# Patient Record
Sex: FEMALE | Race: WHITE | NOT HISPANIC OR LATINO | Employment: OTHER | URBAN - METROPOLITAN AREA
[De-identification: names, ages, dates, MRNs, and addresses within clinical notes are randomized per-mention and may not be internally consistent; named-entity substitution may affect disease eponyms.]

---

## 2017-01-12 ENCOUNTER — OFFICE VISIT (OUTPATIENT)
Dept: URGENT CARE | Facility: RETAIL CLINIC | Age: 67
End: 2017-01-12
Payer: OTHER GOVERNMENT

## 2017-01-12 VITALS
SYSTOLIC BLOOD PRESSURE: 173 MMHG | TEMPERATURE: 98.8 F | OXYGEN SATURATION: 97 % | DIASTOLIC BLOOD PRESSURE: 91 MMHG | HEART RATE: 62 BPM

## 2017-01-12 DIAGNOSIS — J01.90 ACUTE SINUSITIS WITH SYMPTOMS > 10 DAYS: Primary | ICD-10-CM

## 2017-01-12 PROCEDURE — 99213 OFFICE O/P EST LOW 20 MIN: CPT | Performed by: PHYSICIAN ASSISTANT

## 2017-01-12 RX ORDER — DOXYCYCLINE HYCLATE 100 MG
100 TABLET ORAL 2 TIMES DAILY
Qty: 14 TABLET | Refills: 0 | Status: SHIPPED | OUTPATIENT
Start: 2017-01-12 | End: 2017-01-19

## 2017-01-12 NOTE — PATIENT INSTRUCTIONS
Doxycycline twice daily for 7 days.  Take Tylenol or an NSAID such as ibuprofen or naproxen as needed for pain.  May use netti pot with bottled or distilled water and saline packets to flush sinuses.  Afrin (oxymetazoline) nasal spray twice daily for 3 days. Stop after 3 days.  Mucinex (guiafenesin) thins mucus and may help it to loosen more quickly  Saline drops or nasal sprays may loosen mucus.  Sit in the bathroom with the door closed and hot shower running to loosen mucus.  Contact primary care clinic if you do not have any relief from your symptoms after 10 days.  Present to emergency room for significantly increasing pain, persistent high fever >102F, swelling/redness around your eyes, changes in your vision or ability to move your eyes, altered mental status or a severe headache.

## 2017-01-12 NOTE — MR AVS SNAPSHOT
"              After Visit Summary   1/12/2017    Sri Grewal    MRN: [id number]           Patient Information     Date Of Birth          1950        Visit Information        Provider Department      1/12/2017 11:10 AM Jordyn Sun PA-C [facility]        Today's Diagnoses     Acute sinusitis with symptoms > 10 days    -  1       Care Instructions    Doxycycline twice daily for 7 days.  Take Tylenol or an NSAID such as ibuprofen or naproxen as needed for pain.  May use netti pot with bottled or distilled water and saline packets to flush sinuses.  Afrin (oxymetazoline) nasal spray twice daily for 3 days. Stop after 3 days.  Mucinex (guiafenesin) thins mucus and may help it to loosen more quickly  Saline drops or nasal sprays may loosen mucus.  Sit in the bathroom with the door closed and hot shower running to loosen mucus.  Contact primary care clinic if you do not have any relief from your symptoms after 10 days.  Present to emergency room for significantly increasing pain, persistent high fever >102F, swelling/redness around your eyes, changes in your vision or ability to move your eyes, altered mental status or a severe headache.        Follow-ups after your visit        Who to contact     You can reach your care team any time of the day by calling [phone].  Notification of test results:  If you have an abnormal lab result, we will notify you by phone as soon as possible.         Additional Information About Your Visit        MyChart Information     [facility]ointments[facility] lets you send messages to your doctor, view your test results, renew your prescriptions, schedule appointments and more. To sign up, go to [email or web address] . Click on \"Log in\" on the left side of the screen, which will take you to the Welcome page. Then click on \"Sign up Now\" on the right side of the page.     You will be asked to enter the access code listed below, as well as some personal information. Please follow " the directions to create your username and password.     Your access code is: [email or web address]  Expires: 2017  2:33 PM     Your access code will  in 90 days. If you need help or a new code, please call your [place] clinic or [phone].        Care EveryWhere ID     This is your Care EveryWhere ID. This could be used by other organizations to access your [place] medical records  [id number]        Your Vitals Were     Pulse Temperature Pulse Oximetry Last Period          62 98.8  F (37.1  C) (Oral) 97% 2005         Blood Pressure from Last 3 Encounters:   17 173/91   16 138/76   16 148/80    Weight from Last 3 Encounters:   16 166 lb (75.297 kg)   16 163 lb 3.2 oz (74.027 kg)   16 162 lb 9.6 oz (73.755 kg)              Today, you had the following     No orders found for display         Today's Medication Changes          These changes are accurate as of: 17 11:40 AM.  If you have any questions, ask your nurse or doctor.               Start taking these medicines.        Dose/Directions    doxycycline 100 MG tablet   Commonly known as:  VIBRA-TABS   Used for:  Acute sinusitis with symptoms > 10 days        Dose:  100 mg   Take 1 tablet (100 mg) by mouth 2 times daily for 7 days   Quantity:  14 tablet   Refills:  0            Where to get your medicines      These medications were sent to [facility] [address]  [address] [facility] [address]     Phone:  [phone]    - doxycycline 100 MG tablet             Primary Care Provider Office Phone # Fax #    Rosenda Pierre PA-C [phone]       [facility] [address] [facility] [facility] [address]  [facility] [address]        Thank you!     Thank you for choosing [facility]  for your care. Our goal is always to provide you with excellent care. Hearing back from our patients is one way we can continue to improve our services.  Please take a few minutes to complete the written survey that you may receive in the mail after your visit with us. Thank you!             Your Updated Medication List - Protect others around you: Learn how to safely use, store and throw away your medicines at www.disposemymeds.org.          This list is accurate as of: 1/12/17 11:40 AM.  Always use your most recent med list.                   Brand Name Dispense Instructions for use    albuterol 108 (90 BASE) MCG/ACT Inhaler    albuterol    2 Inhaler    Inhale 1-2 puffs into the lungs every 6 hours as needed for shortness of breath / dyspnea       ALLEGRA PO          amoxicillin-clavulanate 875-125 MG per tablet    AUGMENTIN    20 tablet    Take 1 tablet by mouth 2 times daily       aspirin 81 MG tablet          ONE DAILY       butalbital-aspirin-caffeine -40 MG per capsule    FIORINAL    30 capsule    TAKE ONE CAPSULE BY MOUTH EVERY 6 HOURS AS NEEDED (one month supply)       CENTURY-LUTEIN Tabs     100 tablet    Take 1 tablet by mouth daily.       doxycycline 100 MG tablet    VIBRA-TABS    14 tablet    Take 1 tablet (100 mg) by mouth 2 times daily for 7 days       IBUPROFEN PO          propranolol 80 MG 24 hr capsule    INDERAL LA    180 capsule    Take 2 capsules (160 mg) by mouth daily       pseudoePHEDrine 120 MG 12 hr tablet    SUDAFED    28 tablet    Take 1 tablet (120 mg) by mouth every 12 hours       topiramate 25 MG tablet    TOPAMAX    180 tablet    Take 2 tablets (50 mg) by mouth daily       valACYclovir 500 MG tablet    VALTREX    14 tablet    Take 1 tablet (500 mg) by mouth 2 times daily As needed

## 2017-01-12 NOTE — PROGRESS NOTES
Chief Complaint   Patient presents with     Sinus Problem     was treated with amoxicillin 10 weeks ago; improved for a while now worsening     Dizziness     Nasal Congestion     Generalized Body Aches     SUBJECTIVE:  Sri Grewal is a 66 year old female here with concerns about sinus infection. She was seen 2 months ago and given Augmentin and Sudafed. She took these as directed but did not take the last 2 Augmentin pills because she felt better and didn't think she needed them. Symptoms resolved but feel like they're coming back now.   She states onset of symptoms was 2 week(s) ago.    Course of illness is worsening.   Severity moderate  She has had maxillary pressure as well as nasal congestion, cough , headache and post-nasal drainage  Predisposing factors include HX of recurrent sinusitis.   Recent treatment has included: OTC meds    Past Medical History   Diagnosis Date     Migraine, unspecified, without mention of intractable migraine without mention of status migrainosus      Allergic rhinitis, cause unspecified      seasonal allergies     Esophageal reflux      GERD     Abnormal Papanicolaou smear of cervix and cervical HPV      Endometriosis, site unspecified      Unspecified disorder of uterus      fibroid uterus     Contact dermatitis and other eczema, due to unspecified cause      Mild persistent asthma      Current Outpatient Prescriptions   Medication Sig Dispense Refill     topiramate (TOPAMAX) 25 MG tablet Take 2 tablets (50 mg) by mouth daily 180 tablet 1     propranolol (INDERAL LA) 80 MG 24 hr capsule Take 2 capsules (160 mg) by mouth daily 180 capsule 1     pseudoePHEDrine (SUDAFED) 120 MG 12 hr tablet Take 1 tablet (120 mg) by mouth every 12 hours 28 tablet 0     IBUPROFEN PO        Fexofenadine HCl (ALLEGRA PO)        Multiple Vitamins-Minerals (CENTURY-LUTEIN) TABS Take 1 tablet by mouth daily. 100 tablet 12     ASPIRIN 81 MG OR TABS ONE DAILY       butalbital-aspirin-caffeine (FIORINAL)  -40 MG per capsule TAKE ONE CAPSULE BY MOUTH EVERY 6 HOURS AS NEEDED (one month supply) 30 capsule 5     amoxicillin-clavulanate (AUGMENTIN) 875-125 MG per tablet Take 1 tablet by mouth 2 times daily 20 tablet 0     albuterol (ALBUTEROL) 108 (90 BASE) MCG/ACT inhaler Inhale 1-2 puffs into the lungs every 6 hours as needed for shortness of breath / dyspnea 2 Inhaler 5     valACYclovir (VALTREX) 500 MG tablet Take 1 tablet (500 mg) by mouth 2 times daily As needed 14 tablet 5     Social History   Substance Use Topics     Smoking status: Never Smoker      Smokeless tobacco: Never Used     Alcohol Use: Yes      Comment: beerx2/x1 per month     Allergies   Allergen Reactions     Codeine      GI UPSET     Percocet [Oxycodone-Acetaminophen] Nausea and Vomiting     Seasonal Allergies      ROS:  Review of systems negative except as stated above.    OBJECTIVE:  /91 mmHg  Pulse 62  Temp(Src) 98.8  F (37.1  C) (Oral)  SpO2 97%  LMP 11/09/2005  GENERAL APPEARANCE: healthy, alert and no distress  EYES: PERRL, conjunctiva clear  HENT: Pain with palpation over frontal and maxillary sinuses. Ear canals normal TMs with mild serous effusions bilaterally. Nasal turbinates edematous and boggy with a blue hue bilaterally. Posterior pharynx is not erythematous.  NECK: supple, nontender, no lymphadenopathy  RESP: lungs clear to auscultation - no rales, rhonchi or wheezes  CV: regular rates and rhythm, normal S1 S2, no murmur noted    ASSESSMENT:    ICD-10-CM    1. Acute sinusitis with symptoms > 10 days J01.90 doxycycline (VIBRA-TABS) 100 MG tablet     PLAN:   Patient Instructions   Doxycycline twice daily for 7 days.  Take Tylenol or an NSAID such as ibuprofen or naproxen as needed for pain.  May use netti pot with bottled or distilled water and saline packets to flush sinuses.  Afrin (oxymetazoline) nasal spray twice daily for 3 days. Stop after 3 days.  Mucinex (guiafenesin) thins mucus and may help it to loosen more  quickly  Saline drops or nasal sprays may loosen mucus.  Sit in the bathroom with the door closed and hot shower running to loosen mucus.  Contact primary care clinic if you do not have any relief from your symptoms after 10 days.  Present to emergency room for significantly increasing pain, persistent high fever >102F, swelling/redness around your eyes, changes in your vision or ability to move your eyes, altered mental status or a severe headache.    Follow up with primary care provider with any problems, questions or concerns or if symptoms worsen or fail to improve. Patient agreed to plan and verbalized understanding.    Tessie Sun PA-C  [facility] [place]

## 2017-01-14 ENCOUNTER — TELEPHONE (OUTPATIENT)
Dept: NURSING | Facility: CLINIC | Age: 67
End: 2017-01-14

## 2017-01-14 NOTE — TELEPHONE ENCOUNTER
"Call Type: Triage Call    Presenting Problem: Patient is calling and has a \"runny nose.\"  Patient has asthma. Pharmacist recommended taking allegra and if  needed something more to take flonase nasal spray for her runny  nose. Triaged for medication questions-Adult/Disposition to provide  health information.  Triage Note:  Guideline Title: Medication Questions - Adult  Recommended Disposition: Provide Health Information  Original Inclination: Wanted to speak with a nurse  Override Disposition:  Intended Action: Follow advice given  Physician Contacted: No  Caller has medication question(s) that was answered with available resources ?  YES  Pregnant and has medication questions regarding prescribed and/or nonprescribed  medication(s) not covered by available resources ? NO  Breastfeeding and has medication questions regarding prescribed and/or  nonprescribed medication(s) not covered by available resources ? NO  Requested information on how to safely dispose of  or unused medications ?  NO  Sign(s) or symptom(s) associated with a diagnosed condition or with a new illness  ? NO  Prescription ordered today and not available at pharmacy putting patient at  clinical risk ? NO  Recurrence of a symptom(s) or illness post prescribed medication treatment AND  provider instructed patient to call if symptom(s) returned. ? NO  Unable to obtain prescribed medication related to available resources AND  situation poses immediate clinical risk ? NO  Pharmacy calling to clarify prescription order. ? NO  Requests refill of prescribed medication that does NOT have a valid refill; lack  of medication may cause clinical risk to patient if not available. ? NO  Has questions about prescribed and/or nonprescribed medications not covered by  available resources ? NO  Pharmacy calling with prescription question; answered per department policy. ? NO  Requests refill of prescribed medication without valid refills OR requests refill  of " prescribed medication with valid refills but does not have prescription number  (no RX container); lack of medication does not put patient at clinical risk ? NO  Physician Instructions:  Care Advice: Medication Storage: - Store medication as directed, for  example, refrigeration.  - Don't store medications in the bathroom medicine  cabinet or in direct sunlight. Humidity, heat and light can affect  medications' potency and safety. - Store all medications out of the reach  of children.  Safe Use of Medications: - Keep a list of your medications, listing both  brand and generic names, the prescribed dosage, common side effects,  recommended action for side effects, when to call their provider, and any  other specific instructions. This medication list should be updated if any  prescriptions change or if new medications are added. Your list should  include nonprescription medications, vitamins and supplements. An online  resource for a medication list is:  http://www.ahrq.gov/patients-consumers/diagnosis-treatment/treatments/safeme  ds/walletform.html or  http://www.ahrq.gov/patients-consumers/diagnosis-treatment/treatments/safeme  ds/yourmeds.pdf     Ask about your medications interaction with other  medications, supplements or foods. - Take the medication list to each  provider visit, especially if seeing more than one doctor. Make note of any  known allergies on this list. - Use your medication exactly as directed.  Any concerns about side effects should be discussed with your pharmacist or  prescribing provider before changing doses or stopping the medication. -  Don't chew or crush tablets or open capsules unless told to do so.  Some  long-acting medications can be absorbed too quickly when chewed or crushed.  Other medications either won't be effective or could cause side effects. -  If you have difficulty swallowing pills, ask your provider or the  pharmacist if the medication is available in liquid form. - For  liquid  medications, only use measuring device that came with it or was provided by  the pharmacy. Household teaspoons and tablespoons can be inaccurate. -  Never take anyone else's medication.

## 2017-01-15 ENCOUNTER — APPOINTMENT (OUTPATIENT)
Dept: CT IMAGING | Facility: CLINIC | Age: 67
End: 2017-01-15
Attending: FAMILY MEDICINE
Payer: OTHER GOVERNMENT

## 2017-01-15 ENCOUNTER — TELEPHONE (OUTPATIENT)
Dept: NURSING | Facility: CLINIC | Age: 67
End: 2017-01-15

## 2017-01-15 ENCOUNTER — HOSPITAL ENCOUNTER (EMERGENCY)
Facility: CLINIC | Age: 67
Discharge: ANOTHER HOSPITAL | End: 2017-01-16
Attending: FAMILY MEDICINE | Admitting: FAMILY MEDICINE
Payer: OTHER GOVERNMENT

## 2017-01-15 DIAGNOSIS — G45.9 TRANSIENT CEREBRAL ISCHEMIA, UNSPECIFIED TYPE: ICD-10-CM

## 2017-01-15 DIAGNOSIS — R51.9 NONINTRACTABLE EPISODIC HEADACHE, UNSPECIFIED HEADACHE TYPE: ICD-10-CM

## 2017-01-15 DIAGNOSIS — I10 SEVERE HYPERTENSION: ICD-10-CM

## 2017-01-15 DIAGNOSIS — I67.1 SACCULAR ANEURYSM: ICD-10-CM

## 2017-01-15 LAB
ANION GAP SERPL CALCULATED.3IONS-SCNC: 11 MMOL/L (ref 3–14)
APTT PPP: 30 SEC (ref 22–37)
BASOPHILS # BLD AUTO: 0.1 10E9/L (ref 0–0.2)
BASOPHILS NFR BLD AUTO: 1.2 %
BUN SERPL-MCNC: 10 MG/DL (ref 7–30)
CALCIUM SERPL-MCNC: 8.3 MG/DL (ref 8.5–10.1)
CHLORIDE SERPL-SCNC: 107 MMOL/L (ref 94–109)
CO2 SERPL-SCNC: 25 MMOL/L (ref 20–32)
CREAT SERPL-MCNC: 0.69 MG/DL (ref 0.52–1.04)
DIFFERENTIAL METHOD BLD: NORMAL
EOSINOPHIL # BLD AUTO: 0.1 10E9/L (ref 0–0.7)
EOSINOPHIL NFR BLD AUTO: 1.8 %
ERYTHROCYTE [DISTWIDTH] IN BLOOD BY AUTOMATED COUNT: 12.5 % (ref 10–15)
GFR SERPL CREATININE-BSD FRML MDRD: 85 ML/MIN/1.7M2
GLUCOSE SERPL-MCNC: 124 MG/DL (ref 70–99)
HCT VFR BLD AUTO: 41 % (ref 35–47)
HGB BLD-MCNC: 14.2 G/DL (ref 11.7–15.7)
IMM GRANULOCYTES # BLD: 0 10E9/L (ref 0–0.4)
IMM GRANULOCYTES NFR BLD: 0.2 %
INR PPP: 0.87 (ref 0.86–1.14)
LYMPHOCYTES # BLD AUTO: 0.9 10E9/L (ref 0.8–5.3)
LYMPHOCYTES NFR BLD AUTO: 20.1 %
MCH RBC QN AUTO: 31.2 PG (ref 26.5–33)
MCHC RBC AUTO-ENTMCNC: 34.6 G/DL (ref 31.5–36.5)
MCV RBC AUTO: 90 FL (ref 78–100)
MONOCYTES # BLD AUTO: 0.8 10E9/L (ref 0–1.3)
MONOCYTES NFR BLD AUTO: 17.8 %
NEUTROPHILS # BLD AUTO: 2.6 10E9/L (ref 1.6–8.3)
NEUTROPHILS NFR BLD AUTO: 58.9 %
PLATELET # BLD AUTO: 212 10E9/L (ref 150–450)
POTASSIUM SERPL-SCNC: 4 MMOL/L (ref 3.4–5.3)
RBC # BLD AUTO: 4.55 10E12/L (ref 3.8–5.2)
SODIUM SERPL-SCNC: 143 MMOL/L (ref 133–144)
TROPONIN I SERPL-MCNC: NORMAL UG/L (ref 0–0.04)
WBC # BLD AUTO: 4.3 10E9/L (ref 4–11)

## 2017-01-15 PROCEDURE — 85025 COMPLETE CBC W/AUTO DIFF WBC: CPT | Performed by: FAMILY MEDICINE

## 2017-01-15 PROCEDURE — 70450 CT HEAD/BRAIN W/O DYE: CPT | Mod: XS

## 2017-01-15 PROCEDURE — 25000125 ZZHC RX 250: Performed by: FAMILY MEDICINE

## 2017-01-15 PROCEDURE — 93005 ELECTROCARDIOGRAM TRACING: CPT

## 2017-01-15 PROCEDURE — 93010 ELECTROCARDIOGRAM REPORT: CPT | Performed by: FAMILY MEDICINE

## 2017-01-15 PROCEDURE — 85730 THROMBOPLASTIN TIME PARTIAL: CPT | Performed by: FAMILY MEDICINE

## 2017-01-15 PROCEDURE — 99285 EMERGENCY DEPT VISIT HI MDM: CPT | Mod: 25

## 2017-01-15 PROCEDURE — 96365 THER/PROPH/DIAG IV INF INIT: CPT

## 2017-01-15 PROCEDURE — 99291 CRITICAL CARE FIRST HOUR: CPT | Mod: 25 | Performed by: FAMILY MEDICINE

## 2017-01-15 PROCEDURE — 84484 ASSAY OF TROPONIN QUANT: CPT | Performed by: FAMILY MEDICINE

## 2017-01-15 PROCEDURE — 25500064 ZZH RX 255 OP 636: Performed by: FAMILY MEDICINE

## 2017-01-15 PROCEDURE — 85610 PROTHROMBIN TIME: CPT | Performed by: FAMILY MEDICINE

## 2017-01-15 PROCEDURE — 70498 CT ANGIOGRAPHY NECK: CPT

## 2017-01-15 PROCEDURE — 80048 BASIC METABOLIC PNL TOTAL CA: CPT | Performed by: FAMILY MEDICINE

## 2017-01-15 PROCEDURE — 96375 TX/PRO/DX INJ NEW DRUG ADDON: CPT

## 2017-01-15 RX ORDER — FENTANYL CITRATE 50 UG/ML
25-50 INJECTION, SOLUTION INTRAMUSCULAR; INTRAVENOUS
Status: DISCONTINUED | OUTPATIENT
Start: 2017-01-15 | End: 2017-01-16 | Stop reason: HOSPADM

## 2017-01-15 RX ORDER — IOPAMIDOL 755 MG/ML
100 INJECTION, SOLUTION INTRAVASCULAR ONCE
Status: COMPLETED | OUTPATIENT
Start: 2017-01-15 | End: 2017-01-15

## 2017-01-15 RX ADMIN — SODIUM CHLORIDE 70 ML: 9 INJECTION, SOLUTION INTRAVENOUS at 22:20

## 2017-01-15 RX ADMIN — NICARDIPINE HYDROCHLORIDE 2.5 MG/HR: 0.2 INJECTION, SOLUTION INTRAVENOUS at 22:57

## 2017-01-15 RX ADMIN — FENTANYL CITRATE 25 MCG: 50 INJECTION, SOLUTION INTRAMUSCULAR; INTRAVENOUS at 23:16

## 2017-01-15 RX ADMIN — IOPAMIDOL 80 ML: 755 INJECTION, SOLUTION INTRAVENOUS at 22:20

## 2017-01-15 ASSESSMENT — ENCOUNTER SYMPTOMS
PALPITATIONS: 0
NUMBNESS: 1
HEADACHES: 1
WEAKNESS: 0
VOMITING: 0
FEVER: 0
RHINORRHEA: 1
NAUSEA: 0
SHORTNESS OF BREATH: 0
CONFUSION: 0
SINUS PRESSURE: 1
DIZZINESS: 1

## 2017-01-15 ASSESSMENT — PAIN DESCRIPTION - DESCRIPTORS: DESCRIPTORS: ACHING

## 2017-01-16 ENCOUNTER — HOSPITAL ENCOUNTER (INPATIENT)
Facility: CLINIC | Age: 67
LOS: 1 days | Discharge: HOME | DRG: 069 | End: 2017-01-16
Attending: PSYCHIATRY & NEUROLOGY | Admitting: PSYCHIATRY & NEUROLOGY
Payer: OTHER GOVERNMENT

## 2017-01-16 ENCOUNTER — APPOINTMENT (OUTPATIENT)
Dept: CARDIOLOGY | Facility: CLINIC | Age: 67
DRG: 069 | End: 2017-01-16
Attending: NURSE PRACTITIONER
Payer: OTHER GOVERNMENT

## 2017-01-16 ENCOUNTER — HOSPITAL ENCOUNTER (OUTPATIENT)
Dept: EDUCATION SERVICES | Facility: CLINIC | Age: 67
DRG: 069 | End: 2017-01-16
Attending: NURSE PRACTITIONER | Admitting: PSYCHIATRY & NEUROLOGY
Payer: OTHER GOVERNMENT

## 2017-01-16 ENCOUNTER — APPOINTMENT (OUTPATIENT)
Dept: MRI IMAGING | Facility: CLINIC | Age: 67
DRG: 069 | End: 2017-01-16
Attending: PSYCHIATRY & NEUROLOGY
Payer: OTHER GOVERNMENT

## 2017-01-16 VITALS
HEIGHT: 64 IN | OXYGEN SATURATION: 97 % | DIASTOLIC BLOOD PRESSURE: 97 MMHG | TEMPERATURE: 98 F | WEIGHT: 163.8 LBS | BODY MASS INDEX: 27.96 KG/M2 | RESPIRATION RATE: 20 BRPM | SYSTOLIC BLOOD PRESSURE: 145 MMHG

## 2017-01-16 VITALS
BODY MASS INDEX: 28.35 KG/M2 | HEIGHT: 63 IN | DIASTOLIC BLOOD PRESSURE: 91 MMHG | RESPIRATION RATE: 15 BRPM | OXYGEN SATURATION: 93 % | WEIGHT: 160 LBS | TEMPERATURE: 99.4 F | HEART RATE: 100 BPM | SYSTOLIC BLOOD PRESSURE: 159 MMHG

## 2017-01-16 DIAGNOSIS — I10 ESSENTIAL HYPERTENSION: Primary | ICD-10-CM

## 2017-01-16 DIAGNOSIS — G45.3 AMAUROSIS FUGAX: ICD-10-CM

## 2017-01-16 PROBLEM — G45.9 TIA (TRANSIENT ISCHEMIC ATTACK): Status: ACTIVE | Noted: 2017-01-16

## 2017-01-16 LAB
ACETAMINOPHEN QUAL: NEGATIVE
ALBUMIN UR-MCNC: NEGATIVE MG/DL
AMANTADINE: NEGATIVE
AMITRIPTYLINE QUAL: NEGATIVE
AMOXAPINE: NEGATIVE
AMPHETAMINES QUAL: NEGATIVE
APPEARANCE UR: CLEAR
ATROPINE: ABNORMAL
BENZODIAZ UR QL: ABNORMAL
BILIRUB UR QL STRIP: NEGATIVE
CAFFEINE QUAL: POSITIVE
CANNABINOIDS UR QL SCN: ABNORMAL
CARBAMAZEPINE QUAL: NEGATIVE
CHLORPHENIRAMINE: NEGATIVE
CHLORPROMAZINE: NEGATIVE
CITALOPRAM QUAL: NEGATIVE
CLOMIPRAMINE QUAL: NEGATIVE
COCAINE QUAL: NEGATIVE
COCAINE UR QL: ABNORMAL
CODEINE QUAL: NEGATIVE
COLOR UR AUTO: ABNORMAL
CREAT SERPL-MCNC: 0.52 MG/DL (ref 0.52–1.04)
DESIPRAMINE QUAL: NEGATIVE
DEXTROMETHORPHAN: NEGATIVE
DIPHENHYDRAMINE: POSITIVE
DOXEPIN/METABOLITE: NEGATIVE
DOXYLAMINE: NEGATIVE
EPHEDRINE OR PSEUDO: NEGATIVE
FENTANYL QUAL: NEGATIVE
FLUOXETINE AND METAB: NEGATIVE
GFR SERPL CREATININE-BSD FRML MDRD: NORMAL ML/MIN/1.7M2
GLUCOSE UR STRIP-MCNC: NEGATIVE MG/DL
HBA1C MFR BLD: 5.4 % (ref 4.3–6)
HGB UR QL STRIP: NEGATIVE
HYDROCODONE QUAL: NEGATIVE
HYDROMORPHONE QUAL: NEGATIVE
IBUPROFEN QUAL: NEGATIVE
IMIPRAMINE QUAL: NEGATIVE
KETONES UR STRIP-MCNC: 5 MG/DL
LAMOTRIGINE QUAL: NEGATIVE
LEUKOCYTE ESTERASE UR QL STRIP: NEGATIVE
LOXAPINE: NEGATIVE
MAPROTYLINE: NEGATIVE
MDMA QUAL: NEGATIVE
MEPERIDINE QUAL: NEGATIVE
METHAMPHETAMINE: NEGATIVE
METHODONE QUAL: NEGATIVE
MORPHINE QUAL: NEGATIVE
MRSA DNA SPEC QL NAA+PROBE: NORMAL
NICOTINE: NEGATIVE
NITRATE UR QL: NEGATIVE
NORTRIPTYLINE QUAL: NEGATIVE
OLANZAPINE QUAL: NEGATIVE
OPIATES UR QL SCN: ABNORMAL
OXYCODONE QUAL: NEGATIVE
PENTAZOCINE: NEGATIVE
PH UR STRIP: 6.5 PH (ref 5–7)
PHENCYCLIDINE QUAL: NEGATIVE
PHENMETRAZINE: NEGATIVE
PHENTERMINE: NEGATIVE
PHENYLBUTAZONE: NEGATIVE
PHENYLPROPANOLAMINE: NEGATIVE
PLATELET # BLD AUTO: 184 10E9/L (ref 150–450)
PROPOXPHENE QUAL: NEGATIVE
PROPRANOLOL QUAL: NEGATIVE
PYRILAMINE: NEGATIVE
SALICYLATE QUAL: NEGATIVE
SP GR UR STRIP: 1.03 (ref 1–1.03)
SPECIMEN SOURCE: NORMAL
THEOBROMINE: POSITIVE
TOPIRAMATE QUAL: POSITIVE
TRIMIPRAMINE QUAL: NEGATIVE
TSH SERPL DL<=0.05 MIU/L-ACNC: 0.57 MU/L (ref 0.4–4)
URN SPEC COLLECT METH UR: ABNORMAL
UROBILINOGEN UR STRIP-MCNC: NORMAL MG/DL (ref 0–2)
VENLAFAXINE QUAL: ABNORMAL

## 2017-01-16 PROCEDURE — 93005 ELECTROCARDIOGRAM TRACING: CPT

## 2017-01-16 PROCEDURE — 25000132 ZZH RX MED GY IP 250 OP 250 PS 637: Performed by: PSYCHIATRY & NEUROLOGY

## 2017-01-16 PROCEDURE — 36415 COLL VENOUS BLD VENIPUNCTURE: CPT | Performed by: PSYCHIATRY & NEUROLOGY

## 2017-01-16 PROCEDURE — 93010 ELECTROCARDIOGRAM REPORT: CPT | Performed by: INTERNAL MEDICINE

## 2017-01-16 PROCEDURE — 93306 TTE W/DOPPLER COMPLETE: CPT | Mod: 26 | Performed by: INTERNAL MEDICINE

## 2017-01-16 PROCEDURE — 83036 HEMOGLOBIN GLYCOSYLATED A1C: CPT | Performed by: PSYCHIATRY & NEUROLOGY

## 2017-01-16 PROCEDURE — 85049 AUTOMATED PLATELET COUNT: CPT | Performed by: PSYCHIATRY & NEUROLOGY

## 2017-01-16 PROCEDURE — 80307 DRUG TEST PRSMV CHEM ANLYZR: CPT | Performed by: PSYCHIATRY & NEUROLOGY

## 2017-01-16 PROCEDURE — 40000264 ECHO COMPLETE BUBBLE

## 2017-01-16 PROCEDURE — 93306 TTE W/DOPPLER COMPLETE: CPT

## 2017-01-16 PROCEDURE — 25000125 ZZHC RX 250: Performed by: PSYCHIATRY & NEUROLOGY

## 2017-01-16 PROCEDURE — 25000132 ZZH RX MED GY IP 250 OP 250 PS 637: Performed by: STUDENT IN AN ORGANIZED HEALTH CARE EDUCATION/TRAINING PROGRAM

## 2017-01-16 PROCEDURE — 25000128 H RX IP 250 OP 636: Performed by: PSYCHIATRY & NEUROLOGY

## 2017-01-16 PROCEDURE — 82565 ASSAY OF CREATININE: CPT | Performed by: PSYCHIATRY & NEUROLOGY

## 2017-01-16 PROCEDURE — 87640 STAPH A DNA AMP PROBE: CPT | Performed by: PSYCHIATRY & NEUROLOGY

## 2017-01-16 PROCEDURE — 87641 MR-STAPH DNA AMP PROBE: CPT | Performed by: PSYCHIATRY & NEUROLOGY

## 2017-01-16 PROCEDURE — 84443 ASSAY THYROID STIM HORMONE: CPT | Performed by: PSYCHIATRY & NEUROLOGY

## 2017-01-16 PROCEDURE — 40000358 ZZHCL STATISTIC DRUG SCREEN MULTIPLE (METRO): Performed by: PSYCHIATRY & NEUROLOGY

## 2017-01-16 PROCEDURE — 70551 MRI BRAIN STEM W/O DYE: CPT

## 2017-01-16 PROCEDURE — 20000004 ZZH R&B ICU UMMC

## 2017-01-16 PROCEDURE — 81003 URINALYSIS AUTO W/O SCOPE: CPT | Performed by: PSYCHIATRY & NEUROLOGY

## 2017-01-16 PROCEDURE — 40000141 ZZH STATISTIC PERIPHERAL IV START W/O US GUIDANCE

## 2017-01-16 RX ORDER — LISINOPRIL 10 MG/1
10 TABLET ORAL DAILY
Qty: 30 TABLET | Refills: 3 | Status: SHIPPED
Start: 2017-01-16 | End: 2017-04-06

## 2017-01-16 RX ORDER — ASPIRIN 325 MG
325 TABLET ORAL DAILY
Status: DISCONTINUED | OUTPATIENT
Start: 2017-01-16 | End: 2017-01-16 | Stop reason: HOSPADM

## 2017-01-16 RX ORDER — ALBUTEROL SULFATE 90 UG/1
1-2 AEROSOL, METERED RESPIRATORY (INHALATION) EVERY 6 HOURS PRN
Status: DISCONTINUED | OUTPATIENT
Start: 2017-01-16 | End: 2017-01-16 | Stop reason: HOSPADM

## 2017-01-16 RX ORDER — CLOPIDOGREL BISULFATE 75 MG/1
75 TABLET ORAL DAILY
Status: DISCONTINUED | OUTPATIENT
Start: 2017-01-16 | End: 2017-01-16 | Stop reason: HOSPADM

## 2017-01-16 RX ORDER — ACETAMINOPHEN 325 MG/1
650 TABLET ORAL EVERY 4 HOURS PRN
Status: DISCONTINUED | OUTPATIENT
Start: 2017-01-16 | End: 2017-01-16 | Stop reason: HOSPADM

## 2017-01-16 RX ORDER — LISINOPRIL 10 MG/1
10 TABLET ORAL DAILY
Status: DISCONTINUED | OUTPATIENT
Start: 2017-01-16 | End: 2017-01-16 | Stop reason: HOSPADM

## 2017-01-16 RX ORDER — DOXYCYCLINE 100 MG/1
100 CAPSULE ORAL 2 TIMES DAILY
Status: DISCONTINUED | OUTPATIENT
Start: 2017-01-16 | End: 2017-01-16 | Stop reason: HOSPADM

## 2017-01-16 RX ORDER — CLOPIDOGREL BISULFATE 75 MG/1
75 TABLET ORAL DAILY
Qty: 30 TABLET | Refills: 3 | Status: SHIPPED
Start: 2017-01-16 | End: 2017-04-06

## 2017-01-16 RX ORDER — HYDRALAZINE HYDROCHLORIDE 20 MG/ML
10 INJECTION INTRAMUSCULAR; INTRAVENOUS EVERY 4 HOURS PRN
Status: DISCONTINUED | OUTPATIENT
Start: 2017-01-16 | End: 2017-01-16 | Stop reason: HOSPADM

## 2017-01-16 RX ORDER — SODIUM CHLORIDE 9 MG/ML
INJECTION, SOLUTION INTRAVENOUS
Status: DISCONTINUED
Start: 2017-01-16 | End: 2017-01-16 | Stop reason: HOSPADM

## 2017-01-16 RX ORDER — DOXYCYCLINE 100 MG/1
100 CAPSULE ORAL 2 TIMES DAILY
Status: DISCONTINUED | OUTPATIENT
Start: 2017-01-16 | End: 2017-01-16

## 2017-01-16 RX ORDER — ASPIRIN 81 MG/1
81 TABLET ORAL DAILY
Status: DISCONTINUED | OUTPATIENT
Start: 2017-01-16 | End: 2017-01-16

## 2017-01-16 RX ORDER — ASPIRIN 325 MG
325 TABLET ORAL DAILY
Qty: 180 TABLET | Refills: 1 | Status: SHIPPED
Start: 2017-01-16 | End: 2018-02-06 | Stop reason: ALTCHOICE

## 2017-01-16 RX ORDER — SODIUM CHLORIDE 9 MG/ML
INJECTION, SOLUTION INTRAVENOUS CONTINUOUS
Status: DISCONTINUED | OUTPATIENT
Start: 2017-01-16 | End: 2017-01-16 | Stop reason: HOSPADM

## 2017-01-16 RX ORDER — SCOLOPAMINE TRANSDERMAL SYSTEM 1 MG/1
1 PATCH, EXTENDED RELEASE TRANSDERMAL
Status: DISCONTINUED | OUTPATIENT
Start: 2017-01-16 | End: 2017-01-16 | Stop reason: HOSPADM

## 2017-01-16 RX ORDER — NALOXONE HYDROCHLORIDE 0.4 MG/ML
.1-.4 INJECTION, SOLUTION INTRAMUSCULAR; INTRAVENOUS; SUBCUTANEOUS
Status: DISCONTINUED | OUTPATIENT
Start: 2017-01-16 | End: 2017-01-16 | Stop reason: HOSPADM

## 2017-01-16 RX ORDER — LABETALOL HYDROCHLORIDE 5 MG/ML
10-20 INJECTION, SOLUTION INTRAVENOUS EVERY 4 HOURS PRN
Status: DISCONTINUED | OUTPATIENT
Start: 2017-01-16 | End: 2017-01-16 | Stop reason: HOSPADM

## 2017-01-16 RX ORDER — PROPRANOLOL HYDROCHLORIDE 160 MG/1
160 CAPSULE, EXTENDED RELEASE ORAL DAILY
Status: DISCONTINUED | OUTPATIENT
Start: 2017-01-16 | End: 2017-01-16

## 2017-01-16 RX ORDER — VALACYCLOVIR HYDROCHLORIDE 500 MG/1
500 TABLET, FILM COATED ORAL 2 TIMES DAILY PRN
Status: DISCONTINUED | OUTPATIENT
Start: 2017-01-16 | End: 2017-01-16 | Stop reason: HOSPADM

## 2017-01-16 RX ORDER — TOPIRAMATE 50 MG/1
50 TABLET, FILM COATED ORAL DAILY
Status: DISCONTINUED | OUTPATIENT
Start: 2017-01-16 | End: 2017-01-16 | Stop reason: HOSPADM

## 2017-01-16 RX ORDER — AMLODIPINE BESYLATE 5 MG/1
5 TABLET ORAL DAILY
Status: DISCONTINUED | OUTPATIENT
Start: 2017-01-16 | End: 2017-01-16

## 2017-01-16 RX ADMIN — AMLODIPINE BESYLATE 5 MG: 5 TABLET ORAL at 08:19

## 2017-01-16 RX ADMIN — Medication 2.5 MG/HR: at 02:10

## 2017-01-16 RX ADMIN — ACETAMINOPHEN 650 MG: 325 TABLET, FILM COATED ORAL at 08:19

## 2017-01-16 RX ADMIN — SODIUM CHLORIDE: 9 INJECTION, SOLUTION INTRAVENOUS at 08:20

## 2017-01-16 RX ADMIN — CLOPIDOGREL BISULFATE 75 MG: 75 TABLET, FILM COATED ORAL at 15:20

## 2017-01-16 RX ADMIN — PROPRANOLOL HYDROCHLORIDE 160 MG: 160 CAPSULE, EXTENDED RELEASE ORAL at 08:19

## 2017-01-16 RX ADMIN — ASPIRIN 325 MG ORAL TABLET 325 MG: 325 PILL ORAL at 12:34

## 2017-01-16 RX ADMIN — DOXYCYCLINE HYCLATE 100 MG: 100 CAPSULE, GELATIN COATED ORAL at 08:19

## 2017-01-16 RX ADMIN — LISINOPRIL 10 MG: 10 TABLET ORAL at 12:34

## 2017-01-16 RX ADMIN — SCOPALAMINE 1 PATCH: 1 PATCH, EXTENDED RELEASE TRANSDERMAL at 09:46

## 2017-01-16 RX ADMIN — TOPIRAMATE 50 MG: 50 TABLET ORAL at 09:38

## 2017-01-16 RX ADMIN — ALBUTEROL SULFATE 2 PUFF: 90 AEROSOL, METERED RESPIRATORY (INHALATION) at 06:37

## 2017-01-16 RX ADMIN — ACETAMINOPHEN 650 MG: 325 TABLET, FILM COATED ORAL at 03:11

## 2017-01-16 RX ADMIN — LABETALOL HYDROCHLORIDE 20 MG: 5 INJECTION, SOLUTION INTRAVENOUS at 11:28

## 2017-01-16 RX ADMIN — ASPIRIN 81 MG: 81 TABLET, COATED ORAL at 08:19

## 2017-01-16 RX ADMIN — ENOXAPARIN SODIUM 40 MG: 40 INJECTION SUBCUTANEOUS at 08:19

## 2017-01-16 ASSESSMENT — VISUAL ACUITY
OU: NORMAL ACUITY;BASELINE
OU: BLURRED VISION
OU: GLASSES

## 2017-01-16 ASSESSMENT — ACTIVITIES OF DAILY LIVING (ADL)
RETIRED_COMMUNICATION: 0-->UNDERSTANDS/COMMUNICATES WITHOUT DIFFICULTY
DRESS: 0-->INDEPENDENT
TOILETING: 0-->INDEPENDENT
COGNITION: 0 - NO COGNITION ISSUES REPORTED
TRANSFERRING: 0-->INDEPENDENT
FALL_HISTORY_WITHIN_LAST_SIX_MONTHS: NO
AMBULATION: 0-->INDEPENDENT
BATHING: 0-->INDEPENDENT
SWALLOWING: 0-->SWALLOWS FOODS/LIQUIDS WITHOUT DIFFICULTY
RETIRED_EATING: 0-->INDEPENDENT

## 2017-01-16 ASSESSMENT — PAIN DESCRIPTION - DESCRIPTORS
DESCRIPTORS: HEADACHE;CONSTANT
DESCRIPTORS: HEADACHE

## 2017-01-16 NOTE — PLAN OF CARE
Patient arrived at 01:15 for [facility] via stretcher with 3 attendants. Patient transferred herself from stretcher to bed SBA. Alert and oriented X 4. Neuro assessment intact except for HA. See flow sheets for further assessment.

## 2017-01-16 NOTE — CONSULTS
OPHTHALMOLOGY CONSULT NOTE  2017, 1:20 PM    Patient: Sri Grewal  Consulted by: Dr. Iftikhar Pina  Reason for consult: TIA    HISTORY OF PRESENTING ILLNESS:     Patient is a 66 year old year old female hx of hypertension who presents noting sudden onset left arm weakness and decreased vision which she described as a sudden line in her line of sight in her left eye. Patient notes that the line lasted for 10 minutes and then resolved spontaneously. She reports that her left arm numbness resolved spontaneously over a similar time period. She has not had any other similar episodes. The Ophthalmology service was asked to evaluate the patient due to concerns for TIA.     Patient denies: floaters/flashes    Review of systems were otherwise negative except for that which has been stated above.      OCULAR/MEDICAL/SURGICAL HISTORIES:     Past Ocular History:  Refractive error  S/p LASIK OU    Past Medical History   Diagnosis Date     Migraine, unspecified, without mention of intractable migraine without mention of status migrainosus      Allergic rhinitis, cause unspecified      seasonal allergies     Esophageal reflux      GERD     Abnormal Papanicolaou smear of cervix and cervical HPV      Endometriosis, site unspecified      Unspecified disorder of uterus      fibroid uterus     Contact dermatitis and other eczema, due to unspecified cause      Mild persistent asthma        Past Surgical History   Procedure Laterality Date     C  delivery only        X2     Hc colonoscopy thru stoma, diagnostic  2002     normal     Colonoscopy  2014     Procedure: COLONOSCOPY;  Surgeon: Nathan Baker MD;  Location:  GI         CURRENT MEDICATIONS:     Current Facility-Administered Medications   Medication     topiramate (TOPAMAX) tablet 50 mg     valACYclovir (VALTREX) tablet 500 mg     naloxone (NARCAN) injection 0.1-0.4 mg     enoxaparin (LOVENOX) injection 40 mg     acetaminophen (TYLENOL) tablet  650 mg     melatonin tablet 1 mg     scopolamine (TRANSDERM) 1 MG/3DAYS 72 hr patch 1 patch    And     scopolamine (TRANSDERM-SCOP) Patch in Place    And     [START ON 1/19/2017] scopolamine (TRANSDERM-SCOP) patch REMOVAL     albuterol (PROAIR HFA/PROVENTIL HFA/VENTOLIN HFA) Inhaler 1-2 puff     labetalol (NORMODYNE/TRANDATE) injection 10-20 mg     0.9% sodium chloride infusion     lisinopril (PRINIVIL/ZESTRIL) tablet 10 mg     hydrALAZINE (APRESOLINE) injection 10 mg     aspirin tablet 325 mg     doxycycline (VIBRAMYCIN) capsule 100 mg         EXAMINATION:     VAcc: RE 20/20 , LE 20/20. (Near card 3')  Motility: Full  Confrontational Visual Field: Full   Pupils: Equal and reactive to light and accomodation with no afferent pupillary defect.  IOP RE  10 LE 10 by tonopen (<5% error).       External/Slit Lamp exam   RIGHT   Lids/lashes: No abnormality   Conj/Sclera: White and quiet   Cornea:  Clear   Ant Chamber:  Deep and quiet   Iris: round and reactive   Lens: 1+ NS  LEFT   Lids/lashes: No abnormality   Conj/Sclera: White and quiet   Cornea:  Clear   Ant Chamber:  Deep and quiet   Iris: round and reactive   Lens: 1+ NS    Dilated Fundus Exam  Eyes Dilated? Yes  Time: 12:10 With Phenylephrine 2.5% and Mydriacyl 1%   (Normally, dilation with the above lasts about 4-6 hours)  RIGHT:   Anterior vitreous: clear   Media: clear   Optic nerve: normal contours and size   Cup to Disc Ratio: 0.2   Macula: flat and no pigment abnormality   Vessels: normal caliber and distribution   Retinal Periphery: no holes or tears identified  LEFT:   Anterior vitreous: clear   Media: clear   Optic nerve: normal contours and size   Cup to Disc Ratio: 0.2   Macula: flat and no pigment abnormality   Vessels: normal caliber and distribution   Retinal Periphery: no holes or tears identified    ASSESSMENT/PLAN:     Ophthalmology was consulted to evaluate Sri Grewal, who is a 66 year old female presenting with concerns for TIA.    1. Transient  Ischemic Attack:  Patient without evidence of current ocular or retinal pathology   -Recommend continuing workup with neurology for etiology of TIA  -Recommend continuing annual eye exams with her local ophthalmologist    Please contact us with any further questions or concerns.      Elías Bergman MD  Ophthalmology, PGY2  Pager [phone]    Tracey Rodriguez MD  Comprehensive Ophthalmology & Ocular Pathology  Department of Ophthalmology and Visual Neurosciences  [email or web address]  Pager [phone]

## 2017-01-16 NOTE — TELEPHONE ENCOUNTER
Call Type: Triage Call    Presenting Problem: Sri says she was diagnosed with a sinus  infection and was prescribed abx. Augmentin started 3 days ago. She  says she feels dizzy, started about 3 days ago, off and on. She says  she could not drive she is so dizzy. She also says, when asked, that  her left arm feels numb, off and on, for the last 3 days. She says  her son will drive her to the ER.  Triage Note:  Guideline Title: Dizziness or Vertigo  Recommended Disposition: Activate   Original Inclination: Wanted to speak with a nurse  Override Disposition:  Intended Action: Go to Hospital / ED  Physician Contacted: No  New numbness, weakness or paralysis involving face, arm or leg, especially on same  side of body, loss of balance or coordination, confusion or trouble speaking  occurring now or within last 8 hours ?  YES  Unconscious now ? NO  Severe breathing problems ? NO  New or worsening signs and symptoms that may indicate shock ? NO  Chest discomfort associated with shortness of breath, sweating, odd heartbeats or  different heart rate, nausea, vomiting, lightheadedness, or fainting lasting 5 or  more minutes now or within the last hour ? NO  Chest pain spreading to the shoulders, neck, jaw, in one or both arms, stomach or  back lasting 5 or more minutes now or within the last hour. Pain is NOT  associated with taking a deep breath or a productive cough, movement, or touch to  a localized area. ? NO  Pressure, fullness, squeezing sensation or pain anywhere in the chest lasting 5 or  more minutes now or within the last hour. Pain is NOT associated with taking a  deep breath or a productive cough, movement, or touch to a localized area on the  chest. ? NO  Physician Instructions:  Care Advice: Spinal Immobilization:    - If head, neck, or spinal injury or  disease is known, or suspected, tell the patient not to move.   - Do not  move the person unless there is an immediate threat to their life.  -  If  moving becomes absolutely necessary, immobilize head, neck and spine. -  Then move the patient's head, neck and body as a single unit to prevent or  not worsen spinal cord injury.  - If vomiting, immobilize, then roll  patient to side with head, neck and body as a single unit. Do not turn or  move head or neck.  An adult should stay with the patient, preferably one trained in CPR. If  the person is not trained in CPR, then he or she should provide hands-only  (compression-only) CPR as recommended by the American [employer] Association.

## 2017-01-16 NOTE — PLAN OF CARE
Problem: Goal Outcome Summary  Goal: Goal Outcome Summary  Outcome: Improving  S:  Pt w/ one episode of visual changes and decreased dexterity LUE at 0730.  B:  At start of shift pt noted gray line in vision from left eye and decreased dexterity in L hand.  Neurocrit mds notified and in to assess..  Symptoms fully resolved by midmorning.  During this time pt had MRI of brain.  Pt also has had bitemporal HA medicate dw/ tylenol w/ some decrease in pain.  No nausea.  Gait steady when OOB to bathroom.      Started on norvasc and given propranolol------able to DC nicardipine gtt after one time dose of labetolol all to keep SBP < 160.  .  Later in shift lisinopril started.         Harsh nonproductive cough.  Sp02 wnl on RA.  Afebrile         Opthalmology consult completed in room, as well as cardiac echo.  A:  As noted.  R:  Pt to possibly be dC to home after MDs review all test results.

## 2017-01-16 NOTE — ED NOTES
"Being treated for sinus infection for last 3 days, has been having intermittent dizziness since November.  C/o numbness to left upper arm and left hand intermittently but happening more often recently.  Left sided vision is blurry.  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

## 2017-01-16 NOTE — ED PROVIDER NOTES
"  History     Chief Complaint   Patient presents with     Numbness     Sinusitis     The history is provided by the patient.     Sri Grewal is a 66 year old female who presents to the ED with sinuses and numbness. Patient saw her primary PA Ambreen Pierre on 11/22/16, 2 months ago and was diagnosed with a sinus infection.  At that time her equilibrium was off and she felt dizzy and would list to the left.  She was started on an Augmentin but did not take the full course because she felt better after 4-5 days.  She did fine until around Kristy.    Around [place] she started having similar symptoms of her sinus infection including a runny nose, sinus pressure and her equilibrium being off. She then started having dizzy episodes and [facility] and was diagnosed with acute sinusitis and was started on doxycycline.     In the last few days she has had intermittent episodes of numbness to left arm, noting her arm will go completely numb and she had difficulty with coordination. Associated with \"foggy\" vision to left eye.     She has had a few episodes today, lasting 15 minutes. She also mentions she gets headaches \"always.\"  Has had approximately 6 episodes of this in the last couple of days, 2 or 3 today.  Symptoms seem to be stuttering, and she is currently symptom-free, other than a headache which is not unusual for her.     Sri has a history of an aneurysm that \"they are watching.\"  She is followed at [facility].  Had been having scans of her year but now is stretched out every 2 years because it has been stable.        Past Medical History   Diagnosis Date     Migraine, unspecified, without mention of intractable migraine without mention of status migrainosus      Allergic rhinitis, cause unspecified      seasonal allergies     Esophageal reflux      GERD     Abnormal Papanicolaou smear of cervix and cervical HPV      Endometriosis, site unspecified      Unspecified " disorder of uterus      fibroid uterus     Contact dermatitis and other eczema, due to unspecified cause      Mild persistent asthma        Past Surgical History   Procedure Laterality Date     C  delivery only        X2     Hc colonoscopy thru stoma, diagnostic  2002     normal     Colonoscopy  2014     Procedure: COLONOSCOPY;  Surgeon: Nathan Baker MD;  Location:  GI       Social History     Social History     Marital Status:      Spouse Name: N/A     Number of Children: 2     Years of Education: N/A     Occupational History     retired            Social History Main Topics     Smoking status: Never Smoker      Smokeless tobacco: Never Used     Alcohol Use: Yes      Comment: beerx2/x1 per month     Drug Use: No     Sexual Activity:     Partners: Male     Birth Control/ Protection: Surgical      Comment: tubal ligation     Other Topics Concern      Service No     Blood Transfusions No     Caffeine Concern No     Occupational Exposure No     Hobby Hazards No     Sleep Concern Yes     Insomnia     Stress Concern Yes     breathing,  passed away      Weight Concern Yes     would like to lose some weight     Special Diet No     Back Care Yes     Exercise Yes     walking at work daily     Seat Belt Yes     Self-Exams Yes     periodically     Social History Narrative          Allergies   Allergen Reactions     Codeine      GI UPSET     Percocet [Oxycodone-Acetaminophen] Nausea and Vomiting     Seasonal Allergies        Med List Reviewed    I have reviewed the Medications, Allergies, Past Medical and Surgical History, and Social History in the Epic system.    Review of Systems   Constitutional: Negative for fever.   HENT: Positive for congestion, rhinorrhea and sinus pressure.    Eyes: Positive for visual disturbance (left eye blurry vision).   Respiratory: Negative for shortness of breath.    Cardiovascular: Negative for chest pain, palpitations and leg swelling.  "  Gastrointestinal: Negative for nausea and vomiting.   Skin: Negative for rash.   Neurological: Positive for dizziness, numbness (left arm) and headaches. Negative for weakness.        Positive for her \"equilibrium\" being off.   Psychiatric/Behavioral: Negative for confusion.   All other systems reviewed and are negative.      Physical Exam   BP: (!) 201/107 mmHg  Pulse: 100  Temp: 99.4  F (37.4  C)  Resp: 15  Height: 160 cm (5' 3\")  Weight: 72.576 kg (160 lb)  SpO2: 97 %  Physical Exam   Constitutional: She is oriented to person, place, and time. She appears well-developed and well-nourished. No distress.   HENT:   Head: Normocephalic.   Right Ear: External ear normal.   Left Ear: External ear normal.   Mouth/Throat: Oropharynx is clear and moist.   Eyes: EOM are normal. Pupils are equal, round, and reactive to light.   Neck: Neck supple.   Cardiovascular: Normal rate, regular rhythm and normal heart sounds.    No murmur heard.  Pulmonary/Chest: Effort normal and breath sounds normal. No respiratory distress.   Abdominal: Soft. There is no tenderness.   Musculoskeletal: Normal range of motion. She exhibits no edema or tenderness.   Neurological: She is alert and oriented to person, place, and time. She has normal strength. No cranial nerve deficit or sensory deficit. Coordination and gait normal. GCS eye subscore is 4. GCS verbal subscore is 5. GCS motor subscore is 6.   Finger-nose-finger and heel-knee-shin are normal tonight.   Skin: Skin is warm and dry. No rash noted.   Psychiatric: She has a normal mood and affect.       ED Course    I'm concerned about her stuttering symptoms .  Head CT and CTA were ordered .      EKG shows a sinus rhythm at 98 bpm.  No acute ischemic changes.  Left anterior fascicular block with a left axis of -45 .  Left atrial enlargement.      Once everything is back, I will be speaking with the stroke team.      Head CT showed no acute bleed.  CTA is still pending.  I spoke with " Jamel from the stroke team at the [place] [facility] [place] and he agreed that she would benefit from transfer for further evaluation.  Her stuttering symptoms occurring 6 times over the past couple of days is quite concerning.  Her blood pressure has gone up even further and now is 210/120.  Nicardipine drip has been ordered.  We will try to bring her pressure down gradually to around a SBP of 160.      She will be going to the [facility].      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  Otherwise the CT of the head and neck were unremarkable.      She was started on nicardipine drip for blood pressure.  We gave her a little fentanyl for her headache and that actually brought her pressure down to about 160 systolic.  Headache is much improved.  She just now developed a little left facial droop and just slight slurring of her speech.  Still is able to move her face and her tongue protrudes midline.      I spoke with Dr. Mccullough and gave him an update.  Her systolic pressure just dropped down to about 148.  We are going to back off and on nicardipine and let that systolic pressure go back up to around 160 or so.  It may be that she is hypoperfusing a little bit and causing the left facial droop.  Dr. Mccullough is aware and will see her when she arrives.  The ambulance is on its way up from the [facility] area with the pump team and should be here in 15 minutes or so.         Procedures           EKG: Sinus rhythm at 98 BPM.  Left anterior fascicular block with left axis of -45 .  Left atrial enlargement.  No acute ischemic changes.    Results for orders placed or performed during the hospital encounter of 01/15/17 (from the past 24 hour(s))   CBC with platelets differential   Result Value Ref Range    WBC 4.3 4.0 - 11.0 10e9/L    RBC Count 4.55 3.8 - 5.2 10e12/L     Hemoglobin 14.2 11.7 - 15.7 g/dL    Hematocrit 41.0 35.0 - 47.0 %    MCV 90 78 - 100 fl    MCH 31.2 26.5 - 33.0 pg    MCHC 34.6 31.5 - 36.5 g/dL    RDW 12.5 10.0 - 15.0 %    Platelet Count 212 150 - 450 10e9/L    Diff Method Automated Method     % Neutrophils 58.9 %    % Lymphocytes 20.1 %    % Monocytes 17.8 %    % Eosinophils 1.8 %    % Basophils 1.2 %    % Immature Granulocytes 0.2 %    Absolute Neutrophil 2.6 1.6 - 8.3 10e9/L    Absolute Lymphocytes 0.9 0.8 - 5.3 10e9/L    Absolute Monocytes 0.8 0.0 - 1.3 10e9/L    Absolute Eosinophils 0.1 0.0 - 0.7 10e9/L    Absolute Basophils 0.1 0.0 - 0.2 10e9/L    Abs Immature Granulocytes 0.0 0 - 0.4 10e9/L   Basic metabolic panel   Result Value Ref Range    Sodium 143 133 - 144 mmol/L    Potassium 4.0 3.4 - 5.3 mmol/L    Chloride 107 94 - 109 mmol/L    Carbon Dioxide 25 20 - 32 mmol/L    Anion Gap 11 3 - 14 mmol/L    Glucose 124 (H) 70 - 99 mg/dL    Urea Nitrogen 10 7 - 30 mg/dL    Creatinine 0.69 0.52 - 1.04 mg/dL    GFR Estimate 85 >60 mL/min/1.7m2    GFR Estimate If Black >90   GFR Calc   >60 mL/min/1.7m2    Calcium 8.3 (L) 8.5 - 10.1 mg/dL   INR   Result Value Ref Range    INR 0.87 0.86 - 1.14   Partial thromboplastin time   Result Value Ref Range    PTT 30 22 - 37 sec   Troponin I   Result Value Ref Range    Troponin I ES  0.000 - 0.045 ug/L     <0.015  The 99th percentile for upper reference range is 0.045 ug/L.  Troponin values in   the range of 0.045 - 0.120 ug/L may be associated with risks of adverse   clinical events.     CT Head w/o Contrast    Narrative    CT OF THE HEAD WITHOUT CONTRAST 1/15/2017 10:18 PM     COMPARISON: Head CT 11/4/2013.    HISTORY: Intermittent left arm numbness/ataxia, left vision changes,  ataxia, listing to the left, stuttering symptoms over 2 days, OK now.    TECHNIQUE: 5 mm thick axial CT images of the head were acquired  without IV contrast material.    FINDINGS: There is mild diffuse cerebral volume loss. There  are  extensive confluent areas of decreased density in the cerebral white  matter bilaterally that are consistent with sequela of chronic small  vessel ischemic disease.    The ventricles and basal cisterns are within normal limits in  configuration given the degree of cerebral volume loss.  There is no  midline shift. There are no extra-axial fluid collections.    No intracranial hemorrhage, mass or recent infarct.    The visualized paranasal sinuses are well-aerated. There is no  mastoiditis. There are no fractures of the visualized bones.      Impression    IMPRESSION: Diffuse cerebral volume loss and cerebral white matter  changes consistent with chronic small vessel ischemic disease. No  evidence for acute intracranial pathology.      Radiation dose for this scan was reduced using automated exposure  control, adjustment of the mA and/or kV according to patient size, or  iterative reconstruction technique    TORIBIO MELENDREZ MD   CT Head Neck Angio w/o & w Contrast    Narrative    CT ANGIOGRAM OF THE HEAD AND NECK WITHOUT AND WITH CONTRAST  1/15/2017  10:39 PM     COMPARISON: CT angiogram 11/4/2013.    HISTORY: Evaluate for dissection/thromboembolism.  Has known aneurysm  that they are following.    TECHNIQUE:  Precontrast localizing scans were followed by CT  angiography with an injection of 80 mL- Isovue 370 nonionic  intravenous contrast material with scans through the head and neck.   Images were transferred to a separate 3-D workstation where  multiplanar reformations and 3-D images were created.  Estimates of  carotid stenoses are made relative to the distal internal carotid  artery diameters except as noted.      FINDINGS:   Neck CTA: The bilateral common carotid, bilateral cervical internal  carotid and bilateral vertebral arteries remain patent and  unremarkable.    Head CTA: A saccular aneurysm involving all the trifurcation vessels  of the left MCA trifurcation measuring roughly 0.6 x 0.6 x 0.7 cm  in  the greatest AP, transverse and cephalocaudad dimensions respectively  is again noted and may have increased minimally (less than 1 mm in any  dimension) in size since the comparison study. No other aneurysms  identified. The left middle cerebral artery is otherwise patent and  unremarkable. The basilar, bilateral distal internal carotid,  bilateral anterior cerebral, bilateral middle cerebral and bilateral  posterior cerebral arteries are patent and unremarkable. The anterior  communicating and right posterior communicating arteries are patent  and unremarkable.      Impression    IMPRESSION:  1. 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  2. Otherwise, normal neck and head CTA.      Radiation dose for this scan was reduced using automated exposure  control, adjustment of the mA and/or kV according to patient size, or  iterative reconstruction technique    TORIBIO MELENDREZ MD         Critical Care Time:  70 minutes excluding procedures.    Assessments & Plan (with Medical Decision Making)    (G45.9) Transient cerebral ischemia, unspecified type  Comment: Left arm numbness and ataxia with associated left vision fogginess and generalized ataxia, listing to the left.  6 episodes in the last 2 days  Plan: Transfer to the [facility] station [facility]A under the care of Dr. Mccullough from the stroke team for further workup.    (I10) Severe hypertension  Comment:   Plan: Nicardipine drip to gradually bring her pressure down for a systolic pressure of around 160.    (I67.1) Saccular aneurysm  Comment: 0.6 x 0.6 x 0.7cm  Plan: per neuro    (R51) Nonintractable episodic headache, unspecified headache type  Comment:   Plan: May try a little fentanyl for pain.  Bringing her blood pressure down little bit may help as well.            I have reviewed the nursing notes.    I have  reviewed the findings, diagnosis, plan and need for follow up with the patient.    New Prescriptions    No medications on file       Final diagnoses:   Transient cerebral ischemia, unspecified type   Severe hypertension   Saccular aneurysm - 0.6 x 0.6 x 0.7cm   Nonintractable episodic headache, unspecified headache type   This document serves as a record of services personally performed by Trenton Martinez MD. It was created on their behalf by Lizzie Grant, a trained medical scribe. The creation of this record is based on the provider's personal observations and the statements of the patient. This document has been checked and approved by the attending provider.   Note: Chart documentation done in part with Dragon Voice Recognition software. Although reviewed after completion, some word and grammatical errors may remain.        1/15/2017   [facility] EMERGENCY DEPARTMENT      Trenton Martinez MD  01/15/17 5551    Trenton Martinez MD  01/15/17 2086

## 2017-01-16 NOTE — IP AVS SNAPSHOT
"` `     [address] [facility]: [phone]                                              INTERAGENCY TRANSFER FORM - NURSING   2017                    Hospital Admission Date: 2017  PETRA BRUNSON   : 1950  Sex: Female        Attending Provider: Naveen Mccullough MD     Allergies:  Codeine, Percocet, Seasonal Allergies    Infection:  None   Service:  NEUROLOGY    Ht:  1.626 m (5' 4\")   Wt:  74.3 kg (163 lb 12.8 oz)   Admission Wt:  74.3 kg (163 lb 12.8 oz)    BMI:  28.1 kg/m 2   BSA:  1.83 m 2            Patient PCP Information     Provider PCP Type    Rosenda Pierre PA-C General      Current Code Status     Date Active Code Status Order ID Comments User Context       Prior      Code Status History     Date Active Date Inactive Code Status Order ID Comments User Context    2017  2:43 PM  Full Code [id number]  Iftikhar Pina MD Outpatient    2017  1:36 AM 2017  2:43 PM Full Code [id number]  Babs Gong MD Inpatient      Advance Directives        Does patient have a scanned Advance Directive/ACP document in EPIC?           No        Hospital Problems as of 2017              Priority Class Noted POA    TIA (transient ischemic attack) Medium  2017 Yes      Non-Hospital Problems as of 2017              Priority Class Noted    Chronic migraine without aura without status migrainosus, not intractable   Unknown    Other abnormal Papanicolaou smear of cervix and cervical HPV(795.09)   Unknown    Endometriosis   Unknown    Allergic rhinitis   Unknown    Anemia   2002    Chronic peptic ulcer   2003    Mild persistent asthma   2005    Lump or mass in breast   3/29/2010    Atrophy of vagina   2010    Abnormal Pap smear, can't excl hi gd sq intraepithelial lesion (ASC-H)   2010    Moderate major depression (H)   2010    Insomnia   2010    CARDIOVASCULAR SCREENING; LDL GOAL LESS THAN 160   10/31/2010    Health " "[facility] Home [facility]  7/27/2011    Advanced directives, counseling/discussion   7/19/2012    Adjustment disorder with depressed mood   11/8/2012    Middle cerebral artery aneurysm   10/29/2013    Adjustment disorder with mixed anxiety and depressed mood   9/9/2014      Immunizations     Name Date      HERPES ZOSTER 07/19/12     Influenza Vaccine IM 3yrs+ 4 Valent IIV4 defer-01/16/17     Deferral:       TD (ADULT, 7+) 05/30/03     TD (ADULT, 7+) 11/02/95     TDAP (ADACEL AGES 11-64) 07/12/13          END      ASSESSMENT     Discharge Profile Flowsheet     GASTROINTESTINAL (ADULT,PEDIATRIC,OB)     Skin Temperature  warm 01/16/17 0353    GI WDL  WDL 01/16/17 1342   Skin Moisture  dry 01/16/17 0353    Last Bowel Movement  01/15/17 (per patient ) 01/16/17 0244   Skin Elasticity  quick return to original state 01/16/17 0244    COMMUNICATION ASSESSMENT     Skin Integrity  drain/device(s);tattoo(s) 01/16/17 1342    Patient's communication style  spoken language (English or Bilingual) 01/16/17 0149   SAFETY      SKIN     Safety WDL  WDL 01/16/17 1342    Inspection  Partial (identify areas NOT inspected) 01/16/17 1342   Safety Factors  bed in low position;wheels locked;call light in reach;ID band on;upper side rails raised x 2 01/16/17 0353    Skin areas NOT inspected  -- (did not inspect buttocks or perineum) 01/16/17 1342   Safety Equipment  oxygen flowmeter;manual resusciator with appropriate mask;suction equipment 01/16/17 0807    Skin WDL  WDL 01/16/17 1342                      Assessment WDL (Within Defined Limits) Definitions           Safety WDL     Effective: 09/28/15    Row Information: <b>WDL Definition:</b> Bed in low position, wheels locked; call light in reach; upper side rails up x 2; ID band on<br> <font color=\"gray\"><i>Item=AS safety wdl>>List=AS safety wdl>>Version=F14</i></font>      Skin WDL     Effective: 09/28/15    Row Information: <b>WDL Definition:</b> Warm; dry; intact; elastic; without discoloration; " "pressure points without redness<br> <font color=\"gray\"><i>Item=AS skin wdl>>List=AS skin wdl>>Version=F14</i></font>      Vitals     Vital Signs Flowsheet     COMMENTS     Pain Control  partially effective 01/16/17 0327    Comments  Pt in MRI 01/16/17 0859   Functioning  can do everything I need to 01/16/17 0327    VITAL SIGNS     Sleep  awake with pain most of the night 01/16/17 0327    Temp  98  F (36.7  C) 01/16/17 0755   MICHAELLE COMA SCALE      Temp src  Axillary 01/16/17 0755   Best Eye Response  4-->(E4) spontaneous 01/16/17 1342    Resp  20 01/16/17 1454   Best Motor Response  6-->(M6) obeys commands 01/16/17 1342    Pulse  -- 01/16/17 0138   Best Verbal Response  5-->(V5) oriented 01/16/17 1342    Heart Rate  73 01/16/17 1454   Michaelle Coma Scale Score  15 01/16/17 1342    BP  (!) 145/97 mmHg 01/16/17 1317   HEIGHT AND WEIGHT      BP Location  Left arm 01/16/17 0346   Height  1.626 m (5' 4\") 01/16/17 0138    OXYGEN THERAPY     Weight  74.3 kg (163 lb 12.8 oz) 01/16/17 0138    SpO2  97 % 01/16/17 1102   BSA (Calculated - sq m)  1.83 01/16/17 0138    O2 Device  None (Room air) 01/16/17 0954   BMI (Calculated)  28.18 01/16/17 0138    PAIN/COMFORT     POSITIONING      Patient Currently in Pain  sleeping: patient not able to self report 01/16/17 0348   Body Position  independently positioning 01/16/17 0348    Preferred Pain Scale  CAPA (Clinically Aligned Pain Assessment) ([facility] Adults Only) 01/16/17 0327   Head of Bed (HOB)  HOB at 20-30 degrees 01/16/17 1342    0-10 Pain Scale  4 01/16/17 0819   DAILY CARE      Pain Location  -- (\"rainbow from temple to temple\" ) 01/16/17 0142   Activity Type  bedrest with bathroom privileges 01/16/17 0807    Pain Descriptors  Headache 01/16/17 0327   Activity Level of Assistance  assistance, stand-by 01/16/17 0807    Pain Intervention(s)  Medication (See eMAR) 01/16/17 0327   ECG      CLINICALLY ALIGNED PAIN ASSESSMENT (CAPA) ([facility], [facility] " ADULTS ONLY)     ECG Rhythm  Sinus tachycardia 01/16/17 0348    Comfort  intolerable 01/16/17 0327   Ectopy  None 01/16/17 0348    Change in Pain  about the same 01/16/17 0327   Lead Monitored  Lead II;V 1 01/16/17 0348            Patient Lines/Drains/Airways Status    Active LINES/DRAINS/AIRWAYS     Name: Placement date: Placement time: Site: Days: Last dressing change:    Peripheral IV 01/15/17 Right Upper forearm 01/15/17  2210  Upper forearm  less than 1     Peripheral IV 01/16/17 Right;Anterior Lower forearm 01/16/17  0222  Lower forearm  less than 1             Patient Lines/Drains/Airways Status    Active PICC/CVC     **None**            Intake/Output Detail Report     Date Intake     Output Net    Shift P.O. I.V. IV Piggyback Total Urine Total       Genevieve 01/15/17 0700 - 01/15/17 1459 -- -- -- -- -- -- 0    Noc 01/15/17 1500 - 01/15/17 2359 -- -- -- -- -- -- 0    Day 01/16/17 0000 - 01/16/17 0659 240 52.71 -- 292.71 1000 1000 -707.29    Genevieve 01/16/17 0700 - 01/16/17 1459 360 136.71 -- 496.71 1150 1150 -653.29    Noc 01/16/17 1500 - 01/16/17 2359 -- -- -- -- -- -- 0      Case Management/Discharge Planning     Case Management/Discharge Planning Flowsheet     LIVING ENVIRONMENT     ABUSE RISK SCREEN      Lives With  alone 01/16/17 0218   QUESTION TO PATIENT:  Has a member of your family or a partner(now or in the past) intimidated, hurt, manipulated, or controlled you in any way?  no 01/16/17 0150    COPING/STRESS     QUESTION TO PATIENT: Do you feel safe going back to the place where you are living?  yes 01/16/17 0150    Major Change/Loss/Stressor  none 01/16/17 0217   OBSERVATION: Is there reason to believe there has been maltreatment of a vulnerable adult (ie. Physical/Sexual/Emotional abuse, self neglect, lack of adequate food, shelter, medical care, or financial exploitation)?  no 01/16/17 0150    MH/BH CAREGIVER     (R) MENTAL HEALTH SUICIDE RISK      Filed Complexity Screen Score  8 01/16/17 1138   Are you  depressed or being treated for depression?  No 01/16/17 0225

## 2017-01-16 NOTE — IP AVS SNAPSHOT
[address] [address]    Phone:  [phone]                                       After Visit Summary   1/16/2017    Sri Grewal    MRN: [id number]           After Visit Summary Signature Page     I have received my discharge instructions, and my questions have been answered. I have discussed any challenges I see with this plan with the nurse or doctor.    ..........................................................................................................................................  Patient/Patient Representative Signature      ..........................................................................................................................................  Patient Representative Print Name and Relationship to Patient    ..................................................               ................................................  Date                                            Time    ..........................................................................................................................................  Reviewed by Signature/Title    ...................................................              ..............................................  Date                                                            Time

## 2017-01-16 NOTE — H&P
[facility]      Neurology Stroke Admission    Patient Name: Sri Grewal  : 1950 MRN#: [id number]    STROKE DATA     Stroke Code:  Stroke code not indicated.  Time patient seen:  2017 0100  Last known normal (pt's baseline):  1/15/17 unclear     TPA treatment:  Not given due to outside the time window, minor / isolated / quickly resolving stroke symptoms.     National Institutes of Health Stroke Scale (at presentation)  NIHSS done at:  time patient seen      Score   Level of consciousness: (0)   Alert, keenly responsive   LOC questions: (0)   Answers both questions correctly   LOC commands: (0)   Performs both tasks correctly   Best gaze: (0)   Normal   Visual: (0)   No visual loss   Facial palsy: (0)   Normal symmetrical movements   Motor arm (left): (0)   No drift   Motor arm (right): (0)   No drift   Motor leg (left): (0)   No drift   Motor leg (right): (0)   No drift   Limb ataxia: (0)   Absent   Sensory: (0)   Normal- no sensory loss   Best language: (0)   Normal- no aphasia   Dysarthria: (0)   Normal   Extinction and inattention: (0)   No abnormality       NIHSS Total Score: 0        Dysphagia Screen  Time of screenin2017 3:30  Screening results: Passed screening, no dysarthria - Regular Diet with thin liquids     ASSESSMENT & PLAN BY PROBLEM     Work-up for Transient Ischemic Attack   TIA vs stuttering stroke vs PRES vs hypertensive emergency. Symptoms of L arm numb, L side ataxia, L vision change (unclear if monocular or field), lasting about 10 min, resolved by time of admission. ABCD2 score 3. Probably patient has vertigo from ?labrynthitis, then overdosed on over-the-counter meds and became hypertensive.  - Admit to Neurology  - Neurochecks  - Nicardipine gtt to keep SBP around 160  - Euthermia, Euglycemia  - Daily aspirin for secondary stroke prevention  - Statin  - MRI/MRA Stroke Protocol  - TTE with Bubble Study  - Telemetry, EKG  - Bedside Glucose  "Monitoring  - A1c, Lipid Panel, Troponin x 3  - PT/OT/SLP  - PM&R  - Stroke Education  - Depression Screen  - Apnea Screen     Patient was admitted via transfer from [facility] ED.     The patient will be admitted to the Neuro Critical Care/Stroke team..     Other Medical Problems:  HTN: Patient doesn't have a documented hx of HTN, but has been hypertensive (150/90, even 190/95 once) on several occasions to her PCP visits. On propranolol for migraine prophylaxis. At OSH was 180's-215/103-110 before starting nicardipine gtt. BP improving w/ minimal intervention; probably 2/2 meds.  - SBP goal <160  - Continue propranolol to avoid rebound HTN  - Consider adding daily med  - TSH       Migraine: Continue PTA topiramate and propranolol for migraine prevention. PRN tylenol    ?Sinusitis: Was on doxycycline PTA, will complete course.   - No Sudafed  - No OTCs  - Try scopolamine patch for rhinorrhea... Otherwise can try antihistamines.      Fluids/Electrolytes/Nutrition  Regular diet, regular fluids  Avoid hypotonic fluids.    Nutrition:   Active Diet Order  NPO    Prophylaxis            For VTE Prevention:  - heparin SQ    For Acid Suppression:  - GI prophylaxis is not indicated    Code Status  Full Code    HPI  Sri Grewal is a 66 year old female with hx small L MCA aneurysm, migraines and frequent sinusitis who is transferred from [facility] for frequent episodes of L arm numbness, L arm ataxia, and \"foggy\" vision in L eye over past 2 days. Lasts less than 15 minutes. Asymptomatic when she presented to ER. BP there was very elevated to . Her head CT was without aneurysm rupture, no hemorrhage at all but has some faint hypodensities predominantly in the white matter that may be chronic. She was transferred to [facility] for stroke workup.    She was given doxycycline for a diagnosis of sinusitis at an urgent care on 1/12. Symptoms included 2 weeks of maxillary sinus pressure, nasal congestion, cough ,headache. " Then was feeling miserable, fatigued, head pressure, and had vertigo. She also had this vertigo when she had probable sinusitis Dec 24 and in Nov, in Nov it got better with antibiotics. Her left arm numbness, however, was new on .     She has been taking high quantities of tylenol-sinus and alkaseltzer-Sinus.    Pertinent Past Medical/Surgical History  Past Medical History   Diagnosis Date     Migraine, unspecified, without mention of intractable migraine without mention of status migrainosus      Allergic rhinitis, cause unspecified      seasonal allergies     Esophageal reflux      GERD     Abnormal Papanicolaou smear of cervix and cervical HPV      Endometriosis, site unspecified      Unspecified disorder of uterus      fibroid uterus     Contact dermatitis and other eczema, due to unspecified cause      Mild persistent asthma        Past Surgical History   Procedure Laterality Date     C  delivery only        X2     Hc colonoscopy thru stoma, diagnostic  2002     normal     Colonoscopy  2014     Procedure: COLONOSCOPY;  Surgeon: Nathan Baker MD;  Location:  GI       Medications:   Current Facility-Administered Medications   Medication     niCARdipine 40 mg in 200 mL 0.9% NaCl (CARDENE) infusion     fentaNYL Citrate (PF) (SUBLIMAZE) injection 25-50 mcg     Current Outpatient Prescriptions   Medication Sig     Albuterol Sulfate 108 (90 BASE) MCG/ACT AEPB      doxycycline (VIBRA-TABS) 100 MG tablet Take 1 tablet (100 mg) by mouth 2 times daily for 7 days     topiramate (TOPAMAX) 25 MG tablet Take 2 tablets (50 mg) by mouth daily     propranolol (INDERAL LA) 80 MG 24 hr capsule Take 2 capsules (160 mg) by mouth daily     butalbital-aspirin-caffeine (FIORINAL) -40 MG per capsule TAKE ONE CAPSULE BY MOUTH EVERY 6 HOURS AS NEEDED (one month supply)     amoxicillin-clavulanate (AUGMENTIN) 875-125 MG per tablet Take 1 tablet by mouth 2 times daily     pseudoePHEDrine (SUDAFED) 120  MG 12 hr tablet Take 1 tablet (120 mg) by mouth every 12 hours     albuterol (ALBUTEROL) 108 (90 BASE) MCG/ACT inhaler Inhale 1-2 puffs into the lungs every 6 hours as needed for shortness of breath / dyspnea     valACYclovir (VALTREX) 500 MG tablet Take 1 tablet (500 mg) by mouth 2 times daily As needed     IBUPROFEN PO      Fexofenadine HCl (ALLEGRA PO)      Multiple Vitamins-Minerals (CENTURY-LUTEIN) TABS Take 1 tablet by mouth daily.     ASPIRIN 81 MG OR TABS ONE DAILY   .    Allergies:   Allergies   Allergen Reactions     Codeine      GI UPSET     Percocet [Oxycodone-Acetaminophen] Nausea and Vomiting     Seasonal Allergies    .    Family History:   Family History   Problem Relation Age of Onset     CANCER No family hx of      breast     CANCER No family hx of      colon     HEART DISEASE Mother      anemia     OSTEOPOROSIS Mother      Hypertension Mother      Hypertension Father      CEREBROVASCULAR DISEASE Mother      Alcohol/Drug Mother      alcohol     OSTEOPOROSIS Mother      Neurologic Disorder Mother      migraines   .    Social History:   Social History   Substance Use Topics     Smoking status: Never Smoker      Smokeless tobacco: Never Used     Alcohol Use: Yes      Comment: beerx2/x1 per month   .    Tobacco use: Never    ROS:  The 10 point Review of Systems is negative other than noted in the HPI or here. Rhinorrhea    PHYSICAL EXAMINATION  Vital Signs:  B/P: 161/100,  T: 99.4,  P: 100,  R: 43    General:  patient lying in bed without any acute distress    HEENT:  normocephalic/atraumatic  Cardio:  RRR  Pulmonary:  no respiratory distress  Abdomen:  soft, obese  Extremities:  no edema  Skin:  intact     Neurologic  Mental Status:  fully alert, attentive and oriented, follows commands, speech clear and fluent  Cranial Nerves:  visual fields intact, PERRL, EOMI with normal smooth pursuit, facial sensation intact and symmetric, facial movements symmetric, hearing not formally tested but intact to  conversation, palate elevation symmetric and uvula midline, no dysarthria, shoulder shrug strong bilaterally, tongue protrusion midline  Motor:  no abnormal movements, no pronator drift, normal and symmetric rapid finger tapping, able to move all limbs spontaneously, strength 5/5 throughout upper and lower extremities  Reflexes:  2+ and symmetric throughout  Sensory:  intact/symmetric to light touch and pin prick throughout upper and lower extremities  Coordination:  FNF and HS intact without dysmetria      Labs  Labs and Imaging reviewed and used in developing the plan; pertinent results included.     Lab Results   Component Value Date    * 01/15/2017       The patient was discussed with the fellow, Dr. Dsouza.  The staff is Dr. Mccullough.    Babs Gong MD  Neurology PGY3

## 2017-01-16 NOTE — IP AVS SNAPSHOT
MRN:[id number]                      After Visit Summary   1/16/2017    Sri Grewal    MRN: [id number]           Thank you!     Thank you for choosing [facility] for your care. Our goal is always to provide you with excellent care. Hearing back from our patients is one way we can continue to improve our services. Please take a few minutes to complete the written survey that you may receive in the mail after you visit with us. Thank you!        Patient Information     Date Of Birth          1950        About your hospital stay     You were admitted on:  January 16, 2017 You last received care in the:  [address] [facility]    You were discharged on:  January 16, 2017        Reason for your hospital stay       TIA                  Who to Call     For medical emergencies, please call 911.  For non-urgent questions about your medical care, please call your primary care provider or clinic, [phone]          Attending Provider     Provider    Naveen Mccullough MD       Primary Care Provider Office Phone # Fax #    Rosenda Pierre PA-C [phone]       [address]  [facility] [address]         When to contact your care team       Please go the the emergency room if you are experiencing any vision changes, changes in speech, facial droop, weakness, and numbness as these symptoms are concerning for possible stroke.                  After Care Instructions     Activity       Your activity upon discharge: activity as tolerated            Diet       Follow this diet upon discharge: Orders Placed This Encounter  Regular Diet Adult            Discharge Instructions       1) Start aspirin 325mg daily.  2) Start clopidogrel 75mg daily for 3 months then stop.  3) Start lisinopril 10mg daily for hypertension  4) Follow up with PCP to discuss new med changes and recent hospitalization. Will check cholesterol levels as well. Please fast  "night before morning appointment and blood draw.   5) Follow up in stroke clinic in 3 months                  Follow-up Appointments     Follow Up and recommended labs and tests       Follow up with primary care provider, Rosenda Pierre, within 7 days for hospital follow- up and discuss recent med changes.  The following labs/tests are recommended: Lipid panel. Patient instructed to fast night before morning appointment. Also discuss propranolol and inhalers.      Follow up with all other scheduled appointments.    Follow up in stroke clinic in 3 months    Please contact Stroke Clinic- Neurology Clinic at [phone], pick option #1,  if you have not been contacted to schedule a stroke follow up appointment in 5 days of discharge.  If you have other stroke related questions, please call [phone], pick option #3, and ask to speak to Saul Pozo RN Stroke/Endovascular Care Coordinator.  If you have any urgent stroke related questions after hours, please contact the hospital  at [phone] and ask to be connected to a stroke provider.                  Pending Results     Date and Time Order Name Status Description    1/16/2017 1126 Echo Complete Bubble In process     1/16/2017 0136 EKG 12-lead, tracing only Preliminary             Admission Information        Provider Department Dept Phone    1/16/2017 Naveen Mccullough MD [facility] [phone]      Your Vitals Were     Blood Pressure Temperature Respirations    145/97 mmHg 98  F (36.7  C) (Axillary) 20    Height Weight BMI (Body Mass Index)    1.626 m (5' 4\") 74.3 kg (163 lb 12.8 oz) 28.10 kg/m2    Pulse Oximetry Last Period       97% 11/09/2005       [employer] Information     [facility] lets you send messages to your doctor, view your test results, renew your prescriptions, schedule appointments and more. To sign up, go to [email or web address] . Click on \"Log in\" on the left side of the screen, which will take you to the " "Welcome page. Then click on \"Sign up Now\" on the right side of the page.     You will be asked to enter the access code listed below, as well as some personal information. Please follow the directions to create your username and password.     Your access code is: [email or web address]  Expires: 2017  2:33 PM     Your access code will  in 90 days. If you need help or a new code, please call your [place] clinic or [phone].        Care EveryWhere ID     This is your Care EveryWhere ID. This could be used by other organizations to access your [place] medical records  [id number]           Review of your medicines      START taking        Dose / Directions    clopidogrel 75 MG tablet   Commonly known as:  PLAVIX   Used for:  Amaurosis fugax        Dose:  75 mg   Take 1 tablet (75 mg) by mouth daily   Quantity:  30 tablet   Refills:  3       lisinopril 10 MG tablet   Commonly known as:  PRINIVIL/ZESTRIL   Used for:  Essential hypertension        Dose:  10 mg   Take 1 tablet (10 mg) by mouth daily   Quantity:  30 tablet   Refills:  3         CONTINUE these medicines which may have CHANGED, or have new prescriptions. If we are uncertain of the size of tablets/capsules you have at home, strength may be listed as something that might have changed.        Dose / Directions    aspirin 325 MG tablet   This may have changed:  See the new instructions.   Used for:  Amaurosis fugax        Dose:  325 mg   Take 1 tablet (325 mg) by mouth daily   Quantity:  180 tablet   Refills:  1         CONTINUE these medicines which have NOT CHANGED        Dose / Directions    * Albuterol Sulfate 108 (90 BASE) MCG/ACT Aepb        Refills:  0       * albuterol 108 (90 BASE) MCG/ACT Inhaler   Commonly known as:  albuterol   Used for:  Mild persistent asthma, uncomplicated        Dose:  1-2 puff   Inhale 1-2 puffs into the lungs every 6 hours as needed for shortness of breath / dyspnea   Quantity:  2 Inhaler   Refills:  5       ALLEGRA PO    "     Refills:  0       amoxicillin-clavulanate 875-125 MG per tablet   Commonly known as:  AUGMENTIN   Used for:  Acute sinusitis with symptoms > 10 days        Dose:  1 tablet   Take 1 tablet by mouth 2 times daily   Quantity:  20 tablet   Refills:  0       butalbital-aspirin-caffeine -40 MG per capsule   Commonly known as:  FIORINAL   Used for:  Chronic migraine without aura without status migrainosus, not intractable        TAKE ONE CAPSULE BY MOUTH EVERY 6 HOURS AS NEEDED (one month supply)   Quantity:  30 capsule   Refills:  5       CENTURY-LUTEIN Tabs   Used for:  Family history of macular degeneration        Dose:  1 tablet   Take 1 tablet by mouth daily.   Quantity:  100 tablet   Refills:  12       doxycycline 100 MG tablet   Commonly known as:  VIBRA-TABS   Used for:  Acute sinusitis with symptoms > 10 days        Dose:  100 mg   Take 1 tablet (100 mg) by mouth 2 times daily for 7 days   Quantity:  14 tablet   Refills:  0       IBUPROFEN PO        Refills:  0       propranolol 80 MG 24 hr capsule   Commonly known as:  INDERAL LA   Used for:  Chronic migraine without aura without status migrainosus, not intractable        Dose:  160 mg   Take 2 capsules (160 mg) by mouth daily   Quantity:  180 capsule   Refills:  1       pseudoePHEDrine 120 MG 12 hr tablet   Commonly known as:  SUDAFED   Used for:  Acute sinusitis with symptoms > 10 days        Dose:  120 mg   Take 1 tablet (120 mg) by mouth every 12 hours   Quantity:  28 tablet   Refills:  0       topiramate 25 MG tablet   Commonly known as:  TOPAMAX   Used for:  Chronic migraine without aura without status migrainosus, not intractable        Dose:  50 mg   Take 2 tablets (50 mg) by mouth daily   Quantity:  180 tablet   Refills:  1       valACYclovir 500 MG tablet   Commonly known as:  VALTREX   Used for:  Herpes simplex disease        Dose:  500 mg   Take 1 tablet (500 mg) by mouth 2 times daily As needed   Quantity:  14 tablet   Refills:  5       *  Notice:  This list has 2 medication(s) that are the same as other medications prescribed for you. Read the directions carefully, and ask your doctor or other care provider to review them with you.         Where to get your medicines      These medications were sent to [facility] BY MAIL  [address] [address] must have pt , last 4 #, name, address, [place] [address]     Phone:  [phone]    - aspirin 325 MG tablet  - clopidogrel 75 MG tablet  - lisinopril 10 MG tablet             Protect others around you: Learn how to safely use, store and throw away your medicines at www.disposemymeds.org.             Medication List: This is a list of all your medications and when to take them. Check marks below indicate your daily home schedule. Keep this list as a reference.      Medications           Morning Afternoon Evening Bedtime As Needed    * Albuterol Sulfate 108 (90 BASE) MCG/ACT Aepb                                * albuterol 108 (90 BASE) MCG/ACT Inhaler   Commonly known as:  albuterol   Inhale 1-2 puffs into the lungs every 6 hours as needed for shortness of breath / dyspnea   Last time this was given:  2 puffs on 2017  6:37 AM                                ALLEGRA PO                                amoxicillin-clavulanate 875-125 MG per tablet   Commonly known as:  AUGMENTIN   Take 1 tablet by mouth 2 times daily                                aspirin 325 MG tablet   Take 1 tablet (325 mg) by mouth daily   Last time this was given:  325 mg on 2017 12:34 PM                                butalbital-aspirin-caffeine -40 MG per capsule   Commonly known as:  FIORINAL   TAKE ONE CAPSULE BY MOUTH EVERY 6 HOURS AS NEEDED (one month supply)                                CENTURY-LUTEIN Tabs   Take 1 tablet by mouth daily.                                clopidogrel 75 MG tablet   Commonly known as:  PLAVIX   Take 1 tablet (75 mg) by mouth daily                                doxycycline 100 MG tablet   Commonly  known as:  VIBRA-TABS   Take 1 tablet (100 mg) by mouth 2 times daily for 7 days                                IBUPROFEN PO                                lisinopril 10 MG tablet   Commonly known as:  PRINIVIL/ZESTRIL   Take 1 tablet (10 mg) by mouth daily   Last time this was given:  10 mg on 1/16/2017 12:34 PM                                propranolol 80 MG 24 hr capsule   Commonly known as:  INDERAL LA   Take 2 capsules (160 mg) by mouth daily   Last time this was given:  160 mg on 1/16/2017  8:19 AM                                pseudoePHEDrine 120 MG 12 hr tablet   Commonly known as:  SUDAFED   Take 1 tablet (120 mg) by mouth every 12 hours                                topiramate 25 MG tablet   Commonly known as:  TOPAMAX   Take 2 tablets (50 mg) by mouth daily   Last time this was given:  50 mg on 1/16/2017  9:38 AM                                valACYclovir 500 MG tablet   Commonly known as:  VALTREX   Take 1 tablet (500 mg) by mouth 2 times daily As needed                                * Notice:  This list has 2 medication(s) that are the same as other medications prescribed for you. Read the directions carefully, and ask your doctor or other care provider to review them with you.

## 2017-01-16 NOTE — PROVIDER NOTIFICATION
"Notified MD via [facility] that patient is reporting L eye vision changes, \"gray line through to middle\" and that her \"left hand feels funny\".   "

## 2017-01-16 NOTE — PLAN OF CARE
Problem: Goal Outcome Summary  Goal: Goal Outcome Summary  Outcome: No Change  D/I/A: Patient admitted to rule out stroke (symptoms of L arm numbness/ tingling, L eye vision changes, and ataxia resolved before transfer).   Neuro- Patient alert and oriented X 4. PERRLA. No neurological deficits noted. Complained of HA, treated with PRN Tylenol. Effective, patient currently sleeping.   CV- Sinus rhythm/ sinus tach without ectopy. Afebrile. Hypertensive 190s/110s. Nicardipine gtt initiated to keep SBP less than 160.   Resp- Lung sounds clear. Patient on room air.   GI- Patient passed dysphagia screen. Regular diet with thin liquids. Abdomen soft, non-tender. Bowel sounds auscultated X 4. Passing gas. Last BM yesterday per patient.   - Voiding clear yellow urine without difficulty.   P: MRI this AM. Continue to monitor and notify MD of any changes.

## 2017-01-16 NOTE — CONSULTS
Social Work: Assessment with Discharge Plan    Patient Name:  Petra Grewal  :  1950  Age:  66 year old  MRN:  [id number]  Risk/Complexity Score:  Filed Complexity Screen Score: 8  Completed assessment with:  Patient, chart review, RNCC    Presenting Information   Reason for Referral:  Stroke consult  Date of Intake:  2017  Referral Source:  Physician  Decision Maker:  self  Alternate Decision Maker:  Son Felix Grewal ([phone])  Health Care Directive:  Not in chart  Living Situation:  House alone  Previous Functional Status:  Independent and working part time at [employer]  Patient and family understanding of hospitalization:  Restorative   Cultural/Language/Spiritual Considerations:  Oriental orthodox  Adjustment to Illness:  Pt appears to be very happy to be discharging to home today.    Physical Health  Reason for Admission:  No diagnosis found.  Services Needed/Recommended:  Home with no services    Mental Health/Chemical Dependency  Diagnosis:  N/A  Support/Services in Place:  N/A  Services Needed/Recommended:  N/A    Support System  Significant relationship at present time:  N/A  Family of origin is available for support:  Yes, pt's son Felix lives nearby and is very supportive and can assist as needed.  Other support available:  Pt has good friends and denies having gaps in support system.  Gaps in support system:  None  Patient is caregiver to:  Other:  Pt is currently providing care/check-ins to her neighbor who just had knee surgery.    Provider Information   Primary Care Physician:  Rosenda Pierre   [phone]   Clinic:  [address] / [place] [facility]      :  None    Financial   Income Source:  Pt is retired and also works part time at [employer].  She receives assisted benefits and part-time salary.  Financial Concerns:  None  Insurance:    Payor/Plan Subscriber Name Rel Member # Group #   ELVIN/ADITHYA Bennett CHA* PETRA GREWAL   [id number]        [id number]       Discharge Plan   Patient and family discharge goal:  Home  Provided education on discharge plan:  YES  Patient agreeable to discharge plan:  YES  Barriers to discharge:  None    Discharge Recommendations   Anticipated Disposition:  Home, no needs identified  Transportation Needs:  Other:  Pt's friend will provide transportation and pt's son will meet them half-way.    Additional comments   Met with pt to introduce SW role and assess needs.  Pt appears to be quite independent and states she is eager to d/c to home.  Pt denies having any questions, concerns, or needs.    ASIA [place], [facility]  Adult ICU Clinical   Pager [phone]

## 2017-01-16 NOTE — DISCHARGE SUMMARY
[facility], [place]    Neurology Stroke Discharge Summary    Date of Admission: 1/16/2017  Date of Discharge: 01/16/2017    Disposition: Discharged to home  Primary Care Physician: Rosenda Pierre      Admission Diagnosis:   TIA    Discharge Diagnosis:   Transient Ischemic Attack    Problem Leading to Hospitalization (from hospitals):   Left eye visual changes and left arm numbness    Please see H&P dated 1/16/2017 for further details about presentation.    Brief Hospital Course:   Sri Grewal is a 65 yo woman with a history of migraine who is presenting with acute onset left arm numbness, left eye vision changes and left sided ataxia for 10 min likely due to TIA. ABCD2 score 3. She was hypertensive on admission likely due to uncontrolled HTN complicated by overuse of pseudoephedrine for sinusitis. She was treated with a nicardipine drip and BP stabilized. She will be discharged with 10mg lisinopril and close PCP follow up. She is currently on propranolol for migraine prophylaxis and albuterol inhaler as needed.  She will follow up with PCP and discuss alternative options as these two medications have opposing MOA.  Her CT and MRI were unremarkable.  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  She currently follows up with Dr. Bhaskar Lares at [facility] for surveillance of the aneurysm. I discussed with him about dual antiplatelet therapy for 3 months and he is on board with this therapy.  She will discuss further follow up with him for surveillance. She was evaluated by Ophtho who stated vision changes are likely due to TIA.  Results of her echo were unremarkable.  She will follow up with PCP for hospitalization follow up with new meds and lipid panel.  She will also follow up with stroke clinic in 3 months.        Work-up as stated below under Pertinent Investigations.    Etiology is thought to be undetermined at this time      Rehab evaluation: not indicated  "as deficits resolved.     Smoking Cessation: patient is not a smoker    BP Long-term Goal: 140/90 or less    Antithrombotic/Anticoagulant Agent: aspirin 325 mg and clopidogrel (Plavix) 75 mg    Statins: will follow up with PCP for lipid panel       Hgb A1C Goal: < 7.0    Complications: None.     Other problems addressed during this hospitalization:    HTN: Patient doesn't have a documented hx of HTN, but has been hypertensive (150/90, even 190/95 once) on several occasions to her PCP visits. At OSH was 180's-215/103-110 before starting nicardipine gtt. BP now stable off of nicardipine drip. Will start lisinopril 10mg as outpatient with close PCP follow up.  Will also order TSH level with PCP follow up.    Migraine: Continued PTA topiramate and propranolol for migraine prevention. PRN tylenol. Will discuss with PCP about taking propranolol with albuterol inhalers and their mechanism of action opposes each other.    Sinusitis: Continued doxycycline PTA    PERTINENT INVESTIGATIONS    Labs  Lipid Panel:   Recent Labs   Lab Test  07/13/12   0920   CHOL  214*   HDL  94   LDL  111   TRIG  45   CHOLHDLRATIO  2.0     TSH: 0.57  A1C: 5.4  INR:     Recent Labs  Lab 01/15/17  2200   INR 0.87      Coag Panel / Hypercoag Workup: Not indicated  Pending test results: None    Echo: read as \" Global and regional left ventricular function is normal with an EF of 60-65%. Left ventricular wall thickness is normal. Left ventricular size is normal. Normal left ventricular filling for age. No regional wall motion abnormalities are seen. Right ventricular function, chamber size, wall motion, and thickness are normal. Both atria appear normal. The atrial septum is intact as assessed by agitated dextrose bubble study. The mitral valve is normal. Trace mitral insufficiency is present. Aortic valve is normal in structure and function. The tricuspid valve is normal. Trace tricuspid insufficiency is present. Pulmonary artery systolic pressure " "cannot be assessed. The pulmonic valve is normal. Trace pulmonic insufficiency is present. The aorta root is normal. The pulmonary artery is normal. The inferior vena cava is normal. No pericardial effusion is present.\"    Imaging:     CT head w/o contrast: read as \"Diffuse cerebral volume loss and cerebral white matter changes consistent with chronic small vessel ischemic disease. No evidence for acute intracranial pathology.\"    CTA head and neck: read as \"Saccular aneurysm arising from the left MCA trifurcation measuring roughly 0.6 x 0.6 x 0.7 cm in size again noted and possibly minimally increased in size from the comparison study. Otherwise, normal neck and head CTA.    MRI brain: Moderate small vessel ischemic disease, but no acute  infarct or hemorrhage.    Endovascular procedure: None     Cardiac Monitoring: Patient had > 24 hrs of cardiac monitor while in hospital.    Findings: No atrial fibrillation was found.    Sleep Apnea Screen:   Did not screen patient since this was not an ischemic stroke    Sleep Apnea Screen Findings: Was not asked as not an ischemic stroke    PHQ-9 Depression Screen Score: Was not asked as not an ischemic stroke    Stroke Education was provided including: stroke warning signs, EMS activation, medication changes, follow-up instructions/plan, and risk factor management plan.      PHYSICAL EXAMINATION  Vital Signs:  B/P: 149/91, T: 98, P: Data Unavailable, R: 20    General:  Lying in bed and in NAD   HEENT:  AT/NC, MMM  Cardio:  RRR, no murmurs appreciated  Pulmonary:  CTAB, no increased work of breathing  Extremities:  No pedal edema  Skin:  No rashes or lesions    Neurologic  Mental Status:  fully alert, attentive and oriented, follows commands, speech clear and fluent  Cranial Nerves:  visual fields intact, PERRL, EOMI with normal smooth pursuit, facial sensation intact and symmetric, facial movements symmetric, hearing not formally tested but intact to conversation, palate " elevation symmetric and uvula midline, no dysarthria, shoulder shrug strong bilaterally, tongue protrusion midline  Motor:  no abnormal movements, normal tone throughout, normal muscle bulk, no pronator drift, normal and symmetric rapid finger tapping, able to move all limbs spontaneously, strength 5/5 throughout upper and lower extremities  Reflexes:  2+ and symmetric throughout, no clonus, toes downgoing  Sensory:  intact/symmetric to light touch and pin prick throughout upper and lower extremities  Coordination:  FNF and HS intact without dysmetria  Station/Gait:  Deferred    National Institutes of Health Stroke Scale (on day of discharge)  NIHSS Total Score: 0    Modified Keena Scale (on day of discharge): 0-No symptoms    Medications    Discharge Medication List as of 1/16/2017  3:07 PM      START taking these medications    Details   lisinopril (PRINIVIL/ZESTRIL) 10 MG tablet Take 1 tablet (10 mg) by mouth daily, Disp-30 tablet, R-3, Fax      clopidogrel (PLAVIX) 75 MG tablet Take 1 tablet (75 mg) by mouth daily, Disp-30 tablet, R-3, Fax         CONTINUE these medications which have CHANGED    Details   aspirin 325 MG tablet Take 1 tablet (325 mg) by mouth daily, Disp-180 tablet, R-1, Fax         CONTINUE these medications which have NOT CHANGED    Details   Albuterol Sulfate 108 (90 BASE) MCG/ACT AEPB Historical      doxycycline (VIBRA-TABS) 100 MG tablet Take 1 tablet (100 mg) by mouth 2 times daily for 7 days, Disp-14 tablet, R-0, E-Prescribe      topiramate (TOPAMAX) 25 MG tablet Take 2 tablets (50 mg) by mouth daily, Disp-180 tablet, R-1, Fax      propranolol (INDERAL LA) 80 MG 24 hr capsule Take 2 capsules (160 mg) by mouth daily, Disp-180 capsule, R-1, Fax      butalbital-aspirin-caffeine (FIORINAL) -40 MG per capsule TAKE ONE CAPSULE BY MOUTH EVERY 6 HOURS AS NEEDED (one month supply), Disp-30 capsule, R-5, Local Print      amoxicillin-clavulanate (AUGMENTIN) 875-125 MG per tablet Take 1 tablet  by mouth 2 times daily, Disp-20 tablet, R-0, E-Prescribe      pseudoePHEDrine (SUDAFED) 120 MG 12 hr tablet Take 1 tablet (120 mg) by mouth every 12 hours, Disp-28 tablet, R-0, Local Print      albuterol (ALBUTEROL) 108 (90 BASE) MCG/ACT inhaler Inhale 1-2 puffs into the lungs every 6 hours as needed for shortness of breath / dyspnea, Disp-2 Inhaler, R-5, FaxHold until patient needs, update refills      valACYclovir (VALTREX) 500 MG tablet Take 1 tablet (500 mg) by mouth 2 times daily As needed, Disp-14 tablet, R-5, Fax      IBUPROFEN PO Historical      Fexofenadine HCl (ALLEGRA PO) Historical      Multiple Vitamins-Minerals (CENTURY-LUTEIN) TABS Take 1 tablet by mouth daily., Disp-100 tablet, R-12, E-Prescribe           Additional recommendations and follow up:      Reason for your hospital stay   TIA     Follow Up and recommended labs and tests   Follow up with primary care provider, Rosenda Pierre, within 7 days for hospital follow- up and discuss recent med changes.  The following labs/tests are recommended: Lipid panel. Patient instructed to fast night before morning appointment. Also discuss propranolol and inhalers.      Follow up with all other scheduled appointments.    Follow up in stroke clinic in 3 months    Please contact Stroke Clinic- Neurology Clinic at [phone], pick option #1,  if you have not been contacted to schedule a stroke follow up appointment in 5 days of discharge.  If you have other stroke related questions, please call [phone], pick option #3, and ask to speak to Saul Pozo RN Stroke/Endovascular Care Coordinator.  If you have any urgent stroke related questions after hours, please contact the hospital  at [phone] and ask to be connected to a stroke provider.     Activity   Your activity upon discharge: activity as tolerated     When to contact your care team   Please go the the emergency room if you are experiencing any vision changes, changes in  speech, facial droop, weakness, and numbness as these symptoms are concerning for possible stroke.     Discharge Instructions   1) Start aspirin 325mg daily.  2) Start clopidogrel 75mg daily for 3 months then stop.  3) Start lisinopril 10mg daily for hypertension  4) Follow up with PCP to discuss new med changes and recent hospitalization. Will check cholesterol levels as well. Please fast night before morning appointment and blood draw.   5) Follow up in stroke clinic in 3 months     Full Code     Diet   Follow this diet upon discharge: Orders Placed This Encounter  Regular Diet Adult       Patient was seen and discussed with the Attending, Dr. Mcmahon.    Iftikhar Pina  Neurology PGY1  Pager: [phone]    CC: Rosenda Pierre

## 2017-01-17 ENCOUNTER — TELEPHONE (OUTPATIENT)
Dept: FAMILY MEDICINE | Facility: OTHER | Age: 67
End: 2017-01-17

## 2017-01-17 LAB — INTERPRETATION ECG - MUSE: NORMAL

## 2017-01-17 NOTE — TELEPHONE ENCOUNTER
RN to call for hospital follow up:    Reason for follow up: Sri Grewal appeared on our list for being seen in an Emergency Room or a recent Hospital discharge.    Admitting date: 1/16/17  Discharge date: 1/16/17  Location: [place]  Reason for visit: Chief Complaint: Essential Hypertension    Coleen Robertson RN, BSN

## 2017-01-17 NOTE — TELEPHONE ENCOUNTER
ED/ OUTREACH PROTOCOL    Patient Contact:  Follow up encounter documentation:    Reason for follow up: Sri Grewal appeared on our list for recent discharge from an Emergency Room, inpatient admission, or TCU/nursing home facility.    Attempt # 1      Was call answered? No   Left message for patient to return call.  Heather Hull RN

## 2017-01-18 NOTE — TELEPHONE ENCOUNTER
"I spoke with patient.   She is more concerned with the lack of improvement in her sinus infection than her hospital stay.   She has not started her medications yet as she hasn't received them in the mail.   Continues to feel weak and \"hard to breath in\".   Doesn't feel like the antibiotics are helping much.   Recommended follow up today, but patient refuses.   She did schedule for tomorrow morning and would like to address both the hospital follow up and the sinus infection.   She will take it easy today, continue to use her inhalers, try steam, and push fluids.   Patient will let us know if she worsens.     Rachel De Paz, RN, BSN    "

## 2017-01-19 ENCOUNTER — CARE COORDINATION (OUTPATIENT)
Dept: NEUROLOGY | Facility: CLINIC | Age: 67
End: 2017-01-19

## 2017-01-25 NOTE — PROGRESS NOTES
Discharge phone call placed - attempt #3: LVM message for patient to return call. Message sent to scheduling to contact patient to make appointment for stroke follow-up.     Follow up plan:   Per DC Summary -   1) Start aspirin 325mg daily.  2) Start clopidogrel 75mg daily for 3 months then stop.  3) Start lisinopril 10mg daily for hypertension  4) Follow up with PCP to discuss new med changes and recent hospitalization. Will check cholesterol levels as well. Please fast night before morning appointment and blood draw.   5) Follow up in stroke clinic in 3 months

## 2017-02-22 ENCOUNTER — TELEPHONE (OUTPATIENT)
Dept: FAMILY MEDICINE | Facility: OTHER | Age: 67
End: 2017-02-22

## 2017-02-22 NOTE — TELEPHONE ENCOUNTER
Sri Grewal is a 66 year old female who calls with vomiting.    NURSING ASSESSMENT:  Description:  Vomiting started yesterday, 2/21/17. LBM: 2/20/17. Ate 2 big meals yesterday, but has been vomiting since around lunch time. Anything she eats, throws it back up. Temp is 99F. Rates 6-7 on pain scale of 10. Pain located along waistline all the way across. Cramping, sharp pains. Feels full. Urination per normal. Denies weakness, dizziness, dehydration, severe pain, vomiting blood or black/coffee ground material  Onset/duration:  2/21/17  Precip. factors:  n/a  Associated symptoms:  Vomiting, cramping  Improves/worsens symptoms:  n/a  Pain scale (0-10)   6/10  LMP/preg/breast feeding:  n/a  Last exam/Treatment:  11/22/2016  Allergies:   Allergies   Allergen Reactions     Codeine      GI UPSET     Percocet [Oxycodone-Acetaminophen] Nausea and Vomiting     Seasonal Allergies        MEDICATIONS:   Taking medication(s) as prescribed? N/A  Taking over the counter medication(s?) N/A  Any medication side effects? Not Applicable    Any barriers to taking medication(s) as prescribed?  N/A  Medication(s) improving/managing symptoms?  N/A  Medication reconciliation completed: N/A      NURSING PLAN: Nursing advice to patient home care per protocol. schedule in 24hrs    RECOMMENDED DISPOSITION:  See in 24 hours - scheduled 2/23 in [place]. Home care per protocol until then. ED with new/worsening symptoms.   Will comply with recommendation: Yes  If further questions/concerns or if symptoms do not improve, worsen or new symptoms develop, call your PCP or [facility] as soon as possible.      Guideline used:  Telephone Triage Protocols for Nurses, Fourth Edition, Lilli Bright  Abdominal pain  Nausea/vomiting     Coleen Robertson RN

## 2017-02-22 NOTE — TELEPHONE ENCOUNTER
Sri Grewal is a 66 year old female who calls with abdominal pain.    NURSING ASSESSMENT:  Description:  ***  Onset/duration:  ***  Pain scale (0-10)   { :364312}  LMP/preg/breast feeding:  ***  Last exam/Treatment:  ***  Allergies:   Allergies   Allergen Reactions     Codeine      GI UPSET     Percocet [Oxycodone-Acetaminophen] Nausea and Vomiting     Seasonal Allergies      NURSING PLAN: { :291986}    RECOMMENDED DISPOSITION:  { :431462}  Will comply with recommendation: { :756668}  If further questions/concerns or if symptoms do not improve, worsen or new symptoms develop, call your PCP or [facility] as soon as possible.    NOTES:  Disposition was determined by the first positive assessment question, therefore all previous assessment questions were negative    Guideline used:  { :308653}    Cee Miles RN

## 2017-03-20 ENCOUNTER — TELEPHONE (OUTPATIENT)
Dept: FAMILY MEDICINE | Facility: OTHER | Age: 67
End: 2017-03-20

## 2017-03-20 NOTE — TELEPHONE ENCOUNTER
Spoke with pt and informed pt she will need an appt with [facility] if she wants her to start filling the medications. Mary Sullivan, CMA

## 2017-03-22 ENCOUNTER — OFFICE VISIT (OUTPATIENT)
Dept: URGENT CARE | Facility: RETAIL CLINIC | Age: 67
End: 2017-03-22
Payer: OTHER GOVERNMENT

## 2017-03-22 VITALS — HEART RATE: 81 BPM | DIASTOLIC BLOOD PRESSURE: 98 MMHG | TEMPERATURE: 99 F | SYSTOLIC BLOOD PRESSURE: 162 MMHG

## 2017-03-22 DIAGNOSIS — J01.90 ACUTE SINUSITIS WITH SYMPTOMS > 10 DAYS: Primary | ICD-10-CM

## 2017-03-22 PROCEDURE — 99213 OFFICE O/P EST LOW 20 MIN: CPT | Performed by: PHYSICIAN ASSISTANT

## 2017-03-22 NOTE — PATIENT INSTRUCTIONS
Take antibiotic as directed  Apply warm facial compresses/packs to R ear and sinuses for 5-10 minutes daily  Drink plenty of fluids- 6 to 10 glasses of liquids each day. Rest.  Saline drops or nasal sprays as needed.   May use netti pot as needed. Do not use tap water. May use filtered or distilled water.  Steam treatments or humidifier.  DO NOT TAKE Sudafed/nasal decongestant   Mucinex (guaifenesin) to thin out secretions.  Tylenol or ibuprofen as needed for pain or fever  Follow up at your primary care clinic for increasing pain, high fever, vision changes, worsening symptoms, or no relief from symptoms after 7-10 days.  Please follow up with primary care provider if not improving, worsening or new symptoms or for any adverse reactions to medications.

## 2017-03-22 NOTE — PROGRESS NOTES
Chief Complaint   Patient presents with     Sinus Problem     since the end of february, last night fever around 100     Otalgia     right ear pain        SUBJECTIVE:  Sri Grewal is a 66 year old female here with concerns about sinus infection.  She states onset of symptoms were 3+ week(s) ago.  She has had maxillary pressure. Course of illness is worsening. Severity moderate  Current and Associated symptoms: nasal congestion, rhinorrhea, cough , R ear pain (today), facial pain/pressure and headache  Predisposing factors include HX of recurrent sinusitis and recent illness. Recent treatment has included: steam, rest, allegra    Past Medical History:   Diagnosis Date     Abnormal Papanicolaou smear of cervix and cervical HPV      Allergic rhinitis, cause unspecified     seasonal allergies     Contact dermatitis and other eczema, due to unspecified cause      Endometriosis, site unspecified      Esophageal reflux     GERD     Migraine, unspecified, without mention of intractable migraine without mention of status migrainosus      Mild persistent asthma      Unspecified disorder of uterus     fibroid uterus     Current Outpatient Prescriptions   Medication Sig Dispense Refill     CHLORPROMAZINE HCL PO        LISINOPRIL PO        amoxicillin-clavulanate (AUGMENTIN) 875-125 MG per tablet Take 1 tablet by mouth 2 times daily for 10 days 20 tablet 0     aspirin 325 MG tablet Take 1 tablet (325 mg) by mouth daily 180 tablet 1     lisinopril (PRINIVIL/ZESTRIL) 10 MG tablet Take 1 tablet (10 mg) by mouth daily 30 tablet 3     butalbital-aspirin-caffeine (FIORINAL) -40 MG per capsule TAKE ONE CAPSULE BY MOUTH EVERY 6 HOURS AS NEEDED (one month supply) 30 capsule 5     albuterol (ALBUTEROL) 108 (90 BASE) MCG/ACT inhaler Inhale 1-2 puffs into the lungs every 6 hours as needed for shortness of breath / dyspnea 2 Inhaler 5     Fexofenadine HCl (ALLEGRA PO)        Multiple Vitamins-Minerals (CENTURY-LUTEIN) TABS Take 1  tablet by mouth daily. 100 tablet 12     clopidogrel (PLAVIX) 75 MG tablet Take 1 tablet (75 mg) by mouth daily (Patient not taking: Reported on 3/22/2017) 30 tablet 3     Albuterol Sulfate 108 (90 BASE) MCG/ACT AEPB Reported on 3/22/2017       valACYclovir (VALTREX) 500 MG tablet Take 1 tablet (500 mg) by mouth 2 times daily As needed (Patient not taking: Reported on 3/22/2017) 14 tablet 5     IBUPROFEN PO Reported on 3/22/2017          Allergies   Allergen Reactions     Codeine      GI UPSET     Percocet [Oxycodone-Acetaminophen] Nausea and Vomiting     Seasonal Allergies         History   Smoking Status     Never Smoker   Smokeless Tobacco     Never Used       ROS:  Review of systems negative except as stated above.    OBJECTIVE:  BP (!) 162/98  Pulse 81  Temp 99  F (37.2  C) (Temporal)  LMP 11/09/2005 Initial BP was 178/101  Exam:GENERAL APPEARANCE: mildly ill appearing  EYES: conjunctiva clear  HENT: R TM opaque effusion, L TM gray without effusion, nasal turbinates erythematous, swollen, rhinorrhea yellow, oral mucous membranes moist, no erythema noted, maxillary sinus tenderness. Post nasal drainage noted in the pharynx.  NECK: supple, nontender, no lymphadenopathy  RESP: lungs clear to auscultation - no rales, rhonchi or wheezes  CV: regular rates and rhythm, normal S1 S2, no murmur noted  SKIN: no suspicious lesions or rashes    ASSESSMENT/PLAN:  (J01.90) Acute sinusitis with symptoms > 10 days  (primary encounter diagnosis)   amoxicillin-clavulanate (AUGMENTIN) 875-125 MG         per tablet   Take antibiotic as directed  Apply warm facial compresses/packs to R ear and sinuses  Drink plenty of fluids- 6 to 10 glasses of liquids each day. Rest.  Saline drops or nasal sprays as needed.   May use netti pot as needed. Do not use tap water. May use filtered or distilled water.  Steam treatments or humidifier.  DO NOT TAKE SUDAFED or NASAL DECONGESTANTS  Mucinex (guaifenesin) to thin out secretions.  Tylenol  or ibuprofen as needed for pain or fever  Follow up at your primary care clinic for increasing pain, high fever, vision changes, worsening symptoms, or no relief from symptoms after 7-10 days.  Please follow up with primary care provider if not improving, worsening or new symptoms or for any adverse reactions to medications.       (I10) Elevated blood pressure  Discussed elevated BP  Today  Is seeing her PCP for f/u next month to discuss BP medications     Risa Beaver PA-C  [facility] [place]

## 2017-03-22 NOTE — Clinical Note
Primo Herzog,  I saw Sri at [facility] today. Her BPs were 178/101 and 162/98. I advised that she see you for recheck. She is scheduled to see you on 4/6 when you are back in. I informed her to avoid Sudafed and other nasal decongestants. She asked that I forward her BPs to you. Thanks!  Risa Beaver PA-C [facility] [place]

## 2017-03-22 NOTE — MR AVS SNAPSHOT
After Visit Summary   3/22/2017    Sri Grewal    MRN: [id number]           Patient Information     Date Of Birth          1950        Visit Information        Provider Department      3/22/2017 10:10 AM Risa Beaver PA-C [facility]        Today's Diagnoses     Acute sinusitis with symptoms > 10 days    -  1    Elevated blood pressure          Care Instructions    Take antibiotic as directed  Apply warm facial compresses/packs to R ear and sinuses for 5-10 minutes daily  Drink plenty of fluids- 6 to 10 glasses of liquids each day. Rest.  Saline drops or nasal sprays as needed.   May use netti pot as needed. Do not use tap water. May use filtered or distilled water.  Steam treatments or humidifier.  DO NOT TAKE Sudafed/nasal decongestant   Mucinex (guaifenesin) to thin out secretions.  Tylenol or ibuprofen as needed for pain or fever  Follow up at your primary care clinic for increasing pain, high fever, vision changes, worsening symptoms, or no relief from symptoms after 7-10 days.  Please follow up with primary care provider if not improving, worsening or new symptoms or for any adverse reactions to medications.             Follow-ups after your visit        Your next 10 appointments already scheduled     Apr 06, 2017 10:00 AM CDT   Office Visit with Rosenda Pierre PA-C   [facility]    [address] [place] [id number] [phone]           Bring a current list of meds and any records pertaining to this visit.  For Physicals, please bring immunization records and any forms needing to be filled out.  Please arrive 10 minutes early to complete paperwork.              Who to contact     You can reach your care team any time of the day by calling [phone].  Notification of test results:  If you have an abnormal lab result, we will notify you by phone as soon as possible.         Additional  "Information About Your Visit        MyChart Information     [facility] lets you send messages to your doctor, view your test results, renew your prescriptions, schedule appointments and more. To sign up, go to [email or web address] . Click on \"Log in\" on the left side of the screen, which will take you to the Welcome page. Then click on \"Sign up Now\" on the right side of the page.     You will be asked to enter the access code listed below, as well as some personal information. Please follow the directions to create your username and password.     Your access code is: [email or web address]  Expires: 2017  7:57 PM     Your access code will  in 90 days. If you need help or a new code, please call your [place] clinic or [phone].        Care EveryWhere ID     This is your Care EveryWhere ID. This could be used by other organizations to access your [place] medical records  [id number]        Your Vitals Were     Pulse Temperature Last Period             81 99  F (37.2  C) (Temporal) 2005          Blood Pressure from Last 3 Encounters:   17 (!) 162/98   17 (!) 145/97   17 (!) 159/91    Weight from Last 3 Encounters:   17 163 lb 12.8 oz (74.3 kg)   01/15/17 160 lb (72.6 kg)   16 166 lb (75.3 kg)              Today, you had the following     No orders found for display         Today's Medication Changes          These changes are accurate as of: 3/22/17 10:34 AM.  If you have any questions, ask your nurse or doctor.               Start taking these medicines.        Dose/Directions    amoxicillin-clavulanate 875-125 MG per tablet   Commonly known as:  AUGMENTIN   Used for:  Acute sinusitis with symptoms > 10 days        Dose:  1 tablet   Take 1 tablet by mouth 2 times daily for 10 days   Quantity:  20 tablet   Refills:  0            Where to get your medicines      These medications were sent to [facility] [place] [address] [facility] [address] " [facility] [address]     Phone:  [phone]     amoxicillin-clavulanate 875-125 MG per tablet                Primary Care Provider Office Phone # Fax #    Rosenda PAMELA Pierre PA-C [phone]       [facility] [address] [facility] [address]  [facility] [address]        Thank you!     Thank you for choosing [facility]  for your care. Our goal is always to provide you with excellent care. Hearing back from our patients is one way we can continue to improve our services. Please take a few minutes to complete the written survey that you may receive in the mail after your visit with us. Thank you!             Your Updated Medication List - Protect others around you: Learn how to safely use, store and throw away your medicines at www.disposemymeds.org.          This list is accurate as of: 3/22/17 10:34 AM.  Always use your most recent med list.                   Brand Name Dispense Instructions for use    * Albuterol Sulfate 108 (90 BASE) MCG/ACT Aepb      Reported on 3/22/2017       * albuterol 108 (90 BASE) MCG/ACT Inhaler    albuterol    2 Inhaler    Inhale 1-2 puffs into the lungs every 6 hours as needed for shortness of breath / dyspnea       ALLEGRA PO          amoxicillin-clavulanate 875-125 MG per tablet    AUGMENTIN    20 tablet    Take 1 tablet by mouth 2 times daily for 10 days       aspirin 325 MG tablet     180 tablet    Take 1 tablet (325 mg) by mouth daily       butalbital-aspirin-caffeine -40 MG per capsule    FIORINAL    30 capsule    TAKE ONE CAPSULE BY MOUTH EVERY 6 HOURS AS NEEDED (one month supply)       CENTURY-LUTEIN Tabs     100 tablet    Take 1 tablet by mouth daily.       CHLORPROMAZINE HCL PO          clopidogrel 75 MG tablet    PLAVIX    30 tablet    Take 1 tablet (75 mg) by mouth daily       IBUPROFEN PO      Reported on 3/22/2017       * LISINOPRIL PO          * lisinopril 10 MG tablet    PRINIVIL/ZESTRIL    30 tablet    Take 1  tablet (10 mg) by mouth daily       pseudoePHEDrine 120 MG 12 hr tablet    SUDAFED    28 tablet    Take 1 tablet (120 mg) by mouth every 12 hours       valACYclovir 500 MG tablet    VALTREX    14 tablet    Take 1 tablet (500 mg) by mouth 2 times daily As needed       * Notice:  This list has 4 medication(s) that are the same as other medications prescribed for you. Read the directions carefully, and ask your doctor or other care provider to review them with you.

## 2017-03-22 NOTE — NURSING NOTE
"Chief Complaint   Patient presents with     Sinus Problem     since the end of february, last night fever around 100     Otalgia     right ear pain       Initial BP (!) 178/101 (BP Location: Left arm)  Pulse 81  Temp 99  F (37.2  C) (Temporal)  LMP 11/09/2005 Estimated body mass index is 28.12 kg/(m^2) as calculated from the following:    Height as of 1/16/17: 5' 4\" (1.626 m).    Weight as of 1/16/17: 163 lb 12.8 oz (74.3 kg).  Medication Reconciliation: complete    "

## 2017-03-31 NOTE — PROGRESS NOTES
SUBJECTIVE:                                                    Sri Grewal is a 66 year old female who presents to clinic today for the following health issues:      History of Present Illness   Hypertension:     Outpatient blood pressures:  Are not being checked    Dietary sodium intake::  Low salt diet    Acute Illness   Acute illness concerns: nasal congestion, rhinorrhea, sore throat, facial pain/pressure, cough, fever, chills, ear pain both, headache and fatigue/malaise  Onset: 1+ week    Fever: YES    Chills/Sweats: YES    Headache (location?): no    Sinus Pressure:YES    Conjunctivitis:  no    Ear Pain: YES: bilateral    Rhinorrhea: YES    Congestion: YES    Sore Throat: YES    - Augmentin on 3/22/17 for sinus infection     Cough: YES-productive of yellow sputum, productive of green sputum    Wheeze: no    Decreased Appetite: no    Nausea: no    Vomiting: no    Diarrhea:  no    Dysuria/Freq.: no    Fatigue/Achiness: YES    Sick/Strep Exposure: YES- work     Therapies Tried and outcome: Aspirin, tylenol sinus      Hospital Follow-up Visit:    Hospital/Nursing Home/IP Rehab Facility: [facility]  Date of Admission: February  Date of Discharge: the next day  Reason(s) for Admission: Sinus infection            Problems taking medications regularly:  None       Medication changes since discharge: None       Problems adhering to non-medication therapy:  None    Summary of hospitalization:  [facility] discharge summary reviewed  Diagnostic Tests/Treatments reviewed.  Follow up needed: Lipid panel   Other Healthcare Providers Involved in Patient s Care:         None  Update since discharge: improved.     Post Discharge Medication Reconciliation: discharge medications reconciled, continue medications without change.  Plan of care communicated with patient     Coding guidelines for this visit:  Type of Medical   Decision Making Face-to-Face Visit       within 7 Days of discharge Face-to-Face Visit        within 14  "days of discharge   Moderate Complexity 71870 19453   High Complexity 58820 67649          - Did not follow up with stroke clinic in 3 months like they recommended       Hospital discharge  BP Long-term Goal: 140/90 or less  Antithrombotic/Anticoagulant Agent: aspirin 325 mg and clopidogrel (Plavix) 75 mg  Statins: will follow up with PCP for lipid panel   Hgb A1C Goal: < 7.0  Complications: None.   Other problems addressed during this hospitalization:  HTN: Patient doesn't have a documented hx of HTN, but has been hypertensive (150/90, even 190/95 once) on several occasions to her PCP visits. At OSH was 180's-215/103-110 before starting nicardipine gtt. BP now stable off of nicardipine drip. Will start lisinopril 10mg as outpatient with close PCP follow up. Will also order TSH level with PCP follow up.  Migraine: Continued PTA topiramate and propranolol for migraine prevention. PRN tylenol. Will discuss with PCP about taking propranolol with albuterol inhalers and their mechanism of action opposes each other.   Sinusitis: Continued doxycycline PTA        Problem list and histories reviewed & adjusted, as indicated.  Additional history: as documented    Labs reviewed in EPIC    ROS:  Constitutional, HEENT, cardiovascular, pulmonary, gi and gu systems are negative, except as otherwise noted.    OBJECTIVE:                                                    BP (!) 186/100 (BP Location: Left arm, Patient Position: Chair, Cuff Size: Adult Regular)  Pulse 88  Temp 100.7  F (38.2  C) (Oral)  Resp 16  Ht 5' 2.24\" (1.581 m)  Wt 162 lb (73.5 kg)  LMP 11/09/2005  SpO2 98%  BMI 29.4 kg/m2  Body mass index is 29.4 kg/(m^2).  GENERAL APPEARANCE: mildly ill appearing, alert and no distress  EYES: Eyes grossly normal to inspection, PERRLA, conjunctivae and sclerae without injection or discharge, EOM intact   HENT: Bilateral ear canals without erythema or cerumen, bilateral TM's pearly grey with normal light reflex, " bilateral clear serous effusion with no injection or bulging, nasal turbinates with severe swelling & erythema, yellow-green nasal discharge, mouth without ulcers or lesions, oropharynx clear and oral mucous membranes moist, bilateral maxillary sinus tenderness   NECK: Bilateral anterior cervical adenopathy, no adenopathy in posterior cervical or supraclavicular regions  RESP: Lungs clear to auscultation - no rales, rhonchi or wheezes   CV: Regular rates and rhythm, normal S1 S2, no S3 or S4, no murmur, click or rub, no peripheral edema and peripheral pulses strong and symmetric bilaterally   MS: No musculoskeletal defects are noted and gait is age appropriate without ataxia   SKIN: No suspicious lesions or rashes, hydration status appears adeuqate with normal skin turgor   PSYCH: Alert and oriented x3; speech- coherent , normal rate and volume; able to articulate logical thoughts, able to abstract reason, no tangential thoughts, no hallucinations or delusions, mentation appears normal, Mood is euthymic. Affect is appropriate for this mood state and bright. Thought content is free of suicidal ideation, hallucinations, and delusions. Dress is adequate and upkept. Eye contact is good during conversation.       Diagnostic Test Results:  See orders pending in Epic      ASSESSMENT/PLAN:                                                        ICD-10-CM    1. Essential hypertension I10 lisinopril (PRINIVIL/ZESTRIL) 20 MG tablet   2. Amaurosis fugax G45.3 clopidogrel (PLAVIX) 75 MG tablet     Lipid panel reflex to direct LDL     NEUROLOGY ADULT REFERRAL   3. Middle cerebral artery aneurysm I67.1    4. Chronic migraine without aura without status migrainosus, not intractable G43.709 butalbital-aspirin-caffeine (FIORINAL) -40 MG per capsule     NEUROLOGY ADULT REFERRAL   5. Acute sinusitis with symptoms > 10 days J01.90 levofloxacin (LEVAQUIN) 750 MG tablet     1. HTN   - Was previously on propanolol, taken off due to  asthma and put on Lisinopril, doing well with no side effects however BP still elevated   - Increase Lisinopril to 20 mg   - Recheck ~1 week with nurse only visit   - If still elevated, will refer to RN HTN program (free) or [facility] (financial  concerns)   - Recheck with provider in ~1 month, plan to recheck BMP at that time     2 & 3.   - Per discharge note, is to follow up with stroke clinic. Patient didn't want to drive to [facility].   - Referral to [facility] that comes here   - Discussed importance of this follow up   - Per discharge note, recommend lipid check   - Results will be communicated to patient when available and appropriate medication changes made if necessary   - Discussed continuing Plavix and what it is doing for her, refilled, per discharge note can stop at 3 months, she is just shy of this, plan to d/c at next appointment   - Continue Aspirin 325 mg   - Will also have her discuss this with neurology     4. Migraines   - Stable with Fiorinal occasionally that provides complete relief   - Refilled this, reviewed use and side effects  - Was previously on Propanolol for BP and migraine prophylaxis, doing ok since off it   - Discussed as per discharge notes, may need to consider a different prophylactic medication such as topiramate    - Discussed she can also talk about this with neurology, this provider will be a regular and important part of her care team from now on so needs to get established     5. Acute sinusitis   - Patient with frequent infections, discussed if this comes back after treatment this month, will refer to ENT for chronic sinusitis   - Discussed antibiotic use, duration, and side effects  - Also recommend starting daily Flonase (as opposed to PRN), continue until recheck in 1 month     The patient indicates understanding of these issues and agrees with the plan.    Follow up: as above         Rosenda Pierre PA-C  [facility]  [place]

## 2017-04-06 ENCOUNTER — OFFICE VISIT (OUTPATIENT)
Dept: FAMILY MEDICINE | Facility: OTHER | Age: 67
End: 2017-04-06
Payer: OTHER GOVERNMENT

## 2017-04-06 VITALS
WEIGHT: 162 LBS | OXYGEN SATURATION: 98 % | RESPIRATION RATE: 16 BRPM | HEART RATE: 88 BPM | DIASTOLIC BLOOD PRESSURE: 100 MMHG | HEIGHT: 62 IN | TEMPERATURE: 100.7 F | BODY MASS INDEX: 29.81 KG/M2 | SYSTOLIC BLOOD PRESSURE: 186 MMHG

## 2017-04-06 DIAGNOSIS — I10 ESSENTIAL HYPERTENSION: Primary | ICD-10-CM

## 2017-04-06 DIAGNOSIS — G43.709 CHRONIC MIGRAINE WITHOUT AURA WITHOUT STATUS MIGRAINOSUS, NOT INTRACTABLE: ICD-10-CM

## 2017-04-06 DIAGNOSIS — J01.90 ACUTE SINUSITIS WITH SYMPTOMS > 10 DAYS: ICD-10-CM

## 2017-04-06 DIAGNOSIS — I67.1 MIDDLE CEREBRAL ARTERY ANEURYSM: ICD-10-CM

## 2017-04-06 DIAGNOSIS — G45.3 AMAUROSIS FUGAX: ICD-10-CM

## 2017-04-06 LAB
CHOLEST SERPL-MCNC: 225 MG/DL
HDLC SERPL-MCNC: 110 MG/DL
LDLC SERPL CALC-MCNC: 103 MG/DL
NONHDLC SERPL-MCNC: 115 MG/DL
TRIGL SERPL-MCNC: 61 MG/DL

## 2017-04-06 PROCEDURE — 99214 OFFICE O/P EST MOD 30 MIN: CPT | Performed by: PHYSICIAN ASSISTANT

## 2017-04-06 PROCEDURE — 80061 LIPID PANEL: CPT | Performed by: PHYSICIAN ASSISTANT

## 2017-04-06 PROCEDURE — 36415 COLL VENOUS BLD VENIPUNCTURE: CPT | Performed by: PHYSICIAN ASSISTANT

## 2017-04-06 RX ORDER — BUTALBITAL/ASPIRIN/CAFFEINE 50-325-40
CAPSULE ORAL
Qty: 30 CAPSULE | Refills: 5 | Status: SHIPPED | OUTPATIENT
Start: 2017-04-06 | End: 2017-10-12

## 2017-04-06 RX ORDER — LEVOFLOXACIN 750 MG/1
750 TABLET, FILM COATED ORAL DAILY
Qty: 10 TABLET | Refills: 0 | Status: SHIPPED | OUTPATIENT
Start: 2017-04-06 | End: 2017-04-12

## 2017-04-06 RX ORDER — LISINOPRIL 20 MG/1
20 TABLET ORAL DAILY
Qty: 30 TABLET | Refills: 3 | Status: SHIPPED | OUTPATIENT
Start: 2017-04-06 | End: 2017-06-01 | Stop reason: DRUGHIGH

## 2017-04-06 RX ORDER — CLOPIDOGREL BISULFATE 75 MG/1
75 TABLET ORAL DAILY
Qty: 90 TABLET | Refills: 3 | Status: SHIPPED | OUTPATIENT
Start: 2017-04-06 | End: 2018-02-06 | Stop reason: ALTCHOICE

## 2017-04-06 ASSESSMENT — PAIN SCALES - GENERAL: PAINLEVEL: SEVERE PAIN (7)

## 2017-04-06 NOTE — LETTER
[facility]  [address]  [place] [address]  [phone]             April 6, 2017    Sri Grewal  [address]  [place] [address]              Dear Sri,    The results of your recent tests were normal, but cholesterol was borderline as it was 4 years ago when it was last checked. It is recommended to do a low-fat diet. Enclosed is a copy of the results.  It was a pleasure to see you at your last appointment.    Results for orders placed or performed in visit on 04/06/17   Lipid panel reflex to direct LDL   Result Value Ref Range    Cholesterol 225 (H) <200 mg/dL    Triglycerides 61 <150 mg/dL    HDL Cholesterol 110 >49 mg/dL    LDL Cholesterol Calculated 103 (H) <100 mg/dL    Non HDL Cholesterol 115 <130 mg/dL     If you have any questions or concerns, please call myself or my nurse at [phone].      Sincerely,      Rosenda Pierre PA-C

## 2017-04-06 NOTE — MR AVS SNAPSHOT
After Visit Summary   4/6/2017    Sri Grewal    MRN: [id number]           Patient Information     Date Of Birth          1950        Visit Information        Provider Department      4/6/2017 10:00 AM Rosenda Pierre PA-C [facility]        Today's Diagnoses     Essential hypertension    -  1    Amaurosis fugax        Chronic migraine without aura without status migrainosus, not intractable        Acute sinusitis with symptoms > 10 days          Care Instructions    - Increase Lisinopril to 20 mg   - Recheck ~1 week with nurse   - Recheck with provider in ~1 month     - Labs today     - Call for appointment with neurology, can discuss with them Topiramate for migraine prevention    - Start Levaquin for sinus infection   - Flonase 2 puffs each side once a day until recheck in 1 month         Follow-ups after your visit        Additional Services     NEUROLOGY ADULT REFERRAL       Your provider has referred you to: N: [facility] [place] [phone]   [email or web address]    Reason for Referral: Follow up TIA, was referred to stroke center in \[facility] [place] [facility]\"" and doesn't want to drive    Please be aware that coverage of these services is subject to the terms and limitations of your health insurance plan.  Call member services at your health plan with any benefit or coverage questions.      Please bring the following with you to your appointment:    (1) Any X-Rays, CTs or MRIs which have been performed.  Contact the facility where they were done to arrange for  prior to your scheduled appointment.    (2) List of current medications  (3) This referral request   (4) Any documents/labs given to you for this referral                  Who to contact     If you have questions or need follow up information about today's clinic visit or your schedule please contact [facility] directly at  "[phone].  Normal or non-critical lab and imaging results will be communicated to you by MyChart, letter or phone within 4 business days after the clinic has received the results. If you do not hear from us within 7 days, please contact the clinic through [facility] or phone. If you have a critical or abnormal lab result, we will notify you by phone as soon as possible.  Submit refill requests through [facility] or call your pharmacy and they will forward the refill request to us. Please allow 3 business days for your refill to be completed.          Additional Information About Your Visit        [facility] Information     [facility] lets you send messages to your doctor, view your test results, renew your prescriptions, schedule appointments and more. To sign up, go to [email or web address] . Click on \"Log in\" on the left side of the screen, which will take you to the Welcome page. Then click on \"Sign up Now\" on the right side of the page.     You will be asked to enter the access code listed below, as well as some personal information. Please follow the directions to create your username and password.     Your access code is: [email or web address]  Expires: 2017  7:57 PM     Your access code will  in 90 days. If you need help or a new code, please call your [place] clinic or [phone].        Care EveryWhere ID     This is your Care EveryWhere ID. This could be used by other organizations to access your [place] medical records  [id number]        Your Vitals Were     Pulse Temperature Respirations Height Last Period Pulse Oximetry    88 100.7  F (38.2  C) (Oral) 16 5' 2.24\" (1.581 m) 2005 98%    BMI (Body Mass Index)                   29.4 kg/m2            Blood Pressure from Last 3 Encounters:   17 (!) 186/100   17 (!) 162/98   17 (!) 145/97    Weight from Last 3 Encounters:   17 162 lb (73.5 kg)   17 163 lb 12.8 oz (74.3 kg)   01/15/17 160 lb (72.6 kg)              We " Performed the Following     Lipid panel reflex to direct LDL     NEUROLOGY ADULT REFERRAL          Today's Medication Changes          These changes are accurate as of: 4/6/17 10:55 AM.  If you have any questions, ask your nurse or doctor.               Start taking these medicines.        Dose/Directions    levofloxacin 750 MG tablet   Commonly known as:  LEVAQUIN   Used for:  Acute sinusitis with symptoms > 10 days   Started by:  Rosenda Pierre PA-C        Dose:  750 mg   Take 1 tablet (750 mg) by mouth daily   Quantity:  10 tablet   Refills:  0         These medicines have changed or have updated prescriptions.        Dose/Directions    lisinopril 20 MG tablet   Commonly known as:  PRINIVIL/ZESTRIL   This may have changed:    - medication strength  - how much to take   Used for:  Essential hypertension   Changed by:  Rosenda Pierre PA-C        Dose:  20 mg   Take 1 tablet (20 mg) by mouth daily   Quantity:  30 tablet   Refills:  3            Where to get your medicines      These medications were sent to [facility] - [place], [place] - [address]  [address] [facility] [address]     Phone:  [phone]     levofloxacin 750 MG tablet         Some of these will need a paper prescription and others can be bought over the counter.  Ask your nurse if you have questions.     Bring a paper prescription for each of these medications     butalbital-aspirin-caffeine -40 MG per capsule    clopidogrel 75 MG tablet    lisinopril 20 MG tablet                Primary Care Provider Office Phone # Fax #    Rosenda Pierre PA-C [phone]       [facility] [address]  [place] [address]        Thank you!     Thank you for choosing [facility]  for your care. Our goal is always to provide you with excellent care. Hearing back from our patients is one way we can continue to improve our  services. Please take a few minutes to complete the written survey that you may receive in the mail after your visit with us. Thank you!             Your Updated Medication List - Protect others around you: Learn how to safely use, store and throw away your medicines at www.disposemymeds.org.          This list is accurate as of: 4/6/17 10:55 AM.  Always use your most recent med list.                   Brand Name Dispense Instructions for use    * Albuterol Sulfate 108 (90 BASE) MCG/ACT Aepb      Reported on 3/22/2017       * albuterol 108 (90 BASE) MCG/ACT Inhaler    albuterol    2 Inhaler    Inhale 1-2 puffs into the lungs every 6 hours as needed for shortness of breath / dyspnea       ALLEGRA PO          aspirin 325 MG tablet     180 tablet    Take 1 tablet (325 mg) by mouth daily       butalbital-aspirin-caffeine -40 MG per capsule    FIORINAL    30 capsule    TAKE ONE CAPSULE BY MOUTH EVERY 6 HOURS AS NEEDED (one month supply)       CENTURY-LUTEIN Tabs     100 tablet    Take 1 tablet by mouth daily.       clopidogrel 75 MG tablet    PLAVIX    90 tablet    Take 1 tablet (75 mg) by mouth daily       IBUPROFEN PO      Reported on 4/6/2017       levofloxacin 750 MG tablet    LEVAQUIN    10 tablet    Take 1 tablet (750 mg) by mouth daily       lisinopril 20 MG tablet    PRINIVIL/ZESTRIL    30 tablet    Take 1 tablet (20 mg) by mouth daily       * Notice:  This list has 2 medication(s) that are the same as other medications prescribed for you. Read the directions carefully, and ask your doctor or other care provider to review them with you.

## 2017-04-06 NOTE — NURSING NOTE
"Chief Complaint   Patient presents with     Hospital F/U     Hypertension     Panel Management     height, mychart, pap, hep c, care everywhere       Initial BP (!) 186/100 (BP Location: Left arm, Patient Position: Chair, Cuff Size: Adult Regular)  Pulse 88  Temp 100.7  F (38.2  C) (Oral)  Resp 16  Ht 5' 2.24\" (1.581 m)  Wt 162 lb (73.5 kg)  LMP 11/09/2005  SpO2 98%  BMI 29.4 kg/m2 Estimated body mass index is 29.4 kg/(m^2) as calculated from the following:    Height as of this encounter: 5' 2.24\" (1.581 m).    Weight as of this encounter: 162 lb (73.5 kg).  Medication Reconciliation: complete  "

## 2017-04-06 NOTE — PROGRESS NOTES
Please call patient with the following message    Cholesterol borderline, as it was 4 years ago when last checked. Recommend low-fat diet. Please mail info.     Jose Francisco Pierre PA-C

## 2017-04-06 NOTE — PATIENT INSTRUCTIONS
- Increase Lisinopril to 20 mg   - Recheck ~1 week with nurse   - Recheck with provider in ~1 month     - Labs today     - Call for appointment with neurology, can discuss with them Topiramate for migraine prevention    - Start Levaquin for sinus infection   - Flonase 2 puffs each side once a day until recheck in 1 month

## 2017-04-10 ENCOUNTER — TELEPHONE (OUTPATIENT)
Dept: FAMILY MEDICINE | Facility: OTHER | Age: 67
End: 2017-04-10

## 2017-04-10 DIAGNOSIS — J45.30 MILD PERSISTENT ASTHMA, UNCOMPLICATED: ICD-10-CM

## 2017-04-10 RX ORDER — ALBUTEROL SULFATE 90 UG/1
1-2 AEROSOL, METERED RESPIRATORY (INHALATION) EVERY 6 HOURS PRN
Qty: 2 INHALER | Refills: 0 | Status: SHIPPED | OUTPATIENT
Start: 2017-04-10 | End: 2017-06-01

## 2017-04-10 NOTE — TELEPHONE ENCOUNTER
proventil      Last Written Prescription Date:    Last Fill Quantity: ,   # refills:   Last Office Visit with [facility] or [facility] prescribing provider: 04/06/17  Future Office visit:   NA    Routing refill request to provider for review/approval because:  Drug not active on patient's medication list    albuterol       Last Written Prescription Date: 03/31/16  Last Fill Quantity: 2, # refills: 5    Last Office Visit with [facility], [facility] or [facility] prescribing provider:  04/06/17   Future Office Visit:       Date of Last Asthma Action Plan Letter:   Asthma Action Plan Q1 Year    Topic Date Due     Asthma Action Plan - yearly  05/20/2017      Asthma Control Test:   ACT Total Scores 11/22/2016   ACT TOTAL SCORE (Goal Greater than or Equal to 20) 23   In the past 12 months, how many times did you visit the emergency room for your asthma without being admitted to the hospital? 0   In the past 12 months, how many times were you hospitalized overnight because of your asthma? 0       Date of Last Spirometry Test:   No results found for this or any previous visit.

## 2017-04-10 NOTE — TELEPHONE ENCOUNTER
Proventil and albuterol are the same thing.   Prescription approved per [facility] Refill Protocol.  Rachel De Paz, RN, BSN

## 2017-04-10 NOTE — TELEPHONE ENCOUNTER
Reason for call:  Medication  Reason for Call:  Medication or medication refill:    Do you use a [facility]?  Name of the pharmacy and phone number for the current request:  [facility]    Name of the medication requested: albuterol and proventil inhalers    Other request: pt needs filled asap    Can we leave a detailed message on this number? YES    Phone number patient can be reached at: Cell number on file:    Telephone Information:   Mobile [phone]       Best Time: any    Call taken on 4/10/2017 at 11:58 AM by Caroline Valle

## 2017-04-12 ENCOUNTER — TELEPHONE (OUTPATIENT)
Dept: FAMILY MEDICINE | Facility: OTHER | Age: 67
End: 2017-04-12

## 2017-04-12 DIAGNOSIS — J01.90 ACUTE SINUSITIS WITH SYMPTOMS > 10 DAYS: ICD-10-CM

## 2017-04-12 RX ORDER — LEVOFLOXACIN 750 MG/1
750 TABLET, FILM COATED ORAL DAILY
Qty: 10 TABLET | Refills: 0 | Status: SHIPPED | OUTPATIENT
Start: 2017-04-12 | End: 2017-06-01

## 2017-04-12 NOTE — TELEPHONE ENCOUNTER
Please notify patient I have sent a refill of her antibiotic to [facility].    Jose Francisco Pierre PA-C  [facility]

## 2017-04-12 NOTE — TELEPHONE ENCOUNTER
[facility] phone call message- patient reporting a symptom:    Symptom or request: sinus infection    Duration (how long have symptoms been present): last week  Have you been treated for this before? No    Additional comments:     Call taken on 4/12/2017 at 9:48 AM by Caroline Rodrigues

## 2017-04-12 NOTE — TELEPHONE ENCOUNTER
"Sri Grewal is a 66 year old female    S-(situation): Sri is calling today with concerns of ongoing sinus infection    B-(background): patient was seen 4/6. Treated for sinus infection. Given levaquin-10 day treatment. States she lost a pill. Looking for more medication, but hasn't completed first round.      A-(assessment): patient still coughing. Nasal congestion/drainage-yellow. Weak. \"i'm better overall, but not great\"    Pain scale (1-10): 0/10   Denies: Chest pain, sob, fever, dizziness, facial pain, sore throat, ear issues   Meds/MeasuresTried: levaquin   Improves/Worsens: abx is helping some       R-(recommendations): Home care advice - home cares per protocol recommended. Patient notified she still has medication left to take. Patient states \"i just know this won't be gone by the time the pills are done, can i get another round of antibiotics?\" notified will discuss with provider. She states she will be going out of town this weekend for [facility] and is hoping to get something before then. 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 Uses [facility] in ER.   Will comply with recommendation: YES     If further questions/concerns or if Sxs do not improve, worsen or new Sxs develop, call your PCP or [facility] as soon as possible.    Guideline used:  Telephone Triage Protocols for Nurses, Fourth Edition, Lilli Brigth  Sinus problems    NOTES:  Disposition was determined by the first positive assessment question, therefore all previous assessment questions were negative      Coleen Robertson, RN, BSN      "

## 2017-05-09 ENCOUNTER — TELEPHONE (OUTPATIENT)
Dept: FAMILY MEDICINE | Facility: OTHER | Age: 67
End: 2017-05-09

## 2017-05-09 DIAGNOSIS — I10 HYPERTENSION GOAL BP (BLOOD PRESSURE) < 140/90: Primary | ICD-10-CM

## 2017-05-09 NOTE — TELEPHONE ENCOUNTER
Summary:    Patient is due/failing the following:   BMP, ACT, BP CHECK, PAP and PHQ9    Action needed:   Patient needs office visit for PAP/BP check ., Patient needs to do ACT. and Patient needs to do PHQ9.    Type of outreach:    Phone, spoke to patient.  patient scheduled her Medicare PE    Questions for provider review:    None                                                                                                                                    Alisha Cabrera       Chart routed to Care Team .      Panel Management Review      Patient has the following on her problem list:     Depression / Dysthymia review  PHQ-9 SCORE 5/20/2016 5/20/2016 11/22/2016   Total Score - - -   Total Score 6 6 3      Patient is due for:  PHQ9    Asthma review     ACT Total Scores 11/22/2016   ACT TOTAL SCORE (Goal Greater than or Equal to 20) 23   In the past 12 months, how many times did you visit the emergency room for your asthma without being admitted to the hospital? 0   In the past 12 months, how many times were you hospitalized overnight because of your asthma? 0      1. Is Asthma diagnosis on the Problem List? Yes    2. Is Asthma listed on Health Maintenance? Yes    3. Patient is due for:  none      Composite cancer screening  Chart review shows that this patient is due/due soon for the following Pap Smear

## 2017-05-25 ENCOUNTER — TELEPHONE (OUTPATIENT)
Dept: FAMILY MEDICINE | Facility: OTHER | Age: 67
End: 2017-05-25

## 2017-05-25 ENCOUNTER — ALLIED HEALTH/NURSE VISIT (OUTPATIENT)
Dept: FAMILY MEDICINE | Facility: OTHER | Age: 67
End: 2017-05-25
Payer: OTHER GOVERNMENT

## 2017-05-25 VITALS — DIASTOLIC BLOOD PRESSURE: 92 MMHG | SYSTOLIC BLOOD PRESSURE: 179 MMHG

## 2017-05-25 DIAGNOSIS — I10 ESSENTIAL HYPERTENSION: ICD-10-CM

## 2017-05-25 DIAGNOSIS — Z01.30 BP CHECK: Primary | ICD-10-CM

## 2017-05-25 DIAGNOSIS — I10 HYPERTENSION GOAL BP (BLOOD PRESSURE) < 140/90: ICD-10-CM

## 2017-05-25 DIAGNOSIS — G45.3 AMAUROSIS FUGAX: ICD-10-CM

## 2017-05-25 PROCEDURE — 99207 ZZC NO CHARGE NURSE ONLY: CPT | Performed by: PHYSICIAN ASSISTANT

## 2017-05-25 RX ORDER — LISINOPRIL 30 MG/1
30 TABLET ORAL DAILY
Qty: 30 TABLET | Refills: 1 | Status: SHIPPED | OUTPATIENT
Start: 2017-05-25 | End: 2017-06-01

## 2017-05-25 RX ORDER — LISINOPRIL 20 MG/1
20 TABLET ORAL DAILY
Refills: 0 | OUTPATIENT
Start: 2017-05-25

## 2017-05-25 NOTE — TELEPHONE ENCOUNTER
Patient informed of message in pharmacy encounter. I informed her that CDL sent 30mg to [facility]. She states she will call [facility] to check the price. She states she might want to use [facility]. Will call back if she needs it sent there instead.  Candy Felix, [facility]

## 2017-05-25 NOTE — TELEPHONE ENCOUNTER
Reason for call:  Medication  Reason for Call:  Medication or medication refill:    Do you use a [facility]?  Name of the pharmacy and phone number for the current request:  [facility] - [phone]    Name of the medication requested: lisinopril    Other request: pt states her regular rx is on order through the mail.  She is out of the med and she is worried she wont get the med over the long weekend.  She si requesting 4-5 pills be called in to the [facility].  Please call to advise.  thanks    Can we leave a detailed message on this number? YES    Phone number patient can be reached at: Cell number on file:    Telephone Information:   Mobile [phone]       Best Time: any    Call taken on 5/25/2017 at 9:53 AM by Caroline Valle

## 2017-05-25 NOTE — MR AVS SNAPSHOT
"              After Visit Summary   5/25/2017    Sri Grewal    MRN: [id number]           Patient Information     Date Of Birth          1950        Visit Information        Provider Department      5/25/2017 10:02 AM Rosenda Pierre PA-C [facility]        Today's Diagnoses     BP check    -  1    Amaurosis fugax        Hypertension goal BP (blood pressure) < 140/90           Follow-ups after your visit        Your next 10 appointments already scheduled     Jun 01, 2017 10:45 AM CDT   [facility]YSICAL with Rosenda Pierre PA-C   [facility]    [address]  [place] [id number] [phone]              Future tests that were ordered for you today     Open Future Orders        Priority Expected Expires Ordered    **Basic metabolic panel FUTURE 2mo Routine 7/24/2017 9/22/2017 5/25/2017            Who to contact     If you have questions or need follow up information about today's clinic visit or your schedule please contact [facility] directly at [phone].  Normal or non-critical lab and imaging results will be communicated to you by [facility], letter or phone within 4 business days after the clinic has received the results. If you do not hear from us within 7 days, please contact the clinic through [employer] or phone. If you have a critical or abnormal lab result, we will notify you by phone as soon as possible.  Submit refill requests through [employer]pany or call your pharmacy and they will forward the refill request to us. Please allow 3 business days for your refill to be completed.          Additional Information About Your Visit        [facility] Information     kompany lets you send messages to your doctor, view your test results, renew your prescriptions, schedule appointments and more. To sign up, go to [email or web address] . Click on \"Log in\" on the left side of the screen, which will take you " "to the Welcome page. Then click on \"Sign up Now\" on the right side of the page.     You will be asked to enter the access code listed below, as well as some personal information. Please follow the directions to create your username and password.     Your access code is: [email or web address]  Expires: 2017  7:57 PM     Your access code will  in 90 days. If you need help or a new code, please call your [facility] clinic or [phone].        Care EveryWhere ID     This is your Care EveryWhere ID. This could be used by other organizations to access your [facility] medical records  [id number]        Your Vitals Were     Last Period                   2005            Blood Pressure from Last 3 Encounters:   17 (!) 179/92   17 (!) 186/100   17 (!) 162/98    Weight from Last 3 Encounters:   17 162 lb (73.5 kg)   17 163 lb 12.8 oz (74.3 kg)   01/15/17 160 lb (72.6 kg)                 Today's Medication Changes          These changes are accurate as of: 17 12:25 PM.  If you have any questions, ask your nurse or doctor.               These medicines have changed or have updated prescriptions.        Dose/Directions    * lisinopril 20 MG tablet   Commonly known as:  PRINIVIL/ZESTRIL   This may have changed:  Another medication with the same name was added. Make sure you understand how and when to take each.   Used for:  Essential hypertension   Changed by:  Rosenda Pierre PA-C        Dose:  20 mg   Take 1 tablet (20 mg) by mouth daily   Quantity:  30 tablet   Refills:  3       * lisinopril 30 MG tablet   Commonly known as:  PRINIVIL,ZESTRIL   This may have changed:  You were already taking a medication with the same name, and this prescription was added. Make sure you understand how and when to take each.   Used for:  Amaurosis fugax, Hypertension goal BP (blood pressure) < 140/90   Changed by:  Rosenda Pierre PA-C        Dose:  30 mg   Take 1 tablet " (30 mg) by mouth daily   Quantity:  30 tablet   Refills:  1       * Notice:  This list has 2 medication(s) that are the same as other medications prescribed for you. Read the directions carefully, and ask your doctor or other care provider to review them with you.         Where to get your medicines      [place] [facility] [place] [address]  [address], [place] [address]     Phone:  [phone]     lisinopril 30 MG tablet                Primary Care Provider Office Phone # Fax #    Rosenda PAMELA Pierre PA-C [phone]       [facility] [address]  [place] [address]        Thank you!     Thank you for choosing [facility]  for your care. Our goal is always to provide you with excellent care. Hearing back from our patients is one way we can continue to improve our services. Please take a few minutes to complete the written survey that you may receive in the mail after your visit with us. Thank you!             Your Updated Medication List - Protect others around you: Learn how to safely use, store and throw away your medicines at www.disposemymeds.org.          This list is accurate as of: 5/25/17 12:25 PM.  Always use your most recent med list.                   Brand Name Dispense Instructions for use    * Albuterol Sulfate 108 (90 BASE) MCG/ACT Aepb      Reported on 3/22/2017       * albuterol 108 (90 BASE) MCG/ACT Inhaler    albuterol    2 Inhaler    Inhale 1-2 puffs into the lungs every 6 hours as needed for shortness of breath / dyspnea       ALLEGRA PO          aspirin 325 MG tablet     180 tablet    Take 1 tablet (325 mg) by mouth daily       butalbital-aspirin-caffeine -40 MG per capsule    FIORINAL    30 capsule    TAKE ONE CAPSULE BY MOUTH EVERY 6 HOURS AS NEEDED (one month supply)       CENTURY-LUTEIN Tabs     100 tablet    Take 1 tablet by mouth daily.        clopidogrel 75 MG tablet    PLAVIX    90 tablet    Take 1 tablet (75 mg) by mouth daily       IBUPROFEN PO      Reported on 4/6/2017       levofloxacin 750 MG tablet    LEVAQUIN    10 tablet    Take 1 tablet (750 mg) by mouth daily       * lisinopril 20 MG tablet    PRINIVIL/ZESTRIL    30 tablet    Take 1 tablet (20 mg) by mouth daily       * lisinopril 30 MG tablet    PRINIVIL,ZESTRIL    30 tablet    Take 1 tablet (30 mg) by mouth daily       * Notice:  This list has 4 medication(s) that are the same as other medications prescribed for you. Read the directions carefully, and ask your doctor or other care provider to review them with you.

## 2017-05-25 NOTE — TELEPHONE ENCOUNTER
RX's is this in your protocol?  (see below)  I started reorder, please add appropriate qty if approved.

## 2017-05-25 NOTE — PROGRESS NOTES
Please call patient    - Please increase Lisinopril to 30 mg (1.5 tablets of what she has at home) I have sent a new RX as well for 30 mg (1 tablet)   - Need her to come in for lab recheck, BMP, I have placed this order, important to follow closely due to Lisinopril and HTN  - BP recheck in 1 week    - Recheck with me in 2-3 weeks     Jose Francisco Pierre PA-C  [facility]

## 2017-05-25 NOTE — PROGRESS NOTES
Sri Grewal is enrolled/participating in the retail pharmacy Blood Pressure Goals Achievement Program (BPGAP).  Sri Grewal was evaluated at [facility] on May 25, 2017 at which time her blood pressure was:    BP Readings from Last 3 Encounters:   05/25/17 (!) 179/92   04/06/17 (!) 186/100   03/22/17 (!) 162/98     Reviewed lifestyle modifications for blood pressure control and reduction: including making healthy food choices, managing weight, getting regular exercise, smoking cessation, reducing alcohol consumption, monitoring blood pressure regularly.     Sri Grewal is not experiencing symptoms.    Follow-Up: BP is not at goal of < 140/90mmHg (patient 18+ years of age with or without diabetes), Recommended follow-up with PCP.  Routing to PCP for further review.    Recommendation to Provider: Patient states she is out of lisinopril due to waiting on mail order. Advised pt to request 1-2 week supply from MD until mail order comes (pt does not use our pharmacy). Advised pt to have another check on 5/30/ or 5/31. Patient states she will try, but has appointment on 6/2 with MD and may wait until that time.    Completed by:     Jorge Price, [place]  [facility]   [phone]

## 2017-05-25 NOTE — TELEPHONE ENCOUNTER
Routing refill request to provider for review/approval because:  Patient needs to be seen because:  Due for 1 month follow up with Provider and BMP check.  Are you willing to send in a short dose?    Cee Miles RN, BSN

## 2017-05-26 ENCOUNTER — HEALTH MAINTENANCE LETTER (OUTPATIENT)
Age: 67
End: 2017-05-26

## 2017-05-26 NOTE — PROGRESS NOTES
SUBJECTIVE:                                                            Sri Grewal is a 67 year old female who presents for Preventive Visit.      Are you in the first 12 months of your Medicare coverage?  No    Physical   Annual:     Getting at least 3 servings of Calcium per day::  Yes    Bi-annual eye exam::  Yes    Dental care twice a year::  Yes    Sleep apnea or symptoms of sleep apnea::  None    Diet::  Low salt    Frequency of exercise::  1 day/week    Duration of exercise::  15-30 minutes    Taking medications regularly::  Yes    Medication side effects::  Lightheadedness    Additional concerns today::  No      COGNITIVE SCREEN  1) Repeat 3 items (Banana, Sunrise, Chair)    2) Clock draw: NORMAL  3) 3 item recall: Recalls 3 objects  Results: 3 items recalled: COGNITIVE IMPAIRMENT LESS LIKELY    Mini-CogTM Copyright S Abida. Licensed by the author for use in [place] [employer]; reprinted with permission (lee@[facility]). All rights reserved.      Hypertension Follow-up      Outpatient blood pressures are not being checked.    Low Salt Diet: no added salt    - Not quite a week, been up to 30 mg   - Lightheadedness only when bends down and comes up to quickly        Cerebrovascular Follow-up      Patient history: TIA    Residual symptoms: None    Worsened or new symptoms since last visit: No    Daily aspirin use: Yes    Hypertension controlled: Yes     Asthma Follow-Up    Was ACT completed today?  No      Respiratory symptoms:   Cough: No   Wheezing: No   Shortness of breath: No    Use of short- acting(rescue) inhaler: None    Taking controlled (daily) meds as prescribed: NA    ER/UC visits or hospital admissions since last visit: none     Recent asthma triggers that patient is dealing with: pollens and humidity     Reviewed and updated as needed this visit by clinical staff  Tobacco  Allergies  Meds  Problems  Med Hx  Surg Hx  Fam Hx  Soc Hx          Reviewed and updated as needed this visit by  Provider  Allergies  Meds  Problems        Social History   Substance Use Topics     Smoking status: Never Smoker     Smokeless tobacco: Never Used     Alcohol use Yes      Comment: beerx2/x1 per month       The patient does not drink >3 drinks per day nor >7 drinks per week.      Today's PHQ-2 Score:   PHQ-2 ( 1999 Pfizer) 7/12/2013   Q1: Little interest or pleasure in doing things 1   Q2: Feeling down, depressed or hopeless 1   PHQ-2 Score 2       Do you feel safe in your environment - Yes    Do you have a Health Care Directive?: No: Advance care planning reviewed with patient; information given to patient to review.    Current providers sharing in care for this patient include:   Patient Care Team:  Rosenda Pierre PA-C as PCP - General (Physician Assistant - Medical)  Maricel Pozo RN as Nurse Coordinator (Neurology)      Hearing impairment: No    Ability to successfully perform activities of daily living: Yes, no assistance needed     Fall risk:  Fallen 2 or more times in the past year?: No  Any fall with injury in the past year?: No    Home safety:  none identified      The following health maintenance items are reviewed in Epic and correct as of today:  Health Maintenance   Topic Date Due     MEDICARE ANNUAL WELLNESS VISIT  05/09/1970     HEPATITIS C SCREENING  05/09/1970     PAP Q3 YR  07/12/2016     PNEUMOCOCCAL (1 of 2 - PCV13) 05/09/2017     FALL RISK ASSESSMENT  05/09/2017     DEXA SCAN SCREENING (SYSTEM ASSIGNED)  05/09/2017     DEPRESSION ACTION PLAN Q1 YR  05/19/2017     ASTHMA ACTION PLAN Q1 YR  05/20/2017     ASTHMA CONTROL TEST Q6 MOS  05/22/2017     PHQ-9 Q6 MONTHS  05/22/2017     ADVANCE DIRECTIVE PLANNING Q5 YRS  07/19/2017     MAMMO SCREEN Q2 YR (SYSTEM ASSIGNED)  07/24/2017     INFLUENZA VACCINE (SYSTEM ASSIGNED)  09/01/2017     LIPID SCREEN Q5 YR FEMALE (SYSTEM ASSIGNED)  04/06/2022     TETANUS IMMUNIZATION (SYSTEM ASSIGNED)  07/12/2023     COLON CANCER SCREEN (SYSTEM  "ASSIGNED)  05/30/2024     MIGRAINE ACTION PLAN  Completed         Pneumonia Vaccine:Adults age 65+ who have not received previous Pneumovax (PPSV23) or PCV13 as an adult: Should first be given PCV13 AND then should be given PPSV23 6-12 months after PCV13  Mammogram Screening: Patient over age 50, mutual decision to screen reflected in health maintenance.  History of abnormal Pap smear:   Last 3 Pap Results:   PAP (no units)   Date Value   07/12/2013 NIL   07/19/2012 NIL   07/18/2011 NIL        ROS:  Constitutional, HEENT, cardiovascular, pulmonary, gi and gu systems are negative, except as otherwise noted.    Problem list, Medication list, Allergies, and Medical/Social/Surgical histories reviewed in [facility] and updated as appropriate.  Labs reviewed in EPIC  BP Readings from Last 3 Encounters:   06/01/17 128/78   05/25/17 (!) 179/92   04/06/17 (!) 186/100    Wt Readings from Last 3 Encounters:   06/01/17 159 lb 3.2 oz (72.2 kg)   04/06/17 162 lb (73.5 kg)   01/16/17 163 lb 12.8 oz (74.3 kg)                  OBJECTIVE:                                                            LMP 11/09/2005 Estimated body mass index is 29.4 kg/(m^2) as calculated from the following:    Height as of 4/6/17: 5' 2.24\" (1.581 m).    Weight as of 4/6/17: 162 lb (73.5 kg).  EXAM:   GENERAL: healthy, alert and no distress  EYES: Eyes grossly normal to inspection, PERRL and conjunctivae and sclerae normal  HENT: ear canals and TM's normal, nose and mouth without ulcers or lesions  NECK: no adenopathy, no asymmetry, masses, or scars and thyroid normal to palpation  RESP: lungs clear to auscultation - no rales, rhonchi or wheezes  BREAST: normal without masses, tenderness or nipple discharge and no palpable axillary masses or adenopathy  CV: regular rate and rhythm, normal S1 S2, no S3 or S4, no murmur, click or rub, no peripheral edema and peripheral pulses strong  ABDOMEN: soft, nontender, no hepatosplenomegaly, no masses and bowel sounds " normal   (female): normal female external genitalia, normal urethral meatus, vaginal mucosa pink, moist, well rugated, and normal cervix/adnexa/uterus without masses or discharge  MS: no gross musculoskeletal defects noted, no edema  SKIN: Many SK's, 4 concerning as follows - no other suspicious lesions or rashes      1) Mid back - 0.5 cm circular raised mole with irregular borders       2) Left lower back - 0.6 cm circular raised mole with multiple colors and irregular borders         3)  Left flank superior - large multilobulated, multicolored irregular mole ~0.7 x 0.8 cm         4) Left flank inferior - 0.5 multicolored irregular mole   NEURO: Normal strength and tone, mentation intact and speech normal  PSYCH: mentation appears normal, affect normal/bright    ASSESSMENT / PLAN:                                                                ICD-10-CM    1. Encounter for routine adult health examination without abnormal findings Z00.00 Asthma Action Plan (AAP)   2. Asymptomatic postmenopausal status Z78.0 DEXA HIP/PELVIS/SPINE - Future   3. Screening for malignant neoplasm of cervix Z12.4 Pap imaged thin layer screen with HPV - recommended age 30 - 65 years (select HPV order below)     HPV High Risk Types DNA Cervical   4. Need for hepatitis C screening test Z11.59 Hepatitis C Screen Reflex to HCV RNA Quant and Genotype   5. Need for prophylactic vaccination against Streptococcus pneumoniae (pneumococcus) Z23    6. Amaurosis fugax G45.3 lisinopril (PRINIVIL,ZESTRIL) 30 MG tablet   7. Hypertension goal BP (blood pressure) < 140/90 I10 **Basic metabolic panel FUTURE 2mo     lisinopril (PRINIVIL,ZESTRIL) 30 MG tablet   8. Encounter for screening mammogram for breast cancer Z12.31 *MA Screening Digital Bilateral   9. Mild persistent asthma, uncomplicated J45.30 albuterol (ALBUTEROL) 108 (90 BASE) MCG/ACT Inhaler   10. Atypical mole D22.9      - Blood work today  - Results will be communicated to patient when  "available and appropriate medication changes made if necessary   - BP much improved on 30 mg Lisinopril, refilled, also gave RX to get a BP cuff to monitor at home   - Discussed BP goals   - Patient to schedule mammogram &  DEXA scan   - Patient to schedule mole removal        - Discussed risks and benefits of procedure including bleeding, infection, and scarring       - Recommend procedure as follows:          1) Mid back - 6 mm punch biopsy, with sutures and removal in 10-14 days             2) Left lower back  - 6 mm punch biopsy, with sutures and removal in 10-14 days          3) Left flank superior - shave biopsy with cautery          4) Left flank inferior - shave biopsy with cautery     Supplies: 1% lidocaine with epi, 2 alcohol wipes, chloraprep, biopsy blade - 6 mm and bendy blade, cautery pen, lac pack, Sutures: 3-0 Ethilon, sterile 4x4 pack, 4 - sterile 2x2, 4 - small tegaderm      - Recheck BP every 6 months, lab work yearly        End of Life Planning:  Patient currently has an advanced directive: No.  I have verified the patient's ablity to prepare an advanced directive/make health care decisions.  Literature was provided to assist patient in preparing an advanced directive.    COUNSELING:  Reviewed preventive health counseling, as reflected in patient instructions  Special attention given to:       Regular exercise       Healthy diet/nutrition       Dental care       Immunizations    Vaccinated for: Pneumococcal  - Patient given this to take to pharmacy          Aspirin Prophylaxsis       Osteoporosis Prevention/Bone Health       Colon cancer screening       Hepatitis C screening        Estimated body mass index is 29.4 kg/(m^2) as calculated from the following:    Height as of 4/6/17: 5' 2.24\" (1.581 m).    Weight as of 4/6/17: 162 lb (73.5 kg).  Weight management plan: Discussed healthy diet and exercise guidelines and patient will follow up in 12 months in clinic to re-evaluate.   reports that she " has never smoked. She has never used smokeless tobacco.      Appropriate preventive services were discussed with this patient, including applicable screening as appropriate for cardiovascular disease, diabetes, osteopenia/osteoporosis, and glaucoma.  As appropriate for age/gender, discussed screening for colorectal cancer, prostate cancer, breast cancer, and cervical cancer. Checklist reviewing preventive services available has been given to the patient.    Reviewed patients plan of care and provided an AVS. The Basic Care Plan (routine screening as documented in Health Maintenance) for Sri meets the Care Plan requirement. This Care Plan has been established and reviewed with the Patient.    Counseling Resources:  ATP IV Guidelines  Pooled Cohorts Equation Calculator  Breast Cancer Risk Calculator  FRAX Risk Assessment  ICSI Preventive Guidelines  Dietary Guidelines for Americans, 2010  USDA's MyPlate  ASA Prophylaxis  Lung CA Screening    Rosenda Pierre PA-C  [facility]

## 2017-05-26 NOTE — PATIENT INSTRUCTIONS
1. Schedule DEXA scan (bone scan)     You may call any office from below to schedule an appointment for radiology.  [place]  [phone]  [place]  [phone]     2. Schedule mammogram      [phone]     3. Schedule mole removal - 1 hour       Preventive Health Recommendations    Female Ages 65 +    Yearly exam:     See your health care provider every year in order to  o Review health changes.   o Discuss preventive care.    o Review your medicines if your doctor has prescribed any.      You no longer need a yearly Pap test unless you've had an abnormal Pap test in the past 10 years. If you have vaginal symptoms, such as bleeding or discharge, be sure to talk with your provider about a Pap test.      Every 1 to 2 years, have a mammogram.  If you are over 69, talk with your health care provider about whether or not you want to continue having screening mammograms.      Every 10 years, have a colonoscopy. Or, have a yearly FIT test (stool test). These exams will check for colon cancer.       Have a cholesterol test every 5 years, or more often if your doctor advises it.       Have a diabetes test (fasting glucose) every three years. If you are at risk for diabetes, you should have this test more often.       At age 65, have a bone density scan (DEXA) to check for osteoporosis (brittle bone disease).    Shots:    Get a flu shot each year.    Get a tetanus shot every 10 years.    Talk to your doctor about your pneumonia vaccines. There are now two you should receive - Pneumovax (PPSV 23) and Prevnar (PCV 13).    Talk to your doctor about the shingles vaccine.    Talk to your doctor about the hepatitis B vaccine.    Nutrition:     Eat at least 5 servings of fruits and vegetables each day.      Eat whole-grain bread, whole-wheat pasta and brown rice instead of white grains and rice.      Talk to your provider about Calcium and Vitamin D.     Lifestyle    Exercise at least 150 minutes a week (30 minutes a day, 5  days a week). This will help you control your weight and prevent disease.      Limit alcohol to one drink per day.      No smoking.       Wear sunscreen to prevent skin cancer.       See your dentist twice a year for an exam and cleaning.      See your eye doctor every 1 to 2 years to screen for conditions such as glaucoma, macular degeneration and cataracts.

## 2017-06-01 ENCOUNTER — OFFICE VISIT (OUTPATIENT)
Dept: FAMILY MEDICINE | Facility: OTHER | Age: 67
End: 2017-06-01
Payer: MEDICARE

## 2017-06-01 VITALS
OXYGEN SATURATION: 97 % | WEIGHT: 159.2 LBS | HEART RATE: 73 BPM | DIASTOLIC BLOOD PRESSURE: 78 MMHG | SYSTOLIC BLOOD PRESSURE: 128 MMHG | HEIGHT: 62 IN | TEMPERATURE: 98.5 F | BODY MASS INDEX: 29.3 KG/M2 | RESPIRATION RATE: 16 BRPM

## 2017-06-01 DIAGNOSIS — Z12.4 SCREENING FOR MALIGNANT NEOPLASM OF CERVIX: ICD-10-CM

## 2017-06-01 DIAGNOSIS — I10 HYPERTENSION GOAL BP (BLOOD PRESSURE) < 140/90: ICD-10-CM

## 2017-06-01 DIAGNOSIS — J45.30 MILD PERSISTENT ASTHMA, UNCOMPLICATED: ICD-10-CM

## 2017-06-01 DIAGNOSIS — Z00.00 ENCOUNTER FOR ROUTINE ADULT HEALTH EXAMINATION WITHOUT ABNORMAL FINDINGS: Primary | ICD-10-CM

## 2017-06-01 DIAGNOSIS — D22.9 ATYPICAL MOLE: ICD-10-CM

## 2017-06-01 DIAGNOSIS — Z78.0 ASYMPTOMATIC POSTMENOPAUSAL STATUS: ICD-10-CM

## 2017-06-01 DIAGNOSIS — G45.3 AMAUROSIS FUGAX: ICD-10-CM

## 2017-06-01 DIAGNOSIS — Z12.31 ENCOUNTER FOR SCREENING MAMMOGRAM FOR BREAST CANCER: ICD-10-CM

## 2017-06-01 DIAGNOSIS — Z11.59 NEED FOR HEPATITIS C SCREENING TEST: ICD-10-CM

## 2017-06-01 LAB
ANION GAP SERPL CALCULATED.3IONS-SCNC: 6 MMOL/L (ref 3–14)
BUN SERPL-MCNC: 8 MG/DL (ref 7–30)
CALCIUM SERPL-MCNC: 9.1 MG/DL (ref 8.5–10.1)
CHLORIDE SERPL-SCNC: 99 MMOL/L (ref 94–109)
CO2 SERPL-SCNC: 30 MMOL/L (ref 20–32)
CREAT SERPL-MCNC: 0.57 MG/DL (ref 0.52–1.04)
GFR SERPL CREATININE-BSD FRML MDRD: NORMAL ML/MIN/1.7M2
GLUCOSE SERPL-MCNC: 90 MG/DL (ref 70–99)
POTASSIUM SERPL-SCNC: 4.9 MMOL/L (ref 3.4–5.3)
SODIUM SERPL-SCNC: 135 MMOL/L (ref 133–144)

## 2017-06-01 PROCEDURE — G0101 CA SCREEN;PELVIC/BREAST EXAM: HCPCS | Performed by: PHYSICIAN ASSISTANT

## 2017-06-01 PROCEDURE — 99397 PER PM REEVAL EST PAT 65+ YR: CPT | Performed by: PHYSICIAN ASSISTANT

## 2017-06-01 PROCEDURE — G0476 HPV COMBO ASSAY CA SCREEN: HCPCS | Performed by: PHYSICIAN ASSISTANT

## 2017-06-01 PROCEDURE — 80048 BASIC METABOLIC PNL TOTAL CA: CPT | Performed by: PHYSICIAN ASSISTANT

## 2017-06-01 PROCEDURE — G0472 HEP C SCREEN HIGH RISK/OTHER: HCPCS | Performed by: PHYSICIAN ASSISTANT

## 2017-06-01 PROCEDURE — G0145 SCR C/V CYTO,THINLAYER,RESCR: HCPCS | Performed by: PHYSICIAN ASSISTANT

## 2017-06-01 PROCEDURE — 87624 HPV HI-RISK TYP POOLED RSLT: CPT | Performed by: PHYSICIAN ASSISTANT

## 2017-06-01 PROCEDURE — 86803 HEPATITIS C AB TEST: CPT | Performed by: PHYSICIAN ASSISTANT

## 2017-06-01 PROCEDURE — 36415 COLL VENOUS BLD VENIPUNCTURE: CPT | Performed by: PHYSICIAN ASSISTANT

## 2017-06-01 RX ORDER — LISINOPRIL 30 MG/1
30 TABLET ORAL DAILY
Qty: 30 TABLET | Refills: 1 | Status: CANCELLED | OUTPATIENT
Start: 2017-06-01

## 2017-06-01 RX ORDER — LISINOPRIL 30 MG/1
30 TABLET ORAL DAILY
Qty: 90 TABLET | Refills: 3 | Status: SHIPPED | OUTPATIENT
Start: 2017-06-01 | End: 2017-11-13

## 2017-06-01 RX ORDER — ALBUTEROL SULFATE 90 UG/1
1-2 AEROSOL, METERED RESPIRATORY (INHALATION) EVERY 6 HOURS PRN
Qty: 2 INHALER | Refills: 11 | Status: SHIPPED | OUTPATIENT
Start: 2017-06-01 | End: 2018-02-06

## 2017-06-01 ASSESSMENT — ANXIETY QUESTIONNAIRES
5. BEING SO RESTLESS THAT IT IS HARD TO SIT STILL: NOT AT ALL
4. TROUBLE RELAXING: NOT AT ALL
3. WORRYING TOO MUCH ABOUT DIFFERENT THINGS: NOT AT ALL
7. FEELING AFRAID AS IF SOMETHING AWFUL MIGHT HAPPEN: NOT AT ALL
7. FEELING AFRAID AS IF SOMETHING AWFUL MIGHT HAPPEN: NOT AT ALL
2. NOT BEING ABLE TO STOP OR CONTROL WORRYING: SEVERAL DAYS
6. BECOMING EASILY ANNOYED OR IRRITABLE: SEVERAL DAYS
GAD7 TOTAL SCORE: 0
GAD7 TOTAL SCORE: 0
1. FEELING NERVOUS, ANXIOUS, OR ON EDGE: SEVERAL DAYS
GAD7 TOTAL SCORE: 3

## 2017-06-01 ASSESSMENT — PATIENT HEALTH QUESTIONNAIRE - PHQ9
10. IF YOU CHECKED OFF ANY PROBLEMS, HOW DIFFICULT HAVE THESE PROBLEMS MADE IT FOR YOU TO DO YOUR WORK, TAKE CARE OF THINGS AT HOME, OR GET ALONG WITH OTHER PEOPLE: SOMEWHAT DIFFICULT
SUM OF ALL RESPONSES TO PHQ QUESTIONS 1-9: 6
SUM OF ALL RESPONSES TO PHQ QUESTIONS 1-9: 6

## 2017-06-01 ASSESSMENT — PAIN SCALES - GENERAL: PAINLEVEL: NO PAIN (0)

## 2017-06-01 NOTE — LETTER
My Depression Action Plan  Name: Sri Grewal   Date of Birth 1950  Date: 5/26/2017    My doctor: Rosenda Pierre   My clinic: [address] [id number] [phone]          GREEN    ZONE   Good Control    What it looks like:     Things are going generally well. You have normal up s and down s. You may even feel depressed from time to time, but bad moods usually last less than a day.   What you need to do:  1. Continue to care for yourself (see self care plan)  2. Check your depression survival kit and update it as needed  3. Follow your physician s recommendations including any medication.  4. Do not stop taking medication unless you consult with your physician first.           YELLOW         ZONE Getting Worse    What it looks like:     Depression is starting to interfere with your life.     It may be hard to get out of bed; you may be starting to isolate yourself from others.    Symptoms of depression are starting to last most all day and this has happened for several days.     You may have suicidal thoughts but they are not constant.   What you need to do:     1. Call your care team, your response to treatment will improve if you keep your care team informed of your progress. Yellow periods are signs an adjustment may need to be made.     2. Continue your self-care, even if you have to fake it!    3. Talk to someone in your support network    4. Open up your depression survival kit           RED    ZONE Medical Alert - Get Help    What it looks like:     Depression is seriously interfering with your life.     You may experience these or other symptoms: You can t get out of bed most days, can t work or engage in other necessary activities, you have trouble taking care of basic hygiene, or basic responsibilities, thoughts of suicide or death that will not go away, self-injurious behavior.     What you need to do:  1. Call your  care team and request a same-day appointment. If they are not available (weekends or after hours) call your local crisis line, emergency room or 911.      Electronically signed by: Candy Felix, May 26, 2017    Depression Self Care Plan / Survival Kit    Self-Care for Depression  Here s the deal. Your body and mind are really not as separate as most people think.  What you do and think affects how you feel and how you feel influences what you do and think. This means if you do things that people who feel good do, it will help you feel better.  Sometimes this is all it takes.  There is also a place for medication and therapy depending on how severe your depression is, so be sure to consult with your medical provider and/ or Behavioral Health Consultant if your symptoms are worsening or not improving.     In order to better manage my stress, I will:    Exercise  Get some form of exercise, every day. This will help reduce pain and release endorphins, the  feel good  chemicals in your brain. This is almost as good as taking antidepressants!  This is not the same as joining a gym and then never going! (they count on that by the way ) It can be as simple as just going for a walk or doing some gardening, anything that will get you moving.      Hygiene   Maintain good hygiene (Get out of bed in the morning, Make your bed, Brush your teeth, Take a shower, and Get dressed like you were going to work, even if you are unemployed).  If your clothes don't fit try to get ones that do.    Diet  I will strive to eat foods that are good for me, drink plenty of water, and avoid excessive sugar, caffeine, alcohol, and other mood-altering substances.  Some foods that are helpful in depression are: complex carbohydrates, B vitamins, flaxseed, fish or fish oil, fresh fruits and vegetables.    Psychotherapy  I agree to participate in Individual Therapy (if recommended).    Medication  If prescribed medications, I agree to take them.   Missing doses can result in serious side effects.  I understand that drinking alcohol, or other illicit drug use, may cause potential side effects.  I will not stop my medication abruptly without first discussing it with my provider.    Staying Connected With Others  I will stay in touch with my friends, family members, and my primary care provider/team.    Use your imagination  Be creative.  We all have a creative side; it doesn t matter if it s oil painting, sand castles, or mud pies! This will also kick up the endorphins.    Witness Beauty  (AKA stop and smell the roses) Take a look outside, even in mid-winter. Notice colors, textures. Watch the squirrels and birds.     Service to others  Be of service to others.  There is always someone else in need.  By helping others we can  get out of ourselves  and remember the really important things.  This also provides opportunities for practicing all the other parts of the program.    Humor  Laugh and be silly!  Adjust your TV habits for less news and crime-drama and more comedy.    Control your stress  Try breathing deep, massage therapy, biofeedback, and meditation. Find time to relax each day.     My support system    Clinic Contact:  Phone number:    Contact 1:  Phone number:    Contact 2:  Phone number:    Rastafarian/:  Phone number:    Therapist:  Phone number:    Local crisis center:    Phone number:    Other community support:  Phone number:

## 2017-06-01 NOTE — NURSING NOTE
"Chief Complaint   Patient presents with     Physical     Panel Management     mychart, height, pneumo, dexa, pap, fall risk hep c, DAP, phw/ethan, AAP, ACT       Initial /80 (BP Location: Right arm, Patient Position: Chair, Cuff Size: Adult Regular)  Pulse 73  Temp 98.5  F (36.9  C) (Oral)  Resp 16  Ht 5' 2.13\" (1.578 m)  Wt 159 lb 3.2 oz (72.2 kg)  LMP 11/09/2005  SpO2 97%  BMI 29 kg/m2 Estimated body mass index is 29 kg/(m^2) as calculated from the following:    Height as of this encounter: 5' 2.13\" (1.578 m).    Weight as of this encounter: 159 lb 3.2 oz (72.2 kg).  Medication Reconciliation: complete    "

## 2017-06-01 NOTE — MR AVS SNAPSHOT
After Visit Summary   6/1/2017    Sri Grewal    MRN: [id number]           Patient Information     Date Of Birth          1950        Visit Information        Provider Department      6/1/2017 10:45 AM Rosenda Pierre PA-C [facility]        Today's Diagnoses     Encounter for routine adult health examination without abnormal findings    -  1    Asymptomatic postmenopausal status        Screening for malignant neoplasm of cervix        Need for hepatitis C screening test        Need for prophylactic vaccination against Streptococcus pneumoniae (pneumococcus)        Amaurosis fugax        Hypertension goal BP (blood pressure) < 140/90        Encounter for screening mammogram for breast cancer        Mild persistent asthma, uncomplicated          Care Instructions    1. Schedule DEXA scan (bone scan)     You may call any office from below to schedule an appointment for radiology.  [place]  [phone]  [place]  [phone]     2. Schedule mammogram      [phone]     3. Schedule mole removal - 1 hour       Preventive Health Recommendations    Female Ages 65 +    Yearly exam:     See your health care provider every year in order to  o Review health changes.   o Discuss preventive care.    o Review your medicines if your doctor has prescribed any.      You no longer need a yearly Pap test unless you've had an abnormal Pap test in the past 10 years. If you have vaginal symptoms, such as bleeding or discharge, be sure to talk with your provider about a Pap test.      Every 1 to 2 years, have a mammogram.  If you are over 69, talk with your health care provider about whether or not you want to continue having screening mammograms.      Every 10 years, have a colonoscopy. Or, have a yearly FIT test (stool test). These exams will check for colon cancer.       Have a cholesterol test every 5 years, or more often if your doctor advises it.       Have a diabetes test  (fasting glucose) every three years. If you are at risk for diabetes, you should have this test more often.       At age 65, have a bone density scan (DEXA) to check for osteoporosis (brittle bone disease).    Shots:    Get a flu shot each year.    Get a tetanus shot every 10 years.    Talk to your doctor about your pneumonia vaccines. There are now two you should receive - Pneumovax (PPSV 23) and Prevnar (PCV 13).    Talk to your doctor about the shingles vaccine.    Talk to your doctor about the hepatitis B vaccine.    Nutrition:     Eat at least 5 servings of fruits and vegetables each day.      Eat whole-grain bread, whole-wheat pasta and brown rice instead of white grains and rice.      Talk to your provider about Calcium and Vitamin D.     Lifestyle    Exercise at least 150 minutes a week (30 minutes a day, 5 days a week). This will help you control your weight and prevent disease.      Limit alcohol to one drink per day.      No smoking.       Wear sunscreen to prevent skin cancer.       See your dentist twice a year for an exam and cleaning.      See your eye doctor every 1 to 2 years to screen for conditions such as glaucoma, macular degeneration and cataracts.          Follow-ups after your visit        Future tests that were ordered for you today     Open Future Orders        Priority Expected Expires Ordered    *MA Screening Digital Bilateral Routine  6/1/2018 6/1/2017    DEXA HIP/PELVIS/SPINE - Future Routine  5/26/2018 6/1/2017            Who to contact     If you have questions or need follow up information about today's clinic visit or your schedule please contact [facility] directly at [phone].  Normal or non-critical lab and imaging results will be communicated to you by MyChart, letter or phone within 4 business days after the clinic has received the results. If you do not hear from us within 7 days, please contact the clinic through MyChart or phone. If you have a critical or  "abnormal lab result, we will notify you by phone as soon as possible.  Submit refill requests through Sopheon or call your pharmacy and they will forward the refill request to us. Please allow 3 business days for your refill to be completed.          Additional Information About Your Visit        Tiny Posthart Information     Sopheon lets you send messages to your doctor, view your test results, renew your prescriptions, schedule appointments and more. To sign up, go to [email or web address] . Click on \"Log in\" on the left side of the screen, which will take you to the Welcome page. Then click on \"Sign up Now\" on the right side of the page.     You will be asked to enter the access code listed below, as well as some personal information. Please follow the directions to create your username and password.     Your access code is: [email or web address]  Expires: 2017 11:38 AM     Your access code will  in 90 days. If you need help or a new code, please call your [facility] clinic or [phone].        Care EveryWhere ID     This is your Care EveryWhere ID. This could be used by other organizations to access your [facility] medical records  [id number]        Your Vitals Were     Pulse Temperature Respirations Height Last Period Pulse Oximetry    73 98.5  F (36.9  C) (Oral) 16 5' 2.13\" (1.578 m) 2005 97%    BMI (Body Mass Index)                   29 kg/m2            Blood Pressure from Last 3 Encounters:   17 128/78   17 (!) 179/92   17 (!) 186/100    Weight from Last 3 Encounters:   17 159 lb 3.2 oz (72.2 kg)   17 162 lb (73.5 kg)   17 163 lb 12.8 oz (74.3 kg)              We Performed the Following     **Basic metabolic panel FUTURE 2mo     Asthma Action Plan (AAP)     DEPRESSION ACTION PLAN (DAP)     Hepatitis C Screen Reflex to HCV RNA Quant and Genotype     HPV High Risk Types DNA Cervical     Pap imaged thin layer screen with HPV - recommended age 30 - 65 years (select HPV " order below)          Today's Medication Changes          These changes are accurate as of: 6/1/17 11:38 AM.  If you have any questions, ask your nurse or doctor.               These medicines have changed or have updated prescriptions.        Dose/Directions    lisinopril 30 MG tablet   Commonly known as:  PRINIVIL,ZESTRIL   This may have changed:  Another medication with the same name was removed. Continue taking this medication, and follow the directions you see here.   Used for:  Amaurosis fugax, Hypertension goal BP (blood pressure) < 140/90   Changed by:  Rosenda Pierre PA-C        Dose:  30 mg   Take 1 tablet (30 mg) by mouth daily   Quantity:  90 tablet   Refills:  3            Where to get your medicines      Some of these will need a paper prescription and others can be bought over the counter.  Ask your nurse if you have questions.     Bring a paper prescription for each of these medications     albuterol 108 (90 BASE) MCG/ACT Inhaler    lisinopril 30 MG tablet                Primary Care Provider Office Phone # Fax #    Rosenda Pierre PA-C [phone]       [facility] [address]  [place] [address]        Thank you!     Thank you for choosing [facility]  for your care. Our goal is always to provide you with excellent care. Hearing back from our patients is one way we can continue to improve our services. Please take a few minutes to complete the written survey that you may receive in the mail after your visit with us. Thank you!             Your Updated Medication List - Protect others around you: Learn how to safely use, store and throw away your medicines at www.disposemymeds.org.          This list is accurate as of: 6/1/17 11:38 AM.  Always use your most recent med list.                   Brand Name Dispense Instructions for use    * Albuterol Sulfate 108 (90 BASE) MCG/ACT Aepb      Reported on 3/22/2017        * albuterol 108 (90 BASE) MCG/ACT Inhaler    albuterol    2 Inhaler    Inhale 1-2 puffs into the lungs every 6 hours as needed for shortness of breath / dyspnea       ALLEGRA PO          aspirin 325 MG tablet     180 tablet    Take 1 tablet (325 mg) by mouth daily       butalbital-aspirin-caffeine -40 MG per capsule    FIORINAL    30 capsule    TAKE ONE CAPSULE BY MOUTH EVERY 6 HOURS AS NEEDED (one month supply)       CENTURY-LUTEIN Tabs     100 tablet    Take 1 tablet by mouth daily.       clopidogrel 75 MG tablet    PLAVIX    90 tablet    Take 1 tablet (75 mg) by mouth daily       IBUPROFEN PO      Reported on 4/6/2017       lisinopril 30 MG tablet    PRINIVIL,ZESTRIL    90 tablet    Take 1 tablet (30 mg) by mouth daily       * Notice:  This list has 2 medication(s) that are the same as other medications prescribed for you. Read the directions carefully, and ask your doctor or other care provider to review them with you.

## 2017-06-01 NOTE — LETTER
June 12, 2017      Sri Grewal  [address] [address]    Dear ,      I am happy to inform you that your cervical cancer screening test (PAP smear) was normal and your Human Papillomavirus (HPV) test was negative.    Per current guidelines, you no longer need to have pap smears completed.     Please continue to be seen every year for an annual wellness visit and other preventative tests.     Please contact my office at [phone] if you have further questions.    Sincerely,      Rosenda Pierre PA-C/leonel

## 2017-06-01 NOTE — LETTER
[facility]  [address]  [place] [address]  [phone]    June 2, 2017    Sri Grewla  [address]  [place] [address]    Dear Sri,    The results of your recent tests were normal.  Enclosed is a copy of the results.   Results for orders placed or performed in visit on 06/01/17   Hepatitis C Screen Reflex to HCV RNA Quant and Genotype   Result Value Ref Range    Hepatitis C Antibody  NR     Nonreactive   Assay performance characteristics have not been established for newborns,   infants, and children     **Basic metabolic panel FUTURE 2mo   Result Value Ref Range    Sodium 135 133 - 144 mmol/L    Potassium 4.9 3.4 - 5.3 mmol/L    Chloride 99 94 - 109 mmol/L    Carbon Dioxide 30 20 - 32 mmol/L    Anion Gap 6 3 - 14 mmol/L    Glucose 90 70 - 99 mg/dL    Urea Nitrogen 8 7 - 30 mg/dL    Creatinine 0.57 0.52 - 1.04 mg/dL    GFR Estimate >90  Non  GFR Calc   >60 mL/min/1.7m2    GFR Estimate If Black >90   GFR Calc   >60 mL/min/1.7m2    Calcium 9.1 8.5 - 10.1 mg/dL   It was a pleasure to see you at your last appointment. If you have any questions or concerns, please call myself or my nurse at [phone].      Sincerely,    Rosenda Pierre PA-C

## 2017-06-02 LAB — HCV AB SERPL QL IA: NORMAL

## 2017-06-02 ASSESSMENT — PATIENT HEALTH QUESTIONNAIRE - PHQ9: SUM OF ALL RESPONSES TO PHQ QUESTIONS 1-9: 6

## 2017-06-02 ASSESSMENT — ASTHMA QUESTIONNAIRES: ACT_TOTALSCORE: 22

## 2017-06-07 LAB
COPATH REPORT: NORMAL
PAP: NORMAL

## 2017-06-08 LAB
FINAL DIAGNOSIS: NORMAL
HPV HR 12 DNA CVX QL NAA+PROBE: NEGATIVE
HPV16 DNA SPEC QL NAA+PROBE: NEGATIVE
HPV18 DNA SPEC QL NAA+PROBE: NEGATIVE
SPECIMEN DESCRIPTION: NORMAL

## 2017-07-07 DIAGNOSIS — B00.9 HERPES SIMPLEX DISEASE: ICD-10-CM

## 2017-07-07 NOTE — TELEPHONE ENCOUNTER
valACYclovir (VALTREX) 500 MG tablet (Discontinued)  Routing refill request to provider for review/approval because:  Drug not active on patient's medication list  Discontinued on 04/06/2017 with reason of therapy completed    Creatinine   Date Value Ref Range Status   06/01/2017 0.57 0.52 - 1.04 mg/dL Final     Cee Miles RN, BSN

## 2017-07-07 NOTE — TELEPHONE ENCOUNTER
valtrex      Last Written Prescription Date: 10/22/2015  Last Fill Quantity: 14,  # refills: 5   Last Office Visit with G, [facility] or [facility] prescribing provider: 06/01/2017                                             Routing refill request to provider for review/approval because:  Drug not active on patient's medication list

## 2017-07-07 NOTE — TELEPHONE ENCOUNTER
Reason for Call:  Medication or medication refill:    Do you use a [facility]?  Name of the pharmacy and phone number for the current request:  Meds by Mail    Name of the medication requested: valACYclovir (VALTREX) tablet 500 mg         Other request: n/a    Can we leave a detailed message on this number? YES    Phone number patient can be reached at: Home number on file [phone] (home)    Best Time: anytime    Call taken on 7/7/2017 at 9:47 AM by Sujata Mack

## 2017-07-10 RX ORDER — VALACYCLOVIR HYDROCHLORIDE 500 MG/1
500 TABLET, FILM COATED ORAL 2 TIMES DAILY
Qty: 14 TABLET | Refills: 5 | Status: SHIPPED | OUTPATIENT
Start: 2017-07-10 | End: 2017-10-13

## 2017-07-10 NOTE — TELEPHONE ENCOUNTER
Medication(s) reviewed and approved. Rx. was printed and signed. Please fax.       Please notify that this has been done.      Please close the encounter when finished with it.     Rosenda Pierre PA-C

## 2017-08-03 ENCOUNTER — OFFICE VISIT (OUTPATIENT)
Dept: URGENT CARE | Facility: RETAIL CLINIC | Age: 67
End: 2017-08-03
Payer: MEDICARE

## 2017-08-03 VITALS
HEART RATE: 74 BPM | OXYGEN SATURATION: 98 % | DIASTOLIC BLOOD PRESSURE: 90 MMHG | TEMPERATURE: 97.5 F | SYSTOLIC BLOOD PRESSURE: 156 MMHG

## 2017-08-03 DIAGNOSIS — H69.92 DYSFUNCTION OF EUSTACHIAN TUBE, LEFT: Primary | ICD-10-CM

## 2017-08-03 DIAGNOSIS — H92.02 OTALGIA OF LEFT EAR: ICD-10-CM

## 2017-08-03 PROCEDURE — 99213 OFFICE O/P EST LOW 20 MIN: CPT | Performed by: PHYSICIAN ASSISTANT

## 2017-08-03 NOTE — PATIENT INSTRUCTIONS
Aleve twice daily as needed for pain and to reduce inflammation.  Use Flonase as directed- 2 sprays in each nostril daily until symptoms resolve then continue with 1 spray in each nostril daily for 5 more days.  Warm compresses next to ear for pain relief.  Drink plenty of fluids and place a humidifier in bedroom.  Follow up with primary care provider in 10-14 days with any concern of persistent pain.

## 2017-08-03 NOTE — MR AVS SNAPSHOT
"              After Visit Summary   8/3/2017    Sri Grewal    MRN: [id number]           Patient Information     Date Of Birth          1950        Visit Information        Provider Department      8/3/2017 4:30 PM Jordyn Sun PA-C [facility]        Care Instructions    Aleve twice daily as needed for pain and to reduce inflammation.  Use Flonase as directed- 2 sprays in each nostril daily until symptoms resolve then continue with 1 spray in each nostril daily for 5 more days.  Warm compresses next to ear for pain relief.  Drink plenty of fluids and place a humidifier in bedroom.  Follow up with primary care provider in 10-14 days with any concern of persistent pain.          Follow-ups after your visit        Who to contact     You can reach your care team any time of the day by calling [phone].  Notification of test results:  If you have an abnormal lab result, we will notify you by phone as soon as possible.         Additional Information About Your Visit        BOSS [employer] Information     Access Mobile lets you send messages to your doctor, view your test results, renew your prescriptions, schedule appointments and more. To sign up, go to [email or web address] . Click on \"Log in\" on the left side of the screen, which will take you to the Welcome page. Then click on \"Sign up Now\" on the right side of the page.     You will be asked to enter the access code listed below, as well as some personal information. Please follow the directions to create your username and password.     Your access code is: [email or web address]  Expires: 2017 11:38 AM     Your access code will  in 90 days. If you need help or a new code, please call your [facility] clinic or [phone].        Care EveryWhere ID     This is your Care EveryWhere ID. This could be used by other organizations to access your [facility] medical records  [id number]        Your Vitals Were     Pulse Temperature Last Period Pulse " Oximetry          74 97.5  F (36.4  C) (Tympanic) 11/09/2005 98%         Blood Pressure from Last 3 Encounters:   08/03/17 156/90   06/01/17 128/78   05/25/17 (!) 179/92    Weight from Last 3 Encounters:   06/01/17 159 lb 3.2 oz (72.2 kg)   04/06/17 162 lb (73.5 kg)   01/16/17 163 lb 12.8 oz (74.3 kg)              Today, you had the following     No orders found for display       Primary Care Provider Office Phone # Fax #    Rosenda SANTIAGO FLEX Pierre [phone]       [facility] [place] [facility] [address]  [place] [address]        Equal Access to Services     SACHA VICKERS : Hadii aad ku hadasho Soomaali, waaxda luqadaha, qaybta kaalmada adeegyada, waxjuvenal castroin hayjoseph davies . So [facility] [phone].    ATENCIÓN: Si habla español, tiene a orozco disposición servicios gratuitos de asistencia lingüística. Llame al [phone].    We comply with applicable federal civil rights laws and [place] laws. We do not discriminate on the basis of race, color, national origin, age, disability sex, sexual orientation or gender identity.            Thank you!     Thank you for choosing [facility]  for your care. Our goal is always to provide you with excellent care. Hearing back from our patients is one way we can continue to improve our services. Please take a few minutes to complete the written survey that you may receive in the mail after your visit with us. Thank you!             Your Updated Medication List - Protect others around you: Learn how to safely use, store and throw away your medicines at www.disposemymeds.org.          This list is accurate as of: 8/3/17  4:59 PM.  Always use your most recent med list.                   Brand Name Dispense Instructions for use Diagnosis    albuterol 108 (90 BASE) MCG/ACT Inhaler    albuterol    2 Inhaler    Inhale 1-2 puffs into the lungs every 6 hours as needed for shortness of breath / dyspnea    Mild persistent  asthma, uncomplicated       ALLEGRA PO           aspirin 325 MG tablet     180 tablet    Take 1 tablet (325 mg) by mouth daily    Amaurosis fugax       butalbital-aspirin-caffeine -40 MG per capsule    FIORINAL    30 capsule    TAKE ONE CAPSULE BY MOUTH EVERY 6 HOURS AS NEEDED (one month supply)    Chronic migraine without aura without status migrainosus, not intractable       CENTURY-LUTEIN Tabs     100 tablet    Take 1 tablet by mouth daily.    Family history of macular degeneration       clopidogrel 75 MG tablet    PLAVIX    90 tablet    Take 1 tablet (75 mg) by mouth daily    Amaurosis fugax       IBUPROFEN PO      Reported on 4/6/2017        lisinopril 30 MG tablet    PRINIVIL,ZESTRIL    90 tablet    Take 1 tablet (30 mg) by mouth daily    Amaurosis fugax, Hypertension goal BP (blood pressure) < 140/90       valACYclovir 500 MG tablet    VALTREX    14 tablet    Take 1 tablet (500 mg) by mouth 2 times daily As needed    Herpes simplex disease

## 2017-08-03 NOTE — PROGRESS NOTES
Chief Complaint   Patient presents with     Otalgia     left x3w      Headache     SUBJECTIVE:  Sri Grewal is a 67 year old female who presents for evaluation of left ear pain for 3 weeks.   Severity: mild and moderate   Timing: gradual onset but suddenly got worse today.  Additional symptoms include none.  Treatment measures tried include: None tried.  History of recurrent otitis: no  Predisposing factors include: None.    Past Medical History:   Diagnosis Date     Abnormal Papanicolaou smear of cervix and cervical HPV      Allergic rhinitis, cause unspecified     seasonal allergies     Contact dermatitis and other eczema, due to unspecified cause      Endometriosis, site unspecified      Esophageal reflux     GERD     Migraine, unspecified, without mention of intractable migraine without mention of status migrainosus      Mild persistent asthma      Unspecified disorder of uterus     fibroid uterus     Current Outpatient Prescriptions   Medication Sig Dispense Refill     lisinopril (PRINIVIL,ZESTRIL) 30 MG tablet Take 1 tablet (30 mg) by mouth daily 90 tablet 3     albuterol (ALBUTEROL) 108 (90 BASE) MCG/ACT Inhaler Inhale 1-2 puffs into the lungs every 6 hours as needed for shortness of breath / dyspnea 2 Inhaler 11     clopidogrel (PLAVIX) 75 MG tablet Take 1 tablet (75 mg) by mouth daily 90 tablet 3     butalbital-aspirin-caffeine (FIORINAL) -40 MG per capsule TAKE ONE CAPSULE BY MOUTH EVERY 6 HOURS AS NEEDED (one month supply) 30 capsule 5     aspirin 325 MG tablet Take 1 tablet (325 mg) by mouth daily 180 tablet 1     IBUPROFEN PO Reported on 4/6/2017       Fexofenadine HCl (ALLEGRA PO)        Multiple Vitamins-Minerals (CENTURY-LUTEIN) TABS Take 1 tablet by mouth daily. 100 tablet 12     valACYclovir (VALTREX) 500 MG tablet Take 1 tablet (500 mg) by mouth 2 times daily As needed (Patient not taking: Reported on 8/3/2017) 14 tablet 5     [DISCONTINUED] Albuterol Sulfate 108 (90 BASE) MCG/ACT AEPB  Reported on 3/22/2017       Social History   Substance Use Topics     Smoking status: Never Smoker     Smokeless tobacco: Never Used     Alcohol use Yes      Comment: beerx2/x1 per month     Allergies   Allergen Reactions     Codeine      GI UPSET     Percocet [Oxycodone-Acetaminophen] Nausea and Vomiting     Seasonal Allergies      ROS:   Review of systems negative except as stated above.    OBJECTIVE:  /90 (BP Location: Left arm, Patient Position: Chair, Cuff Size: Adult Regular)  Pulse 74  Temp 97.5  F (36.4  C) (Tympanic)  LMP 11/09/2005  SpO2 98%   GENERAL: awake alert and in no acute distress  EARS:   Right TM in neutral position, translucent with moderate scarring, without effusion or erythema. Normal landmarks are visible. Auditory canal without drainage, edema, erythema.  Left TM in neutral position, translucent with moderate scarring, with minimal effusion without erythema. Normal landmarks are visible. Auditory canal without drainage, edema, erythema.  ENT: EOMI,  PERRL, conjunctiva clear. Nares bilaterally without edematous turbinates or discharge. Posterior pharynx is not erythematous.  NECK: supple, non-tender to palpation, no adenopathy noted.   RESP: lungs clear to auscultation - no rales, rhonchi or wheezes  CV: regular rates and rhythm, normal S1 S2, no murmur noted    ASSESSMENT:    ICD-10-CM    1. Dysfunction of eustachian tube, left H69.82    2. Otalgia of left ear H92.02      PLAN:   Patient Instructions   Aleve twice daily as needed for pain and to reduce inflammation.  Use Flonase as directed- 2 sprays in each nostril daily until symptoms resolve then continue with 1 spray in each nostril daily for 5 more days.  Warm compresses next to ear for pain relief.  Drink plenty of fluids and place a humidifier in bedroom.  Follow up with primary care provider in 10-14 days with any concern of persistent pain.    Follow up with primary care provider with any problems, questions or concerns  or if symptoms worsen or fail to improve. Patient agreed to plan and verbalized understanding.    Tessie Sun PA-C  [facility]

## 2017-08-28 ENCOUNTER — TELEPHONE (OUTPATIENT)
Dept: FAMILY MEDICINE | Facility: OTHER | Age: 67
End: 2017-08-28

## 2017-08-28 NOTE — TELEPHONE ENCOUNTER
Not on medication list. Was prescribed in the past for acute reasons. Will need appointment, but advise this medication is available OTC without a prescription. Also should limit the use of this as it could raise her blood pressure.    Jose Francisco Pierre PA-C  [facility]

## 2017-08-28 NOTE — TELEPHONE ENCOUNTER
Sudafed 120mg 12hr tab   Last Written Prescription Date:  n/a  Last Fill Quantity: n/a,   # refills: n/a  Last Office Visit with G, P or [facility] prescribing provider: n/a  Future Office visit:       Routing refill request to provider for review/approval because:  Drug not active on patient's medication list

## 2017-08-28 NOTE — TELEPHONE ENCOUNTER
Reason for Call:  Medication or medication refill:    Do you use a [facility]?  Name of the pharmacy and phone number for the current request:  Meds by mail    Name of the medication requested: pseudoePHEDrine (SUDAFED) 120 MG 12 hr tablet     Other request: patient would like a refill,please call when this is called in.      Can we leave a detailed message on this number? YES    Phone number patient can be reached at: Home number on file [phone] (home)    Best Time: any    Call taken on 8/28/2017 at 3:15 PM by Miryam Torre

## 2017-08-29 NOTE — TELEPHONE ENCOUNTER
Pt returned our call and was given message below.  She states that she is going to gets some over the counter.

## 2017-10-10 ENCOUNTER — TELEPHONE (OUTPATIENT)
Dept: FAMILY MEDICINE | Facility: OTHER | Age: 67
End: 2017-10-10

## 2017-10-10 DIAGNOSIS — G43.709 CHRONIC MIGRAINE WITHOUT AURA WITHOUT STATUS MIGRAINOSUS, NOT INTRACTABLE: ICD-10-CM

## 2017-10-10 RX ORDER — BUTALBITAL/ASPIRIN/CAFFEINE 50-325-40
CAPSULE ORAL
Qty: 30 CAPSULE | Refills: 5 | Status: CANCELLED | OUTPATIENT
Start: 2017-10-10

## 2017-10-10 NOTE — TELEPHONE ENCOUNTER
butalbital-aspirin-caffeine (FIORINAL) -40 MG per capsule  Routing refill request to provider for review/approval because:  Drug not on the [facility] refill protocol     Cee Miles RN, BSN

## 2017-10-10 NOTE — TELEPHONE ENCOUNTER
Reason for call:  Medication  Reason for Call:  Medication or medication refill:    Do you use a [facility]?  Name of the pharmacy and phone number for the current request:  christian fax to 1 [phone]    Name of the medication requested: butalbital    Other request: please call patient once this has been done.    Can we leave a detailed message on this number? YES    Phone number patient can be reached at: Home number on file [phone] (home)    Best Time: asap    Call taken on 10/10/2017 at 4:58 PM by Marianne Perez

## 2017-10-11 NOTE — TELEPHONE ENCOUNTER
Lm with Medical Records to either fax us notes form her visit with them or call me back to let me know that she never made the appointment.

## 2017-10-11 NOTE — TELEPHONE ENCOUNTER
Patient called back stating she still has one more refill left for this medication. She only refilled 5 out of 6. She wants to know what is going on and said she needs this prescription refilled. I relayed message from below that she was to establish care with a neurologist and she didn't understand what she needed to do that for. She is asking for an update. Please contact patient and leave message if she doesn't answer.

## 2017-10-11 NOTE — TELEPHONE ENCOUNTER
April note states they were supposed to establish with neurology and patient has sent no notes to us from any visit of this type.    CDL noted need for prophylactic med.  Please call to see if there are any records to get or if she needs any assistance in scheduling.  My last visit with her more than a year ago I was working on weaning her off of this, so I would not be filling for her if she is continuing to use and not following through with her primary care instructions.  Primary will be in and can review tomorrow if she prefers.  Yanely Manrique MD

## 2017-10-11 NOTE — TELEPHONE ENCOUNTER
"If patient has only filled 5 out of 6, she should be able to call the pharmacy to get it refilled?    [facility]. They said \"it has .\" She states she is going to call the [facility] again, because her rx bottle says order . Told patient to call back here if there are any more issues.  Candy Felix CMA      Patient was referred to the [facility]. [phone]. Order faxed to them. Patient given phone number.      "

## 2017-10-11 NOTE — TELEPHONE ENCOUNTER
Patient then states it has to be faxed to them [phone].  She would like to get some on hand because she was told it would take up to 2 weeks before her rx would get to her ... She would like it sent to [facility].  Please call her and let her know where we are with this.

## 2017-10-12 RX ORDER — BUTALBITAL/ASPIRIN/CAFFEINE 50-325-40
CAPSULE ORAL
Qty: 15 CAPSULE | Refills: 0 | Status: SHIPPED | OUTPATIENT
Start: 2017-10-12 | End: 2017-10-27

## 2017-10-12 RX ORDER — BUTALBITAL/ASPIRIN/CAFFEINE 50-325-40
CAPSULE ORAL
Qty: 30 CAPSULE | Refills: 5 | Status: SHIPPED | OUTPATIENT
Start: 2017-10-12 | End: 2017-10-12

## 2017-10-12 NOTE — TELEPHONE ENCOUNTER
1 RX signed and placed in MA task to be faxed to [facility].     Smaller rx signed and placed in MA task for zina myles to be faxed to [facility].     Jose Francisco Pierre PA-C  [facility]

## 2017-10-12 NOTE — TELEPHONE ENCOUNTER
Spoke to patient and informed her of message below.   Short fill faxed to [facility]  And other with the 5 refills sent to mail order

## 2017-10-13 ENCOUNTER — ALLIED HEALTH/NURSE VISIT (OUTPATIENT)
Dept: FAMILY MEDICINE | Facility: OTHER | Age: 67
End: 2017-10-13
Payer: MEDICARE

## 2017-10-13 VITALS — SYSTOLIC BLOOD PRESSURE: 158 MMHG | DIASTOLIC BLOOD PRESSURE: 80 MMHG

## 2017-10-13 DIAGNOSIS — I10 HYPERTENSION GOAL BP (BLOOD PRESSURE) < 140/90: Primary | ICD-10-CM

## 2017-10-13 PROCEDURE — 99207 ZZC NO CHARGE NURSE ONLY: CPT | Performed by: PHYSICIAN ASSISTANT

## 2017-10-13 NOTE — PROGRESS NOTES
Please call patient     Got her blood pressure readings from the pharmacy. Still running a little high.     Recommend increasing Lisinopril to 45 mg (1.5 tablets of her 30 mg tablets) and recheck BP in 1-2 weeks at pharmacy.     Please route back if patient needs refills sent.       Jose Francisco Pierre PA-C  [facility]

## 2017-10-13 NOTE — PROGRESS NOTES
Sri Grewal is enrolled/participating in the retail pharmacy Blood Pressure Goals Achievement Program (BPGAP).  Sri Grewal was evaluated at [facility] on October 13, 2017 at which time her blood pressure was:    BP Readings from Last 3 Encounters:   10/13/17 158/80   08/03/17 156/90   06/01/17 128/78         Sri Grewal is not experiencing symptoms.    Follow-Up: BP is not at goal of < 140/90mmHg (patient 18+ years of age with or without diabetes), Recommended follow-up with PCP.  Routing to PCP for further review.    Recommendation to Provider:  None     Completed by:     Jorge Price PharmD  [facility]   [phone]

## 2017-10-13 NOTE — MR AVS SNAPSHOT
"              After Visit Summary   10/13/2017    Sri Grewal    MRN: [id number]           Patient Information     Date Of Birth          1950        Visit Information        Provider Department      10/13/2017 11:20 AM Rosenda Pierre PA-C [facility]        Today's Diagnoses     Hypertension goal BP (blood pressure) < 140/90    -  1       Follow-ups after your visit        Who to contact     If you have questions or need follow up information about today's clinic visit or your schedule please contact [facility] directly at [phone].  Normal or non-critical lab and imaging results will be communicated to you by Dotted Blockhart, letter or phone within 4 business days after the clinic has received the results. If you do not hear from us within 7 days, please contact the clinic through Dotted Blockhart or phone. If you have a critical or abnormal lab result, we will notify you by phone as soon as possible.  Submit refill requests through [facility] or call your pharmacy and they will forward the refill request to us. Please allow 3 business days for your refill to be completed.          Additional Information About Your Visit        MyChart Information     [facility] lets you send messages to your doctor, view your test results, renew your prescriptions, schedule appointments and more. To sign up, go to [email or web address] . Click on \"Log in\" on the left side of the screen, which will take you to the Welcome page. Then click on \"Sign up Now\" on the right side of the page.     You will be asked to enter the access code listed below, as well as some personal information. Please follow the directions to create your username and password.     Your access code is: [email or web address]  Expires: 2018 12:00 PM     Your access code will  in 90 days. If you need help or a new code, please call your [facility] or [phone].        Care EveryWhere ID     This is your Care " EveryWhere ID. This could be used by other organizations to access your [place] medical records  [id number]        Your Vitals Were     Last Period                   11/09/2005            Blood Pressure from Last 3 Encounters:   10/13/17 158/80   08/03/17 156/90   06/01/17 128/78    Weight from Last 3 Encounters:   06/01/17 159 lb 3.2 oz (72.2 kg)   04/06/17 162 lb (73.5 kg)   01/16/17 163 lb 12.8 oz (74.3 kg)              Today, you had the following     No orders found for display       Primary Care Provider Office Phone # Fax #    Rosenda SANTIAGO FLEX Pierre [phone]       [address]  [facility] [address]        Equal Access to Services     SACHA VICKERS : Hadii aad ku hadasho Soomaali, waaxda luqadaha, qaybta kaalmada adeegyada, caridad davies . So [facility] [phone].    ATENCIÓN: Si habla español, tiene a orozco disposición servicios gratuitos de asistencia lingüística. Llame al [phone].    We comply with applicable federal civil rights laws and [place] laws. We do not discriminate on the basis of race, color, national origin, age, disability, sex, sexual orientation, or gender identity.            Thank you!     Thank you for choosing [facility]  for your care. Our goal is always to provide you with excellent care. Hearing back from our patients is one way we can continue to improve our services. Please take a few minutes to complete the written survey that you may receive in the mail after your visit with us. Thank you!             Your Updated Medication List - Protect others around you: Learn how to safely use, store and throw away your medicines at www.disposemymeds.org.          This list is accurate as of: 10/13/17 11:59 PM.  Always use your most recent med list.                   Brand Name Dispense Instructions for use Diagnosis    albuterol 108 (90 BASE) MCG/ACT Inhaler    PROAIR HFA    2 Inhaler    Inhale 1-2 puffs into  the lungs every 6 hours as needed for shortness of breath / dyspnea    Mild persistent asthma, uncomplicated       ALLEGRA PO           aspirin 325 MG tablet     180 tablet    Take 1 tablet (325 mg) by mouth daily    Amaurosis fugax       butalbital-aspirin-caffeine -40 MG per capsule    FIORINAL    15 capsule    TAKE ONE CAPSULE BY MOUTH EVERY 6 HOURS AS NEEDED (one month supply)    Chronic migraine without aura without status migrainosus, not intractable       CENTURY-LUTEIN Tabs     100 tablet    Take 1 tablet by mouth daily.    Family history of macular degeneration       clopidogrel 75 MG tablet    PLAVIX    90 tablet    Take 1 tablet (75 mg) by mouth daily    Amaurosis fugax       IBUPROFEN PO      Reported on 4/6/2017        lisinopril 30 MG tablet    PRINIVIL,ZESTRIL    90 tablet    Take 1 tablet (30 mg) by mouth daily    Amaurosis fugax, Hypertension goal BP (blood pressure) < 140/90

## 2017-10-16 ENCOUNTER — TELEPHONE (OUTPATIENT)
Dept: FAMILY MEDICINE | Facility: OTHER | Age: 67
End: 2017-10-16

## 2017-10-16 NOTE — TELEPHONE ENCOUNTER
Please call patient      Got her blood pressure readings from the pharmacy. Still running a little high.      Recommend increasing Lisinopril to 45 mg (1.5 tablets of her 30 mg tablets) and recheck BP in 1-2 weeks at pharmacy.      Please route back if patient needs refills sent.         Jose Francisco Pierre PA-C  [facility]

## 2017-10-16 NOTE — TELEPHONE ENCOUNTER
Patient returned call - given information below. She expressed understanding.    Candy Felix, CMA

## 2017-10-16 NOTE — TELEPHONE ENCOUNTER
Copied and pasted CDL's message below; somehow it came as a CC'd chart.     Notes   Rosenda Pierre PA-C (Physician Assistant)     Physician Assistant - Medical   Unsigned      Please call patient      Got her blood pressure readings from the pharmacy. Still running a little high.      Recommend increasing Lisinopril to 45 mg (1.5 tablets of her 30 mg tablets) and recheck BP in 1-2 weeks at pharmacy.      Please route back if patient needs refills sent.         Jose Francisco Pierre PA-C  [facility]

## 2017-10-27 ENCOUNTER — TELEPHONE (OUTPATIENT)
Dept: FAMILY MEDICINE | Facility: OTHER | Age: 67
End: 2017-10-27

## 2017-10-27 DIAGNOSIS — G43.709 CHRONIC MIGRAINE WITHOUT AURA WITHOUT STATUS MIGRAINOSUS, NOT INTRACTABLE: ICD-10-CM

## 2017-10-27 RX ORDER — BUTALBITAL/ASPIRIN/CAFFEINE 50-325-40
CAPSULE ORAL
Qty: 30 CAPSULE | Refills: 5 | Status: SHIPPED | OUTPATIENT
Start: 2017-10-27 | End: 2018-02-06

## 2017-10-27 RX ORDER — BUTALBITAL/ASPIRIN/CAFFEINE 50-325-40
CAPSULE ORAL
Qty: 15 CAPSULE | Refills: 0 | Status: SHIPPED | OUTPATIENT
Start: 2017-10-27 | End: 2017-10-27

## 2017-10-27 NOTE — TELEPHONE ENCOUNTER
Reason for Call:  Medication or medication refill:    Do you use a [facility]?  Name of the pharmacy and phone number for the current request:  [facility] - [phone]    Name of the medication requested: butalbital-aspirin-caffeine (FIORINAL) -40 MG per capsule AND Asthma Inhaler    Other request: pt states following up on medication,states was supposed to get another 15 sent and hasnt received it. Pt states would like this filled to pharmacy.PT also wants asthma inhaler refilled-that [facility] be [facility]     Can we leave a detailed message on this number? YES    Phone number patient can be reached at: Home number on file [phone] (home)    Best Time: ANY    Call taken on 10/27/2017 at 8:57 AM by Kesha Ngo

## 2017-10-27 NOTE — TELEPHONE ENCOUNTER
RX signed and placed in MA task. Please fax.    Jose Francisco Pierre PA-C  [facility]

## 2017-10-27 NOTE — TELEPHONE ENCOUNTER
Spoke to patient and she will need refill on Fiorinal  She got the 15 tables from [facility] on 10/12/17 but not sure if the rest of refills [facility]. I saw that it was then discontinued   on 10/12/17   I told her that the albuterol was sent to [facility]  In June 1 with 11 refills.

## 2017-10-31 ENCOUNTER — OFFICE VISIT (OUTPATIENT)
Dept: URGENT CARE | Facility: RETAIL CLINIC | Age: 67
End: 2017-10-31
Payer: MEDICARE

## 2017-10-31 VITALS
DIASTOLIC BLOOD PRESSURE: 102 MMHG | OXYGEN SATURATION: 98 % | SYSTOLIC BLOOD PRESSURE: 170 MMHG | HEART RATE: 91 BPM | TEMPERATURE: 98.3 F

## 2017-10-31 DIAGNOSIS — J01.90 ACUTE SINUSITIS WITH SYMPTOMS > 10 DAYS: Primary | ICD-10-CM

## 2017-10-31 DIAGNOSIS — J45.31 MILD PERSISTENT ASTHMA WITH EXACERBATION: ICD-10-CM

## 2017-10-31 PROCEDURE — 99213 OFFICE O/P EST LOW 20 MIN: CPT | Performed by: PHYSICIAN ASSISTANT

## 2017-10-31 RX ORDER — PREDNISONE 20 MG/1
40 TABLET ORAL DAILY
Qty: 10 TABLET | Refills: 0 | Status: SHIPPED | OUTPATIENT
Start: 2017-10-31 | End: 2017-11-05

## 2017-10-31 NOTE — PROGRESS NOTES
Chief Complaint   Patient presents with     Sinus Problem     headaches 2 weeks ago then sinus congestion and drainage, coughing all night last night, no fevers     SUBJECTIVE:  Sri Grewal is a 67 year old female here with concerns about sinus infection.  She states onset of symptoms was 2 weeks ago.    Course of illness is worsening.   Severity moderate  She has had maxillary pressure as well as post-nasal drainage with a cough and headaches  Predisposing factors include none.   Recent treatment has included: OTC meds  Takes lisinopril at 2pm. Dose was recently increased.    Past Medical History:   Diagnosis Date     Abnormal Papanicolaou smear of cervix and cervical HPV      Allergic rhinitis, cause unspecified     seasonal allergies     Contact dermatitis and other eczema, due to unspecified cause      Endometriosis, site unspecified      Esophageal reflux     GERD     Migraine, unspecified, without mention of intractable migraine without mention of status migrainosus      Mild persistent asthma      Unspecified disorder of uterus     fibroid uterus     Current Outpatient Prescriptions   Medication Sig Dispense Refill     Multiple Vitamins-Minerals (AIRBORNE PO)        amoxicillin-clavulanate (AUGMENTIN) 875-125 MG per tablet Take 1 tablet by mouth 2 times daily for 10 days 20 tablet 0     predniSONE (DELTASONE) 20 MG tablet Take 2 tablets (40 mg) by mouth daily for 5 days 10 tablet 0     butalbital-aspirin-caffeine (FIORINAL) -40 MG per capsule TAKE ONE CAPSULE BY MOUTH EVERY 6 HOURS AS NEEDED (one month supply) 30 capsule 5     lisinopril (PRINIVIL,ZESTRIL) 30 MG tablet Take 1 tablet (30 mg) by mouth daily 90 tablet 3     albuterol (ALBUTEROL) 108 (90 BASE) MCG/ACT Inhaler Inhale 1-2 puffs into the lungs every 6 hours as needed for shortness of breath / dyspnea 2 Inhaler 11     clopidogrel (PLAVIX) 75 MG tablet Take 1 tablet (75 mg) by mouth daily 90 tablet 3     aspirin 325 MG tablet Take 1 tablet (325  mg) by mouth daily 180 tablet 1     IBUPROFEN PO Reported on 4/6/2017       Fexofenadine HCl (ALLEGRA PO)        Multiple Vitamins-Minerals (CENTURY-LUTEIN) TABS Take 1 tablet by mouth daily. (Patient not taking: Reported on 10/31/2017) 100 tablet 12     Social History   Substance Use Topics     Smoking status: Never Smoker     Smokeless tobacco: Never Used     Alcohol use Yes      Comment: beerx2/x1 per month     Allergies   Allergen Reactions     Codeine      GI UPSET     Percocet [Oxycodone-Acetaminophen] Nausea and Vomiting     Seasonal Allergies      ROS:  Review of systems negative except as stated above.    OBJECTIVE:  BP (!) 170/102 (BP Location: Left arm)  Pulse 91  Temp 98.3  F (36.8  C) (Temporal)  LMP 11/09/2005  SpO2 98%  GENERAL APPEARANCE: healthy, alert and no distress  EYES: PERRL, conjunctiva clear  HENT: Pain with palpation over frontal and maxillary sinuses. Ear canals normal TMs with mild serous effusions bilaterally. Nasal turbinates edematous and boggy with a blue hue bilaterally. Posterior pharynx is not erythematous.  NECK: supple, nontender, no lymphadenopathy  RESP: lungs with expiratory wheezes and scattered coarse rhonchi that clear with coughing throughout lung field. Breathing is comfortable without use of accessory muscles.  CV: regular rates and rhythm, normal S1 S2, no murmur noted    ASSESSMENT:    ICD-10-CM    1. Acute sinusitis with symptoms > 10 days J01.90 amoxicillin-clavulanate (AUGMENTIN) 875-125 MG per tablet   2. Mild persistent asthma with exacerbation J45.31 predniSONE (DELTASONE) 20 MG tablet     PLAN:   Patient Instructions   Augmentin (amoxicillin-clavulanate) 875mg twice daily for 10 days as directed.  Prednisone 40 mg daily for 5 days.  Albuterol, 2 puffs every 4-6 hours as needed for wheezing.  Flonase 2 sprays in each nostril daily until symptoms resolve, then continue 1 spray in each nostril for at least 5 more days.  Take Tylenol or an NSAID such as  ibuprofen or naproxen as needed for pain.  Avoid Sudafed or other decongestants as these will raise your blood pressure.  Mucinex (guiafenesin) thins mucus and may help it to loosen more quickly  Saline drops or nasal sprays may loosen mucus.  Sit in the bathroom with the door closed and hot shower running to loosen mucus.  Contact primary care clinic if you do not have any relief from your symptoms after 10 days.  Present to emergency room for significantly increasing pain, persistent high fever >102F, swelling/redness around your eyes, changes in your vision or ability to move your eyes, altered mental status or a severe headache.    Follow up with primary care provider with any problems, questions or concerns or if symptoms worsen or fail to improve. Patient agreed to plan and verbalized understanding.    Tessie Sun PA-C  [facility]

## 2017-10-31 NOTE — PATIENT INSTRUCTIONS
Augmentin (amoxicillin-clavulanate) 875mg twice daily for 10 days as directed.  Prednisone 40 mg daily for 5 days.  Albuterol, 2 puffs every 4-6 hours as needed for wheezing.  Flonase 2 sprays in each nostril daily until symptoms resolve, then continue 1 spray in each nostril for at least 5 more days.  Take Tylenol or an NSAID such as ibuprofen or naproxen as needed for pain.  Avoid Sudafed or other decongestants as these will raise your blood pressure.  Mucinex (guiafenesin) thins mucus and may help it to loosen more quickly  Saline drops or nasal sprays may loosen mucus.  Sit in the bathroom with the door closed and hot shower running to loosen mucus.  Contact primary care clinic if you do not have any relief from your symptoms after 10 days.  Present to emergency room for significantly increasing pain, persistent high fever >102F, swelling/redness around your eyes, changes in your vision or ability to move your eyes, altered mental status or a severe headache.

## 2017-10-31 NOTE — MR AVS SNAPSHOT
"              After Visit Summary   10/31/2017    Sri Grewal    MRN: [id number]           Patient Information     Date Of Birth          1950        Visit Information        Provider Department      10/31/2017 9:30 AM Jordyn Sun PA-C [facility]        Today's Diagnoses     Acute sinusitis with symptoms > 10 days    -  1    Mild persistent asthma with exacerbation          Care Instructions    Augmentin (amoxicillin-clavulanate) 875mg twice daily for 10 days as directed.  Prednisone 40 mg daily for 5 days.  Albuterol, 2 puffs every 4-6 hours as needed for wheezing.  Flonase 2 sprays in each nostril daily until symptoms resolve, then continue 1 spray in each nostril for at least 5 more days.  Take Tylenol or an NSAID such as ibuprofen or naproxen as needed for pain.  Avoid Sudafed or other decongestants as these will raise your blood pressure.  Mucinex (guiafenesin) thins mucus and may help it to loosen more quickly  Saline drops or nasal sprays may loosen mucus.  Sit in the bathroom with the door closed and hot shower running to loosen mucus.  Contact primary care clinic if you do not have any relief from your symptoms after 10 days.  Present to emergency room for significantly increasing pain, persistent high fever >102F, swelling/redness around your eyes, changes in your vision or ability to move your eyes, altered mental status or a severe headache.          Follow-ups after your visit        Who to contact     You can reach your care team any time of the day by calling [phone].  Notification of test results:  If you have an abnormal lab result, we will notify you by phone as soon as possible.         Additional Information About Your Visit        MyChart Information     [facility] lets you send messages to your doctor, view your test results, renew your prescriptions, schedule appointments and more. To sign up, go to [email or web address] . Click on \"Log in\" on the left " "side of the screen, which will take you to the Welcome page. Then click on \"Sign up Now\" on the right side of the page.     You will be asked to enter the access code listed below, as well as some personal information. Please follow the directions to create your username and password.     Your access code is: [email or web address]  Expires: 2018 12:00 PM     Your access code will  in 90 days. If you need help or a new code, please call your [place] clinic or [phone].        Care EveryWhere ID     This is your Care EveryWhere ID. This could be used by other organizations to access your [place] medical records  [id number]        Your Vitals Were     Pulse Temperature Last Period Pulse Oximetry          91 98.3  F (36.8  C) (Temporal) 2005 98%         Blood Pressure from Last 3 Encounters:   10/31/17 (!) 170/102   10/13/17 158/80   17 156/90    Weight from Last 3 Encounters:   17 159 lb 3.2 oz (72.2 kg)   17 162 lb (73.5 kg)   17 163 lb 12.8 oz (74.3 kg)              Today, you had the following     No orders found for display         Today's Medication Changes          These changes are accurate as of: 10/31/17  9:45 AM.  If you have any questions, ask your nurse or doctor.               Start taking these medicines.        Dose/Directions    amoxicillin-clavulanate 875-125 MG per tablet   Commonly known as:  AUGMENTIN   Used for:  Acute sinusitis with symptoms > 10 days        Dose:  1 tablet   Take 1 tablet by mouth 2 times daily for 10 days   Quantity:  20 tablet   Refills:  0       predniSONE 20 MG tablet   Commonly known as:  DELTASONE   Used for:  Mild persistent asthma with exacerbation        Dose:  40 mg   Take 2 tablets (40 mg) by mouth daily for 5 days   Quantity:  10 tablet   Refills:  0            Where to get your medicines      These medications were sent to [facility] [place] - [place], [place] - [address]  [address], [facility] [address] "     Phone:  [phone]     amoxicillin-clavulanate 875-125 MG per tablet    predniSONE 20 MG tablet                Primary Care Provider Office Phone # Fax #    Rosenda SANTIAGO FLEX Pierre [phone]       [address] [place] [address] [address]  [facility] [address]        Equal Access to Services     BUSHRA VICKERS : Hadii aad ku hadasho Soomaali, waaxda luqadaha, qaybta kaalmada adeegyada, waxay gloriain hayaan adenancy khlindaraya davies . So [facility] [place] [phone].    ATENCIÓN: Si habla español, tiene a orozco disposición servicios gratuitos de asistencia lingüística. Radha al [phone].    We comply with applicable federal civil rights laws and [place] laws. We do not discriminate on the basis of race, color, national origin, age, disability, sex, sexual orientation, or gender identity.            Thank you!     Thank you for choosing [facility]  for your care. Our goal is always to provide you with excellent care. Hearing back from our patients is one way we can continue to improve our services. Please take a few minutes to complete the written survey that you may receive in the mail after your visit with us. Thank you!             Your Updated Medication List - Protect others around you: Learn how to safely use, store and throw away your medicines at www.disposemymeds.org.          This list is accurate as of: 10/31/17  9:45 AM.  Always use your most recent med list.                   Brand Name Dispense Instructions for use Diagnosis    albuterol 108 (90 BASE) MCG/ACT Inhaler    PROAIR HFA    2 Inhaler    Inhale 1-2 puffs into the lungs every 6 hours as needed for shortness of breath / dyspnea    Mild persistent asthma, uncomplicated       ALLEGRA PO           amoxicillin-clavulanate 875-125 MG per tablet    AUGMENTIN    20 tablet    Take 1 tablet by mouth 2 times daily for 10 days    Acute sinusitis with symptoms > 10 days       aspirin 325 MG tablet     180 tablet    Take 1 tablet (325 mg) by  mouth daily    Amaurosis fugax       butalbital-aspirin-caffeine -40 MG per capsule    FIORINAL    30 capsule    TAKE ONE CAPSULE BY MOUTH EVERY 6 HOURS AS NEEDED (one month supply)    Chronic migraine without aura without status migrainosus, not intractable       * AIRBORNE PO           * CENTURY-LUTEIN Tabs     100 tablet    Take 1 tablet by mouth daily.    Family history of macular degeneration       clopidogrel 75 MG tablet    PLAVIX    90 tablet    Take 1 tablet (75 mg) by mouth daily    Amaurosis fugax       IBUPROFEN PO      Reported on 4/6/2017        lisinopril 30 MG tablet    PRINIVIL,ZESTRIL    90 tablet    Take 1 tablet (30 mg) by mouth daily    Amaurosis fugax, Hypertension goal BP (blood pressure) < 140/90       predniSONE 20 MG tablet    DELTASONE    10 tablet    Take 2 tablets (40 mg) by mouth daily for 5 days    Mild persistent asthma with exacerbation       * Notice:  This list has 2 medication(s) that are the same as other medications prescribed for you. Read the directions carefully, and ask your doctor or other care provider to review them with you.

## 2017-10-31 NOTE — NURSING NOTE
"Chief Complaint   Patient presents with     Sinus Problem     headaches 2 weeks ago then sinus congestion and drainage, coughing all night last night, no fevers       Initial BP (!) 170/102 (BP Location: Left arm)  Pulse 91  Temp 98.3  F (36.8  C) (Temporal)  LMP 11/09/2005  SpO2 98% Estimated body mass index is 29 kg/(m^2) as calculated from the following:    Height as of 6/1/17: 5' 2.13\" (1.578 m).    Weight as of 6/1/17: 159 lb 3.2 oz (72.2 kg).  Medication Reconciliation: complete    "

## 2017-11-03 ENCOUNTER — TELEPHONE (OUTPATIENT)
Dept: FAMILY MEDICINE | Facility: OTHER | Age: 67
End: 2017-11-03

## 2017-11-03 NOTE — TELEPHONE ENCOUNTER
I did a follow up call to patient regarding her blood pressure.      10/13/17 she was in and had it checked at the pharmacy and it was high  10/16/17 provider recommended she increase dose to 1 and 1/2 tablets and recheck bp in 2 weeks  10/31/17 In to urgent care for sinus issue and bp was still high     Today 11/3/17 - I called patient to follow up - she said she has been taking the increase dose, and said the dr wanted her to recheck in a couple weeks and that she did have it checked at the Urgent Care and the dr should have gotten that reading.  Seems reluctant to have checked in pharmacy again as she had it checked at Urgent Care.  I said it may have been high due to her issues that were going on at the time and suggested she come in next week and have it checked again in the pharmacy.  It seemed she doesn't think she has to as she had it checked and dr could review it off the one done at Urgent Care.    Please review and follow up with patient if needed.    Thank you   -Nova Pozo, Pharm.D., [facility], [phone]

## 2017-11-03 NOTE — TELEPHONE ENCOUNTER
Sri Grewal is a 67 year old female who calls with blood pressure concerns - high readings.    NURSING ASSESSMENT:  Description:  Spoke with the patient regarding recent blood pressure checks being high even with her medication change of Lisinopril to 1.5 tablets per day. Patient stated she always has headaches, but no changes to them. Has a known sinus infection that she is being treated for with an intermittent cough. Denies visual changes, lightheaded, dizzy, weak, breathing concerns.  Onset/duration:  Ongoing the past month  Pain scale (0-10)   No new pain  LMP/preg/breast feeding:  Patient's last menstrual period was 11/09/2005.  Last exam/Treatment:  06/01/2017  Allergies:   Allergies   Allergen Reactions     Codeine      GI UPSET     Percocet [Oxycodone-Acetaminophen] Nausea and Vomiting     Seasonal Allergies      NURSING PLAN: Nursing advice to patient to follow up in clinic for BP concerns either for FREE BP check, or with provider as she will be due for 6 month follow up 12/2017    RECOMMENDED DISPOSITION:  See above  Will comply with recommendation: Yes stated would call back with changes, or if she would complete a BP check next week.  If further questions/concerns or if symptoms do not improve, worsen or new symptoms develop, call your PCP or [facility] as soon as possible.    NOTES:  Disposition was determined by the first positive assessment question, therefore all previous assessment questions were negative    Guideline used:  Telephone Triage Protocols for Nurses, Fifth Edition, Lilli Bright  Hypertension  Nursing Judgment    Cee Miles RN, BSN

## 2017-11-13 ENCOUNTER — TELEPHONE (OUTPATIENT)
Dept: FAMILY MEDICINE | Facility: OTHER | Age: 67
End: 2017-11-13

## 2017-11-13 DIAGNOSIS — I10 HYPERTENSION GOAL BP (BLOOD PRESSURE) < 140/90: ICD-10-CM

## 2017-11-13 DIAGNOSIS — G45.3 AMAUROSIS FUGAX: ICD-10-CM

## 2017-11-13 RX ORDER — LISINOPRIL 30 MG/1
45 TABLET ORAL DAILY
Qty: 135 TABLET | Refills: 3 | Status: SHIPPED | OUTPATIENT
Start: 2017-11-13 | End: 2018-02-06

## 2017-11-13 NOTE — TELEPHONE ENCOUNTER
Left message for patient to call back. Please see message below and help schedule nurse blood pressure check. Rx faxed.  Candy Felix, CMA

## 2017-11-13 NOTE — TELEPHONE ENCOUNTER
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  Goes through meds by mail would like to have new rx sent for new dose.  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  Miryam Grimm, CMA

## 2017-11-13 NOTE — TELEPHONE ENCOUNTER
Patient would like a call regarding Lisinopril, she has some questions, did not wish to give any more information.

## 2017-11-13 NOTE — TELEPHONE ENCOUNTER
Rx signed, needs to be faxed   Also make sure comes in THIS WEEK for BP recheck     Jose Francisco Pierre PA-C  [facility]

## 2017-11-16 ENCOUNTER — TELEPHONE (OUTPATIENT)
Dept: FAMILY MEDICINE | Facility: OTHER | Age: 67
End: 2017-11-16

## 2017-11-16 ENCOUNTER — ALLIED HEALTH/NURSE VISIT (OUTPATIENT)
Dept: FAMILY MEDICINE | Facility: OTHER | Age: 67
End: 2017-11-16
Payer: MEDICARE

## 2017-11-16 VITALS — HEART RATE: 80 BPM | SYSTOLIC BLOOD PRESSURE: 146 MMHG | DIASTOLIC BLOOD PRESSURE: 76 MMHG

## 2017-11-16 DIAGNOSIS — I10 HYPERTENSION GOAL BP (BLOOD PRESSURE) < 140/90: Primary | ICD-10-CM

## 2017-11-16 PROCEDURE — 99207 ZZC NO CHARGE NURSE ONLY: CPT | Performed by: PHYSICIAN ASSISTANT

## 2017-11-16 NOTE — Clinical Note
Routing message to PCP for review -BP checked at pharmacy and noted to be above 140/90 but is at goal per pharmay protocol (<150/90 without diabetes).  Sending as fyi.

## 2017-11-16 NOTE — TELEPHONE ENCOUNTER
Please call patient     I received her BP recheck from the pharmacy. I recommend we leave her medication as it is (Lisinopril 1.5 tablets or 45 mg) for now.     I would like her to have another recheck in 1-2 weeks     Jose Francisco Pierre PA-C  [facility]

## 2017-11-16 NOTE — PROGRESS NOTES
Sri Grewal is enrolled/participating in the retail pharmacy Blood Pressure Goals Achievement Program (BPGAP).  Sri Grewal was evaluated at [facility] on November 16, 2017 at which time her blood pressure was:    BP Readings from Last 3 Encounters:   11/16/17 146/76   10/31/17 (!) 170/102   10/13/17 158/80     Reviewed lifestyle modifications for blood pressure control and reduction: including making healthy food choices, managing weight, getting regular exercise, smoking cessation, reducing alcohol consumption, monitoring blood pressure regularly.     Sri Grewal is not experiencing symptoms.    Follow-Up: BP is at goal of < 150/90 mmHg (patient 60+ years of age without diabetes).  Recommended follow-up at pharmacy in 6 months.     Recommendation to Provider: none    Sri Grewal was evaluated for enrollment into the PGEN study today.    Patient eligible for enrollment:  Yes  Patient interested in enrollment:  Yes    Completed by:  -Nova Pozo, Pharm.D., [facility], [phone]

## 2017-11-16 NOTE — MR AVS SNAPSHOT
"              After Visit Summary   2017    Sri Grewal    MRN: [id number]           Patient Information     Date Of Birth          1950        Visit Information        Provider Department      2017 12:26 PM Rosenda Pierre PA-C [facility]        Today's Diagnoses     Hypertension goal BP (blood pressure) < 140/90    -  1       Follow-ups after your visit        Who to contact     If you have questions or need follow up information about today's clinic visit or your schedule please contact [facility] directly at [phone].  Normal or non-critical lab and imaging results will be communicated to you by [facility], letter or phone within 4 business days after the clinic has received the results. If you do not hear from us within 7 days, please contact the clinic through [employer] or phone. If you have a critical or abnormal lab result, we will notify you by phone as soon as possible.  Submit refill requests through [address] or call your pharmacy and they will forward the refill request to us. Please allow 3 business days for your refill to be completed.          Additional Information About Your Visit        MyChart Information     [facility] [address] lets you send messages to your doctor, view your test results, renew your prescriptions, schedule appointments and more. To sign up, go to [email or web address] . Click on \"Log in\" on the left side of the screen, which will take you to the Welcome page. Then click on \"Sign up Now\" on the right side of the page.     You will be asked to enter the access code listed below, as well as some personal information. Please follow the directions to create your username and password.     Your access code is: [email or web address]  Expires: 2018 11:00 AM     Your access code will  in 90 days. If you need help or a new code, please call your [facility] or [phone].        Care EveryWhere ID     This is your Care " EveryWhere ID. This could be used by other organizations to access your [place] medical records  [id number]        Your Vitals Were     Pulse Last Period                80 11/09/2005           Blood Pressure from Last 3 Encounters:   11/16/17 146/76   10/31/17 (!) 170/102   10/13/17 158/80    Weight from Last 3 Encounters:   06/01/17 159 lb 3.2 oz (72.2 kg)   04/06/17 162 lb (73.5 kg)   01/16/17 163 lb 12.8 oz (74.3 kg)              Today, you had the following     No orders found for display       Primary Care Provider Office Phone # Fax #    Rosenda SANTIAGO FLEX Pierre [phone]       [address] [place] [address]        Equal Access to Services     [facility] Center[facility]O : Hadii anthony cabrera hadasho Soomaali, waaxda luqadaha, qaybta kaalmada adeegyada, caridad davies . So [facility] [phone].    ATENCIÓN: Si habla español, tiene a orozco disposición servicios gratuitos de asistencia lingüística. Radha al [phone].    We comply with applicable federal civil rights laws and [place] laws. We do not discriminate on the basis of race, color, national origin, age, disability, sex, sexual orientation, or gender identity.            Thank you!     Thank you for choosing [facility]  for your care. Our goal is always to provide you with excellent care. Hearing back from our patients is one way we can continue to improve our services. Please take a few minutes to complete the written survey that you may receive in the mail after your visit with us. Thank you!             Your Updated Medication List - Protect others around you: Learn how to safely use, store and throw away your medicines at www.disposemymeds.org.          This list is accurate as of: 11/16/17 11:59 PM.  Always use your most recent med list.                   Brand Name Dispense Instructions for use Diagnosis    albuterol 108 (90 BASE) MCG/ACT Inhaler    PROAIR HFA    2 Inhaler    Inhale 1-2  puffs into the lungs every 6 hours as needed for shortness of breath / dyspnea    Mild persistent asthma, uncomplicated       ALLEGRA PO           aspirin 325 MG tablet     180 tablet    Take 1 tablet (325 mg) by mouth daily    Amaurosis fugax       butalbital-aspirin-caffeine -40 MG per capsule    FIORINAL    30 capsule    TAKE ONE CAPSULE BY MOUTH EVERY 6 HOURS AS NEEDED (one month supply)    Chronic migraine without aura without status migrainosus, not intractable       * AIRBORNE PO           * CENTURY-LUTEIN Tabs     100 tablet    Take 1 tablet by mouth daily.    Family history of macular degeneration       clopidogrel 75 MG tablet    PLAVIX    90 tablet    Take 1 tablet (75 mg) by mouth daily    Amaurosis fugax       IBUPROFEN PO      Reported on 4/6/2017        lisinopril 30 MG tablet    PRINIVIL,ZESTRIL    135 tablet    Take 1.5 tablets (45 mg) by mouth daily    Amaurosis fugax, Hypertension goal BP (blood pressure) < 140/90       * Notice:  This list has 2 medication(s) that are the same as other medications prescribed for you. Read the directions carefully, and ask your doctor or other care provider to review them with you.

## 2017-11-22 ENCOUNTER — TRANSFERRED RECORDS (OUTPATIENT)
Dept: HEALTH INFORMATION MANAGEMENT | Facility: CLINIC | Age: 67
End: 2017-11-22

## 2017-12-01 ENCOUNTER — HOSPITAL ENCOUNTER (OUTPATIENT)
Dept: CT IMAGING | Facility: CLINIC | Age: 67
Discharge: HOME | End: 2017-12-01
Attending: PSYCHIATRY & NEUROLOGY | Admitting: PSYCHIATRY & NEUROLOGY
Payer: MEDICARE

## 2017-12-01 DIAGNOSIS — I67.1 CEREBRAL ANEURYSM: ICD-10-CM

## 2017-12-01 DIAGNOSIS — G43.711 INTRACTABLE CHRONIC MIGRAINE WITHOUT AURA AND WITH STATUS MIGRAINOSUS: ICD-10-CM

## 2017-12-01 PROCEDURE — 25000125 ZZHC RX 250: Performed by: RADIOLOGY

## 2017-12-01 PROCEDURE — 25000128 H RX IP 250 OP 636: Performed by: RADIOLOGY

## 2017-12-01 PROCEDURE — 70496 CT ANGIOGRAPHY HEAD: CPT

## 2017-12-01 RX ORDER — IOPAMIDOL 755 MG/ML
500 INJECTION, SOLUTION INTRAVASCULAR ONCE
Status: COMPLETED | OUTPATIENT
Start: 2017-12-01 | End: 2017-12-01

## 2017-12-01 RX ADMIN — IOPAMIDOL 80 ML: 755 INJECTION, SOLUTION INTRAVENOUS at 09:58

## 2017-12-01 RX ADMIN — SODIUM CHLORIDE 100 ML: 9 INJECTION, SOLUTION INTRAVENOUS at 09:58

## 2018-01-05 ENCOUNTER — OFFICE VISIT (OUTPATIENT)
Dept: URGENT CARE | Facility: RETAIL CLINIC | Age: 68
End: 2018-01-05
Payer: MEDICARE

## 2018-01-05 ENCOUNTER — HOSPITAL ENCOUNTER (EMERGENCY)
Facility: CLINIC | Age: 68
Discharge: HOME | End: 2018-01-05
Attending: FAMILY MEDICINE | Admitting: FAMILY MEDICINE
Payer: MEDICARE

## 2018-01-05 VITALS
HEART RATE: 76 BPM | DIASTOLIC BLOOD PRESSURE: 100 MMHG | SYSTOLIC BLOOD PRESSURE: 187 MMHG | BODY MASS INDEX: 27.31 KG/M2 | TEMPERATURE: 98.8 F | OXYGEN SATURATION: 98 % | RESPIRATION RATE: 20 BRPM | HEIGHT: 64 IN | WEIGHT: 160 LBS

## 2018-01-05 VITALS
HEART RATE: 73 BPM | SYSTOLIC BLOOD PRESSURE: 210 MMHG | DIASTOLIC BLOOD PRESSURE: 100 MMHG | OXYGEN SATURATION: 98 % | TEMPERATURE: 99.2 F

## 2018-01-05 DIAGNOSIS — J45.21 MILD INTERMITTENT ASTHMA WITH EXACERBATION: ICD-10-CM

## 2018-01-05 DIAGNOSIS — J20.9 ACUTE BRONCHITIS, UNSPECIFIED ORGANISM: ICD-10-CM

## 2018-01-05 DIAGNOSIS — I10 ESSENTIAL HYPERTENSION, BENIGN: ICD-10-CM

## 2018-01-05 DIAGNOSIS — T50.905A ADVERSE EFFECT OF DRUG, INITIAL ENCOUNTER: ICD-10-CM

## 2018-01-05 DIAGNOSIS — I67.1 MIDDLE CEREBRAL ARTERY ANEURYSM: ICD-10-CM

## 2018-01-05 DIAGNOSIS — I10 ELEVATED BLOOD PRESSURE READING WITH DIAGNOSIS OF HYPERTENSION: Primary | ICD-10-CM

## 2018-01-05 PROCEDURE — 99282 EMERGENCY DEPT VISIT SF MDM: CPT | Performed by: FAMILY MEDICINE

## 2018-01-05 PROCEDURE — 99284 EMERGENCY DEPT VISIT MOD MDM: CPT | Mod: Z6 | Performed by: FAMILY MEDICINE

## 2018-01-05 RX ORDER — PREDNISONE 20 MG/1
20 TABLET ORAL 2 TIMES DAILY
Qty: 10 TABLET | Refills: 0 | Status: SHIPPED | OUTPATIENT
Start: 2018-01-05 | End: 2018-01-10

## 2018-01-05 RX ORDER — PROPRANOLOL HYDROCHLORIDE 20 MG/1
20 TABLET ORAL 2 TIMES DAILY
Refills: 11 | COMMUNITY
Start: 2017-12-18 | End: 2018-02-06

## 2018-01-05 RX ORDER — AMOXICILLIN 500 MG/1
500 CAPSULE ORAL 3 TIMES DAILY
Qty: 30 CAPSULE | Refills: 0 | Status: SHIPPED | OUTPATIENT
Start: 2018-01-05 | End: 2018-02-06

## 2018-01-05 ASSESSMENT — ENCOUNTER SYMPTOMS
VOMITING: 0
NAUSEA: 0
LIGHT-HEADEDNESS: 0
DIZZINESS: 0
BACK PAIN: 0
WEAKNESS: 0
COUGH: 1
FATIGUE: 0
SINUS PAIN: 0
HEADACHES: 0
ABDOMINAL PAIN: 0
WHEEZING: 1
CHILLS: 0
SHORTNESS OF BREATH: 0
FEVER: 0
DIARRHEA: 0
SORE THROAT: 0
EYE REDNESS: 0
RHINORRHEA: 0
CONFUSION: 0
SINUS PRESSURE: 0
POLYDIPSIA: 0
EYE PAIN: 0

## 2018-01-05 NOTE — DISCHARGE INSTRUCTIONS
It was nice to meet you Sri.  Stop any decongestant--pseudoephedrine.  Do not take any cold medication that has a decongestant in it.  You can take plain Mucinex continue your inhaler 2 puffs every 4 hours as needed..  Take prednisone 20 mg twice a day and amoxicillin 503 times a day.  When you have recovered and you are feeling bacteria your usual self you need to follow-up with your primary care physician to recheck your blood pressure.  Continue your blood pressure medications as previously directed for the time being.  Return to the ED if any problems.

## 2018-01-05 NOTE — PATIENT INSTRUCTIONS
Must be seen in ER today due to elevated blood pressure  204/99, 210/82, and 210/100  Should be evaluated today urgently  [facility] to evaluated in [facility]

## 2018-01-05 NOTE — ED NOTES
Cough for about 2 weeks, chest tightness today.  Hx of asthma and has been using inhalers recently.  [facility] to [facility] and was sent here for HTN.  Denies dizziness, ringing in ears or vision changes.

## 2018-01-05 NOTE — ED AVS SNAPSHOT
[facility] Emergency Department    [address] [place] [address]    Phone:  [phone]    Fax:  [phone]                                       Sri Grewal   MRN: [id number]    Department:  [facility] Emergency Department   Date of Visit:  1/5/2018           Patient Information     Date Of Birth          1950        Your diagnoses for this visit were:     Essential hypertension, benign     Adverse effect of drug, initial encounter     Acute bronchitis, unspecified organism     Mild intermittent asthma with exacerbation     Middle cerebral artery aneurysm        You were seen by Juliana, Giovanni EDWARDS MD.      Follow-up Information     Follow up with Rosenda Pierre PA-C In 1 week.    Specialty:  Physician Assistant - Medical    Contact information:    [address]  [place] [address]  [phone]          Follow up with [facility] Emergency Department.    Specialty:  EMERGENCY MEDICINE    Why:  If symptoms worsen    Contact information:    [address] [place] [address] [phone]    Additional information:    From [address]: Exit at [address] on [address] side of [place]. Turn right on [address]. Turn left at stoplight on [address]. [facility] will be in view two blocks ahead        Discharge Instructions       It was nice to meet you Sri.  Stop any decongestant--pseudoephedrine.  Do not take any cold medication that has a decongestant in it.  You can take plain Mucinex continue your inhaler 2 puffs every 4 hours as needed..  Take prednisone 20 mg twice a day and amoxicillin 503 times a day.  When you have recovered and you are feeling bacteria your usual self you need to follow-up with your primary care physician to recheck your blood pressure.  Continue your blood pressure medications as previously directed for the time being.  Return to the ED if any problems.    24 Hour Appointment Hotline       To  make an appointment at any [facility] clinic, call [phone]). If you don't have a family doctor or clinic, we will help you find one. [facility] clinics are conveniently located to serve the needs of you and your family.             Review of your medicines      START taking        Dose / Directions Last dose taken    amoxicillin 500 MG capsule   Commonly known as:  AMOXIL   Dose:  500 mg   Quantity:  30 capsule        Take 1 capsule (500 mg) by mouth 3 times daily   Refills:  0        predniSONE 20 MG tablet   Commonly known as:  DELTASONE   Dose:  20 mg   Quantity:  10 tablet        Take 1 tablet (20 mg) by mouth 2 times daily for 5 days   Refills:  0          Our records show that you are taking the medicines listed below. If these are incorrect, please call your family doctor or clinic.        Dose / Directions Last dose taken    albuterol 108 (90 BASE) MCG/ACT Inhaler   Commonly known as:  PROAIR HFA   Dose:  1-2 puff   Quantity:  2 Inhaler        Inhale 1-2 puffs into the lungs every 6 hours as needed for shortness of breath / dyspnea   Refills:  11        ALLEGRA PO        Refills:  0        * ASPIRIN 81 PO        Refills:  0        * aspirin 325 MG tablet   Dose:  325 mg   Quantity:  180 tablet        Take 1 tablet (325 mg) by mouth daily   Refills:  1        butalbital-aspirin-caffeine -40 MG per capsule   Commonly known as:  FIORINAL   Quantity:  30 capsule        TAKE ONE CAPSULE BY MOUTH EVERY 6 HOURS AS NEEDED (one month supply)   Refills:  5        * AIRBORNE PO        Refills:  0        * CENTURY-LUTEIN Tabs   Dose:  1 tablet   Quantity:  100 tablet        Take 1 tablet by mouth daily.   Refills:  12        clopidogrel 75 MG tablet   Commonly known as:  PLAVIX   Dose:  75 mg   Quantity:  90 tablet        Take 1 tablet (75 mg) by mouth daily   Refills:  3        IBUPROFEN PO        Reported on 4/6/2017   Refills:  0        lisinopril 30 MG tablet   Commonly known as:   PRINIVIL,ZESTRIL   Dose:  45 mg   Quantity:  135 tablet        Take 1.5 tablets (45 mg) by mouth daily   Refills:  3        propranolol 20 MG tablet   Commonly known as:  INDERAL   Dose:  20 mg        Take 20 mg by mouth 2 times daily   Refills:  11        * Notice:  This list has 4 medication(s) that are the same as other medications prescribed for you. Read the directions carefully, and ask your doctor or other care provider to review them with you.            Prescriptions were sent or printed at these locations (2 Prescriptions)                   [facility] [place] [address]   [address] [facility] [address]    Telephone:  [phone]   Fax:  [phone]   Hours:                  E-Prescribed (2 of 2)         amoxicillin (AMOXIL) 500 MG capsule               predniSONE (DELTASONE) 20 MG tablet                Orders Needing Specimen Collection     None      Pending Results     No orders found from 1/3/2018 to 1/6/2018.            Pending Culture Results     No orders found from 1/3/2018 to 1/6/2018.            Pending Results Instructions     If you had any lab results that were not finalized at the time of your Discharge, you can call the ED Lab Result RN at [phone]. You will be contacted by this team for any positive Lab results or changes in treatment. The nurses are available 7 days a week from 10A to 6:30P.  You can leave a message 24 hours per day and they will return your call.        Thank you for choosing [facility]       Thank you for choosing [facility] for your care. Our goal is always to provide you with excellent care. Hearing back from our patients is one way we can continue to improve our services. Please take a few minutes to complete the written survey that you may receive in the mail after you visit with us. Thank you!        [facility] Information     [facility] lets you send messages to your doctor, view your test results, renew your prescriptions,  "schedule appointments and more. To sign up, go to [email or web address] . Click on \"Log in\" on the left side of the screen, which will take you to the Welcome page. Then click on \"Sign up Now\" on the right side of the page.     You will be asked to enter the access code listed below, as well as some personal information. Please follow the directions to create your username and password.     Your access code is: [email or web address]  Expires: 2018 11:00 AM     Your access code will  in 90 days. If you need help or a new code, please call your [place] clinic or [phone].        Care EveryWhere ID     This is your Care EveryWhere ID. This could be used by other organizations to access your [place] [place] medical records  [id number]        Equal Access to Services     SACHA VICKERS : Rachel Rao, rosa saldaña, ingrid mcbride, caridad davies . So [facility] [phone].    ATENCIÓN: Si habla español, tiene a orozco disposición servicios gratuitos de asistencia lingüística. Llame al [phone].    We comply with applicable federal civil rights laws and [place] laws. We do not discriminate on the basis of race, color, national origin, age, disability, sex, sexual orientation, or gender identity.            After Visit Summary       This is your record. Keep this with you and show to your community pharmacist(s) and doctor(s) at your next visit.                  "

## 2018-01-05 NOTE — ED PROVIDER NOTES
History     Chief Complaint   Patient presents with     Hypertension     Cough     HPI  Sri Grewal is a 67 year old female who presents to the emergency department as directed by a local [facility] clinic.  The patient was being seen for a 2 week history of cough and congestion.  She has mild intermittent asthma that only gives her difficulty if she does not get over a cold quickly enough.  She has a history of bronchitis which has been treated with prednisone and amoxicillin.  At the [facility] clinic she was noted to be hypertensive.  She is on propranolol but she is unclear as to why she takes this as she has a history of hypertension, aneurysm and migraine headaches.  She is also on lisinopril for her hypertension.  Her only complaint is an ongoing nonproductive cough and a little difficulty with her asthma.  She would like to be on prednisone and amoxicillin as she does quite well on this regimen.    Problem List:    Patient Active Problem List    Diagnosis Date Noted     Health Care Home 07/27/2011     Priority: High     X  EMERGENCY CARE PLAN  Presenting Problem Signs and Symptoms Treatment Plan    Questions or conerns during clinic hours    I will call the clinic directly     Questions or conerns outside clinic hours    I will call the 24 hour nurse line at [phone]    Patient needs to schedule an appointment    I will call the 24 hour scheduling team at [phone] or clinic directly    Same day treatment     I will call the clinic first, nurse line if after hours, urgent care and express care if needed                            DX V65.8 REPLACED WITH [id number] [facility] CARE HOME (04/08/2013)       Atypical mole 06/01/2017     Priority: Medium     Hypertension goal BP (blood pressure) < 140/90 05/25/2017     Priority: Medium     TIA (transient ischemic attack) 01/16/2017     Priority: Medium     Adjustment disorder with mixed anxiety and depressed mood 09/09/2014     Priority: Medium     Middle cerebral artery  aneurysm 10/29/2013     Priority: Medium     Noted in 2007.  Intervention not recommended at that time.  Observation.  Saw Interventional Radiology 12/2013.  Recheck CTA in one year.       Adjustment disorder with depressed mood 11/08/2012     Priority: Medium     Advanced directives, counseling/discussion 07/19/2012     Priority: Medium     Discussed advance care planning with patient; information given to patient to review. 7/19/2012          Moderate major depression (H) 09/08/2010     Priority: Medium     Insomnia 09/08/2010     Priority: Medium     Atrophy of vagina 05/30/2010     Priority: Medium     Lump or mass in breast 03/29/2010     Priority: Medium     Mild persistent asthma 02/21/2005     Priority: Medium     Chronic peptic ulcer 12/23/2003     Priority: Medium     Problem list name updated by automated process. Provider to review       Anemia 04/25/2002     Priority: Medium     Problem list name updated by automated process. Provider to review       Chronic migraine without aura without status migrainosus, not intractable      Priority: Medium     Multiple trials off of fiorinal have not been successful  Problem list name updated by automated process. Provider to review       Other abnormal Papanicolaou smear of cervix and cervical HPV(795.09)      Priority: Medium     (Problem list name updated by automated process. Provider to review and confirm.)       Endometriosis      Priority: Medium     Problem list name updated by automated process. Provider to review       Allergic rhinitis      Priority: Medium     Problem list name updated by automated process. Provider to review          Past Medical History:    Past Medical History:   Diagnosis Date     Abnormal Papanicolaou smear of cervix and cervical HPV      Allergic rhinitis, cause unspecified      Contact dermatitis and other eczema, due to unspecified cause      Endometriosis, site unspecified      Esophageal reflux      Migraine, unspecified,  without mention of intractable migraine without mention of status migrainosus      Mild persistent asthma      Unspecified disorder of uterus        Past Surgical History:    Past Surgical History:   Procedure Laterality Date     C  DELIVERY ONLY       X2     COLONOSCOPY  2014    Procedure: COLONOSCOPY;  Surgeon: Nathan Baker MD;  Location:  GI     HC COLONOSCOPY THRU STOMA, DIAGNOSTIC  2002    normal       Family History:    Family History   Problem Relation Age of Onset     HEART DISEASE Mother      anemia     OSTEOPOROSIS Mother      Hypertension Mother      CEREBROVASCULAR DISEASE Mother      Alcohol/Drug Mother      alcohol     Neurologic Disorder Mother      migraines     Hypertension Father      CANCER No family hx of      breast       Social History:  Marital Status:   [5]  Social History   Substance Use Topics     Smoking status: Never Smoker     Smokeless tobacco: Never Used     Alcohol use Yes      Comment: beerx2/x1 per month        Medications:      amoxicillin (AMOXIL) 500 MG capsule   predniSONE (DELTASONE) 20 MG tablet   propranolol (INDERAL) 20 MG tablet   ASPIRIN 81 PO   lisinopril (PRINIVIL,ZESTRIL) 30 MG tablet   Multiple Vitamins-Minerals (AIRBORNE PO)   butalbital-aspirin-caffeine (FIORINAL) -40 MG per capsule   albuterol (ALBUTEROL) 108 (90 BASE) MCG/ACT Inhaler   clopidogrel (PLAVIX) 75 MG tablet   aspirin 325 MG tablet   IBUPROFEN PO   Fexofenadine HCl (ALLEGRA PO)   Multiple Vitamins-Minerals (CENTURY-LUTEIN) TABS         Review of Systems   Constitutional: Negative for chills, fatigue and fever.   HENT: Positive for congestion. Negative for rhinorrhea, sinus pain, sinus pressure and sore throat.    Eyes: Negative for pain and redness.   Respiratory: Positive for cough and wheezing. Negative for shortness of breath.    Cardiovascular: Negative for chest pain.   Gastrointestinal: Negative for abdominal pain, diarrhea, nausea and vomiting.  "  Endocrine: Negative for polydipsia and polyuria.   Musculoskeletal: Negative for back pain.   Skin: Negative for rash.   Allergic/Immunologic: Negative for immunocompromised state.   Neurological: Negative for dizziness, weakness, light-headedness and headaches.   Psychiatric/Behavioral: Negative for confusion.    ROS: 10 point ROS neg other than the symptoms noted above in the HPI.      Physical Exam   BP: (!) 217/103  Pulse: 65  Heart Rate: 64  Temp: 98.8  F (37.1  C)  Resp: 17  Height: 162.6 cm (5' 4\")  Weight: 72.6 kg (160 lb)  SpO2: 99 %      Physical Exam   Constitutional: She is oriented to person, place, and time. She appears well-developed and well-nourished.   Alert pleasant smiling female in no acute distress.  Her blood pressures noted to be elevated.No results found for this or any previous visit (from the past 48 hour(s))..vs  BP (!) 188/103  Pulse 65  Temp 98.8  F (37.1  C) (Oral)  Resp 8  Ht 1.626 m (5' 4\")  Wt 72.6 kg (160 lb)  LMP 11/09/2005  SpO2 98%  BMI 27.46 kg/m2     HENT:   Head: Normocephalic.   Right Ear: External ear normal.   Left Ear: External ear normal.   Nose: Nose normal.   Mouth/Throat: Oropharynx is clear and moist.   Eyes: Conjunctivae are normal.   Neck: Normal range of motion. Neck supple.   Cardiovascular: Normal rate, regular rhythm and normal heart sounds.    Pulmonary/Chest: No accessory muscle usage. No tachypnea. No respiratory distress. She has no rhonchi.   Abdominal: Soft.   Musculoskeletal: Normal range of motion.   Neurological: She is alert and oriented to person, place, and time.   Skin: Skin is warm and dry.   Psychiatric: She has a normal mood and affect. Her behavior is normal.   Nursing note and vitals reviewed.      ED Course     ED Course     Procedures               Critical Care time:  none               Labs Ordered and Resulted from Time of ED Arrival Up to the Time of Departure from the ED - No data to display    Assessments & Plan (with Medical " Decision Making)   MDM--67-year-old female with history of essential hypertension who was sent to the emergency department for further evaluation of her elevated blood pressure.  Patient has been having trouble with cough and congestion.  Illness started as a cold and now is settled in her chest and is exacerbating her asthma.  She failed to tell the practitioner at the clinic that she was on pseudoephedrine.  She is also using her inhaler frequently.  She is on propranolol but it is unclear if this is for her blood pressure, her migraines or aneurysm.  I discussed options with the patient.  I recommended stopping the pseudoephedrine and she should not take any cold medication as the decongestants would give her trouble with her hypertension and aneurysm and antihistamines give her trouble with asthma.  I told her she could take Mucinex.  I also elected to place her on prednisone 20 twice daily for 5 days and amoxicillin.  Patient will need her blood pressure addressed when she is back to her usual self and off her inhaler.  I do not want to adjust any of her antihypertensives at this time given it obvious reason that they are out of control at this time.  I have reviewed the nursing notes.    I have reviewed the findings, diagnosis, plan and need for follow up with the patient.            New Prescriptions    AMOXICILLIN (AMOXIL) 500 MG CAPSULE    Take 1 capsule (500 mg) by mouth 3 times daily    PREDNISONE (DELTASONE) 20 MG TABLET    Take 1 tablet (20 mg) by mouth 2 times daily for 5 days       Final diagnoses:   Essential hypertension, benign   Adverse effect of drug, initial encounter   Acute bronchitis, unspecified organism   Mild intermittent asthma with exacerbation   Middle cerebral artery aneurysm       1/5/2018   [facility] EMERGENCY DEPARTMENT     Juliana, Giovanni EDWARDS MD  01/05/18 [id number]

## 2018-01-05 NOTE — ED AVS SNAPSHOT
[facility] Emergency Department    [address]     [place] [address]    Phone:  [phone]    Fax:  [phone]                                       Sri Grewal   MRN: [id number]    Department:  [facility] Emergency Department   Date of Visit:  1/5/2018           After Visit Summary Signature Page     I have received my discharge instructions, and my questions have been answered. I have discussed any challenges I see with this plan with the nurse or doctor.    ..........................................................................................................................................  Patient/Patient Representative Signature      ..........................................................................................................................................  Patient Representative Print Name and Relationship to Patient    ..................................................               ................................................  Date                                            Time    ..........................................................................................................................................  Reviewed by Signature/Title    ...................................................              ..............................................  Date                                                            Time

## 2018-01-05 NOTE — PROGRESS NOTES
Chief Complaint   Patient presents with     Sinus Problem     pressure in face     Cough     at least 2 weeks     Eye Problem     eyes hurt from sinus congestion        BP (!) 204/99 (BP Location: Left arm)  Pulse 73  Temp 99.2  F (37.3  C) (Oral)  LMP 11/09/2005  SpO2 98%      First BP reading 210/82, Second 204/99, Third reading 210/100  Having some shortness of breath, eye hurt, headaches  Taking over the counter medication    Advised unable to evaluate her symptoms at [facility]  Stable, advised to be seen at ER urgently for evaluation of very elevated BP.  She agreed to this.

## 2018-01-05 NOTE — MR AVS SNAPSHOT
"              After Visit Summary   2018    Sri Grewal    MRN: [id number]           Patient Information     Date Of Birth          1950        Visit Information        Provider Department      2018 9:50 AM Risa Beaver PA-C [facility]        Care Instructions    Must be seen in ER today due to elevated blood pressure  204/99, 210/82, and 210/100  Should be evaluated today urgently  Unable to evaluated in [facility]          Follow-ups after your visit        Who to contact     You can reach your care team any time of the day by calling [phone].  Notification of test results:  If you have an abnormal lab result, we will notify you by phone as soon as possible.         Additional Information About Your Visit        MyChart Information     [employer] lets you send messages to your doctor, view your test results, renew your prescriptions, schedule appointments and more. To sign up, go to [email or web address] . Click on \"Log in\" on the left side of the screen, which will take you to the Welcome page. Then click on \"Sign up Now\" on the right side of the page.     You will be asked to enter the access code listed below, as well as some personal information. Please follow the directions to create your username and password.     Your access code is: [email or web address]  Expires: 2018 11:00 AM     Your access code will  in 90 days. If you need help or a new code, please call your [facility] clinic or [phone].        Care EveryWhere ID     This is your Care EveryWhere ID. This could be used by other organizations to access your [facility] medical records  [id number]        Your Vitals Were     Pulse Temperature Last Period Pulse Oximetry          73 99.2  F (37.3  C) (Oral) 2005 98%         Blood Pressure from Last 3 Encounters:   18 (!) 210/100   17 146/76   10/31/17 (!) 170/102    Weight from Last 3 Encounters:   17 159 lb 3.2 oz (72.2 kg) "   04/06/17 162 lb (73.5 kg)   01/16/17 163 lb 12.8 oz (74.3 kg)              Today, you had the following     No orders found for display       Primary Care Provider Office Phone # Fax #    Rosenda PAMELA Pierre PA-C [phone]       [address]  [facility] [address]        Equal Access to Services     [facility] : Hadii aad ku hadasho Soomaali, waaxda luqadaha, qaybta kaalmada adeegyada, waxay idiin hayaan adeeg kharash la'aan . So [facility] [phone].    ATENCIÓN: Si habla espjeanmarie, tiene a orozco disposición servicios gratuitos de asistencia lingüística. Llame al [phone].    We comply with applicable federal civil rights laws and [place] laws. We do not discriminate on the basis of race, color, national origin, age, disability, sex, sexual orientation, or gender identity.            Thank you!     Thank you for choosing [facility]  for your care. Our goal is always to provide you with excellent care. Hearing back from our patients is one way we can continue to improve our services. Please take a few minutes to complete the written survey that you may receive in the mail after your visit with us. Thank you!             Your Updated Medication List - Protect others around you: Learn how to safely use, store and throw away your medicines at www.disposemymeds.org.          This list is accurate as of: 1/5/18 11:06 AM.  Always use your most recent med list.                   Brand Name Dispense Instructions for use Diagnosis    albuterol 108 (90 BASE) MCG/ACT Inhaler    PROAIR HFA    2 Inhaler    Inhale 1-2 puffs into the lungs every 6 hours as needed for shortness of breath / dyspnea    Mild persistent asthma, uncomplicated       ALLEGRA PO           * ASPIRIN 81 PO           * aspirin 325 MG tablet     180 tablet    Take 1 tablet (325 mg) by mouth daily    Amaurosis fugax       butalbital-aspirin-caffeine -40 MG per capsule    FIORINAL    30 capsule     TAKE ONE CAPSULE BY MOUTH EVERY 6 HOURS AS NEEDED (one month supply)    Chronic migraine without aura without status migrainosus, not intractable       * AIRBORNE PO           * CENTURY-LUTEIN Tabs     100 tablet    Take 1 tablet by mouth daily.    Family history of macular degeneration       clopidogrel 75 MG tablet    PLAVIX    90 tablet    Take 1 tablet (75 mg) by mouth daily    Amaurosis fugax       IBUPROFEN PO      Reported on 4/6/2017        lisinopril 30 MG tablet    PRINIVIL,ZESTRIL    135 tablet    Take 1.5 tablets (45 mg) by mouth daily    Amaurosis fugax, Hypertension goal BP (blood pressure) < 140/90       propranolol 20 MG tablet    INDERAL     Take 20 mg by mouth 2 times daily        * Notice:  This list has 4 medication(s) that are the same as other medications prescribed for you. Read the directions carefully, and ask your doctor or other care provider to review them with you.

## 2018-01-17 ENCOUNTER — TELEPHONE (OUTPATIENT)
Dept: FAMILY MEDICINE | Facility: OTHER | Age: 68
End: 2018-01-17

## 2018-01-17 NOTE — TELEPHONE ENCOUNTER
Reason for call:  Patient reporting a symptom    Symptom or request: symptoms    Duration (how long have symptoms been present): n/a    Have you been treated for this before? Yes    Additional comments: patient is calling asking to speak to a nurse she states her ulcers are really bad right now and she wants to know what she can do from home to help them?    Phone Number patient can be reached at:  Home number on file [phone] (home)    Best Time:  anytime    Can we leave a detailed message on this number:  YES    Call taken on 1/17/2018 at 9:09 AM by Sujata Mack

## 2018-01-17 NOTE — TELEPHONE ENCOUNTER
"Sri Grewal is a 67 year old female who calls with pain in her stomach.    NURSING ASSESSMENT:  Description: Patient states she has a \"hot burning\" pain in her \"stomach. I don't know where specifically, just in my stomach\".   Onset/duration:  Today  Precip. factors:  History of peptic ulcer - states this \"feels like always\".  Associated symptoms:  Nausea  Improves/worsens symptoms: Started to flare after her cup of coffee. Has only tried water.  Last exam/Treatment:  6/2017  Allergies:   Allergies   Allergen Reactions     Codeine      GI UPSET     Percocet [Oxycodone-Acetaminophen] Nausea and Vomiting     Seasonal Allergies        RECOMMENDED DISPOSITION:  Home care advice - Patient will try Maalox or TUMS. Advised to be seen if not improving or pain is severe.  Will comply with recommendation: YES   If further questions/concerns or if Sx do not improve, worsen or new Sx develop, call your PCP or [facility] as soon as possible.    NOTES:  Disposition was determined by the first positive assessment question, therefore all previous assessment questions were negative.     Guideline used:  Telephone Triage Protocols for Nurses, Fifth Edition, Lilli De Paz, RN, BSN      "

## 2018-01-23 ENCOUNTER — TELEPHONE (OUTPATIENT)
Dept: FAMILY MEDICINE | Facility: OTHER | Age: 68
End: 2018-01-23

## 2018-01-23 NOTE — TELEPHONE ENCOUNTER
Summary:    Patient is due/failing the following:   MAMMOGRAM    Action needed:   Schedule a mammogram     Type of outreach:    Phone, spoke to patient.  patient will calll back to schedule this summer.       Questions for provider review:    None                                                                                                                                    Alisha Cabrera       Chart routed to Care Team .    Panel Management Review      Patient has the following on her problem list:     Depression / Dysthymia review    Measure:  Needs PHQ-9 score of 4 or less during index window.  Administer PHQ-9 and if score is 5 or more, send encounter to provider for next steps.        PHQ-9 SCORE 5/20/2016 11/22/2016 6/1/2017   Total Score - - -   Total Score MyChart - - 6 (Mild depression)   Total Score 6 3 6       If PHQ-9 recheck is 5 or more, route to provider for next steps.    Patient is due for:  PHQ9    Hypertension   Last three blood pressure readings:  BP Readings from Last 3 Encounters:   01/05/18 (!) 187/100   01/05/18 (!) 210/100   11/16/17 146/76     Blood pressure: FAILED    HTN Guidelines:  Age 18-59 BP range:  Less than 140/90  Age 60-85 with Diabetes:  Less than 140/90  Age 60-85 without Diabetes:  less than 150/90          Composite cancer screening  Chart review shows that this patient is due/due soon for the following Mammogram

## 2018-01-23 NOTE — LETTER
[facility]  [address]   [place] [address]  Phone: [phone]  May 17, 2018      Sri [place]  [address] [address]      Dear Sri,    We care about your health and have reviewed your health plan including your medical conditions, medications, and lab results.  Based on this review, it is recommended that you follow up regarding the following health topic(s):  -Breast Cancer Screening    We recommend you take the following action(s):  -schedule a MAMMOGRAM which is due. Please disregard this reminder if you have had this exam elsewhere within the last 1-2 years please let us know so we can update your records.     Please call us at the [facility] - [phone] (or use [facility]) to address the above recommendations.     Thank you for trusting [facility] and we appreciate the opportunity to serve you.  We look forward to supporting your healthcare needs in the future.    Healthy Regards,    Your Health Care Team  [facility]

## 2018-02-05 NOTE — PROGRESS NOTES
"  SUBJECTIVE:   Sri Grewal is a 67 year old female who presents to clinic today for the following health issues:      HPI     ED/UC Followup:    Facility:  1/5/18   Date of visit: [facility]   Reason for visit: Bronchitis and elevated BP   Current Status: not improving, cough is back    - Treated with prednisone 20 mg for 5 days and amoxicillin 500 mg TID for 10 days   - Did get better, then came back after about 1 week          Acute Illness   Acute illness concerns: sinus/cold  Onset: 1.5 weeks ago    Fever: YES- low grade    Chills/Sweats: YES    Headache (location?): YES    Sinus Pressure:YES    Conjunctivitis:  no    Ear Pain: no    Rhinorrhea: no     Congestion: YES    Sore Throat: YES     Cough: YES-productive of yellow sputum    Wheeze: YES    Decreased Appetite: YES- slightly    Nausea: YES- seldomly     Vomiting: no     Diarrhea:  YES    Dysuria/Freq.: no     Fatigue/Achiness: YES    Sick/Strep Exposure: no      Therapies Tried and outcome: allegra- helped  - Occasionally needing albuterol, 2-3x per week     Medication Followup of all     Taking Medication as prescribed: yes    Side Effects:  None    Medication Helping Symptoms:  Yes    - Migraines stable, improving with addition of propanolol per neurology   - Continues to need Bubital, gets complete relief of symptoms   - Requesting 90 day supply of medications sent to mail order company        Hypertension Follow-up      Outpatient blood pressures are being checked at home.  Results are \"high\".    Low Salt Diet: no added salt    - Blood pressures have been high at home. 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  Additional history: as documented    Labs reviewed in EPIC    ROS:  Constitutional, HEENT, cardiovascular, " "pulmonary, gi and gu systems are negative, except as otherwise noted.    OBJECTIVE:   /80  Pulse 72  Temp 98  F (36.7  C) (Oral)  Resp 18  Ht 5' 1.81\" (1.57 m)  Wt 159 lb 6.4 oz (72.3 kg)  LMP 11/09/2005  SpO2 99%  BMI 29.33 kg/m2  Body mass index is 29.33 kg/(m^2).  GENERAL APPEARANCE: ill appearing, alert and no distress  EYES: Eyes grossly normal to inspection, PERRLA, conjunctivae and sclerae without injection or discharge, EOM intact   HENT: Bilateral ear canals without erythema or cerumen, bilateral TM's with clear serous effusion, no injection or bulging, nasal turbinates with severe swelling & erythema, yellow-green nasal discharge, mouth without ulcers or lesions, oropharynx mildly erythematous and irritated, no edema or exudate, oral mucous membranes moist, bilateral maxillary and frontal sinus tenderness   NECK: Bilateral anterior cervical adenopathy, no adenopathy in posterior cervical or supraclavicular regions  RESP: Diffuse intermittent expiratory wheezes  CV: Regular rates and rhythm, normal S1 S2, no S3 or S4, no murmur, click or rub  MS: No musculoskeletal defects are noted and gait is age appropriate without ataxia   SKIN: No suspicious lesions or rashes, hydration status appears adeuqate with normal skin turgor   PSYCH: Alert and oriented x3; speech- coherent , normal rate and volume; able to articulate logical thoughts, able to abstract reason, no tangential thoughts, no hallucinations or delusions, mentation appears normal, Mood is euthymic. Affect is appropriate for this mood state and bright. Thought content is free of suicidal ideation, hallucinations, and delusions. Dress is adequate and upkept. Eye contact is good during conversation.       Diagnostic Test Results:  None       ASSESSMENT/PLAN:       ICD-10-CM    1. Mild persistent asthma with acute exacerbation J45.31 albuterol (PROAIR HFA) 108 (90 BASE) MCG/ACT Inhaler     levofloxacin (LEVAQUIN) 750 MG tablet   2. Acute " sinusitis with coexisting condition requiring prophylactic treatment J01.90 predniSONE (DELTASONE) 20 MG tablet     levofloxacin (LEVAQUIN) 750 MG tablet   3. Hypertension goal BP (blood pressure) < 140/90 I10 lisinopril (PRINIVIL,ZESTRIL) 30 MG tablet     DISCONTINUED: lisinopril (PRINIVIL,ZESTRIL) 30 MG tablet   4. Chronic migraine without aura without status migrainosus, not intractable G43.709 propranolol (INDERAL) 20 MG tablet     butalbital-aspirin-caffeine (FIORINAL) -40 MG per capsule   5. Amaurosis fugax G45.3 lisinopril (PRINIVIL,ZESTRIL) 30 MG tablet     DISCONTINUED: lisinopril (PRINIVIL,ZESTRIL) 30 MG tablet     1 & 2.   - Likely not same illness as from 1/5/18 when went to ED, was better for ~1 week before symptom returned, does work with public (retail)   - Discussed exam findings of sinus infection and exacerbation of asthma   - Start Levofloxacin tonight, 1 tablet once a day for 10 days   - Discussed antibiotic use, duration, and side effects  - Tomorrow morning start Prednisone, 1 tablet for 5 days      Discussed use and side effects including that it can disrupt sleep   - Albuterol inhaler - 2 puffs 3-4x/day for 4-5 days, then return to as needed   - Hand out on sinus infections given  - Avoid all decongestants   - Can try Afrin (3 days only), Mucinex (not D), or Coricidin     3. HTN   - Elevated, though patient is ill, but per patient was elevated when not sick  - Increase Lisinopril to 2 tablets (top dose of 60 mg per day)   - Reviewed use and side effects  - Recheck BP in clinic in 1-2 weeks   - Patient is also on Propanolol, but this was prescribed for migraine prophylaxis     4 & 5.   - Followed by [facility]   - Reviewed scanned in notes along with recent MRI   - Continue propanolol for migraine prophylaxis, patient would like RX to come from PCP and 90 day supply to mail order company   - Reviewed use and side effects, refilled     The patient indicates understanding of  these issues and agrees with the plan.    Follow up: BP recheck in 1-2 weeks         Rosenda Pierre PA-C  [facility]

## 2018-02-06 ENCOUNTER — OFFICE VISIT (OUTPATIENT)
Dept: FAMILY MEDICINE | Facility: OTHER | Age: 68
End: 2018-02-06
Payer: MEDICARE

## 2018-02-06 VITALS
RESPIRATION RATE: 18 BRPM | BODY MASS INDEX: 29.33 KG/M2 | WEIGHT: 159.4 LBS | SYSTOLIC BLOOD PRESSURE: 140 MMHG | HEIGHT: 62 IN | OXYGEN SATURATION: 99 % | TEMPERATURE: 98 F | DIASTOLIC BLOOD PRESSURE: 80 MMHG | HEART RATE: 72 BPM

## 2018-02-06 DIAGNOSIS — G43.709 CHRONIC MIGRAINE WITHOUT AURA WITHOUT STATUS MIGRAINOSUS, NOT INTRACTABLE: ICD-10-CM

## 2018-02-06 DIAGNOSIS — G45.3 AMAUROSIS FUGAX: ICD-10-CM

## 2018-02-06 DIAGNOSIS — I10 HYPERTENSION GOAL BP (BLOOD PRESSURE) < 140/90: ICD-10-CM

## 2018-02-06 DIAGNOSIS — J01.90 ACUTE SINUSITIS WITH COEXISTING CONDITION REQUIRING PROPHYLACTIC TREATMENT: ICD-10-CM

## 2018-02-06 DIAGNOSIS — J45.31 MILD PERSISTENT ASTHMA WITH ACUTE EXACERBATION: Primary | ICD-10-CM

## 2018-02-06 PROCEDURE — 99214 OFFICE O/P EST MOD 30 MIN: CPT | Performed by: PHYSICIAN ASSISTANT

## 2018-02-06 RX ORDER — LISINOPRIL 30 MG/1
45 TABLET ORAL DAILY
Qty: 135 TABLET | Refills: 3 | Status: SHIPPED | OUTPATIENT
Start: 2018-02-06 | End: 2018-02-06

## 2018-02-06 RX ORDER — BUTALBITAL/ASPIRIN/CAFFEINE 50-325-40
CAPSULE ORAL
Qty: 30 CAPSULE | Refills: 5 | Status: SHIPPED | OUTPATIENT
Start: 2018-02-06 | End: 2018-07-31

## 2018-02-06 RX ORDER — LEVOFLOXACIN 750 MG/1
750 TABLET, FILM COATED ORAL DAILY
Qty: 10 TABLET | Refills: 0 | Status: SHIPPED | OUTPATIENT
Start: 2018-02-06 | End: 2018-03-12

## 2018-02-06 RX ORDER — ALBUTEROL SULFATE 90 UG/1
1-2 AEROSOL, METERED RESPIRATORY (INHALATION) EVERY 6 HOURS PRN
Qty: 2 INHALER | Refills: 11 | Status: SHIPPED | OUTPATIENT
Start: 2018-02-06 | End: 2018-07-31

## 2018-02-06 RX ORDER — PREDNISONE 20 MG/1
20 TABLET ORAL DAILY
Qty: 5 TABLET | Refills: 0 | Status: SHIPPED | OUTPATIENT
Start: 2018-02-06 | End: 2018-03-12

## 2018-02-06 RX ORDER — PROPRANOLOL HYDROCHLORIDE 20 MG/1
20 TABLET ORAL 2 TIMES DAILY
Qty: 180 TABLET | Refills: 3 | Status: ON HOLD | OUTPATIENT
Start: 2018-02-06 | End: 2019-01-11

## 2018-02-06 RX ORDER — LISINOPRIL 30 MG/1
60 TABLET ORAL DAILY
Qty: 180 TABLET | Refills: 3 | Status: SHIPPED | OUTPATIENT
Start: 2018-02-06 | End: 2018-05-14 | Stop reason: ALTCHOICE

## 2018-02-06 ASSESSMENT — PAIN SCALES - GENERAL: PAINLEVEL: MODERATE PAIN (5)

## 2018-02-06 ASSESSMENT — ANXIETY QUESTIONNAIRES
7. FEELING AFRAID AS IF SOMETHING AWFUL MIGHT HAPPEN: NOT AT ALL
GAD7 TOTAL SCORE: 1
6. BECOMING EASILY ANNOYED OR IRRITABLE: NOT AT ALL
2. NOT BEING ABLE TO STOP OR CONTROL WORRYING: NOT AT ALL
3. WORRYING TOO MUCH ABOUT DIFFERENT THINGS: NOT AT ALL
GAD7 TOTAL SCORE: 1
4. TROUBLE RELAXING: NOT AT ALL
5. BEING SO RESTLESS THAT IT IS HARD TO SIT STILL: NOT AT ALL
1. FEELING NERVOUS, ANXIOUS, OR ON EDGE: SEVERAL DAYS
GAD7 TOTAL SCORE: 1
7. FEELING AFRAID AS IF SOMETHING AWFUL MIGHT HAPPEN: NOT AT ALL

## 2018-02-06 ASSESSMENT — PATIENT HEALTH QUESTIONNAIRE - PHQ9
SUM OF ALL RESPONSES TO PHQ QUESTIONS 1-9: 3
SUM OF ALL RESPONSES TO PHQ QUESTIONS 1-9: 3
10. IF YOU CHECKED OFF ANY PROBLEMS, HOW DIFFICULT HAVE THESE PROBLEMS MADE IT FOR YOU TO DO YOUR WORK, TAKE CARE OF THINGS AT HOME, OR GET ALONG WITH OTHER PEOPLE: NOT DIFFICULT AT ALL

## 2018-02-06 NOTE — NURSING NOTE
"Chief Complaint   Patient presents with     URI     Panel Management     mychart, height, flu, pneumovax, mammogram, dexa, honoring choices, ethan, phq9, aap, act       Initial BP (!) 154/92  Pulse 72  Temp 98  F (36.7  C) (Oral)  Resp 18  Ht 5' 1.81\" (1.57 m)  Wt 159 lb 6.4 oz (72.3 kg)  LMP 11/09/2005  SpO2 99%  BMI 29.33 kg/m2 Estimated body mass index is 29.33 kg/(m^2) as calculated from the following:    Height as of this encounter: 5' 1.81\" (1.57 m).    Weight as of this encounter: 159 lb 6.4 oz (72.3 kg).  Medication Reconciliation: complete    "

## 2018-02-06 NOTE — PATIENT INSTRUCTIONS
- Afrin nasal spray - only 3 days   - Mucinex but not Mucinex-D   - Coricidin     - Increase Lisinopril to 2 tablets   - Stop by for a BP recheck in 1-2 weeks     - Start Levofloxacin tonight, 1 tablet once a day for 10 days   - Tomorrow morning start Prednisone, 1 tablet for 5 days   - Albuterol inhaler - 2 puffs 3-4x/day for 4-5 days                     Sinusitis           What is sinusitis?   Sinusitis is swollen, infected linings of the sinuses. The sinuses are hollow spaces in the bones of your face and skull. They connect with the nose through small openings. Like the nose, their linings make mucus.   How does it occur?   Sinusitis occurs when the sinus linings become infected. The passageways from the sinuses to the nose are very narrow. Swelling and mucus may block the passageways. This leads to pressure changes in the sinuses that can be painful.   A number of things can cause swelling and sinusitis. Most often it's allergens (things that cause allergies, like pollen and mold) and viruses, such as viruses that cause the common cold. Whether the cause is allergies or a virus, the sinus linings can swell. When swelling causes the sinus passageway to swell shut, bacteria, viruses, and even fungus can be trapped in the sinuses and cause a sinus infection.   If your nasal bones have been injured or are deformed, causing partial blockage of the sinus openings, you are more likely to get sinusitis.   What are the symptoms?   Symptoms include:   feeling of fullness or pressure in your head   a headache that is most painful when you first wake up in the morning or when you bend your head down or forward   pain above or below your eyes   aching in the upper jaw and teeth   runny or stuffy nose   cough, especially at night   fluid draining down the back of your throat (postnasal drainage)   sore throat in the morning or evening.   How is it diagnosed?   Your healthcare provider will ask about your symptoms and will  examine you. You may have an X-ray to look for swelling, fluid, or small benign growths (polyps) in the sinuses.   How is it treated?   Decongestants may help. They may be nonprescription or prescription. They are available as liquids, pills, and nose sprays.   Your healthcare provider may prescribe an antibiotic. In some cases you may need to take decongestants and antibiotics for several weeks.   You may need nonprescription medicine for pain, such as acetaminophen or ibuprofen. Check with your healthcare provider before you give any medicine that contains aspirin or salicylates to a child or teen. This includes medicines like baby aspirin, some cold medicines, and Pepto Bismol. Children and teens who take aspirin are at risk for a serious illness called Reye's syndrome. Ibuprofen is an NSAID. Nonsteroidal anti-inflammatory medicines (NSAIDs) may cause stomach bleeding and other problems. These risks increase with age. Read the label and take as directed. Unless recommended by your healthcare provider, do not take NSAIDs for more than 10 days for any reason.   If you have chronic or repeated sinus infections, allergies may be the cause. Your healthcare provider may prescribe antihistamine tablets or prescription nasal sprays (steroids or cromolyn) to treat the allergies.   If you have chronic, severe sinusitis that does not respond to treatment with medicines, surgery may be done. The surgeon can create an extra or enlarged passageway in the wall of the sinus cavity. This allows the sinuses to drain more easily through the nasal passages. This should help them stay free of infection.   How long will the effects last?   Symptoms may get better gradually over 3 to 10 days. Depending on what caused the sinusitis and how severe it is, it may last for days or weeks. The symptoms may come back if you do not finish all of your antibiotic.   How can I take care of myself?   Follow your healthcare provider's instructions.    If you are taking an antibiotic, take all of it as directed by your provider. If you stop taking the medicine when your symptoms are gone but before you have taken all of the medicine, symptoms may come back.   Avoid tobacco smoke.   If you have allergies, take care to avoid the things you are allergic to, such as animal dander.   Add moisture to the air with a humidifier or a vaporizer, unless you have mold allergy (mold may grow in your vaporizer).   Inhale steam from a basin of hot water or shower to help open your sinuses and relieve pain.   Use saline nasal sprays to help wash out nasal passages and clear some mucus from the airways.   Use decongestants as directed on the label or by your provider.   If you are using a nonprescription nasal-spray decongestant, generally you should not use it for more than 3 days. After 3 days it may cause your symptoms to get worse. Ask your healthcare provider if it is OK for you to use a nasal spray decongestant longer than this.   Get plenty of rest.   Drink more fluids to keep the mucus as thin as possible so your sinuses can drain more easily.   Put warm compresses on painful areas.   Take antibiotics as prescribed. Use all of the medicine, even after you feel better. Some sinus infections require 2 to 4 weeks of antibiotic treatment.   See your healthcare provider if the pain lasts for several days or gets worse.   If the sinus areas above or below your eyes are swollen or bulging, see your healthcare provider right away. This symptom may mean that the infection is spreading. A spreading infection can affect other parts of your body--even the brain--and needs to be treated promptly.   How can I help prevent sinusitis?   Treat your colds and allergies promptly. Use decongestants as soon as you start having symptoms.   Do not smoke and stay away from secondhand smoke.   Drink lots of fluids to keep the mucus thin.   Humidify your home if the air is particularly dry.   If  you have sinus infections often, consider having allergy tests.   If sinusitis continues to be a problem despite treatment, you might need an exam by an ear, nose, and throat doctor (called an ENT or otolaryngologist). The specialist will check for polyps or a deformed bone that may be blocking your sinuses.     Published by [employer].  This content is reviewed periodically and is subject to change as new health information becomes available. The information is intended to inform and educate and is not a replacement for medical evaluation, advice, diagnosis or treatment by a healthcare professional.   Developed by [employer].   ? 2010 [employer] and/or its affiliates. All Rights Reserved.   Copyright   [employer] 2011  [employer]visor

## 2018-02-06 NOTE — MR AVS SNAPSHOT
After Visit Summary   2/6/2018    Sri Grewal    MRN: [id number]           Patient Information     Date Of Birth          1950        Visit Information        Provider Department      2/6/2018 9:00 AM Rosenda Pierre PA-C [facility]        Today's Diagnoses     Mild persistent asthma with acute exacerbation    -  1    Chronic migraine without aura without status migrainosus, not intractable        Amaurosis fugax        Hypertension goal BP (blood pressure) < 140/90        Mild persistent asthma, uncomplicated        Acute sinusitis with coexisting condition requiring prophylactic treatment          Care Instructions    - Afrin nasal spray - only 3 days   - Mucinex but not Mucinex-D   - Coricidin     - Increase Lisinopril to 2 tablets   - Stop by for a BP recheck in 1-2 weeks     - Start Levofloxacin tonight, 1 tablet once a day for 10 days   - Tomorrow morning start Prednisone, 1 tablet for 5 days   - Albuterol inhaler - 2 puffs 3-4x/day for 4-5 days                     Sinusitis           What is sinusitis?   Sinusitis is swollen, infected linings of the sinuses. The sinuses are hollow spaces in the bones of your face and skull. They connect with the nose through small openings. Like the nose, their linings make mucus.   How does it occur?   Sinusitis occurs when the sinus linings become infected. The passageways from the sinuses to the nose are very narrow. Swelling and mucus may block the passageways. This leads to pressure changes in the sinuses that can be painful.   A number of things can cause swelling and sinusitis. Most often it's allergens (things that cause allergies, like pollen and mold) and viruses, such as viruses that cause the common cold. Whether the cause is allergies or a virus, the sinus linings can swell. When swelling causes the sinus passageway to swell shut, bacteria, viruses, and even fungus can be trapped in the sinuses and cause a sinus  infection.   If your nasal bones have been injured or are deformed, causing partial blockage of the sinus openings, you are more likely to get sinusitis.   What are the symptoms?   Symptoms include:   feeling of fullness or pressure in your head   a headache that is most painful when you first wake up in the morning or when you bend your head down or forward   pain above or below your eyes   aching in the upper jaw and teeth   runny or stuffy nose   cough, especially at night   fluid draining down the back of your throat (postnasal drainage)   sore throat in the morning or evening.   How is it diagnosed?   Your healthcare provider will ask about your symptoms and will examine you. You may have an X-ray to look for swelling, fluid, or small benign growths (polyps) in the sinuses.   How is it treated?   Decongestants may help. They may be nonprescription or prescription. They are available as liquids, pills, and nose sprays.   Your healthcare provider may prescribe an antibiotic. In some cases you may need to take decongestants and antibiotics for several weeks.   You may need nonprescription medicine for pain, such as acetaminophen or ibuprofen. Check with your healthcare provider before you give any medicine that contains aspirin or salicylates to a child or teen. This includes medicines like baby aspirin, some cold medicines, and Pepto Bismol. Children and teens who take aspirin are at risk for a serious illness called Reye's syndrome. Ibuprofen is an NSAID. Nonsteroidal anti-inflammatory medicines (NSAIDs) may cause stomach bleeding and other problems. These risks increase with age. Read the label and take as directed. Unless recommended by your healthcare provider, do not take NSAIDs for more than 10 days for any reason.   If you have chronic or repeated sinus infections, allergies may be the cause. Your healthcare provider may prescribe antihistamine tablets or prescription nasal sprays (steroids or cromolyn) to  treat the allergies.   If you have chronic, severe sinusitis that does not respond to treatment with medicines, surgery may be done. The surgeon can create an extra or enlarged passageway in the wall of the sinus cavity. This allows the sinuses to drain more easily through the nasal passages. This should help them stay free of infection.   How long will the effects last?   Symptoms may get better gradually over 3 to 10 days. Depending on what caused the sinusitis and how severe it is, it may last for days or weeks. The symptoms may come back if you do not finish all of your antibiotic.   How can I take care of myself?   Follow your healthcare provider's instructions.   If you are taking an antibiotic, take all of it as directed by your provider. If you stop taking the medicine when your symptoms are gone but before you have taken all of the medicine, symptoms may come back.   Avoid tobacco smoke.   If you have allergies, take care to avoid the things you are allergic to, such as animal dander.   Add moisture to the air with a humidifier or a vaporizer, unless you have mold allergy (mold may grow in your vaporizer).   Inhale steam from a basin of hot water or shower to help open your sinuses and relieve pain.   Use saline nasal sprays to help wash out nasal passages and clear some mucus from the airways.   Use decongestants as directed on the label or by your provider.   If you are using a nonprescription nasal-spray decongestant, generally you should not use it for more than 3 days. After 3 days it may cause your symptoms to get worse. Ask your healthcare provider if it is OK for you to use a nasal spray decongestant longer than this.   Get plenty of rest.   Drink more fluids to keep the mucus as thin as possible so your sinuses can drain more easily.   Put warm compresses on painful areas.   Take antibiotics as prescribed. Use all of the medicine, even after you feel better. Some sinus infections require 2 to 4  weeks of antibiotic treatment.   See your healthcare provider if the pain lasts for several days or gets worse.   If the sinus areas above or below your eyes are swollen or bulging, see your healthcare provider right away. This symptom may mean that the infection is spreading. A spreading infection can affect other parts of your body--even the brain--and needs to be treated promptly.   How can I help prevent sinusitis?   Treat your colds and allergies promptly. Use decongestants as soon as you start having symptoms.   Do not smoke and stay away from secondhand smoke.   Drink lots of fluids to keep the mucus thin.   Humidify your home if the air is particularly dry.   If you have sinus infections often, consider having allergy tests.   If sinusitis continues to be a problem despite treatment, you might need an exam by an ear, nose, and throat doctor (called an ENT or otolaryngologist). The specialist will check for polyps or a deformed bone that may be blocking your sinuses.     Published by [employer].  This content is reviewed periodically and is subject to change as new health information becomes available. The information is intended to inform and educate and is not a replacement for medical evaluation, advice, diagnosis or treatment by a healthcare professional.   Developed by [employer].   ? 2010 [employer] and/or its affiliates. All Rights Reserved.   Copyright   [employer] 2011  [employer]ult [employer]visor                Follow-ups after your visit        Who to contact     If you have questions or need follow up information about today's clinic visit or your schedule please contact [facility] directly at [phone].  Normal or non-critical lab and imaging results will be communicated to you by MyChart, letter or phone within 4 business days after the clinic has received the results. If you do not hear from us within 7 days, please contact the clinic through MyChart or phone. If  "you have a critical or abnormal lab result, we will notify you by phone as soon as possible.  Submit refill requests through [employer] or call your pharmacy and they will forward the refill request to us. Please allow 3 business days for your refill to be completed.          Additional Information About Your Visit        123ContactFormhart Information     [employer] lets you send messages to your doctor, view your test results, renew your prescriptions, schedule appointments and more. To sign up, go to [email or web address] . Click on \"Log in\" on the left side of the screen, which will take you to the Welcome page. Then click on \"Sign up Now\" on the right side of the page.     You will be asked to enter the access code listed below, as well as some personal information. Please follow the directions to create your username and password.     Your access code is: [email or web address]  Expires: 2018  9:41 AM     Your access code will  in 90 days. If you need help or a new code, please call your [facility] clinic or [phone].        Care EveryWhere ID     This is your Care EveryWhere ID. This could be used by other organizations to access your [facility] medical records  [id number]        Your Vitals Were     Pulse Temperature Respirations Height Last Period Pulse Oximetry    72 98  F (36.7  C) (Oral) 18 5' 1.81\" (1.57 m) 2005 99%    BMI (Body Mass Index)                   29.33 kg/m2            Blood Pressure from Last 3 Encounters:   18 140/80   18 (!) 187/100   18 (!) 210/100    Weight from Last 3 Encounters:   18 159 lb 6.4 oz (72.3 kg)   18 160 lb (72.6 kg)   17 159 lb 3.2 oz (72.2 kg)              Today, you had the following     No orders found for display         Today's Medication Changes          These changes are accurate as of 18  9:41 AM.  If you have any questions, ask your nurse or doctor.               Start taking these medicines.        Dose/Directions    levofloxacin " 750 MG tablet   Commonly known as:  LEVAQUIN   Used for:  Mild persistent asthma with acute exacerbation, Acute sinusitis with coexisting condition requiring prophylactic treatment   Started by:  Rosneda Pierre PA-C        Dose:  750 mg   Take 1 tablet (750 mg) by mouth daily   Quantity:  10 tablet   Refills:  0       lisinopril 30 MG tablet   Commonly known as:  PRINIVIL,ZESTRIL   Used for:  Amaurosis fugax, Hypertension goal BP (blood pressure) < 140/90   Started by:  Rosenda Pierre PA-C        Dose:  60 mg   Take 2 tablets (60 mg) by mouth daily   Quantity:  180 tablet   Refills:  3       predniSONE 20 MG tablet   Commonly known as:  DELTASONE   Used for:  Acute sinusitis with coexisting condition requiring prophylactic treatment   Started by:  Rosenda Pierre PA-C        Dose:  20 mg   Take 1 tablet (20 mg) by mouth daily   Quantity:  5 tablet   Refills:  0         These medicines have changed or have updated prescriptions.        Dose/Directions    ASPIRIN 81 PO   This may have changed:  Another medication with the same name was removed. Continue taking this medication, and follow the directions you see here.   Changed by:  Rosenda Pierre PA-C        Refills:  0         Stop taking these medicines if you haven't already. Please contact your care team if you have questions.     clopidogrel 75 MG tablet   Commonly known as:  PLAVIX   Stopped by:  Rosenda Pierre PA-C                Where to get your medicines      These medications were sent to [facility] [address] [facility]  [address] [facility] [address]     Phone:  [phone]     levofloxacin 750 MG tablet    predniSONE 20 MG tablet         These medications were sent to [facility] by Mail [facility]  [place] [address]  [address] [address]     Phone:  [phone]     albuterol 108 (90 BASE)  MCG/ACT Inhaler    lisinopril 30 MG tablet    propranolol 20 MG tablet         Some of these will need a paper prescription and others can be bought over the counter.  Ask your nurse if you have questions.     Bring a paper prescription for each of these medications     butalbital-aspirin-caffeine -40 MG per capsule                Primary Care Provider Office Phone # Fax #    Rosenda SANTIAGO FLEX Pierre [phone]       [address] [place] [address] [address]  [facility] [address]        Equal Access to Services     SACHA VICKERS : Hadii aad ku hadasho Soomaali, waaxda luqadaha, qaybta kaalmada adeegyada, waxay idiin hayrekhan karin davies . So [facility] [phone].    ATENCIÓN: Si habla español, tiene a orozco disposición servicios gratuitos de asistencia lingüística. [facility] [phone].    We comply with applicable federal civil rights laws and [place] laws. We do not discriminate on the basis of race, color, national origin, age, disability, sex, sexual orientation, or gender identity.            Thank you!     Thank you for choosing [facility]  for your care. Our goal is always to provide you with excellent care. Hearing back from our patients is one way we can continue to improve our services. Please take a few minutes to complete the written survey that you may receive in the mail after your visit with us. Thank you!             Your Updated Medication List - Protect others around you: Learn how to safely use, store and throw away your medicines at www.disposemymeds.org.          This list is accurate as of 2/6/18  9:41 AM.  Always use your most recent med list.                   Brand Name Dispense Instructions for use Diagnosis    albuterol 108 (90 BASE) MCG/ACT Inhaler    PROAIR HFA    2 Inhaler    Inhale 1-2 puffs into the lungs every 6 hours as needed for shortness of breath / dyspnea    Mild persistent asthma, uncomplicated       ALLEGRA PO           ASPIRIN 81 PO            butalbital-aspirin-caffeine -40 MG per capsule    FIORINAL    30 capsule    TAKE ONE CAPSULE BY MOUTH EVERY 6 HOURS AS NEEDED (one month supply)    Chronic migraine without aura without status migrainosus, not intractable       IBUPROFEN PO      Reported on 4/6/2017        levofloxacin 750 MG tablet    LEVAQUIN    10 tablet    Take 1 tablet (750 mg) by mouth daily    Mild persistent asthma with acute exacerbation, Acute sinusitis with coexisting condition requiring prophylactic treatment       lisinopril 30 MG tablet    PRINIVIL,ZESTRIL    180 tablet    Take 2 tablets (60 mg) by mouth daily    Amaurosis fugax, Hypertension goal BP (blood pressure) < 140/90       predniSONE 20 MG tablet    DELTASONE    5 tablet    Take 1 tablet (20 mg) by mouth daily    Acute sinusitis with coexisting condition requiring prophylactic treatment       propranolol 20 MG tablet    INDERAL    180 tablet    Take 1 tablet (20 mg) by mouth 2 times daily    Chronic migraine without aura without status migrainosus, not intractable

## 2018-02-07 ASSESSMENT — ASTHMA QUESTIONNAIRES: ACT_TOTALSCORE: 19

## 2018-02-07 ASSESSMENT — ANXIETY QUESTIONNAIRES: GAD7 TOTAL SCORE: 1

## 2018-02-07 ASSESSMENT — PATIENT HEALTH QUESTIONNAIRE - PHQ9: SUM OF ALL RESPONSES TO PHQ QUESTIONS 1-9: 3

## 2018-02-08 ENCOUNTER — TELEPHONE (OUTPATIENT)
Dept: FAMILY MEDICINE | Facility: OTHER | Age: 68
End: 2018-02-08

## 2018-02-08 DIAGNOSIS — J01.90 ACUTE SINUSITIS TREATED WITH ANTIBIOTICS IN THE PAST 60 DAYS: Primary | ICD-10-CM

## 2018-02-08 RX ORDER — DOXYCYCLINE HYCLATE 100 MG
100 TABLET ORAL 2 TIMES DAILY
Qty: 20 TABLET | Refills: 0 | Status: SHIPPED | OUTPATIENT
Start: 2018-02-08 | End: 2018-03-12

## 2018-02-08 NOTE — TELEPHONE ENCOUNTER
Reason for call:  Patient reporting a symptom    Symptom or request: sinus inf    Duration (how long have symptoms been present): a month    Have you been treated for this before? Yes    Additional comments: call to fu. Declined e visit or phone visit, wanted to speak with triage.    Phone Number patient can be reached at: [phone]    Best Time:  any    Can we leave a detailed message on this number:  YES    Call taken on 2/8/2018 at 9:45 AM by Jordyn Orr

## 2018-02-08 NOTE — TELEPHONE ENCOUNTER
"RN triage phone assessment note: 2/8/2018  Sri Grewal is a 67 year old female with continuing sinus symptoms. I spoke with her today.    NURSING ASSESSMENT:  Description:  See visit note from 2 days ago, she does not feel any better. Taking all prescribed meds as advised, but still having sinus pain/pressure, not sleeping, coughing, and labored breathing. No stridor or wheezing noted, no gasping. She is speaking in full, clear sentences. She does report mild relief from using albuterol inhaler. Denies fever. Denies redness or swelling in face, denies tenderness to the touch. \"It feels like it's all stuck in my head, I wish I could blow it out.\" She reports trying to go in to work last night, but only lasted about 2 hours and had to go home sick. Not sleeping well, only got about 4 hours last night. \"It just hurts so much when I lay down.\"  Onset/duration:  persisting since last office visit  Medications related to the above issues: See med list, taking all meds prescribed at visit 2 days ago  Allergies:   Allergies   Allergen Reactions     Codeine      GI UPSET     Percocet [Oxycodone-Acetaminophen] Nausea and Vomiting     Seasonal Allergies        NURSING PLAN: Huddle with provider, plan includes changing antibiotic to doxycycline- per FANTASMA Pierre, patient can start the doxycycline later this afternoon, but only to take one dose today, since she already took the levaquin at 6am today. She is to stop the levaquin, but continue the prednisone. Patient verbalizes understanding of this, and is in agreement. She will follow up if not improving, and will call back if breathing changes/worsens, or any other new/persisiting symptoms.       Will comply with recommendation: Yes  If further questions/concerns or if symptoms do not improve, worsen or new symptoms develop, call your PCP or [facility] as soon as possible.      Guideline used:  Huddled with patient's PCP    NOTE:  Disposition was " determined by the first positive assessment question in the listed triage protocol, therefore all previous assessment questions were negative.       Yousif Arellano RN, BSN

## 2018-02-12 ENCOUNTER — TELEPHONE (OUTPATIENT)
Dept: FAMILY MEDICINE | Facility: OTHER | Age: 68
End: 2018-02-12

## 2018-02-12 NOTE — TELEPHONE ENCOUNTER
Sri Grewal is a 67 year old female who calls with facial pain and coughing.    NURSING ASSESSMENT:  Description:  I spoke with pt who states she is having facial pain around the eyes and coughing. 6/10 days of antibiotics and prednisone. Pt states she is not coughing up mucus or blowing her nose as much. Is feeling better but sick of coughing. She is calling looking for some more home care measures.   Onset/duration:  Since OV 2/6/2018  Precip. factors:  Asthma, HTN  Associated symptoms:  No fever  Improves/worsens symptoms:  Inhaler helps short term for cough  Pain scale (0-10)   0/10 sore from coughing so much  Last exam/Treatment:  OV 2/6/2018 sinus infection and exacerbation of asthma, Levofloxacin and prednisone  Allergies:   Allergies   Allergen Reactions     Codeine      GI UPSET     Percocet [Oxycodone-Acetaminophen] Nausea and Vomiting     Seasonal Allergies        RECOMMENDED DISPOSITION:  Home care advice - plus from OV 2/6/2018 note: Can try Afrin (3 days only), Mucinex (not D), or Coricidin   Will comply with recommendation: Yes  If further questions/concerns or if symptoms do not improve, worsen or new symptoms develop, call your PCP or [facility] as soon as possible.      Guideline used:  Telephone Triage Protocols for Nurses, Fifth Edition, Lilli Francisco RN

## 2018-03-12 ENCOUNTER — TELEPHONE (OUTPATIENT)
Dept: FAMILY MEDICINE | Facility: OTHER | Age: 68
End: 2018-03-12

## 2018-03-12 ENCOUNTER — OFFICE VISIT (OUTPATIENT)
Dept: FAMILY MEDICINE | Facility: OTHER | Age: 68
End: 2018-03-12
Payer: MEDICARE

## 2018-03-12 VITALS
HEART RATE: 68 BPM | SYSTOLIC BLOOD PRESSURE: 156 MMHG | TEMPERATURE: 98.9 F | DIASTOLIC BLOOD PRESSURE: 90 MMHG | RESPIRATION RATE: 16 BRPM | OXYGEN SATURATION: 97 % | BODY MASS INDEX: 28.52 KG/M2 | WEIGHT: 155 LBS

## 2018-03-12 DIAGNOSIS — I10 HYPERTENSION GOAL BP (BLOOD PRESSURE) < 140/90: ICD-10-CM

## 2018-03-12 DIAGNOSIS — J32.9 CHRONIC SINUSITIS, UNSPECIFIED LOCATION: Primary | ICD-10-CM

## 2018-03-12 DIAGNOSIS — J45.31 MILD PERSISTENT ASTHMA WITH ACUTE EXACERBATION: ICD-10-CM

## 2018-03-12 PROCEDURE — 99214 OFFICE O/P EST MOD 30 MIN: CPT | Performed by: PHYSICIAN ASSISTANT

## 2018-03-12 RX ORDER — METHYLPREDNISOLONE 4 MG
TABLET, DOSE PACK ORAL
Qty: 21 TABLET | Refills: 0 | Status: SHIPPED | OUTPATIENT
Start: 2018-03-12 | End: 2018-04-19

## 2018-03-12 RX ORDER — AZITHROMYCIN 250 MG/1
TABLET, FILM COATED ORAL
Qty: 6 TABLET | Refills: 1 | Status: SHIPPED | OUTPATIENT
Start: 2018-03-12 | End: 2018-04-19

## 2018-03-12 ASSESSMENT — PAIN SCALES - GENERAL: PAINLEVEL: SEVERE PAIN (7)

## 2018-03-12 NOTE — TELEPHONE ENCOUNTER
"Sri Grewal is a 67 year old female who calls with ongoing symptoms.    NURSING ASSESSMENT:  Description: Patient continues to have \"the same symptoms\" including sinus pressure and a cough.  Onset/duration:  Beginning of January  Precip. factors:  Seen on 2/6/18  Associated symptoms:  \"run down\"  Improves/worsens symptoms: \"everything is just temporary\" - has tried the FLonase  Last exam/Treatment:  2/6/18  Allergies:   Allergies   Allergen Reactions     Codeine      GI UPSET     Percocet [Oxycodone-Acetaminophen] Nausea and Vomiting     Seasonal Allergies        RECOMMENDED DISPOSITION:  See in 24 hours -   Will comply with recommendation: YES     Next 5 appointments (look out 90 days)     Mar 12, 2018 10:15 AM CDT   [facility]-C   [facility]    [address] [place] [phone]                If further questions/concerns or if Sx do not improve, worsen or new Sx develop, call your PCP or [facility] as soon as possible.    NOTES:  Disposition was determined by the first positive assessment question, therefore all previous assessment questions were negative.     Guideline used:  Telephone Triage Protocols for Nurses, Fifth Edition, Lilli De Paz, RN, BSN      "

## 2018-03-12 NOTE — PROGRESS NOTES
"  SUBJECTIVE:   Sri Grewal is a 67 year old female who presents to clinic today for the following health issues:  HPI     Ok for student  Acute Illness   Acute illness concerns: sinus issues/headache  Onset: 2/8/18    Fever: YES- 99    Chills/Sweats: YES    Headache (location?): YES    Sinus Pressure:YES    Conjunctivitis:  no    Ear Pain: behind left ear    Rhinorrhea: YES    Congestion: YES    Sore Throat: YES     Cough: YES-productive of yellow sputum    Wheeze: no     Decreased Appetite: no     Nausea: no     Vomiting: no     Diarrhea:  no     Dysuria/Freq.: no     Fatigue/Achiness: YES- fatiuge    Sick/Strep Exposure: no     Therapies Tried and outcome: was on levaquin, prednisone, doxycycline - has never fully cleared    Main complaints are feeling tired/rundown and coughing, but other symptoms as above persist as well  Overall not better than a month ago  Has been going to work, feels like she is struggling to get it done due to illness (Benito Crandall)  Hasn't been taking anything for the cough - doesn't like syrups  Has 3 pills left of doxycycline, but doesn't feel like it helped except for the first couple days  No improvement with the Levaquin, but was only on this a couple days before doxy  Had amoxacillin beginning of February, relieved pressure  Prednisone didn't help at first, but later she had some improvement  Using albuterol, once per day; says she only used it twice per day when she was advised to use it on scheduled basis  Thought about taking some leftover antibiotics she has at home, but didn't take these  Adding blueberries, oranges, other healthy foods without improvement    \"Always had bronchitis\" when younger  Seasonal allergies    Is going out of town in 2 weeks to visit daughter's boyfriend's parents in [place]      Problem list and histories reviewed & adjusted, as indicated.  Additional history: as documented    ROS:  Constitutional, HEENT, cardiovascular, pulmonary, gi and gu systems " are negative, except as otherwise noted.    OBJECTIVE:   /90  Pulse 68  Temp 98.9  F (37.2  C) (Oral)  Resp 16  Wt 155 lb (70.3 kg)  LMP 11/09/2005  SpO2 97%  BMI 28.52 kg/m2  Body mass index is 28.52 kg/(m^2).  GENERAL APPEARANCE: moderately ill appearing, alert and no distress  EYES: Eyes grossly normal to inspection, PERRLA, conjunctivae and sclerae without injection or discharge, EOM intact   HENT: Bilateral ear canals without erythema or cerumen, bilateral TM's with bulging serous effusions (L > R); nasal turbinates edematous and erythematous with clear discharge, mouth without ulcers or lesions, oropharynx clear and oral mucous membranes moist, no sinus tenderness   NECK: Cervical lymphadenopathy with tender posterior auricular lymphadenopathy on the left  RESP: Faint rhonchi on the right, worst in the RLL; normal effort on room air  CV: Regular rates and rhythm, normal S1 S2, no S3 or S4, no murmur, click or rub  MS: No musculoskeletal defects are noted and gait is age appropriate without ataxia   SKIN: No suspicious lesions or rashes, hydration status appears adeuqate with normal skin turgor   PSYCH: Alert and oriented x3; speech- coherent , normal rate and volume; able to articulate logical thoughts, able to abstract reason, no tangential thoughts, no hallucinations or delusions, mentation appears normal, Mood is euthymic. Affect is appropriate for this mood state and bright. Thought content is free of suicidal ideation, hallucinations, and delusions. Dress is adequate and upkept. Eye contact is good during conversation.       Diagnostic Test Results:  none     ASSESSMENT/PLAN:       ICD-10-CM    1. Chronic sinusitis, unspecified location J32.9 CT Sinus w/o Contrast     azithromycin (ZITHROMAX) 250 MG tablet     OTOLARYNGOLOGY REFERRAL     methylPREDNISolone (MEDROL DOSEPAK) 4 MG tablet   2. Mild persistent asthma with acute exacerbation J45.31    3. Hypertension goal BP (blood pressure) <  140/90 I10      Acute on Chronic Sinusitis  - Patient with over 1 month of sinus congestion, cough, and fatigue despite treatment with amoxicillin, partial course of Levaquin (2 days), and most recently course of doxycycline; also treated with prednisone and scheduled albuterol, with no improvement   - Recommend start Macrolide  - Discussed antibiotic use, duration, and side effects  - Will also do a medrol dose pack, discussed use and side effects  - Since this has not been responsive to other treatments, will recommend getting CT scan of her sinuses as well as a follow up with ENT specialist   - Encouraged rest and fluids   - Could consider adding in Flonase for additional symptom relief     2. Asthma   - Recommend albuterol scheduled 3-4x/daily 2 puffs for 4-5 days, then return to PRN  - Antibiotic as above will cover anything in her lungs as well     3. HTN   - Severely elevated today, but had just finished coughing fit before came in and had not yet taken Lisinopril (takes with her lunch)   - Patient to monitor   - Recheck BP only in 1-2 weeks when feeling better     The patient indicates understanding of these issues and agrees with the plan.    Follow up: as outlined above     [facility]       Rosenda Pierre PA-C  [facility]

## 2018-03-12 NOTE — NURSING NOTE
"Chief Complaint   Patient presents with     Sinus Problem       Initial /82 (BP Location: Left arm, Patient Position: Chair, Cuff Size: Adult Regular)  Pulse 68  Temp 98.9  F (37.2  C) (Oral)  Resp 16  Wt 155 lb (70.3 kg)  LMP 11/09/2005  SpO2 97%  BMI 28.52 kg/m2 Estimated body mass index is 28.52 kg/(m^2) as calculated from the following:    Height as of 2/6/18: 5' 1.81\" (1.57 m).    Weight as of this encounter: 155 lb (70.3 kg).  Medication Reconciliation: complete  "

## 2018-03-12 NOTE — MR AVS SNAPSHOT
After Visit Summary   3/12/2018    Sri Grewal    MRN: [id number]           Patient Information     Date Of Birth          1950        Visit Information        Provider Department      3/12/2018 10:15 AM Rosenda Pierre PA-C [facility]        Today's Diagnoses     Hypertension goal BP (blood pressure) < 140/90    -  1    Mild persistent asthma with acute exacerbation        Chronic sinusitis, unspecified location          Care Instructions    - Start Medrol dose pack  - Start antibiotic               Follow-ups after your visit        Additional Services     OTOLARYNGOLOGY REFERRAL       Your provider has referred you to: PREFERRED PROVIDERS:  [facility]: [facility] [phone]   [email or web address]    Please be aware that coverage of these services is subject to the terms and limitations of your health insurance plan.  Call member services at your health plan with any benefit or coverage questions.      Please bring the following with you to your appointment:    (1) Any X-Rays, CTs or MRIs which have been performed.  Contact the facility where they were done to arrange for  prior to your scheduled appointment.   (2) List of current medications  (3) This referral request   (4) Any documents/labs given to you for this referral                  Your next 10 appointments already scheduled     Mar 15, 2018 10:15 AM CDT   CT SINUS W/O CONTRAST with PHCT1   [facility] CT Scan ([facility])    [address] [place] [id number] [phone]           Please bring any scans or X-rays taken at other hospitals, if similar tests were done. Also bring a list of your medicines, including vitamins, minerals and over-the-counter drugs. It is safest to leave personal items at home.  Be sure to tell your doctor:   If you have any allergies.   If there s any chance you are pregnant.   If you are  "breastfeeding.  You do not need to do anything special to prepare for this exam.  Please wear loose clothing, such as a sweat suit or jogging clothes. Avoid snaps, zippers and other metal. We may ask you to undress and put on a hospital gown.            Apr 25, 2018 10:45 AM CDT   New Visit with Nic Mckeon MD   [facility] ([facility]    [address]  [facility] [address]   [phone]              Future tests that were ordered for you today     Open Future Orders        Priority Expected Expires Ordered    CT Sinus w/o Contrast Routine  3/12/2019 3/12/2018            Who to contact     If you have questions or need follow up information about today's clinic visit or your schedule please contact [facility] directly at [phone].  Normal or non-critical lab and imaging results will be communicated to you by MyChart, letter or phone within 4 business days after the clinic has received the results. If you do not hear from us within 7 days, please contact the clinic through MyChart or phone. If you have a critical or abnormal lab result, we will notify you by phone as soon as possible.  Submit refill requests through [employer] or call your pharmacy and they will forward the refill request to us. Please allow 3 business days for your refill to be completed.          Additional Information About Your Visit        MyChart Information     [employer] lets you send messages to your doctor, view your test results, renew your prescriptions, schedule appointments and more. To sign up, go to [email or web address] . Click on \"Log in\" on the left side of the screen, which will take you to the Welcome page. Then click on \"Sign up Now\" on the right side of the page.     You will be asked to enter the access code listed below, as well as some personal information. Please follow the directions to create your username and password.     Your access code is: " [email or web address]  Expires: 2018 10:41 AM     Your access code will  in 90 days. If you need help or a new code, please call your [place] clinic or [phone].        Care EveryWhere ID     This is your Care EveryWhere ID. This could be used by other organizations to access your [place] medical records  [id number]        Your Vitals Were     Pulse Temperature Respirations Last Period Pulse Oximetry BMI (Body Mass Index)    68 98.9  F (37.2  C) (Oral) 16 2005 97% 28.52 kg/m2       Blood Pressure from Last 3 Encounters:   18 156/90   18 140/80   18 (!) 187/100    Weight from Last 3 Encounters:   18 155 lb (70.3 kg)   18 159 lb 6.4 oz (72.3 kg)   18 160 lb (72.6 kg)              We Performed the Following     OTOLARYNGOLOGY REFERRAL          Today's Medication Changes          These changes are accurate as of 3/12/18 11:00 AM.  If you have any questions, ask your nurse or doctor.               Start taking these medicines.        Dose/Directions    azithromycin 250 MG tablet   Commonly known as:  ZITHROMAX   Used for:  Chronic sinusitis, unspecified location   Started by:  Rosenda Pierre PA-C        Two tablets first day, then one tablet daily for four days.   Quantity:  6 tablet   Refills:  1       methylPREDNISolone 4 MG tablet   Commonly known as:  MEDROL DOSEPAK   Used for:  Chronic sinusitis, unspecified location   Started by:  Rosenda Pierre PA-C        Follow package instructions   Quantity:  21 tablet   Refills:  0            Where to get your medicines      These medications [place] [facility] [place] [address]  [address] [address] [address]     Phone:  [phone]     azithromycin 250 MG tablet    methylPREDNISolone 4 MG tablet                Primary Care Provider Office Phone # Fax #    Rosenda Pierre PA-C [phone]       [address] [place] [address]  [address]  [facility] [address]        Equal Access to Services     [facility] Hospital[facility] : Hadii aad ku hadkarmenshamika Cynthiaali, waaxda luqadaha, qaybta kaflomarvel mcbride, caridad mccall. So [facility] [phone].    ATENCIÓN: Si habla español, tiene a orozco disposición servicios gratuitos de asistencia lingüística. Masoodame al [phone].    We comply with applicable federal civil rights laws and [place] laws. We do not discriminate on the basis of race, color, national origin, age, disability, sex, sexual orientation, or gender identity.            Thank you!     Thank you for choosing [facility]  for your care. Our goal is always to provide you with excellent care. Hearing back from our patients is one way we can continue to improve our services. Please take a few minutes to complete the written survey that you may receive in the mail after your visit with us. Thank you!             Your Updated Medication List - Protect others around you: Learn how to safely use, store and throw away your medicines at www.disposemymeds.org.          This list is accurate as of 3/12/18 11:00 AM.  Always use your most recent med list.                   Brand Name Dispense Instructions for use Diagnosis    albuterol 108 (90 BASE) MCG/ACT Inhaler    PROAIR HFA    2 Inhaler    Inhale 1-2 puffs into the lungs every 6 hours as needed for shortness of breath / dyspnea    Mild persistent asthma with acute exacerbation       ALLEGRA PO           ASPIRIN 81 PO           azithromycin 250 MG tablet    ZITHROMAX    6 tablet    Two tablets first day, then one tablet daily for four days.    Chronic sinusitis, unspecified location       butalbital-aspirin-caffeine -40 MG per capsule    FIORINAL    30 capsule    TAKE ONE CAPSULE BY MOUTH EVERY 6 HOURS AS NEEDED (one month supply)    Chronic migraine without aura without status migrainosus, not intractable       IBUPROFEN PO      Reported on 4/6/2017        lisinopril 30 MG  tablet    PRINIVIL,ZESTRIL    180 tablet    Take 2 tablets (60 mg) by mouth daily    Amaurosis fugax, Hypertension goal BP (blood pressure) < 140/90       methylPREDNISolone 4 MG tablet    MEDROL DOSEPAK    21 tablet    Follow package instructions    Chronic sinusitis, unspecified location       propranolol 20 MG tablet    INDERAL    180 tablet    Take 1 tablet (20 mg) by mouth 2 times daily    Chronic migraine without aura without status migrainosus, not intractable

## 2018-03-12 NOTE — TELEPHONE ENCOUNTER
Reason for call:  Symptom  Reason for call:  Patient reporting a symptom    Symptom or request: sinus infection    Duration (how long have symptoms been present): a few months    Have you been treated for this before? Yes    Additional comments: she states it doesn't totally go away and just wants to have more antibiotics called to [facility] pharmacy    Phone Number patient can be reached at:  Home number on file [phone] (home)    Best Time:  any    Can we leave a detailed message on this number:  YES    Call taken on 3/12/2018 at 8:42 AM by Marianne Perez

## 2018-03-15 ENCOUNTER — HOSPITAL ENCOUNTER (OUTPATIENT)
Dept: CT IMAGING | Facility: CLINIC | Age: 68
Discharge: HOME | End: 2018-03-15
Attending: PHYSICIAN ASSISTANT | Admitting: PHYSICIAN ASSISTANT
Payer: MEDICARE

## 2018-03-15 ENCOUNTER — TELEPHONE (OUTPATIENT)
Dept: FAMILY MEDICINE | Facility: OTHER | Age: 68
End: 2018-03-15

## 2018-03-15 DIAGNOSIS — J32.9 CHRONIC SINUSITIS, UNSPECIFIED LOCATION: ICD-10-CM

## 2018-03-15 PROCEDURE — 70486 CT MAXILLOFACIAL W/O DYE: CPT

## 2018-03-15 NOTE — TELEPHONE ENCOUNTER
LM for patient to return phone call to clinic about message below.  Angélica Nair CMA ([facility])      Notes Recorded by Humberto Mccullough PA-C on 3/15/2018 at 5:35 PM  Please call and notify that there was a small retention cyst in one of her sinuses but otherwise, no signs of chronic sinus thickening or any other abnormalities. They will discuss this further when she sees the ENT.    Humberto Mccullough PA-C

## 2018-04-18 ENCOUNTER — ALLIED HEALTH/NURSE VISIT (OUTPATIENT)
Dept: FAMILY MEDICINE | Facility: OTHER | Age: 68
End: 2018-04-18
Payer: MEDICARE

## 2018-04-18 VITALS — SYSTOLIC BLOOD PRESSURE: 154 MMHG | DIASTOLIC BLOOD PRESSURE: 84 MMHG | HEART RATE: 64 BPM

## 2018-04-18 DIAGNOSIS — I10 HYPERTENSION GOAL BP (BLOOD PRESSURE) < 140/90: Primary | ICD-10-CM

## 2018-04-18 PROCEDURE — 99207 ZZC NO CHARGE NURSE ONLY: CPT | Performed by: PHYSICIAN ASSISTANT

## 2018-04-18 NOTE — MR AVS SNAPSHOT
"              After Visit Summary   4/18/2018    Sri Grewal    MRN: [id number]           Patient Information     Date Of Birth          1950        Visit Information        Provider Department      4/18/2018 1:23 PM Rosenda Pierre PA-C [facility]        Today's Diagnoses     Hypertension goal BP (blood pressure) < 140/90    -  1       Follow-ups after your visit        Your next 10 appointments already scheduled     Apr 25, 2018 10:45 AM CDT   New Visit with Nic Mckeon MD   [facility]    [address]  [address]  [facility] [id number] [phone]              Who to contact     If you have questions or need follow up information about today's clinic visit or your schedule please contact [facility] directly at [phone].  Normal or non-critical lab and imaging results will be communicated to you by MyChart, letter or phone within 4 business days after the clinic has received the results. If you do not hear from us within 7 days, please contact the clinic through MyChart or phone. If you have a critical or abnormal lab result, we will notify you by phone as soon as possible.  Submit refill requests through [employer] or call your pharmacy and they will forward the refill request to us. Please allow 3 business days for your refill to be completed.          Additional Information About Your Visit        MyChart Information     [employer] lets you send messages to your doctor, view your test results, renew your prescriptions, schedule appointments and more. To sign up, go to [email or web address] . Click on \"Log in\" on the left side of the screen, which will take you to the Welcome page. Then click on \"Sign up Now\" on the right side of the page.     You will be asked to enter the access code listed below, as well as some personal information. Please follow the directions to create your username and " password.     Your access code is: [email or web address]  Expires: 2018 10:41 AM     Your access code will  in 90 days. If you need help or a new code, please call your [place] clinic or [phone].        Care EveryWhere ID     This is your Care EveryWhere ID. This could be used by other organizations to access your [place] medical records  [id number]        Your Vitals Were     Pulse Last Period                64 2005           Blood Pressure from Last 3 Encounters:   18 154/84   18 156/90   18 140/80    Weight from Last 3 Encounters:   18 155 lb (70.3 kg)   18 159 lb 6.4 oz (72.3 kg)   18 160 lb (72.6 kg)              Today, you had the following     No orders found for display         Today's Medication Changes          These changes are accurate as of 18 11:59 PM.  If you have any questions, ask your nurse or doctor.               Start taking these medicines.        Dose/Directions    lisinopril-hydrochlorothiazide 20-12.5 MG per tablet   Commonly known as:  PRINZIDE/ZESTORETIC   Used for:  Hypertension goal BP (blood pressure) < 140/90        Dose:  2 tablet   Take 2 tablets by mouth daily   Quantity:  60 tablet   Refills:  3            Where to get your medicines      These medications were sent to [facility] [address]  [address] [place] [address]     Phone:  [phone]     lisinopril-hydrochlorothiazide 20-12.5 MG per tablet                Primary Care Provider Office Phone # Fax #    Rosenda Pierre PA-C [phone]       [address]  [facility] [address]        Equal Access to Services     SACHA VICKERS : Rachel Rao, waaxelda lumadina, qaybta kaalmacaridad de leon. So [facility] [phone].    ATENCIÓN: Si habla español, tiene a orozco disposición servicios gratuitos de asistencia lingüística. Llame al  [phone].    We comply with applicable federal civil rights laws and [place] laws. We do not discriminate on the basis of race, color, national origin, age, disability, sex, sexual orientation, or gender identity.            Thank you!     Thank you for choosing [facility]  for your care. Our goal is always to provide you with excellent care. Hearing back from our patients is one way we can continue to improve our services. Please take a few minutes to complete the written survey that you may receive in the mail after your visit with us. Thank you!             Your Updated Medication List - Protect others around you: Learn how to safely use, store and throw away your medicines at www.disposemymeds.org.          This list is accurate as of 4/18/18 11:59 PM.  Always use your most recent med list.                   Brand Name Dispense Instructions for use Diagnosis    albuterol 108 (90 Base) MCG/ACT Inhaler    PROAIR HFA    2 Inhaler    Inhale 1-2 puffs into the lungs every 6 hours as needed for shortness of breath / dyspnea    Mild persistent asthma with acute exacerbation       ALLEGRA PO           ASPIRIN 81 PO           butalbital-aspirin-caffeine -40 MG per capsule    FIORINAL    30 capsule    TAKE ONE CAPSULE BY MOUTH EVERY 6 HOURS AS NEEDED (one month supply)    Chronic migraine without aura without status migrainosus, not intractable       IBUPROFEN PO      Reported on 4/6/2017        lisinopril 30 MG tablet    PRINIVIL,ZESTRIL    180 tablet    Take 2 tablets (60 mg) by mouth daily    Amaurosis fugax, Hypertension goal BP (blood pressure) < 140/90       lisinopril-hydrochlorothiazide 20-12.5 MG per tablet    PRINZIDE/ZESTORETIC    60 tablet    Take 2 tablets by mouth daily    Hypertension goal BP (blood pressure) < 140/90       propranolol 20 MG tablet    INDERAL    180 tablet    Take 1 tablet (20 mg) by mouth 2 times daily    Chronic migraine without aura without status migrainosus,  not intractable

## 2018-04-18 NOTE — PROGRESS NOTES
Sri Grewal is enrolled/participating in the retail pharmacy Blood Pressure Goals Achievement Program (BPGAP).  Sri Grewal was evaluated at [facility] on April 18, 2018 at which time her blood pressure was:    BP Readings from Last 3 Encounters:   04/18/18 154/84   03/12/18 156/90   02/06/18 140/80     Reviewed lifestyle modifications for blood pressure control and reduction: including making healthy food choices, managing weight, getting regular exercise, smoking cessation, reducing alcohol consumption, monitoring blood pressure regularly.     Sri Grewal is experiencing symtoms: (She said she had been shoveling snow and had no symptoms with that, but recently has had a small faint numbness in her lower lip and also some tingling in her right hand)    Follow-Up: BP is not at goal of < 150/90 mmHg (patient 60+ years of age without diabetes), Recommended follow-up with PCP.  Routing to PCP for further review.    Recommendation to Provider: Addition of HCTZ to her Lisinopril, although I did ask her if her provider had discussed adding or increasing meds and she said she has talked to her about it many times in the past.    Sri Grewal was evaluated for enrollment into the PGEN study today.    Patient eligible for enrollment:  No  Patient interested in enrollment:  No    Completed by:  -Nova Pozo, Pharm.D., [facility], [phone]

## 2018-04-19 ENCOUNTER — TELEPHONE (OUTPATIENT)
Dept: FAMILY MEDICINE | Facility: OTHER | Age: 68
End: 2018-04-19

## 2018-04-19 RX ORDER — LISINOPRIL AND HYDROCHLOROTHIAZIDE 12.5; 2 MG/1; MG/1
2 TABLET ORAL DAILY
Qty: 60 TABLET | Refills: 3 | Status: SHIPPED | OUTPATIENT
Start: 2018-04-19 | End: 2018-07-31

## 2018-04-19 NOTE — PROGRESS NOTES
Please call patient     I would like her to stop her Lisinopril and start a new medication Prinzide (Lisinopril+Hydrochlorothiazide). 2 tablets once a day. BP recheck at pharmacy in 1-2 weeks. Recheck with provider in 3-4 weeks.     Jose Francisco Pierre PA-C  [facility]

## 2018-04-19 NOTE — TELEPHONE ENCOUNTER
Please call patient     I would like her to stop her Lisinopril and start a new medication Prinzide (Lisinopril+Hydrochlorothiazide). 2 tablets once a day. BP recheck at pharmacy in 1-2 weeks. Recheck with provider in 3-4 weeks.     Jose Francisco Pierre PA-C  [facility]

## 2018-04-19 NOTE — TELEPHONE ENCOUNTER
Pt returned call,relayed below. Pt states ok to do new meidcation and to send through meds by mail. Please advise

## 2018-04-25 ENCOUNTER — OFFICE VISIT (OUTPATIENT)
Dept: OTOLARYNGOLOGY | Facility: OTHER | Age: 68
End: 2018-04-25
Payer: MEDICARE

## 2018-04-25 VITALS
DIASTOLIC BLOOD PRESSURE: 84 MMHG | RESPIRATION RATE: 18 BRPM | SYSTOLIC BLOOD PRESSURE: 160 MMHG | WEIGHT: 156.5 LBS | TEMPERATURE: 98 F | OXYGEN SATURATION: 96 % | HEIGHT: 62 IN | HEART RATE: 64 BPM | BODY MASS INDEX: 28.8 KG/M2

## 2018-04-25 DIAGNOSIS — R20.0 RIGHT FACIAL NUMBNESS: ICD-10-CM

## 2018-04-25 DIAGNOSIS — J32.9 CHRONIC SINUSITIS, UNSPECIFIED LOCATION: Primary | ICD-10-CM

## 2018-04-25 PROCEDURE — 99203 OFFICE O/P NEW LOW 30 MIN: CPT | Performed by: OTOLARYNGOLOGY

## 2018-04-25 ASSESSMENT — PAIN SCALES - GENERAL: PAINLEVEL: NO PAIN (0)

## 2018-04-25 NOTE — NURSING NOTE
"Chief Complaint   Patient presents with     Consult     Chronic sinusitis       Initial /84 (BP Location: Left arm, Patient Position: Chair, Cuff Size: Adult Regular)  Pulse 64  Temp 98  F (36.7  C) (Temporal)  Resp 18  Ht 1.57 m (5' 1.81\")  Wt 71 kg (156 lb 8 oz)  LMP 11/09/2005  SpO2 96%  BMI 28.8 kg/m2 Estimated body mass index is 28.8 kg/(m^2) as calculated from the following:    Height as of this encounter: 1.57 m (5' 1.81\").    Weight as of this encounter: 71 kg (156 lb 8 oz).  Medication Reconciliation: complete     Lucie Inman CMA       "

## 2018-04-25 NOTE — LETTER
4/25/2018         RE: Sri Grewal  [address] [address] [facility] [address]        Dear Colleague,    Thank you for referring your patient, Sri Grewal, to the [facility]. Please see a copy of my visit note below.    ENT Consultation    Sri Grewal who is a 67 year old female seen in consultation at the request of Rosenda Pierre PA-C.      History of Present Illness - Sri Grewal is a 67 year old female with concerns of chronic sinusitis. She states that in Jan 2018 she had a URI that was follow by a protractible sinus infection. Patient was on multiple antibiotics without much relief. Patient now reports that she is doing well. She does have seasonal allergies. Patient was teste for allergies many years ago. She states that the only symptoms she has now is a numbness on the right cheek/lower lip that comes and goes lasting about 1 minute.    CT SCAN OF THE PARANASAL SINUSES AND FACE March 15, 2018 10:25 AM     HISTORY: Chronic sinusitis, unspecified location.    TECHNIQUE: Noncontrast axial scans and coronal reformations. Radiation  dose for this scan was reduced using automated exposure control,  adjustment of the mA and/or kV according to patient size, or iterative  reconstruction technique.    COMPARISON: None.    FINDINGS:  Frontal sinuses: Normal.     Ethmoid sinuses: Normal.     Right maxillary sinus: Normal. The ostiomeatal unit is normal with a  patent infundibulum.     Left maxillary sinus: There is a 2 x 1 x 1.5 cm retention cyst. The  ostiomeatal unit is normal with a patent infundibulum.     Sphenoid sinus: Normal.     Nasal septum: Normal and in the midline.     Turbinates and nasal cavity: Normal.     Laminae papyracea and cribriform plate: Normal.     Other findings: None.    IMPRESSION:   1. Retention cyst in left maxillary sinus.  2. No evidence for nasal cavity mass or any air-fluid levels.    JAMARI ARRIAGA MD    Past Medical History -   Past Medical  History:   Diagnosis Date     Abnormal Papanicolaou smear of cervix and cervical HPV      Allergic rhinitis, cause unspecified     seasonal allergies     Contact dermatitis and other eczema, due to unspecified cause      Endometriosis, site unspecified      Esophageal reflux     GERD     Migraine, unspecified, without mention of intractable migraine without mention of status migrainosus      Mild persistent asthma      Unspecified disorder of uterus     fibroid uterus       Current Medications -   Current Outpatient Prescriptions:      albuterol (PROAIR HFA) 108 (90 BASE) MCG/ACT Inhaler, Inhale 1-2 puffs into the lungs every 6 hours as needed for shortness of breath / dyspnea, Disp: 2 Inhaler, Rfl: 11     ASPIRIN 81 PO, , Disp: , Rfl:      butalbital-aspirin-caffeine (FIORINAL) -40 MG per capsule, TAKE ONE CAPSULE BY MOUTH EVERY 6 HOURS AS NEEDED (one month supply), Disp: 30 capsule, Rfl: 5     Fexofenadine HCl (ALLEGRA PO), , Disp: , Rfl:      IBUPROFEN PO, Reported on 4/6/2017, Disp: , Rfl:      lisinopril (PRINIVIL,ZESTRIL) 30 MG tablet, Take 2 tablets (60 mg) by mouth daily, Disp: 180 tablet, Rfl: 3     lisinopril-hydrochlorothiazide (PRINZIDE/ZESTORETIC) 20-12.5 MG per tablet, Take 2 tablets by mouth daily, Disp: 60 tablet, Rfl: 3     propranolol (INDERAL) 20 MG tablet, Take 1 tablet (20 mg) by mouth 2 times daily, Disp: 180 tablet, Rfl: 3    Allergies -   Allergies   Allergen Reactions     Codeine      GI UPSET     Percocet [Oxycodone-Acetaminophen] Nausea and Vomiting     Seasonal Allergies        Social History -   Social History     Social History     Marital status:      Spouse name: N/A     Number of children: 2     Years of education: N/A     Occupational History     retired       Retired     Social History Main Topics     Smoking status: Never Smoker     Smokeless tobacco: Never Used     Alcohol use Yes      Comment: beerx2/x1 per month     Drug use: No     Sexual activity: Yes      "Partners: Male     Birth control/ protection: Surgical      Comment: tubal ligation     Other Topics Concern      Service No     Blood Transfusions No     Caffeine Concern No     Occupational Exposure No     Hobby Hazards No     Sleep Concern Yes     Insomnia     Stress Concern Yes     breathing,  passed away 2004     Weight Concern Yes     would like to lose some weight     Special Diet No     Back Care Yes     Exercise Yes     walking at work daily     Seat Belt Yes     Self-Exams Yes     periodically     Social History Narrative       Family History -   Family History   Problem Relation Age of Onset     HEART DISEASE Mother      anemia     OSTEOPOROSIS Mother      Hypertension Mother      CEREBROVASCULAR DISEASE Mother      Alcohol/Drug Mother      alcohol     Neurologic Disorder Mother      migraines     Hypertension Father      CANCER No family hx of      breast       Review of Systems - As per HPI and PMHx, otherwise review of system review of the head and neck negative.    Physical Exam  /84 (BP Location: Left arm, Patient Position: Chair, Cuff Size: Adult Regular)  Pulse 64  Temp 98  F (36.7  C) (Temporal)  Resp 18  Ht 1.57 m (5' 1.81\")  Wt 71 kg (156 lb 8 oz)  LMP 11/09/2005  SpO2 96%  BMI 28.8 kg/m2  BMI: Body mass index is 28.8 kg/(m^2).    General - The patient is well nourished and well developed, and appears to have good nutritional status.  Alert and oriented to person and place, answers questions and cooperates with examination appropriately.    SKIN - No suspicious lesions or rashes.  Respiration - No respiratory distress.  Head and Face - Normocephalic and atraumatic, with no gross asymmetry noted of the contour of the facial features.  The facial nerve is intact, with strong symmetric movements.    Voice and Breathing - The patient was breathing comfortably without the use of accessory muscles. The patients voice was clear and strong, and had appropriate pitch and " quality.    Ears - Bilateral pinna and EACs with normal appearing overlying skin. Tympanic membrane intact with good mobility on pneumatic otoscopy bilaterally. Bony landmarks of the ossicular chain are normal. The tympanic membranes are normal in appearance. No retraction, perforation, or masses.  No fluid or purulence was seen in the external canal or the middle ear.     Eyes - Extraocular movements intact.  Sclera were not icteric or injected, conjunctiva were pink and moist.    Mouth - Examination of the oral cavity showed pink, healthy oral mucosa. No lesions or ulcerations noted.  The tongue was mobile and midline, and the dentition were in good condition.      Throat - The walls of the oropharynx were smooth, pink, moist, symmetric, and had no lesions or ulcerations.  The tonsillar pillars and soft palate were symmetric.  The uvula was midline on elevation.    Neck - Normal midline excursion of the laryngotracheal complex during swallowing.  Full range of motion on passive movement.  Palpation of the occipital, submental, submandibular, internal jugular chain, and supraclavicular nodes did not demonstrate any abnormal lymph nodes or masses.  The carotid pulse was palpable bilaterally.  Palpation of the thyroid was soft and smooth, with no nodules or goiter appreciated.  The trachea was mobile and midline.    Nose - External contour is symmetric, no gross deflection or scars.  Nasal mucosa is pink and moist with no abnormal mucus.  The septum was midline and non-obstructive, turbinates of normal size and position.  No polyps, masses, or purulence noted on examination.    Neuro - Nonfocal neuro exam is normal, CN 2 through 12 intact, normal gait and muscle tone.      Performed in clinic today:  No procedures preformed in clinic today      A/P - Sri Grewal is a 67 year old female with facial numbness. I suggested that patient come back for further work up if the numbness becomes more permanent fixture.        This document serves as a record of the services and decisions personally performed and made by Dr. Nic Mckeon MD. It was created on his behalf by Vaishali Zepeda, a trained medical scribe. The creation of this document is based the provider's statements to the medical scribe.  Vaishali Zeepda 4/25/2018    Provider:   The information in this document, created by the medical scribe for me, accurately reflects the services I personally performed and the decisions made by me. I have reviewed and approved this document for accuracy prior to leaving the patient care area.  Dr. Nic Mckeon MD 4/25/2018    Nic Mckeon MD.      Again, thank you for allowing me to participate in the care of your patient.        Sincerely,        Nic Mckeon MD, MD

## 2018-04-25 NOTE — MR AVS SNAPSHOT
"              After Visit Summary   2018    Sri Grewal    MRN: [id number]           Patient Information     Date Of Birth          1950        Visit Information        Provider Department      2018 10:45 AM Nic Mckeon MD [facility]         Follow-ups after your visit        Who to contact     If you have questions or need follow up information about today's clinic visit or your schedule please contact [facility] directly at [phone].  Normal or non-critical lab and imaging results will be communicated to you by MyChart, letter or phone within 4 business days after the clinic has received the results. If you do not hear from us within 7 days, please contact the clinic through [employer] or phone. If you have a critical or abnormal lab result, we will notify you by phone as soon as possible.  Submit refill requests through [employer] or call your pharmacy and they will forward the refill request to us. Please allow 3 business days for your refill to be completed.          Additional Information About Your Visit        [facility] Information     [employer] lets you send messages to your doctor, view your test results, renew your prescriptions, schedule appointments and more. To sign up, go to [email or web address] . Click on \"Log in\" on the left side of the screen, which will take you to the Welcome page. Then click on \"Sign up Now\" on the right side of the page.     You will be asked to enter the access code listed below, as well as some personal information. Please follow the directions to create your username and password.     Your access code is: [email or web address]  Expires: 2018 10:41 AM     Your access code will  in 90 days. If you need help or a new code, please call your [facility] or [phone].        Care EveryWhere ID     This is your Care EveryWhere ID. This could be used by other organizations to access your [place] medical " "records  [id number]        Your Vitals Were     Pulse Temperature Respirations Height Last Period Pulse Oximetry    64 98  F (36.7  C) (Temporal) 18 1.57 m (5' 1.81\") 11/09/2005 96%    BMI (Body Mass Index)                   28.8 kg/m2            Blood Pressure from Last 3 Encounters:   04/25/18 160/84   04/18/18 154/84   03/12/18 156/90    Weight from Last 3 Encounters:   04/25/18 71 kg (156 lb 8 oz)   03/12/18 70.3 kg (155 lb)   02/06/18 72.3 kg (159 lb 6.4 oz)              Today, you had the following     No orders found for display       Primary Care Provider Office Phone # Fax #    Rosenda PAMELA Pierre PA-C [phone]       [address] [address]        Equal Access to Services     [facility]: Hadii aad ku hadasho Soomaali, waaxda luqadaha, qaybta kaalmada adeegyada, waxay gloriain hayjoseph davies . So [facility] [phone].    ATENCIÓN: Si habla español, tiene a orozco disposición servicios gratuitos de asistencia lingüística. Radha al [phone].    We comply with applicable federal civil rights laws and [place] laws. We do not discriminate on the basis of race, color, national origin, age, disability, sex, sexual orientation, or gender identity.            Thank you!     Thank you for choosing [facility]  for your care. Our goal is always to provide you with excellent care. Hearing back from our patients is one way we can continue to improve our services. Please take a few minutes to complete the written survey that you may receive in the mail after your visit with us. Thank you!             Your Updated Medication List - Protect others around you: Learn how to safely use, store and throw away your medicines at www.disposemymeds.org.          This list is accurate as of 4/25/18 12:07 PM.  Always use your most recent med list.                   Brand Name Dispense Instructions for use Diagnosis    albuterol 108 (90 Base) MCG/ACT Inhaler "    PROAIR HFA    2 Inhaler    Inhale 1-2 puffs into the lungs every 6 hours as needed for shortness of breath / dyspnea    Mild persistent asthma with acute exacerbation       ALLEGRA PO           ASPIRIN 81 PO           butalbital-aspirin-caffeine -40 MG per capsule    FIORINAL    30 capsule    TAKE ONE CAPSULE BY MOUTH EVERY 6 HOURS AS NEEDED (one month supply)    Chronic migraine without aura without status migrainosus, not intractable       IBUPROFEN PO      Reported on 4/6/2017        lisinopril 30 MG tablet    PRINIVIL,ZESTRIL    180 tablet    Take 2 tablets (60 mg) by mouth daily    Amaurosis fugax, Hypertension goal BP (blood pressure) < 140/90       lisinopril-hydrochlorothiazide 20-12.5 MG per tablet    PRINZIDE/ZESTORETIC    60 tablet    Take 2 tablets by mouth daily    Hypertension goal BP (blood pressure) < 140/90       propranolol 20 MG tablet    INDERAL    180 tablet    Take 1 tablet (20 mg) by mouth 2 times daily    Chronic migraine without aura without status migrainosus, not intractable

## 2018-05-01 ENCOUNTER — TELEPHONE (OUTPATIENT)
Dept: FAMILY MEDICINE | Facility: OTHER | Age: 68
End: 2018-05-01

## 2018-05-14 ENCOUNTER — TELEPHONE (OUTPATIENT)
Dept: FAMILY MEDICINE | Facility: OTHER | Age: 68
End: 2018-05-14

## 2018-05-14 NOTE — TELEPHONE ENCOUNTER
Do you have any suggestions for her besides bland diet and Maalox? Patient is not willing to make an appointment?   Sri Grewal is a 68 year old female who calls with burning in stomach.    NURSING ASSESSMENT:  Description:  Patient states he is having burning in her stomach.   Onset/duration:  2 days  Precip. factors:  Hx of stomach ulcer, states she has had them on and off since 1974. States this is the same.   Associated symptoms:  Nausea.   Improves/worsens symptoms:  Worsens with eating non bland foods. Has been drinking water and taking Maalox with no relief.   I/O: Eating bland diet, normal urine and bowel. Denies blood in stool.   Pain scale (0-10)   2/10    Allergies:   Allergies   Allergen Reactions     Codeine      GI UPSET     Percocet [Oxycodone-Acetaminophen] Nausea and Vomiting     Seasonal Allergies        NURSING PLAN: Routed to provider Yes. Patient refuses to make appointment. States she would like suggestions from provider otherwise she will just let it heal on its own.     RECOMMENDED DISPOSITION:  See in 24 hours  Will comply with recommendation: No- Barriers to comply with plan of care Patinet refuses to make appointment. .  If further questions/concerns or if symptoms do not improve, worsen or new symptoms develop, call your PCP or [facility] as soon as possible.      Guideline used: Abdominal pain  Telephone Triage Protocols for Nurses, Fifth Edition, Lilli Bright    Disposition was determined by the first positive assessmsent question, therefore all previous assessment questions were negative.     Faustina Dotson, RN, BSN

## 2018-05-14 NOTE — TELEPHONE ENCOUNTER
Patient should take a daily OTC PPI (proton pump inhibitor) like Nexium, Prilosec, Zantac. Return to clinic if doesn't improve.     Jose Francisco Pierre PA-C  [facility]

## 2018-05-14 NOTE — TELEPHONE ENCOUNTER
Reason for call:  Patient reporting a symptom    Symptom or request: Stomach ulcer    Duration (how long have symptoms been present): ongoing    Have you been treated for this before? No    Additional comments: Patient would like to see if CDL has any suggestion as to something that may help her. She said she's tried Maylox and it doesn't help.     Phone Number patient can be reached at:  Home number on file [phone] (home)    Best Time:  any    Can we leave a detailed message on this number:  YES     Call taken on 5/14/2018 at 9:45 AM by Helena White

## 2018-06-12 ENCOUNTER — TELEPHONE (OUTPATIENT)
Dept: FAMILY MEDICINE | Facility: OTHER | Age: 68
End: 2018-06-12

## 2018-06-12 NOTE — TELEPHONE ENCOUNTER
Summary:    Patient is due/failing the following:   BMP, BP CHECK and MAMMOGRAM    Action needed:   Patient needs non-fasting lab only appointment, Patient needs nurse only appointment. and schedule a mammogram     Type of outreach:    Phone, left message for patient to call back.     Questions for provider review:    None                                                                                                                                    Alisha Cabrera       Chart routed to Care Team .        Panel Management Review      Patient has the following on her problem list:     Depression / Dysthymia review    Measure:  Needs PHQ-9 score of 4 or less during index window.  Administer PHQ-9 and if score is 5 or more, send encounter to provider for next steps.      PHQ-9 SCORE 11/22/2016 6/1/2017 2/6/2018   Total Score - - -   Total Score MyChart - 6 (Mild depression) 3 (Minimal depression)   Total Score 3 6 3       If PHQ-9 recheck is 5 or more, route to provider for next steps.    Patient is due for:  None    Asthma review     ACT Total Scores 2/6/2018   ACT TOTAL SCORE -   ASTHMA ER VISITS -   ASTHMA HOSPITALIZATIONS -   ACT TOTAL SCORE (Goal Greater than or Equal to 20) 19   In the past 12 months, how many times did you visit the emergency room for your asthma without being admitted to the hospital? 0   In the past 12 months, how many times were you hospitalized overnight because of your asthma? 0      1. Is Asthma diagnosis on the Problem List? Yes    2. Is Asthma listed on Health Maintenance? Yes    3. Patient is due for:  ACT    Hypertension   Last three blood pressure readings:  BP Readings from Last 3 Encounters:   04/25/18 160/84   04/18/18 154/84   03/12/18 156/90     Blood pressure: FAILED    HTN Guidelines:  Age 18-59 BP range:  Less than 140/90  Age 60-85 with Diabetes:  Less than 140/90  Age 60-85 without Diabetes:  less than 150/90      Composite cancer screening  Chart review shows that this  patient is due/due soon for the following Mammogram

## 2018-07-30 NOTE — PROGRESS NOTES
SUBJECTIVE:   Sri Grewal is a 68 year old female who presents to clinic today for the following health issues:  HPI    Depression and Anxiety Follow-Up    Status since last visit: Worsened     Other associated symptoms: panicing, heavy breathing, consuming    Complicating factors:     Significant life event: Yes-  Finances     Current substance abuse: None    - Always been sort of depressed  - Now worsening anxiety   - Panic attack about finances and house, can't take care of yard without    - Worried about going to concert   - Was on pill years ago - Effexor, never felt real happy or sad, too much      Chart shows Wellbutrin, Celexa, Cymbalta, Prozac, Lexapro, and Zoloft      Always wore off after 1 year       PHQ-9 6/1/2017 2/6/2018 7/31/2018   Total Score 6 3 6   Q9: Suicide Ideation Not at all Not at all Not at all     HAYDEE-7 SCORE 6/1/2017 2/6/2018 7/31/2018   Total Score - - -   Total Score 0 (minimal anxiety) 1 (minimal anxiety) 8 (mild anxiety)   Total Score 3 1 8     In the past two weeks have you had thoughts of suicide or self-harm?  No.    Do you have concerns about your personal safety or the safety of others?   No    Hypertension Follow-up      Outpatient blood pressures are not being checked.    Low Salt Diet: low salt    Additional concern  - Needs refill of Valtrex, would take for 5-7 days due to herpes outbreak  - Got from  who got it in [place]         Problem list and histories reviewed & adjusted, as indicated.  Additional history: as documented    BP Readings from Last 3 Encounters:   04/25/18 160/84   04/18/18 154/84   03/12/18 156/90    Wt Readings from Last 3 Encounters:   04/25/18 156 lb 8 oz (71 kg)   03/12/18 155 lb (70.3 kg)   02/06/18 159 lb 6.4 oz (72.3 kg)         Labs reviewed in EPIC    ROS:  Constitutional, HEENT, cardiovascular, pulmonary, gi and gu systems are negative, except as otherwise noted.    OBJECTIVE:   /80  Pulse 66  Temp 98.4  F (36.9  C)  "(Temporal)  Resp 16  Wt 157 lb (71.2 kg)  LMP 11/09/2005  SpO2 98%  BMI 28.89 kg/m2  Body mass index is 28.89 kg/(m^2).  GENERAL APPEARANCE: healthy, alert and no distress  EYES: Eyes grossly normal to inspection, PERRLA, conjunctivae and sclerae without injection or discharge, EOM intact   RESP: Lungs clear to auscultation - no rales, rhonchi or wheezes    CV: Regular rates and rhythm, normal S1 S2, no S3 or S4, no murmur, click or rub, no peripheral edema and peripheral pulses strong and symmetric bilaterally   MS: No musculoskeletal defects are noted and gait is age appropriate without ataxia   SKIN: No suspicious lesions or rashes, hydration status appears adeuqate with normal skin turgor   PSYCH: Alert and oriented x3; speech- coherent , normal rate and volume; able to articulate logical thoughts, able to abstract reason, no tangential thoughts, no hallucinations or delusions, mentation appears normal, Mood is euthymic. Affect is appropriate for this mood state and bright. Thought content is free of suicidal ideation, hallucinations, and delusions. Dress is adequate and upkept. Eye contact is good during conversation.       Diagnostic Test Results:  none     ASSESSMENT/PLAN:       ICD-10-CM    1. Adjustment disorder with mixed anxiety and depressed mood F43.23 sertraline (ZOLOFT) 50 MG tablet     HONORING CHOICES REFERRAL   2. Moderate major depression (H) F32.1 sertraline (ZOLOFT) 50 MG tablet     HONORING CHOICES REFERRAL   3. Hypertension goal BP (blood pressure) < 140/90 I10 HONORING CHOICES REFERRAL     **Basic metabolic panel FUTURE 2mo     CANCELED: BASIC METABOLIC PANEL   4. HSV (herpes simplex virus) infection B00.9 valACYclovir (VALTREX) 500 MG tablet     1& 2. Mood  - Chronic issue but new worsening, patient not currently on any medications   - Tried Effexor, Wellbutrin, Celexa, Cymbalta, Prozac, Lexapro, and Zoloft for varying amounts of time, patient only remembers Effexor was \"too strong\"   - " Recommend trial of Zoloft, reviewed use and side effects  - Start sertraline (Zoloft) 1/2 tablet (25 mg) per day for 1-2 weeks, then increase to full tablet (50 mg) per day  - Recheck 1 month     3. HTN   - Stable today, has had some highs and lows   - No medication changes today  - Due for BMP, will get at next visit     4. HSV  - Occasional outbreaks, does well on Valtrex BID for 5-7 days   - Reviewed use and side effects, refilled     The patient indicates understanding of these issues and agrees with the plan.    Follow up: 1 month         Rosenda Pierre PA-C  [facility]

## 2018-07-31 ENCOUNTER — OFFICE VISIT (OUTPATIENT)
Dept: FAMILY MEDICINE | Facility: OTHER | Age: 68
End: 2018-07-31
Payer: MEDICARE

## 2018-07-31 VITALS
DIASTOLIC BLOOD PRESSURE: 80 MMHG | SYSTOLIC BLOOD PRESSURE: 128 MMHG | WEIGHT: 157 LBS | BODY MASS INDEX: 28.89 KG/M2 | RESPIRATION RATE: 16 BRPM | HEART RATE: 66 BPM | OXYGEN SATURATION: 98 % | TEMPERATURE: 98.4 F

## 2018-07-31 DIAGNOSIS — G43.709 CHRONIC MIGRAINE WITHOUT AURA WITHOUT STATUS MIGRAINOSUS, NOT INTRACTABLE: ICD-10-CM

## 2018-07-31 DIAGNOSIS — J45.31 MILD PERSISTENT ASTHMA WITH ACUTE EXACERBATION: ICD-10-CM

## 2018-07-31 DIAGNOSIS — I10 HYPERTENSION GOAL BP (BLOOD PRESSURE) < 140/90: ICD-10-CM

## 2018-07-31 DIAGNOSIS — B00.9 HSV (HERPES SIMPLEX VIRUS) INFECTION: ICD-10-CM

## 2018-07-31 DIAGNOSIS — F43.23 ADJUSTMENT DISORDER WITH MIXED ANXIETY AND DEPRESSED MOOD: Primary | ICD-10-CM

## 2018-07-31 DIAGNOSIS — F32.1 MODERATE MAJOR DEPRESSION (H): ICD-10-CM

## 2018-07-31 PROCEDURE — 99214 OFFICE O/P EST MOD 30 MIN: CPT | Performed by: PHYSICIAN ASSISTANT

## 2018-07-31 RX ORDER — LISINOPRIL AND HYDROCHLOROTHIAZIDE 12.5; 2 MG/1; MG/1
2 TABLET ORAL DAILY
Qty: 180 TABLET | Refills: 3 | Status: ON HOLD | OUTPATIENT
Start: 2018-07-31 | End: 2019-01-11

## 2018-07-31 RX ORDER — VALACYCLOVIR HYDROCHLORIDE 500 MG/1
500 TABLET, FILM COATED ORAL 2 TIMES DAILY
Qty: 14 TABLET | Refills: 3 | Status: ON HOLD | OUTPATIENT
Start: 2018-07-31 | End: 2019-01-11

## 2018-07-31 RX ORDER — ALBUTEROL SULFATE 90 UG/1
1-2 AEROSOL, METERED RESPIRATORY (INHALATION) EVERY 6 HOURS PRN
Qty: 2 INHALER | Refills: 11 | Status: SHIPPED | OUTPATIENT
Start: 2018-07-31 | End: 2019-05-22

## 2018-07-31 RX ORDER — BUTALBITAL/ASPIRIN/CAFFEINE 50-325-40
CAPSULE ORAL
Qty: 30 CAPSULE | Refills: 5 | Status: SHIPPED | OUTPATIENT
Start: 2018-07-31 | End: 2018-11-08

## 2018-07-31 ASSESSMENT — PATIENT HEALTH QUESTIONNAIRE - PHQ9
SUM OF ALL RESPONSES TO PHQ QUESTIONS 1-9: 6
SUM OF ALL RESPONSES TO PHQ QUESTIONS 1-9: 6

## 2018-07-31 ASSESSMENT — ANXIETY QUESTIONNAIRES
GAD7 TOTAL SCORE: 8
2. NOT BEING ABLE TO STOP OR CONTROL WORRYING: SEVERAL DAYS
3. WORRYING TOO MUCH ABOUT DIFFERENT THINGS: SEVERAL DAYS
6. BECOMING EASILY ANNOYED OR IRRITABLE: MORE THAN HALF THE DAYS
7. FEELING AFRAID AS IF SOMETHING AWFUL MIGHT HAPPEN: SEVERAL DAYS
1. FEELING NERVOUS, ANXIOUS, OR ON EDGE: SEVERAL DAYS
7. FEELING AFRAID AS IF SOMETHING AWFUL MIGHT HAPPEN: SEVERAL DAYS
GAD7 TOTAL SCORE: 8
GAD7 TOTAL SCORE: 8
4. TROUBLE RELAXING: SEVERAL DAYS
5. BEING SO RESTLESS THAT IT IS HARD TO SIT STILL: SEVERAL DAYS

## 2018-07-31 ASSESSMENT — PAIN SCALES - GENERAL: PAINLEVEL: NO PAIN (0)

## 2018-07-31 NOTE — TELEPHONE ENCOUNTER
This is done. Please fax Bubital rx. Notify patient went done.    Jose Francisco Pierre PA-C  [facility]

## 2018-07-31 NOTE — TELEPHONE ENCOUNTER
Reason for Call:  Medication or medication refill:    Do you use a [facility]?  Name of the pharmacy and phone number for the current request:  [facility]    Name of the medication requested: butabutol, asthma inhalers    Other request: pt forgot to mention these at appt today.  Please let her know by message annamaria done    Can we leave a detailed message on this number? YES    Phone number patient can be reached at: Cell number on file:    Telephone Information:   Mobile [phone]       Best Time: any    Call taken on 7/31/2018 at 11:17 AM by Caroline Valle

## 2018-07-31 NOTE — LETTER
My Depression Action Plan  Name: Sri Grewal   Date of Birth 1950  Date: 7/30/2018    My doctor: Rosenda Pierre   My clinic: [address]  [facility] [address] [phone]          GREEN    ZONE   Good Control    What it looks like:     Things are going generally well. You have normal up s and down s. You may even feel depressed from time to time, but bad moods usually last less than a day.   What you need to do:  1. Continue to care for yourself (see self care plan)  2. Check your depression survival kit and update it as needed  3. Follow your physician s recommendations including any medication.  4. Do not stop taking medication unless you consult with your physician first.           YELLOW         ZONE Getting Worse    What it looks like:     Depression is starting to interfere with your life.     It may be hard to get out of bed; you may be starting to isolate yourself from others.    Symptoms of depression are starting to last most all day and this has happened for several days.     You may have suicidal thoughts but they are not constant.   What you need to do:     1. Call your care team, your response to treatment will improve if you keep your care team informed of your progress. Yellow periods are signs an adjustment may need to be made.     2. Continue your self-care, even if you have to fake it!    3. Talk to someone in your support network    4. Open up your depression survival kit           RED    ZONE Medical Alert - Get Help    What it looks like:     Depression is seriously interfering with your life.     You may experience these or other symptoms: You can t get out of bed most days, can t work or engage in other necessary activities, you have trouble taking care of basic hygiene, or basic responsibilities, thoughts of suicide or death that will not go away, self-injurious behavior.     What you need to do:  1. Call your care  team and request a same-day appointment. If they are not available (weekends or after hours) call your local crisis line, emergency room or 911.            Depression Self Care Plan / Survival Kit    Self-Care for Depression  Here s the deal. Your body and mind are really not as separate as most people think.  What you do and think affects how you feel and how you feel influences what you do and think. This means if you do things that people who feel good do, it will help you feel better.  Sometimes this is all it takes.  There is also a place for medication and therapy depending on how severe your depression is, so be sure to consult with your medical provider and/ or Behavioral Health Consultant if your symptoms are worsening or not improving.     In order to better manage my stress, I will:    Exercise  Get some form of exercise, every day. This will help reduce pain and release endorphins, the  feel good  chemicals in your brain. This is almost as good as taking antidepressants!  This is not the same as joining a gym and then never going! (they count on that by the way ) It can be as simple as just going for a walk or doing some gardening, anything that will get you moving.      Hygiene   Maintain good hygiene (Get out of bed in the morning, Make your bed, Brush your teeth, Take a shower, and Get dressed like you were going to work, even if you are unemployed).  If your clothes don't fit try to get ones that do.    Diet  I will strive to eat foods that are good for me, drink plenty of water, and avoid excessive sugar, caffeine, alcohol, and other mood-altering substances.  Some foods that are helpful in depression are: complex carbohydrates, B vitamins, flaxseed, fish or fish oil, fresh fruits and vegetables.    Psychotherapy  I agree to participate in Individual Therapy (if recommended).    Medication  If prescribed medications, I agree to take them.  Missing doses can result in serious side effects.  I  understand that drinking alcohol, or other illicit drug use, may cause potential side effects.  I will not stop my medication abruptly without first discussing it with my provider.    Staying Connected With Others  I will stay in touch with my friends, family members, and my primary care provider/team.    Use your imagination  Be creative.  We all have a creative side; it doesn t matter if it s oil painting, sand castles, or mud pies! This will also kick up the endorphins.    Witness Beauty  (AKA stop and smell the roses) Take a look outside, even in mid-winter. Notice colors, textures. Watch the squirrels and birds.     Service to others  Be of service to others.  There is always someone else in need.  By helping others we can  get out of ourselves  and remember the really important things.  This also provides opportunities for practicing all the other parts of the program.    Humor  Laugh and be silly!  Adjust your TV habits for less news and crime-drama and more comedy.    Control your stress  Try breathing deep, massage therapy, biofeedback, and meditation. Find time to relax each day.     My support system    Clinic Contact:  Phone number:    Contact 1:  Phone number:    Contact 2:  Phone number:    [employer]/:  Phone number:    Therapist:  Phone number:    Local crisis center:    Phone number:    Other community support:  Phone number:

## 2018-07-31 NOTE — PATIENT INSTRUCTIONS
- Start sertraline (Zoloft) 1/2 tablet (25 mg) per day for 1-2 weeks, then increase to full tablet (50 mg) per day     - Recheck 1 month

## 2018-07-31 NOTE — MR AVS SNAPSHOT
After Visit Summary   7/31/2018    Sri Grewal    MRN: [id number]           Patient Information     Date Of Birth          1950        Visit Information        Provider Department      7/31/2018 10:30 AM Rosenda Pierre PA-C [facility]        Today's Diagnoses     Adjustment disorder with mixed anxiety and depressed mood    -  1    Moderate major depression (H)        Hypertension goal BP (blood pressure) < 140/90        HSV (herpes simplex virus) infection          Care Instructions      - Start sertraline (Zoloft) 1/2 tablet (25 mg) per day for 1-2 weeks, then increase to full tablet (50 mg) per day     - Recheck 1 month           Follow-ups after your visit        Additional Services     [facility] REFERRAL       Your provider has referred you to Outpatient [facility] Advance Care Planning Facilitator or Serious illness clinic support staff. The facilitator or support staff will contact you to schedule the appointment or for the follow up call    Reason for Referral: Basic Advance Care Planning - 1:1 need                  Your next 10 appointments already scheduled     Aug 09, 2018  2:30 PM CDT   MA SCREENING DIGITAL BILATERAL with ERMA1   [facility] ([facility])    [address] [facility] [address] [phone]           Do not use any powder, lotion or deodorant under your arms or on your breast. If you do, we will ask you to remove it before your exam.  Wear comfortable, two-piece clothing.  If you have any allergies, tell your care team.  Bring any previous mammograms from other facilities or have them mailed to the breast center.              Who to contact     If you have questions or need follow up information about today's clinic visit or your schedule please contact [facility] directly at [phone].  Normal or non-critical lab and imaging results will be communicated to you by  MyChart, letter or phone within 4 business days after the clinic has received the results. If you do not hear from us within 7 days, please contact the clinic through MyChart or phone. If you have a critical or abnormal lab result, we will notify you by phone as soon as possible.  Submit refill requests through [employer] or call your pharmacy and they will forward the refill request to us. Please allow 3 business days for your refill to be completed.          Additional Information About Your Visit        Care EveryWhere ID     This is your Care EveryWhere ID. This could be used by other organizations to access your [facility] medical records  [id number]        Your Vitals Were     Pulse Temperature Respirations Last Period Pulse Oximetry BMI (Body Mass Index)    66 98.4  F (36.9  C) (Temporal) 16 11/09/2005 98% 28.89 kg/m2       Blood Pressure from Last 3 Encounters:   07/31/18 128/80   04/25/18 160/84   04/18/18 154/84    Weight from Last 3 Encounters:   07/31/18 157 lb (71.2 kg)   04/25/18 156 lb 8 oz (71 kg)   03/12/18 155 lb (70.3 kg)              We Performed the Following     BASIC METABOLIC PANEL     [facility] REFERRAL          Today's Medication Changes          These changes are accurate as of 7/31/18 11:06 AM.  If you have any questions, ask your nurse or doctor.               Start taking these medicines.        Dose/Directions    sertraline 50 MG tablet   Commonly known as:  ZOLOFT   Used for:  Moderate major depression (H), Adjustment disorder with mixed anxiety and depressed mood   Started by:  Rosenda Pierre PA-C        Take 1/2 tablet (25 mg) for 1-2 weeks, then increase to 1 tablet orally daily   Quantity:  30 tablet   Refills:  0       valACYclovir 500 MG tablet   Commonly known as:  VALTREX   Used for:  HSV (herpes simplex virus) infection   Started by:  Rosenda Pierre PA-C        Dose:  500 mg   Take 1 tablet (500 mg) by mouth 2 times daily for 7 days    Quantity:  14 tablet   Refills:  3            Where to get your medicines      [facility] [place] [place] - [address]  [address], [facility] [address]     Phone:  [phone]     sertraline 50 MG tablet    valACYclovir 500 MG tablet                Primary Care Provider Office Phone # Fax #    Rosenda SANTIAGO FLEX Pierre [phone]       [address] [place] [address] [address]  [facility] [address]        Equal Access to Services     [facility]: Hadii aad ku hadasho Soomaali, waaxda luqadaha, qaybta kaalmada adeegyada, waxay idiin hayaan karin davies . So [facility] [phone].    ATENCIÓN: Si habla español, tiene a orozco disposición servicios gratuitos de asistencia lingüística. [facility] [phone].    We comply with applicable federal civil rights laws and [place] laws. We do not discriminate on the basis of race, color, national origin, age, disability, sex, sexual orientation, or gender identity.            Thank you!     Thank you for choosing [facility]  for your care. Our goal is always to provide you with excellent care. Hearing back from our patients is one way we can continue to improve our services. Please take a few minutes to complete the written survey that you may receive in the mail after your visit with us. Thank you!             Your Updated Medication List - Protect others around you: Learn how to safely use, store and throw away your medicines at www.disposemymeds.org.          This list is accurate as of 7/31/18 11:06 AM.  Always use your most recent med list.                   Brand Name Dispense Instructions for use Diagnosis    albuterol 108 (90 Base) MCG/ACT Inhaler    PROAIR HFA    2 Inhaler    Inhale 1-2 puffs into the lungs every 6 hours as needed for shortness of breath / dyspnea    Mild persistent asthma with acute exacerbation       ALLEGRA PO           ASPIRIN 81 PO            butalbital-aspirin-caffeine -40 MG per capsule    FIORINAL    30 capsule    TAKE ONE CAPSULE BY MOUTH EVERY 6 HOURS AS NEEDED (one month supply)    Chronic migraine without aura without status migrainosus, not intractable       IBUPROFEN PO      Reported on 4/6/2017        lisinopril-hydrochlorothiazide 20-12.5 MG per tablet    PRINZIDE/ZESTORETIC    60 tablet    Take 2 tablets by mouth daily    Hypertension goal BP (blood pressure) < 140/90       propranolol 20 MG tablet    INDERAL    180 tablet    Take 1 tablet (20 mg) by mouth 2 times daily    Chronic migraine without aura without status migrainosus, not intractable       sertraline 50 MG tablet    ZOLOFT    30 tablet    Take 1/2 tablet (25 mg) for 1-2 weeks, then increase to 1 tablet orally daily    Moderate major depression (H), Adjustment disorder with mixed anxiety and depressed mood       valACYclovir 500 MG tablet    VALTREX    14 tablet    Take 1 tablet (500 mg) by mouth 2 times daily for 7 days    HSV (herpes simplex virus) infection

## 2018-08-01 ASSESSMENT — ANXIETY QUESTIONNAIRES: GAD7 TOTAL SCORE: 8

## 2018-08-01 ASSESSMENT — PATIENT HEALTH QUESTIONNAIRE - PHQ9: SUM OF ALL RESPONSES TO PHQ QUESTIONS 1-9: 6

## 2018-08-09 ENCOUNTER — RADIANT APPOINTMENT (OUTPATIENT)
Dept: MAMMOGRAPHY | Facility: OTHER | Age: 68
End: 2018-08-09
Attending: PHYSICIAN ASSISTANT
Payer: MEDICARE

## 2018-08-09 DIAGNOSIS — Z12.31 ENCOUNTER FOR SCREENING MAMMOGRAM FOR BREAST CANCER: ICD-10-CM

## 2018-08-09 PROCEDURE — 77067 SCR MAMMO BI INCL CAD: CPT | Mod: TC

## 2018-08-13 ENCOUNTER — OFFICE VISIT (OUTPATIENT)
Dept: URGENT CARE | Facility: RETAIL CLINIC | Age: 68
End: 2018-08-13
Payer: MEDICARE

## 2018-08-13 VITALS
TEMPERATURE: 99 F | DIASTOLIC BLOOD PRESSURE: 84 MMHG | SYSTOLIC BLOOD PRESSURE: 164 MMHG | OXYGEN SATURATION: 98 % | HEART RATE: 58 BPM

## 2018-08-13 DIAGNOSIS — J45.31 MILD PERSISTENT ASTHMA WITH ACUTE EXACERBATION: ICD-10-CM

## 2018-08-13 DIAGNOSIS — R06.2 WHEEZING: ICD-10-CM

## 2018-08-13 DIAGNOSIS — J01.90 ACUTE SINUSITIS WITH SYMPTOMS > 10 DAYS: Primary | ICD-10-CM

## 2018-08-13 PROCEDURE — 99213 OFFICE O/P EST LOW 20 MIN: CPT | Performed by: PHYSICIAN ASSISTANT

## 2018-08-13 RX ORDER — PREDNISONE 20 MG/1
20 TABLET ORAL DAILY
Qty: 5 TABLET | Refills: 0 | Status: SHIPPED | OUTPATIENT
Start: 2018-08-13 | End: 2019-02-26

## 2018-08-13 NOTE — PROGRESS NOTES
Chief Complaint   Patient presents with     Sinus Problem     almost 2 weeks; pressure in whole head, face     Cough     almost 2 weeks     Plugged Ears     feel full, both      SUBJECTIVE:  Sri Grewal is a 68 year old female here with concerns about sinus infection.  She states onset of symptoms were 2 week(s) ago.  She has had maxillary, frontal pressure. Course of illness is worsening. Severity moderate  Current and Associated symptoms: nasal congestion, rhinorrhea, wheezing, cough , facial pain/pressure, headache and post-nasal drainage  Predisposing factors include HX of recurrent sinusitis, has asthma and recent illness.  Had ongoing sinus infection earlier this winter that required several antibiotic to clear, saw ENT 04/2018. Had CT sinuses per chart.   Recent treatment has included: albuterol inhaler frequently, benadryl, ibuprofen, allegra    Past Medical History:   Diagnosis Date     Abnormal Papanicolaou smear of cervix and cervical HPV      Allergic rhinitis, cause unspecified     seasonal allergies     Contact dermatitis and other eczema, due to unspecified cause      Endometriosis, site unspecified      Esophageal reflux     GERD     Migraine, unspecified, without mention of intractable migraine without mention of status migrainosus      Mild persistent asthma      Unspecified disorder of uterus     fibroid uterus     Current Outpatient Prescriptions   Medication Sig Dispense Refill     albuterol (PROAIR HFA) 108 (90 Base) MCG/ACT Inhaler Inhale 1-2 puffs into the lungs every 6 hours as needed for shortness of breath / dyspnea 2 Inhaler 11     amoxicillin-clavulanate (AUGMENTIN) 875-125 MG per tablet Take 1 tablet by mouth 2 times daily for 10 days 20 tablet 0     ASPIRIN 81 PO        butalbital-aspirin-caffeine (FIORINAL) -40 MG per capsule TAKE ONE CAPSULE BY MOUTH EVERY 6 HOURS AS NEEDED (one month supply) 30 capsule 5     Fexofenadine HCl (ALLEGRA PO)        IBUPROFEN PO Reported on  4/6/2017       lisinopril-hydrochlorothiazide (PRINZIDE/ZESTORETIC) 20-12.5 MG per tablet Take 2 tablets by mouth daily 180 tablet 3     predniSONE (DELTASONE) 20 MG tablet Take 1 tablet (20 mg) by mouth daily for 5 days 5 tablet 0     propranolol (INDERAL) 20 MG tablet Take 1 tablet (20 mg) by mouth 2 times daily 180 tablet 3     sertraline (ZOLOFT) 50 MG tablet Take 1/2 tablet (25 mg) for 1-2 weeks, then increase to 1 tablet orally daily 30 tablet 0     valACYclovir (VALTREX) 500 MG tablet Take 1 tablet (500 mg) by mouth 2 times daily for 7 days 14 tablet 3        Allergies   Allergen Reactions     Codeine      GI UPSET     Percocet [Oxycodone-Acetaminophen] Nausea and Vomiting     Seasonal Allergies         History   Smoking Status     Never Smoker   Smokeless Tobacco     Never Used       ROS:  CONSTITUTIONAL:POSITIVE  for fatigue and NEGATIVE  for fever   ENT/MOUTH: POSITIVE for Hx sinus infections, nasal congestion, rhinorrhea-purulent, ears plugged, sinus pressure and NEGATIVE for sore throat  RESP:POSITIVE for cough-non productive, wheezing     OBJECTIVE:  /84 (BP Location: Left arm)  Pulse 58  Temp 99  F (37.2  C) (Oral)  LMP 11/09/2005  SpO2 98%  Exam:GENERAL APPEARANCE: healthy, alert and no distress  EYES:  conjunctiva clear  HENT: ear canals clear. Bilateral TMs gray, neutral position, serous effusion. , nasal turbinates erythematous, swollen, rhinorrhea yellow, oral mucous membranes moist, no erythema noted, maxillary sinus tenderness. Post nasal drainage noted in the pharynx.   NECK: supple, nontender, no lymphadenopathy  RESP: expiratory and inspiratory wheezing in upper airways, no rales or rhonchi, frequent cough  CV: regular rates and rhythm, normal S1 S2, no murmur noted  SKIN: no suspicious lesions or rashes    ASSESSMENT:  (J01.90) Acute sinusitis with symptoms > 10 days  (primary encounter diagnosis)  (R06.2) Wheezing  (J45.31) Mild persistent asthma with acute  exacerbation    PLAN:  amoxicillin-clavulanate (AUGMENTIN) 875-125 MG per tablet  predniSONE (DELTASONE) 20 MG tablet  Take antibiotic as directed - twice a day for 10 days with food  Discussed yogurt daily also during antibiotic course  Take oral steroid - 1 tablet for 5 days in morning for with food  Use albuterol inhaler as needed  Apply warm facial compresses/packs for 5-10 minutes three times daily.  Drink plenty of fluids- 6 to 10 glasses of liquids each day. Rest.  Saline drops or nasal sprays as needed.   May use netti pot as needed. Do not use tap water. May use filtered or distilled water.  Steam treatments or humidifier.  Mucinex (guaifenesin) to thin out secretions.  Over the counter pain reliever as needed for pain or fever  Follow up at your primary care clinic for increasing pain, high fever, vision changes, worsening symptoms, or no relief from symptoms after 7-10 days.  Please follow up with primary care provider if not improving, worsening or new symptoms or for any adverse reactions to medications.     Risa Beaver PA-C  [facility]

## 2018-08-13 NOTE — MR AVS SNAPSHOT
After Visit Summary   8/13/2018    Sri Grewal    MRN: [id number]           Patient Information     Date Of Birth          1950        Visit Information        Provider Department      8/13/2018 9:00 AM Risa Beaver PA-C [facility]        Today's Diagnoses     Acute sinusitis with symptoms > 10 days    -  1    Wheezing        Mild persistent asthma with acute exacerbation          Care Instructions    Take antibiotic as directed - twice a day for 10 days with food  Take oral steroid - 1 tablet for5 days in morning for with food  Use inhaler as needed  Apply warm facial compresses/packs for 5-10 minutes three times daily.  Drink plenty of fluids- 6 to 10 glasses of liquids each day. Rest.  Saline drops or nasal sprays as needed.   May use netti pot as needed. Do not use tap water. May use filtered or distilled water.  Steam treatments or humidifier.  Mucinex (guaifenesin) to thin out secretions.  Tylenol or ibuprofen as needed for pain or fever  Follow up at your primary care clinic for increasing pain, high fever, vision changes, worsening symptoms, or no relief from symptoms after 7-10 days.  Please follow up with primary care provider if not improving, worsening or new symptoms or for any adverse reactions to medications.               Follow-ups after your visit        Who to contact     You can reach your care team any time of the day by calling [phone].  Notification of test results:  If you have an abnormal lab result, we will notify you by phone as soon as possible.         Additional Information About Your Visit        Care EveryWhere ID     This is your Care EveryWhere ID. This could be used by other organizations to access your [facility] medical records  [id number]        Your Vitals Were     Pulse Temperature Last Period Pulse Oximetry          58 99  F (37.2  C) (Oral) 11/09/2005 98%         Blood Pressure from Last 3 Encounters:   08/13/18 164/84    07/31/18 128/80   04/25/18 160/84    Weight from Last 3 Encounters:   07/31/18 157 lb (71.2 kg)   04/25/18 156 lb 8 oz (71 kg)   03/12/18 155 lb (70.3 kg)              Today, you had the following     No orders found for display         Today's Medication Changes          These changes are accurate as of 8/13/18  9:24 AM.  If you have any questions, ask your nurse or doctor.               Start taking these medicines.        Dose/Directions    amoxicillin-clavulanate 875-125 MG per tablet   Commonly known as:  AUGMENTIN   Used for:  Acute sinusitis with symptoms > 10 days        Dose:  1 tablet   Take 1 tablet by mouth 2 times daily for 10 days   Quantity:  20 tablet   Refills:  0       predniSONE 20 MG tablet   Commonly known as:  DELTASONE   Used for:  Wheezing, Mild persistent asthma with acute exacerbation        Dose:  20 mg   Take 1 tablet (20 mg) by mouth daily for 5 days   Quantity:  5 tablet   Refills:  0            Where to get your medicines      These medications were sent to [place] [address]  [address] [facility] [address]     Phone:  [phone]     amoxicillin-clavulanate 875-125 MG per tablet    predniSONE 20 MG tablet                Primary Care Provider Office Phone # Fax #    Rosenda Pierre PA-C [phone]       [address]  [facility] [address]        Equal Access to Services     [facility]: Hadii anthony cabrera hadasho Sojairali, waaxda luqadaha, qaybta kaalmada adeegyada, caridad mccall. So [facility] [phone].    ATENCIÓN: Si habla español, tiene a orozco disposición servicios gratuitos de asistencia lingüística. Radha al [phone].    We comply with applicable federal civil rights laws and [place] laws. We do not discriminate on the basis of race, color, national origin, age, disability, sex, sexual orientation, or gender identity.            Thank you!     Thank you for  choosing [facility]  for your care. Our goal is always to provide you with excellent care. Hearing back from our patients is one way we can continue to improve our services. Please take a few minutes to complete the written survey that you may receive in the mail after your visit with us. Thank you!             Your Updated Medication List - Protect others around you: Learn how to safely use, store and throw away your medicines at www.disposemymeds.org.          This list is accurate as of 8/13/18  9:24 AM.  Always use your most recent med list.                   Brand Name Dispense Instructions for use Diagnosis    albuterol 108 (90 Base) MCG/ACT inhaler    PROAIR HFA    2 Inhaler    Inhale 1-2 puffs into the lungs every 6 hours as needed for shortness of breath / dyspnea    Mild persistent asthma with acute exacerbation       ALLEGRA PO           amoxicillin-clavulanate 875-125 MG per tablet    AUGMENTIN    20 tablet    Take 1 tablet by mouth 2 times daily for 10 days    Acute sinusitis with symptoms > 10 days       ASPIRIN 81 PO           butalbital-aspirin-caffeine -40 MG per capsule    FIORINAL    30 capsule    TAKE ONE CAPSULE BY MOUTH EVERY 6 HOURS AS NEEDED (one month supply)    Chronic migraine without aura without status migrainosus, not intractable       IBUPROFEN PO      Reported on 4/6/2017        lisinopril-hydrochlorothiazide 20-12.5 MG per tablet    PRINZIDE/ZESTORETIC    180 tablet    Take 2 tablets by mouth daily    Hypertension goal BP (blood pressure) < 140/90       predniSONE 20 MG tablet    DELTASONE    5 tablet    Take 1 tablet (20 mg) by mouth daily for 5 days    Wheezing, Mild persistent asthma with acute exacerbation       propranolol 20 MG tablet    INDERAL    180 tablet    Take 1 tablet (20 mg) by mouth 2 times daily    Chronic migraine without aura without status migrainosus, not intractable       sertraline 50 MG tablet    ZOLOFT    30 tablet    Take 1/2  tablet (25 mg) for 1-2 weeks, then increase to 1 tablet orally daily    Moderate major depression (H), Adjustment disorder with mixed anxiety and depressed mood       valACYclovir 500 MG tablet    VALTREX    14 tablet    Take 1 tablet (500 mg) by mouth 2 times daily for 7 days    HSV (herpes simplex virus) infection

## 2018-08-13 NOTE — PATIENT INSTRUCTIONS
Take antibiotic as directed - twice a day for 10 days with food  Take oral steroid - 1 tablet for5 days in morning for with food  Use inhaler as needed  Apply warm facial compresses/packs for 5-10 minutes three times daily.  Drink plenty of fluids- 6 to 10 glasses of liquids each day. Rest.  Saline drops or nasal sprays as needed.   May use netti pot as needed. Do not use tap water. May use filtered or distilled water.  Steam treatments or humidifier.  Mucinex (guaifenesin) to thin out secretions.  Tylenol or ibuprofen as needed for pain or fever  Follow up at your primary care clinic for increasing pain, high fever, vision changes, worsening symptoms, or no relief from symptoms after 7-10 days.  Please follow up with primary care provider if not improving, worsening or new symptoms or for any adverse reactions to medications.

## 2018-08-24 ENCOUNTER — TELEPHONE (OUTPATIENT)
Dept: FAMILY MEDICINE | Facility: OTHER | Age: 68
End: 2018-08-24

## 2018-08-24 DIAGNOSIS — F43.23 ADJUSTMENT DISORDER WITH MIXED ANXIETY AND DEPRESSED MOOD: ICD-10-CM

## 2018-08-24 DIAGNOSIS — F32.1 MODERATE MAJOR DEPRESSION (H): ICD-10-CM

## 2018-08-24 NOTE — TELEPHONE ENCOUNTER
"Requested Prescriptions   Pending Prescriptions Disp Refills     sertraline (ZOLOFT) 50 MG tablet 30 tablet 0     Sig: Take 1/2 tablet (25 mg) for 1-2 weeks, then increase to 1 tablet orally daily    SSRIs Protocol Failed    8/24/2018  9:52 AM       Failed - PHQ-9 score less than 5 in past 6 months    Please review last PHQ-9 score.   PHQ-9 SCORE 6/1/2017 2/6/2018 7/31/2018   Total Score - - -   Total Score MyChart 6 (Mild depression) 3 (Minimal depression) 6 (Mild depression)   Total Score 6 3 6          Passed - Patient is age 18 or older       Passed - No active pregnancy on record       Passed - No positive pregnancy test in last 12 months       Passed - Recent (6 mo) or future (30 days) visit within the authorizing provider's specialty    Patient had office visit in the last 6 months or has a visit in the next 30 days with authorizing provider or within the authorizing provider's specialty.  See \"Patient Info\" tab in inbasket, or \"Choose Columns\" in Meds & Orders section of the refill encounter.            sertraline (ZOLOFT) 50 MG tablet  Medication is being filled for 1 time refill only due to:  Patient needs to be seen because due for 1 month OV for medication.   Please assist with scheduling.    Cee Miles, RN, BSN         "

## 2018-08-24 NOTE — TELEPHONE ENCOUNTER
Reason for Call:  Medication or medication refill:    Do you use a [facility]?  Name of the pharmacy and phone number for the current request:  [facility] mail order    Name of the medication requested: sertraline    Other request: patient has been taking her sertraline one per day. It is showing slight improvement and no side effects. She is wondering if it should be increased to 1 1/2 tablets?     Can we leave a detailed message on this number? YES    Phone number patient can be reached at: Home number on file [phone] (home)    Best Time: any    Call taken on 8/24/2018 at 9:48 AM by Jordyn Orr

## 2018-08-28 NOTE — PROGRESS NOTES
"  SUBJECTIVE:   Sri Grewal is a 68 year old female who presents to clinic today for the following health issues:    History of Present Illness     Asthma:     Cough::  No    Wheezing::  No    Dyspnea::  No    Use of rescue inhaler::  Twice a month    Taking Asthma medication as prescribed::  Yes    Asthma triggers::  [place]    ER/UC Visits or Admissions::  None    Depression and Anxiety Follow-Up    Status since last visit: Improved very slight    Other associated symptoms:None    Complicating factors:     Significant life event: Yes-  Already addressed     Current substance abuse: None    - No side effects   - 1 week on 1/2 tablet then increased   - Less \"hating myself\"   - Worrying makes stomach hurts, chronic ulcer hurts lately      Used to be on Nexium which helped, wondering if can do this again     - Additional concern     Right great toe, 20+ years ago ran into concrete step and broke it    Always been funny but now darkening, starting to hurt, and starting to lift up    Would like to see a podiatrist about it       PHQ-9 2/6/2018 7/31/2018 8/30/2018   Total Score 3 6 4   Q9: Suicide Ideation Not at all Not at all Not at all     HAYDEE-7 SCORE 2/6/2018 7/31/2018 8/30/2018   Total Score - - -   Total Score 1 (minimal anxiety) 8 (mild anxiety) 6 (mild anxiety)   Total Score 1 8 6     In the past two weeks have you had thoughts of suicide or self-harm?  No.    Do you have concerns about your personal safety or the safety of others?   No      Plan from 7/31/18   1& 2. Mood  - Chronic issue but new worsening, patient not currently on any medications   - Tried Effexor, Wellbutrin, Celexa, Cymbalta, Prozac, Lexapro, and Zoloft for varying amounts of time, patient only remembers Effexor was \"too strong\"   - Recommend trial of Zoloft, reviewed use and side effects  - Start sertraline (Zoloft) 1/2 tablet (25 mg) per day for 1-2 weeks, then increase to full tablet (50 mg) per day  - Recheck 1 month           Problem " list and histories reviewed & adjusted, as indicated.  Additional history: as documented    BP Readings from Last 3 Encounters:   08/30/18 110/62   08/13/18 164/84   07/31/18 128/80    Wt Readings from Last 3 Encounters:   08/30/18 155 lb 6.4 oz (70.5 kg)   07/31/18 157 lb (71.2 kg)   04/25/18 156 lb 8 oz (71 kg)           Labs reviewed in EPIC    ROS:  Constitutional, HEENT, cardiovascular, pulmonary, gi and gu systems are negative, except as otherwise noted.    OBJECTIVE:   /62  Pulse 70  Temp 98.4  F (36.9  C) (Temporal)  Resp 16  Wt 155 lb 6.4 oz (70.5 kg)  LMP 11/09/2005  SpO2 97%  BMI 28.6 kg/m2  Body mass index is 28.6 kg/(m^2).  GENERAL APPEARANCE: healthy, alert and no distress  EYES: Eyes grossly normal to inspection, PERRLA, conjunctivae and sclerae without injection or discharge, EOM intact   RESP: Lungs clear to auscultation - no rales, rhonchi or wheezes    CV: Regular rates and rhythm, normal S1 S2, no S3 or S4, no murmur, click or rub, no peripheral edema and peripheral pulses strong and symmetric bilaterally   MS: No musculoskeletal defects are noted and gait is age appropriate without ataxia      Right great toe: dark nail bed, thickened nail, lifting from distal end, nail not well attached throughout   SKIN: No suspicious lesions or rashes, hydration status appears adeuqate with normal skin turgor   PSYCH: Alert and oriented x3; speech- coherent , normal rate and volume; able to articulate logical thoughts, able to abstract reason, no tangential thoughts, no hallucinations or delusions, mentation appears normal, Mood is euthymic. Affect is appropriate for this mood state and bright. Thought content is free of suicidal ideation, hallucinations, and delusions. Dress is adequate and upkept. Eye contact is good during conversation.       Diagnostic Test Results:  none     ASSESSMENT/PLAN:       ICD-10-CM    1. Moderate major depression (H) F32.1 sertraline (ZOLOFT) 50 MG tablet   2.  Adjustment disorder with mixed anxiety and depressed mood F43.23 sertraline (ZOLOFT) 50 MG tablet   3. Hypertension goal BP (blood pressure) < 140/90 I10 BASIC METABOLIC PANEL   4. Asymptomatic postmenopausal status Z78.0 DEXA HIP/PELVIS/SPINE - Future   5. Onycholysis L60.1 PODIATRY/FOOT & ANKLE SURGERY REFERRAL   6. Chronic peptic ulcer K27.7 esomeprazole (NEXIUM) 40 MG CR capsule   7. Mild persistent asthma without complication J45.30      1 & 2. Mood   - Improving a little with 4 weeks Zoloft 50 mg, no side effects  - Increase Zoloft to 75 mg   - Recheck 1 month     3. HTN   - Stable below goal on current regimen  - Recommend update BMP today, await results     4. Recommend Dexa scan, patient will schedule     5. Lifting of great nail on right foot   - Podiatry referral placed     6. History of peptic ulcer   - Worse with worrying lately   - Nexium used to help, will re-start   - Recheck 1 month     7. Asthma   - Stable, doing well with albuterol only     Also discussed pneumonia vaccination, gave pharmacy router to get PCV13, patient will consider         The patient indicates understanding of these issues and agrees with the plan.    Follow up: 1 month           Rosenda Varma-FLEX [place]  [facility]

## 2018-08-30 ENCOUNTER — OFFICE VISIT (OUTPATIENT)
Dept: FAMILY MEDICINE | Facility: OTHER | Age: 68
End: 2018-08-30
Payer: MEDICARE

## 2018-08-30 VITALS
TEMPERATURE: 98.4 F | OXYGEN SATURATION: 97 % | DIASTOLIC BLOOD PRESSURE: 62 MMHG | WEIGHT: 155.4 LBS | HEART RATE: 70 BPM | RESPIRATION RATE: 16 BRPM | SYSTOLIC BLOOD PRESSURE: 110 MMHG | BODY MASS INDEX: 28.6 KG/M2

## 2018-08-30 DIAGNOSIS — Z78.0 ASYMPTOMATIC POSTMENOPAUSAL STATUS: ICD-10-CM

## 2018-08-30 DIAGNOSIS — F32.1 MODERATE MAJOR DEPRESSION (H): Primary | ICD-10-CM

## 2018-08-30 DIAGNOSIS — K27.7 CHRONIC PEPTIC ULCER: ICD-10-CM

## 2018-08-30 DIAGNOSIS — E87.1 LOW SODIUM LEVELS: ICD-10-CM

## 2018-08-30 DIAGNOSIS — F43.23 ADJUSTMENT DISORDER WITH MIXED ANXIETY AND DEPRESSED MOOD: ICD-10-CM

## 2018-08-30 DIAGNOSIS — J45.30 MILD PERSISTENT ASTHMA WITHOUT COMPLICATION: ICD-10-CM

## 2018-08-30 DIAGNOSIS — I10 HYPERTENSION GOAL BP (BLOOD PRESSURE) < 140/90: ICD-10-CM

## 2018-08-30 DIAGNOSIS — L60.1 ONYCHOLYSIS: ICD-10-CM

## 2018-08-30 LAB
ANION GAP SERPL CALCULATED.3IONS-SCNC: 9 MMOL/L (ref 3–14)
BUN SERPL-MCNC: 11 MG/DL (ref 7–30)
CALCIUM SERPL-MCNC: 9 MG/DL (ref 8.5–10.1)
CHLORIDE SERPL-SCNC: 90 MMOL/L (ref 94–109)
CO2 SERPL-SCNC: 30 MMOL/L (ref 20–32)
CREAT SERPL-MCNC: 0.8 MG/DL (ref 0.52–1.04)
GFR SERPL CREATININE-BSD FRML MDRD: 72 ML/MIN/1.7M2
GLUCOSE SERPL-MCNC: 96 MG/DL (ref 70–99)
POTASSIUM SERPL-SCNC: 4.2 MMOL/L (ref 3.4–5.3)
SODIUM SERPL-SCNC: 129 MMOL/L (ref 133–144)

## 2018-08-30 PROCEDURE — 36415 COLL VENOUS BLD VENIPUNCTURE: CPT | Performed by: PHYSICIAN ASSISTANT

## 2018-08-30 PROCEDURE — 80048 BASIC METABOLIC PNL TOTAL CA: CPT | Performed by: PHYSICIAN ASSISTANT

## 2018-08-30 PROCEDURE — 99214 OFFICE O/P EST MOD 30 MIN: CPT | Performed by: PHYSICIAN ASSISTANT

## 2018-08-30 RX ORDER — ESOMEPRAZOLE MAGNESIUM 40 MG/1
40 CAPSULE, DELAYED RELEASE ORAL
Qty: 90 CAPSULE | Refills: 3 | Status: SHIPPED | OUTPATIENT
Start: 2018-08-30 | End: 2019-01-09

## 2018-08-30 ASSESSMENT — ANXIETY QUESTIONNAIRES
GAD7 TOTAL SCORE: 6
4. TROUBLE RELAXING: SEVERAL DAYS
5. BEING SO RESTLESS THAT IT IS HARD TO SIT STILL: NOT AT ALL
3. WORRYING TOO MUCH ABOUT DIFFERENT THINGS: SEVERAL DAYS
GAD7 TOTAL SCORE: 6
6. BECOMING EASILY ANNOYED OR IRRITABLE: SEVERAL DAYS
7. FEELING AFRAID AS IF SOMETHING AWFUL MIGHT HAPPEN: SEVERAL DAYS
7. FEELING AFRAID AS IF SOMETHING AWFUL MIGHT HAPPEN: SEVERAL DAYS
GAD7 TOTAL SCORE: 6
1. FEELING NERVOUS, ANXIOUS, OR ON EDGE: SEVERAL DAYS
2. NOT BEING ABLE TO STOP OR CONTROL WORRYING: SEVERAL DAYS

## 2018-08-30 ASSESSMENT — PAIN SCALES - GENERAL: PAINLEVEL: NO PAIN (0)

## 2018-08-30 ASSESSMENT — PATIENT HEALTH QUESTIONNAIRE - PHQ9
SUM OF ALL RESPONSES TO PHQ QUESTIONS 1-9: 4
10. IF YOU CHECKED OFF ANY PROBLEMS, HOW DIFFICULT HAVE THESE PROBLEMS MADE IT FOR YOU TO DO YOUR WORK, TAKE CARE OF THINGS AT HOME, OR GET ALONG WITH OTHER PEOPLE: SOMEWHAT DIFFICULT
SUM OF ALL RESPONSES TO PHQ QUESTIONS 1-9: 4

## 2018-08-30 NOTE — MR AVS SNAPSHOT
After Visit Summary   8/30/2018    Sri Grewal    MRN: [id number]           Patient Information     Date Of Birth          1950        Visit Information        Provider Department      8/30/2018 2:30 PM Rosenda Pierre PA-C [facility]        Today's Diagnoses     Moderate major depression (H)    -  1    Adjustment disorder with mixed anxiety and depressed mood        Hypertension goal BP (blood pressure) < 140/90        Asymptomatic postmenopausal status        Onycholysis        Chronic peptic ulcer          Care Instructions    1. Dexa scan (bone health)      You may call any office from below to schedule an appointment for radiology.  [place]  [phone]  [place]  [phone]     2. Labs today, await results     3. Increase Zoloft to 75 mg (1.5 tablets)                   Follow-ups after your visit        Additional Services     PODIATRY/FOOT & ANKLE SURGERY REFERRAL       Your provider has referred you to: PREFERRED PROVIDERS:  [facility]: [facility] [phone]   [email or web address]    Please be aware that coverage of these services is subject to the terms and limitations of your health insurance plan.  Call member services at your health plan with any benefit or coverage questions.      Please bring the following to your appointment:  >>   Any x-rays, CTs or MRIs which have been performed.  Contact the facility where they were done to arrange for  prior to your scheduled appointment.    >>   List of current medications   >>   This referral request   >>   Any documents/labs given to you for this referral                  Follow-up notes from your care team     Return in about 1 month (around 9/30/2018).      Future tests that were ordered for you today     Open Future Orders        Priority Expected Expires Ordered    DEXA HIP/PELVIS/SPINE - Future Routine  8/28/2019 8/30/2018            Who to  contact     If you have questions or need follow up information about today's clinic visit or your schedule please contact [facility] directly at [phone].  Normal or non-critical lab and imaging results will be communicated to you by MyChart, letter or phone within 4 business days after the clinic has received the results. If you do not hear from us within 7 days, please contact the clinic through MyChart or phone. If you have a critical or abnormal lab result, we will notify you by phone as soon as possible.  Submit refill requests through [employer] or call your pharmacy and they will forward the refill request to us. Please allow 3 business days for your refill to be completed.          Additional Information About Your Visit        Care EveryWhere ID     This is your Care EveryWhere ID. This could be used by other organizations to access your [facility] medical records  [id number]        Your Vitals Were     Pulse Temperature Respirations Last Period Pulse Oximetry BMI (Body Mass Index)    70 98.4  F (36.9  C) (Temporal) 16 11/09/2005 97% 28.6 kg/m2       Blood Pressure from Last 3 Encounters:   08/30/18 110/62   08/13/18 164/84   07/31/18 128/80    Weight from Last 3 Encounters:   08/30/18 155 lb 6.4 oz (70.5 kg)   07/31/18 157 lb (71.2 kg)   04/25/18 156 lb 8 oz (71 kg)              We Performed the Following     BASIC METABOLIC PANEL     PODIATRY/FOOT & ANKLE SURGERY REFERRAL          Today's Medication Changes          These changes are accurate as of 8/30/18  2:55 PM.  If you have any questions, ask your nurse or doctor.               Start taking these medicines.        Dose/Directions    esomeprazole 40 MG CR capsule   Commonly known as:  nexIUM   Used for:  Chronic peptic ulcer   Started by:  Rosenda Pierre PA-C        Dose:  40 mg   Take 1 capsule (40 mg) by mouth every morning (before breakfast) Take 30-60 minutes before eating.   Quantity:  90 capsule   Refills:  3          These medicines have changed or have updated prescriptions.        Dose/Directions    sertraline 50 MG tablet   Commonly known as:  ZOLOFT   This may have changed:    - how much to take  - how to take this  - when to take this   Used for:  Moderate major depression (H), Adjustment disorder with mixed anxiety and depressed mood   Changed by:  Rosenda Pierre PA-C        Dose:  75 mg   Take 1.5 tablets (75 mg) by mouth daily Take 1 tablet orally daily   Quantity:  45 tablet   Refills:  3            Where to get your medicines      These medications were sent to [employer] [address]  [address], [facility] [address]     Phone:  [phone]     esomeprazole 40 MG CR capsule    sertraline 50 MG tablet                Primary Care Provider Office Phone # Fax #    Rosenda Pierre PA-C [phone]       [address] [address]        Equal Access to Services     BUSHRA [facility] RegionAM[facility]O : Hadii anthony ku hadasho Soomaali, waaxda luqadaha, qaybta kaalmada adeegyada, waxay idiin hayjoseph davies . So [facility] [phone].    ATENCIÓN: Si habla español, tiene a orozco disposición servicios gratuitos de asistencia lingüística. [facility] [phone].    We comply with applicable federal civil rights laws and [place] laws. We do not discriminate on the basis of race, color, national origin, age, disability, sex, sexual orientation, or gender identity.            Thank you!     Thank you for choosing [facility]  for your care. Our goal is always to provide you with excellent care. Hearing back from our patients is one way we can continue to improve our services. Please take a few minutes to complete the written survey that you may receive in the mail after your visit with us. Thank you!             Your Updated Medication List - Protect others around you: Learn how to safely use, store and throw away  your medicines at www.disposemymeds.org.          This list is accurate as of 8/30/18  2:55 PM.  Always use your most recent med list.                   Brand Name Dispense Instructions for use Diagnosis    albuterol 108 (90 Base) MCG/ACT inhaler    PROAIR HFA    2 Inhaler    Inhale 1-2 puffs into the lungs every 6 hours as needed for shortness of breath / dyspnea    Mild persistent asthma with acute exacerbation       ALLEGRA PO           ASPIRIN 81 PO           butalbital-aspirin-caffeine -40 MG per capsule    FIORINAL    30 capsule    TAKE ONE CAPSULE BY MOUTH EVERY 6 HOURS AS NEEDED (one month supply)    Chronic migraine without aura without status migrainosus, not intractable       esomeprazole 40 MG CR capsule    nexIUM    90 capsule    Take 1 capsule (40 mg) by mouth every morning (before breakfast) Take 30-60 minutes before eating.    Chronic peptic ulcer       IBUPROFEN PO      Reported on 4/6/2017        lisinopril-hydrochlorothiazide 20-12.5 MG per tablet    PRINZIDE/ZESTORETIC    180 tablet    Take 2 tablets by mouth daily    Hypertension goal BP (blood pressure) < 140/90       propranolol 20 MG tablet    INDERAL    180 tablet    Take 1 tablet (20 mg) by mouth 2 times daily    Chronic migraine without aura without status migrainosus, not intractable       sertraline 50 MG tablet    ZOLOFT    45 tablet    Take 1.5 tablets (75 mg) by mouth daily Take 1 tablet orally daily    Moderate major depression (H), Adjustment disorder with mixed anxiety and depressed mood       valACYclovir 500 MG tablet    VALTREX    14 tablet    Take 1 tablet (500 mg) by mouth 2 times daily for 7 days    HSV (herpes simplex virus) infection

## 2018-08-30 NOTE — PATIENT INSTRUCTIONS
1. Dexa scan (bone health)      You may call any office from below to schedule an appointment for radiology.  [place]  [phone]  [place]  [phone]     2. Labs today, await results     3. Increase Zoloft to 75 mg (1.5 tablets)

## 2018-08-31 ASSESSMENT — ASTHMA QUESTIONNAIRES: ACT_TOTALSCORE: 21

## 2018-08-31 ASSESSMENT — ANXIETY QUESTIONNAIRES: GAD7 TOTAL SCORE: 6

## 2018-08-31 ASSESSMENT — PATIENT HEALTH QUESTIONNAIRE - PHQ9: SUM OF ALL RESPONSES TO PHQ QUESTIONS 1-9: 4

## 2018-08-31 NOTE — PROGRESS NOTES
Please call patient with the following message    Labs were normal except sodium was a little low. I recommend she come in for lab only recheck in 2 weeks.     Jose Francisco Pierre PA-C

## 2018-09-06 NOTE — TELEPHONE ENCOUNTER
Pharmacy is requesting clarification on directions for patients Zoloft. [phone]    sertraline (ZOLOFT) 50 MG tablet 45 tablet 3 8/30/2018  No   Sig - Route: Take 1.5 tablets (75 mg) by mouth daily Take 1 tablet orally daily - Oral   Class: E-Prescribe   Order: [id number]   E-Prescribing Status: Sent to pharmacy (8/30/2018  2:52 PM CDT)

## 2018-09-06 NOTE — TELEPHONE ENCOUNTER
LM for patient to return phone call to clinic about message below.  Just clarifying dose with patient.  Angélica Nair CMA ([facility])

## 2018-09-06 NOTE — TELEPHONE ENCOUNTER
Called and spoke with patient regarding message below.  Patient verbalized understanding.  Angélica Nair CMA ([facility])

## 2018-09-11 ENCOUNTER — OFFICE VISIT (OUTPATIENT)
Dept: PODIATRY | Facility: OTHER | Age: 68
End: 2018-09-11
Payer: MEDICARE

## 2018-09-11 VITALS
HEIGHT: 62 IN | DIASTOLIC BLOOD PRESSURE: 80 MMHG | BODY MASS INDEX: 29.08 KG/M2 | SYSTOLIC BLOOD PRESSURE: 132 MMHG | WEIGHT: 158 LBS

## 2018-09-11 DIAGNOSIS — L60.3 ONYCHODYSTROPHY: Primary | ICD-10-CM

## 2018-09-11 PROCEDURE — 99203 OFFICE O/P NEW LOW 30 MIN: CPT | Performed by: PODIATRIST

## 2018-09-11 ASSESSMENT — PAIN SCALES - GENERAL: PAINLEVEL: NO PAIN (1)

## 2018-09-11 NOTE — MR AVS SNAPSHOT
"              After Visit Summary   9/11/2018    Sri Grewal    MRN: [id number]           Patient Information     Date Of Birth          1950        Visit Information        Provider Department      9/11/2018 3:30 PM Norberto Veras DPM [facility] Instructions    Nail Debridement    A high quality instrument makes trimming toenails MUCH easier.  Search ebay for any 5\" nail nipper manufactured by reliable brands such as Miltex, Integra or Jarit as these quality instruments will help manage difficult nails more effectively and comfortably. We use Miltex -SS.  A physician is not necessary to trim nails even if you are taking blood thinners or are diabetic.  Your family or care givers may help manage your toenails.      Trim or sand the nails once weekly.  Do not wait until they are long and painful or trimming will become too difficult and painful and will increase your risk of complications or infection.  A course file or 120 grit sandpaper on a sanding block can be helpful.  For very thick nails many people prefer battery operated vasquez such as an [employer]', Personal Pedi and Emjoi for regular use or heavy painful callouses or thick toenails.    Trim or skive any portion of nail that is thick, loose, crumbling, or not well attached. Do not tear the nail away, but rather cut them with a nail nipperor sand or sand them down.  You may follow up with your Podiatric Physician if you have pain, bleeding, infection, questions or other concerns.      You may also contact the following Registered Nurses for further help with nail debridement and minor hygiene concnerns.  They may come to your home or meet them at their clinic to trim your toenails and soak your feet, as well as monitor for any complications that would require evaluation by a Physician.        [facility]  Karina Gonzalez, RN  Office [phone]    [facility]  Soila Hebert, " RN  [phone]   Call or text for appointment  Some home visits and has a clinic at:  [address] [facility] [address] [place] [address]    [facility]  [phone]  [facility]    [facility]  [phone]  [email or web address]  [facility]    For up to date list and to find foot care nurses in other communities visit [employer] Association website:  afcna.org.               Follow-ups after your visit        Your next 10 appointments already scheduled     Sep 14, 2018  2:00 PM CDT   [facility]    [address] [facility] [address] [phone]           Please do not eat 10-12 hours before your appointment if you are coming in fasting for labs on lipids, cholesterol, or glucose (sugar). This does not apply to pregnant women. Water, hot tea and black coffee (with nothing added) are okay. Do not drink other fluids, diet soda or chew gum.              Who to contact     If you have questions or need follow up information about today's clinic visit or your schedule please contact [facility] directly at [phone].  Normal or non-critical lab and imaging results will be communicated to you by MyChart, letter or phone within 4 business days after the clinic has received the results. If you do not hear from us within 7 days, please contact the clinic through MyChart or phone. If you have a critical or abnormal lab result, we will notify you by phone as soon as possible.  Submit refill requests through [email or web address] or call your pharmacy and they will forward the refill request to us. Please allow 3 business days for your refill to be completed.          Additional Information About Your Visit        Care EveryWhere ID     This is your Care EveryWhere ID. This could be used by other organizations to access your [place] medical records  [id number]        Your Vitals Were     Height  "Last Period BMI (Body Mass Index)             5' 1.81\" (1.57 m) 11/09/2005 29.08 kg/m2          Blood Pressure from Last 3 Encounters:   09/11/18 132/80   08/30/18 110/62   08/13/18 164/84    Weight from Last 3 Encounters:   09/11/18 158 lb (71.7 kg)   08/30/18 155 lb 6.4 oz (70.5 kg)   07/31/18 157 lb (71.2 kg)              Today, you had the following     No orders found for display       Primary Care Provider Office Phone # Fax #    Rosenda SANTIAGO FLEX Pierre [phone]       [address] [facility] [address]  [facility] [address]        Equal Access to Services     SACHA VICKERS : Hadii aad ku hadasho Soomaali, waaxda luqadaha, qaybta kaalmada adeegyada, caridad castroin jose davies . So [facility] [phone].    ATENCIÓN: Si habla español, tiene a orozco disposición servicios gratuitos de asistencia lingüística. Llame al [phone].    We comply with applicable federal civil rights laws and [place] laws. We do not discriminate on the basis of race, color, national origin, age, disability, sex, sexual orientation, or gender identity.            Thank you!     Thank you for choosing [facility]  for your care. Our goal is always to provide you with excellent care. Hearing back from our patients is one way we can continue to improve our services. Please take a few minutes to complete the written survey that you may receive in the mail after your visit with us. Thank you!             Your Updated Medication List - Protect others around you: Learn how to safely use, store and throw away your medicines at www.disposemymeds.org.          This list is accurate as of 9/11/18  4:12 PM.  Always use your most recent med list.                   Brand Name Dispense Instructions for use Diagnosis    albuterol 108 (90 Base) MCG/ACT inhaler    PROAIR HFA    2 Inhaler    Inhale 1-2 puffs into the lungs every 6 hours as needed for shortness of breath / dyspnea    Mild persistent asthma with acute " exacerbation       ALLEGRA PO           ASPIRIN 81 PO           butalbital-aspirin-caffeine -40 MG per capsule    FIORINAL    30 capsule    TAKE ONE CAPSULE BY MOUTH EVERY 6 HOURS AS NEEDED (one month supply)    Chronic migraine without aura without status migrainosus, not intractable       esomeprazole 40 MG CR capsule    nexIUM    90 capsule    Take 1 capsule (40 mg) by mouth every morning (before breakfast) Take 30-60 minutes before eating.    Chronic peptic ulcer       IBUPROFEN PO      Reported on 4/6/2017        lisinopril-hydrochlorothiazide 20-12.5 MG per tablet    PRINZIDE/ZESTORETIC    180 tablet    Take 2 tablets by mouth daily    Hypertension goal BP (blood pressure) < 140/90       propranolol 20 MG tablet    INDERAL    180 tablet    Take 1 tablet (20 mg) by mouth 2 times daily    Chronic migraine without aura without status migrainosus, not intractable       sertraline 50 MG tablet    ZOLOFT    45 tablet    Take 1.5 tablets (75 mg) by mouth daily Take 1 tablet orally daily    Moderate major depression (H), Adjustment disorder with mixed anxiety and depressed mood       valACYclovir 500 MG tablet    VALTREX    14 tablet    Take 1 tablet (500 mg) by mouth 2 times daily for 7 days    HSV (herpes simplex virus) infection

## 2018-09-11 NOTE — LETTER
9/11/2018         RE: Sri Grewal  [address]  [facility] [address]        Dear Colleague,    Thank you for referring your patient, Sri Grewal, to the [facility]. Please see a copy of my visit note below.    HPI:  Sri Grewal is a 68 year old female who is seen in consultation at the request of Rosenda Pierre PA-C    Pt presents for eval of:   (Onset, Location, L/R, Character, Treatments, Injury if yes)     Many years ago dropped something onto Left and Right great toenails, toenails did not come off but crack. Now thick, discolored, pain 1, > Left.    Retired.    BMI is normal.    Patient to follow up with Primary Care provider regarding elevated blood pressure.    ROS:  10 point ROS neg other than the symptoms noted above in the HPI.    Patient Active Problem List   Diagnosis     Chronic migraine without aura without status migrainosus, not intractable     Other abnormal Papanicolaou smear of cervix and cervical HPV(795.09)     Endometriosis     Allergic rhinitis     Anemia     Chronic peptic ulcer     Mild persistent asthma     Lump or mass in breast     Atrophy of vagina     Moderate major depression (H)     Insomnia     Health Care Home     Advanced directives, counseling/discussion     Middle cerebral artery aneurysm     Adjustment disorder with mixed anxiety and depressed mood     TIA (transient ischemic attack)     Hypertension goal BP (blood pressure) < 140/90     Atypical mole     HSV (herpes simplex virus) infection       PAST MEDICAL HISTORY:   Past Medical History:   Diagnosis Date     Abnormal Papanicolaou smear of cervix and cervical HPV      Allergic rhinitis, cause unspecified     seasonal allergies     Contact dermatitis and other eczema, due to unspecified cause      Endometriosis, site unspecified      Esophageal reflux     GERD     Migraine, unspecified, without mention of intractable migraine without mention of status migrainosus      Mild  persistent asthma      Unspecified disorder of uterus     fibroid uterus        PAST SURGICAL HISTORY:   Past Surgical History:   Procedure Laterality Date     C  DELIVERY ONLY       X2     COLONOSCOPY  2014    Procedure: COLONOSCOPY;  Surgeon: Nathan Baker MD;  Location:  GI      COLONOSCOPY THRU STOMA, DIAGNOSTIC  2002    normal        MEDICATIONS:   Current Outpatient Prescriptions:      albuterol (PROAIR HFA) 108 (90 Base) MCG/ACT Inhaler, Inhale 1-2 puffs into the lungs every 6 hours as needed for shortness of breath / dyspnea, Disp: 2 Inhaler, Rfl: 11     ASPIRIN 81 PO, , Disp: , Rfl:      butalbital-aspirin-caffeine (FIORINAL) -40 MG per capsule, TAKE ONE CAPSULE BY MOUTH EVERY 6 HOURS AS NEEDED (one month supply), Disp: 30 capsule, Rfl: 5     esomeprazole (NEXIUM) 40 MG CR capsule, Take 1 capsule (40 mg) by mouth every morning (before breakfast) Take 30-60 minutes before eating., Disp: 90 capsule, Rfl: 3     Fexofenadine HCl (ALLEGRA PO), , Disp: , Rfl:      IBUPROFEN PO, Reported on 2017, Disp: , Rfl:      lisinopril-hydrochlorothiazide (PRINZIDE/ZESTORETIC) 20-12.5 MG per tablet, Take 2 tablets by mouth daily, Disp: 180 tablet, Rfl: 3     propranolol (INDERAL) 20 MG tablet, Take 1 tablet (20 mg) by mouth 2 times daily, Disp: 180 tablet, Rfl: 3     sertraline (ZOLOFT) 50 MG tablet, Take 1.5 tablets (75 mg) by mouth daily Take 1 tablet orally daily, Disp: 45 tablet, Rfl: 3     valACYclovir (VALTREX) 500 MG tablet, Take 1 tablet (500 mg) by mouth 2 times daily for 7 days, Disp: 14 tablet, Rfl: 3     ALLERGIES:    Allergies   Allergen Reactions     Codeine      GI UPSET     Percocet [Oxycodone-Acetaminophen] Nausea and Vomiting     Seasonal Allergies         SOCIAL HISTORY:   Social History     Social History     Marital status:      Spouse name: N/A     Number of children: 2     Years of education: N/A     Occupational History     retired       Retired  "    Social History Main Topics     Smoking status: Never Smoker     Smokeless tobacco: Never Used     Alcohol use Yes      Comment: beerx2/x1 per month     Drug use: No     Sexual activity: Not Currently     Partners: Male     Birth control/ protection: Surgical      Comment: tubal ligation     Other Topics Concern      Service No     Blood Transfusions No     Caffeine Concern No     Occupational Exposure No     Hobby Hazards No     Sleep Concern Yes     Insomnia     Stress Concern Yes     breathing,  passed away 2004     Weight Concern Yes     would like to lose some weight     Special Diet No     Back Care Yes     Exercise Yes     walking at work daily     Seat Belt Yes     Self-Exams Yes     periodically     Parent/Sibling W/ Cabg, Mi Or Angioplasty Before 65f 55m? No     Social History Narrative        FAMILY HISTORY:   Family History   Problem Relation Age of Onset     HEART DISEASE Mother      anemia     Osteoporosis Mother      Hypertension Mother      Cerebrovascular Disease Mother      Alcohol/Drug Mother      alcohol     Neurologic Disorder Mother      migraines     Hypertension Father      Cancer No family hx of      breast        EXAM:Vitals: /80 (BP Location: Right arm, Cuff Size: Adult Regular)  Ht 5' 1.81\" (1.57 m)  Wt 158 lb (71.7 kg)  LMP 11/09/2005  BMI 29.08 kg/m2  BMI= Body mass index is 29.08 kg/(m^2).    General appearance: Patient is alert and fully cooperative with history & exam.  No sign of distress is noted during the visit.     Psychiatric: Affect is pleasant & appropriate.  Patient appears motivated to improve health.     Respiratory: Breathing is regular & unlabored while sitting.     HEENT: Hearing is intact to spoken word.  Speech is clear.  No gross evidence of visual impairment that would impact ambulation.     Vascular: DP & PT pulses are intact & regular bilaterally.  No significant edema or varicosities noted.  CFT and skin temperature is normal to both " lower extremities.     Neurologic: Lower extremity sensation is intact to light touch.  No evidence of weakness or contracture in the lower extremities.  No evidence of neuropathy.    Dermatologic: Skin is intact to both lower extremities with adequate texture, turgor and tone about the integument.  Both hallux nails are severely dystrophic thickened and lifted from the nailbed.  They are painful.  No acute abscess drainage or paronychia noted.    Musculoskeletal: Patient is ambulatory without assistive device or brace.  No gross ankle deformity noted.  No foot or ankle joint effusion is noted.       ASSESSMENT:       ICD-10-CM    1. Onychodystrophy L60.3         PLAN:  Reviewed patient's chart in [facility].      9/11/2018   We discussed treatment options of debridement abrasive debridement sharp debridement versus matrixectomy.  Written instructions were dispensed.  She would rather not pursue matrixectomy but we describe this at length and as well as expectations and postoperative care.  She would like to exhaust conservative options with appropriate abrasive debridement and written instructions were dispensed.  All questions answered follow-up as needed.    Norberto Veras DPM        Again, thank you for allowing me to participate in the care of your patient.        Sincerely,        Norberto Veras DPM

## 2018-09-11 NOTE — PATIENT INSTRUCTIONS
"Nail Debridement    A high quality instrument makes trimming toenails MUCH easier.  Search ebay for any 5\" nail nipper manufactured by reliable brands such as Miltex, Integra or Jarit as these quality instruments will help manage difficult nails more effectively and comfortably. We use Miltex -SS.  A physician is not necessary to trim nails even if you are taking blood thinners or are diabetic.  Your family or care givers may help manage your toenails.      Trim or sand the nails once weekly.  Do not wait until they are long and painful or trimming will become too difficult and painful and will increase your risk of complications or infection.  A course file or 120 grit sandpaper on a sanding block can be helpful.  For very thick nails many people prefer battery operated vasquez such as an [employer]', Personal Pedi and Emjoi for regular use or heavy painful callouses or thick toenails.    Trim or skive any portion of nail that is thick, loose, crumbling, or not well attached. Do not tear the nail away, but rather cut them with a nail nipperor sand or sand them down.  You may follow up with your Podiatric Physician if you have pain, bleeding, infection, questions or other concerns.      You may also contact the following Registered Nurses for further help with nail debridement and minor hygiene concnerns.  They may come to your home or meet them at their clinic to trim your toenails and soak your feet, as well as monitor for any complications that would require evaluation by a Physician.        [facility]  Karina Gonzalez RN  Office [phone]    [facility]  Soila Hebert RN  [phone]   Call or text for appointment  Some home visits and has a clinic at:  [address] [place] [id number]    [facility]  [phone]  [facility]  [phone]  [email or web address]  [facility]    For up to date list and " to find foot care nurses in other communities visit American [employer] Nurses Association website:  afcna.org.

## 2018-09-11 NOTE — PROGRESS NOTES
HPI:  Sri Grewal is a 68 year old female who is seen in consultation at the request of Rosenda Pierre PA-C    Pt presents for eval of:   (Onset, Location, L/R, Character, Treatments, Injury if yes)     Many years ago dropped something onto Left and Right great toenails, toenails did not come off but crack. Now thick, discolored, pain 1, > Left.    Retired.    BMI is normal.    Patient to follow up with Primary Care provider regarding elevated blood pressure.    ROS:  10 point ROS neg other than the symptoms noted above in the HPI.    Patient Active Problem List   Diagnosis     Chronic migraine without aura without status migrainosus, not intractable     Other abnormal Papanicolaou smear of cervix and cervical HPV(795.09)     Endometriosis     Allergic rhinitis     Anemia     Chronic peptic ulcer     Mild persistent asthma     Lump or mass in breast     Atrophy of vagina     Moderate major depression (H)     Insomnia     Health Care Home     Advanced directives, counseling/discussion     Middle cerebral artery aneurysm     Adjustment disorder with mixed anxiety and depressed mood     TIA (transient ischemic attack)     Hypertension goal BP (blood pressure) < 140/90     Atypical mole     HSV (herpes simplex virus) infection       PAST MEDICAL HISTORY:   Past Medical History:   Diagnosis Date     Abnormal Papanicolaou smear of cervix and cervical HPV      Allergic rhinitis, cause unspecified     seasonal allergies     Contact dermatitis and other eczema, due to unspecified cause      Endometriosis, site unspecified      Esophageal reflux     GERD     Migraine, unspecified, without mention of intractable migraine without mention of status migrainosus      Mild persistent asthma      Unspecified disorder of uterus     fibroid uterus        PAST SURGICAL HISTORY:   Past Surgical History:   Procedure Laterality Date     C  DELIVERY ONLY       X2     COLONOSCOPY  2014    Procedure:  COLONOSCOPY;  Surgeon: Nathan Baker MD;  Location:  GI      COLONOSCOPY THRU STOMA, DIAGNOSTIC  5/2002    normal        MEDICATIONS:   Current Outpatient Prescriptions:      albuterol (PROAIR HFA) 108 (90 Base) MCG/ACT Inhaler, Inhale 1-2 puffs into the lungs every 6 hours as needed for shortness of breath / dyspnea, Disp: 2 Inhaler, Rfl: 11     ASPIRIN 81 PO, , Disp: , Rfl:      butalbital-aspirin-caffeine (FIORINAL) -40 MG per capsule, TAKE ONE CAPSULE BY MOUTH EVERY 6 HOURS AS NEEDED (one month supply), Disp: 30 capsule, Rfl: 5     esomeprazole (NEXIUM) 40 MG CR capsule, Take 1 capsule (40 mg) by mouth every morning (before breakfast) Take 30-60 minutes before eating., Disp: 90 capsule, Rfl: 3     Fexofenadine HCl (ALLEGRA PO), , Disp: , Rfl:      IBUPROFEN PO, Reported on 4/6/2017, Disp: , Rfl:      lisinopril-hydrochlorothiazide (PRINZIDE/ZESTORETIC) 20-12.5 MG per tablet, Take 2 tablets by mouth daily, Disp: 180 tablet, Rfl: 3     propranolol (INDERAL) 20 MG tablet, Take 1 tablet (20 mg) by mouth 2 times daily, Disp: 180 tablet, Rfl: 3     sertraline (ZOLOFT) 50 MG tablet, Take 1.5 tablets (75 mg) by mouth daily Take 1 tablet orally daily, Disp: 45 tablet, Rfl: 3     valACYclovir (VALTREX) 500 MG tablet, Take 1 tablet (500 mg) by mouth 2 times daily for 7 days, Disp: 14 tablet, Rfl: 3     ALLERGIES:    Allergies   Allergen Reactions     Codeine      GI UPSET     Percocet [Oxycodone-Acetaminophen] Nausea and Vomiting     Seasonal Allergies         SOCIAL HISTORY:   Social History     Social History     Marital status:      Spouse name: N/A     Number of children: 2     Years of education: N/A     Occupational History     retired       Retired     Social History Main Topics     Smoking status: Never Smoker     Smokeless tobacco: Never Used     Alcohol use Yes      Comment: beerx2/x1 per month     Drug use: No     Sexual activity: Not Currently     Partners: Male     Birth control/ protection:  "Surgical      Comment: tubal ligation     Other Topics Concern      Service No     Blood Transfusions No     Caffeine Concern No     Occupational Exposure No     Hobby Hazards No     Sleep Concern Yes     Insomnia     Stress Concern Yes     breathing,  passed away 2004     Weight Concern Yes     would like to lose some weight     Special Diet No     Back Care Yes     Exercise Yes     walking at work daily     Seat Belt Yes     Self-Exams Yes     periodically     Parent/Sibling W/ Cabg, Mi Or Angioplasty Before 65f 55m? No     Social History Narrative        FAMILY HISTORY:   Family History   Problem Relation Age of Onset     HEART DISEASE Mother      anemia     Osteoporosis Mother      Hypertension Mother      Cerebrovascular Disease Mother      Alcohol/Drug Mother      alcohol     Neurologic Disorder Mother      migraines     Hypertension Father      Cancer No family hx of      breast        EXAM:Vitals: /80 (BP Location: Right arm, Cuff Size: Adult Regular)  Ht 5' 1.81\" (1.57 m)  Wt 158 lb (71.7 kg)  LMP 11/09/2005  BMI 29.08 kg/m2  BMI= Body mass index is 29.08 kg/(m^2).    General appearance: Patient is alert and fully cooperative with history & exam.  No sign of distress is noted during the visit.     Psychiatric: Affect is pleasant & appropriate.  Patient appears motivated to improve health.     Respiratory: Breathing is regular & unlabored while sitting.     HEENT: Hearing is intact to spoken word.  Speech is clear.  No gross evidence of visual impairment that would impact ambulation.     Vascular: DP & PT pulses are intact & regular bilaterally.  No significant edema or varicosities noted.  CFT and skin temperature is normal to both lower extremities.     Neurologic: Lower extremity sensation is intact to light touch.  No evidence of weakness or contracture in the lower extremities.  No evidence of neuropathy.    Dermatologic: Skin is intact to both lower extremities with adequate " texture, turgor and tone about the integument.  Both hallux nails are severely dystrophic thickened and lifted from the nailbed.  They are painful.  No acute abscess drainage or paronychia noted.    Musculoskeletal: Patient is ambulatory without assistive device or brace.  No gross ankle deformity noted.  No foot or ankle joint effusion is noted.       ASSESSMENT:       ICD-10-CM    1. Onychodystrophy L60.3         PLAN:  [facility]      9/11/2018   We discussed treatment options of debridement abrasive debridement sharp debridement versus matrixectomy.  Written instructions were dispensed.  She would rather not pursue matrixectomy but we describe this at length and as well as expectations and postoperative care.  She would like to exhaust conservative options with appropriate abrasive debridement and written instructions were dispensed.  All questions answered follow-up as needed.    Norberto Veras DPM

## 2018-09-12 DIAGNOSIS — E87.1 LOW SODIUM LEVELS: ICD-10-CM

## 2018-09-12 DIAGNOSIS — I10 HYPERTENSION GOAL BP (BLOOD PRESSURE) < 140/90: ICD-10-CM

## 2018-09-12 LAB
ANION GAP SERPL CALCULATED.3IONS-SCNC: 9 MMOL/L (ref 3–14)
BUN SERPL-MCNC: 12 MG/DL (ref 7–30)
CALCIUM SERPL-MCNC: 8.8 MG/DL (ref 8.5–10.1)
CHLORIDE SERPL-SCNC: 91 MMOL/L (ref 94–109)
CO2 SERPL-SCNC: 30 MMOL/L (ref 20–32)
CREAT SERPL-MCNC: 0.65 MG/DL (ref 0.52–1.04)
GFR SERPL CREATININE-BSD FRML MDRD: >90 ML/MIN/1.7M2
GLUCOSE SERPL-MCNC: 108 MG/DL (ref 70–99)
POTASSIUM SERPL-SCNC: 4.4 MMOL/L (ref 3.4–5.3)
SODIUM SERPL-SCNC: 130 MMOL/L (ref 133–144)

## 2018-09-12 PROCEDURE — 80048 BASIC METABOLIC PNL TOTAL CA: CPT | Performed by: PHYSICIAN ASSISTANT

## 2018-09-12 PROCEDURE — 36415 COLL VENOUS BLD VENIPUNCTURE: CPT | Performed by: PHYSICIAN ASSISTANT

## 2018-09-13 ENCOUNTER — TELEPHONE (OUTPATIENT)
Dept: FAMILY MEDICINE | Facility: OTHER | Age: 68
End: 2018-09-13

## 2018-09-13 DIAGNOSIS — F32.1 MODERATE MAJOR DEPRESSION (H): ICD-10-CM

## 2018-09-13 DIAGNOSIS — F43.23 ADJUSTMENT DISORDER WITH MIXED ANXIETY AND DEPRESSED MOOD: ICD-10-CM

## 2018-09-13 NOTE — TELEPHONE ENCOUNTER
Reason for call: Patient would like to get a refill on her sertraline at 1 1/2 tablet.  [facility] mail order.  Ok to leave a detailed message

## 2018-09-13 NOTE — PROGRESS NOTES
Please call patient with the following message    Sodium improved. We will recheck again in about 1 month, when I need her to come back for mood follow up anyway. Please assist in scheduling recheck.     Jose Francisco Pierre PA-C

## 2018-09-13 NOTE — TELEPHONE ENCOUNTER
"This was refilled on 08/30 for 45 tablets with 3 refills and it was sent to [facility].     Called [facility] to confirm - pharmacy tech states that they received it but they can't fill it because \"the sig has two separate sets of directions.\"  Sig says: \"Take 1.5 tablets (75 mg) by mouth daily Take 1 tablet orally daily - Oral\"  Per last OV: \"- Improving a little with 4 weeks Zoloft 50 mg, no side effects  - Increase Zoloft to 75 mg   - Recheck 1 month \"    So patient should be taking the 75 mg per CDL message.    Will route to CDL - can we re-write this prescription and send it to [facility]? I pended the medication and pharmacy. Please review/advise.  Candy Felix [facility]    "

## 2018-10-05 NOTE — PROGRESS NOTES
"  SUBJECTIVE:   Sri Grewal is a 68 year old female who presents to clinic today for the following health issues:    HPI  Depression and Anxiety Follow-Up    Status since last visit: No change - \"no change with the increase in Zoloft\"    Other associated symptoms: None    Complicating factors:     Significant life event: No     Current substance abuse: None    HAYDEE - 6  PHQ9 - 9  - No side effects       PHQ 2/6/2018 7/31/2018 8/30/2018   PHQ-9 Total Score 3 6 4   Q9: Suicide Ideation Not at all Not at all Not at all     HAYDEE-7 SCORE 2/6/2018 7/31/2018 8/30/2018   Total Score - - -   Total Score 1 (minimal anxiety) 8 (mild anxiety) 6 (mild anxiety)   Total Score 1 8 6     In the past two weeks have you had thoughts of suicide or self-harm?  No.    Do you have concerns about your personal safety or the safety of others?   No      Plan from last visit 8/30/18  1 & 2. Mood   - Improving a little with 4 weeks Zoloft 50 mg, no side effects  - Increase Zoloft to 75 mg   - Recheck 1 month   3. HTN   - Stable below goal on current regimen  - Recommend update BMP today, await results       Problem list and histories reviewed & adjusted, as indicated.  Additional history: as documented    BP Readings from Last 3 Encounters:   09/11/18 132/80   08/30/18 110/62   08/13/18 164/84    Wt Readings from Last 3 Encounters:   09/11/18 158 lb (71.7 kg)   08/30/18 155 lb 6.4 oz (70.5 kg)   07/31/18 157 lb (71.2 kg)                  Labs reviewed in EPIC    ROS:  Constitutional, HEENT, cardiovascular, pulmonary, gi and gu systems are negative, except as otherwise noted.    OBJECTIVE:   /72  Pulse 62  Temp 97.9  F (36.6  C) (Temporal)  Resp 16  Wt 158 lb (71.7 kg)  LMP 11/09/2005  SpO2 99%  BMI 29.08 kg/m2  Body mass index is 29.08 kg/(m^2).  GENERAL APPEARANCE: healthy, alert and no distress  EYES: Eyes grossly normal to inspection, PERRLA, conjunctivae and sclerae without injection or discharge, EOM intact   RESP: Lungs clear " to auscultation - no rales, rhonchi or wheezes    CV: Regular rates and rhythm, normal S1 S2, no S3 or S4, no murmur, click or rub, no peripheral edema and peripheral pulses strong and symmetric bilaterally   MS: No musculoskeletal defects are noted and gait is age appropriate without ataxia   SKIN: No suspicious lesions or rashes, hydration status appears adeuqate with normal skin turgor   PSYCH: Alert and oriented x3; speech- coherent , normal rate and volume; able to articulate logical thoughts, able to abstract reason, no tangential thoughts, no hallucinations or delusions, mentation appears normal, Mood is euthymic. Affect is appropriate for this mood state and bright. Thought content is free of suicidal ideation, hallucinations, and delusions. Dress is adequate and upkept. Eye contact is good during conversation.       Diagnostic Test Results:  BMP - pending       ASSESSMENT/PLAN:       ICD-10-CM    1. Hypertension goal BP (blood pressure) < 140/90 I10 Basic metabolic panel   2. Hyponatremia E87.1 Basic metabolic panel   3. TIA (transient ischemic attack) G45.9    4. Adjustment disorder with mixed anxiety and depressed mood F43.23 sertraline (ZOLOFT) 100 MG tablet   5. Moderate major depression (H) F32.1 sertraline (ZOLOFT) 100 MG tablet     1-3.   - Blood pressures well controlled, as goal from neurology after TIA   - However last 2 months slight decrease in Sodium (129 and 130, with normal >133)   - Discussed and reviewed this with patient   - Discussed likely due to Prinzide (Lisinopril-hydrochlorothiazide)      Also continues on Propranolol   - Discussed if persists may need to change Prinzide     4 & 5. Mood   - No change with increase Zoloft from 25 mg to 50 mg   - Will increase to 100 mg   - Reviewed use and side effects  - Recheck 1-2 months     The patient indicates understanding of these issues and agrees with the plan.    Follow up: 1-2 months     The patient was seen with student LINDA Ortiz.        Rosenda Pierre PA-C  [facility]

## 2018-10-11 ENCOUNTER — OFFICE VISIT (OUTPATIENT)
Dept: FAMILY MEDICINE | Facility: OTHER | Age: 68
End: 2018-10-11
Payer: MEDICARE

## 2018-10-11 VITALS
DIASTOLIC BLOOD PRESSURE: 72 MMHG | HEART RATE: 62 BPM | OXYGEN SATURATION: 99 % | BODY MASS INDEX: 29.08 KG/M2 | SYSTOLIC BLOOD PRESSURE: 124 MMHG | WEIGHT: 158 LBS | RESPIRATION RATE: 16 BRPM | TEMPERATURE: 97.9 F

## 2018-10-11 DIAGNOSIS — I10 HYPERTENSION GOAL BP (BLOOD PRESSURE) < 140/90: Primary | ICD-10-CM

## 2018-10-11 DIAGNOSIS — E87.1 HYPONATREMIA: ICD-10-CM

## 2018-10-11 DIAGNOSIS — F32.1 MODERATE MAJOR DEPRESSION (H): ICD-10-CM

## 2018-10-11 DIAGNOSIS — F43.23 ADJUSTMENT DISORDER WITH MIXED ANXIETY AND DEPRESSED MOOD: ICD-10-CM

## 2018-10-11 DIAGNOSIS — G45.9 TIA (TRANSIENT ISCHEMIC ATTACK): ICD-10-CM

## 2018-10-11 LAB
ANION GAP SERPL CALCULATED.3IONS-SCNC: 7 MMOL/L (ref 3–14)
BUN SERPL-MCNC: 12 MG/DL (ref 7–30)
CALCIUM SERPL-MCNC: 8.7 MG/DL (ref 8.5–10.1)
CHLORIDE SERPL-SCNC: 91 MMOL/L (ref 94–109)
CO2 SERPL-SCNC: 30 MMOL/L (ref 20–32)
CREAT SERPL-MCNC: 0.53 MG/DL (ref 0.52–1.04)
GFR SERPL CREATININE-BSD FRML MDRD: >90 ML/MIN/1.7M2
GLUCOSE SERPL-MCNC: 96 MG/DL (ref 70–99)
POTASSIUM SERPL-SCNC: 4.6 MMOL/L (ref 3.4–5.3)
SODIUM SERPL-SCNC: 128 MMOL/L (ref 133–144)

## 2018-10-11 PROCEDURE — 99214 OFFICE O/P EST MOD 30 MIN: CPT | Performed by: PHYSICIAN ASSISTANT

## 2018-10-11 PROCEDURE — 80048 BASIC METABOLIC PNL TOTAL CA: CPT | Performed by: PHYSICIAN ASSISTANT

## 2018-10-11 PROCEDURE — 36415 COLL VENOUS BLD VENIPUNCTURE: CPT | Performed by: PHYSICIAN ASSISTANT

## 2018-10-11 RX ORDER — SERTRALINE HYDROCHLORIDE 100 MG/1
100 TABLET, FILM COATED ORAL DAILY
Qty: 90 TABLET | Refills: 1 | Status: SHIPPED | OUTPATIENT
Start: 2018-10-11 | End: 2018-11-26

## 2018-10-11 ASSESSMENT — ANXIETY QUESTIONNAIRES
7. FEELING AFRAID AS IF SOMETHING AWFUL MIGHT HAPPEN: SEVERAL DAYS
6. BECOMING EASILY ANNOYED OR IRRITABLE: NOT AT ALL
3. WORRYING TOO MUCH ABOUT DIFFERENT THINGS: SEVERAL DAYS
GAD7 TOTAL SCORE: 6
IF YOU CHECKED OFF ANY PROBLEMS ON THIS QUESTIONNAIRE, HOW DIFFICULT HAVE THESE PROBLEMS MADE IT FOR YOU TO DO YOUR WORK, TAKE CARE OF THINGS AT HOME, OR GET ALONG WITH OTHER PEOPLE: SOMEWHAT DIFFICULT
2. NOT BEING ABLE TO STOP OR CONTROL WORRYING: SEVERAL DAYS
1. FEELING NERVOUS, ANXIOUS, OR ON EDGE: SEVERAL DAYS
5. BEING SO RESTLESS THAT IT IS HARD TO SIT STILL: SEVERAL DAYS

## 2018-10-11 ASSESSMENT — PATIENT HEALTH QUESTIONNAIRE - PHQ9: 5. POOR APPETITE OR OVEREATING: SEVERAL DAYS

## 2018-10-11 NOTE — MR AVS SNAPSHOT
After Visit Summary   10/11/2018    Sri Grewal    MRN: [id number]           Patient Information     Date Of Birth          1950        Visit Information        Provider Department      10/11/2018 2:30 PM Rosenda Pierre PA-C [facility]        Today's Diagnoses     Hypertension goal BP (blood pressure) < 140/90    -  1    Hyponatremia        TIA (transient ischemic attack)        Adjustment disorder with mixed anxiety and depressed mood        Moderate major depression (H)        Need for prophylactic vaccination and inoculation against influenza          Care Instructions      - Increase Zoloft to 100 mg   - Recheck 1-2 months                 Follow-ups after your visit        Who to contact     If you have questions or need follow up information about today's clinic visit or your schedule please contact [facility] directly at [phone].  Normal or non-critical lab and imaging results will be communicated to you by MyChart, letter or phone within 4 business days after the clinic has received the results. If you do not hear from us within 7 days, please contact the clinic through MyChart or phone. If you have a critical or abnormal lab result, we will notify you by phone as soon as possible.  Submit refill requests through [facility] or call your pharmacy and they will forward the refill request to us. Please allow 3 business days for your refill to be completed.          Additional Information About Your Visit        Care EveryWhere ID     This is your Care EveryWhere ID. This could be used by other organizations to access your [facility] medical records  [id number]        Your Vitals Were     Pulse Temperature Respirations Last Period Pulse Oximetry BMI (Body Mass Index)    62 97.9  F (36.6  C) (Temporal) 16 11/09/2005 99% 29.08 kg/m2       Blood Pressure from Last 3 Encounters:   10/11/18 124/72   09/11/18 132/80   08/30/18 110/62    Weight from  Last 3 Encounters:   10/11/18 158 lb (71.7 kg)   09/11/18 158 lb (71.7 kg)   08/30/18 155 lb 6.4 oz (70.5 kg)              We Performed the Following     Basic metabolic panel          Today's Medication Changes          These changes are accurate as of 10/11/18  3:14 PM.  If you have any questions, ask your nurse or doctor.               These medicines have changed or have updated prescriptions.        Dose/Directions    sertraline 100 MG tablet   Commonly known as:  ZOLOFT   This may have changed:    - medication strength  - how much to take   Used for:  Moderate major depression (H), Adjustment disorder with mixed anxiety and depressed mood   Changed by:  Rosenda Pierre PA-C        Dose:  100 mg   Take 1 tablet (100 mg) by mouth daily   Quantity:  90 tablet   Refills:  1            Where to get your medicines      These medications were sent to [facility] [address] [facility]  [address] [facility] [address]     Phone:  [phone]     sertraline 100 MG tablet                Primary Care Provider Office Phone # Fax #    Rosenda Pierre PA-C [phone]       [address] [place] [address]        Equal Access to Services     SACHA VICKERS AH: Hadii anthony ku hadasho Soomaali, waaxda luqadaha, qaybta kaalmada adeegyada, waxay nilda mccall. So [facility] [phone].    ATENCIÓN: Si habla español, tiene a orozco disposición servicios gratuitos de asistencia lingüística. Llame al [phone].    We comply with applicable federal civil rights laws and [place] laws. We do not discriminate on the basis of race, color, national origin, age, disability, sex, sexual orientation, or gender identity.            Thank you!     Thank you for choosing [facility]  for your care. Our goal is always to provide you with excellent care. Hearing back from our patients is one way we can continue to improve our  services. Please take a few minutes to complete the written survey that you may receive in the mail after your visit with us. Thank you!             Your Updated Medication List - Protect others around you: Learn how to safely use, store and throw away your medicines at www.disposemymeds.org.          This list is accurate as of 10/11/18  3:14 PM.  Always use your most recent med list.                   Brand Name Dispense Instructions for use Diagnosis    albuterol 108 (90 Base) MCG/ACT inhaler    PROAIR HFA    2 Inhaler    Inhale 1-2 puffs into the lungs every 6 hours as needed for shortness of breath / dyspnea    Mild persistent asthma with acute exacerbation       ALLEGRA PO           ASPIRIN 81 PO           butalbital-aspirin-caffeine -40 MG per capsule    FIORINAL    30 capsule    TAKE ONE CAPSULE BY MOUTH EVERY 6 HOURS AS NEEDED (one month supply)    Chronic migraine without aura without status migrainosus, not intractable       esomeprazole 40 MG CR capsule    nexIUM    90 capsule    Take 1 capsule (40 mg) by mouth every morning (before breakfast) Take 30-60 minutes before eating.    Chronic peptic ulcer       IBUPROFEN PO      Reported on 4/6/2017        lisinopril-hydrochlorothiazide 20-12.5 MG per tablet    PRINZIDE/ZESTORETIC    180 tablet    Take 2 tablets by mouth daily    Hypertension goal BP (blood pressure) < 140/90       propranolol 20 MG tablet    INDERAL    180 tablet    Take 1 tablet (20 mg) by mouth 2 times daily    Chronic migraine without aura without status migrainosus, not intractable       sertraline 100 MG tablet    ZOLOFT    90 tablet    Take 1 tablet (100 mg) by mouth daily    Moderate major depression (H), Adjustment disorder with mixed anxiety and depressed mood       valACYclovir 500 MG tablet    VALTREX    14 tablet    Take 1 tablet (500 mg) by mouth 2 times daily for 7 days    HSV (herpes simplex virus) infection

## 2018-10-11 NOTE — NURSING NOTE
"Chief Complaint   Patient presents with     RECHECK     Panel Management     medicare annual, dexa, pcv13, aac, fall risk, flu       Initial /86  Pulse 62  Temp 97.9  F (36.6  C) (Temporal)  Resp 16  Wt 158 lb (71.7 kg)  LMP 11/09/2005  SpO2 99%  BMI 29.08 kg/m2 Estimated body mass index is 29.08 kg/(m^2) as calculated from the following:    Height as of 9/11/18: 5' 1.81\" (1.57 m).    Weight as of this encounter: 158 lb (71.7 kg).  Medication Reconciliation: complete    Candy Felix, CMA      "

## 2018-10-12 ENCOUNTER — TELEPHONE (OUTPATIENT)
Dept: FAMILY MEDICINE | Facility: OTHER | Age: 68
End: 2018-10-12

## 2018-10-12 DIAGNOSIS — I10 HYPERTENSION GOAL BP (BLOOD PRESSURE) < 140/90: ICD-10-CM

## 2018-10-12 DIAGNOSIS — E87.1 HYPONATREMIA: Primary | ICD-10-CM

## 2018-10-12 RX ORDER — POTASSIUM CHLORIDE 750 MG/1
10 TABLET, EXTENDED RELEASE ORAL DAILY
Qty: 30 TABLET | Refills: 1 | Status: SHIPPED | OUTPATIENT
Start: 2018-10-12 | End: 2018-10-12

## 2018-10-12 RX ORDER — POTASSIUM CHLORIDE 750 MG/1
10 TABLET, EXTENDED RELEASE ORAL DAILY
Qty: 30 TABLET | Refills: 1 | Status: SHIPPED | OUTPATIENT
Start: 2018-10-12 | End: 2018-12-14

## 2018-10-12 ASSESSMENT — ANXIETY QUESTIONNAIRES: GAD7 TOTAL SCORE: 6

## 2018-10-12 ASSESSMENT — PATIENT HEALTH QUESTIONNAIRE - PHQ9: SUM OF ALL RESPONSES TO PHQ QUESTIONS 1-9: 9

## 2018-10-12 NOTE — TELEPHONE ENCOUNTER
This is done. Please notify [facility] to disregard rx.     Jose Francisco Pierre PA-C  [facility]

## 2018-10-12 NOTE — TELEPHONE ENCOUNTER
Please call patient     Sodium continues to be mildly low. I would like for her to take a potassium supplement I have prescribed for her. 1 tablet per day. This will help to drive her sodium up and hopefully correct the problem without needing to switch medications. I hope this will be just a short term medication. I have sent this to [facility] so she can start today.     She should come in for lab only recheck in 2 weeks.     Jose Francisco Pierre PA-C  [facility]

## 2018-10-12 NOTE — TELEPHONE ENCOUNTER
Spoke to patient and read her the message, she wants this to go to her [facility] instead of [facility]. Pharmacy pended.

## 2018-11-08 DIAGNOSIS — G43.709 CHRONIC MIGRAINE WITHOUT AURA WITHOUT STATUS MIGRAINOSUS, NOT INTRACTABLE: ICD-10-CM

## 2018-11-08 RX ORDER — BUTALBITAL/ASPIRIN/CAFFEINE 50-325-40
CAPSULE ORAL
Qty: 30 CAPSULE | Refills: 5 | Status: ON HOLD | OUTPATIENT
Start: 2018-11-08 | End: 2019-01-11

## 2018-11-08 NOTE — TELEPHONE ENCOUNTER
Reason for Call:  Other prescription    Detailed comments: pt states has ongoing headaches and takes medication butalbital-aspirin-caffeine (FIORINAL) -40 MG per capsule. Pt states is almost out of the medication and wondering what Jose Francisco scott would like pt to do. Please advise     Phone Number Patient can be reached at: Cell number on file:    Telephone Information:   Mobile [phone]       Best Time: ANY    Can we leave a detailed message on this number? YES    Call taken on 11/8/2018 at 12:43 PM by Kesha Ngo

## 2018-11-09 ENCOUNTER — HOSPITAL ENCOUNTER (OUTPATIENT)
Dept: BONE DENSITY | Facility: CLINIC | Age: 68
Discharge: HOME | End: 2018-11-09
Attending: PHYSICIAN ASSISTANT | Admitting: PHYSICIAN ASSISTANT
Payer: MEDICARE

## 2018-11-09 DIAGNOSIS — Z78.0 ASYMPTOMATIC POSTMENOPAUSAL STATUS: ICD-10-CM

## 2018-11-09 PROCEDURE — 77080 DXA BONE DENSITY AXIAL: CPT

## 2018-11-09 NOTE — TELEPHONE ENCOUNTER
Requested Prescriptions   Pending Prescriptions Disp Refills     butalbital-aspirin-caffeine (FIORINAL) -40 MG per capsule 30 capsule 5     Sig: TAKE ONE CAPSULE BY MOUTH EVERY 6 HOURS AS NEEDED (one month supply)    There is no refill protocol information for this order        butalbital-aspirin-caffeine (FIORINAL) -40 MG per capsule      Last Written Prescription Date:  07/31/2018  Last Fill Quantity: 30,   # refills: 5  Last Office Visit: 10/11/2018  Future Office visit:       Routing refill request to provider for review/approval because:  Drug not on the [facility], [facility] or [facility] refill protocol or controlled substance  Cee Miles RN, BSN

## 2018-11-09 NOTE — TELEPHONE ENCOUNTER
Refill signed and placed in MA task.    Jose Francisco Pierre PA-C  [facility]

## 2018-11-12 ENCOUNTER — TELEPHONE (OUTPATIENT)
Dept: FAMILY MEDICINE | Facility: OTHER | Age: 68
End: 2018-11-12

## 2018-11-12 DIAGNOSIS — M81.0 AGE-RELATED OSTEOPOROSIS WITHOUT CURRENT PATHOLOGICAL FRACTURE: Primary | ICD-10-CM

## 2018-11-12 RX ORDER — ALENDRONATE SODIUM 70 MG/1
TABLET ORAL
Qty: 12 TABLET | Refills: 3 | Status: SHIPPED | OUTPATIENT
Start: 2018-11-12 | End: 2019-01-09

## 2018-11-12 NOTE — TELEPHONE ENCOUNTER
Please call patient     DEXA scan showed osteoporosis. Due to this we should start a medication to preserve her bone health.     I would like her to start alendronate (Fosamax) Take 1 tablet (70 mg) by mouth with 8oz water every 7 days 30 minutes before breakfast and remain upright during this time.    She should also make sure she is getting 1200 mg of calcium and 2,000 IU of vitamin D per day.     We will recheck DEXA scan in 3 years.     Jose Francisco Pierre PA-C  [facility]

## 2018-11-14 ENCOUNTER — TELEPHONE (OUTPATIENT)
Dept: FAMILY MEDICINE | Facility: OTHER | Age: 68
End: 2018-11-14

## 2018-11-14 NOTE — TELEPHONE ENCOUNTER
Reason for Call:  Other call back    Detailed comments: Patient called clinic back. She stated she figured out her medication issue and to disregard this message.     Phone Number Patient can be reached at: Home number on file [phone] (home)    Best Time: ---    Can we leave a detailed message on this number? Not Applicable    Call taken on 11/14/2018 at 11:52 AM by Helena White

## 2018-11-20 NOTE — PROGRESS NOTES
SUBJECTIVE:   Sri Grewal is a 68 year old female who presents to clinic today for the following health issues:    HPI     Depression and Anxiety Follow-Up    Status since last visit: No change    Other associated symptoms: pain on toe since starting medications, goes away through out day, restless legs at night only (only happened twice)     Complicating factors:     Significant life event: No     Current substance abuse: None    - Potassium added 10/12  - Dexa scan, started Fosamax 11/12      PHQ 7/31/2018 8/30/2018 10/11/2018   PHQ-9 Total Score 6 4 9   Q9: Suicide Ideation Not at all Not at all Not at all     HAYDEE-7 SCORE 8/30/2018 10/11/2018 11/26/2018   Total Score - - -   Total Score 6 (mild anxiety) - 8 (mild anxiety)   Total Score 6 6 8     In the past two weeks have you had thoughts of suicide or self-harm?  No.    Do you have concerns about your personal safety or the safety of others?   No      Problem list and histories reviewed & adjusted, as indicated.  Additional history: as documented    BP Readings from Last 3 Encounters:   10/11/18 124/72   09/11/18 132/80   08/30/18 110/62    Wt Readings from Last 3 Encounters:   11/26/18 158 lb 6.4 oz (71.8 kg)   10/11/18 158 lb (71.7 kg)   09/11/18 158 lb (71.7 kg)            Plan from last visit 10/11/18  1-3.   - Blood pressures well controlled, as goal from neurology after TIA   - However last 2 months slight decrease in Sodium (129 and 130, with normal >133)   - Discussed and reviewed this with patient   - Discussed likely due to Prinzide (Lisinopril-hydrochlorothiazide)      Also continues on Propranolol   - Discussed if persists may need to change Prinzide      4 & 5. Mood   - No change with increase Zoloft from 25 mg to 50 mg   - Will increase to 100 mg   - Reviewed use and side effects  - Recheck 1-2 months           Labs reviewed in EPIC    ROS:  Constitutional, HEENT, cardiovascular, pulmonary, gi and gu systems are negative, except as otherwise  noted.    OBJECTIVE:   /70  Pulse 70  Temp 98.7  F (37.1  C) (Temporal)  Resp 16  Wt 158 lb 6.4 oz (71.8 kg)  LMP 11/09/2005  SpO2 96%  BMI 29.15 kg/m2  Body mass index is 29.15 kg/(m^2).  GENERAL APPEARANCE: healthy, alert and no distress  EYES: Eyes grossly normal to inspection, PERRLA, conjunctivae and sclerae without injection or discharge, EOM intact   RESP: Lungs clear to auscultation - no rales, rhonchi or wheezes    CV: Regular rates and rhythm, normal S1 S2, no S3 or S4, no murmur, click or rub, no peripheral edema and peripheral pulses strong and symmetric bilaterally   MS: No musculoskeletal defects are noted and gait is age appropriate without ataxia   SKIN: No suspicious lesions or rashes, hydration status appears adeuqate with normal skin turgor   PSYCH: Alert and oriented x3; speech- coherent , normal rate and volume; able to articulate logical thoughts, able to abstract reason, no tangential thoughts, no hallucinations or delusions, mentation appears normal, Mood is euthymic. Affect is appropriate for this mood state and bright. Thought content is free of suicidal ideation, hallucinations, and delusions. Dress is adequate and upkept. Eye contact is good during conversation.       Diagnostic Test Results:  none     ASSESSMENT/PLAN:       ICD-10-CM    1. Moderate major depression (H) F32.1 venlafaxine (EFFEXOR-XR) 37.5 MG 24 hr capsule   2. Adjustment disorder with mixed anxiety and depressed mood F43.23 venlafaxine (EFFEXOR-XR) 37.5 MG 24 hr capsule   3. Hypertension goal BP (blood pressure) < 140/90 I10 **Basic metabolic panel FUTURE 14d   4. Hyponatremia E87.1 **Basic metabolic panel FUTURE 14d   5. Age-related osteoporosis without current pathological fracture M81.0    6. Need for prophylactic vaccination against Streptococcus pneumoniae (pneumococcus) Z23 PNEUMOCOCCAL CONJ VACCINE 13 VALENT IM     1 & 2. Mood   - Increased Zoloft to 100 mg, no change in anxiety or perhaps even a  little worse  - Failed previously on many SSRIs (Celexa, Prozac, Lexapro) as well as Wellbutrin and Cymbalta   - Recommend trial of Effexor (SNRI)   - Reviewed use and side effects  - Taper plan      - Week 1 - Decrease Sertraline 50 mg     - Week 2 - Continue Sertraline 50 mg and start Effexor 37.5 mg      - Week 3 - Stop Sertraline and continue Effexor 37.5 mg      - Week 4 - Increase Effexor to 75 mg (2 capsules)   - Recheck 4-6 weeks       3. HTN & 4. Hyponatremia  - Blood pressures well controlled, as goal from neurology after TIA   - However last 2 months slight decrease in Sodium (129, 130, and 128 with normal >133)   - Started on Potassium supplement on 10/12/18  - Recommend update BMP today to see if hyponatremia has improved, if not may need to consider alternate BP therapy       5. Osteoporosis   - DEXA scan results reviewed   - Started Fosamax 1 week ago, went fine no side effects  - Patient working on calcium in her diet as well   - Plan DEXA recheck in 3 years      - Agreed to pneumonia vaccine, declined Flu shot     The patient indicates understanding of these issues and agrees with the plan.    Follow up: 4-6 weeks         Rosenda Varma-FLEX Saravia  [facility]

## 2018-11-26 ENCOUNTER — OFFICE VISIT (OUTPATIENT)
Dept: FAMILY MEDICINE | Facility: OTHER | Age: 68
End: 2018-11-26
Payer: MEDICARE

## 2018-11-26 VITALS
SYSTOLIC BLOOD PRESSURE: 110 MMHG | OXYGEN SATURATION: 96 % | BODY MASS INDEX: 29.15 KG/M2 | RESPIRATION RATE: 16 BRPM | HEART RATE: 70 BPM | DIASTOLIC BLOOD PRESSURE: 70 MMHG | TEMPERATURE: 98.7 F | WEIGHT: 158.4 LBS

## 2018-11-26 DIAGNOSIS — I10 HYPERTENSION GOAL BP (BLOOD PRESSURE) < 140/90: ICD-10-CM

## 2018-11-26 DIAGNOSIS — F32.1 MODERATE MAJOR DEPRESSION (H): Primary | ICD-10-CM

## 2018-11-26 DIAGNOSIS — F43.23 ADJUSTMENT DISORDER WITH MIXED ANXIETY AND DEPRESSED MOOD: ICD-10-CM

## 2018-11-26 DIAGNOSIS — Z23 NEED FOR PROPHYLACTIC VACCINATION AGAINST STREPTOCOCCUS PNEUMONIAE (PNEUMOCOCCUS): ICD-10-CM

## 2018-11-26 DIAGNOSIS — M81.0 AGE-RELATED OSTEOPOROSIS WITHOUT CURRENT PATHOLOGICAL FRACTURE: ICD-10-CM

## 2018-11-26 DIAGNOSIS — E87.1 HYPONATREMIA: ICD-10-CM

## 2018-11-26 PROCEDURE — G0009 ADMIN PNEUMOCOCCAL VACCINE: HCPCS | Performed by: PHYSICIAN ASSISTANT

## 2018-11-26 PROCEDURE — 90670 PCV13 VACCINE IM: CPT | Performed by: PHYSICIAN ASSISTANT

## 2018-11-26 PROCEDURE — 99214 OFFICE O/P EST MOD 30 MIN: CPT | Mod: 25 | Performed by: PHYSICIAN ASSISTANT

## 2018-11-26 RX ORDER — VENLAFAXINE HYDROCHLORIDE 37.5 MG/1
CAPSULE, EXTENDED RELEASE ORAL
Qty: 46 CAPSULE | Refills: 1 | Status: ON HOLD | OUTPATIENT
Start: 2018-11-26 | End: 2019-01-11

## 2018-11-26 ASSESSMENT — ANXIETY QUESTIONNAIRES
7. FEELING AFRAID AS IF SOMETHING AWFUL MIGHT HAPPEN: SEVERAL DAYS
1. FEELING NERVOUS, ANXIOUS, OR ON EDGE: MORE THAN HALF THE DAYS
GAD7 TOTAL SCORE: 8
GAD7 TOTAL SCORE: 8
4. TROUBLE RELAXING: SEVERAL DAYS
2. NOT BEING ABLE TO STOP OR CONTROL WORRYING: MORE THAN HALF THE DAYS
GAD7 TOTAL SCORE: 8
7. FEELING AFRAID AS IF SOMETHING AWFUL MIGHT HAPPEN: SEVERAL DAYS
5. BEING SO RESTLESS THAT IT IS HARD TO SIT STILL: SEVERAL DAYS
6. BECOMING EASILY ANNOYED OR IRRITABLE: NOT AT ALL
3. WORRYING TOO MUCH ABOUT DIFFERENT THINGS: SEVERAL DAYS

## 2018-11-26 ASSESSMENT — PAIN SCALES - GENERAL: PAINLEVEL: MILD PAIN (2)

## 2018-11-26 ASSESSMENT — PATIENT HEALTH QUESTIONNAIRE - PHQ9: SUM OF ALL RESPONSES TO PHQ QUESTIONS 1-9: 11

## 2018-11-26 NOTE — MR AVS SNAPSHOT
After Visit Summary   11/26/2018    Sri Grewal    MRN: [id number]           Patient Information     Date Of Birth          1950        Visit Information        Provider Department      11/26/2018 3:00 PM Rosenda Pierre PA-C [facility]        Today's Diagnoses     Moderate major depression (H)    -  1    Adjustment disorder with mixed anxiety and depressed mood        Hypertension goal BP (blood pressure) < 140/90        Hyponatremia        Age-related osteoporosis without current pathological fracture        Need for prophylactic vaccination against Streptococcus pneumoniae (pneumococcus)          Care Instructions      - Week 1 - Decrease Sertraline 50 mg  - Week 2 - Continue Sertraline 50 mg and start Effexor 37.5 mg   - Week 3 - Stop Sertraline and continue Effexor 37.5 mg   - Week 4 - Increase Effexor to 75 mg (2 capsules)     - Recheck 4-6 weeks               Follow-ups after your visit        Follow-up notes from your care team     Return in about 1 month (around 12/26/2018).      Who to contact     If you have questions or need follow up information about today's clinic visit or your schedule please contact [facility] directly at [phone].  Normal or non-critical lab and imaging results will be communicated to you by MyChart, letter or phone within 4 business days after the clinic has received the results. If you do not hear from us within 7 days, please contact the clinic through MyChart or phone. If you have a critical or abnormal lab result, we will notify you by phone as soon as possible.  Submit refill requests through [facility] or call your pharmacy and they will forward the refill request to us. Please allow 3 business days for your refill to be completed.          Additional Information About Your Visit        Care EveryWhere ID     This is your Care EveryWhere ID. This could be used by other organizations to access your [facility]  medical records  [id number]        Your Vitals Were     Pulse Temperature Respirations Last Period Pulse Oximetry BMI (Body Mass Index)    70 98.7  F (37.1  C) (Temporal) 16 11/09/2005 96% 29.15 kg/m2       Blood Pressure from Last 3 Encounters:   11/26/18 110/70   10/11/18 124/72   09/11/18 132/80    Weight from Last 3 Encounters:   11/26/18 158 lb 6.4 oz (71.8 kg)   10/11/18 158 lb (71.7 kg)   09/11/18 158 lb (71.7 kg)              We Performed the Following     **Basic metabolic panel FUTURE 14d     PNEUMOCOCCAL CONJ VACCINE 13 VALENT IM          Today's Medication Changes          These changes are accurate as of 11/26/18  3:36 PM.  If you have any questions, ask your nurse or doctor.               Start taking these medicines.        Dose/Directions    venlafaxine 37.5 MG 24 hr capsule   Commonly known as:  EFFEXOR-XR   Used for:  Moderate major depression (H), Adjustment disorder with mixed anxiety and depressed mood   Started by:  Rosenda Pierre PA-C        Take 1 capsule daily for 14 days, then take 2 capsules daily.   Quantity:  46 capsule   Refills:  1         Stop taking these medicines if you haven't already. Please contact your care team if you have questions.     sertraline 100 MG tablet   Commonly known as:  ZOLOFT   Stopped by:  Rosenda Pierre PA-C                Where to get your medicines      These medications were sent to [facility] [address]  [address] [address]     Phone:  [phone]     venlafaxine 37.5 MG 24 hr capsule                Primary Care Provider Office Phone # Fax #    Rosenda Pierre PA-C [phone]       [address] [place] [address]        Equal Access to Services     [facility] ALEE : Rachel Rao, waaxda luqadaha, qaybta kaaltamy mcbride, caridad davies . [facility] [phone].    ATENCIÓN: Si  maria elena valerio, tiene a orozco disposición servicios gratuitos de asistencia lingüística. Radha ly [phone].    We comply with applicable federal civil rights laws and [place] laws. We do not discriminate on the basis of race, color, national origin, age, disability, sex, sexual orientation, or gender identity.            Thank you!     Thank you for choosing [facility]  for your care. Our goal is always to provide you with excellent care. Hearing back from our patients is one way we can continue to improve our services. Please take a few minutes to complete the written survey that you may receive in the mail after your visit with us. Thank you!             Your Updated Medication List - Protect others around you: Learn how to safely use, store and throw away your medicines at www.disposemymeds.org.          This list is accurate as of 11/26/18  3:36 PM.  Always use your most recent med list.                   Brand Name Dispense Instructions for use Diagnosis    albuterol 108 (90 Base) MCG/ACT inhaler    PROAIR HFA    2 Inhaler    Inhale 1-2 puffs into the lungs every 6 hours as needed for shortness of breath / dyspnea    Mild persistent asthma with acute exacerbation       alendronate 70 MG tablet    FOSAMAX    12 tablet    Take 1 tablet (70 mg) by mouth with 8oz water every 7 days 30 minutes before breakfast and remain upright during this time.    Age-related osteoporosis without current pathological fracture       ALLEGRA PO           ASPIRIN 81 PO           butalbital-aspirin-caffeine -40 MG per capsule    FIORINAL    30 capsule    TAKE ONE CAPSULE BY MOUTH EVERY 6 HOURS AS NEEDED (one month supply)    Chronic migraine without aura without status migrainosus, not intractable       esomeprazole 40 MG DR capsule    nexIUM    90 capsule    Take 1 capsule (40 mg) by mouth every morning (before breakfast) Take 30-60 minutes before eating.    Chronic peptic ulcer       IBUPROFEN PO       Reported on 4/6/2017        lisinopril-hydrochlorothiazide 20-12.5 MG per tablet    PRINZIDE/ZESTORETIC    180 tablet    Take 2 tablets by mouth daily    Hypertension goal BP (blood pressure) < 140/90       potassium chloride SA 10 MEQ CR tablet    K-DUR/KLOR-CON M    30 tablet    Take 1 tablet (10 mEq) by mouth daily    Hypertension goal BP (blood pressure) < 140/90, Hyponatremia       propranolol 20 MG tablet    INDERAL    180 tablet    Take 1 tablet (20 mg) by mouth 2 times daily    Chronic migraine without aura without status migrainosus, not intractable       valACYclovir 500 MG tablet    VALTREX    14 tablet    Take 1 tablet (500 mg) by mouth 2 times daily for 7 days    HSV (herpes simplex virus) infection       venlafaxine 37.5 MG 24 hr capsule    EFFEXOR-XR    46 capsule    Take 1 capsule daily for 14 days, then take 2 capsules daily.    Moderate major depression (H), Adjustment disorder with mixed anxiety and depressed mood

## 2018-11-26 NOTE — PATIENT INSTRUCTIONS
- Week 1 - Decrease Sertraline 50 mg  - Week 2 - Continue Sertraline 50 mg and start Effexor 37.5 mg   - Week 3 - Stop Sertraline and continue Effexor 37.5 mg   - Week 4 - Increase Effexor to 75 mg (2 capsules)     - Recheck 4-6 weeks

## 2018-11-26 NOTE — NURSING NOTE
Screening Questionnaire for Adult Immunization    Are you sick today?   No   Do you have allergies to medications, food, a vaccine component or latex?   No   Have you ever had a serious reaction after receiving a vaccination?   No   Do you have a long-term health problem with heart disease, lung disease, asthma, kidney disease, metabolic disease (e.g. diabetes), anemia, or other blood disorder?   No   Do you have cancer, leukemia, HIV/AIDS, or any other immune system problem?   No   In the past 3 months, have you taken medications that affect  your immune system, such as prednisone, other steroids, or anticancer drugs; drugs for the treatment of rheumatoid arthritis, Crohn s disease, or psoriasis; or have you had radiation treatments?   No   Have you had a seizure, or a brain or other nervous system problem?   No   During the past year, have you received a transfusion of blood or blood     products, or been given immune (gamma) globulin or antiviral drug?   No   For women: Are you pregnant or is there a chance you could become        pregnant during the next month?   No   Have you received any vaccinations in the past 4 weeks?   No     Immunization questionnaire answers were all negative.        Per orders of [facility], injection of PVC 13   given by Sabrina White. Patient instructed to remain in clinic for 15 minutes afterwards, and to report any adverse reaction to me immediately.       Screening performed by Sabrina White on 11/26/2018 at 3:41 PM.    Prior to injection verified patient identity using patient's name and date of birth.  Due to injection administration, patient instructed to remain in clinic for 15 minutes  afterwards, and to report any adverse reaction to me immediately.

## 2018-11-27 ASSESSMENT — ANXIETY QUESTIONNAIRES: GAD7 TOTAL SCORE: 8

## 2018-11-28 DIAGNOSIS — E87.1 HYPONATREMIA: ICD-10-CM

## 2018-11-28 DIAGNOSIS — I10 HYPERTENSION GOAL BP (BLOOD PRESSURE) < 140/90: ICD-10-CM

## 2018-11-28 LAB
ANION GAP SERPL CALCULATED.3IONS-SCNC: 11 MMOL/L (ref 3–14)
BUN SERPL-MCNC: 9 MG/DL (ref 7–30)
CALCIUM SERPL-MCNC: 8.8 MG/DL (ref 8.5–10.1)
CHLORIDE SERPL-SCNC: 89 MMOL/L (ref 94–109)
CO2 SERPL-SCNC: 29 MMOL/L (ref 20–32)
CREAT SERPL-MCNC: 1.06 MG/DL (ref 0.52–1.04)
GFR SERPL CREATININE-BSD FRML MDRD: 52 ML/MIN/1.7M2
GLUCOSE SERPL-MCNC: 98 MG/DL (ref 70–99)
POTASSIUM SERPL-SCNC: 4.3 MMOL/L (ref 3.4–5.3)
SODIUM SERPL-SCNC: 129 MMOL/L (ref 133–144)

## 2018-11-28 PROCEDURE — 36415 COLL VENOUS BLD VENIPUNCTURE: CPT | Performed by: PHYSICIAN ASSISTANT

## 2018-11-28 PROCEDURE — 80048 BASIC METABOLIC PNL TOTAL CA: CPT | Performed by: PHYSICIAN ASSISTANT

## 2018-12-14 DIAGNOSIS — E87.1 HYPONATREMIA: ICD-10-CM

## 2018-12-14 DIAGNOSIS — I10 HYPERTENSION GOAL BP (BLOOD PRESSURE) < 140/90: ICD-10-CM

## 2018-12-14 RX ORDER — POTASSIUM CHLORIDE 750 MG/1
10 TABLET, EXTENDED RELEASE ORAL DAILY
Qty: 30 TABLET | Refills: 1 | Status: ON HOLD | OUTPATIENT
Start: 2018-12-14 | End: 2019-01-11

## 2018-12-14 NOTE — TELEPHONE ENCOUNTER
"Requested Prescriptions   Pending Prescriptions Disp Refills     potassium chloride ER (K-DUR/KLOR-CON M) 10 MEQ CR tablet 30 tablet 1     Sig: Take 1 tablet (10 mEq) by mouth daily    Potassium Supplements Protocol Passed - 12/14/2018 12:55 PM       Passed - Recent (12 mo) or future (30 days) visit within the authorizing provider's specialty    Patient had office visit in the last 12 months or has a visit in the next 30 days with authorizing provider or within the authorizing provider's specialty.  See \"Patient Info\" tab in inbasket, or \"Choose Columns\" in Meds & Orders section of the refill encounter.             Passed - Patient is age 18 or older       Passed - Normal serum potassium in past 12 months    Recent Labs   Lab Test 11/28/18  1504   POTASSIUM 4.3           potassium chloride SA (K-DUR/KLOR-CON M) 10 MEQ CR tablet  Prescription approved per [facility] Ref[facility] Protocol.    "

## 2018-12-14 NOTE — TELEPHONE ENCOUNTER
Reason for Call:  Medication or medication refill:    Do you use a [facility]?  Name of the pharmacy and phone number for the current request:  [facility]    Name of the medication requested: potassium    Other request:     Can we leave a detailed message on this number? Yes    Phone number patient can be reached at: Home number on file [phone] (home)    Best Time:     Call taken on 12/14/2018 at 12:53 PM by Caroline Rodrigues

## 2019-01-02 ENCOUNTER — TELEPHONE (OUTPATIENT)
Dept: FAMILY MEDICINE | Facility: OTHER | Age: 69
End: 2019-01-02

## 2019-01-02 ENCOUNTER — OFFICE VISIT (OUTPATIENT)
Dept: URGENT CARE | Facility: RETAIL CLINIC | Age: 69
End: 2019-01-02
Payer: MEDICARE

## 2019-01-02 VITALS
DIASTOLIC BLOOD PRESSURE: 97 MMHG | HEART RATE: 76 BPM | SYSTOLIC BLOOD PRESSURE: 164 MMHG | OXYGEN SATURATION: 97 % | TEMPERATURE: 98.7 F

## 2019-01-02 DIAGNOSIS — J06.9 VIRAL URI WITH COUGH: Primary | ICD-10-CM

## 2019-01-02 PROCEDURE — 99213 OFFICE O/P EST LOW 20 MIN: CPT | Performed by: PHYSICIAN ASSISTANT

## 2019-01-02 NOTE — PROGRESS NOTES
Chief Complaint   Patient presents with     Sinus Problem     sinus pain and pressure since x 8 days, no fevers     Cough     x 8 days, post nasal drip constantly       SUBJECTIVE:   Sri Grewal is a 68 year old female presenting with a chief complaint of sinus congestion, sinus pressure and cough  Onset of symptoms was 8 day(s) ago.  Course of illness is same.    Severity moderate  Current and Associated symptoms:sinus congestion, pressure, post nasal drainage and mild cough  Treatment measures tried include fluids, albuterol inhaler in mornings  No wheezing  Predisposing factors include asthma    Past Medical History:   Diagnosis Date     Abnormal Papanicolaou smear of cervix and cervical HPV      Allergic rhinitis, cause unspecified     seasonal allergies     Contact dermatitis and other eczema, due to unspecified cause      Endometriosis, site unspecified      Esophageal reflux     GERD     Migraine, unspecified, without mention of intractable migraine without mention of status migrainosus      Mild persistent asthma      Unspecified disorder of uterus     fibroid uterus     Current Outpatient Medications   Medication Sig Dispense Refill     albuterol (PROAIR HFA) 108 (90 Base) MCG/ACT Inhaler Inhale 1-2 puffs into the lungs every 6 hours as needed for shortness of breath / dyspnea 2 Inhaler 11     ASPIRIN 81 PO        butalbital-aspirin-caffeine (FIORINAL) -40 MG per capsule TAKE ONE CAPSULE BY MOUTH EVERY 6 HOURS AS NEEDED (one month supply) 30 capsule 5     Fexofenadine HCl (ALLEGRA PO)        lisinopril-hydrochlorothiazide (PRINZIDE/ZESTORETIC) 20-12.5 MG per tablet Take 2 tablets by mouth daily 180 tablet 3     potassium chloride ER (K-DUR/KLOR-CON M) 10 MEQ CR tablet Take 1 tablet (10 mEq) by mouth daily 30 tablet 1     propranolol (INDERAL) 20 MG tablet Take 1 tablet (20 mg) by mouth 2 times daily 180 tablet 3     venlafaxine (EFFEXOR-XR) 37.5 MG 24 hr capsule Take 1 capsule daily for 14 days, then  take 2 capsules daily. 46 capsule 1     alendronate (FOSAMAX) 70 MG tablet Take 1 tablet (70 mg) by mouth with 8oz water every 7 days 30 minutes before breakfast and remain upright during this time. (Patient not taking: Reported on 1/2/2019) 12 tablet 3     esomeprazole (NEXIUM) 40 MG CR capsule Take 1 capsule (40 mg) by mouth every morning (before breakfast) Take 30-60 minutes before eating. (Patient not taking: Reported on 11/26/2018) 90 capsule 3     IBUPROFEN PO Reported on 4/6/2017       valACYclovir (VALTREX) 500 MG tablet Take 1 tablet (500 mg) by mouth 2 times daily for 7 days 14 tablet 3        Allergies   Allergen Reactions     Codeine      GI UPSET     Percocet [Oxycodone-Acetaminophen] Nausea and Vomiting     Seasonal Allergies         Social History     Tobacco Use     Smoking status: Never Smoker     Smokeless tobacco: Never Used   Substance Use Topics     Alcohol use: Yes     Comment: beerx2/x1 per month       ROS:  CONSTITUTIONAL:NEGATIVE for fever, chills  ENT/MOUTH: POSITIVE for congestion, drainage, sinus presure and NEGATIVE for sore throat  RESP:POSITIVE for cough-non productive and NEGATIVE for wheezing    OBJECTIVE:  BP (!) 164/97 (BP Location: Left arm)   Pulse 76   Temp 98.7  F (37.1  C) (Oral)   LMP 11/09/2005   SpO2 97%   GENERAL APPEARANCE: healthy, alert and no distress  EYES: conjunctiva clear  HENT: ear canals and TM's normal.  Nose boggy, pink. mouth without ulcers, erythema or lesions  NECK: supple, nontender, no lymphadenopathy  CV: regular rates and rhythm, normal S1 S2, no murmur noted  SKIN: no suspicious lesions or rashes    ASSESSMENT:  (J06.9,  B97.89) Viral URI with cough  (primary encounter diagnosis)    PLAN:  Your symptoms appear to be viral at this time.  Take ibuprofen as needed for pain.  Mucinex (guiafenesin) 600mg tablet 12 hour (blue box) thins mucus and may help it to loosen more quickly  Saline drops or nasal sprays may loosen mucus.    DO NOT TAKE  DECONGESTANTS     Apply warm facial compresses/packs for 5-10 minutes three times daily.  Drink plenty of fluids- 6 to 10 glasses of liquids each day. Rest.  Sit in the bathroom with the door closed and hot shower running to loosen mucus.  Contact primary care clinic if you do not have any relief from your symptoms after 10 days.  Present to emergency room for significantly increasing pain, persistent high fever >102F, swelling/redness around your eyes, changes in your vision or ability to move your eyes, altered mental status or a severe headache.    Risa Beaver PA-C  [facility]   See orders in Epic

## 2019-01-02 NOTE — PATIENT INSTRUCTIONS
Your symptoms appear to be viral at this time.    Take ibuprofen as needed for pain.  Mucinex (guiafenesin) 600mg tablet 12 hour blue box  thins mucus and may help it to loosen more quickly  Saline drops or nasal sprays may loosen mucus.    DO NOT TAKE DECONGESTANTS     Apply warm facial compresses/packs for 5-10 minutes three times daily.  Drink plenty of fluids- 6 to 10 glasses of liquids each day. Rest.  Sit in the bathroom with the door closed and hot shower running to loosen mucus.  Contact primary care clinic if you do not have any relief from your symptoms after 10 days.  Present to emergency room for significantly increasing pain, persistent high fever >102F, swelling/redness around your eyes, changes in your vision or ability to move your eyes, altered mental status or a severe headache.

## 2019-01-02 NOTE — TELEPHONE ENCOUNTER
Sri Grewal is a 68 year old female who calls with cold symptoms.    NURSING ASSESSMENT:  Description:  Has a cold with cough, head pain, nasal congestion, runny nose. Dry cough. Using humidifier at home. Getting real warm and sweats at home. Increased shortness of breath. Pain in face above eyes and ears. Sore throat. Denies fevers, chest pain, faint, lightheaded, dizzy.   Onset/duration:  12/25/2018  Precip. factors:  Asthma   Associated symptoms:  Nasal congestion is causing her to feel short of breath but stated lungs feel good, head pain, dry cough, runny nose, sore throat   Improves/worsens symptoms:  New  Pain scale (0-10)   Increased pain with coughing   LMP/preg/breast feeding:  Patient's last menstrual period was 11/09/2005.  Last exam/Treatment:  11/26/2018  Allergies:   Allergies   Allergen Reactions     Codeine      GI UPSET     Percocet [Oxycodone-Acetaminophen] Nausea and Vomiting     Seasonal Allergies      NURSING PLAN: Nursing advice to patient to be seen within 24 hour    RECOMMENDED DISPOSITION:  See in 24 hours   Will comply with recommendation: Yes  If further questions/concerns or if symptoms do not improve, worsen or new symptoms develop, call your PCP or [facility] as soon as possible.    NOTES:  Disposition was determined by the first positive assessment question, therefore all previous assessment questions were negative    Guideline used:  Telephone Triage Protocols for Nurses, Fifth Edition, Lilli Bright  Common cold symptoms   Nursing Judgment     Cee Miles RN, BSN

## 2019-01-02 NOTE — TELEPHONE ENCOUNTER
Reason for call:  Symptom  Reason for call:  Patient reporting a symptom    Symptom or request: cold symptoms    Duration (how long have symptoms been present): for a few days    Have you been treated for this before? No    Additional comments: pt is just calling to see what would be safe to take over the counter with her asthma and high blood pressure.     Phone Number patient can be reached at:  Cell number on file:    Telephone Information:   Mobile [phone]       Best Time:  anytime    Can we leave a detailed message on this number:  YES    Call taken on 1/2/2019 at 2:26 PM by Yamilex Hernandez

## 2019-01-04 NOTE — PROGRESS NOTES
"  SUBJECTIVE:   Sri Grewal is a 68 year old female who presents to clinic today for the following health issues:    HPI     Patient was seen on 01/02 at [facility] and was told she has a viral URI with cough. Nothing prescribed.     Acute Illness   Acute illness concerns: \"bad cold\"  Onset: 2 weeks    Fever: YES- yesterday was 100    Chills/Sweats: YES- both    Headache (location?): no    Sinus Pressure:YES- post-nasal drainage and facial pain    Conjunctivitis:  no    Ear Pain: no - \"not like it was in the start\"    Rhinorrhea: YES    Congestion: YES    Sore Throat: no - possibly from coughing     Cough: YES-productive of \"darker, thicker\" yellow sputum    Wheeze: YES    Decreased Appetite: YES    Nausea: YES    Vomiting: no    Diarrhea:  no    Dysuria/Freq.: no    Fatigue/Achiness: YES- \"feeling like garbage\"    Sick/Strep Exposure: no     Therapies Tried and outcome: fluids, albuterol inhaler in mornings, mucinex with little relief \"but it didn't make me feel better\"    She was seen last week in urgent care and was diagnosed with a viral URI. Her symptoms have worsened since then with intermittent fevers, chills, sweats, productive cough, wheezing, shortness of breath, sinus pressure, and fatigue.     Problem list and histories reviewed & adjusted, as indicated.  Additional history: none    ROS:  GENERAL: +Fevers and fatigue. Denies weakness, weight gain, or weight loss.  HEENT: Eyes-Denies pain, redness, loss of vision, double or blurred vision.     Ears/Nose- +Sinus/nasal congestion and pressure. Denies tinnitus, loss of hearing, epistaxis, decreased sense of smell. Denies loss of sense of taste, dry mouth, or sore throat.   CARDIOVASCULAR: +Shortness of breath. Denies chest pain, irregular heartbeats, palpitations, or edema.  RESPIRATORY: +Productive cough, wheezing, shortness of breath. Denies hemoptysis.    OBJECTIVE:     /80   Pulse 77   Temp 97.8  F (36.6  C) (Temporal)   " Resp 18   Wt 69.9 kg (154 lb)   LMP 11/09/2005   SpO2 97%   BMI 28.34 kg/m    Body mass index is 28.34 kg/m .  GENERAL: healthy, alert and no distress  EYES: Eyes grossly normal to inspection, PERRL and conjunctivae and sclerae normal  HENT: ear canals and TM's normal. Nasal mucosa is mildly erythematous bilaterally. Pharynx is clear.   NECK: no adenopathy, no asymmetry, masses, or scars and thyroid normal to palpation  RESP: lungs with course breath sounds throughout and mild wheezing of left lower lobe, no rales or rhonchi.   CV: regular rate and rhythm, normal S1 S2, no S3 or S4, no murmur, click or rub    ASSESSMENT/PLAN:       ICD-10-CM    1. Acute sinusitis with symptoms > 10 days J01.90 predniSONE (DELTASONE) 20 MG tablet     amoxicillin-clavulanate (AUGMENTIN) 875-125 MG tablet   2. Wheezing R06.2 predniSONE (DELTASONE) 20 MG tablet       Symptoms are consistent with sinusitis and I am concerned about progression to pneumonia so will treat with Augmentin for 10 days to cover for both. Vitals normal today.  I recommend she take a daily probiotic or Activia yogurt while on this..  Will also prescribe prednisone 20 mg to take once daily for 5 days to help with the sinus congestion and the wheezing/shortness of breath.  Continue with plenty of fluids.  Can use an over the counter Nettipot or sinus rinse to help with nasal congestion.   Continue with Mucinex.  Take Tylenol or Advil as needed for fevers.  Follow up if symptoms are not improving within the next week.        Humberto Mccullough PA-C  [facility]

## 2019-01-07 ENCOUNTER — OFFICE VISIT (OUTPATIENT)
Dept: FAMILY MEDICINE | Facility: OTHER | Age: 69
End: 2019-01-07
Payer: MEDICARE

## 2019-01-07 VITALS
RESPIRATION RATE: 18 BRPM | BODY MASS INDEX: 28.34 KG/M2 | HEART RATE: 77 BPM | WEIGHT: 154 LBS | TEMPERATURE: 97.8 F | OXYGEN SATURATION: 97 % | SYSTOLIC BLOOD PRESSURE: 138 MMHG | DIASTOLIC BLOOD PRESSURE: 80 MMHG

## 2019-01-07 DIAGNOSIS — J01.90 ACUTE SINUSITIS WITH SYMPTOMS > 10 DAYS: Primary | ICD-10-CM

## 2019-01-07 DIAGNOSIS — R06.2 WHEEZING: ICD-10-CM

## 2019-01-07 PROCEDURE — 99213 OFFICE O/P EST LOW 20 MIN: CPT | Performed by: PHYSICIAN ASSISTANT

## 2019-01-07 RX ORDER — PREDNISONE 20 MG/1
20 TABLET ORAL DAILY
Qty: 5 TABLET | Refills: 0 | Status: ON HOLD | OUTPATIENT
Start: 2019-01-07 | End: 2019-01-11

## 2019-01-07 NOTE — PATIENT INSTRUCTIONS
Will prescribe an antibiotic called Augmentin to take twice daily for 10 days. Take a daily probiotic or Activia yogurt while you are on this.  Will also prescribe a steroid to take once daily for 5 days to help with the sinus congestion and the wheezing/shortness of breath.  Drink plenty of fluids.  Can use an over the counter Nettipot or sinus rinse to help with nasal congestion.   Continue with Mucinex.  Take Tylenol or Advil as needed for fevers.  Follow up if symptoms are not improving.

## 2019-01-09 ENCOUNTER — APPOINTMENT (OUTPATIENT)
Dept: CT IMAGING | Facility: CLINIC | Age: 69
DRG: 556 | End: 2019-01-09
Payer: MEDICARE

## 2019-01-09 ENCOUNTER — TELEPHONE (OUTPATIENT)
Dept: FAMILY MEDICINE | Facility: OTHER | Age: 69
End: 2019-01-09

## 2019-01-09 ENCOUNTER — HOSPITAL ENCOUNTER (INPATIENT)
Facility: CLINIC | Age: 69
LOS: 3 days | Discharge: HOME | DRG: 556 | End: 2019-01-12
Attending: STUDENT IN AN ORGANIZED HEALTH CARE EDUCATION/TRAINING PROGRAM | Admitting: HOSPITALIST
Payer: MEDICARE

## 2019-01-09 ENCOUNTER — OFFICE VISIT (OUTPATIENT)
Dept: FAMILY MEDICINE | Facility: CLINIC | Age: 69
End: 2019-01-09
Payer: MEDICARE

## 2019-01-09 VITALS
HEART RATE: 84 BPM | RESPIRATION RATE: 16 BRPM | WEIGHT: 154 LBS | DIASTOLIC BLOOD PRESSURE: 84 MMHG | OXYGEN SATURATION: 98 % | SYSTOLIC BLOOD PRESSURE: 133 MMHG | TEMPERATURE: 97.9 F | BODY MASS INDEX: 28.34 KG/M2

## 2019-01-09 DIAGNOSIS — J06.9 UPPER RESPIRATORY TRACT INFECTION, UNSPECIFIED TYPE: ICD-10-CM

## 2019-01-09 DIAGNOSIS — S30.1XXA ABDOMINAL WALL HEMATOMA, INITIAL ENCOUNTER: ICD-10-CM

## 2019-01-09 DIAGNOSIS — F43.23 ADJUSTMENT DISORDER WITH MIXED ANXIETY AND DEPRESSED MOOD: ICD-10-CM

## 2019-01-09 DIAGNOSIS — E87.1 HYPONATREMIA: ICD-10-CM

## 2019-01-09 DIAGNOSIS — E87.8 HYPOCHLOREMIA: ICD-10-CM

## 2019-01-09 DIAGNOSIS — R05.9 COUGH: ICD-10-CM

## 2019-01-09 DIAGNOSIS — H66.002 ACUTE SUPPURATIVE OTITIS MEDIA OF LEFT EAR WITHOUT SPONTANEOUS RUPTURE OF TYMPANIC MEMBRANE, RECURRENCE NOT SPECIFIED: ICD-10-CM

## 2019-01-09 DIAGNOSIS — I10 HYPERTENSION GOAL BP (BLOOD PRESSURE) < 140/90: Primary | ICD-10-CM

## 2019-01-09 DIAGNOSIS — S30.1XXA HEMATOMA OF ABDOMINAL WALL, INITIAL ENCOUNTER: ICD-10-CM

## 2019-01-09 DIAGNOSIS — R10.31 ABDOMINAL PAIN, RIGHT LOWER QUADRANT: Primary | ICD-10-CM

## 2019-01-09 LAB
ALBUMIN SERPL-MCNC: 3.5 G/DL (ref 3.4–5)
ALP SERPL-CCNC: 169 U/L (ref 40–150)
ALT SERPL W P-5'-P-CCNC: 88 U/L (ref 0–50)
ANION GAP SERPL CALCULATED.3IONS-SCNC: 11 MMOL/L (ref 3–14)
AST SERPL W P-5'-P-CCNC: 70 U/L (ref 0–45)
BASOPHILS # BLD AUTO: 0.1 10E9/L (ref 0–0.2)
BASOPHILS NFR BLD AUTO: 1 %
BILIRUB SERPL-MCNC: 0.5 MG/DL (ref 0.2–1.3)
BUN SERPL-MCNC: 9 MG/DL (ref 7–30)
CALCIUM SERPL-MCNC: 8.7 MG/DL (ref 8.5–10.1)
CHLORIDE SERPL-SCNC: 85 MMOL/L (ref 94–109)
CO2 SERPL-SCNC: 27 MMOL/L (ref 20–32)
CREAT SERPL-MCNC: 0.55 MG/DL (ref 0.52–1.04)
DIFFERENTIAL METHOD BLD: ABNORMAL
EOSINOPHIL NFR BLD AUTO: 2.3 %
ERYTHROCYTE [DISTWIDTH] IN BLOOD BY AUTOMATED COUNT: 11.5 % (ref 10–15)
GFR SERPL CREATININE-BSD FRML MDRD: >90 ML/MIN/{1.73_M2}
GLUCOSE SERPL-MCNC: 127 MG/DL (ref 70–99)
HCT VFR BLD AUTO: 43.3 % (ref 35–47)
HGB BLD-MCNC: 13.4 G/DL (ref 11.7–15.7)
HGB BLD-MCNC: 15.3 G/DL (ref 11.7–15.7)
IMM GRANULOCYTES # BLD: 0.2 10E9/L (ref 0–0.4)
IMM GRANULOCYTES NFR BLD: 1.5 %
LYMPHOCYTES # BLD AUTO: 2.4 10E9/L (ref 0.8–5.3)
LYMPHOCYTES NFR BLD AUTO: 20.9 %
MCH RBC QN AUTO: 31.4 PG (ref 26.5–33)
MCHC RBC AUTO-ENTMCNC: 35.3 G/DL (ref 31.5–36.5)
MCV RBC AUTO: 89 FL (ref 78–100)
MONOCYTES # BLD AUTO: 1.4 10E9/L (ref 0–1.3)
MONOCYTES NFR BLD AUTO: 12.3 %
NEUTROPHILS # BLD AUTO: 7.2 10E9/L (ref 1.6–8.3)
NEUTROPHILS NFR BLD AUTO: 62 %
NRBC # BLD AUTO: 0 10*3/UL
NRBC BLD AUTO-RTO: 0 /100
PLATELET # BLD AUTO: 364 10E9/L (ref 150–450)
POTASSIUM SERPL-SCNC: 4 MMOL/L (ref 3.4–5.3)
PROT SERPL-MCNC: 7.7 G/DL (ref 6.8–8.8)
RBC # BLD AUTO: 4.88 10E12/L (ref 3.8–5.2)
SODIUM SERPL-SCNC: 123 MMOL/L (ref 133–144)
WBC # BLD AUTO: 11.5 10E9/L (ref 4–11)

## 2019-01-09 PROCEDURE — 25000128 H RX IP 250 OP 636: Performed by: STUDENT IN AN ORGANIZED HEALTH CARE EDUCATION/TRAINING PROGRAM

## 2019-01-09 PROCEDURE — 99207 ZZC CDG-CHARGE REQUIRED MANUAL ENTRY: CPT | Performed by: INTERNAL MEDICINE

## 2019-01-09 PROCEDURE — 25000125 ZZHC RX 250: Performed by: STUDENT IN AN ORGANIZED HEALTH CARE EDUCATION/TRAINING PROGRAM

## 2019-01-09 PROCEDURE — A9270 NON-COVERED ITEM OR SERVICE: HCPCS | Mod: GY | Performed by: INTERNAL MEDICINE

## 2019-01-09 PROCEDURE — 36415 COLL VENOUS BLD VENIPUNCTURE: CPT | Performed by: INTERNAL MEDICINE

## 2019-01-09 PROCEDURE — 25000132 ZZH RX MED GY IP 250 OP 250 PS 637: Mod: GY | Performed by: INTERNAL MEDICINE

## 2019-01-09 PROCEDURE — 96375 TX/PRO/DX INJ NEW DRUG ADDON: CPT

## 2019-01-09 PROCEDURE — 99285 EMERGENCY DEPT VISIT HI MDM: CPT | Mod: 25

## 2019-01-09 PROCEDURE — 96374 THER/PROPH/DIAG INJ IV PUSH: CPT

## 2019-01-09 PROCEDURE — 96361 HYDRATE IV INFUSION ADD-ON: CPT

## 2019-01-09 PROCEDURE — 85025 COMPLETE CBC W/AUTO DIFF WBC: CPT | Performed by: STUDENT IN AN ORGANIZED HEALTH CARE EDUCATION/TRAINING PROGRAM

## 2019-01-09 PROCEDURE — 99215 OFFICE O/P EST HI 40 MIN: CPT | Performed by: FAMILY MEDICINE

## 2019-01-09 PROCEDURE — 74177 CT ABD & PELVIS W/CONTRAST: CPT

## 2019-01-09 PROCEDURE — 99222 1ST HOSP IP/OBS MODERATE 55: CPT | Mod: AI | Performed by: INTERNAL MEDICINE

## 2019-01-09 PROCEDURE — 25000132 ZZH RX MED GY IP 250 OP 250 PS 637: Mod: GY | Performed by: STUDENT IN AN ORGANIZED HEALTH CARE EDUCATION/TRAINING PROGRAM

## 2019-01-09 PROCEDURE — 99285 EMERGENCY DEPT VISIT HI MDM: CPT | Mod: 25 | Performed by: STUDENT IN AN ORGANIZED HEALTH CARE EDUCATION/TRAINING PROGRAM

## 2019-01-09 PROCEDURE — 85018 HEMOGLOBIN: CPT | Performed by: INTERNAL MEDICINE

## 2019-01-09 PROCEDURE — 12000011 ZZH R&B MS OVERFLOW

## 2019-01-09 PROCEDURE — 80053 COMPREHEN METABOLIC PANEL: CPT | Performed by: STUDENT IN AN ORGANIZED HEALTH CARE EDUCATION/TRAINING PROGRAM

## 2019-01-09 PROCEDURE — A9270 NON-COVERED ITEM OR SERVICE: HCPCS | Mod: GY | Performed by: STUDENT IN AN ORGANIZED HEALTH CARE EDUCATION/TRAINING PROGRAM

## 2019-01-09 RX ORDER — ONDANSETRON 2 MG/ML
4 INJECTION INTRAMUSCULAR; INTRAVENOUS EVERY 6 HOURS PRN
Status: DISCONTINUED | OUTPATIENT
Start: 2019-01-09 | End: 2019-01-12 | Stop reason: HOSPADM

## 2019-01-09 RX ORDER — ONDANSETRON 4 MG/1
4 TABLET, ORALLY DISINTEGRATING ORAL EVERY 6 HOURS PRN
Status: DISCONTINUED | OUTPATIENT
Start: 2019-01-09 | End: 2019-01-09

## 2019-01-09 RX ORDER — BENZONATATE 100 MG/1
100 CAPSULE ORAL 3 TIMES DAILY PRN
Status: DISCONTINUED | OUTPATIENT
Start: 2019-01-09 | End: 2019-01-12 | Stop reason: HOSPADM

## 2019-01-09 RX ORDER — HYDRALAZINE HYDROCHLORIDE 20 MG/ML
10 INJECTION INTRAMUSCULAR; INTRAVENOUS EVERY 4 HOURS PRN
Status: DISCONTINUED | OUTPATIENT
Start: 2019-01-09 | End: 2019-01-12 | Stop reason: HOSPADM

## 2019-01-09 RX ORDER — BUTALBITAL, ASPIRIN, AND CAFFEINE 325; 50; 40 MG/1; MG/1; MG/1
1 CAPSULE ORAL EVERY 4 HOURS PRN
Status: DISCONTINUED | OUTPATIENT
Start: 2019-01-09 | End: 2019-01-12 | Stop reason: HOSPADM

## 2019-01-09 RX ORDER — HYDROMORPHONE HYDROCHLORIDE 1 MG/ML
0.5 INJECTION, SOLUTION INTRAMUSCULAR; INTRAVENOUS; SUBCUTANEOUS ONCE
Status: COMPLETED | OUTPATIENT
Start: 2019-01-09 | End: 2019-01-09

## 2019-01-09 RX ORDER — BENZONATATE 200 MG/1
200 CAPSULE ORAL 3 TIMES DAILY PRN
Qty: 30 CAPSULE | Refills: 0 | Status: SHIPPED | OUTPATIENT
Start: 2019-01-09 | End: 2019-02-26

## 2019-01-09 RX ORDER — ALBUTEROL SULFATE 90 UG/1
1-2 AEROSOL, METERED RESPIRATORY (INHALATION) EVERY 6 HOURS PRN
Status: DISCONTINUED | OUTPATIENT
Start: 2019-01-09 | End: 2019-01-12 | Stop reason: HOSPADM

## 2019-01-09 RX ORDER — PROPRANOLOL HYDROCHLORIDE 10 MG/1
20 TABLET ORAL 2 TIMES DAILY
Status: DISCONTINUED | OUTPATIENT
Start: 2019-01-09 | End: 2019-01-10

## 2019-01-09 RX ORDER — NALOXONE HYDROCHLORIDE 0.4 MG/ML
.1-.4 INJECTION, SOLUTION INTRAMUSCULAR; INTRAVENOUS; SUBCUTANEOUS
Status: DISCONTINUED | OUTPATIENT
Start: 2019-01-09 | End: 2019-01-09

## 2019-01-09 RX ORDER — LEVOFLOXACIN 750 MG/1
750 TABLET, FILM COATED ORAL DAILY
Status: DISCONTINUED | OUTPATIENT
Start: 2019-01-09 | End: 2019-01-12 | Stop reason: HOSPADM

## 2019-01-09 RX ORDER — IOPAMIDOL 755 MG/ML
200 INJECTION, SOLUTION INTRAVASCULAR ONCE
Status: COMPLETED | OUTPATIENT
Start: 2019-01-09 | End: 2019-01-09

## 2019-01-09 RX ORDER — GUAIFENESIN 600 MG/1
600 TABLET, EXTENDED RELEASE ORAL 2 TIMES DAILY
Status: DISCONTINUED | OUTPATIENT
Start: 2019-01-09 | End: 2019-01-12 | Stop reason: HOSPADM

## 2019-01-09 RX ORDER — ONDANSETRON 4 MG/1
4 TABLET, ORALLY DISINTEGRATING ORAL EVERY 6 HOURS PRN
Status: DISCONTINUED | OUTPATIENT
Start: 2019-01-09 | End: 2019-01-12 | Stop reason: HOSPADM

## 2019-01-09 RX ORDER — LISINOPRIL 2.5 MG/1
2.5 TABLET ORAL DAILY
Status: DISCONTINUED | OUTPATIENT
Start: 2019-01-10 | End: 2019-01-09

## 2019-01-09 RX ORDER — LEVOFLOXACIN 250 MG/1
250 TABLET, FILM COATED ORAL DAILY
Status: DISCONTINUED | OUTPATIENT
Start: 2019-01-10 | End: 2019-01-09

## 2019-01-09 RX ORDER — NALOXONE HYDROCHLORIDE 0.4 MG/ML
.1-.4 INJECTION, SOLUTION INTRAMUSCULAR; INTRAVENOUS; SUBCUTANEOUS
Status: DISCONTINUED | OUTPATIENT
Start: 2019-01-09 | End: 2019-01-12 | Stop reason: HOSPADM

## 2019-01-09 RX ORDER — SODIUM CHLORIDE 9 MG/ML
INJECTION, SOLUTION INTRAVENOUS CONTINUOUS
Status: DISCONTINUED | OUTPATIENT
Start: 2019-01-10 | End: 2019-01-10

## 2019-01-09 RX ORDER — ACETAMINOPHEN 325 MG/1
650 TABLET ORAL EVERY 4 HOURS PRN
Status: DISCONTINUED | OUTPATIENT
Start: 2019-01-09 | End: 2019-01-12 | Stop reason: HOSPADM

## 2019-01-09 RX ORDER — OXYCODONE HYDROCHLORIDE 5 MG/1
5 TABLET ORAL EVERY 4 HOURS PRN
Status: DISCONTINUED | OUTPATIENT
Start: 2019-01-09 | End: 2019-01-12 | Stop reason: HOSPADM

## 2019-01-09 RX ORDER — ONDANSETRON 2 MG/ML
4 INJECTION INTRAMUSCULAR; INTRAVENOUS EVERY 6 HOURS PRN
Status: DISCONTINUED | OUTPATIENT
Start: 2019-01-09 | End: 2019-01-09

## 2019-01-09 RX ORDER — KETOROLAC TROMETHAMINE 15 MG/ML
10 INJECTION, SOLUTION INTRAMUSCULAR; INTRAVENOUS ONCE
Status: COMPLETED | OUTPATIENT
Start: 2019-01-09 | End: 2019-01-09

## 2019-01-09 RX ORDER — DOCUSATE SODIUM 100 MG/1
100 CAPSULE, LIQUID FILLED ORAL 2 TIMES DAILY
Status: DISCONTINUED | OUTPATIENT
Start: 2019-01-09 | End: 2019-01-12 | Stop reason: HOSPADM

## 2019-01-09 RX ORDER — IPRATROPIUM BROMIDE AND ALBUTEROL SULFATE 2.5; .5 MG/3ML; MG/3ML
3 SOLUTION RESPIRATORY (INHALATION)
Status: DISCONTINUED | OUTPATIENT
Start: 2019-01-09 | End: 2019-01-10

## 2019-01-09 RX ORDER — VENLAFAXINE HYDROCHLORIDE 75 MG/1
75 CAPSULE, EXTENDED RELEASE ORAL
Status: DISCONTINUED | OUTPATIENT
Start: 2019-01-10 | End: 2019-01-12 | Stop reason: HOSPADM

## 2019-01-09 RX ORDER — LISINOPRIL 10 MG/1
20 TABLET ORAL DAILY
Status: DISCONTINUED | OUTPATIENT
Start: 2019-01-10 | End: 2019-01-10

## 2019-01-09 RX ORDER — ACETAMINOPHEN 650 MG/1
650 SUPPOSITORY RECTAL EVERY 4 HOURS PRN
Status: DISCONTINUED | OUTPATIENT
Start: 2019-01-09 | End: 2019-01-10

## 2019-01-09 RX ORDER — ACETAMINOPHEN 325 MG/1
650 TABLET ORAL EVERY 4 HOURS PRN
Status: DISCONTINUED | OUTPATIENT
Start: 2019-01-09 | End: 2019-01-09

## 2019-01-09 RX ORDER — HYDROMORPHONE HYDROCHLORIDE 1 MG/ML
0.5 INJECTION, SOLUTION INTRAMUSCULAR; INTRAVENOUS; SUBCUTANEOUS EVERY 4 HOURS PRN
Status: DISCONTINUED | OUTPATIENT
Start: 2019-01-09 | End: 2019-01-12 | Stop reason: HOSPADM

## 2019-01-09 RX ADMIN — HYDROMORPHONE HYDROCHLORIDE 0.5 MG: 1 INJECTION, SOLUTION INTRAMUSCULAR; INTRAVENOUS; SUBCUTANEOUS at 18:02

## 2019-01-09 RX ADMIN — KETOROLAC TROMETHAMINE 10 MG: 15 INJECTION, SOLUTION INTRAMUSCULAR; INTRAVENOUS at 17:25

## 2019-01-09 RX ADMIN — ACETAMINOPHEN 650 MG: 325 TABLET ORAL at 23:39

## 2019-01-09 RX ADMIN — SODIUM CHLORIDE 60 ML: 9 INJECTION, SOLUTION INTRAVENOUS at 18:17

## 2019-01-09 RX ADMIN — BENZONATATE 100 MG: 100 CAPSULE, LIQUID FILLED ORAL at 23:39

## 2019-01-09 RX ADMIN — OXYCODONE HYDROCHLORIDE 5 MG: 5 TABLET ORAL at 23:40

## 2019-01-09 RX ADMIN — SODIUM CHLORIDE 500 ML: 9 INJECTION, SOLUTION INTRAVENOUS at 17:29

## 2019-01-09 RX ADMIN — GUAIFENESIN 600 MG: 600 TABLET, EXTENDED RELEASE ORAL at 23:39

## 2019-01-09 RX ADMIN — IOPAMIDOL 75 ML: 755 INJECTION, SOLUTION INTRAVENOUS at 18:18

## 2019-01-09 RX ADMIN — PROPRANOLOL HYDROCHLORIDE 20 MG: 10 TABLET ORAL at 23:39

## 2019-01-09 ASSESSMENT — ACTIVITIES OF DAILY LIVING (ADL)
FALL_HISTORY_WITHIN_LAST_SIX_MONTHS: NO
RETIRED_COMMUNICATION: 0-->UNDERSTANDS/COMMUNICATES WITHOUT DIFFICULTY
RETIRED_EATING: 0-->INDEPENDENT
DRESS: 0-->INDEPENDENT
TRANSFERRING: 0-->INDEPENDENT
COGNITION: 0 - NO COGNITION ISSUES REPORTED
SWALLOWING: 0-->SWALLOWS FOODS/LIQUIDS WITHOUT DIFFICULTY
TOILETING: 0-->INDEPENDENT
BATHING: 0-->INDEPENDENT
AMBULATION: 0-->INDEPENDENT

## 2019-01-09 ASSESSMENT — PAIN SCALES - GENERAL: PAINLEVEL: SEVERE PAIN (6)

## 2019-01-09 ASSESSMENT — MIFFLIN-ST. JEOR: SCORE: 1207.13

## 2019-01-09 NOTE — PROGRESS NOTES
SUBJECTIVE:   Sri Grewal is a 68 year old female who presents to clinic today for the following health issues:      Patient presents with:  Foreign Body in Ear: possible ear plug in her left ear.  Cough: c/o cough, congestion, post nasal drainage and sinus pressure x 3 weeks    augmentin and prednisone x 2 days   Hasn't gotten much better   However today wasn't sure as she felt her left ear now completely plugged  She thought maybe an ear plug got stuck in her ear    Today also started having right lower quadrant pain.  Worse with movement  Some nausea  She thought may have been muscular from coughing  Other symptoms: positive  cough, colds, sinus congestion  Fever: No  Tried over the counter medications without relief  No fevers or chills chest pain or shortness of breath   No rash    Because of persistent and worsening symptoms came in to be seen    Problem list and histories reviewed & adjusted, as indicated.  Additional history: as documented    Problem list, Medication list, Allergies, and Medical/Social/Surgical histories reviewed in EPIC and updated as appropriate.    ROS:  Constitutional, HEENT, cardiovascular, pulmonary, gi and gu systems are negative, except as otherwise noted.  Review negative except for noted above.    OBJECTIVE:                                                    /84   Pulse 84   Temp 97.9  F (36.6  C) (Oral)   Resp 16   Wt 69.9 kg (154 lb)   LMP 11/09/2005   SpO2 98%   Breastfeeding? No   BMI 28.34 kg/m    Body mass index is 28.34 kg/m .  GENERAL: healthy, alert and no distress  EYES: pink palpebral conjunctiva, anicteric sclera, pupils equally reactive to light and accomodation, extraocular muscles intact full and equal.  ENT: midline nasal septum, positive  nasal congestion   Left ear:no tragal tenderness, no mastoid tenderness erythematous, bulging and effusion tympaninc membrane   Right ear: no tragal tenderness, no mastoid tenderness normal tympaninc membrane    NECK: no adenopathy, no asymmetry or  masses  RESP: lungs clear to auscultation - no rales, rhonchi or wheezes  CV: regular rate and rhythm, normal S1 S2, no S3 or S4, no murmur, click or rub, no peripheral edema and peripheral pulses strong  ABDOMEN:   Soft good bowel sounds  Exquisitely tender on right lower quadrant area with guarding   Patient was doubled over in pain after exam for a good minute   MS: no gross musculoskeletal defects noted, no edema  NEURO: Normal strength and tone, mentation intact and speech normal  Neuro. No thoughts of harming self or others     Diagnostic Test Results:  No results found for this or any previous visit (from the past 24 hour(s)).     ASSESSMENT/PLAN:                                                        ICD-10-CM    1. Abdominal pain, right lower quadrant R10.31    2. Cough R05 benzonatate (TESSALON) 200 MG capsule   3. Acute suppurative otitis media of left ear without spontaneous rupture of tympanic membrane, recurrence not specified H66.002      Otitis media noted of the left ear- hasn't really been on augmentin long enough to say effective. Will continue on same antibiotic and follow up right away with worsening symptoms or if do not improve  Prescribed with tessalon as needed cough  Abdominal pain - initially thought maybe abdominal wall pain  However on exam was very tender and patient was doubled over in pain and guarding at least a minute after exam  With severity need to rule out acute abdomen. I discussed STAT CT abdomen/pelvis vs ER evaluation and patient preferred ER evaluation for rule out acute abdomen  Patient declined ambulance. Her son will drive. She states she will contact her son first. She is not sure when she will go to ER. AVS printed.       MD Silvia Nolasco MD  [facility]

## 2019-01-09 NOTE — ED TRIAGE NOTES
"Pt here with pain to right lower abdominal area, states \"feels like I pulled a muscle when I coughed\"    "

## 2019-01-09 NOTE — PATIENT INSTRUCTIONS
Moderate to severe abdominal pain Right lower quadrant  - recommend ER evaluation rule out acute abdomen

## 2019-01-09 NOTE — TELEPHONE ENCOUNTER
I spoke with patient.   She thinks she may have part of her foam ear plug stuck in her left ear.   There is no pain, but it is a plugged feeling.   She was scheduled in [place] today to address that as well as concerns below.     Rachel De Paz, RN, BSN

## 2019-01-09 NOTE — ED PROVIDER NOTES
History     Chief Complaint   Patient presents with     Abdominal Pain     HPI  Sri Grewal is a 68 year old female with past medical history which includes migraine headaches, endometriosis, chronic PUD, anemia, previous TIA, hypertension and asthma who presents for evaluation of right lower abdominal pain.  Patient explains that over the past several days she has had a productive cough which is seemingly improving with prescribed oral Augmentin.  However early this morning while coughing in bed around 3 AM she developed achy right lower abdominal pain.  The discomfort was tolerable until getting out of her car around 3 PM this afternoon when it acutely grew severe.  Associated symptoms include nausea but no vomiting.  She denies fever, chills, chest pain, shortness of breath, back pain, diarrhea or genitourinary symptoms.  Has taken no analgesic medication.  No known alleviating factors.    Problem List:    Patient Active Problem List    Diagnosis Date Noted     Health Care Home 07/27/2011     Priority: High     X  EMERGENCY CARE PLAN  Presenting Problem Signs and Symptoms Treatment Plan    Questions or conerns during clinic hours    I will call the clinic directly     Questions or conerns outside clinic hours    I will call the 24 hour nurse line at [phone]    Patient needs to schedule an appointment    I will call the 24 hour scheduling team at [phone] or clinic directly    Same day treatment     I will call the clinic first, nurse line if after hours, urgent care and express care if needed                            DX V65.8 REPLACED WITH [id number] [facility] CARE HOME (04/08/2013)       Hyponatremia 11/26/2018     Priority: Medium     Age-related osteoporosis without current pathological fracture 11/12/2018     Priority: Medium     HSV (herpes simplex virus) infection 07/31/2018     Priority: Medium     Atypical mole 06/01/2017     Priority: Medium     Hypertension goal BP (blood pressure) < 140/90  05/25/2017     Priority: Medium     TIA (transient ischemic attack) 01/16/2017     Priority: Medium     Adjustment disorder with mixed anxiety and depressed mood 09/09/2014     Priority: Medium     Middle cerebral artery aneurysm 10/29/2013     Priority: Medium     Noted in 2007.  Intervention not recommended at that time.  Observation.  Saw Interventional Radiology 12/2013.  Recheck CTA in one year.       Advanced directives, counseling/discussion 07/19/2012     Priority: Medium     Discussed advance care planning with patient; information given to patient to review. 7/19/2012          Moderate major depression (H) 09/08/2010     Priority: Medium     Insomnia 09/08/2010     Priority: Medium     Atrophy of vagina 05/30/2010     Priority: Medium     Lump or mass in breast 03/29/2010     Priority: Medium     Mild persistent asthma 02/21/2005     Priority: Medium     Chronic peptic ulcer 12/23/2003     Priority: Medium     Problem list name updated by automated process. Provider to review       Anemia 04/25/2002     Priority: Medium     Problem list name updated by automated process. Provider to review       Chronic migraine without aura without status migrainosus, not intractable      Priority: Medium     Multiple trials off of fiorinal have not been successful  Problem list name updated by automated process. Provider to review       Other abnormal Papanicolaou smear of cervix and cervical HPV(795.09)      Priority: Medium     (Problem list name updated by automated process. Provider to review and confirm.)       Endometriosis      Priority: Medium     Problem list name updated by automated process. Provider to review       Allergic rhinitis      Priority: Medium     Problem list name updated by automated process. Provider to review          Past Medical History:    Past Medical History:   Diagnosis Date     Abnormal Papanicolaou smear of cervix and cervical HPV      Allergic rhinitis, cause unspecified      Contact  dermatitis and other eczema, due to unspecified cause      Endometriosis, site unspecified      Esophageal reflux      Migraine, unspecified, without mention of intractable migraine without mention of status migrainosus      Mild persistent asthma      Unspecified disorder of uterus        Past Surgical History:    Past Surgical History:   Procedure Laterality Date     C  DELIVERY ONLY       X2     COLONOSCOPY  2014    Procedure: COLONOSCOPY;  Surgeon: Nathan Baker MD;  Location: [facility]     HC COLONOSCOPY THRU STOMA, DIAGNOSTIC  2002    normal       Family History:    Family History   Problem Relation Age of Onset     Heart Disease Mother         anemia     Osteoporosis Mother      Hypertension Mother      Cerebrovascular Disease Mother      Alcohol/Drug Mother         alcohol     Neurologic Disorder Mother         migraines     Hypertension Father      Cancer No family hx of         breast       Social History:  Marital Status:   [5]  Social History     Tobacco Use     Smoking status: Never Smoker     Smokeless tobacco: Never Used   Substance Use Topics     Alcohol use: Yes     Comment: beerx2/x1 per month     Drug use: No        Medications:      albuterol (PROAIR HFA) 108 (90 Base) MCG/ACT Inhaler   alendronate (FOSAMAX) 70 MG tablet   amoxicillin-clavulanate (AUGMENTIN) 875-125 MG tablet   ASPIRIN 81 PO   benzonatate (TESSALON) 200 MG capsule   butalbital-aspirin-caffeine (FIORINAL) -40 MG per capsule   esomeprazole (NEXIUM) 40 MG CR capsule   Fexofenadine HCl (ALLEGRA PO)   IBUPROFEN PO   lisinopril-hydrochlorothiazide (PRINZIDE/ZESTORETIC) 20-12.5 MG per tablet   potassium chloride ER (K-DUR/KLOR-CON M) 10 MEQ CR tablet   predniSONE (DELTASONE) 20 MG tablet   propranolol (INDERAL) 20 MG tablet   valACYclovir (VALTREX) 500 MG tablet   venlafaxine (EFFEXOR-XR) 37.5 MG 24 hr capsule         Review of Systems  Constitutional:  Negative for fever or chills.  Cardiovascular:   Negative for chest pain.  Respiratory: Positive for productive cough, improving.  Negative for shortness of breath.  Gastrointestinal: The for RLQ abdominal pain.  Negative for vomiting or diarrhea.  Denies blood with bowel movements.  Genitourinary:  Negative for dysuria.  Musculoskeletal: Negative for back pain.    All others reviewed and are negative.      Physical Exam   BP: (!) 190/119  Pulse: 84  Heart Rate: 92  Temp: 97.8  F (36.6  C)  Resp: 18  Weight: 69.6 kg (153 lb 6 oz)  SpO2: 97 %      Physical Exam  Constitutional:  Well developed, well nourished.  Appears nontoxic and in no acute distress.    HENT:  Normocephalic and atraumatic.  Symmetric in appearance.  Eyes:  Conjunctivae are normal.  Neck:  Neck supple.  Cardiovascular:  No cyanosis.  RRR.  No audible murmurs noted.    Respiratory:  Effort normal without sign of respiratory distress.  CTAB without diminished regions.    Gastrointestinal:  Soft nondistended abdomen.  RLQ tenderness with guarding.  No rigidity or rebound tenderness.  Negative Higgins's sign.  No palpable pulsatile mass.   Genitourinary:  Noncontributory.   Musculoskeletal:  Moves extremities spontaneously.  Neurological:  Patient is alert.  Skin:  Skin is warm and dry.  Psychiatric:  Normal mood and affect.      ED Course        Procedures               Critical Care time:  none               Results for orders placed or performed during the hospital encounter of 01/09/19 (from the past 24 hour(s))   CBC with platelets differential   Result Value Ref Range    WBC 11.5 (H) 4.0 - 11.0 10e9/L    RBC Count 4.88 3.8 - 5.2 10e12/L    Hemoglobin 15.3 11.7 - 15.7 g/dL    Hematocrit 43.3 35.0 - 47.0 %    MCV 89 78 - 100 fl    MCH 31.4 26.5 - 33.0 pg    MCHC 35.3 31.5 - 36.5 g/dL    RDW 11.5 10.0 - 15.0 %    Platelet Count 364 150 - 450 10e9/L    Diff Method Automated Method     % Neutrophils 62.0 %    % Lymphocytes 20.9 %    % Monocytes 12.3 %    % Eosinophils 2.3 %    % Basophils 1.0 %    %  Immature Granulocytes 1.5 %    Nucleated RBCs 0 0 /100    Absolute Neutrophil 7.2 1.6 - 8.3 10e9/L    Absolute Lymphocytes 2.4 0.8 - 5.3 10e9/L    Absolute Monocytes 1.4 (H) 0.0 - 1.3 10e9/L    Absolute Basophils 0.1 0.0 - 0.2 10e9/L    Abs Immature Granulocytes 0.2 0 - 0.4 10e9/L    Absolute Nucleated RBC 0.0    Comprehensive metabolic panel   Result Value Ref Range    Sodium 123 (L) 133 - 144 mmol/L    Potassium 4.0 3.4 - 5.3 mmol/L    Chloride 85 (L) 94 - 109 mmol/L    Carbon Dioxide 27 20 - 32 mmol/L    Anion Gap 11 3 - 14 mmol/L    Glucose 127 (H) 70 - 99 mg/dL    Urea Nitrogen 9 7 - 30 mg/dL    Creatinine 0.55 0.52 - 1.04 mg/dL    GFR Estimate >90 >60 mL/min/[1.73_m2]    GFR Estimate If Black >90 >60 mL/min/[1.73_m2]    Calcium 8.7 8.5 - 10.1 mg/dL    Bilirubin Total 0.5 0.2 - 1.3 mg/dL    Albumin 3.5 3.4 - 5.0 g/dL    Protein Total 7.7 6.8 - 8.8 g/dL    Alkaline Phosphatase 169 (H) 40 - 150 U/L    ALT 88 (H) 0 - 50 U/L    AST 70 (H) 0 - 45 U/L   CT Abdomen Pelvis w Contrast    Narrative    CT ABDOMEN AND PELVIS WITH CONTRAST   1/9/2019 6:34 PM     HISTORY: Right lower quadrant abdominal pain.    TECHNIQUE: 75mL Isovue-370. Radiation dose for this scan was reduced  using automated exposure control, adjustment of the mA and/or kV  according to patient size, or iterative reconstruction technique.    COMPARISON: None.    FINDINGS: The lung bases are clear. There is no evidence for free air.  There is a large right rectus abdominus mass which is most likely an  acute hematoma. The initial images show curvilinear density which is  most likely due to some active bleeding. This is not as well seen on  the five minute delayed images. The lesion does not change density,  indicating that there is no enhancement on five minute delayed images.  The liver, spleen, pancreas, and adrenal glands are normal.  Gallbladder not visualized; presumably, it has been resected. There is  a 4.5 cm simple cyst in the right kidney. The  kidneys are otherwise  normal. The bladder, uterus and gastrointestinal tract appear normal.  Osseous structures unremarkable.      Impression    IMPRESSION: Right abdominus rectus sheath mass which appears to be due  to a hematoma with active extravasation.. Initial images showed some  high density which is presumably due to some active bleeding. Much  less likely, this could represent unusual mass.    JAMARI ARRIAGA MD       Medications   sodium chloride 0.45% BOLUS (500 mLs Intravenous Not Given 1/9/19 1730)   ketorolac (TORADOL) injection 10 mg (10 mg Intravenous Given 1/9/19 1725)   sodium chloride (PF) 0.9% PF flush 3 mL (3 mLs Intravenous Given 1/9/19 1817)   iopamidol (ISOVUE-370) solution 200 mL (75 mLs Intravenous Given 1/9/19 1818)   Saline 100mL Bag (60 mLs Intravenous Given 1/9/19 1817)   0.9% sodium chloride BOLUS (500 mLs Intravenous New Bag 1/9/19 1729)   HYDROmorphone (PF) (DILAUDID) injection 0.5 mg (0.5 mg Intravenous Given 1/9/19 1802)       Assessments & Plan (with Medical Decision Making)   Sri Grewal is a 68 year old female who presented to the department complaining of acute severe right lower abdominal pain.  She has had upper respiratory infectious symptoms for the past few days for which she is taking oral Augmentin.  Differential diagnosis included ovarian torsion, appendicitis, ureteral calculus and pyelonephritis.  She has significant RLQ tenderness on examination but without rigidity or rebound tenderness.  CBC reveals mild leukocytosis but nonspecific finding.  Patient afebrile with clear lung sounds to auscultation.  Surprisingly she is significantly hyponatremic compared to previous values on file, is likely related to her diuretic therapies.  CT scan of abdomen/pelvis independently reviewed and a large right abdominal rectus sheath mass is noted.  Radiologist called to inform that it is consistent with hematoma, this would explain her acute onset pain.  In reviewing her records,  she had a similar diagnosis made of the left rectus sheath few years ago which was apparently self-limited.  On-call surgeon Dr. Narvaez was consulted, suggests this is a self-limited injury but recommends placement of binder.  Patient will require admission for continued monitoring and management of her hyponatremia.  Hospitalist Dr. Helton was consulted and has accepted ongoing care for the patient at this time.  Recommends full admission to telemetry.  Temporary transition orders were placed per hospital protocol to prevent any potential delay in patient care.    The patient has been informed of results and the recommendation for admission.  They have verbalized an understanding, all questions answered, and in agreement with the plan.      Disclaimer:  This note consists of symbols derived from keyboarding, dictation, and/or voice recognition software.  As a result, there may be errors in the script that have gone undetected.  Please consider this when interpreting information found in the chart.        I have reviewed the nursing notes.    I have reviewed the findings, diagnosis, plan and need for follow up with the patient.          Medication List      There are no discharge medications for this visit.         Final diagnoses:   Abdominal wall hematoma, initial encounter   Hyponatremia   Hypochloremia   Upper respiratory tract infection, unspecified type       1/9/2019   [facility] EMERGENCY DEPARTMENT     Vik Ureña DO  01/09/19 [id number]

## 2019-01-09 NOTE — TELEPHONE ENCOUNTER
Patient called stating that she is still not feeling well. States that her left ear is plugged and uncomfortable. She is having a lot of sinus pressure and pain and cannot stop coughing. States that her chest hurts from coughing. She is wondering if she should stay on the Augmentin or should she be on something else? Need to be seen? Please advise. Thank you

## 2019-01-10 ENCOUNTER — APPOINTMENT (OUTPATIENT)
Dept: GENERAL RADIOLOGY | Facility: CLINIC | Age: 69
DRG: 556 | End: 2019-01-10
Payer: MEDICARE

## 2019-01-10 PROBLEM — T14.8XXA HEMATOMA: Status: ACTIVE | Noted: 2019-01-10

## 2019-01-10 LAB
ABO + RH BLD: NORMAL
ABO + RH BLD: NORMAL
ALBUMIN UR-MCNC: NEGATIVE MG/DL
ANION GAP SERPL CALCULATED.3IONS-SCNC: 7 MMOL/L (ref 3–14)
APPEARANCE UR: CLEAR
BILIRUB UR QL STRIP: NEGATIVE
BLD GP AB SCN SERPL QL: NORMAL
BLOOD BANK CMNT PATIENT-IMP: NORMAL
BUN SERPL-MCNC: 12 MG/DL (ref 7–30)
CALCIUM SERPL-MCNC: 7.8 MG/DL (ref 8.5–10.1)
CHLORIDE SERPL-SCNC: 90 MMOL/L (ref 94–109)
CO2 SERPL-SCNC: 31 MMOL/L (ref 20–32)
COLOR UR AUTO: YELLOW
CREAT SERPL-MCNC: 0.71 MG/DL (ref 0.52–1.04)
ERYTHROCYTE [DISTWIDTH] IN BLOOD BY AUTOMATED COUNT: 11.6 % (ref 10–15)
GFR SERPL CREATININE-BSD FRML MDRD: 88 ML/MIN/{1.73_M2}
GLUCOSE SERPL-MCNC: 137 MG/DL (ref 70–99)
GLUCOSE UR STRIP-MCNC: NEGATIVE MG/DL
HCT VFR BLD AUTO: 32.6 % (ref 35–47)
HCT VFR BLD AUTO: 33.5 % (ref 35–47)
HCT VFR BLD AUTO: 33.9 % (ref 35–47)
HCT VFR BLD AUTO: 34.3 % (ref 35–47)
HGB BLD-MCNC: 11.5 G/DL (ref 11.7–15.7)
HGB BLD-MCNC: 11.8 G/DL (ref 11.7–15.7)
HGB BLD-MCNC: 11.8 G/DL (ref 11.7–15.7)
HGB BLD-MCNC: 11.9 G/DL (ref 11.7–15.7)
HGB BLD-MCNC: 12.4 G/DL (ref 11.7–15.7)
HGB UR QL STRIP: NEGATIVE
KETONES UR STRIP-MCNC: 5 MG/DL
LEUKOCYTE ESTERASE UR QL STRIP: NEGATIVE
MCH RBC QN AUTO: 31.3 PG (ref 26.5–33)
MCHC RBC AUTO-ENTMCNC: 34.7 G/DL (ref 31.5–36.5)
MCV RBC AUTO: 90 FL (ref 78–100)
NITRATE UR QL: NEGATIVE
PH UR STRIP: 6 PH (ref 5–7)
PLATELET # BLD AUTO: 267 10E9/L (ref 150–450)
POTASSIUM SERPL-SCNC: 4.3 MMOL/L (ref 3.4–5.3)
RBC # BLD AUTO: 3.8 10E12/L (ref 3.8–5.2)
SODIUM SERPL-SCNC: 128 MMOL/L (ref 133–144)
SOURCE: ABNORMAL
SP GR UR STRIP: 1.02 (ref 1–1.03)
SPECIMEN EXP DATE BLD: NORMAL
UROBILINOGEN UR STRIP-MCNC: 0 MG/DL (ref 0–2)
WBC # BLD AUTO: 8.9 10E9/L (ref 4–11)

## 2019-01-10 PROCEDURE — 36415 COLL VENOUS BLD VENIPUNCTURE: CPT | Performed by: INTERNAL MEDICINE

## 2019-01-10 PROCEDURE — 12000000 ZZH R&B MED SURG/OB

## 2019-01-10 PROCEDURE — 86850 RBC ANTIBODY SCREEN: CPT | Performed by: INTERNAL MEDICINE

## 2019-01-10 PROCEDURE — 81003 URINALYSIS AUTO W/O SCOPE: CPT | Performed by: STUDENT IN AN ORGANIZED HEALTH CARE EDUCATION/TRAINING PROGRAM

## 2019-01-10 PROCEDURE — A9270 NON-COVERED ITEM OR SERVICE: HCPCS | Mod: GY | Performed by: INTERNAL MEDICINE

## 2019-01-10 PROCEDURE — 25000128 H RX IP 250 OP 636: Performed by: HOSPITALIST

## 2019-01-10 PROCEDURE — 36415 COLL VENOUS BLD VENIPUNCTURE: CPT | Performed by: HOSPITALIST

## 2019-01-10 PROCEDURE — 85027 COMPLETE CBC AUTOMATED: CPT | Performed by: INTERNAL MEDICINE

## 2019-01-10 PROCEDURE — 94640 AIRWAY INHALATION TREATMENT: CPT

## 2019-01-10 PROCEDURE — 86900 BLOOD TYPING SEROLOGIC ABO: CPT | Performed by: INTERNAL MEDICINE

## 2019-01-10 PROCEDURE — 25000132 ZZH RX MED GY IP 250 OP 250 PS 637: Mod: GY | Performed by: INTERNAL MEDICINE

## 2019-01-10 PROCEDURE — A9270 NON-COVERED ITEM OR SERVICE: HCPCS | Mod: GY | Performed by: HOSPITALIST

## 2019-01-10 PROCEDURE — 85014 HEMATOCRIT: CPT | Performed by: HOSPITALIST

## 2019-01-10 PROCEDURE — 99232 SBSQ HOSP IP/OBS MODERATE 35: CPT | Performed by: HOSPITALIST

## 2019-01-10 PROCEDURE — 86901 BLOOD TYPING SEROLOGIC RH(D): CPT | Performed by: INTERNAL MEDICINE

## 2019-01-10 PROCEDURE — 85018 HEMOGLOBIN: CPT | Performed by: INTERNAL MEDICINE

## 2019-01-10 PROCEDURE — 80048 BASIC METABOLIC PNL TOTAL CA: CPT | Performed by: INTERNAL MEDICINE

## 2019-01-10 PROCEDURE — 25000132 ZZH RX MED GY IP 250 OP 250 PS 637: Mod: GY | Performed by: HOSPITALIST

## 2019-01-10 PROCEDURE — 25000132 ZZH RX MED GY IP 250 OP 250 PS 637: Mod: GY | Performed by: STUDENT IN AN ORGANIZED HEALTH CARE EDUCATION/TRAINING PROGRAM

## 2019-01-10 PROCEDURE — A9270 NON-COVERED ITEM OR SERVICE: HCPCS | Mod: GY | Performed by: STUDENT IN AN ORGANIZED HEALTH CARE EDUCATION/TRAINING PROGRAM

## 2019-01-10 PROCEDURE — 40000275 ZZH STATISTIC RCP TIME EA 10 MIN

## 2019-01-10 PROCEDURE — 25000128 H RX IP 250 OP 636: Performed by: INTERNAL MEDICINE

## 2019-01-10 PROCEDURE — 71046 X-RAY EXAM CHEST 2 VIEWS: CPT | Mod: TC

## 2019-01-10 PROCEDURE — 40000274 ZZH STATISTIC RCP CONSULT EA 30 MIN

## 2019-01-10 PROCEDURE — 25000125 ZZHC RX 250: Performed by: INTERNAL MEDICINE

## 2019-01-10 PROCEDURE — 85018 HEMOGLOBIN: CPT | Performed by: HOSPITALIST

## 2019-01-10 PROCEDURE — 87081 CULTURE SCREEN ONLY: CPT | Performed by: HOSPITALIST

## 2019-01-10 RX ORDER — TRAZODONE HYDROCHLORIDE 50 MG/1
50 TABLET, FILM COATED ORAL
Status: DISCONTINUED | OUTPATIENT
Start: 2019-01-10 | End: 2019-01-12 | Stop reason: HOSPADM

## 2019-01-10 RX ORDER — HYDRALAZINE HYDROCHLORIDE 25 MG/1
25 TABLET, FILM COATED ORAL ONCE
Status: COMPLETED | OUTPATIENT
Start: 2019-01-10 | End: 2019-01-10

## 2019-01-10 RX ORDER — LIDOCAINE 40 MG/G
CREAM TOPICAL
Status: DISCONTINUED | OUTPATIENT
Start: 2019-01-10 | End: 2019-01-12 | Stop reason: HOSPADM

## 2019-01-10 RX ORDER — PROPRANOLOL HYDROCHLORIDE 10 MG/1
20 TABLET ORAL 3 TIMES DAILY
Status: DISCONTINUED | OUTPATIENT
Start: 2019-01-10 | End: 2019-01-11

## 2019-01-10 RX ADMIN — ACETAMINOPHEN 650 MG: 325 TABLET ORAL at 16:09

## 2019-01-10 RX ADMIN — OXYCODONE HYDROCHLORIDE 5 MG: 5 TABLET ORAL at 22:30

## 2019-01-10 RX ADMIN — DEXTROSE AND SODIUM CHLORIDE: 5; 900 INJECTION, SOLUTION INTRAVENOUS at 08:11

## 2019-01-10 RX ADMIN — IPRATROPIUM BROMIDE AND ALBUTEROL SULFATE 3 ML: .5; 3 SOLUTION RESPIRATORY (INHALATION) at 07:57

## 2019-01-10 RX ADMIN — SODIUM CHLORIDE: 9 INJECTION, SOLUTION INTRAVENOUS at 00:44

## 2019-01-10 RX ADMIN — GUAIFENESIN 600 MG: 600 TABLET, EXTENDED RELEASE ORAL at 20:38

## 2019-01-10 RX ADMIN — DOCUSATE SODIUM 100 MG: 100 CAPSULE, LIQUID FILLED ORAL at 20:38

## 2019-01-10 RX ADMIN — ACETAMINOPHEN 650 MG: 325 TABLET ORAL at 20:38

## 2019-01-10 RX ADMIN — PROPRANOLOL HYDROCHLORIDE 20 MG: 10 TABLET ORAL at 14:35

## 2019-01-10 RX ADMIN — IPRATROPIUM BROMIDE AND ALBUTEROL SULFATE 3 ML: .5; 3 SOLUTION RESPIRATORY (INHALATION) at 00:14

## 2019-01-10 RX ADMIN — LEVOFLOXACIN 750 MG: 750 TABLET, FILM COATED ORAL at 00:11

## 2019-01-10 RX ADMIN — GUAIFENESIN 600 MG: 600 TABLET, EXTENDED RELEASE ORAL at 08:12

## 2019-01-10 RX ADMIN — DOCUSATE SODIUM 100 MG: 100 CAPSULE, LIQUID FILLED ORAL at 00:11

## 2019-01-10 RX ADMIN — LEVOFLOXACIN 750 MG: 750 TABLET, FILM COATED ORAL at 08:11

## 2019-01-10 RX ADMIN — OXYCODONE HYDROCHLORIDE 5 MG: 5 TABLET ORAL at 11:43

## 2019-01-10 RX ADMIN — BENZONATATE 100 MG: 100 CAPSULE, LIQUID FILLED ORAL at 20:38

## 2019-01-10 RX ADMIN — DOCUSATE SODIUM 100 MG: 100 CAPSULE, LIQUID FILLED ORAL at 08:11

## 2019-01-10 RX ADMIN — VENLAFAXINE HYDROCHLORIDE 75 MG: 75 CAPSULE, EXTENDED RELEASE ORAL at 08:11

## 2019-01-10 RX ADMIN — ACETAMINOPHEN 650 MG: 325 TABLET ORAL at 05:21

## 2019-01-10 RX ADMIN — PROPRANOLOL HYDROCHLORIDE 20 MG: 10 TABLET ORAL at 20:38

## 2019-01-10 RX ADMIN — HYDROMORPHONE HYDROCHLORIDE 0.5 MG: 1 INJECTION, SOLUTION INTRAMUSCULAR; INTRAVENOUS; SUBCUTANEOUS at 00:46

## 2019-01-10 RX ADMIN — ACETAMINOPHEN 650 MG: 325 TABLET ORAL at 09:50

## 2019-01-10 RX ADMIN — OXYCODONE HYDROCHLORIDE 5 MG: 5 TABLET ORAL at 17:27

## 2019-01-10 RX ADMIN — BENZONATATE 100 MG: 100 CAPSULE, LIQUID FILLED ORAL at 09:50

## 2019-01-10 RX ADMIN — HYDRALAZINE HYDROCHLORIDE 25 MG: 25 TABLET ORAL at 00:28

## 2019-01-10 RX ADMIN — PROPRANOLOL HYDROCHLORIDE 20 MG: 10 TABLET ORAL at 08:11

## 2019-01-10 ASSESSMENT — ACTIVITIES OF DAILY LIVING (ADL)
ADLS_ACUITY_SCORE: 13
ADLS_ACUITY_SCORE: 11
ADLS_ACUITY_SCORE: 13

## 2019-01-10 NOTE — PROGRESS NOTES
SPIRITUAL HEALTH SERVICES  SPIRITUAL ASSESSMENT Progress Note  [facility]      During Rounding,  introduced himself to Sri Grewal and informed her of his availability.    Dedrick Marin M.Div., [facility]  Staff   Office tel: [phone]

## 2019-01-10 NOTE — ED NOTES
Patient has abdominal binder on. Ice pack placed on abdomen. Patient declines further pain medicine at this time. /95,  HR 90's. Requesting ice chips, given per OK by provider.

## 2019-01-10 NOTE — PROGRESS NOTES
S-(situation): Patient registered to Observation. Patient arrived to room 216 via cart from ED    B-(background): patient with pain associated with muscle hematoma and hyponatremia.     A-(assessment): Hypertensive and pain tolerable.   All other systems negative.    R-(recommendations): Orders and observation goals reviewed with patient.    Nursing Observation criteria listed below was met:    Skin issues/needs documented:NA  Isolation needs addressed, if appropriate: NA  Fall Prevention: Education given and documented: Yes  Education Assessment documented:Yes  Education Documented (Pre-existing chronic infection such as, MRSA/VRE need education on admission): NA  OBS video/handout Reviewed & DocumentedYes  Medication Reconciliation Complete: Yes  New medication patient education completed and documented (Possible Side Effects of Common Medications handout): Yes  Home medications if not able to send immediately home with family stored here: NA  Reminder note placed in discharge instructions: NA  Patient has discharge needs (If yes, please explain): No

## 2019-01-10 NOTE — H&P
[facility]    History and Physical - Hospitalist Service       Date of Admission:  1/9/2019    Assessment & Plan   Sri Grewal is a 68 year old female PMH of mild intermittent asthma, HTN, TIA, migraine headaches, chronic hyponatremia, anemia, PID, endometriosis, recently received treatment for acute bronchitis and acute sinusitis, still ongoing cough, admitted on 1/9/2019 with:    Nontraumatic rectal sheath hematoma, with active extravasation is seen on abdominal CT.  Patient not anticoagulated.  No recent trauma.  This is recurrent problem, patient had similar presentation in May 2014, when with cough she developed left-sided rectal sheath hematoma, which resolved with conservative treatment.  HgB 15.3 at 5 PM, and 13.4 at 11 PM, after patient received 0.5 L normal saline bolus.  - On-call surgeon Dr. Narvaez was consulted, suggests this is a self-limited injury but recommends placement of binder  -Apply ice pack to right lower abdomen  - Serial hemoglobin, next check at 3 AM, if at the drop noted, will transfer patient to the facility with IR capacity  -Serial abdominal exams, frequent vitals check, telemetry  - Obtain blood infusion consent  - Symptomatic treatment with analgesics  -Optimize blood pressure    # Mild intermittent asthma, currently in exacerbation.  Likely viral infection cause exacerbation.  No symptoms of bronchitis, failed outpatient treatment with Augmentin.  -Obtain chest x-ray, given persistent cough  -10-day course of levofloxacin  -Scheduled DuoNeb  -As needed albuterol nebs  -Extended release guaifenesin twice a day  -Joi Rich, patient intolerant to codeine    # Hyponatremia, likely due to HCTZ.  Chronically sodium 129-130.  Was 123 on admission.  -0.5 L normal saline given in ER  -Discontinue hydrochlorothiazide  -Continue normal saline overnight, check a.m. Electrolytes    # Essential hypertension, elevated BP on admission, likely due to  abdominal pain  -Continue propranolol, discontinue HCTZ, continue lisinopril  -As needed IV hydralazine for SBP above 170  -One-time 25 mg p.o. Hydralazine  -Pain control     Diet: NPO, in case she would require IR procedure  Room Service    DVT Prophylaxis: Pneumatic Compression Devices  Merida Catheter: not present  Code Status: Full Code      Disposition Plan   Expected discharge: Tomorrow, recommended to prior living arrangement once hemoglobin stable.  Entered: Letty Helton MD 01/09/2019, 11:57 PM     The patient's care was discussed with the Bedside Nurse, Patient and Patient's Family.    Letty Helton MD  [facility]    ______________________________________________________________________    Chief Complaint   right lower quadrant abdominal pain, cough    History is obtained from the patient    History of Present Illness   Sri Grewal is a 68 year old female PMH of mild intermittent asthma, HTN, TIA, migraine headaches, chronic hyponatremia, anemia, PID, endometriosis, recently received treatment for acute bronchitis and acute sinusitis, still ongoing cough, presented to ED for evaluation of right lower quadrant abdominal pain, sudden onset earlier today, and worsened throughout the day.  She has history of spontaneous rectal sheath hematoma on the left in 2014, triggered by frequent cough.  CT abdomen showed right rectal sheath hematoma, with active extravasation.  Patient hemodynamic is stable, hemoglobin 15.3.  On-call surgeon Dr. Narvaez was consulted, suggests this is a self-limited injury but recommends placement of binder.   Patient has been treated for acute bronchitis/sinusitis with Augmentin.  Still having productive whitish phlegm cough.  Afebrile.  WBC is 11.5.  She denies hemoptysis, chest pain, headache, cardiac palpitations, nausea, vomiting, diarrhea.  She has been constipated lately.    Review of Systems    The 10 point Review of Systems is negative  other than noted in the HPI.    Past Medical History    I have reviewed this patient's medical history and updated it with pertinent information if needed.   Past Medical History:   Diagnosis Date     Abnormal Papanicolaou smear of cervix and cervical HPV      Allergic rhinitis, cause unspecified     seasonal allergies     Contact dermatitis and other eczema, due to unspecified cause      Endometriosis, site unspecified      Esophageal reflux     GERD     Migraine, unspecified, without mention of intractable migraine without mention of status migrainosus      Mild persistent asthma      Unspecified disorder of uterus     fibroid uterus     Past Surgical History   I have reviewed this patient's surgical history and updated it with pertinent information if needed.  Past Surgical History:   Procedure Laterality Date     C  DELIVERY ONLY       X2     COLONOSCOPY  2014    Procedure: COLONOSCOPY;  Surgeon: Nathan Baker MD;  Location:  GI     HC COLONOSCOPY THRU STOMA, DIAGNOSTIC  2002    normal     Social History   I have reviewed this patient's social history and updated it with pertinent information if needed.  Social History     Tobacco Use     Smoking status: Never Smoker     Smokeless tobacco: Never Used   Substance Use Topics     Alcohol use: Yes     Comment: beerx2/x1 per month     Drug use: No     Family History   I have reviewed this patient's family history and updated it with pertinent information if needed.   Family History   Problem Relation Age of Onset     Heart Disease Mother         anemia     Osteoporosis Mother      Hypertension Mother      Cerebrovascular Disease Mother      Alcohol/Drug Mother         alcohol     Neurologic Disorder Mother         migraines     Hypertension Father      Cancer No family hx of         breast     Prior to Admission Medications   Prior to Admission Medications   Prescriptions Last Dose Informant Patient Reported? Taking?   ASPIRIN 81 PO Past  Month at Unknown time  Yes Yes   Fexofenadine HCl (ALLEGRA PO) Past Month at Unknown time  Yes Yes   IBUPROFEN PO Past Week at Unknown time  Yes Yes   Sig: Reported on 4/6/2017   albuterol (PROAIR HFA) 108 (90 Base) MCG/ACT Inhaler 1/9/2019 at 2100  No Yes   Sig: Inhale 1-2 puffs into the lungs every 6 hours as needed for shortness of breath / dyspnea   amoxicillin-clavulanate (AUGMENTIN) 875-125 MG tablet 1/9/2019 at 0500  No Yes   Sig: Take 1 tablet by mouth 2 times daily for 10 days   benzonatate (TESSALON) 200 MG capsule Unknown at Unknown time  No No   Sig: Take 1 capsule (200 mg) by mouth 3 times daily as needed for cough   butalbital-aspirin-caffeine (FIORINAL) -40 MG per capsule 1/8/2019 at 1200  No Yes   Sig: TAKE ONE CAPSULE BY MOUTH EVERY 6 HOURS AS NEEDED (one month supply)   lisinopril-hydrochlorothiazide (PRINZIDE/ZESTORETIC) 20-12.5 MG per tablet 1/9/2019 at 1200  No Yes   Sig: Take 2 tablets by mouth daily   potassium chloride ER (K-DUR/KLOR-CON M) 10 MEQ CR tablet 1/9/2019 at 1200  No Yes   Sig: Take 1 tablet (10 mEq) by mouth daily   predniSONE (DELTASONE) 20 MG tablet Unknown at Unknown time  No No   Sig: Take 20 mg by mouth daily.   propranolol (INDERAL) 20 MG tablet 1/9/2019 at 0800  No Yes   Sig: Take 1 tablet (20 mg) by mouth 2 times daily   valACYclovir (VALTREX) 500 MG tablet   No No   Sig: Take 1 tablet (500 mg) by mouth 2 times daily for 7 days   venlafaxine (EFFEXOR-XR) 37.5 MG 24 hr capsule 1/9/2019 at 1200  No Yes   Sig: Take 1 capsule daily for 14 days, then take 2 capsules daily.      Facility-Administered Medications: None     Allergies   Allergies   Allergen Reactions     Codeine      GI UPSET     Percocet [Oxycodone-Acetaminophen] Nausea and Vomiting     Seasonal Allergies        Physical Exam   Vital Signs: Temp: 97.8  F (36.6  C) Temp src: Oral BP: (!) 160/105 Pulse: 103 Heart Rate: 95 Resp: 18 SpO2: 97 % O2 Device: None (Room air)    Weight: 153 lbs 14.1  oz    Constitutional:  Well developed, well nourished.  Appears nontoxic and in no acute distress.    HENT:  Normocephalic and atraumatic.  Symmetric in appearance.  Eyes:  Conjunctivae are normal.  Neck:  Neck supple.  Cardiovascular:  No cyanosis.  RRR.  No audible murmurs noted.    Respiratory:  Effort normal without sign of respiratory distress.  CTAB without diminished regions.    Gastrointestinal:  Soft nondistended abdomen.  RLQ tenderness with guarding,.  No rigidity or rebound tenderness.  Negative Higgins's sign.  No palpable pulsatile mass.   Genitourinary:  Noncontributory.   Musculoskeletal:  Moves extremities spontaneously.  Neurological:  Patient is alert.  Skin:  Skin is warm and dry.  Psychiatric:  Normal mood and affect.    Data   Data reviewed today: I reviewed all medications, new labs and imaging results over the last 24 hours. I personally reviewed no images or EKG's today.    Recent Labs   Lab 01/09/19  2349 01/09/19  1721   WBC  --  11.5*   HGB 13.4 15.3   MCV  --  89   PLT  --  364   NA  --  123*   POTASSIUM  --  4.0   CHLORIDE  --  85*   CO2  --  27   BUN  --  9   CR  --  0.55   ANIONGAP  --  11   CESIA  --  8.7   GLC  --  127*   ALBUMIN  --  3.5   PROTTOTAL  --  7.7   BILITOTAL  --  0.5   ALKPHOS  --  169*   ALT  --  88*   AST  --  70*     Recent Results (from the past 24 hour(s))   CT Abdomen Pelvis w Contrast    Narrative    CT ABDOMEN AND PELVIS WITH CONTRAST   1/9/2019 6:34 PM     HISTORY: Right lower quadrant abdominal pain.    TECHNIQUE: 75mL Isovue-370. Radiation dose for this scan was reduced  using automated exposure control, adjustment of the mA and/or kV  according to patient size, or iterative reconstruction technique.    COMPARISON: None.    FINDINGS: The lung bases are clear. There is no evidence for free air.  There is a large right rectus abdominus mass which is most likely an  acute hematoma. The initial images show curvilinear density which is  most likely due to some active  bleeding. This is not as well seen on  the five minute delayed images. The lesion does not change density,  indicating that there is no enhancement on five minute delayed images.  The liver, spleen, pancreas, and adrenal glands are normal.  Gallbladder not visualized; presumably, it has been resected. There is  a 4.5 cm simple cyst in the right kidney. The kidneys are otherwise  normal. The bladder, uterus and gastrointestinal tract appear normal.  Osseous structures unremarkable.      Impression    IMPRESSION: Right abdominus rectus sheath mass which appears to be due  to a hematoma with active extravasation.. Initial images showed some  high density which is presumably due to some active bleeding. Much  less likely, this could represent unusual mass.    JAMARI ARRIAGA MD

## 2019-01-10 NOTE — PROGRESS NOTES
S- Respiratory Therapy  B- Asthma  A- Patient is on room air SpO2 = 97%.  Breath sounds are clear and equal.  Patient sates she has an occasional wheeze and an occasional non productive cough.   Patient states she prefers to use her inhaler as opposed to a nebulizer.     Patient states she feels ready to go home.   R- RCP will follow , recommend changing patient to MDI prn from scheduled nebs QID

## 2019-01-10 NOTE — PROGRESS NOTES
S: Patient changed to inpatient status    B: Patient status change due to observation goals not being met.     A: C/o RLQ pain. Declines pain meds at this time. Na 128, Hgb 11.9    R: Will monitor patient per MD orders.       Inpatient nursing criteria listed below were met:    Adult Profile completedYes  Health care directives status obtained and documented: Yes  VTE prophylaxis orders: Yes  (FYI: Asprin is not an approved anticoagulation for DVT prophylaxis)  SCD's Documented: Yes  Vaccine assessment done and vaccine ordered if needed. Yes  MRSA swab completed for patient 55 years and older (exclude IGOR and TKA): Yes  Care Plan initiated: Yes  Discharge planning review completed (admission navigator) Yes

## 2019-01-10 NOTE — PROGRESS NOTES
Patient has rested comfortably since Dilaudid and Oxycodone administration.  She declines medication at this time.  Vitals are stable, BP came down nicely.  Sinus rhythm on monitor.   LS are clear, cough seems less pronounced after Mucinex and Tessalon.    She is voiding and steady on her feet with ambulation.   She offers no complaints at this time.

## 2019-01-10 NOTE — PROVIDER NOTIFICATION
Notified MD at 0345 AM regarding lab results.      Spoke with: Dr. Helton    Orders were not obtained.    Comments: Hemoglobin result for 0300 down to 12.4 from 13.4. MD aware vitals are stable and no further action at this time will continue to monitor. Patient denies any need for pain medication at this time.

## 2019-01-10 NOTE — PROGRESS NOTES
[facility]    Medicine Progress Note - Hospitalist Service       Date of Admission:  1/9/2019  Assessment & Plan    Sri Grewal is a 68 year old female PMH of mild intermittent asthma, HTN, TIA, migraine headaches, chronic hyponatremia, anemia, PID, endometriosis, recently received treatment for acute bronchitis and acute sinusitis, still ongoing cough, admitted on 1/9/2019 with:     rectal sheath hematoma, with active extravasation is seen on abdominal CT.  Patient not anticoagulated.  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  HgB 15.3 at 5 PM, and 13.4 at 11 PM, after patient received 0.5 L normal saline bolus.  - On-call surgeon Dr. Narvaez was consulted, suggests this is a self-limited injury but recommends placement of binder and application of ice pack to right lower abdomen  - Serial hemoglobin,  will transfer patient to the facility with IR capacity if H/H drops more than 2 points today  -Serial abdominal exams, frequent vitals check, telemetry  - Obtain blood infusion consent  - Symptomatic treatment with analgesics  -Optimize blood pressure(done)     # Mild intermittent asthma, currently with exacerbation, likely due to viral infection.  No symptoms of bronchitis, failed outpatient treatment with Augmentin.  -chest x-ray pending  -10-day course of levofloxacin(1/9 to 1/19/19)  -Scheduled DuoNeb  -As needed albuterol nebs  -Extended release guaifenesin twice a day  -Joi Rich, patient intolerant to codeine     # Hyponatremia, likely due to HCTZ.  Chronically sodium 129-130.  Was 123 on admission. Improved to 128 today, goal is to increase by 4-6 points per day  -0.5 L normal saline given in ER, D5NS while NPO  -Discontinue hydrochlorothiazide and lisinopril     # Essential  hypertension, elevated BP on admission, likely due to abdominal pain  -discontinue hydrochlorothiazide and  Lisinopril due to hyponatremia  -increase propranolol from 20mg bid to tid to control BP and HR while off lisinopril/HCTZ  -As needed IV hydralazine for SBP above 170  -Pain control       Diet: NPO, in case she would require IR procedure  DVT Prophylaxis: Pneumatic Compression Devices  Merida Catheter: not present  Code Status: Full Code       Diet: Room Service  NPO for Medical/Clinical Reasons Except for: Meds, Ice Chips    DVT Prophylaxis: Pneumatic Compression Devices  Merida Catheter: not present  Code Status: Full Code      Disposition Plan   Expected discharge: 2 - 3 days, recommended to prior living arrangement once hemoglobin stable.  Entered: Mariposa Heart MD 01/10/2019, 7:37 AM       The patient's care was discussed with the Patient.    Mariposa Heart MD  Hospitalist Service  [facility]    ______________________________________________________________________    Interval History   Sri Grewal is a 68 year old female PMH of mild intermittent asthma, HTN, TIA, migraine headaches, chronic hyponatremia, anemia, PID, endometriosis, recently received treatment for acute bronchitis and acute sinusitis, still ongoing cough, presented to ED for evaluation of right lower quadrant abdominal pain, sudden onset earlier today, and worsened throughout the day.  She has history of spontaneous rectal sheath hematoma on the left in 2014, triggered by frequent cough.  CT abdomen showed right rectal sheath hematoma, with active extravasation.  Patient hemodynamic is stable, hemoglobin 15.3.  On-call surgeon Dr. Narvaez was consulted, suggests this is a self-limited injury but recommends placement of binder.   Patient has been treated for acute bronchitis/sinusitis with Augmentin.  Still having productive whitish phlegm cough.  Afebrile.  WBC is 11.5.  She denies hemoptysis, chest pain,  headache, cardiac palpitations, nausea, vomiting, diarrhea.  She has been constipated lately.      Data reviewed today: I reviewed all medications, new labs and imaging results over the last 24 hours. I personally reviewed as follows:  CXR  IMPRESSION:  1. Patchy density left base could represent mild vascular congestion,  atelectasis, or subtle pneumonia.  2. Slightly increased interstitial markings throughout the lungs have  a similar differential diagnosis. Recommend clinical correlation.     CT of abdomen and pelvis  IMPRESSION: Right abdominus rectus sheath mass which appears to be due  to a hematoma with active extravasation.. Initial images showed some  high density which is presumably due to some active bleeding. Much  less likely, this could represent unusual mass.    Physical Exam   Vital Signs: Temp: 97.9  F (36.6  C) Temp src: Oral BP: 131/67 Pulse: 69 Heart Rate: 95 Resp: 18 SpO2: 96 % O2 Device: None (Room air)    Weight: 153 lbs 14.1 oz  Constitutional:  Well developed, well nourished.  Appears nontoxic and in no acute distress.    HENT:  Normocephalic and atraumatic.  Symmetric in appearance.  Eyes:  Conjunctivae are normal.  Neck:  Neck supple.  Cardiovascular:  No cyanosis.  RRR.  No audible murmurs noted.    Respiratory:  Effort normal without sign of respiratory distress.  CTAB without diminished regions.    Gastrointestinal:  Soft nondistended abdomen.  RLQ tenderness with guarding,.  No rigidity or rebound tenderness.  Negative Higgins's sign.  No palpable pulsatile mass.   Genitourinary:  Noncontributory.   Musculoskeletal:  Moves extremities spontaneously.  Neurological:  Patient is alert.  Skin:  Skin is warm and dry.  Psychiatric:  Normal mood and affect.      Data   Recent Labs   Lab 01/10/19  1801 01/10/19  1410 01/10/19  0958 01/10/19  0513  01/09/19  1721   WBC  --   --   --  8.9  --  11.5*   HGB 11.8 11.5* 11.8 11.9   < > 15.3   MCV  --   --   --  90  --  89   PLT  --   --   --  267  --   364   NA  --   --   --  128*  --  123*   POTASSIUM  --   --   --  4.3  --  4.0   CHLORIDE  --   --   --  90*  --  85*   CO2  --   --   --  31  --  27   BUN  --   --   --  12  --  9   CR  --   --   --  0.71  --  0.55   ANIONGAP  --   --   --  7  --  11   CESIA  --   --   --  7.8*  --  8.7   GLC  --   --   --  137*  --  127*   ALBUMIN  --   --   --   --   --  3.5   PROTTOTAL  --   --   --   --   --  7.7   BILITOTAL  --   --   --   --   --  0.5   ALKPHOS  --   --   --   --   --  169*   ALT  --   --   --   --   --  88*   AST  --   --   --   --   --  70*    < > = values in this interval not displayed.

## 2019-01-10 NOTE — PROVIDER NOTIFICATION
MD updated regarding most recent hemoglobin and VS.  Patient declines further pain intervention at this time. Plan is to continue POC at this time.

## 2019-01-11 LAB
BACTERIA SPEC CULT: NORMAL
ERYTHROCYTE [DISTWIDTH] IN BLOOD BY AUTOMATED COUNT: 11.5 % (ref 10–15)
HCT VFR BLD AUTO: 34.7 % (ref 35–47)
HGB BLD-MCNC: 12.2 G/DL (ref 11.7–15.7)
MCH RBC QN AUTO: 31.3 PG (ref 26.5–33)
MCHC RBC AUTO-ENTMCNC: 35.2 G/DL (ref 31.5–36.5)
MCV RBC AUTO: 89 FL (ref 78–100)
PLATELET # BLD AUTO: 296 10E9/L (ref 150–450)
PROCALCITONIN SERPL-MCNC: <0.05 NG/ML
RBC # BLD AUTO: 3.9 10E12/L (ref 3.8–5.2)
SPECIMEN SOURCE: NORMAL
WBC # BLD AUTO: 9.5 10E9/L (ref 4–11)

## 2019-01-11 PROCEDURE — 25000132 ZZH RX MED GY IP 250 OP 250 PS 637: Mod: GY | Performed by: INTERNAL MEDICINE

## 2019-01-11 PROCEDURE — A9270 NON-COVERED ITEM OR SERVICE: HCPCS | Mod: GY | Performed by: STUDENT IN AN ORGANIZED HEALTH CARE EDUCATION/TRAINING PROGRAM

## 2019-01-11 PROCEDURE — 25000128 H RX IP 250 OP 636: Performed by: INTERNAL MEDICINE

## 2019-01-11 PROCEDURE — 25000132 ZZH RX MED GY IP 250 OP 250 PS 637: Mod: GY | Performed by: STUDENT IN AN ORGANIZED HEALTH CARE EDUCATION/TRAINING PROGRAM

## 2019-01-11 PROCEDURE — 36415 COLL VENOUS BLD VENIPUNCTURE: CPT | Performed by: INTERNAL MEDICINE

## 2019-01-11 PROCEDURE — A9270 NON-COVERED ITEM OR SERVICE: HCPCS | Mod: GY | Performed by: HOSPITALIST

## 2019-01-11 PROCEDURE — 25000132 ZZH RX MED GY IP 250 OP 250 PS 637: Mod: GY | Performed by: HOSPITALIST

## 2019-01-11 PROCEDURE — 99232 SBSQ HOSP IP/OBS MODERATE 35: CPT | Performed by: HOSPITALIST

## 2019-01-11 PROCEDURE — 12000000 ZZH R&B MED SURG/OB

## 2019-01-11 PROCEDURE — 85027 COMPLETE CBC AUTOMATED: CPT | Performed by: INTERNAL MEDICINE

## 2019-01-11 PROCEDURE — A9270 NON-COVERED ITEM OR SERVICE: HCPCS | Mod: GY | Performed by: INTERNAL MEDICINE

## 2019-01-11 PROCEDURE — 84145 PROCALCITONIN (PCT): CPT | Performed by: HOSPITALIST

## 2019-01-11 RX ORDER — LEVOFLOXACIN 750 MG/1
750 TABLET, FILM COATED ORAL DAILY
Qty: 4 TABLET | Refills: 0 | Status: SHIPPED | OUTPATIENT
Start: 2019-01-12 | End: 2019-01-28

## 2019-01-11 RX ORDER — VENLAFAXINE HYDROCHLORIDE 75 MG/1
75 CAPSULE, EXTENDED RELEASE ORAL
Qty: 30 CAPSULE | Refills: 0 | Status: SHIPPED | OUTPATIENT
Start: 2019-01-12 | End: 2019-01-17

## 2019-01-11 RX ORDER — PROPRANOLOL HYDROCHLORIDE 40 MG/1
40 TABLET ORAL 3 TIMES DAILY
Qty: 90 TABLET | Refills: 0 | Status: SHIPPED | OUTPATIENT
Start: 2019-01-11 | End: 2019-01-17

## 2019-01-11 RX ORDER — ACETAMINOPHEN 325 MG/1
650 TABLET ORAL EVERY 8 HOURS PRN
Qty: 30 TABLET | Refills: 0 | COMMUNITY
Start: 2019-01-11 | End: 2019-05-22

## 2019-01-11 RX ORDER — OXYCODONE HYDROCHLORIDE 5 MG/1
5 TABLET ORAL EVERY 6 HOURS PRN
Qty: 10 TABLET | Refills: 0 | Status: SHIPPED | OUTPATIENT
Start: 2019-01-11 | End: 2019-05-22

## 2019-01-11 RX ORDER — GUAIFENESIN 600 MG/1
600 TABLET, EXTENDED RELEASE ORAL 2 TIMES DAILY
Qty: 20 TABLET | Refills: 0 | Status: SHIPPED | OUTPATIENT
Start: 2019-01-11 | End: 2019-02-26

## 2019-01-11 RX ORDER — PROPRANOLOL HYDROCHLORIDE 10 MG/1
40 TABLET ORAL 3 TIMES DAILY
Status: DISCONTINUED | OUTPATIENT
Start: 2019-01-11 | End: 2019-01-12 | Stop reason: HOSPADM

## 2019-01-11 RX ADMIN — ACETAMINOPHEN 650 MG: 325 TABLET ORAL at 01:06

## 2019-01-11 RX ADMIN — TRAZODONE HYDROCHLORIDE 50 MG: 50 TABLET ORAL at 01:04

## 2019-01-11 RX ADMIN — ACETAMINOPHEN 650 MG: 325 TABLET ORAL at 13:08

## 2019-01-11 RX ADMIN — PROPRANOLOL HYDROCHLORIDE 20 MG: 10 TABLET ORAL at 09:45

## 2019-01-11 RX ADMIN — OXYCODONE HYDROCHLORIDE 5 MG: 5 TABLET ORAL at 02:28

## 2019-01-11 RX ADMIN — ACETAMINOPHEN 650 MG: 325 TABLET ORAL at 18:04

## 2019-01-11 RX ADMIN — HYDRALAZINE HYDROCHLORIDE 10 MG: 20 INJECTION INTRAMUSCULAR; INTRAVENOUS at 21:13

## 2019-01-11 RX ADMIN — OXYCODONE HYDROCHLORIDE 5 MG: 5 TABLET ORAL at 23:28

## 2019-01-11 RX ADMIN — PROPRANOLOL HYDROCHLORIDE 40 MG: 10 TABLET ORAL at 21:32

## 2019-01-11 RX ADMIN — DOCUSATE SODIUM 100 MG: 100 CAPSULE, LIQUID FILLED ORAL at 09:46

## 2019-01-11 RX ADMIN — VENLAFAXINE HYDROCHLORIDE 75 MG: 75 CAPSULE, EXTENDED RELEASE ORAL at 09:45

## 2019-01-11 RX ADMIN — PROPRANOLOL HYDROCHLORIDE 20 MG: 10 TABLET ORAL at 15:24

## 2019-01-11 RX ADMIN — LEVOFLOXACIN 750 MG: 750 TABLET, FILM COATED ORAL at 09:45

## 2019-01-11 RX ADMIN — GUAIFENESIN 600 MG: 600 TABLET, EXTENDED RELEASE ORAL at 21:06

## 2019-01-11 RX ADMIN — GUAIFENESIN 600 MG: 600 TABLET, EXTENDED RELEASE ORAL at 09:44

## 2019-01-11 RX ADMIN — HYDRALAZINE HYDROCHLORIDE 10 MG: 20 INJECTION INTRAMUSCULAR; INTRAVENOUS at 01:13

## 2019-01-11 RX ADMIN — DOCUSATE SODIUM 100 MG: 100 CAPSULE, LIQUID FILLED ORAL at 21:06

## 2019-01-11 ASSESSMENT — ACTIVITIES OF DAILY LIVING (ADL)
ADLS_ACUITY_SCORE: 11

## 2019-01-11 NOTE — PROGRESS NOTES
[facility]    Medicine Progress Note - Hospitalist Service       Date of Admission:  1/9/2019  Assessment & Plan    Sri Grewal is a 68 year old female PMH of mild intermittent asthma, HTN, TIA, migraine headaches, chronic hyponatremia, anemia, PID, endometriosis, recently received treatment for acute bronchitis and acute sinusitis, still ongoing cough, admitted on 1/9/2019 with:     rectal sheath hematoma, with active extravasation is seen on abdominal CT.  Patient not anticoagulated.  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  -H/H stable since admission  - surgeon Dr. Narvaez suggested placement of binder and application of ice pack to right lower abdomen  - Symptomatic treatment with analgesics     # Mild intermittent asthma, currently with exacerbation, likely due to viral infection.  No symptoms of bacterial bronchitis, failed outpatient treatment with Augmentin.  -chest x-ray 1/10/19 suggest pneumonia vs vascular congestion, the latter is more likely the case based on her clinical history, procalcitonin negative 1/11/19  -7-day course of levofloxacin(1/9 to 1/16/19)  -As needed albuterol nebs  -Extended release guaifenesin twice a day  -Joi Rich, patient intolerant to codeine     # chronic Hyponatremia.  Chronically sodium 129-130.  not much improved after stopping hydrochlorothiazide and lisinopril  -IVF saline locked  -restrict free water oral intake     # Essential hypertension, uncontrolled  -discontinue hydrochlorothiazide and  Lisinopril due to hyponatremia  -increase propranolol from 20mg tid to 40mg tid control BP and HR better  -As needed IV hydralazine for SBP above 170  -Pain control     Diet: Room Service  Regular Diet Adult    DVT Prophylaxis: Pneumatic  Compression Devices  Merida Catheter: not present  Code Status: Full Code      Disposition Plan   Expected discharge: Tomorrow, recommended to prior living arrangement once blood pressure less than 140/90 and hemoglobin stable.  Entered: Mariposa Heart MD 01/11/2019, 3:58 PM       The patient's care was discussed with the Patient.    Mariposa Heart MD  Hospitalist Service  [facility]    ______________________________________________________________________    Interval History   Abdominal pain is better but not gone, tolerated diet well    Data reviewed today: I reviewed all medications, new labs and imaging results over the last 24 hours. I personally reviewed as follows:  CXR  IMPRESSION:  1. Patchy density left base could represent mild vascular congestion,  atelectasis, or subtle pneumonia.  2. Slightly increased interstitial markings throughout the lungs have  a similar differential diagnosis. Recommend clinical correlation.     CT of abdomen and pelvis  IMPRESSION: Right abdominus rectus sheath mass which appears to be due  to a hematoma with active extravasation.. Initial images showed some  high density which is presumably due to some active bleeding. Much  less likely, this could represent unusual mass.    Physical Exam   Vital Signs: Temp: 97.4  F (36.3  C) Temp src: Oral BP: 150/82 Pulse: 81 Heart Rate: 81 Resp: 16 SpO2: 98 % O2 Device: None (Room air)    Weight: 153 lbs 14.1 oz  Constitutional:  Well developed, well nourished.  Appears nontoxic and in no acute distress.    HENT:  Normocephalic and atraumatic.  Symmetric in appearance.  Eyes:  Conjunctivae are normal.  Neck:  Neck supple.  Cardiovascular:  No cyanosis.  RRR.  No audible murmurs noted.    Respiratory:  Effort normal without sign of respiratory distress.  CTAB without diminished regions.    Gastrointestinal:  Soft nondistended abdomen.  RLQ tenderness without rebound,.   No palpable pulsatile mass.   Genitourinary:   Noncontributory.   Musculoskeletal:  Moves extremities spontaneously.  Neurological:  Patient is alert.  Skin:  Skin is warm and dry.  Psychiatric:  Normal mood and affect.      Data   Recent Labs   Lab 01/11/19  0514 01/10/19  1801 01/10/19  1410  01/10/19  0513  01/09/19  1721   WBC 9.5  --   --   --  8.9  --  11.5*   HGB 12.2 11.8 11.5*   < > 11.9   < > 15.3   MCV 89  --   --   --  90  --  89     --   --   --  267  --  364   NA  --   --   --   --  128*  --  123*   POTASSIUM  --   --   --   --  4.3  --  4.0   CHLORIDE  --   --   --   --  90*  --  85*   CO2  --   --   --   --  31  --  27   BUN  --   --   --   --  12  --  9   CR  --   --   --   --  0.71  --  0.55   ANIONGAP  --   --   --   --  7  --  11   CESIA  --   --   --   --  7.8*  --  8.7   GLC  --   --   --   --  137*  --  127*   ALBUMIN  --   --   --   --   --   --  3.5   PROTTOTAL  --   --   --   --   --   --  7.7   BILITOTAL  --   --   --   --   --   --  0.5   ALKPHOS  --   --   --   --   --   --  169*   ALT  --   --   --   --   --   --  88*   AST  --   --   --   --   --   --  70*    < > = values in this interval not displayed.

## 2019-01-11 NOTE — PLAN OF CARE
Patient received one dose of Hydralazine for elevated SBP.   She received Trazodone for sleep overnight.  She appears to have slept well.   She is SR on telemetry.   LS are clear and diminished. Cough is controlled with Tessalon PRN and Mucinex.  She is eating, drinking and voiding.  She offers no complaints at this time.

## 2019-01-12 VITALS
DIASTOLIC BLOOD PRESSURE: 92 MMHG | HEART RATE: 92 BPM | BODY MASS INDEX: 27.27 KG/M2 | RESPIRATION RATE: 18 BRPM | WEIGHT: 153.88 LBS | TEMPERATURE: 98.6 F | SYSTOLIC BLOOD PRESSURE: 174 MMHG | HEIGHT: 63 IN | OXYGEN SATURATION: 95 %

## 2019-01-12 LAB
ALBUMIN SERPL-MCNC: 2.8 G/DL (ref 3.4–5)
ALP SERPL-CCNC: 108 U/L (ref 40–150)
ALT SERPL W P-5'-P-CCNC: 44 U/L (ref 0–50)
ANION GAP SERPL CALCULATED.3IONS-SCNC: 6 MMOL/L (ref 3–14)
AST SERPL W P-5'-P-CCNC: 30 U/L (ref 0–45)
BILIRUB SERPL-MCNC: 0.4 MG/DL (ref 0.2–1.3)
BUN SERPL-MCNC: 5 MG/DL (ref 7–30)
CALCIUM SERPL-MCNC: 8.5 MG/DL (ref 8.5–10.1)
CHLORIDE SERPL-SCNC: 100 MMOL/L (ref 94–109)
CO2 SERPL-SCNC: 25 MMOL/L (ref 20–32)
CREAT SERPL-MCNC: 0.51 MG/DL (ref 0.52–1.04)
ERYTHROCYTE [DISTWIDTH] IN BLOOD BY AUTOMATED COUNT: 11.9 % (ref 10–15)
GFR SERPL CREATININE-BSD FRML MDRD: >90 ML/MIN/{1.73_M2}
GLUCOSE SERPL-MCNC: 112 MG/DL (ref 70–99)
HCT VFR BLD AUTO: 34.7 % (ref 35–47)
HGB BLD-MCNC: 12.1 G/DL (ref 11.7–15.7)
MAGNESIUM SERPL-MCNC: 2.3 MG/DL (ref 1.6–2.3)
MCH RBC QN AUTO: 31.9 PG (ref 26.5–33)
MCHC RBC AUTO-ENTMCNC: 34.9 G/DL (ref 31.5–36.5)
MCV RBC AUTO: 92 FL (ref 78–100)
PLATELET # BLD AUTO: 295 10E9/L (ref 150–450)
POTASSIUM SERPL-SCNC: 4 MMOL/L (ref 3.4–5.3)
PROT SERPL-MCNC: 6.1 G/DL (ref 6.8–8.8)
RBC # BLD AUTO: 3.79 10E12/L (ref 3.8–5.2)
SODIUM SERPL-SCNC: 131 MMOL/L (ref 133–144)
WBC # BLD AUTO: 7.7 10E9/L (ref 4–11)

## 2019-01-12 PROCEDURE — 36415 COLL VENOUS BLD VENIPUNCTURE: CPT | Performed by: HOSPITALIST

## 2019-01-12 PROCEDURE — 25000132 ZZH RX MED GY IP 250 OP 250 PS 637: Mod: GY | Performed by: STUDENT IN AN ORGANIZED HEALTH CARE EDUCATION/TRAINING PROGRAM

## 2019-01-12 PROCEDURE — 25000128 H RX IP 250 OP 636: Performed by: INTERNAL MEDICINE

## 2019-01-12 PROCEDURE — A9270 NON-COVERED ITEM OR SERVICE: HCPCS | Mod: GY | Performed by: HOSPITALIST

## 2019-01-12 PROCEDURE — 25000132 ZZH RX MED GY IP 250 OP 250 PS 637: Mod: GY | Performed by: INTERNAL MEDICINE

## 2019-01-12 PROCEDURE — A9270 NON-COVERED ITEM OR SERVICE: HCPCS | Mod: GY | Performed by: STUDENT IN AN ORGANIZED HEALTH CARE EDUCATION/TRAINING PROGRAM

## 2019-01-12 PROCEDURE — A9270 NON-COVERED ITEM OR SERVICE: HCPCS | Mod: GY | Performed by: INTERNAL MEDICINE

## 2019-01-12 PROCEDURE — 25000132 ZZH RX MED GY IP 250 OP 250 PS 637: Mod: GY | Performed by: HOSPITALIST

## 2019-01-12 PROCEDURE — 99239 HOSP IP/OBS DSCHRG MGMT >30: CPT | Performed by: HOSPITALIST

## 2019-01-12 PROCEDURE — 85027 COMPLETE CBC AUTOMATED: CPT | Performed by: HOSPITALIST

## 2019-01-12 PROCEDURE — 80053 COMPREHEN METABOLIC PANEL: CPT | Performed by: HOSPITALIST

## 2019-01-12 PROCEDURE — 83735 ASSAY OF MAGNESIUM: CPT | Performed by: HOSPITALIST

## 2019-01-12 RX ORDER — CLONIDINE HYDROCHLORIDE 0.1 MG/1
0.1 TABLET ORAL 2 TIMES DAILY PRN
Qty: 60 TABLET | Refills: 0 | Status: SHIPPED | OUTPATIENT
Start: 2019-01-12 | End: 2020-10-14

## 2019-01-12 RX ADMIN — BENZONATATE 100 MG: 100 CAPSULE, LIQUID FILLED ORAL at 03:27

## 2019-01-12 RX ADMIN — HYDRALAZINE HYDROCHLORIDE 10 MG: 20 INJECTION INTRAMUSCULAR; INTRAVENOUS at 01:36

## 2019-01-12 RX ADMIN — PROPRANOLOL HYDROCHLORIDE 40 MG: 10 TABLET ORAL at 08:38

## 2019-01-12 RX ADMIN — LEVOFLOXACIN 750 MG: 750 TABLET, FILM COATED ORAL at 08:38

## 2019-01-12 RX ADMIN — ACETAMINOPHEN 650 MG: 325 TABLET ORAL at 03:27

## 2019-01-12 RX ADMIN — VENLAFAXINE HYDROCHLORIDE 75 MG: 75 CAPSULE, EXTENDED RELEASE ORAL at 08:38

## 2019-01-12 RX ADMIN — DOCUSATE SODIUM 100 MG: 100 CAPSULE, LIQUID FILLED ORAL at 08:38

## 2019-01-12 RX ADMIN — GUAIFENESIN 600 MG: 600 TABLET, EXTENDED RELEASE ORAL at 08:38

## 2019-01-12 ASSESSMENT — ACTIVITIES OF DAILY LIVING (ADL)
ADLS_ACUITY_SCORE: 11

## 2019-01-12 NOTE — PROGRESS NOTES
S-(situation): Patient discharged to home via ambulation with family    B-(background): abdominal hematoma, hyponatremia    A-(assessment): patient reports abdominal pain controlled. Sodium up to 131 this morning. On a 1000 ml free water restriction, stated understanding.     R-(recommendations): Discharge instructions reviewed with patient. Listed belongings gathered and returned to patient. yes         Discharge Nursing Criteria:     Care Plan and Patient education resolved: Yes    New Medications- pt has been educated about purpose and side effects: Yes    Vaccines  Influenza status verified at discharge:  Yes    MISC  Prescriptions if needed, hard copies sent with patient  NA  Home and hospital aquired medications returned to patient: NA  Medication Bin checked and emptied on discharge Yes  Patient reports post-discharge pain management plan is effective: Yes

## 2019-01-12 NOTE — PLAN OF CARE
"S-(situation): End of shift note    B-(background): abdominal hematoma    A-(assessment): /84 (BP Location: Right arm)   Pulse 83   Temp 98.7  F (37.1  C) (Oral)   Resp 16   Ht 1.6 m (5' 3\")   Wt 69.8 kg (153 lb 14.1 oz)   LMP 11/09/2005   SpO2 94%   BMI 27.26 kg/m  . Pt hypertensive throughout shift. Hydralazine given x 2 per MAR.  High BP of 206/99. Ambulating independently with SBA. Adequate output. Tele NSR.     R-(recommendations): Continue to monitor per care plan.     "

## 2019-01-12 NOTE — DISCHARGE SUMMARY
[facility]  Hospitalist Discharge Summary       Date of Admission:  1/9/2019  Date of Discharge:  1/12/2019 10:20 AM  Discharging Provider: Mariposa Heart MD      Discharge Diagnoses   Sri Grewal is a 68 year old female PMH of mild intermittent asthma, HTN, TIA, migraine headaches, chronic hyponatremia, anemia, PID, endometriosis, recently received treatment for acute bronchitis and acute sinusitis, still ongoing cough, admitted on 1/9/2019 with:     rectal sheath hematoma, with active extravasation is seen on abdominal CT.  Patient not anticoagulated.  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  -H/H stable since admission  - surgeon Dr. Narvaez suggested placement of binder and application of ice pack to right lower abdomen  - Symptomatic treatment with analgesics     # Mild intermittent asthma, currently with exacerbation, likely due to viral infection.  No symptoms of bacterial bronchitis, failed outpatient treatment with Augmentin.  -chest x-ray 1/10/19 suggest pneumonia vs vascular congestion, the latter is more likely the case based on her clinical history, procalcitonin negative 1/11/19  -7-day course of levofloxacin(1/9 to 1/16/19)  -As needed albuterol nebs  -Extended release guaifenesin twice a day  -Joi Rich, patient intolerant to codeine     # chronic Hyponatremia.  Chronically sodium 129-130.  not much improved after stopping hydrochlorothiazide and lisinopril  -restrict free water oral intake     # Essential hypertension, uncontrolled  -discontinue hydrochlorothiazide and  Lisinopril due to hyponatremia  -increased propranolol from 20mg tid to 40mg tid control BP and HR better  -Pain control    Follow-ups Needed After Discharge   Follow-up Appointments     Follow-up and  recommended labs and tests       Follow up with PCP within 3-5 days, recheck CBC and BMP Monday 1/14/19 to follow up hematoma/hyponatremia               Unresulted Labs Ordered in the Past 30 Days of this Admission     No orders found from 11/10/2018 to 1/10/2019.          Hospital Course   Patient was admitted for abdominal pain from rectal sheath hematoma from violent coughes. H/H remains stable, no transfusion or surgical intervention is needed, her pain is better and she is stable to go home today    Consultations This Hospital Stay   None    Code Status   Full Code    Time Spent on this Encounter   I, Mariposa Heart, personally saw the patient today and spent greater than 30 minutes discharging this patient.       Mariposa Heart MD  [facility]  ______________________________________________________________________    Physical Exam   Vital Signs: Temp: 98.6  F (37  C) Temp src: Oral BP: (!) 174/92 Pulse: 92 Heart Rate: 83 Resp: 18 SpO2: 95 % O2 Device: None (Room air)    Weight: 153 lbs 14.1 oz  Constitutional:  Well developed, well nourished.  Appears nontoxic and in no acute distress.    HENT:  Normocephalic and atraumatic.  Symmetric in appearance.  Eyes:  Conjunctivae are normal.  Neck:  Neck supple.  Cardiovascular:  No cyanosis.  RRR.  No audible murmurs noted.    Respiratory:  Effort normal without sign of respiratory distress.  CTAB without diminished regions.    Gastrointestinal:  Soft nondistended abdomen.  RLQ tenderness without rebound,.   No palpable pulsatile mass.   Genitourinary:  Noncontributory.   Musculoskeletal:  Moves extremities spontaneously.  Neurological:  Patient is alert.  Skin:  Skin is warm and dry.  Psychiatric:  Normal mood and affect.        Primary Care Physician   Rosenda Pierre    Discharge Disposition   Discharged to home  Condition at discharge: Stable    Significant Results and Procedures   Most Recent 3 CBC's:  Recent Labs   Lab Test  01/12/19  0544 01/11/19  0514 01/10/19  1801  01/10/19  0513   WBC 7.7 9.5  --   --  8.9   HGB 12.1 12.2 11.8   < > 11.9   MCV 92 89  --   --  90    296  --   --  267    < > = values in this interval not displayed.       Discharge Orders      Reason for your hospital stay    Right rectal sheath hematoma     Follow-up and recommended labs and tests     Follow up with PCP within 3-5 days, recheck CBC and BMP Monday 1/14/19 to follow up hematoma/hyponatremia     Activity    Your activity upon discharge: as tolerated     Full Code     Diet    Follow this diet upon discharge: Orders Placed This Encounter      Regular Diet Adult, restrict free water oral intake under 1000cc/day     Discharge Medications   Current Discharge Medication List      START taking these medications    Details   acetaminophen (TYLENOL) 325 MG tablet Take 2 tablets (650 mg) by mouth every 8 hours as needed for mild pain or fever  Qty: 30 tablet, Refills: 0    Associated Diagnoses: Hematoma of abdominal wall, initial encounter      guaiFENesin (MUCINEX) 600 MG 12 hr tablet Take 1 tablet (600 mg) by mouth 2 times daily for 10 days  Qty: 20 tablet, Refills: 0    Associated Diagnoses: Upper respiratory tract infection, unspecified type      levofloxacin (LEVAQUIN) 750 MG tablet Take 1 tablet (750 mg) by mouth daily for 4 days  Qty: 4 tablet, Refills: 0    Associated Diagnoses: Upper respiratory tract infection, unspecified type      oxyCODONE (ROXICODONE) 5 MG tablet Take 1 tablet (5 mg) by mouth every 6 hours as needed for severe pain  Qty: 10 tablet, Refills: 0    Associated Diagnoses: Hematoma of abdominal wall, initial encounter         CONTINUE these medications which have CHANGED    Details   propranolol (INDERAL) 40 MG tablet Take 1 tablet (40 mg) by mouth 3 times daily  Qty: 90 tablet, Refills: 0    Associated Diagnoses: Hypertension goal BP (blood pressure) < 140/90      venlafaxine (EFFEXOR-XR) 75 MG 24 hr capsule Take 1 capsule (75  mg) by mouth daily (with breakfast)  Qty: 30 capsule, Refills: 0    Associated Diagnoses: Adjustment disorder with mixed anxiety and depressed mood         CONTINUE these medications which have NOT CHANGED    Details   albuterol (PROAIR HFA) 108 (90 Base) MCG/ACT Inhaler Inhale 1-2 puffs into the lungs every 6 hours as needed for shortness of breath / dyspnea  Qty: 2 Inhaler, Refills: 11    Associated Diagnoses: Mild persistent asthma with acute exacerbation      benzonatate (TESSALON) 200 MG capsule Take 1 capsule (200 mg) by mouth 3 times daily as needed for cough  Qty: 30 capsule, Refills: 0    Associated Diagnoses: Cough         STOP taking these medications       amoxicillin-clavulanate (AUGMENTIN) 875-125 MG tablet Comments:   Reason for Stopping:         ASPIRIN 81 PO Comments:   Reason for Stopping:         butalbital-aspirin-caffeine (FIORINAL) -40 MG per capsule Comments:   Reason for Stopping:         Fexofenadine HCl (ALLEGRA PO) Comments:   Reason for Stopping:         IBUPROFEN PO Comments:   Reason for Stopping:         lisinopril-hydrochlorothiazide (PRINZIDE/ZESTORETIC) 20-12.5 MG per tablet Comments:   Reason for Stopping:         potassium chloride ER (K-DUR/KLOR-CON M) 10 MEQ CR tablet Comments:   Reason for Stopping:         predniSONE (DELTASONE) 20 MG tablet Comments:   Reason for Stopping:         valACYclovir (VALTREX) 500 MG tablet Comments:   Reason for Stopping:             Allergies   Allergies   Allergen Reactions     Codeine      GI UPSET     Percocet [Oxycodone-Acetaminophen] Nausea and Vomiting     Seasonal Allergies

## 2019-01-14 ENCOUNTER — TELEPHONE (OUTPATIENT)
Facility: CLINIC | Age: 69
End: 2019-01-14

## 2019-01-14 ENCOUNTER — TELEPHONE (OUTPATIENT)
Dept: FAMILY MEDICINE | Facility: OTHER | Age: 69
End: 2019-01-14

## 2019-01-14 DIAGNOSIS — S30.1XXA HEMATOMA OF ABDOMINAL WALL, INITIAL ENCOUNTER: ICD-10-CM

## 2019-01-14 DIAGNOSIS — E87.1 HYPONATREMIA: ICD-10-CM

## 2019-01-14 PROCEDURE — 36415 COLL VENOUS BLD VENIPUNCTURE: CPT | Performed by: FAMILY MEDICINE

## 2019-01-14 PROCEDURE — 80048 BASIC METABOLIC PNL TOTAL CA: CPT | Performed by: FAMILY MEDICINE

## 2019-01-14 PROCEDURE — 85027 COMPLETE CBC AUTOMATED: CPT | Performed by: FAMILY MEDICINE

## 2019-01-14 NOTE — TELEPHONE ENCOUNTER
Reason for follow up call: Sri Grewal appeared on our list for being seen in and recenlty discharge from the Hospital.    Chief Complaint   Patient presents with     Hospital F/U     Hypertension Goal Bp (Blood Pressure) < 140/90, Abdominal Wall Hematoma, Initial Encounter       Encounter routed for Clinic RN to call for follow up      Cassia Francisco RN, BSN

## 2019-01-14 NOTE — PROGRESS NOTES
"  SUBJECTIVE:   Sri Grewal is a 68 year old female who presents to clinic today for the following health issues:      HPI       Hospital Follow-up Visit:    Hospital/Nursing Home/IP Rehab Facility: [facility]  Date of Admission: 01/09/2019  Date of Discharge: 01/12/2019  Reason(s) for Admission: abdominal pain from rectal sheath hematoma from violent coughs             Problems taking medications regularly:  None       Medication changes since discharge: None       Problems adhering to non-medication therapy:  None    Summary of hospitalization:  [facility] discharge summary reviewed  \"Sri Grewal is a 68 year old female PMH of mild intermittent asthma, HTN, TIA, migraine headaches, chronic hyponatremia, anemia, PID, endometriosis, recently received treatment for acute bronchitis and acute sinusitis, still ongoing cough, admitted on 1/9/2019 with:     rectal sheath hematoma, with active extravasation is seen on abdominal CT.  Patient not anticoagulated.  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  -H/H stable since admission  - surgeon Dr. Narvaez suggested placement of binder and application of ice pack to right lower abdomen  - Symptomatic treatment with analgesics     # Mild intermittent asthma, currently with exacerbation, likely due to viral infection.  No symptoms of bacterial bronchitis, failed outpatient treatment with Augmentin.  -chest x-ray 1/10/19 suggest pneumonia vs vascular congestion, the latter is more likely the case based on her clinical history, procalcitonin negative 1/11/19  -7-day course of levofloxacin(1/9 to 1/16/19)  -As needed albuterol nebs  -Extended release guaifenesin twice a day  -Joi Rich, patient intolerant to codeine     # chronic Hyponatremia. " " Chronically sodium 129-130.  not much improved after stopping hydrochlorothiazide and lisinopril  -restrict free water oral intake     # Essential hypertension, uncontrolled  -discontinue hydrochlorothiazide and  Lisinopril due to hyponatremia  -increased propranolol from 20mg tid to 40mg tid control BP and HR better  -Pain control\"    Diagnostic Tests/Treatments reviewed.  Follow up needed: none  Other Healthcare Providers Involved in Patient s Care:         none  Update since discharge: improved. Patient is feeling much better since her recently hospital stay with improving cough and abdominal pain. The lower abdominal bruising is also improving. She still has some nasal congestion but denies fevers or shortness of breath. She recently finished her Levaquin. She is not longer wearing the abdominal binder.     Post Discharge Medication Reconciliation: discharge medications reconciled, continue medications without change.  Plan of care communicated with patient     Coding guidelines for this visit:  Type of Medical   Decision Making Face-to-Face Visit       within 7 Days of discharge Face-to-Face Visit        within 14 days of discharge   Moderate Complexity [id number]   High Complexity [id number]            Problem list and histories reviewed & adjusted, as indicated.  Additional history: none    ROS:  GENERAL: Denies fever, fatigue, weakness, weight gain, or weight loss.  HEENT: Eyes-Denies pain, redness, loss of vision, double or blurred vision.     Ears/Nose- +Nasal congestion. Denies tinnitus, loss of hearing, epistaxis, decreased sense of smell. Denies loss of sense of taste, dry mouth, or sore throat.   CARDIOVASCULAR: Denies chest pain, shortness of breath, irregular heartbeats,  palpitations, or edema.  RESPIRATORY: +Improving cough. Denies hemoptysis, and shortness of breath.  GASTROINTESTINAL: +Improving abdominal pain and abdominal bruising. Denies nausea, vomiting, change in appetite, diarrhea, or " "constipation.  NEUROLOGIC: Denies headache, fainting, dizziness, memory loss, numbness, tingling, or seizures.  PSYCHIATRIC: +Improving anxiety and depression since starting Effexor. Denies thoughts of suicide.    OBJECTIVE:     /74   Pulse 68   Temp 97.3  F (36.3  C) (Temporal)   Resp 16   Ht 1.565 m (5' 1.61\")   Wt 68 kg (150 lb)   LMP 11/09/2005   SpO2 99%   BMI 27.78 kg/m    Body mass index is 27.78 kg/m .  GENERAL: healthy, alert and no distress  EYES: Eyes grossly normal to inspection, PERRL and conjunctivae and sclerae normal  HENT: ear canals and TM's normal, nose and mouth without ulcers or lesions  NECK: no adenopathy, no asymmetry, masses, or scars and thyroid normal to palpation  RESP: lungs clear to auscultation - no rales, rhonchi or wheezes  CV: regular rate and rhythm, normal S1 S2, no S3 or S4, no murmur, click or rub, no peripheral edema  ABDOMEN: soft, minimal lower abdominal tenderness, no hepatosplenomegaly, no masses and bowel sounds normal  PSYCH: mentation appears normal, affect normal/bright    ASSESSMENT/PLAN:       ICD-10-CM    1. Hematoma of rectum, subsequent encounter S36.62XD    2. Pneumonia of left lower lobe due to infectious organism (H) J18.1    3. Hypertension goal BP (blood pressure) < 140/90 I10 propranolol (INDERAL) 40 MG tablet   4. Hyponatremia E87.1 Basic metabolic panel  (Ca, Cl, CO2, Creat, Gluc, K, Na, BUN)   5. Hyperkalemia E87.5 Basic metabolic panel  (Ca, Cl, CO2, Creat, Gluc, K, Na, BUN)   6. Adjustment disorder with mixed anxiety and depressed mood F43.23 venlafaxine (EFFEXOR-XR) 75 MG 24 hr capsule       1-2. Her rectal sheath hematoma symptoms have significantly improved and her cough is improving as well. She will continue with Mucinex and Tessalon pearls as needed to help with her residual cough and if symptoms worsen again, she will contact the clinic.    3-5. She was taken off lisinopril and hydrochlorithiazide due to hyponatremia and her " propranolol was increased to 40 mg 3 times daily. She was also prescribed as needed clonidine to take when systolic pressures are above 180. Her sodium was still low on Monday and potassium was elevated for some reason so this will be rechecked today. Her blood pressure has improved significantly and she will continue to monitor pressures at home. If continually above 140/90, she will let me know.    6. Improving depression and anxiety on the Effexor so will continue current dose.    Follow up in 6 months for a recheck with PCP.           Humberto Mccullough PA-C  [facility]

## 2019-01-14 NOTE — TELEPHONE ENCOUNTER
This patient was in for lab this morning, per your request.    Please place any orders you would like completed.    Thank you,  Jyoti

## 2019-01-14 NOTE — TELEPHONE ENCOUNTER
"Hospital/TCU/ED for chronic condition Discharge Protocol    \"Hi, my name is Cee Miles, a registered nurse, and I am calling from [facility].  I am calling to follow up and see how things are going for you after your recent emergency visit/hospital/TCU stay.\"    Tell me how you are doing now that you are home? \"Here today for lab.\"      Discharge Instructions    \"Let's review your discharge instructions.  What is/are the follow-up recommendations?  Pt. Response: labs today and JM Thursday     \"Has an appointment with your primary care provider been scheduled?\"   Yes. (confirm)    \"When you see the provider, I would recommend that you bring your medications with you.\"    Medications    \"Tell me what changed about your medicines when you discharged?\"    Changes to chronic meds?    0-1    \"What questions do you have about your medications?\"    None     New diagnoses of heart failure, COPD, diabetes, or MI?    No       Medication reconciliation completed? Yes  Was MTM referral placed (*Make sure to put transitions as reason for referral)?   No    Call Summary    \"What questions or concerns do you have about your recent visit and your follow-up care?\"     none    \"If you have questions or things don't continue to improve, we encourage you contact us through the main clinic number (give number).  Even if the clinic is not open, triage nurses are available 24/7 to help you.     We would like you to know that our clinic has extended hours (provide information).  We also have urgent care (provide details on closest location and hours/contact info)\"      \"Thank you for your time and take care!\"    Cee Miles, SHAVON, BSN     "

## 2019-01-15 LAB
ANION GAP SERPL CALCULATED.3IONS-SCNC: 3 MMOL/L (ref 3–14)
BUN SERPL-MCNC: 7 MG/DL (ref 7–30)
CALCIUM SERPL-MCNC: 8.5 MG/DL (ref 8.5–10.1)
CHLORIDE SERPL-SCNC: 94 MMOL/L (ref 94–109)
CO2 SERPL-SCNC: 32 MMOL/L (ref 20–32)
CREAT SERPL-MCNC: 0.63 MG/DL (ref 0.52–1.04)
ERYTHROCYTE [DISTWIDTH] IN BLOOD BY AUTOMATED COUNT: 11.9 % (ref 10–15)
GFR SERPL CREATININE-BSD FRML MDRD: >90 ML/MIN/{1.73_M2}
GLUCOSE SERPL-MCNC: 106 MG/DL (ref 70–99)
HCT VFR BLD AUTO: 37.4 % (ref 35–47)
HGB BLD-MCNC: 12.9 G/DL (ref 11.7–15.7)
MCH RBC QN AUTO: 31.5 PG (ref 26.5–33)
MCHC RBC AUTO-ENTMCNC: 34.5 G/DL (ref 31.5–36.5)
MCV RBC AUTO: 91 FL (ref 78–100)
PLATELET # BLD AUTO: 368 10E9/L (ref 150–450)
POTASSIUM SERPL-SCNC: 6 MMOL/L (ref 3.4–5.3)
RBC # BLD AUTO: 4.1 10E12/L (ref 3.8–5.2)
SODIUM SERPL-SCNC: 129 MMOL/L (ref 133–144)
WBC # BLD AUTO: 8.2 10E9/L (ref 4–11)

## 2019-01-17 ENCOUNTER — OFFICE VISIT (OUTPATIENT)
Dept: FAMILY MEDICINE | Facility: OTHER | Age: 69
End: 2019-01-17
Payer: MEDICARE

## 2019-01-17 VITALS
BODY MASS INDEX: 27.6 KG/M2 | OXYGEN SATURATION: 99 % | TEMPERATURE: 97.3 F | WEIGHT: 150 LBS | RESPIRATION RATE: 16 BRPM | HEIGHT: 62 IN | SYSTOLIC BLOOD PRESSURE: 126 MMHG | DIASTOLIC BLOOD PRESSURE: 74 MMHG | HEART RATE: 68 BPM

## 2019-01-17 DIAGNOSIS — F43.23 ADJUSTMENT DISORDER WITH MIXED ANXIETY AND DEPRESSED MOOD: ICD-10-CM

## 2019-01-17 DIAGNOSIS — S36.62XD: Primary | ICD-10-CM

## 2019-01-17 DIAGNOSIS — E87.5 HYPERKALEMIA: ICD-10-CM

## 2019-01-17 DIAGNOSIS — J18.9 PNEUMONIA OF LEFT LOWER LOBE DUE TO INFECTIOUS ORGANISM: ICD-10-CM

## 2019-01-17 DIAGNOSIS — E87.1 HYPONATREMIA: ICD-10-CM

## 2019-01-17 DIAGNOSIS — I10 HYPERTENSION GOAL BP (BLOOD PRESSURE) < 140/90: ICD-10-CM

## 2019-01-17 LAB
ANION GAP SERPL CALCULATED.3IONS-SCNC: 5 MMOL/L (ref 3–14)
BUN SERPL-MCNC: 10 MG/DL (ref 7–30)
CALCIUM SERPL-MCNC: 8.6 MG/DL (ref 8.5–10.1)
CHLORIDE SERPL-SCNC: 99 MMOL/L (ref 94–109)
CO2 SERPL-SCNC: 29 MMOL/L (ref 20–32)
CREAT SERPL-MCNC: 0.6 MG/DL (ref 0.52–1.04)
GFR SERPL CREATININE-BSD FRML MDRD: >90 ML/MIN/{1.73_M2}
GLUCOSE SERPL-MCNC: 91 MG/DL (ref 70–99)
POTASSIUM SERPL-SCNC: 4.9 MMOL/L (ref 3.4–5.3)
SODIUM SERPL-SCNC: 133 MMOL/L (ref 133–144)

## 2019-01-17 PROCEDURE — 36415 COLL VENOUS BLD VENIPUNCTURE: CPT | Performed by: PHYSICIAN ASSISTANT

## 2019-01-17 PROCEDURE — 99214 OFFICE O/P EST MOD 30 MIN: CPT | Performed by: PHYSICIAN ASSISTANT

## 2019-01-17 PROCEDURE — 80048 BASIC METABOLIC PNL TOTAL CA: CPT | Performed by: PHYSICIAN ASSISTANT

## 2019-01-17 RX ORDER — VENLAFAXINE HYDROCHLORIDE 75 MG/1
75 CAPSULE, EXTENDED RELEASE ORAL
Qty: 90 CAPSULE | Refills: 1 | Status: SHIPPED | OUTPATIENT
Start: 2019-01-17 | End: 2019-03-27

## 2019-01-17 RX ORDER — PROPRANOLOL HYDROCHLORIDE 40 MG/1
40 TABLET ORAL 3 TIMES DAILY
Qty: 270 TABLET | Refills: 1 | Status: SHIPPED | OUTPATIENT
Start: 2019-01-17 | End: 2019-05-22

## 2019-01-17 ASSESSMENT — MIFFLIN-ST. JEOR: SCORE: 1167.52

## 2019-01-17 NOTE — PATIENT INSTRUCTIONS
Continue current medications.    Will order updated labs.    Watch home blood pressures closely and if consistently above 140/90, contact the clinic.    Follow up in 6 months for a recheck.

## 2019-01-22 ENCOUNTER — TELEPHONE (OUTPATIENT)
Dept: FAMILY MEDICINE | Facility: OTHER | Age: 69
End: 2019-01-22

## 2019-01-22 NOTE — TELEPHONE ENCOUNTER
Reason for call:  Patient reporting a symptom    Symptom or request: Water in ear    Duration (how long have symptoms been present): few days    Have you been treated for this before? No    Additional comments: Patient has water in her ear and she cannot get it out. She is asking to speak with a nurse about what she can do to help get it out    Phone Number patient can be reached at:  Home number on file [phone] (home)    Best Time:  any    Can we leave a detailed message on this number:  YES    Call taken on 1/22/2019 at 4:21 PM by Helena White

## 2019-01-22 NOTE — TELEPHONE ENCOUNTER
LM for the patient to return call to the clinic to discuss the below. Will await to hear from patient. Cee Miles RN, BSN

## 2019-01-23 NOTE — TELEPHONE ENCOUNTER
I spoke with the pt who states it is feeling a little better. Got her haircut and water went in her ear.     She will callback if not better.    Cassia Francisco, RN, BSN

## 2019-01-28 ENCOUNTER — TELEPHONE (OUTPATIENT)
Dept: FAMILY MEDICINE | Facility: OTHER | Age: 69
End: 2019-01-28

## 2019-01-28 DIAGNOSIS — J06.9 UPPER RESPIRATORY TRACT INFECTION, UNSPECIFIED TYPE: ICD-10-CM

## 2019-01-28 RX ORDER — LEVOFLOXACIN 750 MG/1
750 TABLET, FILM COATED ORAL DAILY
Qty: 7 TABLET | Refills: 0 | Status: SHIPPED | OUTPATIENT
Start: 2019-01-28 | End: 2019-02-26

## 2019-01-28 NOTE — TELEPHONE ENCOUNTER
Reason for call:  Patient reporting a symptom    Symptom or request: ear pain and still coughing    Duration (how long have symptoms been present): 2 weeks    Have you been treated for this before? Yes    Additional comments: she uses [facility] pharmacy    Phone Number patient can be reached at:  Cell number on file:    Telephone Information:   Mobile [phone]       Best Time:  none    Can we leave a detailed message on this number:  YES    Call taken on 1/28/2019 at 8:49 AM by Harry Barber

## 2019-01-28 NOTE — TELEPHONE ENCOUNTER
Patient requesting another prescription for levofloxacin. Patient was seen in clinic on 1/17/19. She states that she was improving until levofloxacin prescription was completed. Patient states that she has not worsened since that clinic visit but has shown no improvement either.  Patient uses [facility]. Patient requests that we call and leave a detailed message on her voicemail with plan (if prescription is sent or if she has to be seen in clinic).  Sri Grewal is a 68 year old female who calls with plugged left ear, sinus congestion, cough.    NURSING ASSESSMENT:  Description:  Coughs all night, left ear plugged, dental pain, sinus congestion,   Onset/duration:  12/25/18  Precip. factors:  Patient has been treated with augmentin, benzonatate, and levofloxacin  Associated symptoms:  Denies SOB, clear mucus, denies fever, increased fatigue, reports occasional wheezing, ear is not draining,   Improves/worsens symptoms:  Taking mucinex which helps, takes tylenol for pain, augmentin was not effective, levofloxacin was helping but symptoms resumed when patient therapy was completed  Pain scale (0-10)   2/10  LMP/preg/breast feeding:  NA  Last exam/Treatment:  1/17/19  Allergies:   Allergies   Allergen Reactions     Codeine      GI UPSET     Percocet [Oxycodone-Acetaminophen] Nausea and Vomiting     Seasonal Allergies        MEDICATIONS:   Taking medication(s) as prescribed? Yes  Taking over the counter medication(s?) Yes  Any medication side effects? No significant side effects    Any barriers to taking medication(s) as prescribed?  No  Medication(s) improving/managing symptoms?  Yes mucinex is helping and levofloxacin was helping, forgets to take tessalon  Medication reconciliation completed: No      NURSING PLAN: Nursing advice to patient see in 24 hours-no improvement after 48 hours of antibiotic therapy    RECOMMENDED DISPOSITION:  See in 24 hours - patient requesting prescription be called in to  pharmacy. Will come in to clinic if provider wants her to be seen.   Will comply with recommendation: Yes  If further questions/concerns or if symptoms do not improve, worsen or new symptoms develop, call your PCP or [facility] as soon as possible.      Guideline used: sinus problem, cough, earache  Telephone Triage Protocols for Nurses, Fifth Edition, Lilli Bryson RN

## 2019-01-28 NOTE — TELEPHONE ENCOUNTER
Will provide another 7 day course of Levaquin but if still not improving, needs an appt. Sent to [facility].    Humberto Mccullough PA-C

## 2019-01-28 NOTE — TELEPHONE ENCOUNTER
Left detailed message for patient with information below.  Candy Felix CMA    JM - per message below, she wanted the prescripion to be sent to the pharmacy in the building.  Can you please resend it to [facility] pharmacy.

## 2019-02-25 DIAGNOSIS — G43.709 CHRONIC MIGRAINE WITHOUT AURA WITHOUT STATUS MIGRAINOSUS, NOT INTRACTABLE: ICD-10-CM

## 2019-02-25 NOTE — TELEPHONE ENCOUNTER
butalbitol-aspirin-caffeine   Last Written Prescription Date:  11/8/2018  Last Fill Quantity: 30,   # refills: 5  Last Office Visit: 01/17/2019 with TORI  Future Office visit:       Routing refill request to provider for review/approval because:  Drug not active on patient's medication list - discontinued on 01/11/2019

## 2019-02-25 NOTE — TELEPHONE ENCOUNTER
Reason for Call:  Medication or medication refill:    Do you use a [facility]?  Name of the pharmacy and phone number for the current request:  [facility] - [phone]    Name of the medication requested: butalbital    Other request:     Can we leave a detailed message on this number? YES    Phone number patient can be reached at: Cell number on file:    Telephone Information:   Mobile [phone]       Best Time: any    Call taken on 2/25/2019 at 9:44 AM by Caroline Valle

## 2019-02-26 RX ORDER — BUTALBITAL/ASPIRIN/CAFFEINE 50-325-40
CAPSULE ORAL
Qty: 30 CAPSULE | Refills: 0 | Status: SHIPPED | OUTPATIENT
Start: 2019-02-26 | End: 2019-03-27

## 2019-02-26 NOTE — TELEPHONE ENCOUNTER
CDL patient.  Chart reviewed, it appears it was stopped upon discharge from hospital in 1/2019, but no mention of the intention and most likely just med-rec clean-up.  Checked --last refill 12/2018, will go ahead and refill.  Rx printed and signed.

## 2019-02-26 NOTE — TELEPHONE ENCOUNTER
Requested Prescriptions   Pending Prescriptions Disp Refills     butalbital-aspirin-caffeine (FIORINAL) -40 MG per capsule 30 capsule 5     Sig: TAKE ONE CAPSULE BY MOUTH EVERY 6 HOURS AS NEEDED (one month supply)    There is no refill protocol information for this order        Routing refill request to provider for review/approval because:  Drug not on the [facility] refill protocol   Yelena Bryson RN

## 2019-02-26 NOTE — TELEPHONE ENCOUNTER
Patient wondering if this could be sent to [facility] mail order pharmacy. Please call to let her know if this can be done.

## 2019-03-21 NOTE — PROGRESS NOTES
"  SUBJECTIVE:   Sri Grewal is a 68 year old female who presents to clinic today for the following health issues:    HPI  Hypertension Follow-up      Outpatient blood pressures are not being checked.    Low Salt Diet: no added salt    Depression and Anxiety Follow-Up    Status since last visit: Same but Effexor is helping     Other associated symptoms:None    Complicating factors:     Significant life event: Yes-  Friend has cancer      Current substance abuse: None        PHQ 10/11/2018 11/26/2018 3/27/2019   PHQ-9 Total Score 9 11 4   Q9: Suicide Ideation Not at all Not at all Not at all     HAYDEE-7 SCORE 10/11/2018 11/26/2018 3/27/2019   Total Score - - -   Total Score - 8 (mild anxiety) 4 (minimal anxiety)   Total Score 6 8 4     In the past two weeks have you had thoughts of suicide or self-harm?  No.    Do you have concerns about your personal safety or the safety of others?   No        Plan from last visit 1/17/19 with JM - improving mood on Effexor   Started Effexor 11/26/18             Problem list and histories reviewed & adjusted, as indicated.  Additional history: as documented    BP Readings from Last 3 Encounters:   03/27/19 150/90   01/17/19 126/74   01/12/19 (!) 174/92    Wt Readings from Last 3 Encounters:   03/27/19 67.6 kg (149 lb)   01/17/19 68 kg (150 lb)   01/09/19 69.8 kg (153 lb 14.1 oz)                  Labs reviewed in EPIC    ROS:  Constitutional, HEENT, cardiovascular, pulmonary, gi and gu systems are negative, except as otherwise noted.    OBJECTIVE:   /88   Pulse 64   Temp 98.1  F (36.7  C) (Temporal)   Resp 14   Ht 1.575 m (5' 2\")   Wt 67.6 kg (149 lb)   LMP 11/09/2005   SpO2 97%   BMI 27.25 kg/m    Body mass index is 27.25 kg/m .  GENERAL APPEARANCE: healthy, alert and no distress  EYES: Eyes grossly normal to inspection, PERRLA, conjunctivae and sclerae without injection or discharge, EOM intact   RESP: Lungs clear to auscultation - no rales, rhonchi or wheezes    CV: " Regular rates and rhythm, normal S1 S2, no S3 or S4, no murmur, click or rub, no peripheral edema and peripheral pulses strong and symmetric bilaterally   MS: No musculoskeletal defects are noted and gait is age appropriate without ataxia   SKIN: No suspicious lesions or rashes, hydration status appears adeuqate with normal skin turgor   PSYCH: Alert and oriented x3; speech- coherent , normal rate and volume; able to articulate logical thoughts, able to abstract reason, no tangential thoughts, no hallucinations or delusions, mentation appears normal, Mood is euthymic. Affect is appropriate for this mood state and bright. Thought content is free of suicidal ideation, hallucinations, and delusions. Dress is adequate and upkept. Eye contact is good during conversation.       Diagnostic Test Results:  none     ASSESSMENT/PLAN:       ICD-10-CM    1. Moderate major depression (H) F32.1    2. Adjustment disorder with mixed anxiety and depressed mood F43.23 venlafaxine (EFFEXOR-XR) 75 MG 24 hr capsule   3. Chronic migraine without aura without status migrainosus, not intractable G43.709 butalbital-aspirin-caffeine (FIORINAL) -40 MG per capsule   4. Hypertension goal BP (blood pressure) < 140/90 I10      1 & 2. Mood   - Stable on Effexor 75 mg for ~3 months now with no side effects   - Reviewed use and side effects  - Refilled for 1 year, return to clinic if desires dosing change     3. Migraines   - Stable with PRN use of Bubital, failed on triptans in the past   - Reviewed use and side effects, refilled     4. HTN   - Had issues with sodium and potassium due to other medications, ended up in the hospital, now on Propranolol with PRN use of Clonidine if systolic >180   - Stable     The patient indicates understanding of these issues and agrees with the plan.    Follow up: 6 months for migraine recheck         Rosenda Varma-FLEX Saravia  [facility]

## 2019-03-27 ENCOUNTER — OFFICE VISIT (OUTPATIENT)
Dept: FAMILY MEDICINE | Facility: OTHER | Age: 69
End: 2019-03-27
Payer: MEDICARE

## 2019-03-27 VITALS
OXYGEN SATURATION: 97 % | SYSTOLIC BLOOD PRESSURE: 140 MMHG | BODY MASS INDEX: 27.42 KG/M2 | DIASTOLIC BLOOD PRESSURE: 88 MMHG | WEIGHT: 149 LBS | HEIGHT: 62 IN | TEMPERATURE: 98.1 F | RESPIRATION RATE: 14 BRPM | HEART RATE: 64 BPM

## 2019-03-27 DIAGNOSIS — F32.1 MODERATE MAJOR DEPRESSION (H): Primary | ICD-10-CM

## 2019-03-27 DIAGNOSIS — I10 HYPERTENSION GOAL BP (BLOOD PRESSURE) < 140/90: ICD-10-CM

## 2019-03-27 DIAGNOSIS — F43.23 ADJUSTMENT DISORDER WITH MIXED ANXIETY AND DEPRESSED MOOD: ICD-10-CM

## 2019-03-27 DIAGNOSIS — G43.709 CHRONIC MIGRAINE WITHOUT AURA WITHOUT STATUS MIGRAINOSUS, NOT INTRACTABLE: ICD-10-CM

## 2019-03-27 PROCEDURE — 99214 OFFICE O/P EST MOD 30 MIN: CPT | Performed by: PHYSICIAN ASSISTANT

## 2019-03-27 RX ORDER — VENLAFAXINE HYDROCHLORIDE 75 MG/1
75 CAPSULE, EXTENDED RELEASE ORAL
Qty: 90 CAPSULE | Refills: 3 | Status: SHIPPED | OUTPATIENT
Start: 2019-03-27 | End: 2019-05-22

## 2019-03-27 RX ORDER — BUTALBITAL/ASPIRIN/CAFFEINE 50-325-40
CAPSULE ORAL
Qty: 30 CAPSULE | Refills: 5 | Status: SHIPPED | OUTPATIENT
Start: 2019-03-27 | End: 2019-05-22

## 2019-03-27 ASSESSMENT — ANXIETY QUESTIONNAIRES
3. WORRYING TOO MUCH ABOUT DIFFERENT THINGS: SEVERAL DAYS
GAD7 TOTAL SCORE: 4
7. FEELING AFRAID AS IF SOMETHING AWFUL MIGHT HAPPEN: NOT AT ALL
2. NOT BEING ABLE TO STOP OR CONTROL WORRYING: SEVERAL DAYS
7. FEELING AFRAID AS IF SOMETHING AWFUL MIGHT HAPPEN: NOT AT ALL
GAD7 TOTAL SCORE: 4
1. FEELING NERVOUS, ANXIOUS, OR ON EDGE: SEVERAL DAYS
GAD7 TOTAL SCORE: 4
6. BECOMING EASILY ANNOYED OR IRRITABLE: NOT AT ALL
5. BEING SO RESTLESS THAT IT IS HARD TO SIT STILL: SEVERAL DAYS
4. TROUBLE RELAXING: NOT AT ALL

## 2019-03-27 ASSESSMENT — PATIENT HEALTH QUESTIONNAIRE - PHQ9
SUM OF ALL RESPONSES TO PHQ QUESTIONS 1-9: 4
SUM OF ALL RESPONSES TO PHQ QUESTIONS 1-9: 4
10. IF YOU CHECKED OFF ANY PROBLEMS, HOW DIFFICULT HAVE THESE PROBLEMS MADE IT FOR YOU TO DO YOUR WORK, TAKE CARE OF THINGS AT HOME, OR GET ALONG WITH OTHER PEOPLE: SOMEWHAT DIFFICULT

## 2019-03-27 ASSESSMENT — PAIN SCALES - GENERAL: PAINLEVEL: MILD PAIN (3)

## 2019-03-27 ASSESSMENT — MIFFLIN-ST. JEOR: SCORE: 1169.11

## 2019-03-28 ASSESSMENT — PATIENT HEALTH QUESTIONNAIRE - PHQ9: SUM OF ALL RESPONSES TO PHQ QUESTIONS 1-9: 4

## 2019-03-28 ASSESSMENT — ANXIETY QUESTIONNAIRES: GAD7 TOTAL SCORE: 4

## 2019-05-13 NOTE — PATIENT INSTRUCTIONS
- Morning of procedure      Take Venlafaxine and Propranolol with a small sip of water     - Increase Venlafaxine to 3 capsules (112.5 mg)      Recheck 4-6 weeks     - Ask insurance about coverage and location   Check at the insurance for coverage of the new shingles vaccine, Shingrix. If it is not covered now, it may be covered later in the year. Shingrix is given in a 2 shot series, between 2 and 6 months apart. It is recommended for adults age 50 and older. Initial studies have indicated that this new vaccine is 85-90% effective at preventing shingles, and is preferred over the old Zostavax vaccine.            Preventive Health Recommendations    See your health care provider every year to    Review health changes.     Discuss preventive care.      Review your medicines if your doctor has prescribed any.      You no longer need a yearly Pap test unless you've had an abnormal Pap test in the past 10 years. If you have vaginal symptoms, such as bleeding or discharge, be sure to talk with your provider about a Pap test.      Every 1 to 2 years, have a mammogram.  If you are over 69, talk with your health care provider about whether or not you want to continue having screening mammograms.      Every 10 years, have a colonoscopy. Or, have a yearly FIT test (stool test). These exams will check for colon cancer.       Have a cholesterol test every 5 years, or more often if your doctor advises it.       Have a diabetes test (fasting glucose) every three years. If you are at risk for diabetes, you should have this test more often.       At age 65, have a bone density scan (DEXA) to check for osteoporosis (brittle bone disease).    Shots:    Get a flu shot each year.    Get a tetanus shot every 10 years.    Talk to your doctor about your pneumonia vaccines. There are now two you should receive - Pneumovax (PPSV 23) and Prevnar (PCV 13).    Talk to your pharmacist about the shingles vaccine.    Talk to your doctor about  the hepatitis B vaccine.    Nutrition:     Eat at least 5 servings of fruits and vegetables each day.      Eat whole-grain bread, whole-wheat pasta and brown rice instead of white grains and rice.      Get adequate about Calcium and Vitamin D.     Lifestyle    Exercise at least 150 minutes a week (30 minutes a day, 5 days a week). This will help you control your weight and prevent disease.      Limit alcohol to one drink per day.      No smoking.       Wear sunscreen to prevent skin cancer.       See your dentist twice a year for an exam and cleaning.      See your eye doctor every 1 to 2 years to screen for conditions such as glaucoma, macular degeneration, cataracts, etc.    Personalized Prevention Plan  You are due for the preventive services outlined below.  Your care team is available to assist you in scheduling these services.  If you have already completed any of these items, please share that information with your care team to update in your medical record.    Health Maintenance Due   Topic Date Due     Zoster (Shingles) Vaccine (2 of 3) 09/13/2012     Asthma Action Plan - yearly  05/20/2017     Annual Wellness Visit  06/01/2018     Asthma Control Test - every 6 months  02/28/2019     Before Your Surgery      Call your surgeon if there is any change in your health. This includes signs of a cold or flu (such as a sore throat, runny nose, cough, rash or fever).    Do not smoke, drink alcohol or take over the counter medicine (unless your surgeon or primary care doctor tells you to) for the 24 hours before and after surgery.    If you take prescribed drugs: Follow your doctor s orders about which medicines to take and which to stop until after surgery.    Eating and drinking prior to surgery: follow the instructions from your surgeon    Take a shower or bath the night before surgery. Use the soap your surgeon gave you to gently clean your skin. If you do not have soap from your surgeon, use your regular soap.  Do not shave or scrub the surgery site.  Wear clean pajamas and have clean sheets on your bed.   Patient Education   Personalized Prevention Plan  You are due for the preventive services outlined below.  Your care team is available to assist you in scheduling these services.  If you have already completed any of these items, please share that information with your care team to update in your medical record.  Health Maintenance Due   Topic Date Due     Asthma Action Plan  1950     Asthma Control Test  1950     Mammogram  1950     Colonscopy  05/09/1962     Cholesterol Lab  05/09/1997     Zoster (Shingles) Vaccine (2 of 3) 09/13/2012     Annual Wellness Visit  06/01/2018       Exercise for a Healthier Heart     Exercise with a friend. When activity is fun, you're more likely to stick with it.   You may wonder how you can improve the health of your heart. If you re thinking about exercise, you re on the right track. You don t need to become an athlete, but you do need a certain amount of brisk exercise to help strengthen your heart. If you have been diagnosed with a heart condition, your doctor may recommend exercise to help stabilize your condition. To help make exercise a habit, choose safe, fun activities.  Be sure to check with your healthcare provider before starting an exercise program.   Why exercise?  Exercising regularly offers many healthy rewards. It can help you do all of the following:  Improve your blood cholesterol level to help prevent further heart trouble  Lower your blood pressure to help prevent a stroke or heart attack  Control diabetes, or reduce your risk of getting this disease  Improve your heart and lung function  Reach and maintain a healthy weight  Make your muscles stronger and more limber so you can stay active  Prevent falls and fractures by slowing the loss of bone mass (osteoporosis)  Manage stress better  Reduce your blood pressure  Improve your sense of self and your  body image  Exercise tips  Ease into your routine. Set small goals. Then build on them.  Exercise on most days. Aim for a total of 150 or more minutes of moderate to  vigorous intensity activity each week. Consider 40 minutes, 3 to 4 times a week. For best results, activity should last for 40 minutes on average. It is OK to work up to the 40 minute period over time. Examples of moderate-intensity activity is walking 1 mile in 15 minutes or 30 to 45 minutes of yard work.  Step up your daily activity level. Along with your exercise program, try being more active throughout the day. Walk instead of drive. Do more household tasks or yard work.  Choose one or more activities you enjoy. Walking is one of the easiest things you can do. You can also try swimming, riding a bike, dancing, or taking an exercise class.  Stop exercising and call your doctor if you:  Have chest pain or feel dizzy or lightheaded  Feel burning, tightness, pressure, or heaviness in your chest, neck, shoulders, back, or arms  Have unusual shortness of breath  Have increased joint or muscle pain  Have palpitations or an irregular heartbeat   Date Last Reviewed: 5/1/2016 2000-2018 [employer]. [address] [place], [place] [address]. All rights reserved. This information is not intended as a substitute for professional medical care. Always follow your healthcare professional's instructions.        Your Health Risk Assessment indicates you feel you are not in good emotional health.    Recreation   Recreation is not limited to sports and team events. It includes any activity that provides relaxation, interest, enjoyment, and exercise. Recreation provides an outlet for physical, mental, and social energy. It can give a sense of worth and achievement. It can help you stay healthy.    Mental Exercise and Social Involvement  Mental and emotional health is as important as physical health. Keep in touch with friends and family. Stay as  active as possible. Continue to learn and challenge yourself.   Things you can do to stay mentally active are:  Learn something new, like a foreign language or musical instrument.   Play SCRABBLE or do crossword puzzles. If you cannot find people to play these games with you at home, you can play them with others on your computer through the Internet.   Join a games club--anything from card games to chess or checkers or lawn bowling.   Start a new hobby.   Go back to school.   Volunteer.   Read.   Keep up with world events.

## 2019-05-13 NOTE — PROGRESS NOTES
"SUBJECTIVE:   Sri Grewal is a 69 year old female who presents for Preventive Visit.  {PVP to remind patient that this is not necessarily a physical exam; physical exam may or may not be done:701007::\"click delete button to remove this line now\"}  {PVP to inform patient that additional E&M charge may apply, if additional problems addressed:681661::\"click delete button to remove this line now\"}  Are you in the first 12 months of your Medicare coverage?  { :198011::\"No\"}    HPI  Do you feel safe in your environment? { :315577}    Do you have a Health Care Directive? { :979920}    {Hearing Test Done (Optional):118175}  Fall risk  { :827631}  {If any of the above assessments are answered yes, consider ordering appropriate referrals (Optional):158484::\"click delete button to remove this line now\"}  Cognitive Screening { :345969}    {Do you have sleep apnea, excessive snoring or daytime drowsiness? (Optional):712887}    Reviewed and updated as needed this visit by clinical staff         Reviewed and updated as needed this visit by Provider        Social History     Tobacco Use     Smoking status: Never Smoker     Smokeless tobacco: Never Used   Substance Use Topics     Alcohol use: Yes     Comment: beerx2/x1 per month     {Rooming Staff- Complete this question if Prescreen response is not shown below for today's visit. If you drink alcohol do you typically have >3 drinks per day or >7 drinks per week? (Optional):037479}    Alcohol Use 6/1/2017   Prescreen: >3 drinks/day or >7 drinks/week? The patient does not drink >3 drinks per day nor >7 drinks per week.   {add AUDIT responses (Optional) (A score of 7 for adult men is an indication of hazardous drinking; a score of 8 or more is an indication of an alcohol use disorder.  A score of 7 or more for adult women is an indication of hazardous drinking or an alchohol use disorder):316234}    {Outside tests to abstract? :107895}    {additional problems to add " "(Optional):828616}    Current providers sharing in care for this patient include: {Rooming staff:  Please update Care Team in Rooming Activity, refresh this note and then delete this statement}  Patient Care Team:  Rosenda Pierre PA-C as PCP - General (Physician Assistant - Medical)  Rosenda Pierre PA-C as Assigned PCP    The following health maintenance items are reviewed in Epic and correct as of today:  Health Maintenance   Topic Date Due     ZOSTER IMMUNIZATION (2 of 3) 2012     ASTHMA ACTION PLAN Q1 YR  2017     MEDICARE ANNUAL WELLNESS VISIT  2018     ASTHMA CONTROL TEST Q6 MOS  2019     PHQ-9 Q6 MONTHS  2019     BMP Q1 YR  2020     FALL RISK ASSESSMENT  2020     MAMMO SCREEN Q2 YR (SYSTEM ASSIGNED)  2020     LIPID SCREEN Q5 YR FEMALE (SYSTEM ASSIGNED)  2022     DTAP/TDAP/TD IMMUNIZATION (4 - Td) 2023     ADVANCE DIRECTIVE PLANNING Q5 YRS  01/10/2024     COLON CANCER SCREEN (SYSTEM ASSIGNED)  2024     MIGRAINE ACTION PLAN  Completed     DEXA SCAN SCREENING (SYSTEM ASSIGNED)  Completed     DEPRESSION ACTION PLAN  Completed     HEPATITIS C SCREENING  Completed     INFLUENZA VACCINE  Addressed     IPV IMMUNIZATION  Aged Out     MENINGITIS IMMUNIZATION  Aged Out     {Chronicprobdata (optional):372015}  {Decision Support (Optional):019439}    Review of Systems  {ROS COMP (Optional):889904}    OBJECTIVE:   LMP 2005  Estimated body mass index is 27.25 kg/m  as calculated from the following:    Height as of 3/27/19: 1.575 m (5' 2\").    Weight as of 3/27/19: 67.6 kg (149 lb).  Physical Exam  {Exam (Optional) :881476}    {Diagnostic Test Results (Optional):625097::\"Diagnostic Test Results:\",\"none \"}    ASSESSMENT / PLAN:   {Diag Picklist:082563}    End of Life Planning:  Patient currently has an advanced directive: { :604232}    COUNSELING:  {Medicare Counselin}    Estimated body mass index is 27.25 kg/m  as " "calculated from the following:    Height as of 3/27/19: 1.575 m (5' 2\").    Weight as of 3/27/19: 67.6 kg (149 lb).    {Weight Management Plan (ACO) Complete if BMI is abnormal-  Ages 18-64  BMI >24.9.  Age 65+ with BMI <23 or >30 (Optional):903844}     reports that she has never smoked. She has never used smokeless tobacco.  {Tobacco Cessation -- Complete if patient is a smoker (Optional):007468}    Appropriate preventive services were discussed with this patient, including applicable screening as appropriate for cardiovascular disease, diabetes, osteopenia/osteoporosis, and glaucoma.  As appropriate for age/gender, discussed screening for colorectal cancer, prostate cancer, breast cancer, and cervical cancer. Checklist reviewing preventive services available has been given to the patient.    Reviewed patients plan of care and provided an AVS. The {CarePlan:597769} for Sri meets the Care Plan requirement. This Care Plan has been established and reviewed with the {PATIENT, FAMILY MEMBER, CAREGIVER:853875}.    Counseling Resources:  ATP IV Guidelines  Pooled Cohorts Equation Calculator  Breast Cancer Risk Calculator  FRAX Risk Assessment  ICSI Preventive Guidelines  Dietary Guidelines for Americans, 2010  USDA's MyPlate  ASA Prophylaxis  Lung CA Screening    Rosenda Pierre PA-C  [facility]    Identified Health Risks:  "

## 2019-05-22 ENCOUNTER — OFFICE VISIT (OUTPATIENT)
Dept: FAMILY MEDICINE | Facility: OTHER | Age: 69
End: 2019-05-22
Payer: MEDICARE

## 2019-05-22 VITALS
BODY MASS INDEX: 28.05 KG/M2 | WEIGHT: 148.6 LBS | RESPIRATION RATE: 16 BRPM | HEIGHT: 61 IN | DIASTOLIC BLOOD PRESSURE: 88 MMHG | SYSTOLIC BLOOD PRESSURE: 138 MMHG | OXYGEN SATURATION: 99 % | TEMPERATURE: 97.4 F | HEART RATE: 64 BPM

## 2019-05-22 DIAGNOSIS — F43.23 ADJUSTMENT DISORDER WITH MIXED ANXIETY AND DEPRESSED MOOD: ICD-10-CM

## 2019-05-22 DIAGNOSIS — I67.1 MIDDLE CEREBRAL ARTERY ANEURYSM: ICD-10-CM

## 2019-05-22 DIAGNOSIS — Z01.818 PREOP GENERAL PHYSICAL EXAM: Primary | ICD-10-CM

## 2019-05-22 DIAGNOSIS — Z00.00 MEDICARE ANNUAL WELLNESS VISIT, SUBSEQUENT: ICD-10-CM

## 2019-05-22 DIAGNOSIS — I10 HYPERTENSION GOAL BP (BLOOD PRESSURE) < 140/90: ICD-10-CM

## 2019-05-22 DIAGNOSIS — G43.709 CHRONIC MIGRAINE WITHOUT AURA WITHOUT STATUS MIGRAINOSUS, NOT INTRACTABLE: ICD-10-CM

## 2019-05-22 DIAGNOSIS — H25.9 AGE-RELATED CATARACT OF BOTH EYES, UNSPECIFIED AGE-RELATED CATARACT TYPE: ICD-10-CM

## 2019-05-22 DIAGNOSIS — Z13.220 SCREENING FOR LIPID DISORDERS: ICD-10-CM

## 2019-05-22 DIAGNOSIS — J45.31 MILD PERSISTENT ASTHMA WITH ACUTE EXACERBATION: ICD-10-CM

## 2019-05-22 LAB
ANION GAP SERPL CALCULATED.3IONS-SCNC: 6 MMOL/L (ref 3–14)
BUN SERPL-MCNC: 6 MG/DL (ref 7–30)
CALCIUM SERPL-MCNC: 9.3 MG/DL (ref 8.5–10.1)
CHLORIDE SERPL-SCNC: 100 MMOL/L (ref 94–109)
CHOLEST SERPL-MCNC: 205 MG/DL
CO2 SERPL-SCNC: 31 MMOL/L (ref 20–32)
CREAT SERPL-MCNC: 0.64 MG/DL (ref 0.52–1.04)
ERYTHROCYTE [DISTWIDTH] IN BLOOD BY AUTOMATED COUNT: 12.6 % (ref 10–15)
GFR SERPL CREATININE-BSD FRML MDRD: >90 ML/MIN/{1.73_M2}
GLUCOSE SERPL-MCNC: 100 MG/DL (ref 70–99)
HCT VFR BLD AUTO: 43.6 % (ref 35–47)
HDLC SERPL-MCNC: 108 MG/DL
HGB BLD-MCNC: 15.2 G/DL (ref 11.7–15.7)
LDLC SERPL CALC-MCNC: 86 MG/DL
MCH RBC QN AUTO: 31.6 PG (ref 26.5–33)
MCHC RBC AUTO-ENTMCNC: 34.9 G/DL (ref 31.5–36.5)
MCV RBC AUTO: 91 FL (ref 78–100)
NONHDLC SERPL-MCNC: 97 MG/DL
PLATELET # BLD AUTO: 236 10E9/L (ref 150–450)
POTASSIUM SERPL-SCNC: 5.5 MMOL/L (ref 3.4–5.3)
RBC # BLD AUTO: 4.81 10E12/L (ref 3.8–5.2)
SODIUM SERPL-SCNC: 137 MMOL/L (ref 133–144)
TRIGL SERPL-MCNC: 55 MG/DL
WBC # BLD AUTO: 4.8 10E9/L (ref 4–11)

## 2019-05-22 PROCEDURE — 36415 COLL VENOUS BLD VENIPUNCTURE: CPT | Performed by: PHYSICIAN ASSISTANT

## 2019-05-22 PROCEDURE — 80048 BASIC METABOLIC PNL TOTAL CA: CPT | Performed by: PHYSICIAN ASSISTANT

## 2019-05-22 PROCEDURE — 99214 OFFICE O/P EST MOD 30 MIN: CPT | Mod: 25 | Performed by: PHYSICIAN ASSISTANT

## 2019-05-22 PROCEDURE — 85027 COMPLETE CBC AUTOMATED: CPT | Performed by: PHYSICIAN ASSISTANT

## 2019-05-22 PROCEDURE — G0438 PPPS, INITIAL VISIT: HCPCS | Performed by: PHYSICIAN ASSISTANT

## 2019-05-22 PROCEDURE — 80061 LIPID PANEL: CPT | Performed by: PHYSICIAN ASSISTANT

## 2019-05-22 RX ORDER — VENLAFAXINE HYDROCHLORIDE 37.5 MG/1
112.5 CAPSULE, EXTENDED RELEASE ORAL
Qty: 90 CAPSULE | Refills: 3 | Status: SHIPPED | OUTPATIENT
Start: 2019-05-22 | End: 2019-08-01

## 2019-05-22 RX ORDER — BUTALBITAL/ASPIRIN/CAFFEINE 50-325-40
CAPSULE ORAL
Qty: 30 CAPSULE | Refills: 5 | Status: SHIPPED | OUTPATIENT
Start: 2019-05-22 | End: 2019-11-12

## 2019-05-22 RX ORDER — ALBUTEROL SULFATE 90 UG/1
1-2 AEROSOL, METERED RESPIRATORY (INHALATION) EVERY 6 HOURS PRN
Qty: 18 G | Refills: 3 | Status: SHIPPED | OUTPATIENT
Start: 2019-05-22 | End: 2020-10-14

## 2019-05-22 RX ORDER — PROPRANOLOL HYDROCHLORIDE 40 MG/1
40 TABLET ORAL 3 TIMES DAILY
Qty: 270 TABLET | Refills: 1 | Status: SHIPPED | OUTPATIENT
Start: 2019-05-22 | End: 2020-01-03

## 2019-05-22 ASSESSMENT — PAIN SCALES - GENERAL: PAINLEVEL: NO PAIN (0)

## 2019-05-22 ASSESSMENT — ACTIVITIES OF DAILY LIVING (ADL): CURRENT_FUNCTION: NO ASSISTANCE NEEDED

## 2019-05-22 ASSESSMENT — MIFFLIN-ST. JEOR: SCORE: 1149.92

## 2019-05-22 NOTE — PROGRESS NOTES
"SUBJECTIVE:   Sri Grewal is a 69 year old female who presents for Preventive Visit.  Are you in the first 12 months of your Medicare coverage?  No    Healthy Habits:    In general, how would you rate your overall health?  Good    Frequency of exercise:  None    Duration of exercise:  Less than 15 minutes    Do you usually eat at least 4 servings of fruit and vegetables a day, include whole grains    & fiber and avoid regularly eating high fat or \"junk\" foods?  Yes    Taking medications regularly:  Yes    Barriers to taking medications:  None    Medication side effects:  None    Ability to successfully perform activities of daily living:  No assistance needed    Home Safety:  No safety concerns identified    Hearing Impairment:  No hearing concerns    In the past 6 months, have you been bothered by leaking of urine?  No    In general, how would you rate your overall mental or emotional health?  Fair      PHQ-2 Total Score:    Additional concerns today:  Yes (can she bump up effexor )    Do you feel safe in your environment? Yes    Do you have a Health Care Directive? Yes: Patient states has Advance Directive and will bring in a copy to clinic.      Fall risk  Fallen 2 or more times in the past year?: No  Any fall with injury in the past year?: No    Cognitive Screening   1) Repeat 3 items (Leader, Season, Table)   2) Clock draw: NORMAL  3) 3 item recall: Recalls 3 objects  Results: 3 items recalled: COGNITIVE IMPAIRMENT LESS LIKELY    Mini-CogTM Copyright S Abida. Licensed by the author for use in [facility]; reprinted with permission (lee@.[facility]). All rights reserved.      Do you have sleep apnea, excessive snoring or daytime drowsiness?: no    Reviewed and updated as needed this visit by clinical staff  Tobacco  Allergies         Reviewed and updated as needed this visit by Provider  Allergies  Meds  Problems        Social History     Tobacco Use     Smoking status: Never Smoker     Smokeless " tobacco: Never Used   Substance Use Topics     Alcohol use: Yes     Comment: beerx2/x1 per month     If you drink alcohol do you typically have >3 drinks per day or >7 drinks per week? No    Alcohol Use 6/1/2017   Prescreen: >3 drinks/day or >7 drinks/week? The patient does not drink >3 drinks per day nor >7 drinks per week.   No flowsheet data found.    Hypertension Follow-up      Do you check your blood pressure regularly outside of the clinic? Yes     Are you following a low salt diet? Yes    Are your blood pressures ever more than 140 on the top number (systolic) OR more   than 90 on the bottom number (diastolic), for example 140/90? No    Depression and Anxiety Follow-Up    How are you doing with your depression since your last visit? No change    How are you doing with your anxiety since your last visit?  No change    Are you having other symptoms that might be associated with depression or anxiety? Yes:  gets down sometimes     Have you had a significant life event? Financial Concerns     Do you have any concerns with your use of alcohol or other drugs? No    Social History     Tobacco Use     Smoking status: Never Smoker     Smokeless tobacco: Never Used   Substance Use Topics     Alcohol use: Yes     Comment: beerx2/x1 per month     Drug use: No     PHQ 10/11/2018 11/26/2018 3/27/2019   PHQ-9 Total Score 9 11 4   Q9: Thoughts of better off dead/self-harm past 2 weeks Not at all Not at all Not at all     HAYDEE-7 SCORE 10/11/2018 11/26/2018 3/27/2019   Total Score - - -   Total Score - 8 (mild anxiety) 4 (minimal anxiety)   Total Score 6 8 4     In the past two weeks have you had thoughts of suicide or self-harm?  No.    Do you have concerns about your personal safety or the safety of others?   No    Suicide Assessment Five-step Evaluation and Treatment (SAFE-T)      Asthma Follow-Up    Was ACT completed today?    Yes    ACT Total Scores 8/30/2018   ACT TOTAL SCORE -   ASTHMA ER VISITS -   ASTHMA  HOSPITALIZATIONS -   ACT TOTAL SCORE (Goal Greater than or Equal to 20) 21   In the past 12 months, how many times did you visit the emergency room for your asthma without being admitted to the hospital? 0   In the past 12 months, how many times were you hospitalized overnight because of your asthma? 0       How many days per week do you miss taking your asthma controller medication?  I do not have an asthma controller medication    Please describe any recent triggers for your asthma: upper respiratory infections, pollens, mold and humidity    Have you had any Emergency Room Visits, Urgent Care Visits, or Hospital Admissions since your last office visit?  No        Current providers sharing in care for this patient include:   Patient Care Team:  Rosenda Pierre PA-C as PCP - General (Physician Assistant - Medical)  Rosenda Pierre PA-C as Assigned PCP    The following health maintenance items are reviewed in Epic and correct as of today:  Health Maintenance   Topic Date Due     ASTHMA ACTION PLAN  1950     ASTHMA CONTROL TEST  1950     MAMMO SCREENING  1950     COLONOSCOPY  05/09/1962     LIPID  05/09/1997     ZOSTER IMMUNIZATION (2 of 3) 09/13/2012     MEDICARE ANNUAL WELLNESS VISIT  06/01/2018     PHQ-9  09/27/2019     BMP  01/17/2020     FALL RISK ASSESSMENT  01/17/2020     DEXA  11/09/2020     DTAP/TDAP/TD IMMUNIZATION (4 - Td) 07/12/2023     ADVANCED DIRECTIVE PLANNING  01/10/2024     HEPATITIS C SCREENING  Completed     DEPRESSION ACTION PLAN  Completed     MIGRAINE ACTION PLAN  Completed     INFLUENZA VACCINE  Addressed     IPV IMMUNIZATION  Aged Out     MENINGITIS IMMUNIZATION  Aged Out     Lab work is in process  Labs reviewed in EPIC  BP Readings from Last 3 Encounters:   05/22/19 138/88   03/27/19 140/88   01/17/19 126/74    Wt Readings from Last 3 Encounters:   05/22/19 67.4 kg (148 lb 9.6 oz)   03/27/19 67.6 kg (149 lb)   01/17/19 68 kg (150 lb)           "        Mammogram Screening: Mammogram Screening: Patient over age 50, mutual decision to screen reflected in health maintenance.    Review of Systems  Constitutional, HEENT, cardiovascular, pulmonary, GI, , musculoskeletal, neuro, skin, endocrine and psych systems are negative, except as otherwise noted.    OBJECTIVE:   /88   Pulse 64   Temp 97.4  F (36.3  C) (Temporal)   Resp 16   Ht 1.555 m (5' 1.22\")   Wt 67.4 kg (148 lb 9.6 oz)   LMP 11/09/2005   SpO2 99%   BMI 27.88 kg/m   Estimated body mass index is 27.25 kg/m  as calculated from the following:    Height as of 3/27/19: 1.575 m (5' 2\").    Weight as of 3/27/19: 67.6 kg (149 lb).  Physical Exam   Constitutional: She is oriented to person, place, and time. She appears well-developed and well-nourished. No distress.   HENT:   Right Ear: Tympanic membrane and external ear normal.   Left Ear: Tympanic membrane and external ear normal.   Nose: Nose normal.   Mouth/Throat: Oropharynx is clear and moist. No oropharyngeal exudate.   Eyes: Pupils are equal, round, and reactive to light. Conjunctivae are normal. Right eye exhibits no discharge. Left eye exhibits no discharge.   Neck: Neck supple. No tracheal deviation present. No thyromegaly present.   Cardiovascular: Normal rate, regular rhythm, S1 normal, S2 normal, normal heart sounds and normal pulses. Exam reveals no S3, no S4 and no friction rub.   No murmur heard.  Pulmonary/Chest: Effort normal and breath sounds normal. No respiratory distress. She has no wheezes. She has no rales.   Abdominal: Soft. Bowel sounds are normal. She exhibits no mass. There is no hepatosplenomegaly. There is no tenderness.   Musculoskeletal: Normal range of motion. She exhibits no edema.   Lymphadenopathy:     She has no cervical adenopathy.   Neurological: She is alert and oriented to person, place, and time. She has normal strength and normal reflexes. She exhibits normal muscle tone.   Skin: Skin is warm and dry. " No rash noted.   Psychiatric: She has a normal mood and affect. Judgment and thought content normal. Cognition and memory are normal.       Diagnostic Test Results:  Labs reviewed in Epic    See orders pending in Epic       ASSESSMENT / PLAN:       ICD-10-CM    1. Preop general physical exam Z01.818 Basic metabolic panel     CBC with platelets     OFFICE/OUTPT VISIT,EST,LEVL IV   2. Age-related cataract of both eyes, unspecified age-related cataract type H25.9 OFFICE/OUTPT VISIT,EST,LEVL IV   3. Medicare annual wellness visit, subsequent Z00.00    4. Hypertension goal BP (blood pressure) < 140/90 I10 propranolol (INDERAL) 40 MG tablet     Basic metabolic panel   5. Mild persistent asthma with acute exacerbation J45.31 albuterol (PROAIR HFA) 108 (90 Base) MCG/ACT inhaler   6. Adjustment disorder with mixed anxiety and depressed mood F43.23 venlafaxine (EFFEXOR-XR) 37.5 MG 24 hr capsule   7. Chronic migraine without aura without status migrainosus, not intractable G43.709 butalbital-aspirin-caffeine (FIORINAL) -40 MG per capsule   8. Middle cerebral artery aneurysm I67.1    9. Screening for lipid disorders Z13.220 Lipid panel reflex to direct LDL Fasting     - See other note for pre-operative consult     - No records of colonoscopy, patient states had one      Addendum: found Arun Olivera 5/15/2002, will need to update     - Mood     Patient on Venlafaxine 75 mg, wanting to try increased dose due to still having some sadness      Discussed increasing to 112.5 vs. 150 mg, patient would like to go to 112.5 mg (3 tablets of 37.5 mg)      Reviewed use and side effects     Recheck 4-6 weeks     - Asthma and Migraines stable on current regimen     - HTN and Aneurysm      Stable with Propranolol and Clonidine PRN       Follows with neurology for aneurysm           End of Life Planning:  Patient currently has an advanced directive: Yes.  Practitioner is supportive of decision.    COUNSELING:  Reviewed preventive  "health counseling, as reflected in patient instructions  Special attention given to:       Regular exercise       Healthy diet/nutrition       Vision screening       Immunizations    Declined: Zoster due to Other check with insurance for coverage            Advanced Planning     Estimated body mass index is 27.25 kg/m  as calculated from the following:    Height as of 3/27/19: 1.575 m (5' 2\").    Weight as of 3/27/19: 67.6 kg (149 lb).    Weight management plan: Discussed healthy diet and exercise guidelines     reports that she has never smoked. She has never used smokeless tobacco.      Appropriate preventive services were discussed with this patient, including applicable screening as appropriate for cardiovascular disease, diabetes, osteopenia/osteoporosis, and glaucoma.  As appropriate for age/gender, discussed screening for colorectal cancer, prostate cancer, breast cancer, and cervical cancer. Checklist reviewing preventive services available has been given to the patient.    Reviewed patients plan of care and provided an AVS. The Basic Care Plan (routine screening as documented in Health Maintenance) for Sri meets the Care Plan requirement. This Care Plan has been established and reviewed with the Patient.    Counseling Resources:  ATP IV Guidelines  Pooled Cohorts Equation Calculator  Breast Cancer Risk Calculator  FRAX Risk Assessment  ICSI Preventive Guidelines  Dietary Guidelines for Americans, 2010  [employer]'s MyPlate  ASA Prophylaxis  Lung CA Screening    Rosenda Pierre PA-C  [facility]    Identified Health Risks:  "

## 2019-05-22 NOTE — PROGRESS NOTES
[address]  [facility] [address]  [phone]  Dept: [phone]    PRE-OP EVALUATION:  Today's date: 2019    Sri Grewal (: 1950) presents for pre-operative evaluation assessment as requested by Dr. herrera.  She requires evaluation and anesthesia risk assessment prior to undergoing surgery/procedure for treatment of cataracts .    Proposed Surgery/ Procedure: cataracts   Date of Surgery/ Procedure: May 30 and    Time of Surgery/ Procedure: unknown   Hospital/Surgical Facility: [facility] [phone]  Fax number for surgical facility: [phone]  Primary Physician: Rosenda Pierre  Type of Anesthesia Anticipated: Local    Patient has a Health Care Directive or Living Will:  NO    1. NO - Do you have a history of heart attack, stroke, stent, bypass or surgery on an artery in the head, neck, heart or legs?  2. NO - Do you ever have any pain or discomfort in your chest?  3. NO - Do you have a history of  Heart Failure?  4. YES - ARE YOUR TROUBLED BY SHORTNESS OF BREATH WHEN WALKING ON THE LEVEL, UP A SLIGHT HILL OR AT NIGHT? asthmatic under control   5. NO - Do you currently have a cold, bronchitis or other respiratory infection?  6. NO - Do you have a cough, shortness of breath or wheezing?  7. NO - Do you sometimes get pains in the calves of your legs when you walk?  8. NO - Do you or anyone in your family have previous history of blood clots?  9. NO - Do you or does anyone in your family have a serious bleeding problem such as prolonged bleeding following surgeries or cuts?  10. YES - HAVE YOU EVER HAD PROBLEMS WITH ANEMIA OR BEEN TOLD TO TAKE IRON PILLS? Anemia 25 years ago  11. NO - Have you had any abnormal blood loss such as black, tarry or bloody stools, or abnormal vaginal bleeding?  12. NO - Have you ever had a blood transfusion?  13. NO - Have you or any of your relatives ever had problems with anesthesia?  14. NO - Do  you have sleep apnea, excessive snoring or daytime drowsiness?  15. NO - Do you have any prosthetic heart valves?  16. NO - Do you have prosthetic joints?  17. NO - Is there any chance that you may be pregnant?      HPI:     HPI related to upcoming procedure: Patient with bilateral cataracts requiring surgical repair as continue to worsen     See problem list for active medical problems.  Problems all longstanding and stable, except as noted/documented.  See ROS for pertinent symptoms related to these conditions.                                                                                                                                                           ASTHMA - Patient has a longstanding history of asthma . Patient has been doing well overall noting NO SYMPTOMS and continues on medication regimen consisting of albuterol without adverse reactions or side effects.                                                                                                                                                DEPRESSION - Patient has a long history of depression requiring medication for control with recent symptoms being gradually improving. Current symptoms of depression include depressed mood.                                                                                                                                                                                     HYPERTENSION - Patient has history of hypertension, currently denies any symptoms referable to elevated blood pressure. Specifically denies chest pain, palpitations, dyspnea, orthopnea, PND or peripheral edema. Blood pressure readings have been in normal range. Current medication regimen is as listed below. Patient denies any side effects of medication.                                                                                                                                                                                              MEDICAL HISTORY:     Patient Active Problem List    Diagnosis Date Noted     Health Care Home 07/27/2011     Priority: High     X  EMERGENCY CARE PLAN  Presenting Problem Signs and Symptoms Treatment Plan    Questions or conerns during clinic hours    I will call the clinic directly     Questions or conerns outside clinic hours    I will call the 24 hour nurse line at [phone]    Patient needs to schedule an appointment    I will call the 24 hour scheduling team at [phone] or clinic directly    Same day treatment     I will call the clinic first, nurse line if after hours, urgent care and express care if needed                            DX V65.8 REPLACED WITH [id number] HEALTH CARE HOME (04/08/2013)       Hematoma 01/10/2019     Priority: Medium     Hematoma of abdominal wall, initial encounter 01/09/2019     Priority: Medium     Abdominal wall hematoma 01/09/2019     Priority: Medium     Hyponatremia 11/26/2018     Priority: Medium     Age-related osteoporosis without current pathological fracture 11/12/2018     Priority: Medium     HSV (herpes simplex virus) infection 07/31/2018     Priority: Medium     Atypical mole 06/01/2017     Priority: Medium     Hypertension goal BP (blood pressure) < 140/90 05/25/2017     Priority: Medium     TIA (transient ischemic attack) 01/16/2017     Priority: Medium     Adjustment disorder with mixed anxiety and depressed mood 09/09/2014     Priority: Medium     Middle cerebral artery aneurysm 10/29/2013     Priority: Medium     Noted in 2007.  Intervention not recommended at that time.  Observation.  Saw Interventional Radiology 12/2013.  Recheck CTA in one year.       Moderate major depression (H) 09/08/2010     Priority: Medium     Insomnia 09/08/2010     Priority: Medium     Atrophy of vagina 05/30/2010     Priority: Medium     Lump or mass in breast 03/29/2010     Priority: Medium     Mild persistent asthma 02/21/2005     Priority: Medium     Chronic peptic ulcer  2003     Priority: Medium     Problem list name updated by automated process. Provider to review       Anemia 2002     Priority: Medium     Problem list name updated by automated process. Provider to review       Chronic migraine without aura without status migrainosus, not intractable      Priority: Medium     Multiple trials off of fiorinal have not been successful  Problem list name updated by automated process. Provider to review       Other abnormal Papanicolaou smear of cervix and cervical HPV(795.09)      Priority: Medium     (Problem list name updated by automated process. Provider to review and confirm.)       Endometriosis      Priority: Medium     Problem list name updated by automated process. Provider to review       Allergic rhinitis      Priority: Medium     Problem list name updated by automated process. Provider to review        Past Medical History:   Diagnosis Date     Abnormal Papanicolaou smear of cervix and cervical HPV      Allergic rhinitis, cause unspecified     seasonal allergies     Contact dermatitis and other eczema, due to unspecified cause      Endometriosis, site unspecified      Esophageal reflux     GERD     Migraine, unspecified, without mention of intractable migraine without mention of status migrainosus      Mild persistent asthma      Unspecified disorder of uterus     fibroid uterus     Past Surgical History:   Procedure Laterality Date     C  DELIVERY ONLY       X2     COLONOSCOPY  2014    Procedure: COLONOSCOPY;  Surgeon: Nathan Baker MD;  Location:  GI      COLONOSCOPY THRU STOMA, DIAGNOSTIC  2002    normal     Current Outpatient Medications   Medication Sig Dispense Refill     albuterol (PROAIR HFA) 108 (90 Base) MCG/ACT inhaler Inhale 1-2 puffs into the lungs every 6 hours as needed for shortness of breath / dyspnea 18 g 3     butalbital-aspirin-caffeine (FIORINAL) -40 MG per capsule TAKE ONE CAPSULE BY MOUTH EVERY 6 HOURS  "AS NEEDED (one month supply) 30 capsule 5     propranolol (INDERAL) 40 MG tablet Take 1 tablet (40 mg) by mouth 3 times daily 270 tablet 1     venlafaxine (EFFEXOR-XR) 37.5 MG 24 hr capsule Take 3 capsules (112.5 mg) by mouth daily (with breakfast) 90 capsule 3     cloNIDine (CATAPRES) 0.1 MG tablet Take 1 tablet (0.1 mg) by mouth 2 times daily as needed (systolic blood pressure over 180) 60 tablet 0     OTC products: None, except as noted above    Allergies   Allergen Reactions     Codeine      GI UPSET     Percocet [Oxycodone-Acetaminophen] Nausea and Vomiting     Seasonal Allergies       Latex Allergy: NO    Social History     Tobacco Use     Smoking status: Never Smoker     Smokeless tobacco: Never Used   Substance Use Topics     Alcohol use: Yes     Comment: beerx2/x1 per month     History   Drug Use No       REVIEW OF SYSTEMS:   Constitutional, neuro, ENT, endocrine, pulmonary, cardiac, gastrointestinal, genitourinary, musculoskeletal, integument and psychiatric systems are negative, except as otherwise noted.    EXAM:   /88   Pulse 64   Temp 97.4  F (36.3  C) (Temporal)   Resp 16   Ht 1.555 m (5' 1.22\")   Wt 67.4 kg (148 lb 9.6 oz)   LMP 11/09/2005   SpO2 99%   BMI 27.88 kg/m      GENERAL APPEARANCE: healthy, alert and no distress     EYES: EOMI, PERRL     HENT: ear canals and TM's normal and nose and mouth without ulcers or lesions     NECK: no adenopathy, no asymmetry, masses, or scars and thyroid normal to palpation     RESP: lungs clear to auscultation - no rales, rhonchi or wheezes     CV: regular rates and rhythm, normal S1 S2, no S3 or S4 and no murmur, click or rub     ABDOMEN:  soft, nontender, no HSM or masses and bowel sounds normal     MS: extremities normal- no gross deformities noted, no evidence of inflammation in joints, FROM in all extremities.     SKIN: no suspicious lesions or rashes     NEURO: Normal strength and tone, sensory exam grossly normal, mentation intact and speech " normal     PSYCH: mentation appears normal. and affect normal/bright     LYMPHATICS: No cervical adenopathy    DIAGNOSTICS:     No labs or EKG required for low risk surgery (cataract, skin procedure, breast biopsy, etc)    Labs Resulted Today:   Results for orders placed or performed in visit on 01/17/19   Basic metabolic panel  (Ca, Cl, CO2, Creat, Gluc, K, Na, BUN)   Result Value Ref Range    Sodium 133 133 - 144 mmol/L    Potassium 4.9 3.4 - 5.3 mmol/L    Chloride 99 94 - 109 mmol/L    Carbon Dioxide 29 20 - 32 mmol/L    Anion Gap 5 3 - 14 mmol/L    Glucose 91 70 - 99 mg/dL    Urea Nitrogen 10 7 - 30 mg/dL    Creatinine 0.60 0.52 - 1.04 mg/dL    GFR Estimate >90 >60 mL/min/[1.73_m2]    GFR Estimate If Black >90 >60 mL/min/[1.73_m2]    Calcium 8.6 8.5 - 10.1 mg/dL     Labs Drawn and in Process:   Unresulted Labs Ordered in the Past 30 Days of this Admission     Date and Time Order Name Status Description    5/22/2019 1105 LIPID REFLEX TO DIRECT LDL PANEL In process     5/22/2019 1105 CBC WITH PLATELETS In process     5/22/2019 1105 BASIC METABOLIC PANEL In process           Recent Labs   Lab Test 01/17/19  1155 01/14/19  1001 01/12/19  0544  01/16/17  0323 01/15/17  2200   HGB  --  12.9 12.1   < >  --  14.2   PLT  --  368 295   < > 184 212   INR  --   --   --   --   --  0.87    129* 131*   < >  --  143   POTASSIUM 4.9 6.0* 4.0   < >  --  4.0   CR 0.60 0.63 0.51*   < > 0.52 0.69   A1C  --   --   --   --  5.4  --     < > = values in this interval not displayed.      IMPRESSION:   Reason for surgery/procedure: Cataracts - bilateral   Diagnosis/reason for consult: Pre operative consult     The proposed surgical procedure is considered LOW risk.    REVISED CARDIAC RISK INDEX  The patient has the following serious cardiovascular risks for perioperative complications such as (MI, PE, VFib and 3  AV Block):  No serious cardiac risks  INTERPRETATION: 0 risks: Class I (very low risk - 0.4% complication rate)    The  patient has the following additional risks for perioperative complications:  No identified additional risks      ICD-10-CM    1. Preop general physical exam Z01.818 Basic metabolic panel     CBC with platelets     OFFICE/OUTPT VISIT,EST,LEVL IV   2. Age-related cataract of both eyes, unspecified age-related cataract type H25.9 OFFICE/OUTPT VISIT,EST,LEVL IV   3. Medicare annual wellness visit, subsequent Z00.00    4. Hypertension goal BP (blood pressure) < 140/90 I10 propranolol (INDERAL) 40 MG tablet     Basic metabolic panel   5. Mild persistent asthma with acute exacerbation J45.31 albuterol (PROAIR HFA) 108 (90 Base) MCG/ACT inhaler   6. Adjustment disorder with mixed anxiety and depressed mood F43.23 venlafaxine (EFFEXOR-XR) 37.5 MG 24 hr capsule   7. Chronic migraine without aura without status migrainosus, not intractable G43.709 butalbital-aspirin-caffeine (FIORINAL) -40 MG per capsule   8. Screening for lipid disorders Z13.220 Lipid panel reflex to direct LDL Fasting       RECOMMENDATIONS:       Cardiovascular Risk  Patient is already on a Beta Blocker. Continue Betablocker therapy before and after surgery, using Beta blocker order set as necessary for NPO status.    --Patient is to take all scheduled medications on the day of surgery EXCEPT for modifications listed below.    APPROVAL GIVEN to proceed with proposed procedure, without further diagnostic evaluation       Signed Electronically by: Rosenda Pierre PA-C    Copy of this evaluation report is provided to requesting physician.    [facility] Preop Guidelines    Revised Cardiac Risk Index

## 2019-05-22 NOTE — LETTER
My Asthma Action Plan  Name: Sri Grewal   YOB: 1950  Date: 5/22/2019   My doctor: Rosenda Pierre PA-C   My clinic: [facility]        My Control Medicine: None  My Rescue Medicine: Albuterol (Proair/Ventolin/Proventil) inhaler 1-2 puffs every 2-4 hours PRN    My Asthma Severity: intermittent  Avoid your asthma triggers: pollens and humidity  pollens  humidity            GREEN ZONE   Good Control    I feel good    No cough or wheeze    Can work, sleep and play without asthma symptoms       Take your asthma control medicine every day.     1. If exercise triggers your asthma, take your rescue medication    15 minutes before exercise or sports, and    During exercise if you have asthma symptoms  2. Spacer to use with inhaler: If you have a spacer, make sure to use it with your inhaler             YELLOW ZONE Getting Worse  I have ANY of these:    I do not feel good    Cough or wheeze    Chest feels tight    Wake up at night   1. Keep taking your Green Zone medications  2. Start taking your rescue medicine:    every 20 minutes for up to 1 hour. Then every 4 hours for 24-48 hours.  3. If you stay in the Yellow Zone for more than 12-24 hours, contact your doctor.  4. If you do not return to the Green Zone in 12-24 hours or you get worse, start taking your oral steroid medicine if prescribed by your provider.           RED ZONE Medical Alert - Get Help  I have ANY of these:    I feel awful    Medicine is not helping    Breathing getting harder    Trouble walking or talking    Nose opens wide to breathe       1. Take your rescue medicine NOW  2. If your provider has prescribed an oral steroid medicine, start taking it NOW  3. Call your doctor NOW  4. If you are still in the Red Zone after 20 minutes and you have not reached your doctor:    Take your rescue medicine again and    Call 911 or go to the emergency room right away    See your regular doctor within 2 weeks of an  Emergency Room or Urgent Care visit for follow-up treatment.          Annual Reminders:  Meet with Asthma Educator,  Flu Shot in the Fall, consider Pneumonia Vaccination for patients with asthma (aged 19 and older).    Pharmacy:    [facility] [place] [address]  [facility]   [facility] - [place], [place] - [address]                      Asthma Triggers  How To Control Things That Make Your Asthma Worse    Triggers are things that make your asthma worse.  Look at the list below to help you find your triggers and what you can do about them.  You can help prevent asthma flare-ups by staying away from your triggers.      Trigger                                                          What you can do   Cigarette Smoke  Tobacco smoke can make asthma worse. Do not allow smoking in your home, car or around you.  Be sure no one smokes at a child s day care or school.  If you smoke, ask your health care provider for ways to help you quit.  Ask family members to quit too.  Ask your health care provider for a referral to Quit Plan to help you quit smoking, or call 5-154-211-PLAN.     Colds, Flu, Bronchitis  These are common triggers of asthma. Wash your hands often.  Don t touch your eyes, nose or mouth.  Get a flu shot every year.     Dust Mites  These are tiny bugs that live in cloth or carpet. They are too small to see. Wash sheets and blankets in hot water every week.   Encase pillows and mattress in dust mite proof covers.  Avoid having carpet if you can. If you have carpet, vacuum weekly.   Use a dust mask and HEPA vacuum.   Pollen and Outdoor Mold  Some people are allergic to trees, grass, or weed pollen, or molds. Try to keep your windows closed.  Limit time out doors when pollen count is high.   Ask you health care provider about taking medicine during allergy season.     Animal Dander  Some people are allergic to  skin flakes, urine or saliva from pets with fur or feathers. Keep pets with fur or feathers out of your home.    If you can t keep the pet outdoors, then keep the pet out of your bedroom.  Keep the bedroom door closed.  Keep pets off cloth furniture and away from stuffed toys.     Mice, Rats, and Cockroaches  Some people are allergic to the waste from these pests.   Cover food and garbage.  Clean up spills and food crumbs.  Store grease in the refrigerator.   Keep food out of the bedroom.   Indoor Mold  This can be a trigger if your home has high moisture. Fix leaking faucets, pipes, or other sources of water.   Clean moldy surfaces.  Dehumidify basement if it is damp and smelly.   Smoke, Strong Odors, and Sprays  These can reduce air quality. Stay away from strong odors and sprays, such as perfume, powder, hair spray, paints, smoke incense, paint, cleaning products, candles and new carpet.   Exercise or Sports  Some people with asthma have this trigger. Be active!  Ask your doctor about taking medicine before sports or exercise to prevent symptoms.    Warm up for 5-10 minutes before and after sports or exercise.     Other Triggers of Asthma  Cold air:  Cover your nose and mouth with a scarf.  Sometimes laughing or crying can be a trigger.  Some medicines and food can trigger asthma.

## 2019-05-22 NOTE — Clinical Note
Ronit - Please review billing - Pre-op, medicare, physical, and I increased her depression medication

## 2019-05-22 NOTE — PROGRESS NOTES
She is at risk for lack of exercise and has been provided with information to increase physical activity for the benefit of her well-being.  The patient was provided with suggestions to help her develop a healthy emotional lifestyle.

## 2019-05-27 ENCOUNTER — TELEPHONE (OUTPATIENT)
Dept: FAMILY MEDICINE | Facility: OTHER | Age: 69
End: 2019-05-27

## 2019-05-27 DIAGNOSIS — E87.1 HYPONATREMIA: ICD-10-CM

## 2019-05-27 DIAGNOSIS — E87.5 HYPERKALEMIA: ICD-10-CM

## 2019-05-27 DIAGNOSIS — I10 HYPERTENSION GOAL BP (BLOOD PRESSURE) < 140/90: Primary | ICD-10-CM

## 2019-05-27 NOTE — RESULT ENCOUNTER NOTE
See telephone encounter.   Patient is cleared to proceed with surgery.     Jose Francisco Pierre PA-C

## 2019-05-27 NOTE — TELEPHONE ENCOUNTER
Please call patient     Labs all looking good except for a small increase in potassium. Not as high as she has been in the past. I am not sure why as she is no longer on the medication that caused this issue in the past. Could be due to dehydration. I recommend we recheck this Tuesday 5/28 or morning of Wednesday 5/29 before her surgery to make sure it has normalized.     She is cleared to proceed with surgery.   Rest of labs normal.    Jose Francisco Pierre PA-C  [facility]

## 2019-05-28 ENCOUNTER — TELEPHONE (OUTPATIENT)
Dept: FAMILY MEDICINE | Facility: OTHER | Age: 69
End: 2019-05-28

## 2019-05-28 DIAGNOSIS — E87.5 HYPERKALEMIA: ICD-10-CM

## 2019-05-28 DIAGNOSIS — I10 HYPERTENSION GOAL BP (BLOOD PRESSURE) < 140/90: ICD-10-CM

## 2019-05-28 DIAGNOSIS — E87.1 HYPONATREMIA: ICD-10-CM

## 2019-05-28 LAB
ANION GAP SERPL CALCULATED.3IONS-SCNC: 5 MMOL/L (ref 3–14)
BUN SERPL-MCNC: 8 MG/DL (ref 7–30)
CALCIUM SERPL-MCNC: 9.4 MG/DL (ref 8.5–10.1)
CHLORIDE SERPL-SCNC: 96 MMOL/L (ref 94–109)
CO2 SERPL-SCNC: 31 MMOL/L (ref 20–32)
CREAT SERPL-MCNC: 0.65 MG/DL (ref 0.52–1.04)
GFR SERPL CREATININE-BSD FRML MDRD: >90 ML/MIN/{1.73_M2}
GLUCOSE SERPL-MCNC: 102 MG/DL (ref 70–99)
POTASSIUM SERPL-SCNC: 5.1 MMOL/L (ref 3.4–5.3)
SODIUM SERPL-SCNC: 132 MMOL/L (ref 133–144)

## 2019-05-28 PROCEDURE — 80048 BASIC METABOLIC PNL TOTAL CA: CPT | Performed by: PHYSICIAN ASSISTANT

## 2019-05-28 PROCEDURE — 36415 COLL VENOUS BLD VENIPUNCTURE: CPT | Performed by: PHYSICIAN ASSISTANT

## 2019-05-28 NOTE — TELEPHONE ENCOUNTER
Attempted to reach the patient with the following information.  Left message for patient to return call to clinic.     Sabrina White MA

## 2019-05-28 NOTE — RESULT ENCOUNTER NOTE
Please call patient with the following message    Potassium now in normal range. OK to proceed with surgery.     Jose Francisco Pierre PA-C

## 2019-05-28 NOTE — TELEPHONE ENCOUNTER
----- Message from Rosenda Pierre PA-C sent at 5/28/2019  2:43 PM CDT -----  Please call patient with the following message    Potassium now in normal range. OK to proceed with surgery.     Jose Francisco Pierre PA-C

## 2019-07-06 ENCOUNTER — NURSE TRIAGE (OUTPATIENT)
Dept: NURSING | Facility: CLINIC | Age: 69
End: 2019-07-06

## 2019-07-06 NOTE — TELEPHONE ENCOUNTER
Pt calling as she fell while up camping last night. Still having pain from her right arm pit to her upper chest. Denies bruising, but hurts more when she moves or tries to take a breath. Advised to be seen in the [place] ED now. She wants to wait awhile before going. I again advised she go now. If she chooses to stay home, to let someone know what is going on and to carry a cell phone with her at all times.    Dina Gomez RN, [facility]      Reason for Disposition    SEVERE chest pain    Additional Information    Negative: Major injury from dangerous force or speed (e.g., MVA, fall > 10 feet or 3 meters)    Negative: Bullet wound, knife wound, or other penetrating object    Negative: Puncture wound that sounds life-threatening to the triager    Negative: Severe difficulty breathing (e.g., struggling for each breath, speaks in single words)    Negative: [1] Major bleeding (e.g., actively dripping or spurting) AND [2] can't be stopped    Negative: Open wound of the chest with sound of moving air (sucking wound) or visible air bubbles    Negative: Shock suspected (e.g., cold/pale/clammy skin, too weak to stand, low BP, rapid pulse)    Negative: Coughing or spitting up blood    Negative: Bluish (or gray) lips or face now    Negative: Unconscious or was unconscious    Negative: Sounds like a life-threatening emergency to the triager    Negative: [1] Injuries at more than 1 site AND [2] unsure which guideline to use    Negative: Chest pain not from an injury OR cause is unknown    Negative: Wound looks infected    Protocols used: CHEST INJURY-A-AH

## 2019-07-09 ENCOUNTER — OFFICE VISIT (OUTPATIENT)
Dept: FAMILY MEDICINE | Facility: OTHER | Age: 69
End: 2019-07-09
Payer: MEDICARE

## 2019-07-09 ENCOUNTER — TELEPHONE (OUTPATIENT)
Dept: FAMILY MEDICINE | Facility: OTHER | Age: 69
End: 2019-07-09

## 2019-07-09 ENCOUNTER — ANCILLARY PROCEDURE (OUTPATIENT)
Dept: GENERAL RADIOLOGY | Facility: OTHER | Age: 69
End: 2019-07-09
Attending: PHYSICIAN ASSISTANT
Payer: MEDICARE

## 2019-07-09 VITALS
HEIGHT: 61 IN | DIASTOLIC BLOOD PRESSURE: 88 MMHG | OXYGEN SATURATION: 96 % | TEMPERATURE: 98.2 F | SYSTOLIC BLOOD PRESSURE: 142 MMHG | HEART RATE: 70 BPM | WEIGHT: 151.2 LBS | BODY MASS INDEX: 28.55 KG/M2 | RESPIRATION RATE: 16 BRPM
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
## 2019-07-09 DIAGNOSIS — R07.81 RIB PAIN ON RIGHT SIDE: Primary | ICD-10-CM

## 2019-07-09 DIAGNOSIS — R07.81 RIB PAIN ON RIGHT SIDE: ICD-10-CM

## 2019-07-09 PROCEDURE — 99214 OFFICE O/P EST MOD 30 MIN: CPT | Performed by: PHYSICIAN ASSISTANT

## 2019-07-09 PROCEDURE — 71101 X-RAY EXAM UNILAT RIBS/CHEST: CPT | Mod: RT

## 2019-07-09 RX ORDER — TRAMADOL HYDROCHLORIDE 50 MG/1
50 TABLET ORAL EVERY 6 HOURS PRN
Qty: 20 TABLET | Refills: 0 | Status: SHIPPED | OUTPATIENT
Start: 2019-07-09 | End: 2019-07-12

## 2019-07-09 ASSESSMENT — MIFFLIN-ST. JEOR: SCORE: 1161.71

## 2019-07-09 ASSESSMENT — PAIN SCALES - GENERAL: PAINLEVEL: SEVERE PAIN (6)

## 2019-07-09 NOTE — TELEPHONE ENCOUNTER
Reason for call:  Same Day Appointment  Reason for Call:  Same Day Appointment, Requested Provider:  KATIE Berger     PCP: Rosenda Pierre    Reason for visit: injured right ribcage    Duration of symptoms: since friday    Have you been treated for this in the past? No    Additional comments: pt had taken a fall on Friday and her ribcage is bothering her. She states its painful and when she fell a chair went into her ribcage. 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   - painful when taking deep breaths    Can we leave a detailed message on this number? YES    Phone number patient can be reached at: Cell number on file:    Telephone Information:   Mobile [phone]       Best Time: anytime    Call taken on 7/9/2019 at 8:52 AM by Yamilex Hernandez

## 2019-07-09 NOTE — PATIENT INSTRUCTIONS
1. Continue Tylenol Extra Strength - 500 mg      2 tablets (1,000 mg) three times a day for 4-5 days     2. Tramadol - pain medication, as needed mainly at night, no driving     3. Wear brace as needed for pain     4. Remember to take slow deep breaths every few hours, take 4-5 deep breaths              Patient Education     Rib Contusion or Minor Fracture    A rib contusion is a bruise to one or more rib bones. It may cause pain, tenderness, swelling, and a purplish color to the skin. There may be a sharp pain with each breath. A rib contusion takes anywhere from a few days to a few weeks to heal. A minor rib fracture or break may cause the same symptoms as a rib contusion. The small crack may not be seen on a regular chest X-ray. Treatment for both problems is basically the same.  Home care    You may use over-the-counter pain medicine to control pain, unless another pain medicine was prescribed. If you have chronic liver or kidney disease or ever had a stomach ulcer or GI (gastrointestinal) bleeding, talk with your healthcare provider before using these medicines.    Rest. Don't lift anything heavy or do any activity that causes pain.    Apply an ice pack over the injured area for 15 to 20 minutes every 1 to 2 hours. You should do this for the first 24 to 48 hours. To make an ice pack, put ice cubes in a plastic bag that seals at the top. Wrap the bag in a clean, thin towel or cloth. Never put ice or an ice pack directly on the skin. Continue with ice packs as needed for the relief of pain and swelling.    The first 3 to 4 weeks of healing will be the most painful. If your pain is not under control with the treatment given, call your healthcare provider. Sometimes a stronger pain medicine may be needed. A nerve block can be done in case of severe pain. It will numb the nerve between the ribs.  Follow-up care  Follow up with your healthcare provider, or as advised.  If X-rays were taken, you will be told of any  new findings that may affect your care.  Call 911  Call 911 if you have:    Dizziness, weakness or fainting    Shortness of breath with or without chest discomfort    New or worsening pain  When to seek medical advice  Call your healthcare provider right away if any of these occur:    Fever of 100.4 F (38 C) or higher, or as directed by your healthcare provider    Chills    Stomach pain, vomiting  Date Last Reviewed: 6/1/2018 2000-2018 [employer]. [address] [place], [place] [address]. All rights reserved. This information is not intended as a substitute for professional medical care. Always follow your healthcare professional's instructions.

## 2019-07-09 NOTE — PROGRESS NOTES
"Subjective     Sri Grewal is a 69 year old female who presents to clinic today for the following health issues:    XRay first -CDL    HPI   Joint Pain- Rib pain     Onset: Friday     Description:   Location: right ribs  Character: Sharp    Intensity: moderate    Progression of Symptoms: intermittent with movement     Accompanying Signs & Symptoms:  Other symptoms: none    History:   Previous similar pain: no       Precipitating factors:   Trauma or overuse: YES- fell on a chair    Alleviating factors:  Improved by: rest/inactivity  Therapies Tried and outcome: over the counter tylenol - doesn't help  Was told to avoid NSAIDs due to chronic peptic ulcer     RN Triage note from 7/6/19   Pt calling as she fell while up camping last night. Still having pain from her right arm pit to her upper chest. Denies bruising, but hurts more when she moves or tries to take a breath. Advised to be seen in the [facility] ED now. She wants to wait awhile before going. I again advised she go now. If she chooses to stay home, to let someone know what is going on and to carry a cell phone with her at all times.  Dina Gomez RN, [facility]      Note from today  Reason for call:  Same Day Appointment  Reason for Call:  Same Day Appointment  Reason for visit: injured right ribcage  Duration of symptoms: since friday  Have you been treated for this in the past? No  Additional comments: pt had taken a fall on Friday and her ribcage is bothering her. She states its painful and when she fell a chair went into her ribcage. 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   - painful when taking deep breaths      Review of Systems   ROS COMP: Constitutional, HEENT, cardiovascular, pulmonary, gi and gu systems are negative, except as otherwise noted.      Objective    /88   Pulse 70   Temp 98.2  F (36.8  C) (Temporal)   Resp 16   Ht 1.555 m (5' 1.22\")   Wt 68.6 kg (151 lb 3.2 oz)   LMP 11/09/2005   SpO2 96%   " BMI 28.36 kg/m    Body mass index is 28.36 kg/m .  Physical Exam   GENERAL APPEARANCE: healthy, alert and no distress  EYES: Eyes grossly normal to inspection, PERRLA, conjunctivae and sclerae without injection or discharge, EOM intact   RESP: Lungs clear to auscultation - no rales, rhonchi or wheezes   CV: Regular rates and rhythm, normal S1 S2, no S3 or S4, no murmur, click or rub, no peripheral edema and peripheral pulses strong and symmetric bilaterally, mild tenderness to ribs located just under right breast, no crepitus, no skin changes    MS: No musculoskeletal defects are noted and gait is age appropriate without ataxia   SKIN: No suspicious lesions or rashes, hydration status appears adeuqate with normal skin turgor   PSYCH: Alert and oriented x3; speech- coherent , normal rate and volume; able to articulate logical thoughts, able to abstract reason, no tangential thoughts, no hallucinations or delusions, mentation appears normal, Mood is euthymic. Affect is appropriate for this mood state and bright. Thought content is free of suicidal ideation, hallucinations, and delusions. Dress is adequate and upkept. Eye contact is good during conversation.       Diagnostic Test Results:  Xray right ribs and chest: Preliminary reading - normal bone structure with no evidence of fracture. Final pending review by radiology.           Assessment & Plan       ICD-10-CM    1. Rib pain on right side R07.81 XR Ribs & Chest Right G/E 3 Views     traMADol (ULTRAM) 50 MG tablet   2. Fall, initial encounter W19.XXXA XR Ribs & Chest Right G/E 3 Views     - Discussed no findings of rib fracture, no lung collapse or changes, good breath sounds   - Likely rib injury, will take awhile to heal  - As above, no NSAIDs due to chronic peptic ulcer   - Recommend the following     1. Continue Tylenol Extra Strength - 500 mg      2 tablets (1,000 mg) three times a day for 4-5 days, then as needed for pain after    2. Tramadol - pain  medication, as needed mainly at night, no driving as will cause sedation, use sparingly     3. Wear brace as needed for pain (gave ace wrap, could get belly band or back brace OTC)     4. Remember to take slow deep breaths every few hours, take 4-5 deep breaths     - Hand out given  - Discussed warning signs that would warrant return to clinic/ED      The patient indicates understanding of these issues and agrees with the plan.    Return in about 2 weeks (around 7/23/2019) for if not improving.    Rosenda Pierre PA-C  [facility]

## 2019-07-09 NOTE — TELEPHONE ENCOUNTER
Please put at 1:45 for 30 min appointment  OK dr meredith Varma-FLEX Saravia  [facility]

## 2019-07-10 ENCOUNTER — TELEPHONE (OUTPATIENT)
Dept: FAMILY MEDICINE | Facility: OTHER | Age: 69
End: 2019-07-10

## 2019-07-10 NOTE — TELEPHONE ENCOUNTER
Patient called back and said to disregard this message because she found her medication.    Thank you Sujata

## 2019-07-10 NOTE — TELEPHONE ENCOUNTER
----- Message from Rosenda Pierre PA-C sent at 7/10/2019 10:25 AM CDT -----  Please call patient with the following message    X-ray was not completely clear, but the radiologist thinks there may be a small fracture of the 6th rib, right where your pain is. I recommend recheck in 2-3 weeks so we can re-xray the area.     Jose Francisco Pierre PA-C

## 2019-07-10 NOTE — TELEPHONE ENCOUNTER
Reason for Call:  Other prescription    Detailed comments: Pt was in yesterday for injury and she was given pain pills to take at night (5 pills) - she has lost the bottle and hasn't even taken one. Wondering if CDL would prescribe her 5 more? If not, she states she totally understands as she know's it sounds suspicious. Please advise.     Phone Number Patient can be reached at: Cell number on file:    Telephone Information:   Mobile [phone]       Best Time: any     Can we leave a detailed message on this number? YES    Call taken on 7/10/2019 at 8:31 AM by Salome Nunes

## 2019-07-10 NOTE — RESULT ENCOUNTER NOTE
Please call patient with the following message    X-ray was not completely clear, but the radiologist thinks there may be a small fracture of the 6th rib, right where your pain is. I recommend recheck in 2-3 weeks so we can re-xray the area.     Jose Francisco Pierre PA-C

## 2019-08-01 ENCOUNTER — TELEPHONE (OUTPATIENT)
Dept: FAMILY MEDICINE | Facility: OTHER | Age: 69
End: 2019-08-01

## 2019-08-01 DIAGNOSIS — F43.23 ADJUSTMENT DISORDER WITH MIXED ANXIETY AND DEPRESSED MOOD: ICD-10-CM

## 2019-08-01 RX ORDER — VENLAFAXINE HYDROCHLORIDE 150 MG/1
150 CAPSULE, EXTENDED RELEASE ORAL
Qty: 90 CAPSULE | Refills: 3 | Status: SHIPPED | OUTPATIENT
Start: 2019-08-01 | End: 2020-04-13

## 2019-08-01 NOTE — TELEPHONE ENCOUNTER
Last increased Effexor on 5/22/19 to 112.5 mg     Advise patient   - Can increase to 150 mg next   - We can do this now as it has been 3 months since last increase  - I will send a new RX     Jose Francisco Vrama-FLEX Saravia  [facility]

## 2019-08-01 NOTE — TELEPHONE ENCOUNTER
Attempted to contact patient - when she answered - she hung up. Will try again later.  If she calls back, please see CDL message below and confirm which medication she is talking about.

## 2019-08-01 NOTE — TELEPHONE ENCOUNTER
Reason for Call:  Other prescription    Detailed comments: Patient is wondering how she can increase the dose on her medication. She is wondering if she can double up or not and how she can do it soon.    Phone Number Patient can be reached at: Cell number on file:    Telephone Information:   Mobile [phone]       Best Time: any    Can we leave a detailed message on this number? YES    Call taken on 8/1/2019 at 11:38 AM by Dionne Cardoso

## 2019-08-01 NOTE — TELEPHONE ENCOUNTER
Patient called clinic back. She stated she was speaking about her Venlafaxine. Please follow up with patient.

## 2019-08-01 NOTE — TELEPHONE ENCOUNTER
I need to know which medication as some we did discuss increasing during her last appointment.    Jose Francisco Pierre PA-C  [facility]

## 2019-09-05 NOTE — PROGRESS NOTES
[address]  [facility] [address]  [phone]  Dept: [phone]    PRE-OP EVALUATION:  Today's date: 2019    Sri Grewal (: 1950) presents for pre-operative evaluation assessment as requested by Dr. Pandya.  She requires evaluation and anesthesia risk assessment prior to undergoing surgery/procedure for treatment of bilateral eyelid blepharoplasty surgery .    Fax number for surgical facility: [phone]  Primary Physician: Rosenda Pierre  Type of Anesthesia Anticipated: to be determined    Patient has a Health Care Directive or Living Will:  NO    Preop Questions 2019   Who is doing your surgery?    What are you having done? eye lid surgery   Date of Surgery/Procedure: 10/03   Facility or Hospital where procedure/surgery will be performed: [facility]   1.  Do you have a history of Heart attack, stroke, stent, coronary bypass surgery, or other heart surgery? YES  - notes having had a mild heart attack in  where vessels were open but was stress related to  passing, no problems since.    2.  Do you ever have any pain or discomfort in your chest? YES - once per year, unchanged, no concerns. Nothing in the last week.    3.  Do you have a history of  Heart Failure? No   4.   Are you troubled by shortness of breath when:  walking on a level surface, or up a slight hill, or at night? YES - related to asthma. Stable unchanged.    5.  Do you currently have a cold, bronchitis or other respiratory infection? No   6.  Do you have a cough, shortness of breath, or wheezing? No   7.  Do you sometimes get pains in the calves of your legs when you walk? No   8. Do you or anyone in your family have previous history of blood clots? No   9.  Do you or does anyone in your family have a serious bleeding problem such as prolonged bleeding following surgeries or cuts? No   10. Have you ever had problems with anemia or  been told to take iron pills? YES - Issue when she was 32's likely menstrual related.    11. Have you had any abnormal blood loss such as black, tarry or bloody stools, or abnormal vaginal bleeding? No   12. Have you ever had a blood transfusion? No   13. Have you or any of your relatives ever had problems with anesthesia? No   14. Do you have sleep apnea, excessive snoring or daytime drowsiness? No   15. Do you have any prosthetic heart valves? No   16. Do you have prosthetic joints? No   17. Is there any chance that you may be pregnant? No         HPI:     HPI related to upcoming procedure: Has had issues with this for years, didn't realize she could have a procedure done.  Ophthalmologist recommended after her cataract procedure.       HYPERTENSION - Patient has longstanding history of HTN , currently denies any symptoms referable to elevated blood pressure. Specifically denies chest pain, palpitations, dyspnea, orthopnea, PND or peripheral edema. Blood pressure readings have been in normal range. Current medication regimen is as listed below. Patient denies any side effects of medication.     ANEURYSM - Followed by Neurology.       MEDICAL HISTORY:     Patient Active Problem List    Diagnosis Date Noted     Health Care Home 07/27/2011     Priority: High     X  EMERGENCY CARE PLAN  Presenting Problem Signs and Symptoms Treatment Plan    Questions or conerns during clinic hours    I will call the clinic directly     Questions or conerns outside clinic hours    I will call the 24 hour nurse line at [phone]    Patient needs to schedule an appointment    I will call the 24 hour scheduling team at [phone] or clinic directly    Same day treatment     I will call the clinic first, nurse line if after hours, urgent care and express care if needed                            DX V65.8 REPLACED WITH [id number] HEALTH CARE HOME (04/08/2013)       Hematoma 01/10/2019     Priority: Medium     Hematoma of abdominal wall,  initial encounter 01/09/2019     Priority: Medium     Abdominal wall hematoma 01/09/2019     Priority: Medium     Hyponatremia 11/26/2018     Priority: Medium     Age-related osteoporosis without current pathological fracture 11/12/2018     Priority: Medium     HSV (herpes simplex virus) infection 07/31/2018     Priority: Medium     Atypical mole 06/01/2017     Priority: Medium     Hypertension goal BP (blood pressure) < 140/90 05/25/2017     Priority: Medium     TIA (transient ischemic attack) 01/16/2017     Priority: Medium     Adjustment disorder with mixed anxiety and depressed mood 09/09/2014     Priority: Medium     Middle cerebral artery aneurysm 10/29/2013     Priority: Medium     Noted in 2007.  Intervention not recommended at that time.  Observation.  Saw Interventional Radiology 12/2013.  Recheck CTA in one year.       Moderate major depression (H) 09/08/2010     Priority: Medium     Insomnia 09/08/2010     Priority: Medium     Atrophy of vagina 05/30/2010     Priority: Medium     Lump or mass in breast 03/29/2010     Priority: Medium     Mild persistent asthma 02/21/2005     Priority: Medium     Chronic peptic ulcer 12/23/2003     Priority: Medium     Problem list name updated by automated process. Provider to review       Anemia 04/25/2002     Priority: Medium     Problem list name updated by automated process. Provider to review       Chronic migraine without aura without status migrainosus, not intractable      Priority: Medium     Multiple trials off of fiorinal have not been successful  Problem list name updated by automated process. Provider to review       Other abnormal Papanicolaou smear of cervix and cervical HPV(795.09)      Priority: Medium     (Problem list name updated by automated process. Provider to review and confirm.)       Endometriosis      Priority: Medium     Problem list name updated by automated process. Provider to review       Allergic rhinitis      Priority: Medium     Problem  list name updated by automated process. Provider to review        Past Medical History:   Diagnosis Date     Abnormal Papanicolaou smear of cervix and cervical HPV      Allergic rhinitis, cause unspecified     seasonal allergies     Contact dermatitis and other eczema, due to unspecified cause      Endometriosis, site unspecified      Esophageal reflux     GERD     Migraine, unspecified, without mention of intractable migraine without mention of status migrainosus      Mild persistent asthma      Unspecified disorder of uterus     fibroid uterus     Past Surgical History:   Procedure Laterality Date     C  DELIVERY ONLY       X2     COLONOSCOPY  2014    Procedure: COLONOSCOPY;  Surgeon: Nathan Baker MD;  Location:  GI     HC COLONOSCOPY THRU STOMA, DIAGNOSTIC  2002    normal     Current Outpatient Medications   Medication Sig Dispense Refill     albuterol (PROAIR HFA) 108 (90 Base) MCG/ACT inhaler Inhale 1-2 puffs into the lungs every 6 hours as needed for shortness of breath / dyspnea 18 g 3     butalbital-aspirin-caffeine (FIORINAL) -40 MG per capsule TAKE ONE CAPSULE BY MOUTH EVERY 6 HOURS AS NEEDED (one month supply) 30 capsule 5     propranolol (INDERAL) 40 MG tablet Take 1 tablet (40 mg) by mouth 3 times daily 270 tablet 1     venlafaxine (EFFEXOR-XR) 150 MG 24 hr capsule Take 1 capsule (150 mg) by mouth daily (with breakfast) 90 capsule 3     cloNIDine (CATAPRES) 0.1 MG tablet Take 1 tablet (0.1 mg) by mouth 2 times daily as needed (systolic blood pressure over 180) 60 tablet 0     OTC products: allegra and tylenol    Allergies   Allergen Reactions     Codeine      GI UPSET     Percocet [Oxycodone-Acetaminophen] Nausea and Vomiting     Seasonal Allergies       Latex Allergy: NO    Social History     Tobacco Use     Smoking status: Never Smoker     Smokeless tobacco: Never Used   Substance Use Topics     Alcohol use: Yes     Comment: beerx2/x1 per month     History   Drug Use  "No       REVIEW OF SYSTEMS:   Constitutional, neuro, ENT, endocrine, pulmonary, cardiac, gastrointestinal, genitourinary, musculoskeletal, integument and psychiatric systems are negative, except as otherwise noted.    EXAM:   BP (!) 134/96   Pulse 64   Temp 97.5  F (36.4  C) (Temporal)   Resp 16   Ht 1.565 m (5' 1.61\")   Wt 66.4 kg (146 lb 6.4 oz)   LMP 11/09/2005   SpO2 98%   BMI 27.11 kg/m      GENERAL APPEARANCE: healthy, alert and no distress     EYES: EOMI, PERRL     HENT: ear canals and TM's normal and nose and mouth without ulcers or lesions     NECK: no adenopathy, no asymmetry, masses, or scars and thyroid normal to palpation     RESP: lungs clear to auscultation - no rales, rhonchi or wheezes     CV: regular rates and rhythm, normal S1 S2, no S3 or S4 and no murmur, click or rub     ABDOMEN:  soft, nontender, no HSM or masses and bowel sounds normal     MS: extremities normal- no gross deformities noted, no evidence of inflammation in joints, FROM in all extremities.     SKIN: no suspicious lesions or rashes     NEURO: Normal strength and tone, sensory exam grossly normal, mentation intact and speech normal     PSYCH: mentation appears normal. and affect normal/bright     LYMPHATICS: No cervical adenopathy    DIAGNOSTICS:   EKG: appears normal, NSR, anterior fascicular block, normal axis, normal intervals, no acute ST/T changes c/w ischemia, no LVH by voltage criteria, unchanged from previous tracings    Recent Labs   Lab Test 05/28/19  0956 05/22/19  1123  01/14/19  1001  01/16/17  0323 01/15/17  2200   HGB  --  15.2  --  12.9   < >  --  14.2   PLT  --  236  --  368   < > 184 212   INR  --   --   --   --   --   --  0.87   * 137   < > 129*   < >  --  143   POTASSIUM 5.1 5.5*   < > 6.0*   < >  --  4.0   CR 0.65 0.64   < > 0.63   < > 0.52 0.69   A1C  --   --   --   --   --  5.4  --     < > = values in this interval not displayed.      Component      Latest Ref Rng & Units 9/30/2019   Sodium     "  133 - 144 mmol/L 133   Potassium      3.4 - 5.3 mmol/L 4.0   Chloride      94 - 109 mmol/L 96   Carbon Dioxide      20 - 32 mmol/L 29   Anion Gap      3 - 14 mmol/L 8   Glucose      70 - 99 mg/dL 107 (H)   Urea Nitrogen      7 - 30 mg/dL 8   Creatinine      0.52 - 1.04 mg/dL 0.62   GFR Estimate      >60 mL/min/1.73:m2 >90   GFR Estimate If Black      >60 mL/min/1.73:m2 >90   Calcium      8.5 - 10.1 mg/dL 8.9   Hemoglobin      11.7 - 15.7 g/dL 15.0       IMPRESSION:   Reason for surgery/procedure: Eyelid drooping.     The proposed surgical procedure is considered INTERMEDIATE risk.    REVISED CARDIAC RISK INDEX  The patient has the following serious cardiovascular risks for perioperative complications such as (MI, PE, VFib and 3  AV Block):  Coronary Artery Disease (MI, positive stress test, angina, Qs on EKG)  Cerebrovascular Disease (TIA or CVA)  INTERPRETATION: 2 risks: Class III (moderate risk - 6.6% complication rate)    The patient has the following additional risks for perioperative complications:  No identified additional risks      ICD-10-CM    1. Preop general physical exam Z01.818    2. Ptosis of both eyelids H02.403    3. Hypertension goal BP (blood pressure) < 140/90 I10 EKG 12-lead complete w/read - Clinics     Basic metabolic panel     Hemoglobin   4. TIA (transient ischemic attack) G45.9 EKG 12-lead complete w/read - Clinics     Basic metabolic panel     Hemoglobin   5. Mild persistent asthma without complication J45.30    6. Middle cerebral artery aneurysm I67.1        RECOMMENDATIONS:       --Patient is to take all scheduled medications on the day of surgery    APPROVAL GIVEN to proceed with proposed procedure, without further diagnostic evaluation       Signed Electronically by: José Miguel Thornton PA-C    Copy of this evaluation report is provided to requesting physician.    [facility] Preop Guidelines    Revised Cardiac Risk Index  Answers for HPI/ROS submitted by the patient on 9/30/2019   If you  checked off any problems, how difficult have these problems made it for you to do your work, take care of things at home, or get along with other people?: Not difficult at all  PHQ9 TOTAL SCORE: 5

## 2019-09-05 NOTE — PATIENT INSTRUCTIONS
[facility] [place]  Address: [address], [place] [address]   Phone: [phone]        Before Your Surgery      Call your surgeon if there is any change in your health. This includes signs of a cold or flu (such as a sore throat, runny nose, cough, rash or fever).    Do not smoke, drink alcohol or take over the counter medicine (unless your surgeon or primary care doctor tells you to) for the 24 hours before and after surgery.    If you take prescribed drugs: Follow your doctor s orders about which medicines to take and which to stop until after surgery.    Eating and drinking prior to surgery: follow the instructions from your surgeon    Take a shower or bath the night before surgery. Use the soap your surgeon gave you to gently clean your skin. If you do not have soap from your surgeon, use your regular soap. Do not shave or scrub the surgery site.  Wear clean pajamas and have clean sheets on your bed.

## 2019-09-30 ENCOUNTER — TELEPHONE (OUTPATIENT)
Dept: FAMILY MEDICINE | Facility: OTHER | Age: 69
End: 2019-09-30

## 2019-09-30 ENCOUNTER — OFFICE VISIT (OUTPATIENT)
Dept: FAMILY MEDICINE | Facility: OTHER | Age: 69
End: 2019-09-30
Payer: MEDICARE

## 2019-09-30 VITALS
HEIGHT: 62 IN | OXYGEN SATURATION: 98 % | BODY MASS INDEX: 26.94 KG/M2 | SYSTOLIC BLOOD PRESSURE: 134 MMHG | TEMPERATURE: 97.5 F | DIASTOLIC BLOOD PRESSURE: 96 MMHG | RESPIRATION RATE: 16 BRPM | HEART RATE: 64 BPM | WEIGHT: 146.4 LBS

## 2019-09-30 DIAGNOSIS — Z01.818 PREOP GENERAL PHYSICAL EXAM: Primary | ICD-10-CM

## 2019-09-30 DIAGNOSIS — I67.1 MIDDLE CEREBRAL ARTERY ANEURYSM: ICD-10-CM

## 2019-09-30 DIAGNOSIS — J45.30 MILD PERSISTENT ASTHMA WITHOUT COMPLICATION: ICD-10-CM

## 2019-09-30 DIAGNOSIS — H02.403 PTOSIS OF BOTH EYELIDS: ICD-10-CM

## 2019-09-30 DIAGNOSIS — G45.9 TIA (TRANSIENT ISCHEMIC ATTACK): ICD-10-CM

## 2019-09-30 DIAGNOSIS — I10 HYPERTENSION GOAL BP (BLOOD PRESSURE) < 140/90: ICD-10-CM

## 2019-09-30 LAB
ANION GAP SERPL CALCULATED.3IONS-SCNC: 8 MMOL/L (ref 3–14)
BUN SERPL-MCNC: 8 MG/DL (ref 7–30)
CALCIUM SERPL-MCNC: 8.9 MG/DL (ref 8.5–10.1)
CHLORIDE SERPL-SCNC: 96 MMOL/L (ref 94–109)
CO2 SERPL-SCNC: 29 MMOL/L (ref 20–32)
CREAT SERPL-MCNC: 0.62 MG/DL (ref 0.52–1.04)
GFR SERPL CREATININE-BSD FRML MDRD: >90 ML/MIN/{1.73_M2}
GLUCOSE SERPL-MCNC: 107 MG/DL (ref 70–99)
HGB BLD-MCNC: 15 G/DL (ref 11.7–15.7)
POTASSIUM SERPL-SCNC: 4 MMOL/L (ref 3.4–5.3)
SODIUM SERPL-SCNC: 133 MMOL/L (ref 133–144)

## 2019-09-30 PROCEDURE — 99214 OFFICE O/P EST MOD 30 MIN: CPT | Performed by: PHYSICIAN ASSISTANT

## 2019-09-30 PROCEDURE — 93000 ELECTROCARDIOGRAM COMPLETE: CPT | Performed by: PHYSICIAN ASSISTANT

## 2019-09-30 PROCEDURE — 36415 COLL VENOUS BLD VENIPUNCTURE: CPT | Performed by: PHYSICIAN ASSISTANT

## 2019-09-30 PROCEDURE — 85018 HEMOGLOBIN: CPT | Performed by: PHYSICIAN ASSISTANT

## 2019-09-30 PROCEDURE — 80048 BASIC METABOLIC PNL TOTAL CA: CPT | Performed by: PHYSICIAN ASSISTANT

## 2019-09-30 ASSESSMENT — MIFFLIN-ST. JEOR: SCORE: 1146.19

## 2019-09-30 ASSESSMENT — PATIENT HEALTH QUESTIONNAIRE - PHQ9
10. IF YOU CHECKED OFF ANY PROBLEMS, HOW DIFFICULT HAVE THESE PROBLEMS MADE IT FOR YOU TO DO YOUR WORK, TAKE CARE OF THINGS AT HOME, OR GET ALONG WITH OTHER PEOPLE: NOT DIFFICULT AT ALL
SUM OF ALL RESPONSES TO PHQ QUESTIONS 1-9: 5
SUM OF ALL RESPONSES TO PHQ QUESTIONS 1-9: 5

## 2019-09-30 NOTE — TELEPHONE ENCOUNTER
Reason for call:  Pt is calling to have her records of her Pre-Op visit  sent over to the [facility] .[employer] @ [phone].

## 2019-09-30 NOTE — TELEPHONE ENCOUNTER
Patient is having this done through [facility] and already have their fax number which is not the number listed below. Will send pre-op as soon as it is finished.  Kala Winchestre MA

## 2019-10-01 ASSESSMENT — PATIENT HEALTH QUESTIONNAIRE - PHQ9: SUM OF ALL RESPONSES TO PHQ QUESTIONS 1-9: 5

## 2019-10-28 NOTE — PROGRESS NOTES
"Subjective     Sri Grewal is a 69 year old female who presents to clinic today for the following health issues:    HPI     CHEST PAIN - rib pain      Onset: 2.5 weeks     Description:   Location:  left side  Character: sharp and tight  Radiation: None   Duration: waxing and waning     Intensity: moderate    Progression of Symptoms:  same    Accompanying Signs & Symptoms:  Shortness of breath: no  Sweating: no  Nausea/vomiting: no  Lightheadedness: no  Palpitations: no  Fever/Chills: no  Cough: no  Heartburn: no    History:   Family history of heart disease no  Tobacco use: no    Precipitating factors:   Worse with exertion: YES  Worse with deep breaths :  YES  Related to food: no    Alleviating factors:          Rest and not moving      Therapies Tried and outcome: Tylenol - not helping     - felt like wind was knocked out of her after bending over the - having a hard time now with breathing feels like she has to take deep breaths     - Tylenol lots and lots   - Doesn't want to use migraine pills on this   - Bra bothers her         Review of Systems   ROS COMP: Constitutional, HEENT, cardiovascular, pulmonary, gi and gu systems are negative, except as otherwise noted.      Objective    BP (!) 150/92   Pulse 72   Temp 97.8  F (36.6  C) (Temporal)   Resp 16   Ht 1.565 m (5' 1.61\")   Wt 68.2 kg (150 lb 6.4 oz)   LMP 11/09/2005   SpO2 98%   BMI 27.86 kg/m    Body mass index is 27.86 kg/m .  Physical Exam   GENERAL APPEARANCE: healthy, alert and no distress  EYES: Eyes grossly normal to inspection, PERRLA, conjunctivae and sclerae without injection or discharge, EOM intact   RESP: Lungs clear to auscultation - no rales, rhonchi or wheezes    CV: Regular rates and rhythm, normal S1 S2, no S3 or S4, no murmur, click or rub, no peripheral edema and peripheral pulses strong and symmetric bilaterally, chest wall pain to lower left ribs between axillary and nipple lines   MS: No musculoskeletal defects are " noted and gait is age appropriate without ataxia   SKIN: No suspicious lesions or rashes, hydration status appears adeuqate with normal skin turgor   PSYCH: Alert and oriented x3; speech- coherent , normal rate and volume; able to articulate logical thoughts, able to abstract reason, no tangential thoughts, no hallucinations or delusions, mentation appears normal, Mood is euthymic. Affect is appropriate for this mood state and bright. Thought content is free of suicidal ideation, hallucinations, and delusions. Dress is adequate and upkept. Eye contact is good during conversation.       Diagnostic Test Results:  Labs reviewed in Epic  Xray - possible fractures lower left 3 ribs, remainder normal, await radiology report         Assessment & Plan       ICD-10-CM    1. Rib pain on left side R07.81 XR Ribs & Chest Left G/E 3 Views     naproxen (NAPROSYN) 500 MG tablet     - Possible fracture of ribs after injury with  2.5 weeks ago   - Discussed if fractured doesn't change plan from just bruised (unless displaced)   - Plan   - Naproxen 500 mg - 1 tablet twice a day       Eat when you take it       Take scheduled for 1 week, then as needed   - Tylenol Extra strength 2 tablets up to three times a day   - Alternating Naproxen and Tylenol for pain   - Ace wrap as needed   - Every couple hours - take 4 long slow deep breaths - 15 seconds   - Ice in 15-20 minute intervals   - Also every few hours - take slow deep breaths counting 10 seconds in, 10 second hold, and 10 second out   - Hand out given  - Discussed warning signs that would warrant return to clinic/ED      The patient indicates understanding of these issues and agrees with the plan.    Return in about 2 weeks (around 11/12/2019) for if not improving.    Rosenda Pierre PA-C  [facility]

## 2019-10-29 ENCOUNTER — OFFICE VISIT (OUTPATIENT)
Dept: FAMILY MEDICINE | Facility: OTHER | Age: 69
End: 2019-10-29
Payer: MEDICARE

## 2019-10-29 ENCOUNTER — ANCILLARY PROCEDURE (OUTPATIENT)
Dept: GENERAL RADIOLOGY | Facility: OTHER | Age: 69
End: 2019-10-29
Attending: PHYSICIAN ASSISTANT
Payer: MEDICARE

## 2019-10-29 VITALS
RESPIRATION RATE: 16 BRPM | TEMPERATURE: 97.8 F | HEART RATE: 72 BPM | BODY MASS INDEX: 27.68 KG/M2 | DIASTOLIC BLOOD PRESSURE: 92 MMHG | HEIGHT: 62 IN | OXYGEN SATURATION: 98 % | WEIGHT: 150.4 LBS | SYSTOLIC BLOOD PRESSURE: 150 MMHG

## 2019-10-29 DIAGNOSIS — R07.81 RIB PAIN ON LEFT SIDE: ICD-10-CM

## 2019-10-29 DIAGNOSIS — R07.81 RIB PAIN ON LEFT SIDE: Primary | ICD-10-CM

## 2019-10-29 PROCEDURE — 71101 X-RAY EXAM UNILAT RIBS/CHEST: CPT | Mod: LT

## 2019-10-29 PROCEDURE — 99214 OFFICE O/P EST MOD 30 MIN: CPT | Performed by: PHYSICIAN ASSISTANT

## 2019-10-29 RX ORDER — NAPROXEN 500 MG/1
500 TABLET ORAL 2 TIMES DAILY WITH MEALS
Qty: 60 TABLET | Refills: 0 | Status: SHIPPED | OUTPATIENT
Start: 2019-10-29 | End: 2020-05-11

## 2019-10-29 ASSESSMENT — MIFFLIN-ST. JEOR: SCORE: 1164.27

## 2019-10-29 ASSESSMENT — PAIN SCALES - GENERAL: PAINLEVEL: MILD PAIN (2)

## 2019-10-29 NOTE — RESULT ENCOUNTER NOTE
Please call patient with the following message    Radiologist sees 3 fractures in ribs. Recommend recheck in 3-4 weeks so we can re x-ray to assure they have improved.     Jose Francisco Pierre PA-C

## 2019-10-29 NOTE — PATIENT INSTRUCTIONS
- Naproxen 500 mg - 1 tablet twice a day       Eat when you take it       Take scheduled for 1 week, then as needed     - Tylenol Extra strength 2 tablets up to three times a day     - Alternating Naproxen and Tylenol for pain     - Ace wrap as needed     - Every couple hours - take 4 long slow deep breaths - 15 seconds     - Ice in 15-20 minute intervals       Patient Education     Rib Contusion or Minor Fracture    A rib contusion is a bruise to one or more rib bones. It may cause pain, tenderness, swelling, and a purplish color to the skin. There may be a sharp pain with each breath. A rib contusion takes anywhere from a few days to a few weeks to heal. A minor rib fracture or break may cause the same symptoms as a rib contusion. The small crack may not be seen on a regular chest X-ray. Treatment for both problems is basically the same.  Home care    You may use over-the-counter pain medicine to control pain, unless another pain medicine was prescribed. If you have chronic liver or kidney disease or ever had a stomach ulcer or GI (gastrointestinal) bleeding, talk with your healthcare provider before using these medicines.    Rest. Don't lift anything heavy or do any activity that causes pain.    Apply an ice pack over the injured area for 15 to 20 minutes every 1 to 2 hours. You should do this for the first 24 to 48 hours. To make an ice pack, put ice cubes in a plastic bag that seals at the top. Wrap the bag in a clean, thin towel or cloth. Never put ice or an ice pack directly on the skin. Continue with ice packs as needed for the relief of pain and swelling.    The first 3 to 4 weeks of healing will be the most painful. If your pain is not under control with the treatment given, call your healthcare provider. Sometimes a stronger pain medicine may be needed. A nerve block can be done in case of severe pain. It will numb the nerve between the ribs.  Follow-up care  Follow up with your healthcare provider, or  as advised.  If X-rays were taken, you will be told of any new findings that may affect your care.  Call 911  Call 911 if you have:    Dizziness, weakness or fainting    Shortness of breath with or without chest discomfort    New or worsening pain  When to seek medical advice  Call your healthcare provider right away if any of these occur:    Fever of 100.4 F (38 C) or higher, or as directed by your healthcare provider    Chills    Stomach pain, vomiting  Date Last Reviewed: 6/1/2018 2000-2018 [employer]. [address] [place] [id number]. All rights reserved. This information is not intended as a substitute for professional medical care. Always follow your healthcare professional's instructions.

## 2019-11-12 DIAGNOSIS — G43.709 CHRONIC MIGRAINE WITHOUT AURA WITHOUT STATUS MIGRAINOSUS, NOT INTRACTABLE: ICD-10-CM

## 2019-11-12 RX ORDER — BUTALBITAL/ASPIRIN/CAFFEINE 50-325-40
CAPSULE ORAL
Qty: 30 CAPSULE | Refills: 5 | Status: SHIPPED | OUTPATIENT
Start: 2019-11-12 | End: 2020-05-11

## 2019-11-12 NOTE — TELEPHONE ENCOUNTER
Pending Prescriptions:                       Disp   Refills    butalbital-aspirin-caffeine (FIORINAL) 50-*30 cap*5        Sig: TAKE ONE CAPSULE BY MOUTH EVERY 6 HOURS AS NEEDED           (one month supply)    Last Written Prescription Date:  5/22/2019  Last Fill Quantity: 30,  # refills: 5   Last office visit: 10/29/2019 with prescribing provider:     Future Office Visit:      Routing refill request to provider for review/approval because:  Drug not on the [facility] refill protocol     Cassia Francisco RN, BSN

## 2019-11-12 NOTE — TELEPHONE ENCOUNTER
Reason for Call:  Medication or medication refill:    Do you use a [facility]?  Name of the pharmacy and phone number for the current request:  [facility]    Name of the medication requested: butalbital-aspirin-caffeine (FIORINAL) -40 MG per capsule    Other request: Patient needs refills, she thought she asked you to refill this at her last visit.      Can we leave a detailed message on this number? YES    Phone number patient can be reached at: Home number on file [phone] (home)    Best Time: any    Call taken on 11/12/2019 at 8:16 AM by Miryam Torre

## 2019-11-12 NOTE — TELEPHONE ENCOUNTER
Medication(s) reviewed and approved. Rx. was faxed to the designated pharmacy.      Please notify that this has been done.      Please close the encounter when finished with it.     Rosenda Peirre PA-C

## 2020-01-03 DIAGNOSIS — I10 HYPERTENSION GOAL BP (BLOOD PRESSURE) < 140/90: ICD-10-CM

## 2020-01-03 RX ORDER — PROPRANOLOL HYDROCHLORIDE 40 MG/1
40 TABLET ORAL 3 TIMES DAILY
Qty: 270 TABLET | Refills: 0 | Status: SHIPPED | OUTPATIENT
Start: 2020-01-03 | End: 2020-04-02

## 2020-01-03 NOTE — TELEPHONE ENCOUNTER
Routing refill request to provider for review/approval because:  Labs out of range:  BP    Rachel De Paz, RN, BSN

## 2020-01-03 NOTE — TELEPHONE ENCOUNTER
Reason for Call:  Medication or medication refill:    Do you use a [facility]?  Name of the pharmacy and phone number for the current request:  [facility]    Name of the medication requested: PROPRANOLOL 40 MG    Other request: Patient needs refill    Can we leave a detailed message on this number? YES    Phone number patient can be reached at: Home number on file [phone] (home)    Best Time: any    Call taken on 1/3/2020 at 8:26 AM by Helena White

## 2020-01-08 ENCOUNTER — ALLIED HEALTH/NURSE VISIT (OUTPATIENT)
Dept: FAMILY MEDICINE | Facility: OTHER | Age: 70
End: 2020-01-08
Payer: MEDICARE

## 2020-01-08 VITALS — SYSTOLIC BLOOD PRESSURE: 138 MMHG | DIASTOLIC BLOOD PRESSURE: 72 MMHG | HEART RATE: 82 BPM

## 2020-01-08 DIAGNOSIS — I10 HYPERTENSION GOAL BP (BLOOD PRESSURE) < 140/90: Primary | ICD-10-CM

## 2020-01-08 PROCEDURE — 99207 ZZC NO CHARGE NURSE ONLY: CPT

## 2020-01-08 NOTE — PROGRESS NOTES
Sri Grewal is a 69 year old patient who comes in today for a Blood Pressure check.  Initial BP:  /72 (BP Location: Right arm, Patient Position: Chair, Cuff Size: Adult Regular)   Pulse 82   LMP 11/09/2005      Data Unavailable  Disposition: results routed to provider

## 2020-01-21 ENCOUNTER — TELEPHONE (OUTPATIENT)
Dept: FAMILY MEDICINE | Facility: OTHER | Age: 70
End: 2020-01-21

## 2020-01-21 DIAGNOSIS — B00.9 HSV (HERPES SIMPLEX VIRUS) INFECTION: Primary | ICD-10-CM

## 2020-01-21 RX ORDER — VALACYCLOVIR HYDROCHLORIDE 500 MG/1
500 TABLET, FILM COATED ORAL 2 TIMES DAILY
Qty: 14 TABLET | Refills: 3 | Status: SHIPPED | OUTPATIENT
Start: 2020-01-21 | End: 2020-05-11

## 2020-01-21 NOTE — TELEPHONE ENCOUNTER
Valacyclovir not on current medication list, it appears it was last prescribed 7/31/18. Patient was last seen 10/29/19 and this was not addressed. May need a new appointment. Will route to [facility] to review/advise  Melvina Kenny CMA

## 2020-01-21 NOTE — TELEPHONE ENCOUNTER
Reason for Call:  Medication or medication refill: Refill    Do you use a [facility]?  Name of the pharmacy and phone number for the current request:  [facility] fax# [phone]    Name of the medication requested: valacyclovir    Other request: Pt states her Rx  and needs new one    Can we leave a detailed message on this number? YES    Phone number patient can be reached at: Cell number on file:    Telephone Information:   Mobile [phone]       Best Time: Anytime    Call taken on 2020 at 2:17 PM by Kalyani Lira

## 2020-01-27 ENCOUNTER — TELEPHONE (OUTPATIENT)
Dept: FAMILY MEDICINE | Facility: OTHER | Age: 70
End: 2020-01-27

## 2020-01-27 NOTE — TELEPHONE ENCOUNTER
Reason for call:  Patient reporting a symptom    Symptom or request: no sense of taste, some drainage as well in sinuses    Duration (how long have symptoms been present): 4 days     Have you been treated for this before? No    Additional comments: patient would like to know if she should be seen or not. Is leaving in two hours would like a call back before then.     Phone Number patient can be reached at:  Home number on file [phone] (home)    Best Time:  any    Can we leave a detailed message on this number:  YES    Call taken on 1/27/2020 at 11:32 AM by Rosalinda Lunsford

## 2020-01-27 NOTE — TELEPHONE ENCOUNTER
4 days without a taste. Has a cold, nasal congestion, no fever. No facial pain. Pt would like to see if it improves in next day or 2. If not she will callback and schedule    Cassia Francisco, MSN, RN

## 2020-02-04 NOTE — PROGRESS NOTES
"Subjective     Sri Grewal is a 69 year old female who presents to clinic today for the following health issues:    HPI   Acute Illness   Acute illness concerns: sinus concern   Onset: 2.5 weeks     Fever: no    Chills/Sweats: YES- sweats at night    Headache (location?): YES    Sinus Pressure:YES- mild    Conjunctivitis:  YES: both    Ear Pain: YES: bilateral    Rhinorrhea: YES    Congestion: YES    Sore Throat: no     Cough: YES-non-productive: when laying down     Wheeze: YES    Decreased Appetite: no    Nausea: no    Vomiting: no    Diarrhea:  YES    Dysuria/Freq.: no    Fatigue/Achiness: no    Sick/Strep Exposure: no     Therapies Tried and outcome: mucinex- no didn't help      RN Note from 1/27/2020  no sense of taste, some drainage as well in sinuses    4 days without a taste. Has a cold, nasal congestion, no fever. No facial pain. Pt would like to see if it improves in next day or 2. If not she will callback and schedule     Cassia Francisco, MSN, RN          Review of Systems   ROS COMP: Constitutional, HEENT, cardiovascular, pulmonary, gi and gu systems are negative, except as otherwise noted.      Objective    BP (!) 156/88   Pulse 78   Temp 98.1  F (36.7  C) (Temporal)   Resp 18   Ht 1.565 m (5' 1.61\")   Wt 68.5 kg (151 lb)   LMP 11/09/2005   SpO2 97%   BMI 27.97 kg/m    Body mass index is 27.97 kg/m .  Physical Exam   GENERAL APPEARANCE: ill appearing, alert and no distress  EYES: Eyes grossly normal to inspection, PERRLA, conjunctivae and sclerae without injection or discharge, EOM intact   HENT: Bilateral ear canals without erythema or cerumen, bilateral TM's with clear serous effusion and mild injection, no bulging, nasal turbinates with severe swelling & erythema, yellow nasal discharge, mouth without ulcers or lesions, oropharynx slightly erythematous without edema or exudate, oral mucous membranes moist, bilateral maxillary and frontal sinus tenderness   NECK: Bilateral anterior cervical " and submandibular adenopathy, no adenopathy in posterior cervical or supraclavicular regions  RESP: Lungs clear to auscultation - no rales, rhonchi or wheezes   CV: Regular rates and rhythm, normal S1 S2, no S3 or S4, no murmur, click or rub  MS: No musculoskeletal defects are noted and gait is age appropriate without ataxia   SKIN: No suspicious lesions or rashes, hydration status appears adeuqate with normal skin turgor   PSYCH: Alert and oriented x3; speech- coherent , normal rate and volume; able to articulate logical thoughts, able to abstract reason, no tangential thoughts, no hallucinations or delusions, mentation appears normal, Mood is euthymic. Affect is appropriate for this mood state and bright. Thought content is free of suicidal ideation, hallucinations, and delusions. Dress is adequate and upkept. Eye contact is good during conversation.       Diagnostic Test Results:  none         Assessment & Plan     ICD-10-CM    1. Acute sinusitis with symptoms > 10 days J01.90 amoxicillin-clavulanate (AUGMENTIN) 875-125 MG tablet     - Due to exam findings and duration will treat  - Discussed antibiotic use, duration, and side effects     Has been on this in the past and is helpful   - Avoid decongestants due to HTN   - Encouraged rest and fluids   - Hand out given        The patient indicates understanding of these issues and agrees with the plan.    Return in about 1 week (around 2/13/2020) for if not improving.    Rosenda Pierre PA-C  [facility]

## 2020-02-06 ENCOUNTER — OFFICE VISIT (OUTPATIENT)
Dept: FAMILY MEDICINE | Facility: OTHER | Age: 70
End: 2020-02-06
Payer: MEDICARE

## 2020-02-06 VITALS
TEMPERATURE: 98.1 F | RESPIRATION RATE: 18 BRPM | SYSTOLIC BLOOD PRESSURE: 162 MMHG | HEART RATE: 78 BPM | DIASTOLIC BLOOD PRESSURE: 86 MMHG | HEIGHT: 62 IN | BODY MASS INDEX: 27.79 KG/M2 | OXYGEN SATURATION: 97 % | WEIGHT: 151 LBS

## 2020-02-06 DIAGNOSIS — J01.90 ACUTE SINUSITIS WITH SYMPTOMS > 10 DAYS: Primary | ICD-10-CM

## 2020-02-06 PROCEDURE — 99213 OFFICE O/P EST LOW 20 MIN: CPT | Performed by: PHYSICIAN ASSISTANT

## 2020-02-06 ASSESSMENT — MIFFLIN-ST. JEOR: SCORE: 1166.99

## 2020-02-06 ASSESSMENT — PAIN SCALES - GENERAL: PAINLEVEL: MILD PAIN (3)

## 2020-02-06 NOTE — PATIENT INSTRUCTIONS
Patient Education     Sinusitis (Antibiotic Treatment)    The sinuses are air-filled spaces within the bones of the face. They connect to the inside of the nose. Sinusitis is an inflammation of the tissue that lines the sinuses. Sinusitis can occur during a cold. It can also happen due to allergies to pollens and other particles in the air. Sinusitis can cause symptoms of sinus congestion and a feeling of fullness. A sinus infection causes fever, headache, and facial pain. There is often green or yellow fluid draining from the nose or into the back of the throat (post-nasal drip). You have been given antibiotics to treat this condition.  Home care    Take the full course of antibiotics as instructed. Do not stop taking them, even when you feel better.    Drink plenty of water, hot tea, and other liquids. This may help thin nasal mucus. It also may help your sinuses drain fluids.    Heat may help soothe painful areas of your face. Use a towel soaked in hot water. Or,  the shower and direct the warm spray onto your face. Using a vaporizer along with a menthol rub at night may also help soothe symptoms.     An expectorant with guaifenesin may help thin nasal mucus and help your sinuses drain fluids.    You can use an over-the-counter decongestant, unless a similar medicine was prescribed to you. Nasal sprays work the fastest. Use one that contains phenylephrine or oxymetazoline. First blow your nose gently. Then use the spray. Do not use these medicines more often than directed on the label. If you do, your symptoms may get worse. You may also take pills that contain pseudoephedrine. Don t use products that combine multiple medicines. This is because side effects may be increased. Read labels. You can also ask the pharmacist for help. (People with high blood pressure should not use decongestants. They can raise blood pressure.)    Over-the-counter antihistamines may help if allergies contributed to your  sinusitis.      Do not use nasal rinses or irrigation during an acute sinus infection, unless your healthcare provider tells you to. Rinsing may spread the infection to other areas in your sinuses.    Use acetaminophen or ibuprofen to control pain, unless another pain medicine was prescribed to you. If you have chronic liver or kidney disease or ever had a stomach ulcer, talk with your healthcare provider before using these medicines. (Aspirin should never be taken by anyone under age 18 who is ill with a fever. It may cause severe liver damage.)    Don't smoke. This can make symptoms worse.  Follow-up care  Follow up with your healthcare provider or our staff if you are not better in 1 week.  When to seek medical advice  Call your healthcare provider if any of these occur:    Facial pain or headache that gets worse    Stiff neck    Unusual drowsiness or confusion    Swelling of your forehead or eyelids    Vision problems, such as blurred or double vision    Fever of 100.4 F (38 C) or higher, or as directed by your healthcare provider    Seizure    Breathing problems    Symptoms don't go away in 10 days  Prevention  Here are steps you can take to help prevent an infection:    Keep good hand washing habits.    Don t have close contact with people who have sore throats, colds, or other upper respiratory infections.    Don t smoke, and stay away from secondhand smoke.    Stay up to date with of your vaccines.  Date Last Reviewed: 11/1/2017 2000-2019 [employer]. [address] [place] [address], [place] [address]. All rights reserved. This information is not intended as a substitute for professional medical care. Always follow your healthcare professional's instructions.

## 2020-02-07 ASSESSMENT — ASTHMA QUESTIONNAIRES: ACT_TOTALSCORE: 16

## 2020-04-01 DIAGNOSIS — I10 HYPERTENSION GOAL BP (BLOOD PRESSURE) < 140/90: ICD-10-CM

## 2020-04-01 NOTE — TELEPHONE ENCOUNTER
Pending Prescriptions:                       Disp   Refills    propranolol (INDERAL) 40 MG tablet         270 ta*0        Sig: Take 1 tablet (40 mg) by mouth 3 times daily    Routing refill request to provider for review/approval because:  BP is not at goal  BP Readings from Last 3 Encounters:   02/06/20 (!) 162/86   01/08/20 138/72   10/29/19 (!) 150/92

## 2020-04-02 RX ORDER — PROPRANOLOL HYDROCHLORIDE 40 MG/1
40 TABLET ORAL 3 TIMES DAILY
Qty: 270 TABLET | Refills: 3 | Status: SHIPPED | OUTPATIENT
Start: 2020-04-02 | End: 2020-10-14

## 2020-04-02 NOTE — TELEPHONE ENCOUNTER
Medication(s) reviewed and approved. Rx. was faxed to the designated pharmacy.      Please notify that this has been done.      Please close the encounter when finished with it.     Rosenda Pierre PA-C

## 2020-04-13 ENCOUNTER — TELEPHONE (OUTPATIENT)
Dept: FAMILY MEDICINE | Facility: OTHER | Age: 70
End: 2020-04-13

## 2020-04-13 ENCOUNTER — VIRTUAL VISIT (OUTPATIENT)
Dept: FAMILY MEDICINE | Facility: OTHER | Age: 70
End: 2020-04-13
Payer: MEDICARE

## 2020-04-13 DIAGNOSIS — F43.23 ADJUSTMENT DISORDER WITH MIXED ANXIETY AND DEPRESSED MOOD: ICD-10-CM

## 2020-04-13 PROCEDURE — G2012 BRIEF CHECK IN BY MD/QHP: HCPCS | Performed by: PHYSICIAN ASSISTANT

## 2020-04-13 RX ORDER — VENLAFAXINE HYDROCHLORIDE 150 MG/1
150 CAPSULE, EXTENDED RELEASE ORAL
Qty: 90 CAPSULE | Refills: 3 | Status: SHIPPED | OUTPATIENT
Start: 2020-04-13 | End: 2020-07-20

## 2020-04-13 RX ORDER — BUPROPION HYDROCHLORIDE 150 MG/1
150 TABLET ORAL EVERY MORNING
Qty: 90 TABLET | Refills: 1 | Status: SHIPPED | OUTPATIENT
Start: 2020-04-13 | End: 2020-05-11

## 2020-04-13 ASSESSMENT — ANXIETY QUESTIONNAIRES
IF YOU CHECKED OFF ANY PROBLEMS ON THIS QUESTIONNAIRE, HOW DIFFICULT HAVE THESE PROBLEMS MADE IT FOR YOU TO DO YOUR WORK, TAKE CARE OF THINGS AT HOME, OR GET ALONG WITH OTHER PEOPLE: SOMEWHAT DIFFICULT
GAD7 TOTAL SCORE: 7
1. FEELING NERVOUS, ANXIOUS, OR ON EDGE: SEVERAL DAYS
6. BECOMING EASILY ANNOYED OR IRRITABLE: NOT AT ALL
5. BEING SO RESTLESS THAT IT IS HARD TO SIT STILL: MORE THAN HALF THE DAYS
3. WORRYING TOO MUCH ABOUT DIFFERENT THINGS: MORE THAN HALF THE DAYS
2. NOT BEING ABLE TO STOP OR CONTROL WORRYING: MORE THAN HALF THE DAYS
7. FEELING AFRAID AS IF SOMETHING AWFUL MIGHT HAPPEN: NOT AT ALL

## 2020-04-13 ASSESSMENT — PATIENT HEALTH QUESTIONNAIRE - PHQ9
SUM OF ALL RESPONSES TO PHQ QUESTIONS 1-9: 9
5. POOR APPETITE OR OVEREATING: NOT AT ALL

## 2020-04-13 NOTE — PROGRESS NOTES
"Sri Grewal is a 69 year old female who is being evaluated via a billable telephone visit.      The patient has been notified of following:     \"This telephone visit will be conducted via a call between you and your physician/provider. We have found that certain health care needs can be provided without the need for a physical exam.  This service lets us provide the care you need with a short phone conversation.  If a prescription is necessary we can send it directly to your pharmacy.  If lab work is needed we can place an order for that and you can then stop by our lab to have the test done at a later time.    Telephone visits are billed at different rates depending on your insurance coverage. During this emergency period, for some insurers they may be billed the same as an in-person visit.  Please reach out to your insurance provider with any questions.    If during the course of the call the physician/provider feels a telephone visit is not appropriate, you will not be charged for this service.\"    Patient has given verbal consent for Telephone visit?  Yes    How would you like to obtain your AVS? Mail a copy    Subjective     Sri Grewal is a 69 year old female who presents to clinic today for the following health issues:    Wondering if there is something she can take in addition to her Venlafaxine during this time, or if she can be on a higher dose.    Depression and Anxiety Follow-Up    How are you doing with your depression/anxiety since your last visit? \"under the circumstance of being quarantined and stuff during this, it's worsened. I feel better in the day if I can help people, like calling people to make them laugh. But when I wake up in the morning, I feel like helpless.\"    Are you having other symptoms that might be associated with depression or anxiety? Yes:  \"Irritability. Stomach is messed up/upset all of the time\"    Have you had a significant life event? OTHER: \"Sister just . Construction " "near her house. The quarantine\"     Do you have any concerns with your use of alcohol or other drugs? No    - Patient wants to change Venlafaxine       Social History     Tobacco Use     Smoking status: Never Smoker     Smokeless tobacco: Never Used   Substance Use Topics     Alcohol use: Yes     Comment: beerx2/x1 per month     Drug use: No     PHQ 11/26/2018 3/27/2019 9/30/2019   PHQ-9 Total Score 11 4 5   Q9: Thoughts of better off dead/self-harm past 2 weeks Not at all Not at all Not at all     HAYDEE-7 SCORE 10/11/2018 11/26/2018 3/27/2019   Total Score - - -   Total Score - 8 (mild anxiety) 4 (minimal anxiety)   Total Score 6 8 4       Reviewed and updated as needed this visit by Provider  Allergies  Meds  Problems  Med Hx  Surg Hx         Review of Systems   ROS COMP: Constitutional, HEENT, cardiovascular, pulmonary, gi and gu systems are negative, except as otherwise noted.       Objective   Reported vitals:  [facility] 11/09/2005    healthy, alert and no distress  PSYCH: Alert and oriented times 3; coherent speech, normal   rate and volume, able to articulate logical thoughts, able   to abstract reason, no tangential thoughts, no hallucinations   or delusions  Her affect is normal  RESP: No cough, no audible wheezing, able to talk in full sentences  Remainder of exam unable to be completed due to telephone visits    Diagnostic Test Results:  Labs reviewed in Epic  none         Assessment/Plan:    ICD-10-CM    1. Adjustment disorder with mixed anxiety and depressed mood  F43.23 buPROPion (WELLBUTRIN XL) 150 MG 24 hr tablet     - Patient previously doing well on Effexor 150 mg, now reporting worsening   - Failed in the past on Wellbutrin, Celexa, Cymbalta, Prozac, Lexapro, & Zoloft      These were a long time ago and patient doesn't remember side effects   - Discussed increasing Effexor to 225 mg vs. Adding something in      She would like to add something in, will do a trial of Wellbutrin 150 mg      Reviewed use " and side effects  - Plan       - Venlafaxine (Effexor) - 150 mg - 1 capsule - at night       - Bupropion (Wellbutrin) 150 mg - 1 tablet in the morning       - Recheck in 1 month          Monday, 5/11/2020 - at 2 pm      The patient indicates understanding of these issues and agrees with the plan.    Return in about 1 month (around 5/13/2020).    Phone call duration:  7 minutes and 37 seconds     Jose Francisco [facility]

## 2020-04-13 NOTE — PATIENT INSTRUCTIONS
- Venlafaxine (Effexor) - 150 mg - 1 capsule - at night     - Bupropion (Wellbutrin) 150 mg - 1 tablet in the morning     - Recheck in 1 month      Monday, 5/11/2020 - at 2 pm

## 2020-04-14 ASSESSMENT — ANXIETY QUESTIONNAIRES: GAD7 TOTAL SCORE: 7

## 2020-05-08 NOTE — PROGRESS NOTES
"Sri Grewal is a 69 year old female who is being evaluated via a billable telephone visit.      The patient has been notified of following:     \"This telephone visit will be conducted via a call between you and your physician/provider. We have found that certain health care needs can be provided without the need for a physical exam.  This service lets us provide the care you need with a short phone conversation.  If a prescription is necessary we can send it directly to your pharmacy.  If lab work is needed we can place an order for that and you can then stop by our lab to have the test done at a later time.    Telephone visits are billed at different rates depending on your insurance coverage. During this emergency period, for some insurers they may be billed the same as an in-person visit.  Please reach out to your insurance provider with any questions.    If during the course of the call the physician/provider feels a telephone visit is not appropriate, you will not be charged for this service.\"    Patient has given verbal consent for Telephone visit?  Yes    What phone number would you like to be contacted at? [phone]     How would you like to obtain your AVS? Mail a copy    Subjective     Sri Grewal is a 69 year old female who presents to clinic today for the following health issues:    HPI     Depression and Anxiety Follow-Up    How are you doing with your depression since your last visit? Improved     How are you doing with your anxiety since your last visit?  Improved     Are you having other symptoms that might be associated with depression or anxiety? No    Have you had a significant life event? No     Do you have any concerns with your use of alcohol or other drugs? No    - Got to see her daughter, really helped, didn't hug   - Feeling much better in the morning      Used to wake up not worrying and not angry   - No side effects       Plan from 4/13/20   - Plan       - Venlafaxine (Effexor) - 150 " mg - 1 capsule - at night       - Bupropion (Wellbutrin) 150 mg - 1 tablet in the morning       - Recheck in 1 month          Monday, 5/11/2020 - at 2 pm          Social History     Tobacco Use     Smoking status: Never Smoker     Smokeless tobacco: Never Used   Substance Use Topics     Alcohol use: Yes     Comment: beerx2/x1 per month     Drug use: No     PHQ 9/30/2019 4/13/2020 5/11/2020   PHQ-9 Total Score 5 9 4   Q9: Thoughts of better off dead/self-harm past 2 weeks Not at all Not at all Not at all     HAYDEE-7 SCORE 3/27/2019 4/13/2020 5/11/2020   Total Score - - -   Total Score 4 (minimal anxiety) - -   Total Score 4 7 3         How many servings of fruits and vegetables do you eat daily?  2-3    On average, how many sweetened beverages do you drink each day (Examples: soda, juice, sweet tea, etc.  Do NOT count diet or artificially sweetened beverages)?   0      Reviewed and updated as needed this visit by Provider  Allergies  Meds  Problems  Med Hx  Surg Hx         Review of Systems   Constitutional, HEENT, cardiovascular, pulmonary, gi and gu systems are negative, except as otherwise noted.       Objective   Reported vitals:  LMP 11/09/2005    healthy, alert and no distress  PSYCH: Alert and oriented times 3; coherent speech, normal   rate and volume, able to articulate logical thoughts, able   to abstract reason, no tangential thoughts, no hallucinations   or delusions  Her affect is normal  RESP: No cough, no audible wheezing, able to talk in full sentences  Remainder of exam unable to be completed due to telephone visits    Diagnostic Test Results:  Labs reviewed in Epic  none         Assessment/Plan:    ICD-10-CM    1. Adjustment disorder with mixed anxiety and depressed mood  F43.23 buPROPion (WELLBUTRIN XL) 150 MG 24 hr tablet   2. Chronic migraine without aura without status migrainosus, not intractable  G43.709 butalbital-aspirin-caffeine (FIORINAL) -40 MG per capsule     - Marked  improvement with mood since last visit and no side effects      Will continue on Effexor and Wellbutrin      Reviewed both use and side effects   - Migraines stable with rare Fiorinal use      Reviewed use and side effects, refilled     The patient indicates understanding of these issues and agrees with the plan.    Return in about 6 months (around 11/11/2020) for Yearly Physical.    Phone call duration:  6 minutes    Jose Francisco Pierre PA-C  [facility]

## 2020-05-11 ENCOUNTER — VIRTUAL VISIT (OUTPATIENT)
Dept: FAMILY MEDICINE | Facility: OTHER | Age: 70
End: 2020-05-11
Payer: MEDICARE

## 2020-05-11 DIAGNOSIS — G43.709 CHRONIC MIGRAINE WITHOUT AURA WITHOUT STATUS MIGRAINOSUS, NOT INTRACTABLE: ICD-10-CM

## 2020-05-11 DIAGNOSIS — F43.23 ADJUSTMENT DISORDER WITH MIXED ANXIETY AND DEPRESSED MOOD: ICD-10-CM

## 2020-05-11 PROCEDURE — 99441 ZZC PHYSICIAN TELEPHONE EVALUATION 5-10 MIN: CPT | Performed by: PHYSICIAN ASSISTANT

## 2020-05-11 RX ORDER — BUTALBITAL/ASPIRIN/CAFFEINE 50-325-40
CAPSULE ORAL
Qty: 30 CAPSULE | Refills: 5 | Status: SHIPPED | OUTPATIENT
Start: 2020-05-11 | End: 2020-10-14

## 2020-05-11 RX ORDER — BUPROPION HYDROCHLORIDE 150 MG/1
150 TABLET ORAL EVERY MORNING
Qty: 90 TABLET | Refills: 3 | Status: SHIPPED | OUTPATIENT
Start: 2020-05-11 | End: 2020-10-14

## 2020-05-11 ASSESSMENT — ANXIETY QUESTIONNAIRES
3. WORRYING TOO MUCH ABOUT DIFFERENT THINGS: SEVERAL DAYS
7. FEELING AFRAID AS IF SOMETHING AWFUL MIGHT HAPPEN: NOT AT ALL
5. BEING SO RESTLESS THAT IT IS HARD TO SIT STILL: SEVERAL DAYS
6. BECOMING EASILY ANNOYED OR IRRITABLE: NOT AT ALL
GAD7 TOTAL SCORE: 3
2. NOT BEING ABLE TO STOP OR CONTROL WORRYING: NOT AT ALL
1. FEELING NERVOUS, ANXIOUS, OR ON EDGE: SEVERAL DAYS
IF YOU CHECKED OFF ANY PROBLEMS ON THIS QUESTIONNAIRE, HOW DIFFICULT HAVE THESE PROBLEMS MADE IT FOR YOU TO DO YOUR WORK, TAKE CARE OF THINGS AT HOME, OR GET ALONG WITH OTHER PEOPLE: NOT DIFFICULT AT ALL

## 2020-05-11 ASSESSMENT — PATIENT HEALTH QUESTIONNAIRE - PHQ9
5. POOR APPETITE OR OVEREATING: NOT AT ALL
SUM OF ALL RESPONSES TO PHQ QUESTIONS 1-9: 4

## 2020-05-12 ASSESSMENT — ANXIETY QUESTIONNAIRES: GAD7 TOTAL SCORE: 3

## 2020-05-17 ENCOUNTER — NURSE TRIAGE (OUTPATIENT)
Dept: NURSING | Facility: CLINIC | Age: 70
End: 2020-05-17

## 2020-05-17 NOTE — TELEPHONE ENCOUNTER
Was around a person who tested positive for COVID yesterday.    Was around him on 5/10/2020.    Currently has no symptoms    Pt would like an appointment with clinic.    Transferred to scheduling.    Dionne Moon RN    Reason for Disposition    [1] COVID-19 EXPOSURE (Close Contact) within last 14 days AND [2] NO cough, fever, or breathing difficulty    Additional Information    Negative: SEVERE difficulty breathing (e.g., struggling for each breath, speaks in single words)    Negative: Bluish (or gray) lips or face now    Negative: Sounds like a life-threatening emergency to the triager    Negative: [1] Difficulty breathing occurs AND [2] within 14 days of COVID-19 EXPOSURE (Close Contact)    Negative: Patient sounds very sick or weak to the triager    Negative: [1] Fever (or feeling feverish) OR cough AND [2] within 14 Days of COVID-19 EXPOSURE (Close Contact)    Negative: [1] Fever (or feeling feverish) OR cough occurs AND [2] within 14 days of travel from another country (international travel)    Negative: [1] Fever (or feeling feverish) OR cough occurs AND [2] within 14 days of travel from a city or area with major community spread    Negative: [1] Fever (or feeling feverish) OR cough occurs AND [2] living in area with major community spread AND [3] testing being done in the community for symptoms    Negative: [1] Mild body aches, chills, diarrhea, headache, runny nose, or sore throat AND [2] within 14 days of COVID-19 EXPOSURE    Negative: [1] COVID-19 EXPOSURE within last 14 days AND [2] NO cough, fever, or breathing difficulty AND [3] exposed person is a healthcare worker who was NOT using all recommended personal protective equipment (i.e., a respirator-N95 mask, eye protection, gloves, and gown)    Protocols used: CORONAVIRUS (COVID-19) EXPOSURE-A- 4.22.20

## 2020-05-18 ENCOUNTER — TELEPHONE (OUTPATIENT)
Dept: FAMILY MEDICINE | Facility: OTHER | Age: 70
End: 2020-05-18

## 2020-05-18 ENCOUNTER — VIRTUAL VISIT (OUTPATIENT)
Dept: FAMILY MEDICINE | Facility: OTHER | Age: 70
End: 2020-05-18
Payer: MEDICARE

## 2020-05-18 DIAGNOSIS — Z20.822 EXPOSURE TO COVID-19 VIRUS: Primary | ICD-10-CM

## 2020-05-18 DIAGNOSIS — R05.9 COUGH: ICD-10-CM

## 2020-05-18 PROCEDURE — 99441 ZZC PHYSICIAN TELEPHONE EVALUATION 5-10 MIN GOVT: CPT | Performed by: FAMILY MEDICINE

## 2020-05-18 ASSESSMENT — PAIN SCALES - GENERAL: PAINLEVEL: NO PAIN (0)

## 2020-05-18 NOTE — TELEPHONE ENCOUNTER
Reason for Call:  Other appointment and call back    Detailed comments: Patient called clinic. She had a phone apppt with [facility] earlier today and has been waiting for someone to give her a call to schedule her COVID test. Please f/u with patient.    Phone Number Patient can be reached at: Home number on file [phone] (home) or Cell number on file:    Telephone Information:   Mobile [phone]       Best Time: any    Can we leave a detailed message on this number? YES    Call taken on 5/18/2020 at 4:18 PM by Helena White

## 2020-05-18 NOTE — PROGRESS NOTES
"Sri Grewal is a 70 year old female who is being evaluated via a billable telephone visit.      The patient has been notified of following:     \"This telephone visit will be conducted via a call between you and your physician/provider. We have found that certain health care needs can be provided without the need for a physical exam.  This service lets us provide the care you need with a short phone conversation.  If a prescription is necessary we can send it directly to your pharmacy.  If lab work is needed we can place an order for that and you can then stop by our lab to have the test done at a later time.    Telephone visits are billed at different rates depending on your insurance coverage. During this emergency period, for some insurers they may be billed the same as an in-person visit.  Please reach out to your insurance provider with any questions.    If during the course of the call the physician/provider feels a telephone visit is not appropriate, you will not be charged for this service.\"    Patient has given verbal consent for Telephone visit?  Yes    What phone number would you like to be contacted at? [phone]    How would you like to obtain your AVS? Mail a copy    Subjective     Sri Grewal is a 70 year old female who presents via phone visit today for the following health issues:    HPI  Acute Illness   Acute illness concerns: suspected [id number] exposed to someone who does have a confirmed test  Onset: exposed about 1 week ago    Fever: no    Chills/Sweats: YES- chills    Headache (location?): no    Sinus Pressure:no    Conjunctivitis:  no    Ear Pain: no    Rhinorrhea: YES    Congestion: YES    Sore Throat: no, dry mouth    Rash: no     Cough: YES-non-productive    Wheeze: no    Decreased Appetite: no but has \"been strange\"    Nausea: no but stomach problems    Vomiting: no    Diarrhea:  YES- and constipation     Dysuria/Freq.: no    Fatigue/Achiness: no    Sick/Strep Exposure: YES- was " "exposed to someone who was positive      Therapies Tried and outcome: \"natural laxative\", allergy med      -------------------------------------    Patient Active Problem List   Diagnosis     Chronic migraine without aura without status migrainosus, not intractable     Other abnormal Papanicolaou smear of cervix and cervical HPV(795.09)     Endometriosis     Allergic rhinitis     Anemia     Mild persistent asthma     Lump or mass in breast     Atrophy of vagina     Moderate major depression (H)     Insomnia     Health Care Home     Middle cerebral artery aneurysm     Adjustment disorder with mixed anxiety and depressed mood     TIA (transient ischemic attack)     Hypertension goal BP (blood pressure) < 140/90     Atypical mole     HSV (herpes simplex virus) infection     Age-related osteoporosis without current pathological fracture     Hyponatremia     Hematoma of abdominal wall, initial encounter     Abdominal wall hematoma     Hematoma     Past Surgical History:   Procedure Laterality Date     C  DELIVERY ONLY       X2     COLONOSCOPY  2014    Procedure: COLONOSCOPY;  Surgeon: Nathan Baker MD;  Location: [facility]     HC COLONOSCOPY THRU STOMA, DIAGNOSTIC  2002    normal       Social History     Tobacco Use     Smoking status: Never Smoker     Smokeless tobacco: Never Used   Substance Use Topics     Alcohol use: Yes     Comment: beerx2/x1 per month     Family History   Problem Relation Age of Onset     Heart Disease Mother         anemia     Osteoporosis Mother      Hypertension Mother      Cerebrovascular Disease Mother      Alcohol/Drug Mother         alcohol     Neurologic Disorder Mother         migraines     Hypertension Father      Cancer No family hx of         breast         Current Outpatient Medications   Medication Sig Dispense Refill     albuterol (PROAIR HFA) 108 (90 Base) MCG/ACT inhaler Inhale 1-2 puffs into the lungs every 6 hours as needed for shortness of breath / dyspnea " 18 g 3     buPROPion (WELLBUTRIN XL) 150 MG 24 hr tablet Take 1 tablet (150 mg) by mouth every morning 90 tablet 3     butalbital-aspirin-caffeine (FIORINAL) -40 MG per capsule TAKE ONE CAPSULE BY MOUTH EVERY 6 HOURS AS NEEDED (one month supply) 30 capsule 5     propranolol (INDERAL) 40 MG tablet Take 1 tablet (40 mg) by mouth 3 times daily 270 tablet 3     venlafaxine (EFFEXOR-XR) 150 MG 24 hr capsule Take 1 capsule (150 mg) by mouth daily (with breakfast) 90 capsule 3     cloNIDine (CATAPRES) 0.1 MG tablet Take 1 tablet (0.1 mg) by mouth 2 times daily as needed (systolic blood pressure over 180) 60 tablet 0     Allergies   Allergen Reactions     Codeine      GI UPSET     Percocet [Oxycodone-Acetaminophen] Nausea and Vomiting     Seasonal Allergies      BP Readings from Last 3 Encounters:   02/06/20 (!) 162/86   01/08/20 138/72   10/29/19 (!) 150/92    Wt Readings from Last 3 Encounters:   02/06/20 68.5 kg (151 lb)   10/29/19 68.2 kg (150 lb 6.4 oz)   09/30/19 66.4 kg (146 lb 6.4 oz)                    Reviewed and updated as needed this visit by Provider         Review of Systems   Constitutional, HEENT, cardiovascular, pulmonary, GI, , musculoskeletal, neuro, skin, endocrine and psych systems are negative, except as otherwise noted.       Objective   Reported vitals:  LMP 11/09/2005 (Approximate)    healthy, alert and no distress  PSYCH: Alert and oriented times 3; coherent speech, normal   rate and volume, able to articulate logical thoughts, able   to abstract reason, no tangential thoughts, no hallucinations   or delusions  Her affect is normal  RESP: No cough, no audible wheezing, able to talk in full sentences  Remainder of exam unable to be completed due to telephone visits    Diagnostic Test Results:  Labs reviewed in Epic        Assessment/Plan:  1. Exposure to Covid-19 Virus  Patient is a pleasant 70-year-old with history of TIA, hypertension, asthma is evaluated through virtual visit to discuss  new symptoms of chills and cough following a recent visit to her daughter in [facility] about 10 days ago.  She received a phone call 2 days ago from her daughter stating that her son-in-law was tested positive for COVID-19 and is calling the clinic to request testing.  Given her age, risk factors and her symptoms of chills I think she is an eligible candidate to be tested for COVID-19.  Test ordered.  She seems to be stable right now.  Reportable signs and symptoms discussed.  Will await test results.  She is currently quarantined at home for the next 2 weeks.  - Symptomatic COVID-19 Virus (Coronavirus) by PCR Nasopharyngeal swab; Future    2. Cough  - Symptomatic COVID-19 Virus (Coronavirus) by PCR Nasopharyngeal swab; Future    No follow-ups on file.      Phone call duration:  5 minutes    Litzy Walton MD

## 2020-05-19 ENCOUNTER — NURSE TRIAGE (OUTPATIENT)
Dept: NURSING | Facility: CLINIC | Age: 70
End: 2020-05-19

## 2020-05-19 NOTE — TELEPHONE ENCOUNTER
Pt calls to schedule PCR swab covid test, per orders entered by provider yesterday (5/18/2020).  Now transferring to scheduling line -> [phone].    Gisella MELENDEZ [facility] Nurse Advisor

## 2020-05-19 NOTE — TELEPHONE ENCOUNTER
Additional Information    General information question, no triage required and triager able to answer question    Protocols used: INFORMATION ONLY CALL-A-AH

## 2020-05-20 ENCOUNTER — OFFICE VISIT (OUTPATIENT)
Dept: URGENT CARE | Facility: URGENT CARE | Age: 70
End: 2020-05-20
Attending: FAMILY MEDICINE
Payer: MEDICARE

## 2020-05-20 DIAGNOSIS — Z20.822 EXPOSURE TO COVID-19 VIRUS: ICD-10-CM

## 2020-05-20 DIAGNOSIS — R05.9 COUGH: ICD-10-CM

## 2020-05-20 PROCEDURE — 99000 SPECIMEN HANDLING OFFICE-LAB: CPT

## 2020-05-20 PROCEDURE — 87635 SARS-COV-2 COVID-19 AMP PRB: CPT

## 2020-05-21 LAB
SARS-COV-2 RNA SPEC QL NAA+PROBE: NOT DETECTED
SPECIMEN SOURCE: NORMAL

## 2020-07-20 DIAGNOSIS — F43.23 ADJUSTMENT DISORDER WITH MIXED ANXIETY AND DEPRESSED MOOD: ICD-10-CM

## 2020-07-20 RX ORDER — VENLAFAXINE HYDROCHLORIDE 150 MG/1
150 CAPSULE, EXTENDED RELEASE ORAL
Qty: 90 CAPSULE | Refills: 2 | Status: SHIPPED | OUTPATIENT
Start: 2020-07-20 | End: 2020-10-14

## 2020-07-20 NOTE — TELEPHONE ENCOUNTER
Reason for Call:  Medication or medication refill:    Do you use a [facility]?  Name of the pharmacy and phone number for the current request:  [facility]    Name of the medication requested: VENLAFAXINE    Other request: Patient only has 7 pills left. She has 3 refills left, but pharmacy stated the rx was put on hold by [facility].     Can we leave a detailed message on this number? YES    Phone number patient can be reached at: [phone]    Best Time: any    Call taken on 7/20/2020 at 9:04 AM by Helena White

## 2020-07-20 NOTE — TELEPHONE ENCOUNTER
Pending Prescriptions:                       Disp   Refills    venlafaxine (EFFEXOR-XR) 150 MG 24 hr cap*90 cap*3            Sig: Take 1 capsule (150 mg) by mouth daily (with           breakfast)    Sending remaining refills to new pharmacy    Cassia Francisco, MSN, RN

## 2020-10-02 NOTE — PROGRESS NOTES
"SUBJECTIVE:   Sri Grewal is a 70 year old female who presents for Preventive Visit.    Patient has been advised of split billing requirements and indicates understanding: Yes   Are you in the first 12 months of your Medicare coverage?  No    Healthy Habits:     In general, how would you rate your overall health?  Good    Frequency of exercise:  2-3 days/week    Duration of exercise:  45-60 minutes    Do you usually eat at least 4 servings of fruit and vegetables a day, include whole grains    & fiber and avoid regularly eating high fat or \"junk\" foods?  Yes    Taking medications regularly:  Yes    Medication side effects:  Lightheadedness    Ability to successfully perform activities of daily living:  No assistance needed    Home Safety:  No safety concerns identified    Hearing Impairment:  No hearing concerns    In the past 6 months, have you been bothered by leaking of urine? Yes    In general, how would you rate your overall mental or emotional health?  Fair      PHQ-2 Total Score: 1    Additional concerns today:  No    - Migraines stable, rare   - HTN, also stable, hasn't needed clonidine   - Mood - doing really well, working on hobbies and volunteering     Do you feel safe in your environment? Yes    Have you ever done Advance Care Planning? (For example, a Health Directive, POLST, or a discussion with a medical provider or your loved ones about your wishes): Yes, advance care planning is on file.      Fall risk  Fallen 2 or more times in the past year?: No  Any fall with injury in the past year?: No    Cognitive Screening   1) Repeat 3 items (Leader, Season, Table)    2) Clock draw: NORMAL  3) 3 item recall: Recalls 3 objects  Results: 3 items recalled: COGNITIVE IMPAIRMENT LESS LIKELY    Mini-CogTM Copyright ABRAHAM Tai. Licensed by the author for use in [facility]; reprinted with permission (lee@.[facility]). All rights reserved.      Do you have sleep apnea, excessive snoring or daytime " drowsiness?: no    Reviewed and updated as needed this visit by clinical staff  Tobacco  Allergies  Meds   Med Hx  Surg Hx  Fam Hx  Soc Hx        Reviewed and updated as needed this visit by Provider   Allergies  Meds  Problems            Social History     Tobacco Use     Smoking status: Never Smoker     Smokeless tobacco: Never Used   Substance Use Topics     Alcohol use: Yes     Comment: beerx2/x1 per month     If you drink alcohol do you typically have >3 drinks per day or >7 drinks per week? Yes      Alcohol Use 10/14/2020   Prescreen: >3 drinks/day or >7 drinks/week? Yes   Prescreen: >3 drinks/day or >7 drinks/week? -   AUDIT SCORE  3     AUDIT - Alcohol Use Disorders Identification Test - Reproduced from the World Health Organization Audit 2001 (Second Edition) 10/14/2020   1.  How often do you have a drink containing alcohol? 2 to 3 times a week   2.  How many drinks containing alcohol do you have on a typical day when you are drinking? 1 or 2   3.  How often do you have five or more drinks on one occasion? Never   4.  How often during the last year have you found that you were not able to stop drinking once you had started? Never   5.  How often during the last year have you failed to do what was normally expected of you because of drinking? Never   6.  How often during the last year have you needed a first drink in the morning to get yourself going after a heavy drinking session? Never   7.  How often during the last year have you had a feeling of guilt or remorse after drinking? Never   8.  How often during the last year have you been unable to remember what happened the night before because of your drinking? Never   9.  Have you or someone else been injured because of your drinking? No   10. Has a relative, friend, doctor or other health care worker been concerned about your drinking or suggested you cut down? No   TOTAL SCORE 3       Current providers sharing in care for this patient include:    Patient Care Team:  Rosenda Pierre PA-C as PCP - General (Physician Assistant - Medical)  Rosenda Pierre PA-C as Assigned PCP    The following health maintenance items are reviewed in Epic and correct as of today:  Health Maintenance   Topic Date Due     ZOSTER IMMUNIZATION (2 of 3) 09/13/2012     Pneumococcal Vaccine: 65+ Years (2 of 2 - PPSV23) 11/26/2019     ASTHMA ACTION PLAN  05/22/2020     ASTHMA CONTROL TEST  08/06/2020     MAMMO SCREENING  08/09/2020     INFLUENZA VACCINE (1) 09/01/2020     BMP  09/30/2020     PHQ-9  11/11/2020     FALL RISK ASSESSMENT  05/18/2021     MEDICARE ANNUAL WELLNESS VISIT  10/05/2021     DTAP/TDAP/TD IMMUNIZATION (4 - Td) 07/12/2023     ADVANCE CARE PLANNING  01/10/2024     LIPID  05/22/2024     COLORECTAL CANCER SCREENING  05/30/2024     DEXA  Completed     HEPATITIS C SCREENING  Completed     DEPRESSION ACTION PLAN  Completed     MIGRAINE ACTION PLAN  Completed     Pneumococcal Vaccine: Pediatrics (0 to 5 Years) and At-Risk Patients (6 to 64 Years)  Aged Out     IPV IMMUNIZATION  Aged Out     MENINGITIS IMMUNIZATION  Aged Out     HEPATITIS B IMMUNIZATION  Aged Out       Review of Systems   Constitutional: Negative for chills and fever.   HENT: Negative for congestion, ear pain, hearing loss and sore throat.    Eyes: Negative for pain and visual disturbance.   Respiratory: Negative for cough and shortness of breath.    Cardiovascular: Negative for chest pain, palpitations and peripheral edema.   Gastrointestinal: Negative for abdominal pain, constipation, diarrhea, heartburn, hematochezia and nausea.   Breasts:  Negative for tenderness, breast mass and discharge.   Genitourinary: Negative for dysuria, frequency, genital sores, hematuria, pelvic pain, urgency, vaginal bleeding and vaginal discharge.   Musculoskeletal: Negative for arthralgias, joint swelling and myalgias.   Skin: Negative for rash.   Neurological: Negative for dizziness,  "weakness, headaches and paresthesias.   Psychiatric/Behavioral: Negative for mood changes. The patient is not nervous/anxious.          OBJECTIVE:   BP (!) 144/86   Pulse 64   Temp 98.2  F (36.8  C) (Temporal)   Resp 18   Ht 1.565 m (5' 1.61\")   Wt 66.2 kg (146 lb)   LMP 11/09/2005 (Approximate)   SpO2 98%   BMI 27.04 kg/m   Estimated body mass index is 27.04 kg/m  as calculated from the following:    Height as of this encounter: 1.565 m (5' 1.61\").    Weight as of this encounter: 66.2 kg (146 lb).  Physical Exam  Constitutional:       General: She is not in acute distress.     Appearance: She is well-developed.   HENT:      Right Ear: External ear normal.      Left Ear: External ear normal.      Nose: Nose normal.      Mouth/Throat:      Pharynx: No oropharyngeal exudate.   Eyes:      General:         Right eye: No discharge.         Left eye: No discharge.      Conjunctiva/sclera: Conjunctivae normal.      Pupils: Pupils are equal, round, and reactive to light.   Neck:      Musculoskeletal: Normal range of motion and neck supple.      Thyroid: No thyromegaly.      Vascular: No JVD.      Trachea: No tracheal deviation.   Cardiovascular:      Rate and Rhythm: Normal rate and regular rhythm.      Heart sounds: Normal heart sounds. No murmur. No friction rub. No gallop.    Pulmonary:      Effort: Pulmonary effort is normal. No respiratory distress.      Breath sounds: Normal breath sounds. No stridor. No wheezing or rales.   Abdominal:      General: Bowel sounds are normal. There is no distension.      Palpations: Abdomen is soft. There is no mass.      Tenderness: There is no abdominal tenderness. There is no guarding or rebound.      Hernia: No hernia is present.   Musculoskeletal: Normal range of motion.   Lymphadenopathy:      Cervical: No cervical adenopathy.   Skin:     General: Skin is warm and dry.   Neurological:      Mental Status: She is alert and oriented to person, place, and time.   Psychiatric:  "        Behavior: Behavior normal.         Thought Content: Thought content normal.         Judgment: Judgment normal.         Diagnostic Test Results:  Labs reviewed in Epic  See orders pending in Epic     ASSESSMENT / PLAN:       ICD-10-CM    1. Routine general medical examination at a health care facility  Z00.00    2. Breast cancer screening  Z12.39    3. Chronic migraine without aura without status migrainosus, not intractable  G43.709 butalbital-aspirin-caffeine (FIORINAL) -40 MG per capsule   4. Mild persistent asthma without complication  J45.30    5. Moderate major depression (H)  F32.1    6. Hypertension goal BP (blood pressure) < 140/90  I10 BASIC METABOLIC PANEL     propranolol (INDERAL) 40 MG tablet     cloNIDine (CATAPRES) 0.1 MG tablet   7. Screening for lipid disorders  Z13.220 Lipid panel reflex to direct LDL Fasting   8. Encounter for Medicare annual wellness exam  Z00.00    9. Encounter for screening mammogram for breast cancer  Z12.31 MA SCREENING DIGITAL BILAT - Future  (s+30)   10. Adjustment disorder with mixed anxiety and depressed mood  F43.23 buPROPion (WELLBUTRIN XL) 150 MG 24 hr tablet     venlafaxine (EFFEXOR-XR) 150 MG 24 hr capsule   11. Need for pneumococcal vaccination  Z23 PPSV23, IM/SUBQ (2+ YRS) - [id number]   12. Need for influenza vaccination  Z23 FLUZONE HIGH DOSE 65+  [[id number]]   13. Urinary urgency  R39.15 *UA reflex to Microscopic and Culture ([facility] (except [place] and [place])   14. Mild persistent asthma with acute exacerbation  J45.31 albuterol (PROAIR HFA) 108 (90 Base) MCG/ACT inhaler     - All stable   - Reviewed use and side effects   - Will update labs today  - Results will be communicated to patient when available and appropriate medication changes made if necessary     - Wants UA checked, just in case, no symptoms           Patient has been advised of split billing requirements and indicates understanding: Yes  COUNSELING:  Reviewed  "preventive health counseling, as reflected in patient instructions  Special attention given to:       Regular exercise       Healthy diet/nutrition       Fall risk prevention       Immunizations    Vaccinated for: Influenza and Pneumococcal         Colon cancer screening       Advanced Planning     RISKS    Information on urinary incontinence and treatment options given to patient.  The patient was provided with suggestions to help her develop a healthy emotional lifestyle.      Estimated body mass index is 27.04 kg/m  as calculated from the following:    Height as of this encounter: 1.565 m (5' 1.61\").    Weight as of this encounter: 66.2 kg (146 lb).    She reports that she has never smoked. She has never used smokeless tobacco.      Appropriate preventive services were discussed with this patient, including applicable screening as appropriate for cardiovascular disease, diabetes, osteopenia/osteoporosis, and glaucoma.  As appropriate for age/gender, discussed screening for colorectal cancer, prostate cancer, breast cancer, and cervical cancer. Checklist reviewing preventive services available has been given to the patient.    Reviewed patients plan of care and provided an AVS. The Basic Care Plan (routine screening as documented in Health Maintenance) for Sri meets the Care Plan requirement. This Care Plan has been established and reviewed with the Patient.    Counseling Resources:  ATP IV Guidelines  Pooled Cohorts Equation Calculator  Breast Cancer Risk Calculator  Breast Cancer: Medication to Reduce Risk  FRAX Risk Assessment  ICSI Preventive Guidelines  Dietary Guidelines for Americans, 2010  USDA's MyPlate  ASA Prophylaxis  Lung CA Screening    FLEX [facility]    Identified Health Risks:  "

## 2020-10-02 NOTE — PATIENT INSTRUCTIONS
Check with insurance for coverage of the new shingles vaccine, Shingrix (ask them how old do I need to be and where should I get it done pharmacy or clinic). If it is not covered now, it may be covered later in the year. Shingrix is given in a 2 shot series, between 2 and 6 months apart. It is recommended for adults age 50 and older. Initial studies have indicated that this new vaccine is 85-90% effective at preventing shingles, and is preferred over the old Zostavax vaccine.             Preventive Health Recommendations    See your health care provider every year to    Review health changes.     Discuss preventive care.      Review your medicines if your doctor has prescribed any.      You no longer need a yearly Pap test unless you've had an abnormal Pap test in the past 10 years. If you have vaginal symptoms, such as bleeding or discharge, be sure to talk with your provider about a Pap test.      Every 1 to 2 years, have a mammogram.  If you are over 69, talk with your health care provider about whether or not you want to continue having screening mammograms.      Every 10 years, have a colonoscopy. Or, have a yearly FIT test (stool test). These exams will check for colon cancer.       Have a cholesterol test every 5 years, or more often if your doctor advises it.       Have a diabetes test (fasting glucose) every three years. If you are at risk for diabetes, you should have this test more often.       At age 65, have a bone density scan (DEXA) to check for osteoporosis (brittle bone disease).    Shots:    Get a flu shot each year.    Get a tetanus shot every 10 years.    Talk to your doctor about your pneumonia vaccines. There are now two you should receive - Pneumovax (PPSV 23) and Prevnar (PCV 13).    Talk to your pharmacist about the shingles vaccine.    Talk to your doctor about the hepatitis B vaccine.    Nutrition:     Eat at least 5 servings of fruits and vegetables each day.      Eat whole-grain bread,  whole-wheat pasta and brown rice instead of white grains and rice.      Get adequate about Calcium and Vitamin D.     Lifestyle    Exercise at least 150 minutes a week (30 minutes a day, 5 days a week). This will help you control your weight and prevent disease.      Limit alcohol to one drink per day.      No smoking.       Wear sunscreen to prevent skin cancer.       See your dentist twice a year for an exam and cleaning.      See your eye doctor every 1 to 2 years to screen for conditions such as glaucoma, macular degeneration, cataracts, etc.    Personalized Prevention Plan  You are due for the preventive services outlined below.  Your care team is available to assist you in scheduling these services.  If you have already completed any of these items, please share that information with your care team to update in your medical record.    Health Maintenance Due   Topic Date Due     Zoster (Shingles) Vaccine (2 of 3) 09/13/2012     Pneumococcal Vaccine (1 of 1 - PPSV23) 05/09/2017     Annual Wellness Visit  05/22/2020     Asthma Action Plan - yearly  05/22/2020     Asthma Control Test  08/06/2020     Flu Vaccine (1) 09/01/2020     Basic Metabolic Panel  09/30/2020     Mammogram  08/09/2020     Patient Education   Personalized Prevention Plan  You are due for the preventive services outlined below.  Your care team is available to assist you in scheduling these services.  If you have already completed any of these items, please share that information with your care team to update in your medical record.  Health Maintenance Due   Topic Date Due     Zoster (Shingles) Vaccine (2 of 3) 09/13/2012     Pneumococcal Vaccine (2 of 2 - PPSV23) 11/26/2019     Asthma Action Plan - yearly  05/22/2020     Asthma Control Test  08/06/2020     Mammogram  08/09/2020     Flu Vaccine (1) 09/01/2020     Basic Metabolic Panel  09/30/2020     Depression Assessment  11/11/2020       Urinary Incontinence, Female (Adult)  Urinary  incontinence means loss of control of the bladder. This problem affects many women, especially as they get older. If you have incontinence, you may be embarrassed to ask for help. But know that this problem can be treated.  Types of Incontinence  There are different types of incontinence. Two of the main types are described here. You can have more than one type.    Stress incontinence. With this type, urine leaks when pressure (stress) is put on the bladder. This may happen when you cough, sneeze, or laugh. Stress incontinence most often occurs because the pelvic floor muscles that support the bladder and urethra are weak. This can happen after pregnancy and vaginal childbirth or a hysterectomy. It can also be due to excess body weight or hormone changes.    Urge incontinence (also called overactive bladder). With this type, a sudden urge to urinate is felt often. This may happen even though there may not be much urine in the bladder. The need to urinate often during the night is common. Urge incontinence most often occurs because of bladder spasms. This may be due to bladder irritation or infection. Damage to bladder nerves or pelvic muscles, constipation, and certain medicines can also lead to urge incontinence.  Treatment of urinary incontinence depends on the cause. Further evaluation is needed to find the type you have. This will likely include an exam and certain tests. Based on the results, you and your healthcare provider can then plan treatment. Until a diagnosis is made, the home care tips below can help relieve symptoms.  Home care    Do pelvic floor muscle exercises, if they are prescribed. The pelvic floor muscles help support the bladder and urethra. Many women find that their symptoms improve when doing special exercises that strengthen these muscles. To do the exercises contract the muscles you would use to stop your stream of urine, but do this when you re not urinating. Hold for 10 seconds, then  relax. Repeat 10 to 20 times in a row, at least 3 times a day. Your provider may give you other instructions for how to do the exercises and how often.    Keep a bladder diary. This helps track how often and how much you urinate over a set period of time. Bring this diary with you to your next visit with the provider. The information can help your provider learn more about your bladder problem.    Lose weight, if advised to by your provider. Excess weight puts pressure on the bladder. Your provider can help you create a weight-loss plan that s right for you. This may include exercising more and making certain diet changes.    Don't consume foods and drinks that may irritate the bladder. These can include alcohol and caffeinated drinks.    Quit smoking. Smoking and other tobacco use can lead to chronic cough that strains the pelvic floor muscles. Smoking may also damage the bladder and urethra. Talk with your provider about treatments or methods you can use to quit smoking.    If drinking large amounts of fluid causes you to have symptoms, you may be advised to limit your fluid intake. You may also be advised to drink most of your fluids during the day and to limit fluids at night.    If you re worried about urine leakage or accidents, you may wear absorbent pads to catch urine. Change the pads often. This helps reduce discomfort. It may also reduce the risk of skin or bladder infections.  Follow-up care  Follow up with your healthcare provider, or as directed. It may take some to find the right treatment for your problem. Your treatment plan may include special therapies or medicines. Certain procedures or surgery may also be options. Be sure to discuss any questions you have with your provider.  When to seek medical advice  Call the healthcare provider right away if any of these occur:    Fever of 100.4 F (38 C) or higher, or as directed by your provider    Bladder pain or fullness    Abdominal swelling    Nausea  or vomiting    Back pain    Weakness, dizziness or fainting  Date Last Reviewed: 10/1/2017    8941-7236 [employer] [address] [facility], [place] [address]. All rights reserved. This information is not intended as a substitute for professional medical care. Always follow your healthcare professional's instructions.        Your Health Risk Assessment indicates you feel you are not in good emotional health.    Recreation   Recreation is not limited to sports and team events. It includes any activity that provides relaxation, interest, enjoyment, and exercise. Recreation provides an outlet for physical, mental, and social energy. It can give a sense of worth and achievement. It can help you stay healthy.    Mental Exercise and Social Involvement  Mental and emotional health is as important as physical health. Keep in touch with friends and family. Stay as active as possible. Continue to learn and challenge yourself.   Things you can do to stay mentally active are:    Learn something new, like a foreign language or musical instrument.     Play SCRABBLE or do crossword puzzles. If you cannot find people to play these games with you at home, you can play them with others on your computer through the Internet.     Join a games club--anything from card games to chess or checkers or lawn bowling.     Start a new hobby.     Go back to school.     Volunteer.     Read.   Keep up with world events.

## 2020-10-14 ENCOUNTER — OFFICE VISIT (OUTPATIENT)
Dept: FAMILY MEDICINE | Facility: OTHER | Age: 70
End: 2020-10-14
Payer: MEDICARE

## 2020-10-14 VITALS
TEMPERATURE: 98.2 F | HEIGHT: 62 IN | WEIGHT: 146 LBS | DIASTOLIC BLOOD PRESSURE: 86 MMHG | HEART RATE: 64 BPM | SYSTOLIC BLOOD PRESSURE: 144 MMHG | OXYGEN SATURATION: 98 % | RESPIRATION RATE: 18 BRPM | BODY MASS INDEX: 26.87 KG/M2

## 2020-10-14 DIAGNOSIS — Z00.00 ROUTINE GENERAL MEDICAL EXAMINATION AT A HEALTH CARE FACILITY: Primary | ICD-10-CM

## 2020-10-14 DIAGNOSIS — F32.1 MODERATE MAJOR DEPRESSION (H): ICD-10-CM

## 2020-10-14 DIAGNOSIS — Z23 NEED FOR INFLUENZA VACCINATION: ICD-10-CM

## 2020-10-14 DIAGNOSIS — Z00.00 ENCOUNTER FOR MEDICARE ANNUAL WELLNESS EXAM: ICD-10-CM

## 2020-10-14 DIAGNOSIS — R39.15 URINARY URGENCY: ICD-10-CM

## 2020-10-14 DIAGNOSIS — J45.30 MILD PERSISTENT ASTHMA WITHOUT COMPLICATION: ICD-10-CM

## 2020-10-14 DIAGNOSIS — Z23 NEED FOR PNEUMOCOCCAL VACCINATION: ICD-10-CM

## 2020-10-14 DIAGNOSIS — Z12.31 ENCOUNTER FOR SCREENING MAMMOGRAM FOR BREAST CANCER: ICD-10-CM

## 2020-10-14 DIAGNOSIS — Z13.220 SCREENING FOR LIPID DISORDERS: ICD-10-CM

## 2020-10-14 DIAGNOSIS — G43.709 CHRONIC MIGRAINE WITHOUT AURA WITHOUT STATUS MIGRAINOSUS, NOT INTRACTABLE: ICD-10-CM

## 2020-10-14 DIAGNOSIS — I10 HYPERTENSION GOAL BP (BLOOD PRESSURE) < 140/90: ICD-10-CM

## 2020-10-14 DIAGNOSIS — F43.23 ADJUSTMENT DISORDER WITH MIXED ANXIETY AND DEPRESSED MOOD: ICD-10-CM

## 2020-10-14 DIAGNOSIS — J45.31 MILD PERSISTENT ASTHMA WITH ACUTE EXACERBATION: ICD-10-CM

## 2020-10-14 LAB
ALBUMIN UR-MCNC: NEGATIVE MG/DL
ANION GAP SERPL CALCULATED.3IONS-SCNC: 3 MMOL/L (ref 3–14)
APPEARANCE UR: CLEAR
BILIRUB UR QL STRIP: NEGATIVE
BUN SERPL-MCNC: 10 MG/DL (ref 7–30)
CALCIUM SERPL-MCNC: 9.4 MG/DL (ref 8.5–10.1)
CHLORIDE SERPL-SCNC: 102 MMOL/L (ref 94–109)
CHOLEST SERPL-MCNC: 215 MG/DL
CO2 SERPL-SCNC: 31 MMOL/L (ref 20–32)
COLOR UR AUTO: YELLOW
CREAT SERPL-MCNC: 0.72 MG/DL (ref 0.52–1.04)
GFR SERPL CREATININE-BSD FRML MDRD: 87 ML/MIN/{1.73_M2}
GLUCOSE SERPL-MCNC: 105 MG/DL (ref 70–99)
GLUCOSE UR STRIP-MCNC: NEGATIVE MG/DL
HDLC SERPL-MCNC: 101 MG/DL
HGB UR QL STRIP: NEGATIVE
KETONES UR STRIP-MCNC: NEGATIVE MG/DL
LDLC SERPL CALC-MCNC: 99 MG/DL
LEUKOCYTE ESTERASE UR QL STRIP: NEGATIVE
NITRATE UR QL: NEGATIVE
NONHDLC SERPL-MCNC: 114 MG/DL
PH UR STRIP: 6.5 PH (ref 5–7)
POTASSIUM SERPL-SCNC: 5.4 MMOL/L (ref 3.4–5.3)
SODIUM SERPL-SCNC: 136 MMOL/L (ref 133–144)
SOURCE: NORMAL
SP GR UR STRIP: 1.01 (ref 1–1.03)
TRIGL SERPL-MCNC: 75 MG/DL
UROBILINOGEN UR STRIP-ACNC: 0.2 EU/DL (ref 0.2–1)

## 2020-10-14 PROCEDURE — 90732 PPSV23 VACC 2 YRS+ SUBQ/IM: CPT | Performed by: PHYSICIAN ASSISTANT

## 2020-10-14 PROCEDURE — 90662 IIV NO PRSV INCREASED AG IM: CPT | Performed by: PHYSICIAN ASSISTANT

## 2020-10-14 PROCEDURE — 80048 BASIC METABOLIC PNL TOTAL CA: CPT | Performed by: PHYSICIAN ASSISTANT

## 2020-10-14 PROCEDURE — 36415 COLL VENOUS BLD VENIPUNCTURE: CPT | Performed by: PHYSICIAN ASSISTANT

## 2020-10-14 PROCEDURE — G0008 ADMIN INFLUENZA VIRUS VAC: HCPCS | Performed by: PHYSICIAN ASSISTANT

## 2020-10-14 PROCEDURE — 80061 LIPID PANEL: CPT | Performed by: PHYSICIAN ASSISTANT

## 2020-10-14 PROCEDURE — 81003 URINALYSIS AUTO W/O SCOPE: CPT | Performed by: PHYSICIAN ASSISTANT

## 2020-10-14 PROCEDURE — G0439 PPPS, SUBSEQ VISIT: HCPCS | Performed by: PHYSICIAN ASSISTANT

## 2020-10-14 PROCEDURE — G0009 ADMIN PNEUMOCOCCAL VACCINE: HCPCS | Performed by: PHYSICIAN ASSISTANT

## 2020-10-14 RX ORDER — BUTALBITAL/ASPIRIN/CAFFEINE 50-325-40
CAPSULE ORAL
Qty: 30 CAPSULE | Refills: 5 | Status: SHIPPED | OUTPATIENT
Start: 2020-10-14 | End: 2020-11-05

## 2020-10-14 RX ORDER — PROPRANOLOL HYDROCHLORIDE 40 MG/1
40 TABLET ORAL 3 TIMES DAILY
Qty: 270 TABLET | Refills: 3 | Status: SHIPPED | OUTPATIENT
Start: 2020-10-14 | End: 2021-04-06

## 2020-10-14 RX ORDER — BUPROPION HYDROCHLORIDE 150 MG/1
150 TABLET ORAL EVERY MORNING
Qty: 90 TABLET | Refills: 3 | Status: SHIPPED | OUTPATIENT
Start: 2020-10-14 | End: 2021-01-06

## 2020-10-14 RX ORDER — VENLAFAXINE HYDROCHLORIDE 150 MG/1
150 CAPSULE, EXTENDED RELEASE ORAL
Qty: 90 CAPSULE | Refills: 3 | Status: SHIPPED | OUTPATIENT
Start: 2020-10-14 | End: 2021-02-16

## 2020-10-14 RX ORDER — ALBUTEROL SULFATE 90 UG/1
1-2 AEROSOL, METERED RESPIRATORY (INHALATION) EVERY 6 HOURS PRN
Qty: 18 G | Refills: 3 | Status: ON HOLD | OUTPATIENT
Start: 2020-10-14 | End: 2023-04-30

## 2020-10-14 RX ORDER — CLONIDINE HYDROCHLORIDE 0.1 MG/1
0.1 TABLET ORAL 2 TIMES DAILY PRN
Qty: 60 TABLET | Refills: 3 | Status: SHIPPED | OUTPATIENT
Start: 2020-10-14 | End: 2021-07-27

## 2020-10-14 ASSESSMENT — ACTIVITIES OF DAILY LIVING (ADL): CURRENT_FUNCTION: NO ASSISTANCE NEEDED

## 2020-10-14 ASSESSMENT — MIFFLIN-ST. JEOR: SCORE: 1139.31

## 2020-10-15 ASSESSMENT — ENCOUNTER SYMPTOMS
ARTHRALGIAS: 0
WEAKNESS: 0
MYALGIAS: 0
DYSURIA: 0
NERVOUS/ANXIOUS: 0
CHILLS: 0
CONSTIPATION: 0
FREQUENCY: 0
SORE THROAT: 0
DIARRHEA: 0
PALPITATIONS: 0
BREAST MASS: 0
SHORTNESS OF BREATH: 0
HEADACHES: 0
NAUSEA: 0
HEMATOCHEZIA: 0
EYE PAIN: 0
DIZZINESS: 0
PARESTHESIAS: 0
ABDOMINAL PAIN: 0
HEMATURIA: 0
COUGH: 0
HEARTBURN: 0
FEVER: 0
JOINT SWELLING: 0

## 2020-10-15 NOTE — RESULT ENCOUNTER NOTE
Please call patient with the following message    Labs all normal. Mail patient a copy.     Jose Francisco Pierre PA-C

## 2020-11-04 ENCOUNTER — APPOINTMENT (OUTPATIENT)
Dept: CT IMAGING | Facility: CLINIC | Age: 70
End: 2020-11-04
Attending: FAMILY MEDICINE
Payer: MEDICARE

## 2020-11-04 ENCOUNTER — HOSPITAL ENCOUNTER (EMERGENCY)
Facility: CLINIC | Age: 70
Discharge: HOME | End: 2020-11-04
Attending: FAMILY MEDICINE | Admitting: FAMILY MEDICINE
Payer: MEDICARE

## 2020-11-04 VITALS
TEMPERATURE: 99 F | HEART RATE: 72 BPM | RESPIRATION RATE: 16 BRPM | OXYGEN SATURATION: 96 % | SYSTOLIC BLOOD PRESSURE: 195 MMHG | DIASTOLIC BLOOD PRESSURE: 97 MMHG

## 2020-11-04 DIAGNOSIS — S22.080A COMPRESSION FRACTURE OF T12 VERTEBRA, INITIAL ENCOUNTER (H): ICD-10-CM

## 2020-11-04 LAB
ALBUMIN UR-MCNC: NEGATIVE MG/DL
ANION GAP SERPL CALCULATED.3IONS-SCNC: 8 MMOL/L (ref 3–14)
APPEARANCE UR: CLEAR
BASOPHILS # BLD AUTO: 0.1 10E9/L (ref 0–0.2)
BASOPHILS NFR BLD AUTO: 0.6 %
BILIRUB UR QL STRIP: NEGATIVE
BUN SERPL-MCNC: 7 MG/DL (ref 7–30)
CALCIUM SERPL-MCNC: 9.1 MG/DL (ref 8.5–10.1)
CHLORIDE SERPL-SCNC: 95 MMOL/L (ref 94–109)
CO2 SERPL-SCNC: 27 MMOL/L (ref 20–32)
COLOR UR AUTO: NORMAL
CREAT SERPL-MCNC: 0.54 MG/DL (ref 0.52–1.04)
DIFFERENTIAL METHOD BLD: NORMAL
EOSINOPHIL NFR BLD AUTO: 0.5 %
ERYTHROCYTE [DISTWIDTH] IN BLOOD BY AUTOMATED COUNT: 11.7 % (ref 10–15)
GFR SERPL CREATININE-BSD FRML MDRD: >90 ML/MIN/{1.73_M2}
GLUCOSE SERPL-MCNC: 112 MG/DL (ref 70–99)
GLUCOSE UR STRIP-MCNC: NEGATIVE MG/DL
HCT VFR BLD AUTO: 41.7 % (ref 35–47)
HGB BLD-MCNC: 14.8 G/DL (ref 11.7–15.7)
HGB UR QL STRIP: NEGATIVE
IMM GRANULOCYTES # BLD: 0 10E9/L (ref 0–0.4)
IMM GRANULOCYTES NFR BLD: 0.5 %
KETONES UR STRIP-MCNC: NEGATIVE MG/DL
LEUKOCYTE ESTERASE UR QL STRIP: NEGATIVE
LYMPHOCYTES # BLD AUTO: 1.2 10E9/L (ref 0.8–5.3)
LYMPHOCYTES NFR BLD AUTO: 14.7 %
MCH RBC QN AUTO: 32.2 PG (ref 26.5–33)
MCHC RBC AUTO-ENTMCNC: 35.5 G/DL (ref 31.5–36.5)
MCV RBC AUTO: 91 FL (ref 78–100)
MONOCYTES # BLD AUTO: 0.6 10E9/L (ref 0–1.3)
MONOCYTES NFR BLD AUTO: 7 %
NEUTROPHILS # BLD AUTO: 6.2 10E9/L (ref 1.6–8.3)
NEUTROPHILS NFR BLD AUTO: 76.7 %
NITRATE UR QL: NEGATIVE
NRBC # BLD AUTO: 0 10*3/UL
NRBC BLD AUTO-RTO: 0 /100
PH UR STRIP: 7 PH (ref 5–7)
PLATELET # BLD AUTO: 242 10E9/L (ref 150–450)
POTASSIUM SERPL-SCNC: 4.2 MMOL/L (ref 3.4–5.3)
RBC # BLD AUTO: 4.59 10E12/L (ref 3.8–5.2)
RBC #/AREA URNS AUTO: 0 /HPF (ref 0–2)
SODIUM SERPL-SCNC: 130 MMOL/L (ref 133–144)
SOURCE: NORMAL
SP GR UR STRIP: 1 (ref 1–1.03)
SQUAMOUS #/AREA URNS AUTO: 1 /HPF (ref 0–1)
UROBILINOGEN UR STRIP-MCNC: 0 MG/DL (ref 0–2)
WBC # BLD AUTO: 8.1 10E9/L (ref 4–11)
WBC #/AREA URNS AUTO: 0 /HPF (ref 0–5)

## 2020-11-04 PROCEDURE — 250N000011 HC RX IP 250 OP 636: Performed by: FAMILY MEDICINE

## 2020-11-04 PROCEDURE — 81001 URINALYSIS AUTO W/SCOPE: CPT | Performed by: FAMILY MEDICINE

## 2020-11-04 PROCEDURE — 99284 EMERGENCY DEPT VISIT MOD MDM: CPT | Performed by: FAMILY MEDICINE

## 2020-11-04 PROCEDURE — 96374 THER/PROPH/DIAG INJ IV PUSH: CPT | Mod: 59 | Performed by: FAMILY MEDICINE

## 2020-11-04 PROCEDURE — 250N000013 HC RX MED GY IP 250 OP 250 PS 637: Mod: GY | Performed by: FAMILY MEDICINE

## 2020-11-04 PROCEDURE — 99285 EMERGENCY DEPT VISIT HI MDM: CPT | Mod: 25 | Performed by: FAMILY MEDICINE

## 2020-11-04 PROCEDURE — 85025 COMPLETE CBC W/AUTO DIFF WBC: CPT | Performed by: FAMILY MEDICINE

## 2020-11-04 PROCEDURE — 80048 BASIC METABOLIC PNL TOTAL CA: CPT | Performed by: FAMILY MEDICINE

## 2020-11-04 PROCEDURE — 250N000009 HC RX 250: Performed by: FAMILY MEDICINE

## 2020-11-04 PROCEDURE — 74177 CT ABD & PELVIS W/CONTRAST: CPT

## 2020-11-04 RX ORDER — LIDOCAINE 4 G/G
2 PATCH TOPICAL ONCE
Status: DISCONTINUED | OUTPATIENT
Start: 2020-11-04 | End: 2020-11-04 | Stop reason: HOSPADM

## 2020-11-04 RX ORDER — KETOROLAC TROMETHAMINE 15 MG/ML
15 INJECTION, SOLUTION INTRAMUSCULAR; INTRAVENOUS ONCE
Status: COMPLETED | OUTPATIENT
Start: 2020-11-04 | End: 2020-11-04

## 2020-11-04 RX ORDER — CLONIDINE HYDROCHLORIDE 0.1 MG/1
0.2 TABLET ORAL ONCE
Status: COMPLETED | OUTPATIENT
Start: 2020-11-04 | End: 2020-11-04

## 2020-11-04 RX ORDER — IOPAMIDOL 755 MG/ML
500 INJECTION, SOLUTION INTRAVASCULAR ONCE
Status: COMPLETED | OUTPATIENT
Start: 2020-11-04 | End: 2020-11-04

## 2020-11-04 RX ORDER — CALCITONIN SALMON 200 [IU]/.09ML
1 SPRAY, METERED NASAL DAILY
Qty: 2.7 ML | Refills: 0 | Status: SHIPPED | OUTPATIENT
Start: 2020-11-04 | End: 2020-11-05

## 2020-11-04 RX ORDER — HYDROCODONE BITARTRATE AND ACETAMINOPHEN 5; 325 MG/1; MG/1
1 TABLET ORAL EVERY 6 HOURS PRN
Qty: 14 TABLET | Refills: 0 | Status: SHIPPED | OUTPATIENT
Start: 2020-11-04 | End: 2020-11-07

## 2020-11-04 RX ADMIN — CLONIDINE HYDROCHLORIDE 0.2 MG: 0.1 TABLET ORAL at 08:58

## 2020-11-04 RX ADMIN — SODIUM CHLORIDE 60 ML: 9 INJECTION, SOLUTION INTRAVENOUS at 09:08

## 2020-11-04 RX ADMIN — LIDOCAINE 2 PATCH: 560 PATCH PERCUTANEOUS; TOPICAL; TRANSDERMAL at 09:20

## 2020-11-04 RX ADMIN — IOPAMIDOL 70 ML: 755 INJECTION, SOLUTION INTRAVENOUS at 09:08

## 2020-11-04 RX ADMIN — KETOROLAC TROMETHAMINE 15 MG: 15 INJECTION, SOLUTION INTRAMUSCULAR; INTRAVENOUS at 08:26

## 2020-11-04 NOTE — ED PROVIDER NOTES
History     Chief Complaint   Patient presents with     Back Pain     HPI  Sri Grewal is a 70 year old female who presents with low back pain that started in the middle of the night.  Pain woke her up from her sleep and was pretty intense.  She rated it a 9 out of 10 type of pain.  She states she is never had pain like this before.  She states when she went to bed last night she was feeling perfectly fine.  Denies any recent trauma.  She states she was not doing any strenuous activity yesterday before bed.  She took a Tylenol and that was able to let her go back to sleep but when she woke up the pain was still pretty intense.  The pain seems to be across her whole lower back.  It does radiate around both sides to her abdomen somewhat.  Denies any nausea any vomiting.  Denies any chest pain.  Denies any fevers or chills.  Denies any recent dysuria or hematuria.    Allergies:  Allergies   Allergen Reactions     Codeine      GI UPSET     Percocet [Oxycodone-Acetaminophen] Nausea and Vomiting     Seasonal Allergies        Problem List:    Patient Active Problem List    Diagnosis Date Noted     Health Care Home 07/27/2011     Priority: High     X  EMERGENCY CARE PLAN  Presenting Problem Signs and Symptoms Treatment Plan    Questions or conerns during clinic hours    I will call the clinic directly     Questions or conerns outside clinic hours    I will call the 24 hour nurse line at [phone]    Patient needs to schedule an appointment    I will call the 24 hour scheduling team at [phone] or clinic directly    Same day treatment     I will call the clinic first, nurse line if after hours, urgent care and express care if needed                            DX V65.8 REPLACED WITH [id number] [facility] (04/08/2013)       Hematoma 01/10/2019     Priority: Medium     Hematoma of abdominal wall, initial encounter 01/09/2019     Priority: Medium     Abdominal wall hematoma 01/09/2019     Priority: Medium      Hyponatremia 11/26/2018     Priority: Medium     Age-related osteoporosis without current pathological fracture 11/12/2018     Priority: Medium     HSV (herpes simplex virus) infection 07/31/2018     Priority: Medium     Atypical mole 06/01/2017     Priority: Medium     Hypertension goal BP (blood pressure) < 140/90 05/25/2017     Priority: Medium     TIA (transient ischemic attack) 01/16/2017     Priority: Medium     Adjustment disorder with mixed anxiety and depressed mood 09/09/2014     Priority: Medium     Middle cerebral artery aneurysm 10/29/2013     Priority: Medium     Noted in 2007.  Intervention not recommended at that time.  Observation.  Saw Interventional Radiology 12/2013.  Recheck CTA in one year.       Moderate major depression (H) 09/08/2010     Priority: Medium     Insomnia 09/08/2010     Priority: Medium     Atrophy of vagina 05/30/2010     Priority: Medium     Lump or mass in breast 03/29/2010     Priority: Medium     Mild persistent asthma 02/21/2005     Priority: Medium     Anemia 04/25/2002     Priority: Medium     Problem list name updated by automated process. Provider to review       Chronic migraine without aura without status migrainosus, not intractable      Priority: Medium     Multiple trials off of fiorinal have not been successful  Problem list name updated by automated process. Provider to review       Other abnormal Papanicolaou smear of cervix and cervical HPV(795.09)      Priority: Medium     (Problem list name updated by automated process. Provider to review and confirm.)       Endometriosis      Priority: Medium     Problem list name updated by automated process. Provider to review       Allergic rhinitis      Priority: Medium     Problem list name updated by automated process. Provider to review          Past Medical History:    Past Medical History:   Diagnosis Date     Abnormal Papanicolaou smear of cervix and cervical HPV      Allergic rhinitis, cause unspecified       Contact dermatitis and other eczema, due to unspecified cause      Endometriosis, site unspecified      Esophageal reflux      Migraine, unspecified, without mention of intractable migraine without mention of status migrainosus      Mild persistent asthma      Unspecified disorder of uterus        Past Surgical History:    Past Surgical History:   Procedure Laterality Date     C  DELIVERY ONLY       X2     COLONOSCOPY  2014    Procedure: COLONOSCOPY;  Surgeon: Nathan Baker MD;  Location: [facility]     HC COLONOSCOPY THRU STOMA, DIAGNOSTIC  2002    normal       Family History:    Family History   Problem Relation Age of Onset     Heart Disease Mother         anemia     Osteoporosis Mother      Hypertension Mother      Cerebrovascular Disease Mother      Alcohol/Drug Mother         alcohol     Neurologic Disorder Mother         migraines     Hypertension Father      Cancer No family hx of         breast       Social History:  Marital Status:   [5]  Social History     Tobacco Use     Smoking status: Never Smoker     Smokeless tobacco: Never Used   Substance Use Topics     Alcohol use: Yes     Comment: beerx2/x1 per month     Drug use: No        Medications:         albuterol (PROAIR HFA) 108 (90 Base) MCG/ACT inhaler       buPROPion (WELLBUTRIN XL) 150 MG 24 hr tablet       butalbital-aspirin-caffeine (FIORINAL) -40 MG per capsule       cloNIDine (CATAPRES) 0.1 MG tablet       propranolol (INDERAL) 40 MG tablet       venlafaxine (EFFEXOR-XR) 150 MG 24 hr capsule          Review of Systems   All other systems reviewed and are negative.      Physical Exam   BP: (!) 188/119  Pulse: 72  Temp: 99  F (37.2  C)  Resp: 16  SpO2: 96 %      Physical Exam  Vitals signs and nursing note reviewed.   Constitutional:       General: She is not in acute distress.     Appearance: She is well-developed. She is not diaphoretic.   HENT:      Head: Normocephalic and atraumatic.      Nose: Nose normal.       Mouth/Throat:      Pharynx: No oropharyngeal exudate.   Eyes:      Conjunctiva/sclera: Conjunctivae normal.   Neck:      Musculoskeletal: Normal range of motion and neck supple.   Cardiovascular:      Rate and Rhythm: Normal rate and regular rhythm.      Heart sounds: Normal heart sounds. No murmur. No friction rub.   Pulmonary:      Effort: Pulmonary effort is normal. No respiratory distress.      Breath sounds: Normal breath sounds. No stridor. No wheezing or rales.   Abdominal:      General: Bowel sounds are normal. There is no distension.      Palpations: Abdomen is soft. There is no mass.      Tenderness: There is no abdominal tenderness. There is no guarding.   Musculoskeletal: Normal range of motion.      Lumbar back: She exhibits tenderness.        Back:    Skin:     General: Skin is warm and dry.      Capillary Refill: Capillary refill takes less than 2 seconds.      Findings: No erythema.   Neurological:      Mental Status: She is alert and oriented to person, place, and time.   Psychiatric:         Judgment: Judgment normal.         ED Course        Procedures      Results for orders placed or performed during the hospital encounter of 11/04/20 (from the past 24 hour(s))   CBC with platelets differential   Result Value Ref Range    WBC 8.1 4.0 - 11.0 10e9/L    RBC Count 4.59 3.8 - 5.2 10e12/L    Hemoglobin 14.8 11.7 - 15.7 g/dL    Hematocrit 41.7 35.0 - 47.0 %    MCV 91 78 - 100 fl    MCH 32.2 26.5 - 33.0 pg    MCHC 35.5 31.5 - 36.5 g/dL    RDW 11.7 10.0 - 15.0 %    Platelet Count 242 150 - 450 10e9/L    Diff Method Automated Method     % Neutrophils 76.7 %    % Lymphocytes 14.7 %    % Monocytes 7.0 %    % Eosinophils 0.5 %    % Basophils 0.6 %    % Immature Granulocytes 0.5 %    Nucleated RBCs 0 0 /100    Absolute Neutrophil 6.2 1.6 - 8.3 10e9/L    Absolute Lymphocytes 1.2 0.8 - 5.3 10e9/L    Absolute Monocytes 0.6 0.0 - 1.3 10e9/L    Absolute Basophils 0.1 0.0 - 0.2 10e9/L    Abs Immature Granulocytes  0.0 0 - 0.4 10e9/L    Absolute Nucleated RBC 0.0    Basic metabolic panel   Result Value Ref Range    Sodium 130 (L) 133 - 144 mmol/L    Potassium 4.2 3.4 - 5.3 mmol/L    Chloride 95 94 - 109 mmol/L    Carbon Dioxide 27 20 - 32 mmol/L    Anion Gap 8 3 - 14 mmol/L    Glucose 112 (H) 70 - 99 mg/dL    Urea Nitrogen 7 7 - 30 mg/dL    Creatinine 0.54 0.52 - 1.04 mg/dL    GFR Estimate >90 >60 mL/min/[1.73_m2]    GFR Estimate If Black >90 >60 mL/min/[1.73_m2]    Calcium 9.1 8.5 - 10.1 mg/dL   UA with Microscopic   Result Value Ref Range    Color Urine Straw     Appearance Urine Clear     Glucose Urine Negative NEG^Negative mg/dL    Bilirubin Urine Negative NEG^Negative    Ketones Urine Negative NEG^Negative mg/dL    Specific Gravity Urine 1.005 1.003 - 1.035    Blood Urine Negative NEG^Negative    pH Urine 7.0 5.0 - 7.0 pH    Protein Albumin Urine Negative NEG^Negative mg/dL    Urobilinogen mg/dL 0.0 0.0 - 2.0 mg/dL    Nitrite Urine Negative NEG^Negative    Leukocyte Esterase Urine Negative NEG^Negative    Source Midstream Urine     WBC Urine 0 0 - 5 /HPF    RBC Urine 0 0 - 2 /HPF    Squamous Epithelial /HPF Urine 1 0 - 1 /HPF   CT Abdomen Pelvis w Contrast    Narrative    CT ABDOMEN PELVIS W CONTRAST 11/4/2020 9:20 AM    CLINICAL HISTORY: low back pain, radiates to abd    TECHNIQUE: CT scan of the abdomen and pelvis was performed following  injection of IV contrast. Multiplanar reformats were obtained. Dose  reduction techniques were used.  CONTRAST: 70mL, Isovue-370    COMPARISON: CT exams 1/9/2019 and 5/20/2014    FINDINGS:   LOWER CHEST: Bibasilar atelectasis versus scarring. Mitral annulus  calcifications. Tiny hiatal hernia.    HEPATOBILIARY: Nonvisualized gallbladder. Mild biliary ductal  dilatation which is improved since the prior CT. No obstructing mass  or radiodense stone.    PANCREAS: Stable prominence of the main pancreatic duct.     SPLEEN: Normal.    ADRENAL GLANDS: Normal.    KIDNEYS/BLADDER: Stable benign  4.8 cm exophytic right renal cyst. No  follow-up is needed. No hydronephrosis or hydroureter. Unremarkable  urinary bladder.    BOWEL: Normal caliber small bowel. Normal appendix. Colonic  diverticulosis without evidence of acute diverticulitis. No free air  or free fluid.    PELVIC ORGANS: Stable incidental left ovarian calcification.    ADDITIONAL FINDINGS: Mild atherosclerosis. Patent central SMA and SMV.    MUSCULOSKELETAL: Moderate T12 wedge compression fracture which is new  since the CT from 1/9/2019. No significant osseous retropulsion or  central canal narrowing.      Impression    IMPRESSION:   1.  Moderate T12 wedge compression fracture which is new since the CT  from 1/9/2019.  2.  No acute intra-abdominal or intrapelvic findings.    STERLING GOMEZ MD       Medications   ketorolac (TORADOL) injection 15 mg (15 mg Intravenous Given 11/4/20 0826)   cloNIDine (CATAPRES) tablet 0.2 mg (0.2 mg Oral Given 11/4/20 0858)   iopamidol (ISOVUE-370) solution 500 mL (70 mLs Intravenous Given 11/4/20 0908)   sodium chloride (PF) 0.9% PF flush 3 mL (10 mLs Intravenous Given 11/4/20 0908)   Saline Bag 100mL  CT  flush use only (60 mLs Intravenous Given 11/4/20 0908)     X-ray shows a new T12 compression fracture.  I think this is most likely the cause of the patient's pain.  Discussed pain options with the patient, will treat with some calcitonin and patient was given some Norco for breakthrough pain.  Patient apparently cannot tolerate oral NSAIDs.  Patient's blood pressure was somewhat elevated here, she had not taken her morning medications.  We gave her some oral clonidine and her blood pressure improved significantly.  Recommend that when she follows up with her doctor, she should discuss further management of this also.    Assessments & Plan (with Medical Decision Making)  T12 compression fracture     I have reviewed the nursing notes.    I have reviewed the findings, diagnosis, plan and need for follow up  with the patient.      Discharge Medication List as of 11/4/2020  9:52 AM      START taking these medications    Details   calcitonin, salmon, (MIACALCIN) 200 UNIT/ACT nasal spray Spray 1 spray into one nostril alternating nostrils daily Alternate nostril each day., Disp-2.7 mL, R-0, E-Prescribe      HYDROcodone-acetaminophen (NORCO) 5-325 MG tablet Take 1 tablet by mouth every 6 hours as needed for severe pain, Disp-14 tablet, R-0, Local Print             Final diagnoses:   Compression fracture of T12 vertebra, initial encounter (H)       11/4/2020   [facility] EMERGENCY DEPT     Toni Jackson MD  11/04/20 [id number]

## 2020-11-04 NOTE — ED TRIAGE NOTES
Pt woke up in the middle of the night from back pain 9/10 felt like could pass out, took two tylenol pain is now 7/10, feels stiff, and feels the pain in lower back most equally on both sides. Says has not experienced pain like this before.

## 2020-11-04 NOTE — ED AVS SNAPSHOT
[facility]  [address] [place] [address]  Phone: [phone]  Fax: [phone]                                    Sri Grewal   MRN: [id number]    Department: [facility]   Date of Visit: 11/4/2020           After Visit Summary Signature Page    I have received my discharge instructions, and my questions have been answered. I have discussed any challenges I see with this plan with the nurse or doctor.    ..........................................................................................................................................  Patient/Patient Representative Signature      ..........................................................................................................................................  Patient Representative Print Name and Relationship to Patient    ..................................................               ................................................  Date                                   Time    ..........................................................................................................................................  Reviewed by Signature/Title    ...................................................              ..............................................  Date                                               Time          22EPIC Rev 08/18

## 2020-11-05 ENCOUNTER — VIRTUAL VISIT (OUTPATIENT)
Dept: FAMILY MEDICINE | Facility: OTHER | Age: 70
End: 2020-11-05
Payer: MEDICARE

## 2020-11-05 DIAGNOSIS — M80.00XD AGE-RELATED OSTEOPOROSIS WITH CURRENT PATHOLOGICAL FRACTURE WITH ROUTINE HEALING, SUBSEQUENT ENCOUNTER: ICD-10-CM

## 2020-11-05 DIAGNOSIS — S22.080D COMPRESSION FRACTURE OF T12 VERTEBRA WITH ROUTINE HEALING, SUBSEQUENT ENCOUNTER: Primary | ICD-10-CM

## 2020-11-05 PROCEDURE — 99442 PR PHYSICIAN TELEPHONE EVALUATION 11-20 MIN: CPT | Mod: 95 | Performed by: PHYSICIAN ASSISTANT

## 2020-11-05 RX ORDER — BUTALBITAL/ASPIRIN/CAFFEINE 50-325-40
1 CAPSULE ORAL EVERY 6 HOURS PRN
Qty: 30 CAPSULE | Refills: 0 | Status: SHIPPED | OUTPATIENT
Start: 2020-11-05 | End: 2020-12-14

## 2020-11-05 RX ORDER — CALCITONIN SALMON 200 [IU]/.09ML
1 SPRAY, METERED NASAL DAILY
Qty: 2.7 ML | Refills: 5 | Status: SHIPPED | OUTPATIENT
Start: 2020-11-05 | End: 2021-07-27

## 2020-11-05 ASSESSMENT — PAIN SCALES - GENERAL: PAINLEVEL: EXTREME PAIN (8)

## 2020-11-05 ASSESSMENT — ANXIETY QUESTIONNAIRES
IF YOU CHECKED OFF ANY PROBLEMS ON THIS QUESTIONNAIRE, HOW DIFFICULT HAVE THESE PROBLEMS MADE IT FOR YOU TO DO YOUR WORK, TAKE CARE OF THINGS AT HOME, OR GET ALONG WITH OTHER PEOPLE: SOMEWHAT DIFFICULT
3. WORRYING TOO MUCH ABOUT DIFFERENT THINGS: SEVERAL DAYS
1. FEELING NERVOUS, ANXIOUS, OR ON EDGE: SEVERAL DAYS
5. BEING SO RESTLESS THAT IT IS HARD TO SIT STILL: SEVERAL DAYS
6. BECOMING EASILY ANNOYED OR IRRITABLE: SEVERAL DAYS
2. NOT BEING ABLE TO STOP OR CONTROL WORRYING: SEVERAL DAYS
GAD7 TOTAL SCORE: 6
7. FEELING AFRAID AS IF SOMETHING AWFUL MIGHT HAPPEN: NOT AT ALL

## 2020-11-05 ASSESSMENT — PATIENT HEALTH QUESTIONNAIRE - PHQ9
SUM OF ALL RESPONSES TO PHQ QUESTIONS 1-9: 6
5. POOR APPETITE OR OVEREATING: SEVERAL DAYS

## 2020-11-05 NOTE — PROGRESS NOTES
"Sri Grewal is a 70 year old female who is being evaluated via a billable telephone visit.      The patient has been notified of following:     \"This telephone visit will be conducted via a call between you and your physician/provider. We have found that certain health care needs can be provided without the need for a physical exam.  This service lets us provide the care you need with a short phone conversation.  If a prescription is necessary we can send it directly to your pharmacy.  If lab work is needed we can place an order for that and you can then stop by our lab to have the test done at a later time.    Telephone visits are billed at different rates depending on your insurance coverage. During this emergency period, for some insurers they may be billed the same as an in-person visit.  Please reach out to your insurance provider with any questions.    If during the course of the call the physician/provider feels a telephone visit is not appropriate, you will not be charged for this service.\"    Patient has given verbal consent for Telephone visit?  Yes    What phone number would you like to be contacted at? [phone]    How would you like to obtain your AVS? Mail a copy    Subjective     Sri Grewal is a 70 year old female who presents via phone visit today for the following health issues:    HPI     ED/UC Followup:    Facility:  [facility]  Date of visit: 11/4/2020  Reason for visit: compression fracture of T12 vertebra  Current Status: ER gave her some pain meds but pt does not like this medication (hydrocodone) and she has only taken a half a one, she is afraid to take these, BP is very high due to pain, patches help a little especially with heating pad, hydrocodone doesn't work at all she would like butalbital     - Thinks happened when she fell a few months ago down a couple stairs   - Hurt her ribs then but didn't know hurt her back   - Yesterday just started hurting and hurt so bad   - Every " movement hurts so bad   - 1/2 tablet of Hydrocodone, slept 2 hours then was up in pain   - Salon pas patch, heating pad - both helped a little   - Night before went to ED, did try Butalbital - took one whole one, helped      Just doesn't want to be on pain medications, doesn't like the way they make her feel            Review of Systems   Constitutional, HEENT, cardiovascular, pulmonary, gi and gu systems are negative, except as otherwise noted.       Objective          Vitals:  No vitals were obtained today due to virtual visit.    healthy, alert and no distress  PSYCH: Alert and oriented times 3; coherent speech, normal   rate and volume, able to articulate logical thoughts, able   to abstract reason, no tangential thoughts, no hallucinations   or delusions  Her affect is normal  RESP: No cough, no audible wheezing, able to talk in full sentences  Remainder of exam unable to be completed due to telephone visits    Diagnostics  None       Assessment & Plan       ICD-10-CM    1. Compression fracture of T12 vertebra with routine healing, subsequent encounter  S22.080D Orthopedic & Spine  Referral   2. Age-related osteoporosis with current pathological fracture with routine healing, subsequent encounter  M80.00XD      - Reviewed T12 fracture from ED      Patient reports possibly from   - Last Dexa 11/2018      Osteoporosis with high fracture risk      Was started on Fosamax but this was discontinued some time last year for unknown reason       Will need to discuss with patient at follow up       Was given calcitonin in ED       Will need to review with patient at follow up to continue this medication   - Will also get her into neurosurgery for evaluation and follow to healing       Referral placed   - Patient reports pain is very bad      Took 1/2 tablet Norco but didn't help and really doesn't want to take any more       Took Butalbital for her pain before ED, did help and doesn't make her foggy/tired         Discussed not the ideal medication for this kind of pain, she is looking just for 1-2 weeks to help      Will allow this, reviewed use and side effects         The patient indicates understanding of these issues and agrees with the plan.    Return in about 2 weeks (around 11/19/2020) for if not improving. and with specialist     Rosenda Pierre PA-C  [facility]    Phone call duration:  12 minutes

## 2020-11-05 NOTE — PATIENT INSTRUCTIONS
Patient Education     Back Fracture (Compression Fracture)  Your spine stretches from the base of your skull to your tailbone. It's composed of 33 bones (vertebrae) stacked on top of one another. These bones are strong enough to support the weight of your upper body. Certain injuries, however, can damage one or more of the vertebrae and cause them to collapse. A collapsed bone in your spine is known as a compression fracture.  What to expect in the ER  A healthcare provider will ask about your health history and examine you. In some cases, you may have X-rays. You also may have other tests, such as computed tomography (CT) scan or magnetic resonance imaging (MRI) scan. These tests can provide detailed images of your bones and spinal cord.  Treatment  Treatment will depend on the type and cause of the fracture. You will be given medicine for pain. Severe fractures or those that cause nerve problems may need surgery. Many compression fractures mend on their own.  Follow-up  As you improve, you may be given exercises to strengthen your bones. If you have osteoporosis, your healthcare provider may prescribe a medicine to treat it. Sometimes you may have pain even after the bone has healed. In that case, your healthcare provider will discuss your further options.    Causes of compression fracture  Many compression fractures result from osteoporosis. This disease thins your bones and weakens so they can't withstand normal pressure and are more likely to break. Trauma from a car accident or hard fall can fracture even healthy vertebrae. In rare cases, vertebrae may fracture for unknown reasons.  When to go to the emergency room (ER)  Call 911 if you've been in an accident or had a fall and have neck or back pain, especially when pain occurs with any of these symptoms:    Loss of control over your bowels or bladder    Numbness or weakness    High fever    Unexplained back pain in a person with cancer  [employer] last  reviewed this educational content on 4/1/2018 2000-2020 [employer]. [address] [facility], [place] [address]. All rights reserved. This information is not intended as a substitute for professional medical care. Always follow your healthcare professional's instructions.           Patient Education     Vertebral Compression Fracture    You have a compression fracture or break in one of the bones in your spine. This kind of break usually happens in older people with thinning of the bones called osteoporosis. It may happen after a ground level fall or even with a very minor force. This can include bending forward, getting up from a seated position, coughing, or sneezing.  It may also occur in young healthy people after a severe injury, such as a car accident or fall from a height. This is generally a stable break and usually does not cause any injury to the spinal cord or nerves. This injury usually takes 1 to 3 months to heal. It can be treated at home with bed rest and pain medicine.  Prescription or over-the-counter pain medicine can be used to control the pain. Long-term use of pain medicine can increase the risk of side effects. This includes: liver or kidney damage, gastrointestinal bleeding, constipation, or narcotic dependence. If you have chronic liver or kidney disease, or ever had a stomach ulcer or gastrointestinal bleeding, talk with your healthcare provider before using these medicines. If pain medicine is needed for more than 1 to 2 weeks, talk with your healthcare provider about other treatment options.  A back brace or abdominal binder may be prescribed to reduce pain by limiting motion at the site of the break. If you have osteoporosis, talk with your healthcare provider about using calcium and vitamin D supplements. You may need prescription medicines to prevent further bone loss. An exercise program to strengthen spine strength is a very important part of the treatment plan. It  should start once the pain is under control.  If you have severe or persistent pain, your healthcare provider may recommend a procedure called a vertebral augmentation. In this procedure, a needle is used to inject a bone cement into the broken vertebra. This will expand it  to its original shape.  Home care    You may need to stay in bed for the first few days. But, start sitting or walking as soon as possible. This will help prevent problems with prolonged bed rest such as: muscle weakness, worsening back stiffness and pain, and blood clots in the legs.    When in bed, try to find a comfortable position. A firm mattress is best. Try lying flat on your back with pillows under your knees. You can also try lying on your side with your knees bent up towards your chest and a pillow between your knees.    Don't sit for long periods of time. This puts more stress on the lower back than standing or walking.    Apply an ice pack over the injured area for 15 to 20 minutes every 3 to 6 hours. You should do this for the first 24 to 48 hours. You can make an ice pack by filling a plastic bag that seals at the top with ice cubes and then wrapping it with a thin towel. You can start with ice, then switch to heat after 2 days. Apply heat (warm shower or warm bath) for 15 to 20 minutes several times a day for muscle spasms. Some people feel best alternating ice and heat treatments. Use the one method that feels the best to you. Be careful not to injure your skin with the ice or heat treatments. Ice should never be applied directly to skin. Warm rather than hot heat should be used to protect skin areas that have decreased sensation.    Take pain medicine as directed. Call your healthcare provider if your pain is not well-controlled. A dose change, stronger medicine, or other treatment options may be needed.    Be aware of safe lifting methods and don't lift anything over 10 pounds until all the pain is gone.  Follow-up  care  Follow up with your healthcare provider, or as advised. If X-rays were taken, you will be told of any new findings that may affect your care.  Call 911  Call 911 if you have:    Weakness or numbness in one or both legs    Loss of control over bowels or bladder    Numbness in the groin area  When to seek medical advice  Call your healthcare provider right away if the pain gets worse or spreads to your arms or legs.  [employer] last reviewed this educational content on 5/1/2018 2000-2020 [employer]. [address] [place] [address], [place] [address]. All rights reserved. This information is not intended as a substitute for professional medical care. Always follow your healthcare professional's instructions.

## 2020-11-06 ASSESSMENT — ANXIETY QUESTIONNAIRES: GAD7 TOTAL SCORE: 6

## 2020-11-09 ENCOUNTER — NURSE TRIAGE (OUTPATIENT)
Dept: FAMILY MEDICINE | Facility: OTHER | Age: 70
End: 2020-11-09

## 2020-11-09 ENCOUNTER — HOSPITAL ENCOUNTER (EMERGENCY)
Facility: CLINIC | Age: 70
Discharge: HOME | End: 2020-11-09
Attending: PHYSICIAN ASSISTANT | Admitting: PHYSICIAN ASSISTANT
Payer: MEDICARE

## 2020-11-09 ENCOUNTER — APPOINTMENT (OUTPATIENT)
Dept: GENERAL RADIOLOGY | Facility: CLINIC | Age: 70
End: 2020-11-09
Attending: PHYSICIAN ASSISTANT
Payer: MEDICARE

## 2020-11-09 VITALS
TEMPERATURE: 97.7 F | SYSTOLIC BLOOD PRESSURE: 218 MMHG | BODY MASS INDEX: 27.6 KG/M2 | WEIGHT: 149 LBS | DIASTOLIC BLOOD PRESSURE: 126 MMHG | RESPIRATION RATE: 20 BRPM | OXYGEN SATURATION: 97 % | HEART RATE: 79 BPM

## 2020-11-09 DIAGNOSIS — R14.0 ABDOMINAL BLOATING: ICD-10-CM

## 2020-11-09 DIAGNOSIS — K59.00 CONSTIPATION: ICD-10-CM

## 2020-11-09 PROCEDURE — 99284 EMERGENCY DEPT VISIT MOD MDM: CPT | Performed by: PHYSICIAN ASSISTANT

## 2020-11-09 PROCEDURE — 250N000011 HC RX IP 250 OP 636: Performed by: PHYSICIAN ASSISTANT

## 2020-11-09 PROCEDURE — 99284 EMERGENCY DEPT VISIT MOD MDM: CPT | Mod: 25 | Performed by: PHYSICIAN ASSISTANT

## 2020-11-09 PROCEDURE — 250N000013 HC RX MED GY IP 250 OP 250 PS 637: Mod: GY | Performed by: PHYSICIAN ASSISTANT

## 2020-11-09 PROCEDURE — 74019 RADEX ABDOMEN 2 VIEWS: CPT

## 2020-11-09 PROCEDURE — 96372 THER/PROPH/DIAG INJ SC/IM: CPT | Performed by: PHYSICIAN ASSISTANT

## 2020-11-09 RX ORDER — ACETAMINOPHEN 325 MG/1
975 TABLET ORAL ONCE
Status: COMPLETED | OUTPATIENT
Start: 2020-11-09 | End: 2020-11-09

## 2020-11-09 RX ADMIN — ACETAMINOPHEN 975 MG: 325 TABLET, FILM COATED ORAL at 14:13

## 2020-11-09 RX ADMIN — METHYLNALTREXONE BROMIDE 12 MG: 12 INJECTION, SOLUTION SUBCUTANEOUS at 15:39

## 2020-11-09 RX ADMIN — DOCUSATE SODIUM 286 ML: 50 LIQUID ORAL at 14:30

## 2020-11-09 NOTE — ED PROVIDER NOTES
History     Chief Complaint   Patient presents with     Constipation     The history is provided by the patient.     Sri Grewal is a 70 year old female who presents to the emergency department with concerns of constipation. The patient reports having a compression fracture of her T12 vertebrae on 11/04/2020. Since then she has been taking pain medications, only at night. She has not had a bowel movement for the past 6 days. She has felt uncomfortable and bloated. Last night she notes her abdomen feeling hard. She has lost her appetite, which she notes is unusual for her. The patient has mild abdominal pain she rates a 4-5/10 and describes as achy and dull. No recent fever or chills. She has felt nauseated, but denies any vomiting. She has noticed increased bowel sounds. The patient has tried eating more salads. She has also attempted to drink milk of magnesia, but says this was unsuccessful.    Allergies:  Allergies   Allergen Reactions     Codeine      GI UPSET     Oxycodone      Seasonal Allergies        Problem List:    Patient Active Problem List    Diagnosis Date Noted     Health Care Home 07/27/2011     Priority: High     X  EMERGENCY CARE PLAN  Presenting Problem Signs and Symptoms Treatment Plan    Questions or conerns during clinic hours    I will call the clinic directly     Questions or conerns outside clinic hours    I will call the 24 hour nurse line at [phone]    Patient needs to schedule an appointment    I will call the 24 hour scheduling team at [phone] or clinic directly    Same day treatment     I will call the clinic first, nurse line if after hours, urgent care and express care if needed                            DX V65.8 REPLACED WITH [id number] CARE HOME (04/08/2013)       Hematoma 01/10/2019     Priority: Medium     Hematoma of abdominal wall, initial encounter 01/09/2019     Priority: Medium     Abdominal wall hematoma 01/09/2019     Priority: Medium     Hyponatremia  11/26/2018     Priority: Medium     Age-related osteoporosis without current pathological fracture 11/12/2018     Priority: Medium     HSV (herpes simplex virus) infection 07/31/2018     Priority: Medium     Atypical mole 06/01/2017     Priority: Medium     Hypertension goal BP (blood pressure) < 140/90 05/25/2017     Priority: Medium     TIA (transient ischemic attack) 01/16/2017     Priority: Medium     Adjustment disorder with mixed anxiety and depressed mood 09/09/2014     Priority: Medium     Middle cerebral artery aneurysm 10/29/2013     Priority: Medium     Noted in 2007.  Intervention not recommended at that time.  Observation.  Saw Interventional Radiology 12/2013.  Recheck CTA in one year.       Moderate major depression (H) 09/08/2010     Priority: Medium     Insomnia 09/08/2010     Priority: Medium     Atrophy of vagina 05/30/2010     Priority: Medium     Lump or mass in breast 03/29/2010     Priority: Medium     Mild persistent asthma 02/21/2005     Priority: Medium     Anemia 04/25/2002     Priority: Medium     Problem list name updated by automated process. Provider to review       Chronic migraine without aura without status migrainosus, not intractable      Priority: Medium     Multiple trials off of fiorinal have not been successful  Problem list name updated by automated process. Provider to review       Other abnormal Papanicolaou smear of cervix and cervical HPV(795.09)      Priority: Medium     (Problem list name updated by automated process. Provider to review and confirm.)       Endometriosis      Priority: Medium     Problem list name updated by automated process. Provider to review       Allergic rhinitis      Priority: Medium     Problem list name updated by automated process. Provider to review          Past Medical History:    Past Medical History:   Diagnosis Date     Abnormal Papanicolaou smear of cervix and cervical HPV      Allergic rhinitis, cause unspecified      Contact dermatitis  and other eczema, due to unspecified cause      Endometriosis, site unspecified      Esophageal reflux      Migraine, unspecified, without mention of intractable migraine without mention of status migrainosus      Mild persistent asthma      Unspecified disorder of uterus        Past Surgical History:    Past Surgical History:   Procedure Laterality Date     C  DELIVERY ONLY       X2     COLONOSCOPY  2014    Procedure: COLONOSCOPY;  Surgeon: Nathan Baker MD;  Location: [facility]     HC COLONOSCOPY THRU STOMA, DIAGNOSTIC  2002    normal       Family History:    Family History   Problem Relation Age of Onset     Heart Disease Mother         anemia     Osteoporosis Mother      Hypertension Mother      Cerebrovascular Disease Mother      Alcohol/Drug Mother         alcohol     Neurologic Disorder Mother         migraines     Hypertension Father      Cancer No family hx of         breast       Social History:  Marital Status:   [5]  Social History     Tobacco Use     Smoking status: Never Smoker     Smokeless tobacco: Never Used   Substance Use Topics     Alcohol use: Yes     Comment: beerx2/x1 per month     Drug use: No        Medications:         albuterol (PROAIR HFA) 108 (90 Base) MCG/ACT inhaler       buPROPion (WELLBUTRIN XL) 150 MG 24 hr tablet       butalbital-aspirin-caffeine (FIORINAL) -40 MG per capsule       calcitonin, salmon, (MIACALCIN) 200 UNIT/ACT nasal spray       cloNIDine (CATAPRES) 0.1 MG tablet       propranolol (INDERAL) 40 MG tablet       venlafaxine (EFFEXOR-XR) 150 MG 24 hr capsule          Review of Systems   All other systems reviewed and are negative.      Physical Exam   BP: (!) 176/98  Pulse: 99  Temp: 97.7  F (36.5  C)  Resp: 20  Weight: 67.6 kg (149 lb)  SpO2: 96 %      Physical Exam  Vitals signs and nursing note reviewed.   Constitutional:       General: She is not in acute distress.     Appearance: She is not diaphoretic.   HENT:      Head:  Normocephalic and atraumatic.      Right Ear: External ear normal.      Left Ear: External ear normal.      Nose: Nose normal.      Mouth/Throat:      Pharynx: No oropharyngeal exudate.   Eyes:      General: No scleral icterus.        Right eye: No discharge.         Left eye: No discharge.      Conjunctiva/sclera: Conjunctivae normal.      Pupils: Pupils are equal, round, and reactive to light.   Neck:      Musculoskeletal: Normal range of motion and neck supple.      Thyroid: No thyromegaly.   Cardiovascular:      Rate and Rhythm: Normal rate and regular rhythm.      Heart sounds: Normal heart sounds. No murmur.   Pulmonary:      Effort: Pulmonary effort is normal. No respiratory distress.      Breath sounds: Normal breath sounds. No wheezing or rales.   Chest:      Chest wall: No tenderness.   Abdominal:      General: Bowel sounds are normal. There is no distension.      Palpations: Abdomen is soft. There is no mass.      Tenderness: There is no abdominal tenderness. There is no right CVA tenderness, left CVA tenderness, guarding or rebound.   Musculoskeletal: Normal range of motion.         General: No tenderness or deformity.   Lymphadenopathy:      Cervical: No cervical adenopathy.   Skin:     General: Skin is warm and dry.      Capillary Refill: Capillary refill takes less than 2 seconds.      Findings: No erythema or rash.   Neurological:      Mental Status: She is alert and oriented to person, place, and time.      Cranial Nerves: No cranial nerve deficit.   Psychiatric:         Behavior: Behavior normal.         Thought Content: Thought content normal.         ED Course        Procedures               Critical Care time:  none               Results for orders placed or performed during the hospital encounter of 11/09/20 (from the past 24 hour(s))   XR Abdomen 2 Views    Narrative    XR ABDOMEN 2 VW 11/9/2020 1:49 PM    HISTORY: Abdominal pain and nausea, no bowel movement x6 days.    COMPARISON: CT  11/4/2020       Impression    IMPRESSION: Considerable retained fecal debris within colon. Nothing  for intestinal obstruction. No organomegaly or suspicious  calcification. Phlebolith calcifications in the pelvis. No evidence  for pneumoperitoneum.    JESUS SOTO MD       Medications   pink lady enema (COMPOUNDED: docusate, magnesium citrate, mineral oil, sodium phosphate) (286 mLs Rectal Given 11/9/20 1430)   acetaminophen (TYLENOL) tablet 975 mg (975 mg Oral Given 11/9/20 1413)   methylnaltrexone (RELISTOR) injection 12 mg (12 mg Subcutaneous Given 11/9/20 1539)       Assessments & Plan (with Medical Decision Making)     Abdominal bloating  Constipation     70 year old female who presents with concerns of constipation. She recently started taking pain medications. Has not had a BM in the past 6 days since starting pain medication. Upon arrival she is afebrile and all vitals are within normal limits.  Abdomen exam is reassuring.  Good bowel sounds.  No significant localized tenderness.  Remainder the exam is otherwise normal.  An x-ray of her abdomen shows considerable amount of retained fecal debris within the colon but no sign of obstruction.  Patient was given a pink lady enema with no results.  We then gave Relistor with no results.  Rather than repeating an enema, it was felt that we would need to hit this from a top-down approach.  She will drink a bottle of magnesium citrate when she returns home.  If no results by tomorrow morning, then she will do a colonoscopy prep with MiraLAX as noted in the discharge instructions.  We could repeat an enema as well.  Indications for ED return reviewed.  She will start a stool softener anytime that she needs pain medication for her compression fracture.  Patient was in agreement with this plan and was suitable for discharge.     I have reviewed the nursing notes.    I have reviewed the findings, diagnosis, plan and need for follow up with the patient.        Discharge Medication List as of 11/9/2020  4:58 PM          Final diagnoses:   Abdominal bloating   Constipation       This document serves as a record of services personally performed by Juan F Sifuentes PA-C*. It was created on their behalf by Verna Barber, a trained medical scribe. The creation of this record is based on the provider's personal observations and the statements of the patient. This document has been checked and approved by the attending provider.  Note: Chart documentation done in part with Dragon Voice Recognition software. Although reviewed after completion, some word and grammatical errors may remain.  11/9/2020   [facility] EMERGENCY DEPT     Juan F Sifuentes PA-C  11/09/20 8101

## 2020-11-09 NOTE — TELEPHONE ENCOUNTER
Reason for Call:  Other call back    Detailed comments: Patient calling stating that she is real tense and hasn't been eating. Patient is concerned that she hasn't had a bowl movement since last Tuesday. Patient states she even tried milk of magnesium and hasnt seemed to work. Patient would like a call back to discuss what else she should try. Please advise.    Phone Number Patient can be reached at: Cell number on file:    Telephone Information:   Mobile [phone]       Best Time: Any    Can we leave a detailed message on this number? YES    Call taken on 11/9/2020 at 8:50 AM by Candi Pagan

## 2020-11-09 NOTE — ED TRIAGE NOTES
Pt has not had a BM since Tuesday 11-2.  Pt is on pain medication for a recent back fx.  She is not taking stool softeners.

## 2020-11-09 NOTE — DISCHARGE INSTRUCTIONS
It was a pleasure working with you today!  I hope your condition improves rapidly!     Please drink a bottle of magnesium citrate when you get home.  You can drink it straight or you can mix it with lemon lime soda or Gatorade as an option.  This hopefully will help open things up so that you can go to the bathroom.  I would also recommend taking Docusate ( stool softener) 100 mg 2 times daily until you start to go regularly again.      If you do not get results by tomorrow morning, then drink MiraLAX.  Purchase the 8.3 ounce or 238 g bottle of MiraLAX.  Mix one half of the bottle in 32 ounces of Propel or Gatorade.  Reserve the one half bottle of MiraLAX to consume in 32 ounces of Propel or Gatorade tomorrow morning if you still have not had any results.  Please also take 4 tablets of 5 mg Bisacodyl/Dulcolax 5 mg ( stool softener )  This combination is the same thing that is given for a colonoscopy prep.

## 2020-11-09 NOTE — TELEPHONE ENCOUNTER
"Back pain seen on Tuesday, given pain medication  Last BM Tuesday 4-5/10 constant pain  generalized weakness  Hard/distended/swollen abdomen  Tried milk of mag yesterday, no success    PLAN: per protocol patient advised ED visit due to distention and constant pain, at home cares unsuccessful. Patient is agreeable to plan and will go in now with someone driving her    Salomegina Montenegro RN      1. STOOL PATTERN OR FREQUENCY: \"How often do you pass bowel movements (BMs)?\"  (Normal range: tid to q 3 days)  \"When was the last BM passed?\"        Normal is once daily      Last BM- Tuesday 11/2    2. STRAINING: \"Do you have to strain to have a BM?\"       None, cannot go per patient    3. RECTAL PAIN: \"Does your rectum hurt when the stool comes out?\" If so, ask: \"Do you have hemorrhoids? How bad is the pain?\"  (Scale 1-10; or mild, moderate, severe)      none    4. STOOL COMPOSITION: \"Are the stools hard?\"       Unable to recall last stool consistancy    5. BLOOD ON STOOLS: \"Has there been any blood on the toilet tissue or on the surface of the BM?\" If so, ask: \"When was the last time?\"       None    6. CHRONIC CONSTIPATION: \"Is this a new problem for you?\"  If no, ask: \"How long have you had this problem?\" (days, weeks, months)       None    7. CHANGES IN DIET: \"Have there been any recent changes in your diet?\"       Not eating    8. MEDICATIONS: \"Have you been taking any new medications?\"      No    9. LAXATIVES: \"Have you been using any laxatives or enemas?\"  If yes, ask \"What, how often, and when was the last time?\"      Tried taking milk of mag yesterday    10. CAUSE: \"What do you think is causing the constipation?\"         Pain medication    11. OTHER SYMPTOMS: \"Do you have any other symptoms?\" (e.g., abdominal pain, fever, vomiting)        nausea    12. PREGNANCY: \"Is there any chance you are pregnant?\" \"When was your last menstrual period?\"        NA    Additional Information    Constant abdominal pain lasting > 2 " hours    Negative: Patient sounds very sick or weak to the triager    Negative: Abdomen pain is the main symptom and adult male    Negative: Abdomen pain is the main symptom and adult female    Negative: Rectal bleeding or blood in stool is the main symptom    Protocols used: CONSTIPATION-A-[place]

## 2020-11-10 ENCOUNTER — PATIENT OUTREACH (OUTPATIENT)
Dept: CARE COORDINATION | Facility: CLINIC | Age: 70
End: 2020-11-10

## 2020-11-10 DIAGNOSIS — K59.00 CONSTIPATION: Primary | ICD-10-CM

## 2020-11-10 NOTE — PROGRESS NOTES
Clinic Care Coordination Contact  [facility]/Voicemail       Clinical Data: CHW Outreach  Outreach attempted x 1. Left message on patient's voicemail with call back information and requested return call.    Plan: CHW will try to reach patient again in 1-2 business days.    Karolina CHAN Community Health Worker  Clinic Care Coordination  [facility] [place] [facility] [place]  Phone: [phone]    Reason for Referral: Care Transition: Inpatient to outpatient

## 2020-11-11 ENCOUNTER — PATIENT OUTREACH (OUTPATIENT)
Dept: NURSING | Facility: CLINIC | Age: 70
End: 2020-11-11
Payer: MEDICARE

## 2020-11-11 ENCOUNTER — TELEPHONE (OUTPATIENT)
Dept: FAMILY MEDICINE | Facility: OTHER | Age: 70
End: 2020-11-11

## 2020-11-11 NOTE — TELEPHONE ENCOUNTER
Reason for Call: Request for an order or referral:    Order or referral being requested: Spine speacialist    Date needed: at your convenience    Has the patient been seen by the PCP for this problem? YES    Additional comments: Patient called and is requesting a referral to see a spine specialist she is requesting it to be close if possible. Please Advise thank you    Phone number Patient can be reached at:  Cell number on file:    Telephone Information:   Mobile [phone]       Best Time:  anytime    Can we leave a detailed message on this number?  YES    Call taken on 11/11/2020 at 9:44 AM by Tracey Garces

## 2020-11-11 NOTE — TELEPHONE ENCOUNTER
Attempted to reach the patient with the following information.  Left message for patient to return call to clinic.     [phone] is the number she needs to call.    Sabrina White MA

## 2020-11-11 NOTE — PROGRESS NOTES
Clinic Care Coordination Contact  Community Health Worker Initial Outreach            Patient accepts CC: No, Patient declined CCC. . Patient will be sent Care Coordination introduction letter for future reference.     The Clinic Community Health Worker spoke with the patient today at the request of the PCP to discuss possible Clinic Care Coordination enrollment. The service was described to the patient and immediate needs were discussed. The patient declined enrollment at this time. The PCP is encouraged to refer in the future if the patient's needs change.    Karolina CHAN Community Health Worker  Clinic Care Coordination  [facility] : [facility]  Phone: [phone]    Reason for Referral: Care Transition: Inpatient to outpatient

## 2020-11-11 NOTE — LETTER
[place] CARE COORDINATION  [address]  [facility] [address]    November 11, 2020    Sri [place] Car[place]  [address] [facility] [address]      Dear Sri,    I am a clinic community health worker who works with Rosenda Pierre PA-C at [facility]. I wanted to thank you for spending the time to talk with me.  Below is a description of clinic care coordination and how I can further assist you.      The clinic care coordination team is made up of a registered nurse,  and community health worker who understand the health care system. The goal of clinic care coordination is to help you manage your health and improve access to the health care system in the most efficient manner. The team can assist you in meeting your health care goals by providing education, coordinating services, strengthening the communication among your providers and supporting you with any resource needs.    Please feel free to contact me at [phone] with any questions or concerns. We are focused on providing you with the highest-quality healthcare experience possible and that all starts with you.     Sincerely,     Karolina CHAN, Community Health Worker  Clinic Care Coordination  [facility] : [facility] [place]  Phone: [phone]

## 2020-11-11 NOTE — TELEPHONE ENCOUNTER
Referral was already placed at last visit on 11/5/20.    Jose Francisco Pierre PA-C  [facility]

## 2020-11-16 ENCOUNTER — TELEPHONE (OUTPATIENT)
Dept: FAMILY MEDICINE | Facility: OTHER | Age: 70
End: 2020-11-16

## 2020-11-16 DIAGNOSIS — S22.080D COMPRESSION FRACTURE OF T12 VERTEBRA WITH ROUTINE HEALING, SUBSEQUENT ENCOUNTER: Primary | ICD-10-CM

## 2020-11-16 NOTE — TELEPHONE ENCOUNTER
We typically avoid NSAIDs like Ibuprofen because they can cause GI upset and delayed bone healing. But it would be lower risk than narcotic pain medication like Norco or Percocet. I can send something for her if she wants, otherwise if she has a lot of questions schedule a visit with me.     Jose Francisco Pierre PA-C  [facility]

## 2020-11-16 NOTE — TELEPHONE ENCOUNTER
Patient has a fx back and was told she can take tylenol for pain. That is not helping at all she said. Wondering what else she can do to relieve her pain. She does use a heating pad.    Please advise and okay to lm she said if don't .

## 2020-11-17 PROBLEM — S22.080D COMPRESSION FRACTURE OF T12 VERTEBRA WITH ROUTINE HEALING, SUBSEQUENT ENCOUNTER: Status: ACTIVE | Noted: 2020-11-17

## 2020-11-17 RX ORDER — HYDROCODONE BITARTRATE AND ACETAMINOPHEN 5; 325 MG/1; MG/1
1 TABLET ORAL EVERY 6 HOURS PRN
Qty: 30 TABLET | Refills: 0 | Status: SHIPPED | OUTPATIENT
Start: 2020-11-17 | End: 2020-11-20

## 2020-11-17 NOTE — TELEPHONE ENCOUNTER
I have sent the Norco for her. She should not drive on this medication and keep up on the stool softeners.     Jose Francisco Pierre PA-C  [facility]

## 2020-11-17 NOTE — TELEPHONE ENCOUNTER
Spoke to patient, she would like to try Norco. She states she will keep up on her stool softeners. She does have to go see the spine specialist.     Kala Winchester MA

## 2020-11-27 ENCOUNTER — TELEPHONE (OUTPATIENT)
Dept: FAMILY MEDICINE | Facility: OTHER | Age: 70
End: 2020-11-27

## 2020-11-27 NOTE — TELEPHONE ENCOUNTER
Pt is calling back and she states that the [facility] reached out to her so we can disregard this message.

## 2020-11-27 NOTE — TELEPHONE ENCOUNTER
Patient is scheduled for Monday 11-30 to see a spine specialist with [facility] in [place].  She received a message that they want to do a virtual visit.  She left them a message saying she is unable to do a virtual visit as she does not have the smart phone or computer.  She has not heard back from them.  She wanted to let Ambreen Pierre know.  She will try to contact them again and let us know what happens.  She is calling with [facility].

## 2020-11-30 ENCOUNTER — VIRTUAL VISIT (OUTPATIENT)
Dept: NEUROSURGERY | Facility: CLINIC | Age: 70
End: 2020-11-30
Attending: NURSE PRACTITIONER
Payer: MEDICARE

## 2020-11-30 DIAGNOSIS — S22.080A COMPRESSION FRACTURE OF T12 VERTEBRA, INITIAL ENCOUNTER (H): Primary | ICD-10-CM

## 2020-11-30 PROCEDURE — 99443 PR PHYSICIAN TELEPHONE EVALUATION 21-30 MIN: CPT | Performed by: NURSE PRACTITIONER

## 2020-11-30 NOTE — LETTER
"    11/30/2020         RE: Sri Grewal  [address] [facility] [address]        Dear Colleague,    Thank you for referring your patient, Sri Grewal, to the [facility] [place]. Please see a copy of my visit note below.    The patient has been notified of following:     \"This telephone visit will be conducted via a call between you and your physician/provider. We have found that certain health care needs can be provided without the need for a physical exam.  This service lets us provide the care you need with a short phone conversation.  If a prescription is necessary we can send it directly to your pharmacy.  If lab work is needed we can place an order for that and you can then stop by our lab to have the test done at a later time.    If during the course of the call the physician/provider feels a telephone visit is not appropriate, you will not be charged for this service.\"     Physician has received verbal consent for a Telephone Visit from the patient? Yes    CC:     Primary care Provider: Rosenda Pierre    Reason For Visit:   I was asked by Rosenda Woods PA-C to consult on the patient for   S22.080D (ICD-10-CM) - Compression fracture of T12 vertebra with routine healing, subsequent encounter    HPI: Sri Grewal is a 70 year old female who was referred to our clinic for evaluation of T12 compression fracture that occurred on 11/4/20. Patient was seen in the ED that day. She reports her pain occurred when she turned over in bed.  She was not given a brace. Today, patient reports intermittent, mild back pain, but not as severe as when she went to the ED on 11/4/20. She feels as though her fracture is \"healing\". Denies any radicular leg pain, paresthesias, or weakness. She is no longer taking Norco. Her main concern is the pain in her right side near her waist line. This is the same pain that brought her to the ED on 11/9/20. She describes it " as a constant sharp pain. She denies any nausea or vomiting. She denies any falls, gait changes, incontinence, or saddle anesthesia.     Current right sided pain: 8  Current back pain: 3    Past Medical History:   Diagnosis Date     Abnormal Papanicolaou smear of cervix and cervical HPV      Allergic rhinitis, cause unspecified     seasonal allergies     Contact dermatitis and other eczema, due to unspecified cause      Endometriosis, site unspecified      Esophageal reflux     GERD     Migraine, unspecified, without mention of intractable migraine without mention of status migrainosus      Mild persistent asthma      Unspecified disorder of uterus     fibroid uterus       Past Medical History reviewed with patient during visit.    Past Surgical History:   Procedure Laterality Date     C  DELIVERY ONLY       X2     COLONOSCOPY  2014    Procedure: COLONOSCOPY;  Surgeon: Nathan Baker MD;  Location:  GI     HC COLONOSCOPY THRU STOMA, DIAGNOSTIC  2002    normal     Past Surgical History reviewed with patient during visit.    Current Outpatient Medications   Medication     albuterol (PROAIR HFA) 108 (90 Base) MCG/ACT inhaler     buPROPion (WELLBUTRIN XL) 150 MG 24 hr tablet     butalbital-aspirin-caffeine (FIORINAL) -40 MG per capsule     calcitonin, salmon, (MIACALCIN) 200 UNIT/ACT nasal spray     cloNIDine (CATAPRES) 0.1 MG tablet     propranolol (INDERAL) 40 MG tablet     venlafaxine (EFFEXOR-XR) 150 MG 24 hr capsule     No current facility-administered medications for this visit.        Allergies   Allergen Reactions     Codeine      GI UPSET     Oxycodone      Seasonal Allergies        Social History     Socioeconomic History     Marital status:      Spouse name: Not on file     Number of children: 2     Years of education: Not on file     Highest education level: Not on file   Occupational History     Occupation: retired     Employer: RETIRED   Social Needs     Financial  resource strain: Not on file     Food insecurity     Worry: Not on file     Inability: Not on file     Transportation needs     Medical: Not on file     Non-medical: Not on file   Tobacco Use     Smoking status: Never Smoker     Smokeless tobacco: Never Used   Substance and Sexual Activity     Alcohol use: Yes     Comment: beerx2/x1 per month     Drug use: No     Sexual activity: Not Currently     Partners: Male     Birth control/protection: Surgical     Comment: tubal ligation   Lifestyle     Physical activity     Days per week: Not on file     Minutes per session: Not on file     Stress: Not on file   Relationships     Social connections     Talks on phone: Not on file     Gets together: Not on file     Attends Mandaen service: Not on file     Active member of club or organization: Not on file     Attends meetings of clubs or organizations: Not on file     Relationship status: Not on file     Intimate partner violence     Fear of current or ex partner: Not on file     Emotionally abused: Not on file     Physically abused: Not on file     Forced sexual activity: Not on file   Other Topics Concern      Service No     Blood Transfusions No     Caffeine Concern No     Occupational Exposure No     Hobby Hazards No     Sleep Concern Yes     Comment: Insomnia     Stress Concern Yes     Comment: breathing,  passed away 2004     Weight Concern Yes     Comment: would like to lose some weight     Special Diet No     Back Care Yes     Exercise Yes     Comment: walking at work daily     Bike Helmet Not Asked     Seat Belt Yes     Self-Exams Yes     Comment: periodically     Parent/sibling w/ CABG, MI or angioplasty before 65F 55M? No   Social History Narrative     Not on file       Family History   Problem Relation Age of Onset     Heart Disease Mother         anemia     Osteoporosis Mother      Hypertension Mother      Cerebrovascular Disease Mother      Alcohol/Drug Mother         alcohol     Neurologic  "Disorder Mother         migraines     Hypertension Father      Cancer No family hx of         breast         ROS: 10 point ROS neg other than the symptoms noted above in the HPI.    Imaging:   CT ABDOMEN PELVIS W CONTRAST 11/4/2020 9:20 AM. Reviewed in clinic today.    IMPRESSION:   1.  Moderate T12 wedge compression fracture which is new since the CT from 1/9/2019.  2.  No acute intra-abdominal or intrapelvic findings.    Assessment/Plan:  70 year old female who was referred to our clinic for evaluation of T12 compression fracture that occurred on 11/4/20. Patient was seen in the ED that day. She reports her pain occurred when she turned over in bed.  She was not given a brace. Today, patient reports intermittent, mild back pain, but not as severe as when she went to the ED on 11/4/20. She feels as though her fracture is \"healing\". Denies any radicular leg pain, paresthesias, or weakness. Her main concern is the pain in her right side near her waist line. She reports this is the same pain that brought her to the ED on 11/9/20. She describes it as a constant sharp pain. We discussed obtaining a lumbar spine MRI for further evaluation of T12 compression fracture and radicular right sided pain. I will call her with the results. Also recommend a brace through Orthotics, but patient declined at this time. She states she will continue with no heavy lifting and limited bending/twisting activities.      Advised patient to call our clinic if symptoms persist, change, or worsen. Patient voiced understanding and agreement with this plan.    Phone call duration: 21 minutes    Ryanne Zhou CNP  [facility] [place] [address]  Tel [phone]  Pager [phone]                Again, thank you for allowing me to participate in the care of your patient.        Sincerely,        Ryanne Zhou NP    "

## 2020-11-30 NOTE — PROGRESS NOTES
"The patient has been notified of following:     \"This telephone visit will be conducted via a call between you and your physician/provider. We have found that certain health care needs can be provided without the need for a physical exam.  This service lets us provide the care you need with a short phone conversation.  If a prescription is necessary we can send it directly to your pharmacy.  If lab work is needed we can place an order for that and you can then stop by our lab to have the test done at a later time.    If during the course of the call the physician/provider feels a telephone visit is not appropriate, you will not be charged for this service.\"     Physician has received verbal consent for a Telephone Visit from the patient? Yes    CC:     Primary care Provider: Rosenda Pierre    Reason For Visit:   I was asked by Rosenda Woods PA-C to consult on the patient for   S22.080D (ICD-10-CM) - Compression fracture of T12 vertebra with routine healing, subsequent encounter    HPI: Sri Grewal is a 70 year old female who was referred to our clinic for evaluation of T12 compression fracture that occurred on 11/4/20. Patient was seen in the ED that day. She reports her pain occurred when she turned over in bed.  She was not given a brace. Today, patient reports intermittent, mild back pain, but not as severe as when she went to the ED on 11/4/20. She feels as though her fracture is \"healing\". Denies any radicular leg pain, paresthesias, or weakness. She is no longer taking Norco. Her main concern is the pain in her right side near her waist line. This is the same pain that brought her to the ED on 11/9/20. She describes it as a constant sharp pain. She denies any nausea or vomiting. She denies any falls, gait changes, incontinence, or saddle anesthesia.     Current right sided pain: 8  Current back pain: 3    Past Medical History:   Diagnosis Date     Abnormal Papanicolaou smear of " cervix and cervical HPV      Allergic rhinitis, cause unspecified     seasonal allergies     Contact dermatitis and other eczema, due to unspecified cause      Endometriosis, site unspecified      Esophageal reflux     GERD     Migraine, unspecified, without mention of intractable migraine without mention of status migrainosus      Mild persistent asthma      Unspecified disorder of uterus     fibroid uterus       Past Medical History reviewed with patient during visit.    Past Surgical History:   Procedure Laterality Date     C  DELIVERY ONLY       X2     COLONOSCOPY  2014    Procedure: COLONOSCOPY;  Surgeon: Nathan Baker MD;  Location:  GI      COLONOSCOPY THRU STOMA, DIAGNOSTIC  2002    normal     Past Surgical History reviewed with patient during visit.    Current Outpatient Medications   Medication     albuterol (PROAIR HFA) 108 (90 Base) MCG/ACT inhaler     buPROPion (WELLBUTRIN XL) 150 MG 24 hr tablet     butalbital-aspirin-caffeine (FIORINAL) -40 MG per capsule     calcitonin, salmon, (MIACALCIN) 200 UNIT/ACT nasal spray     cloNIDine (CATAPRES) 0.1 MG tablet     propranolol (INDERAL) 40 MG tablet     venlafaxine (EFFEXOR-XR) 150 MG 24 hr capsule     No current facility-administered medications for this visit.        Allergies   Allergen Reactions     Codeine      GI UPSET     Oxycodone      Seasonal Allergies        Social History     Socioeconomic History     Marital status:      Spouse name: Not on file     Number of children: 2     Years of education: Not on file     Highest education level: Not on file   Occupational History     Occupation: retired     Employer: RETIRED   Social Needs     Financial resource strain: Not on file     Food insecurity     Worry: Not on file     Inability: Not on file     Transportation needs     Medical: Not on file     Non-medical: Not on file   Tobacco Use     Smoking status: Never Smoker     Smokeless tobacco: Never Used    Substance and Sexual Activity     Alcohol use: Yes     Comment: beerx2/x1 per month     Drug use: No     Sexual activity: Not Currently     Partners: Male     Birth control/protection: Surgical     Comment: tubal ligation   Lifestyle     Physical activity     Days per week: Not on file     Minutes per session: Not on file     Stress: Not on file   Relationships     Social connections     Talks on phone: Not on file     Gets together: Not on file     Attends Restorationism service: Not on file     Active member of club or organization: Not on file     Attends meetings of clubs or organizations: Not on file     Relationship status: Not on file     Intimate partner violence     Fear of current or ex partner: Not on file     Emotionally abused: Not on file     Physically abused: Not on file     Forced sexual activity: Not on file   Other Topics Concern      Service No     Blood Transfusions No     Caffeine Concern No     Occupational Exposure No     Hobby Hazards No     Sleep Concern Yes     Comment: Insomnia     Stress Concern Yes     Comment: breathing,  passed away 2004     Weight Concern Yes     Comment: would like to lose some weight     Special Diet No     Back Care Yes     Exercise Yes     Comment: walking at work daily     Bike Helmet Not Asked     Seat Belt Yes     Self-Exams Yes     Comment: periodically     Parent/sibling w/ CABG, MI or angioplasty before 65F 55M? No   Social History Narrative     Not on file       Family History   Problem Relation Age of Onset     Heart Disease Mother         anemia     Osteoporosis Mother      Hypertension Mother      Cerebrovascular Disease Mother      Alcohol/Drug Mother         alcohol     Neurologic Disorder Mother         migraines     Hypertension Father      Cancer No family hx of         breast         ROS: 10 point ROS neg other than the symptoms noted above in the HPI.    Imaging:   CT ABDOMEN PELVIS W CONTRAST 11/4/2020 9:20 AM. Reviewed in clinic  "today.    IMPRESSION:   1.  Moderate T12 wedge compression fracture which is new since the CT from 1/9/2019.  2.  No acute intra-abdominal or intrapelvic findings.    Assessment/Plan:  70 year old female who was referred to our clinic for evaluation of T12 compression fracture that occurred on 11/4/20. Patient was seen in the ED that day. She reports her pain occurred when she turned over in bed.  She was not given a brace. Today, patient reports intermittent, mild back pain, but not as severe as when she went to the ED on 11/4/20. She feels as though her fracture is \"healing\". Denies any radicular leg pain, paresthesias, or weakness. Her main concern is the pain in her right side near her waist line. She reports this is the same pain that brought her to the ED on 11/9/20. She describes it as a constant sharp pain. We discussed obtaining a lumbar spine MRI for further evaluation of T12 compression fracture and radicular right sided pain. I will call her with the results. Also recommend a brace through Orth[facility], but patient declined at this time. She states she will continue with no heavy lifting and limited bending/twisting activities.      Advised patient to call our clinic if symptoms persist, change, or worsen. Patient voiced understanding and agreement with this plan.    Phone call duration: 21 minutes    Ryanne Zhou CNP  [facility] Neurosurgery  [address] [address]  Tel [phone]  Pager [phone]            "

## 2020-11-30 NOTE — NURSING NOTE
"Sri Grewal is a 70 year old female who is being evaluated via a billable telephone visit.      The patient has been notified of following:     \"This telephone visit will be conducted via a call between you and your physician/provider. We have found that certain health care needs can be provided without the need for a physical exam.  This service lets us provide the care you need with a short phone conversation.  If a prescription is necessary we can send it directly to your pharmacy.  If lab work is needed we can place an order for that and you can then stop by our lab to have the test done at a later time.    Telephone visits are billed at different rates depending on your insurance coverage. During this emergency period, for some insurers they may be billed the same as an in-person visit.  Please reach out to your insurance provider with any questions.    If during the course of the call the physician/provider feels a telephone visit is not appropriate, you will not be charged for this service.\"    Patient has given verbal consent for Telephone visit?  Yes    What phone number would you like to be contacted at? [phone]    How would you like to obtain your AVS? Mail a copy    Patient having a lot of pain on rt side.  Kasandra Pratt MA    "

## 2020-12-01 ENCOUNTER — HOSPITAL ENCOUNTER (OUTPATIENT)
Dept: MRI IMAGING | Facility: CLINIC | Age: 70
Discharge: HOME | End: 2020-12-01
Attending: NURSE PRACTITIONER | Admitting: NURSE PRACTITIONER
Payer: MEDICARE

## 2020-12-01 DIAGNOSIS — S22.080A COMPRESSION FRACTURE OF T12 VERTEBRA, INITIAL ENCOUNTER (H): ICD-10-CM

## 2020-12-01 PROCEDURE — 72148 MRI LUMBAR SPINE W/O DYE: CPT

## 2020-12-02 ENCOUNTER — TELEPHONE (OUTPATIENT)
Dept: NEUROSURGERY | Facility: CLINIC | Age: 70
End: 2020-12-02

## 2020-12-02 DIAGNOSIS — S22.080A COMPRESSION FRACTURE OF T12 VERTEBRA, INITIAL ENCOUNTER (H): Primary | ICD-10-CM

## 2020-12-02 NOTE — TELEPHONE ENCOUNTER
Reviewed recent MRI of lumbar spine . Reveals further compression fracture at T12, now with approximately 75% vertebral body height loss. There is further posterior displacement of the fractured superior endplate causing moderate to severe spinal canal narrowing, but no obvious spinal cord edema. Reviewed with Dr. Asif as well. Discussed with patient.    Plan:  -TLSO brace ordered through Orth[facility]   -Follow-up with PCP regarding bone health medication management. Currently on calcitonin. Last DEXA was 2018, showed osteoporosis.  -Follow up in 6 weeks with NSG clinic, xray prior    Advised patient to call our clinic if symptoms persist, change, or worsen. Patient voiced understanding and agreement with this plan.       Ryanne Zhou, [facility] Neurosurgery  [address] [address]  Tel [phone]  Pager [phone]

## 2020-12-10 DIAGNOSIS — G43.709 CHRONIC MIGRAINE WITHOUT AURA WITHOUT STATUS MIGRAINOSUS, NOT INTRACTABLE: Primary | ICD-10-CM

## 2020-12-10 NOTE — TELEPHONE ENCOUNTER
[facility] phone call message- patient requests medication or medication refill:    If this is a refill request, has the caller requested the refill from the pharmacy already? No  Name of the pharmacy and phone number for the current request:  [facility]    Name of the medication requested: butalbital-aspirin-caffeine    Other request:     OK to leave the result message on voice mail or with a family member? NO    Call taken on 12/10/2020 at 9:24 AM by Caroline Rodrigues

## 2020-12-14 RX ORDER — BUTALBITAL/ASPIRIN/CAFFEINE 50-325-40
1 CAPSULE ORAL EVERY 6 HOURS PRN
Qty: 30 CAPSULE | Refills: 5 | Status: SHIPPED | OUTPATIENT
Start: 2020-12-14 | End: 2021-08-02

## 2020-12-14 NOTE — TELEPHONE ENCOUNTER
Signed and placed in MA task. Will need to be faxed    Jose Francisco Pierre PA-C  [facility]

## 2020-12-16 ENCOUNTER — ANCILLARY PROCEDURE (OUTPATIENT)
Dept: MAMMOGRAPHY | Facility: OTHER | Age: 70
End: 2020-12-16
Payer: MEDICARE

## 2020-12-16 DIAGNOSIS — Z12.31 ENCOUNTER FOR SCREENING MAMMOGRAM FOR BREAST CANCER: ICD-10-CM

## 2020-12-16 PROCEDURE — 77067 SCR MAMMO BI INCL CAD: CPT | Mod: TC | Performed by: RADIOLOGY

## 2020-12-19 DIAGNOSIS — F43.23 ADJUSTMENT DISORDER WITH MIXED ANXIETY AND DEPRESSED MOOD: ICD-10-CM

## 2020-12-19 NOTE — TELEPHONE ENCOUNTER
Reason for call:  Medication   If this is a refill request, has the caller requested the refill from the pharmacy already? N/A  Will the patient be using a [facility]? No  Name of the pharmacy and phone number for the current request: [facility]    Name of the medication requested: Bupropion 150 mg    Other request: Please let patient know by a phone call when this is taken care of    Phone number to reach patient:  Cell number on file:    Telephone Information:   Mobile [phone]       Best Time:  Anytime    Can we leave a detailed message on this number?  YES    Travel screening: Not Applicable

## 2020-12-22 RX ORDER — BUPROPION HYDROCHLORIDE 150 MG/1
150 TABLET ORAL EVERY MORNING
Qty: 90 TABLET | Refills: 3 | OUTPATIENT
Start: 2020-12-22

## 2020-12-22 NOTE — TELEPHONE ENCOUNTER
Routing refill request to provider for review/approval because:  Unable to fill per RN protocol due to PHQ-9 >5.  Sanaz Nicolas RN      PHQ-2 Score:     PHQ-2 ( 1999 [employer]) 11/5/2020 10/14/2020   Q1: Little interest or pleasure in doing things 1 0   Q2: Feeling down, depressed or hopeless 0 1   PHQ-2 Score 1 1   Q1: Little interest or pleasure in doing things - Not at all   Q2: Feeling down, depressed or hopeless - Several days   PHQ-2 Score - 1

## 2021-01-06 ENCOUNTER — TELEPHONE (OUTPATIENT)
Dept: FAMILY MEDICINE | Facility: OTHER | Age: 71
End: 2021-01-06

## 2021-01-06 ENCOUNTER — HOSPITAL ENCOUNTER (OUTPATIENT)
Dept: GENERAL RADIOLOGY | Facility: CLINIC | Age: 71
Discharge: HOME | End: 2021-01-06
Attending: NURSE PRACTITIONER | Admitting: NURSE PRACTITIONER
Payer: MEDICARE

## 2021-01-06 DIAGNOSIS — F43.23 ADJUSTMENT DISORDER WITH MIXED ANXIETY AND DEPRESSED MOOD: ICD-10-CM

## 2021-01-06 DIAGNOSIS — S22.080A COMPRESSION FRACTURE OF T12 VERTEBRA, INITIAL ENCOUNTER (H): ICD-10-CM

## 2021-01-06 PROCEDURE — 72100 X-RAY EXAM L-S SPINE 2/3 VWS: CPT

## 2021-01-06 RX ORDER — BUPROPION HYDROCHLORIDE 150 MG/1
150 TABLET ORAL EVERY MORNING
Qty: 90 TABLET | Refills: 0 | Status: SHIPPED | OUTPATIENT
Start: 2021-01-06 | End: 2021-07-27
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
## 2021-01-13 ENCOUNTER — VIRTUAL VISIT (OUTPATIENT)
Dept: NEUROSURGERY | Facility: CLINIC | Age: 71
End: 2021-01-13
Attending: NURSE PRACTITIONER
Payer: MEDICARE

## 2021-01-13 VITALS — WEIGHT: 149 LBS | BODY MASS INDEX: 28.13 KG/M2 | HEIGHT: 61 IN

## 2021-01-13 DIAGNOSIS — S22.080A COMPRESSION FRACTURE OF T12 VERTEBRA, INITIAL ENCOUNTER (H): Primary | ICD-10-CM

## 2021-01-13 PROCEDURE — 99441 PR PHYSICIAN TELEPHONE EVALUATION 5-10 MIN: CPT | Mod: 95 | Performed by: NURSE PRACTITIONER

## 2021-01-13 ASSESSMENT — PAIN SCALES - GENERAL: PAINLEVEL: NO PAIN (0)

## 2021-01-13 ASSESSMENT — MIFFLIN-ST. JEOR: SCORE: 1143.24

## 2021-01-13 NOTE — PROGRESS NOTES
"The patient has been notified of following:     \"This telephone visit will be conducted via a call between you and your physician/provider. We have found that certain health care needs can be provided without the need for a physical exam.  This service lets us provide the care you need with a short phone conversation.  If a prescription is necessary we can send it directly to your pharmacy.  If lab work is needed we can place an order for that and you can then stop by our lab to have the test done at a later time.    If during the course of the call the physician/provider feels a telephone visit is not appropriate, you will not be charged for this service.\"     Physician has received verbal consent for a Telephone Visit from the patient? Yes    Primary care Provider: Rosenda Pierre    Reason For Visit:   Follow up for T12 compression fracture     HPI: Sri Grewal is a 70 year old female who was referred to our clinic for evaluation of T12 compression fracture that occurred on 11/4/20. Patient was seen in the ED that day. She reports her pain occurred when she turned over in bed.  She was not given a brace. Today, patient reports intermittent, mild back pain, but not as severe as when she went to the ED on 11/4/20. She feels as though her fracture is \"healing\". Denies any radicular leg pain, paresthesias, or weakness. She is no longer taking Norco. Her main concern is the pain in her right side near her waist line. This is the same pain that brought her to the ED on 11/9/20. She describes it as a constant sharp pain. She denies any nausea or vomiting. She denies any falls, gait changes, incontinence, or saddle anesthesia.     Telephone encounter today for follow-up of T12 compression fracture that occurred on 11/4/20. Patient reports feeling well today. She denies any back or leg pain. Denies any paresthesias or weakness. Denies any falls, gait changes, incontinence, or saddle anesthesia. She has " been wearing the TLSO brace when outside of her home. She follows with her PCP regarding osteoporosis management. She does not have any further concerns today.     Current back pain: 0    Past Medical History:   Diagnosis Date     Abnormal Papanicolaou smear of cervix and cervical HPV      Allergic rhinitis, cause unspecified     seasonal allergies     Contact dermatitis and other eczema, due to unspecified cause      Endometriosis, site unspecified      Esophageal reflux     GERD     Migraine, unspecified, without mention of intractable migraine without mention of status migrainosus      Mild persistent asthma      Unspecified disorder of uterus     fibroid uterus       Past Medical History reviewed with patient during visit.    Past Surgical History:   Procedure Laterality Date     C  DELIVERY ONLY       X2     COLONOSCOPY  2014    Procedure: COLONOSCOPY;  Surgeon: Nathan Baker MD;  Location:  GI     [facility] COLONOSCOPY THRU STOMA, DIAGNOSTIC  2002    normal     Past Surgical History reviewed with patient during visit.    Current Outpatient Medications   Medication     albuterol (PROAIR HFA) 108 (90 Base) MCG/ACT inhaler     buPROPion (WELLBUTRIN XL) 150 MG 24 hr tablet     butalbital-aspirin-caffeine (FIORINAL) -40 MG per capsule     calcitonin, salmon, (MIACALCIN) 200 UNIT/ACT nasal spray     cloNIDine (CATAPRES) 0.1 MG tablet     propranolol (INDERAL) 40 MG tablet     venlafaxine (EFFEXOR-XR) 150 MG 24 hr capsule     No current facility-administered medications for this visit.        Allergies   Allergen Reactions     Codeine      GI UPSET     Oxycodone      Seasonal Allergies        Social History     Socioeconomic History     Marital status:      Spouse name: Not on file     Number of children: 2     Years of education: Not on file     Highest education level: Not on file   Occupational History     Occupation: retired     Employer: RETIRED   Social Needs     Financial  resource strain: Not on file     Food insecurity     Worry: Not on file     Inability: Not on file     Transportation needs     Medical: Not on file     Non-medical: Not on file   Tobacco Use     Smoking status: Never Smoker     Smokeless tobacco: Never Used   Substance and Sexual Activity     Alcohol use: Yes     Comment: beerx2/x1 per month     Drug use: No     Sexual activity: Not Currently     Partners: Male     Birth control/protection: Surgical     Comment: tubal ligation   Lifestyle     Physical activity     Days per week: Not on file     Minutes per session: Not on file     Stress: Not on file   Relationships     Social connections     Talks on phone: Not on file     Gets together: Not on file     Attends Yarsani service: Not on file     Active member of club or organization: Not on file     Attends meetings of clubs or organizations: Not on file     Relationship status: Not on file     Intimate partner violence     Fear of current or ex partner: Not on file     Emotionally abused: Not on file     Physically abused: Not on file     Forced sexual activity: Not on file   Other Topics Concern      Service No     Blood Transfusions No     Caffeine Concern No     Occupational Exposure No     Hobby Hazards No     Sleep Concern Yes     Comment: Insomnia     Stress Concern Yes     Comment: breathing,  passed away 2004     Weight Concern Yes     Comment: would like to lose some weight     Special Diet No     Back Care Yes     Exercise Yes     Comment: walking at work daily     Bike Helmet Not Asked     Seat Belt Yes     Self-Exams Yes     Comment: periodically     Parent/sibling w/ CABG, MI or angioplasty before 65F 55M? No   Social History Narrative     Not on file       Family History   Problem Relation Age of Onset     Heart Disease Mother         anemia     Osteoporosis Mother      Hypertension Mother      Cerebrovascular Disease Mother      Alcohol/Drug Mother         alcohol     Neurologic  Disorder Mother         migraines     Hypertension Father      Cancer No family hx of         breast         ROS: 10 point ROS neg other than the symptoms noted above in the HPI.    Imaging:   LUMBAR SPINE TWO TO THREE VIEWS   1/6/2021 9:08 AM     IMPRESSION: T12 burst-type fracture deformity again noted with severe anterior wedging. There is some slightly more progressive wedging since the MR dated 12/1/2020. Posterior alignment of the lumbar spine is normal. The posterior protrusion seen on prior MR study is difficult to appreciate on this study. Lumbar vertebral body heights are maintained. Mild degenerative facet disease is noted in the lower lumbar spine.    Assessment/Plan:  70F with T12 compression fracture that occurred on 11/4/20. Patient reports feeling well today. She denies any back or leg pain. Denies any paresthesias or weakness. Denies any falls, gait changes, incontinence, or saddle anesthesia. She has been wearing the TLSO brace. She follows with her PCP regarding osteoporosis management. XR reviewed with patient and Dr. Asif.  Recommend to continue with TLSO brace and follow-up in 6 weeks with CT prior.     Advised patient to call our clinic if symptoms persist, change, or worsen. Patient voiced understanding and agreement with this plan.    Phone call duration: 9 minutes     Ryanne Zhou CNP  [facility] Neurosurgery  [address] [address]  Tel [phone]  Pager [phone]

## 2021-01-13 NOTE — LETTER
"    1/13/2021         RE: Sri Grewal  [address] [facility] [address]        Dear Colleague,    Thank you for referring your patient, Sri Grewal, to the [facility] [place]. Please see a copy of my visit note below.    The patient has been notified of following:     \"This telephone visit will be conducted via a call between you and your physician/provider. We have found that certain health care needs can be provided without the need for a physical exam.  This service lets us provide the care you need with a short phone conversation.  If a prescription is necessary we can send it directly to your pharmacy.  If lab work is needed we can place an order for that and you can then stop by our lab to have the test done at a later time.    If during the course of the call the physician/provider feels a telephone visit is not appropriate, you will not be charged for this service.\"     Physician has received verbal consent for a Telephone Visit from the patient? Yes    Primary care Provider: Rosenda Pierre    Reason For Visit:   Follow up for T12 compression fracture     HPI: Sri Grewal is a 70 year old female who was referred to our clinic for evaluation of T12 compression fracture that occurred on 11/4/20. Patient was seen in the ED that day. She reports her pain occurred when she turned over in bed.  She was not given a brace. Today, patient reports intermittent, mild back pain, but not as severe as when she went to the ED on 11/4/20. She feels as though her fracture is \"healing\". Denies any radicular leg pain, paresthesias, or weakness. She is no longer taking Norco. Her main concern is the pain in her right side near her waist line. This is the same pain that brought her to the ED on 11/9/20. She describes it as a constant sharp pain. She denies any nausea or vomiting. She denies any falls, gait changes, incontinence, or saddle anesthesia.     Telephone " encounter today for follow-up of T12 compression fracture that occurred on 20. Patient reports feeling well today. She denies any back or leg pain. Denies any paresthesias or weakness. Denies any falls, gait changes, incontinence, or saddle anesthesia. She has been wearing the TLSO brace when outside of her home. She follows with her PCP regarding osteoporosis management. She does not have any further concerns today.     Current back pain: 0    Past Medical History:   Diagnosis Date     Abnormal Papanicolaou smear of cervix and cervical HPV      Allergic rhinitis, cause unspecified     seasonal allergies     Contact dermatitis and other eczema, due to unspecified cause      Endometriosis, site unspecified      Esophageal reflux     GERD     Migraine, unspecified, without mention of intractable migraine without mention of status migrainosus      Mild persistent asthma      Unspecified disorder of uterus     fibroid uterus       Past Medical History reviewed with patient during visit.    Past Surgical History:   Procedure Laterality Date     C  DELIVERY ONLY       X2     COLONOSCOPY  2014    Procedure: COLONOSCOPY;  Surgeon: Nathan Baker MD;  Location: [facility] COLONOSCOPY THRU STOMA, DIAGNOSTIC  2002    normal     Past Surgical History reviewed with patient during visit.    Current Outpatient Medications   Medication     albuterol (PROAIR HFA) 108 (90 Base) MCG/ACT inhaler     buPROPion (WELLBUTRIN XL) 150 MG 24 hr tablet     butalbital-aspirin-caffeine (FIORINAL) -40 MG per capsule     calcitonin, salmon, (MIACALCIN) 200 UNIT/ACT nasal spray     cloNIDine (CATAPRES) 0.1 MG tablet     propranolol (INDERAL) 40 MG tablet     venlafaxine (EFFEXOR-XR) 150 MG 24 hr capsule     No current facility-administered medications for this visit.        Allergies   Allergen Reactions     Codeine      GI UPSET     Oxycodone      Seasonal Allergies        Social History     Socioeconomic  History     Marital status:      Spouse name: Not on file     Number of children: 2     Years of education: Not on file     Highest education level: Not on file   Occupational History     Occupation: retired     Employer: RETIRED   Social Needs     Financial resource strain: Not on file     Food insecurity     Worry: Not on file     Inability: Not on file     Transportation needs     Medical: Not on file     Non-medical: Not on file   Tobacco Use     Smoking status: Never Smoker     Smokeless tobacco: Never Used   Substance and Sexual Activity     Alcohol use: Yes     Comment: beerx2/x1 per month     Drug use: No     Sexual activity: Not Currently     Partners: Male     Birth control/protection: Surgical     Comment: tubal ligation   Lifestyle     Physical activity     Days per week: Not on file     Minutes per session: Not on file     Stress: Not on file   Relationships     Social connections     Talks on phone: Not on file     Gets together: Not on file     Attends Nondenominational service: Not on file     Active member of club or organization: Not on file     Attends meetings of clubs or organizations: Not on file     Relationship status: Not on file     Intimate partner violence     Fear of current or ex partner: Not on file     Emotionally abused: Not on file     Physically abused: Not on file     Forced sexual activity: Not on file   Other Topics Concern      Service No     Blood Transfusions No     Caffeine Concern No     Occupational Exposure No     Hobby Hazards No     Sleep Concern Yes     Comment: Insomnia     Stress Concern Yes     Comment: breathing,  passed away 2004     Weight Concern Yes     Comment: would like to lose some weight     Special Diet No     Back Care Yes     Exercise Yes     Comment: walking at work daily     Bike Helmet Not Asked     Seat Belt Yes     Self-Exams Yes     Comment: periodically     Parent/sibling w/ CABG, MI or angioplasty before 65F 55M? No   Social  History Narrative     Not on file       Family History   Problem Relation Age of Onset     Heart Disease Mother         anemia     Osteoporosis Mother      Hypertension Mother      Cerebrovascular Disease Mother      Alcohol/Drug Mother         alcohol     Neurologic Disorder Mother         migraines     Hypertension Father      Cancer No family hx of         breast         ROS: 10 point ROS neg other than the symptoms noted above in the HPI.    Imaging:   LUMBAR SPINE TWO TO THREE VIEWS   1/6/2021 9:08 AM     IMPRESSION: T12 burst-type fracture deformity again noted with severe anterior wedging. There is some slightly more progressive wedging since the MR dated 12/1/2020. Posterior alignment of the lumbar spine is normal. The posterior protrusion seen on prior MR study is difficult to appreciate on this study. Lumbar vertebral body heights are maintained. Mild degenerative facet disease is noted in the lower lumbar spine.    Assessment/Plan:  70F with T12 compression fracture that occurred on 11/4/20. Patient reports feeling well today. She denies any back or leg pain. Denies any paresthesias or weakness. Denies any falls, gait changes, incontinence, or saddle anesthesia. She has been wearing the TLSO brace. She follows with her PCP regarding osteoporosis management. XR reviewed with patient and Dr. Asif.  Recommend to continue with TLSO brace and follow-up in 6 weeks with CT prior.     Advised patient to call our clinic if symptoms persist, change, or worsen. Patient voiced understanding and agreement with this plan.    Phone call duration: 9 minutes     Ryanne Zhou CNP  [facility] Neurosurgery  [address] [address]  Tel [phone]  Pager [phone]                Again, thank you for allowing me to participate in the care of your patient.        Sincerely,        Ryanne Zhou NP

## 2021-01-13 NOTE — NURSING NOTE
January 13, 2021 9:57 AM  Sri Grewal is a 70 year old female who is being evaluated via a billable telephone visit.      What phone number would you like to be contacted at? [phone]  How would you like to obtain your AVS? mail  Manuel Salazar MA

## 2021-01-14 ENCOUNTER — TELEPHONE (OUTPATIENT)
Dept: NEUROSURGERY | Facility: CLINIC | Age: 71
End: 2021-01-14

## 2021-01-14 NOTE — TELEPHONE ENCOUNTER
LVM for patient that CT has been scheduled for same day as appt  On 2-22 at 10:45 prior to her visit.

## 2021-02-01 ENCOUNTER — OFFICE VISIT (OUTPATIENT)
Dept: FAMILY MEDICINE | Facility: OTHER | Age: 71
End: 2021-02-01
Payer: MEDICARE

## 2021-02-01 VITALS
OXYGEN SATURATION: 96 % | TEMPERATURE: 100 F | DIASTOLIC BLOOD PRESSURE: 102 MMHG | HEIGHT: 61 IN | HEART RATE: 96 BPM | BODY MASS INDEX: 26.81 KG/M2 | WEIGHT: 142 LBS | SYSTOLIC BLOOD PRESSURE: 174 MMHG

## 2021-02-01 DIAGNOSIS — R39.9 SYMPTOMS OF URINARY TRACT INFECTION: Primary | ICD-10-CM

## 2021-02-01 DIAGNOSIS — R82.90 NONSPECIFIC FINDING ON EXAMINATION OF URINE: ICD-10-CM

## 2021-02-01 DIAGNOSIS — R50.9 FEVER, UNSPECIFIED FEVER CAUSE: ICD-10-CM

## 2021-02-01 DIAGNOSIS — N10 PYELONEPHRITIS, ACUTE: ICD-10-CM

## 2021-02-01 LAB
ALBUMIN UR-MCNC: 100 MG/DL
APPEARANCE UR: CLEAR
BILIRUB UR QL STRIP: NEGATIVE
COLOR UR AUTO: YELLOW
GLUCOSE UR STRIP-MCNC: NEGATIVE MG/DL
HGB UR QL STRIP: ABNORMAL
KETONES UR STRIP-MCNC: NEGATIVE MG/DL
LEUKOCYTE ESTERASE UR QL STRIP: ABNORMAL
NITRATE UR QL: POSITIVE
PH UR STRIP: 6.5 PH (ref 5–7)
RBC #/AREA URNS AUTO: >100 /HPF
SOURCE: ABNORMAL
SP GR UR STRIP: 1.02 (ref 1–1.03)
UROBILINOGEN UR STRIP-ACNC: 0.2 EU/DL (ref 0.2–1)
WBC #/AREA URNS AUTO: ABNORMAL /HPF

## 2021-02-01 PROCEDURE — 99215 OFFICE O/P EST HI 40 MIN: CPT | Mod: 25 | Performed by: PHYSICIAN ASSISTANT

## 2021-02-01 PROCEDURE — 87086 URINE CULTURE/COLONY COUNT: CPT | Performed by: PHYSICIAN ASSISTANT

## 2021-02-01 PROCEDURE — U0003 INFECTIOUS AGENT DETECTION BY NUCLEIC ACID (DNA OR RNA); SEVERE ACUTE RESPIRATORY SYNDROME CORONAVIRUS 2 (SARS-COV-2) (CORONAVIRUS DISEASE [COVID-19]), AMPLIFIED PROBE TECHNIQUE, MAKING USE OF HIGH THROUGHPUT TECHNOLOGIES AS DESCRIBED BY CMS-2020-01-R: HCPCS | Performed by: PHYSICIAN ASSISTANT

## 2021-02-01 PROCEDURE — 96372 THER/PROPH/DIAG INJ SC/IM: CPT | Performed by: PHYSICIAN ASSISTANT

## 2021-02-01 PROCEDURE — 81001 URINALYSIS AUTO W/SCOPE: CPT | Performed by: PHYSICIAN ASSISTANT

## 2021-02-01 PROCEDURE — U0005 INFEC AGEN DETEC AMPLI PROBE: HCPCS | Performed by: PHYSICIAN ASSISTANT

## 2021-02-01 RX ORDER — CEFTRIAXONE 1 G/1
1000 INJECTION, POWDER, FOR SOLUTION INTRAMUSCULAR; INTRAVENOUS ONCE
Qty: 10 ML | Refills: 0 | Status: SHIPPED | OUTPATIENT
Start: 2021-02-01 | End: 2021-02-01

## 2021-02-01 RX ORDER — CEFTRIAXONE SODIUM 1 G
1 VIAL (EA) INJECTION ONCE
Status: COMPLETED | OUTPATIENT
Start: 2021-02-01 | End: 2021-02-01

## 2021-02-01 RX ORDER — CIPROFLOXACIN 500 MG/1
500 TABLET, FILM COATED ORAL 2 TIMES DAILY
Qty: 28 TABLET | Refills: 0 | Status: SHIPPED | OUTPATIENT
Start: 2021-02-01 | End: 2021-02-15

## 2021-02-01 RX ADMIN — Medication 1 G: at 15:56

## 2021-02-01 ASSESSMENT — MIFFLIN-ST. JEOR: SCORE: 1111.49

## 2021-02-01 NOTE — PROGRESS NOTES
"Assessment & Plan     ICD-10-CM    1. Symptoms of urinary tract infection  R39.9 *UA reflex to Microscopic and Culture ([place] [facility] (except [place] and [place])     Urine Microscopic   2. Nonspecific finding on examination of urine  R82.90 Urine Culture Aerobic Bacterial   3. Pyelonephritis, acute  N10 ciprofloxacin (CIPRO) 500 MG tablet     cefTRIAXone (ROCEPHIN) injection 1 g     DISCONTINUED: cefTRIAXone (ROCEPHIN) 1 GM vial   4. Fever, unspecified fever cause  R50.9 Symptomatic COVID-19 Virus (Coronavirus) by PCR     - Patient with sudden onset of fever/chills, headache, dysuria, hematuria, urinary frequency, and fatigue 3 days ago   - Patient left urine sample, consistent with infection, pyelonephritis due to fever/chills      Patient does not look toxic, no CVA tenderness, no signs of acute or surgical abdomen       OK to treat at home, patient doesn't want to go to ED anyway      Will give Rocephin here in clinic and start patient on Cipro 500 mg BID  - Discussed antibiotic use, duration, and side effects  - Recommend take tylenol round the clock (didn't take today due to wanting to come in with everything at \"full force\")      1,000 mg TID, reviewed use and side effects   - Discussed could also be COVID on top of infection      Recommend COVID swab, await results   - Should quarantine for 10 days from symptom onset   - Encouraged pushing fluids and resting   - Hand out given   - Discussed warning signs that would warrant return to clinic/ER   - Will need close follow up of BP      Should take her clonidine that has for PRN use when BP is high     Review of the result(s) of each unique test - UA today   Diagnosis or treatment significantly limited by social determinants of health - None     40 minutes spent on the date of the encounter doing chart review, history and exam, documentation and further activities as noted above    The patient indicates understanding of these issues and agrees " with the plan.    Return in about 1 week (around 2/8/2021) for if not improving.    FLEX Alonso [facility]     Sri Grewal is a 70 year old female who presents to clinic today for the following health issues accompanied by her:     HPI     Genitourinary - Female  Onset/Duration: 3 days  Description:   Painful urination (Dysuria): YES           Frequency: YES  Blood in urine (Hematuria): YES  Delay in urine (Hesitency): YES  Intensity: moderate, severe  Progression of Symptoms:  worsening  Accompanying Signs & Symptoms:  Fever/chills: YES- 101 this morning   Flank pain: no  Nausea and vomiting: YES, nausea only   Vaginal symptoms: itching  Abdominal/Pelvic Pain: no  History:   History of frequent UTI s: no  History of kidney stones: no  Sexually Active: no  Possibility of pregnancy: No  Precipitating or alleviating factors: None  Therapies tried and outcome:  Tylenol     ALSO HAS HEADACHE AND FATIGUE       Concern for COVID-19  About how many days ago did these symptoms start? Sunday AM (yesterday)  Is this your first visit for this illness? Yes  In the 14 days before your symptoms started, have you had close contact with someone with COVID-19 (Coronavirus)? No  Do you have a fever or chills? Yes, the highest temperature was 101  Are you having new or worsening difficulty breathing? No  Do you have new or worsening cough? No  Have you had any new or unexplained body aches? YES    Have you experienced any of the following NEW symptoms?    Headache: YES - across front of head     Sore throat: No    Loss of taste or smell: No,     Chest pain: No    Diarrhea: No    Rash: No  What treatments have you tried? Tylenol   Who do you live with? No one   Are you, or a household member, a healthcare worker or a ? No  Do you live in a nursing home, group home, or shelter? No  Do you have a way to get food/medications if quarantined? Yes, I have a  "friend or family member who can help me.      Review of Systems   Constitutional, HEENT, cardiovascular, pulmonary, gi and gu systems are negative, except as otherwise noted.      Objective    BP (!) 174/102   Pulse 96   Temp 100  F (37.8  C) (Temporal)   Ht 1.549 m (5' 1\")   Wt 64.4 kg (142 lb)   LMP 11/09/2005 (Approximate)   SpO2 96%   BMI 26.83 kg/m    Body mass index is 26.83 kg/m .  Physical Exam   GENERAL APPEARANCE: healthy, alert and no distress but is in pain and looks sick but not toxic   EYES: Eyes grossly normal to inspection, PERRLA, conjunctivae and sclerae without injection or discharge, EOM intact   RESP: Lungs clear to auscultation - no rales, rhonchi or wheezes   CV: Regular rates and rhythm, normal S1 S2, no S3 or S4, no murmur, click or rub, no peripheral edema and peripheral pulses strong and symmetric bilaterally   ABDOMEN: Soft, nontender, no hepatosplenomegaly, no masses and bowel sounds normal   MS: No musculoskeletal defects are noted and gait is age appropriate without ataxia   SKIN: No suspicious lesions or rashes, hydration status appears adeuqate with normal skin turgor   BACK: No CVA tenderness  PSYCH: Alert and oriented x3; speech- coherent , normal rate and volume; able to articulate logical thoughts, able to abstract reason, no tangential thoughts, no hallucinations or delusions, mentation appears normal, Mood is euthymic. Affect is appropriate for this mood state and bright. Thought content is free of suicidal ideation, hallucinations, and delusions. Dress is adequate and upkept. Eye contact is good during conversation.       Diagnostics     Results for orders placed or performed in visit on 02/01/21   *UA reflex to Microscopic and Culture ([employer] [facility] [facility] (except [place] and [place])     Status: Abnormal    Specimen: Midstream Urine   Result Value Ref Range    Color Urine Yellow     Appearance Urine Clear     Glucose Urine Negative NEG^Negative mg/dL    " Bilirubin Urine Negative NEG^Negative    Ketones Urine Negative NEG^Negative mg/dL    Specific Gravity Urine 1.020 1.003 - 1.035    Blood Urine Large (A) NEG^Negative    pH Urine 6.5 5.0 - 7.0 pH    Protein Albumin Urine 100 (A) NEG^Negative mg/dL    Urobilinogen Urine 0.2 0.2 - 1.0 EU/dL    Nitrite Urine Positive (A) NEG^Negative    Leukocyte Esterase Urine Small (A) NEG^Negative    Source Midstream Urine    Urine Microscopic     Status: Abnormal   Result Value Ref Range    WBC Urine  (A) OTO5^0 - 5 /HPF    RBC Urine >100 (A) OTO2^O - 2 /HPF   Symptomatic COVID-19 Virus (Coronavirus) by PCR     Status: None    Specimen: Nasopharyngeal   Result Value Ref Range    COVID-19 Virus PCR to [facility] - Source Nasopharyngeal     COVID-19 Virus PCR to [facility] - Result Not Detected    Urine Culture Aerobic Bacterial     Status: None    Specimen: Midstream Urine   Result Value Ref Range    Specimen Description Midstream Urine     Special Requests Specimen received in preservative     Culture Micro       10,000 to 50,000 colonies/mL  mixed urogenital akhil

## 2021-02-01 NOTE — PATIENT INSTRUCTIONS
Patient Education     Discharge Instructions for Pyelonephritis  You have been told you have a kidney infection. This is called pyelonephritis. The infection can be serious. It can damage your kidneys and cause bacteria to enter your bloodstream. You were treated in the hospital. Once you return home, here s what you can do at home to aid in your recovery and prevent future infections.   Home care    Take all the medicine you were prescribed, even if you feel better. Not finishing the medicine can make the infection come back. It may also make a future infection harder to treat.    Unless told not to by your healthcare provider, drink 8 to 12 glasses of fluid every day. Clear fluids, such as water, are best. This may help flush the infection from your system.    Preventing future infection    Keep your genital area clean. Use mild soap. Rinse with water.    If you are a woman, always wipe the genital area from front to back.    Urinate frequently. Don't hold urine in your bladder for a long time.    Always urinate after having sex.    Practice safe sex. Protect yourself and your partner from sexually transmitted infections (STIs).    Follow-up care  Follow up with your healthcare provider, or as advised. And see your healthcare provider for regular lab tests as directed.   When to call your healthcare provider  Call your healthcare provider right away if you have any of the following:    Decreased urine output or trouble urinating    Severe pain in the lower back or flank    Fever of 100.4 F (38 C) or higher, or as directed by your healthcare provider    Shaking chills    Vomiting    Blood in your urine    Dark-colored or foul-smelling urine    Nausea or other problems that prevent you from taking your prescribed medicine    New or worsening symptoms  [employer] last reviewed this educational content on 4/1/2020 2000-2020 [employer]. [address] [place] [address], [place] [address]. All rights reserved.  This information is not intended as a substitute for professional medical care. Always follow your healthcare professional's instructions.

## 2021-02-02 LAB
BACTERIA SPEC CULT: NORMAL
Lab: NORMAL
SARS-COV-2 RNA RESP QL NAA+PROBE: NOT DETECTED
SPECIMEN SOURCE: NORMAL
SPECIMEN SOURCE: NORMAL

## 2021-02-03 NOTE — RESULT ENCOUNTER NOTE
Please call patient with the following message    COVID test was negative. Please get update on patient's condition and route back to provider.     Jose Francisco Pierre PA-C

## 2021-02-04 ENCOUNTER — TELEPHONE (OUTPATIENT)
Dept: FAMILY MEDICINE | Facility: OTHER | Age: 71
End: 2021-02-04

## 2021-02-04 NOTE — TELEPHONE ENCOUNTER
Reason for Call:  Other FYI    Detailed comments: Pt states that provider requested an update on how she was feeling. She states that she is feeling 100% better than on Monday and she says thank you.    Phone Number Patient can be reached at: Home number on file [phone] (home)    Best Time: Any    Can we leave a detailed message on this number? YES    Call taken on 2/4/2021 at 3:53 PM by Darcie Naqvi

## 2021-02-15 ENCOUNTER — TELEPHONE (OUTPATIENT)
Dept: FAMILY MEDICINE | Facility: OTHER | Age: 71
End: 2021-02-15

## 2021-02-15 DIAGNOSIS — F43.23 ADJUSTMENT DISORDER WITH MIXED ANXIETY AND DEPRESSED MOOD: ICD-10-CM

## 2021-02-16 RX ORDER — VENLAFAXINE HYDROCHLORIDE 150 MG/1
150 CAPSULE, EXTENDED RELEASE ORAL
Qty: 14 CAPSULE | Refills: 0 | Status: SHIPPED | OUTPATIENT
Start: 2021-02-16 | End: 2021-10-05 | Stop reason: DRUGHIGH

## 2021-02-16 RX ORDER — VENLAFAXINE HYDROCHLORIDE 150 MG/1
150 CAPSULE, EXTENDED RELEASE ORAL
Qty: 90 CAPSULE | Refills: 3 | Status: SHIPPED | OUTPATIENT
Start: 2021-02-16 | End: 2021-02-16

## 2021-02-16 RX ORDER — BUPROPION HYDROCHLORIDE 150 MG/1
150 TABLET ORAL EVERY MORNING
Refills: 0 | Status: CANCELLED | OUTPATIENT
Start: 2021-02-16

## 2021-02-16 NOTE — TELEPHONE ENCOUNTER
I sent refills to her mail order. Does she need a few days supply sent to a local pharmacy?     Jose Francisco Pierre PA-C  [facility]

## 2021-02-16 NOTE — TELEPHONE ENCOUNTER
Pending Prescriptions:                       Disp   Refills    venlafaxine (EFFEXOR-XR) 150 MG 24 hr caps*90 cap*3        Sig: Take 1 capsule (150 mg) by mouth daily (with           breakfast)      Routing refill request to provider for review/approval because:  Labs out of range:  Bp, phq9  Patient is almost out    Winnie Atwood, RN on 2/16/2021 at 11:10 AM

## 2021-02-22 ENCOUNTER — HOSPITAL ENCOUNTER (OUTPATIENT)
Dept: CT IMAGING | Facility: CLINIC | Age: 71
Discharge: HOME | End: 2021-02-22
Attending: NURSE PRACTITIONER | Admitting: NURSE PRACTITIONER
Payer: MEDICARE

## 2021-02-22 ENCOUNTER — OFFICE VISIT (OUTPATIENT)
Dept: NEUROSURGERY | Facility: CLINIC | Age: 71
End: 2021-02-22
Payer: MEDICARE

## 2021-02-22 VITALS
SYSTOLIC BLOOD PRESSURE: 138 MMHG | DIASTOLIC BLOOD PRESSURE: 86 MMHG | HEIGHT: 61 IN | BODY MASS INDEX: 26.81 KG/M2 | WEIGHT: 142 LBS | TEMPERATURE: 98 F

## 2021-02-22 DIAGNOSIS — S22.080A COMPRESSION FRACTURE OF T12 VERTEBRA, INITIAL ENCOUNTER (H): ICD-10-CM

## 2021-02-22 DIAGNOSIS — S22.080A COMPRESSION FRACTURE OF T12 VERTEBRA, INITIAL ENCOUNTER (H): Primary | ICD-10-CM

## 2021-02-22 PROCEDURE — G1004 CDSM NDSC: HCPCS

## 2021-02-22 PROCEDURE — 99213 OFFICE O/P EST LOW 20 MIN: CPT | Performed by: PHYSICIAN ASSISTANT

## 2021-02-22 ASSESSMENT — MIFFLIN-ST. JEOR: SCORE: 1111.49

## 2021-02-22 ASSESSMENT — PAIN SCALES - GENERAL: PAINLEVEL: MILD PAIN (3)

## 2021-02-22 NOTE — LETTER
"    2/22/2021         RE: Sri Grewal  [address]  [facility] [address]        Dear Colleague,    Thank you for referring your patient, Sri Grewal, to the [facility] [place]. Please see a copy of my visit note below.    Sri Grewal is a 70 year old female who presents for:  Chief Complaint   Patient presents with     Neurologic Problem     Compression fracture T 12         Initial Vitals:  /86   Temp 98  F (36.7  C) (Temporal)   Ht 5' 1\" (1.549 m)   Wt 142 lb (64.4 kg)   LMP 11/09/2005 (Approximate)   BMI 26.83 kg/m   Estimated body mass index is 26.83 kg/m  as calculated from the following:    Height as of this encounter: 5' 1\" (1.549 m).    Weight as of this encounter: 142 lb (64.4 kg).. Body surface area is 1.66 meters squared. BP completed using cuff size: regular  Mild Pain (3)    Nursing Comments:     Adamaris Jean [facility]      [facility]  Neurosurgery followup:    HPI: She is 3 months status post injury for her T12 compression fracture from early November.  She has been maintained in a TLSO brace.  She now notes that her pain has dramatically improved.  She has minimal back pain in the area of her injury.  No radicular leg pain or paresthesias.  Exam:  Constitutional:  Alert, well nourished, NAD.  HEENT: Normocephalic, atraumatic.   Pulm:  Without shortness of breath   CV:  No pitting edema of BLE.      Neurological:  Awake  Alert  Oriented x 3  Motor exam:        IP Q DF PF EHL  R   5  5   5   5    5  L   5  5   5   5    5     Reflexes are 2+ in the patellar and Achilles. There is no clonus. Downgoing Babinski.      Able to spontaneously move L/E bilaterally  Sensation intact throughout all L/E dermatomes       Imaging: CT scan of the lumbar spine was reviewed in the office today.  It shows stable appearance of her T12 compression fracture in comparison with prior imaging.    A/P:  3-month status post T12 compression fracture.  At this " point, her pain has improved dramatically.  Her imaging is stable.  She can certainly follow-up with us for any new or worsening symptoms.  Otherwise, we will see her back on an as-needed basis.  She voiced agreement and understanding.      Byron Bill PA-C  [facility] Neurosurgery  [address] [address]    Tel [phone]  Pager [phone]        Again, thank you for allowing me to participate in the care of your patient.        Sincerely,        Byron Bill PA-C

## 2021-02-22 NOTE — PROGRESS NOTES
[facility]  Neurosurgery followup:    HPI: She is 3 months status post injury for her T12 compression fracture from early November.  She has been maintained in a TLSO brace.  She now notes that her pain has dramatically improved.  She has minimal back pain in the area of her injury.  No radicular leg pain or paresthesias.  Exam:  Constitutional:  Alert, well nourished, NAD.  HEENT: Normocephalic, atraumatic.   Pulm:  Without shortness of breath   CV:  No pitting edema of BLE.      Neurological:  Awake  Alert  Oriented x 3  Motor exam:        IP Q DF PF EHL  R   5  5   5   5    5  L   5  5   5   5    5     Reflexes are 2+ in the patellar and Achilles. There is no clonus. Downgoing Babinski.      Able to spontaneously move L/E bilaterally  Sensation intact throughout all L/E dermatomes       Imaging: CT scan of the lumbar spine was reviewed in the office today.  It shows stable appearance of her T12 compression fracture in comparison with prior imaging.    A/P:  3-month status post T12 compression fracture.  At this point, her pain has improved dramatically.  Her imaging is stable.  She can certainly follow-up with us for any new or worsening symptoms.  Otherwise, we will see her back on an as-needed basis.  She voiced agreement and understanding.      Byron MELENDEZ [facility] Neurosurgery  [address] [address]    Tel [phone]  Pager [phone]

## 2021-02-22 NOTE — PROGRESS NOTES
"Sri Grewal is a 70 year old female who presents for:  Chief Complaint   Patient presents with     Neurologic Problem     Compression fracture T 12         Initial Vitals:  /86   Temp 98  F (36.7  C) (Temporal)   Ht 5' 1\" (1.549 m)   Wt 142 lb (64.4 kg)   LMP 11/09/2005 (Approximate)   BMI 26.83 kg/m   Estimated body mass index is 26.83 kg/m  as calculated from the following:    Height as of this encounter: 5' 1\" (1.549 m).    Weight as of this encounter: 142 lb (64.4 kg).. Body surface area is 1.66 meters squared. BP completed using cuff size: regular  Mild Pain (3)    Nursing Comments:     Adamaris Jean CMA    "
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
## 2021-03-23 NOTE — TELEPHONE ENCOUNTER
[facility] Daily Progress Note    Patient: Maura Cook Date: 3/23/2021  : 3/8/1928 Attending: Olaf Ott MD  89 year old female     Chief Complaint: Delirium    Dementia    Falls      Subjective: Pt seen sitting in recliner chair. Pt is an unreliable historian 2/2 dementia. Denies pain, but when asked specifically about R hip, pt does state she has mild pain currently. Per RN, pt did just get narcotic recently. Denies trouble sleeping, poor appetite or trouble with BMs. No c/o discomfort with Castillo cath.    Per RN: pt has been anxious on and off through the day. Mental status fluctuates. Worried about . Forgot that he came to visit her.      Review of Systems:  Unable to perform review of systems due to  dementia    PAST MEDICAL HISTORY, PAST SURGICAL HISTORY, FAMILY HISTORY, SOCIAL HISTORY:  Reviewed in EPIC.     Medications/Infusions:    Scheduled:   • famotidine  20 mg Oral Daily   • amLODIPine  10 mg Oral Daily   • metoPROLOL succinate  25 mg Oral Daily   • aspirin  324 mg Oral Daily   • polyethylene glycol  17 g Oral Daily   • docusate sodium-sennosides  1 tablet Oral Nightly   • sodium chloride (PF)  2 mL Intracatheter 2 times per day   • Potassium Standard Replacement Protocol   Does not apply See Admin Instructions   • Magnesium Standard Replacement Protocol   Does not apply See Admin Instructions   • acetaminophen  500 mg Oral TID   • melatonin  3 mg Oral Nightly        Continuous Infusions:     Physical Exam:     Vital 24 Hour Range Most Recent Value   Temperature Temp  Min: 97.5 °F (36.4 °C)  Max: 98.6 °F (37 °C) 98.6 °F (37 °C) (21 0815)   Pulse Pulse  Min: 60  Max: 80 73 (21 0815)   Respiratory Resp  Min: 12  Max: 19 18 (21 0815)   Non-Invasive  Blood Pressure BP  Min: 141/100  Max: 199/100 (!) 160/58 (21 1000)   Pulse Oximetry SpO2  Min: 92 %  Max: 97 % 96 % (21 0815)   Arterial  Blood Pressure No data recorded     O2 No data recorded    Left message for patient to return call.       Vital Most Recent Value First Value   Weight 77.1 kg (03/20/21 1800) Weight: 77.1 kg (03/20/21 1253)   Height 5' 6\" (167.6 cm) (03/20/21 1800) Height: 5' 6\" (167.6 cm) (03/20/21 1253)   BMI 27.44 (03/20/21 1800) N/A     Last Stool Occurrence:    I/O:  03/22 0700 - 03/23 0659  In: 1000 [I.V.:1000]  Out: 340 [Urine:340]    GENERAL:  AAO (Awake, alert, oriented) x 2 (self and hospital).  No acute pain or distress.   HEENT:  PERRLA (Pupils equal, round, reactive to light and accommodation).  No pallor or icterus.  Oral mucosa is moist.  No thrush.   NECK:  No JVD (jugular venous distention), thyromegaly.   CHEST:  Bilaterally clear.  No rhonchi or crepitations.   CARDIOVASCULAR:  RRR (Regular rate and rhythm).  No gallops, rub or murmur.   ABDOMEN:  BS+ (Bowel sounds positive), soft, nontender.   EXTREMITIES:  No edema.   SKIN:  Intact.  No new decubitus or rashes.   NEUROLOGIC:  No new focal neurological deficit.   MENTAL STATUS:  Unable to understand directions to count backward from 20 -> 1. CAM: positive based on hx. Does not know why she's in the hospital.     Labs:  Lab Results   Component Value Date    SODIUM 139 03/23/2021    POTASSIUM 4.0 03/23/2021    CHLORIDE 110 (H) 03/23/2021    CO2 25 03/23/2021    ANIONGAP 8 (L) 03/23/2021    BUN 27 (H) 03/23/2021    CREATININE 0.77 03/23/2021    WBC 8.2 03/23/2021    RBC 3.76 (L) 03/23/2021    HCT 35.1 (L) 03/23/2021    HGB 11.0 (L) 03/23/2021     03/23/2021    GLUCOSE 134 (H) 03/23/2021    TSH 3.091 03/21/2021     No results available in last 24 hours  Recent Labs   Lab 03/23/21  0512 03/22/21  0801 03/22/21  0529 03/21/21  0537  03/20/21  1301  03/20/21  1300   WBC 8.2 9.3  --  8.5  --  8.7   < >  --    HGB 11.0* 11.8*  --  11.2*  --  12.3   < >  --    HCT 35.1* 37.9  --  35.8*  --  39.3   < >  --     149  --  148  --  170   < >  --    MCV 93.4 93.1  --  93.2  --  93.6   < >  --    SODIUM 139 137  --  136  --  137   < >  --    POTASSIUM 4.0 4.0 4.0  3.7  --  4.1   < >  --    CHLORIDE 110* 108*  --  107  --  107   < >  --    CO2 25 27  --  29  --  27   < >  --    BUN 27* 19  --  25*  --  27*   < >  --    CREATININE 0.77 0.76  --  0.90   < > 0.86  --   --    GLUCOSE 134* 131*  --  121*  --  141*   < >  --    ALBUMIN  --   --   --   --   --  3.5*  --   --    CALCIUM 9.6 10.0  --  9.7  --  10.8*   < >  --    AST  --   --   --   --   --  20  --   --    BILIRUBIN  --   --   --   --   --  0.8  --   --    PTT  --   --   --   --   --   --   --  22   PT  --   --   --   --   --   --   --  10.7   INR  --   --   --   --   --   --   --  1.0    < > = values in this interval not displayed.     Lab Results   Component Value Date    TSH 3.091 03/21/2021    CHOLESTEROL 177 03/21/2021    TRIGLYCERIDE 53 03/21/2021    HDL 71 03/21/2021    CALCLDL 95 03/21/2021    HGBA1C 5.7 (H) 03/21/2021     Blood Cx:  No results found for this or any previous visit.  Urine Cx:  No results found for this or any previous visit.    EKG:  No results found for this or any previous visit.  Imaging:  Xr Chest Ap Or Pa    Result Date: 3/20/2021  Narrative: EXAM: XR CHEST AP OR PA CLINICAL HISTORY: sob COMPARISON: None. FINDINGS: The cardiac silhouette is mildly enlarged. There is ectasia and tortuosity of the aortic arch. Pulmonary vascularity is normal. Lungs are well aerated and clear without acute consolidation or effusion. Levorotoscoliosis and degenerative changes involve the lumbar spine.     Impression: IMPRESSION: No acute disease.     Xr Pelvis 1 Or 2 Views    Result Date: 3/20/2021  Narrative: PELVIS HISTORY: Trauma. Pelvic pain. COMPARISON: Femur x-rays performed 3/20/2021 Single view of the pelvis is obtained. There is deformity of the right femoral neck compatible with a mildly displaced femoral neck fracture. The right femoral head remains within the right acetabulum in the right hip joint space is narrowed. There is no left hip fracture. The left hip joint space is narrowed. There are no  additional fractures identified in the bony pelvis. There is degenerative change and scoliosis in the spine.     Impression: IMPRESSION: Deformity right femoral neck compatible with a mildly displaced femoral neck fracture.    Xr Hip 1 Vw Right    Result Date: 3/22/2021  Narrative: XR HIP 1 VW RIGHT DATE OF EXAM:  3/22/2021 7:02 PM. HISTORY:Total Hip PREVIOUS FOR COMPARISON: None. FINDINGS: Portable AP view of the right hip was obtained.  There is no evidence for acute fracture or dislocation. There are postoperative changes consistent with right hip replacement. The prosthetic components are well anchored and aligned. There is soft tissue swelling and gas at the operative site. No radiopaque retained foreign bodies are seen.     Impression: IMPRESSION: Postoperative changes consistent with right hip replacement.      Xr Femur 2 Vw Bilateral    Result Date: 3/20/2021  Narrative: EXAM: XR FEMUR 2+ VW BILATERAL HISTORY:  From order:  trauma COMPARISON:  None FINDINGS:  The study was performed on 3/20/2021 2:34 PM Bilateral femurs, two view study. On the right there is an acute angulated and displaced femoral neck fracture. No femoral shaft deformity is seen. Both knees show mild arthritis changes. Left hip and femoral neck region appears intact. There are some vascular calcifications in both five regions.     Impression: IMPRESSION:  Acute angulated right femoral neck fracture     Ct Abdomen Pelvis W Contrast    Result Date: 3/20/2021  Narrative: EXAM: CT ABDOMEN PELVIS W CONTRAST HISTORY: From order: Abdominal pain, acute, nonlocalized CONTRAST: 100 mL Omnipaque 300.. COMPARISON: None. This CT exam was performed using one or more of the following dose reduction techniques: Automated exposure control, adjustment of the mA and/or KV according to patient size, and/or use of iterative reconstructive technique. ABDOMEN FINDINGS: Lung bases: No lung base consolidation or effusion. The heart is mildly enlarged. There are  artifacts related to patient arm positioning. This obscures upper abdomen anatomy Liver, Spleen and Pancreas: No discrete mass or abnormality. Gallbladder: Normal without stones, with no evidence of significant biliary dilation.. Kidneys and Adrenal Glands: The left kidney is hydronephrotic with a large renal pelvic calculus measuring 2.1 cm. There are additional smaller calyceal stones. On the left there are smaller renal stones. The renal pelvis is seen but the ureters not dilated. The superior left kidney shows a partially exophytic hypoattenuating area which would appear to be a cyst or some parenchymal thinning. There are no adrenal masses. Aorta and Retroperitoneum: Extensive aortic calcification with fusiform infrarenal aortic dilation measuring 3.3 cm. No dissection rupture or leakage. The iliac arteries are calcified and tortuous without dilation. No intracranial adenopathy is evident. Bowel: The stomach is mildly distended with air and fluid. There is no wall thickening. Normal small bowel. Mildly increased generalized: Stool content. No wall thickening or inflammation.  Appendix: Not seen Small umbilical hernia PELVIS FINDINGS: Distended normal-appearing urinary bladder. Uterus is present. No sign of ovarian or adnexal mass. There is some sigmoid constipation but there is no wall thickening or inflammation. No free fluid or pelvic sidewall mass. There is a shallow right inguinal hernia, there are additional bilateral inguinal lipomas or smaller hernias. No adenopathy or mass is seen. The spine is arthritic without compression fracture. There is some levoscoliosis at the thoracal lumbar region. No acute pelvic finding. There is an acute angulated right femoral neck fracture also identified on preceding radiographs done today     Impression: IMPRESSION:  There is left hydronephrosis with a large left renal pelvic calculus. There are additional multiple bilateral renal calculi. No obstruction on right. The  superior left kidney shows some parenchymal thinning, also a partially exophytic low-attenuation area which could be a cyst. There are artifacts in the region. Acute appearing right femoral neck fracture. Normal gallbladder Widespread colonic constipation without wall thickening or inflammation The appendix is not seen Fusiform infrarenal aortic aneurysm without rupture or dissection, size 3.3 cm Distended urinary bladder without stone or mass.     Ct Head Wo Contrast    Result Date: 3/20/2021  Narrative: EXAM: CT HEAD WO CONTRAST CLINICAL INFORMATION: Head trauma, mod-severe COMPARISON:  None available. TECHNIQUE:  Routine noncontrast head CT spanning cranial vertex through foramen magnum.   Coronal and sagittal reformats. FINDINGS:  Mild to moderate diffuse cortical atrophic changes are present. There is associated ventriculomegaly. Patchy areas of diminished attenuation are present within the periventricular white matter consistent with chronic small vessel ischemic disease. Remote lacunar infarct left thalamus. No acute intracranial mass, mass effect or midline shift is present. No intracranial hemorrhage noted. No abnormal extra-axial masses or fluid collections are present. The calvarium is intact.     Impression: IMPRESSION:  1. No acute intracranial mass or hemorrhage. 2. Age-related atrophic changes. 3. Chronic small vessel ischemic changes within the periventricular white matter. Remote left thalamic lacunar infarct.          Geriatric Problems and Recommendations:  1. Dementia, unspecified type, moderate stage with acute delirium secondary to acute illness.  Dementia may be vascular in nature per family discussion with a neurologist 3 years ago.  - decision-making capacity:  Impaired.  POA activation is appropriate at this time.  May be reversed if patient returns back to baseline.  - not on any medications for memory, agree that benefits are on likely to outweigh risks  - home safety:  Recommend home  safety evaluation once patient is able to return safely home.  It sounds like her 's ability to assist with her cares is impaired.  Daughter has been very involved in their care.  - recommend delirium prevention strategies, especially staying awake during the day, encouraged out of bed to chair during the day, which read any possible unmet needs including hunger, thirst, uncontrolled pain, troubles with elimination.  2. Acute traumatic fall with right femoral neck fracture  3. Unsteady gait  - patient is followed by Ortho, POD#1. Daughter signed the consent.  - PT/OT following. Await final dispo recs.  4. Post op Pain s/p R THR  - trial increase tylenol to 1gm TID, dc percocet, start plain oxycodone 2.5 - 5mg q6hr PRN mod-severe pain.  5. Transitions of care.  Social work consulted for discharge planning.  6. Goals of care.  Daughter confirms that patient would want to be full code, in spite of the the risk that patient will likely not recover to baseline and possibility have a poor quality of life after surviving resuscitation.  Daughter did bring POA forms.  - RN to follow up w/SW to see if it was received       Advanced Care Planning/GOC  Code Status:    Code status discussed with: Family and Power of  for health care  Current code status is:  Full code    We appreciate being able to see this senior patient in consultation with your service.  We will continue to follow her during this hospitalization.    Thank you and please contact us with any questions.    Discussed with or notes reviewed: RN, Patient, MD and Reviewed old records    Karen Padua, DO  3/23/2021

## 2021-04-06 DIAGNOSIS — I10 HYPERTENSION GOAL BP (BLOOD PRESSURE) < 140/90: ICD-10-CM

## 2021-04-06 RX ORDER — PROPRANOLOL HYDROCHLORIDE 40 MG/1
40 TABLET ORAL 3 TIMES DAILY
Qty: 12 TABLET | Refills: 0 | Status: SHIPPED | OUTPATIENT
Start: 2021-04-06 | End: 2021-04-08

## 2021-04-06 NOTE — TELEPHONE ENCOUNTER
She is in [place] for a few days and forgot to bring her inderall. She is requesting 12 be sent to the pharmacy

## 2021-04-06 NOTE — TELEPHONE ENCOUNTER
INDERAL 40 mg  Last Written Prescription Date:  10/14/20  Last Fill Quantity: 270 ( one tablet 3 times per day),  # refills: 3   Last office visit: 2/1/2021 with prescribing provider:  Rosenda Pierre PA-C.  Future Office Visit:  NONE    Routing refill request to provider for review/approval because:  Drug interaction warning for use with Albuteral INhaler.   Pt is in [place] and forgot her meds Needs  MD approval as RN cannot approve cross state lines....SHAVON Burgos

## 2021-04-06 NOTE — TELEPHONE ENCOUNTER
This is fine. Rx sent. Please let patient know.    Jose Francisco Pierre PA-C  [facility]

## 2021-04-08 ENCOUNTER — TELEPHONE (OUTPATIENT)
Dept: FAMILY MEDICINE | Facility: OTHER | Age: 71
End: 2021-04-08

## 2021-04-08 DIAGNOSIS — I10 HYPERTENSION GOAL BP (BLOOD PRESSURE) < 140/90: ICD-10-CM

## 2021-04-08 RX ORDER — PROPRANOLOL HYDROCHLORIDE 40 MG/1
40 TABLET ORAL 3 TIMES DAILY
Qty: 12 TABLET | Refills: 0 | Status: SHIPPED | OUTPATIENT
Start: 2021-04-08 | End: 2021-10-05

## 2021-04-08 NOTE — TELEPHONE ENCOUNTER
Spoke with patient. On Sunday in [place] visiting her daughter.  And forgot her propranolol.  She has not be able to get them,  Would like them resent to [place] pharmacy.  carlos    rx sent.    Brandt Conner, TRINON, RN, PHN  [facility] ~ Lead Registered Nurse  Clinic Triage ~ [facility] [place]  April 8, 2021

## 2021-05-20 ENCOUNTER — TELEPHONE (OUTPATIENT)
Dept: FAMILY MEDICINE | Facility: OTHER | Age: 71
End: 2021-05-20

## 2021-05-20 ENCOUNTER — HOSPITAL ENCOUNTER (EMERGENCY)
Facility: CLINIC | Age: 71
Discharge: HOME | End: 2021-05-20
Attending: EMERGENCY MEDICINE | Admitting: EMERGENCY MEDICINE
Payer: MEDICARE

## 2021-05-20 VITALS
DIASTOLIC BLOOD PRESSURE: 117 MMHG | RESPIRATION RATE: 18 BRPM | WEIGHT: 145 LBS | HEIGHT: 61 IN | SYSTOLIC BLOOD PRESSURE: 222 MMHG | BODY MASS INDEX: 27.38 KG/M2 | OXYGEN SATURATION: 98 % | TEMPERATURE: 97.6 F | HEART RATE: 80 BPM

## 2021-05-20 DIAGNOSIS — R39.15 URINARY URGENCY: ICD-10-CM

## 2021-05-20 PROCEDURE — 99283 EMERGENCY DEPT VISIT LOW MDM: CPT | Performed by: EMERGENCY MEDICINE

## 2021-05-20 ASSESSMENT — MIFFLIN-ST. JEOR: SCORE: 1120.1

## 2021-05-20 NOTE — ED TRIAGE NOTES
"Pt seen in  in [place] today for urinary incontinence. \"back injury month ago\", and was told by  that pt needed to come to ED today for MRI of back - to see if related to urinary problems. Denies back pain.   "

## 2021-05-20 NOTE — TELEPHONE ENCOUNTER
Not appropriate for phone visit. Advised in person visit either in clinic or UC (UC would be more appropriate)    Jose Francisco Varma-FLEX Saravia  [facility]

## 2021-05-20 NOTE — TELEPHONE ENCOUNTER
Patient notified. She has had this problem before.  She is wondering if she can have a urine sample.    She does not want to go to the ER.    She thinks that this is similar to the last urinary infection.    She states she will go to the Er if her urine is normal.  Please advise.    Winnie Atwood RN on 5/20/2021 at 2:54 PM

## 2021-05-20 NOTE — TELEPHONE ENCOUNTER
Comes on real quick and has to find a bathroom really quick or cannot control it. Started about 2 weeks ago. More light at this point. Normally has a bowel movement every 2-3 days, harder in shape. Starting today they have been uncontrollable and liquid in form. Urine is also uncontrollable. When it comes on tries not to talk or move otherwise it comes out. Cannot control bowel and bladder and it came on hard today.     Patient is declining the ER due to costs. States she thinks it may be an infection of some sort.     PLAN:  Route back to provider      SHAVON Schilling/[facility] [place]

## 2021-05-20 NOTE — TELEPHONE ENCOUNTER
Since she has a history of fractures in her lumbar spine this could indicate cord impingement and is a medical emergency. She needs to go to ED and get her back imaged right away.     Jose Francisco Pierre PA-C  [facility]

## 2021-05-20 NOTE — DISCHARGE INSTRUCTIONS
Your urgency complaint for both urination and defecation is clinically not showing concerns for pinching off of the spinal cord.  Medically this is referred to his cauda equina syndrome.  Typically patients with this syndrome present with bilateral leg pain, pins-and-needles type tingling, weakness.  In addition patients with this will have numbness through the saddle area of the groin.  With those symptoms patients can then also have urinary retention.  Clinic provider was concerned because of your previous burst fracture at the T12 vertebrae.    Your physical examination and neurological examination days does not suggest any concerns for cauda equina syndrome.  At this time you are not in need of an emergent MRI of your spine.  Would recommend waiting for the urine culture results to return.  The initial urine test completed at the urgent care clinic in [place] does not suggest urinary tract infection.  If infection is proven to not be present per the urine culture results and you continue having urinary urgency I would recommend that you see a urologist.  Urologist to see patients with the specially clinic at [facility].

## 2021-05-20 NOTE — TELEPHONE ENCOUNTER
Per provider she can go to the urgent care.    She agrees with the plan.    Winnie Atwood RN on 5/20/2021 at 2:56 PM

## 2021-05-20 NOTE — TELEPHONE ENCOUNTER
Patient on my schedule for uncontrollable bowel and bladder.     If this is true, she is having incontinence, this is a medical emergency and she needs to go to the ED.    Jose Francisco Pierre PA-C  [facility]

## 2021-05-21 NOTE — ED PROVIDER NOTES
History     Chief Complaint   Patient presents with     Back Pain     HPI  Sri Grewal is a 71 year old female who presents for evaluation of urinary urgency. She is not complaining of back pain. She has had a previous burst fracture of T12. Her most recent CT from February this year shows that stable that there was only some moderate canal narrowing but no neuroforaminal compromise. She was seen at urgent care clinic today for some urinary urgency. The urinalysis was unremarkable. Urine culture is pending. They thought she should come to the emergency room to make sure that she is not having cauda equina syndrome.    With regards to typical symptoms suggestive of cauda equina syndrome the patient expressed;    No urinary retention  No saddle anesthesia  No paresthesias  No bilateral lower extremity symptoms        Allergies:  Allergies   Allergen Reactions     Codeine      GI UPSET     Oxycodone      Seasonal Allergies        Problem List:    Patient Active Problem List    Diagnosis Date Noted     Health Care Home 07/27/2011     Priority: High     X  EMERGENCY CARE PLAN  Presenting Problem Signs and Symptoms Treatment Plan    Questions or conerns during clinic hours    I will call the clinic directly     Questions or conerns outside clinic hours    I will call the 24 hour nurse line at [phone]    Patient needs to schedule an appointment    I will call the 24 hour scheduling team at [phone] or clinic directly    Same day treatment     I will call the clinic first, nurse line if after hours, urgent care and express care if needed                            DX V65.8 REPLACED WITH [id number] [facility] CARE HOME (04/08/2013)       Compression fracture of T12 vertebra with routine healing, subsequent encounter 11/17/2020     Priority: Medium     Hematoma 01/10/2019     Priority: Medium     Hematoma of abdominal wall, initial encounter 01/09/2019     Priority: Medium     Abdominal wall hematoma 01/09/2019      Priority: Medium     Hyponatremia 11/26/2018     Priority: Medium     Age-related osteoporosis without current pathological fracture 11/12/2018     Priority: Medium     HSV (herpes simplex virus) infection 07/31/2018     Priority: Medium     Atypical mole 06/01/2017     Priority: Medium     Hypertension goal BP (blood pressure) < 140/90 05/25/2017     Priority: Medium     TIA (transient ischemic attack) 01/16/2017     Priority: Medium     Adjustment disorder with mixed anxiety and depressed mood 09/09/2014     Priority: Medium     Middle cerebral artery aneurysm 10/29/2013     Priority: Medium     Noted in 2007.  Intervention not recommended at that time.  Observation.  Saw Interventional Radiology 12/2013.  Recheck CTA in one year.       Moderate major depression (H) 09/08/2010     Priority: Medium     Insomnia 09/08/2010     Priority: Medium     Atrophy of vagina 05/30/2010     Priority: Medium     Lump or mass in breast 03/29/2010     Priority: Medium     Mild persistent asthma 02/21/2005     Priority: Medium     Anemia 04/25/2002     Priority: Medium     Problem list name updated by automated process. Provider to review       Chronic migraine without aura without status migrainosus, not intractable      Priority: Medium     Multiple trials off of fiorinal have not been successful  Problem list name updated by automated process. Provider to review       Other abnormal Papanicolaou smear of cervix and cervical HPV(795.09)      Priority: Medium     (Problem list name updated by automated process. Provider to review and confirm.)       Endometriosis      Priority: Medium     Problem list name updated by automated process. Provider to review       Allergic rhinitis      Priority: Medium     Problem list name updated by automated process. Provider to review          Past Medical History:    Past Medical History:   Diagnosis Date     Abnormal Papanicolaou smear of cervix and cervical HPV      Allergic rhinitis, cause  "unspecified      Contact dermatitis and other eczema, due to unspecified cause      Endometriosis, site unspecified      Esophageal reflux      Migraine, unspecified, without mention of intractable migraine without mention of status migrainosus      Mild persistent asthma      Unspecified disorder of uterus        Past Surgical History:    Past Surgical History:   Procedure Laterality Date     C  DELIVERY ONLY       X2     COLONOSCOPY  2014    Procedure: COLONOSCOPY;  Surgeon: Nathan Baker MD;  Location: [facility]     HC COLONOSCOPY THRU STOMA, DIAGNOSTIC  2002    normal       Family History:    Family History   Problem Relation Age of Onset     Heart Disease Mother         anemia     Osteoporosis Mother      Hypertension Mother      Cerebrovascular Disease Mother      Alcohol/Drug Mother         alcohol     Neurologic Disorder Mother         migraines     Hypertension Father      Cancer No family hx of         breast       Social History:  Marital Status:   [5]  Social History     Tobacco Use     Smoking status: Never Smoker     Smokeless tobacco: Never Used   Substance Use Topics     Alcohol use: Yes     Comment: beerx2/x1 per month     Drug use: No        Medications:    albuterol (PROAIR HFA) 108 (90 Base) MCG/ACT inhaler  buPROPion (WELLBUTRIN XL) 150 MG 24 hr tablet  butalbital-aspirin-caffeine (FIORINAL) -40 MG per capsule  calcitonin, salmon, (MIACALCIN) 200 UNIT/ACT nasal spray  cloNIDine (CATAPRES) 0.1 MG tablet  propranolol (INDERAL) 40 MG tablet  venlafaxine (EFFEXOR-XR) 150 MG 24 hr capsule          Review of Systems   All other systems reviewed and are negative.      Physical Exam   BP: (!) 222/117  Pulse: 80  Temp: 97.6  F (36.4  C)  Resp: 18  Height: 154.9 cm (5' 1\")  Weight: 65.8 kg (145 lb)  SpO2: 98 %      Physical Exam  Vitals signs and nursing note reviewed.   Constitutional:       General: She is not in acute distress.     Appearance: She is not ill-appearing " or diaphoretic.   HENT:      Head: Normocephalic and atraumatic.      Nose: Nose normal.      Mouth/Throat:      Pharynx: No oropharyngeal exudate.   Eyes:      General: No scleral icterus.     Conjunctiva/sclera: Conjunctivae normal.      Pupils: Pupils are equal, round, and reactive to light.   Cardiovascular:      Rate and Rhythm: Normal rate.      Heart sounds: Normal heart sounds.   Pulmonary:      Effort: Pulmonary effort is normal. No respiratory distress.      Breath sounds: Normal breath sounds.   Abdominal:      General: Bowel sounds are normal.      Palpations: Abdomen is soft.      Tenderness: There is no abdominal tenderness.   Musculoskeletal:         General: No tenderness.   Skin:     General: Skin is warm.      Capillary Refill: Capillary refill takes less than 2 seconds.      Findings: No rash.   Neurological:      General: No focal deficit present.      Mental Status: She is alert and oriented to person, place, and time.      Cranial Nerves: No cranial nerve deficit.      Sensory: Sensation is intact. No sensory deficit.      Motor: No weakness.      Coordination: Coordination is intact. Coordination normal.      Gait: Gait is intact. Gait normal.      Deep Tendon Reflexes: Reflexes are normal and symmetric. Reflexes normal. Babinski sign absent on the right side.      Reflex Scores:       Tricep reflexes are 2+ on the right side and 2+ on the left side.       Bicep reflexes are 2+ on the right side and 2+ on the left side.       Brachioradialis reflexes are 2+ on the right side and 2+ on the left side.       Patellar reflexes are 2+ on the right side and 2+ on the left side.       Achilles reflexes are 2+ on the right side and 2+ on the left side.  Psychiatric:         Mood and Affect: Mood normal.         Behavior: Behavior normal.         ED Course        Procedures              Assessments & Plan (with Medical Decision Making)  Sri is 71 years of age. She expressed concern to the urgent care  clinic today for some urinary urgency. They determined that she did not have a urinary tract infection suggestive per a UA without micro but are process in a UC. She is not having fever or dysuria. They noted her chart that she had a burst fracture T12 a year and a half ago and are concerned as to the potential for cauda equina syndrome. Patient states she did not feel she need to come to emergency room but thought she should given that they recommended so.  She reports that she has had no change as far as bilateral lower extremity symptoms. No paresthesias. No saddle anesthesia. She has not had difficulty emptying her bladder with regards to the acute urinary retention.  Patient's neurological examination did not suggest any concerns for central canal stenosis/lumbar radiculopathy. Also for the patient that the L1 level would be rare for cauda equina to present. Did take her through the red flags of cauda equina syndrome advised to return if there noted but did not feel that advanced MRI neurologic imaging was warranted on an emergent basis. She also has not had any recurrence of severe low back pain for other  pathology concerns.     I have reviewed the nursing notes.    I have reviewed the findings, diagnosis, plan and need for follow up with the patient.      Discharge Medication List as of 5/20/2021  5:41 PM          Final diagnoses:   Urinary urgency       5/20/2021   [facility] EMERGENCY DEPT     Faraz Nicolas,   05/20/21 2041

## 2021-05-25 NOTE — PROGRESS NOTES
ENT Consultation    Sri Grewal who is a 67 year old female seen in consultation at the request of Rosenda Pierre PA-C.      History of Present Illness - Sri Grewal is a 67 year old female with concerns of chronic sinusitis. She states that in Jan 2018 she had a URI that was follow by a protractible sinus infection. Patient was on multiple antibiotics without much relief. Patient now reports that she is doing well. She does have seasonal allergies. Patient was teste for allergies many years ago. She states that the only symptoms she has now is a numbness on the right cheek/lower lip that comes and goes lasting about 1 minute.    CT SCAN OF THE PARANASAL SINUSES AND FACE March 15, 2018 10:25 AM     HISTORY: Chronic sinusitis, unspecified location.    TECHNIQUE: Noncontrast axial scans and coronal reformations. Radiation  dose for this scan was reduced using automated exposure control,  adjustment of the mA and/or kV according to patient size, or iterative  reconstruction technique.    COMPARISON: None.    FINDINGS:  Frontal sinuses: Normal.     Ethmoid sinuses: Normal.     Right maxillary sinus: Normal. The ostiomeatal unit is normal with a  patent infundibulum.     Left maxillary sinus: There is a 2 x 1 x 1.5 cm retention cyst. The  ostiomeatal unit is normal with a patent infundibulum.     Sphenoid sinus: Normal.     Nasal septum: Normal and in the midline.     Turbinates and nasal cavity: Normal.     Laminae papyracea and cribriform plate: Normal.     Other findings: None.    IMPRESSION:   1. Retention cyst in left maxillary sinus.  2. No evidence for nasal cavity mass or any air-fluid levels.    JAMARI ARRIAGA MD    Past Medical History -   Past Medical History:   Diagnosis Date     Abnormal Papanicolaou smear of cervix and cervical HPV      Allergic rhinitis, cause unspecified     seasonal allergies     Contact dermatitis and other eczema, due to unspecified cause      Endometriosis, site  unspecified      Esophageal reflux     GERD     Migraine, unspecified, without mention of intractable migraine without mention of status migrainosus      Mild persistent asthma      Unspecified disorder of uterus     fibroid uterus       Current Medications -   Current Outpatient Prescriptions:      albuterol (PROAIR HFA) 108 (90 BASE) MCG/ACT Inhaler, Inhale 1-2 puffs into the lungs every 6 hours as needed for shortness of breath / dyspnea, Disp: 2 Inhaler, Rfl: 11     ASPIRIN 81 PO, , Disp: , Rfl:      butalbital-aspirin-caffeine (FIORINAL) -40 MG per capsule, TAKE ONE CAPSULE BY MOUTH EVERY 6 HOURS AS NEEDED (one month supply), Disp: 30 capsule, Rfl: 5     Fexofenadine HCl (ALLEGRA PO), , Disp: , Rfl:      IBUPROFEN PO, Reported on 4/6/2017, Disp: , Rfl:      lisinopril (PRINIVIL,ZESTRIL) 30 MG tablet, Take 2 tablets (60 mg) by mouth daily, Disp: 180 tablet, Rfl: 3     lisinopril-hydrochlorothiazide (PRINZIDE/ZESTORETIC) 20-12.5 MG per tablet, Take 2 tablets by mouth daily, Disp: 60 tablet, Rfl: 3     propranolol (INDERAL) 20 MG tablet, Take 1 tablet (20 mg) by mouth 2 times daily, Disp: 180 tablet, Rfl: 3    Allergies -   Allergies   Allergen Reactions     Codeine      GI UPSET     Percocet [Oxycodone-Acetaminophen] Nausea and Vomiting     Seasonal Allergies        Social History -   Social History     Social History     Marital status:      Spouse name: N/A     Number of children: 2     Years of education: N/A     Occupational History     retired       Retired     Social History Main Topics     Smoking status: Never Smoker     Smokeless tobacco: Never Used     Alcohol use Yes      Comment: beerx2/x1 per month     Drug use: No     Sexual activity: Yes     Partners: Male     Birth control/ protection: Surgical      Comment: tubal ligation     Other Topics Concern      Service No     Blood Transfusions No     Caffeine Concern No     Occupational Exposure No     Hobby Hazards No     Sleep  "Concern Yes     Insomnia     Stress Concern Yes     breathing,  passed away 2004     Weight Concern Yes     would like to lose some weight     Special Diet No     Back Care Yes     Exercise Yes     walking at work daily     Seat Belt Yes     Self-Exams Yes     periodically     Social History Narrative       Family History -   Family History   Problem Relation Age of Onset     HEART DISEASE Mother      anemia     OSTEOPOROSIS Mother      Hypertension Mother      CEREBROVASCULAR DISEASE Mother      Alcohol/Drug Mother      alcohol     Neurologic Disorder Mother      migraines     Hypertension Father      CANCER No family hx of      breast       Review of Systems - As per HPI and PMHx, otherwise review of system review of the head and neck negative.    Physical Exam  /84 (BP Location: Left arm, Patient Position: Chair, Cuff Size: Adult Regular)  Pulse 64  Temp 98  F (36.7  C) (Temporal)  Resp 18  Ht 1.57 m (5' 1.81\")  Wt 71 kg (156 lb 8 oz)  LMP 11/09/2005  SpO2 96%  BMI 28.8 kg/m2  BMI: Body mass index is 28.8 kg/(m^2).    General - The patient is well nourished and well developed, and appears to have good nutritional status.  Alert and oriented to person and place, answers questions and cooperates with examination appropriately.    SKIN - No suspicious lesions or rashes.  Respiration - No respiratory distress.  Head and Face - Normocephalic and atraumatic, with no gross asymmetry noted of the contour of the facial features.  The facial nerve is intact, with strong symmetric movements.    Voice and Breathing - The patient was breathing comfortably without the use of accessory muscles. The patients voice was clear and strong, and had appropriate pitch and quality.    Ears - Bilateral pinna and EACs with normal appearing overlying skin. Tympanic membrane intact with good mobility on pneumatic otoscopy bilaterally. Bony landmarks of the ossicular chain are normal. The tympanic membranes are normal in " <--- Click to Launch ICDx for PreOp, PostOp and Procedure appearance. No retraction, perforation, or masses.  No fluid or purulence was seen in the external canal or the middle ear.     Eyes - Extraocular movements intact.  Sclera were not icteric or injected, conjunctiva were pink and moist.    Mouth - Examination of the oral cavity showed pink, healthy oral mucosa. No lesions or ulcerations noted.  The tongue was mobile and midline, and the dentition were in good condition.      Throat - The walls of the oropharynx were smooth, pink, moist, symmetric, and had no lesions or ulcerations.  The tonsillar pillars and soft palate were symmetric.  The uvula was midline on elevation.    Neck - Normal midline excursion of the laryngotracheal complex during swallowing.  Full range of motion on passive movement.  Palpation of the occipital, submental, submandibular, internal jugular chain, and supraclavicular nodes did not demonstrate any abnormal lymph nodes or masses.  The carotid pulse was palpable bilaterally.  Palpation of the thyroid was soft and smooth, with no nodules or goiter appreciated.  The trachea was mobile and midline.    Nose - External contour is symmetric, no gross deflection or scars.  Nasal mucosa is pink and moist with no abnormal mucus.  The septum was midline and non-obstructive, turbinates of normal size and position.  No polyps, masses, or purulence noted on examination.    Neuro - Nonfocal neuro exam is normal, CN 2 through 12 intact, normal gait and muscle tone.      Performed in clinic today:  No procedures preformed in clinic today      A/P - Sri Grewal is a 67 year old female with facial numbness. I suggested that patient come back for further work up if the numbness becomes more permanent fixture.       This document serves as a record of the services and decisions personally performed and made by Dr. Nic Mckeon MD. It was created on his behalf by Vaishali Zepeda, a trained medical scribe. The creation of this document is based the provider's  statements to the medical scribe.  Vaishali Zepeda 4/25/2018    Provider:   The information in this document, created by the medical scribe for me, accurately reflects the services I personally performed and the decisions made by me. I have reviewed and approved this document for accuracy prior to leaving the patient care area.  Dr. Nic Mckeon MD 4/25/2018    Nic Mckeon MD.

## 2021-06-25 ENCOUNTER — TRANSFERRED RECORDS (OUTPATIENT)
Dept: HEALTH INFORMATION MANAGEMENT | Facility: CLINIC | Age: 71
End: 2021-06-25

## 2021-06-25 LAB — INR (EXTERNAL): 1.1

## 2021-07-01 LAB
CREATININE (EXTERNAL): 0.84 MG/DL (ref 0.57–1.11)
GFR ESTIMATED (EXTERNAL): >60 ML/MIN/1.73M2
GFR ESTIMATED (IF AFRICAN AMERICAN) (EXTERNAL): >60 ML/MIN/1.73M2
GLUCOSE (EXTERNAL): 117 MG/DL (ref 65–100)
POTASSIUM (EXTERNAL): 4.1 MMOL/L (ref 3.5–5)

## 2021-07-05 ENCOUNTER — PATIENT OUTREACH (OUTPATIENT)
Dept: CARE COORDINATION | Facility: CLINIC | Age: 71
End: 2021-07-05

## 2021-07-05 DIAGNOSIS — I21.4 NSTEMI (NON-ST ELEVATED MYOCARDIAL INFARCTION) (H): Primary | ICD-10-CM

## 2021-07-05 NOTE — PROGRESS NOTES
Clinic Care Coordination Contact  Community Health Worker Initial Outreach    CHW Initial Information Gathering:  Referral Source: IP Report       Patient accepts CC:No, I'm feel that I'm doing alright. No needs or barriers identified today.

## 2021-07-15 ENCOUNTER — TELEPHONE (OUTPATIENT)
Dept: FAMILY MEDICINE | Facility: OTHER | Age: 71
End: 2021-07-15

## 2021-07-15 NOTE — TELEPHONE ENCOUNTER
Verbal orders requested:    Delay start of care until this Saturday as pt is coming down from [place], moving in with daughter

## 2021-07-16 NOTE — TELEPHONE ENCOUNTER
Called and spoke with [facility] regarding message below.  Angélica Nair CMA ([facility])

## 2021-07-22 ENCOUNTER — TELEPHONE (OUTPATIENT)
Dept: FAMILY MEDICINE | Facility: OTHER | Age: 71
End: 2021-07-22

## 2021-07-22 NOTE — TELEPHONE ENCOUNTER
Routing to covering provider to review. PCP out of clinic.    Sri Grewal is a 71 year old female    Home care calls for the following orders:  PT start date 7/21/2021. Once a week for 4 weeks. This is for balance training due to recent hospitalization from sepsis.     Speech evaluation for the week of 7/26/2021    Please contact Estefania with orders: [phone]. Ok to leave a detailed message at this number.     Heather Hull, RN, BSN

## 2021-07-27 ENCOUNTER — OFFICE VISIT (OUTPATIENT)
Dept: FAMILY MEDICINE | Facility: OTHER | Age: 71
End: 2021-07-27
Payer: MEDICARE

## 2021-07-27 VITALS
RESPIRATION RATE: 18 BRPM | HEIGHT: 61 IN | HEART RATE: 74 BPM | TEMPERATURE: 98.1 F | BODY MASS INDEX: 26.58 KG/M2 | DIASTOLIC BLOOD PRESSURE: 78 MMHG | WEIGHT: 140.8 LBS | SYSTOLIC BLOOD PRESSURE: 118 MMHG | OXYGEN SATURATION: 98 %

## 2021-07-27 DIAGNOSIS — I21.3 ST ELEVATION MYOCARDIAL INFARCTION (STEMI), UNSPECIFIED ARTERY (H): Primary | ICD-10-CM

## 2021-07-27 DIAGNOSIS — R65.21 SEPTIC SHOCK (H): ICD-10-CM

## 2021-07-27 DIAGNOSIS — G93.40 ENCEPHALOPATHY: ICD-10-CM

## 2021-07-27 DIAGNOSIS — F32.1 MODERATE MAJOR DEPRESSION (H): ICD-10-CM

## 2021-07-27 DIAGNOSIS — I63.10 CEREBROVASCULAR ACCIDENT (CVA) DUE TO EMBOLISM OF PRECEREBRAL ARTERY (H): ICD-10-CM

## 2021-07-27 DIAGNOSIS — I10 HYPERTENSION GOAL BP (BLOOD PRESSURE) < 140/90: ICD-10-CM

## 2021-07-27 DIAGNOSIS — I25.83 CORONARY ARTERY DISEASE DUE TO LIPID RICH PLAQUE: ICD-10-CM

## 2021-07-27 DIAGNOSIS — F43.23 ADJUSTMENT DISORDER WITH MIXED ANXIETY AND DEPRESSED MOOD: ICD-10-CM

## 2021-07-27 DIAGNOSIS — A41.9 SEPTIC SHOCK (H): ICD-10-CM

## 2021-07-27 DIAGNOSIS — I25.10 CORONARY ARTERY DISEASE DUE TO LIPID RICH PLAQUE: ICD-10-CM

## 2021-07-27 LAB
ALBUMIN SERPL-MCNC: 3.6 G/DL (ref 3.4–5)
ALP SERPL-CCNC: 70 U/L (ref 40–150)
ALT SERPL W P-5'-P-CCNC: 28 U/L (ref 0–50)
ANION GAP SERPL CALCULATED.3IONS-SCNC: 5 MMOL/L (ref 3–14)
AST SERPL W P-5'-P-CCNC: 23 U/L (ref 0–45)
BILIRUB SERPL-MCNC: 0.4 MG/DL (ref 0.2–1.3)
BUN SERPL-MCNC: 7 MG/DL (ref 7–30)
CALCIUM SERPL-MCNC: 9.6 MG/DL (ref 8.5–10.1)
CHLORIDE BLD-SCNC: 103 MMOL/L (ref 94–109)
CO2 SERPL-SCNC: 28 MMOL/L (ref 20–32)
CREAT SERPL-MCNC: 0.76 MG/DL (ref 0.52–1.04)
ERYTHROCYTE [DISTWIDTH] IN BLOOD BY AUTOMATED COUNT: 12.6 % (ref 10–15)
GFR SERPL CREATININE-BSD FRML MDRD: 80 ML/MIN/1.73M2
GLUCOSE BLD-MCNC: 109 MG/DL (ref 70–99)
HCT VFR BLD AUTO: 39.3 % (ref 35–47)
HGB BLD-MCNC: 13.4 G/DL (ref 11.7–15.7)
MCH RBC QN AUTO: 31.8 PG (ref 26.5–33)
MCHC RBC AUTO-ENTMCNC: 34.1 G/DL (ref 31.5–36.5)
MCV RBC AUTO: 93 FL (ref 78–100)
PLATELET # BLD AUTO: 224 10E3/UL (ref 150–450)
POTASSIUM BLD-SCNC: 5.5 MMOL/L (ref 3.4–5.3)
PROT SERPL-MCNC: 6.7 G/DL (ref 6.8–8.8)
RBC # BLD AUTO: 4.22 10E6/UL (ref 3.8–5.2)
SODIUM SERPL-SCNC: 136 MMOL/L (ref 133–144)
WBC # BLD AUTO: 5.1 10E3/UL (ref 4–11)

## 2021-07-27 PROCEDURE — 80053 COMPREHEN METABOLIC PANEL: CPT | Performed by: PHYSICIAN ASSISTANT

## 2021-07-27 PROCEDURE — 36415 COLL VENOUS BLD VENIPUNCTURE: CPT | Performed by: PHYSICIAN ASSISTANT

## 2021-07-27 PROCEDURE — 85027 COMPLETE CBC AUTOMATED: CPT | Performed by: PHYSICIAN ASSISTANT

## 2021-07-27 PROCEDURE — 96127 BRIEF EMOTIONAL/BEHAV ASSMT: CPT | Performed by: PHYSICIAN ASSISTANT

## 2021-07-27 PROCEDURE — 99215 OFFICE O/P EST HI 40 MIN: CPT | Performed by: PHYSICIAN ASSISTANT

## 2021-07-27 RX ORDER — BUPROPION HYDROCHLORIDE 150 MG/1
150 TABLET ORAL EVERY MORNING
Qty: 90 TABLET | Refills: 3 | Status: SHIPPED | OUTPATIENT
Start: 2021-07-27 | End: 2022-01-27

## 2021-07-27 RX ORDER — ROSUVASTATIN CALCIUM 20 MG/1
20 TABLET, COATED ORAL DAILY
COMMUNITY
Start: 2021-07-03 | End: 2021-10-05

## 2021-07-27 RX ORDER — LISINOPRIL 20 MG/1
20 TABLET ORAL DAILY
COMMUNITY
Start: 2021-07-03 | End: 2021-08-27

## 2021-07-27 RX ORDER — CARVEDILOL 12.5 MG/1
12.5 TABLET ORAL 2 TIMES DAILY
COMMUNITY
Start: 2021-07-03 | End: 2021-10-05

## 2021-07-27 RX ORDER — FUROSEMIDE 40 MG
40 TABLET ORAL DAILY
COMMUNITY
Start: 2021-07-03 | End: 2021-08-27

## 2021-07-27 RX ORDER — ASPIRIN 81 MG/1
81 TABLET, CHEWABLE ORAL DAILY
Status: ON HOLD | COMMUNITY
Start: 2021-07-03 | End: 2023-05-01

## 2021-07-27 ASSESSMENT — ASTHMA QUESTIONNAIRES
QUESTION_2 LAST FOUR WEEKS HOW OFTEN HAVE YOU HAD SHORTNESS OF BREATH: ONCE A DAY
QUESTION_3 LAST FOUR WEEKS HOW OFTEN DID YOUR ASTHMA SYMPTOMS (WHEEZING, COUGHING, SHORTNESS OF BREATH, CHEST TIGHTNESS OR PAIN) WAKE YOU UP AT NIGHT OR EARLIER THAN USUAL IN THE MORNING: ONCE A WEEK
QUESTION_5 LAST FOUR WEEKS HOW WOULD YOU RATE YOUR ASTHMA CONTROL: WELL CONTROLLED
QUESTION_1 LAST FOUR WEEKS HOW MUCH OF THE TIME DID YOUR ASTHMA KEEP YOU FROM GETTING AS MUCH DONE AT WORK, SCHOOL OR AT HOME: NONE OF THE TIME
ACT_TOTALSCORE: 17
QUESTION_4 LAST FOUR WEEKS HOW OFTEN HAVE YOU USED YOUR RESCUE INHALER OR NEBULIZER MEDICATION (SUCH AS ALBUTEROL): TWO OR THREE TIMES PER WEEK

## 2021-07-27 ASSESSMENT — PATIENT HEALTH QUESTIONNAIRE - PHQ9
10. IF YOU CHECKED OFF ANY PROBLEMS, HOW DIFFICULT HAVE THESE PROBLEMS MADE IT FOR YOU TO DO YOUR WORK, TAKE CARE OF THINGS AT HOME, OR GET ALONG WITH OTHER PEOPLE: SOMEWHAT DIFFICULT
SUM OF ALL RESPONSES TO PHQ QUESTIONS 1-9: 8
SUM OF ALL RESPONSES TO PHQ QUESTIONS 1-9: 8

## 2021-07-27 ASSESSMENT — ANXIETY QUESTIONNAIRES
3. WORRYING TOO MUCH ABOUT DIFFERENT THINGS: SEVERAL DAYS
GAD7 TOTAL SCORE: 5
7. FEELING AFRAID AS IF SOMETHING AWFUL MIGHT HAPPEN: NOT AT ALL
5. BEING SO RESTLESS THAT IT IS HARD TO SIT STILL: SEVERAL DAYS
8. IF YOU CHECKED OFF ANY PROBLEMS, HOW DIFFICULT HAVE THESE MADE IT FOR YOU TO DO YOUR WORK, TAKE CARE OF THINGS AT HOME, OR GET ALONG WITH OTHER PEOPLE?: NOT DIFFICULT AT ALL
7. FEELING AFRAID AS IF SOMETHING AWFUL MIGHT HAPPEN: NOT AT ALL
GAD7 TOTAL SCORE: 5
1. FEELING NERVOUS, ANXIOUS, OR ON EDGE: SEVERAL DAYS
2. NOT BEING ABLE TO STOP OR CONTROL WORRYING: SEVERAL DAYS
4. TROUBLE RELAXING: SEVERAL DAYS
6. BECOMING EASILY ANNOYED OR IRRITABLE: NOT AT ALL
GAD7 TOTAL SCORE: 5

## 2021-07-27 ASSESSMENT — MIFFLIN-ST. JEOR: SCORE: 1100.78

## 2021-07-27 ASSESSMENT — PAIN SCALES - GENERAL: PAINLEVEL: NO PAIN (0)

## 2021-07-27 NOTE — PATIENT INSTRUCTIONS
1. They will call you to schedule with neurology     2. Call to [facility]     [address]  [address]  [place] [address] [phone]   Phone  [phone]   Fax      3. Start Bupropion (Wellbutrin) 1 tablet every morning     4. Recheck 1 month

## 2021-07-27 NOTE — PROGRESS NOTES
Assessment & Plan     ICD-10-CM    1. ST elevation myocardial infarction (STEMI), unspecified artery (H)  I21.3 Comprehensive metabolic panel (BMP + Alb, Alk Phos, ALT, AST, Total. Bili, TP)     CBC with platelets     Comprehensive metabolic panel (BMP + Alb, Alk Phos, ALT, AST, Total. Bili, TP)     CBC with platelets   2. Septic shock (H)  A41.9 Comprehensive metabolic panel (BMP + Alb, Alk Phos, ALT, AST, Total. Bili, TP)    R65.21 CBC with platelets     Comprehensive metabolic panel (BMP + Alb, Alk Phos, ALT, AST, Total. Bili, TP)     CBC with platelets   3. Moderate major depression (H)  F32.1    4. Hypertension goal BP (blood pressure) < 140/90  I10 Comprehensive metabolic panel (BMP + Alb, Alk Phos, ALT, AST, Total. Bili, TP)     CBC with platelets     Comprehensive metabolic panel (BMP + Alb, Alk Phos, ALT, AST, Total. Bili, TP)     CBC with platelets   5. Coronary artery disease due to lipid rich plaque  I25.10     I25.83    6. Encephalopathy  G93.40 Adult Neurology Referral   7. Cerebrovascular accident (CVA) due to embolism of precerebral artery (H)  I63.10 Adult Neurology Referral   8. Adjustment disorder with mixed anxiety and depressed mood  F43.23 buPROPion (WELLBUTRIN XL) 150 MG 24 hr tablet       - Patient reports doing better, but not 100% since hospital stay and rehab stay, living in a temporary apartment   - Children and friends are helping out  - Reviewed labs and hospital discharge note      These were rechecked in rehab but I don't have rehab notes      Will recheck today to ensure kidneys back to normal   - Cardiology      Unclear when cardiology wants to see her, this is not scheduled      Has 2nd ziopatch in place     Recommend call them to discuss and schedule   - Never told needs neurology recheck, unclear from discharge note      Recommend neurology evaluation due to CVA   - Reviewed medication use and side effects, refilled as needed   - Patient very depressed, wishes to go back on her  Wellbutrin 150 mg and Effexor      Reviewed hospital notes, appears these were first stopped when she was confused due to CVA      Doesn't seem to be a reason that she can't restart, recommend one at a time      Will start with Wellbutrin 150 mg, reviewed use and side effects   - Recheck 1 month   - Advised to continue to not drive until seen by neurology and OT/PT clears       Review of the result(s) of each unique test - see list      All labs from hospitalization   Diagnosis or treatment significantly limited by social determinants of health - None     43 minutes spent on the date of the encounter doing chart review, history and exam, documentation and further activities as noted above    The patient indicates understanding of these issues and agrees with the plan.    Return in about 1 month (around 8/27/2021).    Rosenda Pierre PA-C  [facility] SUSY Fierro is a 71 year old who presents for the following health issues  accompanied by her friend Bryson:    [facility]     Hospital Follow-up Visit:    Hospital/Nursing Home/ Rehab Facility: [facility]   Date of Admission: 6/25/2021  Date of Discharge: 7/3/2021  Reason(s) for Admission: Confusion, heart attack/stroke      Was your hospitalization related to COVID-19? No   Problems taking medications regularly:  None  Medication changes since discharge: None  Problems adhering to non-medication therapy:  None    Summary of hospitalization:  CareEverywhere information obtained and reviewed  Diagnostic Tests/Treatments reviewed.  Follow up needed: Cardiology and neurology   Other Healthcare Providers Involved in Patient s Care:         Specialist appointment - needs to schedule   Update since discharge: improved.     - Rehab, was in there 10 days   - Can't drive   - Has restrictions   - Depressed, stopped because told to in hospital      Wants to restart   - OT, Physical therapy, and speech coming to her home       Helping   - Short term 6 months at an apartment   - [facility]              Post Discharge Medication Reconciliation: discharge medications reconciled, continue medications without change.  Plan of care communicated with patient and caregiver         Hospital Course     HPI per Dr Spaulding   History of present illness: Sri Grewal is a 71 y.o. female with coronary artery disease, asthma, hypertension, and gastroesophogeal reflux disease who was found in her bath room standing but obviously confused. She is not able to provide historical information and her son who found her was not able to provide more history. The patient is not a reliable historian but denies chest pain, fever/chills, shortness of breath, etc.     ED physician reported very elevated Troponin and EKG changes and has already spoken with Cardiology who is concerned about MI and plans on coronary angiogram.    Assessment and Plan   Severe Sepsis vs Heat Stroke ( seems more likely)  --admit White blood cells 15.5K, temperature 102.2F, heart rate 138 beats per minute   --lactate: 5.6--.3.0-->2.3  --procalcitonin: 0.82  --CRP : 0.66 (<0.50)   --Blood culture(6/25/21): no growth to date   --COVID-19 : negative (6/25/21)  --CT Chest/Abd/Pelvis(6/25/21):  1.  Age-indeterminate severe T12 compression deformity with bony retropulsion resulting in mild spinal canal narrowing.  2.  Subacute to chronic appearing nondisplaced anterolateral right sixth and seventh rib fractures.  3.  No pleural effusion or pneumothorax.  4.  No evidence of acute abdominopelvic visceral injury or hemoperitoneum.  5.  Urinary bladder wall thickening asymmetric anteriorly and may be due to cystitis, though urothelial carcinoma cannot a similar appearance. Cystoscopy may be warranted for further evaluation.  --CXR(6/26/21):  The heart size is normal. The thoracic aorta is calcified and tortuous. There is interstitial edema at the lung bases. No pneumothorax.  --continue  cefepime and Flagyl for now but may discontinue if ID agrees  --continue iv acyclovir for now (unless HSV results come back negative)  --given vigorous IV fluid hydration- stopped intravenous fluids during the night due to pulmonary edema noted on chest radiograph   --given iv Lasix and now on room air  --ID consulted and they did not see a source of infection and felt that heat stroke was also a possible etiology for her fever, tachycardia, hemoconcentration.  6/29/2021 acyclovir discontinued by neurology secondary to negative HSV  6/30/2021 Stable from an infectious disease and neurology standpoint to discharge as soon as okay with cardiology  7/2/2021 okay to dismiss as per cardiology  7/3/2021 cardiology had ordered all of their medications yesterday however because they were ordered in anticipation of her going to acute inpatient rehab none of them are actually filled and are a no fill for that reason they will need to be reordered today which I can do and change the pharmacy if patient is to go to her daughters until an acute rehab bed can be found apparently her bed for yesterday fell through     Asthma -shortness of breath was due to volume overload  --iv steroids started but discontinued quickly  --given iv Lasix and now on room air  --Duonebs started   --started albuterol mdi      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 No high grade coronary lesions  LEFT VENTRICULAR FUNCTION  * Left ventricular function is severely reduced with EF <30%  --echocardiogram(6/26/21):  Normal left ventricular  size. The mid to apical wall is akinetic.  Normal right ventricular size and function.  Visually estimated ejection fraction is 30-35%.  Normal right ventricular size and function.  The left atrium is normal in size.  No significant valvular heart disease noted.  --repeat echocardiogram in ~ 3 months   6/30/2021 cardiac MRI done yesterday await cardiology review and plan for discharge  7/1/2021 yung is planning on a Zio patch on discharge BNP today was 1016 pending add on BMP anticipate discharge soon from a cardiovascular standpoint transportation to [place] for acute rehabilitation  7/2/2021 okay to discharge as per cardiology cardiology emailed her Zio patch to her home however she will be going to [place] for acute rehab with this arise via the mail prior to their departing for [place] today if not hopefully can be forwarded as a cannot issue another device  7/3/2021 his Zio patch did arrive at home he can be placed on her here prior to discharge however now her acute rehab bed fell through available on Monday she is wanting to go home and live with her daughter for a few days until she can be admitted there waiting for therapy approval    Acute Metabolic Encephalopathy: likely due to combination of severe sepsis, hypercalcemia, acute renal failure, and dehydration and embolic stroke HSV ruled out   --ammonia level: 33 (11-33)  --ethanol level:  <0.010  --Neurology performed Lumbar Puncture (6/26/21):  CSF glucose: 107 (40-70)  CSF Protein: 41 (15-40)  CSF Enterovirus RNA PCR: negative  CSF Encephalopathy Autoimmune panel: pending  CSF HSV 1/2 PCR: pending  CSF Cell Count: 1 WBC, 0 RBC  CSF gram stain/ culture : no PMNs, No RBCs, No organisms / culture: no growth to date   --HEAD CT (6/25/21):  1.  Severe changes of cerebral small vessel disease.  2.  Given differences in technique, no evidence for significant interval change in size of left MCA bifurcation aneurysm.   --CERVICAL SPINE CT(6/25/21):  1.  No CT  evidence for acute fracture or post traumatic subluxation.  2.  Cervical spondylosis.  3.  Stable benign right thyroid nodule.  --THORACIC SPINE CT(6/25/21):  1.  Age-indeterminate, though likely chronic, severe osteoporotic compression fracture at T12 with vertebra plana, 5 mm retropulsion, segmental kyphosis from T11 through L1, and probable osteonecrosis.There is contiguous involvement of the adjacent T11-T12 intervertebral disc.  2.  Mild spinal canal stenosis at T11-T12.  --LUMBAR SPINE CT(6/25/21):  1.  No fracture or posttraumatic subluxation.  2.  No high-grade spinal canal or neural foraminal stenosis.  --Brain MRI(6/26/21):  1.  Multiple scattered small foci of diffusion restriction compatible with acute infarct involving the posterior left insula, the periventricular left corona radiata, the posterolateral left temporal lobe, the left temporal occipital region, the posterior right temporal lobe, the anteroinferior right frontal lobe, the lateral left cerebellar hemisphere, and the lateral right parietal lobe. Given the diffuse scattered distribution, an embolic etiology is likely.  2.  Nonenhancing FLAIR hyperintense diffusion restriction involving the medial aspect of the left amygdala and the entirety of the left hippocampus. While an acute to very early subacute infarct is possible, this distribution is somewhat atypical. For this reason, consideration should also be given towards a limbic encephalitis, with consideration towards a paraneoplastic syndrome or possible HSV in the appropriate clinical setting. This could additionally relate to a postictal change in the appropriate clinical setting.  3.  Chronic lacunar infarct with accompanying hemosiderin deposition in the left basal ganglia. Severe burden chronic small vessel ischemic change accompanies this, with an additional small chronic lacunar infarct in the left cerebellar hemisphere.  --HEAD CTA (6/26/21)::   1.  No evidence of proximal large  vessel occlusion.  2.  Multiple sites of mild and mild-to-moderate stenosis, as well as scattered areas of contour irregularity. These changes suggest underlying atherosclerosis.  3.  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  --NECK CTA(6/26/21):  1.  No evidence of hemodynamically significant stenosis based on NASCET criteria.  2.  No evidence for dissection or pseudoaneurysm.  --Neuro consult and felt patient likely had embolic stroke due to cholesterol emboli but also felt that HSV encephalitis was possible so the also recommended LP  --EEG(6/26/21): no epileptiform activity or EEG seizures were seen during this recording.  --acyclovir started due to possible HSV encephalititis   --given intravenous fluids   --avoid anticholinergic drugs, benzodiazepines, opiates if possible  6/29/2021 improving but intermittently still confused  6/30/2021 planning for acute rehab once okay with cardiology for dismissal  7/2/2021 Per cardiology okay for discharge today  7/3/2021 bed at the acute rehab fell through until Monday waiting to hear from therapy of safe to go home with 24-hour surveillance by her children     Acute renal failure : likely due to dehydration and severe sepsis; resolved  --admit Creatinine 2.02 (baseline Creatinine 0.65  5/28/19)  --Urinalysis spec gravity  >1.030 , + protein but otherwise bland Urinalysis consistent with dehydration  --given intravenous fluids  --avoid nephrotoxins( ie iv contrast, nsaids, ace/arb) if possible  6/29/2021 repeat labs in a.m.  6/30/2021 labs remain stable  7/2/2021 needs follow-up BMP within a week     Lactic acidosis: due to severe sepsis and severe dehydration; resolved  --treatment as above   --serial lactate levels     Hypercalcemia: due to dehydration; resolved  --admit calcium: 10.7  --given intravenous fluids     Erythrocytosis: due to dehydration; resolved  --admit Hemoglobin 18.1  --given intravenous fluids      Hypokalemia  --potassium replacement  "per protocol       History of Trauma- patient had right rib fractures and T12 fracture documented ~ 2 months ago   -- Trauma Surgery consulted and they consulted Neuro Surgery who felt the fracture was old and no surgical intervention was needed     DVT Prophylaxis  --subcutaneous Heparin           Review of Systems   Constitutional, HEENT, cardiovascular, pulmonary, gi and gu systems are negative, except as otherwise noted.      Objective    /78   Pulse 74   Temp 98.1  F (36.7  C) (Temporal)   Resp 18   Ht 1.549 m (5' 0.98\")   Wt 63.9 kg (140 lb 12.8 oz)   LMP 11/09/2005 (Approximate)   SpO2 98%   BMI 26.62 kg/m    Body mass index is 26.62 kg/m .  Physical Exam   GENERAL APPEARANCE: healthy, alert and no distress  EYES: Eyes grossly normal to inspection, PERRLA, conjunctivae and sclerae without injection or discharge, EOM intact   RESP: Lungs clear to auscultation - no rales, rhonchi or wheezes    CV: Regular rates and rhythm, normal S1 S2, no S3 or S4, no murmur, click or rub, no peripheral edema and peripheral pulses strong and symmetric bilaterally   MS: No musculoskeletal defects are noted and gait is age appropriate without ataxia   SKIN: No suspicious lesions or rashes, hydration status appears adeuqate with normal skin turgor   PSYCH: Alert and oriented x3; speech- coherent , normal rate and volume; able to articulate logical thoughts, able to abstract reason, no tangential thoughts, no hallucinations or delusions, mentation appears normal, Mood is euthymic. Affect is appropriate for this mood state and bright. Thought content is free of suicidal ideation, hallucinations, and delusions. Dress is adequate and upkept. Eye contact is good during conversation.       Diagnostics  Reviewed in Epic  See orders pending in Epic           "

## 2021-07-28 ASSESSMENT — ASTHMA QUESTIONNAIRES: ACT_TOTALSCORE: 17

## 2021-07-28 ASSESSMENT — PATIENT HEALTH QUESTIONNAIRE - PHQ9: SUM OF ALL RESPONSES TO PHQ QUESTIONS 1-9: 8

## 2021-07-28 ASSESSMENT — ANXIETY QUESTIONNAIRES: GAD7 TOTAL SCORE: 5

## 2021-07-28 NOTE — RESULT ENCOUNTER NOTE
Please call patient with the following message    Labs have returned to normal. No need for recheck of this. Continue with plan as we discussed.     Jose Francisco Pierre PA-C

## 2021-07-29 ENCOUNTER — TELEPHONE (OUTPATIENT)
Dept: FAMILY MEDICINE | Facility: OTHER | Age: 71
End: 2021-07-29

## 2021-07-29 ENCOUNTER — MEDICAL CORRESPONDENCE (OUTPATIENT)
Dept: HEALTH INFORMATION MANAGEMENT | Facility: CLINIC | Age: 71
End: 2021-07-29
Payer: MEDICARE

## 2021-07-29 NOTE — TELEPHONE ENCOUNTER
Reason for Call:  Form, our goal is to have forms completed with 72 hours, however, some forms may require a visit or additional information.    Type of letter, form or note:  medical    Who is the form from?: [facility] (if other please explain)    Where did the form come from: form was faxed in    What clinic location was the form placed at?: [facility] [phone]    Where the form was placed: Team C form bin    What number is listed as a contact on the form?: fax [phone]       Additional comments: Please complete and fax    Call taken on 7/29/2021 at 5:36 PM by Miryam Torre

## 2021-07-30 ENCOUNTER — TELEPHONE (OUTPATIENT)
Dept: FAMILY MEDICINE | Facility: OTHER | Age: 71
End: 2021-07-30

## 2021-07-30 DIAGNOSIS — I25.83 CORONARY ARTERY DISEASE DUE TO LIPID RICH PLAQUE: Primary | ICD-10-CM

## 2021-07-30 DIAGNOSIS — I25.10 CORONARY ARTERY DISEASE DUE TO LIPID RICH PLAQUE: Primary | ICD-10-CM

## 2021-07-30 DIAGNOSIS — Z53.9 DIAGNOSIS NOT YET DEFINED: Primary | ICD-10-CM

## 2021-07-30 PROCEDURE — G0180 MD CERTIFICATION HHA PATIENT: HCPCS | Performed by: FAMILY MEDICINE

## 2021-07-30 RX ORDER — ROSUVASTATIN CALCIUM 20 MG/1
20 TABLET, COATED ORAL DAILY
Qty: 90 TABLET | Refills: 0 | Status: SHIPPED | OUTPATIENT
Start: 2021-07-30 | End: 2021-10-05

## 2021-07-30 NOTE — TELEPHONE ENCOUNTER
Reason for Call:  Medication or medication refill:    Do you use a [facility]?  Name of the pharmacy and phone number for the current request:  [facility] - [phone]    Name of the medication requested: Rosuvastatin    Other request: Patient states it was previously written by a clinician at [facility], but she is almost out and her next appointment isn't until 8/25/21    Can we leave a detailed message on this number? YES    Phone number patient can be reached at: Cell number on file:    Telephone Information:   Mobile [phone]       Best Time: any    Call taken on 7/30/2021 at 2:44 PM by Michael Hutchinson

## 2021-07-30 NOTE — TELEPHONE ENCOUNTER
See note below    Routing refill request to provider for review/approval because:  Medication is reported/historical    Caren Daniels Rn

## 2021-07-30 NOTE — TELEPHONE ENCOUNTER
Refill given.       JAYY Brush CNP  Questions or concerns please feel free to send me a [place] message or call me  Phone : [phone]

## 2021-08-02 DIAGNOSIS — G43.709 CHRONIC MIGRAINE WITHOUT AURA WITHOUT STATUS MIGRAINOSUS, NOT INTRACTABLE: ICD-10-CM

## 2021-08-02 RX ORDER — BUTALBITAL/ASPIRIN/CAFFEINE 50-325-40
1 CAPSULE ORAL EVERY 6 HOURS PRN
Qty: 30 CAPSULE | Refills: 5 | Status: SHIPPED | OUTPATIENT
Start: 2021-08-02 | End: 2022-02-28

## 2021-08-02 NOTE — TELEPHONE ENCOUNTER
reviewed. OK for refill.     States pharmacy won't accept e-prescribe so RX signed and placed in MA task.    Jose Francisco Varma-FLEX Saravia  [facility]

## 2021-08-02 NOTE — TELEPHONE ENCOUNTER
Requested Prescriptions   Pending Prescriptions Disp Refills    butalbital-aspirin-caffeine (FIORINAL) -40 MG per capsule 30 capsule 5     Sig: Take 1 capsule by mouth every 6 hours as needed for moderate to severe pain        There is no refill protocol information for this order         Last Written Prescription Date:  12/14/2020  Last Fill Quantity: 30,  # refills: 5   Last office visit: 7/27/2021 with prescribing provider:  Lewis   Future Office Visit:   Next 5 appointments (look out 90 days)      Aug 25, 2021 10:30 AM  SHORT with Rosenda Pierre PA-C  [facility] ([facility] ) [address]  [place] [address]  [phone]     Sep 21, 2021  1:00 PM  Telephone Visit with Miryam Mcgowan MD  [facility] Neurology [facility] ([facility] ) [address]  [address]  [place] [address] [phone]                 Routing refill request to provider for review/approval because:  Drug not on the [facility] refill protocol         Jodi Riggins RN  [facility]

## 2021-08-02 NOTE — TELEPHONE ENCOUNTER
patient called to request a refill.    Is confused - started 2 months ago was hospitalized.       Nurse cued up medication and pharmacy.        Jodi Riggins RN  [facility]

## 2021-08-11 ENCOUNTER — TELEPHONE (OUTPATIENT)
Dept: FAMILY MEDICINE | Facility: OTHER | Age: 71
End: 2021-08-11

## 2021-08-11 NOTE — TELEPHONE ENCOUNTER
Reason for Call:  Form, our goal is to have forms completed with 72 hours, however, some forms may require a visit or additional information.    Type of letter, form or note:  orders    Who is the form from?: [facility] (if other please explain)    Where did the form come from: form was faxed in    What clinic location was the form placed at?: [facility] [phone]    Where the form was placed: Team C Box/Folder    What number is listed as a contact on the form?: [phone]       Additional comments:  Fax [phone]    Call taken on 8/11/2021 at 12:15 PM by Yee Viera

## 2021-08-12 NOTE — TELEPHONE ENCOUNTER
[facility]    Jose Francisco Pierre PA-C  [facility]

## 2021-08-16 ENCOUNTER — TELEPHONE (OUTPATIENT)
Dept: FAMILY MEDICINE | Facility: OTHER | Age: 71
End: 2021-08-16

## 2021-08-16 NOTE — TELEPHONE ENCOUNTER
Reason for Call:  Form, our goal is to have forms completed with 72 hours, however, some forms may require a visit or additional information.    Type of letter, form or note:  medical    Who is the form from?: [facility] needs signature (if other please explain)    Where did the form come from: form was faxed in    What clinic location was the form placed at?: [facility] [phone]    Where the form was placed: Team [employer] Box/Folder    What number is listed as a contact on the form?: [phone]       Additional comments: Please sign and fax to [phone]    Call taken on 8/16/2021 at 8:47 AM by Amparo Carter

## 2021-08-17 ENCOUNTER — TELEPHONE (OUTPATIENT)
Dept: FAMILY MEDICINE | Facility: OTHER | Age: 71
End: 2021-08-17

## 2021-08-17 NOTE — TELEPHONE ENCOUNTER
Patient is wondering if she can start taking her mood medications again.    Patient notified that Wellbutrin was restarted on 7/27/2021.    Patient will call her pharmacy and ask if they have mailed it.  Winnie Atwood RN on 8/17/2021 at 12:50 PM

## 2021-08-22 NOTE — TELEPHONE ENCOUNTER
FUTURE VISIT INFORMATION      FUTURE VISIT INFORMATION:    Date: 9/21/2021    Time: 1pm    Location: [facility]  REFERRAL INFORMATION:    Referring provider:  Rosenda Pierre PA-C     Referring providers clinic:  [facility]     Reason for visit/diagnosis  CVA, Encephalopathy     RECORDS REQUESTED FROM:       Clinic name Comments Records Status Imaging Status   Internal [facility]-7/27/2021    MR Brain-1/16/2017    CT Head-1/15/2017 [facility] PACS         [facility] ED Visit/Admission-6/25/2021    CTA Head/Neck-6/26/2021    MR Brain-6/26/2021    CT Head-6/25/2021    CT Cervical Spine-6/25/2021    CT Thoracic and Lumbar Spine-6/25/2021 Care Everywhere Requested to PACS                       8/22/2021-Request for Images faxed to [facility]-MR @ 827am    9/13/2021-[facility] Images now in PACS-MR @ 626am

## 2021-08-26 ENCOUNTER — TELEPHONE (OUTPATIENT)
Dept: FAMILY MEDICINE | Facility: OTHER | Age: 71
End: 2021-08-26

## 2021-08-26 ENCOUNTER — VIRTUAL VISIT (OUTPATIENT)
Dept: FAMILY MEDICINE | Facility: OTHER | Age: 71
End: 2021-08-26
Payer: MEDICARE

## 2021-08-26 DIAGNOSIS — I25.10 CORONARY ARTERY DISEASE DUE TO LIPID RICH PLAQUE: ICD-10-CM

## 2021-08-26 DIAGNOSIS — I25.83 CORONARY ARTERY DISEASE DUE TO LIPID RICH PLAQUE: ICD-10-CM

## 2021-08-26 DIAGNOSIS — G45.9 TIA (TRANSIENT ISCHEMIC ATTACK): ICD-10-CM

## 2021-08-26 DIAGNOSIS — F32.1 MODERATE MAJOR DEPRESSION (H): Primary | ICD-10-CM

## 2021-08-26 DIAGNOSIS — I21.3 ST ELEVATION MYOCARDIAL INFARCTION (STEMI), UNSPECIFIED ARTERY (H): ICD-10-CM

## 2021-08-26 DIAGNOSIS — I10 HYPERTENSION GOAL BP (BLOOD PRESSURE) < 140/90: ICD-10-CM

## 2021-08-26 DIAGNOSIS — F43.23 ADJUSTMENT DISORDER WITH MIXED ANXIETY AND DEPRESSED MOOD: ICD-10-CM

## 2021-08-26 PROCEDURE — 96127 BRIEF EMOTIONAL/BEHAV ASSMT: CPT | Performed by: PHYSICIAN ASSISTANT

## 2021-08-26 PROCEDURE — 99442 PR PHYSICIAN TELEPHONE EVALUATION 11-20 MIN: CPT | Mod: 95 | Performed by: PHYSICIAN ASSISTANT

## 2021-08-26 RX ORDER — VENLAFAXINE HYDROCHLORIDE 37.5 MG/1
CAPSULE, EXTENDED RELEASE ORAL
Qty: 42 CAPSULE | Refills: 0 | Status: SHIPPED | OUTPATIENT
Start: 2021-08-26 | End: 2021-10-05

## 2021-08-26 ASSESSMENT — ANXIETY QUESTIONNAIRES
3. WORRYING TOO MUCH ABOUT DIFFERENT THINGS: MORE THAN HALF THE DAYS
2. NOT BEING ABLE TO STOP OR CONTROL WORRYING: MORE THAN HALF THE DAYS
1. FEELING NERVOUS, ANXIOUS, OR ON EDGE: MORE THAN HALF THE DAYS
GAD7 TOTAL SCORE: 11
6. BECOMING EASILY ANNOYED OR IRRITABLE: MORE THAN HALF THE DAYS
7. FEELING AFRAID AS IF SOMETHING AWFUL MIGHT HAPPEN: SEVERAL DAYS
IF YOU CHECKED OFF ANY PROBLEMS ON THIS QUESTIONNAIRE, HOW DIFFICULT HAVE THESE PROBLEMS MADE IT FOR YOU TO DO YOUR WORK, TAKE CARE OF THINGS AT HOME, OR GET ALONG WITH OTHER PEOPLE: SOMEWHAT DIFFICULT
5. BEING SO RESTLESS THAT IT IS HARD TO SIT STILL: SEVERAL DAYS

## 2021-08-26 ASSESSMENT — PATIENT HEALTH QUESTIONNAIRE - PHQ9
5. POOR APPETITE OR OVEREATING: SEVERAL DAYS
SUM OF ALL RESPONSES TO PHQ QUESTIONS 1-9: 11

## 2021-08-26 ASSESSMENT — PAIN SCALES - GENERAL: PAINLEVEL: NO PAIN (0)

## 2021-08-26 NOTE — PROGRESS NOTES
Sri is a 71 year old who is being evaluated via a billable telephone visit.      What phone number would you like to be contacted at? [phone]    How would you like to obtain your AVS? mail    Assessment & Plan     ICD-10-CM    1. Moderate major depression (H)  F32.1 venlafaxine (EFFEXOR-XR) 37.5 MG 24 hr capsule   2. Hypertension goal BP (blood pressure) < 140/90  I10    3. TIA (transient ischemic attack)  G45.9    4. Adjustment disorder with mixed anxiety and depressed mood  F43.23    5. ST elevation myocardial infarction (STEMI), unspecified artery (H)  I21.3    6. Coronary artery disease due to lipid rich plaque  I25.10     I25.83        - Patient reports doing better, but not 100% since hospital stay and rehab stay, living in a temporary apartment, having children and friends are helping out, also has physical therapy and OT   - Cardiology      Reports had her apt and everything was good   - Neurology scheduled for 9/21/21   - Reviewed medication use and side effects, refilled as needed   - Patient very depressed     Has been on her Bupropion 150 mg without marked improvement      Will add back in Venlafaxine that she was on prior to hospitalization     Start Venlafaxine - 37.5 mg for 2 weeks, then increase to 75 mg for 2 weeks      Reviewed use and side effects   - Recheck 1 month   - Patient states ready to drive again, family supportive, recommend make sure cleared by physical therapy and occupational therapy first, then if so I am ok with her starting with very short drives in town     Review of the result(s) of each unique test - PHQ9 & GAD7     Diagnosis or treatment significantly limited by social determinants of health - Depression     35 minutes spent on the date of the encounter doing chart review, history and exam, documentation and further activities as noted above    The patient indicates understanding of these issues and agrees with the plan.    Return in about 1 month (around  9/26/2021).    FLEX [facility] SUSY Fierro is a 71 year old who presents for the following health issues     HPI     Depression Followup    How are you doing with your depression since your last visit? Worsened     Are you having other symptoms that might be associated with depression? No    Have you had a significant life event?  OTHER: unable to drive     Are you feeling anxious or having panic attacks?   Yes:  nervous     Do you have any concerns with your use of alcohol or other drugs? No    - Doing physical therapy and OT   - Going well, decreasing number of visits now   - Gets meds in the mail - got Bupropion 150 mg and Venlafaxine 150 mg      Didn't know if could take both, didn't start Venlafaxine   - Feels good about driving, didn't ask therapists      Would just stay in the city   - 80-90%      Family feels good about her driving as well   - No plan to move back to house yet   - Had follow up with cardiology, told everything looks good   - Has follow up with scheduled with neurology as well (Dr. Mcgowan 9/21/21)         Social History     Tobacco Use     Smoking status: Never Smoker     Smokeless tobacco: Never Used   Vaping Use     Vaping Use: Never used   Substance Use Topics     Alcohol use: Yes     Comment: beerx2/x1 per month     Drug use: No     PHQ 11/5/2020 7/27/2021 8/26/2021   PHQ-9 Total Score 6 8 11   Q9: Thoughts of better off dead/self-harm past 2 weeks Not at all Not at all Not at all     HAYDEE-7 SCORE 11/5/2020 7/27/2021 8/26/2021   Total Score - - -   Total Score - 5 (mild anxiety) -   Total Score 6 5 11         Review of Systems   Constitutional, HEENT, cardiovascular, pulmonary, gi and gu systems are negative, except as otherwise noted.      Objective    Vitals - Patient Reported  Pain Score: No Pain (0)  Vitals:  No vitals were obtained today due to virtual visit.    Physical Exam   healthy, alert and no distress  PSYCH:  Alert and oriented times 3; coherent speech, normal   rate and volume, able to articulate logical thoughts, able   to abstract reason, no tangential thoughts, no hallucinations   or delusions  Her affect is normal  RESP: No cough, no audible wheezing, able to talk in full sentences  Remainder of exam unable to be completed due to telephone visits      Diagnostics: None         Phone call duration: 11 minutes

## 2021-08-26 NOTE — PATIENT INSTRUCTIONS
- Continue Bupropion 150 mg     - Start Venlafaxine - 37.5 mg for 2 weeks, then increase to 75 mg for 2 weeks      - Recheck with provider in 1 month

## 2021-08-26 NOTE — TELEPHONE ENCOUNTER
Reason for Call:  Form, our goal is to have forms completed with 72 hours, however, some forms may require a visit or additional information.    Type of letter, form or note:  medical    Who is the form from?: [facility] (if other please explain)    Where did the form come from: form was faxed in    What clinic location was the form placed at?: [facility] [phone]    Where the form was placed: Team C form bin    What number is listed as a contact on the form?: fax [phone]       Additional comments: Please complete and fax    Call taken on 8/26/2021 at 1:55 PM by Miryam Torre

## 2021-08-27 DIAGNOSIS — I10 HYPERTENSION GOAL BP (BLOOD PRESSURE) < 140/90: Primary | ICD-10-CM

## 2021-08-27 RX ORDER — FUROSEMIDE 40 MG
40 TABLET ORAL DAILY
Qty: 90 TABLET | Refills: 3 | Status: SHIPPED | OUTPATIENT
Start: 2021-08-27 | End: 2022-01-27

## 2021-08-27 RX ORDER — LISINOPRIL 20 MG/1
20 TABLET ORAL DAILY
Qty: 90 TABLET | Refills: 3 | Status: SHIPPED | OUTPATIENT
Start: 2021-08-27 | End: 2022-01-27

## 2021-08-27 ASSESSMENT — ANXIETY QUESTIONNAIRES: GAD7 TOTAL SCORE: 11

## 2021-08-27 NOTE — TELEPHONE ENCOUNTER
Pending Prescriptions:                       Disp   Refills    lisinopril (ZESTRIL) 20 MG tablet                          Sig: Take 1 tablet (20 mg) by mouth daily    furosemide (LASIX) 40 MG tablet                            Sig: Take 1 tablet (40 mg) by mouth daily    Routing refill request to provider for review/approval because:  Medication is reported/historical

## 2021-08-30 ENCOUNTER — TELEPHONE (OUTPATIENT)
Dept: FAMILY MEDICINE | Facility: OTHER | Age: 71
End: 2021-08-30

## 2021-08-30 ENCOUNTER — MEDICAL CORRESPONDENCE (OUTPATIENT)
Dept: HEALTH INFORMATION MANAGEMENT | Facility: CLINIC | Age: 71
End: 2021-08-30

## 2021-08-30 DIAGNOSIS — G45.9 TIA (TRANSIENT ISCHEMIC ATTACK): Primary | ICD-10-CM

## 2021-08-30 DIAGNOSIS — I25.10 CORONARY ARTERY DISEASE DUE TO LIPID RICH PLAQUE: ICD-10-CM

## 2021-08-30 DIAGNOSIS — I25.83 CORONARY ARTERY DISEASE DUE TO LIPID RICH PLAQUE: ICD-10-CM

## 2021-08-30 NOTE — TELEPHONE ENCOUNTER
Jose Francisco, please review the request below this documentation.     [facility] is also requesting outpatient speech therapy treat and evaluation. Please advise.     TRINO MancillaN, RN, PHN  [facility]  August 30, 2021

## 2021-08-30 NOTE — TELEPHONE ENCOUNTER
Reviewed [facility] note, she wanted drive assessment as well. Placed order please fax.     José Miguel Thornton PA-C

## 2021-08-30 NOTE — TELEPHONE ENCOUNTER
Johana kenny from [facility] services stating they having been seeing patient for OT. Patient is needing a referral placed for OT that states okay for outpatient occupational therapy for evaluation for driving assessment. Please fax this order with patients demographic to [facility] at [phone]. Please advise nurse once order has been faxed. Claudia Fraga LPN

## 2021-09-02 ENCOUNTER — TELEPHONE (OUTPATIENT)
Dept: FAMILY MEDICINE | Facility: OTHER | Age: 71
End: 2021-09-02

## 2021-09-02 NOTE — TELEPHONE ENCOUNTER
Called and spoke to Nydia at [facility]  Assessment Rehab order was received and they will call patient to schedule appt.

## 2021-09-02 NOTE — TELEPHONE ENCOUNTER
Reason for Call:  Form, our goal is to have forms completed with 72 hours, however, some forms may require a visit or additional information.    Type of letter, form or note:  medical    Who is the form from?: [facility] (if other please explain)    Where did the form come from: form was faxed in    What clinic location was the form placed at?: [facility] [phone]    Where the form was placed: Team C form bin    What number is listed as a contact on the form?: fax [phone]       Additional comments: Please complete and fax    Call taken on 9/2/2021 at 3:36 PM by Miryam Torre

## 2021-09-03 ENCOUNTER — TELEPHONE (OUTPATIENT)
Dept: FAMILY MEDICINE | Facility: OTHER | Age: 71
End: 2021-09-03

## 2021-09-03 DIAGNOSIS — R13.10 DYSPHAGIA, UNSPECIFIED TYPE: Primary | ICD-10-CM

## 2021-09-03 NOTE — TELEPHONE ENCOUNTER
Reason for Call:  Other call back    Detailed comments: Needs referral order for OT and Speech to be faxed to [phone] please so the appointment can be made.    Phone Number Patient can be reached at: Home number on file [phone] (home)    Best Time: Any    Can we leave a detailed message on this number? YES    Call taken on 9/3/2021 at 3:10 PM by Risa Kramer

## 2021-09-03 NOTE — TELEPHONE ENCOUNTER
Form has ES name on it, but patient appears to be followed by CD[facility]. 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       JAYY Brush CNP  Questions or concerns please feel free to send me a [employer] message or call me  Phone : [phone]

## 2021-09-08 NOTE — TELEPHONE ENCOUNTER
Is this what is on the form for this patient? Making this possibly duplicate.     I need to know which agency she is using to place a referral.    Jose Francisco Varma-FLEX Saravia  [facility]

## 2021-09-09 ENCOUNTER — TELEPHONE (OUTPATIENT)
Dept: FAMILY MEDICINE | Facility: OTHER | Age: 71
End: 2021-09-09

## 2021-09-09 NOTE — TELEPHONE ENCOUNTER
Reason for Call:  Form, our goal is to have forms completed with 72 hours, however, some forms may require a visit or additional information.    Type of letter, form or note:  medical    Who is the form from?: [employer] (if other please explain)    Where did the form come from: form was faxed in    What clinic location was the form placed at?: [facility] [phone]    Where the form was placed: TEam C form bin    What number is listed as a contact on the form?: fax [phone]       Additional comments: please complete and fax    Call taken on 9/9/2021 at 1:43 PM by Miryam Torre

## 2021-09-09 NOTE — TELEPHONE ENCOUNTER
Bryson called stating that [facility] received the OT referral but not the Speech referral. Can you please refax the form and then call Bryson at [phone] once it has been faxed. Thank you

## 2021-09-13 NOTE — TELEPHONE ENCOUNTER
Speech therapy order needed for [facility], they received the OT order and the driving assessment order but not Speech.     Please call Bryson at [phone] so he knows he can schedule Sri for her speech.     Fax referral to [phone]

## 2021-09-20 ENCOUNTER — TRANSFERRED RECORDS (OUTPATIENT)
Dept: HEALTH INFORMATION MANAGEMENT | Facility: CLINIC | Age: 71
End: 2021-09-20

## 2021-09-20 ENCOUNTER — TELEPHONE (OUTPATIENT)
Dept: FAMILY MEDICINE | Facility: OTHER | Age: 71
End: 2021-09-20

## 2021-09-20 NOTE — TELEPHONE ENCOUNTER
Reason for Call:  Form, our goal is to have forms completed with 72 hours, however, some forms may require a visit or additional information.    Type of letter, form or note:  medical    Who is the form from?: [facility] needs signature (if other please explain)    Where did the form come from: form was faxed in    What clinic location was the form placed at?: [facility] [phone]    Where the form was placed: Team B Box/Folder    What number is listed as a contact on the form?: [phone]       Additional comments: Please sign and fax to [phone]    Call taken on 9/20/2021 at 10:57 AM by Amparo Carter

## 2021-09-20 NOTE — TELEPHONE ENCOUNTER
Reason for Call:  Form, our goal is to have forms completed with 72 hours, however, some forms may require a visit or additional information.    Type of letter, form or note:  medical    Who is the form from?: [facility] (if other please explain)    Where did the form come from: form was faxed in    What clinic location was the form placed at?: [facility] [phone]    Where the form was placed: Team C Box/Folder    What number is listed as a contact on the form?: [phone]       Additional comments: Please complete form and return to [phone]    Call taken on 9/20/2021 at 4:14 PM by Beulah Washington

## 2021-09-21 ENCOUNTER — PRE VISIT (OUTPATIENT)
Dept: NEUROLOGY | Facility: CLINIC | Age: 71
End: 2021-09-21

## 2021-09-21 ENCOUNTER — VIRTUAL VISIT (OUTPATIENT)
Dept: NEUROLOGY | Facility: CLINIC | Age: 71
End: 2021-09-21
Attending: PHYSICIAN ASSISTANT
Payer: MEDICARE

## 2021-09-21 ENCOUNTER — MEDICAL CORRESPONDENCE (OUTPATIENT)
Dept: HEALTH INFORMATION MANAGEMENT | Facility: CLINIC | Age: 71
End: 2021-09-21

## 2021-09-21 DIAGNOSIS — G93.40 ENCEPHALOPATHY: ICD-10-CM

## 2021-09-21 DIAGNOSIS — I63.10 CEREBROVASCULAR ACCIDENT (CVA) DUE TO EMBOLISM OF PRECEREBRAL ARTERY (H): ICD-10-CM

## 2021-09-21 PROCEDURE — 99443 PR PHYSICIAN TELEPHONE EVALUATION 21-30 MIN: CPT | Performed by: PSYCHIATRY & NEUROLOGY

## 2021-09-21 NOTE — TELEPHONE ENCOUNTER
Form placed in out of office box in Team C  for review/signature of covering provider.    Veronica GRULLON CMA

## 2021-09-21 NOTE — LETTER
September 28, 2021       TO: Sri Grewal  [address]  [place] [place] [address]       DearMs.Carmina,    We are writing to inform you of your test results.    {[facility] results letter list:[id number]}    No results found from the In Basket message.    ***

## 2021-09-21 NOTE — LETTER
9/21/2021       RE: Sri Grewal  [address]  [place] [address]     Dear Colleague,    Thank you for referring your patient, Sri Grewal, to the [facility] [place] [facility]. Please see a copy of my visit note below.    [facility]    Sri Grewal MRN# [id number]   Age: 71 year old YOB: 1950     Requesting physician: Rosenda Varma-*  Avani-Rosenda Saravia            Assessment and Plan:      (G93.40) Encephalopathy    (I63.10) Cerebrovascular accident (CVA) due to embolism of precerebral artery (H)  Comment:  Embolic strokes due to MI is likely source due to multiple vascular territories. We will check for full improvement to give better prognosis regarding cognitive status with repeat MRI scan brain.  Plan: MRI Brain w/o contrast    Miryam Mcgowan MD            History of Present Illness:     chief complaint:   Chief Complaint   Patient presents with     Neurologic Problem     New visit        Sri Grewal is a 71 year old patient presenting for assessment of encephalopathy/stroke. She was admitted to [facility] in July for a myocardial infarction. Subsequently she was found to have multiple foci of embolic stroke on MRI scan brain likely due to the  Cardiac event.     She had confusion and word finding problems as her symptoms. She reports she feels she is much better at this time. She is not back to living fully independently but feels close to baseline.             Physical Exam:     Unable due to telephone. The patient has some loss of memory of hospital events but seems to be solid on more recent events with no aphasia or dysarthria.                Pertinent history/data     EXAM: MR HEAD BRAIN WWO   LOCATION: [facility]   DATE/TIME: 6/26/2021 11:50 AM     INDICATION: Altered mental status.   COMPARISON: 06/25/2021 head CT and 01/16/2017 brain MRI.   CONTRAST: Gadavist 6  mL IV   TECHNIQUE: Routine multiplanar multisequence head MRI without and with intravenous contrast.     FINDINGS:   INTRACRANIAL CONTENTS: FLAIR hyperintense diffusion restriction without accompanying enhancement in the medial left temporal lobe to involve the medial aspect of the left amygdala as well as the left hippocampus in its entirety. This demonstrates no accompanying susceptibility or abnormal enhancement. This may potentially relate to an acute infarct. In the appropriate clinical setting, the finding is concerning for encephalitis (with consideration towards HSV) could be considered. Limbic encephalitis with possible consideration towards a paraneoplastic syndrome could also be considered. Additionally, this finding could potentially be postictal in nature in the appropriate clinical setting.     Scattered small foci of diffusion restriction involving the posterior left insula, the periventricular left corona radiata, the posterolateral left temporal lobe, the left temporooccipital region, the right posterior temporal lobe, the anteroinferior right frontal lobe, the lateral left cerebellar hemisphere and the lateral right parietal lobe are consistent with additional scattered small embolic foci of acute infarction. Susceptibility in the left basal ganglia reflects hemosiderin deposition from prior hemorrhage in this area. Additional chronic microhemorrhage at the anterior left insula, the parasagittal superior left frontal lobe, and the bilateral anterior temporal lobes. No abnormally enhancing mass. Chronic lacunar infarct in the parasagittal left frontal white matter. Additional chronic lacunar infarcts in the left basal ganglia. Small chronic infarct in the left cerebellar hemisphere. Severe burden FLAIR hyperintense chronic small vessel ischemic change. Mild to moderate diffuse parenchymal volume loss. Ventricular size is in keeping with this volume loss. Major intracranial vascular flow voids are  preserved. Cerebellar tonsils are normally positioned.     SELLA: No abnormality accounting for technique.     OSSEOUS STRUCTURES/SOFT TISSUES: Normal marrow signal.     ORBITS: Prior bilateral cataract surgery. Visualized portions of the orbits are otherwise unremarkable.     SINUSES/MASTOIDS: No paranasal sinus mucosal disease. No middle ear or mastoid effusion.     IMPRESSION:   1.  Multiple scattered small foci of diffusion restriction compatible with acute infarct involving the posterior left insula, the periventricular left corona radiata, the posterolateral left temporal lobe, the left temporal occipital region, the posterior right temporal lobe, the anteroinferior right frontal lobe, the lateral left cerebellar hemisphere, and the lateral right parietal lobe. Given the diffuse scattered distribution, an embolic etiology is likely.     2.  Nonenhancing FLAIR hyperintense diffusion restriction involving the medial aspect of the left amygdala and the entirety of the left hippocampus. While an acute to very early subacute infarct is possible, this distribution is somewhat atypical. For this reason, consideration should also be given towards a limbic encephalitis, with consideration towards a paraneoplastic syndrome or possible HSV in the appropriate clinical setting. This could additionally relate to a postictal change in the appropriate clinical setting.     3.  Chronic lacunar infarct with accompanying hemosiderin deposition in the left basal ganglia. Severe burden chronic small vessel ischemic change accompanies this, with an additional small chronic lacunar infarct in the left cerebellar hemisphere.     Findings and impression discussed with Dr. Worthy by phone at 1300 hours on 06/26/2021.        Again, thank you for allowing me to participate in the care of your patient.      Sincerely,    Miryam Mcgowan MD

## 2021-09-21 NOTE — PROGRESS NOTES
Sri is a 71 year old who is being evaluated via a billable telephone visit.      What phone number would you like to be contacted at? [phone]  How would you like to obtain your AVS? Mail a copy  Phone call duration: 25 minutes        [facility]    Sri Grewal MRN# [id number]   Age: 71 year old YOB: 1950     Requesting physician: Rosenda Varma-Rosenda Chu            Assessment and Plan:      (G93.40) Encephalopathy    (I63.10) Cerebrovascular accident (CVA) due to embolism of precerebral artery (H)  Comment:  Embolic strokes due to MI is likely source due to multiple vascular territories. We will check for full improvement to give better prognosis regarding cognitive status with repeat MRI scan brain.  Plan: MRI Brain w/o contrast    Miryam Mcgowan MD            History of Present Illness:     chief complaint:   Chief Complaint   Patient presents with     Neurologic Problem     New visit        Sri Grewal is a 71 year old patient presenting for assessment of encephalopathy/stroke. She was admitted to [facility] in July for a myocardial infarction. Subsequently she was found to have multiple foci of embolic stroke on MRI scan brain likely due to the  Cardiac event.     She had confusion and word finding problems as her symptoms. She reports she feels she is much better at this time. She is not back to living fully independently but feels close to baseline.             Physical Exam:     Unable due to telephone. The patient has some loss of memory of hospital events but seems to be solid on more recent events with no aphasia or dysarthria.                Pertinent history/data     EXAM: MR HEAD BRAIN WWO   LOCATION: [facility]   DATE/TIME: 6/26/2021 11:50 AM     INDICATION: Altered mental status.   COMPARISON: 06/25/2021 head CT and 01/16/2017 brain MRI.   CONTRAST: Gadavist 6 mL IV   TECHNIQUE: Routine multiplanar multisequence head MRI without and with  intravenous contrast.     FINDINGS:   INTRACRANIAL CONTENTS: FLAIR hyperintense diffusion restriction without accompanying enhancement in the medial left temporal lobe to involve the medial aspect of the left amygdala as well as the left hippocampus in its entirety. This demonstrates no accompanying susceptibility or abnormal enhancement. This may potentially relate to an acute infarct. In the appropriate clinical setting, the finding is concerning for encephalitis (with consideration towards HSV) could be considered. Limbic encephalitis with possible consideration towards a paraneoplastic syndrome could also be considered. Additionally, this finding could potentially be postictal in nature in the appropriate clinical setting.     Scattered small foci of diffusion restriction involving the posterior left insula, the periventricular left corona radiata, the posterolateral left temporal lobe, the left temporooccipital region, the right posterior temporal lobe, the anteroinferior right frontal lobe, the lateral left cerebellar hemisphere and the lateral right parietal lobe are consistent with additional scattered small embolic foci of acute infarction. Susceptibility in the left basal ganglia reflects hemosiderin deposition from prior hemorrhage in this area. Additional chronic microhemorrhage at the anterior left insula, the parasagittal superior left frontal lobe, and the bilateral anterior temporal lobes. No abnormally enhancing mass. Chronic lacunar infarct in the parasagittal left frontal white matter. Additional chronic lacunar infarcts in the left basal ganglia. Small chronic infarct in the left cerebellar hemisphere. Severe burden FLAIR hyperintense chronic small vessel ischemic change. Mild to moderate diffuse parenchymal volume loss. Ventricular size is in keeping with this volume loss. Major intracranial vascular flow voids are preserved. Cerebellar tonsils are normally positioned.     SELLA: No  abnormality accounting for technique.     OSSEOUS STRUCTURES/SOFT TISSUES: Normal marrow signal.     ORBITS: Prior bilateral cataract surgery. Visualized portions of the orbits are otherwise unremarkable.     SINUSES/MASTOIDS: No paranasal sinus mucosal disease. No middle ear or mastoid effusion.     IMPRESSION:   1.  Multiple scattered small foci of diffusion restriction compatible with acute infarct involving the posterior left insula, the periventricular left corona radiata, the posterolateral left temporal lobe, the left temporal occipital region, the posterior right temporal lobe, the anteroinferior right frontal lobe, the lateral left cerebellar hemisphere, and the lateral right parietal lobe. Given the diffuse scattered distribution, an embolic etiology is likely.     2.  Nonenhancing FLAIR hyperintense diffusion restriction involving the medial aspect of the left amygdala and the entirety of the left hippocampus. While an acute to very early subacute infarct is possible, this distribution is somewhat atypical. For this reason, consideration should also be given towards a limbic encephalitis, with consideration towards a paraneoplastic syndrome or possible HSV in the appropriate clinical setting. This could additionally relate to a postictal change in the appropriate clinical setting.     3.  Chronic lacunar infarct with accompanying hemosiderin deposition in the left basal ganglia. Severe burden chronic small vessel ischemic change accompanies this, with an additional small chronic lacunar infarct in the left cerebellar hemisphere.     Findings and impression discussed with Dr. Worthy by phone at 1300 hours on 06/26/2021.

## 2021-09-21 NOTE — LETTER
9/21/2021       RE: Sri Grewal  [address]  [facility] [address]     Dear Colleague,    Thank you for referring your patient, Sri Grewal, to the [facility] [place] [facility]. Please see a copy of my visit note below.    [facility]    Sri Grewal MRN# [id number]   Age: 71 year old YOB: 1950     Requesting physician: Rosenda Varma-*  Avani-Rosenda Saravia            Assessment and Plan:      (G93.40) Encephalopathy    (I63.10) Cerebrovascular accident (CVA) due to embolism of precerebral artery (H)  Comment:  Embolic strokes due to MI is likely source due to multiple vascular territories. We will check for full improvement to give better prognosis regarding cognitive status with repeat MRI scan brain.  Plan: MRI Brain w/o contrast    Miryam Mcgowan MD            History of Present Illness:     chief complaint:   Chief Complaint   Patient presents with     Neurologic Problem     New visit        Sri Grewal is a 71 year old patient presenting for assessment of encephalopathy/stroke. She was admitted to [facility] in July for a myocardial infarction. Subsequently she was found to have multiple foci of embolic stroke on MRI scan brain likely due to the  Cardiac event.     She had confusion and word finding problems as her symptoms. She reports she feels she is much better at this time. She is not back to living fully independently but feels close to baseline.             Physical Exam:     Unable due to telephone. The patient has some loss of memory of hospital events but seems to be solid on more recent events with no aphasia or dysarthria.                Pertinent history/data     EXAM: MR HEAD BRAIN WWO   LOCATION: [facility]   DATE/TIME: 6/26/2021 11:50 AM     INDICATION: Altered mental status.   COMPARISON: 06/25/2021 head CT and 01/16/2017 brain MRI.   CONTRAST: Gadavist 6  mL IV   TECHNIQUE: Routine multiplanar multisequence head MRI without and with intravenous contrast.     FINDINGS:   INTRACRANIAL CONTENTS: FLAIR hyperintense diffusion restriction without accompanying enhancement in the medial left temporal lobe to involve the medial aspect of the left amygdala as well as the left hippocampus in its entirety. This demonstrates no accompanying susceptibility or abnormal enhancement. This may potentially relate to an acute infarct. In the appropriate clinical setting, the finding is concerning for encephalitis (with consideration towards HSV) could be considered. Limbic encephalitis with possible consideration towards a paraneoplastic syndrome could also be considered. Additionally, this finding could potentially be postictal in nature in the appropriate clinical setting.     Scattered small foci of diffusion restriction involving the posterior left insula, the periventricular left corona radiata, the posterolateral left temporal lobe, the left temporooccipital region, the right posterior temporal lobe, the anteroinferior right frontal lobe, the lateral left cerebellar hemisphere and the lateral right parietal lobe are consistent with additional scattered small embolic foci of acute infarction. Susceptibility in the left basal ganglia reflects hemosiderin deposition from prior hemorrhage in this area. Additional chronic microhemorrhage at the anterior left insula, the parasagittal superior left frontal lobe, and the bilateral anterior temporal lobes. No abnormally enhancing mass. Chronic lacunar infarct in the parasagittal left frontal white matter. Additional chronic lacunar infarcts in the left basal ganglia. Small chronic infarct in the left cerebellar hemisphere. Severe burden FLAIR hyperintense chronic small vessel ischemic change. Mild to moderate diffuse parenchymal volume loss. Ventricular size is in keeping with this volume loss. Major intracranial vascular flow voids are  preserved. Cerebellar tonsils are normally positioned.     SELLA: No abnormality accounting for technique.     OSSEOUS STRUCTURES/SOFT TISSUES: Normal marrow signal.     ORBITS: Prior bilateral cataract surgery. Visualized portions of the orbits are otherwise unremarkable.     SINUSES/MASTOIDS: No paranasal sinus mucosal disease. No middle ear or mastoid effusion.     IMPRESSION:   1.  Multiple scattered small foci of diffusion restriction compatible with acute infarct involving the posterior left insula, the periventricular left corona radiata, the posterolateral left temporal lobe, the left temporal occipital region, the posterior right temporal lobe, the anteroinferior right frontal lobe, the lateral left cerebellar hemisphere, and the lateral right parietal lobe. Given the diffuse scattered distribution, an embolic etiology is likely.     2.  Nonenhancing FLAIR hyperintense diffusion restriction involving the medial aspect of the left amygdala and the entirety of the left hippocampus. While an acute to very early subacute infarct is possible, this distribution is somewhat atypical. For this reason, consideration should also be given towards a limbic encephalitis, with consideration towards a paraneoplastic syndrome or possible HSV in the appropriate clinical setting. This could additionally relate to a postictal change in the appropriate clinical setting.     3.  Chronic lacunar infarct with accompanying hemosiderin deposition in the left basal ganglia. Severe burden chronic small vessel ischemic change accompanies this, with an additional small chronic lacunar infarct in the left cerebellar hemisphere.     Findings and impression discussed with Dr. Worthy by phone at 1300 hours on 06/26/2021.        Again, thank you for allowing me to participate in the care of your patient.      Sincerely,    Miryam Mcgowan MD

## 2021-09-22 ENCOUNTER — TELEPHONE (OUTPATIENT)
Dept: FAMILY MEDICINE | Facility: OTHER | Age: 71
End: 2021-09-22

## 2021-09-22 NOTE — TELEPHONE ENCOUNTER
Reason for Call:  Form, our goal is to have forms completed with 72 hours, however, some forms may require a visit or additional information.    Type of letter, form or note:  medical    Who is the form from?: [place] [facility] (if other please explain)    Where did the form come from: form was faxed in    What clinic location was the form placed at?: [facility] [phone]    Where the form was placed: Team C Box/Folder    What number is listed as a contact on the form?: [phone]       Additional comments: Please complete form and return to [phone]    Call taken on 9/22/2021 at 10:05 AM by Beulah Washington

## 2021-09-24 ENCOUNTER — TELEPHONE (OUTPATIENT)
Dept: FAMILY MEDICINE | Facility: OTHER | Age: 71
End: 2021-09-24

## 2021-09-24 DIAGNOSIS — Z53.9 DIAGNOSIS NOT YET DEFINED: Primary | ICD-10-CM

## 2021-09-24 PROCEDURE — G0179 MD RECERTIFICATION HHA PT: HCPCS | Performed by: FAMILY MEDICINE

## 2021-09-24 NOTE — TELEPHONE ENCOUNTER
Also faxed over was the home health certification/plan of care.  Placed with below form.    
Reason for Call:  Form, our goal is to have forms completed with 72 hours, however, some forms may require a visit or additional information.    Type of letter, form or note:  orders    Who is the form from?: [place] [facility] (if other please explain)    Where did the form come from: form was faxed in    What clinic location was the form placed at?: [facility] [phone]    Where the form was placed: Team C Box/Folder    What number is listed as a contact on the form?:  [phone]       Additional comments:  Fax [phone]    Call taken on 9/24/2021 at 1:29 PM by Yee Viera        
faxed  
- - -

## 2021-09-29 ENCOUNTER — TRANSFERRED RECORDS (OUTPATIENT)
Dept: HEALTH INFORMATION MANAGEMENT | Facility: CLINIC | Age: 71
End: 2021-09-29

## 2021-10-04 ENCOUNTER — TRANSFERRED RECORDS (OUTPATIENT)
Dept: HEALTH INFORMATION MANAGEMENT | Facility: CLINIC | Age: 71
End: 2021-10-04

## 2021-10-05 ENCOUNTER — TELEPHONE (OUTPATIENT)
Dept: FAMILY MEDICINE | Facility: OTHER | Age: 71
End: 2021-10-05

## 2021-10-05 ENCOUNTER — OFFICE VISIT (OUTPATIENT)
Dept: FAMILY MEDICINE | Facility: OTHER | Age: 71
End: 2021-10-05
Payer: MEDICARE

## 2021-10-05 VITALS
BODY MASS INDEX: 25.9 KG/M2 | RESPIRATION RATE: 20 BRPM | HEIGHT: 61 IN | DIASTOLIC BLOOD PRESSURE: 68 MMHG | HEART RATE: 88 BPM | OXYGEN SATURATION: 95 % | SYSTOLIC BLOOD PRESSURE: 142 MMHG | WEIGHT: 137.2 LBS | TEMPERATURE: 96.7 F

## 2021-10-05 DIAGNOSIS — G45.9 TIA (TRANSIENT ISCHEMIC ATTACK): Primary | ICD-10-CM

## 2021-10-05 DIAGNOSIS — I25.83 CORONARY ARTERY DISEASE DUE TO LIPID RICH PLAQUE: ICD-10-CM

## 2021-10-05 DIAGNOSIS — I21.3 ST ELEVATION MYOCARDIAL INFARCTION (STEMI), UNSPECIFIED ARTERY (H): ICD-10-CM

## 2021-10-05 DIAGNOSIS — I10 HYPERTENSION GOAL BP (BLOOD PRESSURE) < 140/90: ICD-10-CM

## 2021-10-05 DIAGNOSIS — F32.1 MODERATE MAJOR DEPRESSION (H): ICD-10-CM

## 2021-10-05 DIAGNOSIS — I25.10 CORONARY ARTERY DISEASE DUE TO LIPID RICH PLAQUE: ICD-10-CM

## 2021-10-05 DIAGNOSIS — Z23 NEED FOR PROPHYLACTIC VACCINATION AND INOCULATION AGAINST INFLUENZA: ICD-10-CM

## 2021-10-05 PROCEDURE — G0008 ADMIN INFLUENZA VIRUS VAC: HCPCS | Performed by: PHYSICIAN ASSISTANT

## 2021-10-05 PROCEDURE — 90662 IIV NO PRSV INCREASED AG IM: CPT | Performed by: PHYSICIAN ASSISTANT

## 2021-10-05 PROCEDURE — 99214 OFFICE O/P EST MOD 30 MIN: CPT | Mod: 25 | Performed by: PHYSICIAN ASSISTANT

## 2021-10-05 RX ORDER — CARVEDILOL 12.5 MG/1
12.5 TABLET ORAL 2 TIMES DAILY
Qty: 180 TABLET | Refills: 3 | Status: SHIPPED | OUTPATIENT
Start: 2021-10-05 | End: 2022-11-10

## 2021-10-05 RX ORDER — VENLAFAXINE HYDROCHLORIDE 75 MG/1
75 CAPSULE, EXTENDED RELEASE ORAL DAILY
Qty: 90 CAPSULE | Refills: 3 | Status: SHIPPED | OUTPATIENT
Start: 2021-10-05 | End: 2021-12-07

## 2021-10-05 RX ORDER — ROSUVASTATIN CALCIUM 20 MG/1
20 TABLET, COATED ORAL DAILY
Qty: 90 TABLET | Refills: 3 | Status: SHIPPED | OUTPATIENT
Start: 2021-10-05 | End: 2022-04-04

## 2021-10-05 ASSESSMENT — PATIENT HEALTH QUESTIONNAIRE - PHQ9
5. POOR APPETITE OR OVEREATING: SEVERAL DAYS
SUM OF ALL RESPONSES TO PHQ QUESTIONS 1-9: 9

## 2021-10-05 ASSESSMENT — ASTHMA QUESTIONNAIRES
QUESTION_2 LAST FOUR WEEKS HOW OFTEN HAVE YOU HAD SHORTNESS OF BREATH: ONCE OR TWICE A WEEK
QUESTION_3 LAST FOUR WEEKS HOW OFTEN DID YOUR ASTHMA SYMPTOMS (WHEEZING, COUGHING, SHORTNESS OF BREATH, CHEST TIGHTNESS OR PAIN) WAKE YOU UP AT NIGHT OR EARLIER THAN USUAL IN THE MORNING: ONCE OR TWICE
QUESTION_4 LAST FOUR WEEKS HOW OFTEN HAVE YOU USED YOUR RESCUE INHALER OR NEBULIZER MEDICATION (SUCH AS ALBUTEROL): NOT AT ALL
QUESTION_1 LAST FOUR WEEKS HOW MUCH OF THE TIME DID YOUR ASTHMA KEEP YOU FROM GETTING AS MUCH DONE AT WORK, SCHOOL OR AT HOME: NONE OF THE TIME
QUESTION_5 LAST FOUR WEEKS HOW WOULD YOU RATE YOUR ASTHMA CONTROL: WELL CONTROLLED
ACT_TOTALSCORE: 22

## 2021-10-05 ASSESSMENT — ANXIETY QUESTIONNAIRES
IF YOU CHECKED OFF ANY PROBLEMS ON THIS QUESTIONNAIRE, HOW DIFFICULT HAVE THESE PROBLEMS MADE IT FOR YOU TO DO YOUR WORK, TAKE CARE OF THINGS AT HOME, OR GET ALONG WITH OTHER PEOPLE: SOMEWHAT DIFFICULT
2. NOT BEING ABLE TO STOP OR CONTROL WORRYING: SEVERAL DAYS
5. BEING SO RESTLESS THAT IT IS HARD TO SIT STILL: SEVERAL DAYS
3. WORRYING TOO MUCH ABOUT DIFFERENT THINGS: SEVERAL DAYS
7. FEELING AFRAID AS IF SOMETHING AWFUL MIGHT HAPPEN: NOT AT ALL
6. BECOMING EASILY ANNOYED OR IRRITABLE: NOT AT ALL
GAD7 TOTAL SCORE: 5
1. FEELING NERVOUS, ANXIOUS, OR ON EDGE: SEVERAL DAYS

## 2021-10-05 ASSESSMENT — PAIN SCALES - GENERAL: PAINLEVEL: NO PAIN (0)

## 2021-10-05 ASSESSMENT — MIFFLIN-ST. JEOR: SCORE: 1085.1

## 2021-10-05 NOTE — PROGRESS NOTES
Assessment & Plan     ICD-10-CM    1. TIA (transient ischemic attack)  G45.9    2. Hypertension goal BP (blood pressure) < 140/90  I10 carvedilol (COREG) 12.5 MG tablet   3. ST elevation myocardial infarction (STEMI), unspecified artery (H)  I21.3 carvedilol (COREG) 12.5 MG tablet   4. Moderate major depression (H)  F32.1 venlafaxine (EFFEXOR-XR) 75 MG 24 hr capsule   5. Coronary artery disease due to lipid rich plaque  I25.10 rosuvastatin (CRESTOR) 20 MG tablet    I25.83    6. Need for prophylactic vaccination and inoculation against influenza  Z23 INFLUENZA, QUAD, HIGH DOSE, PF, 65YR + (FLUZONE HD)       - Patient here for another recheck after hospital stay and rehab stay due to stroke and MI   - Patient and friend who is with today report doing much better      Completed OT/PT, was cleared by them to drive, didn't need formal driving assessment, reviewed these notes      Just started speech therapy      Just about ready to move from her temporary apt back to her house     Her son is living at the house and will stay there when she comes home      Everyone in agreement to start driving again  - I agree with this      Encouraged to start with small drives with someone else in car, then small drives alone, before going back to full independence   - Cardiology      Reports had her apt and everything was good   - Neurology apt      Also went well, has to have a recheck MRI and will schedule this   - Reports mood much better but still a little depressed but mainly due to the not driving   - Recommend that we not make any medication changes          Was previously on Effexor 150 mg, but recommend wait on increasing from the 75 mg for now      Reviewed medication list at length with patient and friend       Reviewed all use and side effects, refilled as needed   - Recheck about 2 months, sooner if mood doesn't continue to improve       Review of prior external note(s) from - Outside records from [facility]Re[facility]  Review of the result(s) of each unique test - PHQ9 & GAD7   Diagnosis or treatment significantly limited by social determinants of health - None     35 minutes spent on the date of the encounter doing chart review, history and exam, documentation and further activities as noted above    The patient indicates understanding of these issues and agrees with the plan.    Return in about 2 months (around 12/5/2021).    FLEX Alonso [facility] DERRICK Fierro is a 71 year old who presents for the following health issues with friend     HPI   Depression and Anxiety Follow-Up    How are you doing with your depression since your last visit? Worsened     How are you doing with your anxiety since your last visit?  No change    Are you having other symptoms that might be associated with depression or anxiety? No    Have you had a significant life event? OTHER: family concerns-son    Do you have any concerns with your use of alcohol or other drugs? No      - OT cleared, said ok to drive   - Started speech therapy yesterday, 2 x/week for 8 visits           Social History     Tobacco Use     Smoking status: Never Smoker     Smokeless tobacco: Never Used   Vaping Use     Vaping Use: Never used   Substance Use Topics     Alcohol use: Yes     Comment: beerx2/x1 per month     Drug use: No     PHQ 11/5/2020 7/27/2021 8/26/2021   PHQ-9 Total Score 6 8 11   Q9: Thoughts of better off dead/self-harm past 2 weeks Not at all Not at all Not at all     HAYDEE-7 SCORE 11/5/2020 7/27/2021 8/26/2021   Total Score - - -   Total Score - 5 (mild anxiety) -   Total Score 6 5 11     GAD7 - 5   PHQ9 - 9           Review of Systems   Constitutional, HEENT, cardiovascular, pulmonary, gi and gu systems are negative, except as otherwise noted.      Objective    BP (!) 142/68   Pulse 88   Temp (!) 96.7  F (35.9  C) (Temporal)   Resp 20   Ht 1.55 m (5' 1.02\")   Wt 62.2 kg (137 lb 3.2 oz)   " LMP 11/09/2005 (Approximate)   SpO2 95%   BMI 25.90 kg/m    Body mass index is 25.9 kg/m .  Physical Exam   GENERAL APPEARANCE: healthy, alert and no distress  EYES: Eyes grossly normal to inspection, PERRLA, conjunctivae and sclerae without injection or discharge, EOM intact   RESP: Lungs clear to auscultation - no rales, rhonchi or wheezes    CV: Regular rates and rhythm, normal S1 S2, no S3 or S4, no murmur, click or rub, no peripheral edema and peripheral pulses strong and symmetric bilaterally   MS: No musculoskeletal defects are noted and gait is age appropriate without ataxia   SKIN: No suspicious lesions or rashes, hydration status appears adeuqate with normal skin turgor   PSYCH: Alert and oriented x3; speech- coherent , normal rate and volume; able to articulate logical thoughts, able to abstract reason, no tangential thoughts, no hallucinations or delusions, mentation appears normal, Mood is euthymic. Affect is appropriate for this mood state and bright. Thought content is free of suicidal ideation, hallucinations, and delusions. Dress is adequate and upkept. Eye contact is good during conversation.       Diagnostics:  None

## 2021-10-05 NOTE — TELEPHONE ENCOUNTER
Reason for Call:  Form, our goal is to have forms completed with 72 hours, however, some forms may require a visit or additional information.    Type of letter, form or note:  gerard Alvarado    Who is the form from?: [facility] (if other please explain)    Where did the form come from: form was faxed in    What clinic location was the form placed at?: [facility] [phone]    Where the form was placed: Provider box Box/Folder    What number is listed as a contact on the form?: [phone]       Additional comments:  Call taken on 10/5/2021 at 11:19 AM by Babs Dickerson

## 2021-10-05 NOTE — PATIENT INSTRUCTIONS
Call to schedule MRI brain ordered by Dr. Miryam Mcgowan (neurology)     You may call any office from below to schedule an appointment for radiology.    [place]  [phone]  [place]  [phone]

## 2021-10-06 ASSESSMENT — ASTHMA QUESTIONNAIRES: ACT_TOTALSCORE: 22

## 2021-10-06 ASSESSMENT — ANXIETY QUESTIONNAIRES: GAD7 TOTAL SCORE: 5

## 2021-10-13 ENCOUNTER — HOSPITAL ENCOUNTER (OUTPATIENT)
Dept: MRI IMAGING | Facility: CLINIC | Age: 71
Discharge: HOME | End: 2021-10-13
Attending: PSYCHIATRY & NEUROLOGY | Admitting: PSYCHIATRY & NEUROLOGY
Payer: MEDICARE

## 2021-10-13 DIAGNOSIS — I63.10 CEREBROVASCULAR ACCIDENT (CVA) DUE TO EMBOLISM OF PRECEREBRAL ARTERY (H): ICD-10-CM

## 2021-10-13 PROCEDURE — 70551 MRI BRAIN STEM W/O DYE: CPT | Mod: MG

## 2021-10-27 ENCOUNTER — TELEPHONE (OUTPATIENT)
Dept: FAMILY MEDICINE | Facility: OTHER | Age: 71
End: 2021-10-27

## 2021-10-27 NOTE — TELEPHONE ENCOUNTER
Reason for Call:  Form, our goal is to have forms completed with 72 hours, however, some forms may require a visit or additional information.    Type of letter, form or note:  medical    Who is the form from?: [facility] needs signature (if other please explain)    Where did the form come from: form was faxed in    What clinic location was the form placed at?: [facility] [phone]    Where the form was placed: Team C Box/Folder    What number is listed as a contact on the form?: [phone]       Additional comments: Please sign and fax to [phone]    Call taken on 10/27/2021 at 10:42 AM by Amparo Carter

## 2021-10-28 ENCOUNTER — TRANSFERRED RECORDS (OUTPATIENT)
Dept: HEALTH INFORMATION MANAGEMENT | Facility: CLINIC | Age: 71
End: 2021-10-28
Payer: MEDICARE

## 2021-11-04 ENCOUNTER — TRANSFERRED RECORDS (OUTPATIENT)
Dept: HEALTH INFORMATION MANAGEMENT | Facility: CLINIC | Age: 71
End: 2021-11-04
Payer: MEDICARE

## 2021-11-05 ENCOUNTER — TELEPHONE (OUTPATIENT)
Dept: FAMILY MEDICINE | Facility: OTHER | Age: 71
End: 2021-11-05
Payer: MEDICARE

## 2021-11-05 NOTE — TELEPHONE ENCOUNTER
Reason for Call:  Form, our goal is to have forms completed with 72 hours, however, some forms may require a visit or additional information.    Type of letter, form or note:  medical    Who is the form from?: [facility] (if other please explain)    Where did the form come from: form was faxed in    What clinic location was the form placed at?: [facility] [phone]    Where the form was placed: team c Box/Folder    What number is listed as a contact on the form?: [phone]       Additional comments: please review, sign, and return fax to [phone]    Call taken on 11/5/2021 at 10:04 AM by Ana Vigil

## 2021-11-11 ENCOUNTER — TELEPHONE (OUTPATIENT)
Dept: FAMILY MEDICINE | Facility: OTHER | Age: 71
End: 2021-11-11
Payer: MEDICARE

## 2021-11-11 NOTE — TELEPHONE ENCOUNTER
Reason for Call:  Form, our goal is to have forms completed with 72 hours, however, some forms may require a visit or additional information.    Type of letter, form or note:  medical    Who is the form from?: [place] [facility] (if other please explain)    Where did the form come from: form was faxed in    What clinic location was the form placed at?: [facility] [phone]    Where the form was placed: Team C Box/Folder    What number is listed as a contact on the form?: [phone]       Additional comments: Please complete form and return to [phone]    Call taken on 11/11/2021 at 11:43 AM by Beulah Washington

## 2021-11-11 NOTE — TELEPHONE ENCOUNTER
Reason for Call:  Form, our goal is to have forms completed with 72 hours, however, some forms may require a visit or additional information.    Type of letter, form or note:  medical    Who is the form from?: [place] [facility] (if other please explain)    Where did the form come from: form was faxed in    What clinic location was the form placed at?: [facility] [phone]    Where the form was placed: Team C form bin    What number is listed as a contact on the form?: fax [phone]       Additional comments: Please complete and fax    Call taken on 11/11/2021 at 12:06 PM by Miryam Torre

## 2021-11-15 ENCOUNTER — MEDICAL CORRESPONDENCE (OUTPATIENT)
Dept: HEALTH INFORMATION MANAGEMENT | Facility: CLINIC | Age: 71
End: 2021-11-15

## 2021-11-15 DIAGNOSIS — Z53.9 DIAGNOSIS NOT YET DEFINED: Primary | ICD-10-CM

## 2021-11-15 PROCEDURE — G0179 MD RECERTIFICATION HHA PT: HCPCS | Performed by: PHYSICIAN ASSISTANT

## 2021-12-07 ENCOUNTER — OFFICE VISIT (OUTPATIENT)
Dept: FAMILY MEDICINE | Facility: OTHER | Age: 71
End: 2021-12-07
Payer: MEDICARE

## 2021-12-07 VITALS
HEIGHT: 61 IN | WEIGHT: 134.4 LBS | TEMPERATURE: 98 F | DIASTOLIC BLOOD PRESSURE: 78 MMHG | BODY MASS INDEX: 25.37 KG/M2 | HEART RATE: 89 BPM | SYSTOLIC BLOOD PRESSURE: 138 MMHG | RESPIRATION RATE: 18 BRPM | OXYGEN SATURATION: 98 %

## 2021-12-07 DIAGNOSIS — F33.0 MILD EPISODE OF RECURRENT MAJOR DEPRESSIVE DISORDER (H): Primary | ICD-10-CM

## 2021-12-07 DIAGNOSIS — I69.30 HISTORY OF CEREBROVASCULAR ACCIDENT (CVA) WITH RESIDUAL DEFICIT: ICD-10-CM

## 2021-12-07 DIAGNOSIS — Z23 HIGH PRIORITY FOR 2019 NOVEL CORONAVIRUS VACCINATION: ICD-10-CM

## 2021-12-07 DIAGNOSIS — F32.1 MODERATE MAJOR DEPRESSION (H): ICD-10-CM

## 2021-12-07 PROCEDURE — 91300 COVID-19,PF,PFIZER (12+ YRS): CPT | Performed by: PHYSICIAN ASSISTANT

## 2021-12-07 PROCEDURE — 99214 OFFICE O/P EST MOD 30 MIN: CPT | Mod: 25 | Performed by: PHYSICIAN ASSISTANT

## 2021-12-07 PROCEDURE — 0004A COVID-19,PF,PFIZER (12+ YRS): CPT | Performed by: PHYSICIAN ASSISTANT

## 2021-12-07 RX ORDER — VENLAFAXINE HYDROCHLORIDE 150 MG/1
150 CAPSULE, EXTENDED RELEASE ORAL DAILY
Qty: 90 CAPSULE | Refills: 1 | Status: SHIPPED | OUTPATIENT
Start: 2021-12-07 | End: 2022-04-18

## 2021-12-07 ASSESSMENT — ANXIETY QUESTIONNAIRES
7. FEELING AFRAID AS IF SOMETHING AWFUL MIGHT HAPPEN: NOT AT ALL
4. TROUBLE RELAXING: NOT AT ALL
GAD7 TOTAL SCORE: 3
GAD7 TOTAL SCORE: 3
1. FEELING NERVOUS, ANXIOUS, OR ON EDGE: SEVERAL DAYS
6. BECOMING EASILY ANNOYED OR IRRITABLE: NOT AT ALL
3. WORRYING TOO MUCH ABOUT DIFFERENT THINGS: SEVERAL DAYS
GAD7 TOTAL SCORE: 3
2. NOT BEING ABLE TO STOP OR CONTROL WORRYING: SEVERAL DAYS
8. IF YOU CHECKED OFF ANY PROBLEMS, HOW DIFFICULT HAVE THESE MADE IT FOR YOU TO DO YOUR WORK, TAKE CARE OF THINGS AT HOME, OR GET ALONG WITH OTHER PEOPLE?: SOMEWHAT DIFFICULT
5. BEING SO RESTLESS THAT IT IS HARD TO SIT STILL: NOT AT ALL
7. FEELING AFRAID AS IF SOMETHING AWFUL MIGHT HAPPEN: NOT AT ALL

## 2021-12-07 ASSESSMENT — MIFFLIN-ST. JEOR: SCORE: 1072.39

## 2021-12-07 ASSESSMENT — PAIN SCALES - GENERAL: PAINLEVEL: NO PAIN (0)

## 2021-12-07 NOTE — PATIENT INSTRUCTIONS
1. They will call you to schedule counseling     2. Increase Venlafaxine (Effexor) from 75 mg to 150 mg     3. Recheck 4-6 weeks

## 2021-12-07 NOTE — PROGRESS NOTES
"Assessment & Plan     ICD-10-CM    1. Mild episode of recurrent major depressive disorder (H)  F33.0 MENTAL HEALTH REFERRAL  - Adult; Outpatient Treatment; Individual/Couples/Family/Group Therapy/Health Psychology; [facility] [phone]; We will contact you to schedule the appointment or please call with any questions   2. Moderate major depression (H)  F32.1 venlafaxine (EFFEXOR-XR) 150 MG 24 hr capsule   3. High priority for 2019 novel coronavirus vaccination  Z23 COVID-19,PF,PFIZER (12+ Yrs PURPLE LABEL)   4. History of cerebrovascular accident (CVA) with residual deficit  I69.30        1 & 2. Mood has room for improvement. Patient has been feeling depressed and frusterated lately. PHQ9 of 4 today.   - Discussed counseling and therapy. Mental health referral placed. Patient will receive a call to schedule  - Increase Venlafaxine (Effexor) from 75 mg to 150 mg. Discussed medication use and side effects.     Continue Wellbutrin     This would then be a return to her normal regimen prior to stroke and MI   - Recheck in 4 - 6 weeks  - Patient understood and verbally consented to the treatment plan     3. Discussed COVID booster. Patient received this in clinic today.     4. CVA   - Continues to have some struggles with some effects of CVA - word finding and some others   - Therapy told her she was \"therapied out\" and recommended a break, but can resume in the new year if wishes, patient thinks she will do this, encouraged this   - Discussed driving, she has now slowly increased driving and feel no effects from CVA with this, was cleared by all her therapists, first drove with friend and now feels confident and driving alone, no anxiety     - Patient plans to get pneumonia vaccine after two weeks  - Patient will follow up sooner if questions or concerns arise    Review of the result(s) of each unique test - See list       10/13/21 - MRI brain   Diagnosis or treatment significantly limited by social " "determinants of health - Depression     25 minutes spent on the date of the encounter doing chart review, history and exam, documentation and further activities as noted above    The patient indicates understanding of these issues and agrees with the plan.    Return in about 6 weeks (around 1/18/2022) for Recheck.    Rosenda Varma-FLEX Saravia [facility] SUSY Fierro is a 71 year old who presents for the following health issues  accompanied by her Fredy - Good friend.    HPI   Mood follow up  - patient feels trapped and struggles with mood at times. She feels like she cant get her words out. Trouble remember things. Feeling depressed and frustrated because she can not remember things.   - Room for improvement with mood. Asking if a dose change would help  - Discussed referral for counseling. Would like mental health referral.   - Good support system, with fredy and daughter.      Driving  - Driving is going well  - Driving ever other day, patient drives 5 miles  - One long trip to [facility] 25 miles  - No concerns with driving, plans to do longer trips    - Covid booster question. Would like to discuss Pfizer booster  - Question about shingles vaccine  - Questions about Mri from 10/13/21. Patient states she did not receive a call with the results        PHQ 8/26/2021 10/5/2021 12/7/2021   PHQ-9 Total Score 11 9 4   Q9: Thoughts of better off dead/self-harm past 2 weeks Not at all Not at all Not at all     HAYDEE-7 SCORE 8/26/2021 10/5/2021 12/7/2021   Total Score - - -   Total Score - - 3 (minimal anxiety)   Total Score 11 5 3           Review of Systems   Constitutional, HEENT, cardiovascular, pulmonary, gi and gu systems are negative, except as otherwise noted.      Objective    /78   Pulse 89   Temp 98  F (36.7  C) (Temporal)   Resp 18   Ht 1.55 m (5' 1.02\")   Wt 61 kg (134 lb 6.4 oz)   LMP 11/09/2005 (Approximate)   SpO2 98%   BMI 25.37 kg/m    Body mass index " is 25.37 kg/m .  Physical Exam   GENERAL APPEARANCE: healthy, alert and no distress  EYES: Eyes grossly normal to inspection, PERRLA, conjunctivae and sclerae without injection or discharge, EOM intact   MS: No musculoskeletal defects are noted and gait is age appropriate without ataxia   SKIN: No suspicious lesions or rashes, hydration status appears adeuqate with normal skin turgor   PSYCH: Alert and oriented x3; speech- coherent , normal rate and volume; able to articulate logical thoughts, able to abstract reason, no tangential thoughts, no hallucinations or delusions, mentation appears normal, Mood is euthymic. Affect is appropriate for this mood state and bright. Thought content is free of suicidal ideation, hallucinations, and delusions. Dress is adequate and upkept. Eye contact is good during conversation.         Diagnostics: Reviewed in Epic

## 2021-12-08 ASSESSMENT — ANXIETY QUESTIONNAIRES: GAD7 TOTAL SCORE: 3

## 2021-12-08 ASSESSMENT — PATIENT HEALTH QUESTIONNAIRE - PHQ9: SUM OF ALL RESPONSES TO PHQ QUESTIONS 1-9: 4

## 2022-01-27 ENCOUNTER — OFFICE VISIT (OUTPATIENT)
Dept: FAMILY MEDICINE | Facility: OTHER | Age: 72
End: 2022-01-27
Payer: MEDICARE

## 2022-01-27 VITALS
SYSTOLIC BLOOD PRESSURE: 122 MMHG | WEIGHT: 132.2 LBS | BODY MASS INDEX: 24.96 KG/M2 | RESPIRATION RATE: 16 BRPM | OXYGEN SATURATION: 95 % | HEIGHT: 61 IN | DIASTOLIC BLOOD PRESSURE: 70 MMHG | TEMPERATURE: 97.5 F | HEART RATE: 84 BPM

## 2022-01-27 DIAGNOSIS — I10 HYPERTENSION GOAL BP (BLOOD PRESSURE) < 140/90: ICD-10-CM

## 2022-01-27 DIAGNOSIS — F32.1 MODERATE MAJOR DEPRESSION (H): Primary | ICD-10-CM

## 2022-01-27 DIAGNOSIS — Z00.00 ENCOUNTER FOR MEDICARE ANNUAL WELLNESS EXAM: ICD-10-CM

## 2022-01-27 DIAGNOSIS — F43.23 ADJUSTMENT DISORDER WITH MIXED ANXIETY AND DEPRESSED MOOD: ICD-10-CM

## 2022-01-27 PROCEDURE — G0439 PPPS, SUBSEQ VISIT: HCPCS | Performed by: PHYSICIAN ASSISTANT

## 2022-01-27 PROCEDURE — 99214 OFFICE O/P EST MOD 30 MIN: CPT | Mod: 25 | Performed by: PHYSICIAN ASSISTANT

## 2022-01-27 RX ORDER — LISINOPRIL 20 MG/1
20 TABLET ORAL DAILY
Qty: 90 TABLET | Refills: 3 | Status: SHIPPED | OUTPATIENT
Start: 2022-01-27 | End: 2023-03-07

## 2022-01-27 RX ORDER — FUROSEMIDE 40 MG
40 TABLET ORAL DAILY
Qty: 90 TABLET | Refills: 3 | Status: SHIPPED | OUTPATIENT
Start: 2022-01-27 | End: 2023-03-07

## 2022-01-27 ASSESSMENT — ANXIETY QUESTIONNAIRES
GAD7 TOTAL SCORE: 6
5. BEING SO RESTLESS THAT IT IS HARD TO SIT STILL: SEVERAL DAYS
7. FEELING AFRAID AS IF SOMETHING AWFUL MIGHT HAPPEN: NOT AT ALL
7. FEELING AFRAID AS IF SOMETHING AWFUL MIGHT HAPPEN: NOT AT ALL
6. BECOMING EASILY ANNOYED OR IRRITABLE: SEVERAL DAYS
2. NOT BEING ABLE TO STOP OR CONTROL WORRYING: SEVERAL DAYS
1. FEELING NERVOUS, ANXIOUS, OR ON EDGE: SEVERAL DAYS
GAD7 TOTAL SCORE: 6
3. WORRYING TOO MUCH ABOUT DIFFERENT THINGS: SEVERAL DAYS
4. TROUBLE RELAXING: SEVERAL DAYS
GAD7 TOTAL SCORE: 6

## 2022-01-27 ASSESSMENT — PATIENT HEALTH QUESTIONNAIRE - PHQ9
10. IF YOU CHECKED OFF ANY PROBLEMS, HOW DIFFICULT HAVE THESE PROBLEMS MADE IT FOR YOU TO DO YOUR WORK, TAKE CARE OF THINGS AT HOME, OR GET ALONG WITH OTHER PEOPLE: NOT DIFFICULT AT ALL
SUM OF ALL RESPONSES TO PHQ QUESTIONS 1-9: 6
SUM OF ALL RESPONSES TO PHQ QUESTIONS 1-9: 6

## 2022-01-27 ASSESSMENT — MIFFLIN-ST. JEOR: SCORE: 1068.65

## 2022-01-27 ASSESSMENT — PAIN SCALES - GENERAL: PAINLEVEL: NO PAIN (0)

## 2022-01-27 NOTE — PROGRESS NOTES
Assessment & Plan     ICD-10-CM    1. Moderate major depression (H)  F32.1 Adult Mental Health  Referral     OFFICE/OUTPT VISIT,EST,LEVL IV   2. Adjustment disorder with mixed anxiety and depressed mood  F43.23 Adult Mental Health  Referral     OFFICE/OUTPT VISIT,EST,LEVL IV   3. Hypertension goal BP (blood pressure) < 140/90  I10 furosemide (LASIX) 40 MG tablet     lisinopril (ZESTRIL) 20 MG tablet     OFFICE/OUTPT VISIT,EST,LEVL IV   4. Encounter for Medicare annual wellness exam  Z00.00      1 & 2. Mood  - Patient and friend reporting she is very flat, no emotions since increasing Effexor to 150 mg and continuing on Wellbutrin 150 mg   - Concern for overtreatment  - Recommend stopping Wellbutrin and continue on Effexor   - Reviewed use and side effects   - Recheck 1 month     3. HTN  - Stable on current regimen   - Reviewed medication use and side effects, refilled   - Last CMP 7/27/21 - no concerns, stable     4. No additional identified health risks   - Advised to consider shingles vaccination       Review of the result(s) of each unique test - None   Diagnosis or treatment significantly limited by social determinants of health - None     25 minutes spent on the date of the encounter doing chart review, history and exam, documentation and further activities as noted above    The patient indicates understanding of these issues and agrees with the plan.    Return in about 53 weeks (around 2/2/2023) for Annual Wellness Visit.    Rosenda Pierre PA-C  [facility] SUSY Fierro is a 71 year old who presents for the following health issues  accompanied by her friend.    History of Present Illness       Mental Health Follow-up:  Patient presents to follow-up on Depression & Anxiety.Patient's depression since last visit has been:  No change  The patient is not having other symptoms associated with depression.  Patient's anxiety since last visit has been:  " No change  The patient is not having other symptoms associated with anxiety.  Any significant life events: No  Patient is not feeling anxious or having panic attacks.  Patient has no concerns about alcohol or drug use.     Social History  Tobacco Use    Smoking status: Never Smoker    Smokeless tobacco: Never Used  Vaping Use    Vaping Use: Never used  Alcohol use: Yes    Comment: beerx2/x1 per month  Drug use: No      Today's PHQ-9         PHQ-9 Total Score:     (P) 6   PHQ-9 Q9 Thoughts of better off dead/self-harm past 2 weeks :   (P) Not at all   Thoughts of suicide or self harm:      Self-harm Plan:        Self-harm Action:          Safety concerns for self or others:           Hypertension: She presents for follow up of hypertension.  She does not check blood pressure  regularly outside of the clinic. Outpatient blood pressures have not been over 140/90. She follows a low salt diet.     She eats 4 or more servings of fruits and vegetables daily.She consumes 2 sweetened beverage(s) daily.She exercises with enough effort to increase her heart rate 9 or less minutes per day.  She exercises with enough effort to increase her heart rate 3 or less days per week.   She is taking medications regularly.       Annual Wellness Visit  {  Patient has been advised of split billing requirements and indicates understanding: Yes   Are you in the first 12 months of your Medicare Part B coverage?  No    Physical Health:    In general, how would you rate your overall physical health? good    Outside of work, how many days during the week do you exercise? 2-3 days/week    Outside of work, approximately how many minutes a day do you exercise?less than 15 minutes    If you drink alcohol do you typically have >3 drinks per day or >7 drinks per week? No    Do you usually eat at least 4 servings of fruit and vegetables a day, include whole grains & fiber and avoid regularly eating high fat or \"junk\" foods? Yes    Do you have any " problems taking medications regularly?  No    Do you have any side effects from medications? none    Needs assistance for the following daily activities: no assistance needed    Which of the following safety concerns are present in your home?  none identified     Hearing impairment: No    In the past 6 months, have you been bothered by leaking of urine? no        Mental Health:    In general, how would you rate your overall mental or emotional health? fair  PHQ-2 Score:      Do you feel safe in your environment? Yes    Have you ever done Advance Care Planning? (For example, a Health Directive, POLST, or a discussion with a medical provider or your loved ones about your wishes)? No, advance care planning information given to patient to review.  Patient plans to discuss their wishes with loved ones or provider.      Fall risk:  Fallen 2 or more times in the past year?: (P) No  Any fall with injury in the past year?: (P) No    Cognitive Screenin) Repeat 3 items (Leader, Season, Table)    2) Clock draw: NORMAL  3) 3 item recall: Recalls NO objects   Results: 0 items recalled: PROBABLE COGNITIVE IMPAIRMENT, **INFORM PROVIDER**    Mini-CogTM Copyright S Abida. Licensed by the author for use in [facility]; reprinted with permission (soob@[facility]). All rights reserved.        Current providers sharing in care for this patient include:   Patient Care Team:  Rosenda Pierre PA-C as PCP - General (Physician Assistant - Medical)  Rosenda Pierre PA-C as Assigned PCP  Byron Bill PA-C as Assigned Musculoskeletal Provider  Ryanne Ramon NP as Assigned Neuroscience Provider  Miryam Mcgowan MD as MD (Neurology)    Patient has been advised of split billing requirements and indicates understanding: Yes    HAYDEE-7 SCORE 10/5/2021 2021 2022   Total Score - - -   Total Score - 3 (minimal anxiety) 6 (mild anxiety)   Total Score 5 3 6       PHQ 10/5/2021  "12/7/2021 1/27/2022   PHQ-9 Total Score 9 4 6   Q9: Thoughts of better off dead/self-harm past 2 weeks Not at all Not at all Not at all           Review of Systems   Constitutional, HEENT, cardiovascular, pulmonary, gi and gu systems are negative, except as otherwise noted.      Objective    /70   Pulse 84   Temp 97.5  F (36.4  C) (Temporal)   Resp 16   Ht 1.56 m (5' 1.42\")   Wt 60 kg (132 lb 3.2 oz)   LMP 11/09/2005 (Approximate)   SpO2 95%   BMI 24.64 kg/m    Body mass index is 24.64 kg/m .  Physical Exam   GENERAL APPEARANCE: healthy, alert and no distress  EYES: Eyes grossly normal to inspection, PERRLA, conjunctivae and sclerae without injection or discharge, EOM intact   RESP: Lungs clear to auscultation - no rales, rhonchi or wheezes    CV: Regular rates and rhythm, normal S1 S2, no S3 or S4, no murmur, click or rub, no peripheral edema and peripheral pulses strong and symmetric bilaterally   MS: No musculoskeletal defects are noted and gait is age appropriate without ataxia   SKIN: No suspicious lesions or rashes, hydration status appears adeuqate with normal skin turgor   PSYCH: Alert and oriented x3; speech- coherent , normal rate and volume; able to articulate logical thoughts, able to abstract reason, no tangential thoughts, no hallucinations or delusions, mentation appears normal, Mood is depressed. Affect is flat. Thought content is free of suicidal ideation, hallucinations, and delusions. Dress is adequate and upkept. Eye contact is good during conversation.         Diagnostics: none     "

## 2022-01-27 NOTE — PATIENT INSTRUCTIONS
- Stop Bupropion     - Recheck 1 month       Check with insurance for coverage of the new shingles vaccine, Shingrix (ask them how old do I need to be and where should I get it done pharmacy or clinic). If it is not covered now, it may be covered later in the year. Shingrix is given in a 2 shot series, between 2 and 6 months apart. It is recommended for adults age 50 and older. Initial studies have indicated that this new vaccine is 85-90% effective at preventing shingles, and is preferred over the old Zostavax vaccine.     Patient Education   Personalized Prevention Plan  You are due for the preventive services outlined below.  Your care team is available to assist you in scheduling these services.  If you have already completed any of these items, please share that information with your care team to update in your medical record.  Health Maintenance Due   Topic Date Due     Zoster (Shingles) Vaccine (2 of 3) 09/13/2012     Asthma Action Plan - yearly  05/22/2020     FALL RISK ASSESSMENT  10/14/2021        Patient Education   Personalized Prevention Plan  You are due for the preventive services outlined below.  Your care team is available to assist you in scheduling these services.  If you have already completed any of these items, please share that information with your care team to update in your medical record.  Health Maintenance Due   Topic Date Due     Zoster (Shingles) Vaccine (2 of 3) 09/13/2012     Asthma Action Plan - yearly  05/22/2020     FALL RISK ASSESSMENT  10/14/2021

## 2022-01-28 ASSESSMENT — ANXIETY QUESTIONNAIRES: GAD7 TOTAL SCORE: 6

## 2022-01-28 ASSESSMENT — PATIENT HEALTH QUESTIONNAIRE - PHQ9: SUM OF ALL RESPONSES TO PHQ QUESTIONS 1-9: 6

## 2022-02-09 ENCOUNTER — ANCILLARY PROCEDURE (OUTPATIENT)
Dept: MAMMOGRAPHY | Facility: OTHER | Age: 72
End: 2022-02-09
Attending: PHYSICIAN ASSISTANT
Payer: MEDICARE

## 2022-02-09 DIAGNOSIS — Z12.31 VISIT FOR SCREENING MAMMOGRAM: ICD-10-CM

## 2022-02-09 PROCEDURE — 77063 BREAST TOMOSYNTHESIS BI: CPT | Mod: TC | Performed by: RADIOLOGY

## 2022-02-09 PROCEDURE — 77067 SCR MAMMO BI INCL CAD: CPT | Mod: TC | Performed by: RADIOLOGY

## 2022-02-28 ENCOUNTER — TELEPHONE (OUTPATIENT)
Dept: FAMILY MEDICINE | Facility: OTHER | Age: 72
End: 2022-02-28

## 2022-02-28 DIAGNOSIS — G43.709 CHRONIC MIGRAINE WITHOUT AURA WITHOUT STATUS MIGRAINOSUS, NOT INTRACTABLE: ICD-10-CM

## 2022-02-28 RX ORDER — BUTALBITAL/ASPIRIN/CAFFEINE 50-325-40
1 CAPSULE ORAL EVERY 6 HOURS PRN
Qty: 30 CAPSULE | Refills: 5 | Status: SHIPPED | OUTPATIENT
Start: 2022-02-28 | End: 2022-09-19

## 2022-02-28 NOTE — TELEPHONE ENCOUNTER
CDL is out and I am not certain when she is going to be returning. We can leave this for her to find a date that works. In the meantime she could do a virtual with another provider as well OR if she is running low on medication please let us know so that we can assist.       JAYY Brush CNP  Questions or concerns please feel free to send me a [employer] message or call me  Phone : [phone]

## 2022-02-28 NOTE — TELEPHONE ENCOUNTER
Pending Prescriptions:                       Disp   Refills    butalbital-aspirin-caffeine (FIORINAL) 50-*30 cap*5        Sig: Take 1 capsule by mouth every 6 hours as needed for           moderate to severe pain    Routing refill request to provider for review/approval because:  Drug not on the [facility] refill protocol

## 2022-02-28 NOTE — TELEPHONE ENCOUNTER
Reason for Call:  Same Day Appointment, Requested Provider:  Work in    PCP: Rosenda Pierre    Reason for visit: f/u mood    Duration of symptoms: n/a    Have you been treated for this in the past? Yes    Additional comments: Patient had an appointment on 2/28/2022 with Roesnda Saravia and provider is out this day patient is wanting to know if she can be worked onto your schedule before your first opening on 3/29/2022?    Can we leave a detailed message on this number? YES    Phone number patient can be reached at: Cell number on file:    Telephone Information:   Mobile [phone]       Best Time: Anytime ok to leave patient detailed message with appointment date and time.    Call taken on 2/28/2022 at 7:09 AM by Sujata Mack

## 2022-03-02 NOTE — TELEPHONE ENCOUNTER
OK to work in today before 10:30 am. Or tomorrow before 11:30. I am virtual only. Tomorrow most of my patient's will be rescheduled since they are all in person so tomorrow would be better for me.     Jose Francisco Pierre PA-C  [facility]

## 2022-03-03 ENCOUNTER — VIRTUAL VISIT (OUTPATIENT)
Dept: FAMILY MEDICINE | Facility: OTHER | Age: 72
End: 2022-03-03
Payer: MEDICARE

## 2022-03-03 DIAGNOSIS — F43.23 ADJUSTMENT DISORDER WITH MIXED ANXIETY AND DEPRESSED MOOD: ICD-10-CM

## 2022-03-03 DIAGNOSIS — F32.1 MODERATE MAJOR DEPRESSION (H): Primary | ICD-10-CM

## 2022-03-03 PROCEDURE — 99214 OFFICE O/P EST MOD 30 MIN: CPT | Mod: 95 | Performed by: PHYSICIAN ASSISTANT

## 2022-03-03 ASSESSMENT — PATIENT HEALTH QUESTIONNAIRE - PHQ9: SUM OF ALL RESPONSES TO PHQ QUESTIONS 1-9: 11

## 2022-03-03 NOTE — PROGRESS NOTES
Sri is a 71 year old who is being evaluated via a billable telephone visit.      What phone number would you like to be contacted at? [phone]  How would you like to obtain your AVS? Mail a copy    Assessment & Plan     ICD-10-CM    1. Moderate major depression (H)  F32.1 amitriptyline (ELAVIL) 25 MG tablet   2. Adjustment disorder with mixed anxiety and depressed mood  F43.23 amitriptyline (ELAVIL) 25 MG tablet     - Patient continues to struggle with her mood after having to stop her regimen due to TIA and MI   - We restarted Effexor with mild improvement, we added back in her Wellbutrin 150 mg but this resulted in very flat affect so the Wellbutrin was discontinued at our last visit   - Patient now reports still flat but also some depression      Discussed increasing Effexor vs. Re-trying Wellbutrin vs. Trying something else   - She requests trying something else   - Recommend trial of TCA will start low to avoid serotonin syndrome with her SNRI      Discussed once this is in her, will plan taper off the Effexor   - Discussed Amitriptyline use and side effects   - Plan to start 25 mg, then after 2 weeks can increase to 50 mg   - Recheck monthly until stable     PLAN   - Start Amitriptyline 25 mg, 1 at bedtime      At 2 weeks, if helping but not enough can increase to 2 tablets   - Recheck 1 month       Review of the result(s) of each unique test - PHQ9 & GAD7   Diagnosis or treatment significantly limited by social determinants of health - Depression     20 minutes spent on the date of the encounter doing chart review, history and exam, documentation and further activities as noted above    The patient indicates understanding of these issues and agrees with the plan.    Return in about 1 month (around 4/3/2022).    FLEX Alonso [facility]   Sri is a 71 year old who presents for the following health issues     HPI     Depression Followup    How  "are you doing with your depression since     your last visit? Worsened     Are you having other symptoms that might be associated with depression? Yes:  Energy level is down    Have you had a significant life event?  Housing Concerns     Are you feeling anxious or having panic attacks?   No    Do you have any concerns with your use of alcohol or other drugs? No    - Feeling down, depressed   - \"I need something\"   - But still also flat, helpless, don't care about anything   - April will be going back home   - Poor energy level   - Sleeping well, feels rested         Plan from last visit 1/27/22   - Patient and friend reporting she is very flat, no emotions since increasing Effexor to 150 mg and continuing on Wellbutrin 150 mg   - Concern for overtreatment  - Recommend stopping Wellbutrin and continue on Effexor   - Reviewed use and side effects   - Recheck 1 month         Social History     Tobacco Use     Smoking status: Never Smoker     Smokeless tobacco: Never Used   Vaping Use     Vaping Use: Never used   Substance Use Topics     Alcohol use: Yes     Comment: beerx2/x1 per month     Drug use: No     PHQ 12/7/2021 1/27/2022 3/3/2022   PHQ-9 Total Score 4 6 11   Q9: Thoughts of better off dead/self-harm past 2 weeks Not at all Not at all Not at all     HAYDEE-7 SCORE 10/5/2021 12/7/2021 1/27/2022   Total Score - - -   Total Score - 3 (minimal anxiety) 6 (mild anxiety)   Total Score 5 3 6     Last PHQ-9 3/3/2022   1.  Little interest or pleasure in doing things 2   2.  Feeling down, depressed, or hopeless 2   3.  Trouble falling or staying asleep, or sleeping too much 0   4.  Feeling tired or having little energy 2   5.  Poor appetite or overeating 1   6.  Feeling bad about yourself 2   7.  Trouble concentrating 1   8.  Moving slowly or restless 1   Q9: Thoughts of better off dead/self-harm past 2 weeks 0   PHQ-9 Total Score 11   Difficulty at work, home, or with people Somewhat difficult         How many servings of " fruits and vegetables do you eat daily?  2-3    On average, how many sweetened beverages do you drink each day (Examples: soda, juice, sweet tea, etc.  Do NOT count diet or artificially sweetened beverages)?   0    How many days per week do you exercise enough to make your heart beat faster? 3 or less    How many minutes a day do you exercise enough to make your heart beat faster? 9 or less    How many days per week do you miss taking your medication? 0    Review of Systems   Constitutional, HEENT, cardiovascular, pulmonary, gi and gu systems are negative, except as otherwise noted.      Objective       Vitals:  No vitals were obtained today due to virtual visit.    Physical Exam   healthy, alert and no distress  PSYCH: Alert and oriented times 3; coherent speech, normal   rate and volume, able to articulate logical thoughts, able   to abstract reason, no tangential thoughts, no hallucinations   or delusions  Her affect is slightly flat   RESP: No cough, no audible wheezing, able to talk in full sentences  Remainder of exam unable to be completed due to telephone visits    Diagnostics: None         Phone call duration: 7 minutes and 20 seconds

## 2022-03-03 NOTE — PATIENT INSTRUCTIONS
- Start Amitriptyline 25 mg, 1 at bedtime      At 2 weeks, if helping but not enough can increase to 2 tablets     - Recheck 1 month

## 2022-03-28 ENCOUNTER — ALLIED HEALTH/NURSE VISIT (OUTPATIENT)
Dept: FAMILY MEDICINE | Facility: OTHER | Age: 72
End: 2022-03-28
Payer: MEDICARE

## 2022-03-28 DIAGNOSIS — Z23 ENCOUNTER FOR ADMINISTRATION OF COVID-19 VACCINE: Primary | ICD-10-CM

## 2022-03-28 PROCEDURE — 90471 IMMUNIZATION ADMIN: CPT

## 2022-03-28 PROCEDURE — 90750 HZV VACC RECOMBINANT IM: CPT

## 2022-03-28 NOTE — PROGRESS NOTES
Prior to immunization administration, verified patients identity using patient s name and date of birth. Please see Immunization Activity for additional information.     Screening Questionnaire for Adult Immunization    Are you sick today?   No   Do you have allergies to medications, food, a vaccine component or latex?   No   Have you ever had a serious reaction after receiving a vaccination?   No   Do you have a long-term health problem with heart, lung, kidney, or metabolic disease (e.g., diabetes), asthma, a blood disorder, no spleen, complement component deficiency, a cochlear implant, or a spinal fluid leak?  Are you on long-term aspirin therapy?   No   Do you have cancer, leukemia, HIV/AIDS, or any other immune system problem?   No   Do you have a parent, brother, or sister with an immune system problem?   No   In the past 3 months, have you taken medications that affect  your immune system, such as prednisone, other steroids, or anticancer drugs; drugs for the treatment of rheumatoid arthritis, Crohn s disease, or psoriasis; or have you had radiation treatments?   No   Have you had a seizure, or a brain or other nervous system problem?   No   During the past year, have you received a transfusion of blood or blood    products, or been given immune (gamma) globulin or antiviral drug?   No   For women: Are you pregnant or is there a chance you could become       pregnant during the next month?   No   Have you received any vaccinations in the past 4 weeks?   No     Immunization questionnaire answers were all negative.        Per orders of Rosenda Pierre, injection of Shingrix given by Luann Sandoval. Patient instructed to remain in clinic for 15 minutes afterwards, and to report any adverse reaction to me immediately.       Screening performed by Luann Sandoval on 3/28/2022 at 10:52 AM.

## 2022-04-04 ENCOUNTER — OFFICE VISIT (OUTPATIENT)
Dept: FAMILY MEDICINE | Facility: OTHER | Age: 72
End: 2022-04-04
Payer: MEDICARE

## 2022-04-04 VITALS
OXYGEN SATURATION: 99 % | WEIGHT: 140.8 LBS | HEART RATE: 73 BPM | BODY MASS INDEX: 25.91 KG/M2 | RESPIRATION RATE: 22 BRPM | TEMPERATURE: 98.2 F | HEIGHT: 62 IN | SYSTOLIC BLOOD PRESSURE: 100 MMHG | DIASTOLIC BLOOD PRESSURE: 70 MMHG

## 2022-04-04 DIAGNOSIS — I25.10 CORONARY ARTERY DISEASE DUE TO LIPID RICH PLAQUE: ICD-10-CM

## 2022-04-04 DIAGNOSIS — F32.1 MODERATE MAJOR DEPRESSION (H): ICD-10-CM

## 2022-04-04 DIAGNOSIS — F43.23 ADJUSTMENT DISORDER WITH MIXED ANXIETY AND DEPRESSED MOOD: ICD-10-CM

## 2022-04-04 DIAGNOSIS — I25.83 CORONARY ARTERY DISEASE DUE TO LIPID RICH PLAQUE: ICD-10-CM

## 2022-04-04 PROCEDURE — 99214 OFFICE O/P EST MOD 30 MIN: CPT | Performed by: PHYSICIAN ASSISTANT

## 2022-04-04 RX ORDER — BUPROPION HYDROCHLORIDE 150 MG/1
150 TABLET ORAL EVERY MORNING
Status: CANCELLED | OUTPATIENT
Start: 2022-04-04

## 2022-04-04 RX ORDER — ROSUVASTATIN CALCIUM 20 MG/1
20 TABLET, COATED ORAL DAILY
Qty: 90 TABLET | Refills: 3 | Status: ON HOLD | OUTPATIENT
Start: 2022-04-04 | End: 2023-05-16

## 2022-04-04 RX ORDER — BUPROPION HYDROCHLORIDE 150 MG/1
150 TABLET ORAL EVERY MORNING
COMMUNITY
End: 2022-04-04 | Stop reason: SINTOL

## 2022-04-04 NOTE — PATIENT INSTRUCTIONS
Mental Health   [facility] will call you to coordinate your care as prescribed by your provider. If you don't hear from a representative within 2 business days, please call [phone]      - Stop Bupropion   - Continue Effexor 150   - Increase Amitriptyline to 75 mg (3 tablets) for 2 weeks       Then increase to 100 mg (4 tablets)     - Recheck 2 weeks

## 2022-04-04 NOTE — PROGRESS NOTES
Assessment & Plan     ICD-10-CM    1. Coronary artery disease due to lipid rich plaque  I25.10 rosuvastatin (CRESTOR) 20 MG tablet    I25.83    2. Moderate major depression (H)  F32.1 amitriptyline (ELAVIL) 25 MG tablet     Adult Mental [facility]  Referral     [facility] (You MUST fill out the order form (see link in reference section below) and fax the completed form to [facility] in order for the test to be completed.)     CANCELED: [facility] Psychotropic   3. Adjustment disorder with mixed anxiety and depressed mood  F43.23 amitriptyline (ELAVIL) 25 MG tablet     Adult [facility]  Referral     [facility] (You MUST fill out the order form (see link in reference section below) and fax the completed form to [facility] in order for the test to be completed.)     CANCELED: [facility] Psychotropic     - Patient continues to struggle with depression, despite improving PHQ9 scores   - There was some medication changes that were made that it seems patient did not understand      She continues on Bupropion, this was discontinued 2 visits ago as it was felt the start of this made her very flat      Discussed possibly discontinuing this medication vs. Tapering off Effexor as we are also not sure if this is doing anything for her       We elected to discontinue Bupropion   - For now will continue on Effexor 150 mg      We added in Amitriptyline 25 mg and tapered to 50 mg with no change     Recommend increase to 75 mg   - Also discussed [facility] testing, patient consented to this today, hopeful this will provide some guidance      We will await these results and plan short term follow up in 2 weeks to review these results and make changes   - She also never got a call from mental health for counseling     Gave her this number and placed a new referral   - She also requested refill of Rosuvastatin, reviewed use and side effects, refilled     PLAN  - Stop Bupropion   - Continue Effexor 150   - Increase  Amitriptyline to 75 mg (3 tablets) for 2 weeks       Then increase to 100 mg (4 tablets)   - Recheck 2 weeks       Review of the result(s) of each unique test - PHQ9 & GAD7       Care Everywhere 6/26/21 -Lipid panel   Diagnosis or treatment significantly limited by social determinants of health - depression     45 minutes spent on the date of the encounter doing chart review, history and exam, documentation and further activities as noted above    The patient indicates understanding of these issues and agrees with the plan.    Return in about 2 weeks (around 4/18/2022).    FLEX Alonso [facility] SUSY Fierro is a 71 year old who presents for the following health issues     History of Present Illness       Mental Health Follow-up:  Patient presents to follow-up on Depression.Patient's depression since last visit has been:  No change  The patient is not having other symptoms associated with depression.      Any significant life events: No  Patient is not feeling anxious or having panic attacks.  Patient has no concerns about alcohol or drug use.       Today's PHQ-9         PHQ-9 Total Score: 5  PHQ-9 Q9 Thoughts of better off dead/self-harm past 2 weeks :   (P) Not at all    How difficult have these problems made it for you to do your work, take care of things at home, or get along with other people: Not difficult at all        She eats 2-3 servings of fruits and vegetables daily.She consumes 0 sweetened beverage(s) daily.She exercises with enough effort to increase her heart rate 10 to 19 minutes per day.  She exercises with enough effort to increase her heart rate 4 days per week.   She is taking medications regularly.     - No one ever called about counseling   - Moving to her house in 2 weeks   - Just no change, still depressed         Plan from last visit 3/3/22   PLAN   - Start Amitriptyline 25 mg, 1 at bedtime      At 2 weeks, if helping but not enough  "can increase to 2 tablets   - Recheck 1 month     PHQ 1/27/2022 3/3/2022 4/4/2022   PHQ-9 Total Score 6 11 5   Q9: Thoughts of better off dead/self-harm past 2 weeks Not at all Not at all Not at all     HAYDEE-7 SCORE 10/5/2021 12/7/2021 1/27/2022   Total Score - - -   Total Score - 3 (minimal anxiety) 6 (mild anxiety)   Total Score 5 3 6         Review of Systems   Constitutional, HEENT, cardiovascular, pulmonary, gi and gu systems are negative, except as otherwise noted.      Objective    /70   Pulse 73   Temp 98.2  F (36.8  C) (Temporal)   Resp 22   Ht 1.565 m (5' 1.61\")   Wt 63.9 kg (140 lb 12.8 oz)   LMP 11/09/2005 (Approximate)   SpO2 99%   BMI 26.08 kg/m    Body mass index is 26.08 kg/m .  Physical Exam   GENERAL APPEARANCE: healthy, alert and no distress  EYES: Eyes grossly normal to inspection, PERRLA, conjunctivae and sclerae without injection or discharge, EOM intact   RESP: Lungs clear to auscultation - no rales, rhonchi or wheezes    CV: Regular rates and rhythm, normal S1 S2, no S3 or S4, no murmur, click or rub, no peripheral edema and peripheral pulses strong and symmetric bilaterally   MS: No musculoskeletal defects are noted and gait is age appropriate without ataxia   SKIN: No suspicious lesions or rashes, hydration status appears adeuqate with normal skin turgor   PSYCH: Alert and oriented x3; speech- coherent , normal rate and volume; able to articulate logical thoughts, able to abstract reason, no tangential thoughts, no hallucinations or delusions, mentation appears normal, Mood is depressed. Affect is flat. Thought content is free of suicidal ideation, hallucinations, and delusions. Dress is adequate and upkept. Eye contact is good during conversation.       Diagnostics: None       "

## 2022-04-05 ASSESSMENT — PATIENT HEALTH QUESTIONNAIRE - PHQ9: SUM OF ALL RESPONSES TO PHQ QUESTIONS 1-9: 5

## 2022-04-18 ENCOUNTER — OFFICE VISIT (OUTPATIENT)
Dept: FAMILY MEDICINE | Facility: OTHER | Age: 72
End: 2022-04-18
Payer: MEDICARE

## 2022-04-18 VITALS
SYSTOLIC BLOOD PRESSURE: 138 MMHG | DIASTOLIC BLOOD PRESSURE: 84 MMHG | WEIGHT: 141 LBS | BODY MASS INDEX: 26.11 KG/M2 | RESPIRATION RATE: 18 BRPM | OXYGEN SATURATION: 100 % | TEMPERATURE: 98.9 F | HEART RATE: 78 BPM

## 2022-04-18 DIAGNOSIS — F32.1 MODERATE MAJOR DEPRESSION (H): ICD-10-CM

## 2022-04-18 DIAGNOSIS — F43.23 ADJUSTMENT DISORDER WITH MIXED ANXIETY AND DEPRESSED MOOD: ICD-10-CM

## 2022-04-18 PROCEDURE — 99214 OFFICE O/P EST MOD 30 MIN: CPT | Performed by: PHYSICIAN ASSISTANT

## 2022-04-18 RX ORDER — VENLAFAXINE HYDROCHLORIDE 150 MG/1
150 CAPSULE, EXTENDED RELEASE ORAL DAILY
Qty: 90 CAPSULE | Refills: 1 | Status: SHIPPED | OUTPATIENT
Start: 2022-04-18 | End: 2022-08-18

## 2022-04-18 RX ORDER — AMITRIPTYLINE HYDROCHLORIDE 100 MG/1
100 TABLET ORAL AT BEDTIME
Qty: 90 TABLET | Refills: 1 | Status: SHIPPED | OUTPATIENT
Start: 2022-04-18 | End: 2022-05-16

## 2022-04-18 ASSESSMENT — ANXIETY QUESTIONNAIRES
4. TROUBLE RELAXING: SEVERAL DAYS
GAD7 TOTAL SCORE: 7
7. FEELING AFRAID AS IF SOMETHING AWFUL MIGHT HAPPEN: SEVERAL DAYS
GAD7 TOTAL SCORE: 7
3. WORRYING TOO MUCH ABOUT DIFFERENT THINGS: SEVERAL DAYS
GAD7 TOTAL SCORE: 7
2. NOT BEING ABLE TO STOP OR CONTROL WORRYING: SEVERAL DAYS
7. FEELING AFRAID AS IF SOMETHING AWFUL MIGHT HAPPEN: SEVERAL DAYS
5. BEING SO RESTLESS THAT IT IS HARD TO SIT STILL: SEVERAL DAYS
1. FEELING NERVOUS, ANXIOUS, OR ON EDGE: SEVERAL DAYS
6. BECOMING EASILY ANNOYED OR IRRITABLE: SEVERAL DAYS

## 2022-04-18 ASSESSMENT — ASTHMA QUESTIONNAIRES
QUESTION_3 LAST FOUR WEEKS HOW OFTEN DID YOUR ASTHMA SYMPTOMS (WHEEZING, COUGHING, SHORTNESS OF BREATH, CHEST TIGHTNESS OR PAIN) WAKE YOU UP AT NIGHT OR EARLIER THAN USUAL IN THE MORNING: NOT AT ALL
QUESTION_2 LAST FOUR WEEKS HOW OFTEN HAVE YOU HAD SHORTNESS OF BREATH: NOT AT ALL
ACT_TOTALSCORE: 25
QUESTION_1 LAST FOUR WEEKS HOW MUCH OF THE TIME DID YOUR ASTHMA KEEP YOU FROM GETTING AS MUCH DONE AT WORK, SCHOOL OR AT HOME: NONE OF THE TIME
ACT_TOTALSCORE: 25
QUESTION_4 LAST FOUR WEEKS HOW OFTEN HAVE YOU USED YOUR RESCUE INHALER OR NEBULIZER MEDICATION (SUCH AS ALBUTEROL): NOT AT ALL
QUESTION_5 LAST FOUR WEEKS HOW WOULD YOU RATE YOUR ASTHMA CONTROL: COMPLETELY CONTROLLED

## 2022-04-18 ASSESSMENT — PATIENT HEALTH QUESTIONNAIRE - PHQ9
SUM OF ALL RESPONSES TO PHQ QUESTIONS 1-9: 7
SUM OF ALL RESPONSES TO PHQ QUESTIONS 1-9: 7
10. IF YOU CHECKED OFF ANY PROBLEMS, HOW DIFFICULT HAVE THESE PROBLEMS MADE IT FOR YOU TO DO YOUR WORK, TAKE CARE OF THINGS AT HOME, OR GET ALONG WITH OTHER PEOPLE: SOMEWHAT DIFFICULT

## 2022-04-18 ASSESSMENT — PAIN SCALES - GENERAL: PAINLEVEL: NO PAIN (0)

## 2022-04-18 ASSESSMENT — ENCOUNTER SYMPTOMS: NERVOUS/ANXIOUS: 1

## 2022-04-18 NOTE — PROGRESS NOTES
Assessment & Plan     ICD-10-CM    1. Moderate major depression (H)  F32.1 amitriptyline (ELAVIL) 100 MG tablet     venlafaxine (EFFEXOR-XR) 150 MG 24 hr capsule   2. Adjustment disorder with mixed anxiety and depressed mood  F43.23 amitriptyline (ELAVIL) 100 MG tablet     - Patient continues to struggle with mood after some recent health issues however reports slightly improved from last visit 2 weeks ago with increase of Amitriptyline to 75 mg   - We did [facility] testing at last visit and are here to review this today   - Reviewed at length   - For now, no changes to current regimen except will continue with plan to increase Amitriptyline to 100 mg      She is also on Effexor 150 mg      Both of these on genesight say that she may require high doses   - Reviewed use and side effects   - Recheck 1 month       Review of the result(s) of each unique test - PHQ9 & GAD7       4/4/22 - Genesight testing   Diagnosis or treatment significantly limited by social determinants of health - Depression     22 minutes spent on the date of the encounter doing chart review, history and exam, documentation and further activities as noted above    The patient indicates understanding of these issues and agrees with the plan.    Return in about 1 month (around 5/18/2022).    FLEX Alonso [facility] SUSY Fierro is a 71 year old who presents for the following health issues  accompanied by her friend.    Anxiety    History of Present Illness       Mental Health Follow-up:  Patient presents to follow-up on Depression & Anxiety.Patient's depression since last visit has been:  Better  The patient is not having other symptoms associated with depression.  Patient's anxiety since last visit has been:  Better  The patient is not having other symptoms associated with anxiety.  Any significant life events: housing concerns  Patient is not feeling anxious or having panic attacks.  Patient  has no concerns about alcohol or drug use.       Today's PHQ-9         PHQ-9 Total Score: 7  PHQ-9 Q9 Thoughts of better off dead/self-harm past 2 weeks :   (P) Not at all    How difficult have these problems made it for you to do your work, take care of things at home, or get along with other people: Somewhat difficult    Today's HAYDEE-7 Score: 7    She eats 2-3 servings of fruits and vegetables daily.She consumes 0 sweetened beverage(s) daily.She exercises with enough effort to increase her heart rate 10 to 19 minutes per day.  She exercises with enough effort to increase her heart rate 3 or less days per week.   She is taking medications regularly.     - Little bit better   - More talkative   - People noticed not so down   - Did get call for therapy, not until August   - No side effects       PHQ 3/3/2022 4/4/2022 4/18/2022   PHQ-9 Total Score 11 5 7   Q9: Thoughts of better off dead/self-harm past 2 weeks Not at all Not at all Not at all     HAYDEE-7 SCORE 12/7/2021 1/27/2022 4/18/2022   Total Score - - -   Total Score 3 (minimal anxiety) 6 (mild anxiety) 7 (mild anxiety)   Total Score 3 6 7           Plan from last visit   PLAN  - Stop Bupropion   - Continue Effexor 150   - Increase Amitriptyline to 75 mg (3 tablets) for 2 weeks       Then increase to 100 mg (4 tablets)   - Recheck 2 weeks         Review of Systems   Psychiatric/Behavioral: The patient is nervous/anxious.    Constitutional, HEENT, cardiovascular, pulmonary, gi and gu systems are negative, except as otherwise noted.      Objective    /84   Pulse 78   Temp 98.9  F (37.2  C) (Temporal)   Resp 18   Wt 64 kg (141 lb)   LMP 11/09/2005 (Approximate)   SpO2 100%   BMI 26.11 kg/m    Body mass index is 26.11 kg/m .  Physical Exam   GENERAL APPEARANCE: healthy, alert and no distress  EYES: Eyes grossly normal to inspection, PERRLA, conjunctivae and sclerae without injection or discharge, EOM intact   MS: No musculoskeletal defects are noted and  gait is age appropriate without ataxia   SKIN: No suspicious lesions or rashes, hydration status appears adeuqate with normal skin turgor   PSYCH: Alert and oriented x3; speech- coherent , normal rate and volume; able to articulate logical thoughts, able to abstract reason, no tangential thoughts, no hallucinations or delusions, mentation appears normal, Mood is euthymic. Affect is flat. Thought content is free of suicidal ideation, hallucinations, and delusions. Dress is adequate and upkept. Eye contact is good during conversation.         Diagnostics: Reviewed in Epic

## 2022-04-18 NOTE — PATIENT INSTRUCTIONS
- Increase Amitriptyline to 100 mg (4 of what you have at home, then next prescription will be 100 mg in 1 tablet)    - No other changes     - Recheck 1 month

## 2022-04-19 ASSESSMENT — ANXIETY QUESTIONNAIRES: GAD7 TOTAL SCORE: 7

## 2022-05-16 ENCOUNTER — OFFICE VISIT (OUTPATIENT)
Dept: FAMILY MEDICINE | Facility: OTHER | Age: 72
End: 2022-05-16
Payer: MEDICARE

## 2022-05-16 VITALS
WEIGHT: 140 LBS | HEART RATE: 77 BPM | TEMPERATURE: 98.5 F | DIASTOLIC BLOOD PRESSURE: 84 MMHG | OXYGEN SATURATION: 97 % | BODY MASS INDEX: 25.93 KG/M2 | SYSTOLIC BLOOD PRESSURE: 130 MMHG | RESPIRATION RATE: 16 BRPM

## 2022-05-16 DIAGNOSIS — F32.1 MODERATE MAJOR DEPRESSION (H): Primary | ICD-10-CM

## 2022-05-16 DIAGNOSIS — F43.23 ADJUSTMENT DISORDER WITH MIXED ANXIETY AND DEPRESSED MOOD: ICD-10-CM

## 2022-05-16 PROCEDURE — 99214 OFFICE O/P EST MOD 30 MIN: CPT | Performed by: PHYSICIAN ASSISTANT

## 2022-05-16 RX ORDER — AMITRIPTYLINE HYDROCHLORIDE 150 MG/1
150 TABLET ORAL AT BEDTIME
Qty: 30 TABLET | Refills: 2 | Status: SHIPPED | OUTPATIENT
Start: 2022-05-16 | End: 2022-06-30

## 2022-05-16 ASSESSMENT — ANXIETY QUESTIONNAIRES
6. BECOMING EASILY ANNOYED OR IRRITABLE: NOT AT ALL
8. IF YOU CHECKED OFF ANY PROBLEMS, HOW DIFFICULT HAVE THESE MADE IT FOR YOU TO DO YOUR WORK, TAKE CARE OF THINGS AT HOME, OR GET ALONG WITH OTHER PEOPLE?: SOMEWHAT DIFFICULT
7. FEELING AFRAID AS IF SOMETHING AWFUL MIGHT HAPPEN: NOT AT ALL
4. TROUBLE RELAXING: SEVERAL DAYS
GAD7 TOTAL SCORE: 5
5. BEING SO RESTLESS THAT IT IS HARD TO SIT STILL: SEVERAL DAYS
7. FEELING AFRAID AS IF SOMETHING AWFUL MIGHT HAPPEN: NOT AT ALL
1. FEELING NERVOUS, ANXIOUS, OR ON EDGE: SEVERAL DAYS
2. NOT BEING ABLE TO STOP OR CONTROL WORRYING: SEVERAL DAYS
GAD7 TOTAL SCORE: 5
3. WORRYING TOO MUCH ABOUT DIFFERENT THINGS: SEVERAL DAYS
GAD7 TOTAL SCORE: 5

## 2022-05-16 ASSESSMENT — PAIN SCALES - GENERAL: PAINLEVEL: NO PAIN (0)

## 2022-05-16 ASSESSMENT — PATIENT HEALTH QUESTIONNAIRE - PHQ9
SUM OF ALL RESPONSES TO PHQ QUESTIONS 1-9: 3
10. IF YOU CHECKED OFF ANY PROBLEMS, HOW DIFFICULT HAVE THESE PROBLEMS MADE IT FOR YOU TO DO YOUR WORK, TAKE CARE OF THINGS AT HOME, OR GET ALONG WITH OTHER PEOPLE: NOT DIFFICULT AT ALL
SUM OF ALL RESPONSES TO PHQ QUESTIONS 1-9: 3

## 2022-05-16 NOTE — PROGRESS NOTES
Assessment & Plan     ICD-10-CM    1. Moderate major depression (H)  F32.1 amitriptyline (ELAVIL) 150 MG tablet   2. Adjustment disorder with mixed anxiety and depressed mood  F43.23 amitriptyline (ELAVIL) 150 MG tablet       - Patient continues to struggle with mood after some recent health issues however reports slightly improved from last visit 2 weeks ago with increase of Amitriptyline to 75 mg   - We did [facility] testing at last visit   - She reports doing a little better but still has bad days where she is down in the dumps, requests med increase      Will increase Amitriptyline to 150 mg      She is also on Effexor 150 mg      Both of these on genesight say that she may require high doses   - Reviewed use and side effects   - Recheck 1-2 months       Review of the result(s) of each unique test - PHQ9 & GAD7   Diagnosis or treatment significantly limited by social determinants of health - Depression     15 minutes spent on the date of the encounter doing chart review, history and exam, documentation and further activities as noted above    The patient indicates understanding of these issues and agrees with the plan.    Return in about 1 month (around 6/16/2022).    FLEX Alonso [facility] SUSY Fierro is a 72 year old who presents for the following health issues  accompanied by her spouse.    History of Present Illness       Mental Health Follow-up:  Patient presents to follow-up on Depression.Patient's depression since last visit has been:  Medium  The patient is not having other symptoms associated with depression.      Any significant life events: No  Patient is not feeling anxious or having panic attacks.  Patient has no concerns about alcohol or drug use.       Today's PHQ-9         PHQ-9 Total Score: 3  PHQ-9 Q9 Thoughts of better off dead/self-harm past 2 weeks :   (P) Not at all    How difficult have these problems made it for you to do your  work, take care of things at home, or get along with other people: Not difficult at all    Today's HAYDEE-7 Score: 5    She eats 2-3 servings of fruits and vegetables daily.She consumes 0 sweetened beverage(s) daily.She exercises with enough effort to increase her heart rate 9 or less minutes per day.  She exercises with enough effort to increase her heart rate 3 or less days per week.   She is taking medications regularly.       - Sleep is fine  - Still down in the dumps   - Moved             PHQ 4/4/2022 4/18/2022 5/16/2022   PHQ-9 Total Score 5 7 3   Q9: Thoughts of better off dead/self-harm past 2 weeks Not at all Not at all Not at all     HAYDEE-7 SCORE 1/27/2022 4/18/2022 5/16/2022   Total Score - - -   Total Score 6 (mild anxiety) 7 (mild anxiety) 5 (mild anxiety)   Total Score 6 7 5           Review of Systems   Constitutional, HEENT, cardiovascular, pulmonary, gi and gu systems are negative, except as otherwise noted.      Objective    /84   Pulse 77   Temp 98.5  F (36.9  C) (Temporal)   Resp 16   Wt 63.5 kg (140 lb)   LMP 11/09/2005 (Approximate)   SpO2 97%   BMI 25.93 kg/m    Body mass index is 25.93 kg/m .  Physical Exam   GENERAL APPEARANCE: healthy, alert and no distress  EYES: Eyes grossly normal to inspection, PERRLA, conjunctivae and sclerae without injection or discharge, EOM intact   RESP: Lungs clear to auscultation - no rales, rhonchi or wheezes    CV: Regular rates and rhythm, normal S1 S2, no S3 or S4, no murmur, click or rub, no peripheral edema and peripheral pulses strong and symmetric bilaterally   MS: No musculoskeletal defects are noted and gait is age appropriate without ataxia   SKIN: No suspicious lesions or rashes, hydration status appears adeuqate with normal skin turgor   PSYCH: Alert and oriented x3; speech- coherent , normal rate and volume; able to articulate logical thoughts, able to abstract reason, no tangential thoughts, no hallucinations or delusions, mentation  appears normal, Mood is depressed. Affect is slightly flat Thought content is free of suicidal ideation, hallucinations, and delusions. Dress is adequate and upkept. Eye contact is good during conversation.       Diagnostics: none

## 2022-05-16 NOTE — PATIENT INSTRUCTIONS
- [facility]         - Increase Amitriptyline to 150 mg     - Recheck 1-2 months

## 2022-05-17 ASSESSMENT — ANXIETY QUESTIONNAIRES: GAD7 TOTAL SCORE: 5

## 2022-05-17 ASSESSMENT — PATIENT HEALTH QUESTIONNAIRE - PHQ9: SUM OF ALL RESPONSES TO PHQ QUESTIONS 1-9: 3

## 2022-05-27 ENCOUNTER — TELEPHONE (OUTPATIENT)
Dept: FAMILY MEDICINE | Facility: OTHER | Age: 72
End: 2022-05-27
Payer: MEDICARE

## 2022-05-27 NOTE — TELEPHONE ENCOUNTER
Patient calling in regards to medication (Furosemide).    States pharmacy is telling her medication is on hold. RX was sent on 1/27/22 with #90 and 3 refills.    Calling pharmacy to see why medication is on hold. Pharmacy states there is no hold and not sure why patient was told this.    Will prepare the medication and patient will receive in 10-14 days.     Patient notified.    TRINO SchillingN, RN  [facility]  May 27, 2022

## 2022-06-30 ENCOUNTER — VIRTUAL VISIT (OUTPATIENT)
Dept: FAMILY MEDICINE | Facility: OTHER | Age: 72
End: 2022-06-30
Payer: MEDICARE

## 2022-06-30 DIAGNOSIS — F43.23 ADJUSTMENT DISORDER WITH MIXED ANXIETY AND DEPRESSED MOOD: Primary | ICD-10-CM

## 2022-06-30 DIAGNOSIS — K59.03 DRUG INDUCED CONSTIPATION: ICD-10-CM

## 2022-06-30 DIAGNOSIS — F32.1 MODERATE MAJOR DEPRESSION (H): ICD-10-CM

## 2022-06-30 PROCEDURE — 99214 OFFICE O/P EST MOD 30 MIN: CPT | Mod: 95 | Performed by: PHYSICIAN ASSISTANT

## 2022-06-30 RX ORDER — AMITRIPTYLINE HYDROCHLORIDE 150 MG/1
150 TABLET ORAL AT BEDTIME
Qty: 90 TABLET | Refills: 3 | Status: SHIPPED | OUTPATIENT
Start: 2022-06-30 | End: 2022-10-13

## 2022-06-30 RX ORDER — ASPIRIN 81 MG
100-200 TABLET, DELAYED RELEASE (ENTERIC COATED) ORAL 2 TIMES DAILY PRN
Qty: 120 TABLET | Refills: 3 | Status: ON HOLD | OUTPATIENT
Start: 2022-06-30 | End: 2023-05-16

## 2022-06-30 ASSESSMENT — ANXIETY QUESTIONNAIRES
1. FEELING NERVOUS, ANXIOUS, OR ON EDGE: NOT AT ALL
6. BECOMING EASILY ANNOYED OR IRRITABLE: NOT AT ALL
5. BEING SO RESTLESS THAT IT IS HARD TO SIT STILL: NOT AT ALL
GAD7 TOTAL SCORE: 0
GAD7 TOTAL SCORE: 0
IF YOU CHECKED OFF ANY PROBLEMS ON THIS QUESTIONNAIRE, HOW DIFFICULT HAVE THESE PROBLEMS MADE IT FOR YOU TO DO YOUR WORK, TAKE CARE OF THINGS AT HOME, OR GET ALONG WITH OTHER PEOPLE: NOT DIFFICULT AT ALL
3. WORRYING TOO MUCH ABOUT DIFFERENT THINGS: NOT AT ALL
2. NOT BEING ABLE TO STOP OR CONTROL WORRYING: NOT AT ALL
7. FEELING AFRAID AS IF SOMETHING AWFUL MIGHT HAPPEN: NOT AT ALL

## 2022-06-30 ASSESSMENT — PATIENT HEALTH QUESTIONNAIRE - PHQ9
5. POOR APPETITE OR OVEREATING: NOT AT ALL
SUM OF ALL RESPONSES TO PHQ QUESTIONS 1-9: 5

## 2022-06-30 NOTE — PROGRESS NOTES
"Sri is a 72 year old who is being evaluated via a billable telephone visit.      What phone number would you like to be contacted at? [phone]  How would you like to obtain your AVS? Mail a copy    Assessment & Plan     ICD-10-CM    1. Adjustment disorder with mixed anxiety and depressed mood  F43.23 Adult Mental Health  Referral     amitriptyline (ELAVIL) 150 MG tablet   2. Moderate major depression (H)  F32.1 Adult Mental Health  Referral     amitriptyline (ELAVIL) 150 MG tablet   3. Drug induced constipation  K59.03 docusate sodium (COLACE) 100 MG tablet        - Patient continues to struggle with mood after some recent health issues however reports slightly improved from last visit   - We did Genes[facility] testing previously   - She reports doing a little better but still \"medium\"      On Amitriptyline to 150 mg and Effexor 150 mg      Both of these on genesight say that she may require high doses   - Reviewed use and side effects   - She still would like to feel better, at this point I would recommend psychiatry evaluation as has failed medications     I also think that counseling will be what helps her the most      This is scheduled for August  - Referral placed for mental health psychiatry   - Recheck 1-2 months   - Also reports constipation from all her medications      Recommend continue OTC stimulant laxative until large BM, then start daily stool softener as most her medications that cause dehydration and constipation      Will send Docustate for daily use, reviewed use and side effects        Review of the result(s) of each unique test - PHQ9 & GAD7   Diagnosis or treatment significantly limited by social determinants of health - Depression     28 minutes spent on the date of the encounter doing chart review, history and exam, documentation and further activities as noted above    The patient indicates understanding of these issues and agrees with the plan.    Return in about 2 months " "(around 8/30/2022) for Recheck.    FLEX Alonso [facility] SUSY Fierro is a 72 year old, presenting for the following health issues:  Depression    *Please do PHQ9 & GAD7    HPI   Depression and Anxiety Follow-Up    How are you doing with your depression since your last visit? No change    How are you doing with your anxiety since your last visit?  Improved     Are you having other symptoms that might be associated with depression or anxiety? No    Have you had a significant life event? No     Do you have any concerns with your use of alcohol or other drugs? No    -  Very constipated   - Sleep -   - Just feels \"in the middle\"   - reaching for pills and fell on butt and side   - Just doesn't have a lot of motivation or interest    - Therapy in August       Social History     Tobacco Use     Smoking status: Never Smoker     Smokeless tobacco: Never Used   Vaping Use     Vaping Use: Never used   Substance Use Topics     Alcohol use: Yes     Comment: beerx2/x1 per month     Drug use: No     PHQ 4/18/2022 5/16/2022 6/30/2022   PHQ-9 Total Score 7 3 5   Q9: Thoughts of better off dead/self-harm past 2 weeks Not at all Not at all Not at all     HAYDEE-7 SCORE 4/18/2022 5/16/2022 6/30/2022   Total Score - - -   Total Score 7 (mild anxiety) 5 (mild anxiety) -   Total Score 7 5 0           Review of Systems   Constitutional, HEENT, cardiovascular, pulmonary, gi and gu systems are negative, except as otherwise noted.      Objective     Vitals:  No vitals were obtained today due to virtual visit.    Physical Exam   healthy, alert and no distress  PSYCH: Alert and oriented times 3; coherent speech, normal   rate and volume, able to articulate logical thoughts, able   to abstract reason, no tangential thoughts, no hallucinations   or delusions  Her affect is flat   RESP: No cough, no audible wheezing, able to talk in full sentences  Remainder of exam unable to be completed " due to telephone visits    Diagnostics: none       Phone call duration: 10 minutes and 30 seconds

## 2022-07-01 ENCOUNTER — NURSE TRIAGE (OUTPATIENT)
Dept: FAMILY MEDICINE | Facility: OTHER | Age: 72
End: 2022-07-01

## 2022-07-01 NOTE — TELEPHONE ENCOUNTER
Spoke with patient. Fell last night.  Hurt her ribs.  Landed on her butt. Ribs are very tender. Hurts to take a deep breath.  She is still able to get around just slower.   Advised she should be seen.  No opening in clinic.  Advised UC.    Return call if symptoms worsen or have other questions.    TRINO AlanN, RN, PHN  [facility] ~ Registered Nurse  Clinic Triage ~ [place] [facility]  July 1, 2022      Reason for Disposition    High-risk adult (e.g., age > 60, osteoporosis, chronic steroid use)    Additional Information    Negative: Dangerous mechanism of injury (e.g., MVA, contact sports, trampoline, diving, fall > 10 feet or 3 meters) (Exception: back pain began > 1 hour after injury)    Negative: Weakness (i.e., paralysis, loss of muscle strength) of the leg(s) or foot and sudden onset after back injury    Negative: Numbness (i.e., loss of sensation) of the leg(s) or foot and sudden onset after back injury    Negative: Major bleeding (actively dripping or spurting) that can't be stopped    Negative: Bullet, knife or other serious penetrating wound    Negative: Shock suspected (e.g., cold/pale/clammy skin, too weak to stand, low BP, rapid pulse)    Negative: Sounds like a life-threatening emergency to the triager    Negative: Back pain not from an injury    Negative: Back pain from overuse (work, exercise, gardening) OR from twisting, lifting, or bending injury    Negative: SEVERE pain in kidney area (flank) that follows a direct blow to that site    Negative: Blood in urine (red, pink, or tea-colored)    Negative: Unable to urinate (or only a few drops) > 4 hours and bladder feels very full (e.g., palpable bladder or strong urge to urinate)    Negative: Loss of bladder or bowel control (urine or bowel incontinence; wetting self, leaking stool) of new onset    Negative: Numbness (loss of sensation) in groin or rectal area    Negative: Skin is split open or gaping (length > 1/2 inch or 12 mm)     Negative: Puncture wound of back    Negative: Bleeding won't stop after 10 minutes of direct pressure (using correct technique)    Negative: Sounds like a serious injury to the triager    Negative: Weakness of a leg or foot (e.g., unable to bear weight, dragging foot)    Negative: Numbness of a leg or foot (i.e.., loss of sensation)    Negative: SEVERE back pain (e.g., excruciating, unable to do any normal activities) and not improved after pain medicine and CARE ADVICE    Negative: Pain radiates into the thigh or further down the leg now    Negative: Landed hard on feet or buttocks and pain over spine    Negative: Large swelling or bruise and size > palm of person's hand    Negative: No prior tetanus shots (or is not fully vaccinated) and any wound (e.g., cut or scrape)    Negative: HIV positive or severe immunodeficiency (severely weak immune system) and DIRTY cut or scrape    Negative: Patient is confused or is an unreliable provider of information (e.g., dementia, severe intellectual disability, alcohol intoxication)    Negative: Patient wants to be seen    Negative: Last tetanus shot >10 years ago and CLEAN cut or scrape    Negative: Last tetanus shot > 5 years ago and DIRTY cut or scrape    Protocols used: BACK INJURY-A-OH

## 2022-07-05 ENCOUNTER — VIRTUAL VISIT (OUTPATIENT)
Dept: BEHAVIORAL HEALTH | Facility: CLINIC | Age: 72
End: 2022-07-05
Payer: MEDICARE

## 2022-07-05 ENCOUNTER — VIRTUAL VISIT (OUTPATIENT)
Dept: PSYCHIATRY | Facility: CLINIC | Age: 72
End: 2022-07-05
Attending: PHYSICIAN ASSISTANT
Payer: MEDICARE

## 2022-07-05 DIAGNOSIS — E53.8 VITAMIN B12 DEFICIENCY (NON ANEMIC): ICD-10-CM

## 2022-07-05 DIAGNOSIS — F43.23 ADJUSTMENT DISORDER WITH MIXED ANXIETY AND DEPRESSED MOOD: Primary | ICD-10-CM

## 2022-07-05 DIAGNOSIS — E55.9 VITAMIN D DEFICIENCY: ICD-10-CM

## 2022-07-05 DIAGNOSIS — F33.0 MAJOR DEPRESSIVE DISORDER, RECURRENT EPISODE, MILD (H): Primary | ICD-10-CM

## 2022-07-05 PROCEDURE — 90791 PSYCH DIAGNOSTIC EVALUATION: CPT | Mod: 52 | Performed by: COUNSELOR

## 2022-07-05 PROCEDURE — 99443 PR PHYSICIAN TELEPHONE EVALUATION 21-30 MIN: CPT | Mod: 95 | Performed by: NURSE PRACTITIONER

## 2022-07-05 NOTE — PROGRESS NOTES
Sri is a 72 year old who is being evaluated via a billable telephone visit.      What phone number would you like to be contacted at? [phone]  How would you like to obtain your AVS? Mail a copy   Keila MONZON  Phone call duration: 45 minutes                                                             Outpatient Psychiatric Evaluation - Standard Adult    Name:  Sri Grewal  : 1950    Source of Referral:  Primary Care Provider: Rosenda Pierre PA-C   Last visit: 2022  Current Psychotherapist: None    Identifying Data:  Patient is a 72 year old,   White Not  or  female  who presents for initial visit with me.  Patient is currently retired. Patient attended the session alone. Consent to communicate signed for no one. Consent for treatment signed and included in electronic medical record. Discussed limits of confidentiality today. My Practice Policy was reviewed and signed.     Patient prefers to be called: Sri     Chief Complaint:    No chief complaint on file.        HPI:      Sri is a 72-year-old female visiting with me today to talk about medication options to manage depression and anxiety.  She currently has no psychiatric provider.  She has depression but it does not occur on a daily basis.  Usually it involves how her life is going now.  During the daytime she works around her house and then at noon time will often have lunch with a friend.  Around 4 or 5 PM she will drink 1-2 beers with friends at [facility] and then tells me she gets home by 6:00.  Sometimes she will have additional beer at home after that.  At no time did Sri endorse any suicide thinking.  Sometimes she has concerns about her memory function whereby she can remember things recently but not what happened over 10 years ago.    She began taking medication at age 32 years old.  At the time she was working full-time, her  had alcoholism, and she had 2 small children.   She retired from her position after 31 years of service.  Her  is  and this was a sudden cardiac event.    With regards to anxiety she tells me she used to worry about everything but things have lessened in intensity.  She denies panic attacks.  She takes over-the-counter sleeping medications and sleeps well with no nightmares.    Psychiatric Review of Symptoms:  Depression: Depressed Mood  Sleep: Decrease   Concentration: Decrease  Suicide: No  Ruminations: Increase    PHQ-9 scores:   PHQ-9 SCORE 2022   PHQ-9 Total Score - - -   PHQ-9 Total Score MyChart 7 (Mild depression) 3 (Minimal depression) -   PHQ-9 Total Score 7 3 5     Disha:  No symptoms   MDQ Score: Negative Screen  Anxiety: Feeling nervous, anxious, or on edge  Worrying too much about different things    HAYDEE-7 scores:    HAYDEE-7 SCORE 2022   Total Score - - -   Total Score 7 (mild anxiety) 5 (mild anxiety) -   Total Score 7 5 0     Panic:  No symptoms   Agoraphobia:  No   PTSD:  Denies   OCD:  No symptoms   Psychosis: No symptoms   ADD / ADHD: No symptoms  Gambling or shoplifting: No   Eating Disorder:  No symptoms  Sleep:   Denies concerns     Psychiatric History:   Hospitalizations: None  History of Commitment? No   Past Treatment: counseling and medication(s) from physician / PCP  Suicide Attempts: None  Self-injurious Behavior: Denies  Electroconvulsive Therapy (ECT) or Transcranial Magnetic Stimulation (TMS): No   Genetic Testing: No     Substance Use History:  Current use of drugs or alcohol: Alcohol    Patient reports no problems as a result of their drinking / drug use.   Based on the clinical interview, there  are indications of drug or alcohol abuse. Frequent alcohol consumption.  Tobacco use: No  Caffeine: No  Patient has not received chemical dependency treatment in the past  Recovery Programming Involvement: None    Past Medical History:  Past Medical History:   Diagnosis Date      Abnormal Papanicolaou smear of cervix and cervical HPV      Allergic rhinitis, cause unspecified     seasonal allergies     Contact dermatitis and other eczema, due to unspecified cause      Endometriosis, site unspecified      Esophageal reflux     GERD     Migraine, unspecified, without mention of intractable migraine without mention of status migrainosus      Mild persistent asthma      Unspecified disorder of uterus     fibroid uterus      Surgery:   Past Surgical History:   Procedure Laterality Date     COLONOSCOPY  2014    Procedure: COLONOSCOPY;  Surgeon: Nathan Baker MD;  Location: [facility]     ZZC  DELIVERY ONLY       X2     ZZHC COLONOSCOPY THRU STOMA, DIAGNOSTIC  2002    normal     Allergies:     Allergies   Allergen Reactions     Codeine      GI UPSET     Oxycodone      Seasonal Allergies      Primary Care Provider: Rosenda Pierre PA-C  Seizures or Head Injury: No  Diet: No Restrictions  Food Allergies: No   Exercise: No regular exercise program  Supplements: Reviewed per Electronic Medical Record Today    albuterol (PROAIR HFA) 108 (90 Base) MCG/ACT inhaler, Inhale 1-2 puffs into the lungs every 6 hours as needed for shortness of breath / dyspnea  amitriptyline (ELAVIL) 150 MG tablet, Take 1 tablet (150 mg) by mouth At Bedtime  aspirin (ASA) 81 MG chewable tablet, Take 81 mg by mouth daily  butalbital-aspirin-caffeine (FIORINAL) -40 MG per capsule, Take 1 capsule by mouth every 6 hours as needed for moderate to severe pain  carvedilol (COREG) 12.5 MG tablet, Take 1 tablet (12.5 mg) by mouth 2 times daily (Patient taking differently: Take 12.5 mg by mouth 2 times daily Patient states only taking 1 a day but will start taking 2)  docusate sodium (COLACE) 100 MG tablet, Take 1-2 tablets (100-200 mg) by mouth 2 times daily as needed for constipation  furosemide (LASIX) 40 MG tablet, Take 1 tablet (40 mg) by mouth daily  lisinopril (ZESTRIL) 20 MG tablet,  Take 1 tablet (20 mg) by mouth daily  rosuvastatin (CRESTOR) 20 MG tablet, Take 1 tablet (20 mg) by mouth daily  venlafaxine (EFFEXOR-XR) 150 MG 24 hr capsule, Take 1 capsule (150 mg) by mouth daily    No current facility-administered medications on file prior to visit.       The [place] Prescription Monitoring Program has been reviewed and there are no concerns about diversionary activity for controlled substances at this time.     Vital Signs:  Vitals: LMP 11/09/2005 (Approximate)     Labs:  Most recent laboratory results reviewed and the pertinent results include: History of low sodium    Most recent EKG from January 2017 reviewed. QTc interval 469.     Review of Systems:  10 systems (general, cardiovascular, respiratory, eyes, ENT, endocrine, GI, , M/S, neurological) were reviewed. Most pertinent finding(s) is/are: GI upset, constipated, denies chest pain, controlled hypertension,  . The remaining systems are all unremarkable.  Family History:   Patient reported family history includes:   Family History   Problem Relation Age of Onset     Heart Disease Mother         anemia     Osteoporosis Mother      Hypertension Mother      Cerebrovascular Disease Mother      Alcohol/Drug Mother         alcohol     Neurologic Disorder Mother         migraines     Hypertension Father      Cancer No family hx of         breast     Mental Illness History: Unknown  Substance Abuse History: Yes: Alcoholism  Suicide History: Unknown  Medications: Unknown     Social History:     See [facility] assessment for further information    Raised: [place], [place]  Highest education level was high school graduate.   Employment History: Retired  Childhood illnesses: Denies  Current Living situation:  [place], [place]  with alone . Feels safe at home.  Children: 30, 35   Firearms/Weapons Access: Yes Safety plan reviewed   Service: No    Mental Status Examination:     Appearance:  awake, alert  Attitude:  cooperative   Eye Contact:   Unable to assess  Gait and Station: No assistive Devices used and No dizziness or falls  Psychomotor Behavior:  Unable to assess  Oriented to:  time, person, and place  Attention Span and Concentration:  Normal  Speech:  clear, coherent and Speaks: English  Mood:  anxious and depressed  Affect:  mood congruent  Associations:  no loose associations  Thought Process:  goal oriented  Thought Content:  no evidence of suicidal ideation or homicidal ideation, no auditory hallucinations present and no visual hallucinations present  Recent and Remote Memory:  intact Not formally assessed. No amnesia  Fund of Knowledge: appropriate  Insight:  good  Judgment:  intact  Impulse Control:  intact    Suicide Risk Assessment:  Today Sri Grewal reports no thoughts to harm self or others. In addition, there are notable risk factors for self-harm, including single status, anxiety and substance abuse. However, risk is mitigated by commitment to family, history of seeking help when needed, future oriented, denies suicidal intent or plan and denies homicidal ideation, intent, or plan. Therefore, based on all available evidence including the factors cited above, Sri Grewal does not appear to be at imminent risk for self-harm, does not meet criteria for a 72-hr hold, and therefore remains appropriate for ongoing outpatient level of care.  A thorough assessment of risk factors related to suicide and self-harm have been reviewed and are noted above. The patient convincingly denies acute suicidality on several occasions. Local community safety resources reviewed and printed for patient to use if needed. There was no deceit detected, and the patient presented in a manner that was believable.     DSM5  Diagnosis:  296.31 (F33.0) Major Depressive Disorder, Recurrent Episode, Mild _, With anxious distress and With mixed features    Medical Comorbidities Include:   Patient Active Problem List    Diagnosis Date Noted     Health Care Home  07/27/2011     Priority: High     X  EMERGENCY CARE PLAN  Presenting Problem Signs and Symptoms Treatment Plan    Questions or conerns during clinic hours    I will call the clinic directly     Questions or conerns outside clinic hours    I will call the 24 hour nurse line at [phone]    Patient needs to schedule an appointment    I will call the 24 hour scheduling team at [phone] or clinic directly    Same day treatment     I will call the clinic first, nurse line if after hours, urgent care and express care if needed                            DX V65.8 REPLACED WITH [id number] HEALTH CARE HOME (04/08/2013)       History of cerebrovascular accident (CVA) with residual deficit 12/08/2021     Priority: Medium     ST elevation myocardial infarction (STEMI), unspecified artery (H) 08/26/2021     Priority: Medium     Coronary artery disease due to lipid rich plaque 08/26/2021     Priority: Medium     Compression fracture of T12 vertebra with routine healing, subsequent encounter 11/17/2020     Priority: Medium     Hematoma 01/10/2019     Priority: Medium     Hematoma of abdominal wall, initial encounter 01/09/2019     Priority: Medium     Abdominal wall hematoma 01/09/2019     Priority: Medium     Hyponatremia 11/26/2018     Priority: Medium     Age-related osteoporosis without current pathological fracture 11/12/2018     Priority: Medium     HSV (herpes simplex virus) infection 07/31/2018     Priority: Medium     Atypical mole 06/01/2017     Priority: Medium     Hypertension goal BP (blood pressure) < 140/90 05/25/2017     Priority: Medium     TIA (transient ischemic attack) 01/16/2017     Priority: Medium     Adjustment disorder with mixed anxiety and depressed mood 09/09/2014     Priority: Medium     Middle cerebral artery aneurysm 10/29/2013     Priority: Medium     Noted in 2007.  Intervention not recommended at that time.  Observation.  Saw Interventional Radiology 12/2013.  Recheck CTA in one year.        Moderate major depression (H) 09/08/2010     Priority: Medium     Insomnia 09/08/2010     Priority: Medium     Atrophy of vagina 05/30/2010     Priority: Medium     Lump or mass in breast 03/29/2010     Priority: Medium     Mild persistent asthma 02/21/2005     Priority: Medium     Anemia 04/25/2002     Priority: Medium     Problem list name updated by automated process. Provider to review       Chronic migraine without aura without status migrainosus, not intractable      Priority: Medium     Multiple trials off of fiorinal have not been successful  Problem list name updated by automated process. Provider to review       Other abnormal Papanicolaou smear of cervix and cervical HPV(795.09)      Priority: Medium     (Problem list name updated by automated process. Provider to review and confirm.)       Endometriosis      Priority: Medium     Problem list name updated by automated process. Provider to review       Allergic rhinitis      Priority: Medium     Problem list name updated by automated process. Provider to review         The Patient Activation Measure (PACHECO) score was completed and recorded in EPIC. This assesses patient knowledge, skill, and confidence for self-management.   PACHECO Score (Last Two) 9/8/2010 6/10/2011   PACHECO Raw Score 40 40   Activation Score 60 60   PACHECO Level 3 3                Impression:  Sri Grewal met with me today to review her mental health history and determine medication options to manage depression and anxiety symptoms.  Most days she has low mood but is primarily concerned about anxiety that is generalized in nature.  She denies any panic attacks.  She prefers to continue with her medications of venlafaxine  mg daily and amitriptyline 150 mg at bedtime.  She is reporting no side effects of concern at this time.  1 more visit with me to determine if any other changes are needed is recommended.    Medication side effects and alternatives reviewed. Health promotion activities  recommended and reviewed today. All questions addressed. Education and counseling completed regarding risks and benefits of medications and psychotherapy options. Collaborative Care Psychiatry Service model reviewed today. Recommend therapy for additional support.     Treatment Plan:     1.  Amitriptyline 150 mg at bedtime  2.  Venlafaxine  mg daily  3.  Talk therapy, when able to, for learning ways to adjust to life changes and stressors      Continue all other medical directions per primary care provider.     Continue all other medications as reviewed per electronic medical record today.     Safety plan reviewed. To the Emergency Department as needed or call after hours crisis line at [phone] or [phone]. [place] Crisis Text Line: Text [place] to 266397  or  Suicide LifeLine Chat: Masterseek.org/chat/    To schedule individual or family therapy, call [facility] at [phone].     Schedule an appointment with me in 6 weeks or sooner as needed.  Call [facility] at [phone] to schedule.    Follow up with primary care provider as planned or for acute medical concerns.    Call the psychiatric nurse line with medication questions or concerns at [phone].    Exhbit may be used to communicate with your provider, but this is not intended to be used for emergencies.    Crisis Resources:    National Suicide Prevention Lifeline: 199.263.6080 (TTY: 411.365.9922). Call anytime for help.  (www.suicidepreventionlifeline.org)  [employer] [place] [employer] Mental Illness (www.diamond.org): 408.854.4875 or 974-138-4076.   Mental Health Association (www.mentalhealth.org): 755.609.8460 or 888-515-7398.  [place] Crisis Text Line: Text [place] to 484096  Suicide LifeLine Chat: Masterseek.org/chat    Administrative Billing:   Time spent with patient included counseling and coordination of care regarding above diagnoses and treatment plan.    Patient Status:  Patient  will continue to be seen for ongoing consultation and stabilization.    Signed:   HARJINDER LariosP-BC   Psychiatry

## 2022-07-05 NOTE — PROGRESS NOTES
"Collaborative Care Psychiatry Service  Provider Name: ASIA Hughes, [facility]    PATIENT'S NAME: Sri Grewal  PREFERRED NAME: Sri   PREFERRED PRONOUNS:    MRN:   [id number]  :   1950   ACCT. NUMBER: [id number]  DATE OF SERVICE: 22  START TIME: 958am  END TIME: 1022am    BRIEF ADULT DIAGNOSTIC ASSESSMENT    Telemedicine Visit: The patient's condition can be safely assessed and treated via synchronous audio and visual telemedicine encounter.      Reason for Telemedicine Visit: Services only offered telehealth    Originating Site (Patient Location): Patient's home    Distant Site (Provider Location): Provider Remote Setting- Home Office    Consent:  The patient/guardian has verbally consented to: the potential risks and benefits of telemedicine (video visit) versus in person care; bill my insurance or make self-payment for services provided; and responsibility for payment of non-covered services.     Mode of Communication:  Phone    As the provider I attest to compliance with applicable laws and regulations related to telemedicine.    The patient has been notified of the following:      \"We have found that certain health care needs can be provided without the need for a face to face visit.  This service lets us provide the care you need with a phone conversation.       I will have full access to your [place] medical record during this entire phone call.   I will be taking notes for your medical record.      Since this is like an office visit, we will bill your insurance company for this service.       There are potential benefits and risks of telephone visits (e.g. limits to patient confidentiality) that differ from in-person visits.?  Confidentiality still applies for telephone services, and nobody will record the visit.  It is important to be in a quiet, private space that is free of distractions (including cell phone or other devices) during the visit.??      If during the course of the call I " "believe a telephone visit is not appropriate, you will not be charged for this service\"     Consent has been obtained for this service by care team member: Yes     Identifying Information:  Patient is a 72 year old, .  The pronoun use throughout this assessment reflects the patient's chosen pronoun.  Patient was referred for an assessment by primary care provider.  Patient attended the session alone.     Chief Complaint:   The reason for seeking services at this time is: \" depression that is not responding to medication \"   The problem(s) began 10 years ago and then depression came back after pt had a stoke or seziure. Patient has attempted to resolve these concerns in the past through PCP, medication.    Does the client have any condition that is currently presenting as a potential to harm themselves or others (severe withdrawal, serious medical condition, severe emotional/behavioral problem)? No.  Proceed with assessment.     Reason for CCPS: Tried different medications and they haven't worked. Pt had a gene sight test. Pt says the PCP did not fully review these results with her. Depression in the past 10 years and then it got better on it's own and it started in again. Pt had a stroke or a seizure and pt had to leave their home and then go to a tx center a ways from their home. Pt moved back home and the depression has stayed with her.   Pt states that they still do activities they like to do, but the enjoyment is less. Pt also reports that their appetite has decreased.     Pt did take medications to help with depression 10 years ago. Pt has not done therapy before.     Hope to: find medication to help get rid of depression     Review of Symptoms per patient report:  Depression: Lack of interest, Change in energy level, Difficulties concentrating, Change in appetite, Feelings of hopelessness, Feeling sad, down, or depressed and Poor hygeine  Disha:  No Symptoms  Psychosis: No Symptoms  Anxiety: Poor " concentration- head aches- normal to have them   Panic:  No symptoms  Post Traumatic Stress Disorder:  No Symptoms none,   Eating Disorder: No Symptoms  ADD / ADHD:  Poor organizational skills, Distractibility and Restlessness/fidgety  Conduct Disorder: No symptoms  Autism Spectrum Disorder: No symptoms  Obsessive Compulsive Disorder: No Symptoms    Sleep: fall asleep and stay asleep     Caffeine: coffee- drink one casually over the day   Tobacco: Pt denies     Current alcohol use: at night couple a beers almost every night   Current drug use: Pt denies       Rating Scales:  PHQ-9:   [facility] Follow-up to PHQ 4/18/2022 5/16/2022 6/30/2022   PHQ-9 9. Suicide Ideation past 2 weeks Not at all Not at all Not at all      GAD7:    HAYDEE-7 SCORE 4/18/2022 5/16/2022 6/30/2022   Total Score - - -   Total Score 7 (mild anxiety) 5 (mild anxiety) -   Total Score 7 5 0     WHODAS: No flowsheet data found. Will be updated next meeting due to time constraints.     CAGE:    CAGE-AID Flowsheet 7/5/2022   Have you ever felt you should Cut down on your drinking or drug use? 0   Have people Annoyed you by criticizing your drinking or drug use? 0   Have you ever felt bad or Guilty about your drinking or drug use? 0   Have you ever had a drink or used drugs first thing in the morning to steady your nerves or to get rid of a hangover? (Eye opener) 0   CAGE-AID SCORE 0           Personal Medical History:  Past Medical History:   Diagnosis Date     Abnormal Papanicolaou smear of cervix and cervical HPV      Allergic rhinitis, cause unspecified     seasonal allergies     Contact dermatitis and other eczema, due to unspecified cause      Endometriosis, site unspecified      Esophageal reflux     GERD     Migraine, unspecified, without mention of intractable migraine without mention of status migrainosus      Mild persistent asthma      Unspecified disorder of uterus     fibroid uterus       Patient has not received mental health services in the  past: none.  Psychiatric Hospitalizations: None.  Patient denies a history of civil commitment. Currently, patient is not receiving other mental health services.  These include none.     Patient does not report a history of head injury / trauma / cognitive impairment / seizures.  Pt has migraines every once in awhile, takes medications prescribed by PCP.    Current Medications:  Current Outpatient Medications   Medication Sig Dispense Refill     albuterol (PROAIR HFA) 108 (90 Base) MCG/ACT inhaler Inhale 1-2 puffs into the lungs every 6 hours as needed for shortness of breath / dyspnea 18 g 3     amitriptyline (ELAVIL) 150 MG tablet Take 1 tablet (150 mg) by mouth At Bedtime 90 tablet 3     aspirin (ASA) 81 MG chewable tablet Take 81 mg by mouth daily       butalbital-aspirin-caffeine (FIORINAL) -40 MG per capsule Take 1 capsule by mouth every 6 hours as needed for moderate to severe pain 30 capsule 5     carvedilol (COREG) 12.5 MG tablet Take 1 tablet (12.5 mg) by mouth 2 times daily (Patient taking differently: Take 12.5 mg by mouth 2 times daily Patient states only taking 1 a day but will start taking 2) 180 tablet 3     docusate sodium (COLACE) 100 MG tablet Take 1-2 tablets (100-200 mg) by mouth 2 times daily as needed for constipation 120 tablet 3     furosemide (LASIX) 40 MG tablet Take 1 tablet (40 mg) by mouth daily 90 tablet 3     lisinopril (ZESTRIL) 20 MG tablet Take 1 tablet (20 mg) by mouth daily 90 tablet 3     rosuvastatin (CRESTOR) 20 MG tablet Take 1 tablet (20 mg) by mouth daily 90 tablet 3     venlafaxine (EFFEXOR-XR) 150 MG 24 hr capsule Take 1 capsule (150 mg) by mouth daily 90 capsule 1        Allergies:  Allergies   Allergen Reactions     Codeine      GI UPSET     Oxycodone      Seasonal Allergies        Family Psychiatric History:  Patient did not report a family history of mental health concerns.     Family History     Problem (# of Occurrences) Relation (Name,Age of Onset)     "Alcohol/Drug (1) Mother: alcohol    Cerebrovascular Disease (1) Mother    Heart Disease (1) Mother: anemia    Hypertension (2) Mother, Father    Neurologic Disorder (1) Mother: migraines    Osteoporosis (1) Mother       Negative family history of: Cancer          Social/Family History:  Patient reported they grew up in [place], [place].  They were raised by biological parents. Pt has a brother and a sister. Patient reported that   childhood was \"pretty good\".  Patient denies experiencing childhood abuse/neglect. Patient described their current relationships with family of origin as mom and dad are , when alive, pt had a good relationship with them.  Pt got along with brother and sister.    The patient has been  1 times,  passed away and has 2 children.  Pt  described the relationship with   children as, \"very good.\" Patient reported having few good friends.     Cultural influences and impact on patient's life structure, values, norms, and healthcare: Racial or Ethnic Self-Identification White female, Time Orientation: is on time to appointments and central time and Social Orientation: pt reports having a few close friends who are supportive. Patient identified their preferred language to be English. Patient reported they does not need the assistance of an  or other support involved in treatment.       Educational/Occupational History:  Patient reported   highest education level was associate degree / vocational certificate. 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 The patient did not serve in the . Patient is currently retired.       Social History     Socioeconomic History     Marital status:      Spouse name: Not on file     Number of children: 2     Years of education: Not on file     Highest education level: Not on file   Occupational History     Occupation: retired     Employer: RETIRED   Tobacco Use     Smoking status: Never Smoker     Smokeless " tobacco: Never Used   Vaping Use     Vaping Use: Never used   Substance and Sexual Activity     Alcohol use: Yes     Comment: beerx2/x1 per month     Drug use: No     Sexual activity: Not Currently     Partners: Male     Birth control/protection: Surgical     Comment: tubal ligation   Other Topics Concern      Service No     Blood Transfusions No     Caffeine Concern No     Occupational Exposure No     Hobby Hazards No     Sleep Concern Yes     Comment: Insomnia     Stress Concern Yes     Comment: breathing,  passed away 2004     Weight Concern Yes     Comment: would like to lose some weight     Special Diet No     Back Care Yes     Exercise Yes     Comment: walking at work daily     Bike Helmet Not Asked     Seat Belt Yes     Self-Exams Yes     Comment: periodically     Parent/sibling w/ CABG, MI or angioplasty before 65F 55M? No   Social History Narrative     Not on file     Social Determinants of Health     Financial Resource Strain: Not on file   Food Insecurity: Not on file   Transportation Needs: Not on file   Physical Activity: Not on file   Stress: Not on file   Social Connections: Not on file   Intimate Partner Violence: Not on file   Housing Stability: Not on file       Patient reported that they have not been involved with the legal system. Patient denies being on probation / parole / under the jurisdiction of the court.    Current Mental Status Exam:   Appearance:   Unable to assess-telephone visit  Eye Contact:   Unable to assess-telephone visit  Psychomotor:   Normal   Attitude / Demeanor:  Cooperative  Interested  Speech      Rate / Production:  Normal/ Responsive      Volume:   Normal  volume, soft at times      Language:   intact  Mood:    Depressed  Normal  Affect:    Appropriate    Thought Content:  Clear   Thought Process:  Coherent  Logical       Associations:  No loosening of associations  Insight:    Good   Judgment:   Intact    Orientation:   All  Attention/concentration: Good      Safety Assessment:   Current Safety Concerns:  [place] Suicide Severity Rating Scale (Short Version)  [place] Suicide Severity Rating (Short Version) 1/9/2019 1/9/2019 11/4/2020 11/9/2020 5/20/2021   Over the past 2 weeks have you felt down, depressed, or hopeless? - - no no no   Over the past 2 weeks have you had thoughts of killing yourself? - - no no no   Have you ever attempted to kill yourself? - - no no no   Q1 Wished to be Dead (Past Month) no no - - -   Q2 Suicidal Thoughts (Past Month) no no - - -   Q6 Suicide Behavior (Lifetime) no no - - -     Patient denies current homicidal ideation and behaviors.  Patient denies current self-injurious ideation and behaviors.    Patient denied risk behaviors associated with substance use.  Patient reported substance use associated with mental health symptoms.  Patient reports the following current concerns for their personal safety: None.  Patient reports there are firearms in the house. Pt thinks that there might be one somewhere left from  and it is not loaded. .     History of Safety Concerns:  Patient denied a history of homicidal ideation.     Patient denied a history of personal safety concerns.    Patient denied a history of assaultive behaviors.    Patient denied a history of sexual assault behaviors.     Patient denied a history of risk behaviors associated with substance use.  Patient reported a history of substance use associated with mental health symptoms.  Patient reports the following protective factors: positive relationships positive social network and positive family connections, forward/future oriented thinking, dedication to family/friends, safe and stable environment, regular sleep, effectively controls impulses, adherence with prescribed medication, daily obligations, committment to well-being, strong sense of self-worth/esteem and sense of personal control or determination    Risk  Plan:  See Preliminary Treatment Plan for Safety and Risk Management Plan    Diagnosis:  Diagnostic Criteria:   Major Depressive Disorder  CRITERIA (A-C) REPRESENT A MAJOR DEPRESSIVE EPISODE - SELECT THESE CRITERIA  A) Recurrent episode(s) - symptoms have been present during the same 2-week period and represent a change from previous functioning 5 or more symptoms (required for diagnosis)   - Depressed mood. Note: In children and adolescents, can be irritable mood.     - Diminished interest or pleasure in all, or almost all, activities.    - Fatigue or loss of energy.    - Feelings of worthlessness or N/A guilt.    - Diminished ability to think or concentrate, or indecisiveness.   B) The symptoms cause clinically significant distress or impairment in social, occupational, or other important areas of functioning  C) The episode is not attributable to the physiological effects of a substance or to another medical condition  D) The occurence of major depressive episode is not better explained by other thought / psychotic disorders  E) There has never been a manic episode or hypomanic episode    Diagnoses:  1. Major depressive disorder, recurrent episode, mild (H)        Patient's Strengths and Limitations:  Patient identified the following strengths or resources that will help them succeed in treatment: commitment to health and well being, friends / good social support, family support, insight, intelligence, motivation, sense of humor and work ethic. Things that may interfere with the patient's success in treatment include: none identified.     Recommendations:     1. Plan for Safety and Risk Management:Recommended that patient call 911 or go to the local ED should there be a change in any of these risk factors.  Report to child / adult protection services was N/A.      2. Resources/Service Plan:       services are not indicated.     Modifications to assist communication are not indicated.     Additional  disability accommodations are not indicated.      3. Collaboration:  Collaboration / coordination of treatment will be initiated with the following support professionals:   [facility]      4.  Referrals:   The following referral(s) will be initiated: N/A, pt has a therapy appointment for next month.       Staff Name/Credentials:  ASIA Hughes, [facility]  July 5, 2022

## 2022-07-06 ENCOUNTER — TELEPHONE (OUTPATIENT)
Dept: PHARMACY | Facility: CLINIC | Age: 72
End: 2022-07-06

## 2022-08-18 ENCOUNTER — VIRTUAL VISIT (OUTPATIENT)
Dept: BEHAVIORAL HEALTH | Facility: CLINIC | Age: 72
End: 2022-08-18
Payer: MEDICARE

## 2022-08-18 ENCOUNTER — VIRTUAL VISIT (OUTPATIENT)
Dept: PSYCHIATRY | Facility: CLINIC | Age: 72
End: 2022-08-18
Payer: MEDICARE

## 2022-08-18 DIAGNOSIS — F43.23 ADJUSTMENT DISORDER WITH MIXED ANXIETY AND DEPRESSED MOOD: Primary | ICD-10-CM

## 2022-08-18 DIAGNOSIS — F33.0 MAJOR DEPRESSIVE DISORDER, RECURRENT EPISODE, MILD (H): Primary | ICD-10-CM

## 2022-08-18 PROCEDURE — 99204 OFFICE O/P NEW MOD 45 MIN: CPT | Mod: 95 | Performed by: NURSE PRACTITIONER

## 2022-08-18 RX ORDER — VENLAFAXINE HYDROCHLORIDE 150 MG/1
150 CAPSULE, EXTENDED RELEASE ORAL DAILY
Qty: 90 CAPSULE | Refills: 1 | Status: ON HOLD | OUTPATIENT
Start: 2022-08-18 | End: 2023-05-08

## 2022-08-18 NOTE — PROGRESS NOTES
"Sri is a 72 year old who is being evaluated via a billable telephone visit.      What phone number would you like to be contacted at? [phone]  How would you like to obtain your AVS? Mail  Keila MONZON    Phone call duration: 20 minutes             Outpatient Psychiatric Progress Note    Name: Sri Grewal   : 1950                    Primary Care Provider: Rosenda Pierre PA-C   Therapist: None    PHQ-9 scores:  PHQ-9 SCORE 2022   PHQ-9 Total Score - - -   PHQ-9 Total Score MyChart 7 (Mild depression) 3 (Minimal depression) -   PHQ-9 Total Score 7 3 5       HAYDEE-7 scores:  HAYDEE-7 SCORE 2022   Total Score - - -   Total Score 7 (mild anxiety) 5 (mild anxiety) -   Total Score 7 5 0       Patient Identification:    Patient is a 72 year old year old, single  White Not  or  female  who presents for return visit with me.  Patient is currently retired. Patient attended the session alone. Patient prefers to be called: \" Sri\".    Current medications include: albuterol (PROAIR HFA) 108 (90 Base) MCG/ACT inhaler, Inhale 1-2 puffs into the lungs every 6 hours as needed for shortness of breath / dyspnea  amitriptyline (ELAVIL) 150 MG tablet, Take 1 tablet (150 mg) by mouth At Bedtime  aspirin (ASA) 81 MG chewable tablet, Take 81 mg by mouth daily  butalbital-aspirin-caffeine (FIORINAL) -40 MG per capsule, Take 1 capsule by mouth every 6 hours as needed for moderate to severe pain  carvedilol (COREG) 12.5 MG tablet, Take 1 tablet (12.5 mg) by mouth 2 times daily (Patient taking differently: Take 12.5 mg by mouth 2 times daily Patient states only taking 1 a day but will start taking 2)  docusate sodium (COLACE) 100 MG tablet, Take 1-2 tablets (100-200 mg) by mouth 2 times daily as needed for constipation  furosemide (LASIX) 40 MG tablet, Take 1 tablet (40 mg) by mouth daily  lisinopril (ZESTRIL) 20 MG tablet, Take 1 tablet (20 mg) by " mouth daily  rosuvastatin (CRESTOR) 20 MG tablet, Take 1 tablet (20 mg) by mouth daily  venlafaxine (EFFEXOR-XR) 150 MG 24 hr capsule, Take 1 capsule (150 mg) by mouth daily    No current facility-administered medications on file prior to visit.       The [place] Prescription Monitoring Program has been reviewed and there are no concerns about diversionary activity for controlled substances at this time.      I was able to review most recent Primary Care Provider, specialty provider, and therapy visit notes that I have access to.     Today, patient reports that she is enjoying life more.  She is not isolating.  She meets with friends regularly.  There are days when she feels down.  She worries about things in life that hold her down.  She has no sleep concerns.       has a past medical history of Abnormal Papanicolaou smear of cervix and cervical HPV, Allergic rhinitis, cause unspecified, Contact dermatitis and other eczema, due to unspecified cause, Endometriosis, site unspecified, Esophageal reflux, Migraine, unspecified, without mention of intractable migraine without mention of status migrainosus, Mild persistent asthma, and Unspecified disorder of uterus.    Social history updates:    Remains social with her friends in the community.    Substance use updates:    He admits to daily alcohol use in a social setting with friends.  Tobacco use: No    Vital Signs:   LMP 11/09/2005 (Approximate)     Labs:    Most recent laboratory results reviewed and no new labs.     Mental Status Examination:  Appearance: awake, alert  Attitude: cooperative  Eye Contact:  Unable to assess  Gait and Station: No dizziness or falls  Psychomotor Behavior:  Unable to assess  Oriented to:  time, person, and place  Attention Span and Concentration:  Normal  Speech:   clear, coherent and Speaks: English  Mood:  anxious, depressed and better  Affect:  mood congruent  Associations:  no loose associations  Thought Process:  goal  oriented  Thought Content:  no evidence of suicidal ideation or homicidal ideation, no auditory hallucinations present and no visual hallucinations present  Recent and Remote Memory:  intact Not formally assessed. No amnesia.  Fund of Knowledge: appropriate  Insight:  good  Judgment:  intact  Impulse Control:  intact    Suicide Risk Assessment:  Today Sri Grewal reports no thoughts to harm themself or others. In addition, there are notable risk factors for self-harm, including age, single status and anxiety. However, risk is mitigated by history of seeking help when needed, future oriented, denies suicidal intent or plan and denies homicidal ideation, intent, or plan. Therefore, based on all available evidence including the factors cited above, Sri Grewal does not appear to be at imminent risk for self-harm, does not meet criteria for a 72-hr hold, and therefore remains appropriate for ongoing outpatient level of care.  A thorough assessment of risk factors related to suicide and self-harm have been reviewed and are noted above. The patient convincingly denies suicidality on several occasions. Local community safety resources printed and reviewed for patient to use if needed. There was no deceit detected, and the patient presented in a manner that was believable.     DSM5 Diagnosis:  296.35 (F33.41)  Major Depressive Disorder, Recurrent Episode, In partial remission _ and With mixed features  300.02 (F41.1) Generalized Anxiety Disorder    Medical comorbidities include:   Patient Active Problem List    Diagnosis Date Noted     Health Care Home 07/27/2011     Priority: High     X  EMERGENCY CARE PLAN  Presenting Problem Signs and Symptoms Treatment Plan    Questions or conerns during clinic hours    I will call the clinic directly     Questions or conerns outside clinic hours    I will call the 24 hour nurse line at [phone]    Patient needs to schedule an appointment    I will call the 24 hour scheduling team  at [phone] or clinic directly    Same day treatment     I will call the clinic first, nurse line if after hours, urgent care and express care if needed                            DX V65.8 REPLACED WITH 23479 HEALTH CARE HOME (04/08/2013)       History of cerebrovascular accident (CVA) with residual deficit 12/08/2021     Priority: Medium     ST elevation myocardial infarction (STEMI), unspecified artery (H) 08/26/2021     Priority: Medium     Coronary artery disease due to lipid rich plaque 08/26/2021     Priority: Medium     Compression fracture of T12 vertebra with routine healing, subsequent encounter 11/17/2020     Priority: Medium     Hematoma 01/10/2019     Priority: Medium     Hematoma of abdominal wall, initial encounter 01/09/2019     Priority: Medium     Abdominal wall hematoma 01/09/2019     Priority: Medium     Hyponatremia 11/26/2018     Priority: Medium     Age-related osteoporosis without current pathological fracture 11/12/2018     Priority: Medium     HSV (herpes simplex virus) infection 07/31/2018     Priority: Medium     Atypical mole 06/01/2017     Priority: Medium     Hypertension goal BP (blood pressure) < 140/90 05/25/2017     Priority: Medium     TIA (transient ischemic attack) 01/16/2017     Priority: Medium     Adjustment disorder with mixed anxiety and depressed mood 09/09/2014     Priority: Medium     Middle cerebral artery aneurysm 10/29/2013     Priority: Medium     Noted in 2007.  Intervention not recommended at that time.  Observation.  Saw Interventional Radiology 12/2013.  Recheck CTA in one year.       Moderate major depression (H) 09/08/2010     Priority: Medium     Insomnia 09/08/2010     Priority: Medium     Atrophy of vagina 05/30/2010     Priority: Medium     Lump or mass in breast 03/29/2010     Priority: Medium     Mild persistent asthma 02/21/2005     Priority: Medium     Anemia 04/25/2002     Priority: Medium     Problem list name updated by automated process.  Provider to review       Chronic migraine without aura without status migrainosus, not intractable      Priority: Medium     Multiple trials off of fiorinal have not been successful  Problem list name updated by automated process. Provider to review       Other abnormal Papanicolaou smear of cervix and cervical HPV(795.09)      Priority: Medium     (Problem list name updated by automated process. Provider to review and confirm.)       Endometriosis      Priority: Medium     Problem list name updated by automated process. Provider to review       Allergic rhinitis      Priority: Medium     Problem list name updated by automated process. Provider to review         Assessment:    Sri Grewal reported in today that she is feeling stable.  Depression and anxiety symptoms are mostly manageable.  She does report occasional drinking of alcohol with friends and sometimes drinks at night to help her sleep.  She is continuing with amitriptyline at bedtime to help with sleep.  As she feels stable she is ready to return to her primary care provider for future refills of her venlafaxine.  A note was sent to her primary care provider about this.  Patient is in agreement with this plan as no further changes in her medications are desired..    Medication side effects and alternatives were reviewed. Health promotion activities recommended and reviewed today. All questions addressed. Education and counseling completed regarding risks and benefits of medications and psychotherapy options.    Treatment Plan:        1.  Continue venlafaxine  mg daily    2.  Continue amitriptyline 150 mg at bedtime    3.  Talk therapy, when you feeling need to, to process life stressors    4.  You are now ready to return to your primary care provider for future refills of your venlafaxine        Continue all other medications as reviewed per electronic medical record today.     Safety plan reviewed. To the Emergency Department as needed or call  after hours crisis line at [phone] or [phone]. [place] Crisis Text [place]. Text [place] to 873577 or Suicide LifeLine Chat: suicideUrgentRx.org/chat/    To schedule individual or family therapy, call [facility] at [phone]    Schedule an appointment with me as needed. Call [facility] at [phone] to schedule.    Follow up with primary care provider as planned or for acute medical concerns.    Call the psychiatric nurse line with medication questions or concerns at [phone]    MyChart may be used to communicate with your provider, but this is not intended to be used for emergencies.    Crisis Resources:    National Suicide Prevention Lifeline: 196.776.2968 (TTY: 722.653.3569). Call anytime for help.  (www.suicidepreventionlifeline.org)  National [place] on Mental Illness (www.diamond.org): 809.220.7025 or 091-965-5776.   Mental Health Association (www.mentalhealth.org): 816.640.9935 or 407-882-8038.  [place] Crisis Text [place]: Text [place] to 322627  Suicide LifeLine Chat: suicideUrgentRx.org/chat    Administrative Billing:   Time spent with patient includes counseling and coordination of care regarding above diagnoses and treatment plan.    Patient Status:  The patient is being returned to the referring provider for ongoing care and medication prescribing.  The patient can be referred back to this service for further consultation as needed.    Signed:   YENNY Larios-BC   Psychiatry

## 2022-08-18 NOTE — PATIENT INSTRUCTIONS
1.  Continue venlafaxine  mg daily  2.  Continue amitriptyline 150 mg at bedtime  3.  Talk therapy, when you feeling need to, to process life stressors  4.  You are now ready to return to your primary care provider for future refills of your venlafaxine    Continue all other medications as reviewed per electronic medical record today.   Safety plan reviewed. To the Emergency Department as needed or call after hours crisis line at [phone] or [phone]. [place] Crisis Text [place]. Text [place] to 348594 or Suicide LifeLine Chat: suicide2heuresavant.org/chat/  To schedule individual or family therapy, call [facility] at [phone]  Schedule an appointment with me as needed. Call [facility] at [phone] to schedule.  Follow up with primary care provider as planned or for acute medical concerns.  Call the psychiatric nurse line with medication questions or concerns at [phone]  MyChart may be used to communicate with your provider, but this is not intended to be used for emergencies.    Crisis Resources:    National Suicide Prevention Lifeline: 723.210.8332 (TTY: 902.956.3107). Call anytime for help.  (www.suicidepreventionlifeline.org)  National [place] on Mental Illness (www.diamond.org): 324.753.8929 or 573-595-3438.   Mental Health Association (www.mentalhealth.org): 354.338.3798 or 741-047-3585.  [place] Crisis Text [place]: Text [place] to 299079  Suicide LifeLine Chat: suicide2heuresavant.org/chat

## 2022-08-18 NOTE — PROGRESS NOTES
"[facility] [place]  2022      Behavioral Health Clinician Progress Note    Patient Name: Sri Grewal           Service Type:  Phone Visit      Service Location:   Phone call (patient / identified key support person reached)     Session Start Time: 950am  Session End Time: 10am      Session Length: 10 min- no bill     Attendees: Patient     Service Modality:  Phone Visit:      Provider verified identity through the following two step process.  Patient provided:  Patient  and Patient is known previously to provider    The patient has been notified of the following:      \"We have found that certain health care needs can be provided without the need for a face to face visit.  This service lets us provide the care you need with a phone conversation.       I will have full access to your [facility] medical record during this entire phone call.   I will be taking notes for your medical record.      Since this is like an office visit, we will bill your insurance company for this service.       There are potential benefits and risks of telephone visits (e.g. limits to patient confidentiality) that differ from in-person visits.?Confidentiality still applies for telephone services, and nobody will record the visit.  It is important to be in a quiet, private space that is free of distractions (including cell phone or other devices) during the visit.??      If during the course of the call I believe a telephone visit is not appropriate, you will not be charged for this service\"     Consent has been obtained for this service by care team member: Yes     Visit Activities (Refresh list every visit): [facility] Only    Diagnostic Assessment Date: 2022  Treatment Plan Review Date: 2022  See Flowsheets for today's PHQ-9 and HAYDEE-7 results  Previous PHQ-9:   PHQ-9 SCORE 2022   PHQ-9 Total Score - - -   PHQ-9 Total Score MyChart 7 (Mild " "depression) 3 (Minimal depression) -   PHQ-9 Total Score 7 3 5     Previous HAYDEE-7:   HAYDEE-7 SCORE 4/18/2022 5/16/2022 6/30/2022   Total Score - - -   Total Score 7 (mild anxiety) 5 (mild anxiety) -   Total Score 7 5 0       PACHECO LEVEL:  PACHECO Score (Last Two) 9/8/2010 6/10/2011   PACHECO Raw Score 40 40   Activation Score 60 60   PACHECO Level 3 3       DATA  Extended Session (60+ minutes): No  Interactive Complexity: No  Crisis: No  [facility] Patient: No    Treatment Objective(s) Addressed in This Session:  Target Behavior(s): be social, accept situation and emotions, enjoy activities    Depressed Mood: Decrease frequency and intensity of feeling down, depressed, hopeless  Improve diet, appetite, mindful eating, and / or meal planning  Identify negative self-talk and behaviors: challenge core beliefs, myths, and actions    Current Stressors / Issues:   update: Pt says that they are learned to adjust and accept that this life and world isn't always going to be fun. Pt is spending time with the people they enjoy. Pt says that their medication is helping them to balance things. Pt is still doing what they like to do. Pt is gaining control over their depression. Pt is getting more enjoyment from.   Stressors: none   Side Effects: constipation   Mood: \"right in the middle good, it's getting better\"   Appetite: average   Sleep: unremarkable    Impulsive spending/spending sprees: none  Suicidality: Pt denies  Self-harm: Pt denies  Substance Use: alcohol- 2 lower calorie beer   Caffeine: 2 cups of coffee per day   Therapist: not meeting with a therapist   Interventions: supportive therapy, encourage pt to continue to do things they enjoy   Medication Questions/Requests: none        Progress on Treatment Objective(s) / Homework:  Satisfactory progress - ACTION (Actively working towards change); Intervened by reinforcing change plan / affirming steps taken    Motivational Interviewing    MI Intervention: Co-Developed Goal: reduce depression " symptoms, Expressed Empathy/Understanding, Supported Autonomy, Collaboration, Evocation, Permission to raise concern or advise, Open-ended questions, Reflections: simple and complex, Change talk (evoked) and Reframe     Change Talk Expressed by the Patient: Committment to change Taking steps    Provider Response to Change Talk: E - Evoked more info from patient about behavior change, A - Affirmed patient's thoughts, decisions, or attempts at behavior change, R - Reflected patient's change talk and S - Summarized patient's change talk statements    Also provided psychoeducation about behavioral health condition, symptoms, and treatment options    Care Plan review completed: Yes    Medication Review:  No changes to current psychiatric medication(s)    Medication Compliance:  Yes    Changes in Health Issues:   None reported    Chemical Use Review:   Substance Use: Chemical use reviewed, no active concerns identified      Tobacco Use: No current tobacco use.      Assessment: Current Emotional / Mental Status (status of significant symptoms):  Risk status (Self / Other harm or suicidal ideation)  Patient denies a history of suicidal ideation, suicide attempts, self-injurious behavior, homicidal ideation, homicidal behavior and and other safety concerns  Patient denies current fears or concerns for personal safety.  Patient denies current or recent suicidal ideation or behaviors.  Patient denies current or recent homicidal ideation or behaviors.  Patient denies current or recent self injurious behavior or ideation.  Patient denies other safety concerns.  A safety and risk management plan has not been developed at this time, however patient was encouraged to call [place] [phone] should there be a change in any of these risk factors.    Appearance:   Unable to assess-telephone visit  Eye Contact:   Unable to assess-telephone visit  Psychomotor Behavior: Normal   Attitude:   Cooperative   Interested  Orientation:   All  Speech   Rate / Production: Normal    Volume:  Soft   Mood:    Sad   Affect:    Subdued   Thought Content:  Clear   Thought Form:  Coherent  Logical   Insight:    Good     Diagnoses:  1. Major depressive disorder, recurrent episode, mild (H)        Collateral Reports Completed:  Communicated with:   Communicated with HARJINDER LariosDonaldBC   [facility]      Plan: (Homework, other):  Patient was given information about behavioral services and encouraged to schedule a follow up appointment with the clinic [facility] in conjunction with [facility] appointment.  She was also given information about mental health symptoms and treatment options .  CD Recommendations: No indications of CD issues. ASIA Hughes, [facility] 08/18/2022      ______________________________________________________________________    Integrated Primary Care Behavioral Health Treatment Plan    Patient's Name: Sri Grewal  YOB: 1950    Date of Creation: 08/18/2022  Date Treatment Plan Last Reviewed/Revised: 08/18/2022    DSM5 Diagnoses: 296.31 (F33.0) Major Depressive Disorder, Recurrent Episode, Mild _  Psychosocial / Contextual Factors: health concerns, social, enjoyment of activities   PROMIS (reviewed every 90 days): Will be completed next meeting.     Referral / Collaboration:  Referral to another professional/service is not indicated at this time.    Anticipated number of session for this episode of care: 5-7  Anticipation frequency of session: as determined by Alexus  Anticipated Duration of each session: 16-37 minutes  Treatment plan will be reviewed in 90 days or when goals have been changed.       MeasurableTreatment Goal(s) related to diagnosis / functional impairment(s)  Goal 1: Patient will reduce depressive symptoms.     I will know I've met my goal when I will continue to accept things as they are.      Objective #A (Patient Action)    Patient will continue to use at least 1 skill to  accept their situation and emotions and report to [facility] each meeting their progress .    Status: New - Date: 08/18/2022     Intervention(s)  Therapist will provide support to pt through CBT and MI to make progress towards this goal.     Patient has reviewed and agreed to the above plan.      OMAYRA Hughes  August 18, 2022

## 2022-08-18 NOTE — Clinical Note
Good morning, Sri is ready to return to your care for future medication refills.  She is tolerating the venlafaxine ER well at 150 mg daily.  Amitriptyline is at bedtime at 150 mg and she reports sleeping well.  Thank you for the referral.  Alexus

## 2022-08-25 ENCOUNTER — VIRTUAL VISIT (OUTPATIENT)
Dept: PSYCHOLOGY | Facility: CLINIC | Age: 72
End: 2022-08-25
Attending: PHYSICIAN ASSISTANT
Payer: MEDICARE

## 2022-08-25 DIAGNOSIS — F32.1 MODERATE MAJOR DEPRESSION (H): ICD-10-CM

## 2022-08-25 DIAGNOSIS — F43.23 ADJUSTMENT DISORDER WITH MIXED ANXIETY AND DEPRESSED MOOD: ICD-10-CM

## 2022-08-25 PROCEDURE — 90834 PSYTX W PT 45 MINUTES: CPT | Mod: 95

## 2022-08-25 NOTE — PROGRESS NOTES
"      [facility]                                     Progress Note    Patient Name: Sri Grewal  Date: 8/25/22         Service Type: Individual      Session Start Time: 11:00 am  Session End Time: 11:45 am     Session Length: 45 min    Session #: 1    Attendees: Client attended alone    Service Modality:  Phone Visit:      Provider verified identity through the following two step process.  Patient provided:  Patient address    The patient has been notified of the following:      \"We have found that certain health care needs can be provided without the need for a face to face visit.  This service lets us provide the care you need with a phone conversation.       I will have full access to your [facility] medical record during this entire phone call.   I will be taking notes for your medical record.      Since this is like an office visit, we will bill your insurance company for this service.       There are potential benefits and risks of telephone visits (e.g. limits to patient confidentiality) that differ from in-person visits.?Confidentiality still applies for telephone services, and nobody will record the visit.  It is important to be in a quiet, private space that is free of distractions (including cell phone or other devices) during the visit.??      If during the course of the call I believe a telephone visit is not appropriate, you will not be charged for this service\"     Consent has been obtained for this service by care team member: Yes     DATA  Interactive Complexity: No  Crisis: No        Progress Since Last Session (Related to Symptoms / Goals / Homework):   Symptoms: No change first session    Homework: Did not complete      Episode of Care Goals: No improvement - PRECONTEMPLATION (Not seeing need for change); Intervened by educating the patient about the effects of current behavior on health.  Evoked information about reasons to continue behavior, express concern / " recommendations, and explored any change talk     Current / Ongoing Stressors and Concerns:   Managing depression with medication and behavioral activation, working to improve thinking/activity level.     Treatment Objective(s) Addressed in This Session:   Increase interest, engagement, and pleasure in doing things    Intervention:   DBT: Introduced radical acceptance concept, applied to situational stressors around cognitive and physical functioning.   Interpersonal Therapy: Provided active listening and rapport building. Surfaced current helpful living behaviors and desire for adding cognitive challenge to living including returning to cooking, potentially sewing (quilting)    Assessments completed prior to visit:  The following assessments were completed by patient for this visit:  PHQ2:   PHQ-2 ( 1999 [employer]) 5/16/2022 5/16/2022 4/18/2022 4/18/2022 4/4/2022 4/4/2022 1/27/2022   Q1: Little interest or pleasure in doing things - - - - - - -   Q2: Feeling down, depressed or hopeless - - - - - - -   PHQ-2 Score - - - - - - -   PHQ-2 Total Score (12-17 Years)- Positive if 3 or more points; Administer PHQ-A if positive - - - - - - -   Q1: Little interest or pleasure in doing things - - - - - - -   Q2: Feeling down, depressed or hopeless - - - - - - -   PHQ-2 Score Incomplete Incomplete Incomplete Incomplete Incomplete Incomplete Incomplete     PHQ9:   PHQ-9 SCORE 12/7/2021 1/27/2022 3/3/2022 4/4/2022 4/18/2022 5/16/2022 6/30/2022   PHQ-9 Total Score - - - - - - -   PHQ-9 Total Score MyChart 4 (Minimal depression) 6 (Mild depression) - 5 (Mild depression) 7 (Mild depression) 3 (Minimal depression) -   PHQ-9 Total Score 4 6 11 5 7 3 5     GAD2: No flowsheet data found.  GAD7:   HAYDEE-7 SCORE 8/26/2021 10/5/2021 12/7/2021 1/27/2022 4/18/2022 5/16/2022 6/30/2022   Total Score - - - - - - -   Total Score - - 3 (minimal anxiety) 6 (mild anxiety) 7 (mild anxiety) 5 (mild anxiety) -   Total Score 11 5 3 6 7 5 0     [place]  Suicide Severity Rating Scale (Lifetime/Recent)  [place] Suicide Severity Rating (Lifetime/Recent) 1/9/2019 1/9/2019   Q1 Wished to be Dead (Past Month) no no   Q2 Suicidal Thoughts (Past Month) no no   Q6 Suicide Behavior (Lifetime) no no         ASSESSMENT: Current Emotional / Mental Status (status of significant symptoms):   Risk status (Self / Other harm or suicidal ideation)   Patient denies current fears or concerns for personal safety.   Patient denies current or recent suicidal ideation or behaviors.   Patient denies current or recent homicidal ideation or behaviors.   Patient denies current or recent self injurious behavior or ideation.   Patient denies other safety concerns.   Patient reports there has been no change in risk factors since their last session.     Patient reports there has been no change in protective factors since their last session.     Recommended that patient call 911 or go to the local ED should there be a change in any of these risk factors.     Appearance:   Phone    Eye Contact:   Phone    Psychomotor Behavior: Normal    Attitude:   Cooperative  Interested   Orientation:   All   Speech    Rate / Production: Normal     Volume:  Normal    Mood:    Anxious  Depressed    Affect:    Appropriate  Blunted    Thought Content:  Clear    Thought Form:  Coherent  Logical    Insight:    Good      Medication Review:   No changes to current psychiatric medication(s)     Medication Compliance:   Yes     Changes in Health Issues:   None reported     Chemical Use Review:   Substance Use: Chemical use reviewed, no active concerns identified  drinks a maximum of 2 beers a night.     Tobacco Use: No current tobacco use.      Diagnosis:  1. Moderate major depression (H)    2. Adjustment disorder with mixed anxiety and depressed mood        Collateral Reports Completed:   Not Applicable    PLAN: (Patient Tasks / Therapist Tasks / Other)  Continue with sleep schedule.  Focus on accepting reality of  current condition, while challenging self with some cooking tasks, ie a familiar recipe.Schedule follow up appt as needed at .    Heather Barnett, OMAYRA 8/25/2022                                                         ______________________________________________________________________

## 2022-09-19 DIAGNOSIS — G43.709 CHRONIC MIGRAINE WITHOUT AURA WITHOUT STATUS MIGRAINOSUS, NOT INTRACTABLE: ICD-10-CM

## 2022-09-19 RX ORDER — BUTALBITAL/ASPIRIN/CAFFEINE 50-325-40
1 CAPSULE ORAL EVERY 6 HOURS PRN
Qty: 30 CAPSULE | Refills: 5 | Status: ON HOLD | OUTPATIENT
Start: 2022-09-19 | End: 2023-05-16

## 2022-09-19 NOTE — TELEPHONE ENCOUNTER
reviewed. Due for routine fills.     Jose Francisco Pierre PA-C  [facility]

## 2022-09-26 NOTE — PATIENT INSTRUCTIONS
1.  Amitriptyline 150 mg at bedtime  2.  Venlafaxine  mg daily  3.  Talk therapy, when able to, for learning ways to adjust to life changes and stressors    Continue all other medical directions per primary care provider.   Continue all other medications as reviewed per electronic medical record today.   Safety plan reviewed. To the Emergency Department as needed or call after hours crisis line at [phone] or [phone]. [place] Crisis Text Line: Text [place] to 861963  or  Suicide LifeLine Chat: suicideBenaissance.org/chat/  To schedule individual or family therapy, call [facility] at [phone].   Schedule an appointment with me in 6 weeks or sooner as needed.  Call [facility] at [phone] to schedule.  Follow up with primary care provider as planned or for acute medical concerns.  Call the psychiatric nurse line with medication questions or concerns at [phone].  [employer] may be used to communicate with your provider, but this is not intended to be used for emergencies.    Crisis Resources:    National Suicide Prevention Lifeline: 786.715.1460 (TTY: 280.487.5706). Call anytime for help.  (www.suicidepreventionlifeline.org)  National Premier on Mental Illness (www.diamond.org): 798.134.4770 or 175-009-9583.   Mental Health Association (www.mentalhealth.org): 250.186.7733 or 681-540-4488.  [place] Crisis Text Line: Text [place] to 581896  Suicide LifeLine Chat: suicideBenaissance.org/chat

## 2022-10-13 ENCOUNTER — TELEPHONE (OUTPATIENT)
Dept: FAMILY MEDICINE | Facility: OTHER | Age: 72
End: 2022-10-13

## 2022-10-13 DIAGNOSIS — F32.1 MODERATE MAJOR DEPRESSION (H): ICD-10-CM

## 2022-10-13 DIAGNOSIS — F43.23 ADJUSTMENT DISORDER WITH MIXED ANXIETY AND DEPRESSED MOOD: ICD-10-CM

## 2022-10-13 RX ORDER — AMITRIPTYLINE HYDROCHLORIDE 150 MG/1
150 TABLET ORAL AT BEDTIME
Qty: 90 TABLET | Refills: 3 | Status: SHIPPED | OUTPATIENT
Start: 2022-10-13 | End: 2023-02-02

## 2022-10-13 NOTE — TELEPHONE ENCOUNTER
Refilled Amitriptyline to [facility]    Jose Francisco Pierre PA-C  [facility]

## 2022-10-13 NOTE — TELEPHONE ENCOUNTER
Reason for Call:  Other prescription    Detailed comments: Patient called needing refill on amitriptyline, states that pharmacy told her per order that she is unable to refill until December but she will be out of the medication by then.     Phone Number Patient can be reached at: Cell number on file:    Telephone Information:   Mobile [phone]       Best Time: Anytime    Can we leave a detailed message on this number? YES    Call taken on 10/13/2022 at 12:11 PM by Jordyn Rose

## 2023-01-03 ENCOUNTER — VIRTUAL VISIT (OUTPATIENT)
Dept: FAMILY MEDICINE | Facility: OTHER | Age: 73
End: 2023-01-03
Payer: MEDICARE

## 2023-01-03 DIAGNOSIS — F33.41 MAJOR DEPRESSIVE DISORDER, RECURRENT, IN PARTIAL REMISSION (H): ICD-10-CM

## 2023-01-03 PROCEDURE — 99442 PR PHYSICIAN TELEPHONE EVALUATION 11-20 MIN: CPT | Mod: 95 | Performed by: PHYSICIAN ASSISTANT

## 2023-01-03 RX ORDER — BUPROPION HYDROCHLORIDE 150 MG/1
150 TABLET ORAL EVERY MORNING
Qty: 30 TABLET | Refills: 2 | Status: SHIPPED | OUTPATIENT
Start: 2023-01-03 | End: 2023-02-02

## 2023-01-03 NOTE — PROGRESS NOTES
Sri is a 72 year old who is being evaluated via a billable telephone visit.      What phone number would you like to be contacted at? [phone]  How would you like to obtain your AVS? Mail a copy       [place] Location (provider location):  On-site    Assessment & Plan     ICD-10-CM    1. Major depressive disorder, recurrent, in partial remission (H)  F33.41 buPROPion (WELLBUTRIN XL) 150 MG 24 hr tablet        - Patient reports still feeling very depressed   - Was doing a little better and stopped Amitriptyline at night   - She wants to go back on what she was on before      Chart reviewed, was on Wellbutrin      She would like to restart this   - Will start 150 mg in the AM   - Continue on Effexor 150 mg   - Did go to psychiatry and counseling, didn't find either helpful, doesn't want to go back       Review of the result(s) of each unique test - PHQ9 & GAD7   Diagnosis or treatment significantly limited by social determinants of health - Depression     25 minutes spent on the date of the encounter doing chart review, history and exam, documentation and further activities as noted above    The patient indicates understanding of these issues and agrees with the plan.    Return in about 1 month (around 2/3/2023) for Recheck.    PA student as below was present and participated in shadow capacity only    Baljeet Vann, PADonaldS  [facility] PA-C  [facility]   Sri is a 72 year old accompanied by her friend Bryson, presenting for the following health issues:  Recheck Medication      HPI     Depression - Anxiety   - Been better   - 1 week ago (last Tuesday overnight), felt like had attack, forgot a lot of things       Denies chest pain, numbness/tingling, chest pain, shortness of breath      Happened at night, didn't wake up, in AM woke up with some memory loss      Some has returned, some has not      Forgot peoples names   - Mood       Short fuse, doesn't talk much      Counseling and psychiatry didn't work   - Hasn't been checking blood pressure   - Depressed, during the day      Stopped some because feeling better      Doesn't have mood medications      5 in the AM - Coreg, Lasix, Lisinopril, Crestor, venlafaxine   - Stopped them about 1 month ago   - Amitriptyline didn't seem like working, stopped it           PHQ 4/18/2022 5/16/2022 6/30/2022   PHQ-9 Total Score 7 3 5   Q9: Thoughts of better off dead/self-harm past 2 weeks Not at all Not at all Not at all     HAYDEE-7 SCORE 4/18/2022 5/16/2022 6/30/2022   Total Score - - -   Total Score 7 (mild anxiety) 5 (mild anxiety) -   Total Score 7 5 0       Review of Systems   Constitutional, HEENT, cardiovascular, pulmonary, gi and gu systems are negative, except as otherwise noted.      Objective    Vitals - Patient Reported  Pain Score: No Pain (0)  Vitals:  No vitals were obtained today due to virtual visit.    Physical Exam   healthy, alert and no distress  PSYCH: Alert and oriented times 3; coherent speech, normal   rate and volume, able to articulate logical thoughts, able   to abstract reason, no tangential thoughts, no hallucinations   or delusions  Her affect is normal  RESP: No cough, no audible wheezing, able to talk in full sentences  Remainder of exam unable to be completed due to telephone visits    Diagnostics: none           Phone call duration: 11 minutes & 5 min

## 2023-02-02 ENCOUNTER — OFFICE VISIT (OUTPATIENT)
Dept: FAMILY MEDICINE | Facility: OTHER | Age: 73
End: 2023-02-02
Payer: MEDICARE

## 2023-02-02 VITALS
SYSTOLIC BLOOD PRESSURE: 132 MMHG | WEIGHT: 124 LBS | RESPIRATION RATE: 18 BRPM | TEMPERATURE: 98.6 F | OXYGEN SATURATION: 95 % | BODY MASS INDEX: 23.41 KG/M2 | DIASTOLIC BLOOD PRESSURE: 80 MMHG | HEART RATE: 82 BPM | HEIGHT: 61 IN

## 2023-02-02 DIAGNOSIS — E55.9 VITAMIN D DEFICIENCY: ICD-10-CM

## 2023-02-02 DIAGNOSIS — R01.1 NEWLY RECOGNIZED HEART MURMUR: ICD-10-CM

## 2023-02-02 DIAGNOSIS — I25.83 CORONARY ARTERY DISEASE DUE TO LIPID RICH PLAQUE: ICD-10-CM

## 2023-02-02 DIAGNOSIS — Z00.00 ENCOUNTER FOR MEDICARE ANNUAL WELLNESS EXAM: Primary | ICD-10-CM

## 2023-02-02 DIAGNOSIS — G43.709 CHRONIC MIGRAINE WITHOUT AURA WITHOUT STATUS MIGRAINOSUS, NOT INTRACTABLE: ICD-10-CM

## 2023-02-02 DIAGNOSIS — I10 HYPERTENSION GOAL BP (BLOOD PRESSURE) < 140/90: ICD-10-CM

## 2023-02-02 DIAGNOSIS — I69.30 HISTORY OF CEREBROVASCULAR ACCIDENT (CVA) WITH RESIDUAL DEFICIT: ICD-10-CM

## 2023-02-02 DIAGNOSIS — R41.3 LOSS OF MEMORY: ICD-10-CM

## 2023-02-02 DIAGNOSIS — F32.1 MODERATE MAJOR DEPRESSION (H): ICD-10-CM

## 2023-02-02 DIAGNOSIS — I25.10 CORONARY ARTERY DISEASE DUE TO LIPID RICH PLAQUE: ICD-10-CM

## 2023-02-02 DIAGNOSIS — F43.23 ADJUSTMENT DISORDER WITH MIXED ANXIETY AND DEPRESSED MOOD: ICD-10-CM

## 2023-02-02 LAB
ALBUMIN SERPL BCG-MCNC: 4.5 G/DL (ref 3.5–5.2)
ALP SERPL-CCNC: 86 U/L (ref 35–104)
ALT SERPL W P-5'-P-CCNC: 24 U/L (ref 10–35)
ANION GAP SERPL CALCULATED.3IONS-SCNC: 10 MMOL/L (ref 7–15)
AST SERPL W P-5'-P-CCNC: 31 U/L (ref 10–35)
BASOPHILS # BLD AUTO: 0.1 10E3/UL (ref 0–0.2)
BASOPHILS NFR BLD AUTO: 1 %
BILIRUB SERPL-MCNC: 0.6 MG/DL
BUN SERPL-MCNC: 9.3 MG/DL (ref 8–23)
CALCIUM SERPL-MCNC: 9.7 MG/DL (ref 8.8–10.2)
CHLORIDE SERPL-SCNC: 94 MMOL/L (ref 98–107)
CHOLEST SERPL-MCNC: 184 MG/DL
CREAT SERPL-MCNC: 0.74 MG/DL (ref 0.51–0.95)
DEPRECATED HCO3 PLAS-SCNC: 30 MMOL/L (ref 22–29)
EOSINOPHIL # BLD AUTO: 0.1 10E3/UL (ref 0–0.7)
EOSINOPHIL NFR BLD AUTO: 1 %
ERYTHROCYTE [DISTWIDTH] IN BLOOD BY AUTOMATED COUNT: 12 % (ref 10–15)
FOLATE SERPL-MCNC: 26.9 NG/ML (ref 4.6–34.8)
GFR SERPL CREATININE-BSD FRML MDRD: 87 ML/MIN/1.73M2
GLUCOSE SERPL-MCNC: 125 MG/DL (ref 70–99)
HCT VFR BLD AUTO: 46 % (ref 35–47)
HDLC SERPL-MCNC: 116 MG/DL
HGB BLD-MCNC: 15.9 G/DL (ref 11.7–15.7)
LDLC SERPL CALC-MCNC: 58 MG/DL
LYMPHOCYTES # BLD AUTO: 1.6 10E3/UL (ref 0.8–5.3)
LYMPHOCYTES NFR BLD AUTO: 26 %
MCH RBC QN AUTO: 31.5 PG (ref 26.5–33)
MCHC RBC AUTO-ENTMCNC: 34.6 G/DL (ref 31.5–36.5)
MCV RBC AUTO: 91 FL (ref 78–100)
MONOCYTES # BLD AUTO: 0.6 10E3/UL (ref 0–1.3)
MONOCYTES NFR BLD AUTO: 10 %
NEUTROPHILS # BLD AUTO: 3.8 10E3/UL (ref 1.6–8.3)
NEUTROPHILS NFR BLD AUTO: 63 %
NONHDLC SERPL-MCNC: 68 MG/DL
PLATELET # BLD AUTO: 236 10E3/UL (ref 150–450)
POTASSIUM SERPL-SCNC: 3.9 MMOL/L (ref 3.4–5.3)
PROT SERPL-MCNC: 7.2 G/DL (ref 6.4–8.3)
RBC # BLD AUTO: 5.04 10E6/UL (ref 3.8–5.2)
SODIUM SERPL-SCNC: 134 MMOL/L (ref 136–145)
TRIGL SERPL-MCNC: 52 MG/DL
TSH SERPL DL<=0.005 MIU/L-ACNC: 0.35 UIU/ML (ref 0.3–4.2)
VIT B12 SERPL-MCNC: 596 PG/ML (ref 232–1245)
WBC # BLD AUTO: 6.1 10E3/UL (ref 4–11)

## 2023-02-02 PROCEDURE — 36415 COLL VENOUS BLD VENIPUNCTURE: CPT | Performed by: PHYSICIAN ASSISTANT

## 2023-02-02 PROCEDURE — 80050 GENERAL HEALTH PANEL: CPT | Performed by: PHYSICIAN ASSISTANT

## 2023-02-02 PROCEDURE — 82607 VITAMIN B-12: CPT | Performed by: PHYSICIAN ASSISTANT

## 2023-02-02 PROCEDURE — 99214 OFFICE O/P EST MOD 30 MIN: CPT | Mod: 25 | Performed by: PHYSICIAN ASSISTANT

## 2023-02-02 PROCEDURE — 82306 VITAMIN D 25 HYDROXY: CPT | Performed by: PHYSICIAN ASSISTANT

## 2023-02-02 PROCEDURE — 80061 LIPID PANEL: CPT | Performed by: PHYSICIAN ASSISTANT

## 2023-02-02 PROCEDURE — 82746 ASSAY OF FOLIC ACID SERUM: CPT | Performed by: PHYSICIAN ASSISTANT

## 2023-02-02 PROCEDURE — 86780 TREPONEMA PALLIDUM: CPT | Performed by: PHYSICIAN ASSISTANT

## 2023-02-02 PROCEDURE — G0439 PPPS, SUBSEQ VISIT: HCPCS | Performed by: PHYSICIAN ASSISTANT

## 2023-02-02 RX ORDER — BUPROPION HYDROCHLORIDE 300 MG/1
300 TABLET ORAL EVERY MORNING
Qty: 30 TABLET | Refills: 1 | Status: SHIPPED | OUTPATIENT
Start: 2023-02-02 | End: 2023-03-07

## 2023-02-02 ASSESSMENT — ASTHMA QUESTIONNAIRES
QUESTION_2 LAST FOUR WEEKS HOW OFTEN HAVE YOU HAD SHORTNESS OF BREATH: NOT AT ALL
ACT_TOTALSCORE: 25
QUESTION_5 LAST FOUR WEEKS HOW WOULD YOU RATE YOUR ASTHMA CONTROL: COMPLETELY CONTROLLED
ACT_TOTALSCORE: 25
QUESTION_1 LAST FOUR WEEKS HOW MUCH OF THE TIME DID YOUR ASTHMA KEEP YOU FROM GETTING AS MUCH DONE AT WORK, SCHOOL OR AT HOME: NONE OF THE TIME
QUESTION_3 LAST FOUR WEEKS HOW OFTEN DID YOUR ASTHMA SYMPTOMS (WHEEZING, COUGHING, SHORTNESS OF BREATH, CHEST TIGHTNESS OR PAIN) WAKE YOU UP AT NIGHT OR EARLIER THAN USUAL IN THE MORNING: NOT AT ALL
QUESTION_4 LAST FOUR WEEKS HOW OFTEN HAVE YOU USED YOUR RESCUE INHALER OR NEBULIZER MEDICATION (SUCH AS ALBUTEROL): NOT AT ALL

## 2023-02-02 ASSESSMENT — PATIENT HEALTH QUESTIONNAIRE - PHQ9
SUM OF ALL RESPONSES TO PHQ QUESTIONS 1-9: 8
10. IF YOU CHECKED OFF ANY PROBLEMS, HOW DIFFICULT HAVE THESE PROBLEMS MADE IT FOR YOU TO DO YOUR WORK, TAKE CARE OF THINGS AT HOME, OR GET ALONG WITH OTHER PEOPLE: SOMEWHAT DIFFICULT
SUM OF ALL RESPONSES TO PHQ QUESTIONS 1-9: 8

## 2023-02-02 ASSESSMENT — ACTIVITIES OF DAILY LIVING (ADL): CURRENT_FUNCTION: NO ASSISTANCE NEEDED

## 2023-02-02 ASSESSMENT — PAIN SCALES - GENERAL: PAINLEVEL: NO PAIN (0)

## 2023-02-02 NOTE — PATIENT INSTRUCTIONS
1. Schedule MRI   a. You may call the number below to schedule a radiology appointment from any office  b. [phone]  2.       Patient Education   Personalized Prevention Plan  You are due for the preventive services outlined below.  Your care team is available to assist you in scheduling these services.  If you have already completed any of these items, please share that information with your care team to update in your medical record.  Health Maintenance Due   Topic Date Due     Asthma Action Plan - yearly  05/22/2020     COVID-19 Vaccine (4 - Booster for Moderna series) 02/01/2022     Flu Vaccine (1) 09/01/2022     Annual Wellness Visit  01/27/2023       Exercise for a Healthier Heart  You may wonder how you can improve the health of your heart. If you re thinking about exercise, you re on the right track. You don t need to become an athlete. But you do need a certain amount of brisk exercise to help strengthen your heart. If you have been diagnosed with a heart condition, your healthcare provider may advise exercise to help stabilize your condition. To help make exercise a habit, choose safe, fun activities.      Exercise with a friend. When activity is fun, you're more likely to stick with it.   Before you start  Check with your healthcare provider before starting an exercise program. This is especially important if you have not been active for a while. It's also important if you have a long-term (chronic) health problem such as heart disease, diabetes, or obesity. Or if you are at high risk for having these problems.   Why exercise?  Exercising regularly offers many healthy rewards. It can help you do all of the following:     Improve your blood cholesterol level to help prevent further heart trouble    Lower your blood pressure to help prevent a stroke or heart attack    Control diabetes, or reduce your risk of getting this disease    Improve your heart and lung function    Reach and stay at a healthy  weight    Make your muscles stronger so you can stay active    Prevent falls and fractures by slowing the loss of bone mass (osteoporosis)    Manage stress better    Reduce your blood pressure    Improve your sense of self and your body image  Exercise tips      Ease into your routine. Set small goals. Then build on them. If you are not sure what your activity level should be, talk with your healthcare provider first before starting an exercise routine.    Exercise on most days. Aim for a total of 150 minutes (2 hours and 30 minutes) or more of moderate-intensity aerobic activity each week. Or 75 minutes (1 hour and 15 minutes) or more of vigorous-intensity aerobic activity each week. Or try for a combination of both. Moderate activity means that you breathe heavier and your heart rate increases but you can still talk. Think about doing 40 minutes of moderate exercise, 3 to 4 times a week. For best results, activity should last for about 40 minutes to lower blood pressure and cholesterol. It's OK to work up to the 40-minute period over time. Examples of moderate-intensity activity are walking 1 mile in 15 minutes. Or doing 30 to 45 minutes of yard work.    Step up your daily activity level.  Along with your exercise program, try being more active the whole day. Walk instead of drive. Or park further away so that you take more steps each day. Do more household tasks or yard work. You may not be able to meet the advised mount of physical activity. But doing some moderate- or vigorous-intensity aerobic activity can help reduce your risk for heart disease. Your healthcare provider can help you figure out what is best for you.    Choose 1 or more activities you enjoy.  Walking is one of the easiest things you can do. You can also try swimming, riding a bike, dancing, or taking an exercise class.    When to call your healthcare provider  Call your healthcare provider if you have any of these:     Chest pain or feel dizzy  or lightheaded    Burning, tightness, pressure, or heaviness in your chest, neck, shoulders, back, or arms    Abnormal shortness of breath    More joint or muscle pain    A very fast or irregular heartbeat (palpitations)  [employer] last reviewed this educational content on 7/1/2019 2000-2021 [employer]. All rights reserved. This information is not intended as a substitute for professional medical care. Always follow your healthcare professional's instructions.          Understanding USDA MyPlate  The USDA has guidelines to help you make healthy food choices. These are called MyPlate. MyPlate shows the food groups that make up healthy meals using the image of a place setting. Before you eat, think about the healthiest choices for what to put on your plate or in your cup or bowl. To learn more about building a healthy plate, visit www.choosemyplate.gov.    The food groups    Fruits. Any fruit or 100% fruit juice counts as part of the Fruit Group. Fruits may be fresh, canned, frozen, or dried, and may be whole, cut-up, or pureed. Make 1/2 of your plate fruits and vegetables.    Vegetables. Any vegetable or 100% vegetable juice counts as a member of the Vegetable Group. Vegetables may be fresh, frozen, canned, or dried. They can be served raw or cooked and may be whole, cut-up, or mashed. Make 1/2 of your plate fruits and vegetables.    Grains. All foods made from grains are part of the Grains Group. These include wheat, rice, oats, cornmeal, and barley. Grains are often used to make foods such as bread, pasta, oatmeal, cereal, tortillas, and grits. Grains should be no more than 1/4 of your plate. At least half of your grains should be whole grains.    Protein. This group includes meat, poultry, seafood, beans and peas, eggs, processed soy products (such as tofu), nuts (including nut butters), and seeds. Make protein choices no more than 1/4 of your plate. Meat and poultry choices should be lean or low  fat.    Dairy. The Dairy Group includes all fluid milk products and foods made from milk that contain calcium, such as yogurt and cheese. (Foods that have little calcium, such as cream, butter, and cream cheese, are not part of this group.) Most dairy choices should be low-fat or fat-free.    Oils. Oils aren't a food group, but they do contain essential nutrients. However it's important to watch your intake of oils. These are fats that are liquid at room temperature. They include canola, corn, olive, soybean, vegetable, and sunflower oil. Foods that are mainly oil include mayonnaise, certain salad dressings, and soft margarines. You likely already get your daily oil allowance from the foods you eat.  Things to limit  Eating healthy also means limiting these things in your diet:       Salt (sodium). Many processed foods have a lot of sodium. To keep sodium intake down, eat fresh vegetables, meats, poultry, and seafood when possible. Purchase low-sodium, reduced-sodium, or no-salt-added food products at the store. And don't add salt to your meals at home. Instead, season them with herbs and spices such as dill, oregano, cumin, and paprika. Or try adding flavor with lemon or lime zest and juice.    Saturated fat. Saturated fats are most often found in animal products such as beef, pork, and chicken. They are often solid at room temperature, such as butter. To reduce your saturated fat intake, choose leaner cuts of meat and poultry. And try healthier cooking methods such as grilling, broiling, roasting, or baking. For a simple lower-fat swap, use plain nonfat yogurt instead of mayonnaise when making potato salad or macaroni salad.    Added sugars. These are sugars added to foods. They are in foods such as ice cream, candy, soda, fruit drinks, sports drinks, energy drinks, cookies, pastries, jams, and syrups. Cut down on added sugars by sharing sweet treats with a family member or friend. You can also choose fruit for  dessert, and drink water or other unsweetened beverages.     [employer] last reviewed this educational content on 6/1/2020 2000-2021 [employer]. All rights reserved. This information is not intended as a substitute for professional medical care. Always follow your healthcare professional's instructions.          Urinary Incontinence, Female (Adult)   Urinary incontinence means loss of bladder control. This problem affects many women, especially as they get older. If you have incontinence, you may be embarrassed to ask for help. But know that this problem can be treated.   Types of Incontinence  There are different types of incontinence. Two of the main types are described here. You can have more than one type.     Stress incontinence. With this type, urine leaks when pressure (stress) is put on the bladder. This may happen when you cough, sneeze, or laugh. Stress incontinence most often occurs because the pelvic floor muscles that support the bladder and urethra are weak. This can happen after pregnancy and vaginal childbirth or a hysterectomy. It can also be due to excess body weight or hormone changes.    Urge incontinence (also called overactive bladder). With this type, a sudden urge to urinate is felt often. This may happen even though there may not be much urine in the bladder. The need to urinate often during the night is common. Urge incontinence most often occurs because of bladder spasms. This may be due to bladder irritation or infection. Damage to bladder nerves or pelvic muscles, constipation, and certain medicines can also lead to urge incontinence.  Treatment depends on the cause. Further evaluation is needed to find the type you have. This will likely include an exam and certain tests. Based on the results, you and your healthcare provider can then plan treatment. Until a diagnosis is made, the home care tips below can help ease symptoms.   Home care    Do pelvic floor muscle  exercises, if they are prescribed. The pelvic floor muscles help support the bladder and urethra. Many women find that their symptoms improve when doing special exercises that strengthen these muscles. To do the exercises, contract the muscles you would use to stop your stream of urine. But do this when you re not urinating. Hold for 10 seconds, then relax. Repeat 10 to 20 times in a row, at least 3 times a day. Your healthcare provider may give you other instructions for how to do the exercises and how often.    Keep a bladder diary. This helps track how often and how much you urinate over a set period of time. Bring this diary with you to your next visit with the provider. The information can help your provider learn more about your bladder problem.    Lose weight, if advised to by your provider. Extra weight puts pressure on the bladder. Your provider can help you create a weight-loss plan that s right for you. This may include exercising more and making certain diet changes.    Don't have foods and drinks that may irritate the bladder. These can include alcohol and caffeinated drinks.    Quit smoking. Smoking and other tobacco use can lead to a long-term (chronic) cough that strains the pelvic floor muscles. Smoking may also damage the bladder and urethra. Talk with your provider about treatments or methods you can use to quit smoking.    If drinking large amounts of fluid makes you have symptoms, you may be advised to limit your fluid intake. You may also be advised to drink most of your fluids during the day and to limit fluids at night.    If you re worried about urine leakage or accidents, you may wear absorbent pads to catch urine. Change the pads often. This helps reduce discomfort. It may also reduce the risk of skin or bladder infections.    Follow-up care  Follow up with your healthcare provider, or as directed. It may take some to find the right treatment for your problem. But healthy lifestyle changes  can be made right away. These include such things as exercising on a regular basis, eating a healthy diet, losing weight (if needed), and quitting smoking. Your treatment plan may include special therapies or medicines. Certain procedures or surgery may also be options. Talk about any questions you have with your provider.   When to seek medical advice  Call the healthcare provider right away if any of these occur:    Fever of 100.4 F (38 C) or higher, or as directed by your provider    Bladder pain or fullness    Belly swelling    Nausea or vomiting    Back pain    Weakness, dizziness, or fainting  [employer] last reviewed this educational content on 1/1/2020 2000-2021 [employer]. All rights reserved. This information is not intended as a substitute for professional medical care. Always follow your healthcare professional's instructions.        Your Health Risk Assessment indicates you feel you are not in good emotional health.    Recreation   Recreation is not limited to sports and team events. It includes any activity that provides relaxation, interest, enjoyment, and exercise. Recreation provides an outlet for physical, mental, and social energy. It can give a sense of worth and achievement. It can help you stay healthy.    Mental Exercise and Social Involvement  Mental and emotional health is as important as physical health. Keep in touch with friends and family. Stay as active as possible. Continue to learn and challenge yourself.   Things you can do to stay mentally active are:    Learn something new, like a foreign language or musical instrument.     Play SCRABBLE or do crossword puzzles. If you cannot find people to play these games with you at home, you can play them with others on your computer through the Internet.     Join a games club--anything from card games to chess or checkers or lawn bowling.     Start a new hobby.     Go back to school.     Volunteer.     Read.   Keep up with  "world events.    Depression and Suicide in Older Adults    Nearly 2 million older Americans have some type of depression. Some of them even take their own lives. Yet depression among older adults is often ignored. Learn the warning signs. You may help spare a loved one needless pain. You may also save a life.   What is depression?  Depression is a common and serious illness that affects the way you think and feel. It is not a normal part of aging, nor is it a sign of weakness, a character flaw, or something you can snap out of. Most people with depression need treatment to get better. The most common symptom is a feeling of deep sadness. People who are depressed also may seem tired and listless. And nothing seems to give them pleasure. It s normal to grieve or be sad sometimes. But sadness lessens or passes with time. Depression rarely goes away or improves on its own. A person with clinical depression can't \"snap out of it.\" Other symptoms of depression are:     Sleeping more or less than normal    Eating more or less than normal    Having headaches, stomachaches, or other pains that don t go away    Feeling nervous,  empty,  or worthless    Crying a great deal    Thinking or talking about suicide or death    Loss of interest in activities previously enjoyed    Social isolation    Feeling confused or forgetful  What causes it?  The causes of depression aren t fully known. But it is thought to result from a complex blend of these factors:     Biochemistry. Certain chemicals in the brain play a role.    Genes. Depression does run in families.    Life stress. Life stresses can also trigger depression in some people. Older adults often face many stressors, such as death of friends or a spouse, health problems, and financial concerns.    Chronic conditions. This includes conditions such as diabetes, heart disease, or cancer. These can cause symptoms of depression. Medicine side effects can cause changes in thoughts and " behaviors.  How you can help  Often, depressed people may not want to ask for help. When they do, they may be ignored. Or, they may receive the wrong treatment. You can help by showing parents and older friends love and support. If they seem depressed, don t lecture the person, ignore the symptoms, or discount the symptoms as a  normal  part of aging -which they are not. Get involved, listen, and show interest and support.   Help them understand that depression is a treatable illness. Tell them you can help them find the right treatment. Offer to go to their healthcare provider's appointment with them for support when the symptoms are discussed. With their approval, contact a local mental health center, social service agency, or hospital about services.   You can be an advocate for him or her at healthcare appointments. Many older adults have chronic illnesses that can cause symptoms of depression. Medicine side effects can change thoughts and behaviors. You can help make sure that the healthcare provider looks at all of these factors. He or she should refer your family member or friend to a mental healthcare provider when needed. in some cases, untreated depression can lead to a misdiagnosis. A person may be diagnosed with a brain disorder such as dementia. If the healthcare provider does not take the issue of depression seriously, help your family member or friend to find another provider.   Don't be afraid to ask  If you think an older person you care about could be suicidal, ask,  Have you thought about suicide?  Most people will tell you the truth. If they say  yes,  they may already have a plan for how and when they will attempt it. Find out as much as you can. The more detailed the plan, and the easier it is to carry out, the more danger the person is in right now. Tell the person you are there for them and do not want them to harm him or herself. Don't wait to get help for the person. Call the person's  healthcare provider, local hospital, or emergency services.   To learn more    National Suicide Prevention Lifeline (crisis hotline) 102-208-BBED (164-146-8866)    National [place] of Mental Opqznj901-312-7410vei.[facility].nih.gov    National [place] on Mental Ujeyfnw016-531-4771qjw.diamond.org    Mental Health Tzxbcpq637-965-2027kon.[facility].org    National Suicide Zxrhosc821-PNWXYWA (489-718-4823)    Call 911  Never leave the person alone. A person who is actively suicidal needs psychiatric care right away. They will need constant supervision. Never leave the person out of sight. Call 911 or the national 24-hour suicide crisis hotline at 735-185-BADZ (765-524-0346). You can also take the person to the closest emergency room.   [employer] last reviewed this educational content on 5/1/2020 2000-2021 [employer]. All rights reserved. This information is not intended as a substitute for professional medical care. Always follow your healthcare professional's instructions.          Preventing Falls at Home  A person can fall for many reasons. Older adults may fall because reaction time slows down as we age. Your muscles and joints may get stiff, weak, or less flexible because of illness, medicines, or a physical condition.   Other health problems that make falls more likely include:     Arthritis    Dizziness or lightheadedness when you stand up (orthostatic hypotension)    History of a stroke    Dizziness    Anemia    Certain medicines taken for mental illness or to control blood pressure.    Problems with balance or gait    Bladder or urinary problems    History of falling    Changes in vision (vision impairment)    Changes in thinking skills and memory (cognitive impairment)  Injuries from a fall can include serious injuries such as broken bones, dislocated joints, internal bleeding and cuts. Injuries like these can limit your independence.   Prevention tips  To help prevent falls and fall-related injuries,  follow the tips below.    Floors  To make floors safer:     Put nonskid pads under area rugs.    Remove small rugs.    Replace worn floor coverings.    Tack carpets firmly to each step on carpeted stairs. Put nonskid strips on the edges of uncarpeted stairs.    Keep floors and stairs free of clutter and cords.    Arrange furniture so there are clear pathways.    Clean up any spills right away.  Bathrooms    To make bathrooms safer:     Install grab bars in the tub or shower.    Apply nonskid strips or put a nonskid rubber mat in the tub or shower.    Sit on a bath chair to bathe.    Use bathmats with nonskid backing.  Lighting  To improve visibility in your home:      Keep a flashlight in each room. Or put a lamp next to the bed within easy reach.    Put nightlights in the bedrooms, hallways, kitchen, and bathrooms.    Make sure all stairways have good lighting.    Take your time when going up and down stairs.    Put handrails on both sides of stairs and in walkways for more support. To prevent injury to your wrist or arm, don t use handrails to pull yourself up.    Install grab bars to pull yourself up.    Move or rearrange items that you use often. This will make them easier to find or reach.    Look at your home to find any safety hazards. Especially look at doorways, walkways, and the driveway. Remove or repair any safety problems that you find.  Other changes to make    Look around to find any safety hazards. Look closely at doorways, walkways, and the driveway. Remove or repair any safety problems that you find.    Wear shoes that fit well.    Take your time when going up and down stairs.    Put handrails on both sides of stairs and in walkways for more support. To prevent injury to your wrist or arm, don t use handrails to pull yourself up.    Install grab bars wherever needed to pull yourself up.    Arrange items that you use often. This will make them easier to find or reach.    [employer] last reviewed this  educational content on 3/1/2020    5397-9389 [employer]. All rights reserved. This information is not intended as a substitute for professional medical care. Always follow your healthcare professional's instructions.

## 2023-02-02 NOTE — PROGRESS NOTES
"SUBJECTIVE:   Sri is a 72 year old who presents for Preventive Visit with her son Felix       Patient has been advised of split billing requirements and indicates understanding: Yes  Are you in the first 12 months of your Medicare coverage?  No    History of Present Illness       Reason for visit:  Follow up for existing condition    She eats 0-1 servings of fruits and vegetables daily.She consumes 0 sweetened beverage(s) daily.She exercises with enough effort to increase her heart rate 9 or less minutes per day.  She exercises with enough effort to increase her heart rate 3 or less days per week.   She is not taking prescribed medications regularly due to None.    Today's PHQ-9         PHQ-9 Total Score: 8    PHQ-9 Q9 Thoughts of better off dead/self-harm past 2 weeks :   Not at all    How difficult have these problems made it for you to do your work, take care of things at home, or get along with other people: Somewhat difficult  Healthy Habits:     In general, how would you rate your overall health?  Good    Frequency of exercise:  None    Duration of exercise:  Less than 15 minutes    Do you usually eat at least 4 servings of fruit and vegetables a day, include whole grains    & fiber and avoid regularly eating high fat or \"junk\" foods?  No    Taking medications regularly:  Yes    Barriers to taking medications:  None    Medication side effects:  None    Ability to successfully perform activities of daily living:  No assistance needed    Home Safety:  No safety concerns identified    Hearing Impairment:  No hearing concerns    In the past 6 months, have you been bothered by leaking of urine? Yes    In general, how would you rate your overall mental or emotional health?  Fair      PHQ-2 Total Score: 2    Additional concerns today:  Yes (memory )      Depression and Anxiety Follow-Up    How are you doing with your depression since your last visit? No change    How are you doing with your anxiety since your last " "visit?  No change    Are you having other symptoms that might be associated with depression or anxiety? Yes:  memory     Have you had a significant life event? No     Do you have any concerns with your use of alcohol or other drugs? No     Medication was well tolerated. Patient says she just feels \"in the middle\". Would be interested in trying higher dose.    Patient's son has concerns for memory. Specifically some long term memory concerns.   - forgot family member names   - forgetful of events past and recent past   - Word finding   - Lack of motivation     - just over the counter   - 4 or 5 in AM      Wellbutrin, Coreg, Lisinopril, Effexor, statin, and lasix       Social History     Tobacco Use     Smoking status: Never     Smokeless tobacco: Never   Vaping Use     Vaping Use: Never used   Substance Use Topics     Alcohol use: Yes     Comment: beerx2/x1 per month     Drug use: No     PHQ 5/16/2022 6/30/2022 2/2/2023   PHQ-9 Total Score 3 5 8   Q9: Thoughts of better off dead/self-harm past 2 weeks Not at all Not at all Not at all     HAYDEE-7 SCORE 4/18/2022 5/16/2022 6/30/2022   Total Score - - -   Total Score 7 (mild anxiety) 5 (mild anxiety) -   Total Score 7 5 0       Have you ever done Advance Care Planning? (For example, a Health Directive, POLST, or a discussion with a medical provider or your loved ones about your wishes): Yes, patient states has an Advance Care Planning document and will bring a copy to the clinic.       Fall risk  Fallen 2 or more times in the past year?: Yes  Any fall with injury in the past year?: No    Cognitive Screening   1) Repeat 3 items (Leader, Season, Table)    2) Clock draw: NORMAL  3) 3 item recall: Recalls 1 object   Results: NORMAL clock, 1-2 items recalled: COGNITIVE IMPAIRMENT LESS LIKELY    Mini-CogTM Copyright ABRAHAM Tai. Licensed by the author for use in [facility]; reprinted with permission (lee@.[facility]). All rights reserved.      Do you have sleep apnea, " excessive snoring or daytime drowsiness?: no    Reviewed and updated as needed this visit by clinical staff   Tobacco  Allergies  Meds  Problems  Med Hx  Surg Hx  Fam Hx          Reviewed and updated as needed this visit by Provider   Tobacco  Allergies  Meds  Problems  Med Hx  Surg Hx  Fam Hx            Social History     Tobacco Use     Smoking status: Never     Smokeless tobacco: Never   Substance Use Topics     Alcohol use: Yes     Comment: beerx2/x1 per month         Alcohol Use 10/14/2020   Prescreen: >3 drinks/day or >7 drinks/week? Yes   Prescreen: >3 drinks/day or >7 drinks/week? -   AUDIT SCORE  3     Current providers sharing in care for this patient include:   Patient Care Team:  Rosenda Pierre PA-C as PCP - General (Physician Assistant - Medical)  Rosenda Pierre PA-C as Assigned PCP  Miryam Mcgowan MD as MD (Neurology)  Miryam Mcgowan MD as Assigned Neuroscience Provider  Alexus Ferrari NP as Assigned Behavioral Health Provider    The following health maintenance items are reviewed in Epic and correct as of today:  Health Maintenance   Topic Date Due     ASTHMA ACTION PLAN  05/22/2020     COVID-19 Vaccine (4 - Booster for Moderna series) 02/01/2022     INFLUENZA VACCINE (1) 09/01/2022     ANNUAL REVIEW OF HM ORDERS  06/30/2023     DTAP/TDAP/TD IMMUNIZATION (4 - Td or Tdap) 07/12/2023     ASTHMA CONTROL TEST  08/02/2023     PHQ-9  08/02/2023     MEDICARE ANNUAL WELLNESS VISIT  02/02/2024     BMP  02/02/2024     FALL RISK ASSESSMENT  02/02/2024     MAMMO SCREENING  02/09/2024     COLORECTAL CANCER SCREENING  05/30/2024     ADVANCE CARE PLANNING  01/27/2027     LIPID  02/02/2028     DEXA  11/09/2033     HEPATITIS C SCREENING  Completed     DEPRESSION ACTION PLAN  Completed     MIGRAINE ACTION PLAN  Completed     Pneumococcal Vaccine: 65+ Years  Completed     ZOSTER IMMUNIZATION  Completed     IPV IMMUNIZATION  Aged Out      "MENINGITIS IMMUNIZATION  Aged Out     Lab work is in process  Labs reviewed in EPIC      Review of Systems  Constitutional, HEENT, cardiovascular, pulmonary, gi and gu systems are negative, except as otherwise noted.    OBJECTIVE:   /80   Pulse 82   Temp 98.6  F (37  C) (Temporal)   Resp 18   Ht 1.54 m (5' 0.63\")   Wt 56.2 kg (124 lb)   LMP 11/09/2005 (Approximate)   SpO2 95%   BMI 23.72 kg/m   Estimated body mass index is 23.72 kg/m  as calculated from the following:    Height as of this encounter: 1.54 m (5' 0.63\").    Weight as of this encounter: 56.2 kg (124 lb).  Physical Exam  Constitutional:       General: She is not in acute distress.     Appearance: She is well-developed.   HENT:      Right Ear: External ear normal.      Left Ear: External ear normal.      Nose: Nose normal.      Mouth/Throat:      Pharynx: No oropharyngeal exudate.   Eyes:      General:         Right eye: No discharge.         Left eye: No discharge.      Conjunctiva/sclera: Conjunctivae normal.      Pupils: Pupils are equal, round, and reactive to light.   Neck:      Thyroid: No thyromegaly.      Vascular: No JVD.      Trachea: No tracheal deviation.   Cardiovascular:      Rate and Rhythm: Normal rate and regular rhythm.      Heart sounds: Murmur (best heard over aortic area) heard.    Systolic murmur is present with a grade of 1/6.    No friction rub. No gallop.   Pulmonary:      Effort: Pulmonary effort is normal. No respiratory distress.      Breath sounds: Normal breath sounds. No stridor. No wheezing or rales.   Abdominal:      General: Bowel sounds are normal. There is no distension.      Palpations: Abdomen is soft. There is no mass.      Tenderness: There is no abdominal tenderness. There is no guarding or rebound.      Hernia: No hernia is present.   Musculoskeletal:         General: Normal range of motion.      Cervical back: Normal range of motion and neck supple.   Lymphadenopathy:      Cervical: No cervical " adenopathy.   Skin:     General: Skin is warm and dry.   Neurological:      Mental Status: She is alert and oriented to person, place, and time.   Psychiatric:         Mood and Affect: Mood is depressed. Affect is flat.         Speech: Speech normal.         Behavior: Behavior normal.         Thought Content: Thought content normal.         Cognition and Memory: Memory is impaired (forgetting past events, our last visit, not sure of what medications she is taking, some word finding difficultly ).         Judgment: Judgment normal.         Diagnostic Test Results:  Labs reviewed in Epic  See list    ASSESSMENT / PLAN:       ICD-10-CM    1. Encounter for Medicare annual wellness exam  Z00.00       2. Chronic migraine without aura without status migrainosus, not intractable  G43.709 MR Brain w/o Contrast     Adult Neurology  Referral      3. History of cerebrovascular accident (CVA) with residual deficit  I69.30 MR Brain w/o Contrast     Adult Neurology  Referral     OFFICE/OUTPT VISIT,EST,LEVL IV      4. Loss of memory  R41.3 MR Brain w/o Contrast     Adult Neurology  Referral     Comprehensive metabolic panel (BMP + Alb, Alk Phos, ALT, AST, Total. Bili, TP)     TSH with free T4 reflex     CBC with platelets and differential     Vitamin B12     Folate     Treponema Abs w Reflex to RPR and Titer     Treponema Abs w Reflex to RPR and Titer     Folate     Vitamin B12     CBC with platelets and differential     TSH with free T4 reflex     Comprehensive metabolic panel (BMP + Alb, Alk Phos, ALT, AST, Total. Bili, TP)     OFFICE/OUTPT VISIT,EST,LEVL IV      5. Vitamin D deficiency  E55.9 Vitamin D Deficiency     Vitamin D Deficiency     vitamin D2 (ERGOCALCIFEROL) 79125 units (1250 mcg) capsule     OFFICE/OUTPT VISIT,EST,LEVL IV      6. Newly recognized heart murmur  R01.1 Echocardiogram Complete     OFFICE/OUTPT VISIT,EST,LEVL IV      7. Coronary artery disease due to lipid rich plaque  I25.10  Echocardiogram Complete    I25.83 Lipid panel reflex to direct LDL Non-fasting     Lipid panel reflex to direct LDL Non-fasting     OFFICE/OUTPT VISIT,EST,LEVL IV      8. Hypertension goal BP (blood pressure) < 140/90  I10 OFFICE/OUTPT VISIT,EST,LEVL IV      9. Adjustment disorder with mixed anxiety and depressed mood  F43.23 buPROPion (WELLBUTRIN XL) 300 MG 24 hr tablet     OFFICE/OUTPT VISIT,EST,LEVL IV      10. Moderate major depression (H)  F32.1 buPROPion (WELLBUTRIN XL) 300 MG 24 hr tablet     OFFICE/OUTPT VISIT,EST,LEVL IV        - Medicare see below     - Son here with patient, they are reporting issues with memory since about last fall, forgetting events or people that she should know, aware that she should know them, word finding difficulty  - Exam today largely normal except for some areas of memory, no signs of acute neurological etiology      Discussed possible etiology - electrolyte deficiency vs. Vitamin deficiency vs. Intracranial process vs. Medication side effect vs. Uncontrolled depression vs. Other      Reviewed MRI done around time of her heart attack and stroke, there is a collection of blood vessels that were supposed to followed      Appears that after her heart attack and stroke in summer of 2021 she never followed up with cardiology and neurology for very long      Recommend we get MRI of brain to recheck this abnormality      Recommend labs to check for underlying issue      Will also get neurology consult     - On exam today, there is a new aortic systolic murmur, could possibly be contributing       Recommend further evaluation with a non fasting cholesterol and echocardiogram       Discussed what murmur means with patient and son and things to watch for     - Mood      Patient reports no real change with adding Wellbutrin back in at 150 mg, continues on Effexor as well (150 mg)      Discussed could be contributing to memory      Could increase Wellbutrin to 300 vs. Effexor to 225 mg      " Will increase Wellbutrin to 300 mg      Reviewed both use and side effects      Recheck 3-4 weeks     - Extensive review of all medications including use and side effects, refilled as needed       Patient has been advised of split billing requirements and indicates understanding: Yes    COUNSELING:  Reviewed preventive health counseling, as reflected in patient instructions  Special attention given to:       Regular exercise       Healthy diet/nutrition       Fall risk prevention       Advanced Planning     BMI:   Estimated body mass index is 23.72 kg/m  as calculated from the following:    Height as of this encounter: 1.54 m (5' 0.63\").    Weight as of this encounter: 56.2 kg (124 lb).     She reports that she has never smoked. She has never used smokeless tobacco.      Appropriate preventive services were discussed with this patient, including applicable screening as appropriate for cardiovascular disease, diabetes, osteopenia/osteoporosis, and glaucoma.  As appropriate for age/gender, discussed screening for colorectal cancer, prostate cancer, breast cancer, and cervical cancer. Checklist reviewing preventive services available has been given to the patient.    Reviewed patients plan of care and provided an AVS. The Intermediate Care Plan ( asthma action plan, low back pain action plan, and migraine action plan) for Sri meets the Care Plan requirement. This Care Plan has been established and reviewed with the Patient and son.    Review of the result(s) of each unique test - See list      7/27/22 - all labs   Diagnosis or treatment significantly limited by social determinants of health - Depression     40 minutes spent on the date of the encounter doing chart review, history and exam, documentation and further activities as noted above    The patient & her son indicates understanding of these issues and agrees with the plan.    Follow up: 1 month for mood recheck     I, Jose Francisco Pierre PA-C,  was " present with the PA student who participated in the service and in the documentation of the note.  I have verified the history and personally performed the physical exam and medical decision making.  I agree with the assessment and plan of care as documented in the note.     BUDDY MurphyS   [facility]     Rosenda Pierre PA-C  [facility]    Identified Health Risks:    She is at risk for lack of exercise and has been provided with information to increase physical activity for the benefit of her well-being.  The patient was counseled and encouraged to consider modifying their diet and eating habits. She was provided with information on recommended healthy diet options.  Information on urinary incontinence and treatment options given to patient.  The patient was provided with suggestions to help her develop a healthy emotional lifestyle.  The patient's PHQ-9 score is consistent with mild depression. She was provided with information regarding depression and was advised to schedule a follow up appointment in 4 weeks to further address this issue.  She is at risk for falling and has been provided with information to reduce the risk of falling at home.

## 2023-02-02 NOTE — PROGRESS NOTES
{PROVIDER CHARTING PREFERENCE:140536}    Julio Cesar Fierro is a 72 year old accompanied by her son- Felix, presenting for the following health issues:  No chief complaint on file.      History of Present Illness       Reason for visit:  Follow up for existing condition    She eats 0-1 servings of fruits and vegetables daily.She consumes 0 sweetened beverage(s) daily.She exercises with enough effort to increase her heart rate 9 or less minutes per day.  She exercises with enough effort to increase her heart rate 3 or less days per week.   She is taking medications regularly.    Today's PHQ-9         PHQ-9 Total Score: 8    PHQ-9 Q9 Thoughts of better off dead/self-harm past 2 weeks :   Not at all    How difficult have these problems made it for you to do your work, take care of things at home, or get along with other people: Somewhat difficult       Depression and Anxiety Follow-Up    How are you doing with your depression since your last visit? No change    How are you doing with your anxiety since your last visit?  No change    Are you having other symptoms that might be associated with depression or anxiety? Memory     Have you had a significant life event? No     Do you have any concerns with your use of alcohol or other drugs? No    Social History     Tobacco Use     Smoking status: Never     Smokeless tobacco: Never   Vaping Use     Vaping Use: Never used   Substance Use Topics     Alcohol use: Yes     Comment: beerx2/x1 per month     Drug use: No     PHQ 5/16/2022 6/30/2022 2/2/2023   PHQ-9 Total Score 3 5 8   Q9: Thoughts of better off dead/self-harm past 2 weeks Not at all Not at all Not at all     HAYDEE-7 SCORE 4/18/2022 5/16/2022 6/30/2022   Total Score - - -   Total Score 7 (mild anxiety) 5 (mild anxiety) -   Total Score 7 5 0     {Last PHQ9 or GAD7 Responses (Optional):788969}    Suicide Assessment Five-step Evaluation and Treatment (SAFE-T)  {Provider  Link to Depression Care Package SmartSet  :000158}  {additonal problems for provider to add (Optional):799813}    Review of Systems   {ROS COMP (Optional):392201}      Objective    LMP 11/09/2005 (Approximate)   There is no height or weight on file to calculate BMI.  Physical Exam   {Exam List (Optional):943376}    {Diagnostic Test Results (Optional):487686}    {AMBULATORY ATTESTATION (Optional):216410}

## 2023-02-03 LAB
DEPRECATED CALCIDIOL+CALCIFEROL SERPL-MC: 17 UG/L (ref 20–75)
T PALLIDUM AB SER QL: NONREACTIVE

## 2023-02-05 PROBLEM — F33.41 MAJOR DEPRESSIVE DISORDER, RECURRENT, IN PARTIAL REMISSION (H): Status: ACTIVE | Noted: 2023-02-05

## 2023-02-05 PROBLEM — R01.1 NEWLY RECOGNIZED HEART MURMUR: Status: ACTIVE | Noted: 2023-02-05

## 2023-02-05 PROBLEM — R41.3 LOSS OF MEMORY: Status: ACTIVE | Noted: 2023-02-05

## 2023-02-05 PROBLEM — E55.9 VITAMIN D DEFICIENCY: Status: ACTIVE | Noted: 2023-02-05

## 2023-02-05 RX ORDER — ERGOCALCIFEROL 1.25 MG/1
50000 CAPSULE, LIQUID FILLED ORAL WEEKLY
Qty: 8 CAPSULE | Refills: 0 | Status: SHIPPED | OUTPATIENT
Start: 2023-02-05 | End: 2023-03-27

## 2023-02-06 ENCOUNTER — TELEPHONE (OUTPATIENT)
Dept: FAMILY MEDICINE | Facility: OTHER | Age: 73
End: 2023-02-06
Payer: MEDICARE

## 2023-02-06 NOTE — TELEPHONE ENCOUNTER
Left message for pt to return our call      Please call patient with the following message     Vitamin D is low. Recommend high dose replacement for 8 weeks (50,000 IU once a week). Should stop at home vitamin D if taking one. Then recheck lab between 8-10 weeks. With that lab, I will be able to advise on maintenance dosing.     Rest of labs in normal range.       Jose Francisco Pierre PA-C

## 2023-02-11 ENCOUNTER — APPOINTMENT (OUTPATIENT)
Dept: GENERAL RADIOLOGY | Facility: CLINIC | Age: 73
End: 2023-02-11
Attending: NURSE PRACTITIONER
Payer: MEDICARE

## 2023-02-11 ENCOUNTER — HOSPITAL ENCOUNTER (EMERGENCY)
Facility: CLINIC | Age: 73
Discharge: HOME | End: 2023-02-11
Attending: NURSE PRACTITIONER | Admitting: NURSE PRACTITIONER
Payer: MEDICARE

## 2023-02-11 VITALS
BODY MASS INDEX: 25.82 KG/M2 | WEIGHT: 135 LBS | HEART RATE: 87 BPM | TEMPERATURE: 97.8 F | DIASTOLIC BLOOD PRESSURE: 127 MMHG | SYSTOLIC BLOOD PRESSURE: 226 MMHG | RESPIRATION RATE: 16 BRPM | OXYGEN SATURATION: 97 %

## 2023-02-11 DIAGNOSIS — S42.211A CLOSED DISPLACED FRACTURE OF SURGICAL NECK OF RIGHT HUMERUS, UNSPECIFIED FRACTURE MORPHOLOGY, INITIAL ENCOUNTER: ICD-10-CM

## 2023-02-11 PROCEDURE — 99283 EMERGENCY DEPT VISIT LOW MDM: CPT | Performed by: NURSE PRACTITIONER

## 2023-02-11 PROCEDURE — 250N000013 HC RX MED GY IP 250 OP 250 PS 637: Performed by: NURSE PRACTITIONER

## 2023-02-11 PROCEDURE — 73030 X-RAY EXAM OF SHOULDER: CPT | Mod: RT

## 2023-02-11 PROCEDURE — 23600 CLTX PROX HUMRL FX W/O MNPJ: CPT | Mod: 54 | Performed by: NURSE PRACTITIONER

## 2023-02-11 PROCEDURE — 23600 CLTX PROX HUMRL FX W/O MNPJ: CPT | Mod: RT | Performed by: NURSE PRACTITIONER

## 2023-02-11 PROCEDURE — 99284 EMERGENCY DEPT VISIT MOD MDM: CPT | Mod: 25 | Performed by: NURSE PRACTITIONER

## 2023-02-11 PROCEDURE — 23600 CLTX PROX HUMRL FX W/O MNPJ: CPT | Mod: 55 | Performed by: PHYSICIAN ASSISTANT

## 2023-02-11 RX ORDER — HYDROCODONE BITARTRATE AND ACETAMINOPHEN 5; 325 MG/1; MG/1
1 TABLET ORAL EVERY 6 HOURS PRN
Qty: 4 TABLET | Refills: 0 | Status: SHIPPED | OUTPATIENT
Start: 2023-02-11 | End: 2023-02-16

## 2023-02-11 RX ORDER — HYDROCODONE BITARTRATE AND ACETAMINOPHEN 5; 325 MG/1; MG/1
1 TABLET ORAL EVERY 6 HOURS PRN
Qty: 12 TABLET | Refills: 0 | Status: SHIPPED | OUTPATIENT
Start: 2023-02-11 | End: 2023-02-11

## 2023-02-11 RX ORDER — HYDROCODONE BITARTRATE AND ACETAMINOPHEN 5; 325 MG/1; MG/1
1 TABLET ORAL ONCE
Status: COMPLETED | OUTPATIENT
Start: 2023-02-11 | End: 2023-02-11

## 2023-02-11 RX ORDER — HYDROCODONE BITARTRATE AND ACETAMINOPHEN 5; 325 MG/1; MG/1
1 TABLET ORAL EVERY 6 HOURS PRN
Status: DISCONTINUED | OUTPATIENT
Start: 2023-02-11 | End: 2023-02-11 | Stop reason: HOSPADM

## 2023-02-11 RX ADMIN — HYDROCODONE BITARTRATE AND ACETAMINOPHEN 1 TABLET: 5; 325 TABLET ORAL at 16:13

## 2023-02-11 ASSESSMENT — ACTIVITIES OF DAILY LIVING (ADL): ADLS_ACUITY_SCORE: 35

## 2023-02-11 NOTE — DISCHARGE INSTRUCTIONS
Wear sling. Make position changes slowly.  I recommend someone stay with you the next couple days to give you some additional support.  Ice packs intermittently.  Tylenol 650 mg every 4-6 hours as needed for pain.    Or--    Norco 1 tablet every 6 hours as needed for severe pain. Do not drive or drink alcohol with this medication.  (HOLD your Fiorinal while taking this medication)    Follow-up with orthopedics. Referral has been sent. They should call you or you can contact them to set-up appointment [phone].

## 2023-02-11 NOTE — ED PROVIDER NOTES
History     Chief Complaint   Patient presents with     Arm Injury     HPI  Sri Grewal is a 72 year old female with history of hypertension, MI, chronic migraine, CVA/TIA, middle cerebral artery aneurysm, anemia, depression, and esophageal reflux who presents for evaluation of right shoulder pain after fall.  This was a trip and fall. No syncope. Patient fell about 1.5 hours ago landing on her right shoulder.  She denies hitting her head or loss of consciousness.  She complains of pain in the right shoulder/upper humerus.  Unable to move her right arm due to acuity of pain.         Allergies:  Allergies   Allergen Reactions     Codeine      GI UPSET     Oxycodone      Seasonal Allergies        Problem List:    Patient Active Problem List    Diagnosis Date Noted     Health Care Home 07/27/2011     Priority: High     X  EMERGENCY CARE PLAN  Presenting Problem Signs and Symptoms Treatment Plan    Questions or conerns during clinic hours    I will call the clinic directly     Questions or conerns outside clinic hours    I will call the 24 hour nurse line at [phone]    Patient needs to schedule an appointment    I will call the 24 hour scheduling team at [phone] or clinic directly    Same day treatment     I will call the clinic first, nurse line if after hours, urgent care and express care if needed                            DX V65.8 REPLACED WITH [id number] HEALTH CARE HOME (04/08/2013)       Newly recognized heart murmur 02/05/2023     Priority: Medium     Major depressive disorder, recurrent, in partial remission (H) 02/05/2023     Priority: Medium     Vitamin D deficiency 02/05/2023     Priority: Medium     Loss of memory 02/05/2023     Priority: Medium     History of cerebrovascular accident (CVA) with residual deficit 12/08/2021     Priority: Medium     ST elevation myocardial infarction (STEMI), unspecified artery (H) 08/26/2021     Priority: Medium     Coronary artery disease due to lipid rich plaque  08/26/2021     Priority: Medium     Compression fracture of T12 vertebra with routine healing, subsequent encounter 11/17/2020     Priority: Medium     Hematoma 01/10/2019     Priority: Medium     Hematoma of abdominal wall, initial encounter 01/09/2019     Priority: Medium     Abdominal wall hematoma 01/09/2019     Priority: Medium     Hyponatremia 11/26/2018     Priority: Medium     Age-related osteoporosis without current pathological fracture 11/12/2018     Priority: Medium     HSV (herpes simplex virus) infection 07/31/2018     Priority: Medium     Atypical mole 06/01/2017     Priority: Medium     Hypertension goal BP (blood pressure) < 140/90 05/25/2017     Priority: Medium     TIA (transient ischemic attack) 01/16/2017     Priority: Medium     Adjustment disorder with mixed anxiety and depressed mood 09/09/2014     Priority: Medium     Middle cerebral artery aneurysm 10/29/2013     Priority: Medium     Noted in 2007.  Intervention not recommended at that time.  Observation.  Saw Interventional Radiology 12/2013.  Recheck CTA in one year.       Moderate major depression (H) 09/08/2010     Priority: Medium     Insomnia 09/08/2010     Priority: Medium     Atrophy of vagina 05/30/2010     Priority: Medium     Lump or mass in breast 03/29/2010     Priority: Medium     Mild persistent asthma 02/21/2005     Priority: Medium     Anemia 04/25/2002     Priority: Medium     Problem list name updated by automated process. Provider to review       Chronic migraine without aura without status migrainosus, not intractable      Priority: Medium     Multiple trials off of fiorinal have not been successful  Problem list name updated by automated process. Provider to review       Other abnormal Papanicolaou smear of cervix and cervical HPV(795.09)      Priority: Medium     (Problem list name updated by automated process. Provider to review and confirm.)       Endometriosis      Priority: Medium     Problem list name updated by  automated process. Provider to review       Allergic rhinitis      Priority: Medium     Problem list name updated by automated process. Provider to review          Past Medical History:    Past Medical History:   Diagnosis Date     Abnormal Papanicolaou smear of cervix and cervical HPV      Allergic rhinitis, cause unspecified      Contact dermatitis and other eczema, due to unspecified cause      Endometriosis, site unspecified      Esophageal reflux      Migraine, unspecified, without mention of intractable migraine without mention of status migrainosus      Mild persistent asthma      Unspecified disorder of uterus        Past Surgical History:    Past Surgical History:   Procedure Laterality Date     COLONOSCOPY  2014    Procedure: COLONOSCOPY;  Surgeon: Nathan Baker MD;  Location: [facility]     ZZC  DELIVERY ONLY       X2     ZZHC COLONOSCOPY THRU STOMA, DIAGNOSTIC  2002    normal       Family History:    Family History   Problem Relation Age of Onset     Heart Disease Mother         anemia     Osteoporosis Mother      Hypertension Mother      Cerebrovascular Disease Mother      Alcohol/Drug Mother         alcohol     Neurologic Disorder Mother         migraines     Hypertension Father      Cancer No family hx of         breast       Social History:  Marital Status:   [5]  Social History     Tobacco Use     Smoking status: Never     Passive exposure: Never     Smokeless tobacco: Never   Vaping Use     Vaping Use: Never used   Substance Use Topics     Alcohol use: Yes     Comment: socially     Drug use: No        Medications:    HYDROcodone-acetaminophen (NORCO) 5-325 MG tablet  albuterol (PROAIR HFA) 108 (90 Base) MCG/ACT inhaler  aspirin (ASA) 81 MG chewable tablet  buPROPion (WELLBUTRIN XL) 300 MG 24 hr tablet  butalbital-aspirin-caffeine (FIORINAL) -40 MG per capsule  carvedilol (COREG) 12.5 MG tablet  docusate sodium (COLACE) 100 MG tablet  furosemide (LASIX) 40 MG  tablet  lisinopril (ZESTRIL) 20 MG tablet  rosuvastatin (CRESTOR) 20 MG tablet  venlafaxine (EFFEXOR XR) 150 MG 24 hr capsule  vitamin D2 (ERGOCALCIFEROL) 90630 units (1250 mcg) capsule          Review of Systems  As mentioned above in the history present illness. All other systems were reviewed and are negative.    Physical Exam   BP: (!) 226/127  Pulse: 87  Temp: 97.8  F (36.6  C)  Resp: 16  Weight: 61.2 kg (135 lb)  SpO2: 97 %      Physical Exam  Constitutional:       General: She is in acute distress (appears in pain.).      Appearance: She is well-developed. She is not ill-appearing.   HENT:      Head: Normocephalic and atraumatic.      Right Ear: External ear normal.      Left Ear: External ear normal.      Nose: Nose normal.      Mouth/Throat:      Mouth: Mucous membranes are moist.   Eyes:      Conjunctiva/sclera: Conjunctivae normal.   Cardiovascular:      Rate and Rhythm: Normal rate and regular rhythm.      Heart sounds: Normal heart sounds. No murmur heard.  Pulmonary:      Effort: Pulmonary effort is normal. No respiratory distress.      Breath sounds: Normal breath sounds.   Abdominal:      General: Bowel sounds are normal. There is no distension.      Palpations: Abdomen is soft.      Tenderness: There is no abdominal tenderness.   Musculoskeletal:      Right shoulder: Deformity (anterior upper humerus region. ) and tenderness (anterior humeral head) present. Decreased range of motion.   Skin:     General: Skin is warm and dry.      Findings: No rash.   Neurological:      General: No focal deficit present.      Mental Status: She is alert and oriented to person, place, and time.      Comments: Right hand warm and pink. Normal radial pulse.          ED Course                 Procedures         Results for orders placed or performed during the hospital encounter of 02/11/23 (from the past 24 hour(s))   XR Shoulder Right G/E 3 Views    Narrative    EXAM: XR SHOULDER RIGHT G/E 3 VIEWS  LOCATION: [facility]  DATE/TIME: 2/11/2023 4:26 PM    INDICATION: Right shoulder pain following fall  COMPARISON: None.      Impression    IMPRESSION: Acute fracture of the surgical neck of the proximal humerus. Anterior displacement of the humeral shaft and anterior apical angulation at the fracture margin. Normal glenohumeral alignment. The acromioclavicular joint is unremarkable.   Osteopenia.       Medications   HYDROcodone-acetaminophen (NORCO) 5-325 MG per tablet 1 tablet (has no administration in time range)   HYDROcodone-acetaminophen (NORCO) 5-325 MG per tablet 1 tablet (1 tablet Oral Given 2/11/23 1613)     4:52 PM I consulted with Dr. Kahn, orthopedic surgeon. Agrees with plan for sling, pain control and close follow-up with orthopedics as an outpatient.       Assessments & Plan (with Medical Decision Making)  72 year old female with right humerus fracture after falling today landing on her right shoulder. She appears in quite a bit of pain and distress. A sling was placed to her right arm. She was given p.o. Norco for pain. Xray confirms fracture of the surgical neck of the proximal right humerus with displacement. No disclocation. See full radiologist report above. She denies hitting head or LOC. She is not on blood thinners. No other areas of pain. Right arm neurovascularly intact. Her BP is quite elevated here on arrival which I attribute to her pain. Unfortunately it was not checked again before discharge. I discussed the imaging findings with patient and her son at the bedside. I consulted with Dr. Kahn, on-call for orthopedics and agrees with plan for outpatient management with sling,  pain control, and close follow-up with orthopedics. I recommend someone stay with her the next couple days.   Plan:  Wear sling. Make position changes slowly.  I recommend someone stay with you the next couple days to give you some additional support.  Ice packs intermittently.  Tylenol 650 mg every  4-6 hours as needed for pain.  Or--  Norco 1 tablet every 6 hours as needed for severe pain. Do not drive or drink alcohol with this medication.  (HOLD your Fiorinal while taking this medication)  Follow-up with orthopedics. Referral has been sent. They should call you or you can contact them to set-up appointment [phone].\         Discharge Medication List as of 2/11/2023  5:21 PM      START taking these medications    Details   HYDROcodone-acetaminophen (NORCO) 5-325 MG tablet Take 1 tablet by mouth every 6 hours as needed for severe pain (7-10), Disp-4 tablet, R-0, Local Print             Final diagnoses:   Closed displaced fracture of surgical neck of right humerus, unspecified fracture morphology, initial encounter       2/11/2023   [facility] EMERGENCY DEPT     Ilene, JAYY Menchaca CNP  02/11/23 1156

## 2023-02-11 NOTE — ED TRIAGE NOTES
Pt fell on ice and fell to her right side landing on her shoulder, she has pain at the humoral area, no loc

## 2023-02-16 DIAGNOSIS — S42.214D CLOSED NONDISPLACED FRACTURE OF SURGICAL NECK OF RIGHT HUMERUS WITH ROUTINE HEALING, UNSPECIFIED FRACTURE MORPHOLOGY, SUBSEQUENT ENCOUNTER: Primary | ICD-10-CM

## 2023-02-16 RX ORDER — HYDROCODONE BITARTRATE AND ACETAMINOPHEN 5; 325 MG/1; MG/1
1 TABLET ORAL EVERY 6 HOURS PRN
Qty: 30 TABLET | Refills: 0 | Status: SHIPPED | OUTPATIENT
Start: 2023-02-16 | End: 2023-03-07

## 2023-02-16 NOTE — TELEPHONE ENCOUNTER
PATIENT REQUEST:   Hydrocodone refill.     Next 5 appointments (look out 90 days)    Mar 07, 2023  4:00 PM  (Arrive by 3:40 PM)  Provider Visit with Rosenda Pierre PA-C  [facility] ([facility] ) [address] [address]  [phone]          PLAN:   Routing to PCP to advise.           JOSE ALBERTO Mancilla, RN, N  [facility]  February 16, 2023

## 2023-02-16 NOTE — TELEPHONE ENCOUNTER
Refill sent. Patient should make sure not to use her Fiorinal while on this medication.     Jose Francisco Varma-FLEX Saravia  [facility]

## 2023-02-16 NOTE — TELEPHONE ENCOUNTER
Pt called back in is asking if you are able to fill this until she sees ortho on Tuesday for her fracture. She is in quite a bit of pain.  Call pt on cell with update.

## 2023-02-17 NOTE — TELEPHONE ENCOUNTER
Called and spoke with patient, read back response from CDL below. Patient expressed understanding.

## 2023-02-21 ENCOUNTER — ANCILLARY PROCEDURE (OUTPATIENT)
Dept: GENERAL RADIOLOGY | Facility: CLINIC | Age: 73
End: 2023-02-21
Attending: PHYSICIAN ASSISTANT
Payer: MEDICARE

## 2023-02-21 ENCOUNTER — OFFICE VISIT (OUTPATIENT)
Dept: ORTHOPEDICS | Facility: CLINIC | Age: 73
End: 2023-02-21
Attending: NURSE PRACTITIONER
Payer: MEDICARE

## 2023-02-21 VITALS — WEIGHT: 135 LBS | HEIGHT: 61 IN | BODY MASS INDEX: 25.49 KG/M2

## 2023-02-21 DIAGNOSIS — S42.201A CLOSED TRAUMATIC DISPLACED FRACTURE OF PROXIMAL END OF RIGHT HUMERUS, INITIAL ENCOUNTER: Primary | ICD-10-CM

## 2023-02-21 DIAGNOSIS — S42.211A CLOSED DISPLACED FRACTURE OF SURGICAL NECK OF RIGHT HUMERUS, UNSPECIFIED FRACTURE MORPHOLOGY, INITIAL ENCOUNTER: ICD-10-CM

## 2023-02-21 DIAGNOSIS — S49.91XA ARM INJURY, RIGHT, INITIAL ENCOUNTER: ICD-10-CM

## 2023-02-21 PROCEDURE — 99024 POSTOP FOLLOW-UP VISIT: CPT | Performed by: PHYSICIAN ASSISTANT

## 2023-02-21 PROCEDURE — 73020 X-RAY EXAM OF SHOULDER: CPT | Mod: TC | Performed by: RADIOLOGY

## 2023-02-21 PROCEDURE — 73030 X-RAY EXAM OF SHOULDER: CPT | Mod: TC | Performed by: RADIOLOGY

## 2023-02-21 ASSESSMENT — PAIN SCALES - GENERAL: PAINLEVEL: SEVERE PAIN (7)

## 2023-02-21 NOTE — PROGRESS NOTES
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  She was working with her friend Bryson who is here today.    She is being seen for   Chief Complaints and History of Present Illnesses   Patient presents with     Musculoskeletal Problem     Right humerus injury DOI: 2/11/2023 fell     Consult     Ref; ED     I reviewed the note in detail from 2/11/2023 by NISHANT Odom    History of Present Illness:   Mechanism of Injury: Tripped and fell  Location: Right proximal humerus  Duration of Pain: Since date of injury which was 2/11/2023  Rating of Pain: 7 out of 10  Pain Quality: Achy but sharp when the arm is moved  Pain is better with: Rest and not bumping the arm  Pain is worse with: Bumping the arm  Treatment so far consists of: Patient was seen in the emergency department on 2/11/2023 and at that time x-rays were done showing displaced right proximal humerus fracture.  The ER provider did have a conversation with Dr. Kahn who agreed with the plan of outpatient management and sling.  Referral for orthopedics was placed.  Patient has been wearing her sling since.  She has been taking Norco for pain relief.  She has been taking Tylenol as needed without much relief.  The Norco helps slightly.   Associated Features: Denies numbness or tingling shooting burning or electric pain.  Prior history of related problems: No previous major injury trauma or surgery to the right shoulder  Pain is Limiting: Use of right upper extremity  Here to: Orthopedic consultation  The Pain Has: Been about the same  Additional History: I reviewed this case early and connected with Dr. Kahn as well as Dr. Hutchinson and Dr. Turner.  Dr. Turner felt it could be treated op versus operative and if it is operative he could take care of it and surgery.      Patient's past medical, surgical, social and family  histories reviewed.     Past Medical History:   Diagnosis Date     Abnormal Papanicolaou smear of cervix and cervical HPV      Allergic rhinitis, cause unspecified     seasonal allergies     Contact dermatitis and other eczema, due to unspecified cause      Endometriosis, site unspecified      Esophageal reflux     GERD     Migraine, unspecified, without mention of intractable migraine without mention of status migrainosus      Mild persistent asthma      Unspecified disorder of uterus     fibroid uterus        Past Surgical History:   Procedure Laterality Date     COLONOSCOPY  2014    Procedure: COLONOSCOPY;  Surgeon: Nathan Baker MD;  Location: [facility]     ZZC  DELIVERY ONLY       X2     ZZHC COLONOSCOPY THRU STOMA, DIAGNOSTIC  2002    normal       Medications:  albuterol (PROAIR HFA) 108 (90 Base) MCG/ACT inhaler, Inhale 1-2 puffs into the lungs every 6 hours as needed for shortness of breath / dyspnea  aspirin (ASA) 81 MG chewable tablet, Take 81 mg by mouth daily  buPROPion (WELLBUTRIN XL) 300 MG 24 hr tablet, Take 1 tablet (300 mg) by mouth every morning  butalbital-aspirin-caffeine (FIORINAL) -40 MG per capsule, Take 1 capsule by mouth every 6 hours as needed for moderate to severe pain  carvedilol (COREG) 12.5 MG tablet, TAKE ONE TABLET BY MOUTH TWICE A DAY WITH FOOD  docusate sodium (COLACE) 100 MG tablet, Take 1-2 tablets (100-200 mg) by mouth 2 times daily as needed for constipation  furosemide (LASIX) 40 MG tablet, Take 1 tablet (40 mg) by mouth daily  HYDROcodone-acetaminophen (NORCO) 5-325 MG tablet, Take 1 tablet by mouth every 6 hours as needed for severe pain (7-10)  lisinopril (ZESTRIL) 20 MG tablet, Take 1 tablet (20 mg) by mouth daily  rosuvastatin (CRESTOR) 20 MG tablet, Take 1 tablet (20 mg) by mouth daily  venlafaxine (EFFEXOR XR) 150 MG 24 hr capsule, Take 1 capsule (150 mg) by mouth daily  vitamin D2 (ERGOCALCIFEROL) 56911 units (1250 mcg) capsule, Take 1  "capsule (50,000 Units) by mouth once a week for 8 doses    No current facility-administered medications on file prior to visit.      Allergies   Allergen Reactions     Codeine      GI UPSET     Oxycodone      Seasonal Allergies        Social History     Occupational History     Occupation: retired     Employer: RETIRED   Tobacco Use     Smoking status: Never     Passive exposure: Never     Smokeless tobacco: Never   Vaping Use     Vaping Use: Never used   Substance and Sexual Activity     Alcohol use: Yes     Comment: socially     Drug use: No     Sexual activity: Not Currently     Partners: Male     Birth control/protection: Surgical     Comment: tubal ligation       Family History   Problem Relation Age of Onset     Heart Disease Mother         anemia     Osteoporosis Mother      Hypertension Mother      Cerebrovascular Disease Mother      Alcohol/Drug Mother         alcohol     Neurologic Disorder Mother         migraines     Hypertension Father      Cancer No family hx of         breast     REVIEW OF SYSTEMS  10 point review systems performed otherwise negative as noted as per history of present illness.    Physical Exam:  Vitals: Ht 1.54 m (5' 0.63\")   Wt 61.2 kg (135 lb)   LMP 11/09/2005 (Approximate)   BMI 25.82 kg/m    BMI= Body mass index is 25.82 kg/m .    Constitutional: healthy, alert and no acute distress   Psychiatric: mentation appears normal and affect normal/bright  NEURO: no focal deficits, CMS intact Right upper extremity   RESP: Normal with easy respirations and no use of accessory muscles to breathe, no audible wheezing or retractions  CV: +2 radial pulse and her hand is warm to palpation.  SKIN: No erythema, rashes, excoriation, or breakdown. No evidence of infection.   MUSCULOSKELETAL:    INSPECTION of right shoulder: No gross deformities    PALPATION: Tenderness to palpation at the proximal humerus area.  No tenderness AC joint, proximal biceps tendon, clavicle, lateral shoulder, posterior " shoulder, trapezius area. No increased warmth noted.     ROM: Passive: Elbow flexion 120 degrees, elbow extension 10 degrees short of full, supination 90, pronation 90. The range of motion is without catching locking or pain.        STRENGTH: 5 out of 5 , interosseous and thumb without pain.  Patient also had minus 4 out of 5 deltoid strength.    SPECIAL TEST: none  GAIT: non-antalgic  Lymph: no palpable lymph nodes    Diagnostic Modalities:  Recent Results (from the past 744 hour(s))   XR Shoulder Right G/E 3 Views    Narrative    EXAM: XR SHOULDER RIGHT G/E 3 VIEWS  LOCATION: [facility]  DATE/TIME: 2/11/2023 4:26 PM    INDICATION: Right shoulder pain following fall  COMPARISON: None.      Impression    IMPRESSION: Acute fracture of the surgical neck of the proximal humerus. Anterior displacement of the humeral shaft and anterior apical angulation at the fracture margin. Normal glenohumeral alignment. The acromioclavicular joint is unremarkable.   Osteopenia.     I agree with the above reading and I would measure the angulation at about 60 degrees.    X-rays done today and this time we have the patient in a shoulder immobilizer with the arm more anterior/anatomical.  On these x-rays we see similar angulation at about 50 degrees of displacement of the fracture.  This is seen on the scapular Y view.  No other fracture dislocation or tumor.  No mineralization.    Second isolated scapular Y view done today after Dr. Turner adjusted the shoulder immobilizer.  X-ray showing the fracture with about 50 degrees of anterior displacement.  This is compared to the x-rays done just previous to that which seems to be about the same.  There is no other fracture dislocation or tumor.    Independent visualization of the images was performed.    Impression: 1.  10 days status post right proximal humerus fracture with approximately 50 degrees of anterior displacement.    Plan:  All of the above  pertinent physical exam and imaging modalities findings was reviewed with Sri.    FOCUSED PLAN:  72-year-old female who is 10 days out from right proximal humerus fracture.  I included Dr. Turner in this treatment as we were deciding surgery versus nonop management.  We placed the patient in a shoulder immobilizer today to try and help with the angulation of the fracture, we adjusted this once also.  Fracture seems to been about the same position but acceptable for nonoperative management.  This was discussed with the patient and her friend Bryson.  Patient is to continue Norco for pain and can use Tylenol also as long as the total amount of Tylenol was watched.  Discontinue sling and now will be wearing shoulder immobilizer.  Patient educated to work on hand wrist and elbow range of motion 3-5 times a day.  Patient educated to sleep in recliner with pillows.  Plan is to follow-up in 1 week with me where I will see the patient prior to x-ray to make sure the shoulder immobilizer is properly placed.  If x-rays look good there we would see the patient back 2 weeks after which would be at about 4 weeks and if patient is doing well and x-rays look good we could start passive range of motion at that point.  Then we would follow-up at 6 weeks so 10 weeks out and start active and active assisted and light strengthening at that point.  Follow-up in 1 week for shoulder immobilizer adjustment prior to x-ray.    Re-x-ray on return: Yes, shoulder immobilizer must be adjusted first by provider and then scapular Y and Wang view of right shoulder in shoulder immobilizer.    This plan was made with Dr. Turner.  He also came in and saw the patient and discussed the fracture with the patient and Bryson.    This note was dictated with TruQu.    Elías Willoughby PA-C

## 2023-02-21 NOTE — LETTER
2/21/2023         RE: Sri Grewal  [address] [facility] [id number]        Dear Colleague,    Thank you for referring your patient, Sri Grewal, to the [facility]. Please see a copy of my visit note below.    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  She was working with her friend Bryson who is here today.    She is being seen for   Chief Complaints and History of Present Illnesses   Patient presents with     Musculoskeletal Problem     Right humerus injury DOI: 2/11/2023 fell     Consult     Ref; ED     I reviewed the note in detail from 2/11/2023 by NISHANT Odom    History of Present Illness:   Mechanism of Injury: Tripped and fell  Location: Right proximal humerus  Duration of Pain: Since date of injury which was 2/11/2023  Rating of Pain: 7 out of 10  Pain Quality: Achy but sharp when the arm is moved  Pain is better with: Rest and not bumping the arm  Pain is worse with: Bumping the arm  Treatment so far consists of: Patient was seen in the emergency department on 2/11/2023 and at that time x-rays were done showing displaced right proximal humerus fracture.  The ER provider did have a conversation with Dr. Kahn who agreed with the plan of outpatient management and sling.  Referral for orthopedics was placed.  Patient has been wearing her sling since.  She has been taking Norco for pain relief.  She has been taking Tylenol as needed without much relief.  The Norco helps slightly.   Associated Features: Denies numbness or tingling shooting burning or electric pain.  Prior history of related problems: No previous major injury trauma or surgery to the right shoulder  Pain is Limiting: Use of right upper extremity  Here to: Orthopedic consultation  The Pain Has: Been about the same  Additional History: I reviewed this case  early and connected with Dr. Kahn as well as Dr. Hutchinson and Dr. Turner.  Dr. Turner felt it could be treated op versus operative and if it is operative he could take care of it and surgery.      Patient's past medical, surgical, social and family histories reviewed.     Past Medical History:   Diagnosis Date     Abnormal Papanicolaou smear of cervix and cervical HPV      Allergic rhinitis, cause unspecified     seasonal allergies     Contact dermatitis and other eczema, due to unspecified cause      Endometriosis, site unspecified      Esophageal reflux     GERD     Migraine, unspecified, without mention of intractable migraine without mention of status migrainosus      Mild persistent asthma      Unspecified disorder of uterus     fibroid uterus        Past Surgical History:   Procedure Laterality Date     COLONOSCOPY  2014    Procedure: COLONOSCOPY;  Surgeon: Nathan Baker MD;  Location:  GI     [facility]  DELIVERY ONLY       X2     [facility] COLONOSCOPY THRU STOMA, DIAGNOSTIC  2002    normal       Medications:  albuterol (PROAIR HFA) 108 (90 Base) MCG/ACT inhaler, Inhale 1-2 puffs into the lungs every 6 hours as needed for shortness of breath / dyspnea  aspirin (ASA) 81 MG chewable tablet, Take 81 mg by mouth daily  buPROPion (WELLBUTRIN XL) 300 MG 24 hr tablet, Take 1 tablet (300 mg) by mouth every morning  butalbital-aspirin-caffeine (FIORINAL) -40 MG per capsule, Take 1 capsule by mouth every 6 hours as needed for moderate to severe pain  carvedilol (COREG) 12.5 MG tablet, TAKE ONE TABLET BY MOUTH TWICE A DAY WITH FOOD  docusate sodium (COLACE) 100 MG tablet, Take 1-2 tablets (100-200 mg) by mouth 2 times daily as needed for constipation  furosemide (LASIX) 40 MG tablet, Take 1 tablet (40 mg) by mouth daily  HYDROcodone-acetaminophen (NORCO) 5-325 MG tablet, Take 1 tablet by mouth every 6 hours as needed for severe pain (7-10)  lisinopril (ZESTRIL) 20 MG tablet, Take 1 tablet (20  "mg) by mouth daily  rosuvastatin (CRESTOR) 20 MG tablet, Take 1 tablet (20 mg) by mouth daily  venlafaxine (EFFEXOR XR) 150 MG 24 hr capsule, Take 1 capsule (150 mg) by mouth daily  vitamin D2 (ERGOCALCIFEROL) 81854 units (1250 mcg) capsule, Take 1 capsule (50,000 Units) by mouth once a week for 8 doses    No current facility-administered medications on file prior to visit.      Allergies   Allergen Reactions     Codeine      GI UPSET     Oxycodone      Seasonal Allergies        Social History     Occupational History     Occupation: retired     Employer: RETIRED   Tobacco Use     Smoking status: Never     Passive exposure: Never     Smokeless tobacco: Never   Vaping Use     Vaping Use: Never used   Substance and Sexual Activity     Alcohol use: Yes     Comment: socially     Drug use: No     Sexual activity: Not Currently     Partners: Male     Birth control/protection: Surgical     Comment: tubal ligation       Family History   Problem Relation Age of Onset     Heart Disease Mother         anemia     Osteoporosis Mother      Hypertension Mother      Cerebrovascular Disease Mother      Alcohol/Drug Mother         alcohol     Neurologic Disorder Mother         migraines     Hypertension Father      Cancer No family hx of         breast     REVIEW OF SYSTEMS  10 point review systems performed otherwise negative as noted as per history of present illness.    Physical Exam:  Vitals: Ht 1.54 m (5' 0.63\")   Wt 61.2 kg (135 lb)   LMP 11/09/2005 (Approximate)   BMI 25.82 kg/m    BMI= Body mass index is 25.82 kg/m .    Constitutional: healthy, alert and no acute distress   Psychiatric: mentation appears normal and affect normal/bright  NEURO: no focal deficits, CMS intact Right upper extremity   RESP: Normal with easy respirations and no use of accessory muscles to breathe, no audible wheezing or retractions  CV: +2 radial pulse and her hand is warm to palpation.  SKIN: No erythema, rashes, excoriation, or breakdown. No " evidence of infection.   MUSCULOSKELETAL:    INSPECTION of right shoulder: No gross deformities    PALPATION: Tenderness to palpation at the proximal humerus area.  No tenderness AC joint, proximal biceps tendon, clavicle, lateral shoulder, posterior shoulder, trapezius area. No increased warmth noted.     ROM: Passive: Elbow flexion 120 degrees, elbow extension 10 degrees short of full, supination 90, pronation 90. The range of motion is without catching locking or pain.        STRENGTH: 5 out of 5 , interosseous and thumb without pain.  Patient also had minus 4 out of 5 deltoid strength.    SPECIAL TEST: none  GAIT: non-antalgic  Lymph: no palpable lymph nodes    Diagnostic Modalities:  Recent Results (from the past 744 hour(s))   XR Shoulder Right G/E 3 Views    Narrative    EXAM: XR SHOULDER RIGHT G/E 3 VIEWS  LOCATION: [facility]  DATE/TIME: 2/11/2023 4:26 PM    INDICATION: Right shoulder pain following fall  COMPARISON: None.      Impression    IMPRESSION: Acute fracture of the surgical neck of the proximal humerus. Anterior displacement of the humeral shaft and anterior apical angulation at the fracture margin. Normal glenohumeral alignment. The acromioclavicular joint is unremarkable.   Osteopenia.     I agree with the above reading and I would measure the angulation at about 60 degrees.    X-rays done today and this time we have the patient in a shoulder immobilizer with the arm more anterior/anatomical.  On these x-rays we see similar angulation at about 50 degrees of displacement of the fracture.  This is seen on the scapular Y view.  No other fracture dislocation or tumor.  No mineralization.    Second isolated scapular Y view done today after Dr. Turner adjusted the shoulder immobilizer.  X-ray showing the fracture with about 50 degrees of anterior displacement.  This is compared to the x-rays done just previous to that which seems to be about the same.  There is no  other fracture dislocation or tumor.    Independent visualization of the images was performed.    Impression: 1.  10 days status post right proximal humerus fracture with approximately 50 degrees of anterior displacement.    Plan:  All of the above pertinent physical exam and imaging modalities findings was reviewed with Sri.    FOCUSED PLAN:  72-year-old female who is 10 days out from right proximal humerus fracture.  I included Dr. Turner in this treatment as we were deciding surgery versus nonop management.  We placed the patient in a shoulder immobilizer today to try and help with the angulation of the fracture, we adjusted this once also.  Fracture seems to been about the same position but acceptable for nonoperative management.  This was discussed with the patient and her friend Bryson.  Patient is to continue Norco for pain and can use Tylenol also as long as the total amount of Tylenol was watched.  Discontinue sling and now will be wearing shoulder immobilizer.  Patient educated to work on hand wrist and elbow range of motion 3-5 times a day.  Patient educated to sleep in recliner with pillows.  Plan is to follow-up in 1 week with me where I will see the patient prior to x-ray to make sure the shoulder immobilizer is properly placed.  If x-rays look good there we would see the patient back 2 weeks after which would be at about 4 weeks and if patient is doing well and x-rays look good we could start passive range of motion at that point.  Then we would follow-up at 6 weeks so 10 weeks out and start active and active assisted and light strengthening at that point.  Follow-up in 1 week for shoulder immobilizer adjustment prior to x-ray.    Re-x-ray on return: Yes, shoulder immobilizer must be adjusted first by provider and then scapular Y and Wang view of right shoulder in shoulder immobilizer.    This plan was made with Dr. Turner.  He also came in and saw the patient and discussed the fracture with the  patient and Bryson.    This note was dictated with [facility].    Elías Willoughby PA-C        Again, thank you for allowing me to participate in the care of your patient.        Sincerely,        Elías Willoughby PA-C

## 2023-02-27 ENCOUNTER — ANCILLARY PROCEDURE (OUTPATIENT)
Dept: GENERAL RADIOLOGY | Facility: OTHER | Age: 73
End: 2023-02-27
Attending: PHYSICIAN ASSISTANT
Payer: MEDICARE

## 2023-02-27 DIAGNOSIS — S42.211A CLOSED DISPLACED FRACTURE OF SURGICAL NECK OF RIGHT HUMERUS, UNSPECIFIED FRACTURE MORPHOLOGY, INITIAL ENCOUNTER: ICD-10-CM

## 2023-02-27 PROCEDURE — 73030 X-RAY EXAM OF SHOULDER: CPT | Mod: TC | Performed by: RADIOLOGY

## 2023-02-28 ENCOUNTER — ANCILLARY PROCEDURE (OUTPATIENT)
Dept: CARDIOLOGY | Facility: CLINIC | Age: 73
End: 2023-02-28
Attending: PHYSICIAN ASSISTANT
Payer: MEDICARE

## 2023-02-28 ENCOUNTER — ANCILLARY PROCEDURE (OUTPATIENT)
Dept: MRI IMAGING | Facility: CLINIC | Age: 73
End: 2023-02-28
Attending: PHYSICIAN ASSISTANT
Payer: MEDICARE

## 2023-02-28 ENCOUNTER — OFFICE VISIT (OUTPATIENT)
Dept: ORTHOPEDICS | Facility: CLINIC | Age: 73
End: 2023-02-28
Payer: MEDICARE

## 2023-02-28 ENCOUNTER — ANCILLARY PROCEDURE (OUTPATIENT)
Dept: GENERAL RADIOLOGY | Facility: CLINIC | Age: 73
End: 2023-02-28
Attending: PHYSICIAN ASSISTANT
Payer: MEDICARE

## 2023-02-28 VITALS
DIASTOLIC BLOOD PRESSURE: 90 MMHG | HEIGHT: 61 IN | WEIGHT: 133 LBS | BODY MASS INDEX: 25.11 KG/M2 | SYSTOLIC BLOOD PRESSURE: 120 MMHG

## 2023-02-28 DIAGNOSIS — R01.1 NEWLY RECOGNIZED HEART MURMUR: ICD-10-CM

## 2023-02-28 DIAGNOSIS — S42.211A CLOSED DISPLACED FRACTURE OF SURGICAL NECK OF RIGHT HUMERUS, UNSPECIFIED FRACTURE MORPHOLOGY, INITIAL ENCOUNTER: ICD-10-CM

## 2023-02-28 DIAGNOSIS — I69.30 HISTORY OF CEREBROVASCULAR ACCIDENT (CVA) WITH RESIDUAL DEFICIT: ICD-10-CM

## 2023-02-28 DIAGNOSIS — R41.3 LOSS OF MEMORY: ICD-10-CM

## 2023-02-28 DIAGNOSIS — S42.211D CLOSED DISPLACED FRACTURE OF SURGICAL NECK OF RIGHT HUMERUS WITH ROUTINE HEALING, UNSPECIFIED FRACTURE MORPHOLOGY, SUBSEQUENT ENCOUNTER: ICD-10-CM

## 2023-02-28 DIAGNOSIS — S42.211D CLOSED DISPLACED FRACTURE OF SURGICAL NECK OF RIGHT HUMERUS WITH ROUTINE HEALING, UNSPECIFIED FRACTURE MORPHOLOGY, SUBSEQUENT ENCOUNTER: Primary | ICD-10-CM

## 2023-02-28 DIAGNOSIS — I25.10 CORONARY ARTERY DISEASE DUE TO LIPID RICH PLAQUE: ICD-10-CM

## 2023-02-28 DIAGNOSIS — G43.709 CHRONIC MIGRAINE WITHOUT AURA WITHOUT STATUS MIGRAINOSUS, NOT INTRACTABLE: ICD-10-CM

## 2023-02-28 DIAGNOSIS — I25.83 CORONARY ARTERY DISEASE DUE TO LIPID RICH PLAQUE: ICD-10-CM

## 2023-02-28 LAB — LVEF ECHO: NORMAL

## 2023-02-28 PROCEDURE — 99207 PR FRACTURE CARE IN GLOBAL PERIOD: CPT | Performed by: PHYSICIAN ASSISTANT

## 2023-02-28 PROCEDURE — 73030 X-RAY EXAM OF SHOULDER: CPT | Mod: TC | Performed by: RADIOLOGY

## 2023-02-28 PROCEDURE — G1010 CDSM STANSON: HCPCS | Performed by: RADIOLOGY

## 2023-02-28 PROCEDURE — 93306 TTE W/DOPPLER COMPLETE: CPT | Performed by: STUDENT IN AN ORGANIZED HEALTH CARE EDUCATION/TRAINING PROGRAM

## 2023-02-28 PROCEDURE — 70551 MRI BRAIN STEM W/O DYE: CPT | Mod: MG | Performed by: RADIOLOGY

## 2023-02-28 ASSESSMENT — PAIN SCALES - GENERAL: PAINLEVEL: SEVERE PAIN (7)

## 2023-02-28 NOTE — PROGRESS NOTES
Office Visit-Fracture Follow up    Chief Complaint: Sri Grewal is a 72 year old female who is being seen for   Chief Complaint   Patient presents with     RECHECK     right proximal humerus fracture with approximately 50 degrees of anterior displacement.- DOI: 2/11/2023         History of Present Illness:   Location: Right proximal humerus  Duration of Pain: Since date of injury  Rating of Pain: 7 out of 10  Pain Quality: Achy  Pain is better with: Shoulder immobilizer and not moving the shoulder.  Pain is worse with: Moving the shoulder  Treatment so far consists of: Patient was last seen on 2/21/2023 by me as well as Dr. Turner.  The main question is nonop versus operative and in this visit we switched the patient's immobilizer and readjusted it with x-rays.  In the end Dr. Turner wanted the patient to proceed with nonoperative treatment and get an x-ray in 1 week.  Patient was to be in the shoulder immobilizer.  Patient was taking Norco and Tylenol.  Patient okay to do wrist elbow and hand range of motion 3-5 times a day.  Patient was to get x-rays at follow-up after shoulder immobilizer adjusted properly to decide if nonop treatment will continue.  Patient presents today stating that her pain is slightly better.  She does states she has been coming out of the sling to do elbow range of motion but not very often.  Patient has been very cautious with the right shoulder.  Associated Features: Denies numbness or tingling shooting burning electric pain  Pain is Limiting: Use of right upper extremity  Here to: Orthopedic follow-up  Additional History: This patient was seen with Dr. Turner's guidance via looking at x-rays and decision making.  Patient wants to continue nonoperative treatment.    REVIEW OF SYSTEMS  General: negative for, night sweats, dizziness, fatigue  Resp: No shortness of breath and no cough  CV: negative for chest pain, syncope or near-syncope  GI: negative for nausea, vomiting and  "diarrhea  : negative for dysuria and hematuria  Musculoskeletal: as above  Neurologic: negative for syncope   Hematologic: negative for bleeding disorder    Physical Exam:  Vitals: BP (!) 120/90   Ht 1.54 m (5' 0.63\")   Wt 60.3 kg (133 lb)   LMP 11/09/2005 (Approximate)   BMI 25.44 kg/m    BMI= Body mass index is 25.44 kg/m .  Constitutional: healthy, alert and no acute distress   Psychiatric: mentation appears normal and affect normal/bright  NEURO: no focal deficits, CMS intact Right upper extremity   RESP: Normal with easy respirations and no use of accessory muscles to breathe, no audible wheezing or retractions  CV: +2 radial pulse and her hand is warm to palpation.  SKIN: No erythema, rashes, excoriation, or breakdown. No evidence of infection.   MUSCULOSKELETAL:    INSPECTION of right shoulder: No gross deformities, erythema, edema, ecchymosis, atrophy or fasciculations.     PALPATION: Slight tenderness to palpation on the proximal humerus.  No tenderness AC joint, proximal biceps tendon, clavicle, lateral shoulder, posterior shoulder, trapezius area. No increased warmth noted.     ROM: Passive: Elbow flexion 135 degrees, elbow 20 degrees short of full, supination 90, pronation 90. The range of motion is without catching locking but there is pain with maximal extension.        STRENGTH: 5 out of 5 , interosseous and thumb and 4 out of 5 deltoid without pain.     SPECIAL TEST: None today  GAIT: non-antalgic  Lymph: no palpable lymph nodes      Diagnostic Modalities:  Recent Results (from the past 744 hour(s))   XR Shoulder Right G/E 3 Views    Narrative    EXAM: XR SHOULDER RIGHT G/E 3 VIEWS  LOCATION: [facility]  DATE/TIME: 2/11/2023 4:26 PM    INDICATION: Right shoulder pain following fall  COMPARISON: None.      Impression    IMPRESSION: Acute fracture of the surgical neck of the proximal humerus. Anterior displacement of the humeral shaft and anterior apical " angulation at the fracture margin. Normal glenohumeral alignment. The acromioclavicular joint is unremarkable.   Osteopenia.   XR Shoulder Right 2 Views    Narrative    RIGHT SHOULDER TWO VIEWS  2/21/2023 11:09 AM     HISTORY: Arm injury, right, initial encounter.    COMPARISON: 2/11/2023.       Impression    IMPRESSION: Mildly displaced proximal humerus fracture is again seen.  No significant change in alignment. There are some early signs of  mineralized callus formation without bony bridging. Bones are  demineralized.    RENETTA MATHIS MD         SYSTEM ID:  [id number]   X-ray rt Shoulder 1 vw    Narrative    XR SHOULDER RIGHT 1 VIEW 2/21/2023 12:08 PM     HISTORY: Closed displaced fracture of surgical neck of right humerus,  unspecified fracture morphology, initial encounter    COMPARISON: Same-day radiograph. 2/11/2023.      Impression    IMPRESSION: Overall alignment of the proximal humerus fracture similar  compared to the 2/11/2023 scapular Y view.    RENTETA MATHIS MD         SYSTEM ID:  [id number]   X-ray rt shoulder 2 view    Narrative    XR RIGHT SHOULDER TWO VIEWS   2/27/2023 10:46 AM     HISTORY:  Closed displaced fracture of surgical neck of right humerus,  unspecified fracture morphology, initial encounter.    COMPARISON: 2/21/2023 radiographs.      Impression    IMPRESSION: Given differences in projection, no change in  position/alignment of the proximal humerus fracture. There is a small  amount of adjacent callus but no bridging callus or bone yet evident.  No new findings. Diffuse bone demineralization again noted.    IRA ANTONIO MD         SYSTEM ID:  [id number]     I agree with the above readings    X-rays done today showing acceptable alignment on the AP again and on the lateral view we see displacement anteriorly at about 60 degrees.  No mineralization at this point.  No fracture dislocation or tumor.    X-rays again done after UltraSling placed showing no change in alignment on  AP or lateral view compared to previous x-rays done in the shoulder immobilizer.  No mineralization and angulation about 60 degrees.    Independent visualization of the images was performed.      Impression: 1.    17 days status post right proximal humerus fracture with approximately 50 degrees of anterior displacement.    Plan:  All of the above pertinent physical exam and imaging modalities findings was reviewed with Sri and her friend Bryson.    FOCUSED PLAN:   72-year-old female 17 days out from right proximal humerus fracture with anterior displacement.  X-rays shown and discussed with Dr. Turner.  Offered patient surgery versus nonoperative management as we feel nonoperative management has a good chance of healing per Dr. Turner.  I do feel the anterior displacement is slightly greater today may be by 10 degrees but the contact point is about the same.  Patient opted for nonoperative management.  We reviewed the plan and switch the patient to UltraSling which Dr. Turner felt that it would be in a better position with this.  We are able to see that there is no change in the wrong direction in alignment with x-rays after UltraSling placed.  Plan will be follow-up in 2 weeks and that will be at the 4-week katharine and possibly start passive range of motion with therapy at that point and then follow-up 6 weeks after that at 10-week point and start active and active assisted and strengthening and range of motion in therapy also.  I did stress today that she needs to do range of motion of her elbow 3-5 times a day.  I wrote an AVS that also had that in there and also okayed her for some Codman's exercises until I see her back.  Follow-up in 2 weeks.    Re-x-ray on return: Yes, Grashey and scapular Y view of right shoulder with UltraSling on.      This note was dictated with [employer].    Elías Willoughby PA-C

## 2023-02-28 NOTE — PATIENT INSTRUCTIONS
Encounter Diagnosis   Name Primary?    Closed displaced fracture of surgical neck of right humerus with routine healing, unspecified fracture morphology, subsequent encounter Yes     Rest, ice and elevate above heart level as needed for pain control  1.  Surgery versus nonop: I discussed this with Dr. Turner and the fracture has a good chance of healing and the position is at.  It obviously is not a great position and you might lose some range of motion above shoulder level but should have good chest level range of motion.  After discussion we decided to try nonoperative management.  2.  Brace: Dr. Turner wanted to try a UltraSling to keep your arm in front of your body.  We switched you from the shoulder immobilizer to the UltraSling and got x-rays in this.  3.  Range of motion: Range of motion of fingers wrist and elbow 3-5 times a day is essential.  We have noticed that your elbow is already stiff.  We need you to come out of the brace 3 times a day at least to work on elbow range of motion.  It is okay also to do dangling exercises which I will have a picture of below.  You can do this with the shoulder gently.  4.  Plan: Our plan is to see you back in 2 weeks and if everything looks good and you are feeling good and x-rays are good then we can start passive range of motion in therapy.  Then our plan would be to have you come back 6 weeks after that which would be 10 weeks out from your fracture and if you are doing well then we could start some strengthening exercises in therapy.  5.  Follow-up in 2 weeks.    Pendulum (Flexibility)    Lean over next to a table, with your left arm supporting your weight on the table.  Relax your right arm and let it hang straight down.  Slowly begin to swing your right arm in a small [employer] [place]. Gradually make the [employer] [place] bigger if you can. Change direction after 1 minute of motion.  Next, swing your right arm backward and forward. Then move it side to side. Change direction  after 1 minute of motion.  Repeat these movements for about 5 minutes.  Do this exercise 3 times a day, or as often as instructed.  Date Last Reviewed: 3/10/2016    9773-5961 [employer]. [address] [place] [address], [place] [address]. All rights reserved. This information is not intended as a substitute for professional medical care. Always follow your healthcare professional's instructions.       [employer] may offer reliable information regarding your diagnosis and treatment plan.    THANK YOU for coming in today. If you receive a survey via [employer] or mail please let us know if there was anything you especially appreciated today or if there is any way we can improve our clinic. We appreciate your input.    GENERAL INFORMATION:  My hours are:  Monday: Clinic 720am-440pm  Tuesday: Clinic 8am-440pm  Wednesday: Surgery  Thursday: Admin  Friday: Surgery      [facility] for any issues or concerns: [phone]    I am not in the office Thursdays. Therefore non- urgent calls and medical messages received on Thursday will be addressed when we are back in the office on Wednesday. Urgent matters will be reviewed and addressed by one of our partners in the office as needed.    If lab work was done today as part of your evaluation you will generally be contacted via [employer], mail, or phone with the results within 1-5 days. If there is an alarming result we will contact you by phone. Lab results come back at varying times, I generally wait until all labs are resulted before making comments on results. Please note labs are automatically released to [employer] (if you have signed up for it) once available-at times you may see these prior to my having a chance to review them as well.    If you need refills please contact your pharmacist. They will send a refill request to me to review. Please allow 3 business days for us to process all refill requests. All narcotic refills should be  handled in the clinic at the time of your visit.

## 2023-02-28 NOTE — PROGRESS NOTES
Appropriate assistive devices provided during their visit. no (Yes, No, N/A) no (list device)    Exam table and/or cart  placed in the lowest position. na (Yes, No, N/A)    Brakes on tables/carts/wheelchairs used at all times. na (Yes, No, N/A)    Non slip footwear applied. na (Yes, No, NA)    Patient was accompanied by staff throughout visit. yes (Yes, No, N/A)    Equipment safety straps used. na (Yes, No, N/A)    Assist with toileting. na (Yes, No, N/A)

## 2023-02-28 NOTE — LETTER
2/28/2023         RE: Sri Grewal  [address] [facility] [id number]        Dear Colleague,    Thank you for referring your patient, Sri Grewal, to the [facility]. Please see a copy of my visit note below.    Office Visit-Fracture Follow up    Chief Complaint: Sri Grewal is a 72 year old female who is being seen for   Chief Complaint   Patient presents with     RECHECK     right proximal humerus fracture with approximately 50 degrees of anterior displacement.- DOI: 2/11/2023         History of Present Illness:   Location: Right proximal humerus  Duration of Pain: Since date of injury  Rating of Pain: 7 out of 10  Pain Quality: Achy  Pain is better with: Shoulder immobilizer and not moving the shoulder.  Pain is worse with: Moving the shoulder  Treatment so far consists of: Patient was last seen on 2/21/2023 by me as well as Dr. Turner.  The main question is nonop versus operative and in this visit we switched the patient's immobilizer and readjusted it with x-rays.  In the end Dr. Turner wanted the patient to proceed with nonoperative treatment and get an x-ray in 1 week.  Patient was to be in the shoulder immobilizer.  Patient was taking Norco and Tylenol.  Patient okay to do wrist elbow and hand range of motion 3-5 times a day.  Patient was to get x-rays at follow-up after shoulder immobilizer adjusted properly to decide if nonop treatment will continue.  Patient presents today stating that her pain is slightly better.  She does states she has been coming out of the sling to do elbow range of motion but not very often.  Patient has been very cautious with the right shoulder.  Associated Features: Denies numbness or tingling shooting burning electric pain  Pain is Limiting: Use of right upper extremity  Here to: Orthopedic follow-up  Additional History: This patient was seen with Dr. Turner's guidance via looking at x-rays and decision making.  Patient wants to  "continue nonoperative treatment.    REVIEW OF SYSTEMS  General: negative for, night sweats, dizziness, fatigue  Resp: No shortness of breath and no cough  CV: negative for chest pain, syncope or near-syncope  GI: negative for nausea, vomiting and diarrhea  : negative for dysuria and hematuria  Musculoskeletal: as above  Neurologic: negative for syncope   Hematologic: negative for bleeding disorder    Physical Exam:  Vitals: BP (!) 120/90   Ht 1.54 m (5' 0.63\")   Wt 60.3 kg (133 lb)   LMP 11/09/2005 (Approximate)   BMI 25.44 kg/m    BMI= Body mass index is 25.44 kg/m .  Constitutional: healthy, alert and no acute distress   Psychiatric: mentation appears normal and affect normal/bright  NEURO: no focal deficits, CMS intact Right upper extremity   RESP: Normal with easy respirations and no use of accessory muscles to breathe, no audible wheezing or retractions  CV: +2 radial pulse and her hand is warm to palpation.  SKIN: No erythema, rashes, excoriation, or breakdown. No evidence of infection.   MUSCULOSKELETAL:    INSPECTION of right shoulder: No gross deformities, erythema, edema, ecchymosis, atrophy or fasciculations.     PALPATION: Slight tenderness to palpation on the proximal humerus.  No tenderness AC joint, proximal biceps tendon, clavicle, lateral shoulder, posterior shoulder, trapezius area. No increased warmth noted.     ROM: Passive: Elbow flexion 135 degrees, elbow 20 degrees short of full, supination 90, pronation 90. The range of motion is without catching locking but there is pain with maximal extension.        STRENGTH: 5 out of 5 , interosseous and thumb and 4 out of 5 deltoid without pain.     SPECIAL TEST: None today  GAIT: non-antalgic  Lymph: no palpable lymph nodes      Diagnostic Modalities:  Recent Results (from the past 744 hour(s))   XR Shoulder Right G/E 3 Views    Narrative    EXAM: XR SHOULDER RIGHT G/E 3 VIEWS  LOCATION: [facility]  DATE/TIME: " 2/11/2023 4:26 PM    INDICATION: Right shoulder pain following fall  COMPARISON: None.      Impression    IMPRESSION: Acute fracture of the surgical neck of the proximal humerus. Anterior displacement of the humeral shaft and anterior apical angulation at the fracture margin. Normal glenohumeral alignment. The acromioclavicular joint is unremarkable.   Osteopenia.   XR Shoulder Right 2 Views    Narrative    RIGHT SHOULDER TWO VIEWS  2/21/2023 11:09 AM     HISTORY: Arm injury, right, initial encounter.    COMPARISON: 2/11/2023.       Impression    IMPRESSION: Mildly displaced proximal humerus fracture is again seen.  No significant change in alignment. There are some early signs of  mineralized callus formation without bony bridging. Bones are  demineralized.    RENETTA MATHIS MD         SYSTEM ID:  [id number]   X-ray rt Shoulder 1 vw    Narrative    XR SHOULDER RIGHT 1 VIEW 2/21/2023 12:08 PM     HISTORY: Closed displaced fracture of surgical neck of right humerus,  unspecified fracture morphology, initial encounter    COMPARISON: Same-day radiograph. 2/11/2023.      Impression    IMPRESSION: Overall alignment of the proximal humerus fracture similar  compared to the 2/11/2023 scapular Y view.    RENETTA MATHIS MD         SYSTEM ID:  [id number]   X-ray rt shoulder 2 view    Narrative    XR RIGHT SHOULDER TWO VIEWS   2/27/2023 10:46 AM     HISTORY:  Closed displaced fracture of surgical neck of right humerus,  unspecified fracture morphology, initial encounter.    COMPARISON: 2/21/2023 radiographs.      Impression    IMPRESSION: Given differences in projection, no change in  position/alignment of the proximal humerus fracture. There is a small  amount of adjacent callus but no bridging callus or bone yet evident.  No new findings. Diffuse bone demineralization again noted.    IRA ANTONIO MD         SYSTEM ID:  [id number]     I agree with the above readings    X-rays done today showing acceptable  alignment on the AP again and on the lateral view we see displacement anteriorly at about 60 degrees.  No mineralization at this point.  No fracture dislocation or tumor.    X-rays again done after UltraSling placed showing no change in alignment on AP or lateral view compared to previous x-rays done in the shoulder immobilizer.  No mineralization and angulation about 60 degrees.    Independent visualization of the images was performed.      Impression: 1.    17 days status post right proximal humerus fracture with approximately 50 degrees of anterior displacement.    Plan:  All of the above pertinent physical exam and imaging modalities findings was reviewed with rSi and her friend Bryson.    FOCUSED PLAN:   72-year-old female 17 days out from right proximal humerus fracture with anterior displacement.  X-rays shown and discussed with Dr. Turner.  Offered patient surgery versus nonoperative management as we feel nonoperative management has a good chance of healing per Dr. Turner.  I do feel the anterior displacement is slightly greater today may be by 10 degrees but the contact point is about the same.  Patient opted for nonoperative management.  We reviewed the plan and switch the patient to UltraSling which Dr. Turner felt that it would be in a better position with this.  We are able to see that there is no change in the wrong direction in alignment with x-rays after UltraSling placed.  Plan will be follow-up in 2 weeks and that will be at the 4-week katharine and possibly start passive range of motion with therapy at that point and then follow-up 6 weeks after that at 10-week point and start active and active assisted and strengthening and range of motion in therapy also.  I did stress today that she needs to do range of motion of her elbow 3-5 times a day.  I wrote an AVS that also had that in there and also okayed her for some Codman's exercises until I see her back.  Follow-up in 2 weeks.    Re-x-ray on  return: Yes, Grashey and scapular Y view of right shoulder with UltraSling on.      This note was dictated with [employer].    Elías Willoughby PA-C      Sri to follow up with Primary Care provider regarding elevated blood pressure.  Justin/RAFAEL       Again, thank you for allowing me to participate in the care of your patient.        Sincerely,        Elías Willoughby PA-C

## 2023-03-02 ENCOUNTER — TELEPHONE (OUTPATIENT)
Dept: FAMILY MEDICINE | Facility: OTHER | Age: 73
End: 2023-03-02
Payer: MEDICARE

## 2023-03-02 DIAGNOSIS — I10 HYPERTENSION GOAL BP (BLOOD PRESSURE) < 140/90: ICD-10-CM

## 2023-03-02 NOTE — TELEPHONE ENCOUNTER
Per demographics called and left detailed message with result below on pt vm.   Peterson Pelayo MA

## 2023-03-02 NOTE — TELEPHONE ENCOUNTER
Not sure why this is routed to provider. Please make sure patient is aware and she can discuss these results at her visit.     José Miguel Thornton PA-C

## 2023-03-02 NOTE — TELEPHONE ENCOUNTER
Please call with results.     CDL patient. Please call. Echo shows some areas of dilation of the aorta which was not seen due to the quality of the previous Echo.  Otherwise there are no significant changes noted from the 2017 Echo.  Follow-up with CDL accordingly.      FLEX Desir BSN, RN, PHN  [facility]  March 2, 2023

## 2023-03-07 ENCOUNTER — OFFICE VISIT (OUTPATIENT)
Dept: FAMILY MEDICINE | Facility: OTHER | Age: 73
End: 2023-03-07
Payer: MEDICARE

## 2023-03-07 VITALS
TEMPERATURE: 97.3 F | SYSTOLIC BLOOD PRESSURE: 124 MMHG | HEIGHT: 61 IN | RESPIRATION RATE: 16 BRPM | BODY MASS INDEX: 25.49 KG/M2 | WEIGHT: 135 LBS | HEART RATE: 85 BPM | DIASTOLIC BLOOD PRESSURE: 80 MMHG | OXYGEN SATURATION: 97 %

## 2023-03-07 DIAGNOSIS — I69.30 HISTORY OF CEREBROVASCULAR ACCIDENT (CVA) WITH RESIDUAL DEFICIT: ICD-10-CM

## 2023-03-07 DIAGNOSIS — F32.1 MODERATE MAJOR DEPRESSION (H): ICD-10-CM

## 2023-03-07 DIAGNOSIS — R41.3 LOSS OF MEMORY: ICD-10-CM

## 2023-03-07 DIAGNOSIS — F43.23 ADJUSTMENT DISORDER WITH MIXED ANXIETY AND DEPRESSED MOOD: ICD-10-CM

## 2023-03-07 DIAGNOSIS — I10 HYPERTENSION GOAL BP (BLOOD PRESSURE) < 140/90: ICD-10-CM

## 2023-03-07 DIAGNOSIS — S42.214D CLOSED NONDISPLACED FRACTURE OF SURGICAL NECK OF RIGHT HUMERUS WITH ROUTINE HEALING, UNSPECIFIED FRACTURE MORPHOLOGY, SUBSEQUENT ENCOUNTER: Primary | ICD-10-CM

## 2023-03-07 PROCEDURE — 99214 OFFICE O/P EST MOD 30 MIN: CPT | Performed by: PHYSICIAN ASSISTANT

## 2023-03-07 PROCEDURE — 96127 BRIEF EMOTIONAL/BEHAV ASSMT: CPT | Performed by: PHYSICIAN ASSISTANT

## 2023-03-07 RX ORDER — BUPROPION HYDROCHLORIDE 300 MG/1
300 TABLET ORAL EVERY MORNING
Qty: 90 TABLET | Refills: 1 | Status: ON HOLD | OUTPATIENT
Start: 2023-03-07 | End: 2023-05-16

## 2023-03-07 RX ORDER — HYDROCODONE BITARTRATE AND ACETAMINOPHEN 5; 325 MG/1; MG/1
1 TABLET ORAL EVERY 6 HOURS PRN
Qty: 70 TABLET | Refills: 0 | Status: ON HOLD | OUTPATIENT
Start: 2023-03-07 | End: 2023-05-07

## 2023-03-07 RX ORDER — FUROSEMIDE 40 MG
TABLET ORAL
Qty: 90 TABLET | Refills: 3 | Status: ON HOLD | OUTPATIENT
Start: 2023-03-07 | End: 2023-05-16

## 2023-03-07 RX ORDER — LISINOPRIL 20 MG/1
20 TABLET ORAL DAILY
Qty: 90 TABLET | Refills: 3 | Status: ON HOLD | OUTPATIENT
Start: 2023-03-07 | End: 2023-05-16

## 2023-03-07 RX ORDER — FUROSEMIDE 40 MG
40 TABLET ORAL DAILY
Qty: 90 TABLET | Refills: 3 | Status: CANCELLED | OUTPATIENT
Start: 2023-03-07

## 2023-03-07 ASSESSMENT — PAIN SCALES - GENERAL: PAINLEVEL: MODERATE PAIN (5)

## 2023-03-07 ASSESSMENT — PATIENT HEALTH QUESTIONNAIRE - PHQ9: SUM OF ALL RESPONSES TO PHQ QUESTIONS 1-9: 9

## 2023-03-07 NOTE — PROGRESS NOTES
Assessment & Plan     ICD-10-CM    1. Closed nondisplaced fracture of surgical neck of right humerus with routine healing, unspecified fracture morphology, subsequent encounter  S42.214D HYDROcodone-acetaminophen (NORCO) 5-325 MG tablet      2. Hypertension goal BP (blood pressure) < 140/90  I10 lisinopril (ZESTRIL) 20 MG tablet      3. Adjustment disorder with mixed anxiety and depressed mood  F43.23 buPROPion (WELLBUTRIN XL) 300 MG 24 hr tablet      4. Moderate major depression (H)  F32.1 buPROPion (WELLBUTRIN XL) 300 MG 24 hr tablet          - Patient here for follow up after fracturing her arm   - They are treating conservatively with sling and no surgery at this time   - She reports still in pain at times     Using Norco very sparingly     Reviewed use and side effects including sedation, addiction, and constipation, refilled, hold Fiorinal while on this       reviewed, no concerns   - Has scheduled follow up with ortho     - Reviewed labs and MRI of brain      No underlying issue found regarding her memory loss, still concerning for dementia vs. Alzheimer's vs. Secondary effects from TIA      Has apt with neurology scheduled, encouraged to keep this     - Mood      Still struggling      Effexor 150 mg & Wellbutrin increased to 300 mg     Discussed giving more time vs. Further increase     Since also had this bad fall, recommend give more time      Patient in agreement      - Reviewed all medication use and side effects, refilled as needed       Review of the result(s) of each unique test - See list        2/28/23 - MRI brain        2/11/23 - XR right shoulder        2/2/23 - labs   Diagnosis or treatment significantly limited by social determinants of health - None     20 minutes spent on the date of the encounter doing chart review, history and exam, documentation and further activities as noted above    The patient indicates understanding of these issues and agrees with the plan.    Rosenda PADGETT  "FLEX [facility] SUSY Fierro is a 72 year old accompanied by her friend, presenting for the following health issues:  No chief complaint on file.      History of Present Illness       Reason for visit:  Follow up from [facility]    She eats 2-3 servings of fruits and vegetables daily.She consumes 2 sweetened beverage(s) daily.She exercises with enough effort to increase her heart rate 10 to 19 minutes per day.  She exercises with enough effort to increase her heart rate 4 days per week.   She is taking medications regularly.       ED/UC Followup:    Facility:  [facility]  Date of visit: 2/11/2023  Reason for visit: arm injury  Current Status: about the same            PHQ 6/30/2022 2/2/2023 3/7/2023   PHQ-9 Total Score 5 8 9   Q9: Thoughts of better off dead/self-harm past 2 weeks Not at all Not at all Not at all     HAYDEE-7 SCORE 4/18/2022 5/16/2022 6/30/2022   Total Score - - -   Total Score 7 (mild anxiety) 5 (mild anxiety) -   Total Score 7 5 0           Review of Systems   Constitutional, HEENT, cardiovascular, pulmonary, gi and gu systems are negative, except as otherwise noted.      Objective    /80   Pulse 85   Temp 97.3  F (36.3  C) (Temporal)   Resp 16   Ht 1.54 m (5' 0.63\")   Wt 61.2 kg (135 lb)   LMP 11/09/2005 (Approximate)   SpO2 97%   BMI 25.82 kg/m    Body mass index is 25.82 kg/m .  Physical Exam   GENERAL APPEARANCE: healthy, alert and no distress  EYES: Eyes grossly normal to inspection, PERRLA, conjunctivae and sclerae without injection or discharge, EOM intact   RESP: Lungs clear to auscultation - no rales, rhonchi or wheezes    CV: Regular rates and rhythm, normal S1 S2, no S3 or S4, no murmur, click or rub, no peripheral edema and peripheral pulses strong and symmetric bilaterally   MS: No musculoskeletal defects are noted and gait is age appropriate without ataxia   SKIN: No suspicious lesions or rashes, hydration status appears " adeuqate with normal skin turgor   PSYCH: Alert and oriented x3; speech- coherent , normal rate and volume; able to articulate logical thoughts, able to abstract reason, no tangential thoughts, no hallucinations or delusions, mentation appears normal, Mood is euthymic. Affect is appropriate for this mood state and bright. Thought content is free of suicidal ideation, hallucinations, and delusions. Dress is adequate and upkept. Eye contact is good during conversation.       Diagnostics: reviewed in Epic

## 2023-03-07 NOTE — TELEPHONE ENCOUNTER
Pending Prescriptions:                       Disp   Refills    furosemide (LASIX) 40 MG tablet [Pharmacy *90 tab*3        Sig: TAKE ONE TABLET BY MOUTH EVERY DAY    Routing refill request to provider for review/approval because:  Labs out of range:    Sodium   Date Value Ref Range Status   02/02/2023 134 (L) 136 - 145 mmol/L Final   11/04/2020 130 (L) 133 - 144 mmol/L Final        BP Readings from Last 3 Encounters:   02/28/23 (!) 120/90   02/11/23 (!) 226/127   02/02/23 132/80

## 2023-03-13 ENCOUNTER — TELEPHONE (OUTPATIENT)
Dept: FAMILY MEDICINE | Facility: OTHER | Age: 73
End: 2023-03-13
Payer: MEDICARE

## 2023-03-13 NOTE — TELEPHONE ENCOUNTER
Patient calling for refill of Lisinopril.     Informed patient this was sent to mail order pharmacy on 3/7/23.     Patient will contact pharmacy to confirm delivery date.     Melvina JAMESONN, RN  [facility]

## 2023-03-14 ENCOUNTER — ANCILLARY PROCEDURE (OUTPATIENT)
Dept: GENERAL RADIOLOGY | Facility: CLINIC | Age: 73
End: 2023-03-14
Attending: PHYSICIAN ASSISTANT
Payer: MEDICARE

## 2023-03-14 ENCOUNTER — OFFICE VISIT (OUTPATIENT)
Dept: ORTHOPEDICS | Facility: CLINIC | Age: 73
End: 2023-03-14
Payer: MEDICARE

## 2023-03-14 VITALS
SYSTOLIC BLOOD PRESSURE: 150 MMHG | WEIGHT: 135 LBS | BODY MASS INDEX: 25.49 KG/M2 | DIASTOLIC BLOOD PRESSURE: 100 MMHG | HEIGHT: 61 IN

## 2023-03-14 DIAGNOSIS — S42.211D CLOSED DISPLACED FRACTURE OF SURGICAL NECK OF RIGHT HUMERUS WITH ROUTINE HEALING, UNSPECIFIED FRACTURE MORPHOLOGY, SUBSEQUENT ENCOUNTER: ICD-10-CM

## 2023-03-14 DIAGNOSIS — S42.211D CLOSED DISPLACED FRACTURE OF SURGICAL NECK OF RIGHT HUMERUS WITH ROUTINE HEALING, UNSPECIFIED FRACTURE MORPHOLOGY, SUBSEQUENT ENCOUNTER: Primary | ICD-10-CM

## 2023-03-14 PROCEDURE — 73030 X-RAY EXAM OF SHOULDER: CPT | Mod: TC | Performed by: STUDENT IN AN ORGANIZED HEALTH CARE EDUCATION/TRAINING PROGRAM

## 2023-03-14 PROCEDURE — 99207 PR FRACTURE CARE IN GLOBAL PERIOD: CPT | Performed by: PHYSICIAN ASSISTANT

## 2023-03-14 ASSESSMENT — PAIN SCALES - GENERAL: PAINLEVEL: MILD PAIN (3)

## 2023-03-14 NOTE — PROGRESS NOTES
"Office Visit-Fracture Follow up    Chief Complaint: Sri Grewal is a 72 year old female who is being seen for   Chief Complaint   Patient presents with     RECHECK     right proximal humerus fracture with approximately 50 degrees of anterior displacement.- DOI: 2/11/2023           History of Present Illness:   Location: Right proximal humerus  Duration of Pain: Since date of injury which was 2/11/2023  Rating of Pain: 3 out of 10  Pain Quality: Achy  Pain is better with: Rest and sling  Pain is worse with: Bumping the shoulder  Treatment so far consists of: Patient was last seen by myself on 2/28/2023 and I consulted with Dr. Turner who recommended continue nonoperative treatment with this fracture.  Patient was placed in an UltraSling at that time per Dr. Turner's choice.  Patient was to do range of motion of fingers wrist and elbow 3-5 times a day.  At follow-up in 2 weeks then we are hoping to start passive range of motion after x-ray and examination.  Patient feels that her pain has gotten a lot better.  She has been doing her elbow range of motion but unfortunately maybe not as frequently as I would like.  She has been wearing her sling with the pillow.  Associated Features: Denies numbness or tingling shooting burning electric pain  Pain is Limiting: Any lifting of the right arm  Here to: Orthopedic follow-up and x-ray  Additional History: Patient is here with her friend Bryson who helps take care of her.    REVIEW OF SYSTEMS  General: negative for, night sweats, dizziness, fatigue  Resp: No shortness of breath and no cough  CV: negative for chest pain, syncope or near-syncope  GI: negative for nausea, vomiting and diarrhea  : negative for dysuria and hematuria  Musculoskeletal: as above  Neurologic: negative for syncope   Hematologic: negative for bleeding disorder    Physical Exam:  Vitals: BP (!) 150/100   Ht 1.54 m (5' 0.63\")   Wt 61.2 kg (135 lb)   LMP 11/09/2005 (Approximate)   BMI 25.82 kg/m  "   BMI= Body mass index is 25.82 kg/m .  Constitutional: healthy, alert and no acute distress   Psychiatric: mentation appears normal and affect normal/bright  NEURO: no focal deficits, CMS intact Right upper extremity   RESP: Normal with easy respirations and no use of accessory muscles to breathe, no audible wheezing or retractions  CV: +2 radial pulse and her hand is warm to palpation.   SKIN: No erythema, rashes, excoriation, or breakdown. No evidence of infection of the skin I see today I did not have her change into a gown..   MUSCULOSKELETAL:    INSPECTION of right shoulder: No gross deformities, erythema, edema, ecchymosis, atrophy or fasciculations.     PALPATION: Very slight tenderness to palpation over the fracture site today.  No tenderness AC joint, proximal biceps tendon, clavicle, lateral shoulder, posterior shoulder, trapezius area. No increased warmth noted.     ROM: Passive: Elbow flexion 145 degrees degrees, elbow extension 20 degrees short of full, supination 90, pronation 90.  Able to do Codman's with the shoulder.  The range of motion is without catching locking or pain.        STRENGTH: 5 out of 5 , deltoid     SPECIAL TEST: None today  GAIT: non-antalgic  Lymph: no palpable lymph nodes      Diagnostic Modalities:  Recent Results (from the past 744 hour(s))   XR Shoulder Right G/E 3 Views    Narrative    EXAM: XR SHOULDER RIGHT G/E 3 VIEWS  LOCATION: [facility]  DATE/TIME: 2/11/2023 4:26 PM    INDICATION: Right shoulder pain following fall  COMPARISON: None.      Impression    IMPRESSION: Acute fracture of the surgical neck of the proximal humerus. Anterior displacement of the humeral shaft and anterior apical angulation at the fracture margin. Normal glenohumeral alignment. The acromioclavicular joint is unremarkable.   Osteopenia.   XR Shoulder Right 2 Views    Narrative    RIGHT SHOULDER TWO VIEWS  2/21/2023 11:09 AM     HISTORY: Arm injury, right, initial  encounter.    COMPARISON: 2/11/2023.       Impression    IMPRESSION: Mildly displaced proximal humerus fracture is again seen.  No significant change in alignment. There are some early signs of  mineralized callus formation without bony bridging. Bones are  demineralized.    RENETTA MATHIS MD         SYSTEM ID:  [id number]   X-ray rt Shoulder 1 vw    Narrative    XR SHOULDER RIGHT 1 VIEW 2/21/2023 12:08 PM     HISTORY: Closed displaced fracture of surgical neck of right humerus,  unspecified fracture morphology, initial encounter    COMPARISON: Same-day radiograph. 2/11/2023.      Impression    IMPRESSION: Overall alignment of the proximal humerus fracture similar  compared to the 2/11/2023 scapular Y view.    RENETTA MATHIS MD         SYSTEM ID:  [id number]   X-ray rt shoulder 2 view    Narrative    XR RIGHT SHOULDER TWO VIEWS   2/27/2023 10:46 AM     HISTORY:  Closed displaced fracture of surgical neck of right humerus,  unspecified fracture morphology, initial encounter.    COMPARISON: 2/21/2023 radiographs.      Impression    IMPRESSION: Given differences in projection, no change in  position/alignment of the proximal humerus fracture. There is a small  amount of adjacent callus but no bridging callus or bone yet evident.  No new findings. Diffuse bone demineralization again noted.    IRA ANTONIO MD         SYSTEM ID:  [id number]   X-ray rt shoulder 2 view    Narrative    XR SHOULDER RIGHT 2 VIEWS 2/28/2023 10:15 AM    HISTORY: Closed displaced fracture of surgical neck of right humerus,  unspecified fracture morphology, initial encounter    COMPARISON: 2/27/2023 radiographs.      Impression    IMPRESSION: Again seen is a mildly displaced fracture of the surgical  neck of the humerus with slight anterior-superior displacement of the  humeral shaft. No evidence of healing. The humeral head is normally  located. Osteopenia.    ZOHAIB CARPIO MD         SYSTEM ID:  [id number]   X-ray rt shoulder 2  view    Narrative    RIGHT SHOULDER TWO VIEWS   2/28/2023 10:57 AM     HISTORY:  Closed displaced fracture of surgical neck of right humerus  with routine healing, unspecified fracture morphology, subsequent  encounter.    COMPARISON: 2/28/2023 radiographs.      Impression    IMPRESSION: Given differences in projection, no change in  position/alignment in the proximal humerus fracture evident. No  definite bridging callus or bone.    IRA ANTONIO MD         SYSTEM ID:  [id number]                                            X-rays done today showing x-rays showing the mineralization on the AP and extra calcinosis around fracture site.  Also no change in alignment from previous x-rays which was done on 2/28/2023.    Independent visualization of the images was performed.      Impression:  1.      1 month and 3 days status post right proximal humerus fracture with approximately 50 degrees of anterior displacement.    Plan:  All of the above pertinent physical exam and imaging modalities findings was reviewed with Sri and her friend Bryson.    FOCUSED PLAN:   72-year-old female with right proximal humerus fracture who is about a month out and her pain is gotten drastically better.  On exam she still has a stiff elbow especially with extension.  I reeducated she must work on her elbow 3-5 times a day.  She has an abundance of callus formation on x-ray.  Patient can start passive range of motion of the shoulder now and an order for physical therapy is put in.  They will work on her elbow also.  Patient can remove the pillow on her sling this week and then next week she can wean out of the sling.  Patient can follow-up in 6 weeks and at that time if x-rays look good and she is doing well then she can start active active assisted and strengthening.  Follow-up in 6 weeks.    Re-x-ray on return: Yes we will get Grashey and scapular Y view of the right shoulder without sling      This note was dictated with [employer].    Elías Willoughby PA-C

## 2023-03-14 NOTE — LETTER
3/14/2023         RE: Sri Grewal  [id number] [facility] [id number]        Dear Colleague,    Thank you for referring your patient, Sri Grewal, to the [facility]. Please see a copy of my visit note below.    Office Visit-Fracture Follow up    Chief Complaint: Sri Grewal is a 72 year old female who is being seen for   Chief Complaint   Patient presents with     RECHECK     right proximal humerus fracture with approximately 50 degrees of anterior displacement.- DOI: 2/11/2023           History of Present Illness:   Location: Right proximal humerus  Duration of Pain: Since date of injury which was 2/11/2023  Rating of Pain: 3 out of 10  Pain Quality: Achy  Pain is better with: Rest and sling  Pain is worse with: Bumping the shoulder  Treatment so far consists of: Patient was last seen by myself on 2/28/2023 and I consulted with Dr. Turner who recommended continue nonoperative treatment with this fracture.  Patient was placed in an UltraSling at that time per Dr. Turner's choice.  Patient was to do range of motion of fingers wrist and elbow 3-5 times a day.  At follow-up in 2 weeks then we are hoping to start passive range of motion after x-ray and examination.  Patient feels that her pain has gotten a lot better.  She has been doing her elbow range of motion but unfortunately maybe not as frequently as I would like.  She has been wearing her sling with the pillow.  Associated Features: Denies numbness or tingling shooting burning electric pain  Pain is Limiting: Any lifting of the right arm  Here to: Orthopedic follow-up and x-ray  Additional History: Patient is here with her friend Bryson who helps take care of her.    REVIEW OF SYSTEMS  General: negative for, night sweats, dizziness, fatigue  Resp: No shortness of breath and no cough  CV: negative for chest pain, syncope or near-syncope  GI: negative for nausea, vomiting and diarrhea  : negative for dysuria and  "hematuria  Musculoskeletal: as above  Neurologic: negative for syncope   Hematologic: negative for bleeding disorder    Physical Exam:  Vitals: BP (!) 150/100   Ht 1.54 m (5' 0.63\")   Wt 61.2 kg (135 lb)   LMP 11/09/2005 (Approximate)   BMI 25.82 kg/m    BMI= Body mass index is 25.82 kg/m .  Constitutional: healthy, alert and no acute distress   Psychiatric: mentation appears normal and affect normal/bright  NEURO: no focal deficits, CMS intact Right upper extremity   RESP: Normal with easy respirations and no use of accessory muscles to breathe, no audible wheezing or retractions  CV: +2 radial pulse and her hand is warm to palpation.   SKIN: No erythema, rashes, excoriation, or breakdown. No evidence of infection of the skin I see today I did not have her change into a gown..   MUSCULOSKELETAL:    INSPECTION of right shoulder: No gross deformities, erythema, edema, ecchymosis, atrophy or fasciculations.     PALPATION: Very slight tenderness to palpation over the fracture site today.  No tenderness AC joint, proximal biceps tendon, clavicle, lateral shoulder, posterior shoulder, trapezius area. No increased warmth noted.     ROM: Passive: Elbow flexion 145 degrees degrees, elbow extension 20 degrees short of full, supination 90, pronation 90.  Able to do Codman's with the shoulder.  The range of motion is without catching locking or pain.        STRENGTH: 5 out of 5 , deltoid     SPECIAL TEST: None today  GAIT: non-antalgic  Lymph: no palpable lymph nodes      Diagnostic Modalities:  Recent Results (from the past 744 hour(s))   XR Shoulder Right G/E 3 Views    Narrative    EXAM: XR SHOULDER RIGHT G/E 3 VIEWS  LOCATION: [facility]  DATE/TIME: 2/11/2023 4:26 PM    INDICATION: Right shoulder pain following fall  COMPARISON: None.      Impression    IMPRESSION: Acute fracture of the surgical neck of the proximal humerus. Anterior displacement of the humeral shaft and anterior " apical angulation at the fracture margin. Normal glenohumeral alignment. The acromioclavicular joint is unremarkable.   Osteopenia.   XR Shoulder Right 2 Views    Narrative    RIGHT SHOULDER TWO VIEWS  2/21/2023 11:09 AM     HISTORY: Arm injury, right, initial encounter.    COMPARISON: 2/11/2023.       Impression    IMPRESSION: Mildly displaced proximal humerus fracture is again seen.  No significant change in alignment. There are some early signs of  mineralized callus formation without bony bridging. Bones are  demineralized.    RENETTA MATHIS MD         SYSTEM ID:  [id number]   X-ray rt Shoulder 1 vw    Narrative    XR SHOULDER RIGHT 1 VIEW 2/21/2023 12:08 PM     HISTORY: Closed displaced fracture of surgical neck of right humerus,  unspecified fracture morphology, initial encounter    COMPARISON: Same-day radiograph. 2/11/2023.      Impression    IMPRESSION: Overall alignment of the proximal humerus fracture similar  compared to the 2/11/2023 scapular Y view.    RENETTA MATHIS MD         SYSTEM ID:  [id number]   X-ray rt shoulder 2 view    Narrative    XR RIGHT SHOULDER TWO VIEWS   2/27/2023 10:46 AM     HISTORY:  Closed displaced fracture of surgical neck of right humerus,  unspecified fracture morphology, initial encounter.    COMPARISON: 2/21/2023 radiographs.      Impression    IMPRESSION: Given differences in projection, no change in  position/alignment of the proximal humerus fracture. There is a small  amount of adjacent callus but no bridging callus or bone yet evident.  No new findings. Diffuse bone demineralization again noted.    IRA ANTONIO MD         SYSTEM ID:  [id number]   X-ray rt shoulder 2 view    Narrative    XR SHOULDER RIGHT 2 VIEWS 2/28/2023 10:15 AM    HISTORY: Closed displaced fracture of surgical neck of right humerus,  unspecified fracture morphology, initial encounter    COMPARISON: 2/27/2023 radiographs.      Impression    IMPRESSION: Again seen is a mildly displaced  fracture of the surgical  neck of the humerus with slight anterior-superior displacement of the  humeral shaft. No evidence of healing. The humeral head is normally  located. Osteopenia.    ZOHAIB CARPIO MD         SYSTEM ID:  [id number]   X-ray rt shoulder 2 view    Narrative    RIGHT SHOULDER TWO VIEWS   2/28/2023 10:57 AM     HISTORY:  Closed displaced fracture of surgical neck of right humerus  with routine healing, unspecified fracture morphology, subsequent  encounter.    COMPARISON: 2/28/2023 radiographs.      Impression    IMPRESSION: Given differences in projection, no change in  position/alignment in the proximal humerus fracture evident. No  definite bridging callus or bone.    IRA ANTONIO MD         SYSTEM ID:  [id number]                                            X-rays done today showing x-rays showing the mineralization on the AP and extra calcinosis around fracture site.  Also no change in alignment from previous x-rays which was done on 2/28/2023.    Independent visualization of the images was performed.      Impression:  1.      1 month and 3 days status post right proximal humerus fracture with approximately 50 degrees of anterior displacement.    Plan:  All of the above pertinent physical exam and imaging modalities findings was reviewed with Sri and her friend Bryson.    FOCUSED PLAN:   72-year-old female with right proximal humerus fracture who is about a month out and her pain is gotten drastically better.  On exam she still has a stiff elbow especially with extension.  I reeducated she must work on her elbow 3-5 times a day.  She has an abundance of callus formation on x-ray.  Patient can start passive range of motion of the shoulder now and an order for physical therapy is put in.  They will work on her elbow also.  Patient can remove the pillow on her sling this week and then next week she can wean out of the sling.  Patient can follow-up in 6 weeks and at that time if x-rays look  good and she is doing well then she can start active active assisted and strengthening.  Follow-up in 6 weeks.    Re-x-ray on return: Yes we will get Grashey and scapular Y view of the right shoulder without sling      This note was dictated with Namely.    Elías Willoughby PA-C      Sri to follow up with Primary Care provider regarding elevated blood pressure.  Justin/RAFAEL       Again, thank you for allowing me to participate in the care of your patient.        Sincerely,        Elías Willoughby PA-C

## 2023-03-20 NOTE — ED AVS SNAPSHOT
[address]  Trip number: [id number]  Address: [place] [address] [place] [address], [address]  [phone] [facility]  [address] [place] [address]  Phone: [phone]  Fax: [phone]                                    Sri Grewal   MRN: [id number]    Department: [facility]   Date of Visit: 11/9/2020           After Visit Summary Signature Page    I have received my discharge instructions, and my questions have been answered. I have discussed any challenges I see with this plan with the nurse or doctor.    ..........................................................................................................................................  Patient/Patient Representative Signature      ..........................................................................................................................................  Patient Representative Print Name and Relationship to Patient    ..................................................               ................................................  Date                                   Time    ..........................................................................................................................................  Reviewed by Signature/Title    ...................................................              ..............................................  Date                                               Time          22EPIC Rev 08/18

## 2023-03-27 ENCOUNTER — THERAPY VISIT (OUTPATIENT)
Dept: PHYSICAL THERAPY | Facility: CLINIC | Age: 73
End: 2023-03-27
Payer: MEDICARE

## 2023-03-27 DIAGNOSIS — S42.211A: ICD-10-CM

## 2023-03-27 DIAGNOSIS — M25.511 ACUTE PAIN OF RIGHT SHOULDER: ICD-10-CM

## 2023-03-27 PROCEDURE — 97161 PT EVAL LOW COMPLEX 20 MIN: CPT | Mod: GP | Performed by: PHYSICAL THERAPIST

## 2023-03-27 PROCEDURE — 97110 THERAPEUTIC EXERCISES: CPT | Mod: GP | Performed by: PHYSICAL THERAPIST

## 2023-03-27 NOTE — PROGRESS NOTES
Physical Therapy Initial Evaluation  Subjective:  The history is provided by the patient. No  was used.   Patient Health History  Sri Grewal being seen for R Shoulder Fracture.     Date of Onset: 2/11/23.   Problem occurred: Fell on Ice   Pain score: Ranges 0-8/10.  General health as reported by patient is good.  Pertinent medical history includes: depression, high blood pressure, migraines/headaches and stroke.   Red flags:  None as reported by patient.  Medical allergies: none.   Surgeries include:  None.    Current medications:  Anti-depressants and high blood pressure medication.    Current occupation is Retired.   Primary job tasks include:  Driving and repetitive tasks.                  Therapist Generated HPI Evaluation  Problem details: Wore sling for 6 weeks. .         Type of problem:  Right shoulder.    This is a new condition.  Condition occurred with:  A fall.  Where condition occurred: at home.  Patient reports pain:  Lateral and upper arm.  Pain is described as aching and is intermittent.  Pain timing: worse as day progresses.  Since onset symptoms are gradually improving.  Associated symptoms:  Loss of strength and loss of motion/stiffness. Symptoms are exacerbated by using arm behind back, using arm overhead, lying on extremity, lifting and using arm at shoulder level  and relieved by rest.  Special tests included:  X-ray.    Barriers include:  Lives alone.                        Objective:  Standing Alignment:    Cervical/Thoracic:  Forward head and thoracic kyphosis increased  Shoulder/UE:  Rounded shoulders, protracted scapula R and protracted scapula L              Gait:    Gait Type:  Normal   Assistive Devices:  None      Flexibility/Screens:   Positive screens:  Shoulder  Upper Extremity:    Decreased left upper extremity flexibility at:  Pectoralis Major; Pectoralis Minor and Latissimus    Decreased right upper extremity flexibility present at:  Pectoralis Major;  Pectoralis Minor and Latissimus                           Shoulder Evaluation:  ROM:  AROM:    Flexion:  Left:  155    Right:  50+    Abduction:  Left: 150   Right:  Unable to perfrom                Flexion/External Rotation:  Left:  T2    Right:  Occiput R  Extension/Internal Rotation:  Left:  T10    Right:  R iliac crest    PROM:  not assessed                                  Stability Testing:  not assessed      Special Tests:  not assessed      Palpation:  Palpation assessed shoulder: R anterior deltoid and biceps tendon/muscles.      Mobility Tests:      Glenohumeral posterior left:  Hypomobile    Glenohumeral inferior left:  Hypomobile                                             General     ROS    Assessment/Plan:    Patient is a 72 year old female with right side shoulder complaints.    Patient has the following significant findings with corresponding treatment plan.                Diagnosis 1:  R closed displaced fracture surgical neck of humerus    Pain -  hot/cold therapy, manual therapy, self management, education and home program  Decreased ROM/flexibility - manual therapy, therapeutic exercise, therapeutic activity and home program  Decreased joint mobility - manual therapy, therapeutic exercise, therapeutic activity and home program  Decreased strength - therapeutic exercise, therapeutic activities and home program  Inflammation - self management/home program  Impaired muscle performance - neuro re-education and home program  Decreased function - therapeutic activities and home program  Impaired posture - neuro re-education, therapeutic activities and home program    Therapy Evaluation Codes:   1) History comprised of:   Personal factors that impact the plan of care:      Lives alone.    Comorbidity factors that impact the plan of care are:      Depression, High blood pressure, Migraines/headaches and Stroke.     Medications impacting care: Anti-depressant and High blood pressure.  2) Examination of  Body Systems comprised of:   Body structures and functions that impact the plan of care:      Shoulder.   Activity limitations that impact the plan of care are:      Bathing, Cooking, Driving, Dressing, Lifting and Reaching and Lying on R side.  3) Clinical presentation characteristics are:   Stable/Uncomplicated.  4) Decision-Making    Low complexity using standardized patient assessment instrument and/or measureable assessment of functional outcome.  Cumulative Therapy Evaluation is: Low complexity.    Previous and current functional limitations:  (See Goal Flow Sheet for this information)    Short term and Long term goals: (See Goal Flow Sheet for this information)     Communication ability:  Patient appears to be able to clearly communicate and understand verbal and written communication and follow directions correctly.  Treatment Explanation - The following has been discussed with the patient:   RX ordered/plan of care  Anticipated outcomes  Possible risks and side effects  This patient would benefit from PT intervention to resume normal activities.   Rehab potential is good.    Frequency:  1 X week, once daily  Duration:  for 12 weeks  Discharge Plan:  Achieve all LTG.  Independent in home treatment program.  Return to previous functional level by discharge.  Reach maximal therapeutic benefit.    Please refer to the daily flowsheet for treatment today, total treatment time and time spent performing 1:1 timed codes.

## 2023-03-27 NOTE — PROGRESS NOTES
[facility]    OUTPATIENT Physical Therapy ORTHOPEDIC EVALUATION  PLAN OF TREATMENT FOR OUTPATIENT REHABILITATION  (COMPLETE FOR INITIAL CLAIMS ONLY)  Patient's Last Name, First Name, M.I.  YOB: 1950  [place]  [place]    Provider s Name:  [facility]   Medical Record No.  [id number]   Start of Care Date:  03/27/23   Onset Date:   02/11/23   Treatment Diagnosis:  Closed displaced fracture of surgical humerus R Medical Diagnosis:     Closed displaced fracture of surgical neck of right humerus  Acute pain of right shoulder       Goals:     03/27/23 0500   Body Part   Goals listed below are for R Shoulder   Goal #1   Goal #1 reaching   Previous Functional Level No restrictions   Current Functional Level Cannot reach ;overhead   Performance level Active Flex = 50 degrees PL = 6/10   STG Target Performance Reach ;to shoulder level   Performance level Active Flex = 90 PL = 3/10   Rationale for independent personal hygiene;for dressing;for bathing;for meal preparation;for safe driving, hook seat belt, reach shift lever and turn signal, using both hands on steering wheel   Due date 05/08/23   LTG Target Performance Reach;overhead   Performance Level > 100 degrees  PL = 0-1/10   Rationale for independent personal hygiene;for dressing;for bathing;for meal preparation;for safe driving, hook seat belt, reach shift lever and turn signal, using both hands on steering wheel   Due date 06/19/23         Therapy Frequency:  1 x week  Predicted Duration of Therapy Intervention:  12 weeks    Zayra Castillo, PT                 I CERTIFY THE NEED FOR THESE SERVICES FURNISHED UNDER        THIS PLAN OF TREATMENT AND WHILE UNDER MY CARE     (Physician co-signature of this document indicates review and certification of the therapy plan).                     Certification Date From:  03/27/23    Certification Date To:  06/24/23    Referring Provider:  Elías Willoughby    Initial Assessment        See Epic Evaluation SOC Date: 03/27/23

## 2023-04-03 ENCOUNTER — THERAPY VISIT (OUTPATIENT)
Dept: PHYSICAL THERAPY | Facility: CLINIC | Age: 73
End: 2023-04-03
Payer: MEDICARE

## 2023-04-03 ENCOUNTER — TELEPHONE (OUTPATIENT)
Dept: ORTHOPEDICS | Facility: OTHER | Age: 73
End: 2023-04-03

## 2023-04-03 DIAGNOSIS — S42.211A: Primary | ICD-10-CM

## 2023-04-03 DIAGNOSIS — M25.511 ACUTE PAIN OF RIGHT SHOULDER: ICD-10-CM

## 2023-04-03 DIAGNOSIS — S42.211D CLOSED DISPLACED FRACTURE OF SURGICAL NECK OF RIGHT HUMERUS WITH ROUTINE HEALING, UNSPECIFIED FRACTURE MORPHOLOGY, SUBSEQUENT ENCOUNTER: Primary | ICD-10-CM

## 2023-04-03 PROCEDURE — 97535 SELF CARE MNGMENT TRAINING: CPT | Mod: GP | Performed by: PHYSICAL THERAPIST

## 2023-04-03 NOTE — TELEPHONE ENCOUNTER
Reason for Call:  Other     Detailed comments: Elías Jordan is calling, he is a friend of Sri that took her to physical therapy in [place] this morning. The therapist found bruising today that wasn't there previously. Patient fell on her left side on Friday, she didn't have pain over the weekend but today she has pain in her right side upper arm, she said the pain is at a level of 5+. The therapist told Sri and Elías she was sending a note to see if Elías Willoughby would like to see her. Sri has not received a call to see if she needs to be seen again. Do you want her seen again?    Phone Number Patient can be reached at: Other phone number:  [phone]*    Best Time:     Can we leave a detailed message on this number? YES    Call taken on 4/3/2023 at 3:32 PM by Beulah Washington

## 2023-04-03 NOTE — TELEPHONE ENCOUNTER
The patient is about 6 weeks out from her fracture so she should be stable enough but just to be safe I put in an x-ray ordered for Grashey and scapular Y view of the right shoulder which they could come in and get the x-ray at any time and I will evaluate it and make sure that the fracture has not changed or there has not been any displacement of the alignment.  I will have the patient hold on therapy until she gets this done.  I will get back to her with the results.    Please reach out to the patient and let her know she can schedule with radiology and I will review it and get back to her after the x-rays are done.    Thanks

## 2023-04-04 ENCOUNTER — ANCILLARY PROCEDURE (OUTPATIENT)
Dept: GENERAL RADIOLOGY | Facility: OTHER | Age: 73
End: 2023-04-04
Attending: PHYSICIAN ASSISTANT
Payer: MEDICARE

## 2023-04-04 DIAGNOSIS — S42.211D CLOSED DISPLACED FRACTURE OF SURGICAL NECK OF RIGHT HUMERUS WITH ROUTINE HEALING, UNSPECIFIED FRACTURE MORPHOLOGY, SUBSEQUENT ENCOUNTER: ICD-10-CM

## 2023-04-04 PROCEDURE — 73030 X-RAY EXAM OF SHOULDER: CPT | Mod: TC | Performed by: RADIOLOGY

## 2023-04-04 NOTE — TELEPHONE ENCOUNTER
Patient notified of provider's message as written. She was advised to call [phone] to schedule her x-ray. Patient verbalized understanding and has no further questions at this time.     Jocelyne Henning RN   [facility]

## 2023-04-05 ENCOUNTER — TELEPHONE (OUTPATIENT)
Dept: ORTHOPEDICS | Facility: CLINIC | Age: 73
End: 2023-04-05
Payer: MEDICARE

## 2023-04-05 NOTE — TELEPHONE ENCOUNTER
72-year-old female who is right-hand dominant fell on 3/31/2023 and I received a message from therapy letting me know about it.  I ordered a x-ray just to be safe to see if there was any displacement.  Reviewing x-rays today on the Grashey view it looks like there is about 50 degrees of varus angulation on the new x-rays.  I have reached out to Dr. Hutchinson to see after he reviews the imaging if he feels this patient should be treated surgically.    Dr. Hutchinson did get back to me after reviewing the imaging he felt that the patient could continue to be treated nonoperatively.  He recommended keeping the elbow anterior to the axilla.  I will have her hold on formal physical therapy and doing passive range of motion for now until I see her back in 3 weeks which is 4/25/2023.  She can do Codman exercises as well as finger wrist elbow range of motion until then.  We will give the bone a little more time to solidify before we start passive range of motion again.  I left all this information and a voicemail on her personal mobile phone which it states we can leave voicemails there.  She is to call if she has further questions.

## 2023-04-25 ENCOUNTER — ANCILLARY PROCEDURE (OUTPATIENT)
Dept: GENERAL RADIOLOGY | Facility: CLINIC | Age: 73
End: 2023-04-25
Attending: PHYSICIAN ASSISTANT
Payer: MEDICARE

## 2023-04-25 ENCOUNTER — OFFICE VISIT (OUTPATIENT)
Dept: ORTHOPEDICS | Facility: CLINIC | Age: 73
End: 2023-04-25
Payer: MEDICARE

## 2023-04-25 VITALS
DIASTOLIC BLOOD PRESSURE: 84 MMHG | SYSTOLIC BLOOD PRESSURE: 118 MMHG | HEIGHT: 61 IN | BODY MASS INDEX: 24 KG/M2 | WEIGHT: 127.1 LBS

## 2023-04-25 DIAGNOSIS — S42.211D CLOSED DISPLACED FRACTURE OF SURGICAL NECK OF RIGHT HUMERUS WITH ROUTINE HEALING, UNSPECIFIED FRACTURE MORPHOLOGY, SUBSEQUENT ENCOUNTER: ICD-10-CM

## 2023-04-25 DIAGNOSIS — S42.211D CLOSED DISPLACED FRACTURE OF SURGICAL NECK OF RIGHT HUMERUS WITH ROUTINE HEALING, UNSPECIFIED FRACTURE MORPHOLOGY, SUBSEQUENT ENCOUNTER: Primary | ICD-10-CM

## 2023-04-25 PROCEDURE — 73030 X-RAY EXAM OF SHOULDER: CPT | Mod: TC | Performed by: STUDENT IN AN ORGANIZED HEALTH CARE EDUCATION/TRAINING PROGRAM

## 2023-04-25 PROCEDURE — 99207 PR FRACTURE CARE IN GLOBAL PERIOD: CPT | Performed by: PHYSICIAN ASSISTANT

## 2023-04-25 ASSESSMENT — PAIN SCALES - GENERAL: PAINLEVEL: SEVERE PAIN (6)

## 2023-04-25 NOTE — LETTER
4/25/2023         RE: Sri Grewal  [address] [place] [id number]        Dear Colleague,    Thank you for referring your patient, Sri Grewal, to the [facility]. Please see a copy of my visit note below.    Office Visit-Fracture Follow up    Chief Complaint: Sri Grewal is a 72 year old female who is being seen for   Chief Complaint   Patient presents with     RECHECK     right proximal humerus fracture with approximately 50 degrees of anterior displacement- DOI: 2/11/2023       History of Present Illness:   Location: Right shoulder  Duration of Pain: Since date of injury 2-1/2 months ago however the patient is not having as much pain now  Rating of Pain: 6 out of 10  Pain Quality: Achy  Pain is better with: Rest  Pain is worse with: Bumping shoulder  Treatment so far consists of: Patient was last seen on 3/14/2023 and she was much better on that day.  She was reeducated about working on elbow range of motion 3-5 times a day.  Patient was allowed to start passive range of motion and physical therapy was put in.  They were to focus on her elbow also.  She was to remove the abductor pillow on her sling and then wean out of the sling the very next week.  We are hoping to start active and active assisted and strengthening range of motion on her next visit.  Then the patient had a fall on 3/31/2023 which I found out about through physical therapy message.  We did get x-rays and I showed them to Dr. Hutchinson who decided on continued nonoperative treatment which we did.  We held on formal physical therapy and planned seeing the patient back on 4/25/2023 and restarting passive range of motion as well as possibly active and active assisted and strengthening at that point.  Patient states that when she fell she did a lateral impact on her humerus which would explain the displacement of the fracture.  Patient discussed to therapy and found out that we are holding off on therapy.  She  "admits to not doing her range of motion on her own.  Associated Features: Denies numbness tingling shooting burning electric pain  Pain is Limiting: Heavy use of right upper extremity  Here to: Orthopedic follow-up  Additional History: Patient is here with her friend Bryson renee    REVIEW OF SYSTEMS  General: negative for, night sweats, dizziness, fatigue  Resp: No shortness of breath and no cough  CV: negative for chest pain, syncope or near-syncope  GI: negative for nausea, vomiting and diarrhea  : negative for dysuria and hematuria  Musculoskeletal: as above  Neurologic: negative for syncope   Hematologic: negative for bleeding disorder    Physical Exam:  Vitals: /84   Ht 1.54 m (5' 0.63\")   Wt 57.7 kg (127 lb 1.6 oz)   LMP 11/09/2005 (Approximate)   BMI 24.31 kg/m    BMI= Body mass index is 24.31 kg/m .  Constitutional: healthy, alert and no acute distress   Psychiatric: mentation appears normal and affect normal/bright  NEURO: no focal deficits, CMS intact Right upper extremity   RESP: Normal with easy respirations and no use of accessory muscles to breathe, no audible wheezing or retractions  CV: +2 radial pulse and her hand is warm to palpation.   SKIN: No erythema, rashes, excoriation, or breakdown. No evidence of infection.   MUSCULOSKELETAL:    INSPECTION of right shoulder: No gross deformities, erythema, edema, ecchymosis, atrophy or fasciculations.     PALPATION: No tenderness to aggressive palpation over the proximal humerus.  No tenderness to palpation at AC joint, proximal biceps tendon, clavicle, lateral shoulder, posterior shoulder, trapezius area. No increased warmth noted.     ROM: Passive: Forward flexion to 100 degrees, abduction to 105 degrees , external rotation to 70 , internal rotation to lumbar spine.  All range of motion is without catching, locking or pain.  Active: Forward flexion to 90 degrees.     STRENGTH: 5 out of 5 , deltoid as well as internal and external rotation. " 5 out of 5 supraspinatus, infraspinatus and subscapularis.      SPECIAL TEST: Patient has a negative bear hug test and negative liftoff test, negative empty can and full can test, negative external rotator lag sign, negative drop test   GAIT: non-antalgic  Lymph: no palpable lymph nodes      Diagnostic Modalities:  Recent Results (from the past 744 hour(s))   X-ray rt shoulder 2 view    Narrative    XR SHOULDER RIGHT 2 VIEWS 4/4/2023 3:58 PM     HISTORY: Closed displaced fracture of surgical neck of right humerus  with routine healing, unspecified fracture morphology, subsequent  encounter    COMPARISON: 3/14/2023      Impression    IMPRESSION: Subacute fracture of the surgical neck of the proximal  humerus with resorption along the fracture margin and peripheral  callus formation.  Increased proximal and lateral displacement of the  humeral shaft. Peripheral callus formation. No osseous bridging.  Osteopenia.    BEA SORIA MD         SYSTEM ID:  [id number]     X-rays done today showing increased mineralization over the fracture site when compared to the previous x-ray of 4/4/2023 and 3/14/2023.  There is no change in angulation from today's x-rays to the x-rays that were done on 4/4/2023.  No other fracture dislocation or tumor.    Independent visualization of the images was performed.      Impression: 1.  2 months and 14 days status post right proximal humerus fracture with approximately 50 degrees of anterior displacement and varus angulation.    Plan:  All of the above pertinent physical exam and imaging modalities findings was reviewed with Sri and her friend Bryson.    FOCUSED PLAN:   Patient is now 10 weeks out from right proximal humerus fracture and about 1 month out from lateral impact fall which created a little more varus.  I did have Dr. Hutchinson review these x-rays after the fall and we decided to continue nonoperative treatment.  Today patient does not have much for tenderness and is able to do  some active range of motion without pain.  She does have an abundance of callus formation today compared to the previous x-rays and the fracture has not changed in angulation.  I did have the patient take a break from formal therapy after her fall to be cautious.  We will restart her back in formal therapy to restart passive range of motion and now start active active assisted and gentle progressive strengthening of the shoulder for the next 6 weeks and I will see her back at that point and hopefully she will be doing well at that point and if so we will possibly follow-up as needed.  Follow-up in 6 weeks.    Re-x-ray on return: Yes we will get Grashey and scapular Y view of the right shoulder.      This note was dictated with [employer].    Elías Willoughby PA-C        Again, thank you for allowing me to participate in the care of your patient.        Sincerely,        Elías Willoughby PA-C

## 2023-04-25 NOTE — PROGRESS NOTES
Office Visit-Fracture Follow up    Chief Complaint: Sri Grewal is a 72 year old female who is being seen for   Chief Complaint   Patient presents with     RECHECK     right proximal humerus fracture with approximately 50 degrees of anterior displacement- DOI: 2/11/2023       History of Present Illness:   Location: Right shoulder  Duration of Pain: Since date of injury 2-1/2 months ago however the patient is not having as much pain now  Rating of Pain: 6 out of 10  Pain Quality: Achy  Pain is better with: Rest  Pain is worse with: Bumping shoulder  Treatment so far consists of: Patient was last seen on 3/14/2023 and she was much better on that day.  She was reeducated about working on elbow range of motion 3-5 times a day.  Patient was allowed to start passive range of motion and physical therapy was put in.  They were to focus on her elbow also.  She was to remove the abductor pillow on her sling and then wean out of the sling the very next week.  We are hoping to start active and active assisted and strengthening range of motion on her next visit.  Then the patient had a fall on 3/31/2023 which I found out about through physical therapy message.  We did get x-rays and I showed them to Dr. Hutchinson who decided on continued nonoperative treatment which we did.  We held on formal physical therapy and planned seeing the patient back on 4/25/2023 and restarting passive range of motion as well as possibly active and active assisted and strengthening at that point.  Patient states that when she fell she did a lateral impact on her humerus which would explain the displacement of the fracture.  Patient discussed to therapy and found out that we are holding off on therapy.  She admits to not doing her range of motion on her own.  Associated Features: Denies numbness tingling shooting burning electric pain  Pain is Limiting: Heavy use of right upper extremity  Here to: Orthopedic follow-up  Additional History: Patient is here  "with her friend Bryson again    REVIEW OF SYSTEMS  General: negative for, night sweats, dizziness, fatigue  Resp: No shortness of breath and no cough  CV: negative for chest pain, syncope or near-syncope  GI: negative for nausea, vomiting and diarrhea  : negative for dysuria and hematuria  Musculoskeletal: as above  Neurologic: negative for syncope   Hematologic: negative for bleeding disorder    Physical Exam:  Vitals: /84   Ht 1.54 m (5' 0.63\")   Wt 57.7 kg (127 lb 1.6 oz)   LMP 11/09/2005 (Approximate)   BMI 24.31 kg/m    BMI= Body mass index is 24.31 kg/m .  Constitutional: healthy, alert and no acute distress   Psychiatric: mentation appears normal and affect normal/bright  NEURO: no focal deficits, CMS intact Right upper extremity   RESP: Normal with easy respirations and no use of accessory muscles to breathe, no audible wheezing or retractions  CV: +2 radial pulse and her hand is warm to palpation.   SKIN: No erythema, rashes, excoriation, or breakdown. No evidence of infection.   MUSCULOSKELETAL:    INSPECTION of right shoulder: No gross deformities, erythema, edema, ecchymosis, atrophy or fasciculations.     PALPATION: No tenderness to aggressive palpation over the proximal humerus.  No tenderness to palpation at AC joint, proximal biceps tendon, clavicle, lateral shoulder, posterior shoulder, trapezius area. No increased warmth noted.     ROM: Passive: Forward flexion to 100 degrees, abduction to 105 degrees , external rotation to 70 , internal rotation to lumbar spine.  All range of motion is without catching, locking or pain.  Active: Forward flexion to 90 degrees.     STRENGTH: 5 out of 5 , deltoid as well as internal and external rotation. 5 out of 5 supraspinatus, infraspinatus and subscapularis.      SPECIAL TEST: Patient has a negative bear hug test and negative liftoff test, negative empty can and full can test, negative external rotator lag sign, negative drop test   GAIT: " non-antalgic  Lymph: no palpable lymph nodes      Diagnostic Modalities:  Recent Results (from the past 744 hour(s))   X-ray rt shoulder 2 view    Narrative    XR SHOULDER RIGHT 2 VIEWS 4/4/2023 3:58 PM     HISTORY: Closed displaced fracture of surgical neck of right humerus  with routine healing, unspecified fracture morphology, subsequent  encounter    COMPARISON: 3/14/2023      Impression    IMPRESSION: Subacute fracture of the surgical neck of the proximal  humerus with resorption along the fracture margin and peripheral  callus formation.  Increased proximal and lateral displacement of the  humeral shaft. Peripheral callus formation. No osseous bridging.  Osteopenia.    BEA SORIA MD         SYSTEM ID:  [id number]     X-rays done today showing increased mineralization over the fracture site when compared to the previous x-ray of 4/4/2023 and 3/14/2023.  There is no change in angulation from today's x-rays to the x-rays that were done on 4/4/2023.  No other fracture dislocation or tumor.    Independent visualization of the images was performed.      Impression: 1.  2 months and 14 days status post right proximal humerus fracture with approximately 50 degrees of anterior displacement and varus angulation.    Plan:  All of the above pertinent physical exam and imaging modalities findings was reviewed with Sri and her friend Bryson.    FOCUSED PLAN:   Patient is now 10 weeks out from right proximal humerus fracture and about 1 month out from lateral impact fall which created a little more varus.  I did have Dr. Hutchinson review these x-rays after the fall and we decided to continue nonoperative treatment.  Today patient does not have much for tenderness and is able to do some active range of motion without pain.  She does have an abundance of callus formation today compared to the previous x-rays and the fracture has not changed in angulation.  I did have the patient take a break from formal therapy after her  fall to be cautious.  We will restart her back in formal therapy to restart passive range of motion and now start active active assisted and gentle progressive strengthening of the shoulder for the next 6 weeks and I will see her back at that point and hopefully she will be doing well at that point and if so we will possibly follow-up as needed.  Follow-up in 6 weeks.    Re-x-ray on return: Yes we will get Grashey and scapular Y view of the right shoulder.      This note was dictated with [employer].    Elías Willoughby PA-C

## 2023-04-29 ENCOUNTER — APPOINTMENT (OUTPATIENT)
Dept: GENERAL RADIOLOGY | Facility: CLINIC | Age: 73
DRG: 563 | End: 2023-04-29
Attending: EMERGENCY MEDICINE
Payer: MEDICARE

## 2023-04-29 ENCOUNTER — HOSPITAL ENCOUNTER (EMERGENCY)
Facility: CLINIC | Age: 73
Discharge: ANOTHER HOSPITAL | DRG: 563 | End: 2023-04-30
Attending: FAMILY MEDICINE | Admitting: FAMILY MEDICINE
Payer: MEDICARE

## 2023-04-29 VITALS
HEART RATE: 88 BPM | WEIGHT: 130 LBS | RESPIRATION RATE: 18 BRPM | OXYGEN SATURATION: 97 % | DIASTOLIC BLOOD PRESSURE: 91 MMHG | HEIGHT: 63 IN | BODY MASS INDEX: 23.04 KG/M2 | SYSTOLIC BLOOD PRESSURE: 133 MMHG | TEMPERATURE: 98 F

## 2023-04-29 DIAGNOSIS — S42.201A CLOSED FRACTURE OF PROXIMAL END OF RIGHT HUMERUS, UNSPECIFIED FRACTURE MORPHOLOGY, INITIAL ENCOUNTER: ICD-10-CM

## 2023-04-29 DIAGNOSIS — S42.401A CLOSED FRACTURE OF DISTAL END OF RIGHT HUMERUS, UNSPECIFIED FRACTURE MORPHOLOGY, INITIAL ENCOUNTER: ICD-10-CM

## 2023-04-29 PROCEDURE — 99285 EMERGENCY DEPT VISIT HI MDM: CPT | Performed by: FAMILY MEDICINE

## 2023-04-29 PROCEDURE — 96376 TX/PRO/DX INJ SAME DRUG ADON: CPT

## 2023-04-29 PROCEDURE — 250N000013 HC RX MED GY IP 250 OP 250 PS 637: Performed by: FAMILY MEDICINE

## 2023-04-29 PROCEDURE — 96374 THER/PROPH/DIAG INJ IV PUSH: CPT

## 2023-04-29 PROCEDURE — 99285 EMERGENCY DEPT VISIT HI MDM: CPT | Mod: 25

## 2023-04-29 PROCEDURE — 73070 X-RAY EXAM OF ELBOW: CPT | Mod: RT

## 2023-04-29 PROCEDURE — 250N000013 HC RX MED GY IP 250 OP 250 PS 637: Performed by: EMERGENCY MEDICINE

## 2023-04-29 PROCEDURE — 250N000011 HC RX IP 250 OP 636: Performed by: FAMILY MEDICINE

## 2023-04-29 PROCEDURE — 73030 X-RAY EXAM OF SHOULDER: CPT | Mod: RT

## 2023-04-29 RX ORDER — IBUPROFEN 600 MG/1
600 TABLET, FILM COATED ORAL ONCE
Status: COMPLETED | OUTPATIENT
Start: 2023-04-29 | End: 2023-04-29

## 2023-04-29 RX ORDER — FENTANYL CITRATE 50 UG/ML
25 INJECTION, SOLUTION INTRAMUSCULAR; INTRAVENOUS
Status: COMPLETED | OUTPATIENT
Start: 2023-04-29 | End: 2023-04-30

## 2023-04-29 RX ADMIN — IBUPROFEN 600 MG: 600 TABLET, FILM COATED ORAL at 19:23

## 2023-04-29 RX ADMIN — FENTANYL CITRATE 25 MCG: 50 INJECTION INTRAMUSCULAR; INTRAVENOUS at 21:36

## 2023-04-29 RX ADMIN — FENTANYL CITRATE 25 MCG: 50 INJECTION INTRAMUSCULAR; INTRAVENOUS at 21:58

## 2023-04-29 ASSESSMENT — ACTIVITIES OF DAILY LIVING (ADL)
ADLS_ACUITY_SCORE: 35
ADLS_ACUITY_SCORE: 35

## 2023-04-30 ENCOUNTER — APPOINTMENT (OUTPATIENT)
Dept: GENERAL RADIOLOGY | Facility: CLINIC | Age: 73
DRG: 563 | End: 2023-04-30
Attending: EMERGENCY MEDICINE
Payer: MEDICARE

## 2023-04-30 ENCOUNTER — APPOINTMENT (OUTPATIENT)
Dept: CT IMAGING | Facility: CLINIC | Age: 73
DRG: 563 | End: 2023-04-30
Payer: MEDICARE

## 2023-04-30 ENCOUNTER — APPOINTMENT (OUTPATIENT)
Dept: GENERAL RADIOLOGY | Facility: CLINIC | Age: 73
DRG: 563 | End: 2023-04-30
Payer: MEDICARE

## 2023-04-30 ENCOUNTER — HOSPITAL ENCOUNTER (INPATIENT)
Facility: CLINIC | Age: 73
LOS: 1 days | Discharge: HOME | DRG: 563 | End: 2023-05-01
Attending: EMERGENCY MEDICINE | Admitting: ORTHOPAEDIC SURGERY
Payer: MEDICARE
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
## 2023-04-30 DIAGNOSIS — S42.401A CLOSED FRACTURE OF DISTAL END OF RIGHT HUMERUS, UNSPECIFIED FRACTURE MORPHOLOGY, INITIAL ENCOUNTER: ICD-10-CM

## 2023-04-30 DIAGNOSIS — M25.511 ACUTE PAIN OF RIGHT SHOULDER: Primary | ICD-10-CM

## 2023-04-30 DIAGNOSIS — S42.201K CLOSED FRACTURE OF PROXIMAL END OF RIGHT HUMERUS WITH NONUNION, UNSPECIFIED FRACTURE MORPHOLOGY, SUBSEQUENT ENCOUNTER: ICD-10-CM

## 2023-04-30 LAB
ABO/RH(D): NORMAL
ANION GAP SERPL CALCULATED.3IONS-SCNC: 10 MMOL/L (ref 7–15)
ANTIBODY SCREEN: NEGATIVE
BASOPHILS # BLD AUTO: 0 10E3/UL (ref 0–0.2)
BASOPHILS NFR BLD AUTO: 1 %
BUN SERPL-MCNC: 14.1 MG/DL (ref 8–23)
CALCIUM SERPL-MCNC: 8.9 MG/DL (ref 8.8–10.2)
CHLORIDE SERPL-SCNC: 94 MMOL/L (ref 98–107)
CREAT SERPL-MCNC: 0.89 MG/DL (ref 0.51–0.95)
DEPRECATED HCO3 PLAS-SCNC: 26 MMOL/L (ref 22–29)
EOSINOPHIL # BLD AUTO: 0.1 10E3/UL (ref 0–0.7)
EOSINOPHIL NFR BLD AUTO: 1 %
ERYTHROCYTE [DISTWIDTH] IN BLOOD BY AUTOMATED COUNT: 12.2 % (ref 10–15)
GFR SERPL CREATININE-BSD FRML MDRD: 69 ML/MIN/1.73M2
GLUCOSE SERPL-MCNC: 112 MG/DL (ref 70–99)
HCT VFR BLD AUTO: 34.2 % (ref 35–47)
HGB BLD-MCNC: 11.7 G/DL (ref 11.7–15.7)
IMM GRANULOCYTES # BLD: 0 10E3/UL
IMM GRANULOCYTES NFR BLD: 0 %
INR PPP: 0.96 (ref 0.85–1.15)
LYMPHOCYTES # BLD AUTO: 1.6 10E3/UL (ref 0.8–5.3)
LYMPHOCYTES NFR BLD AUTO: 21 %
MCH RBC QN AUTO: 31.5 PG (ref 26.5–33)
MCHC RBC AUTO-ENTMCNC: 34.2 G/DL (ref 31.5–36.5)
MCV RBC AUTO: 92 FL (ref 78–100)
MONOCYTES # BLD AUTO: 0.8 10E3/UL (ref 0–1.3)
MONOCYTES NFR BLD AUTO: 10 %
NEUTROPHILS # BLD AUTO: 5.3 10E3/UL (ref 1.6–8.3)
NEUTROPHILS NFR BLD AUTO: 67 %
NRBC # BLD AUTO: 0 10E3/UL
NRBC BLD AUTO-RTO: 0 /100
PLATELET # BLD AUTO: 207 10E3/UL (ref 150–450)
POTASSIUM SERPL-SCNC: 3.6 MMOL/L (ref 3.4–5.3)
RBC # BLD AUTO: 3.72 10E6/UL (ref 3.8–5.2)
SODIUM SERPL-SCNC: 130 MMOL/L (ref 136–145)
SPECIMEN EXPIRATION DATE: NORMAL
WBC # BLD AUTO: 7.9 10E3/UL (ref 4–11)

## 2023-04-30 PROCEDURE — 96376 TX/PRO/DX INJ SAME DRUG ADON: CPT

## 2023-04-30 PROCEDURE — G1010 CDSM STANSON: HCPCS

## 2023-04-30 PROCEDURE — 86850 RBC ANTIBODY SCREEN: CPT | Performed by: EMERGENCY MEDICINE

## 2023-04-30 PROCEDURE — 99285 EMERGENCY DEPT VISIT HI MDM: CPT | Mod: 25

## 2023-04-30 PROCEDURE — 250N000013 HC RX MED GY IP 250 OP 250 PS 637: Performed by: INTERNAL MEDICINE

## 2023-04-30 PROCEDURE — 258N000003 HC RX IP 258 OP 636: Performed by: EMERGENCY MEDICINE

## 2023-04-30 PROCEDURE — 80048 BASIC METABOLIC PNL TOTAL CA: CPT | Performed by: EMERGENCY MEDICINE

## 2023-04-30 PROCEDURE — 250N000011 HC RX IP 250 OP 636: Performed by: FAMILY MEDICINE

## 2023-04-30 PROCEDURE — 85610 PROTHROMBIN TIME: CPT | Performed by: EMERGENCY MEDICINE

## 2023-04-30 PROCEDURE — 250N000013 HC RX MED GY IP 250 OP 250 PS 637: Performed by: STUDENT IN AN ORGANIZED HEALTH CARE EDUCATION/TRAINING PROGRAM

## 2023-04-30 PROCEDURE — 99222 1ST HOSP IP/OBS MODERATE 55: CPT | Mod: AI | Performed by: INTERNAL MEDICINE

## 2023-04-30 PROCEDURE — 85025 COMPLETE CBC W/AUTO DIFF WBC: CPT | Performed by: EMERGENCY MEDICINE

## 2023-04-30 PROCEDURE — 250N000011 HC RX IP 250 OP 636: Performed by: STUDENT IN AN ORGANIZED HEALTH CARE EDUCATION/TRAINING PROGRAM

## 2023-04-30 PROCEDURE — 36415 COLL VENOUS BLD VENIPUNCTURE: CPT | Performed by: EMERGENCY MEDICINE

## 2023-04-30 PROCEDURE — 73030 X-RAY EXAM OF SHOULDER: CPT | Mod: RT

## 2023-04-30 PROCEDURE — 99285 EMERGENCY DEPT VISIT HI MDM: CPT | Performed by: EMERGENCY MEDICINE

## 2023-04-30 PROCEDURE — 120N000002 HC R&B MED SURG/OB UMMC

## 2023-04-30 PROCEDURE — 86901 BLOOD TYPING SEROLOGIC RH(D): CPT | Performed by: EMERGENCY MEDICINE

## 2023-04-30 PROCEDURE — 73090 X-RAY EXAM OF FOREARM: CPT | Mod: RT

## 2023-04-30 RX ORDER — HYDROMORPHONE HYDROCHLORIDE 2 MG/1
4 TABLET ORAL EVERY 4 HOURS PRN
Status: DISCONTINUED | OUTPATIENT
Start: 2023-04-30 | End: 2023-04-30

## 2023-04-30 RX ORDER — ALBUTEROL SULFATE 90 UG/1
1-2 AEROSOL, METERED RESPIRATORY (INHALATION) EVERY 6 HOURS PRN
Status: DISCONTINUED | OUTPATIENT
Start: 2023-04-30 | End: 2023-04-30

## 2023-04-30 RX ORDER — METHOCARBAMOL 500 MG/1
500 TABLET, FILM COATED ORAL EVERY 6 HOURS PRN
Status: DISCONTINUED | OUTPATIENT
Start: 2023-04-30 | End: 2023-05-01 | Stop reason: HOSPADM

## 2023-04-30 RX ORDER — BISACODYL 10 MG
10 SUPPOSITORY, RECTAL RECTAL DAILY PRN
Status: DISCONTINUED | OUTPATIENT
Start: 2023-04-30 | End: 2023-05-01 | Stop reason: HOSPADM

## 2023-04-30 RX ORDER — NALOXONE HYDROCHLORIDE 0.4 MG/ML
0.4 INJECTION, SOLUTION INTRAMUSCULAR; INTRAVENOUS; SUBCUTANEOUS
Status: DISCONTINUED | OUTPATIENT
Start: 2023-04-30 | End: 2023-05-01 | Stop reason: HOSPADM

## 2023-04-30 RX ORDER — ASPIRIN 81 MG/1
81 TABLET, CHEWABLE ORAL DAILY
Status: DISCONTINUED | OUTPATIENT
Start: 2023-04-30 | End: 2023-04-30

## 2023-04-30 RX ORDER — HYDROMORPHONE HYDROCHLORIDE 2 MG/1
6 TABLET ORAL EVERY 4 HOURS PRN
Status: DISCONTINUED | OUTPATIENT
Start: 2023-04-30 | End: 2023-05-01 | Stop reason: HOSPADM

## 2023-04-30 RX ORDER — CARVEDILOL 6.25 MG/1
12.5 TABLET ORAL 2 TIMES DAILY WITH MEALS
Status: DISCONTINUED | OUTPATIENT
Start: 2023-04-30 | End: 2023-05-01 | Stop reason: HOSPADM

## 2023-04-30 RX ORDER — HYDROMORPHONE HCL IN WATER/PF 6 MG/30 ML
0.4 PATIENT CONTROLLED ANALGESIA SYRINGE INTRAVENOUS
Status: DISCONTINUED | OUTPATIENT
Start: 2023-04-30 | End: 2023-05-01 | Stop reason: HOSPADM

## 2023-04-30 RX ORDER — BUPROPION HYDROCHLORIDE 300 MG/1
300 TABLET ORAL EVERY MORNING
Status: DISCONTINUED | OUTPATIENT
Start: 2023-04-30 | End: 2023-05-01 | Stop reason: HOSPADM

## 2023-04-30 RX ORDER — CLOPIDOGREL BISULFATE 75 MG/1
75 TABLET ORAL DAILY
Status: DISCONTINUED | OUTPATIENT
Start: 2023-04-30 | End: 2023-05-01 | Stop reason: HOSPADM

## 2023-04-30 RX ORDER — ONDANSETRON 4 MG/1
4 TABLET, ORALLY DISINTEGRATING ORAL EVERY 6 HOURS PRN
Status: DISCONTINUED | OUTPATIENT
Start: 2023-04-30 | End: 2023-05-01 | Stop reason: HOSPADM

## 2023-04-30 RX ORDER — ROSUVASTATIN CALCIUM 20 MG/1
20 TABLET, COATED ORAL DAILY
Status: DISCONTINUED | OUTPATIENT
Start: 2023-04-30 | End: 2023-05-01 | Stop reason: HOSPADM

## 2023-04-30 RX ORDER — AMOXICILLIN 250 MG
1 CAPSULE ORAL 2 TIMES DAILY
Status: DISCONTINUED | OUTPATIENT
Start: 2023-04-30 | End: 2023-05-01 | Stop reason: HOSPADM

## 2023-04-30 RX ORDER — ACETAMINOPHEN 325 MG/1
650 TABLET ORAL EVERY 4 HOURS PRN
Status: DISCONTINUED | OUTPATIENT
Start: 2023-05-03 | End: 2023-05-01 | Stop reason: HOSPADM

## 2023-04-30 RX ORDER — ACETAMINOPHEN 325 MG/1
975 TABLET ORAL EVERY 8 HOURS
Status: DISCONTINUED | OUTPATIENT
Start: 2023-04-30 | End: 2023-04-30

## 2023-04-30 RX ORDER — ONDANSETRON 2 MG/ML
4 INJECTION INTRAMUSCULAR; INTRAVENOUS EVERY 6 HOURS PRN
Status: DISCONTINUED | OUTPATIENT
Start: 2023-04-30 | End: 2023-05-01 | Stop reason: HOSPADM

## 2023-04-30 RX ORDER — VENLAFAXINE HYDROCHLORIDE 150 MG/1
150 CAPSULE, EXTENDED RELEASE ORAL DAILY
Status: DISCONTINUED | OUTPATIENT
Start: 2023-04-30 | End: 2023-05-01 | Stop reason: HOSPADM

## 2023-04-30 RX ORDER — HYDROMORPHONE HCL IN WATER/PF 6 MG/30 ML
0.2 PATIENT CONTROLLED ANALGESIA SYRINGE INTRAVENOUS
Status: DISCONTINUED | OUTPATIENT
Start: 2023-04-30 | End: 2023-05-01 | Stop reason: HOSPADM

## 2023-04-30 RX ORDER — LIDOCAINE 40 MG/G
CREAM TOPICAL
Status: DISCONTINUED | OUTPATIENT
Start: 2023-04-30 | End: 2023-05-01 | Stop reason: HOSPADM

## 2023-04-30 RX ORDER — NALOXONE HYDROCHLORIDE 0.4 MG/ML
0.2 INJECTION, SOLUTION INTRAMUSCULAR; INTRAVENOUS; SUBCUTANEOUS
Status: DISCONTINUED | OUTPATIENT
Start: 2023-04-30 | End: 2023-05-01 | Stop reason: HOSPADM

## 2023-04-30 RX ORDER — HYDROXYZINE HYDROCHLORIDE 10 MG/1
10 TABLET, FILM COATED ORAL EVERY 6 HOURS PRN
Status: DISCONTINUED | OUTPATIENT
Start: 2023-04-30 | End: 2023-05-01 | Stop reason: HOSPADM

## 2023-04-30 RX ORDER — POLYETHYLENE GLYCOL 3350 17 G/17G
17 POWDER, FOR SOLUTION ORAL DAILY
Status: DISCONTINUED | OUTPATIENT
Start: 2023-05-01 | End: 2023-05-01 | Stop reason: HOSPADM

## 2023-04-30 RX ORDER — SODIUM CHLORIDE, SODIUM LACTATE, POTASSIUM CHLORIDE, CALCIUM CHLORIDE 600; 310; 30; 20 MG/100ML; MG/100ML; MG/100ML; MG/100ML
INJECTION, SOLUTION INTRAVENOUS CONTINUOUS
Status: DISCONTINUED | OUTPATIENT
Start: 2023-04-30 | End: 2023-05-01 | Stop reason: HOSPADM

## 2023-04-30 RX ORDER — CLOPIDOGREL BISULFATE 75 MG/1
75 TABLET ORAL DAILY
Status: ON HOLD | COMMUNITY
End: 2023-05-01

## 2023-04-30 RX ORDER — PROCHLORPERAZINE MALEATE 5 MG
5 TABLET ORAL EVERY 6 HOURS PRN
Status: DISCONTINUED | OUTPATIENT
Start: 2023-04-30 | End: 2023-05-01 | Stop reason: HOSPADM

## 2023-04-30 RX ORDER — HYDROMORPHONE HYDROCHLORIDE 2 MG/1
2 TABLET ORAL EVERY 4 HOURS PRN
Status: DISCONTINUED | OUTPATIENT
Start: 2023-04-30 | End: 2023-04-30

## 2023-04-30 RX ADMIN — Medication 3 MG: at 20:38

## 2023-04-30 RX ADMIN — METHOCARBAMOL 500 MG: 500 TABLET ORAL at 20:39

## 2023-04-30 RX ADMIN — HYDROMORPHONE HYDROCHLORIDE 6 MG: 2 TABLET ORAL at 12:29

## 2023-04-30 RX ADMIN — BUPROPION HYDROCHLORIDE 300 MG: 300 TABLET, FILM COATED, EXTENDED RELEASE ORAL at 09:22

## 2023-04-30 RX ADMIN — VENLAFAXINE HYDROCHLORIDE 150 MG: 150 CAPSULE, EXTENDED RELEASE ORAL at 09:22

## 2023-04-30 RX ADMIN — CARVEDILOL 12.5 MG: 6.25 TABLET, FILM COATED ORAL at 09:22

## 2023-04-30 RX ADMIN — HYDROXYZINE HYDROCHLORIDE 10 MG: 10 TABLET ORAL at 15:47

## 2023-04-30 RX ADMIN — DOCUSATE SODIUM 50 MG AND SENNOSIDES 8.6 MG 1 TABLET: 8.6; 5 TABLET, FILM COATED ORAL at 19:44

## 2023-04-30 RX ADMIN — SODIUM CHLORIDE, POTASSIUM CHLORIDE, SODIUM LACTATE AND CALCIUM CHLORIDE: 600; 310; 30; 20 INJECTION, SOLUTION INTRAVENOUS at 03:34

## 2023-04-30 RX ADMIN — CARVEDILOL 12.5 MG: 6.25 TABLET, FILM COATED ORAL at 18:39

## 2023-04-30 RX ADMIN — DOCUSATE SODIUM 50 MG AND SENNOSIDES 8.6 MG 1 TABLET: 8.6; 5 TABLET, FILM COATED ORAL at 08:04

## 2023-04-30 RX ADMIN — HYDROXYZINE HYDROCHLORIDE 10 MG: 10 TABLET ORAL at 09:22

## 2023-04-30 RX ADMIN — HYDROMORPHONE HYDROCHLORIDE 2 MG: 2 TABLET ORAL at 08:04

## 2023-04-30 RX ADMIN — FENTANYL CITRATE 25 MCG: 50 INJECTION INTRAMUSCULAR; INTRAVENOUS at 01:32

## 2023-04-30 RX ADMIN — HYDROMORPHONE HYDROCHLORIDE 0.4 MG: 0.2 INJECTION, SOLUTION INTRAMUSCULAR; INTRAVENOUS; SUBCUTANEOUS at 10:29

## 2023-04-30 RX ADMIN — ROSUVASTATIN CALCIUM 20 MG: 20 TABLET, FILM COATED ORAL at 09:22

## 2023-04-30 RX ADMIN — CLOPIDOGREL BISULFATE 75 MG: 75 TABLET ORAL at 10:30

## 2023-04-30 RX ADMIN — METHOCARBAMOL 500 MG: 500 TABLET ORAL at 06:59

## 2023-04-30 ASSESSMENT — ACTIVITIES OF DAILY LIVING (ADL)
NUMBER_OF_TIMES_PATIENT_HAS_FALLEN_WITHIN_LAST_SIX_MONTHS: 2
ADLS_ACUITY_SCORE: 35
WEAR_GLASSES_OR_BLIND: YES
ADLS_ACUITY_SCORE: 40
ADLS_ACUITY_SCORE: 25
ADLS_ACUITY_SCORE: 40
FALL_HISTORY_WITHIN_LAST_SIX_MONTHS: YES
DOING_ERRANDS_INDEPENDENTLY_DIFFICULTY: NO
ADLS_ACUITY_SCORE: 36
ADLS_ACUITY_SCORE: 35
ADLS_ACUITY_SCORE: 40
VISION_MANAGEMENT: GLASSES
ADLS_ACUITY_SCORE: 35
WALKING_OR_CLIMBING_STAIRS_DIFFICULTY: NO
DRESSING/BATHING_DIFFICULTY: NO
ADLS_ACUITY_SCORE: 25
CHANGE_IN_FUNCTIONAL_STATUS_SINCE_ONSET_OF_CURRENT_ILLNESS/INJURY: YES
ADLS_ACUITY_SCORE: 29
CONCENTRATING,_REMEMBERING_OR_MAKING_DECISIONS_DIFFICULTY: NO
TOILETING_ISSUES: NO
ADLS_ACUITY_SCORE: 40
DIFFICULTY_EATING/SWALLOWING: NO
ADLS_ACUITY_SCORE: 35

## 2023-04-30 NOTE — ED TRIAGE NOTES
R arm pain after a fall - decreased ROM R arm/shoulder/elbow pain 8/10.      Triage Assessment     Row Name 04/29/23 1914       Triage Assessment (Adult)    Airway WDL WDL       Respiratory WDL    Respiratory WDL WDL       Peripheral/Neurovascular WDL    Peripheral Neurovascular WDL WDL

## 2023-04-30 NOTE — PLAN OF CARE
VS: BP (!) 139/93 (BP Location: Left arm, Patient Position: Semi-Nair's, Cuff Size: Adult Regular)   Pulse 87   Temp 97.8  F (36.6  C) (Oral)   Resp 16   LMP 11/09/2005 (Approximate)   SpO2 99%    O2: Sating >90% on RA. Lung sounds clear. Denies chest pain and SOB.   Output: Voids spontaneously and adequately in bathroom.    Last BM: 4/28/23 per pt report. Bowel sounds active x4. Passing flatus.    Activity: Up with SBA assist. NWB to RUE. Sling is a makeshift sling using a soft wrist restraint.    Skin: Bruising to R shoulder.    Pain: Pain was managed with PRNs Robaxin, Atarax, IV Dilaudid and PO Dilaudid.    CMS: A&Ox4. Denies N/T except for tingling to RUE. Edema +2 to R hand and shoulder.    Dressing: Hard slab sprint and dressing to RUE C/D/I. PIV dressing to L AC C/D/I.   Diet: Regular. Appetite was good. Denies N/V.    LDA: PIV to L AC is S/L.    Equipment: IV pole and personal belongings.    Plan: Continue to monitor. Call light within reach and utilized appropriately.    Additional Info: CT and X-ray completed. Plan for surgery on Wed, May 3rd.

## 2023-04-30 NOTE — PLAN OF CARE
Goal Outcome Evaluation:  Pt arrived on unit around 0630. Pt c/o arm and shoulder pain. Prn Robaxin given.     Orientation: Aox4  Bowel: Cont  Bladder:Cont  Ambulation/Transfers: SBA  Diet/ Liquids: Regular  Tubes/ Lines/ Drains: R PIV infusing LR  Skin: Intact, ELENI R arm and under clothing

## 2023-04-30 NOTE — PHARMACY-ADMISSION MEDICATION HISTORY
Pharmacist Admission Medication History    Admission medication history is complete. The information provided in this note is only as accurate as the sources available at the time of the update.    Medication reconciliation/reorder completed by provider prior to medication history? Yes    Information Source(s): Patient, Hospital records and CareEverywhere/SureScripts via phone    Pertinent Information: Patient recently discharged from [facility] on 4/28/23, was started on clopidogrel on that admission. [facility] but those [facility] are not available in Care Everywhere.     Changes made to PTA medication list:    Added: clopidogrel    Deleted: albuterol inhaler- per pt not taking    Changed: None    Allergies reviewed with patient and updates made in EHR: no    Medication History Completed By: Angélica Simon RP 4/30/2023 8:59 AM    Prior to Admission medications    Medication Sig Last Dose Taking? Auth Provider Long Term End Date   aspirin (ASA) 81 MG chewable tablet Take 81 mg by mouth daily 4/29/2023 Yes Reported, Patient No    buPROPion (WELLBUTRIN XL) 300 MG 24 hr tablet Take 1 tablet (300 mg) by mouth every morning 4/29/2023 Yes Rosenda Pierre PA-C Yes    butalbital-aspirin-caffeine (FIORINAL) -40 MG per capsule Take 1 capsule by mouth every 6 hours as needed for moderate to severe pain Past Week Yes Rosenda Pierre PA-C No    carvedilol (COREG) 12.5 MG tablet TAKE ONE TABLET BY MOUTH TWICE A DAY WITH FOOD 4/29/2023 Yes Rosenda Pierre PA-C Yes    clopidogrel (PLAVIX) 75 MG tablet Take 75 mg by mouth daily 4/29/2023 Yes Unknown, Entered By History No    docusate sodium (COLACE) 100 MG tablet Take 1-2 tablets (100-200 mg) by mouth 2 times daily as needed for constipation Past Month Yes Rosenda Pierre PA-C     furosemide (LASIX) 40 MG tablet TAKE ONE TABLET BY MOUTH EVERY DAY 4/29/2023 Yes Rosenda Pierre  FLEX SANTIAGO Yes    HYDROcodone-acetaminophen (NORCO) 5-325 MG tablet Take 1 tablet by mouth every 6 hours as needed for severe pain (7-10) (Max 3/day) Past Week Yes Rosenda Pierre PA-C No    lisinopril (ZESTRIL) 20 MG tablet Take 1 tablet (20 mg) by mouth daily 4/29/2023 Yes Rosenda Pierre PA-C Yes    rosuvastatin (CRESTOR) 20 MG tablet Take 1 tablet (20 mg) by mouth daily 4/29/2023 Yes Rosenda Pierre PA-C Yes    venlafaxine (EFFEXOR XR) 150 MG 24 hr capsule Take 1 capsule (150 mg) by mouth daily 4/29/2023 Yes Alexus Ferrari, NISHANT Yes

## 2023-04-30 NOTE — H&P
[facility]  Orthopedic Surgery H&P    Name: Sri Grewal  Age: 72 year old  MRN: [id number]  YOB: 1950    Reason for Consult: Right proximal and distal humerus fractures    Requesting Provider: Seth Stewart MD    Assessment and Plan:     Assessment:  Sri Grewal is a 72 year old right-hand-dominant female who presents as a transfer from OSH with right proximal and distal humerus fractures.  I discussed with the patient that her right acute on chronic proximal humerus fracture may possibly be managed nonoperatively.  Her right displaced medial condyle elbow fracture will likely benefit from operative treatment, however.  We reviewed that the surgeries are not emergent or urgent and that she would likely do best with consultation from one of our hand and elbow surgeons.  We will therefore hold off on performing any surgery this morning and instead formulate a plan moving forward.  The signs and symptoms of a compartment syndrome were discussed with the patient and she will let nursing know should they develop.  Very low concern for development of compartment syndrome at this time.    Plan:  Orthopedic Surgery primary  - Activity: Up with assist until independent.  - Weightbearing Status: NWB RUE.  - Pain Management: PO with IV for breakthrough.  - Antibiotics: None.  - Diet: Regular. No plans for OR today.  - DVT Prophylaxis: SCDs.  - Imaging: XR right forearm and CT right elbow pending.  - Labs: Labs PRN.  - Bracing/Splinting: Posterior slab splint RUE.  - Dressings: None.  - Drains: None.  - Merida Catheter: None.   - Physical Therapy/Occupational Therapy: Evaluation and treatment.  - Cultures: None.    - Consults: Hospitalist.  - Follow-up: TBD    - Disposition: Pending discussion with hand and elbow surgeons. No plans for OR today.    The patient was discussed with attending Dr. Lobato.    Tk Messina MD  Orthopedic Surgery PGY4    History of Present  Illness:     Patient was seen and examined by me. History, PMH, Meds, SH, complete ROS (10 organ systems) and PE reviewed with patient and prior medical records.      Sri Grewal is a 72 year old right-hand-dominant female who presents as a transfer from OSH with right proximal and distal humerus fractures.  She has a history of a right proximal humerus fracture that occurred in February and has been managed nonoperatively.  Yesterday, she had a mechanical fall and sustained the fractures above.  She denies injury to her head and other extremities.  She presented to OSH where she was placed into a splint.  She denies severe pain, weakness, numbness, and tingling.  She was recently in the hospital for a TIA and given thrombolytics.  On discharge she was started on Plavix.  She has been taking her Plavix as prescribed and took it yesterday.  She lives alone and independently.  She last ate around lunchtime yesterday.  No other concerns or complaints were expressed.     Past Medical History:     Past Medical History:   Diagnosis Date     Abnormal Papanicolaou smear of cervix and cervical HPV      Allergic rhinitis, cause unspecified     seasonal allergies     Contact dermatitis and other eczema, due to unspecified cause      Endometriosis, site unspecified      Esophageal reflux     GERD     Migraine, unspecified, without mention of intractable migraine without mention of status migrainosus      Mild persistent asthma      Unspecified disorder of uterus     fibroid uterus   No personal history of bleeding or clotting disorders. No personal history of problems with anesthesia.     Past Surgical History:     Past Surgical History:   Procedure Laterality Date     COLONOSCOPY  2014    Procedure: COLONOSCOPY;  Surgeon: Nathan Baker MD;  Location: [facility]  DELIVERY ONLY       X2     [facility] COLONOSCOPY THRU STOMA, DIAGNOSTIC  2002    normal      Social History:     Social History      Socioeconomic History     Marital status:      Spouse name: None     Number of children: 2     Years of education: None     Highest education level: None   Occupational History     Occupation: retired     Employer: RETIRED   Tobacco Use     Smoking status: Never     Passive exposure: Never     Smokeless tobacco: Never   Vaping Use     Vaping status: Never Used   Substance and Sexual Activity     Alcohol use: Yes     Comment: socially     Drug use: No     Sexual activity: Not Currently     Partners: Male     Birth control/protection: Surgical     Comment: tubal ligation   Other Topics Concern      Service No     Blood Transfusions No     Caffeine Concern No     Occupational Exposure No     Hobby Hazards No     Sleep Concern Yes     Comment: Insomnia     Stress Concern Yes     Comment: breathing,  passed away 2004     Weight Concern Yes     Comment: would like to lose some weight     Special Diet No     Back Care Yes     Exercise Yes     Comment: walking at work daily     Seat Belt Yes     Self-Exams Yes     Comment: periodically     Parent/sibling w/ CABG, MI or angioplasty before 65F 55M? No     Family History:     Family History   Problem Relation Age of Onset     Heart Disease Mother         anemia     Osteoporosis Mother      Hypertension Mother      Cerebrovascular Disease Mother      Alcohol/Drug Mother         alcohol     Neurologic Disorder Mother         migraines     Hypertension Father      Cancer No family hx of         breast   No family history of bleeding or clotting disorders. No family history of problems with anesthesia.     Medications:     Current Facility-Administered Medications   Medication     lactated ringers infusion     Current Outpatient Medications   Medication Sig     albuterol (PROAIR HFA) 108 (90 Base) MCG/ACT inhaler Inhale 1-2 puffs into the lungs every 6 hours as needed for shortness of breath / dyspnea     aspirin (ASA) 81 MG chewable tablet Take 81 mg by  mouth daily     buPROPion (WELLBUTRIN XL) 300 MG 24 hr tablet Take 1 tablet (300 mg) by mouth every morning     butalbital-aspirin-caffeine (FIORINAL) -40 MG per capsule Take 1 capsule by mouth every 6 hours as needed for moderate to severe pain     carvedilol (COREG) 12.5 MG tablet TAKE ONE TABLET BY MOUTH TWICE A DAY WITH FOOD     docusate sodium (COLACE) 100 MG tablet Take 1-2 tablets (100-200 mg) by mouth 2 times daily as needed for constipation     furosemide (LASIX) 40 MG tablet TAKE ONE TABLET BY MOUTH EVERY DAY     HYDROcodone-acetaminophen (NORCO) 5-325 MG tablet Take 1 tablet by mouth every 6 hours as needed for severe pain (7-10) (Max 3/day) (Patient not taking: Reported on 4/25/2023)     lisinopril (ZESTRIL) 20 MG tablet Take 1 tablet (20 mg) by mouth daily     rosuvastatin (CRESTOR) 20 MG tablet Take 1 tablet (20 mg) by mouth daily     venlafaxine (EFFEXOR XR) 150 MG 24 hr capsule Take 1 capsule (150 mg) by mouth daily      Allergies:     Allergies   Allergen Reactions     Morphine And Related      GI UPSET     Oxycodone      Seasonal Allergies       Review of Systems:     A comprehensive 10 point review of systems (constitutional, ENT, cardiac, peripheral vascular, respiratory, GI, , musculoskeletal, skin, neurological) was performed and found to be negative except as described in this note.      Physical Exam:     Vital Signs: /89   Pulse 85   Temp 97.3  F (36.3  C) (Oral)   Resp 16   LMP 11/09/2005 (Approximate)   SpO2 99%   General: Resting comfortably in bed, awake, alert, cooperative, no apparent distress, appears stated age.  HEENT: Normocephalic, atraumatic, EOMI, no scleral icterus.  Cardiovascular: RRR assessed by peripheral pulse.  Respiratory: Breathing comfortably on room air, no wheezing.  Skin: No rashes or lesions.  Neurologic: A&Ox3, CN II-XII grossly intact  Musculoskeletal:  RUE: Posterior slab splint in place. Mild hand swelling. Anterior and posterior arm  compartments soft and compressible. Skin not fully assessed. Fires FPL, EPL, and IOs with 5/5 strength. SILT M/R/U nerve distributions. Brisk capillary refill. No pain with passive stretch of fingers (flexion and extension).     Imaging:     Radiographs of the right shoulder obtained yesterday at OSH were reviewed. These demonstrate a right acute on chronic proximal humerus fracture.    Radiographs of the right elbow obtained yesterday at OSH were reviewed. These demonstrate a right displaced medial condyle elbow fracture.    Radiographs of the right forearm and CT of the right elbow are pending.    Data:     CBC:  Lab Results   Component Value Date    WBC 7.9 04/30/2023    HGB 11.7 04/30/2023     04/30/2023     BMP:  Lab Results   Component Value Date     (L) 04/30/2023    POTASSIUM 3.6 04/30/2023    CHLORIDE 94 (L) 04/30/2023    CO2 26 04/30/2023    BUN 14.1 04/30/2023    CR 0.89 04/30/2023    ANIONGAP 10 04/30/2023    CESIA 8.9 04/30/2023     (H) 04/30/2023     Inflammatory Markers:  Lab Results   Component Value Date    WBC 7.9 04/30/2023    WBC 6.1 02/02/2023    WBC 5.1 07/27/2021    SED 11 10/14/2005    SED 27 (H) 04/25/2002

## 2023-04-30 NOTE — CONSULTS
[facility]  Consult Note - Hospitalist Service, GOLD TEAM 17  Date of Admission:  4/30/2023  Consult Requested by: Orthopedic Surgery  Reason for Consult: Medical Co-mgt    Assessment & Plan   Sri Grewal is a 72 year old female admitted on 4/30/2023. She has a pmh of coronary artery disease, NICM (Taksubo), HTN, previous TIA and CVA recently discharged from [facility] given concern for acute CVA in the setting of aphasia and R-sided weakness s/p TNK in the ED with resolution of symptoms. Patient now presents as a transfer from [facility] following right arm pain due to fall at home. Patient now found to have:  - Acute displaced fracture of the proximal shaft of the humerus superimposed on a late subacute, ununited fracture of the humeral neck  - Acute displaced intra-articular fracture of the distal humerus extending from the supracondylar region between the condyles and into the lateral humeral condyle  Patient has since been evaluated by orthopedic surgery recommending NWB in RUE and no indication for emergent or urgent surgery.     #Right arm pain  #Right proximal and distal humerus fractures  -Care per primary team  -Appears no indication for surgery at this time      #Hx of CAD, Previous TIA (more recent CVA on 4/25/23)  #Cerebral aneurysm   -EF 65-70%, normal RV function  -Hold Lisinopril 20 mg & Lasix 40 mg   -Continue Coreg 12.5 mg bid, Plavix 75 mg every day (of note, ASA was discontinued for patient to start taking plavix)   - Continue Rosuvastatin 20 mg at bedtime     #Right upper pole kidney cyst     #Depression - Venlafaxine 150 mg & Bupropion 300 mg every day   #Migraine - okay for prn fiorinal          Clinically Significant Risk Factors Present on Admission                  # Hypertension: home medication list includes antihypertensive(s)              MARLENY IVEY MD  Hospitalist Service, GOLD TEAM 17  Securely message with [employer] (more  info)  Text page via [facility] Paging/Directory   See signed in provider for up to date coverage information  ______________________________________________________________________    Chief Complaint   Right arm pain    History of Present Illness   Sri Grewal is a 72 year old female admitted on 2023. She has a pmh of coronary artery disease, NICM (Taksubo), HTN, previous TIA and CVA recently discharged from [facility] given concern for acute CVA in the setting of aphasia and R-sided weakness s/p TNK in the ED with resolution of symptoms. Patient now presents as a transfer from [facility] following right arm pain due to fall at home. Patient now found to have:  - Acute displaced fracture of the proximal shaft of the humerus superimposed on a late subacute, ununited fracture of the humeral neck  - Acute displaced intra-articular fracture of the distal humerus extending from the supracondylar region between the condyles and into the lateral humeral condyle  Patient has since been evaluated by orthopedic surgery recommending NWB in RUE and no indication for emergent or urgent surgery.   This morning, patient reports adequate pain control at this time.       Past Medical History    Past Medical History:   Diagnosis Date     Abnormal Papanicolaou smear of cervix and cervical HPV      Allergic rhinitis, cause unspecified     seasonal allergies     Contact dermatitis and other eczema, due to unspecified cause      Endometriosis, site unspecified      Esophageal reflux     GERD     Migraine, unspecified, without mention of intractable migraine without mention of status migrainosus      Mild persistent asthma      Unspecified disorder of uterus     fibroid uterus       Past Surgical History   Past Surgical History:   Procedure Laterality Date     COLONOSCOPY  2014    Procedure: COLONOSCOPY;  Surgeon: Nathan Baker MD;  Location: [facility]  DELIVERY ONLY       X2     ZZHC COLONOSCOPY THRU  STOMA, DIAGNOSTIC  5/2002    normal       Medications   I have reviewed this patient's current medications  Medications Prior to Admission   Medication Sig Dispense Refill Last Dose     aspirin (ASA) 81 MG chewable tablet Take 81 mg by mouth daily   4/29/2023     buPROPion (WELLBUTRIN XL) 300 MG 24 hr tablet Take 1 tablet (300 mg) by mouth every morning 90 tablet 1 4/29/2023     butalbital-aspirin-caffeine (FIORINAL) -40 MG per capsule Take 1 capsule by mouth every 6 hours as needed for moderate to severe pain 30 capsule 5 Past Week     carvedilol (COREG) 12.5 MG tablet TAKE ONE TABLET BY MOUTH TWICE A DAY WITH FOOD 180 tablet 1 4/29/2023     clopidogrel (PLAVIX) 75 MG tablet Take 75 mg by mouth daily   4/29/2023     docusate sodium (COLACE) 100 MG tablet Take 1-2 tablets (100-200 mg) by mouth 2 times daily as needed for constipation 120 tablet 3 Past Month     furosemide (LASIX) 40 MG tablet TAKE ONE TABLET BY MOUTH EVERY DAY 90 tablet 3 4/29/2023     HYDROcodone-acetaminophen (NORCO) 5-325 MG tablet Take 1 tablet by mouth every 6 hours as needed for severe pain (7-10) (Max 3/day) 70 tablet 0 Past Week     lisinopril (ZESTRIL) 20 MG tablet Take 1 tablet (20 mg) by mouth daily 90 tablet 3 4/29/2023     rosuvastatin (CRESTOR) 20 MG tablet Take 1 tablet (20 mg) by mouth daily 90 tablet 3 4/29/2023     venlafaxine (EFFEXOR XR) 150 MG 24 hr capsule Take 1 capsule (150 mg) by mouth daily 90 capsule 1 4/29/2023        Physical Exam   Vital Signs: Temp: 97.8  F (36.6  C) Temp src: Oral BP: (!) 139/93 Pulse: 87   Resp: 16 SpO2: 99 % O2 Device: None (Room air)    Weight: 0 lbs 0 oz    General Appearance: Sitting comfortably in bed, on room air, in no acute distress of discomfort, eating breakfast  HEENT: PERRL: EOMI; moist mucous membrane w/o lesions  Neck: No JVD  Pulmonary: Clear to auscultation bilaterally, no wheezes or crackles  CVS: Regular rhythm, systolic murmurs, no rubs or gallops  GI: BS (+), soft nontender,  no rebound or guarding   MSK: Right upper extremity in dressing/cast  Skin: No rashes or lesions  Neurologic: A&O x3      Medical Decision Making             Data   ------------------------- PAST 24 HR DATA REVIEWED -----------------------------------------------    I have personally reviewed the following data over the past 24 hrs:    7.9  \   11.7   / 207     130 (L) 94 (L) 14.1 /  112 (H)   3.6 26 0.89 \       INR:  0.96 PTT:  N/A   D-dimer:  N/A Fibrinogen:  N/A       Imaging results reviewed over the past 24 hrs:   Recent Results (from the past 24 hour(s))   Elbow XR, 2 views, right    Narrative    EXAM: XR ELBOW RIGHT 2 VIEWS  LOCATION: [facility]  DATE/TIME: 4/29/2023 7:45 PM CDT    INDICATION: Fall. Elbow pain.  COMPARISON: None available      Impression    IMPRESSION: Nonstandard exam. Mild to moderately displaced distal right humerus fracture at the distal humeral metaphysis, particularly near the supracondylar medial distal humerus. Profound diffuse bone demineralization. Difficult to exclude proximal   radius or ulna fracture on this exam. Elbow soft tissue swelling.   XR Shoulder Right 2 Views    Narrative    EXAM: XR SHOULDER RIGHT 2 VIEWS  LOCATION: [facility]  DATE/TIME: 4/29/2023 7:46 PM CDT    INDICATION: Fall. Shoulder pain.  COMPARISON: 04/25/2023.      Impression    IMPRESSION: Acute on subacute/chronic proximal right humerus fracture, now comminuted and progressively displaced. The new dominant fracture line is at the proximal humeral metadiaphysis. Immature callus is seen about the proximal humerus. No   glenohumeral joint dislocation or AC separation. Mild right acromioclavicular joint osteoarthritis. Diffuse bone demineralization. Visualized right lung is grossly clear. Degenerative change visualized spine.   XR Shoulder Right 2 Views    Narrative    EXAM: XR SHOULDER RIGHT 2 VIEWS  LOCATION: [facility]  DATE/TIME: 4/30/2023 11:42 AM CDT    INDICATION: eval fracture  COMPARISON: 04/29/2023      Impression    IMPRESSION: No change in displaced comminuted fracture of the proximal humerus with an acute fracture through the proximal shaft and a late subacute fracture of the humeral neck, which results in a separate displaced segmental fragment. No dislocation.   Diffuse bony demineralization.   Radius/Ulna XR, PA & LAT, right    Narrative    EXAM: CT SHOULDER RIGHT W/O CONTRAST, CT ELBOW RIGHT W/O CONTRAST, XR FOREARM RIGHT 2 VIEWS  LOCATION: [facility]  DATE/TIME: 4/30/2023 12:40 PM CDT    INDICATION: fracture eval; Shoulder pain; Fracture, known or r o, or trauma; No shoulder x ray result available  COMPARISON: X-rays 04/29/2023 02/11/2023  TECHNIQUE: Noncontrast. Axial, sagittal and coronal thin-section reconstruction. Dose reduction techniques were used.     FINDINGS:   SHOULDER:  Late subacute ununited fracture of the surgical neck of the humerus with periosteal new bone formation along the fracture and cortication of some of the margins of the fracture. No bony bridging across the humeral neck fracture. There is anterior   displacement of the distal fragment by approximately one half bone width and mild varus angulation at the humeral neck fracture site.    There is a superimposed acute mildly comminuted of the proximal shaft of the humerus 4 cm distal to the humeral neck fracture resulting in a separate segmental fracture fragment between the humeral neck and proximal shaft fracture. The main distal   fragment is displaced posterolaterally by one bone width relative to the more proximal segmental fragment, and there is mild overriding of the main fracture fragments and apex posterior angulation.    There is poorly defined hemorrhage adjacent to the proximal humeral shaft fracture and likely involving the deltoid and pectoralis major  muscles.    Underlying diffuse bony demineralization. No dislocation.    There is a 6 cm hypodense lesion arising from the upper pole right kidney, probably a cyst.      ELBOW:  Comminuted fracture of the distal humerus involving the supracondylar region and extending between the condyles into the lateral humeral condyle. There is impaction of the fracture along the supracondylar region and the main distal articular fragment   which includes the medial humeral condyle is displaced medially by approximately 1 cm result of the main proximal fragment. There is mild displacement of separate lateral condylar fragment.    No fracture is visualized proximal radius or ulna. There is marked diffuse bony demineralization.    Small elbow joint effusion. There is poorly defined hemorrhage adjacent to the distal humerus fracture.      Impression    IMPRESSION:  1.  Acute displaced fracture of the proximal shaft of the humerus superimposed on a late subacute, ununited fracture of the humeral neck.  2.  Acute displaced intra-articular fracture of the distal humerus extending from the supracondylar region between the condyles and into the lateral humeral condyle.  3.  Probable 5 cm cyst upper pole right kidney. This should be confirmed with ultrasound if not a known finding.     CT Shoulder Right w/o Contrast    Narrative    EXAM: CT SHOULDER RIGHT W/O CONTRAST, CT ELBOW RIGHT W/O CONTRAST, XR FOREARM RIGHT 2 VIEWS  LOCATION: [facility]  DATE/TIME: 4/30/2023 12:40 PM CDT    INDICATION: fracture eval; Shoulder pain; Fracture, known or r o, or trauma; No shoulder x ray result available  COMPARISON: X-rays 04/29/2023 02/11/2023  TECHNIQUE: Noncontrast. Axial, sagittal and coronal thin-section reconstruction. Dose reduction techniques were used.     FINDINGS:   SHOULDER:  Late subacute ununited fracture of the surgical neck of the humerus with periosteal new bone formation along the  fracture and cortication of some of the margins of the fracture. No bony bridging across the humeral neck fracture. There is anterior   displacement of the distal fragment by approximately one half bone width and mild varus angulation at the humeral neck fracture site.    There is a superimposed acute mildly comminuted of the proximal shaft of the humerus 4 cm distal to the humeral neck fracture resulting in a separate segmental fracture fragment between the humeral neck and proximal shaft fracture. The main distal   fragment is displaced posterolaterally by one bone width relative to the more proximal segmental fragment, and there is mild overriding of the main fracture fragments and apex posterior angulation.    There is poorly defined hemorrhage adjacent to the proximal humeral shaft fracture and likely involving the deltoid and pectoralis major muscles.    Underlying diffuse bony demineralization. No dislocation.    There is a 6 cm hypodense lesion arising from the upper pole right kidney, probably a cyst.      ELBOW:  Comminuted fracture of the distal humerus involving the supracondylar region and extending between the condyles into the lateral humeral condyle. There is impaction of the fracture along the supracondylar region and the main distal articular fragment   which includes the medial humeral condyle is displaced medially by approximately 1 cm result of the main proximal fragment. There is mild displacement of separate lateral condylar fragment.    No fracture is visualized proximal radius or ulna. There is marked diffuse bony demineralization.    Small elbow joint effusion. There is poorly defined hemorrhage adjacent to the distal humerus fracture.      Impression    IMPRESSION:  1.  Acute displaced fracture of the proximal shaft of the humerus superimposed on a late subacute, ununited fracture of the humeral neck.  2.  Acute displaced intra-articular fracture of the distal humerus extending from the  supracondylar region between the condyles and into the lateral humeral condyle.  3.  Probable 5 cm cyst upper pole right kidney. This should be confirmed with ultrasound if not a known finding.     CT Elbow Right w/o Contrast    Narrative    EXAM: CT SHOULDER RIGHT W/O CONTRAST, CT ELBOW RIGHT W/O CONTRAST, XR FOREARM RIGHT 2 VIEWS  LOCATION: [facility]  DATE/TIME: 4/30/2023 12:40 PM CDT    INDICATION: fracture eval; Shoulder pain; Fracture, known or r o, or trauma; No shoulder x ray result available  COMPARISON: X-rays 04/29/2023 02/11/2023  TECHNIQUE: Noncontrast. Axial, sagittal and coronal thin-section reconstruction. Dose reduction techniques were used.     FINDINGS:   SHOULDER:  Late subacute ununited fracture of the surgical neck of the humerus with periosteal new bone formation along the fracture and cortication of some of the margins of the fracture. No bony bridging across the humeral neck fracture. There is anterior   displacement of the distal fragment by approximately one half bone width and mild varus angulation at the humeral neck fracture site.    There is a superimposed acute mildly comminuted of the proximal shaft of the humerus 4 cm distal to the humeral neck fracture resulting in a separate segmental fracture fragment between the humeral neck and proximal shaft fracture. The main distal   fragment is displaced posterolaterally by one bone width relative to the more proximal segmental fragment, and there is mild overriding of the main fracture fragments and apex posterior angulation.    There is poorly defined hemorrhage adjacent to the proximal humeral shaft fracture and likely involving the deltoid and pectoralis major muscles.    Underlying diffuse bony demineralization. No dislocation.    There is a 6 cm hypodense lesion arising from the upper pole right kidney, probably a cyst.      ELBOW:  Comminuted fracture of the distal humerus involving the  supracondylar region and extending between the condyles into the lateral humeral condyle. There is impaction of the fracture along the supracondylar region and the main distal articular fragment   which includes the medial humeral condyle is displaced medially by approximately 1 cm result of the main proximal fragment. There is mild displacement of separate lateral condylar fragment.    No fracture is visualized proximal radius or ulna. There is marked diffuse bony demineralization.    Small elbow joint effusion. There is poorly defined hemorrhage adjacent to the distal humerus fracture.      Impression    IMPRESSION:  1.  Acute displaced fracture of the proximal shaft of the humerus superimposed on a late subacute, ununited fracture of the humeral neck.  2.  Acute displaced intra-articular fracture of the distal humerus extending from the supracondylar region between the condyles and into the lateral humeral condyle.  3.  Probable 5 cm cyst upper pole right kidney. This should be confirmed with ultrasound if not a known finding.

## 2023-04-30 NOTE — PROGRESS NOTES
VS: /75 (BP Location: Left arm)   Pulse 101   Temp 97.2  F (36.2  C) (Oral)   Resp 16   LMP 11/09/2005 (Approximate)   SpO2 98%    O2: RA, denies CP and SOB, lung sounds clear and equal   Output: Voids spontaneously in BR   Last BM: 4/28 per pt   Activity: SBA, NWB RUE, steady gait   Skin: +2 edema R wrist and hand, UTV R arm due to splint and ace wraps   Pain: Pain reported in R arm  Managed with prn IV and PO dilaudid, atarax, robaxin   Neuro: A/Ox4, numbness and tingling to R arm   Dressing: R arm dressing and ACE wraps, hard slab splint in place DO NOT REMOVE for skin assessments per MD order   Diet: Regular, denies n/v   LDA: L PIV SL   Equipment: IV pole, wheelchair, personal belongings, call light in reach   Plan: Continue with plan of care, ortho team following for plan   Additional Info:

## 2023-04-30 NOTE — ED PROVIDER NOTES
ED Provider Note  [facility]      History     Chief Complaint   Patient presents with     Arm Injury     HPI  Sri Grewal is a 72 year old female who is right-hand dominant.  She has a proximal humeral fracture that occurred in February and was healing when she fell again and displaced this fracture and then developed a distal humeral fracture.  She is on Plavix.  She was recently in the hospital and was given thrombolytics for TIA.  She had no head injury when she fell.  She lives alone.  The doctor at the outside hospital spoke to orthopedics and trauma here and the plan was for her to be transferred to the [facility] for orthopedic evaluation and admission to the medicine service.  On my evaluation she has no complaints she has normal sensation motor and pulses in the right wrist.  We will discuss the tentative plan with orthopedics and order a bed on the medicine service.    Past Medical History  Past Medical History:   Diagnosis Date     Abnormal Papanicolaou smear of cervix and cervical HPV      Allergic rhinitis, cause unspecified     seasonal allergies     Contact dermatitis and other eczema, due to unspecified cause      Endometriosis, site unspecified      Esophageal reflux     GERD     Migraine, unspecified, without mention of intractable migraine without mention of status migrainosus      Mild persistent asthma      Unspecified disorder of uterus     fibroid uterus     Past Surgical History:   Procedure Laterality Date     COLONOSCOPY  2014    Procedure: COLONOSCOPY;  Surgeon: Nathan Baker MD;  Location:  GI     ZZC  DELIVERY ONLY       X2     ZZ COLONOSCOPY THRU STOMA, DIAGNOSTIC  2002    normal     albuterol (PROAIR HFA) 108 (90 Base) MCG/ACT inhaler  aspirin (ASA) 81 MG chewable tablet  buPROPion (WELLBUTRIN XL) 300 MG 24 hr tablet  butalbital-aspirin-caffeine (FIORINAL) -40 MG per capsule  carvedilol (COREG) 12.5 MG tablet  docusate  sodium (COLACE) 100 MG tablet  furosemide (LASIX) 40 MG tablet  HYDROcodone-acetaminophen (NORCO) 5-325 MG tablet  lisinopril (ZESTRIL) 20 MG tablet  rosuvastatin (CRESTOR) 20 MG tablet  venlafaxine (EFFEXOR XR) 150 MG 24 hr capsule      Allergies   Allergen Reactions     Morphine And Related      GI UPSET     Oxycodone      Seasonal Allergies      Family History  Family History   Problem Relation Age of Onset     Heart Disease Mother         anemia     Osteoporosis Mother      Hypertension Mother      Cerebrovascular Disease Mother      Alcohol/Drug Mother         alcohol     Neurologic Disorder Mother         migraines     Hypertension Father      Cancer No family hx of         breast     Social History   Social History     Tobacco Use     Smoking status: Never     Passive exposure: Never     Smokeless tobacco: Never   Vaping Use     Vaping status: Never Used   Substance Use Topics     Alcohol use: Yes     Comment: socially     Drug use: No         A medically appropriate review of systems was performed with pertinent positives and negatives noted in the HPI, and all other systems negative.    Physical Exam   BP: 126/89  Pulse: 85  Temp: 97.3  F (36.3  C)  Resp: 16  SpO2: 97 %  Physical Exam  Vitals and nursing note reviewed.   Constitutional:       General: She is not in acute distress.     Appearance: She is well-developed.   HENT:      Head: Normocephalic and atraumatic.      Mouth/Throat:      Mouth: Mucous membranes are moist.   Eyes:      General: No scleral icterus.     Conjunctiva/sclera: Conjunctivae normal.      Pupils: Pupils are equal, round, and reactive to light.   Cardiovascular:      Rate and Rhythm: Normal rate and regular rhythm.      Heart sounds: Normal heart sounds.   Pulmonary:      Effort: Pulmonary effort is normal. No respiratory distress.      Breath sounds: Normal breath sounds. No wheezing.   Abdominal:      General: Abdomen is flat.      Palpations: Abdomen is soft.   Musculoskeletal:       Cervical back: Neck supple.   Skin:     General: Skin is warm and dry.   Neurological:      General: No focal deficit present.      Mental Status: She is alert and oriented to person, place, and time.      Cranial Nerves: No cranial nerve deficit.   Psychiatric:         Mood and Affect: Mood normal.         Behavior: Behavior normal.           ED Course, Procedures, & Data      Procedures                      Results for orders placed or performed during the hospital encounter of 04/30/23   Basic metabolic panel     Status: Abnormal   Result Value Ref Range    Sodium 130 (L) 136 - 145 mmol/L    Potassium 3.6 3.4 - 5.3 mmol/L    Chloride 94 (L) 98 - 107 mmol/L    Carbon Dioxide (CO2) 26 22 - 29 mmol/L    Anion Gap 10 7 - 15 mmol/L    Urea Nitrogen 14.1 8.0 - 23.0 mg/dL    Creatinine 0.89 0.51 - 0.95 mg/dL    Calcium 8.9 8.8 - 10.2 mg/dL    Glucose 112 (H) 70 - 99 mg/dL    GFR Estimate 69 >60 mL/min/1.73m2   INR     Status: Normal   Result Value Ref Range    INR 0.96 0.85 - 1.15   CBC with platelets and differential     Status: Abnormal   Result Value Ref Range    WBC Count 7.9 4.0 - 11.0 10e3/uL    RBC Count 3.72 (L) 3.80 - 5.20 10e6/uL    Hemoglobin 11.7 11.7 - 15.7 g/dL    Hematocrit 34.2 (L) 35.0 - 47.0 %    MCV 92 78 - 100 fL    MCH 31.5 26.5 - 33.0 pg    MCHC 34.2 31.5 - 36.5 g/dL    RDW 12.2 10.0 - 15.0 %    Platelet Count 207 150 - 450 10e3/uL    % Neutrophils 67 %    % Lymphocytes 21 %    % Monocytes 10 %    % Eosinophils 1 %    % Basophils 1 %    % Immature Granulocytes 0 %    NRBCs per 100 WBC 0 <1 /100    Absolute Neutrophils 5.3 1.6 - 8.3 10e3/uL    Absolute Lymphocytes 1.6 0.8 - 5.3 10e3/uL    Absolute Monocytes 0.8 0.0 - 1.3 10e3/uL    Absolute Eosinophils 0.1 0.0 - 0.7 10e3/uL    Absolute Basophils 0.0 0.0 - 0.2 10e3/uL    Absolute Immature Granulocytes 0.0 <=0.4 10e3/uL    Absolute NRBCs 0.0 10e3/uL   Adult Type and Screen     Status: None   Result Value Ref Range    ABO/RH(D) O NEG     Antibody  Screen Negative Negative    SPECIMEN EXPIRATION DATE [id number]    CBC with platelets differential     Status: Abnormal    Narrative    The following orders were created for panel order CBC with platelets differential.  Procedure                               Abnormality         Status                     ---------                               -----------         ------                     CBC with platelets and d...[[id number]]  Abnormal            Final result                 Please view results for these tests on the individual orders.   ABO/Rh type and screen     Status: None    Narrative    The following orders were created for panel order ABO/Rh type and screen.  Procedure                               Abnormality         Status                     ---------                               -----------         ------                     Adult Type and Screen[[id number]]                            Final result                 Please view results for these tests on the individual orders.   Results for orders placed or performed during the hospital encounter of 04/29/23   Elbow XR, 2 views, right     Status: None    Narrative    EXAM: XR ELBOW RIGHT 2 VIEWS  LOCATION: [facility]  DATE/TIME: 4/29/2023 7:45 PM CDT    INDICATION: Fall. Elbow pain.  COMPARISON: None available      Impression    IMPRESSION: Nonstandard exam. Mild to moderately displaced distal right humerus fracture at the distal humeral metaphysis, particularly near the supracondylar medial distal humerus. Profound diffuse bone demineralization. Difficult to exclude proximal   radius or ulna fracture on this exam. Elbow soft tissue swelling.   XR Shoulder Right 2 Views     Status: None    Narrative    EXAM: XR SHOULDER RIGHT 2 VIEWS  LOCATION: [facility]  DATE/TIME: 4/29/2023 7:46 PM CDT    INDICATION: Fall. Shoulder pain.  COMPARISON: 04/25/2023.      Impression    IMPRESSION: Acute on  subacute/chronic proximal right humerus fracture, now comminuted and progressively displaced. The new dominant fracture line is at the proximal humeral metadiaphysis. Immature callus is seen about the proximal humerus. No   glenohumeral joint dislocation or AC separation. Mild right acromioclavicular joint osteoarthritis. Diffuse bone demineralization. Visualized right lung is grossly clear. Degenerative change visualized spine.     Medications   lactated ringers infusion ( Intravenous $New Bag 4/30/23 0336)     Labs Ordered and Resulted from Time of ED Arrival to Time of ED Departure   BASIC METABOLIC PANEL - Abnormal       Result Value    Sodium 130 (*)     Potassium 3.6      Chloride 94 (*)     Carbon Dioxide (CO2) 26      Anion Gap 10      Urea Nitrogen 14.1      Creatinine 0.89      Calcium 8.9      Glucose 112 (*)     GFR Estimate 69     CBC WITH PLATELETS AND DIFFERENTIAL - Abnormal    WBC Count 7.9      RBC Count 3.72 (*)     Hemoglobin 11.7      Hematocrit 34.2 (*)     MCV 92      MCH 31.5      MCHC 34.2      RDW 12.2      Platelet Count 207      % Neutrophils 67      % Lymphocytes 21      % Monocytes 10      % Eosinophils 1      % Basophils 1      % Immature Granulocytes 0      NRBCs per 100 WBC 0      Absolute Neutrophils 5.3      Absolute Lymphocytes 1.6      Absolute Monocytes 0.8      Absolute Eosinophils 0.1      Absolute Basophils 0.0      Absolute Immature Granulocytes 0.0      Absolute NRBCs 0.0     INR - Normal    INR 0.96     TYPE AND SCREEN, ADULT    ABO/RH(D) O NEG      Antibody Screen Negative      SPECIMEN EXPIRATION DATE [id number]     ABO/RH TYPE AND SCREEN     Radius/Ulna XR, PA & LAT, right    (Results Pending)   CT Elbow Right w/o Contrast    (Results Pending)          Critical care was not performed.     Medical Decision Making  The patient's presentation was of moderate complexity (an acute complicated injury).    The patient's evaluation involved:  ordering and/or review of 3+  test(s) in this encounter (CBC, coags, forearm x-ray)  discussion of management or test interpretation with another health professional (Orthopedic resident, [facility] hospitalist)    The patient's management necessitated high risk (a decision regarding hospitalization).      Assessment & Plan    Patient from outside hospital referred to the [facility] to be admitted to either orthopedics or medicine for fracture of the right proximal and distal humerus the patient is on Plavix.  She arrived with a posterior splint in place.  She has minimal pain the hand is warm and well-perfused sensation is intact she has no paresthesias and a strong radial pulse.  Case was briefly discussed with the orthopedic resident who is requesting the patient be admitted to the medicine service.  I am obtaining routine labs and will also obtain a forearm x-ray.  She did not injure her head or neck in the fall.  It was a trip but not syncope.  There was no head or neck imaging done at the outside hospital  5:00 AM discussed with orthopedics they will admit the patient.  Bed was ordered  I have reviewed the nursing notes. I have reviewed the findings, diagnosis, plan and need for follow up with the patient.    New Prescriptions    No medications on file       Final diagnoses:   Fall, initial encounter   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, unspecified fracture morphology, subsequent encounter   Closed fracture of distal end of right humerus, unspecified fracture morphology, initial encounter       Seth Stewart MD  [facility] EMERGENCY DEPARTMENT  4/29/2023     Seth Stewart MD  04/30/23 8528

## 2023-04-30 NOTE — ED NOTES
Bed: ED02  Expected date:   Expected time:   Means of arrival:   Comments:  Hold Pt. from [facility]

## 2023-04-30 NOTE — ED PROVIDER NOTES
History     Chief Complaint   Patient presents with     Arm Injury     HPI  Sri Grewal is a 72 year old female who presents to the emergency department with right arm pain after a fall again.  Patient lost her balance and fell onto her forearm and elbow area.  She is having severe pain in her whole right arm.  Patient originally had fallen back in February and was diagnosed with a displaced right proximal humeral fracture.  Patient followed up with Ortho where they decided to treat this nonsurgically and with therapy.  Patient was just seen 4 days ago for follow-up and things were progressing as expected.  Patient had a complicated history as after that appointment 4 days ago patient ended up being admitted to the hospital for a TIA versus aborted stroke.  Patient was given TNK.  Patient was ultimately discharged on Plavix.  Upon arrival today patient does not complain of pain anywhere else other than her arm.  Denies any numbness or tingling in her fingertips.  She does have range of motion of her hand in the affected arm.  Patient denies a headache or any neck pain.    Allergies:  Allergies   Allergen Reactions     Morphine And Related      GI UPSET     Oxycodone      Seasonal Allergies        Problem List:    Patient Active Problem List    Diagnosis Date Noted     [facility] 07/27/2011     Priority: High     X  EMERGENCY CARE PLAN  Presenting Problem Signs and Symptoms Treatment Plan    Questions or conerns during clinic hours    I will call the clinic directly     Questions or conerns outside clinic hours    I will call the 24 hour nurse line at [phone]    Patient needs to schedule an appointment    I will call the 24 hour scheduling team at [phone] or clinic directly    Same day treatment     I will call the clinic first, nurse line if after hours, urgent care and express care if needed                            DX V65.8 REPLACED WITH [id number] [facility] (04/08/2013)       Closed  displaced fracture of surgical neck of right humerus 03/27/2023     Priority: Medium     Acute pain of right shoulder 03/27/2023     Priority: Medium     Newly recognized heart murmur 02/05/2023     Priority: Medium     Major depressive disorder, recurrent, in partial remission (H) 02/05/2023     Priority: Medium     Vitamin D deficiency 02/05/2023     Priority: Medium     Loss of memory 02/05/2023     Priority: Medium     History of cerebrovascular accident (CVA) with residual deficit 12/08/2021     Priority: Medium     ST elevation myocardial infarction (STEMI), unspecified artery (H) 08/26/2021     Priority: Medium     Coronary artery disease due to lipid rich plaque 08/26/2021     Priority: Medium     Compression fracture of T12 vertebra with routine healing, subsequent encounter 11/17/2020     Priority: Medium     Hematoma 01/10/2019     Priority: Medium     Hematoma of abdominal wall, initial encounter 01/09/2019     Priority: Medium     Abdominal wall hematoma 01/09/2019     Priority: Medium     Hyponatremia 11/26/2018     Priority: Medium     Age-related osteoporosis without current pathological fracture 11/12/2018     Priority: Medium     HSV (herpes simplex virus) infection 07/31/2018     Priority: Medium     Atypical mole 06/01/2017     Priority: Medium     Hypertension goal BP (blood pressure) < 140/90 05/25/2017     Priority: Medium     TIA (transient ischemic attack) 01/16/2017     Priority: Medium     Adjustment disorder with mixed anxiety and depressed mood 09/09/2014     Priority: Medium     Middle cerebral artery aneurysm 10/29/2013     Priority: Medium     Noted in 2007.  Intervention not recommended at that time.  Observation.  Saw Interventional Radiology 12/2013.  Recheck CTA in one year.       Moderate major depression (H) 09/08/2010     Priority: Medium     Insomnia 09/08/2010     Priority: Medium     Atrophy of vagina 05/30/2010     Priority: Medium     Lump or mass in breast 03/29/2010      Priority: Medium     Mild persistent asthma 2005     Priority: Medium     Anemia 2002     Priority: Medium     Problem list name updated by automated process. Provider to review       Chronic migraine without aura without status migrainosus, not intractable      Priority: Medium     Multiple trials off of fiorinal have not been successful  Problem list name updated by automated process. Provider to review       Other abnormal Papanicolaou smear of cervix and cervical HPV(795.09)      Priority: Medium     (Problem list name updated by automated process. Provider to review and confirm.)       Endometriosis      Priority: Medium     Problem list name updated by automated process. Provider to review       Allergic rhinitis      Priority: Medium     Problem list name updated by automated process. Provider to review          Past Medical History:    Past Medical History:   Diagnosis Date     Abnormal Papanicolaou smear of cervix and cervical HPV      Allergic rhinitis, cause unspecified      Contact dermatitis and other eczema, due to unspecified cause      Endometriosis, site unspecified      Esophageal reflux      Migraine, unspecified, without mention of intractable migraine without mention of status migrainosus      Mild persistent asthma      Unspecified disorder of uterus        Past Surgical History:    Past Surgical History:   Procedure Laterality Date     COLONOSCOPY  2014    Procedure: COLONOSCOPY;  Surgeon: Nathan Baker MD;  Location: [facility]     ZZC  DELIVERY ONLY       X2     ZZHC COLONOSCOPY THRU STOMA, DIAGNOSTIC  2002    normal       Family History:    Family History   Problem Relation Age of Onset     Heart Disease Mother         anemia     Osteoporosis Mother      Hypertension Mother      Cerebrovascular Disease Mother      Alcohol/Drug Mother         alcohol     Neurologic Disorder Mother         migraines     Hypertension Father      Cancer No family hx of          "breast       Social History:  Marital Status:   [5]  Social History     Tobacco Use     Smoking status: Never     Passive exposure: Never     Smokeless tobacco: Never   Vaping Use     Vaping status: Never Used   Substance Use Topics     Alcohol use: Yes     Comment: socially     Drug use: No        Medications:    albuterol (PROAIR HFA) 108 (90 Base) MCG/ACT inhaler  aspirin (ASA) 81 MG chewable tablet  buPROPion (WELLBUTRIN XL) 300 MG 24 hr tablet  butalbital-aspirin-caffeine (FIORINAL) -40 MG per capsule  carvedilol (COREG) 12.5 MG tablet  docusate sodium (COLACE) 100 MG tablet  furosemide (LASIX) 40 MG tablet  HYDROcodone-acetaminophen (NORCO) 5-325 MG tablet  lisinopril (ZESTRIL) 20 MG tablet  rosuvastatin (CRESTOR) 20 MG tablet  venlafaxine (EFFEXOR XR) 150 MG 24 hr capsule          Review of Systems   All other systems reviewed and are negative.      Physical Exam   BP: (!) 188/118  Pulse: 88  Temp: 98  F (36.7  C)  Resp: 18  Height: 160 cm (5' 3\")  Weight: 59 kg (130 lb)  SpO2: 98 %      Physical Exam  Vitals and nursing note reviewed.   Constitutional:       General: She is not in acute distress.     Appearance: Normal appearance. She is not ill-appearing.   HENT:      Head: Normocephalic.   Musculoskeletal:      Right shoulder: Swelling, deformity and tenderness present. Decreased range of motion.      Right upper arm: Swelling present. No tenderness or bony tenderness.      Right elbow: Swelling present. Decreased range of motion. Tenderness present.      Comments: Patient's bicep and tricep is very firm and somewhat swollen.   Neurological:      Mental Status: She is alert.         ED Course                 Trauma Summary Disposition     Patient is trauma admission:  Standard ed eval    Intent to transfer: 4/29/2023 @ 10:05 PM    Spine  Backboard removal time: Backboard not applied   C-collar and immobilization: not indicated, cleared.  CSpine Clearance: C spine cleared clinically  Full " Primary and Secondary survey with appropriate immobilization of spine completed in exam section.     Neuro  GCS at arrival:  Motor 6=Obeys commands   Verbal 5=Oriented   Eye Opening 4=Spontaneous   Total: 15     GCS at disposition: unchanged    ED Procedures completed  Splinting of right arm, CMS intact post procedure    Admitting Consultants:  Orthopedic consult with Marilyn GUERRERO at 836pm. Plan: transfer  Imaging reviewed by consultant:  Yes     Transfer Consultant:  Patient s status reviewed with MD wilfredo at [facility] at 857pm,  who agrees to accept patient in transfer.           Procedures           Results for orders placed or performed during the hospital encounter of 04/29/23 (from the past 24 hour(s))   Elbow XR, 2 views, right    Narrative    EXAM: XR ELBOW RIGHT 2 VIEWS  LOCATION: [facility]  DATE/TIME: 4/29/2023 7:45 PM CDT    INDICATION: Fall. Elbow pain.  COMPARISON: None available      Impression    IMPRESSION: Nonstandard exam. Mild to moderately displaced distal right humerus fracture at the distal humeral metaphysis, particularly near the supracondylar medial distal humerus. Profound diffuse bone demineralization. Difficult to exclude proximal   radius or ulna fracture on this exam. Elbow soft tissue swelling.   XR Shoulder Right 2 Views    Narrative    EXAM: XR SHOULDER RIGHT 2 VIEWS  LOCATION: [facility]  DATE/TIME: 4/29/2023 7:46 PM CDT    INDICATION: Fall. Shoulder pain.  COMPARISON: 04/25/2023.      Impression    IMPRESSION: Acute on subacute/chronic proximal right humerus fracture, now comminuted and progressively displaced. The new dominant fracture line is at the proximal humeral metadiaphysis. Immature callus is seen about the proximal humerus. No   glenohumeral joint dislocation or AC separation. Mild right acromioclavicular joint osteoarthritis. Diffuse bone demineralization. Visualized right lung is  grossly clear. Degenerative change visualized spine.       Medications   fentaNYL (PF) (SUBLIMAZE) injection 25 mcg (25 mcg Intravenous $Given 4/29/23 2136)   ibuprofen (ADVIL/MOTRIN) tablet 600 mg (600 mg Oral $Given 4/29/23 1923)     X-rays of the arm shows a now significantly more displaced proximal humeral fracture and a new distal humeral fracture.  I consulted our local orthopedic surgeon and they feel that this is not something they can manage here.  I am concerned about possible high risk for developing compartment syndrome.  Patient was recently put on Plavix and with these multiple fractures in the arm, makes her more at risk.  There is no signs of compartment syndrome right now but patient biceps and triceps area is really swollen and very tight and firm.  Patient continues to have normal pulses and was checked multiple times during her stay here in normal distal CMS.  I called and spoke to the [place] [facility] physician, who will accept the patient in transfer.  They are going to call and see if patient should go to the [facility] since this is an isolated orthopedic injury.  Patient was given some fentanyl for pain and remained stable and will be transferred via ground ambulance at this time.    Assessments & Plan (with Medical Decision Making)  Right proximal humeral fracture, right distal humeral fracture     I have reviewed the nursing notes.    I have reviewed the findings, diagnosis, plan and need for follow up with the patient.      New Prescriptions    No medications on file       Final diagnoses:   Closed fracture of proximal end of right humerus, unspecified fracture morphology, initial encounter   Closed fracture of distal end of right humerus, unspecified fracture morphology, initial encounter       4/29/2023   [facility] EMERGENCY DEPT     Toni Jackson MD  04/29/23 2411

## 2023-05-01 VITALS
DIASTOLIC BLOOD PRESSURE: 78 MMHG | OXYGEN SATURATION: 98 % | SYSTOLIC BLOOD PRESSURE: 154 MMHG | TEMPERATURE: 96.1 F | HEART RATE: 82 BPM | RESPIRATION RATE: 16 BRPM

## 2023-05-01 DIAGNOSIS — S42.491A CLOSED BICONDYLAR FRACTURE OF DISTAL HUMERUS, RIGHT, INITIAL ENCOUNTER: Primary | ICD-10-CM

## 2023-05-01 PROCEDURE — 250N000013 HC RX MED GY IP 250 OP 250 PS 637: Performed by: INTERNAL MEDICINE

## 2023-05-01 PROCEDURE — 999N000111 HC STATISTIC OT IP EVAL DEFER: Performed by: OCCUPATIONAL THERAPIST

## 2023-05-01 PROCEDURE — 99232 SBSQ HOSP IP/OBS MODERATE 35: CPT | Performed by: INTERNAL MEDICINE

## 2023-05-01 PROCEDURE — 99233 SBSQ HOSP IP/OBS HIGH 50: CPT | Performed by: CLINICAL NURSE SPECIALIST

## 2023-05-01 PROCEDURE — 250N000013 HC RX MED GY IP 250 OP 250 PS 637: Performed by: STUDENT IN AN ORGANIZED HEALTH CARE EDUCATION/TRAINING PROGRAM

## 2023-05-01 RX ORDER — HYDROMORPHONE HYDROCHLORIDE 2 MG/1
3 TABLET ORAL EVERY 4 HOURS PRN
Qty: 10 TABLET | Refills: 0 | Status: ON HOLD | OUTPATIENT
Start: 2023-05-01 | End: 2023-05-07

## 2023-05-01 RX ORDER — HYDROXYZINE HYDROCHLORIDE 10 MG/1
10 TABLET, FILM COATED ORAL EVERY 6 HOURS PRN
Qty: 30 TABLET | Refills: 0 | Status: ON HOLD | OUTPATIENT
Start: 2023-05-01 | End: 2023-05-06

## 2023-05-01 RX ORDER — AMOXICILLIN 250 MG
1 CAPSULE ORAL 2 TIMES DAILY
Qty: 30 TABLET | Refills: 0 | Status: ON HOLD | OUTPATIENT
Start: 2023-05-01 | End: 2023-05-06

## 2023-05-01 RX ORDER — ACETAMINOPHEN 325 MG/1
650 TABLET ORAL EVERY 4 HOURS PRN
Qty: 60 TABLET | Refills: 0 | Status: ON HOLD | OUTPATIENT
Start: 2023-05-03 | End: 2023-05-06

## 2023-05-01 RX ORDER — POLYETHYLENE GLYCOL 3350 17 G/17G
17 POWDER, FOR SOLUTION ORAL DAILY
Qty: 10 PACKET | Refills: 0 | Status: ON HOLD | OUTPATIENT
Start: 2023-05-02 | End: 2023-05-16

## 2023-05-01 RX ORDER — TIZANIDINE 2 MG/1
2 TABLET ORAL 3 TIMES DAILY
Qty: 30 TABLET | Refills: 0 | Status: ON HOLD | OUTPATIENT
Start: 2023-05-01 | End: 2023-05-06

## 2023-05-01 RX ADMIN — HYDROMORPHONE HYDROCHLORIDE 6 MG: 2 TABLET ORAL at 08:33

## 2023-05-01 RX ADMIN — POLYETHYLENE GLYCOL 3350 17 G: 17 POWDER, FOR SOLUTION ORAL at 07:51

## 2023-05-01 RX ADMIN — HYDROXYZINE HYDROCHLORIDE 10 MG: 10 TABLET ORAL at 09:53

## 2023-05-01 RX ADMIN — HYDROMORPHONE HYDROCHLORIDE 6 MG: 2 TABLET ORAL at 04:26

## 2023-05-01 RX ADMIN — HYDROMORPHONE HYDROCHLORIDE 6 MG: 2 TABLET ORAL at 12:37

## 2023-05-01 RX ADMIN — CLOPIDOGREL BISULFATE 75 MG: 75 TABLET ORAL at 08:34

## 2023-05-01 RX ADMIN — CARVEDILOL 12.5 MG: 6.25 TABLET, FILM COATED ORAL at 07:50

## 2023-05-01 RX ADMIN — BUPROPION HYDROCHLORIDE 300 MG: 300 TABLET, FILM COATED, EXTENDED RELEASE ORAL at 07:49

## 2023-05-01 RX ADMIN — ROSUVASTATIN CALCIUM 20 MG: 20 TABLET, FILM COATED ORAL at 07:51

## 2023-05-01 RX ADMIN — VENLAFAXINE HYDROCHLORIDE 150 MG: 150 CAPSULE, EXTENDED RELEASE ORAL at 07:51

## 2023-05-01 ASSESSMENT — ACTIVITIES OF DAILY LIVING (ADL)
ADLS_ACUITY_SCORE: 25

## 2023-05-01 NOTE — UTILIZATION REVIEW
"Admission Status; Secondary Review Determination         Under the authority of the Utilization Management Committee, the utilization review process indicated a secondary review on the above patient.  The review outcome is based on review of the medical records, discussions with staff, and applying clinical experience noted on the date of the review.          (x) Observation Status Appropriate - This patient does not meet hospital inpatient criteria and is placed in observation status. If this patient's primary payer is Medicare and was admitted as an inpatient, Condition Code 44 should be used and patient status changed to \"observation\".     RATIONALE FOR DETERMINATION     Sri Grewal is a 72 year old right-hand-dominant female  with history of uterine fibroids, asthma, migraines, GERD, endometriosis, seasonal allergies, and dermatitis.  She presented to [facility] as a transfer from [facility] with right proximal and distal humerus fractures.  She was evaluated by orthopedic surgery and immediate surgical intervention was not recommended.  She was placed in a splint.  Pain has been adequately controlled.  Plan is for her discharge home today.      The severity of illness, intensity of service provided, expected LOS and risk for adverse outcome make the care appropriate for further observation; however, doesn't meet criteria for hospital inpatient admission. Jaylin [facility]OPE was notified of this determination.    This document was produced using voice recognition software.      The information on this document is developed by the utilization review team in order for the business office to ensure compliance.  This only denotes the appropriateness of proper admission status and does not reflect the quality of care rendered.         The definitions of Inpatient Status and Observation Status used in making the determination above are those provided in the CMS Coverage Manual, Chapter 1 and Chapter 6, section 70.4.    "   Sincerely,    Faraz Kaur MD    Utilization Review  Physician Advisor  [facility].

## 2023-05-01 NOTE — PROGRESS NOTES
Orthopedic Surgery Progress Note   5/1/2023    Subjective: No acute events overnight. Pain well controlled Tolerating diet. Voiding spontaneously. +Flatus, no BM. Denies fever or chills, CP, SOB, numbness or tingling, motor dysfunction or weakness.       Exam:  BP (!) 154/78   Pulse 82   Temp (!) 96.1  F (35.6  C) (Oral)   Resp 16   LMP 11/09/2005 (Approximate)   SpO2 98%   Gen: Awake, alert, NAD  Resp: breathing equal and non-labored  Extremities:    RLE:     Posterior slab splint in place. Mild hand swelling. Anterior and posterior arm compartments soft and compressible. Skin not fully assessed. Fires FPL, EPL, and IOs with 5/5 strength      Labs:  Recent Labs   Lab 04/30/23  0334   WBC 7.9   HGB 11.7        Recent Labs   Lab 04/30/23  0334   *   POTASSIUM 3.6   CHLORIDE 94*   CO2 26   BUN 14.1   CR 0.89   *     Recent Labs   Lab 04/30/23  0334   INR 0.96     No lab results found in last 7 days.    Invalid input(s): ESR    Assessment:  Sri Grewal is a 72 year old right-hand-dominant female who presents as a transfer from [facility] with right proximal and distal humerus fractures.  I discussed with the patient that her right acute on chronic proximal humerus fracture may possibly be managed nonoperatively.  Her right displaced medial condyle elbow fracture will likely benefit from operative treatment, however.  We reviewed that the surgeries are not emergent or urgent and that she would likely do best with consultation from one of our hand and elbow surgeons.  We will therefore hold off on performing any surgery this morning and instead formulate a plan moving forward.  The signs and symptoms of a compartment syndrome were discussed with the patient and she will let nursing know should they develop.  Very low concern for development of compartment syndrome at this time.     Plan:  Orthopedic Surgery primary  - Activity: Up with assist until independent.  - Weightbearing Status: NWB RUE.  - Pain  Management: PO with IV for breakthrough.  - Antibiotics: None.  - Diet: Regular. No plans for OR today.  - DVT Prophylaxis: SCDs.  - Imaging: XR right forearm and CT right elbow pending.  - Labs: Labs PRN.  - Bracing/Splinting: Posterior slab splint RUE.  - Dressings: None.  - Drains: None.  - Merida Catheter: None.   - Physical Therapy/Occupational Therapy: Evaluation and treatment.  - Cultures: None.    - Consults: Hospitalist.  - Follow-up: TBD     - Disposition: Pending discussion with hand and elbow surgeons. OR on Wednesday with Dr Lobato.       The patient was discussed with attending Dr. Cheryle Hicks APRN CNS  5/1/2023 10:20 AM  Orthopaedic Surgery       Thank you for allowing me to participate in this patient's care. Please page me directly any questions/concerns.   Securely message with the [employer] (learn more here)  Text page via [employer] Paging/Directory    If there is no response, if it is a weekend, or if it is during evening hours, please page the orthopaedic surgery resident on call via [employer] Paging/Directory

## 2023-05-01 NOTE — PLAN OF CARE
Goal Outcome Evaluation:    VS: BP (!) 154/68 (BP Location: Left leg)   Pulse 79   Temp (!) 96.2  F (35.7  C) (Oral)   Resp 16   LMP 11/09/2005 (Approximate)   SpO2 95%      O2: 95% on RA. Lung sounds clear. Denies chest pain and SOB.   Output: Continent of bowel and bladder elimination   Last BM: pt reports LBM 4/28/23 . Bowel sounds active x4.   Activity: Stand by A1 with transfers NWB to RUE. Perform activities as tolerated   Skin: Bruising to Rt shoulder. Independent with turning an reposition   Pain: Pt.verbalized and rates pain @ 6/10. Pain managed with  PRN dilaudid 6 mg   CMS: A&Ox4. Denies N/T except for tingling to RUE. Edema +2 to R hand and shoulder.    Dressing: Splint and dressing to RUE C/D/I. PIV dressing to L AC C/D/I.   Diet: Regular diet. Denies N/V.   LDA: PIV to L AC is S/L.    Equipment: IV pole and personal belongings.   Plan: Continue to monitor pt for pain. Call light within reach and utilized appropriately   Additional Info: Continue with POC

## 2023-05-01 NOTE — PROGRESS NOTES
5/1/2023    Patient cleared to discharge to home today per Ortho.    Patient will return for surgery on Wednesday, May 3 at 730 am with Dr Phipps. Surgery scheduling will contact patient with date and time.    I have paged the Medicine team to verify that patient is medically (cardiac history and recent possible TIA and if Plavix can be held).      Upon confirmation of clearance by Medicine team (Dr Kim) patient okay to discharge to home today.    Discussed above with Dr Phipps.    Jaylin Hicks APRN  Orthopedics

## 2023-05-01 NOTE — PLAN OF CARE
Goal Outcome Evaluation:      Plan of Care Reviewed With: patient          Outcome Evaluation: plan for OR wednesday, managing pain    VS: BP (!) 154/78   Pulse 82   Temp (!) 96.1  F (35.6  C) (Oral)   Resp 16   LMP 11/09/2005 (Approximate)   SpO2 98%   Denies SOB, CP   O2: RA. LS CTA   Output: Voiding no difficulty in BR   Last BM: 4/28, reports some discomfort. BS+. Took miralax and prune juice, declined senna    Activity: SBA, NWB to RUE. Needs assistance with dressing and some positioning of RUE   Skin: Bruising to R shoulder. UTV under clothing, pt declines issues   Pain: Managed with PRN dilaudid, robaxin, atarax, ice pack   CMS: Intact. AxO. N/T to RUE. Edema 2+ to R hand/wrist   Dressing: Splint and dressing covered with ACE wrap, order to not remove for skin check. CDI   Diet: Reg   LDA: L PIV SL   Equipment: IV pole. Personal belongings   Plan: OR wednesday   Additional Info:

## 2023-05-01 NOTE — PROGRESS NOTES
[facility]    Medicine Progress Note - Hospitalist Service, GOLD TEAM 17    Date of Admission:  4/30/2023    Assessment & Plan    Sri Grewal is a 72 year old female admitted on 4/30/2023. She has a pmh of coronary artery disease, NICM ([place]), HTN, previous TIA and CVA post angiogram in 6/2021.  She was seen at [facility] ED on 4/25/23 w/ increased difficulty speaking and right sided weakness.  Stroke code was called upon pt's arrival to the ED.  CT head w/o contrast was negative. CTA head/neck was negative for large vessel occlusion but it did show a L MCA aneurysm which was stable.  She was given TNK in the ED with improvement in her symptoms and admitted to the ICU for close monitoring post TNK.  Her speech returned to her baseline.  She remained in sinus rhythm on telemetry. MRI brain was negative for acute stroke. Neurology team felt pt's symptoms were due to either a TIA or aborted stroke due to TNK administration. Neurology recommended stopping PTA ASA and started pt on plavix, with plans to continue it indefinitely. She will follow up with neurology in clinic and complete another 28 days Zio-patch.      She has a history of a right proximal humerus fracture that has resulted from a mechanical fall in 2/2023 which has been managed nonoperatively. On 4/29, she had another episode of mechanical fall resulting in acute displaced fracture of the proximal shaft of the right humerus.  She was initially seen at [facility] ED on 4/30 from where she was transferred to [facility] orthopedics service for definite surgical intervention. Internal medicine service consulted for preop eval and postop medical management    #. Acute displaced intra-articular fracture of the right distal humerus extending from the supracondylar region between the condyles and into the lateral humeral condyle, 4/29/23  #. Subacute, ununited fracture of the humeral neck 2/2023  ---    Primary team is orthopedics service who is planning to perform ORIF on 5/3  ---   Patient will discharge to home today and will be re-admitted on 5/3  ---   Pt will hold her PTA Plavix for the next 2 days, has already taken her dose today  ---   Pt is aware of the increased risk of stroke without Plavix even though the risk is very small  ---   I have phone consulted with neurology team and their recommendation is to restart Plavix as soon as possible after surgery  ---   ASA discontinued at [facility] by neurology team  ---   Right humerus fracture reduced and splinted in the ED  ---   NWB RUE  ---   Pain meds per Ortho    #. Recent TIA/CVA, 4/25  #. Cerebral aneurysm   ---   S/p TNK on 4/29/23 at [facility] ED, please see above  ---   PTA ASA discontinued and patient was started on Plavix  ---   Hold Plavix pending right humerus fracture repair on 5/3     #. Hx of CAD   ---   EF 65-70%, normal RV function  ---   Continue PTA Lisinopril, Lasix, Coreg, rosuvastatin     #.  Right upper pole kidney cyst   ---   F/u w/ PCP     #. Depression   ---   Venlafaxine 150 mg day and Bupropion 300 mg daily    #. Migraine   ---   PTA prn fiorinal               Diet: Regular Diet Adult  Diet    DVT Prophylaxis: Low Risk/Ambulatory with no VTE prophylaxis indicated  Merida Catheter: Not present  Lines: None     Cardiac Monitoring: None  Code Status: Full Code    Discharge Date:   Medically cleared for discharge today                  Leola Kim MD  Hospitalist Service, GOLD TEAM 17  [facility]  Securely message with [employer] (more info)  Text page via [facility] Paging/Directory   See signed in provider for up to date coverage information  ______________________________________________________________________    Interval History   No complaint  Right upper extremity pain under good control   at bedside  Patient is discharging to home today with plan to be readmitted on 5/3  for surgical intervention of right humerus fracture  Advised patient to hold PTA Plavix the next 2 days for the planned right upper extremity surgery  Patient is aware of the increased risk of stroke without Plavix even though that risk is very small  I have phone consulted with neurology team and recommendation is to hold Plavix prior to surgery (for the next 2 days) and restarted it as soon as possible    Physical Exam   Vital Signs: Temp: (!) 96.1  F (35.6  C) Temp src: Oral BP: (!) 154/78 Pulse: 82   Resp: 16 SpO2: 98 % O2 Device: None (Room air)    Weight: 0 lbs 0 oz  General: aao x 3, NAD.  HEENT:  NC/AT, PERRL, EOMI, neck supple, no thyromegaly, op clear, mmm.  CVS:  NL s 1 and s2, no m/r/g.  Lungs:  CTA B/L.   Abd:  Soft, + bs, NT, no rebound or gaurding, no fluid shift.  Ext: Right upper extremity is immobilized  Lymph:  No edema.  Neuro:  Nonfocal.  Musculoskeletal: No calf tenderness to palpation.    Skin:  No rash.  Psychiatry:  Mood and affect appropriate.        Data         Imaging results reviewed over the past 24 hrs:   No results found for this or any previous visit (from the past 24 hour(s)).

## 2023-05-01 NOTE — PROGRESS NOTES
DISCHARGE SUMMARY    Pt discharging to: home with friend to help  Transportation: wheelchair to friend [place]   AVS given and discussed: yes  Stoplight Tool given and discussed: NA  Medications given: yes, filled from pharmacy and home meds returned to pt from pharmacy  Belongings returned: yes  Comments: tolerated well

## 2023-05-01 NOTE — PLAN OF CARE
Occupational Therapy: Orders received. Chart reviewed and discussed with care team.? Occupational Therapy not indicated due to patient moving well and reporting no concerns for OT prior to surgery.? Defer discharge recommendations to medical team.? Will complete orders.

## 2023-05-02 ENCOUNTER — ANESTHESIA EVENT (OUTPATIENT)
Dept: SURGERY | Facility: CLINIC | Age: 73
DRG: 483 | End: 2023-05-02
Payer: MEDICARE

## 2023-05-03 ENCOUNTER — PATIENT OUTREACH (OUTPATIENT)
Dept: CARE COORDINATION | Facility: CLINIC | Age: 73
End: 2023-05-03

## 2023-05-03 ENCOUNTER — ANESTHESIA (OUTPATIENT)
Dept: SURGERY | Facility: CLINIC | Age: 73
DRG: 483 | End: 2023-05-03
Payer: MEDICARE

## 2023-05-03 ENCOUNTER — HOSPITAL ENCOUNTER (INPATIENT)
Facility: CLINIC | Age: 73
LOS: 11 days | Discharge: HOME HEALTH | DRG: 483 | End: 2023-05-16
Attending: STUDENT IN AN ORGANIZED HEALTH CARE EDUCATION/TRAINING PROGRAM | Admitting: STUDENT IN AN ORGANIZED HEALTH CARE EDUCATION/TRAINING PROGRAM
Payer: MEDICARE

## 2023-05-03 ENCOUNTER — APPOINTMENT (OUTPATIENT)
Dept: GENERAL RADIOLOGY | Facility: CLINIC | Age: 73
DRG: 483 | End: 2023-05-03
Attending: STUDENT IN AN ORGANIZED HEALTH CARE EDUCATION/TRAINING PROGRAM
Payer: MEDICARE

## 2023-05-03 DIAGNOSIS — M25.511 ACUTE PAIN OF RIGHT SHOULDER: ICD-10-CM

## 2023-05-03 DIAGNOSIS — F43.23 ADJUSTMENT DISORDER WITH MIXED ANXIETY AND DEPRESSED MOOD: ICD-10-CM

## 2023-05-03 DIAGNOSIS — S42.201K CLOSED FRACTURE OF PROXIMAL END OF RIGHT HUMERUS WITH NONUNION, UNSPECIFIED FRACTURE MORPHOLOGY, SUBSEQUENT ENCOUNTER: Primary | ICD-10-CM

## 2023-05-03 DIAGNOSIS — F32.1 MODERATE MAJOR DEPRESSION (H): ICD-10-CM

## 2023-05-03 DIAGNOSIS — G43.709 CHRONIC MIGRAINE WITHOUT AURA WITHOUT STATUS MIGRAINOSUS, NOT INTRACTABLE: ICD-10-CM

## 2023-05-03 DIAGNOSIS — Z98.890 POST-OPERATIVE STATE: ICD-10-CM

## 2023-05-03 DIAGNOSIS — I25.10 CORONARY ARTERY DISEASE DUE TO LIPID RICH PLAQUE: ICD-10-CM

## 2023-05-03 DIAGNOSIS — I25.83 CORONARY ARTERY DISEASE DUE TO LIPID RICH PLAQUE: ICD-10-CM
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
## 2023-05-03 DIAGNOSIS — G45.9 TIA (TRANSIENT ISCHEMIC ATTACK): ICD-10-CM

## 2023-05-03 DIAGNOSIS — R41.0 DELIRIUM: ICD-10-CM

## 2023-05-03 DIAGNOSIS — I10 HYPERTENSION GOAL BP (BLOOD PRESSURE) < 140/90: ICD-10-CM

## 2023-05-03 DIAGNOSIS — I21.3 ST ELEVATION MYOCARDIAL INFARCTION (STEMI), UNSPECIFIED ARTERY (H): ICD-10-CM

## 2023-05-03 DIAGNOSIS — E87.1 CHRONIC HYPONATREMIA: ICD-10-CM

## 2023-05-03 DIAGNOSIS — S42.401A CLOSED FRACTURE OF DISTAL END OF RIGHT HUMERUS, UNSPECIFIED FRACTURE MORPHOLOGY, INITIAL ENCOUNTER: ICD-10-CM

## 2023-05-03 DIAGNOSIS — K59.03 DRUG INDUCED CONSTIPATION: ICD-10-CM

## 2023-05-03 LAB
ANION GAP SERPL CALCULATED.3IONS-SCNC: 14 MMOL/L (ref 7–15)
BUN SERPL-MCNC: 10.6 MG/DL (ref 8–23)
CALCIUM SERPL-MCNC: 8.5 MG/DL (ref 8.8–10.2)
CHLORIDE SERPL-SCNC: 97 MMOL/L (ref 98–107)
CREAT SERPL-MCNC: 0.64 MG/DL (ref 0.51–0.95)
DEPRECATED HCO3 PLAS-SCNC: 21 MMOL/L (ref 22–29)
GFR SERPL CREATININE-BSD FRML MDRD: >90 ML/MIN/1.73M2
GLUCOSE SERPL-MCNC: 147 MG/DL (ref 70–99)
HGB BLD-MCNC: 9.3 G/DL (ref 11.7–15.7)
MAGNESIUM SERPL-MCNC: 1.8 MG/DL (ref 1.7–2.3)
POTASSIUM SERPL-SCNC: 4.5 MMOL/L (ref 3.4–5.3)
SODIUM SERPL-SCNC: 132 MMOL/L (ref 136–145)

## 2023-05-03 PROCEDURE — 24546 OPTX HUM FX W/NTRCNDYLR XTN: CPT | Mod: RT | Performed by: STUDENT IN AN ORGANIZED HEALTH CARE EDUCATION/TRAINING PROGRAM

## 2023-05-03 PROCEDURE — 250N000013 HC RX MED GY IP 250 OP 250 PS 637: Performed by: STUDENT IN AN ORGANIZED HEALTH CARE EDUCATION/TRAINING PROGRAM

## 2023-05-03 PROCEDURE — 370N000017 HC ANESTHESIA TECHNICAL FEE, PER MIN: Performed by: STUDENT IN AN ORGANIZED HEALTH CARE EDUCATION/TRAINING PROGRAM

## 2023-05-03 PROCEDURE — 82310 ASSAY OF CALCIUM: CPT | Performed by: PHYSICIAN ASSISTANT

## 2023-05-03 PROCEDURE — 999N000179 XR SURGERY CARM FLUORO LESS THAN 5 MIN W STILLS: Mod: TC

## 2023-05-03 PROCEDURE — 272N000002 HC OR SUPPLY OTHER OPNP: Performed by: STUDENT IN AN ORGANIZED HEALTH CARE EDUCATION/TRAINING PROGRAM

## 2023-05-03 PROCEDURE — 0PUF0KZ SUPPLEMENT RIGHT HUMERAL SHAFT WITH NONAUTOLOGOUS TISSUE SUBSTITUTE, OPEN APPROACH: ICD-10-PCS | Performed by: STUDENT IN AN ORGANIZED HEALTH CARE EDUCATION/TRAINING PROGRAM

## 2023-05-03 PROCEDURE — 36415 COLL VENOUS BLD VENIPUNCTURE: CPT | Performed by: PHYSICIAN ASSISTANT

## 2023-05-03 PROCEDURE — 999N000141 HC STATISTIC PRE-PROCEDURE NURSING ASSESSMENT: Performed by: STUDENT IN AN ORGANIZED HEALTH CARE EDUCATION/TRAINING PROGRAM

## 2023-05-03 PROCEDURE — 258N000003 HC RX IP 258 OP 636: Performed by: NURSE ANESTHETIST, CERTIFIED REGISTERED

## 2023-05-03 PROCEDURE — 0PSF04Z REPOSITION RIGHT HUMERAL SHAFT WITH INTERNAL FIXATION DEVICE, OPEN APPROACH: ICD-10-PCS | Performed by: STUDENT IN AN ORGANIZED HEALTH CARE EDUCATION/TRAINING PROGRAM

## 2023-05-03 PROCEDURE — 0PHK04Z INSERTION OF INTERNAL FIXATION DEVICE INTO RIGHT ULNA, OPEN APPROACH: ICD-10-PCS | Performed by: STUDENT IN AN ORGANIZED HEALTH CARE EDUCATION/TRAINING PROGRAM

## 2023-05-03 PROCEDURE — 83735 ASSAY OF MAGNESIUM: CPT | Performed by: PHYSICIAN ASSISTANT

## 2023-05-03 PROCEDURE — 360N000084 HC SURGERY LEVEL 4 W/ FLUORO, PER MIN: Performed by: STUDENT IN AN ORGANIZED HEALTH CARE EDUCATION/TRAINING PROGRAM

## 2023-05-03 PROCEDURE — C1713 ANCHOR/SCREW BN/BN,TIS/BN: HCPCS | Performed by: STUDENT IN AN ORGANIZED HEALTH CARE EDUCATION/TRAINING PROGRAM

## 2023-05-03 PROCEDURE — 250N000009 HC RX 250: Performed by: NURSE ANESTHETIST, CERTIFIED REGISTERED

## 2023-05-03 PROCEDURE — C1762 CONN TISS, HUMAN(INC FASCIA): HCPCS | Performed by: STUDENT IN AN ORGANIZED HEALTH CARE EDUCATION/TRAINING PROGRAM

## 2023-05-03 PROCEDURE — 272N000001 HC OR GENERAL SUPPLY STERILE: Performed by: STUDENT IN AN ORGANIZED HEALTH CARE EDUCATION/TRAINING PROGRAM

## 2023-05-03 PROCEDURE — 710N000010 HC RECOVERY PHASE 1, LEVEL 2, PER MIN: Performed by: STUDENT IN AN ORGANIZED HEALTH CARE EDUCATION/TRAINING PROGRAM

## 2023-05-03 PROCEDURE — 250N000011 HC RX IP 250 OP 636: Performed by: STUDENT IN AN ORGANIZED HEALTH CARE EDUCATION/TRAINING PROGRAM

## 2023-05-03 PROCEDURE — 85018 HEMOGLOBIN: CPT | Performed by: PHYSICIAN ASSISTANT

## 2023-05-03 PROCEDURE — 250N000011 HC RX IP 250 OP 636: Performed by: NURSE ANESTHETIST, CERTIFIED REGISTERED

## 2023-05-03 PROCEDURE — 250N000009 HC RX 250: Performed by: ANESTHESIOLOGY

## 2023-05-03 PROCEDURE — 250N000011 HC RX IP 250 OP 636: Performed by: ANESTHESIOLOGY

## 2023-05-03 PROCEDURE — 250N000025 HC SEVOFLURANE, PER MIN: Performed by: STUDENT IN AN ORGANIZED HEALTH CARE EDUCATION/TRAINING PROGRAM

## 2023-05-03 PROCEDURE — 99215 OFFICE O/P EST HI 40 MIN: CPT | Performed by: PHYSICIAN ASSISTANT

## 2023-05-03 PROCEDURE — 250N000013 HC RX MED GY IP 250 OP 250 PS 637: Performed by: PHYSICIAN ASSISTANT

## 2023-05-03 PROCEDURE — 0PBK0ZZ EXCISION OF RIGHT ULNA, OPEN APPROACH: ICD-10-PCS | Performed by: STUDENT IN AN ORGANIZED HEALTH CARE EDUCATION/TRAINING PROGRAM

## 2023-05-03 PROCEDURE — 258N000003 HC RX IP 258 OP 636: Performed by: ANESTHESIOLOGY

## 2023-05-03 DEVICE — IMP SCR SYN CORTEX 3.5X26MM SELF TAP SS 204.826: Type: IMPLANTABLE DEVICE | Site: ARM | Status: FUNCTIONAL

## 2023-05-03 DEVICE — SCREW LOCKING ST 2.7X10MM: Type: IMPLANTABLE DEVICE | Site: ARM | Status: FUNCTIONAL

## 2023-05-03 DEVICE — IMP SCR SYN 2.7X36MM T8 SD LOCKING SELF-TAP VA 02.211.036: Type: IMPLANTABLE DEVICE | Site: ARM | Status: FUNCTIONAL

## 2023-05-03 DEVICE — IMP SCR SYN 2.7X26MM T8 SD LOCKING SELF-TAP VA 02.211.026: Type: IMPLANTABLE DEVICE | Site: ARM | Status: FUNCTIONAL

## 2023-05-03 DEVICE — IMPLANTABLE DEVICE: Type: IMPLANTABLE DEVICE | Site: ARM | Status: FUNCTIONAL

## 2023-05-03 DEVICE — IMP SCR SYN CORTEX 3.5X24MM SELF TAP SS 204.824: Type: IMPLANTABLE DEVICE | Site: ARM | Status: FUNCTIONAL

## 2023-05-03 DEVICE — GRAFT BONE CHIPS CANC CUBE 15CC 100315: Type: IMPLANTABLE DEVICE | Site: HUMERUS | Status: FUNCTIONAL

## 2023-05-03 DEVICE — SCREW LOCKING ST 2.7X28MM: Type: IMPLANTABLE DEVICE | Site: ARM | Status: FUNCTIONAL

## 2023-05-03 DEVICE — IMP SCR SYN 3.5X20MM LOCKING W/STARDRIVE SS 212.106: Type: IMPLANTABLE DEVICE | Site: ARM | Status: FUNCTIONAL

## 2023-05-03 DEVICE — IMP SCR SYN CORTEX 3.5X18MM SELF TAP SS 204.818: Type: IMPLANTABLE DEVICE | Site: ARM | Status: FUNCTIONAL

## 2023-05-03 DEVICE — IMP SCR SYN 2.7X44MM T8 SD LOCKING SELF-TAP VA 02.211.044: Type: IMPLANTABLE DEVICE | Site: ARM | Status: FUNCTIONAL

## 2023-05-03 DEVICE — IMP SCR SYN CORTEX 2.7X20MM SELF TAP SS 202.820: Type: IMPLANTABLE DEVICE | Site: ARM | Status: FUNCTIONAL

## 2023-05-03 DEVICE — IMP SCR SYN 2.7X14MM T8 SD LOCKING SELF-TAP VA 02.211.014: Type: IMPLANTABLE DEVICE | Site: ARM | Status: FUNCTIONAL

## 2023-05-03 DEVICE — IMP SCR SYN CORTEX 3.5X22MM SELF TAP SS 204.822: Type: IMPLANTABLE DEVICE | Site: ARM | Status: FUNCTIONAL

## 2023-05-03 DEVICE — IMP SCR SYN CORTEX 3.5X16MM SELF TAP SS 204.816: Type: IMPLANTABLE DEVICE | Site: ARM | Status: FUNCTIONAL

## 2023-05-03 DEVICE — IMP SCR SYN 2.7X18MM T8 SD LOCKING SELF-TAP VA 02.211.018: Type: IMPLANTABLE DEVICE | Site: ARM | Status: FUNCTIONAL

## 2023-05-03 DEVICE — IMP SCR SYN 2.7X20MM T8 SD LOCKING SELF-TAP VA 02.211.020: Type: IMPLANTABLE DEVICE | Site: ARM | Status: FUNCTIONAL

## 2023-05-03 DEVICE — SCREW LOCKING ST 2.7X46MM: Type: IMPLANTABLE DEVICE | Site: ARM | Status: FUNCTIONAL

## 2023-05-03 DEVICE — SCREW LOCKING ST 2.7X50MM: Type: IMPLANTABLE DEVICE | Site: ARM | Status: FUNCTIONAL

## 2023-05-03 RX ORDER — DEXAMETHASONE SODIUM PHOSPHATE 10 MG/ML
INJECTION, SOLUTION INTRAMUSCULAR; INTRAVENOUS
Status: DISCONTINUED | OUTPATIENT
Start: 2023-05-03 | End: 2023-05-03

## 2023-05-03 RX ORDER — LIDOCAINE HYDROCHLORIDE 20 MG/ML
INJECTION, SOLUTION INFILTRATION; PERINEURAL PRN
Status: DISCONTINUED | OUTPATIENT
Start: 2023-05-03 | End: 2023-05-03

## 2023-05-03 RX ORDER — NALOXONE HYDROCHLORIDE 0.4 MG/ML
0.2 INJECTION, SOLUTION INTRAMUSCULAR; INTRAVENOUS; SUBCUTANEOUS
Status: DISCONTINUED | OUTPATIENT
Start: 2023-05-03 | End: 2023-05-16 | Stop reason: HOSPADM

## 2023-05-03 RX ORDER — HYDRALAZINE HYDROCHLORIDE 20 MG/ML
INJECTION INTRAMUSCULAR; INTRAVENOUS PRN
Status: DISCONTINUED | OUTPATIENT
Start: 2023-05-03 | End: 2023-05-03

## 2023-05-03 RX ORDER — CEFAZOLIN SODIUM/WATER 2 G/20 ML
2 SYRINGE (ML) INTRAVENOUS
Status: DISCONTINUED | OUTPATIENT
Start: 2023-05-03 | End: 2023-05-03 | Stop reason: HOSPADM

## 2023-05-03 RX ORDER — ACETAMINOPHEN 325 MG/1
975 TABLET ORAL EVERY 8 HOURS
Status: COMPLETED | OUTPATIENT
Start: 2023-05-03 | End: 2023-05-06

## 2023-05-03 RX ORDER — CARVEDILOL 6.25 MG/1
12.5 TABLET ORAL 2 TIMES DAILY WITH MEALS
Status: DISCONTINUED | OUTPATIENT
Start: 2023-05-03 | End: 2023-05-16 | Stop reason: HOSPADM

## 2023-05-03 RX ORDER — BUPIVACAINE HYDROCHLORIDE 2.5 MG/ML
INJECTION, SOLUTION INFILTRATION; PERINEURAL PRN
Status: DISCONTINUED | OUTPATIENT
Start: 2023-05-03 | End: 2023-05-03 | Stop reason: HOSPADM

## 2023-05-03 RX ORDER — NALOXONE HYDROCHLORIDE 0.4 MG/ML
0.4 INJECTION, SOLUTION INTRAMUSCULAR; INTRAVENOUS; SUBCUTANEOUS
Status: DISCONTINUED | OUTPATIENT
Start: 2023-05-03 | End: 2023-05-16 | Stop reason: HOSPADM

## 2023-05-03 RX ORDER — HYDROMORPHONE HCL IN WATER/PF 6 MG/30 ML
0.4 PATIENT CONTROLLED ANALGESIA SYRINGE INTRAVENOUS
Status: DISCONTINUED | OUTPATIENT
Start: 2023-05-03 | End: 2023-05-07

## 2023-05-03 RX ORDER — HYDROMORPHONE HYDROCHLORIDE 4 MG/1
4 TABLET ORAL EVERY 4 HOURS PRN
Status: DISCONTINUED | OUTPATIENT
Start: 2023-05-03 | End: 2023-05-04

## 2023-05-03 RX ORDER — CEFAZOLIN SODIUM/WATER 2 G/20 ML
2 SYRINGE (ML) INTRAVENOUS SEE ADMIN INSTRUCTIONS
Status: DISCONTINUED | OUTPATIENT
Start: 2023-05-03 | End: 2023-05-03 | Stop reason: HOSPADM

## 2023-05-03 RX ORDER — AMOXICILLIN 250 MG
1 CAPSULE ORAL 2 TIMES DAILY
Status: DISCONTINUED | OUTPATIENT
Start: 2023-05-03 | End: 2023-05-16 | Stop reason: HOSPADM

## 2023-05-03 RX ORDER — HYDROXYZINE HYDROCHLORIDE 25 MG/1
25 TABLET, FILM COATED ORAL EVERY 6 HOURS PRN
Status: DISCONTINUED | OUTPATIENT
Start: 2023-05-03 | End: 2023-05-07

## 2023-05-03 RX ORDER — POLYETHYLENE GLYCOL 3350 17 G/17G
17 POWDER, FOR SOLUTION ORAL DAILY
Status: DISCONTINUED | OUTPATIENT
Start: 2023-05-04 | End: 2023-05-15

## 2023-05-03 RX ORDER — FENTANYL CITRATE 50 UG/ML
INJECTION, SOLUTION INTRAMUSCULAR; INTRAVENOUS PRN
Status: DISCONTINUED | OUTPATIENT
Start: 2023-05-03 | End: 2023-05-03

## 2023-05-03 RX ORDER — BUPIVACAINE HYDROCHLORIDE 2.5 MG/ML
INJECTION, SOLUTION EPIDURAL; INFILTRATION; INTRACAUDAL
Status: DISCONTINUED | OUTPATIENT
Start: 2023-05-03 | End: 2023-05-03

## 2023-05-03 RX ORDER — DEXAMETHASONE SODIUM PHOSPHATE 4 MG/ML
INJECTION, SOLUTION INTRA-ARTICULAR; INTRALESIONAL; INTRAMUSCULAR; INTRAVENOUS; SOFT TISSUE PRN
Status: DISCONTINUED | OUTPATIENT
Start: 2023-05-03 | End: 2023-05-03

## 2023-05-03 RX ORDER — LIDOCAINE 40 MG/G
CREAM TOPICAL
Status: DISCONTINUED | OUTPATIENT
Start: 2023-05-03 | End: 2023-05-16 | Stop reason: HOSPADM

## 2023-05-03 RX ORDER — CEFAZOLIN SODIUM/WATER 2 G/20 ML
SYRINGE (ML) INTRAVENOUS PRN
Status: DISCONTINUED | OUTPATIENT
Start: 2023-05-03 | End: 2023-05-03

## 2023-05-03 RX ORDER — CEFAZOLIN SODIUM 2 G/100ML
2 INJECTION, SOLUTION INTRAVENOUS EVERY 8 HOURS
Status: COMPLETED | OUTPATIENT
Start: 2023-05-03 | End: 2023-05-04

## 2023-05-03 RX ORDER — SODIUM CHLORIDE, SODIUM LACTATE, POTASSIUM CHLORIDE, CALCIUM CHLORIDE 600; 310; 30; 20 MG/100ML; MG/100ML; MG/100ML; MG/100ML
INJECTION, SOLUTION INTRAVENOUS CONTINUOUS
Status: DISCONTINUED | OUTPATIENT
Start: 2023-05-03 | End: 2023-05-03

## 2023-05-03 RX ORDER — CALCIUM CARBONATE 500 MG/1
500 TABLET, CHEWABLE ORAL 4 TIMES DAILY PRN
Status: DISCONTINUED | OUTPATIENT
Start: 2023-05-03 | End: 2023-05-16 | Stop reason: HOSPADM

## 2023-05-03 RX ORDER — FENTANYL CITRATE 50 UG/ML
25 INJECTION, SOLUTION INTRAMUSCULAR; INTRAVENOUS EVERY 5 MIN PRN
Status: DISCONTINUED | OUTPATIENT
Start: 2023-05-03 | End: 2023-05-03

## 2023-05-03 RX ORDER — ONDANSETRON 2 MG/ML
INJECTION INTRAMUSCULAR; INTRAVENOUS PRN
Status: DISCONTINUED | OUTPATIENT
Start: 2023-05-03 | End: 2023-05-03

## 2023-05-03 RX ORDER — LABETALOL 20 MG/4 ML (5 MG/ML) INTRAVENOUS SYRINGE
PRN
Status: DISCONTINUED | OUTPATIENT
Start: 2023-05-03 | End: 2023-05-03

## 2023-05-03 RX ORDER — BUPROPION HYDROCHLORIDE 300 MG/1
300 TABLET ORAL EVERY MORNING
Status: DISCONTINUED | OUTPATIENT
Start: 2023-05-04 | End: 2023-05-16 | Stop reason: HOSPADM

## 2023-05-03 RX ORDER — VANCOMYCIN HYDROCHLORIDE 1 G/20ML
INJECTION, POWDER, LYOPHILIZED, FOR SOLUTION INTRAVENOUS PRN
Status: DISCONTINUED | OUTPATIENT
Start: 2023-05-03 | End: 2023-05-03 | Stop reason: HOSPADM

## 2023-05-03 RX ORDER — PROPOFOL 10 MG/ML
INJECTION, EMULSION INTRAVENOUS PRN
Status: DISCONTINUED | OUTPATIENT
Start: 2023-05-03 | End: 2023-05-03

## 2023-05-03 RX ORDER — CLOPIDOGREL BISULFATE 75 MG/1
75 TABLET ORAL DAILY
Status: DISCONTINUED | OUTPATIENT
Start: 2023-05-04 | End: 2023-05-16 | Stop reason: HOSPADM

## 2023-05-03 RX ORDER — HYDROMORPHONE HCL IN WATER/PF 6 MG/30 ML
0.2 PATIENT CONTROLLED ANALGESIA SYRINGE INTRAVENOUS
Status: DISCONTINUED | OUTPATIENT
Start: 2023-05-03 | End: 2023-05-07

## 2023-05-03 RX ORDER — ONDANSETRON 2 MG/ML
4 INJECTION INTRAMUSCULAR; INTRAVENOUS EVERY 30 MIN PRN
Status: DISCONTINUED | OUTPATIENT
Start: 2023-05-03 | End: 2023-05-03

## 2023-05-03 RX ORDER — ONDANSETRON 4 MG/1
4 TABLET, ORALLY DISINTEGRATING ORAL EVERY 6 HOURS PRN
Status: DISCONTINUED | OUTPATIENT
Start: 2023-05-03 | End: 2023-05-16 | Stop reason: HOSPADM

## 2023-05-03 RX ORDER — ROSUVASTATIN CALCIUM 20 MG/1
20 TABLET, COATED ORAL DAILY
Status: DISCONTINUED | OUTPATIENT
Start: 2023-05-04 | End: 2023-05-16 | Stop reason: HOSPADM

## 2023-05-03 RX ORDER — HYDROMORPHONE HCL IN WATER/PF 6 MG/30 ML
0.4 PATIENT CONTROLLED ANALGESIA SYRINGE INTRAVENOUS EVERY 5 MIN PRN
Status: DISCONTINUED | OUTPATIENT
Start: 2023-05-03 | End: 2023-05-03

## 2023-05-03 RX ORDER — CALCIUM CARBONATE 500 MG/1
500 TABLET, CHEWABLE ORAL 4 TIMES DAILY PRN
Status: DISCONTINUED | OUTPATIENT
Start: 2023-05-03 | End: 2023-05-03

## 2023-05-03 RX ORDER — ASPIRIN 81 MG/1
162 TABLET ORAL DAILY
Status: CANCELLED | OUTPATIENT
Start: 2023-05-04

## 2023-05-03 RX ORDER — SODIUM CHLORIDE, SODIUM GLUCONATE, SODIUM ACETATE, POTASSIUM CHLORIDE AND MAGNESIUM CHLORIDE 526; 502; 368; 37; 30 MG/100ML; MG/100ML; MG/100ML; MG/100ML; MG/100ML
INJECTION, SOLUTION INTRAVENOUS CONTINUOUS PRN
Status: DISCONTINUED | OUTPATIENT
Start: 2023-05-03 | End: 2023-05-03

## 2023-05-03 RX ORDER — CEFAZOLIN SODIUM/WATER 2 G/20 ML
2 SYRINGE (ML) INTRAVENOUS EVERY 8 HOURS
Status: DISCONTINUED | OUTPATIENT
Start: 2023-05-03 | End: 2023-05-03

## 2023-05-03 RX ORDER — METOPROLOL TARTRATE 1 MG/ML
1-2 INJECTION, SOLUTION INTRAVENOUS EVERY 5 MIN PRN
Status: DISCONTINUED | OUTPATIENT
Start: 2023-05-03 | End: 2023-05-03

## 2023-05-03 RX ORDER — ONDANSETRON 4 MG/1
4 TABLET, ORALLY DISINTEGRATING ORAL EVERY 30 MIN PRN
Status: DISCONTINUED | OUTPATIENT
Start: 2023-05-03 | End: 2023-05-03

## 2023-05-03 RX ORDER — ONDANSETRON 2 MG/ML
4 INJECTION INTRAMUSCULAR; INTRAVENOUS EVERY 6 HOURS PRN
Status: DISCONTINUED | OUTPATIENT
Start: 2023-05-03 | End: 2023-05-16 | Stop reason: HOSPADM

## 2023-05-03 RX ORDER — SODIUM CHLORIDE, SODIUM LACTATE, POTASSIUM CHLORIDE, CALCIUM CHLORIDE 600; 310; 30; 20 MG/100ML; MG/100ML; MG/100ML; MG/100ML
INJECTION, SOLUTION INTRAVENOUS CONTINUOUS PRN
Status: DISCONTINUED | OUTPATIENT
Start: 2023-05-03 | End: 2023-05-03

## 2023-05-03 RX ORDER — DEXMEDETOMIDINE HYDROCHLORIDE 4 UG/ML
INJECTION, SOLUTION INTRAVENOUS
Status: DISCONTINUED | OUTPATIENT
Start: 2023-05-03 | End: 2023-05-03

## 2023-05-03 RX ORDER — HYDROMORPHONE HYDROCHLORIDE 2 MG/1
2 TABLET ORAL EVERY 4 HOURS PRN
Status: DISCONTINUED | OUTPATIENT
Start: 2023-05-03 | End: 2023-05-04

## 2023-05-03 RX ORDER — KETAMINE HYDROCHLORIDE 10 MG/ML
INJECTION INTRAMUSCULAR; INTRAVENOUS PRN
Status: DISCONTINUED | OUTPATIENT
Start: 2023-05-03 | End: 2023-05-03

## 2023-05-03 RX ORDER — METHOCARBAMOL 500 MG/1
500 TABLET, FILM COATED ORAL EVERY 6 HOURS PRN
Status: DISCONTINUED | OUTPATIENT
Start: 2023-05-03 | End: 2023-05-16 | Stop reason: HOSPADM

## 2023-05-03 RX ORDER — ACETAMINOPHEN 325 MG/1
650 TABLET ORAL EVERY 4 HOURS PRN
Status: DISCONTINUED | OUTPATIENT
Start: 2023-05-06 | End: 2023-05-07

## 2023-05-03 RX ORDER — FENTANYL CITRATE 50 UG/ML
50 INJECTION, SOLUTION INTRAMUSCULAR; INTRAVENOUS EVERY 5 MIN PRN
Status: DISCONTINUED | OUTPATIENT
Start: 2023-05-03 | End: 2023-05-03

## 2023-05-03 RX ORDER — PROCHLORPERAZINE MALEATE 5 MG
5 TABLET ORAL EVERY 6 HOURS PRN
Status: DISCONTINUED | OUTPATIENT
Start: 2023-05-03 | End: 2023-05-16 | Stop reason: HOSPADM

## 2023-05-03 RX ORDER — BISACODYL 10 MG
10 SUPPOSITORY, RECTAL RECTAL DAILY PRN
Status: DISCONTINUED | OUTPATIENT
Start: 2023-05-03 | End: 2023-05-16 | Stop reason: HOSPADM

## 2023-05-03 RX ORDER — HYDROMORPHONE HCL IN WATER/PF 6 MG/30 ML
0.2 PATIENT CONTROLLED ANALGESIA SYRINGE INTRAVENOUS EVERY 5 MIN PRN
Status: DISCONTINUED | OUTPATIENT
Start: 2023-05-03 | End: 2023-05-03

## 2023-05-03 RX ORDER — VENLAFAXINE HYDROCHLORIDE 150 MG/1
150 CAPSULE, EXTENDED RELEASE ORAL DAILY
Status: DISCONTINUED | OUTPATIENT
Start: 2023-05-04 | End: 2023-05-16 | Stop reason: HOSPADM

## 2023-05-03 RX ADMIN — Medication 50 MG: at 07:43

## 2023-05-03 RX ADMIN — LIDOCAINE HYDROCHLORIDE 100 MG: 20 INJECTION, SOLUTION INFILTRATION; PERINEURAL at 07:42

## 2023-05-03 RX ADMIN — Medication 10 MG: at 11:00

## 2023-05-03 RX ADMIN — CARVEDILOL 12.5 MG: 12.5 TABLET, FILM COATED ORAL at 19:32

## 2023-05-03 RX ADMIN — PHENYLEPHRINE HYDROCHLORIDE 150 MCG: 10 INJECTION INTRAVENOUS at 11:59

## 2023-05-03 RX ADMIN — Medication 10 MG: at 11:50

## 2023-05-03 RX ADMIN — CEFAZOLIN SODIUM 2 G: 2 INJECTION, SOLUTION INTRAVENOUS at 21:32

## 2023-05-03 RX ADMIN — Medication 10 MG: at 12:00

## 2023-05-03 RX ADMIN — Medication 20 MG: at 08:52

## 2023-05-03 RX ADMIN — ONDANSETRON 4 MG: 2 INJECTION INTRAMUSCULAR; INTRAVENOUS at 11:54

## 2023-05-03 RX ADMIN — ACETAMINOPHEN 975 MG: 325 TABLET ORAL at 19:32

## 2023-05-03 RX ADMIN — SODIUM CHLORIDE, SODIUM GLUCONATE, SODIUM ACETATE, POTASSIUM CHLORIDE AND MAGNESIUM CHLORIDE: 526; 502; 368; 37; 30 INJECTION, SOLUTION INTRAVENOUS at 07:40

## 2023-05-03 RX ADMIN — PHENYLEPHRINE HYDROCHLORIDE 200 MCG: 10 INJECTION INTRAVENOUS at 12:03

## 2023-05-03 RX ADMIN — Medication 10 MG: at 11:15

## 2023-05-03 RX ADMIN — Medication 20 MG: at 08:41

## 2023-05-03 RX ADMIN — LABETALOL 20 MG/4 ML (5 MG/ML) INTRAVENOUS SYRINGE 10 MG: at 10:53

## 2023-05-03 RX ADMIN — PROPOFOL 100 MG: 10 INJECTION, EMULSION INTRAVENOUS at 07:43

## 2023-05-03 RX ADMIN — FENTANYL CITRATE 50 MCG: 50 INJECTION, SOLUTION INTRAMUSCULAR; INTRAVENOUS at 09:11

## 2023-05-03 RX ADMIN — Medication 2 G: at 11:53

## 2023-05-03 RX ADMIN — FENTANYL CITRATE 50 MCG: 50 INJECTION, SOLUTION INTRAMUSCULAR; INTRAVENOUS at 08:39

## 2023-05-03 RX ADMIN — HYDROMORPHONE HYDROCHLORIDE 0.2 MG: 0.2 INJECTION, SOLUTION INTRAMUSCULAR; INTRAVENOUS; SUBCUTANEOUS at 16:30

## 2023-05-03 RX ADMIN — DEXAMETHASONE SODIUM PHOSPHATE 1 MG: 10 INJECTION, SOLUTION INTRAMUSCULAR; INTRAVENOUS at 14:25

## 2023-05-03 RX ADMIN — Medication 10 MG: at 10:43

## 2023-05-03 RX ADMIN — Medication 15 MG: at 10:08

## 2023-05-03 RX ADMIN — PHENYLEPHRINE HYDROCHLORIDE 100 MCG: 10 INJECTION INTRAVENOUS at 11:48

## 2023-05-03 RX ADMIN — SUGAMMADEX 200 MG: 100 INJECTION, SOLUTION INTRAVENOUS at 12:45

## 2023-05-03 RX ADMIN — SODIUM CHLORIDE, POTASSIUM CHLORIDE, SODIUM LACTATE AND CALCIUM CHLORIDE: 600; 310; 30; 20 INJECTION, SOLUTION INTRAVENOUS at 07:50

## 2023-05-03 RX ADMIN — FENTANYL CITRATE 50 MCG: 50 INJECTION, SOLUTION INTRAMUSCULAR; INTRAVENOUS at 07:42

## 2023-05-03 RX ADMIN — HYDROMORPHONE HYDROCHLORIDE 0.4 MG: 0.2 INJECTION, SOLUTION INTRAMUSCULAR; INTRAVENOUS; SUBCUTANEOUS at 14:59

## 2023-05-03 RX ADMIN — BUPIVACAINE HYDROCHLORIDE 30 ML: 2.5 INJECTION, SOLUTION EPIDURAL; INFILTRATION; INTRACAUDAL; PERINEURAL at 14:25

## 2023-05-03 RX ADMIN — Medication 2 G: at 08:00

## 2023-05-03 RX ADMIN — DEXMEDETOMIDINE 20 MCG: 100 INJECTION, SOLUTION, CONCENTRATE INTRAVENOUS at 14:25

## 2023-05-03 RX ADMIN — DEXAMETHASONE SODIUM PHOSPHATE 10 MG: 4 INJECTION, SOLUTION INTRA-ARTICULAR; INTRALESIONAL; INTRAMUSCULAR; INTRAVENOUS; SOFT TISSUE at 08:43

## 2023-05-03 RX ADMIN — Medication 10 MG: at 09:37

## 2023-05-03 RX ADMIN — LABETALOL 20 MG/4 ML (5 MG/ML) INTRAVENOUS SYRINGE 10 MG: at 11:12

## 2023-05-03 RX ADMIN — FENTANYL CITRATE 50 MCG: 50 INJECTION, SOLUTION INTRAMUSCULAR; INTRAVENOUS at 13:57

## 2023-05-03 RX ADMIN — HYDRALAZINE HYDROCHLORIDE 5 MG: 20 INJECTION INTRAMUSCULAR; INTRAVENOUS at 11:21

## 2023-05-03 RX ADMIN — LABETALOL 20 MG/4 ML (5 MG/ML) INTRAVENOUS SYRINGE 5 MG: at 10:22

## 2023-05-03 RX ADMIN — HYDROMORPHONE HYDROCHLORIDE 0.4 MG: 0.2 INJECTION, SOLUTION INTRAMUSCULAR; INTRAVENOUS; SUBCUTANEOUS at 14:24

## 2023-05-03 RX ADMIN — FENTANYL CITRATE 50 MCG: 50 INJECTION, SOLUTION INTRAMUSCULAR; INTRAVENOUS at 09:37

## 2023-05-03 RX ADMIN — DOCUSATE SODIUM AND SENNOSIDES 1 TABLET: 8.6; 5 TABLET ORAL at 19:32

## 2023-05-03 RX ADMIN — FENTANYL CITRATE 50 MCG: 50 INJECTION, SOLUTION INTRAMUSCULAR; INTRAVENOUS at 08:30

## 2023-05-03 RX ADMIN — HYDROMORPHONE HYDROCHLORIDE 0.2 MG: 0.2 INJECTION, SOLUTION INTRAMUSCULAR; INTRAVENOUS; SUBCUTANEOUS at 15:45

## 2023-05-03 RX ADMIN — LABETALOL 20 MG/4 ML (5 MG/ML) INTRAVENOUS SYRINGE 5 MG: at 10:34

## 2023-05-03 RX ADMIN — SODIUM CHLORIDE, POTASSIUM CHLORIDE, SODIUM LACTATE AND CALCIUM CHLORIDE: 600; 310; 30; 20 INJECTION, SOLUTION INTRAVENOUS at 11:45

## 2023-05-03 RX ADMIN — FENTANYL CITRATE 50 MCG: 50 INJECTION, SOLUTION INTRAMUSCULAR; INTRAVENOUS at 13:33

## 2023-05-03 ASSESSMENT — ACTIVITIES OF DAILY LIVING (ADL)
ADLS_ACUITY_SCORE: 24
ADLS_ACUITY_SCORE: 24
ADLS_ACUITY_SCORE: 26
ADLS_ACUITY_SCORE: 24
ADLS_ACUITY_SCORE: 26
ADLS_ACUITY_SCORE: 26
ADLS_ACUITY_SCORE: 24

## 2023-05-03 NOTE — ANESTHESIA PROCEDURE NOTES
Brachial plexus Procedure Note    Pre-Procedure   Staff -        Anesthesiologist:  Huber Amin MD       Resident/Fellow: Carlton Baker MD       Performed By: resident and with residents       Procedure performed by resident/fellow/CRNA in presence of a teaching physician.         Location: pre-op       Pre-Anesthestic Checklist: patient identified, IV checked, site marked, risks and benefits discussed, informed consent, monitors and equipment checked, pre-op evaluation, at physician/surgeon's request and post-op pain management  Timeout:       Correct Patient: Yes        Correct Procedure: Yes        Correct Site: Yes        Correct Position: Yes        Correct Laterality: Yes        Site Marked: Yes  Procedure Documentation  Procedure: Brachial plexus       Diagnosis: POSTOP PAIN       Laterality: right       Patient Position: supine       Patient Prep/Sterile Barriers: sterile gloves, mask       Skin prep: Chloraprep (supraclavicular approach).       Needle Gauge: 21.        Needle Length (millimeters): 110        Ultrasound guided       1. Ultrasound was used to identify targeted nerve, plexus, vascular marker, or fascial plane and place a needle adjacent to it in real-time.       2. Ultrasound was used to visualize the spread of anesthetic in close proximity to the above referenced structure.       3. A permanent image is entered into the patient's record.    Assessment/Narrative         The placement was negative for: blood aspirated, painful injection and site bleeding       Paresthesias: No.       Bolus given via needle. no blood aspirated via catheter.        Secured via.        Insertion/Infusion Method: Single Shot       Complications: none    Medication(s) Administered   Bupivacaine 0.25% PF (Infiltration) - Infiltration   30 mL - 5/3/2023 2:25:00 PM  Dexmedetomidine 4 mcg/mL (Perineural) - Perineural   20 mcg - 5/3/2023 2:25:00 PM  Dexamethasone 10 mg/mL PF (Perineural) - Perineural   1 mg -  "5/3/2023 2:25:00 PM   Comments:  Routine right supraclavicular nerve block without complications. Patient tolerated well.      FOR [facility] ONLY:   Pain Team Contact information: please page the Pain Team Via [employer]. Search \"Pain\". During daytime hours, please page the attending first. At night please page the resident first.      "

## 2023-05-03 NOTE — ANESTHESIA CARE TRANSFER NOTE
Patient: Sri Grewal    Procedure: Procedure(s):  OPEN REDUCTION INTERNAL FIXATION, FRACTURE, HUMERUS, DISTAL       Diagnosis: Closed bicondylar fracture of distal humerus, right, initial encounter [S42.886C]  Diagnosis Additional Information: No value filed.    Anesthesia Type:   General     Note:    Oropharynx: oropharynx clear of all foreign objects and ventilatory support  Level of Consciousness: drowsy  Oxygen Supplementation: nasal cannula  Level of Supplemental Oxygen (L/min / FiO2): 4  Independent Airway: airway patency satisfactory and stable  Dentition: dentition unchanged  Vital Signs Stable: post-procedure vital signs reviewed and stable  Report to RN Given: handoff report given  Patient transferred to: PACU    Handoff Report: Identifed the Patient, Identified the Reponsible Provider, Reviewed the pertinent medical history, Discussed the surgical course, Reviewed Intra-OP anesthesia mangement and issues during anesthesia, Set expectations for post-procedure period and Allowed opportunity for questions and acknowledgement of understanding      Vitals:  Vitals Value Taken Time   /73 05/03/23 1300   Temp     Pulse 92 05/03/23 1304   Resp 16 05/03/23 1304   SpO2 93 % 05/03/23 1304   Vitals shown include unvalidated device data.    Electronically Signed By: JAYY Connelly CRNA  May 3, 2023  1:05 PM

## 2023-05-03 NOTE — OP NOTE
Patient: Sri Grewal  : 1950  MRN: [id number]    DATE OF OPERATION: May 3, 2023      OPERATIVE REPORT       PREOPERATIVE DIAGNOSIS:  Right intra-articular distal humerus fracture     POSTOPERATIVE DIAGNOSIS:  Right intra-articular distal humerus fracture     PROCEDURE:  Open reduction internal fixation right distal humerus fracture with intercondylar extension CPT 79660    SURGEON:  Williams Phipps MD     ASSISTANT(S):  Amber Mueller MD ([id number])    ANESTHESIA:  General     IMPLANTS:   Synthes 2.7/3.5 medial and posterolateral distal humerus plates and olecranon plate with associated screws  Cancellous bone allograft    ESTIMATED BLOOD LOSS:  200cc    DVT PROPHYLAXIS:  SCDs    TOURNIQUET TIME:  135 minutes     SPECIMENS REMOVED:  None    INTRAOPERATIVE FINDINGS:  Comminuted intra-articular distal humerus fracture with severe bone loss along the lateral column, posterolateral metaphysis, and olecranon fossa    COMPLICATIONS:  None    DISPOSITION:  Stable to PACU.     INDICATIONS:  The patient is a 72-year-old female with a past medical history of coronary artery disease, Takotsubo cardiomyopathy, hypertension, and TIA and CVA who sustained a right intra-articular distal humerus fracture after a mechanical ground-level fall on .  She presented to an outside hospital and subsequently transferred to our facility.  Of note the patient also sustained a right proximal humerus fracture in February that was treated nonoperatively, which also subsequently displaced after her most recent fall.  Given the intra-articular displacement, and multiple traumatic injuries, the patient was indicated for open reduction internal fixation of her right distal humerus.    The indications for surgery were discussed with the patient. The benefits, risks, and alternatives of operative management were discussed in detail with the patient. The patient understands that the risks of surgery include, but are not limited to: infection,  bleeding, injury to nearby structures (such as nerves, blood vessels, and tendons), nonunion, malunion, hardware failure/irritation or breakage, need for additional surgery, pain, stiffness, scarring, need for rehabilitation, and anesthetic complications.  Patient expressed understanding and elected to proceed with surgery. All questions were answered to the patient's satisfaction.    Consent was obtained for the procedure.     DESCRIPTION OF PROCEDURE IN DETAIL:  The patient was seen in the preoperative care unit. Patient identity, consent, procedure to be performed, and operative site were verified with the patient. The right upper extremity was marked.     The patient was brought to the operating room and placed supine on the operating room table.  SCDs were placed on the bilateral lower extremities . General endotracheal anesthesia was induced.  A Merida catheter was placed.  The patient was then placed in the left lateral decubitus position on a half body beanbag.  All bony prominences were well-padded.  Her right arm was then supported on pillows on top of the radiolucent armboard.     The right upper extremity was prepped and draped in the usual sterile fashion. A timeout was performed confirming the correct patient, procedure, operative site, and antibiotics. All were in agreement.    A longitudinal incision was made over the posterior upper arm curving posterolaterally around the olecranon and continued along the subcutaneous border of the ulna.  Blunt dissection was carried down through the subcutaneous tissues to the triceps fascia.  Full-thickness medial and lateral subcutaneous flaps were elevated.  The ulnar nerve was identified proximally along the intermuscular septum.  It was carefully dissected free of the surrounding tissues and followed into the cubital tunnel.  Colbert's ligament was incised.  The nerve was then followed distally and the FCU fascia released and 2 heads of the FCU muscle belly  bluntly .  A full ulnar nerve release was performed.  The nerve was then tagged with a vessel loop and transposed anteriorly into the subcutaneous pocket.  It was carefully protected throughout the remainder of the case.    The interval between the ECU and FCU fascia was then incised along the subcutaneous border of the ulna.  The muscle bellies were bluntly elevated over the proximal ulna.  A medial arthrotomy was performed to visualize the ulnotrochlear joint.  An olecranon osteotomy was then made with a sagittal saw at the center of the semilunar notch.  The capsulotomies were then completed medially and laterally and the olecranon tip and the triceps were reflected proximally.  This provided excellent exposure of the joint surface.  A sterile tourniquet was then placed on the upper arm. The limb was exsanguinated and tourniquet inflated to 250 mmHg. Examination of the fracture demonstrated severe comminution of the lateral column and posterolateral surface of the capitellum.  There was significant bone loss within the metaphyseal and subchondral bone of the capitellum.  The fracture line extended from the posterior lateral surface across the olecranon fossa and out the medial humeral metaphysis.  The trochlea and articular surface of the capitellum were examined and noted to be intact. The medial column was then reduced with pointed reduction forceps and held provisionally with K wires.  Appropriate fracture reduction was confirmed on fluoroscopy.  A medial column plate was then placed on the medial distal humerus. A proximal cortical screw was first drilled measured and placed.  This was then followed by the distal locking screws across the distal articular block.  Care was taken to place all screws extra-articularly. This was confirmed with direct visualization and fluoroscopy.    Cancellous bone allograft was then placed in the void behind the capitellum and along the lateral column.  The  posterolateral cortex was then reduced to the shaft and held provisionally with a K wire.  A posterolateral plate was then applied to the bone.  The proximal cortical screws were then drilled measured and placed, followed by unicortical distal locking screws.    Finally the remaining locking screws in the medial plate were drilled measured and placed to provide extra fixation given her poor quality bone.  Final fluoroscopic images were obtained confirming appropriate fracture and articular reduction, plate placement, and screw trajectory and lengths.  All screws were confirmed to be extra-articular.    The olecranon osteotomy was then reduced using pointed reduction forceps.  An olecranon plate was then selected and applied to the ulna and held provisionally with K wires.  Fluoroscopic evaluation demonstrated anatomic reduction of the osteotomy with appropriate plate placement.  A proximal locking screw was then placed in the olecranon tip.  This was then followed by the distal cortical screws applying the plate and compression.  Additional locking screws were then placed proximally.  All K wires were then removed. The tourniquet was deflated and hemostasis assured. Final fluoroscopic images were obtained confirming anatomic osteotomy reduction, plate placement, screw trajectories and lengths.    The wound was then copiously irrigated.  1 g of vancomycin powder was placed along the distal humerus implants.  The ulnar nerve was then replaced into the groove behind the medial epicondyle.  The elbow was then taken through range of motion and the nerve was found to be stable without any subluxation.  Therefore no ulnar nerve transposition was performed.    Longitudinal incision at the triceps insertion repaired over the plate Vicryl.  The ECU and FCU fascia was then also closed with 0 Vicryl providing full coverage of the olecranon plate.  The deep dermal tissues were then closed with interrupted 2-0 Vicryl sutures.   The skin was then closed with a running 3-0 Monocryl subcuticular suture.  Steri-Strips and a sterile dressing were applied.  The arm was then placed in a well-padded long-arm splint at neutral. A cuff and collar sling was placed.    All needle and sponge counts were correct at the end of the procedure. There were no complications.     The patient was awoken from anesthesia and taken to the postoperative recovery unit in stable condition.     I was present and scrubbed for the entire procedure.     POSTOPERATIVE PLAN:  The patient was found to be fully neurovascularly intact in the postoperative recovery unit.  The regional anesthesia team performed a postoperative block for pain control.  The patient will be admitted for observation.  She will return in 1 week at which point x-rays of the right elbow and right shoulder will be needed out of plaster.  She will then see hand therapy to begin early active motion of her right elbow, maintaining the elbow at the side due to her proximal humerus fracture. The proximal humerus fracture will be managed by my shoulder colleagues. She will use a cuff and collar sling.    Williams Phipps MD     Hand, Upper Extremity & Microvascular Surgery  Department of Orthopedic Surgery  [facility]

## 2023-05-03 NOTE — ANESTHESIA PREPROCEDURE EVALUATION
Anesthesia Pre-Procedure Evaluation    Patient: Sri Grewal   MRN: [id number] : 1950        Procedure : Procedure(s):  OPEN REDUCTION INTERNAL FIXATION, FRACTURE, HUMERUS, DISTAL          Past Medical History:   Diagnosis Date     Abnormal Papanicolaou smear of cervix and cervical HPV      Allergic rhinitis, cause unspecified     seasonal allergies     Cerebral infarction (H)      Contact dermatitis and other eczema, due to unspecified cause      Endometriosis, site unspecified      Esophageal reflux     GERD     Migraine, unspecified, without mention of intractable migraine without mention of status migrainosus      Mild persistent asthma      Unspecified disorder of uterus     fibroid uterus      Past Surgical History:   Procedure Laterality Date     COLONOSCOPY  2014    Procedure: COLONOSCOPY;  Surgeon: Nathan Baker MD;  Location: [facility]     ZZC  DELIVERY ONLY       X2     ZZHC COLONOSCOPY THRU STOMA, DIAGNOSTIC  2002    normal      Allergies   Allergen Reactions     Morphine And Related      GI UPSET     Oxycodone      Seasonal Allergies       Social History     Tobacco Use     Smoking status: Never     Passive exposure: Never     Smokeless tobacco: Never   Vaping Use     Vaping status: Never Used   Substance Use Topics     Alcohol use: Yes     Comment: socially      Wt Readings from Last 1 Encounters:   23 60.8 kg (134 lb 0.6 oz)        Anesthesia Evaluation   Pt has had prior anesthetic. Type: General.    No history of anesthetic complications       ROS/MED HX  ENT/Pulmonary:     (+) asthma     Neurologic:     (+) CVA, TIA,     Cardiovascular: Comment: H/o takotsubo cardiomyopathy, normal EF    (+) hypertension--CAD ---    METS/Exercise Tolerance:     Hematologic:       Musculoskeletal:       GI/Hepatic:     (+) GERD, Asymptomatic on medication,     Renal/Genitourinary:       Endo:       Psychiatric/Substance Use:       Infectious Disease:       Malignancy:       Other:   "          Physical Exam    Airway        Mallampati: II   TM distance: > 3 FB   Neck ROM: full   Mouth opening: > 3 cm    Respiratory Devices and Support         Dental         B=Bridge, C=Chipped, L=Loose, M=Missing    Cardiovascular   cardiovascular exam normal          Pulmonary   pulmonary exam normal                OUTSIDE LABS:  CBC:   Lab Results   Component Value Date    WBC 7.9 04/30/2023    WBC 6.1 02/02/2023    HGB 11.7 04/30/2023    HGB 15.9 (H) 02/02/2023    HCT 34.2 (L) 04/30/2023    HCT 46.0 02/02/2023     04/30/2023     02/02/2023     BMP:   Lab Results   Component Value Date     (L) 04/30/2023     (L) 02/02/2023    POTASSIUM 3.6 04/30/2023    POTASSIUM 3.9 02/02/2023    CHLORIDE 94 (L) 04/30/2023    CHLORIDE 94 (L) 02/02/2023    CO2 26 04/30/2023    CO2 30 (H) 02/02/2023    BUN 14.1 04/30/2023    BUN 9.3 02/02/2023    CR 0.89 04/30/2023    CR 0.74 02/02/2023     (H) 04/30/2023     (H) 02/02/2023     COAGS:   Lab Results   Component Value Date    PTT 30 01/15/2017    INR 0.96 04/30/2023     POC: No results found for: BGM, HCG, HCGS  HEPATIC:   Lab Results   Component Value Date    ALBUMIN 4.5 02/02/2023    PROTTOTAL 7.2 02/02/2023    ALT 24 02/02/2023    AST 31 02/02/2023    ALKPHOS 86 02/02/2023    BILITOTAL 0.6 02/02/2023    BILIDIRECT 0.2 04/26/2002     OTHER:   Lab Results   Component Value Date    PH 7.0 12/09/2013    A1C 5.4 01/16/2017    CESIA 8.9 04/30/2023    MAG 2.3 01/12/2019    LIPASE 109 05/20/2014    TSH 0.35 02/02/2023    SED 11 10/14/2005       Anesthesia Plan    ASA Status:  3      Anesthesia Type: General.     - Airway: ETT   Induction: Propofol.           Consents    Anesthesia Plan(s) and associated risks, benefits, and realistic alternatives discussed. Questions answered and patient/representative(s) expressed understanding.    - Discussed:     - Discussed with:  Patient, Other (See Comment) (\"friend\")      - Extended Intubation/Ventilatory " Support Discussed: Yes.      - Patient is DNR/DNI Status: No    Use of blood products discussed: Yes.     - Discussed with: Patient.     - Consented: consented to blood products            Reason for refusal: other.     Postoperative Care    Pain management: IV analgesics, Peripheral nerve block (Single Shot) (nerve block to be performed post op).        Comments:                Juan F Kennedy MD

## 2023-05-03 NOTE — DISCHARGE SUMMARY
[facility] Orthopaedic Surgery Discharge Summary    Sri Grewal MRN# [id number]   Age: 72 year old YOB: 1950       Date of Admission:  4/30/2023  Date of Discharge::  5/1/2023    Admitting Physician:  Emigdio Lobato MD  Discharge To:  Home   Primary Care Physician:      Rosenda Pierre          Admission Diagnoses:   Fall, initial encounter [W19.XXXA]  Closed fracture of distal end of right humerus, unspecified fracture morphology, initial encounter [S42.401A]  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, unspecified fracture morphology, subsequent encounter [S42.201K]         Discharge Diagnosis:   There are no discharge diagnoses documented for the most recent discharge.            Procedures Performed:     None         Consultations:   INTERNAL MEDICINE ADULT IP CONSULT FOR [facility]  OCCUPATIONAL THERAPY ADULT IP CONSULT          Brief History:        Sri Grewal is a 72 year old right-hand-dominant female who presents as a transfer from [facility] with right proximal and distal humerus fractures.  I discussed with the patient that her right acute on chronic proximal humerus fracture may possibly be managed nonoperatively.  Her right displaced medial condyle elbow fracture will likely benefit from operative treatment, however.  We reviewed that the surgeries are not emergent or urgent and that she would likely do best with consultation from one of our hand and elbow surgeons.  We will therefore hold off on performing any surgery this morning and instead formulate a plan moving forward.  The signs and symptoms of a compartment syndrome were discussed with the patient and she will let nursing know should they develop.  Very low concern for development of compartment syndrome at this time.        Hospital Course:   Patient did well post operatively.  Transitioned from iv medication to oral meds.  Tolerated diet, resumed normal bowel and bladder  function.  Was seen by PT/OT prior to discharge.           Medications Prior to Admission:     Medications Prior to Admission   Medication Sig Dispense Refill Last Dose     acetaminophen (TYLENOL) 325 MG tablet Take 2 tablets (650 mg) by mouth every 4 hours as needed for other 60 tablet 0      buPROPion (WELLBUTRIN XL) 300 MG 24 hr tablet Take 1 tablet (300 mg) by mouth every morning 90 tablet 1 4/29/2023     butalbital-aspirin-caffeine (FIORINAL) -40 MG per capsule Take 1 capsule by mouth every 6 hours as needed for moderate to severe pain 30 capsule 5 Past Week     carvedilol (COREG) 12.5 MG tablet TAKE ONE TABLET BY MOUTH TWICE A DAY WITH FOOD 180 tablet 1 4/29/2023     docusate sodium (COLACE) 100 MG tablet Take 1-2 tablets (100-200 mg) by mouth 2 times daily as needed for constipation 120 tablet 3 Past Month     furosemide (LASIX) 40 MG tablet TAKE ONE TABLET BY MOUTH EVERY DAY 90 tablet 3 4/29/2023     HYDROcodone-acetaminophen (NORCO) 5-325 MG tablet Take 1 tablet by mouth every 6 hours as needed for severe pain (7-10) (Max 3/day) 70 tablet 0 Past Week     HYDROmorphone (DILAUDID) 2 MG tablet Take 1.5 tablets (3 mg) by mouth every 4 hours as needed for moderate pain 10 tablet 0      hydrOXYzine (ATARAX) 10 MG tablet Take 1 tablet (10 mg) by mouth every 6 hours as needed for other (adjuvant pain) 30 tablet 0      lisinopril (ZESTRIL) 20 MG tablet Take 1 tablet (20 mg) by mouth daily 90 tablet 3 4/29/2023     polyethylene glycol (MIRALAX) 17 g packet Take 17 g by mouth daily 10 packet 0      rosuvastatin (CRESTOR) 20 MG tablet Take 1 tablet (20 mg) by mouth daily 90 tablet 3 4/29/2023     senna-docusate (SENOKOT-S/PERICOLACE) 8.6-50 MG tablet Take 1 tablet by mouth 2 times daily 30 tablet 0      tiZANidine (ZANAFLEX) 2 MG tablet Take 1 tablet (2 mg) by mouth 3 times daily 30 tablet 0      venlafaxine (EFFEXOR XR) 150 MG 24 hr capsule Take 1 capsule (150 mg) by mouth daily 90 capsule 1 4/29/2023             Discharge Medications:        Review of your medicines      START taking      Dose / Directions   acetaminophen 325 MG tablet  Commonly known as: TYLENOL  Used for: Acute pain of right shoulder      Dose: 650 mg  Take 2 tablets (650 mg) by mouth every 4 hours as needed for other  Quantity: 60 tablet  Refills: 0     HYDROmorphone 2 MG tablet  Commonly known as: DILAUDID  Used for: Acute pain of right shoulder      Dose: 3 mg  Take 1.5 tablets (3 mg) by mouth every 4 hours as needed for moderate pain  Quantity: 10 tablet  Refills: 0     hydrOXYzine 10 MG tablet  Commonly known as: ATARAX  Used for: Acute pain of right shoulder      Dose: 10 mg  Take 1 tablet (10 mg) by mouth every 6 hours as needed for other (adjuvant pain)  Quantity: 30 tablet  Refills: 0     polyethylene glycol 17 g packet  Commonly known as: MIRALAX  Used for: Acute pain of right shoulder      Dose: 17 g  Take 17 g by mouth daily  Quantity: 10 packet  Refills: 0     senna-docusate 8.6-50 MG tablet  Commonly known as: SENOKOT-S/PERICOLACE  Used for: Acute pain of right shoulder      Dose: 1 tablet  Take 1 tablet by mouth 2 times daily  Quantity: 30 tablet  Refills: 0     tiZANidine 2 MG tablet  Commonly known as: ZANAFLEX  Used for: Acute pain of right shoulder      Dose: 2 mg  Take 1 tablet (2 mg) by mouth 3 times daily  Quantity: 30 tablet  Refills: 0        CONTINUE these medicines which have NOT CHANGED      Dose / Directions   buPROPion 300 MG 24 hr tablet  Commonly known as: WELLBUTRIN XL  Used for: Adjustment disorder with mixed anxiety and depressed mood, Moderate major depression (H)      Dose: 300 mg  Take 1 tablet (300 mg) by mouth every morning  Quantity: 90 tablet  Refills: 1     butalbital-aspirin-caffeine -40 MG per capsule  Commonly known as: FIORINAL  Used for: Chronic migraine without aura without status migrainosus, not intractable      Dose: 1 capsule  Take 1 capsule by mouth every 6 hours as needed for moderate to severe  pain  Quantity: 30 capsule  Refills: 5     carvedilol 12.5 MG tablet  Commonly known as: COREG  Used for: Hypertension goal BP (blood pressure) < 140/90, ST elevation myocardial infarction (STEMI), unspecified artery (H)      TAKE ONE TABLET BY MOUTH TWICE A DAY WITH FOOD  Quantity: 180 tablet  Refills: 1     docusate sodium 100 MG tablet  Commonly known as: COLACE  Used for: Drug induced constipation      Dose: 100-200 mg  Take 1-2 tablets (100-200 mg) by mouth 2 times daily as needed for constipation  Quantity: 120 tablet  Refills: 3     furosemide 40 MG tablet  Commonly known as: LASIX  Used for: Hypertension goal BP (blood pressure) < 140/90      TAKE ONE TABLET BY MOUTH EVERY DAY  Quantity: 90 tablet  Refills: 3     HYDROcodone-acetaminophen 5-325 MG tablet  Commonly known as: NORCO  Used for: Closed nondisplaced fracture of surgical neck of right humerus with routine healing, unspecified fracture morphology, subsequent encounter      Dose: 1 tablet  Take 1 tablet by mouth every 6 hours as needed for severe pain (7-10) (Max 3/day)  Quantity: 70 tablet  Refills: 0     lisinopril 20 MG tablet  Commonly known as: ZESTRIL  Used for: Hypertension goal BP (blood pressure) < 140/90      Dose: 20 mg  Take 1 tablet (20 mg) by mouth daily  Quantity: 90 tablet  Refills: 3     rosuvastatin 20 MG tablet  Commonly known as: CRESTOR  Used for: Coronary artery disease due to lipid rich plaque      Dose: 20 mg  Take 1 tablet (20 mg) by mouth daily  Quantity: 90 tablet  Refills: 3     venlafaxine 150 MG 24 hr capsule  Commonly known as: EFFEXOR XR      Dose: 150 mg  Take 1 capsule (150 mg) by mouth daily  Quantity: 90 capsule  Refills: 1        STOP taking    aspirin 81 MG chewable tablet  Commonly known as: ASA        clopidogrel 75 MG tablet  Commonly known as: PLAVIX              Where to get your medicines      These medications were sent to [place], [place] - [address]  [address], [facility] [address]    Phone: [phone]     acetaminophen 325 MG tablet    HYDROmorphone 2 MG tablet    hydrOXYzine 10 MG tablet    polyethylene glycol 17 g packet    senna-docusate 8.6-50 MG tablet    tiZANidine 2 MG tablet                 Pending Results at Discharge:   None         Discharge Instructions:     Discharge Procedure Orders   Reason for your hospital stay   Order Comments: Assessment & Plan  Sri Grewal is a 72 year old female admitted on 4/30/2023. She has a pmh of coronary artery disease, NICM ([place]), HTN, previous TIA and CVA recently discharged from [facility] given concern for acute CVA in the setting of aphasia and R-sided weakness s/p TNK in the ED with resolution of symptoms. Patient now presents as a transfer from [facility] following right arm pain due to fall at home. Patient now found to have:  - Acute displaced fracture of the proximal shaft of the humerus superimposed on a late subacute, ununited fracture of the humeral neck  - Acute displaced intra-articular fracture of the distal humerus extending from the supracondylar region between the condyles and into the lateral humeral condyle  Patient has since been evaluated by orthopedic surgery recommending NWB in RUE and no indication for emergent or urgent surgery.     Activity   Order Comments: Your activity upon discharge: activity as tolerated    Sling at all times. NWB     Order Specific Question Answer Comments   Is discharge order? Yes      When to contact your care team   Order Comments: Call Dr. Lobato  if you have any of the following: temperature greater than 101.3  or less than 96.5,  increased shortness of breath, increased drainage, increased swelling, or increased pain.    Contact phone number is      Wound care and dressings   Order Comments: Instructions to care for your wound at home: ice to area for comfort, keep wound clean and dry, may get incision wet in shower but do not soak or scrub,  reinforce dressing as needed, and remove dressing in 7 days.     Adult [facility] Follow-up and recommended labs and tests   Order Comments: Follow up with Dr Lobato  as scheduled.      Surgery scheduling will call and confirm date and time of surgery.    Appointments at [facility] at [address] [place] [address] ([facility])     Call [phone] if you haven't heard regarding these appointments within 7 days of discharge.     Diet   Order Comments: Follow this diet upon discharge: Orders Placed This Encounter      Regular Diet Adult    NPO after midnight on Tuesday night for surgery on Wednesday!     Order Specific Question Answer Comments   Is discharge order? Yes      Future Appointments   Date Time Provider Department Center   6/6/2023  1:00 PM Nathan Turner MD [facility]   8/4/2023  4:45 PM Miryam Mcgowan MD [facility]       Jaylin HOPE CNS  Department of Orthopedic Surgery  [facility]  [phone]

## 2023-05-03 NOTE — PROGRESS NOTES
Orthopaedic Surgery Progress Note 05/03/2023    S: Evaluated in PACU. Prior to block placement.    O:  Temp: 98.9  F (37.2  C) Temp src: Axillary BP: (!) 142/73 Pulse: 90   Resp: 18 SpO2: 96 % O2 Device: Nasal cannula Oxygen Delivery: 2 LPM    Exam:  Gen: No acute distress, resting comfortably in bed.  CV: wwp  Resp: Non-labored breathing  MSK:  Upper Extremity:    Inspection: Dressing C/D/I, Swelling of right hand, and bruising of right shoulder noted  Motor:  Able to fire FDS/FDP, EDC, FPL, EPL, interossei  Sensory: SILT to median, ulnar, and radial nerve distributions  Circulation: fingers warm and well perfused    Assessment: Sri Grewal is a 72 year old female s/p ORIF right distal humerus fracture with intercondylar extension on 5/3/2023 with Dr. Phipps.     Plan:  Primary: Ortho  Activity: Up with assist.  Weight bearing status: NWB RUE   Antibiotics: Ancef x24 hours perioperatively.  Diet: Begin with clear fluids and progress diet as tolerated.  DVT prophylaxis: Continue Plavix and mechanical while in the hospital; discharge on mechanical  Bracing/Splinting: Posterior long arm splint to be kept clean, dry, and intact until follow-up.   2 bracing options:   1. Current brace in place - modified cuff-and-collar  2. Shoulder immobilizer with waist wrap  Elevation: Elevate RUE on pillows to keep swelling down as much as possible.  Pain management: transition from IV to orals as tolerated.   X-rays: R shoulder POD #1 - to be obtained in the upright position.  Occupational Therapy: ADL's.  Labs: Trend Hgb on POD #1.  Consults: OT. Hospitalist, appreciate assistance in caring for this patient.  Follow-up: Clinic with Dr. Phipps Care Team in 1 week         Future Appointments   Date Time Provider Department Center   6/6/2023  1:00 PM Nathan Turner MD [facility]   8/4/2023  4:45 PM Miryam Mcgowan MD [facility]         Disposition: Pending progress with therapies, pain control on orals, and  medical stability, anticipate discharge to home on POD #1.     Amber Mueller MD  Orthopaedic Surgery, PGY-5

## 2023-05-03 NOTE — BRIEF OP NOTE
[facility]    Brief Operative Note    Pre-operative diagnosis: Closed bicondylar fracture of distal humerus, right, initial encounter [S48.623Y]  Post-operative diagnosis Same as pre-operative diagnosis    Procedure: Procedure(s):  OPEN REDUCTION INTERNAL FIXATION, FRACTURE, HUMERUS, DISTAL  Surgeon: Surgeon(s) and Role:     * Williams Phipps MD - Primary     * Amber Mueller MD - Resident - Assisting  Anesthesia: General   Estimated Blood Loss: 200 mL from 5/3/2023  7:37 AM to 5/3/2023 12:57 PM      Drains: None  Specimens: * No specimens in log *  Findings:   Please see op note.  Complications: None.  Implants:   Implant Name Type Inv. Item Serial No.  Lot No. LRB No. Used Action   GRAFT BONE CHIPS CANC CUBE 15CC [id number] - [id number] Bone/Tissue/Biologic GRAFT BONE CHIPS CANC CUBE 15CC [id number] [employer] [facility]  Right 1 Implanted   IMP SCR SYN CORTEX 3.5X16MM SELF TAP .816 - SNA Metallic Hardware/[place] IMP SCR SYN CORTEX 3.5X16MM SELF TAP .816 NA SYNTHES-STRATEC  Right 1 Implanted   IMP SCR SYN CORTEX 3.5X18MM SELF TAP .818 - SNA Metallic Hardware/[place] IMP SCR SYN CORTEX 3.5X18MM SELF TAP .818 NA SYNTHES-STRATEC  Right 3 Implanted   IMP SCR SYN CORTEX 3.5X22MM SELF TAP .822 - SNA Metallic Hardware/[place] IMP SCR SYN CORTEX 3.5X22MM SELF TAP .822 NA SY[employer]THES-[employer]ATEC  Right 1 Implanted   IMP SCR SYN CORTEX 3.5X24MM SELF TAP .824 - SNA Metallic Hardware/[place] IMP SCR SYN CORTEX 3.5X24MM SELF TAP .824 NA SYNTHES-[employer]ATEC  Right 1 Implanted   IMP SCR SYN CORTEX 3.5X26MM SELF TAP .826 - SNA Metallic Hardware/[place] IMP SCR SYN CORTEX 3.5X26MM SELF TAP .826 NA SYNTHES-[employer]ATEC  Right 1 Implanted   IMP SCR SYN 2.7X14MM T8 SD LOCKING SELF-TAP VA 02.211.014 - SNA Metallic Hardware/[place] IMP SCR SYN 2.7X14MM T8 SD LOCKING SELF-TAP VA 02.211.014 NA [employer]  Right 3 Implanted   IMP  SCR SYN 2.7X18MM T8 SD LOCKING SELF-TAP VA 02.211.018 - SNA Metallic Hardware/[place] IMP SCR SYN 2.7X18MM T8 SD LOCKING SELF-TAP VA 02.211.018 NA [facility]  Right 2 Implanted   IMP SCR SYN 2.7X20MM T8 SD LOCKING SELF-TAP VA 02.211.020 - SNA Metallic Hardware/[place] IMP SCR SYN 2.7X20MM T8 SD LOCKING SELF-TAP VA 02.211.020 NA [facility]  Right 1 Implanted   IMP SCR SYN 2.7X26MM T8 SD LOCKING SELF-TAP VA 02.211.026 - SNA Metallic Hardware/[place] IMP SCR SYN 2.7X26MM T8 SD LOCKING SELF-TAP VA 02.211.026 NA [facility]  Right 1 Implanted   SCREW LOCKING ST 2.7X28MM - SNA Metallic Hardware/[place] SCREW LOCKING ST 2.7X28MM NA [facility]  Right 2 Implanted   IMP SCR SYN 2.7X34MM T8 SD LOCKING SELF-TAP VA 02.211.034 - SNA Metallic Hardware/[place] IMP SCR SYN 2.7X34MM T8 SD LOCKING SELF-TAP VA 02.211.034 NA [facility]  Right 1 Wasted   IMP SCR SYN 2.7X36MM T8 SD LOCKING SELF-TAP VA 02.211.036 - SNA Metallic Hardware/[place] IMP SCR SYN 2.7X36MM T8 SD LOCKING SELF-TAP VA 02.211.036 NA [facility]  Right 1 Implanted   IMP SCR SYN 2.7X44MM T8 SD LOCKING SELF-TAP VA 02.211.044 - SNA Metallic Hardware/[place] IMP SCR SYN 2.7X44MM T8 SD LOCKING SELF-TAP VA 02.211.044 NA [facility]  Right 1 Implanted   SCREW LOCKING ST 2.7X46MM - SNA Metallic Hardware/[place] SCREW LOCKING ST 2.7X46MM NA [facility]  Right 2 Implanted   SCREW LOCKING ST 2.7X50MM - SNA Metallic Hardware/[place] SCREW LOCKING ST 2.7X50MM NA [facility]  Right 2 Implanted   IMP SCR SYN CORTEX 2.7X20MM SELF TAP .820 - SNA Metallic Hardware/[place] IMP SCR SYN CORTEX 2.7X20MM SELF TAP .820 NA [facility]  Right 1 Implanted   IMP SCR SYN 3.5X20MM LOCKING W/STARDRIVE .106 - SNA Metallic Hardware/[place] IMP SCR SYN 3.5X20MM LOCKING W/STARDRIVE .106 [facility]  Right 1 Implanted   Drill bits quick coupling 2.5mm diameter and length 110mm   NA SYNTHES  Right 1 Implanted   SCREW LOCKING ST 2.7X10MM - SNA  Metallic Hardware/[place] SCREW LOCKING ST 2.7X10MM NA SYNTHES-STRATEC  Right 1 Implanted   2.0 Drill bit   NA SYNTHES  Right 1 Implanted   PLATE DIST HUM MEDIAL 2H RT 2.7-3.5X85 - SNA Metallic Hardware/[place] PLATE DIST HUM MEDIAL 2H RT 2.7-3.5X85 NA SYNTHES-STRATEC  Right 1 Implanted   PLATE OLECRANON R 2H 2.7/3.5X90MM - SNA Metallic Hardware/[place] PLATE OLECRANON R 2H 2.7/3.5X90MM NA SYNTHES-STRATEC  Right 1 Implanted   PLATE DIST HUM DORSO 4H RT 2.7/3.5X88MM - SNA Metallic Hardware/[place] PLATE DIST HUM DORSO 4H RT 2.7/3.5X88MM NA SYNTHES-STRATEC  Right 1 Implanted       Assessment: Sri Grewal is a 72 year old female s/p ORIF right distal humerus fracture with intercondylar extension on 5/3/2023 with Dr. Phipps.    Plan:  Primary: Ortho  Activity: Up with assist.  Weight bearing status: NWB RUE   Antibiotics: Ancef x24 hours perioperatively.  Diet: Begin with clear fluids and progress diet as tolerated.  DVT prophylaxis: Continue Plavix and mechanical while in the hospital; discharge on mechanical  Bracing/Splinting: Posterior long arm splint to be kept clean, dry, and intact until follow-up.  Elevation: Elevate RUE on pillows to keep swelling down as much as possible.  Pain management: transition from IV to orals as tolerated.   X-rays: R shoulder POD #1 - to be obtained in the upright position.  Occupational Therapy: ADL's.  Labs: Trend Hgb on POD #1.  Consults: OT. Hospitalist, appreciate assistance in caring for this patient.  Follow-up: Clinic with Dr. Phipps Care Team in 1 week  Future Appointments   Date Time Provider Department Center   6/6/2023  1:00 PM Nathan Turner MD [facility]   8/4/2023  4:45 PM Miryam Mcgowan MD [facility]       Disposition: Pending progress with therapies, pain control on orals, and medical stability, anticipate discharge to home on POD #1.    Amber Mueller MD  Orthopaedic Surgery, PGY-5

## 2023-05-03 NOTE — ANESTHESIA POSTPROCEDURE EVALUATION
Patient: Sri Grewal    Procedure: Procedure(s):  OPEN REDUCTION INTERNAL FIXATION, FRACTURE, HUMERUS, DISTAL       Anesthesia Type:  General    Note:  Disposition: Inpatient   Postop Pain Control: Uneventful            Sign Out: Well controlled pain (received supraclavicular block post op)   PONV: No   Neuro/Psych: Uneventful            Sign Out: Acceptable/Baseline neuro status   Airway/Respiratory: Uneventful            Sign Out: Acceptable/Baseline resp. status   CV/Hemodynamics: Uneventful            Sign Out: Acceptable CV status; No obvious hypovolemia; No obvious fluid overload   Other NRE:    DID A NON-ROUTINE EVENT OCCUR? No           Last vitals:  Vitals Value Taken Time   /92 05/03/23 1415   Temp 37.2  C (98.9  F) 05/03/23 1300   Pulse 93 05/03/23 1420   Resp 13 05/03/23 1404   SpO2 99 % 05/03/23 1413   Vitals shown include unvalidated device data.    Electronically Signed By: Juan F Kennedy MD  May 3, 2023  2:21 PM

## 2023-05-03 NOTE — ANESTHESIA PROCEDURE NOTES
Airway       Patient location during procedure: OR       Procedure Start/Stop Times: 5/3/2023 7:45 AM  Staff -        CRNA: Pacheco Sapp APRN CRNA       Performed By: CRNA  Consent for Airway        Urgency: elective  Indications and Patient Condition       Indications for airway management: oren-procedural       Induction type:intravenous       Mask difficulty assessment: 1 - vent by mask    Final Airway Details       Final airway type: endotracheal airway       Successful airway: ETT - single  Endotracheal Airway Details        ETT size (mm): 7.0       Cuffed: yes       Successful intubation technique: direct laryngoscopy       DL Blade Type: MAC 3       Grade View of Cords: 1       Adjucts: stylet       Position: Right       Measured from: gums/teeth       Secured at (cm): 22       Bite block used: None    Post intubation assessment        Placement verified by: capnometry, equal breath sounds and chest rise        Number of attempts at approach: 1       Number of other approaches attempted: 0       Secured with: pink tape       Ease of procedure: easy       Dentition: Intact and Unchanged    Medication(s) Administered   Medication Administration Time: 5/3/2023 7:45 AM

## 2023-05-03 NOTE — CONSULTS
[facility]  Consult Note - Hospitalist Service, GOLD TEAM   Date of Admission:  5/3/2023  Consult Requested by: Dr. Phipps  Reason for Consult: Medical co-management     Assessment & Plan   Sri Grewal is a 72 year old female admitted on 5/3/2023. She has a past medical history significant for CAD, Takotsubo cardiomyopathy, HTN, and prior TIA/CVA who was recently admitted to [facility] due to right proximal and distal humerus fractures. She is now admitted to [facility] after undergoing ORIF of distal right humerus fracture with intercondylar extension with Dr. Phipps. Internal Medicine consulted for medical co-management.     S/p ORIF of distal right humerus fracture with intercondylar extension  Sustained right intra-articular distal humerus fracture after mechanical ground level fall on 4/29. Also displaced a prior right proximal humerus fracture that was previously managed non-operatively. No intraoperative complications.  mL.   - Activity, DVT ppx, post-operative abx per Orthopedics Team  - Pain currently well controlled- further management per Orthopedics Team  - Post-op Hgb, BMP, Mg pending    HTN  Hx of Takotsubo cardiomyopathy  Most recent Echo in 2/2023 obtained due to new murmur. Hyperkinetic EF 65-70%.  - Continue coreg 12.5 mg BID with hold parameters   - Will hold lasix and lisinopril for now and consider resuming as BP/renal function allows    CAD  Hx of TIA/CVA  Cerebral aneursym   Recent admission at [facility] due to concern for CVA in setting of aphasia and right sided weakness. Received TNK in ED with resolution of symptoms. Imaging was negative for acute stroke. Per Neurology episode was likely TIA vs aborted stroke due to TNK.  - Continue plavix 75 mg daily if able   - Continue rosuvastatin 20 mg at bedtime     Depression - Continue venalafaxine 150 mg and buproprion 300 mg daily        The patient's care was discussed with the Patient and PACU  RN. Recommendations communicated to primary team via this note. We will continue to follow for co-management.     Clinically Significant Risk Factors Present on Admission                  # Hypertension: home medication list includes antihypertensive(s)              Darshana Nicolas PA-C  Hospitalist Service, GOLD TEAM   Securely message with [employer] (more info)  Text page via [facility] Paging/Directory   See signed in provider for up to date coverage information  ______________________________________________________________________    Chief Complaint   Post-operative state    History is obtained from the patient    History of Present Illness   Sri Grewal is a 72 year old female who is seen in the PACU following ORIF with Dr. Phipps. Reports doing well so far and pain is well controlled. Breathing is at baseline. Has passed small amount of gas so far. Has not urinated yet. Otherwise denies any acute concerns.     Past Medical History    Past Medical History:   Diagnosis Date     Abnormal Papanicolaou smear of cervix and cervical HPV      Allergic rhinitis, cause unspecified     seasonal allergies     Cerebral infarction (H)      Contact dermatitis and other eczema, due to unspecified cause      Endometriosis, site unspecified      Esophageal reflux     GERD     Migraine, unspecified, without mention of intractable migraine without mention of status migrainosus      Mild persistent asthma      Unspecified disorder of uterus     fibroid uterus       Past Surgical History   Past Surgical History:   Procedure Laterality Date     COLONOSCOPY  2014    Procedure: COLONOSCOPY;  Surgeon: Nathan Baker MD;  Location: [facility]     ZZC  DELIVERY ONLY       X2     ZZHC COLONOSCOPY THRU STOMA, DIAGNOSTIC  2002    normal       Medications   I have reviewed this patient's current medications       Review of Systems    The 5 point Review of Systems is negative other than noted in the HPI or here.      Physical  Exam   Vital Signs: Temp: 98.2  F (36.8  C) Temp src: Axillary BP: 100/77 Pulse: 88   Resp: 14 SpO2: 99 % O2 Device: Nasal cannula Oxygen Delivery: 1 LPM  Weight: 134 lbs .63 oz    General Appearance: Awake. Alert and oriented x3. Laying in bed. No acute distress.  Eyes: Normal lids. Anicteric sclera. Pupils equal and round.  HEENT: Normocephalic, atraumatic. Nares patent. Mucous membranes moist.  Respiratory: Normal work of breathing on 1L. Lungs CTAB. No wheezes.  Cardiovascular: RRR. S1, S2. Systolic murmur 2/6. Pedal pulses 2+ No lower extremity edema.  GI: Abdomen non-distended. Normoactive. Soft, non-tender. No guarding.  Lymph/Hematologic: Bruising to right shoulder.  Skin: Warm, dry. No rashes on exposed skin.  Musculoskeletal: Moving LUE spontaneously.  Neurologic: No focal deficits.     Medical Decision Making       60 MINUTES SPENT BY ME on the date of service doing chart review, history, exam, documentation & further activities per the note.      Data         Imaging results reviewed over the past 24 hrs:   Recent Results (from the past 24 hour(s))   XR Surgery YESSY L/T 5 Min Fluoro w Stills    Narrative    This exam was marked as non-reportable because it will not be read by a   radiologist or a [place] non-radiologist provider.         POC US Guidance Needle Placement    Impression    Right supraclavicular nerve block.

## 2023-05-03 NOTE — PROGRESS NOTES
[facility]    Background: Transitional Care Management program identified per system criteria and reviewed by [facility] team for possible outreach.    Assessment: Upon chart review, [facility] Team member will not proceed with patient outreach related to this episode of Transitional Care Management program due to reason below:    Patient has presented to Emergency Department, been readmitted to hospital, or transferred to another hospital.    Plan: Transitional Care Management episode addressed appropriately per reason noted above.      Naheed Robledo MA  [facility]    *Connected Care Resource Team does NOT follow patient ongoing. Referrals are identified based on internal discharge reports and the outreach is to ensure patient has an understanding of their discharge instructions.

## 2023-05-04 ENCOUNTER — APPOINTMENT (OUTPATIENT)
Dept: GENERAL RADIOLOGY | Facility: CLINIC | Age: 73
DRG: 483 | End: 2023-05-04
Attending: STUDENT IN AN ORGANIZED HEALTH CARE EDUCATION/TRAINING PROGRAM
Payer: MEDICARE

## 2023-05-04 ENCOUNTER — APPOINTMENT (OUTPATIENT)
Dept: OCCUPATIONAL THERAPY | Facility: CLINIC | Age: 73
DRG: 483 | End: 2023-05-04
Attending: STUDENT IN AN ORGANIZED HEALTH CARE EDUCATION/TRAINING PROGRAM
Payer: MEDICARE

## 2023-05-04 ENCOUNTER — ANESTHESIA EVENT (OUTPATIENT)
Dept: SURGERY | Facility: CLINIC | Age: 73
DRG: 483 | End: 2023-05-04
Payer: MEDICARE

## 2023-05-04 LAB
ANION GAP SERPL CALCULATED.3IONS-SCNC: 10 MMOL/L (ref 7–15)
BUN SERPL-MCNC: 9.9 MG/DL (ref 8–23)
CALCIUM SERPL-MCNC: 8.3 MG/DL (ref 8.8–10.2)
CHLORIDE SERPL-SCNC: 97 MMOL/L (ref 98–107)
CREAT SERPL-MCNC: 0.65 MG/DL (ref 0.51–0.95)
DEPRECATED HCO3 PLAS-SCNC: 24 MMOL/L (ref 22–29)
ERYTHROCYTE [DISTWIDTH] IN BLOOD BY AUTOMATED COUNT: 12.6 % (ref 10–15)
GFR SERPL CREATININE-BSD FRML MDRD: >90 ML/MIN/1.73M2
GLUCOSE SERPL-MCNC: 129 MG/DL (ref 70–99)
HCT VFR BLD AUTO: 22.9 % (ref 35–47)
HGB BLD-MCNC: 7.5 G/DL (ref 11.7–15.7)
MCH RBC QN AUTO: 31.1 PG (ref 26.5–33)
MCHC RBC AUTO-ENTMCNC: 32.8 G/DL (ref 31.5–36.5)
MCV RBC AUTO: 95 FL (ref 78–100)
PLATELET # BLD AUTO: 210 10E3/UL (ref 150–450)
POTASSIUM SERPL-SCNC: 3.9 MMOL/L (ref 3.4–5.3)
RBC # BLD AUTO: 2.41 10E6/UL (ref 3.8–5.2)
SODIUM SERPL-SCNC: 131 MMOL/L (ref 136–145)
WBC # BLD AUTO: 8.8 10E3/UL (ref 4–11)

## 2023-05-04 PROCEDURE — 86923 COMPATIBILITY TEST ELECTRIC: CPT | Performed by: STUDENT IN AN ORGANIZED HEALTH CARE EDUCATION/TRAINING PROGRAM

## 2023-05-04 PROCEDURE — 36415 COLL VENOUS BLD VENIPUNCTURE: CPT | Performed by: STUDENT IN AN ORGANIZED HEALTH CARE EDUCATION/TRAINING PROGRAM

## 2023-05-04 PROCEDURE — 999N000248 HC STATISTIC IV INSERT WITH US BY RN

## 2023-05-04 PROCEDURE — 73030 X-RAY EXAM OF SHOULDER: CPT | Mod: RT

## 2023-05-04 PROCEDURE — 250N000013 HC RX MED GY IP 250 OP 250 PS 637: Performed by: STUDENT IN AN ORGANIZED HEALTH CARE EDUCATION/TRAINING PROGRAM

## 2023-05-04 PROCEDURE — 99214 OFFICE O/P EST MOD 30 MIN: CPT | Performed by: STUDENT IN AN ORGANIZED HEALTH CARE EDUCATION/TRAINING PROGRAM

## 2023-05-04 PROCEDURE — 250N000013 HC RX MED GY IP 250 OP 250 PS 637: Performed by: PHYSICIAN ASSISTANT

## 2023-05-04 PROCEDURE — 250N000009 HC RX 250: Performed by: STUDENT IN AN ORGANIZED HEALTH CARE EDUCATION/TRAINING PROGRAM

## 2023-05-04 PROCEDURE — 86923 COMPATIBILITY TEST ELECTRIC: CPT | Performed by: INTERNAL MEDICINE

## 2023-05-04 PROCEDURE — 97535 SELF CARE MNGMENT TRAINING: CPT | Mod: GO

## 2023-05-04 PROCEDURE — 80048 BASIC METABOLIC PNL TOTAL CA: CPT | Performed by: PHYSICIAN ASSISTANT

## 2023-05-04 PROCEDURE — 73030 X-RAY EXAM OF SHOULDER: CPT | Mod: 26 | Performed by: RADIOLOGY

## 2023-05-04 PROCEDURE — 85018 HEMOGLOBIN: CPT | Performed by: STUDENT IN AN ORGANIZED HEALTH CARE EDUCATION/TRAINING PROGRAM

## 2023-05-04 PROCEDURE — 85027 COMPLETE CBC AUTOMATED: CPT | Performed by: STUDENT IN AN ORGANIZED HEALTH CARE EDUCATION/TRAINING PROGRAM

## 2023-05-04 PROCEDURE — 97165 OT EVAL LOW COMPLEX 30 MIN: CPT | Mod: GO

## 2023-05-04 PROCEDURE — 250N000011 HC RX IP 250 OP 636: Performed by: STUDENT IN AN ORGANIZED HEALTH CARE EDUCATION/TRAINING PROGRAM

## 2023-05-04 PROCEDURE — 97530 THERAPEUTIC ACTIVITIES: CPT | Mod: GO

## 2023-05-04 PROCEDURE — 86901 BLOOD TYPING SEROLOGIC RH(D): CPT | Performed by: STUDENT IN AN ORGANIZED HEALTH CARE EDUCATION/TRAINING PROGRAM

## 2023-05-04 PROCEDURE — 86923 COMPATIBILITY TEST ELECTRIC: CPT

## 2023-05-04 RX ORDER — ALBUTEROL SULFATE 0.83 MG/ML
2.5 SOLUTION RESPIRATORY (INHALATION) EVERY 6 HOURS PRN
Status: DISCONTINUED | OUTPATIENT
Start: 2023-05-04 | End: 2023-05-16 | Stop reason: HOSPADM

## 2023-05-04 RX ORDER — OXYCODONE HYDROCHLORIDE 5 MG/1
5-10 TABLET ORAL
Status: DISCONTINUED | OUTPATIENT
Start: 2023-05-04 | End: 2023-05-07

## 2023-05-04 RX ADMIN — ROSUVASTATIN CALCIUM 20 MG: 20 TABLET, FILM COATED ORAL at 09:45

## 2023-05-04 RX ADMIN — ACETAMINOPHEN 975 MG: 325 TABLET ORAL at 20:27

## 2023-05-04 RX ADMIN — Medication 5 MG: at 21:36

## 2023-05-04 RX ADMIN — HYDROMORPHONE HYDROCHLORIDE 2 MG: 2 TABLET ORAL at 12:16

## 2023-05-04 RX ADMIN — METHOCARBAMOL 500 MG: 500 TABLET ORAL at 11:27

## 2023-05-04 RX ADMIN — OXYCODONE HYDROCHLORIDE 5 MG: 5 TABLET ORAL at 16:03

## 2023-05-04 RX ADMIN — CEFAZOLIN SODIUM 2 G: 2 INJECTION, SOLUTION INTRAVENOUS at 03:28

## 2023-05-04 RX ADMIN — METHOCARBAMOL 500 MG: 500 TABLET ORAL at 21:36

## 2023-05-04 RX ADMIN — ALBUTEROL SULFATE 2.5 MG: 2.5 SOLUTION RESPIRATORY (INHALATION) at 13:48

## 2023-05-04 RX ADMIN — DOCUSATE SODIUM AND SENNOSIDES 1 TABLET: 8.6; 5 TABLET ORAL at 09:44

## 2023-05-04 RX ADMIN — CARVEDILOL 12.5 MG: 12.5 TABLET, FILM COATED ORAL at 18:05

## 2023-05-04 RX ADMIN — ACETAMINOPHEN 975 MG: 325 TABLET ORAL at 11:27

## 2023-05-04 RX ADMIN — BUPROPION HYDROCHLORIDE 300 MG: 300 TABLET, FILM COATED, EXTENDED RELEASE ORAL at 09:45

## 2023-05-04 RX ADMIN — OXYCODONE HYDROCHLORIDE 5 MG: 5 TABLET ORAL at 20:27

## 2023-05-04 RX ADMIN — POLYETHYLENE GLYCOL 3350 17 G: 17 POWDER, FOR SOLUTION ORAL at 09:44

## 2023-05-04 RX ADMIN — HYDROXYZINE HYDROCHLORIDE 25 MG: 25 TABLET, FILM COATED ORAL at 21:36

## 2023-05-04 RX ADMIN — OXYCODONE HYDROCHLORIDE 5 MG: 5 TABLET ORAL at 23:48

## 2023-05-04 RX ADMIN — DOCUSATE SODIUM AND SENNOSIDES 1 TABLET: 8.6; 5 TABLET ORAL at 20:27

## 2023-05-04 RX ADMIN — VENLAFAXINE HYDROCHLORIDE 150 MG: 150 CAPSULE, EXTENDED RELEASE ORAL at 09:44

## 2023-05-04 RX ADMIN — CARVEDILOL 12.5 MG: 12.5 TABLET, FILM COATED ORAL at 09:44

## 2023-05-04 RX ADMIN — CLOPIDOGREL BISULFATE 75 MG: 75 TABLET, FILM COATED ORAL at 09:45

## 2023-05-04 ASSESSMENT — ACTIVITIES OF DAILY LIVING (ADL)
ADLS_ACUITY_SCORE: 30
ADLS_ACUITY_SCORE: 30
ADLS_ACUITY_SCORE: 26
ADLS_ACUITY_SCORE: 30
ADLS_ACUITY_SCORE: 26
ADLS_ACUITY_SCORE: 30
ADLS_ACUITY_SCORE: 30
ADLS_ACUITY_SCORE: 33
ADLS_ACUITY_SCORE: 30

## 2023-05-04 NOTE — PROGRESS NOTES
Brief Orthopedic Surgery Note    Plan for transfer to [place] today for right reverse total shoulder tomorrow with Dr. Krause for treatment of her right proximal humerus fracture.     - Transfer order placed, spoke to bed placement  - Daughter (Caroline) and friend (Bryson) updated. Please update both overnight when operative time is determined. Bryson will be on site for the procedure. Daughter lives in [place].  - NPO at midnight  - Consent needs to be obtained.     CORY WILKS PA-C  5/4/2023 3:21 PM  Orthopaedic Surgery     Thank you for allowing me to participate in this patient's care. Please page me directly any questions/concerns.   Securely message with the Vocera Web Console (learn more here)  Text page via [email or web address]    If there is no response, if it is a weekend, or if it is during evening hours, please page the orthopaedic surgery resident on call via [employer] Paging/Directory

## 2023-05-04 NOTE — PROGRESS NOTES
Admitted/transferred from: PACU  2 RN full skin assessment completed by Kalli Lincoln, RN and Bal DRAKE RN.  Skin assessment finding: R arm incision wrapped w/ dressing, R shoulder bruised & edematous, dry cracked heels, mepilex on sacrum   Interventions/actions: no interventions needed at this time     Bedside Emergency Equipment Present:  Suction Regulator: Yes  Suction Canister: Yes  Tubing between Regulator and Canister: Yes  O2 Regulator with Tree: Yes  Ambu Bag: Yes

## 2023-05-04 NOTE — PROVIDER NOTIFICATION
"Writer audra GUERRERO \"NORA Grewal [phone] Pt is was given some sort of sling that both patient and staff cannot figure out- can you order a different one? thanks! Kayla Lima RN [facility] [id number]\"  "

## 2023-05-04 NOTE — PLAN OF CARE
Goal Outcome Evaluation:      Plan of Care Reviewed With: patient    Overall Patient Progress: no changeOverall Patient Progress: no change    Cardiac: hypertensive within parameters, denies cardiac chest pain   Resp: 2 L NC, sating >92%, denies SOB   Neuro: A&Ox4, calm & cooperative   GI/: pt due to void, -straight cath 775, not passing gas, BS hypoactive   Diet: Clears   Skin/Incisions/Drains: R arm incision wrapped w/ dressing, R shoulder bruised & edematous   IV access: L PIV infusing TKO & abx   Labs: Reviewed   Nausea: denies   Activity: Assist x1 w/ walker & GB   Pain: pain managed w/ scheduled meds     Plan: continue post op cares   New changes this shift: pt admitted

## 2023-05-04 NOTE — PROGRESS NOTES
[facility]    Medicine Consult Progress Note   Date of Admission:  5/3/2023    Assessment & Plan   Sri Grewal is a 72 year old female admitted on 5/3/2023. She has a past medical history significant for CAD, Takotsubo cardiomyopathy, mild intermittent asthma, HTN, and prior TIA/CVA who was recently admitted to [facility] due to right proximal and distal humerus fractures. She is now admitted to [facility] after undergoing ORIF of distal right humerus fracture with intercondylar extension with Dr. Phipps. Internal Medicine consulted for medical co-management.     S/p ORIF of distal right humerus fracture with intercondylar extension  Sustained right intra-articular distal humerus fracture after mechanical ground level fall on 4/29. Also displaced a prior right proximal humerus fracture that was previously managed non-operatively. No intraoperative complications.  mL.   - Activity, DVT ppx, post-operative abx per Orthopedics Team  - Pain currently well controlled- further management per Orthopedics Team  - Post-op Hgb, BMP, Mg pending    Mild intermittent asthma  Hypoxia  Pt notes previously on inhalers but occasionally gets weaned from them. Feeling SOB since her operation thus far, subjectively wheezy but clear lungs, does not appear to be in exacerbation at this time. More likely hypoxic due to pain/spliting and atelectasis post-op. PE always on ddx in setting of operations as well, however lower suspicion at this time given non-tachycardic, no chest pain, and BP is normal/elevated. However, low threshold to start steroids and scheduled nebs if respiratory status worsens.   -IS and post-op cares per ortho  -Ordered Q6H albuterol nebs  -Monitor O2 trend, wean as able  -Low threshold to start steroids and scheduled nebs if wheezing or respiratory status worsens     HTN  Hx of Takotsubo cardiomyopathy  Most recent Echo in 2/2023 obtained due to new murmur.  Hyperkinetic EF 65-70%.  - Continue coreg 12.5 mg BID with hold parameters   - Will hold lasix and lisinopril for now while in oren-op period(s), consider resuming per operative course and as BP/renal function allows     CAD  Hx of TIA/CVA  Cerebral aneursym   Recent admission at [facility] due to concern for CVA in setting of aphasia and right sided weakness. Received TNK in ED with resolution of symptoms. Imaging was negative for acute stroke. Per Neurology episode was likely TIA vs aborted stroke due to TNK.  - Continue plavix 75 mg daily if able   - Continue rosuvastatin 20 mg at bedtime      Depression - Continue venalafaxine 150 mg and buproprion 300 mg daily        Diet: Advance Diet as Tolerated: Regular Diet Adult; Regular Diet Adult  NPO per Anesthesia Guidelines for Procedure/Surgery Except for: Meds    DVT Prophylaxis: Per ortho  Merida Catheter: Not present  Lines: None     Cardiac Monitoring: None  Code Status: Full Code      Clinically Significant Risk Factors Present on Admission                  # Hypertension: home medication list includes antihypertensive(s)              Disposition Plan     Expected Discharge Date: 05/04/2023                  Cornell Mcdonald,   Hospitalist Service, GOLD TEAM [address] [place] [address]  Securely message with [employer] (more info)  Text page via [facility] Paging/Directory   See signed in provider for up to date coverage information  ______________________________________________________________________    Interval History   Pt today states the pain is quite bad in her arm, just took some pain meds for this and waiting for it to kick in. Feels more SOB today than usual. We talked about her SOB history and she states she used to be on inhalers in the past but does not always need them. No chest pain, fevers, chills, or swelling in her legs that she can remark on.     Physical Exam   Vital Signs: Temp: 98.3  F (36.8  C) Temp src: Oral BP: 129/68  Pulse: 92   Resp: 20 SpO2: 93 % O2 Device: None (Room air) Oxygen Delivery: 2 LPM  Weight: 134 lbs .63 oz    Gen: No acute distress, non-toxic, alert  CV: Regular rate, regular rhythm  Pulm: Slightly labored breathing, no wheezes  Abd: Non-tender, non-distended  Ext: No LE edema, non-cyanotic   Psych: Normal mood and affect    Medical Decision Making       35 MINUTES SPENT BY ME on the date of service doing chart review, history, exam, documentation & further activities per the note.      Data     I have personally reviewed the following data over the past 24 hrs:    8.8  \   7.5 (L)   / 210     131 (L) 97 (L) 9.9 /  129 (H)   3.9 24 0.65 \       Imaging results reviewed over the past 24 hrs:   Recent Results (from the past 24 hour(s))   XR Shoulder Right G/E 3 Views    Narrative    2 views right shoulder radiographs 5/4/2023    History: Upper radiographs evaluate fracture alignment.    Additional History from EMR: History of right humeral head and neck  fracture.    Comparison: Radiograph 4/30/2023 and 4/29/2023. CT 4/30/2020.    Findings:    AP, Grashey, transscapular Y, axillary views of the right shoulder  were obtained.     There is interval development of slight inferior subluxation of the  right humeral head relative to the glenoid, likely related to  underlying joint effusion.    Redemonstration comminuted proximal humeral fracture with fracture  line extension through the surgical neck. Dominant distal fragment is  posteriorly and laterally displaced relative to middle dominant  fragment for approximately one cortical shaft width, similar to prior  accounting for difference in projection. No substantial bony bridging  across the bone fragments.    Regional soft tissue swelling/fullness. Bones appear osteopenic.    Visualized lung fields are clear.      Impression    Impression:  1. Stable alignment comminuted, displaced proximal humeral fracture.  No interim bony bridging.   2. Interval development of  slight inferior subluxation of the right  humeral head relative to the glenoid, likely related to underlying  increasing joint effusion.    I have personally reviewed the examination and initial interpretation  and I agree with the findings.    OLEG YATES         SYSTEM ID:  [id number]

## 2023-05-04 NOTE — PHARMACY-ADMISSION MEDICATION HISTORY
Pharmacist Admission Medication History was completed 4/30/2023. Please see note for most up-to-date medication list ([facility]).     Sandeep AndersonD

## 2023-05-04 NOTE — PLAN OF CARE
"Goal Outcome Evaluation:    BP (!) 148/76 (BP Location: Left arm)   Pulse 79   Temp 98.3  F (36.8  C) (Oral)   Resp 20   Ht 1.6 m (5' 3\")   Wt 60.8 kg (134 lb 0.6 oz)   LMP 11/09/2005 (Approximate)   SpO2 93%   BMI 23.74 kg/m      Status: VSS, s/p ORIF right distal humerus fracture  Pain/Nausea: Pain in R shoulder and arm- managed with PRN robaxin and oral Dilaudid x1 (made pt feel disoriented) and oxycodone. Ice packs on. Denies N/V  Mobility: Assist x1 with walker and gb- ambulated in room  Diet: Regular- tolerating  Labs: Hgb 7.5  LDAs: L PIV-SL  Skin/incisions: Intact ex R arm incision-CDI, arm in immobilizer sling  Neuro: A&Ox4, N/T in R hand (improving), CMS intact  Respiratory: 2L NC sating >90%. LS clear. SOB- gave PRN albuterol nebulizer  Cardiac: X HTN within parameters  GI/: Unable to void, bladder scan 647, straight cath 600mL, +BS, no BM  New Changes: No acute changes this shift  Plan: Continue with POC, NPO at midnight and waiting to transfer to [facility] for surgery      "

## 2023-05-04 NOTE — PROGRESS NOTES
[facility]  OUTPATIENT OCCUPATIONAL THERAPY  EVALUATION  PLAN OF TREATMENT FOR OUTPATIENT REHABILITATION  (COMPLETE FOR INITIAL CLAIMS ONLY)  Patient's Last Name, First Name, M.I.  YOB: 1950  Sri Grewal                          Provider's Name  [facility] Medical Record No.  [id number]                             Onset Date:  05/03/23   Start of Care Date:  05/04/23   Type:     ___PT   _X_OT   ___SLP Medical Diagnosis:  s/p ORIF R distal humerus                    OT Diagnosis:  Decreased IND with ADL Visits from SOC:  1     See note for plan of treatment, functional goals and certification details    I CERTIFY THE NEED FOR THESE SERVICES FURNISHED UNDER        THIS PLAN OF TREATMENT AND WHILE UNDER MY CARE     (Physician co-signature of this document indicates review and certification of the therapy plan).                                   05/04/23 0800   Appointment Info   Signing Clinician's Name / Credentials (OT) Sujata Quintanilla MA OTR/L   Rehab Comments (OT) NWB R UE, maintain elbow at side, no codmans or ROM   Living Environment   People in Home alone   Current Living Arrangements house   Home Accessibility stairs within home;stairs to enter home   Number of Stairs, Main Entrance 7   Number of Stairs, Within Home, Primary seven   Stair Railings, Within Home, Primary railings on both sides of stairs   Transportation Anticipated family or friend will provide   Living Environment Comments Pt typically lives alone but reports friend (Bryson) can assist as needed. Per Bryson once in home, can stay on main level, only needs to go downstairs to access laundry. Standard toilets. Tub/shower with grab bars.   Self-Care   Usual Activity Tolerance good   Current Activity Tolerance fair   Equipment Currently Used at Home none   Fall history within last six months yes   Number of times patient has fallen within last six months 2  "  Activity/Exercise/Self-Care Comment Pt reports being IND with ADLs and mobility, per Bryson pt had TIA last week and has been \"shuffling\" more when walking, but not using any AD. Bryson comes over daily to assist pt as needed.   Instrumental Activities of Daily Living (IADL)   IADL Comments Pt typically IND with IADLs   General Information   Onset of Illness/Injury or Date of Surgery 05/03/23   Referring Physician Amber Mueller MD   Patient/Family Therapy Goal Statement (OT) Did not endorse   Additional Occupational Profile Info/Pertinent History of Current Problem s/p ORIF right distal humerus fracture with intercondylar extension on 5/3/2023 with Dr. Phipps.   Existing Precautions/Restrictions shoulder;weight bearing  (keep elbow at side)   Right Upper Extremity (Weight-bearing Status) non weight-bearing (NWB)   General Observations and Info Pt with procare waist/cuff immobilizer in room, however does not appear to work well for pt, much difficulty getting it fit and on correctly, partially due to positioning of arm in splint. RN to connect with ortho.   Cognitive Status Examination   Cognitive Status Comments pt oriented to self, place, hospital, stated yr was 2024. Slow to respond, pt reports feeling confused, will continue to monitor. Per discussion with Bryson, pt had stroke in 2021 with aphasia and memory/cognitive deficits and had a recent TIA about a week ago.   Visual Perception   Visual Impairment/Limitations corrective lenses full-time   Sensory   Sensory Comments NT from block   Pain Assessment   Patient Currently in Pain Yes, see Vital Sign flowsheet   Range of Motion Comprehensive   Comment, General Range of Motion L UE WFL, R UE not tested secondary to precautions   Strength Comprehensive (MMT)   Comment, General Manual Muscle Testing (MMT) Assessment L UE WFL, R UE not tested secondary to precautions   Coordination   Upper Extremity Coordination Right UE impaired   Functional Limitations Impaired " ability to perform bilateral tasks   Bed Mobility   Comment (Bed Mobility) Min A, Ax2 for repositioning   Sit-Stand Transfer   Sit-Stand [place] (Transfers) minimum assist (75% patient effort);set up;verbal cues   Bathing Assessment/Intervention   [place] Level (Bathing) maximum assist (25% patient effort)   Upper Body Dressing Assessment/Training   [place] Level (Upper Body Dressing) maximum assist (25% patient effort)   Lower Body Dressing Assessment/Training   [place] Level (Lower Body Dressing) minimum assist (75% patient effort)   Grooming Assessment/Training   [place] Level (Grooming) minimum assist (75% patient effort)   Toileting   [place] Level (Toileting) moderate assist (50% patient effort)   Clinical Impression   Criteria for Skilled Therapeutic Interventions Met (OT) Yes, treatment indicated   OT Diagnosis Decreased IND with ADL   OT Problem List-Impairments impacting ADL problems related to;activity tolerance impaired;pain;post-surgical precautions   Assessment of Occupational Performance 3-5 Performance Deficits   Identified Performance Deficits dressing, bathing, toileting, transfers, home mgmt   Planned Therapy Interventions (OT) ADL retraining;transfer training   Clinical Decision Making Complexity (OT) low complexity   Anticipated Equipment Needs Upon Discharge (OT)   (TBD)   Risk & Benefits of therapy have been explained evaluation/treatment results reviewed;care plan/treatment goals reviewed;patient   OT Total Evaluation Time   OT Eval, Low Complexity Minutes (08271) 8   Therapy Certification   Start of Care Date 05/04/23   Certification date from 05/04/23   Certification date to 05/11/23   Medical Diagnosis s/p ORIF R distal humerus   OT Goals   Therapy Frequency (OT) Daily   OT Predicted Duration/Target Date for Goal Attainment 05/08/23   OT Goals Hygiene/Grooming;Upper Body Dressing;Lower Body Dressing;Toilet Transfer/Toileting;OT Goal 1;OT Goal 2   OT:  Hygiene/Grooming supervision/stand-by assist;within precautions;while standing   OT: Upper Body Dressing Supervision/stand-by assist;including orthotic;within precautions   OT: Lower Body Dressing Supervision/stand-by assist;within precautions   OT: Toilet Transfer/Toileting Supervision/stand-by assist;toilet transfer;cleaning and garment management;within precautions   OT: Goal 1 Pt will complete tub/shower transfer with SBA, maintaining precautions   OT: Goal 2 Pt will ascend/descend 7 stairs as simulated for home envt with SBA, maintaining precautions   Self-Care/Home Management   Self-Care/Home Mgmt/ADL, Compensatory, Meal Prep Minutes (34748) 28   Symptoms Noted During/After Treatment (Meal Preparation/Planning Training) fatigue;increased pain   Treatment Detail/Skilled Intervention Patient reported feeling confused upon arrival, pt was oriented to person, place, knew date, but had some difficulty with yr (stated 2024). Pt slow to process and respond. Increased time needed to assess O2 as monitor frequently showing low O2, pt on 2L O2. Educated on PLB technique, however appeared O2 readings due to low perfusion and poor waveform. RN replaced sensor and O2 sats at 97% on 2L O2.  Pt was educated on post op precautions and implications for ADL. Pt with makeshift sling and cuff on wrist, significant time required to problem solve through bracing options as makeshift sling/bandage was not supportive. Pt had procare box sitting on ledge with waist strap with no instructions. Despite OT, PT, and RN attempting to problem solve proper use/fit of immobilizer, unable to achieve immobilization partially due to position R UE is splinted in. RN to connect with ortho on options. Faciliated training on LB dressing techniques and pt able to demo figure four.   Therapeutic Activities   Therapeutic Activity Minutes (87128) 25   Symptoms noted during/after treatment fatigue;increased pain   Treatment Detail/Skilled Intervention  Educated patient on bed mobility and pt required overall min A and vc's for supine<>sit. Faciliated OOB activity to increase functional activity tolerance. Pt stood with min A/HHA, subsequent stands pt cued to push off bed with L UE and pt able to stand with CGA. Pt returned to sitting with CGA. Pt had difficulty with sit>supine requiring min A and Ax2 for repositioning. Shortly after, transport came to take pt to xray. Pt light min A for supine>sit. Pt was able to stand and take a few steps over to wc with CGA, notably taking small, shuffling steps.   OT Discharge Planning   OT Plan OT: follow up on sling/immobilization as current procare immobilizer not working well (RN sent message to ortho), one handed dressing techniques, ambulate to assess for PT needs (per Bryson pt with new shuffling, does not use AD, but Bryson concerned she's a falls risk- if PT need, will need to get orders), toilet task   OT Discharge Recommendation (DC Rec) home with assist;home with home care occupational therapy;Transitional Care Facility   OT Rationale for DC Rec Currently dual recs of home with assist and HHC vs TCU pending progress. Anticipate patient will progress to discharge home with continued assist from friend Bryson for ADLs/IADLs. Bryson however has noticed pt with more shuffling steps since TIA last week. Will need to assess safety with mobility, and may need PT consult. Will continue to assess daily and update recs.   OT Brief overview of current status Min A bed mobility, min A to stand   Total Session Time   Timed Code Treatment Minutes 53   Total Session Time (sum of timed and untimed services) 61

## 2023-05-04 NOTE — PLAN OF CARE
Goal Outcome Evaluation:      Plan of Care Reviewed With: patient    Overall Patient Progress: no change    Time: 2906-7457  Status: s/p ORIF of distal right humerus fracture with intercondylar extension   Neuros: A&Ox4, flat affect, forgetful.   Cardiac: WNL, denies chest pain.   Respiratory: Sats >95% on 2L NC. Denies SOB.   Pain: rates pain 4/10 declined pain meds, scheduled tylenol not given d/t pt sleeping.  Nausea: Denies N/V   Diet: Regular   GI/: Purewick in place, no output, bladder scan for 93 ml and 191 ml this am.   Skin/incisions: R arm with cast/ace wrap, sling on elevated. R shoulder bruised/edematous.   Lines: L PIV infusing TKO, IV Ancef administered per order.   Labs: Reviewed. Hgb 7.5, WBC 8.8 on 5/4.   Activity: Repositioning encouraged, not OOB overnight.   New Changes this Shift: None   Plan: XR shoulder right this am. Continue POC.

## 2023-05-04 NOTE — PROVIDER NOTIFICATION
"Writer audra GUERRERO \"NORA Grewal [id number] Pt is dizzy and says she feels disoriented after taking oral dilaudid but is still having pain- any recommendations? thanks! Kayla caldwell\"  "

## 2023-05-04 NOTE — PROGRESS NOTES
Orthopaedic Surgery Progress Note 05/04/2023    S: No acute events overnight.  Pain appropriately controlled; block is still in effect. Reports numbness or tingling in the affected extremity secondary to post-operative block. chest pain (-), SOB(-), nausea/vomiting(-). Tolerating oral diet. BM(-) flatus(+). Voiding via Purewick.  Will work with therapy.     O:  Temp: 97.8  F (36.6  C) Temp src: Oral BP: 116/63 Pulse: 88   Resp: 15 SpO2: 99 % O2 Device: Nasal cannula Oxygen Delivery: 2 LPM    Exam:  Gen: No acute distress, resting comfortably in bed. Needs to be aroused frequently during evaluation  CV: wwp  Resp: Non-labored breathing  MSK:  Upper Extremity:    Inspection: Dressing/splint C/D/I, notable swelling about fingers, and resolving ecchymoses about shoulder  Motor: Unable to fire FDS/FDP, EDC, FPL, EPL, interossei due to block  Sensory: Altered sensation throughout median, ulnar, and radial nerve distributions due to block  Circulation: fingers warm and well perfused    Lab:  Recent Labs   Lab 05/04/23  0546 05/03/23  1825 04/30/23  0334   WBC 8.8  --  7.9   HGB 7.5* 9.3* 11.7     --  207     Sed Rate   Date Value Ref Range Status   10/14/2005 11 0 - 30 mm/h Final       Assessment: Sri Grewal is a 72 year old female s/p ORIF right distal humerus fracture with intercondylar extension on 5/3/2023 with Dr. Phipps.     TODAY TO DOs:  R shoulder upright XR  Work with therapy    Plan:  Primary: Ortho  Activity: Up with assist.  Weight bearing status: NWB RUE   Antibiotics: Ancef x24 hours perioperatively.  Diet: Regular diet  DVT prophylaxis: Continue Plavix and mechanical while in the hospital; discharge on mechanical  Bracing/Splinting: Posterior long arm splint to be kept clean, dry, and intact until follow-up.   2 bracing options:   1. Current brace in place - modified cuff-and-collar  2. Shoulder immobilizer with waist wrap  Elevation: Elevate RUE on pillows to keep swelling down as much as  possible.  Pain management: transition from IV to orals as tolerated.   X-rays: R shoulder POD #1 - to be obtained in the upright position.  Occupational Therapy: ADL's.  Labs: Trend Hgb   Consults: OT. Hospitalist, appreciate assistance in caring for this patient.  Follow-up: Clinic with Dr. Phipps Care Team in 1 week    Future Appointments   Date Time Provider Department Center   6/6/2023  1:00 PM Nathan Turner MD [facility]   8/4/2023  4:45 PM Miryam Mcgowan MD [facility]       Disposition: Pending progress with therapies, pain control on orals, and medical stability, anticipate discharge to home on POD #1-2.    Patient discussed with Dr. Phipps.    Amber Mueller MD  Orthopaedic Surgery, PGY-5

## 2023-05-05 ENCOUNTER — APPOINTMENT (OUTPATIENT)
Dept: GENERAL RADIOLOGY | Facility: CLINIC | Age: 73
DRG: 483 | End: 2023-05-05
Payer: MEDICARE

## 2023-05-05 ENCOUNTER — APPOINTMENT (OUTPATIENT)
Dept: GENERAL RADIOLOGY | Facility: CLINIC | Age: 73
DRG: 483 | End: 2023-05-05
Attending: ORTHOPAEDIC SURGERY
Payer: MEDICARE

## 2023-05-05 ENCOUNTER — ANESTHESIA (OUTPATIENT)
Dept: SURGERY | Facility: CLINIC | Age: 73
DRG: 483 | End: 2023-05-05
Payer: MEDICARE

## 2023-05-05 LAB
ABO/RH(D): NORMAL
ALLEN'S TEST: ABNORMAL
ANION GAP SERPL CALCULATED.3IONS-SCNC: 8 MMOL/L (ref 7–15)
ANTIBODY SCREEN: NEGATIVE
BASE EXCESS BLDA CALC-SCNC: 3 MMOL/L (ref -9–1.8)
BLD PROD TYP BPU: NORMAL
BLOOD COMPONENT TYPE: NORMAL
BUN SERPL-MCNC: 8.7 MG/DL (ref 8–23)
CALCIUM SERPL-MCNC: 7.7 MG/DL (ref 8.8–10.2)
CHLORIDE SERPL-SCNC: 102 MMOL/L (ref 98–107)
CODING SYSTEM: NORMAL
CREAT SERPL-MCNC: 0.51 MG/DL (ref 0.51–0.95)
CROSSMATCH: NORMAL
DEPRECATED HCO3 PLAS-SCNC: 23 MMOL/L (ref 22–29)
ERYTHROCYTE [DISTWIDTH] IN BLOOD BY AUTOMATED COUNT: 13.3 % (ref 10–15)
ERYTHROCYTE [DISTWIDTH] IN BLOOD BY AUTOMATED COUNT: 13.8 % (ref 10–15)
GFR SERPL CREATININE-BSD FRML MDRD: >90 ML/MIN/1.73M2
GLUCOSE BLDC GLUCOMTR-MCNC: 127 MG/DL (ref 70–99)
GLUCOSE BLDC GLUCOMTR-MCNC: 137 MG/DL (ref 70–99)
GLUCOSE SERPL-MCNC: 155 MG/DL (ref 70–99)
HCO3 BLD-SCNC: 28 MMOL/L (ref 21–28)
HCT VFR BLD AUTO: 21.6 % (ref 35–47)
HCT VFR BLD AUTO: 22.7 % (ref 35–47)
HGB BLD-MCNC: 7.4 G/DL (ref 11.7–15.7)
HGB BLD-MCNC: 7.5 G/DL (ref 11.7–15.7)
HGB BLD-MCNC: 7.7 G/DL (ref 11.7–15.7)
ISSUE DATE AND TIME: NORMAL
ISSUE DATE AND TIME: NORMAL
LACTATE SERPL-SCNC: 1.3 MMOL/L (ref 0.7–2)
MCH RBC QN AUTO: 31.6 PG (ref 26.5–33)
MCH RBC QN AUTO: 31.6 PG (ref 26.5–33)
MCHC RBC AUTO-ENTMCNC: 33.9 G/DL (ref 31.5–36.5)
MCHC RBC AUTO-ENTMCNC: 34.3 G/DL (ref 31.5–36.5)
MCV RBC AUTO: 92 FL (ref 78–100)
MCV RBC AUTO: 93 FL (ref 78–100)
O2/TOTAL GAS SETTING VFR VENT: 21 %
PCO2 BLD: 42 MM HG (ref 35–45)
PH BLD: 7.43 [PH] (ref 7.35–7.45)
PLATELET # BLD AUTO: 183 10E3/UL (ref 150–450)
PLATELET # BLD AUTO: 219 10E3/UL (ref 150–450)
PO2 BLD: 125 MM HG (ref 80–105)
POTASSIUM SERPL-SCNC: 4.2 MMOL/L (ref 3.4–5.3)
RBC # BLD AUTO: 2.34 10E6/UL (ref 3.8–5.2)
RBC # BLD AUTO: 2.44 10E6/UL (ref 3.8–5.2)
SODIUM SERPL-SCNC: 133 MMOL/L (ref 136–145)
SPECIMEN EXPIRATION DATE: NORMAL
UNIT ABO/RH: NORMAL
UNIT NUMBER: NORMAL
UNIT STATUS: NORMAL
UNIT TYPE ISBT: 9500
WBC # BLD AUTO: 7.9 10E3/UL (ref 4–11)
WBC # BLD AUTO: 9.3 10E3/UL (ref 4–11)

## 2023-05-05 PROCEDURE — 250N000011 HC RX IP 250 OP 636

## 2023-05-05 PROCEDURE — 250N000011 HC RX IP 250 OP 636: Performed by: STUDENT IN AN ORGANIZED HEALTH CARE EDUCATION/TRAINING PROGRAM

## 2023-05-05 PROCEDURE — 272N000001 HC OR GENERAL SUPPLY STERILE: Performed by: ORTHOPAEDIC SURGERY

## 2023-05-05 PROCEDURE — C1713 ANCHOR/SCREW BN/BN,TIS/BN: HCPCS | Performed by: ORTHOPAEDIC SURGERY

## 2023-05-05 PROCEDURE — 250N000013 HC RX MED GY IP 250 OP 250 PS 637

## 2023-05-05 PROCEDURE — 250N000011 HC RX IP 250 OP 636: Performed by: ANESTHESIOLOGY

## 2023-05-05 PROCEDURE — 250N000009 HC RX 250: Performed by: ANESTHESIOLOGY

## 2023-05-05 PROCEDURE — 0RRJ00Z REPLACEMENT OF RIGHT SHOULDER JOINT WITH REVERSE BALL AND SOCKET SYNTHETIC SUBSTITUTE, OPEN APPROACH: ICD-10-PCS | Performed by: ORTHOPAEDIC SURGERY

## 2023-05-05 PROCEDURE — 272N000002 HC OR SUPPLY OTHER OPNP: Performed by: ORTHOPAEDIC SURGERY

## 2023-05-05 PROCEDURE — C9290 INJ, BUPIVACAINE LIPOSOME: HCPCS | Performed by: ANESTHESIOLOGY

## 2023-05-05 PROCEDURE — 82803 BLOOD GASES ANY COMBINATION: CPT | Performed by: ANESTHESIOLOGY

## 2023-05-05 PROCEDURE — 82962 GLUCOSE BLOOD TEST: CPT

## 2023-05-05 PROCEDURE — 83605 ASSAY OF LACTIC ACID: CPT | Performed by: INTERNAL MEDICINE

## 2023-05-05 PROCEDURE — 999N000065 XR ELBOW RIGHT 2 VIEWS: Mod: RT

## 2023-05-05 PROCEDURE — 258N000003 HC RX IP 258 OP 636

## 2023-05-05 PROCEDURE — 250N000013 HC RX MED GY IP 250 OP 250 PS 637: Performed by: STUDENT IN AN ORGANIZED HEALTH CARE EDUCATION/TRAINING PROGRAM

## 2023-05-05 PROCEDURE — 999N000141 HC STATISTIC PRE-PROCEDURE NURSING ASSESSMENT: Performed by: ORTHOPAEDIC SURGERY

## 2023-05-05 PROCEDURE — 999N000180 XR SURGERY CARM FLUORO LESS THAN 5 MIN: Mod: TC

## 2023-05-05 PROCEDURE — 80048 BASIC METABOLIC PNL TOTAL CA: CPT | Performed by: ANESTHESIOLOGY

## 2023-05-05 PROCEDURE — 120N000002 HC R&B MED SURG/OB UMMC

## 2023-05-05 PROCEDURE — 23472 RECONSTRUCT SHOULDER JOINT: CPT | Mod: 22 | Performed by: ORTHOPAEDIC SURGERY

## 2023-05-05 PROCEDURE — 710N000010 HC RECOVERY PHASE 1, LEVEL 2, PER MIN: Performed by: ORTHOPAEDIC SURGERY

## 2023-05-05 PROCEDURE — 250N000011 HC RX IP 250 OP 636: Performed by: ORTHOPAEDIC SURGERY

## 2023-05-05 PROCEDURE — 36415 COLL VENOUS BLD VENIPUNCTURE: CPT | Performed by: INTERNAL MEDICINE

## 2023-05-05 PROCEDURE — 370N000017 HC ANESTHESIA TECHNICAL FEE, PER MIN: Performed by: ORTHOPAEDIC SURGERY

## 2023-05-05 PROCEDURE — 250N000013 HC RX MED GY IP 250 OP 250 PS 637: Performed by: ORTHOPAEDIC SURGERY

## 2023-05-05 PROCEDURE — 36415 COLL VENOUS BLD VENIPUNCTURE: CPT

## 2023-05-05 PROCEDURE — 250N000009 HC RX 250: Performed by: STUDENT IN AN ORGANIZED HEALTH CARE EDUCATION/TRAINING PROGRAM

## 2023-05-05 PROCEDURE — C1776 JOINT DEVICE (IMPLANTABLE): HCPCS | Performed by: ORTHOPAEDIC SURGERY

## 2023-05-05 PROCEDURE — 85027 COMPLETE CBC AUTOMATED: CPT | Performed by: ANESTHESIOLOGY

## 2023-05-05 PROCEDURE — 99233 SBSQ HOSP IP/OBS HIGH 50: CPT | Performed by: INTERNAL MEDICINE

## 2023-05-05 PROCEDURE — 360N000077 HC SURGERY LEVEL 4, PER MIN: Performed by: ORTHOPAEDIC SURGERY

## 2023-05-05 PROCEDURE — P9016 RBC LEUKOCYTES REDUCED: HCPCS

## 2023-05-05 PROCEDURE — 250N000025 HC SEVOFLURANE, PER MIN: Performed by: ORTHOPAEDIC SURGERY

## 2023-05-05 PROCEDURE — 271N000001 HC OR GENERAL SUPPLY NON-STERILE: Performed by: ORTHOPAEDIC SURGERY

## 2023-05-05 PROCEDURE — 999N000065 XR SHOULDER RIGHT PORT G/E 2 VIEWS: Mod: RT

## 2023-05-05 PROCEDURE — P9045 ALBUMIN (HUMAN), 5%, 250 ML: HCPCS

## 2023-05-05 PROCEDURE — 250N000009 HC RX 250

## 2023-05-05 PROCEDURE — 85027 COMPLETE CBC AUTOMATED: CPT

## 2023-05-05 DEVICE — IMP HUMERAL TRAY ZIM RVRS SHLDR STD 110031399: Type: IMPLANTABLE DEVICE | Site: SHOULDER | Status: FUNCTIONAL

## 2023-05-05 DEVICE — IMPLANTABLE DEVICE: Type: IMPLANTABLE DEVICE | Site: SHOULDER | Status: FUNCTIONAL

## 2023-05-05 DEVICE — IMPLANTABLE DEVICE
Type: IMPLANTABLE DEVICE | Site: SHOULDER | Status: FUNCTIONAL
Brand: COMPREHENSIVE REVERSE SHOULDER

## 2023-05-05 DEVICE — IMP BEARING HUMERAL ZIM 36MM STD PRLNG 110031418: Type: IMPLANTABLE DEVICE | Site: SHOULDER | Status: FUNCTIONAL

## 2023-05-05 DEVICE — IMPLANTABLE DEVICE
Type: IMPLANTABLE DEVICE | Site: SHOULDER | Status: FUNCTIONAL
Brand: COMPREHENSIVE® REVERSE SHOULDER

## 2023-05-05 DEVICE — BONE CEMENT RESTRICTOR BUCK FEMORAL 18.5MM 129418: Type: IMPLANTABLE DEVICE | Site: SHOULDER | Status: FUNCTIONAL

## 2023-05-05 DEVICE — IMPLANTABLE DEVICE
Type: IMPLANTABLE DEVICE | Site: SHOULDER | Status: FUNCTIONAL
Brand: REFOBACIN® BONE CEMENT R

## 2023-05-05 RX ORDER — HYDROXYZINE HYDROCHLORIDE 10 MG/1
10 TABLET, FILM COATED ORAL EVERY 6 HOURS PRN
Status: DISCONTINUED | OUTPATIENT
Start: 2023-05-05 | End: 2023-05-05

## 2023-05-05 RX ORDER — SODIUM CHLORIDE, SODIUM LACTATE, POTASSIUM CHLORIDE, CALCIUM CHLORIDE 600; 310; 30; 20 MG/100ML; MG/100ML; MG/100ML; MG/100ML
INJECTION, SOLUTION INTRAVENOUS CONTINUOUS
Status: DISCONTINUED | OUTPATIENT
Start: 2023-05-05 | End: 2023-05-06

## 2023-05-05 RX ORDER — SODIUM CHLORIDE, SODIUM GLUCONATE, SODIUM ACETATE, POTASSIUM CHLORIDE AND MAGNESIUM CHLORIDE 526; 502; 368; 37; 30 MG/100ML; MG/100ML; MG/100ML; MG/100ML; MG/100ML
INJECTION, SOLUTION INTRAVENOUS CONTINUOUS PRN
Status: DISCONTINUED | OUTPATIENT
Start: 2023-05-05 | End: 2023-05-05

## 2023-05-05 RX ORDER — ONDANSETRON 2 MG/ML
INJECTION INTRAMUSCULAR; INTRAVENOUS PRN
Status: DISCONTINUED | OUTPATIENT
Start: 2023-05-05 | End: 2023-05-05

## 2023-05-05 RX ORDER — NALOXONE HYDROCHLORIDE 0.4 MG/ML
0.4 INJECTION, SOLUTION INTRAMUSCULAR; INTRAVENOUS; SUBCUTANEOUS
Status: DISCONTINUED | OUTPATIENT
Start: 2023-05-05 | End: 2023-05-05 | Stop reason: HOSPADM

## 2023-05-05 RX ORDER — SODIUM CHLORIDE, SODIUM LACTATE, POTASSIUM CHLORIDE, CALCIUM CHLORIDE 600; 310; 30; 20 MG/100ML; MG/100ML; MG/100ML; MG/100ML
INJECTION, SOLUTION INTRAVENOUS CONTINUOUS
Status: DISCONTINUED | OUTPATIENT
Start: 2023-05-05 | End: 2023-05-05 | Stop reason: HOSPADM

## 2023-05-05 RX ORDER — ONDANSETRON 4 MG/1
4 TABLET, ORALLY DISINTEGRATING ORAL EVERY 30 MIN PRN
Status: DISCONTINUED | OUTPATIENT
Start: 2023-05-05 | End: 2023-05-05 | Stop reason: HOSPADM

## 2023-05-05 RX ORDER — ACETAMINOPHEN 325 MG/1
975 TABLET ORAL ONCE
Status: COMPLETED | OUTPATIENT
Start: 2023-05-05 | End: 2023-05-05

## 2023-05-05 RX ORDER — HYDROMORPHONE HYDROCHLORIDE 1 MG/ML
0.2 INJECTION, SOLUTION INTRAMUSCULAR; INTRAVENOUS; SUBCUTANEOUS EVERY 5 MIN PRN
Status: DISCONTINUED | OUTPATIENT
Start: 2023-05-05 | End: 2023-05-05 | Stop reason: HOSPADM

## 2023-05-05 RX ORDER — FENTANYL CITRATE 50 UG/ML
25-50 INJECTION, SOLUTION INTRAMUSCULAR; INTRAVENOUS
Status: DISCONTINUED | OUTPATIENT
Start: 2023-05-05 | End: 2023-05-05 | Stop reason: HOSPADM

## 2023-05-05 RX ORDER — FENTANYL CITRATE 50 UG/ML
25 INJECTION, SOLUTION INTRAMUSCULAR; INTRAVENOUS EVERY 5 MIN PRN
Status: DISCONTINUED | OUTPATIENT
Start: 2023-05-05 | End: 2023-05-05 | Stop reason: HOSPADM

## 2023-05-05 RX ORDER — ACETAMINOPHEN 325 MG/1
975 TABLET ORAL ONCE
Status: DISCONTINUED | OUTPATIENT
Start: 2023-05-05 | End: 2023-05-05

## 2023-05-05 RX ORDER — LIDOCAINE HYDROCHLORIDE 20 MG/ML
INJECTION, SOLUTION INFILTRATION; PERINEURAL PRN
Status: DISCONTINUED | OUTPATIENT
Start: 2023-05-05 | End: 2023-05-05

## 2023-05-05 RX ORDER — CEFAZOLIN SODIUM 1 G/3ML
INJECTION, POWDER, FOR SOLUTION INTRAMUSCULAR; INTRAVENOUS PRN
Status: DISCONTINUED | OUTPATIENT
Start: 2023-05-05 | End: 2023-05-05

## 2023-05-05 RX ORDER — FENTANYL CITRATE 50 UG/ML
50 INJECTION, SOLUTION INTRAMUSCULAR; INTRAVENOUS EVERY 5 MIN PRN
Status: DISCONTINUED | OUTPATIENT
Start: 2023-05-05 | End: 2023-05-05 | Stop reason: HOSPADM

## 2023-05-05 RX ORDER — NALOXONE HYDROCHLORIDE 0.4 MG/ML
0.2 INJECTION, SOLUTION INTRAMUSCULAR; INTRAVENOUS; SUBCUTANEOUS
Status: DISCONTINUED | OUTPATIENT
Start: 2023-05-05 | End: 2023-05-05 | Stop reason: HOSPADM

## 2023-05-05 RX ORDER — HALOPERIDOL 5 MG/ML
1 INJECTION INTRAMUSCULAR
Status: DISCONTINUED | OUTPATIENT
Start: 2023-05-05 | End: 2023-05-05 | Stop reason: HOSPADM

## 2023-05-05 RX ORDER — TRANEXAMIC ACID 650 MG/1
1950 TABLET ORAL ONCE
Status: COMPLETED | OUTPATIENT
Start: 2023-05-05 | End: 2023-05-05

## 2023-05-05 RX ORDER — VANCOMYCIN HYDROCHLORIDE 1 G/20ML
INJECTION, POWDER, LYOPHILIZED, FOR SOLUTION INTRAVENOUS PRN
Status: DISCONTINUED | OUTPATIENT
Start: 2023-05-05 | End: 2023-05-05 | Stop reason: HOSPADM

## 2023-05-05 RX ORDER — DEXAMETHASONE SODIUM PHOSPHATE 4 MG/ML
INJECTION, SOLUTION INTRA-ARTICULAR; INTRALESIONAL; INTRAMUSCULAR; INTRAVENOUS; SOFT TISSUE PRN
Status: DISCONTINUED | OUTPATIENT
Start: 2023-05-05 | End: 2023-05-05

## 2023-05-05 RX ORDER — CEFAZOLIN SODIUM/WATER 2 G/20 ML
2 SYRINGE (ML) INTRAVENOUS SEE ADMIN INSTRUCTIONS
Status: DISCONTINUED | OUTPATIENT
Start: 2023-05-05 | End: 2023-05-05 | Stop reason: HOSPADM

## 2023-05-05 RX ORDER — LABETALOL HYDROCHLORIDE 5 MG/ML
10 INJECTION, SOLUTION INTRAVENOUS
Status: DISCONTINUED | OUTPATIENT
Start: 2023-05-05 | End: 2023-05-05 | Stop reason: HOSPADM

## 2023-05-05 RX ORDER — SODIUM CHLORIDE, SODIUM LACTATE, POTASSIUM CHLORIDE, CALCIUM CHLORIDE 600; 310; 30; 20 MG/100ML; MG/100ML; MG/100ML; MG/100ML
INJECTION, SOLUTION INTRAVENOUS CONTINUOUS PRN
Status: DISCONTINUED | OUTPATIENT
Start: 2023-05-05 | End: 2023-05-05

## 2023-05-05 RX ORDER — BUPIVACAINE HYDROCHLORIDE 5 MG/ML
INJECTION, SOLUTION EPIDURAL; INTRACAUDAL
Status: COMPLETED | OUTPATIENT
Start: 2023-05-05 | End: 2023-05-05

## 2023-05-05 RX ORDER — ROPIVACAINE IN 0.9% SOD CHL/PF 0.1 %
.03-.125 PLASTIC BAG, INJECTION (ML) EPIDURAL CONTINUOUS
Status: DISCONTINUED | OUTPATIENT
Start: 2023-05-05 | End: 2023-05-05 | Stop reason: HOSPADM

## 2023-05-05 RX ORDER — FLUMAZENIL 0.1 MG/ML
0.2 INJECTION, SOLUTION INTRAVENOUS
Status: DISCONTINUED | OUTPATIENT
Start: 2023-05-05 | End: 2023-05-05 | Stop reason: HOSPADM

## 2023-05-05 RX ORDER — FENTANYL CITRATE 50 UG/ML
INJECTION, SOLUTION INTRAMUSCULAR; INTRAVENOUS PRN
Status: DISCONTINUED | OUTPATIENT
Start: 2023-05-05 | End: 2023-05-05

## 2023-05-05 RX ORDER — ONDANSETRON 2 MG/ML
4 INJECTION INTRAMUSCULAR; INTRAVENOUS EVERY 30 MIN PRN
Status: DISCONTINUED | OUTPATIENT
Start: 2023-05-05 | End: 2023-05-05 | Stop reason: HOSPADM

## 2023-05-05 RX ORDER — PROPOFOL 10 MG/ML
INJECTION, EMULSION INTRAVENOUS PRN
Status: DISCONTINUED | OUTPATIENT
Start: 2023-05-05 | End: 2023-05-05

## 2023-05-05 RX ORDER — CEFAZOLIN SODIUM/WATER 2 G/20 ML
2 SYRINGE (ML) INTRAVENOUS
Status: COMPLETED | OUTPATIENT
Start: 2023-05-05 | End: 2023-05-05

## 2023-05-05 RX ORDER — CEFAZOLIN SODIUM 1 G/3ML
1 INJECTION, POWDER, FOR SOLUTION INTRAMUSCULAR; INTRAVENOUS EVERY 8 HOURS
Status: COMPLETED | OUTPATIENT
Start: 2023-05-05 | End: 2023-05-06

## 2023-05-05 RX ORDER — HYDROMORPHONE HYDROCHLORIDE 1 MG/ML
0.4 INJECTION, SOLUTION INTRAMUSCULAR; INTRAVENOUS; SUBCUTANEOUS EVERY 5 MIN PRN
Status: DISCONTINUED | OUTPATIENT
Start: 2023-05-05 | End: 2023-05-05 | Stop reason: HOSPADM

## 2023-05-05 RX ORDER — LIDOCAINE 40 MG/G
CREAM TOPICAL
Status: DISCONTINUED | OUTPATIENT
Start: 2023-05-05 | End: 2023-05-05 | Stop reason: HOSPADM

## 2023-05-05 RX ORDER — ALBUTEROL SULFATE 0.83 MG/ML
2.5 SOLUTION RESPIRATORY (INHALATION) ONCE
Status: COMPLETED | OUTPATIENT
Start: 2023-05-05 | End: 2023-05-05

## 2023-05-05 RX ORDER — LIDOCAINE 40 MG/G
CREAM TOPICAL
Status: DISCONTINUED | OUTPATIENT
Start: 2023-05-05 | End: 2023-05-16 | Stop reason: HOSPADM

## 2023-05-05 RX ADMIN — PHENYLEPHRINE HYDROCHLORIDE 100 MCG: 10 INJECTION INTRAVENOUS at 10:36

## 2023-05-05 RX ADMIN — SUCCINYLCHOLINE CHLORIDE 60 MG: 20 INJECTION, SOLUTION INTRAMUSCULAR; INTRAVENOUS; PARENTERAL at 09:42

## 2023-05-05 RX ADMIN — SODIUM CHLORIDE, SODIUM GLUCONATE, SODIUM ACETATE, POTASSIUM CHLORIDE AND MAGNESIUM CHLORIDE: 526; 502; 368; 37; 30 INJECTION, SOLUTION INTRAVENOUS at 10:16

## 2023-05-05 RX ADMIN — FENTANYL CITRATE 100 MCG: 50 INJECTION, SOLUTION INTRAMUSCULAR; INTRAVENOUS at 09:40

## 2023-05-05 RX ADMIN — PHENYLEPHRINE HYDROCHLORIDE 150 MCG: 10 INJECTION INTRAVENOUS at 09:54

## 2023-05-05 RX ADMIN — ACETAMINOPHEN 975 MG: 325 TABLET ORAL at 02:50

## 2023-05-05 RX ADMIN — PHENYLEPHRINE HYDROCHLORIDE 100 MCG: 10 INJECTION INTRAVENOUS at 10:32

## 2023-05-05 RX ADMIN — ACETAMINOPHEN 975 MG: 325 TABLET ORAL at 08:36

## 2023-05-05 RX ADMIN — PHENYLEPHRINE HYDROCHLORIDE 200 MCG: 10 INJECTION INTRAVENOUS at 09:51

## 2023-05-05 RX ADMIN — ALBUMIN HUMAN: 0.05 INJECTION, SOLUTION INTRAVENOUS at 11:00

## 2023-05-05 RX ADMIN — PROPOFOL 60 MG: 10 INJECTION, EMULSION INTRAVENOUS at 09:40

## 2023-05-05 RX ADMIN — FENTANYL CITRATE 50 MCG: 50 INJECTION, SOLUTION INTRAMUSCULAR; INTRAVENOUS at 10:28

## 2023-05-05 RX ADMIN — OXYCODONE HYDROCHLORIDE 5 MG: 5 TABLET ORAL at 14:43

## 2023-05-05 RX ADMIN — Medication 2 G: at 09:48

## 2023-05-05 RX ADMIN — ALBUTEROL SULFATE 2.5 MG: 2.5 SOLUTION RESPIRATORY (INHALATION) at 08:40

## 2023-05-05 RX ADMIN — Medication 5 MG: at 09:40

## 2023-05-05 RX ADMIN — CARVEDILOL 12.5 MG: 12.5 TABLET, FILM COATED ORAL at 18:20

## 2023-05-05 RX ADMIN — ONDANSETRON 4 MG: 2 INJECTION INTRAMUSCULAR; INTRAVENOUS at 12:36

## 2023-05-05 RX ADMIN — ACETAMINOPHEN 650 MG: 325 TABLET, FILM COATED ORAL at 14:43

## 2023-05-05 RX ADMIN — CEFAZOLIN 2 G: 1 INJECTION, POWDER, FOR SOLUTION INTRAMUSCULAR; INTRAVENOUS at 12:42

## 2023-05-05 RX ADMIN — BUPIVACAINE HYDROCHLORIDE 5 ML: 5 INJECTION, SOLUTION EPIDURAL; INTRACAUDAL at 09:15

## 2023-05-05 RX ADMIN — LIDOCAINE HYDROCHLORIDE 100 MG: 20 INJECTION, SOLUTION INFILTRATION; PERINEURAL at 09:38

## 2023-05-05 RX ADMIN — PHENYLEPHRINE HYDROCHLORIDE 25 MCG: 10 INJECTION INTRAVENOUS at 12:17

## 2023-05-05 RX ADMIN — TRANEXAMIC ACID 1950 MG: 650 TABLET ORAL at 08:36

## 2023-05-05 RX ADMIN — SODIUM CHLORIDE, POTASSIUM CHLORIDE, SODIUM LACTATE AND CALCIUM CHLORIDE: 600; 310; 30; 20 INJECTION, SOLUTION INTRAVENOUS at 11:24

## 2023-05-05 RX ADMIN — SODIUM CHLORIDE, POTASSIUM CHLORIDE, SODIUM LACTATE AND CALCIUM CHLORIDE: 600; 310; 30; 20 INJECTION, SOLUTION INTRAVENOUS at 09:28

## 2023-05-05 RX ADMIN — DEXAMETHASONE SODIUM PHOSPHATE 10 MG: 4 INJECTION, SOLUTION INTRA-ARTICULAR; INTRALESIONAL; INTRAMUSCULAR; INTRAVENOUS; SOFT TISSUE at 10:20

## 2023-05-05 RX ADMIN — BUPIVACAINE 10 ML: 13.3 INJECTION, SUSPENSION, LIPOSOMAL INFILTRATION at 09:15

## 2023-05-05 RX ADMIN — FENTANYL CITRATE 50 MCG: 50 INJECTION, SOLUTION INTRAMUSCULAR; INTRAVENOUS at 11:12

## 2023-05-05 RX ADMIN — DOCUSATE SODIUM AND SENNOSIDES 1 TABLET: 8.6; 5 TABLET ORAL at 20:03

## 2023-05-05 RX ADMIN — DEXMEDETOMIDINE HYDROCHLORIDE 10 MCG: 100 INJECTION, SOLUTION INTRAVENOUS at 10:20

## 2023-05-05 RX ADMIN — CEFAZOLIN 1 G: 1 INJECTION, POWDER, FOR SOLUTION INTRAMUSCULAR; INTRAVENOUS at 18:13

## 2023-05-05 RX ADMIN — PHENYLEPHRINE HYDROCHLORIDE 0.3 MCG/KG/MIN: 10 INJECTION INTRAVENOUS at 09:54

## 2023-05-05 RX ADMIN — ACETAMINOPHEN 975 MG: 325 TABLET ORAL at 22:01

## 2023-05-05 RX ADMIN — SODIUM CHLORIDE, POTASSIUM CHLORIDE, SODIUM LACTATE AND CALCIUM CHLORIDE: 600; 310; 30; 20 INJECTION, SOLUTION INTRAVENOUS at 16:36

## 2023-05-05 RX ADMIN — HYDROMORPHONE HYDROCHLORIDE 0.2 MG: 1 INJECTION, SOLUTION INTRAMUSCULAR; INTRAVENOUS; SUBCUTANEOUS at 14:39

## 2023-05-05 ASSESSMENT — ACTIVITIES OF DAILY LIVING (ADL)
ADLS_ACUITY_SCORE: 32
FALL_HISTORY_WITHIN_LAST_SIX_MONTHS: YES
DIFFICULTY_COMMUNICATING: YES
ADLS_ACUITY_SCORE: 32
NUMBER_OF_TIMES_PATIENT_HAS_FALLEN_WITHIN_LAST_SIX_MONTHS: 2
ADLS_ACUITY_SCORE: 31
ADLS_ACUITY_SCORE: 31
VISION_MANAGEMENT: GLASSES
DOING_ERRANDS_INDEPENDENTLY_DIFFICULTY: YES
ADLS_ACUITY_SCORE: 31
EATING/SWALLOWING: SWALLOWING LIQUIDS
DRESSING/BATHING_DIFFICULTY: NO
TOILETING_ISSUES: NO
ADLS_ACUITY_SCORE: 36
ADLS_ACUITY_SCORE: 31
ADLS_ACUITY_SCORE: 31
CHANGE_IN_FUNCTIONAL_STATUS_SINCE_ONSET_OF_CURRENT_ILLNESS/INJURY: YES
WALKING_OR_CLIMBING_STAIRS_DIFFICULTY: NO
DIFFICULTY_EATING/SWALLOWING: YES
ADLS_ACUITY_SCORE: 36
HEARING_DIFFICULTY_OR_DEAF: NO
COMMUNICATION: DIFFICULTY SPEAKING;OTHER (SEE COMMENTS)
WEAR_GLASSES_OR_BLIND: YES
ADLS_ACUITY_SCORE: 36
CONCENTRATING,_REMEMBERING_OR_MAKING_DECISIONS_DIFFICULTY: NO

## 2023-05-05 NOTE — PROGRESS NOTES
"Focus: Patients arrival to unit.   DB: Patient arrived to unit. Pt is having a hard time opening her right eye ( please see postop note) Pt is also quite sleepy. When giving patient a drink of water patient started coughing. When asking patient is that normally how it is for her she said\" I always have a hard time with my swallowing.\" Pt also seems to have a hard time with finding her words that she wants to say. Her friend Bryson was at the bedside and she had a hard time saying finding the word to say his name.   I : Spoke with Dr Kim and had him come to bedside and assess patient .   E: Pt was able to follow Dr Muro finger when she was asked to . Patient is being  Placed on capnography and frequent VS. Status of patient will continue to be monitored.   "

## 2023-05-05 NOTE — PROGRESS NOTES
Brief medicine follow up note:    Pt was being seen by [place] hospitalist service as medicine consult. Pt taken to [facility] overnight 5/4 for procedure on 5/5, thus pt not here to be seen today. If transfers back to [place] and [facility] would like patient to be seen for further consult needs, please page [place] hospitalist service / cross cover. If stays on [facility] and [facility] would like further medicine team assistance, please consult the [facility] hospitalist/medicine consulting service.     Cornell Mcdonald DO 1:16 PM 05/05/23

## 2023-05-05 NOTE — ANESTHESIA PROCEDURE NOTES
Arterial Line Procedure Note    Pre-Procedure   Staff -        Anesthesiologist:  Tal Dunlap DO       Performed By: anesthesiologist       Location: OR       Pre-Anesthestic Checklist: patient identified, IV checked, risks and benefits discussed, informed consent, monitors and equipment checked, pre-op evaluation and at physician/surgeon's request  Timeout:       Correct Patient: Yes        Correct Procedure: Yes        Correct Site: Yes        Correct Position: Yes   Line Placement:   This line was placed Post Induction  Procedure   Procedure: arterial line       Laterality: left       Insertion Site: radial.  Sterile Prep        Standard elements of sterile barrier followed       Skin prep: Chloraprep  Insertion/Injection        Technique: ultrasound guided        1. Ultrasound was used to evaluate the access site.       2. Artery evaluated via ultrasound for patency/adequacy.       3. Using real-time ultrasound the needle/catheter was observed entering the artery/vein.       Catheter Type/Size: 3 Fr, 5 cm  Narrative        Tegaderm dressing used.       Complications: None apparent,        Arterial waveform: Yes        IBP within 10% of NIBP: Yes

## 2023-05-05 NOTE — PROGRESS NOTES
Transfer to [place] today for right reverse total shoulder tomorrow with Dr. Krause for treatment of her right proximal humerus fracture. NPO at midnight.     Pt is A&Ox4, pleasant, cooperative. Pain in R shoulder and arm managed with scheduled tylenol and prn oxycodone prior to transfer to [place].     Transfer patient to [place] 5 ortho, pt's belonging sent with, report given to Clark SANDS.     Pt left via [facility] at 2036.

## 2023-05-05 NOTE — ANESTHESIA CARE TRANSFER NOTE
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    Anesthesia Type:   General     Note:    Oropharynx: oropharynx clear of all foreign objects and spontaneously breathing  Level of Consciousness: awake and drowsy  Oxygen Supplementation: face mask  Level of Supplemental Oxygen (L/min / FiO2): 8  Independent Airway: airway patency satisfactory and stable  Dentition: dentition unchanged  Vital Signs Stable: post-procedure vital signs reviewed and stable  Report to RN Given: handoff report given  Patient transferred to: PACU    Handoff Report: Identifed the Patient, Identified the Reponsible Provider, Reviewed the pertinent medical history, Discussed the surgical course, Reviewed Intra-OP anesthesia mangement and issues during anesthesia, Set expectations for post-procedure period and Allowed opportunity for questions and acknowledgement of understanding      Vitals:  Vitals Value Taken Time   /78 (90) 05/05/23 1320   Temp 36.3    Pulse 94 05/05/23 1322   Resp 15 05/05/23 1322   SpO2 100 % 05/05/23 1322   Vitals shown include unvalidated device data.    Electronically Signed By: JAYY Scott CRNA  May 5, 2023  1:23 PM

## 2023-05-05 NOTE — ANESTHESIA PREPROCEDURE EVALUATION
Anesthesia Pre-Procedure Evaluation    Patient: Sri Grewal   MRN: [id number] : 1950        Procedure : Procedure(s):  ARTHROPLASTY, SHOULDER, TOTAL, REVERSE for proximal humerus fracture, open reduction internal fixation          Past Medical History:   Diagnosis Date     Abnormal Papanicolaou smear of cervix and cervical HPV      Allergic rhinitis, cause unspecified     seasonal allergies     Cerebral infarction (H)      Contact dermatitis and other eczema, due to unspecified cause      Endometriosis, site unspecified      Esophageal reflux     GERD     Migraine, unspecified, without mention of intractable migraine without mention of status migrainosus      Mild persistent asthma      Unspecified disorder of uterus     fibroid uterus      Past Surgical History:   Procedure Laterality Date     COLONOSCOPY  2014    Procedure: COLONOSCOPY;  Surgeon: Nathan Baker MD;  Location: [facility]     OPEN REDUCTION INTERNAL FIXATION HUMERUS DISTAL Right 5/3/2023    Procedure: OPEN REDUCTION INTERNAL FIXATION, FRACTURE, HUMERUS, DISTAL;  Surgeon: Williams Phipps MD;  Location: [facility] OR     [facility]  DELIVERY ONLY       X2     ZZHC COLONOSCOPY THRU STOMA, DIAGNOSTIC  2002    normal      Allergies   Allergen Reactions     Morphine And Related      GI UPSET     Oxycodone      Seasonal Allergies       Social History     Tobacco Use     Smoking status: Never     Passive exposure: Never     Smokeless tobacco: Never   Vaping Use     Vaping status: Never Used   Substance Use Topics     Alcohol use: Yes     Comment: socially      Wt Readings from Last 1 Encounters:   23 60.8 kg (134 lb 0.6 oz)        Anesthesia Evaluation            ROS/MED HX  ENT/Pulmonary:     (+) Mild Persistent, asthma     Neurologic: Comment: Patient has sable L. MCA aneurysm   April stroke being described as TIA or and aborted stroke    (+) migraines, CVA, date: 2021, 23, with deficits, - Aphasia from  stroke, shuffling gait  from April 2023 stroke. See OSH notes.. TIA, date: April 25, 2023, aborted stroke vs. TIA, features: Aphasia, shuffling gait.     Cardiovascular:     (+) hypertension--CAD ---Taking blood thinners     METS/Exercise Tolerance:     Hematologic:  - neg hematologic  ROS     Musculoskeletal: Comment: Closed fracture of right proximal humerus      GI/Hepatic:     (+) GERD,     Renal/Genitourinary:  - neg Renal ROS     Endo:  - neg endo ROS     Psychiatric/Substance Use:     (+) psychiatric history depression     Infectious Disease:  - neg infectious disease ROS     Malignancy:  - neg malignancy ROS     Other:               OUTSIDE LABS:  CBC:   Lab Results   Component Value Date    WBC 8.8 05/04/2023    WBC 7.9 04/30/2023    HGB 7.5 (L) 05/04/2023    HGB 9.3 (L) 05/03/2023    HCT 22.9 (L) 05/04/2023    HCT 34.2 (L) 04/30/2023     05/04/2023     04/30/2023     BMP:   Lab Results   Component Value Date     (L) 05/04/2023     (L) 05/03/2023    POTASSIUM 3.9 05/04/2023    POTASSIUM 4.5 05/03/2023    CHLORIDE 97 (L) 05/04/2023    CHLORIDE 97 (L) 05/03/2023    CO2 24 05/04/2023    CO2 21 (L) 05/03/2023    BUN 9.9 05/04/2023    BUN 10.6 05/03/2023    CR 0.65 05/04/2023    CR 0.64 05/03/2023     (H) 05/04/2023     (H) 05/03/2023     COAGS:   Lab Results   Component Value Date    PTT 30 01/15/2017    INR 0.96 04/30/2023     POC: No results found for: BGM, HCG, HCGS  HEPATIC:   Lab Results   Component Value Date    ALBUMIN 4.5 02/02/2023    PROTTOTAL 7.2 02/02/2023    ALT 24 02/02/2023    AST 31 02/02/2023    ALKPHOS 86 02/02/2023    BILITOTAL 0.6 02/02/2023    BILIDIRECT 0.2 04/26/2002     OTHER:   Lab Results   Component Value Date    PH 7.0 12/09/2013    A1C 5.4 01/16/2017    CESIA 8.3 (L) 05/04/2023    MAG 1.8 05/03/2023    LIPASE 109 05/20/2014    TSH 0.35 02/02/2023    SED 11 10/14/2005       Anesthesia Plan    ASA Status:  3      Anesthesia Type: General.     - Airway: LMA   Induction:  Intravenous.   Maintenance: Balanced.   Techniques and Equipment:     - Lines/Monitors: 2nd IV, Arterial Line, BIS     - Drips/Meds: Phenylephrine, Norepi     Consents            Postoperative Care    Pain management: IV analgesics, Peripheral nerve block (Single Shot), Oral pain medications, Multi-modal analgesia.   PONV prophylaxis: Ondansetron (or other 5HT-3), Dexamethasone or Solumedrol, Background Propofol Infusion     Comments:                Adolfo Verdin MD

## 2023-05-05 NOTE — OR NURSING
Lab results reviewed with Dr. Galeano. No orders received, Hgb to be rechecked in am. ABG results reviewed. No orders received. Ok to transfer to floor. Report given to Gisella SANDS.

## 2023-05-05 NOTE — PLAN OF CARE
Goal Outcome Evaluation:        VS: VSS   O2: SpO2 > 90% and stable on 1-2 LPM. LS clear and equal bilaterally. Denies chest pain and SOB.    Output: Voids to bathroom.    Last BM: 4/29/23, denies abdominal discomfort. BS active. Declined bowel meds    Activity: Up with A 1   Skin: Intact, Right shoulder incision    Pain: Pain managed with scheduled tylenol, prn oxycodone     CMS: Intact, AOx4. Denies numbness and tingling.   Dressing: CDI   Diet: NPO. Denies nausea/vomiting.    BG overnight 137   LDA: Lt PIV SL   Equipment: IV pole, personal belongings,    Plan: To OR today @ 0830 for right shoulder reverse TA    Additional Info:

## 2023-05-05 NOTE — PROGRESS NOTES
"  VS: BP (!) 156/83   Pulse 81   Temp (!) 96.1  F (35.6  C) (Oral)   Resp 18   Ht 1.6 m (5' 2.99\")   Wt 61.7 kg (136 lb 0.4 oz)   LMP 11/09/2005 (Approximate)   SpO2 95%   BMI 24.10 kg/m     O2: Room air saturations 95%.    Output: Pt was able to void up to bathroom before going down to OR in early am.    Last BM: Pt states\" I am very constipated.It has been over a week since my last BM\"    Activity: Pt having pain in right shoulder. Pt is able to walk to bathroom. Patients friend stated\" I noticed since she had her last TIA on Tuesday of last week. I notice she has been shuffling more when she walk. \"    Skin: Right shoulder is covered with dressing .    Pain:    CMS: Pt is having a lot of right hand and right wrist edema.  Pt denies numbness and tingling.    Dressing: Right shoulder dressing is CDI. Pt has immobilizer on right shoulder.    Diet: NPO after midnight. Pt to go to OR this am for having right shoulder repair   LDA:    Equipment: Right shoulder immobilizer,    Plan: Pt to go down to OR this am for right shoulder repair. Pt has been NPO and did not have any of am med's before going to OR.    Additional Info: Her Friend Bryson is at the bedside and will be with her while in surgery. His phone number is in pre op checklist. He is very helpful with insight to patients cares. Pt seems to rely on him to help verify what has been going on with her in the past few years.        "

## 2023-05-05 NOTE — ANESTHESIA POSTPROCEDURE EVALUATION
Patient: Sri Grewal    Procedure: Procedure(s):  ARTHROPLASTY, SHOULDER, TOTAL, REVERSE for       Anesthesia Type:  General    Note:     Postop Pain Control: Uneventful            Sign Out: Well controlled pain   PONV: No   Neuro/Psych: Uneventful            Sign Out: Acceptable/Baseline neuro status   Airway/Respiratory: Uneventful            Sign Out: Acceptable/Baseline resp. status   CV/Hemodynamics: Uneventful            Sign Out: Acceptable CV status; No obvious hypovolemia; No obvious fluid overload   Other NRE: NONE   DID A NON-ROUTINE EVENT OCCUR? No           Last vitals:  Vitals Value Taken Time   /78 05/05/23 1320   Temp     Pulse 94 05/05/23 1352   Resp 15 05/05/23 1352   SpO2 98 % 05/05/23 1352   Vitals shown include unvalidated device data.    Electronically Signed By: Tal Dunlap DO  May 5, 2023  1:53 PM

## 2023-05-05 NOTE — CONSULTS
[facility]  Consult Note - Hospitalist Service  Date of Admission:  5/3/2023  Consult Requested by: Dr. Phipps  Reason for Consult: Postop medical management    Assessment & Plan   Sri Grewal is a 72 year old female admitted on 4/30/2023. She has a past medical h/o CAD, NICM (Taksubo), HTN, previous TIA and CVA post angiogram in 6/2021.  She was seen at [facility] ED on 4/25/23 w/ increased difficulty speaking and right sided weakness.  Stroke code was called upon pt's arrival to the ED.  CT head w/o contrast was negative. CTA head/neck was negative for large vessel occlusion but it did show a L MCA aneurysm which was stable.  She was given TNK in the ED with improvement in her symptoms and admitted to the ICU for close monitoring post TNK. Her speech returned to her baseline.  She was in NSR on tele.  MRI brain was negative for acute stroke. Neurology team felt pt's symptoms were due to either a TIA or aborted stroke by TNK administration. Neurology recommended stopping PTA ASA and started pt on plavix, with plans to continue it indefinitely. She will follow up with neurology in clinic and complete another 28 days Zio-patch.       She has a history of a right proximal humerus fracture that has resulted from a mechanical fall in 2/2023 which has been managed nonoperatively. On 4/29, she had another episode of mechanical fall resulting in acute displaced fracture of the proximal shaft of the right humerus.  She was seen at [facility] ED on 4/30 from where she was transferred to [facility] orthopedic service for definite surgical intervention.  She was discharged to home on 5/1 with plan for patient to be admitted on 5/3 for ORIF since she was on Plavix.  She was advised to hold Plavix until she has her surgery.    Pt was admitted to [place] on 5/3 and underwent ORIF proximal right humerus fracture.  Transferred to [facility] on 5/5 and underwent reverse right  TSA, complicated by right-side Ce syndrome from brachial plexus block.  Pt is currently on surgical hernandez for postop care.  [facility] hospitalist service consulted for preop eval and postop medical management     #. Acute displaced intra-articular fracture of the right distal humerus extending from the supracondylar region between the condyles and into the lateral humeral condyle, 4/29/23  #. Subacute, ununited fracture of the humeral neck 2/2023  ---   S/p ORIF proximal right humerus fracture 5/3, POD #2  ---   S/p  reverse right TSA 5/5, POD #0  ---   Continue prophylactic Ancef  ---   PT/OT consult    #.  Postop right side Ce's syndrome  ---   Due to brachial plexus block per anesthesia  ---   No clear evidence of right eyelid ptosis, miosis or anhidrosis  ---   Already almost resolved  ---   Monitor    DVT prophylaxis  ---   PCD    Acute postop right upper extremity pain  ---   Tylenol, oxycodone and IV Dilaudid     #. Recent TIA/CVA, 4/25  #. Cerebral aneurysm   ---   S/p TNK on 4/29/23 at [facility] ED, please see above  ---   PTA ASA discontinued and patient was started on Plavix on 4/29  ---   Plavix held 5/2 - 5/3 for above surgery  ---   Plavix 75 mg daily today reintroduced on 5/4     #. Hx of CAD   ---   EF 65-70%, normal RV function  ---   Continue PTA Coreg and rosuvastatin  ---   Will reintroduce PTA Lasix and lisinopril tomorrow     #.  Right upper pole kidney cyst   ---   F/u w/ PCP     #. Depression   ---   Venlafaxine 150 mg day and Bupropion 300 mg daily     #. Migraine   ---   PTA prn fiorinal     #.  Hyponatremia  ---   Na level is 133  ---   Not clinically significant  ---   MS clear  ---   Continue isotonic IVF hydration    #.  Acute postop blood loss anemia  ---   Hgb was 15.9 g on 2/2/23 ---> 11.7 g on 4/30 ---> ---> ---> 7.4 g today  ---   Denied lightheadedness, dizziness, CP or SOB  ---   No indication for PRBC transfusion today  ---   Check Hgb in am   ---   Transfuse if Hgb  < 7 g                 Diet: Regular Diet Adult  Diet    DVT Prophylaxis: Low Risk/Ambulatory with no VTE prophylaxis indicated  Merida Catheter: Not present  Lines:  PIV     Cardiac Monitoring: None  Code Status: Full Code    Discharge Date:    Per Ortho                  Leola Kim MD  Hospitalist Service, [facility]  Securely message with [employer] in [place] (more info)  Text page via [employer] Paging/Directory   See signed in provider for up to date coverage information      ______________________________________________________________________    Chief Complaint   S/p reverse right TSA    History is obtained from the patient and EMR    History of Present Illness   Sri Grewal is a 72 year old female admitted on 4/30/2023. She has a past medical h/o CAD, NICM (Taksubo), HTN, previous TIA and CVA post angiogram in 6/2021.  She was seen at [facility] ED on 4/25/23 w/ increased difficulty speaking and right sided weakness.  Stroke code was called upon pt's arrival to the ED.  CT head w/o contrast was negative. CTA head/neck was negative for large vessel occlusion but it did show a L MCA aneurysm which was stable.  She was given TNK in the ED with improvement in her symptoms and admitted to the ICU for close monitoring post TNK. Her speech returned to her baseline.  She was in NSR on tele.  MRI brain was negative for acute stroke. Neurology team felt pt's symptoms were due to either a TIA or aborted stroke by TNK administration. Neurology recommended stopping PTA ASA and started pt on plavix, with plans to continue it indefinitely. She will follow up with neurology in clinic and complete another 28 days Zio-patch.       She has a history of a right proximal humerus fracture that has resulted from a mechanical fall in 2/2023 which has been managed nonoperatively. On 4/29, she had another episode of mechanical fall resulting in acute displaced fracture of the proximal  shaft of the right humerus.  She was seen at [facility] ED on  from where she was transferred [place] [facility] [place] for definite surgical intervention.  She was discharged to home on  with plan for patient to be admitted on 5/3 for ORIF since she was on Plavix.  She was advised to hold Plavix until she has her surgery.    Pt was admitted to [place] on 5/3 and underwent ORIF proximal right humerus fracture.  [facility] [facility] [place]  and underwent reverse right TSA, complicated by right-side Ce syndrome from brachial plexus block.  Pt is currently on surgical hernandez for postop care.  [facility] hospitalist service consulted for preop eval and postop medical management      Past Medical History:   Diagnosis Date     Abnormal Papanicolaou smear of cervix and cervical HPV      Allergic rhinitis, cause unspecified     seasonal allergies     Cerebral infarction (H)      Contact dermatitis and other eczema, due to unspecified cause      Endometriosis, site unspecified      Esophageal reflux     GERD     Migraine, unspecified, without mention of intractable migraine without mention of status migrainosus      Mild persistent asthma      Unspecified disorder of uterus     fibroid uterus       Past Surgical History:   Procedure Laterality Date     COLONOSCOPY  2014    Procedure: COLONOSCOPY;  Surgeon: Nathan Baker MD;  Location:  GI     OPEN REDUCTION INTERNAL FIXATION HUMERUS DISTAL Right 5/3/2023    Procedure: OPEN REDUCTION INTERNAL FIXATION, FRACTURE, HUMERUS, DISTAL;  Surgeon: Williams Phipps MD;  Location:  OR     RUST  DELIVERY ONLY       X2     [facility] COLONOSCOPY THRU STOMA, DIAGNOSTIC  2002    normal         Medications Prior to Admission   Medication Sig Dispense Refill Last Dose     acetaminophen (TYLENOL) 325 MG tablet Take 2 tablets (650 mg) by mouth every 4 hours as needed for other 60 tablet 0 Unknown     buPROPion (WELLBUTRIN XL) 300 MG 24 hr tablet Take  1 tablet (300 mg) by mouth every morning 90 tablet 1 5/2/2023 at 0900     butalbital-aspirin-caffeine (FIORINAL) -40 MG per capsule Take 1 capsule by mouth every 6 hours as needed for moderate to severe pain 30 capsule 5 Unknown     carvedilol (COREG) 12.5 MG tablet TAKE ONE TABLET BY MOUTH TWICE A DAY WITH FOOD 180 tablet 1 5/2/2023 at 2100     docusate sodium (COLACE) 100 MG tablet Take 1-2 tablets (100-200 mg) by mouth 2 times daily as needed for constipation 120 tablet 3 Unknown     furosemide (LASIX) 40 MG tablet TAKE ONE TABLET BY MOUTH EVERY DAY 90 tablet 3 5/2/2023 at 2100     HYDROcodone-acetaminophen (NORCO) 5-325 MG tablet Take 1 tablet by mouth every 6 hours as needed for severe pain (7-10) (Max 3/day) 70 tablet 0 5/2/2023 at 1200     HYDROmorphone (DILAUDID) 2 MG tablet Take 1.5 tablets (3 mg) by mouth every 4 hours as needed for moderate pain 10 tablet 0 5/2/2023 at 2100     hydrOXYzine (ATARAX) 10 MG tablet Take 1 tablet (10 mg) by mouth every 6 hours as needed for other (adjuvant pain) 30 tablet 0 Unknown     lisinopril (ZESTRIL) 20 MG tablet Take 1 tablet (20 mg) by mouth daily 90 tablet 3 5/2/2023 at 2100     polyethylene glycol (MIRALAX) 17 g packet Take 17 g by mouth daily 10 packet 0 5/2/2023 at 2100     rosuvastatin (CRESTOR) 20 MG tablet Take 1 tablet (20 mg) by mouth daily 90 tablet 3 5/2/2023 at 0900     senna-docusate (SENOKOT-S/PERICOLACE) 8.6-50 MG tablet Take 1 tablet by mouth 2 times daily 30 tablet 0 5/2/2023 at 2100     tiZANidine (ZANAFLEX) 2 MG tablet Take 1 tablet (2 mg) by mouth 3 times daily 30 tablet 0 5/2/2023 at 0900     venlafaxine (EFFEXOR XR) 150 MG 24 hr capsule Take 1 capsule (150 mg) by mouth daily 90 capsule 1 5/2/2023 at 0900       Review of Systems   The 10 point Review of Systems is negative other than noted in the HPI or here.     Social History   I have reviewed this patient's social history and updated it with pertinent information if needed.  Social History      Tobacco Use     Smoking status: Never     Passive exposure: Never     Smokeless tobacco: Never   Vaping Use     Vaping status: Never Used   Substance Use Topics     Alcohol use: Yes     Comment: socially     Drug use: No       Family History   I have reviewed this patient's family history and updated it with pertinent information if needed.  Family History   Problem Relation Age of Onset     Heart Disease Mother         anemia     Osteoporosis Mother      Hypertension Mother      Cerebrovascular Disease Mother      Alcohol/Drug Mother         alcohol     Neurologic Disorder Mother         migraines     Hypertension Father      Cancer No family hx of         breast       Allergies   Allergen Reactions     Morphine And Related      GI UPSET     Oxycodone      Seasonal Allergies         Physical Exam   Vital Signs: Temp: (!) 96.3  F (35.7  C) Temp src: Oral BP: 135/78 Pulse: 104   Resp: 12 SpO2: 92 % O2 Device: None (Room air) Oxygen Delivery: 6 LPM  Weight: 136 lbs .38 oz  General: aao x 3, NAD.  HEENT:  NC/AT, PERRL, EOMI, neck supple  CVS:  NL s 1 and s2, no m/r/g.  Lungs:  CTA B/L.   Abd:  Soft, + bs, NT, no rebound or gaurding, no fluid shift.  Ext: Right upper extremity immobilized with a sling  Lymph:  No edema.  Neuro:  Nonfocal.  Musculoskeletal: No calf tenderness to palpation.    Skin:  No rash.  Psychiatry:  Mood and affect appropriate.      Data     I have personally reviewed the following data over the past 24 hrs:    9.3  \   7.4 (L)   / 183     133 (L) 102 8.7 /  155 (H)   4.2 23 0.51 \       Imaging results reviewed over the past 24 hrs:   Recent Results (from the past 24 hour(s))   POC US Guidance Needle Placement    Impression    Right intescalene block   XR Elbow Right 2 Views    Narrative    EXAM: XR ELBOW RIGHT 2 VIEWS  LOCATION: [facility]  DATE/TIME: 5/5/2023 2:50 PM CDT    INDICATION: Status post surgery  COMPARISON: None.      Impression     IMPRESSION: Nonstandard exam. The bones are severely demineralized. There are postoperative changes from plate and screw fixation of the distal humerus and of the proximal ulna. Hardware is in place. No lateral view was obtained, which limits evaluation.   Consider repeat views as clinically indicated.   XR Shoulder Right Port G/E 2 Views    Narrative    EXAM: XR SHOULDER RIGHT PORT G/E 2 VIEWS  LOCATION: [facility]  DATE/TIME: 5/5/2023 2:51 PM CDT    INDICATION: Status post surgery  COMPARISON: 05/04/2023.      Impression    IMPRESSION: There are immediate postoperative changes from right reverse total shoulder arthroplasty for treatment of a proximal humerus fracture, in standard alignment. Small fracture fragments are again noted around the proximal humerus which probably   relate to the original trauma. The bones are severely demineralized.   XR Surgery YESSY L/T 5 Min Fluoro    Narrative    This exam was marked as non-reportable because it will not be read by a   radiologist or a [place] non-radiologist provider.

## 2023-05-05 NOTE — PROGRESS NOTES
Orthopaedic Surgery Progress Note 05/05/2023    S: Pain appropriately controlled. Reports resolved numbness and tingling in the affected extremity after post-operative block. Plan for patient to return to OR today for Right shoulder reverse TSA. Patient acknowledged understanding today's care plan. Remaining NPO for OR.     O:  Temp: (!) 96.2  F (35.7  C) Temp src: Oral BP: (!) 152/84 Pulse: 81   Resp: 17 SpO2: 98 % O2 Device: Nasal cannula Oxygen Delivery: 2 LPM    Exam:  Gen: No acute distress, resting comfortably in bed.  CV: wwp  Resp: Non-labored breathing  MSK:  Upper Extremity:    Inspection: Dressing/splint C/D/I, notable swelling about fingers, and resolving ecchymoses about shoulder  Motor: Able to fire FDS/FDP, EDC, FPL, EPL, interossei   Sensory: SILT throughout median, ulnar, and radial nerve distributions   Circulation: fingers warm and well perfused    Lab:  Recent Labs   Lab 05/04/23  2334 05/04/23  0546 05/03/23  1825 04/30/23  0334   WBC  --  8.8  --  7.9   HGB 7.5* 7.5* 9.3* 11.7   PLT  --  210  --  207     Sed Rate   Date Value Ref Range Status   10/14/2005 11 0 - 30 mm/h Final       Assessment: Sri Grewal is a 72 year old female s/p ORIF right distal humerus fracture with intercondylar extension on 5/3/2023 with Dr. Phipps.     TODAY TO DOs:  Return to OR for Right reverse TSA with Dr. Krause    Plan:  Primary: Ortho  Activity: Up with assist.  Weight bearing status: NWB RUE   Antibiotics: Ancef x24 hours perioperatively.  Diet: Regular diet  DVT prophylaxis: Chemoprophylaxis held for OR  Bracing/Splinting: Posterior long arm splint to be kept clean, dry, and intact until follow-up.   Elevation: Elevate RUE on pillows to keep swelling down as much as possible.  Pain management: transition from IV to orals as tolerated.   X-rays: R shoulder POD #1 - complete  Occupational Therapy: ADL's.  Labs: Trend Hgb   Consults: OT. Hospitalist, appreciate assistance in caring for this patient.  Follow-up: Clinic  with Dr. Phipps Care Team in 1 week    Future Appointments   Date Time Provider Department Center   6/6/2023  1:00 PM Nathan Turner MD [facility]   8/4/2023  4:45 PM Miryam Mcgowan MD [facility]       Disposition: Pending progress with therapies, pain control on orals, and medical stability, anticipate discharge to home on POD #1-2.    Patient discussed with Dr. Phipps & Jimi.    Amber Mueller MD  Orthopaedic Surgery, PGY-5

## 2023-05-05 NOTE — OR NURSING
PACU to Inpatient Nursing Handoff    Patient Sri Grewal is a 72 year old female who speaks English.   Procedure Procedure(s):  ARTHROPLASTY, SHOULDER, TOTAL, REVERSE for   Surgeon(s) Primary: Cierra Krause MD  Resident - Assisting: Amber Mueller MD     Allergies   Allergen Reactions     Morphine And Related      GI UPSET     Oxycodone      Seasonal Allergies        Isolation  [unfilled]     Past Medical History   has a past medical history of Abnormal Papanicolaou smear of cervix and cervical HPV, Allergic rhinitis, cause unspecified, Cerebral infarction (H), Contact dermatitis and other eczema, due to unspecified cause, Endometriosis, site unspecified, Esophageal reflux, Migraine, unspecified, without mention of intractable migraine without mention of status migrainosus, Mild persistent asthma, and Unspecified disorder of uterus.    Anesthesia General with Block propofol rocuronium 0940; succinylcholine at 0942   Dermatome Level     Preop Meds Tylenol 975  Mg po at 0836; txa 1950 mg po at 0836;    Nerve block R Brachial plexus exparel, bupivacaine at 0915   Intraop Meds Fentanyl 200 mcg; precedex 10 mcg at 1020; zofran 4 mg at 1236; decadron 10 mg at 1020; phenylephrine off 1253; albuertal neb at 0840;    Local Meds Yes lidocaine 2% at 0938   Antibiotics Ancef 2 gm 0948; ancef 2 gm at 1242     Pain denies   PACU meds  none   PCA / epidural    Capnography Respiratory Monitoring (EtCO2): 35 mmHg   Telemetry Raised ST wave sinus within baseline per anesthesia   Inpatient Telemetry Monitor Ordered?         Labs Glucose Lab Results   Component Value Date     05/05/2023     07/27/2021     11/04/2020       Hgb Lab Results   Component Value Date    HGB 7.7 05/05/2023    HGB 14.8 11/04/2020       INR Lab Results   Component Value Date    INR 0.96 04/30/2023    INR 1.1 06/25/2021    INR 0.87 01/15/2017      PACU Imaging Completed     Wound/Incision Incision/Surgical Site 05/03/23 Right Arm  (Active)   Incision Assessment UTV 05/04/23 2338   Nora-Incision Assessment UTV 05/04/23 2112   Closure Adhesive strip(s);Sutures 05/03/23 1500   Incision Drainage Amount UTV 05/04/23 0944   Incision Care Ice applied 05/04/23 2338   Dressing Intervention Clean, dry, intact;New dressing applied 05/05/23 1252   Number of days: 2       Incision/Surgical Site 05/05/23 Right;Anterior Shoulder (Active)   Closure Adhesive strip(s);Sutures 05/05/23 1251   Dressing Intervention Clean, dry, intact 05/05/23 1251   Number of days: 0      CMS        Equipment    Other LDA ETT Single;Oral 7 mm (Active)   Number of days: 0        IV Access Peripheral IV 05/04/23 Left;Posterior Upper forearm (Active)   Site Assessment WDL 05/04/23 2344   Line Status Saline locked 05/04/23 2344   Dressing Transparent 05/04/23 2100   Dressing Status clean;dry;intact 05/04/23 2344   Dressing Intervention New dressing  05/04/23 1834   Line Intervention Flushed 05/04/23 2344   Phlebitis Scale 0-->no symptoms 05/04/23 2344   Infiltration? no 05/04/23 2344   Number of days: 1       Peripheral IV 05/05/23 Left Neck (Active)   Number of days: 0       Peripheral IV 05/05/23 Left Lower forearm (Active)   Number of days: 0      Blood Products Plasma lyte a 400 ml; PRBC 300 ml 1100; albumin 5% 250 ml 1118  mL   Intake/Output Date 05/05/23 0700 - 05/06/23 0659   Shift 3545-1864 8058-9249 3444-4310 24 Hour Total   INTAKE   I.V. 1400   1400   IV Piggyback 20   20   Colloid 250   250   Blood Components 300   300   Shift Total(mL/kg) 1970(31.93)   1970(31.93)   OUTPUT   Urine 1035   1035   Blood 400   400   Shift Total(mL/kg) 1435(23.26)   1435(23.26)   Weight (kg) 61.7 61.7 61.7 61.7      Drains / Merida Urethral Catheter 05/05/23 Latex 16 fr (Active)   Number of days: 0       External Urinary Catheter (Active)   Skin no redness;no breakdown 05/04/23 2151   Collection Container Other (Comment) 05/04/23 2151   Securement Method Securing device (Describe)  05/04/23 2151   Number of days: 1      Time of void PreOp Time of Void Prior to Procedure:  (0500) (05/05/23 0749)    PostOp Voided (mL): 0 mL (05/04/23 0700)  Straight cath: 600 mL (05/04/23 1857)    Diapered? no   Bladder Scan Bladder Scan Volume (mL): 273 ml (Post void) (05/05/23 0331)   PO 60 mL (05/04/23 2100)  20 ml     Vitals    B/P: (!) 178/98  T: (!) 96.1  F (35.6  C)    Temp src: Oral  P:  Pulse: 89 (05/05/23 0920)          R: 24  O2:  SpO2: 100 %    O2 Device: Nasal cannula (05/05/23 0920)    Oxygen Delivery: 3 LPM (05/05/23 0920)         Family/support present    Patient belongings In Med Surg   Patient transported on cart   DC meds/scripts (obs/outpt) Not applicable   Inpatient Pain Meds Released? 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

## 2023-05-05 NOTE — ANESTHESIA PROCEDURE NOTES
Brachial plexus Procedure Note    Pre-Procedure   Staff -        Anesthesiologist:  Osvaldo Tafoya MD       Performed By: anesthesiologist       Location: pre-op       Procedure Start/Stop Times: 5/5/2023 9:15 AM       Pre-Anesthestic Checklist: patient identified, IV checked, site marked, risks and benefits discussed, informed consent, monitors and equipment checked, pre-op evaluation, at physician/surgeon's request and post-op pain management  Timeout:       Correct Patient: Yes        Correct Procedure: Yes        Correct Site: Yes        Correct Position: Yes        Correct Laterality: Yes        Site Marked: Yes  Procedure Documentation  Procedure: Brachial plexus       Diagnosis: RIGHT SHOULDER REVERSE ARTHROPLASTY       Laterality: right       Patient Position: supine       Skin prep: Chloraprep       Local skin infiltrated with 3 mL of 1% lidocaine.  (interscalene approach).       Needle Type: short bevel       Needle Gauge: 21.        Needle Length (Inches): 4        Ultrasound guided       1. Ultrasound was used to identify targeted nerve, plexus, vascular marker, or fascial plane and place a needle adjacent to it in real-time.       2. Ultrasound was used to visualize the spread of anesthetic in close proximity to the above referenced structure.       3. A permanent image is entered into the patient's record.    Assessment/Narrative         The placement was negative for: blood aspirated, painful injection and site bleeding       Paresthesias: No.       Bolus given via needle..        Secured via.        Insertion/Infusion Method: Single Shot       Complications: none    Medication(s) Administered   Bupivacaine 0.5% PF (Infiltration) - Infiltration   5 mL - 5/5/2023 9:15:00 AM  Bupivacaine liposome (Exparel) 1.3% LA inj susp (Infiltration) - Infiltration   10 mL - 5/5/2023 9:15:00 AM  Medication Administration Time: 5/5/2023 9:15 AM     Comments:  A lot of swelling is seen in the soft  "tissues surrounding the neck area       FOR [facility] ONLY:   Pain Team Contact information: please page the Pain Team Via [facility]. Search \"Pain\". During daytime hours, please page the attending first. At night please page the resident first.      "

## 2023-05-05 NOTE — ANESTHESIA PROCEDURE NOTES
Airway       Patient location during procedure: OR       Procedure Start/Stop Times: 5/5/2023 9:43 AM  Staff -        Anesthesiologist:  Danisha Shirley MD       Performed By: anesthesiologist and with residents       Procedure performed by resident/fellow/CRNA in presence of a teaching physician.    Consent for Airway        Urgency: elective  Indications and Patient Condition       Indications for airway management: oren-procedural       Induction type:intravenous       Mask difficulty assessment: 1 - vent by mask    Final Airway Details       Final airway type: endotracheal airway       Successful airway: ETT - single and Oral  Endotracheal Airway Details        ETT size (mm): 7.0       Successful intubation technique: video laryngoscopy (VL for teaching purposes)       VL Blade Size: MAC 3       Grade View of Cords: 1       Adjucts: stylet       Position: Left       Measured from: gums/teeth       Secured at (cm): 21       Bite block used: None    Post intubation assessment        Placement verified by: capnometry, equal breath sounds and chest rise        Number of attempts at approach: 1       Number of other approaches attempted: 0       Secured with: silk tape       Ease of procedure: easy       Dentition: Intact and Unchanged    Medication(s) Administered   Medication Administration Time: 5/5/2023 9:43 AM

## 2023-05-05 NOTE — BRIEF OP NOTE
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  Complications: None.  Implants:   Implant Name Type Inv. Item Serial No.  Lot No. LRB No. Used Action   PIN STEINMANN BIOMET REV SHLDER 3.2MM 9\" THRD SS [id number] - [id number] Wire PIN STEINMANN BIOMET REV SHLDER 3.2MM 9\" THRD SS [id number]  [employer] [id number] Right 1 Used as a Supply   IMP BASEPLATE MINI GLENOSPHERE BIOM REV SHLDR 25MM [id number] - [id number] Total Joint Component/Insert IMP BASEPLATE MINI GLENOSPHERE BIOM REV SHLDR 25MM [id number]  [employer] [id number] Right 1 Implanted   IMP SCR CENTRAL BIOMET REVERSE SHLDR 6.5X25MM 555004 - [id number] Metallic Hardware/[place] IMP SCR CENTRAL BIOMET REVERSE SHLDR 6.5X25MM 706215  [employer] [id number] Right 1 Implanted   IMP SCR CENTRAL BIOMET REV SHLDR 6.5X30MM 016752 - [id number] Metallic Hardware/[place] IMP SCR CENTRAL BIOMET REV SHLDR 6.5X30MM 839758  [employer] [id number] Right 1 Implanted   IMP SCR NON-LOCK BIOM REV SHLDR 3.5 HEX 4.05B50KS 965949 - [id number] Metallic Hardware/[place] IMP SCR NON-LOCK BIOM REV SHLDR 3.5 HEX 4.61S74HY 114014  [employer] [id number] Right 1 Implanted   IMP SCR LOCKING BIOM REV SHLDR 3.5 HEX 4.69E51XI 368868 - [id number] Total Joint Component/Insert IMP SCR LOCKING BIOM REV SHLDR 3.5 " HEX 4.96I15ZA 451049  [employer] [id number] Right 1 Implanted   IMP SCR NON-LOCK BIOM REV SHLDR 3.5 HEX 4.35K54OD 690027 - [id number] Metallic Hardware/[place] IMP SCR NON-LOCK BIOM REV SHLDR 3.5 HEX 4.96Q88GE 251260  [employer] [id number] Right 1 Implanted   IMP GLENOSPHERE BIOM REV SHLDR 36MM STD 153669 - [id number] Total Joint Component/Insert IMP GLENOSPHERE BIOM REV SHLDR 36MM STD 681442  [employer] [id number] Right 1 Implanted   IMP SCR LOCKING BIOM REV SHLDR 3.5 HEX 4.77V80OP 215792 - [id number] Total Joint Component/Insert IMP SCR LOCKING BIOM REV SHLDR 3.5 HEX 4.18N26TK 598579  [employer] [id number] Right 1 Implanted   IMP SCR LOCKING BIOM REV SHLDR 3.5 HEX 4.52O05OC 408654 - [id number] Metallic Hardware/[place] IMP SCR LOCKING BIOM REV SHLDR 3.5 HEX 4.25H87CL 152329  [employer] [id number] Right 1 Implanted   BONE CEMENT REFOBACIN R 1X40 [id number] - [id number] Cement, Bone BONE CEMENT REFOBACIN R 1X40 [id number]  [employer] [id number] Right 1 Implanted   IMP HUMERAL TRAY ZIM RVRS SHLDR STD [id number] - [id number] Total Joint Component/Insert IMP HUMERAL TRAY ZIM RVRS SHLDR STD [id number]  [employer] [id number] Right 1 Implanted   IMP BEARING HUMERAL ZIM 36MM STD PRLNG [id number] - [id number] Total Joint Component/Insert IMP BEARING HUMERAL ZIM 36MM STD PRLNG [id number]  [employer] [id number] Right 1 Implanted   BONE CEMENT RESTRICTOR LONG FEMORAL 18.5MM 713539 - [id number] Cement, Bone BONE CEMENT RESTRICTOR LONG FEMORAL 18.5MM 913362  [employer]R [id number] Right 1 Implanted   IMP SCR NON-LOCK BIOM REV SHLDR 3.5 HEX 4.28B47LN 974811 - [id number] Metallic Hardware/[place] IMP SCR NON-LOCK BIOM REV SHLDR 3.5 HEX 4.90R63AO 062578  [employer] [id number] Right 1 Wasted   Biomet Comprehensive Shoulder System Primary Shoulder System Standard Length Porous Plasma 8mm x 122mm Long    BIOMET [id number] Right 1 Implanted

## 2023-05-05 NOTE — PLAN OF CARE
Assumed cares from 2115 to 2330. Pt A/O X 4. Afebrile. VS WNL with O2sat at 96% @ 1L NC. Lung sound CTA, equal bilaterally both anterior and posterior. IS encouraged. Last BM was 5 days ago. Abdomen soft and non-tender. Bowel sound active in all quadrants and stated passing gas a little bit. Suppository offered but refused due to scheduled OR vivienne.  Since last surgery, MF has been retaining urine. Last cath was done at 1857 in ED. Denies headache, dizziness, light-headedness, chestpain, nausea and vomiting. CMS and Neuro's are intact. Stated a little numb and tingly in RUE. Pain in the RUE is constant rated 5/10 and given with Robaxin and Atrax and is tolerating. Explained that her next PRN Oxycodone is due at 2330. She is on reg diet and NPO at midnight. RUE is wrapped with soft cast and ace with immobilizer in place.. Pt up in room A1 GB/Walker. PIV patent in the LUE and saline locked. Refused SCD's. Pt is able to make needs known, and call light is within reach. Encouraged to get up to use the bathroom but prefers to have the purewick placed.

## 2023-05-05 NOTE — UTILIZATION REVIEW
Admission Status; Secondary Review Determination    Under the authority of the Utilization Management Committee, the utilization review process indicated a secondary review on the above patient. The review outcome is based on review of the medical records, discussions with staff, and applying clinical experience noted on the date of the review.    (x) Inpatient Status Appropriate - This patient's medical care is consistent with medical management for inpatient care and reasonable inpatient medical practice.    RATIONALE FOR DETERMINATION: 72-year-old female with significant comorbidities of CAD, stress cardiomyopathy, hypertension, prior stroke who sustained a right intra-articular distal humerus fracture after mechanical fall on 4/29/2023.  Patient also sustained a right proximal humerus fracture in February 2023 that was treated nonoperatively but now has subsequently displaced since her most recent fall.  Patient underwent ORIF of the right distal humerus fracture with intercondylar extension on 5/3/2023.  Postoperatively patient has developed some mild hypoxemia.  Patient underwent right shoulder x-ray on 5/4/2023 and it was determined patient would require a right shoulder reverse total shoulder arthroplasty which was accomplished on 5/5/2023.  Due to the complexity of patient's multiple fractures and recent dislocation of previous fracture in the proximal humerus as well as some perioperative hypoxemia, patient required multiple nights in the hospital appropriate for inpatient management.    At the time of admission with the information available to the attending physician more than 2 nights Hospital complex care was anticipated, based on patient risk of adverse outcome if treated as outpatient and complex care required. Inpatient admission is appropriate based on the Medicare guidelines.    This document was produced using voice recognition software    The information on this document is developed by the  utilization review team in order for the business office to ensure compliance. This only denotes the appropriateness of proper admission status and does not reflect the quality of care rendered.    The definitions of Inpatient Status and Observation Status used in making the determination above are those provided in the CMS Coverage Manual, Chapter 1 and Chapter 6, section 70.4.    Sincerely,    Leobardo Meade MD  Utilization Review  Physician Advisor  [facility].

## 2023-05-05 NOTE — OR NURSING
Per Dr Dunlap, after labs drawn from arterial line, remove art line.  Per Dr MATHEW, remove jugalar PIV and 20 g PIV from arm.  18 g L forearm infusing.  Elevated ST wave noted by anesthesia - no new orders at baseline.  Merida d/c'd in OR for 1035 out.  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 MATHEW  Repeat Hgb in PACU 7.4.

## 2023-05-06 ENCOUNTER — APPOINTMENT (OUTPATIENT)
Dept: OCCUPATIONAL THERAPY | Facility: CLINIC | Age: 73
DRG: 483 | End: 2023-05-06
Payer: MEDICARE

## 2023-05-06 ENCOUNTER — APPOINTMENT (OUTPATIENT)
Dept: GENERAL RADIOLOGY | Facility: CLINIC | Age: 73
DRG: 483 | End: 2023-05-06
Attending: INTERNAL MEDICINE
Payer: MEDICARE

## 2023-05-06 ENCOUNTER — APPOINTMENT (OUTPATIENT)
Dept: CT IMAGING | Facility: CLINIC | Age: 73
DRG: 483 | End: 2023-05-06
Attending: INTERNAL MEDICINE
Payer: MEDICARE

## 2023-05-06 LAB
ANION GAP SERPL CALCULATED.3IONS-SCNC: 10 MMOL/L (ref 7–15)
BLD PROD TYP BPU: NORMAL
BLOOD COMPONENT TYPE: NORMAL
BUN SERPL-MCNC: 11.3 MG/DL (ref 8–23)
CALCIUM SERPL-MCNC: 9 MG/DL (ref 8.8–10.2)
CHLORIDE SERPL-SCNC: 98 MMOL/L (ref 98–107)
CODING SYSTEM: NORMAL
CREAT SERPL-MCNC: 0.6 MG/DL (ref 0.51–0.95)
CROSSMATCH: NORMAL
DEPRECATED HCO3 PLAS-SCNC: 26 MMOL/L (ref 22–29)
ERYTHROCYTE [DISTWIDTH] IN BLOOD BY AUTOMATED COUNT: 13.6 % (ref 10–15)
GFR SERPL CREATININE-BSD FRML MDRD: >90 ML/MIN/1.73M2
GLUCOSE BLDC GLUCOMTR-MCNC: 126 MG/DL (ref 70–99)
GLUCOSE SERPL-MCNC: 137 MG/DL (ref 70–99)
HCT VFR BLD AUTO: 21.7 % (ref 35–47)
HGB BLD-MCNC: 7.4 G/DL (ref 11.7–15.7)
HGB BLD-MCNC: 7.4 G/DL (ref 11.7–15.7)
HGB BLD-MCNC: 8.3 G/DL (ref 11.7–15.7)
LACTATE SERPL-SCNC: 2.4 MMOL/L (ref 0.7–2)
MAGNESIUM SERPL-MCNC: 2 MG/DL (ref 1.7–2.3)
MCH RBC QN AUTO: 31.6 PG (ref 26.5–33)
MCHC RBC AUTO-ENTMCNC: 34.1 G/DL (ref 31.5–36.5)
MCV RBC AUTO: 93 FL (ref 78–100)
PLATELET # BLD AUTO: 268 10E3/UL (ref 150–450)
POTASSIUM SERPL-SCNC: 4 MMOL/L (ref 3.4–5.3)
RBC # BLD AUTO: 2.34 10E6/UL (ref 3.8–5.2)
SODIUM SERPL-SCNC: 134 MMOL/L (ref 136–145)
UNIT ABO/RH: NORMAL
UNIT NUMBER: NORMAL
UNIT STATUS: NORMAL
UNIT TYPE ISBT: 9500
WBC # BLD AUTO: 10.9 10E3/UL (ref 4–11)

## 2023-05-06 PROCEDURE — 258N000003 HC RX IP 258 OP 636: Performed by: INTERNAL MEDICINE

## 2023-05-06 PROCEDURE — 85018 HEMOGLOBIN: CPT | Performed by: INTERNAL MEDICINE

## 2023-05-06 PROCEDURE — 83735 ASSAY OF MAGNESIUM: CPT | Performed by: INTERNAL MEDICINE

## 2023-05-06 PROCEDURE — 82306 VITAMIN D 25 HYDROXY: CPT

## 2023-05-06 PROCEDURE — 250N000013 HC RX MED GY IP 250 OP 250 PS 637

## 2023-05-06 PROCEDURE — 71045 X-RAY EXAM CHEST 1 VIEW: CPT

## 2023-05-06 PROCEDURE — 36415 COLL VENOUS BLD VENIPUNCTURE: CPT | Performed by: INTERNAL MEDICINE

## 2023-05-06 PROCEDURE — 250N000009 HC RX 250

## 2023-05-06 PROCEDURE — 71045 X-RAY EXAM CHEST 1 VIEW: CPT | Mod: 26 | Performed by: RADIOLOGY

## 2023-05-06 PROCEDURE — 97530 THERAPEUTIC ACTIVITIES: CPT | Mod: GO | Performed by: OCCUPATIONAL THERAPIST

## 2023-05-06 PROCEDURE — 83605 ASSAY OF LACTIC ACID: CPT | Performed by: INTERNAL MEDICINE

## 2023-05-06 PROCEDURE — 999N000157 HC STATISTIC RCP TIME EA 10 MIN

## 2023-05-06 PROCEDURE — 120N000002 HC R&B MED SURG/OB UMMC

## 2023-05-06 PROCEDURE — 94640 AIRWAY INHALATION TREATMENT: CPT | Mod: 76

## 2023-05-06 PROCEDURE — 97535 SELF CARE MNGMENT TRAINING: CPT | Mod: GO | Performed by: OCCUPATIONAL THERAPIST

## 2023-05-06 PROCEDURE — 94640 AIRWAY INHALATION TREATMENT: CPT

## 2023-05-06 PROCEDURE — 80048 BASIC METABOLIC PNL TOTAL CA: CPT | Performed by: INTERNAL MEDICINE

## 2023-05-06 PROCEDURE — G1010 CDSM STANSON: HCPCS

## 2023-05-06 PROCEDURE — 71275 CT ANGIOGRAPHY CHEST: CPT | Mod: 26 | Performed by: RADIOLOGY

## 2023-05-06 PROCEDURE — 99232 SBSQ HOSP IP/OBS MODERATE 35: CPT | Performed by: INTERNAL MEDICINE

## 2023-05-06 PROCEDURE — 250N000009 HC RX 250: Performed by: STUDENT IN AN ORGANIZED HEALTH CARE EDUCATION/TRAINING PROGRAM

## 2023-05-06 PROCEDURE — 250N000013 HC RX MED GY IP 250 OP 250 PS 637: Performed by: INTERNAL MEDICINE

## 2023-05-06 PROCEDURE — 36416 COLLJ CAPILLARY BLOOD SPEC: CPT | Performed by: INTERNAL MEDICINE

## 2023-05-06 PROCEDURE — 250N000011 HC RX IP 250 OP 636: Performed by: STUDENT IN AN ORGANIZED HEALTH CARE EDUCATION/TRAINING PROGRAM

## 2023-05-06 PROCEDURE — 85027 COMPLETE CBC AUTOMATED: CPT | Performed by: INTERNAL MEDICINE

## 2023-05-06 PROCEDURE — P9016 RBC LEUKOCYTES REDUCED: HCPCS | Performed by: STUDENT IN AN ORGANIZED HEALTH CARE EDUCATION/TRAINING PROGRAM

## 2023-05-06 PROCEDURE — G1010 CDSM STANSON: HCPCS | Mod: GC | Performed by: RADIOLOGY

## 2023-05-06 PROCEDURE — 258N000003 HC RX IP 258 OP 636

## 2023-05-06 PROCEDURE — 250N000011 HC RX IP 250 OP 636

## 2023-05-06 RX ORDER — SODIUM CHLORIDE 9 MG/ML
INJECTION, SOLUTION INTRAVENOUS
Status: DISPENSED
Start: 2023-05-06 | End: 2023-05-07

## 2023-05-06 RX ORDER — FUROSEMIDE 20 MG
20 TABLET ORAL ONCE
Status: DISCONTINUED | OUTPATIENT
Start: 2023-05-06 | End: 2023-05-06

## 2023-05-06 RX ORDER — LORAZEPAM 2 MG/ML
.5-1 INJECTION INTRAMUSCULAR ONCE
Status: COMPLETED | OUTPATIENT
Start: 2023-05-06 | End: 2023-05-06

## 2023-05-06 RX ORDER — IOPAMIDOL 755 MG/ML
100 INJECTION, SOLUTION INTRAVASCULAR ONCE
Status: COMPLETED | OUTPATIENT
Start: 2023-05-06 | End: 2023-05-06

## 2023-05-06 RX ORDER — AMOXICILLIN 250 MG
1 CAPSULE ORAL 2 TIMES DAILY
Qty: 30 TABLET | Refills: 0 | Status: SHIPPED | OUTPATIENT
Start: 2023-05-06 | End: 2023-05-16

## 2023-05-06 RX ORDER — OXYCODONE HYDROCHLORIDE 5 MG/1
5-10 TABLET ORAL
Qty: 26 TABLET | Refills: 0 | Status: SHIPPED | OUTPATIENT
Start: 2023-05-06 | End: 2023-05-08

## 2023-05-06 RX ORDER — FUROSEMIDE 20 MG
40 TABLET ORAL DAILY
Status: DISCONTINUED | OUTPATIENT
Start: 2023-05-06 | End: 2023-05-16 | Stop reason: HOSPADM

## 2023-05-06 RX ORDER — OXYCODONE HYDROCHLORIDE 5 MG/1
5-10 TABLET ORAL
Qty: 26 TABLET | Refills: 0 | Status: SHIPPED | OUTPATIENT
Start: 2023-05-06 | End: 2023-05-06

## 2023-05-06 RX ORDER — HYDROXYZINE HYDROCHLORIDE 25 MG/1
25 TABLET, FILM COATED ORAL EVERY 6 HOURS PRN
Qty: 30 TABLET | Refills: 0 | Status: SHIPPED | OUTPATIENT
Start: 2023-05-06 | End: 2023-05-08

## 2023-05-06 RX ORDER — ALBUTEROL SULFATE 90 UG/1
2 AEROSOL, METERED RESPIRATORY (INHALATION) EVERY 4 HOURS PRN
Status: DISCONTINUED | OUTPATIENT
Start: 2023-05-06 | End: 2023-05-16 | Stop reason: HOSPADM

## 2023-05-06 RX ORDER — ALBUTEROL SULFATE 5 MG/ML
2.5 SOLUTION RESPIRATORY (INHALATION) EVERY 4 HOURS PRN
Status: DISCONTINUED | OUTPATIENT
Start: 2023-05-06 | End: 2023-05-06

## 2023-05-06 RX ORDER — AMOXICILLIN 250 MG
1 CAPSULE ORAL 2 TIMES DAILY
Qty: 30 TABLET | Refills: 0 | Status: SHIPPED | OUTPATIENT
Start: 2023-05-06 | End: 2023-05-06

## 2023-05-06 RX ORDER — ACETAMINOPHEN 325 MG/1
650 TABLET ORAL EVERY 4 HOURS PRN
Qty: 60 TABLET | Refills: 0 | Status: SHIPPED | OUTPATIENT
Start: 2023-05-06 | End: 2023-05-06

## 2023-05-06 RX ORDER — ACETAMINOPHEN 325 MG/1
650 TABLET ORAL EVERY 4 HOURS PRN
Qty: 60 TABLET | Refills: 0 | Status: SHIPPED | OUTPATIENT
Start: 2023-05-06 | End: 2023-05-08

## 2023-05-06 RX ORDER — HYDROXYZINE HYDROCHLORIDE 25 MG/1
25 TABLET, FILM COATED ORAL EVERY 6 HOURS PRN
Qty: 30 TABLET | Refills: 0 | Status: SHIPPED | OUTPATIENT
Start: 2023-05-06 | End: 2023-05-06

## 2023-05-06 RX ADMIN — CARVEDILOL 12.5 MG: 12.5 TABLET, FILM COATED ORAL at 19:22

## 2023-05-06 RX ADMIN — ALBUTEROL SULFATE 2.5 MG: 2.5 SOLUTION RESPIRATORY (INHALATION) at 13:51

## 2023-05-06 RX ADMIN — ALBUTEROL SULFATE 2.5 MG: 2.5 SOLUTION RESPIRATORY (INHALATION) at 09:54

## 2023-05-06 RX ADMIN — ROSUVASTATIN CALCIUM 20 MG: 20 TABLET, FILM COATED ORAL at 08:23

## 2023-05-06 RX ADMIN — OXYCODONE HYDROCHLORIDE 5 MG: 5 TABLET ORAL at 21:48

## 2023-05-06 RX ADMIN — SODIUM CHLORIDE, POTASSIUM CHLORIDE, SODIUM LACTATE AND CALCIUM CHLORIDE: 600; 310; 30; 20 INJECTION, SOLUTION INTRAVENOUS at 11:38

## 2023-05-06 RX ADMIN — BISACODYL 10 MG: 10 SUPPOSITORY RECTAL at 06:59

## 2023-05-06 RX ADMIN — ACETAMINOPHEN 975 MG: 325 TABLET ORAL at 14:35

## 2023-05-06 RX ADMIN — BUPROPION HYDROCHLORIDE 300 MG: 300 TABLET, FILM COATED, EXTENDED RELEASE ORAL at 08:22

## 2023-05-06 RX ADMIN — Medication 5 MG: at 21:48

## 2023-05-06 RX ADMIN — SODIUM CHLORIDE 75 ML: 9 INJECTION, SOLUTION INTRAVENOUS at 23:03

## 2023-05-06 RX ADMIN — POLYETHYLENE GLYCOL 3350 17 G: 17 POWDER, FOR SOLUTION ORAL at 08:22

## 2023-05-06 RX ADMIN — MAGNESIUM HYDROXIDE 30 ML: 400 SUSPENSION ORAL at 01:45

## 2023-05-06 RX ADMIN — ACETAMINOPHEN 650 MG: 325 TABLET, FILM COATED ORAL at 21:48

## 2023-05-06 RX ADMIN — CLOPIDOGREL BISULFATE 75 MG: 75 TABLET, FILM COATED ORAL at 08:23

## 2023-05-06 RX ADMIN — ACETAMINOPHEN 975 MG: 325 TABLET ORAL at 06:07

## 2023-05-06 RX ADMIN — FUROSEMIDE 40 MG: 20 TABLET ORAL at 14:36

## 2023-05-06 RX ADMIN — CARVEDILOL 12.5 MG: 12.5 TABLET, FILM COATED ORAL at 08:23

## 2023-05-06 RX ADMIN — DOCUSATE SODIUM AND SENNOSIDES 1 TABLET: 8.6; 5 TABLET ORAL at 08:23

## 2023-05-06 RX ADMIN — ALBUTEROL SULFATE 2.5 MG: 2.5 SOLUTION RESPIRATORY (INHALATION) at 04:56

## 2023-05-06 RX ADMIN — CEFAZOLIN 1 G: 1 INJECTION, POWDER, FOR SOLUTION INTRAMUSCULAR; INTRAVENOUS at 01:41

## 2023-05-06 RX ADMIN — VENLAFAXINE HYDROCHLORIDE 150 MG: 150 CAPSULE, EXTENDED RELEASE ORAL at 08:23

## 2023-05-06 RX ADMIN — SODIUM CHLORIDE, POTASSIUM CHLORIDE, SODIUM LACTATE AND CALCIUM CHLORIDE 500 ML: 600; 310; 30; 20 INJECTION, SOLUTION INTRAVENOUS at 12:28

## 2023-05-06 RX ADMIN — ALBUTEROL SULFATE 2 PUFF: 90 AEROSOL, METERED RESPIRATORY (INHALATION) at 18:25

## 2023-05-06 RX ADMIN — ALBUTEROL SULFATE 2.5 MG: 2.5 SOLUTION RESPIRATORY (INHALATION) at 20:13

## 2023-05-06 RX ADMIN — IOPAMIDOL 54 ML: 755 INJECTION, SOLUTION INTRAVENOUS at 23:01

## 2023-05-06 RX ADMIN — DOCUSATE SODIUM AND SENNOSIDES 1 TABLET: 8.6; 5 TABLET ORAL at 19:30

## 2023-05-06 ASSESSMENT — ACTIVITIES OF DAILY LIVING (ADL)
ADLS_ACUITY_SCORE: 35
ADLS_ACUITY_SCORE: 36
ADLS_ACUITY_SCORE: 35

## 2023-05-06 NOTE — PROGRESS NOTES
"  VS: /67   Pulse 84   Temp 97  F (36.1  C) (Oral)   Resp 24   Ht 1.6 m (5' 2.99\")   Wt 61.7 kg (136 lb 0.4 oz)   LMP 11/09/2005 (Approximate)   SpO2 98%   BMI 24.10 kg/m     O2: Room air saturations at end of shift was 98%   Output: Pt has been up to bathroom x 2 to void in good amounts. ( not measured went into toilet)   Last BM: Today x 2 after suppository.    Activity: Shuffles to walk, and needs verbal coaching with getting in and out of bed. Will encourage to be up more in chair after blood is complete with infusing.    Skin: Bruising on left lower arm from all the lab IV sticks. Right shoulder dressing is CDI ( Incision site)   Pain: Pt controlling pain with Tylenol. Has not had any Oxycodone this shift.    CMS:    Dressing: Right shoulder dressing is CDI   Diet: Regular. Fair appetite   LDA: IV is infusing with blood   Equipment: Nail polish remover, IS, continuous oximetry, Nebs with RT   Plan: Pt is hoping to discharge to home. However her friend Bryson states\" she lives alone and could use some help\" Spoke with  to help with facilitating help in patients home.    Additional Info: Pt mentally seems to need verbal cueing and reminding with plans of cares. Patients friend Bryson at bedside he is very helpful with reassurance. Today patient triggered the lactic acid at level of 2.4. Sepsis code was initiated.Dr Manningital to bedside to evaluate patient. Pt had infusion of bolus, and x 1 unit of blood for low Hgb. Pt also had a Chest xray and RT gave x 2 treatments for wheezing. Pt was given Lasix x 1 post infusions. Will continue to monitor patients status.        "

## 2023-05-06 NOTE — PROGRESS NOTES
[facility]    Medicine Progress Note - Hospitalist Service, GOLD TEAM 16    Date of Admission:  5/3/2023    Assessment & Plan       Sri Grewal is a 72 year old female admitted on 4/30/2023. She has a past medical h/o CAD, NICM (Taksubo), HTN, previous TIA and CVA post angiogram in 6/2021.  She was seen at [facility] ED on 4/25/23 w/ increased difficulty speaking and right sided weakness. Stroke code was called upon pt's arrival to the ED. CT head w/o contrast was negative. CTA head/neck was negative for large vessel occlusion but it did show a L MCA aneurysm which was stable.  She was given TNK in the ED with improvement in her symptoms and admitted to the ICU for close monitoring post TNK. Her speech returned to her baseline.  She was in NSR on tele.  MRI brain was negative for acute stroke. Neurology team felt pt's symptoms were due to either a TIA or aborted stroke by TNK administration. Neurology recommended stopping PTA ASA and started pt on plavix, with plans to continue it indefinitely. She will follow up with neurology in clinic and complete another 28 days Zio-patch.       She has a history of a right proximal humerus fracture that has resulted from a mechanical fall in 2/2023 which has been managed nonoperatively. On 4/29, she had another episode of mechanical fall resulting in acute displaced fracture of the proximal shaft of the right humerus.  She was seen at [facility] ED on 4/30 from where she was transferred to [facility] for definite surgical intervention.  She was discharged to home on 5/1 with plan for patient to be admitted on 5/3 for ORIF since she was on Plavix.  She was advised to hold Plavix until she has her surgery.     Pt was admitted to [place] on 5/3 and underwent ORIF proximal right humerus fracture.  Transferred to [facility] 5[facility]5 and underwent reverse right TSA, complicated by right-side Ce syndrome  from brachial plexus block.  Pt is currently on surgical hernandez for postop care.  [facility] hospitalist service consulted for preop eval and postop medical management     #. Acute displaced intra-articular fracture of the right distal humerus extending from the supracondylar region between the condyles and into the lateral humeral condyle, 4/29/23  #. Subacute, ununited fracture of the humeral neck 2/2023  Post op care per ortho  ---   S/p ORIF proximal right humerus fracture 5/3, POD #2  ---   S/p  reverse right TSA 5/5, POD #1  ---   Continue prophylactic Ancef  ---   PT/OT consult     #.  Postop right side Ce's syndrome  ---   Due to brachial plexus block per anesthesia  ---   No clear evidence of right eyelid ptosis, miosis or anhidrosis  ---   Already almost resolved  ---   Monitor     DVT prophylaxis  ---   PCD     Acute postop right upper extremity pain  ---   Tylenol, oxycodone and IV Dilaudid     #. Recent TIA/CVA, 4/25  #. Cerebral aneurysm   ---   S/p TNK on 4/29/23 at [facility] ED, please see above  ---   PTA ASA discontinued and patient was started on Plavix on 4/29  ---   Plavix held 5/2 - 5/3 for above surgery  ---   Plavix 75 mg daily today reintroduced on 5/4     #. Hx of CAD   ---   EF 65-70%, normal RV function  ---   Continue PTA Coreg and rosuvastatin  ---   resume pta lasix 40 mg daily.   ---   Hold PTA lisinopril.      #.  Right upper pole kidney cyst   ---   F/u w/ PCP     #. Depression   ---   Venlafaxine 150 mg day and Bupropion 300 mg daily     #. Migraine   ---   PTA prn fiorinal      #.  Hyponatremia  ---   Na level is 133->134  ---   MS clear  ---   Continue isotonic IVF hydration. Monitor.      #.  Acute postop blood loss anemia  #  MORENO suspect 2.2 acute anemia. cxr clear. Lungs clear. Oxygenating well on RA> no cough or cp. Cough+ Also likely early pulmonary edema.   ---   Hgb was 15.9 g on 2/2/23 ---> 11.7 g on 4/30 ---> ---> ---> 7.4 g today  ---  1 U PRBC with furosemide.    ---   Albuterol inhalers, nebs prn  --- Follow-up Hgb. Keep above 8          Diet: Advance Diet as Tolerated: Regular Diet Adult  Diet    DVT Prophylaxis: Defer to primary service  Merida Catheter: Not present  Lines: None     Cardiac Monitoring: None  Code Status: Full Code      Clinically Significant Risk Factors Present on Admission                  # Hypertension: home medication list includes antihypertensive(s)              Disposition Plan      Expected Discharge Date: 05/07/2023                  Justo Sinclair MD  Hospitalist Service, GOLD TEAM 16  [facility]  Securely message with [employer] (more info)  Text page via [employer] Paging/Directory   See signed in provider for up to date coverage information  ______________________________________________________________________    Interval History   Interval events reviewed.   Noted acute anemia w/hgb 7.4.   MORENO+   Denies fever or chills.   Cough+  No cp  Gen weakness  No NV or pain abdomen.   No new sensory or motor complaint.   No new rash.    No other new or acute medical concern      Physical Exam   Vital Signs: Temp: 97.9  F (36.6  C) Temp src: Oral BP: (!) 156/71 Pulse: 95   Resp: 16 SpO2: 96 % O2 Device: None (Room air) Oxygen Delivery: 2 LPM  Weight: 136 lbs .38 oz    General Appearance: Awake, interactive, NAD  HEENT: AT/NC, Anicteric, Moist MM  Neck: Supple.   Respiratory: Normal work of breathing. CTA BL.   Cardiovascular: S1 S2 Regular.  GI/Abd: Soft. NT. ND.   Extremities: Distally wwp. No pedal edema.  Neuro:Grossly non focal.   Psychiatry: Stable mood.    Medical Decision Making       45 MINUTES SPENT BY ME on the date of service doing chart review, history, exam, documentation & further activities per the note.      Data     I have personally reviewed the following data over the past 24 hrs:    10.9  \   7.4 (L)   / 268     134 (L) 98 11.3 /  137 (H)   4.0 26 0.60 \       Procal: N/A CRP: N/A Lactic Acid:  2.4 (H)         Imaging results reviewed over the past 24 hrs:   Recent Results (from the past 24 hour(s))   XR Elbow Right 2 Views    Narrative    EXAM: XR ELBOW RIGHT 2 VIEWS  LOCATION: [facility]  DATE/TIME: 5/5/2023 2:50 PM CDT    INDICATION: Status post surgery  COMPARISON: None.      Impression    IMPRESSION: Nonstandard exam. The bones are severely demineralized. There are postoperative changes from plate and screw fixation of the distal humerus and of the proximal ulna. Hardware is in place. No lateral view was obtained, which limits evaluation.   Consider repeat views as clinically indicated.   XR Shoulder Right Port G/E 2 Views    Narrative    EXAM: XR SHOULDER RIGHT PORT G/E 2 VIEWS  LOCATION: [facility]  DATE/TIME: 5/5/2023 2:51 PM CDT    INDICATION: Status post surgery  COMPARISON: 05/04/2023.      Impression    IMPRESSION: There are immediate postoperative changes from right reverse total shoulder arthroplasty for treatment of a proximal humerus fracture, in standard alignment. Small fracture fragments are again noted around the proximal humerus which probably   relate to the original trauma. The bones are severely demineralized.   XR Surgery YESSY L/T 5 Min Fluoro    Narrative    This exam was marked as non-reportable because it will not be read by a   radiologist or a [place] non-radiologist provider.         XR Chest 1 View    Narrative    EXAM: XR CHEST 1 VIEW 5/6/2023 11:19 AM      HISTORY: sob.    COMPARISON: Chest x-ray 10/29/2019.     TECHNIQUE: Frontal view of the chest.    FINDINGS: PA upright chest x-ray. Reverse right total shoulder  arthroplasty changes.    Trachea is midline. Cardiac silhouette is within normal limits.  Pulmonary vasculature is distinct. No focal airspace opacity, pleural  effusion, pneumothorax.    Bony structures appear intact.      Impression    IMPRESSION:   Clear chest    I  have personally reviewed the examination and initial interpretation  and I agree with the findings.    OSKRA DESAI MD         SYSTEM ID:  [id number]

## 2023-05-06 NOTE — PROGRESS NOTES
"Arrived on unit @ approximately 1515    VS:  /66   Pulse 105   Temp (!) 96.3  F (35.7  C)   Resp 24   Ht 1.6 m (5' 2.99\")   Wt 61.7 kg (136 lb 0.4 oz)   LMP 11/09/2005 (Approximate)   SpO2 95%   BMI 24.10 kg/m      O2:  >90 on RA   Output:  voided small amount;  mL;   Last BM:  4/30 per pt   Activity:  Assist x1   Skin:  edema +2 and bruises on wrist BUE; RUE surgical incision   Pain:  5/10 managed w/ scheduled Tylenol   CMS:  A&Ox4; mild expressive aphasia - pt sometimes has difficulty expressing her needs; denied numbness or tingling   Dressing:  CDI   Diet:  Reg w/ thin   LDA:  2 Left PIV   Equipment:  IV pole, personal belongings   Plan:  Continue plan of care   Additional Info:  Pt experienced R eye Ce's syndrome due to brachial plexus block per Dr. Dunlap but improving;   Hgb 7.4; Hem crit 21.6%;   Past hx of stroke in 2021, TIA on 4/25 per pt     "

## 2023-05-06 NOTE — PLAN OF CARE
VS: Temp: 97.4  F (36.3  C) Temp src: Oral BP: 132/70 Pulse: 91   Resp: 16 SpO2: 97 % O2 Device: Nasal cannula Oxygen Delivery: 2 LPM     O2: On supplemental O2, C/O SOB, nebulizers utilized   Output: Voids spontaneously in bathroom   Last BM: 4/30 Utilized mom with no results. constipation   Activity: Up with SBA of 1, minimal assistance. NWB to right arm   Skin: Intact with th exception of right shoulder incision   Pain: 5/10. Scheduled tylenol utized   CMS: Alert & O/4, episodes of forgetfulness   Dressing: CDI   Diet: REG   LDA: 2 PIV to left arm, SL   Equipment: IV pole/pump, personal belongings   Plan: TBD   Additional Info: HGB was low, being monitored

## 2023-05-06 NOTE — CONSULTS
Care Management Initial Consult    General Information  Assessment completed with: Patient, Friend,    Type of CM/SW Visit: Initial Assessment    Primary Care Provider verified and updated as needed: Yes   Readmission within the last 30 days: planned readmission      Reason for Consult: discharge planning  Advance Care Planning: Advance Care Planning Reviewed: no concerns identified          Communication Assessment  Patient's communication style: spoken language (English or Bilingual)    Hearing Difficulty or Deaf: no   Wear Glasses or Blind: yes    Cognitive  Cognitive/Neuro/Behavioral: WDL  Level of Consciousness: lethargic  Arousal Level: opens eyes spontaneously  Orientation: oriented x 4  Mood/Behavior: cooperative, calm, behavior appropriate to situation  Best Language: 0 - No aphasia  Speech: clear    Living Environment:   People in home: alone     Current living Arrangements: house      Able to return to prior arrangements: no  Living Arrangement Comments: Will need home care PT, OT services    Family/Social Support:  Care provided by: self, friend  Provides care for: no one         Description of Support System: Supportive         Current Resources:   Patient receiving home care services: No     Community Resources: None  Equipment currently used at home: none  Supplies currently used at home: None    Employment/Financial:  Employment Status: retired        Financial Concerns: No concerns identified          Lifestyle & Psychosocial Needs:  Social Determinants of Health     Tobacco Use: Low Risk  (5/4/2023)    Patient History      Smoking Tobacco Use: Never      Smokeless Tobacco Use: Never      Passive Exposure: Never   Alcohol Use: Not on file   Financial Resource Strain: Not on file   Food Insecurity: Not on file   Transportation Needs: Not on file   Physical Activity: Not on file   Stress: Not on file   Social Connections: Not on file   Intimate Partner Violence: Not on file   Depression: At risk  (3/7/2023)    PHQ-2      PHQ-2 Score: 4   Housing Stability: Not on file       Functional Status:  Prior to admission patient needed assistance:         Assesssment of Functional Status: Needs assistance with dressing, Needs assistance with bathing, Needs assistance with laundry/houskeeping    Mental Health Status:  Mental Health Status: No Current Concerns       Chemical Dependency Status:  Chemical Dependency Status: No Current Concerns             Values/Beliefs:  Spiritual, Cultural Beliefs, Holiness Practices, Values that affect care: no               Additional Information:  Patient is a 72 year old female admitted on 4/30/2023. She has a past medical h/o CAD, NICM (Taksubo), HTN, previous TIA and CVA post angiogram in 6/2021.  She was seen at [facility] ED on 4/25/23 w/ increased difficulty speaking and right sided weakness. She has a history of a right proximal humerus fracture that has resulted from a mechanical fall in 2/2023 which has been managed nonoperatively. Pt was admitted to [facility] on 5/3 and underwent ORIF proximal right humerus fracture.  Transferred to [facility] on 5/5 and underwent reverse right TSA, complicated by right-side Ce syndrome from brachial plexus block. RNCC completed care management assessment on patient. Patient was agreeable to home care services and did not have a preference on a particular home care agency. RNCC placed home care referral to [facility] for PT, OT services. [facility] accepted referral request. RNCC will continue to follow through on discharge planning.     Home Care Agency Referral Accepted    [facility]  Ph: [phone]  Fax: [phone]    Rachael Samuel RN

## 2023-05-06 NOTE — PROGRESS NOTES
Orthopaedic Surgery Progress Note 05/06/2023    S: Pain appropriately controlled. Is having some abdominal cramping from constipation. Does have some numbness in the right shoulder, likely from block. Ce's symptoms on right side appear resolved. Voiding independently. Tolerating clear diet.     O:  Temp: 97.4  F (36.3  C) Temp src: Oral BP: 132/70 Pulse: 91   Resp: 16 SpO2: 94 % O2 Device: Nasal cannula Oxygen Delivery: 2 LPM    Exam:  Gen: No acute distress, resting comfortably in bed.  CV: wwp  Resp: Non-labored breathing  MSK:  Right Upper Extremity:    Inspection: Dressing/splint C/D/I, notable swelling about fingers, and resolving ecchymoses about shoulder  Motor: Able to fire FDS/FDP, EDC, FPL, EPL, interossei   Sensory: SILT throughout median, ulnar, and radial nerve distributions, decreased sensation over axillary   Circulation: fingers warm and well perfused    Lab:  Recent Labs   Lab 05/05/23  1408 05/05/23  1140 05/04/23  2334 05/04/23  0546   WBC 9.3 7.9  --  8.8   HGB 7.4* 7.7* 7.5* 7.5*    219  --  210     Sed Rate   Date Value Ref Range Status   10/14/2005 11 0 - 30 mm/h Final       Assessment: Sri Grewal is a 72 year old female s/p ORIF right distal humerus fracture with intercondylar extension on 5/3/2023 with Dr. Phipps and right reverse TSA with Dr. Krause on 5/5/23.     TODAY TO DOs:  - Pain control   - PT   - Discharge planning     Plan:  Primary: Ortho  Activity: Up with assist.  Weight bearing status: NWB RUE   Antibiotics: Ancef x24 hours perioperatively.  Diet: Regular diet  DVT prophylaxis: Continue Plavix and mechanical while in the hospital   Bracing/Splinting: Sling in place, Posterior long arm splint to be kept clean, dry, and intact until follow-up.   Elevation: Elevate RUE on pillows to keep swelling down as much as possible.  Pain management: transition from IV to orals as tolerated.   X-rays: Completed in PACU   Occupational Therapy: ADL's.  Labs: Hgb check   Consults:  OT. Hospitalist, appreciate assistance in caring for this patient.  Follow-up: Clinic with Dr. Phipps Care Team in 1 week    Future Appointments   Date Time Provider Department Center   6/6/2023  1:00 PM Nathan Turner MD [facility]   8/4/2023  4:45 PM Miryam Mcgowan MD [facility]       Disposition: Pending progress with therapies, pain control on orals, and medical stability, anticipate discharge to home on POD #1-2.    Patient discussed with Dr. Phipps & Krause.      Louann Hoff MD, PGY1   Orthopaedic Surgery  [phone]

## 2023-05-06 NOTE — PROGRESS NOTES
Sepsis Evaluation     I was called to see Sri Grewal due to abnormal vital signs triggering the Sepsis SIRS screening alert. She is not known to have an infection.     PHYSICAL EXAM  Vital Signs:  Temp: (!) 96.4  F (35.8  C) Temp src: Oral BP: 112/51 Pulse: 85   Resp: 18 SpO2: 98 % O2 Device: Nasal cannula Oxygen Delivery: 2 LPM    General: in no acute distress  Mental Status: AAOx4.     Remainder of physical exam is significant for: unremarkable except pallor.     DATA  Lactic Acid   Date Value Ref Range Status   05/06/2023 2.4 (H) 0.7 - 2.0 mmol/L Final   05/05/2023 1.3 0.7 - 2.0 mmol/L Final       ASSESSMENT AND PLAN  NO EVIDENCE OF SEPSIS at this time.  Vital sign, physical exam, and lab findings are due to acute anemia, hypovolemia. Plan to give 500 ml bolus and 1 U PRBC. fu hgb. bp stable. .    Disposition: The patient will remain on the current unit. We will continue to monitor this patient closely..  Justo Sinclair MD  05/06/23, 11:27 AM    Sepsis Criteria   Sepsis: The body's generalized inflammatory state as a response to an infection. Sepsis Predictive Model includes >80 variable to alert to potential sepsis.  Severe Sepsis: Sepsis plus one or more variables of acute organ dysfunction (Note: lactic acid >2 or acute encephalopathy each qualify as organ dysfunction)  Septic Shock: Sepsis AND hypotension despite adequate volume resuscitation with crystalloid or lactic acid >=4  Note: HYPOTENSION is defined as 2 BP readings measured 3 hrs apart that have a SBP <90, MAP <65, or decrease >40 mmHg, occurring 6 hrs before or after t-zero

## 2023-05-06 NOTE — PROGRESS NOTES
Code Sepsis called for lactate 2.4. ICU team paged. En route to evaluate patient, contacted by Dr Sinclair that medicine team had already evaluated and needed no assistance with lactate work-up/management.     ICU team will be happy to assess if needed in the future.       Kody Hagan PA-C  05/06/23  11:32 AM

## 2023-05-07 ENCOUNTER — APPOINTMENT (OUTPATIENT)
Dept: OCCUPATIONAL THERAPY | Facility: CLINIC | Age: 73
DRG: 483 | End: 2023-05-07
Attending: STUDENT IN AN ORGANIZED HEALTH CARE EDUCATION/TRAINING PROGRAM
Payer: MEDICARE

## 2023-05-07 ENCOUNTER — APPOINTMENT (OUTPATIENT)
Dept: PHYSICAL THERAPY | Facility: CLINIC | Age: 73
DRG: 483 | End: 2023-05-07
Payer: MEDICARE

## 2023-05-07 LAB
ANION GAP SERPL CALCULATED.3IONS-SCNC: 10 MMOL/L (ref 7–15)
BUN SERPL-MCNC: 10.4 MG/DL (ref 8–23)
CALCIUM SERPL-MCNC: 8.5 MG/DL (ref 8.8–10.2)
CHLORIDE SERPL-SCNC: 92 MMOL/L (ref 98–107)
CREAT SERPL-MCNC: 0.55 MG/DL (ref 0.51–0.95)
DEPRECATED HCO3 PLAS-SCNC: 25 MMOL/L (ref 22–29)
ERYTHROCYTE [DISTWIDTH] IN BLOOD BY AUTOMATED COUNT: 14.5 % (ref 10–15)
GFR SERPL CREATININE-BSD FRML MDRD: >90 ML/MIN/1.73M2
GLUCOSE BLDC GLUCOMTR-MCNC: 118 MG/DL (ref 70–99)
GLUCOSE SERPL-MCNC: 107 MG/DL (ref 70–99)
HCT VFR BLD AUTO: 24.6 % (ref 35–47)
HGB BLD-MCNC: 8.2 G/DL (ref 11.7–15.7)
LACTATE SERPL-SCNC: 2 MMOL/L (ref 0.7–2)
MCH RBC QN AUTO: 31.2 PG (ref 26.5–33)
MCHC RBC AUTO-ENTMCNC: 33.3 G/DL (ref 31.5–36.5)
MCV RBC AUTO: 94 FL (ref 78–100)
PLATELET # BLD AUTO: 266 10E3/UL (ref 150–450)
POTASSIUM SERPL-SCNC: 3.5 MMOL/L (ref 3.4–5.3)
RBC # BLD AUTO: 2.63 10E6/UL (ref 3.8–5.2)
SODIUM SERPL-SCNC: 127 MMOL/L (ref 136–145)
WBC # BLD AUTO: 10.6 10E3/UL (ref 4–11)

## 2023-05-07 PROCEDURE — 99232 SBSQ HOSP IP/OBS MODERATE 35: CPT | Performed by: INTERNAL MEDICINE

## 2023-05-07 PROCEDURE — 250N000013 HC RX MED GY IP 250 OP 250 PS 637: Performed by: INTERNAL MEDICINE

## 2023-05-07 PROCEDURE — 97530 THERAPEUTIC ACTIVITIES: CPT | Mod: GP | Performed by: PHYSICAL THERAPIST

## 2023-05-07 PROCEDURE — 97161 PT EVAL LOW COMPLEX 20 MIN: CPT | Mod: GP | Performed by: PHYSICAL THERAPIST

## 2023-05-07 PROCEDURE — 250N000013 HC RX MED GY IP 250 OP 250 PS 637

## 2023-05-07 PROCEDURE — 84520 ASSAY OF UREA NITROGEN: CPT | Performed by: INTERNAL MEDICINE

## 2023-05-07 PROCEDURE — 83605 ASSAY OF LACTIC ACID: CPT

## 2023-05-07 PROCEDURE — 97110 THERAPEUTIC EXERCISES: CPT | Mod: GO | Performed by: OCCUPATIONAL THERAPIST

## 2023-05-07 PROCEDURE — 36415 COLL VENOUS BLD VENIPUNCTURE: CPT | Performed by: INTERNAL MEDICINE

## 2023-05-07 PROCEDURE — 85027 COMPLETE CBC AUTOMATED: CPT | Performed by: INTERNAL MEDICINE

## 2023-05-07 PROCEDURE — 120N000002 HC R&B MED SURG/OB UMMC

## 2023-05-07 RX ORDER — HYDRALAZINE HYDROCHLORIDE 10 MG/1
10 TABLET, FILM COATED ORAL EVERY 4 HOURS PRN
Status: DISCONTINUED | OUTPATIENT
Start: 2023-05-07 | End: 2023-05-08

## 2023-05-07 RX ORDER — CLOPIDOGREL BISULFATE 75 MG/1
75 TABLET ORAL DAILY
COMMUNITY
Start: 2023-05-08 | End: 2023-05-16

## 2023-05-07 RX ORDER — HYDROXYZINE HYDROCHLORIDE 10 MG/1
10 TABLET, FILM COATED ORAL EVERY 6 HOURS PRN
Status: DISCONTINUED | OUTPATIENT
Start: 2023-05-07 | End: 2023-05-11

## 2023-05-07 RX ORDER — LISINOPRIL 20 MG/1
20 TABLET ORAL DAILY
Status: DISCONTINUED | OUTPATIENT
Start: 2023-05-07 | End: 2023-05-08

## 2023-05-07 RX ORDER — HYDRALAZINE HYDROCHLORIDE 20 MG/ML
10 INJECTION INTRAMUSCULAR; INTRAVENOUS EVERY 4 HOURS PRN
Status: DISCONTINUED | OUTPATIENT
Start: 2023-05-07 | End: 2023-05-07

## 2023-05-07 RX ADMIN — HYDRALAZINE HYDROCHLORIDE 10 MG: 10 TABLET ORAL at 20:33

## 2023-05-07 RX ADMIN — FUROSEMIDE 40 MG: 20 TABLET ORAL at 08:20

## 2023-05-07 RX ADMIN — CLOPIDOGREL BISULFATE 75 MG: 75 TABLET, FILM COATED ORAL at 08:21

## 2023-05-07 RX ADMIN — Medication 2.5 MG: at 22:12

## 2023-05-07 RX ADMIN — HYDROXYZINE HYDROCHLORIDE 10 MG: 10 TABLET ORAL at 19:59

## 2023-05-07 RX ADMIN — LISINOPRIL 20 MG: 20 TABLET ORAL at 10:35

## 2023-05-07 RX ADMIN — CARVEDILOL 12.5 MG: 12.5 TABLET, FILM COATED ORAL at 08:21

## 2023-05-07 RX ADMIN — VENLAFAXINE HYDROCHLORIDE 150 MG: 150 CAPSULE, EXTENDED RELEASE ORAL at 08:21

## 2023-05-07 RX ADMIN — CARVEDILOL 12.5 MG: 12.5 TABLET, FILM COATED ORAL at 17:20

## 2023-05-07 RX ADMIN — BUPROPION HYDROCHLORIDE 300 MG: 300 TABLET, FILM COATED, EXTENDED RELEASE ORAL at 08:20

## 2023-05-07 RX ADMIN — DOCUSATE SODIUM AND SENNOSIDES 1 TABLET: 8.6; 5 TABLET ORAL at 19:46

## 2023-05-07 RX ADMIN — OXYCODONE HYDROCHLORIDE 10 MG: 5 TABLET ORAL at 02:29

## 2023-05-07 RX ADMIN — ALBUTEROL SULFATE 2 PUFF: 90 AEROSOL, METERED RESPIRATORY (INHALATION) at 02:18

## 2023-05-07 RX ADMIN — ROSUVASTATIN CALCIUM 20 MG: 20 TABLET, FILM COATED ORAL at 08:20

## 2023-05-07 RX ADMIN — ALBUTEROL SULFATE 2 PUFF: 90 AEROSOL, METERED RESPIRATORY (INHALATION) at 15:26

## 2023-05-07 RX ADMIN — METHOCARBAMOL 500 MG: 500 TABLET ORAL at 19:59

## 2023-05-07 RX ADMIN — DOCUSATE SODIUM AND SENNOSIDES 1 TABLET: 8.6; 5 TABLET ORAL at 08:20

## 2023-05-07 RX ADMIN — HYDROXYZINE HYDROCHLORIDE 25 MG: 25 TABLET, FILM COATED ORAL at 02:29

## 2023-05-07 ASSESSMENT — ACTIVITIES OF DAILY LIVING (ADL)
ADLS_ACUITY_SCORE: 36
ADLS_ACUITY_SCORE: 35
ADLS_ACUITY_SCORE: 36

## 2023-05-07 NOTE — PROGRESS NOTES
05/07/23 1300   Appointment Info   Signing Clinician's Name / Credentials (PT) Darshana Grant PT, DPT, ATC, LAT   Living Environment   People in Home alone   Current Living Arrangements house   Home Accessibility stairs within home;stairs to enter home   Number of Stairs, Main Entrance 7   Number of Stairs, Within Home, Primary seven   Stair Railings, Within Home, Primary railings on both sides of stairs   Transportation Anticipated family or friend will provide   Living Environment Comments main level living, friend able to check on her. friend expresses concerns regarding patient's gradual decline in mobility steadiness over the past months.   Self-Care   Usual Activity Tolerance good   Current Activity Tolerance fair   Regular Exercise No   Equipment Currently Used at Home none   Fall history within last six months yes   Number of times patient has fallen within last six months 2   General Information   Onset of Illness/Injury or Date of Surgery 05/03/23   Referring Physician Louann Hoff MD   Patient/Family Therapy Goals Statement (PT) go home   Pertinent History of Current Problem (include personal factors and/or comorbidities that impact the POC) patient is a 72 year old female, status post right humerus proximal fracture ORIF 5/3/23 and 5/5 R reverse TSA with brachial plexus block causing Ce's syndrome. patient with a past medical h/o CAD, NICM (Taksubo), HTN, previous TIA and CVA post angiogram in 6/2021.   General Observations PT eval and treat status post shoulder surgery. Activity orders: ambulate with assist, up with assist, up in chair. OT provided patient with single point cane   Cognition   Affect/Mental Status (Cognition) confused   Orientation Status (Cognition) oriented to;person  (2027, [employer] president.)   Follows Commands (Cognition) follows two-step commands;50-74% accuracy;initiation impaired   Cognitive Status Comments impaired sequencing, attempting to use cane in post operative  arm otherwise carrying cane   Pain Assessment   Patient Currently in Pain Yes, see Vital Sign flowsheet   Integumentary/Edema   Integumentary/Edema Comments post op dressings intact RUE. sling in place   Posture    Posture Forward head position;Protracted shoulders   Range of Motion (ROM)   ROM Comment Bilat LE WFL   Strength (Manual Muscle Testing)   Strength (Manual Muscle Testing) Able to perform L SLR;Able to perform R SLR   Bed Mobility   Bed Mobility supine-sit;sit-supine   Supine-Sit Phillips (Bed Mobility) independent   Sit-Supine Phillips (Bed Mobility) independent   Transfers   Transfers sit-stand transfer   Transfer Safety Concerns Noted decreased step length;losing balance backward   Impairments Contributing to Impaired Transfers impaired balance   Sit-Stand Transfer   Sit-Stand Phillips (Transfers) supervision   Assistive Device (Sit-Stand Transfers) cane, straight   Gait/Stairs (Locomotion)   Phillips Level (Gait) supervision   Assistive Device (Gait) cane, straight   Distance in Feet 20   Pattern (Gait) step-through   Deviations/Abnormal Patterns (Gait) stride length decreased;gait speed decreased;festinating/shuffling;base of support, narrow   Negotiation (Stairs) stairs independence;stairs assistive device;handrail location;ascending technique;number of steps;descending technique   Phillips Level (Stairs) moderate assist (50% patient effort)  (to prevent fall)   Handrail Location (Stairs) left side (ascending);right side (descending)   Number of Steps (Stairs) 2   Ascending Technique (Stairs) step-over-step   Descending Technique (Stairs) step-to-step   Balance   Balance Comments FONTANEZ balance 32/56, high risk of falling, impaired insight to functional mobility deficits and safety needs   Sensory Examination   Sensory Perception patient reports no sensory changes   Coordination   Coordination Comments impaired sequencing with cane   Clinical Impression   Criteria for Skilled  Therapeutic Intervention Yes, treatment indicated   PT Diagnosis (PT) impaired balance, impaired mobility   Influenced by the following impairments status post falls with fractures of arm, post op status, impaired safety insight   Functional limitations due to impairments high risk of falling with poor use of DME for safety. requires safety reminders and support to prevent falls and mobilize safely   Clinical Presentation (PT Evaluation Complexity) Evolving/Changing   Clinical Presentation Rationale post op status, recent TIA, impaired cognition and reasoning   Clinical Decision Making (Complexity) low complexity   Planned Therapy Interventions (PT) balance training;gait training;home exercise program;ROM (range of motion);stair training;strengthening;patient/family education   Anticipated Equipment Needs at Discharge (PT)   (defer to next level of care)   Risk & Benefits of therapy have been explained evaluation/treatment results reviewed;participants voiced agreement with care plan;participants included;patient;friend   Clinical Impression Comments Patient presents with functional mobility impaired due to impaired balance and poor safety insight with minimal efforts to improve mobility safety. Patient unable to properly use the cane and FONTANEZ balance score demonstrates a high risk of falls. She is from home and does not have support needed for safety. Patient previously home IND until her TIA and RUE fractures, friend able to provide some support and notes a significant decline from 1 month ago. Currently patient is below her functional baseline and without resources to function safely at home. She would benefit from TCU placement for safe disposition and post operative mobility management and asfety progression.   PT Total Evaluation Time   PT Eval, Low Complexity Minutes (39647) 12   Plan of Care Review   Plan of Care Reviewed With patient;friend   Physical Therapy Goals   PT Frequency Daily   PT Predicted  Duration/Target Date for Goal Attainment 05/10/23   PT Goals Transfers;Gait;Stairs   PT: Transfers Modified independent;Sit to/from stand;Assistive device   PT: Gait Modified independent;Assistive device;100 feet   PT: Stairs Modified independent;7 stairs;Rail on left   Interventions   Interventions Quick Adds Therapeutic Activity   Therapeutic Activity   Therapeutic Activities: dynamic activities to improve functional performance Minutes (92630) 25   PT Discharge Planning   PT Plan Daily. balance, AD training, ambulate   PT Discharge Recommendation (DC Rec) home with assist;home with home care physical therapy;Transitional Care Facility   PT Rationale for DC Rec Patient presents with functional mobility impaired due to impaired balance and poor safety insight with minimal efforts to improve mobility safety. Patient unable to properly use the cane and FONTANEZ balance score demonstrates a high risk of falls. She is from home and does not have support needed for safety. Patient previously home IND until her TIA and RUE fractures, friend able to provide some support and notes a significant decline from 1 month ago. Currently patient is below her functional baseline and without resources to function safely at home. She would benefit from TCU placement for safe disposition and post operative mobility management and asfety progression.   PT Brief overview of current status IND bed mobility. IND sit to/from stand. IND bed to chair. SBA ambulation 20' and 40' and 40' with poor use of cane. 8 stairs, CGA, MOD assist to prevent falls x2. FONTANEZ 32/56.   Total Session Time   Timed Code Treatment Minutes 25   Total Session Time (sum of timed and untimed services) 37     Thank you for your referral.  Darshana Grant, PT, DPT, ATC, LAT    [facility]  O: [phone]  E: [email or web address]

## 2023-05-07 NOTE — PROGRESS NOTES
Orthopaedic Surgery Progress Note 05/07/2023    S: Received 1 unit pRBCs yesterday. Patient noted to have increased SOB ON. CT PE revealing no PE, symptoms improved with supplemental O2 and albuterol. Notably more confused and sedated this AM. Denies pain, but does appear uncomfortable with adjustment of RUE sling.  Voiding independently. BMx2 yesterday.  Tolerating diet. Has been ambulatory to bathroom.     O:  Temp: 97.1  F (36.2  C) Temp src: Oral BP: (!) 145/73 Pulse: 87   Resp: 18 SpO2: 100 % O2 Device: Nasal cannula Oxygen Delivery: 2.5 LPM    Exam:  Gen: No acute distress, resting comfortably in bed.  HEENT: patient noted to hold right eye closed, but will open completely when asked without lid lag   CV: wwp  Resp: Non-labored breathing on NC   MSK:  Right Upper Extremity:    Inspection: Dressing/splint C/D/I, notable swelling about fingers, and ecchymoses about shoulder   Motor: Able to fire FDS/FDP, EDC, FPL, EPL. Patient not compliant with firing interossei or deltoid this AM   Sensory: SILT throughout median, ulnar, and radial nerve distributions, decreased sensation over axillary   Circulation: fingers warm and well perfused    Lab:  Recent Labs   Lab 05/06/23  1842 05/06/23  1104 05/06/23  0856 05/05/23  1408 05/05/23  1140   WBC  --   --  10.9 9.3 7.9   HGB 8.3* 7.4* 7.4* 7.4* 7.7*   PLT  --   --  268 183 219     Sed Rate   Date Value Ref Range Status   10/14/2005 11 0 - 30 mm/h Final       Assessment: Sri Grewal is a 72 year old female s/p ORIF right distal humerus fracture with intercondylar extension on 5/3/2023 with Dr. Phipps and right reverse TSA with Dr. Krause on 5/5/23.     TODAY TO DOs:  - Labs: CBC, BMP, lactate, appreciate medical team assistance in evaluation of sedation   - Limit sedating medications as possible   - Aggressive pulmonary toilet   - Ongoing work with OT   - Patient to ambulate with RN per shift protocol - encourage OOB ambulation       Plan:  Primary: Ortho  Activity: Up with  assist.  Weight bearing status: NWB RUE   Antibiotics: Ancef x24 hours perioperatively.  Diet: Regular diet  DVT prophylaxis: Continue Plavix and mechanical while in the hospital   Bracing/Splinting: Sling in place, Posterior long arm splint to be kept clean, dry, and intact until follow-up.   Elevation: Elevate RUE on pillows to keep swelling down as much as possible.  Pain management: transition from IV to orals as tolerated.   X-rays: Completed in PACU   Occupational Therapy: ADL's.  Labs: Hgb check   Consults: OT. Hospitalist, appreciate assistance in caring for this patient.  Follow-up: Clinic with Dr. Phipps Care Team in 1 week    Future Appointments   Date Time Provider Department Center   6/6/2023  1:00 PM Nathan Turner MD [facility]   8/4/2023  4:45 PM Miryam Mcgowan MD [facility]       Disposition: Pending progress with therapies, pain control on orals, and medical stability, anticipate discharge to home 5/8 vs 5/9 pending medically stability.     Patient discussed with Dr. Phipps & Krause.      Louann Hoff MD, PGY1   Orthopaedic Surgery  [phone]

## 2023-05-07 NOTE — PROGRESS NOTES
Sepsis Evaluation     I was called to see Sri Grewal due to abnormal vital signs triggering the Sepsis SIRS screening alert. She is not known to have an infection.     PHYSICAL EXAM  Vital Signs:  Temp: (!) 96.6  F (35.9  C) Temp src: Oral BP: (!) 181/92 Pulse: 92   Resp: 16 SpO2: 98 % O2 Device: None (Room air) Oxygen Delivery: 2.5 LPM    General: in no acute distress  Mental Status: AAOx4.     Remainder of physical exam is significant for: unremarkable except pallor.     DATA  Lactic Acid   Date Value Ref Range Status   05/06/2023 2.4 (H) 0.7 - 2.0 mmol/L Final   05/05/2023 1.3 0.7 - 2.0 mmol/L Final       ASSESSMENT AND PLAN  NO EVIDENCE OF SEPSIS at this time.  Vital sign, physical exam, and lab findings are due to acute anemia, hypovolemia. Plan to give 500 ml bolus and 1 U PRBC. fu hgb. bp stable. .    Disposition: The patient will remain on the current unit. We will continue to monitor this patient closely..  Justo Sinclair MD  05/06/23, 11:27 AM    Sepsis Criteria   Sepsis: The body's generalized inflammatory state as a response to an infection. Sepsis Predictive Model includes >80 variable to alert to potential sepsis.  Severe Sepsis: Sepsis plus one or more variables of acute organ dysfunction (Note: lactic acid >2 or acute encephalopathy each qualify as organ dysfunction)  Septic Shock: Sepsis AND hypotension despite adequate volume resuscitation with crystalloid or lactic acid >=4  Note: HYPOTENSION is defined as 2 BP readings measured 3 hrs apart that have a SBP <90, MAP <65, or decrease >40 mmHg, occurring 6 hrs before or after t-zero

## 2023-05-07 NOTE — PLAN OF CARE
"Goal Outcome Evaluation:  VS: /74   Pulse 88   Temp 97  F (36.1  C) (Oral)   Resp 20   Ht 1.6 m (5' 2.99\")   Wt 61.7 kg (136 lb 0.4 oz)   LMP 11/09/2005 (Approximate)   SpO2 97%   BMI 24.10 kg/m       O2: SpO2 > 92% and on 2L of oxygen. LS clear and equal bilaterally. Diminished on RLL and RUL. Encouraged IS. Pt has been having SOB at rest and got worst in late evening. Given prn albuterol inhaler and nub treatment. Page Dr. Kim, received order for CT scan to rule out PE and waiting for result. Pt is continuous pulse oximetry.    Output: Voids spontaneously without difficulty to bathroom.   Last BM: LBM denies abdominal discomfort. BS active / passing flatus.    Activity: Up with A X1.uses gait belt.    Skin: WDL except, right shoulder incision.    Pain: Pain managed with prn tylenol and oxycodone.    CMS: Intact, AOx4. Denies numbness and tingling.   Dressing: Right shoulder incisional dressing CDI. Pt has shoulder brace on.   Diet: Regular diet. Denies nausea/vomiting.    LDA: PIV SL into left forearm.    Equipment: IV pole, personal belongings,    Plan: TBD. Continue with plan of care. Call light within reach, pt able to make needs known.    Additional Info:                              "

## 2023-05-07 NOTE — PROGRESS NOTES
[facility]    Medicine Progress Note - Hospitalist Service, GOLD TEAM 16    Date of Admission:  5/3/2023    Assessment & Plan       Sri Grewal is a 72 year old female admitted on 4/30/2023. She has a past medical h/o CAD, NICM (Taksubo), HTN, previous TIA and CVA post angiogram in 6/2021.  She was seen at [facility] ED on 4/25/23 w/ increased difficulty speaking and right sided weakness. Stroke code was called upon pt's arrival to the ED. CT head w/o contrast was negative. CTA head/neck was negative for large vessel occlusion but it did show a L MCA aneurysm which was stable.  She was given TNK in the ED with improvement in her symptoms and admitted to the ICU for close monitoring post TNK. Her speech returned to her baseline.  She was in NSR on tele.  MRI brain was negative for acute stroke. Neurology team felt pt's symptoms were due to either a TIA or aborted stroke by TNK administration. Neurology recommended stopping PTA ASA and started pt on plavix, with plans to continue it indefinitely. She will follow up with neurology in clinic and complete another 28 days Zio-patch.       She has a history of a right proximal humerus fracture that has resulted from a mechanical fall in 2/2023 which has been managed nonoperatively. On 4/29, she had another episode of mechanical fall resulting in acute displaced fracture of the proximal shaft of the right humerus.  She was seen at [facility] ED on 4/30 from where she was transferred to [facility] orthopedic service for definite surgical intervention.  She was discharged to home on 5/1 with plan for patient to be admitted on 5/3 for ORIF since she was on Plavix.  She was advised to hold Plavix until she has her surgery.     Pt was admitted to [place] on 5/3 and underwent ORIF proximal right humerus fracture.  Transferred to [facility] on 5/5 and underwent reverse right TSA, complicated by right-side Ce syndrome  from brachial plexus block.  Pt is currently on surgical hernandez for postop care.  [facility] hospitalist service consulted for preop eval and postop medical management     #. Acute displaced intra-articular fracture of the right distal humerus extending from the supracondylar region between the condyles and into the lateral humeral condyle, 4/29/23  #. Subacute, ununited fracture of the humeral neck 2/2023  Post op care per ortho  ---  S/p ORIF proximal right humerus fracture 5/3  ---  S/p  reverse right TSA 5/5  --- Continue prophylactic Ancef  ---  PT/OT consult  ---  IS. Bowel regimen.      #.  Postop right side Ce's syndrome  ---   Due to brachial plexus block per anesthesia  ---   No clear evidence of right eyelid ptosis, miosis or anhidrosis  ---   Resolved  ---   Monitor     DVT prophylaxis  ---   PCD        #. Recent TIA/CVA, 4/25  #. Cerebral aneurysm   ---   S/p TNK on 4/29/23 at [facility] ED, please see above  ---   PTA ASA discontinued and patient was started on Plavix on 4/29  ---   Plavix held 5/2 - 5/3 for above surgery  ---   Plavix 75 mg daily today reintroduced on 5/4     #. Hx of CAD   ---   EF 65-70%, normal RV function  ---   Continue PTA Coreg and rosuvastatin  ---   5/6: resume pta lasix 40 mg daily.   ---   Hold PTA lisinopril-> 5/7: resume.      #.  Right upper pole kidney cyst   ---   F/u w/ PCP     #. Depression   ---   Venlafaxine 150 mg day and Bupropion 300 mg daily     #. Migraine   ---   PTA prn fiorinal      #.  Hyponatremia  ---   Na level is 133->134  ---   MS clear  ---   IVF hydration.   Monitor.      #.  Acute postop blood loss anemia  #  MORENO suspect 2.2 acute anemia. cxr clear. Lungs clear. Oxygenating well on RA> no cough or cp. Cough+ Also likely early pulmonary edema. CT neg for PE. Lung nodule. Outpatient follow-up.   Better now.   --- Hgb was 15.9 g on 2/2/23 ---> 11.7 g on 4/30 ---> ---> ---> 7.4 g 5/6. 1 U PRBC. Now 8.3.   --- Albuterol inhalers, nebs prn  ---  Follow-up Hgb. Transfuse for <7.0 or s.s of hypoperfusion.           Diet: Advance Diet as Tolerated: Regular Diet Adult  Diet    DVT Prophylaxis: Defer to primary service  [place] Catheter: Not present  Lines: None     Cardiac Monitoring: None  Code Status: Full Code      Clinically Significant Risk Factors                                 Disposition Plan     Expected Discharge Date: 05/07/2023      Destination: home with help/services            Justo Sinclair MD  Hospitalist Service, GOLD TEAM 16  [facility]  Securely message with [employer] info)  Text page via [employer] Paging/Directory   See signed in provider for up to date coverage information  ______________________________________________________________________    Interval History   Interval events reviewed.   Doing better  Breathing better  Oxygenating well on RA  hgb 8.3  Denies fever or chills.   Pain controlled.   No cp  No NV or pain abdomen.   No new sensory or motor complaint.     No other new or acute medical concern      Physical Exam   Vital Signs: Temp: (!) 96.6  F (35.9  C) Temp src: Oral BP: (!) 181/92 Pulse: 92   Resp: 16 SpO2: 98 % O2 Device: None (Room air) Oxygen Delivery: 2.5 LPM  Weight: 136 lbs .38 oz    General Appearance: Awake, interactive, NAD  HEENT: AT/NC, Anicteric, Moist MM  Neck: Supple.   Respiratory: Normal work of breathing. CTA BL.   Cardiovascular: S1 S2 Regular.  GI/Abd: Soft. NT. ND.   Extremities: Distally wwp. No pedal edema.  Neuro:Grossly non focal.   Psychiatry: Stable mood.    Medical Decision Making       45 MINUTES SPENT BY ME on the date of service doing chart review, history, exam, documentation & further activities per the note.      Data     I have personally reviewed the following data over the past 24 hrs:    N/A  \   8.3 (L)   / N/A     N/A N/A N/A /  118 (H)   N/A N/A N/A \       Procal: N/A CRP: N/A Lactic Acid: 2.4 (H)         Imaging results reviewed over the  past 24 hrs:   Recent Results (from the past 24 hour(s))   XR Chest 1 View    Narrative    EXAM: XR CHEST 1 VIEW 5/6/2023 11:19 AM      HISTORY: sob.    COMPARISON: Chest x-ray 10/29/2019.     TECHNIQUE: Frontal view of the chest.    FINDINGS: PA upright chest x-ray. Reverse right total shoulder  arthroplasty changes.    Trachea is midline. Cardiac silhouette is within normal limits.  Pulmonary vasculature is distinct. No focal airspace opacity, pleural  effusion, pneumothorax.    Bony structures appear intact.      Impression    IMPRESSION:   Clear chest    I have personally reviewed the examination and initial interpretation  and I agree with the findings.    OSKAR DESAI MD         SYSTEM ID:  [id number]   CT Chest Pulmonary Embolism w Contrast    Narrative    CT CHEST PULMONARY EMBOLISM W CONTRAST, 5/6/2023 11:03 PM    History: SOB and increased WOB; Female sex; Not pregnant; No imaging  to r/o PE in the last 24 hours; Pulmonary Embolism Rule-Out Criteria  (PERC) score > 0; Revised Kearney Score (RGS) not >= 11; No D-dimer  result available; D-dimer not ordered    Comparison: 6/25/2021.    Technique: Helical acquisition of CT images of the chest from the lung  apices to the kidneys were acquired after the administration of  intravenous contrast according to the CT pulmonary angiogram protocol.  Axial images were reconstructed in 1 and 3 mm slice thickness. Coronal  reconstructions performed. Three-dimensional (3D) post-processed  angiographic images were reconstructed, archived to PACS and used in  the interpretation of this study.    Contrast dose: 54mL's iso 370    Findings:   The contrast bolus is adequate.  There is no pulmonary embolism. No  evidence of infection. Trace bilateral pleural effusions. No  pneumothorax. 7 mm left upper lobe solitary pulmonary nodule is seen  on series 6 image 34 abutting the major fissure. Mild bibasilar  atelectasis.    Mediastinum: The heart is not enlarged. Mitral annular  calcifications.  Trace pericardial effusion. The ascending aorta and main pulmonary  artery diameters (2.7 cm) are within normal limits.    Upper abdomen: Moderate hiatal hernia. 4.0 cm right upper pole cyst.  Punctate minimal rim calcification Otherwise, the appearance of the  upper abdomen is unremarkable.    Bones and soft tissues: Unchanged appearance of T12 vertebral body  compression deformity with mild spinal canal narrowing. Reverse right  total shoulder arthroplasty.      Impression    Impression:  1. No pulmonary embolism identified.  2. New 7 mm left upper lobe perifissural solid nodule. Per Fleischner  2017 Society criteria consider 6-12 month follow-up to evaluate for  stability..  3. Right renal cyst. Recommend nonurgent renal ultrasound follow-up.  4. Severe T12 compression fracture. Comparison with spinal canal  narrowing. Not significantly changed from  image of elbow CT  dated 4/30/2023.    In the event of a positive result for acute pulmonary embolism  resulting in right heart strain, consider calling the   [facility] patient placement ([phone]) for PERT (Pulmonary Embolism  Response Team) Activation?    PERT -- Pulmonary Embolism Response Team (Multidisciplinary team  including cardiology, interventional radiology, critical care,  hematology)    I have personally reviewed the examination and initial interpretation  and I agree with the findings.    ALYSE DEAN MD         SYSTEM ID:  [id number]

## 2023-05-07 NOTE — PLAN OF CARE
"Goal Outcome Evaluation:    VS: /62   Pulse 92   Temp (!) 96.6  F (35.9  C) (Oral)   Resp 16   Ht 1.6 m (5' 2.99\")   Wt 61.7 kg (136 lb 0.4 oz)   LMP 11/09/2005 (Approximate)   SpO2 98%   BMI 24.10 kg/m       O2:  LS clear , DIMINISHED and equal bilaterally. Denies chest pain and SOB. Show sign of irregular breathing when anxious     Output: Voids spontaneously without difficulty to bathroom    Last BM: denies abdominal discomfort. BS active / passing flatus.    Activity: Up with Assist ad rashi cane and gait belt and wheel chair   Skin: WDL except, Right shoulder to elbow incision and bruising, some swelling   Pain:  see MARS   CMS: Intact, AOx4. Denies numbness and tingling.   Dressing:     Diet: Regular diet. Denies nausea/vomiting.     LDA:  PIV SL    Equipment: IV pole, personal belongings,    Plan: Fall  precautions maintained / Continue with plan of care. Call light within reach, pt able to make needs known.    Additional Info: Pt wants to be discharge. Provider ( Dr. Sinclair)  was paged on pt request. Pt has been ambulating via wheelchair. Pt BP was ranging high provider notified PRN was placed.   Continue POC          "

## 2023-05-07 NOTE — PLAN OF CARE
Goal Outcome Evaluation:  Patient vital signs are at baseline: No,  Reason:  USES 2.5 LITERS NASAL CANNULA  Patient able to ambulate as they were prior to admission or with assist devices provided by therapies during their stay:  Yes  Patient MUST void prior to discharge:  Yes  Patient able to tolerate oral intake:  Yes  Pain has adequate pain control using Oral analgesics:  Yes  Does patient have an identified :  Yes  Has goal D/C date and time been discussed with patient:  Yes      VS: Temp: 97.1  F (36.2  C) Temp src: Oral BP: (!) 145/73 Pulse: 87   Resp: 18 SpO2: 100 % O2 Device: Nasal cannula Oxygen Delivery: 2.5 LPM    O2:  LS clear , DIMINISHED and equal bilaterally. Denies chest pain and SOB. IS in place using up to 500 ml   Output: Voids spontaneously without difficulty to bathroom / using    Last BM: denies abdominal discomfort. BS active / passing flatus.    Activity: Up with Assist ad rashi cane and gait belt   Skin: WDL except, Right shoulder to elbow incision and bruising, some swelling   Pain: Pain managed with see MARS   CMS: Intact, AOx4. Denies numbness and tingling.   Dressing:    Diet: Regular diet. Denies nausea/vomiting.   BG checks before meals and at bedtime. BG check at bedtime    LDA:  PIV SL    Equipment: IV pole, personal belongings,    Plan: Fall  precautions maintained / Continue with plan of care. Call light within reach, pt able to make needs known.    Additional Info: May go to TCU, after episode of SOB at 0200 she slept well.

## 2023-05-07 NOTE — PLAN OF CARE
"VS: BP (!) 145/73 (BP Location: Left arm, Patient Position: Semi-Nair's, Cuff Size: Adult Regular)   Pulse 88   Temp 97.1  F (36.2  C) (Oral)   Resp 18   Ht 1.6 m (5' 2.99\")   Wt 61.7 kg (136 lb 0.4 oz)   LMP 11/09/2005 (Approximate)   SpO2 100%   BMI 24.10 kg/m       O2: Patient had compliant of SOB during the shift. PRN Albuterol administered X1 with effective results. Remains on continues Pulse Oximeter. No concerns noted at this time.   Output: Voids spontaneously without difficulty to bathroom    Last BM: LBM 5/6/23    Activity: Up with one person assist via gait belt   Skin: WDL except bruise to left arm and right shoulder surgical incision. Right shoulder in a brace.   Pain: Pain managed with oxycodone   CMS: Intact, AOx4. Denies numbness and tingling.   Dressing: Dressing C/D/I   Diet: Regular diet. Denies nausea/vomiting.    LDA: Left arm PIV  with SL    Equipment: IV pole, personal belongings,    Plan: Continue with plan of care. Call light within reach, pt able to make needs known.              "

## 2023-05-07 NOTE — PROGRESS NOTES
Pt w/ increased WOB and c/o SOB    She is not hypoxic    CT chest pulm angio, 5/6: Impression:  1. No pulmonary embolism identified.  2. New 7 mm left upper lobe perifissural solid nodule. Per Fleischner  2017 Society criteria consider 6-12 month follow-up to evaluate for  stability..  3. Right renal cyst. Recommend nonurgent renal ultrasound follow-up.  4. Severe T12 compression fracture. Comparison with spinal canal narrowing. Not significantly changed from  image of elbow CT dated 4/30/2023.      A/p:  O2 supplement for comfort.  May need low dose ativan for anxiety      Leola Kim MD.   Hospitalist.  [phone], pager.

## 2023-05-07 NOTE — PROGRESS NOTES
0214, HEARD THE bed alarm goes on and pt was sitting on the edge of the bed. Pt was having shortness of breath also wanted to use bathroom. Oxygen connection was lengthen, oxygen was increased from 2 -2.5 due to a lot SHORTNESS OF BREATH AND ALBUTEROL 2 PUFFS was given. It took approx 5 minutes for the body to calm down. Pt was able to walk to bathroom with assist of 1 and walker and gait belt. Gait unsteady and uneven, makes short steps. Right arm on sling, denies numbness or tingling pain was present and pain medications given. Pt right now sleeping no distress noted

## 2023-05-07 NOTE — PROGRESS NOTES
Care Management Follow Up    Length of Stay (days): 2    Expected Discharge Date: 5/8/23     Concerns to be Addressed: Discharge planning     Patient plan of care discussed at interdisciplinary rounds: Yes    Anticipated Discharge Disposition: TCU, pending      Anticipated Discharge Services: None  Anticipated Discharge DME: TBD     Patient/family educated on Medicare website which has current facility and service quality ratings:  yes  Education Provided on the Discharge Plan: Yes  Patient/Family in Agreement with the Plan: yes    Referrals Placed by CM/SW: Homecare  Private pay costs discussed: Not applicable    Additional Information:  Patient has been accepted by [facility] for RN/PT/OT. However, PT is recommending TCU. Writer and Rachael RNCC met with patient and her friend Bryson to discuss TCU. Patient stated that she will think about it. Bryson verbalized agreement to TCU. Provided a list of TCU's from the Medicare.gov website. Instructed patient to pick at least 5 preferences.        Isamar Eduardo RNCC  RN Care Coordinator   Office: [phone]   Pager: [phone]

## 2023-05-08 ENCOUNTER — APPOINTMENT (OUTPATIENT)
Dept: OCCUPATIONAL THERAPY | Facility: CLINIC | Age: 73
DRG: 483 | End: 2023-05-08
Attending: STUDENT IN AN ORGANIZED HEALTH CARE EDUCATION/TRAINING PROGRAM
Payer: MEDICARE

## 2023-05-08 ENCOUNTER — APPOINTMENT (OUTPATIENT)
Dept: PHYSICAL THERAPY | Facility: CLINIC | Age: 73
DRG: 483 | End: 2023-05-08
Attending: STUDENT IN AN ORGANIZED HEALTH CARE EDUCATION/TRAINING PROGRAM
Payer: MEDICARE

## 2023-05-08 LAB
DEPRECATED CALCIDIOL+CALCIFEROL SERPL-MC: 11 UG/L (ref 20–75)
OSMOLALITY UR: 342 MMOL/KG (ref 100–1200)
SODIUM SERPL-SCNC: 128 MMOL/L (ref 133–144)
SODIUM UR-SCNC: 33 MMOL/L

## 2023-05-08 PROCEDURE — 250N000013 HC RX MED GY IP 250 OP 250 PS 637

## 2023-05-08 PROCEDURE — 250N000013 HC RX MED GY IP 250 OP 250 PS 637: Performed by: STUDENT IN AN ORGANIZED HEALTH CARE EDUCATION/TRAINING PROGRAM

## 2023-05-08 PROCEDURE — 250N000013 HC RX MED GY IP 250 OP 250 PS 637: Performed by: NURSE PRACTITIONER

## 2023-05-08 PROCEDURE — 36415 COLL VENOUS BLD VENIPUNCTURE: CPT | Performed by: INTERNAL MEDICINE

## 2023-05-08 PROCEDURE — 97116 GAIT TRAINING THERAPY: CPT | Mod: GP

## 2023-05-08 PROCEDURE — 99232 SBSQ HOSP IP/OBS MODERATE 35: CPT | Performed by: INTERNAL MEDICINE

## 2023-05-08 PROCEDURE — 84300 ASSAY OF URINE SODIUM: CPT | Performed by: INTERNAL MEDICINE

## 2023-05-08 PROCEDURE — 83935 ASSAY OF URINE OSMOLALITY: CPT | Performed by: INTERNAL MEDICINE

## 2023-05-08 PROCEDURE — 84295 ASSAY OF SERUM SODIUM: CPT | Performed by: INTERNAL MEDICINE

## 2023-05-08 PROCEDURE — 120N000002 HC R&B MED SURG/OB UMMC

## 2023-05-08 PROCEDURE — 250N000013 HC RX MED GY IP 250 OP 250 PS 637: Performed by: INTERNAL MEDICINE

## 2023-05-08 PROCEDURE — 97530 THERAPEUTIC ACTIVITIES: CPT | Mod: GP

## 2023-05-08 PROCEDURE — 97129 THER IVNTJ 1ST 15 MIN: CPT | Mod: GO

## 2023-05-08 PROCEDURE — 97535 SELF CARE MNGMENT TRAINING: CPT | Mod: GO

## 2023-05-08 RX ORDER — HYDRALAZINE HYDROCHLORIDE 25 MG/1
25 TABLET, FILM COATED ORAL EVERY 4 HOURS PRN
Status: DISCONTINUED | OUTPATIENT
Start: 2023-05-08 | End: 2023-05-09

## 2023-05-08 RX ORDER — ERGOCALCIFEROL 1.25 MG/1
50000 CAPSULE, LIQUID FILLED ORAL
DISCHARGE
Start: 2023-05-08 | End: 2023-05-16

## 2023-05-08 RX ORDER — VENLAFAXINE HYDROCHLORIDE 150 MG/1
150 CAPSULE, EXTENDED RELEASE ORAL DAILY
Qty: 90 CAPSULE | Refills: 1 | DISCHARGE
Start: 2023-05-08 | End: 2023-05-16

## 2023-05-08 RX ORDER — LISINOPRIL 20 MG/1
40 TABLET ORAL DAILY
Status: DISCONTINUED | OUTPATIENT
Start: 2023-05-08 | End: 2023-05-16 | Stop reason: HOSPADM

## 2023-05-08 RX ORDER — ERGOCALCIFEROL 1.25 MG/1
50000 CAPSULE, LIQUID FILLED ORAL
Status: DISCONTINUED | OUTPATIENT
Start: 2023-05-08 | End: 2023-05-16 | Stop reason: HOSPADM

## 2023-05-08 RX ORDER — OXYCODONE HYDROCHLORIDE 5 MG/1
5 TABLET ORAL EVERY 4 HOURS PRN
Qty: 26 TABLET | Refills: 0 | Status: SHIPPED | OUTPATIENT
Start: 2023-05-08 | End: 2023-05-16

## 2023-05-08 RX ORDER — ACETAMINOPHEN 325 MG/1
650 TABLET ORAL EVERY 4 HOURS PRN
Qty: 60 TABLET | Refills: 0 | DISCHARGE
Start: 2023-05-08 | End: 2023-05-16

## 2023-05-08 RX ORDER — HYDRALAZINE HYDROCHLORIDE 25 MG/1
25 TABLET, FILM COATED ORAL ONCE
Status: COMPLETED | OUTPATIENT
Start: 2023-05-08 | End: 2023-05-08

## 2023-05-08 RX ORDER — HYDROXYZINE HYDROCHLORIDE 25 MG/1
25 TABLET, FILM COATED ORAL EVERY 6 HOURS PRN
Qty: 30 TABLET | Refills: 0 | DISCHARGE
Start: 2023-05-08 | End: 2023-05-16

## 2023-05-08 RX ORDER — ACETAMINOPHEN 325 MG/1
650 TABLET ORAL EVERY 4 HOURS PRN
Status: DISCONTINUED | OUTPATIENT
Start: 2023-05-08 | End: 2023-05-16 | Stop reason: HOSPADM

## 2023-05-08 RX ADMIN — ACETAMINOPHEN 650 MG: 325 TABLET ORAL at 22:41

## 2023-05-08 RX ADMIN — Medication 5 MG: at 02:05

## 2023-05-08 RX ADMIN — HYDRALAZINE HYDROCHLORIDE 25 MG: 25 TABLET ORAL at 23:56

## 2023-05-08 RX ADMIN — HYDRALAZINE HYDROCHLORIDE 10 MG: 10 TABLET ORAL at 05:05

## 2023-05-08 RX ADMIN — CARVEDILOL 12.5 MG: 12.5 TABLET, FILM COATED ORAL at 08:07

## 2023-05-08 RX ADMIN — HYDRALAZINE HYDROCHLORIDE 10 MG: 10 TABLET ORAL at 00:33

## 2023-05-08 RX ADMIN — LISINOPRIL 40 MG: 20 TABLET ORAL at 07:01

## 2023-05-08 RX ADMIN — ERGOCALCIFEROL 50000 UNITS: 1.25 CAPSULE, LIQUID FILLED ORAL at 18:14

## 2023-05-08 RX ADMIN — Medication 2.5 MG: at 18:27

## 2023-05-08 RX ADMIN — CARVEDILOL 12.5 MG: 12.5 TABLET, FILM COATED ORAL at 18:14

## 2023-05-08 RX ADMIN — FUROSEMIDE 40 MG: 20 TABLET ORAL at 07:01

## 2023-05-08 RX ADMIN — HYDRALAZINE HYDROCHLORIDE 25 MG: 25 TABLET ORAL at 08:11

## 2023-05-08 RX ADMIN — HYDRALAZINE HYDROCHLORIDE 25 MG: 25 TABLET ORAL at 18:19

## 2023-05-08 RX ADMIN — CLOPIDOGREL BISULFATE 75 MG: 75 TABLET, FILM COATED ORAL at 08:07

## 2023-05-08 RX ADMIN — POLYETHYLENE GLYCOL 3350 17 G: 17 POWDER, FOR SOLUTION ORAL at 08:06

## 2023-05-08 RX ADMIN — BUPROPION HYDROCHLORIDE 300 MG: 300 TABLET, FILM COATED, EXTENDED RELEASE ORAL at 08:07

## 2023-05-08 RX ADMIN — VENLAFAXINE HYDROCHLORIDE 150 MG: 150 CAPSULE, EXTENDED RELEASE ORAL at 08:07

## 2023-05-08 RX ADMIN — DOCUSATE SODIUM AND SENNOSIDES 1 TABLET: 8.6; 5 TABLET ORAL at 08:07

## 2023-05-08 RX ADMIN — ROSUVASTATIN CALCIUM 20 MG: 20 TABLET, FILM COATED ORAL at 08:07

## 2023-05-08 RX ADMIN — HYDROXYZINE HYDROCHLORIDE 10 MG: 10 TABLET ORAL at 05:05

## 2023-05-08 ASSESSMENT — ACTIVITIES OF DAILY LIVING (ADL)
ADLS_ACUITY_SCORE: 36

## 2023-05-08 NOTE — PLAN OF CARE
Goal Outcome Evaluation:  Patient vital signs are at baseline: No,  Reason:  High BP, SOB but less today  Patient able to ambulate as they were prior to admission or with assist devices provided by therapies during their stay:  Yes  Patient MUST void prior to discharge:  Yes  Patient able to tolerate oral intake:  Yes  Pain has adequate pain control using Oral analgesics:  Yes  Does patient have an identified :  Yes  Has goal D/C date and time been discussed with patient:  Yes    VS: Temp: (!) 96.4  F (35.8  C) Temp src: Oral BP: (!) 186/87 Pulse: 103   Resp: 16 SpO2: 99 % O2 Device: None (Room air) BP has been high through the shift, the doctor increased lisinopril   O2: stable on RA. LS clear, diminished and equal bilaterally. Denies chest pain and SOB.    Output: Voids spontaneously without difficulty to bathroom / using    Last BM: BM 5/6/23  denies abdominal discomfort. BS active / passing flatus.    Activity: Up with ASSIST of 1, cane and gait belt ad rashi   Skin: WDL except, right humerus and shoulder incision, bruising, swelling, both legs swollen.   Pain: Pain managed with see MAR   CMS: Intact, AOx4. Denies numbness and tingling.   Dressing: Soft cast C/D/I   Diet: Regular diet. Denies nausea/vomiting.      LDA:    Equipment:  personal belongings,    Plan: Fall precautions maintained / Continue with plan of care. Call light within reach, pt able to make needs known.    Additional Info: Pt confused, gait unsteady, had on and off x2 SOB but did not want inhaler. Continue with plan of care may go TCU.

## 2023-05-08 NOTE — PROGRESS NOTES
Care Management Follow Up    Length of Stay (days): 3    Expected Discharge Date: 05/07/2023     Concerns to be Addressed:  Discharge planning, TCU referrals     Patient plan of care discussed at interdisciplinary rounds: Yes    Anticipated Discharge Disposition: Transitional Care     Anticipated Discharge Services: None  Anticipated Discharge DME:  (TBD)    Patient/family educated on Medicare website which has current facility and service quality ratings: yes  Education Provided on the Discharge Plan: Yes  Patient/Family in Agreement with the Plan: yes    Referrals Placed by CM/SW: TCU Referrals       Additional Information:  RNCC spoke with patient and her friend, Bryson, who is helping her navigate TCU placement. Patient prefers to be near home in [place].     TCU referrals sent to list of preferred placements:   #1-[facility]. Ph: [phone]  #2-Critical access hospital. Ph: [phone]  [facility] Ph: [phone]  [facility] Ph: [phone]    All statuses are pending at this time. [facility] has been notified of referral.   RNCC/SW will continue to follow for discharge planning and care management needs.     LI Kat, TRINON., RN  Nurse Care Coordinator  Pager: [phone] - MANJINDER [facility]  Social Work and Care Management Department   [address] [place] [id number]  [email or web address]  Employed by [facility]     SEARCHABLE in [employer] - search CARE COORDINATOR     [place] ([phone]) Saturday & Sunday; ([phone]) [facility] Recognized Holidays     Units: 4A, 4C, 4E, 5A & 5B   Pager: [phone]  Units: 6A & 6B    Pager: [phone]  Units: 6C & 6D   Pager: [phone]  Units: 7A, 7B, 7C, 7D & 5C    Pager: [phone]  Units: [facility] ED, 5 Ortho, 5 Med/Surg, 6 Med/Surg, 8A, 10 ICU, & [facility]    Pager: [phone]

## 2023-05-08 NOTE — PROGRESS NOTES
[facility]    Medicine Progress Note - Hospitalist Service, GOLD TEAM 16    Date of Admission:  5/3/2023    Assessment & Plan       Sri Grewal is a 72 year old female admitted on 4/30/2023. She has a past medical h/o CAD, NICM (Taksubo), HTN, previous TIA and CVA post angiogram in 6/2021.  She was seen at [facility] ED on 4/25/23 w/ increased difficulty speaking and right sided weakness. Stroke code was called upon pt's arrival to the ED. CT head w/o contrast was negative. CTA head/neck was negative for large vessel occlusion but it did show a L MCA aneurysm which was stable.  She was given TNK in the ED with improvement in her symptoms and admitted to the ICU for close monitoring post TNK. Her speech returned to her baseline.  She was in NSR on tele.  MRI brain was negative for acute stroke. Neurology team felt pt's symptoms were due to either a TIA or aborted stroke by TNK administration. Neurology recommended stopping PTA ASA and started pt on plavix, with plans to continue it indefinitely. She will follow up with neurology in clinic and complete another 28 days Zio-patch.       She has a history of a right proximal humerus fracture that has resulted from a mechanical fall in 2/2023 which has been managed nonoperatively. On 4/29, she had another episode of mechanical fall resulting in acute displaced fracture of the proximal shaft of the right humerus.  She was seen at [facility] ED on 4/30 from where she was transferred to [facility] orthopedic service for definite surgical intervention.  She was discharged to home on 5/1 with plan for patient to be admitted on 5/3 for ORIF since she was on Plavix.  She was advised to hold Plavix until she has her surgery.     Pt was admitted to [facility] on 5/3 and underwent ORIF proximal right humerus fracture.  Transferred to [facility] on 5/5 and underwent reverse right TSA, complicated by right-side Ce syndrome  from brachial plexus block.  Pt is currently on surgical hernandez for postop care.  [facility] hospitalist service consulted for preop eval and postop medical management     #. Acute displaced intra-articular fracture of the right distal humerus extending from the supracondylar region between the condyles and into the lateral humeral condyle, 4/29/23  #. Subacute, ununited fracture of the humeral neck 2/2023  Post op care per ortho  ---  S/p ORIF proximal right humerus fracture 5/3  ---  S/p  reverse right TSA 5/5  --- Continue prophylactic Ancef  ---  PT/OT consult  ---  IS. Bowel regimen.      #.  Postop right side Ce's syndrome  ---   Due to brachial plexus block per anesthesia  ---   No clear evidence of right eyelid ptosis, miosis or anhidrosis  ---   Resolved  ---   Monitor     DVT prophylaxis  ---   PCD        #. Recent TIA/CVA, 4/25  #. Cerebral aneurysm   ---   S/p TNK on 4/29/23 at [facility] ED, please see above  ---   PTA ASA discontinued and patient was started on Plavix on 4/29  ---   Plavix held 5/2 - 5/3 for above surgery  ---   Plavix 75 mg daily today reintroduced on 5/4     #. Hx of CAD , HTN.   ---   EF 65-70%, normal RV function  ---   Continue PTA Coreg and rosuvastatin  ---   5/6: resume pta lasix 40 mg daily.   ---   Hold PTA lisinopril-> 5/7: resume.   --- 5/8/2023: noted high bp overnight. Asymptomatic. 116/60 during my visit (checked by myself, left arm). Lisinopril dose increased to 40 mg daily. ?anxiety, pain possible trigger. Prn hydralazine. Monitor.      #.  Right upper pole kidney cyst   ---   F/u w/ PCP     #. Depression   ---   Venlafaxine 150 mg day and Bupropion 300 mg daily     #. Migraine   ---   PTA prn fiorinal      #.  Hyponatremia  ---   Na level is 133->134  5/8/2023  Na 127. ? Siadh (Pain, anxiety) vs hypervolemia. Sp pRBc.   -- repeat 128. Check Edilma, U osm. FR: 1500 ml. Ct lasix.   -- Follow-up BMP in am.   ---   MS clear.        #.  Acute postop blood loss anemia  #   MORENO suspect 2.2 acute anemia. cxr clear. Lungs clear. Oxygenating well on RA> no cough or cp. Cough+ Also likely early pulmonary edema. CT neg for PE. Lung nodule. Outpatient follow-up.   Better now.   --- Hgb was 15.9 g on 2/2/23 ---> 11.7 g on 4/30 ---> ---> ---> 7.4 g 5/6. 1 U PRBC. Now 8.3.   --- Albuterol inhalers, nebs prn  --- Follow-up Hgb. Transfuse for <7.0 or s.s of hypoperfusion.       # VIt D deficiency: Vit D level: 11. Started on high dose vit D Q 7d.             Diet: Diet  Regular Diet Adult  Fluid restriction 1500 ML FLUID    DVT Prophylaxis: Defer to primary service  Merida Catheter: Not present  Lines: None     Cardiac Monitoring: None  Code Status: Full Code      Clinically Significant Risk Factors         # Hyponatremia: Lowest Na = 127 mmol/L in last 2 days, will monitor as appropriate                         Disposition Plan      Expected Discharge Date: 05/08/2023,  3:00 PM    Destination: home with help/services            Justo Sinclair MD  Hospitalist Service, GOLD TEAM [address] [place] [address]  Securely message with [employer] (more info)  Text page via [employer] Paging/Directory   See signed in provider for up to date coverage information  ______________________________________________________________________    Interval History   Interval events reviewed.   Doing better  ON: high bp, asymptomatic. Better now.   Oxygenating well on RA  Denies fever or chills.   No cp or sob at current.   No NV or pain abdomen.   No new sensory or motor complaint.   Friend at bedside.   No other new or acute medical concern      Physical Exam   Vital Signs: Temp: 97.5  F (36.4  C) Temp src: Oral BP: (!) 167/84 Pulse: 87   Resp: 16 SpO2: 97 % O2 Device: None (Room air)    Weight: 136 lbs .38 oz    General Appearance: Awake, interactive, NAD  HEENT: AT/NC, Anicteric, Moist MM  Neck: Supple.   Respiratory: Normal work of breathing. CTA BL. Diminished at bases.   Cardiovascular:  S1 S2 Regular.  GI/Abd: Soft. NT. ND.   Extremities: Distally wwp. Trace BLE edema.   Neuro:Grossly non focal.   Psychiatry: Stable mood.    Medical Decision Making     45 MINUTES SPENT BY ME on the date of service doing chart review, history, exam, documentation & further activities per the note.      Data     I have personally reviewed the following data over the past 24 hrs:    N/A  \   N/A   / N/A     128 (L) N/A N/A /  N/A   N/A N/A N/A \       Imaging results reviewed over the past 24 hrs:   No results found for this or any previous visit (from the past 24 hour(s)).

## 2023-05-08 NOTE — DISCHARGE SUMMARY
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 Phipps and right reverse TSA with Dr. Krause on 5/5/23.    HOSPITAL COURSE:    Surgery was uncomplicated. Sri Grewal has done well post-operatively. Medicine was consulted post operatively to aid in management of medical comorbidities. See final recommendations below. The patient received routine nursing cares and is medically stable. Vital signs are stable. The patient is tolerating a regular diet without GI distress/nausea or vomiting. Voiding spontaneously. All PT/OT goals have been met for safe mobility. Pain is now controlled on oral medications which will be available on discharge. Stool softeners have been used while taking pain medications to help prevent constipation. Sri Grewal is deemed medically safe to discharge.     Antibiotics:   given periop and 24 hours postop.   DVT prophylaxis:  Continue Plavix and mechanical while in the hospital   PT Progress: Has met PT/OT goals for safe mobility.    Pain Meds:  Weaned off all IV pain meds by discharge.  Inpatient Events: No significant events or complications.     Discharge orders and instructions as below.    FOLLOWUP:    Follow up with Dr. Krause  at 1-2  weeks postoperatively.    Appointments on [facility]. Call [phone] if you haven't heard regarding these appointments within 7 days of  discharge.    PLANNED DISCHARGE ORDERS:        Current Discharge Medication List      START taking these medications    Details   clopidogrel (PLAVIX) 75 MG tablet Take 1 tablet (75 mg) by mouth daily      oxyCODONE (ROXICODONE) 5 MG tablet Take 1 tablet (5 mg) by mouth every 4 hours as needed for severe pain  Qty: 26 tablet, Refills: 0    Associated Diagnoses: Post-operative state      vitamin D2 (ERGOCALCIFEROL) 03207 units (1250 mcg) capsule Take 1 capsule (50,000 Units) by mouth every 7 days    Associated Diagnoses: Closed fracture of distal end of right humerus, unspecified fracture morphology, initial encounter         CONTINUE these medications which have CHANGED    Details   acetaminophen (TYLENOL) 325 MG tablet Take 2 tablets (650 mg) by mouth every 4 hours as needed for other  Qty: 60 tablet, Refills: 0    Associated Diagnoses: Post-operative state      hydrOXYzine (ATARAX) 25 MG tablet Take 1 tablet (25 mg) by mouth every 6 hours as needed for itching or other (pain adjunct)  Qty: 30 tablet, Refills: 0    Associated Diagnoses: Post-operative state      senna-docusate (SENOKOT-S/PERICOLACE) 8.6-50 MG tablet Take 1 tablet by mouth 2 times daily  Qty: 30 tablet, Refills: 0    Associated Diagnoses: Post-operative state      tiZANidine (ZANAFLEX) 4 MG tablet Take 0.5 tablets (2 mg) by mouth 3 times daily as needed for muscle spasms  Qty: 40 tablet, Refills: 0    Associated Diagnoses: Post-operative state      venlafaxine (EFFEXOR XR) 150 MG 24 hr capsule Take 1 capsule (150 mg) by mouth daily  Qty: 90 capsule, Refills: 1    Associated Diagnoses: Post-operative state         CONTINUE these medications which have NOT CHANGED    Details   buPROPion (WELLBUTRIN XL) 300 MG 24 hr tablet Take 1 tablet (300 mg) by mouth every morning  Qty: 90 tablet, Refills: 1    Associated Diagnoses: Adjustment disorder with mixed anxiety and depressed mood; Moderate major depression (H)      butalbital-aspirin-caffeine (FIORINAL)  -40 MG per capsule Take 1 capsule by mouth every 6 hours as needed for moderate to severe pain  Qty: 30 capsule, Refills: 5    Associated Diagnoses: Chronic migraine without aura without status migrainosus, not intractable      carvedilol (COREG) 12.5 MG tablet TAKE ONE TABLET BY MOUTH TWICE A DAY WITH FOOD  Qty: 180 tablet, Refills: 1    Associated Diagnoses: Hypertension goal BP (blood pressure) < 140/90; ST elevation myocardial infarction (STEMI), unspecified artery (H)      docusate sodium (COLACE) 100 MG tablet Take 1-2 tablets (100-200 mg) by mouth 2 times daily as needed for constipation  Qty: 120 tablet, Refills: 3    Associated Diagnoses: Drug induced constipation      furosemide (LASIX) 40 MG tablet TAKE ONE TABLET BY MOUTH EVERY DAY  Qty: 90 tablet, Refills: 3    Associated Diagnoses: Hypertension goal BP (blood pressure) < 140/90      lisinopril (ZESTRIL) 20 MG tablet Take 1 tablet (20 mg) by mouth daily  Qty: 90 tablet, Refills: 3    Associated Diagnoses: Hypertension goal BP (blood pressure) < 140/90      polyethylene glycol (MIRALAX) 17 g packet Take 17 g by mouth daily  Qty: 10 packet, Refills: 0    Associated Diagnoses: Acute pain of right shoulder      rosuvastatin (CRESTOR) 20 MG tablet Take 1 tablet (20 mg) by mouth daily  Qty: 90 tablet, Refills: 3    Associated Diagnoses: Coronary artery disease due to lipid rich plaque         STOP taking these medications       HYDROcodone-acetaminophen (NORCO) 5-325 MG tablet Comments:   Reason for Stopping:         HYDROmorphone (DILAUDID) 2 MG tablet Comments:   Reason for Stopping:                 Discharge Procedure Orders   Home Care Referral   Referral Priority: Routine: Next available opening Referral Type: Home Health Therapies & Aides   Number of Visits Requested: 1     Reason for your hospital stay   Order Comments: ARTHROPLASTY, SHOULDER, TOTAL, REVERSE for     Activity   Order Comments: Your activity upon discharge: activity as  tolerated    No ROM; sling at all times  May remove for hygeine and eating.     Order Specific Question Answer Comments   Is discharge order? Yes      When to contact your care team   Order Comments: Call Dr. Krause/Mic  if you have any of the following: temperature greater than 101.3  or less than 96.5,  increased shortness of breath, increased drainage, increased swelling, or increased pain.    Contact phone number is      Wound care and dressings   Order Comments: Instructions to care for your wound at home: ice to area for comfort, keep wound clean and dry, may get incision wet in shower but do not soak or scrub, reinforce dressing as needed, and remove dressing in 7 days.     Adult [facility] Follow-up and recommended labs and tests   Order Comments: Follow up with Dr Manzanares as scheduled.      Appointments at [facility] at [address] [place] [id number] ([facility] AND SURGERY [place])     Call [phone] if you haven't heard regarding these appointments within 7 days of discharge.     Follow Up ([facility])   Order Comments: Follow up with primary care provider, Rosenda Pierre, within 7-10 days for hospital follow- up.  The following labs/tests are recommended: cbc, bmp.     New 7 mm left upper lobe perifissural solid nodule. Per Fleischner   2017 Society criteria consider 6-12 month follow-up CT Chest to evaluate for   stability..     Appointments on [place] and/or [facility] (with [facility] or [facility] provider or service). Call [phone] if you haven't heard regarding these appointments within 7 days of discharge.     General info for SNF   Order Comments: Length of Stay Estimate: Short Term Care: Estimated # of Days <30  Condition at Discharge: Improving  Level of care:skilled   Rehabilitation Potential: Excellent  Admission H&P remains valid and up-to-date: Yes  Recent Chemotherapy: N/A  Use Nursing Home Standing Orders: Yes     Abel  instructions   Order Comments: Give two-step Mantoux (PPD) Per Facility Policy Yes     Physical Therapy Adult Consult   Order Comments: Evaluate and treat as clinically indicated.    Reason:  s/p right proximal humerus surgery     Occupational Therapy Adult Consult   Order Comments: Evaluate and treat as clinically indicated.    Reason:  s/p right proximal humerus surgery     Diet   Order Comments: Follow this diet upon discharge: Orders Placed This Encounter      Advance Diet as Tolerated: Regular Diet Adult     Order Specific Question Answer Comments   Is discharge order? Yes        [facility]      Thank you for allowing me to participate in this patient's care.   [facility]  Securely message with the Vocera Web Console (learn more here)  Text page via [employer] Paging/Directory

## 2023-05-08 NOTE — PROGRESS NOTES
Subjective:  HPI  Physical Exam                    Objective:  System    Physical Exam    General     ROS    Assessment/Plan:    DISCHARGE REPORT    Progress reporting period is from 3/27/23 to 4/3/23.       SUBJECTIVE  Subjective changes noted by patient: Per SOAP note 4/3/23:  Sri and her friend Bryson reported that Sri fell in living room hitting her left hand on the coffee table. She denies falling on right side or hitting right shoulder but more painful today even after pain meds = 5/10.    Current pain level is 5/10  .     Previous pain level was  4/10  .   Changes in function:  None  Adverse reaction to treatment or activity: None    OBJECTIVE  Changes noted in objective findings:  Patient has failed to return to therapy so current objective findings are unknown.  Objective: Observe increased edama and ecchymosis right shoulder with increased TTP. Contacted Elías Willoughby with recommendations on how to proceed. Sri was instructed to hold off on HEP and wait for either myself or Elías Willoughby or his office to call with ho to proceed. Concerned about aggravation or re-injury of right shoulder.     ASSESSMENT/PLAN  Updated problem list and treatment plan: Diagnosis 1:  R Humeral Fracture  Pain -  self management, education and home program  Decreased ROM/flexibility - manual therapy, therapeutic exercise and home program  Decreased joint mobility - manual therapy, therapeutic exercise and home program  Decreased strength - therapeutic exercise, therapeutic activities and home program  Impaired muscle performance - neuro re-education and home program  Decreased function - therapeutic activities and home program  STG/LTGs have been met or progress has been made towards goals:  Yes (See Goal flow sheet completed today.) and None  Assessment of Progress: Patient had a third fall and received ORIF.   Self Management Plans:  The family/caregiver has been instructed in home continuation of care.  I have re-evaluated this  patient and find that the nature, scope, duration and intensity of the therapy is appropriate for the medical condition of the patient.  Sri continues to require the following intervention to meet STG and LTG's:  PT intervention is no longer required to meet STG/LTG.    Recommendations:  This patient is ready to be discharged from therapy and continue their home treatment program.    Please refer to the daily flowsheet for treatment today, total treatment time and time spent performing 1:1 timed codes.

## 2023-05-08 NOTE — PROGRESS NOTES
Medicine Cross Cover    Called regarding ongoing hypertension, asymptomatic per report.    Will increase lisinopril to 40 mg this morning and monitor.    Fabio Bennett MD

## 2023-05-08 NOTE — OP NOTE
"DATE OF PROCEDURE: 5/5/23    PREOPERATIVE DIAGNOSIS:   1. Right comminuted proximal humerus fracture  2. Right subacute proximal humerus fracture  3. Right distal humerus fracture (now s/p ORIF)    POSTOPERATIVE DIAGNOSIS:   1. Right comminuted proximal humerus fracture  2. Right subacute proximal humerus fracture  3. Right distal humerus fracture (now s/p ORIF)     PROCEDURE:   1. Reduction of proximal humerus fracture  2. Right reverse total shoulder arthroplasty.     NOTE ON COMPLEXITY:  Modifier 22 is requested given the complexity of the case secondary to the nature of the fracture (acute and also subacute fractures). The acute comminution and subacute proximal humerus fractures added at least 50% time above standard reverse total shoulder arthroplasty.     STAFF SURGEON: Darcie Haile MD.   ASSISTANT SURGEON: Cierra Krause MD  The assistance of Dr. Krause was needed given the added complexity of the comminuted metaphyseal/proximal shaft fracture as well as the overlay of the subacute proximal humerus neck fracture. No other level of assistant would have been appropriate for this case    ASSIST: Armando Nguyen PA-C      ANESTHESIA: General endotracheal anesthesia.   ESTIMATED BLOOD LOSS: 400 mL.   COMPLICATIONS: None.     IMPLANTS:   1. Biomet Comprehensive Shoulder [id number] stem, Standard tray and standard 36mm bearing  2. Biomet Comprehensive Shoulder Mini baseplate with central screw and 4 peripheral screws  3. Biomet Comprehensive Shoulder 36mm Glenosphere with \"B\" offset inferior    BRIEF PATIENT HISTORY: This patient presented to our facility with proximal and distal (ipsilateral) humerus fractures. She has already had an ORIF for her distal humerus fracture.  We discussed reverse total shoulder arthroplasty for fracture given the displaced nature of the fracture including the risks and benefits and perioperative rehabilitation plan. The patient had the opportunity for questions to be answered and wished " to proceed with surgery. Informed consent was completed.     DESCRIPTION OF PROCEDURE: The patient was identified in the preoperative area and the correct right shoulder was marked for surgery. The patient was provided an interscalene block by our anesthesia colleagues. She was taken to the operating room where she was surrendered to general endotracheal anesthesia. She was moved to the operating table in the beachchair position with all bony prominences well padded. The head was placed in a head waller with the neck in neutral position. The right upper extremity was prepped and draped in the usual sterile fashion. Care was taken throughout to minimize tension or torque across her recently fixated distal humerus fracture. A timeout was held in accordance with hospital policy, confirming correct patient, site, side, procedure and administration of IV antibiotics prior to incision. I completed a deltopectoral incision and approach. I identified the cephalic vein and brought this medially. I came underneath the deltoid and freed the subdeltoid adhesions. The biceps was tenotomized and the groove identified. I palpated the axillary nerve medially and laterally performing a tug test confirming location and continuity of the nerve. This was performed multiple times throughout the procedure including once prior to closure. I identified the anterior circumflex humeral vessels and ligated and divided these. I then used an osteotome to separate the lesser tuberosity from the humeral head. I used an osteotome to remove the greater tuberosity from the head given the varus nature of the subacute proximal humerus fracture and head position. I then removed the humeral head through the subacute fracture line. I passed a cerclage around the intercalary metaphyseal segment and then directed the reamers across this segment into the distal segment. This sized to an 8 (anticipating cementing the stem).   I then turned my attention to  "the glenoid. I performed anterior inferior capsular releases and removed the labrum circumferentially. I prepared the glenoid for the baseplate which was impacted and screws were placed. The bone density was poor but there was good fixation of the baseplate with the combination of screws. I then applied the glenosphere with size 36mm and \"B\" offset inferior. I then confirmed with a size 8 broach that we could trial a polyethylene liner. A standard tray and liner could be reduced.  I removed all trial humeral components. I cemented the stem in the appropriate version using a distal cement restrictor. After the cement cured I impacted the final polyethylene liner with appropriate range of motion of the joint. I repaired the greater tuberosity and lesser tuberosity using bone tunnels to the shaft and racking hitch stitches as well as anterior sutures through greater and lesser tuberosities. I confirmed appropriate tuberosity reduction on fluoro. I then copiously irrigated the wound. I irrigated the wound then with 15 mL a sterile Betadine and 500 mL of saline. This was then irrigated out with saline. The wound was then closed in layered fashion with absorbable suture. Steri-Strips and soft sterile dressing were repaired. The arm was placed into an abduction sling and the patient was extubated and transported to recovery in stable condition. There were no apparent intraoperative complications.     POSTOPERATIVE PLAN:   1. The patient will be admitted to the Orthopedic Service and will begin physical therapy to mobilize out of bed.There will be no shoulder range of motion for 4 weeks but the patient can begin immediate hand, wrist and elbow range of motion.   2. The patient will be seen by me in the clinic for wound check at 2 weeks.     DAVID MERRILL MD     "

## 2023-05-08 NOTE — PROGRESS NOTES
Orthopaedic Surgery Progress Note 05/08/2023    S: Hypertensive overnight. Much more oriented this AM. PT saw yesterday and recommending TCU placement. Denies pain.  Voiding independently. Tolerating diet. Expressed understanding of plan for TCU.     O:  Temp: (!) 96.4  F (35.8  C) Temp src: Oral BP: (!) 201/100 Pulse: 97   Resp: 16 SpO2: 99 % O2 Device: None (Room air)      Exam:  Gen: No acute distress, resting comfortably in bed.  CV: wwp  Resp: Non-labored breathing on RA  MSK:  Right Upper Extremity:    Inspection: ACE wrap starting to fall off, rewrapped at bedside this AM. Dressing/splint C/D/I, notable swelling about fingers, and ecchymoses about shoulder   Motor: Able to fire FDS/FDP, EDC, FPL, EPL ,interossei.   Sensory: SILT throughout axillary, median, ulnar, and radial nerve distributions   Circulation: fingers warm and well perfused    Lab:  Recent Labs   Lab 05/07/23  1231 05/06/23  1842 05/06/23  1104 05/06/23  0856 05/05/23  1408   WBC 10.6  --   --  10.9 9.3   HGB 8.2* 8.3* 7.4* 7.4* 7.4*     --   --  268 183     Sed Rate   Date Value Ref Range Status   10/14/2005 11 0 - 30 mm/h Final       Assessment: Sri Grewal is a 72 year old female s/p ORIF right distal humerus fracture with intercondylar extension on 5/3/2023 with Dr. Phipps and right reverse TSA with Dr. Krause on 5/5/23.     TODAY TO DOs:  - Limit sedating medications as possible   - Aggressive pulmonary toilet   - Ongoing work with OT/PT  - Patient to ambulate with RN per shift protocol - encourage OOB ambulation    - Discharge planning: home with assist vs. TCU       Plan:  Primary: Ortho  Activity: Up with assist.  Weight bearing status: NWB RUE   Antibiotics: Ancef x24 hours perioperatively.  Diet: Regular diet  DVT prophylaxis: Continue Plavix and mechanical while in the hospital   Bracing/Splinting: Sling in place, Posterior long arm splint to be kept clean, dry, and intact until follow-up.   Elevation: Elevate RUE on pillows to  keep swelling down as much as possible.  Pain management: transition from IV to orals as tolerated.   X-rays: Completed in PACU   Occupational Therapy: ADL's.  Labs: Hgb check   Consults: OT. Hospitalist, appreciate assistance in caring for this patient.  Follow-up: Clinic with Dr. Phipps Care Team in 1 week. Clinic with Dr. Krause in 6 weeks with XR.     Future Appointments   Date Time Provider Department Center   6/6/2023  1:00 PM Nathan Turner MD [facility]   8/4/2023  4:45 PM Miryam Mcgowan MD [facility]       Disposition: Pending progress with therapies, pain control on orals, and medical stability, anticipate discharge to TCU 5/8 vs 5/9 pending medically stability.     Patient discussed with Dr. Krause.      Louann Hoff MD, PGY1   Orthopaedic Surgery  [phone]

## 2023-05-09 ENCOUNTER — APPOINTMENT (OUTPATIENT)
Dept: OCCUPATIONAL THERAPY | Facility: CLINIC | Age: 73
DRG: 483 | End: 2023-05-09
Attending: STUDENT IN AN ORGANIZED HEALTH CARE EDUCATION/TRAINING PROGRAM
Payer: MEDICARE

## 2023-05-09 ENCOUNTER — APPOINTMENT (OUTPATIENT)
Dept: PHYSICAL THERAPY | Facility: CLINIC | Age: 73
DRG: 483 | End: 2023-05-09
Attending: STUDENT IN AN ORGANIZED HEALTH CARE EDUCATION/TRAINING PROGRAM
Payer: MEDICARE

## 2023-05-09 DIAGNOSIS — S42.491A CLOSED BICONDYLAR FRACTURE OF DISTAL HUMERUS, RIGHT, INITIAL ENCOUNTER: Primary | ICD-10-CM

## 2023-05-09 DIAGNOSIS — S42.201A CLOSED TRAUMATIC DISPLACED FRACTURE OF PROXIMAL END OF RIGHT HUMERUS, INITIAL ENCOUNTER: ICD-10-CM

## 2023-05-09 DIAGNOSIS — Z96.611 STATUS POST REVERSE TOTAL SHOULDER REPLACEMENT, RIGHT: Primary | ICD-10-CM

## 2023-05-09 LAB
ANION GAP SERPL CALCULATED.3IONS-SCNC: 11 MMOL/L (ref 7–15)
BUN SERPL-MCNC: 6.4 MG/DL (ref 8–23)
CALCIUM SERPL-MCNC: 8.8 MG/DL (ref 8.8–10.2)
CHLORIDE SERPL-SCNC: 93 MMOL/L (ref 98–107)
CREAT SERPL-MCNC: 0.5 MG/DL (ref 0.51–0.95)
DEPRECATED HCO3 PLAS-SCNC: 24 MMOL/L (ref 22–29)
GFR SERPL CREATININE-BSD FRML MDRD: >90 ML/MIN/1.73M2
GLUCOSE SERPL-MCNC: 127 MG/DL (ref 70–99)
POTASSIUM SERPL-SCNC: 3.3 MMOL/L (ref 3.4–5.3)
POTASSIUM SERPL-SCNC: 3.5 MMOL/L (ref 3.4–5.3)
SODIUM SERPL-SCNC: 128 MMOL/L (ref 136–145)

## 2023-05-09 PROCEDURE — 250N000013 HC RX MED GY IP 250 OP 250 PS 637

## 2023-05-09 PROCEDURE — 36415 COLL VENOUS BLD VENIPUNCTURE: CPT | Performed by: INTERNAL MEDICINE

## 2023-05-09 PROCEDURE — 250N000013 HC RX MED GY IP 250 OP 250 PS 637: Performed by: INTERNAL MEDICINE

## 2023-05-09 PROCEDURE — 84132 ASSAY OF SERUM POTASSIUM: CPT | Performed by: INTERNAL MEDICINE

## 2023-05-09 PROCEDURE — 120N000002 HC R&B MED SURG/OB UMMC

## 2023-05-09 PROCEDURE — 80048 BASIC METABOLIC PNL TOTAL CA: CPT | Performed by: INTERNAL MEDICINE

## 2023-05-09 PROCEDURE — 97535 SELF CARE MNGMENT TRAINING: CPT | Mod: GO | Performed by: OCCUPATIONAL THERAPIST

## 2023-05-09 PROCEDURE — 97116 GAIT TRAINING THERAPY: CPT | Mod: GP | Performed by: PHYSICAL THERAPIST

## 2023-05-09 PROCEDURE — 97112 NEUROMUSCULAR REEDUCATION: CPT | Mod: GP | Performed by: PHYSICAL THERAPIST

## 2023-05-09 PROCEDURE — 250N000013 HC RX MED GY IP 250 OP 250 PS 637: Performed by: STUDENT IN AN ORGANIZED HEALTH CARE EDUCATION/TRAINING PROGRAM

## 2023-05-09 PROCEDURE — 99232 SBSQ HOSP IP/OBS MODERATE 35: CPT | Performed by: INTERNAL MEDICINE

## 2023-05-09 PROCEDURE — 250N000013 HC RX MED GY IP 250 OP 250 PS 637: Performed by: NURSE PRACTITIONER

## 2023-05-09 RX ORDER — HYDRALAZINE HYDROCHLORIDE 25 MG/1
25 TABLET, FILM COATED ORAL EVERY 4 HOURS PRN
Status: DISCONTINUED | OUTPATIENT
Start: 2023-05-09 | End: 2023-05-12

## 2023-05-09 RX ORDER — AMLODIPINE BESYLATE 5 MG/1
5 TABLET ORAL DAILY
Status: DISCONTINUED | OUTPATIENT
Start: 2023-05-09 | End: 2023-05-10

## 2023-05-09 RX ORDER — POTASSIUM CHLORIDE 1500 MG/1
40 TABLET, EXTENDED RELEASE ORAL ONCE
Status: COMPLETED | OUTPATIENT
Start: 2023-05-09 | End: 2023-05-09

## 2023-05-09 RX ADMIN — DOCUSATE SODIUM AND SENNOSIDES 1 TABLET: 8.6; 5 TABLET ORAL at 21:07

## 2023-05-09 RX ADMIN — BUPROPION HYDROCHLORIDE 300 MG: 300 TABLET, FILM COATED, EXTENDED RELEASE ORAL at 08:10

## 2023-05-09 RX ADMIN — ROSUVASTATIN CALCIUM 20 MG: 20 TABLET, FILM COATED ORAL at 08:10

## 2023-05-09 RX ADMIN — CARVEDILOL 12.5 MG: 12.5 TABLET, FILM COATED ORAL at 08:10

## 2023-05-09 RX ADMIN — CLOPIDOGREL BISULFATE 75 MG: 75 TABLET, FILM COATED ORAL at 08:11

## 2023-05-09 RX ADMIN — CARVEDILOL 12.5 MG: 12.5 TABLET, FILM COATED ORAL at 18:10

## 2023-05-09 RX ADMIN — AMLODIPINE BESYLATE 5 MG: 5 TABLET ORAL at 09:43

## 2023-05-09 RX ADMIN — DOCUSATE SODIUM AND SENNOSIDES 1 TABLET: 8.6; 5 TABLET ORAL at 08:10

## 2023-05-09 RX ADMIN — ACETAMINOPHEN 650 MG: 325 TABLET ORAL at 21:07

## 2023-05-09 RX ADMIN — HYDRALAZINE HYDROCHLORIDE 25 MG: 25 TABLET ORAL at 06:12

## 2023-05-09 RX ADMIN — ACETAMINOPHEN 650 MG: 325 TABLET ORAL at 13:37

## 2023-05-09 RX ADMIN — FUROSEMIDE 40 MG: 20 TABLET ORAL at 08:11

## 2023-05-09 RX ADMIN — Medication 2.5 MG: at 23:00

## 2023-05-09 RX ADMIN — Medication 2.5 MG: at 14:19

## 2023-05-09 RX ADMIN — ACETAMINOPHEN 650 MG: 325 TABLET ORAL at 02:07

## 2023-05-09 RX ADMIN — POTASSIUM CHLORIDE 40 MEQ: 1500 TABLET, EXTENDED RELEASE ORAL at 18:10

## 2023-05-09 RX ADMIN — VENLAFAXINE HYDROCHLORIDE 150 MG: 150 CAPSULE, EXTENDED RELEASE ORAL at 08:10

## 2023-05-09 RX ADMIN — HYDRALAZINE HYDROCHLORIDE 25 MG: 25 TABLET ORAL at 21:07

## 2023-05-09 RX ADMIN — LISINOPRIL 40 MG: 20 TABLET ORAL at 08:10

## 2023-05-09 ASSESSMENT — ACTIVITIES OF DAILY LIVING (ADL)
ADLS_ACUITY_SCORE: 36
ADLS_ACUITY_SCORE: 35
ADLS_ACUITY_SCORE: 36
ADLS_ACUITY_SCORE: 35
ADLS_ACUITY_SCORE: 35
ADLS_ACUITY_SCORE: 36

## 2023-05-09 NOTE — PROGRESS NOTES
Care Management Follow Up    Length of Stay (days): 4    Expected Discharge Date: 05/10/2023     Concerns to be Addressed:       Patient plan of care discussed at interdisciplinary rounds: Yes    Anticipated Discharge Disposition: Transitional Care     Anticipated Discharge Services: None  Anticipated Discharge DME:  (TBD)    Patient/family educated on Medicare website which has current facility and service quality ratings: yes  Education Provided on the Discharge Plan: Yes  Patient/Family in Agreement with the Plan: yes    Referrals Placed by CM/SW: Homecare  Private pay costs discussed:   NA    Additional Information:  Met with patient and her friend, Bryson, at bedside to discuss current status of TCU referrals. Currently, [facility] is reviewing for possible admit on 5/10. Message was left for Aden Duran.  Will follow up with TCU's on 5/10. Patient is hopeful that she will be able to discharge to [facility]. RNCC will continue to follow.    Caroline Buchanan RN, BSN  Care Coordinator, 5 Ortho  Phone [phone]  Pager [phone]

## 2023-05-09 NOTE — PLAN OF CARE
Goal Outcome Evaluation:       Took over care at 1900. VSS except elevated BP. PRN hydralazine given by previous RN. Pt c/o increase pain with movement otherwise pain 2/10 at rest. Ambulates to the bathroom w/ A1 gait belt and cane. Bruising noted right fingers and shoulder. Pt report BM today and refused Senna. 2+ edema BLE. Pt report slight SOB when ambulating and resolve at rest. Denies other acute complaints.  Follow POC. Referrals sent for TCU placement.         Update:  Sodium 128- placed on 1500 ml Fluid restriction  Urine sample sent. Weight taken per MD order.  Tylenol given for c/o headache. Pt refused Oxycodone when offered.

## 2023-05-09 NOTE — PLAN OF CARE
VS: VSS, pt denied CP or SOB.   O2: Room air sat. >90%.   Output: Voiding adequate amount in the bathroom.    Last BM: 05/09/23 passing gas.    Activity: Up siting on chair most of the day, and up walked.    Skin: Intact except surgical incision.    Pain: Comfortable manageable with PRN medication and ice pack.    Neuro: CMS and neuro intact to baseline.    Dressing: CDI.    Diet: Regular tolerating okay.    LDA: PIV SL.    Equipment: IV pole, cane and personal belongings.    Plan: TBD.    Additional Info:  pt confused on bed alarm when in bed and chair alarm when up in chair, call light in reach,will continue to monitor.

## 2023-05-09 NOTE — PROGRESS NOTES
Orthopaedic Surgery Progress Note 05/09/2023    S: Confused overnight. Denies pain. Having some bladder retention, straight cath x1 overnight. Last BM yesterday. Tolerating diet. Working with therapies, PT still recommending TCU placement.      O:  Temp: (!) 96.3  F (35.7  C) Temp src: Oral BP: (!) 182/91 Pulse: 91   Resp: 18 SpO2: 98 % O2 Device: None (Room air)      Exam:  Gen: No acute distress, resting comfortably in bed.  CV: wwp  Resp: Non-labored breathing on RA  MSK:  Right Upper Extremity:    Inspection: Dressing/splint C/D/I, slight improvement in swelling/echymoses around digits, ecchymoses about shoulder/ axilla  Motor: Able to fire FDS/FDP, EDC, FPL, EPL ,interossei.   Sensory: SILT throughout axillary, median, ulnar, and radial nerve distributions   Circulation: fingers warm and well perfused    Lab:  Recent Labs   Lab 05/07/23  1231 05/06/23  1842 05/06/23  1104 05/06/23  0856 05/05/23  1408   WBC 10.6  --   --  10.9 9.3   HGB 8.2* 8.3* 7.4* 7.4* 7.4*     --   --  268 183     Sed Rate   Date Value Ref Range Status   10/14/2005 11 0 - 30 mm/h Final       Assessment: Sri Grewal is a 72 year old female s/p ORIF right distal humerus fracture with intercondylar extension on 5/3/2023 with Dr. Phipps and right reverse TSA with Dr. Krause on 5/5/23.     TODAY TO DOs:  - Limit sedating medications as possible   - Aggressive pulmonary toilet   - Ongoing work with OT/PT  - Patient to ambulate with RN per shift protocol - encourage OOB ambulation    - Discharge planning: appreciate assistance in TCU placement       Plan:  Primary: Ortho  Activity: Up with assist.  Weight bearing status: NWB RUE   Antibiotics: Ancef x24 hours perioperatively.  Diet: Regular diet  DVT prophylaxis: Continue Plavix and mechanical while in the hospital   Bracing/Splinting: Sling in place, Posterior long arm splint to be kept clean, dry, and intact until follow-up.   Elevation: Elevate RUE on pillows to keep swelling down as much as  possible.  Pain management: transition from IV to orals as tolerated.   X-rays: Completed in PACU   Occupational Therapy: ADL's.  Labs: Hgb check   Consults: OT. Hospitalist, appreciate assistance in caring for this patient.  Follow-up: Clinic with Dr. Phipps Care Team in 1 week. Clinic with Dr. Krause in 6 weeks with XR.     Future Appointments   Date Time Provider Department Center   6/6/2023  1:00 PM Nathan Turner MD [facility]   8/4/2023  4:45 PM Miryam Mcgowan MD [facility]       Disposition: Pending progress with therapies, pain control on orals, and medical stability, anticipate discharge to TCU 5/8 vs 5/9 pending medically stability.     Patient discussed with Dr. Krause.      Louann Hoff MD, PGY1   Orthopaedic Surgery  [phone]

## 2023-05-09 NOTE — PROGRESS NOTES
[facility]    Medicine Progress Note - Hospitalist Service, GOLD TEAM 16    Date of Admission:  5/3/2023    Assessment & Plan       Sri Grewal is a 72 year old female admitted on 4/30/2023. She has a past medical h/o CAD, NICM ([place]), HTN, previous TIA and CVA post angiogram in 6/2021.  She was seen at [facility] ED on 4/25/23 w/ increased difficulty speaking and right sided weakness. Stroke code was called upon pt's arrival to the ED. CT head w/o contrast was negative. CTA head/neck was negative for large vessel occlusion but it did show a L MCA aneurysm which was stable.  She was given TNK in the ED with improvement in her symptoms and admitted to the ICU for close monitoring post TNK. Her speech returned to her baseline.  She was in NSR on tele.  MRI brain was negative for acute stroke. Neurology team felt pt's symptoms were due to either a TIA or aborted stroke by TNK administration. Neurology recommended stopping PTA ASA and started pt on plavix, with plans to continue it indefinitely. She will follow up with neurology in clinic and complete another 28 days Zio-patch.       She has a history of a right proximal humerus fracture that has resulted from a mechanical fall in 2/2023 which has been managed nonoperatively. On 4/29, she had another episode of mechanical fall resulting in acute displaced fracture of the proximal shaft of the right humerus.  She was seen at [facility] ED on 4/30 from where she was transferred to [facility] orthopedic service for definite surgical intervention.  She was discharged to home on 5/1 with plan for patient to be admitted on 5/3 for ORIF since she was on Plavix.  She was advised to hold Plavix until she has her surgery.     Pt was admitted to [facility] on 5/3 and underwent ORIF proximal right humerus fracture.  Transferred to [facility] on 5/5 and underwent reverse right TSA, complicated by right-side Ce syndrome  from brachial plexus block.  Pt is currently on surgical hernandez for postop care.  [facility] hospitalist service consulted for preop eval and postop medical management     #. Acute displaced intra-articular fracture of the right distal humerus extending from the supracondylar region between the condyles and into the lateral humeral condyle, 4/29/23  #. Subacute, ununited fracture of the humeral neck 2/2023  Post op care per ortho  ---  S/p ORIF proximal right humerus fracture 5/3  ---  S/p  reverse right TSA 5/5  --- Continue prophylactic Ancef  ---  PT/OT consult  ---  IS. Bowel regimen.      #.  Postop right side Ce's syndrome  ---   Due to brachial plexus block per anesthesia  ---   No clear evidence of right eyelid ptosis, miosis or anhidrosis  ---   Resolved  ---   Monitor     DVT prophylaxis  ---   PCD        #. Recent TIA/CVA, 4/25  #. Cerebral aneurysm   ---   S/p TNK on 4/29/23 at [facility] ED, please see above  ---   PTA ASA discontinued and patient was started on Plavix on 4/29  ---   Plavix held 5/2 - 5/3 for above surgery  ---   Plavix 75 mg daily today reintroduced on 5/4     #. Hx of CAD , HTN.   ---   EF 65-70%, normal RV function  ---   Continue PTA Coreg and rosuvastatin  ---   5/6: resume pta lasix 40 mg daily.   ---   Hold PTA lisinopril-> 5/7: resume.   ---   BP still elevated. This morning SBP > 180. I added amlodipine to antihypertensive regimen. Continue on Lisinopril 40 mg daily. ?anxiety, pain possible trigger. Prn hydralazine. Monitor.      #.  Right upper pole kidney cyst   ---   F/u w/ PCP     #. Depression   ---   Venlafaxine 150 mg day and Bupropion 300 mg daily     #. Migraine   ---   PTA prn fiorinal      #.  Chronic Hyponatremia  ---   Na level is 133->134  5/9/2023  Na 128. ? Siadh (Pain, anxiety) vs hypervolemia. Sp pRBc.   -- Na 128. Continue on FR: 1500 ml. Ct lasix.   -- Follow-up BMP in am.   -- MS clear.        #.  Acute postop blood loss anemia  #  MORENO suspect 2.2  acute anemia. cxr clear. Lungs clear. Oxygenating well on RA> no cough or cp. Cough+ Also likely early pulmonary edema. CT neg for PE. Lung nodule. Outpatient follow-up.   Better now.   --- Hgb was 15.9 g on 2/2/23 ---> 11.7 g on 4/30 ---> ---> ---> 7.4 g 5/6. 1 U PRBC. Now 8.3.   --- Albuterol inhalers, nebs prn  --- Follow-up Hgb. Transfuse for <7.0 or s.s of hypoperfusion.       # VIt D deficiency: Vit D level: 11. Started on high dose vit D Q 7d.             Diet: Diet  Regular Diet Adult  Fluid restriction 1500 ML FLUID    DVT Prophylaxis: Defer to primary service  Merida Catheter: Not present  Lines: None     Cardiac Monitoring: None  Code Status: Full Code      Clinically Significant Risk Factors        # Hypokalemia: Lowest K = 3.3 mmol/L in last 2 days, will replace as needed  # Hyponatremia: Lowest Na = 127 mmol/L in last 2 days, will monitor as appropriate                         Disposition Plan           Julio Silver MD  Hospitalist Service, GOLD TEAM [address] [place] [address]  Securely message with [employer] Workflow Management (more info)  Text page via [facility] Paging/Directory   See signed in provider for up to date coverage information  ______________________________________________________________________    Interval History   Interval events reviewed.     No acute events overnight.  Patient's  at bedside.  Patient is states that her blood pressure is also high as outpatient.  Patient's pain 5 out of 10.  She also endorses anxiety.  Denies chest pain, palpitations, dizziness, near-syncope, headache, confusion, shortness of breath, cough or abdominal pain.      Physical Exam   Vital Signs: Temp: 96.9  F (36.1  C) Temp src: Oral BP: 136/82 Pulse: 100   Resp: 16 SpO2: 97 % O2 Device: None (Room air)    Weight: 136 lbs 10.96 oz    General Appearance: Awake, interactive, NAD  HEENT: AT/NC, Anicteric, Moist MM  Respiratory: Normal work of breathing. CTA BL. Diminished at bases.    Cardiovascular: S1 S2 Regular.  GI/Abd: Soft. NT. ND.   Extremities: Distally wwp. Trace BLE edema.   Neuro:Grossly non focal.   Psychiatry: Stable mood.    Medical Decision Making     45 MINUTES SPENT BY ME on the date of service doing chart review, history, exam, documentation & further activities per the note.      Data     I have personally reviewed the following data over the past 24 hrs:    N/A  \   N/A   / N/A     128 (L) 93 (L) 6.4 (L) /  127 (H)   3.3 (L) 24 0.50 (L) \       Imaging results reviewed over the past 24 hrs:   No results found for this or any previous visit (from the past 24 hour(s)).

## 2023-05-09 NOTE — PLAN OF CARE
"BP (!) 174/84 (BP Location: Left arm, Patient Position: Semi-Nair's)   Pulse 91   Temp (!) 96.3  F (35.7  C)   Resp 18   Ht 1.6 m (5' 2.99\")   Wt 62 kg (136 lb 11 oz)   LMP 11/09/2005 (Approximate)   SpO2 98%   BMI 24.22 kg/m      Pt disoriented to place and situation, alert and oriented at times with intermittent confusion.  Impulsive, pt attempted to get OOB, bed alarm on. VSS, C/O 7/10 pain, declined oxycodone, prn tylenol given, ice applied to site.    BP elevated. PRN hydralazine given.    Pt retaining urine, feel bladder fullness, attempts to urinate but no success. Bladder scanned 719 ml, ambulated to the BR with x 1 assist, voided 250 ml, straight cathed 450 ml. Fluid restrictions maintained. Will continue with POC.  "

## 2023-05-09 NOTE — PLAN OF CARE
VS: VSS, pt denied CP or SOB.   O2: Room air sat. >90%.   Output: Voiding adequate amount in the bathroom.    Last BM: 05/06/23 passing gas.    Activity: Up siting on chair most of the day, and up walked.    Skin: Intact except surgical incision.    Pain: Comfortable manageable with PRN medication and ice pack.    Neuro: CMS and neuro intact to baseline.    Dressing: CDI.    Diet: Regular tolerating okay.    LDA: NICOLE LEMON.    Equipment: IV pole, cane and personal belongings.    Plan: TBD.    Additional Info:

## 2023-05-10 ENCOUNTER — APPOINTMENT (OUTPATIENT)
Dept: CT IMAGING | Facility: CLINIC | Age: 73
DRG: 483 | End: 2023-05-10
Attending: INTERNAL MEDICINE
Payer: MEDICARE

## 2023-05-10 ENCOUNTER — APPOINTMENT (OUTPATIENT)
Dept: OCCUPATIONAL THERAPY | Facility: CLINIC | Age: 73
DRG: 483 | End: 2023-05-10
Attending: STUDENT IN AN ORGANIZED HEALTH CARE EDUCATION/TRAINING PROGRAM
Payer: MEDICARE

## 2023-05-10 LAB
ALBUMIN SERPL BCG-MCNC: 3.4 G/DL (ref 3.5–5.2)
ALBUMIN UR-MCNC: NEGATIVE MG/DL
ALP SERPL-CCNC: 97 U/L (ref 35–104)
ALT SERPL W P-5'-P-CCNC: 48 U/L (ref 10–35)
ANION GAP SERPL CALCULATED.3IONS-SCNC: 12 MMOL/L (ref 7–15)
APPEARANCE UR: CLEAR
AST SERPL W P-5'-P-CCNC: 59 U/L (ref 10–35)
BASOPHILS # BLD AUTO: 0 10E3/UL (ref 0–0.2)
BASOPHILS NFR BLD AUTO: 0 %
BILIRUB SERPL-MCNC: 0.9 MG/DL
BILIRUB UR QL STRIP: NEGATIVE
BUN SERPL-MCNC: 5.8 MG/DL (ref 8–23)
CALCIUM SERPL-MCNC: 9.1 MG/DL (ref 8.8–10.2)
CHLORIDE SERPL-SCNC: 94 MMOL/L (ref 98–107)
COLOR UR AUTO: ABNORMAL
CREAT SERPL-MCNC: 0.55 MG/DL (ref 0.51–0.95)
DEPRECATED HCO3 PLAS-SCNC: 25 MMOL/L (ref 22–29)
EOSINOPHIL # BLD AUTO: 0.1 10E3/UL (ref 0–0.7)
EOSINOPHIL NFR BLD AUTO: 1 %
ERYTHROCYTE [DISTWIDTH] IN BLOOD BY AUTOMATED COUNT: 14 % (ref 10–15)
GFR SERPL CREATININE-BSD FRML MDRD: >90 ML/MIN/1.73M2
GLUCOSE SERPL-MCNC: 132 MG/DL (ref 70–99)
GLUCOSE UR STRIP-MCNC: NEGATIVE MG/DL
HCT VFR BLD AUTO: 25.9 % (ref 35–47)
HGB BLD-MCNC: 8.8 G/DL (ref 11.7–15.7)
HGB UR QL STRIP: NEGATIVE
IMM GRANULOCYTES # BLD: 0.1 10E3/UL
IMM GRANULOCYTES NFR BLD: 1 %
KETONES UR STRIP-MCNC: ABNORMAL MG/DL
LEUKOCYTE ESTERASE UR QL STRIP: NEGATIVE
LYMPHOCYTES # BLD AUTO: 0.9 10E3/UL (ref 0.8–5.3)
LYMPHOCYTES NFR BLD AUTO: 11 %
MCH RBC QN AUTO: 31.2 PG (ref 26.5–33)
MCHC RBC AUTO-ENTMCNC: 34 G/DL (ref 31.5–36.5)
MCV RBC AUTO: 92 FL (ref 78–100)
MONOCYTES # BLD AUTO: 1.3 10E3/UL (ref 0–1.3)
MONOCYTES NFR BLD AUTO: 15 %
MUCOUS THREADS #/AREA URNS LPF: PRESENT /LPF
NEUTROPHILS # BLD AUTO: 6.2 10E3/UL (ref 1.6–8.3)
NEUTROPHILS NFR BLD AUTO: 72 %
NITRATE UR QL: NEGATIVE
NRBC # BLD AUTO: 0 10E3/UL
NRBC BLD AUTO-RTO: 0 /100
PH UR STRIP: 7 [PH] (ref 5–7)
PLATELET # BLD AUTO: 383 10E3/UL (ref 150–450)
POTASSIUM SERPL-SCNC: 3.6 MMOL/L (ref 3.4–5.3)
POTASSIUM SERPL-SCNC: 4.1 MMOL/L (ref 3.4–5.3)
PROT SERPL-MCNC: 6.1 G/DL (ref 6.4–8.3)
RBC # BLD AUTO: 2.82 10E6/UL (ref 3.8–5.2)
RBC URINE: <1 /HPF
SODIUM SERPL-SCNC: 131 MMOL/L (ref 136–145)
SP GR UR STRIP: 1.01 (ref 1–1.03)
UROBILINOGEN UR STRIP-MCNC: NORMAL MG/DL
WBC # BLD AUTO: 8.6 10E3/UL (ref 4–11)
WBC URINE: <1 /HPF

## 2023-05-10 PROCEDURE — G1010 CDSM STANSON: HCPCS | Mod: GC | Performed by: RADIOLOGY

## 2023-05-10 PROCEDURE — 250N000013 HC RX MED GY IP 250 OP 250 PS 637

## 2023-05-10 PROCEDURE — 250N000013 HC RX MED GY IP 250 OP 250 PS 637: Performed by: STUDENT IN AN ORGANIZED HEALTH CARE EDUCATION/TRAINING PROGRAM

## 2023-05-10 PROCEDURE — 97110 THERAPEUTIC EXERCISES: CPT | Mod: GO

## 2023-05-10 PROCEDURE — 99233 SBSQ HOSP IP/OBS HIGH 50: CPT | Performed by: INTERNAL MEDICINE

## 2023-05-10 PROCEDURE — 36415 COLL VENOUS BLD VENIPUNCTURE: CPT | Performed by: INTERNAL MEDICINE

## 2023-05-10 PROCEDURE — 250N000013 HC RX MED GY IP 250 OP 250 PS 637: Performed by: NURSE PRACTITIONER

## 2023-05-10 PROCEDURE — 85004 AUTOMATED DIFF WBC COUNT: CPT | Performed by: INTERNAL MEDICINE

## 2023-05-10 PROCEDURE — 250N000013 HC RX MED GY IP 250 OP 250 PS 637: Performed by: INTERNAL MEDICINE

## 2023-05-10 PROCEDURE — 97535 SELF CARE MNGMENT TRAINING: CPT | Mod: GO

## 2023-05-10 PROCEDURE — 120N000002 HC R&B MED SURG/OB UMMC

## 2023-05-10 PROCEDURE — G1010 CDSM STANSON: HCPCS

## 2023-05-10 PROCEDURE — 84132 ASSAY OF SERUM POTASSIUM: CPT | Performed by: INTERNAL MEDICINE

## 2023-05-10 PROCEDURE — 80053 COMPREHEN METABOLIC PANEL: CPT | Performed by: INTERNAL MEDICINE

## 2023-05-10 PROCEDURE — 70450 CT HEAD/BRAIN W/O DYE: CPT | Mod: 26 | Performed by: RADIOLOGY

## 2023-05-10 PROCEDURE — 81001 URINALYSIS AUTO W/SCOPE: CPT | Performed by: INTERNAL MEDICINE

## 2023-05-10 RX ORDER — AMLODIPINE BESYLATE 10 MG/1
10 TABLET ORAL DAILY
Status: DISCONTINUED | OUTPATIENT
Start: 2023-05-11 | End: 2023-05-16 | Stop reason: HOSPADM

## 2023-05-10 RX ORDER — AMLODIPINE BESYLATE 5 MG/1
5 TABLET ORAL ONCE
Status: COMPLETED | OUTPATIENT
Start: 2023-05-10 | End: 2023-05-10

## 2023-05-10 RX ORDER — LISINOPRIL 20 MG/1
20 TABLET ORAL DAILY
Status: CANCELLED | OUTPATIENT
Start: 2023-05-10

## 2023-05-10 RX ADMIN — CLOPIDOGREL BISULFATE 75 MG: 75 TABLET, FILM COATED ORAL at 08:42

## 2023-05-10 RX ADMIN — DOCUSATE SODIUM AND SENNOSIDES 1 TABLET: 8.6; 5 TABLET ORAL at 19:53

## 2023-05-10 RX ADMIN — HYDROXYZINE HYDROCHLORIDE 10 MG: 10 TABLET ORAL at 19:53

## 2023-05-10 RX ADMIN — HYDRALAZINE HYDROCHLORIDE 25 MG: 25 TABLET ORAL at 10:49

## 2023-05-10 RX ADMIN — ROSUVASTATIN CALCIUM 20 MG: 20 TABLET, FILM COATED ORAL at 08:41

## 2023-05-10 RX ADMIN — ACETAMINOPHEN 650 MG: 325 TABLET ORAL at 08:41

## 2023-05-10 RX ADMIN — CARVEDILOL 12.5 MG: 12.5 TABLET, FILM COATED ORAL at 08:41

## 2023-05-10 RX ADMIN — DOCUSATE SODIUM AND SENNOSIDES 1 TABLET: 8.6; 5 TABLET ORAL at 08:41

## 2023-05-10 RX ADMIN — CARVEDILOL 12.5 MG: 12.5 TABLET, FILM COATED ORAL at 18:04

## 2023-05-10 RX ADMIN — AMLODIPINE BESYLATE 5 MG: 5 TABLET ORAL at 08:41

## 2023-05-10 RX ADMIN — FUROSEMIDE 40 MG: 20 TABLET ORAL at 08:42

## 2023-05-10 RX ADMIN — ACETAMINOPHEN 650 MG: 325 TABLET ORAL at 16:47

## 2023-05-10 RX ADMIN — BUPROPION HYDROCHLORIDE 300 MG: 300 TABLET, FILM COATED, EXTENDED RELEASE ORAL at 08:41

## 2023-05-10 RX ADMIN — HYDRALAZINE HYDROCHLORIDE 25 MG: 25 TABLET ORAL at 19:53

## 2023-05-10 RX ADMIN — AMLODIPINE BESYLATE 5 MG: 5 TABLET ORAL at 11:28

## 2023-05-10 RX ADMIN — LISINOPRIL 40 MG: 20 TABLET ORAL at 08:42

## 2023-05-10 RX ADMIN — VENLAFAXINE HYDROCHLORIDE 150 MG: 150 CAPSULE, EXTENDED RELEASE ORAL at 08:41

## 2023-05-10 ASSESSMENT — ACTIVITIES OF DAILY LIVING (ADL)
ADLS_ACUITY_SCORE: 35
ADLS_ACUITY_SCORE: 35
ADLS_ACUITY_SCORE: 36
ADLS_ACUITY_SCORE: 35
ADLS_ACUITY_SCORE: 36
ADLS_ACUITY_SCORE: 35
ADLS_ACUITY_SCORE: 35
ADLS_ACUITY_SCORE: 36
ADLS_ACUITY_SCORE: 36
ADLS_ACUITY_SCORE: 35
ADLS_ACUITY_SCORE: 36
ADLS_ACUITY_SCORE: 35

## 2023-05-10 NOTE — PLAN OF CARE
VS: VSS, pt denied CP or SOB.   O2: Room air sat. >90%.   Output: Voiding adequate amount in the bathroom.    Last BM: 05/09/23 passing gas.    Activity: Up siting on chair most of the day, and up walked.    Skin: Intact except surgical incision.    Pain: Comfortable manageable with PRN medication and ice pack.    Neuro: CMS and neuro intact to baseline.    Dressing: CDI.    Diet: Regular tolerating okay.    LDA: none   Equipment: IV pole, cane and personal belongings.    Plan: TBD.    Additional Info:  pt very confused on bed alarm when in bed and chair alarm when up in chair, call light in reach,will continue to monitor.   Pt more oriented less confusion noted this evening.

## 2023-05-10 NOTE — PROGRESS NOTES
"Care Management Follow Up    Length of Stay (days): 5    Expected Discharge Date: 05/11/23     Concerns to be Addressed:  Discharge planning     Patient plan of care discussed at interdisciplinary rounds: Yes    Anticipated Discharge Disposition: Transitional Care    [address] [address]  PH: [phone]  Admissions: [phone] (Miryam)  Fax: [phone]    Pt was medically accepted at TCU today, but discharge now delayed. CM to call Miryam tomorrow (05/11/23) morning to coordinate discharge time.     Anticipated Discharge Services: Post acute therapies  Anticipated Discharge DME:  None    Patient/family educated on Medicare website which has current facility and service quality ratings: yes  Education Provided on the Discharge Plan: Yes  Patient/Family in Agreement with the Plan: yes    Referrals Placed by CM/SW: Post acute facilities  Private pay costs discussed: private room/amenity fees    Additional Information:    FILIPPO assisting with discharge planning while RNCC stepped away from desk. Miryam from [facility] called back RNCC, reported that they can accept pt for TCU today, preferred an \"after lunch discharge.\" Miryam confirmed fax number for orders to be sent to (documented below). Miryam requested that pt be asked if she is agreeable to the $16/day private room rate; shared room on TCU not an option. Miryam also inquired if pt used up any Medicare days from her hospitalization in April.     [address] [address]  PH: [phone]  Admissions: [phone] (Miryam)  Fax: [phone]    FILIPPO met with pt at bedside to discuss the above information. Pt appeared very confused, did not provide relevant information to conversation,  but did state that her daughter, Caroline, should be called and \"she'll guide you through what you need to do.\" FILIPPO reiterated that pt will be discharging to TCU today, offered WC ride to be " "set-up. Pt stated that her friend, Colby, will be giving her a ride. SW reiterated that Colby would need to be at hospital between 12:30 p.m. and 1:00 p.m. today, inquired what Colby's phone number was. Pt did not know Colby's phone number, nor is it listed on face sheet. SW inquired if pt was agreeable to $16/day private room fee at TCU x2; pt stated she was agreeable x2. SW inquired if pt had a previous TCU stay during [facility] in April. Pt responded, \"yeah probably.\" SW informed pt that discharge will be set-up for early this afternoon and daughter, Caroline, will be notified of discharge plan.    Upon chart review, pt was hospitalized for acute ischemic stroke at [facility] from 04/25/23 - 04/27/23; discharge disposition was home.    1035: Bedside nurse updated CM team that pt's confusion is not baseline, acute care concern; will update provider.    1100: SW left VM for Miryam at [facility], provided update on pt's new confusion; requested a call back to discuss holding pt's bed for an admission tomorrow (05/11/23).    1120: SW received VM from Miryam at [facility], who stated they cannot hold the bed for pt, but TCU will have discharges tomorrow; please call tomorrow morning to coordinate discharge time.        ANEL Benitez, LSW  5 Orth[facility]   PHONE: [phone]  Pager: [phone]  "

## 2023-05-10 NOTE — PLAN OF CARE
Pt very confused this morning, up on chair at start of shift, pt appears drowsy and sleepy, back in bed resting at this time, BP high, pt denied CP or SOB, PRN medication and scheduled medication given for BP provider paged waiting for call back, pt on bed alarm, will continue to monitor.   Provider on the unit assessed pt, some new order placed, will continue to monitor.

## 2023-05-10 NOTE — PROGRESS NOTES
Orthopaedic Surgery Progress Note 05/10/2023    S:JAIME. Confused this AM, stating she has a meeting to get to. Tolerating diet. Working with therapies, Awaiting TCU placement.      O:  Temp: (!) 96.6  F (35.9  C) Temp src: Oral BP: (!) 172/89 Pulse: 88   Resp: 16 SpO2: 99 % O2 Device: None (Room air)      Exam:  Gen: No acute distress, resting comfortably in bed.  CV: wwp  Resp: Non-labored breathing on RA  MSK:  Right Upper Extremity:    Inspection: Dressing/splint C/D/I, slight improvement in swelling/echymoses around digits, ecchymoses about shoulder/ axilla  Motor: Able to fire FDS/FDP, EDC, FPL, EPL ,interossei.   Sensory: SILT throughout axillary, median, ulnar, and radial nerve distributions   Circulation: fingers warm and well perfused    Lab:  Recent Labs   Lab 05/07/23  1231 05/06/23  1842 05/06/23  1104 05/06/23  0856 05/05/23  1408   WBC 10.6  --   --  10.9 9.3   HGB 8.2* 8.3* 7.4* 7.4* 7.4*     --   --  268 183     Sed Rate   Date Value Ref Range Status   10/14/2005 11 0 - 30 mm/h Final       Assessment: Sri Grewal is a 72 year old female s/p ORIF right distal humerus fracture with intercondylar extension on 5/3/2023 with Dr. Phipps and right reverse TSA with Dr. Krause on 5/5/23.     TODAY TO DOs:  - Limit sedating medications as possible   - Aggressive pulmonary toilet   - Ongoing work with OT/PT  - Patient to ambulate with RN per shift protocol - encourage OOB ambulation    - Discharge planning: appreciate assistance in TCU placement   - Will discuss possible removal of splint with staff as she is now 1 week s/p ORIF of right distal humerus       Plan:  Primary: Ortho  Activity: Up with assist.  Weight bearing status: NWB RUE   Antibiotics: Ancef x24 hours perioperatively.  Diet: Regular diet  DVT prophylaxis: Continue Plavix and mechanical while in the hospital   Bracing/Splinting: Sling in place, Posterior long arm splint to be kept clean, dry, and intact until follow-up.   Elevation:  Elevate RUE on pillows to keep swelling down as much as possible.  Pain management: transition from IV to orals as tolerated.   X-rays: Completed in PACU   Occupational Therapy: ADL's.  Labs: Hgb check   Consults: OT. Hospitalist, appreciate assistance in caring for this patient.  Follow-up: Clinic with Dr. Phipps Care Team in 1 week. Clinic with Dr. Haile in 6 weeks with XR.     Future Appointments   Date Time Provider Department [place]   6/6/2023  1:00 PM Nathan Turner MD [facility]   8/4/2023  4:45 PM Miryam Mcgowan MD [facility]       Disposition: Pending progress with therapies, pain control on orals, and medical stability, anticipate discharge to TCU 5/8 vs 5/9 pending medically stability.     Patient discussed with Dr. Krause & Moise.      Louann Hoff MD, PGY1   Orthopaedic Surgery  [phone]

## 2023-05-10 NOTE — PLAN OF CARE
"BP (!) 172/89   Pulse 88   Temp (!) 96.6  F (35.9  C) (Oral)   Resp 16   Ht 1.6 m (5' 2.99\")   Wt 62 kg (136 lb 11 oz)   LMP 11/09/2005 (Approximate)   SpO2 99%   BMI 24.22 kg/m      A&O X 3, Mild intermittent confusion, bed alarm on. Right arm dressing CDI, CMS intact, denies numbness and tingling. C/O headache, tylenol and oxycodone given. Ambulates to he BR with ax1. Voiding without difficulty. Fluid restrictions maintained. Potassium redraw 3.5. Anticipates discharge to [facility] today per CC notes.   "

## 2023-05-10 NOTE — PROGRESS NOTES
Brief orthopedic Update:     Patient RUE splint removed at bedside with care to limit ROM at the shoulder joint. Skin assessed, there is ongoing edema and diffuse ecchymosis around the arm, elbow, and forearm. Incision intact with steri-strips in place. ABD pad and Kerlex dressing placed over incision to provide support when arm placed back in sling.    At this time, patient should begin gentle ROM exercises with OT/RN daily at the elbow, wrist, and digits. No ROM at the shoulder until cleared by shoulder surgeon to do so. She should remain in the sling for her shoulder at all times when not performing SUPERVISED exercises at the elbow.     Also updated that STAT CT head is being ordered for concerns of worsening confusion and right eye droop. Patient should also maintain delirium precautions in the meantime (e.g blinds open, door open, light on during the day, OOB as able, reorientation, limit nighttime wakeups) to reduce delirium and reduce sedating pain medications.     Plan of care discussed with Yong Phipps and Mic.     Louann Hoff MD, PGY1   Orthopaedic Surgery  [phone]

## 2023-05-10 NOTE — PROGRESS NOTES
[facility]    Medicine Progress Note - Hospitalist Service, GOLD TEAM 16    Date of Admission:  5/3/2023    Assessment & Plan   Sri Grewal is a 72 year old female admitted on 4/30/2023. She has a past medical h/o CAD, NICM (Taksubo), HTN, previous TIA and CVA post angiogram in 6/2021.  She was seen at [facility] ED on 4/25/23 w/ increased difficulty speaking and right sided weakness. Stroke code was called upon pt's arrival to the ED. CT head w/o contrast was negative. CTA head/neck was negative for large vessel occlusion but it did show a L MCA aneurysm which was stable.  She was given TNK in the ED with improvement in her symptoms and admitted to the ICU for close monitoring post TNK. Her speech returned to her baseline.  She was in NSR on tele.  MRI brain was negative for acute stroke. Neurology team felt pt's symptoms were due to either a TIA or aborted stroke by TNK administration. Neurology recommended stopping PTA ASA and started pt on plavix, with plans to continue it indefinitely. She will follow up with neurology in clinic and complete another 28 days Zio-patch.       She has a history of a right proximal humerus fracture that has resulted from a mechanical fall in 2/2023 which has been managed nonoperatively. On 4/29, she had another episode of mechanical fall resulting in acute displaced fracture of the proximal shaft of the right humerus.  She was seen at [facility] ED on 4/30 from where she was transferred to [facility] orthopedic service for definite surgical intervention.  She was discharged to home on 5/1 with plan for patient to be admitted on 5/3 for ORIF since she was on Plavix.  She was advised to hold Plavix until she has her surgery.     Pt was admitted to [place] on 5/3 and underwent ORIF proximal right humerus fracture.  Transferred to [facility] on 5/5 and underwent reverse right TSA, complicated by right-side Ce syndrome from  brachial plexus block.  Pt is currently on surgical hernandez for postop care.  [facility] hospitalist service consulted for preop eval and postop medical management     #Acute displaced intra-articular fracture of the right distal humerus extending from the supracondylar region between the condyles and into the lateral humeral condyle, 4/29/23  #Subacute, ununited fracture of the humeral neck 2/2023  Post op care per ortho  - S/p ORIF proximal right humerus fracture 5/3  - S/p  reverse right TSA 5/5  - Continue prophylactic Ancef  - PT/OT consult  - IS. Bowel regimen.      #Postop right side Ce's syndrome  - Due to brachial plexus block per anesthesia  - No clear evidence of right eyelid ptosis, miosis or anhidrosis  - Resolved  - Monitor     #DVT prophylaxis  - PCD      #Recent TIA/CVA, 4/25  #Cerebral aneurysm   - S/p TNK on 4/29/23 at [facility] ED, please see above  - PTA ASA discontinued and patient was started on Plavix on 4/29  - Plavix held 5/2 - 5/3 for above surgery  - Plavix 75 mg daily today reintroduced on 5/4     #Hx CAD , HTN  - EF 65-70%, normal RV function  - Blood pressure remains elevated  - Increase amlodipine from 5 to 10 mg p.o. daily     #Right upper pole kidney cyst   - F/u w/ PCP     #Depression   - Venlafaxine 150 mg day and Bupropion 300 mg daily     #Migraine   - PTA prn fiorinal      #Chronic hyponatremia  - Na level is 133->134  -- Na 128. Continue on FR: 1500 ml. Ct lasix.   -- Follow-up BMP in am.   -- MS clear.      #Acute postop blood loss anemia  #MORENO suspect 2/2 acute anemia. cxr clear. Lungs clear. Oxygenating well on RA> no cough or cp. Cough+ Also likely early pulmonary edema. CT neg for PE. Lung nodule.   - Outpatient follow-up.   - Better now.   - Albuterol inhalers, nebs prn  - Follow-up Hgb. Transfuse for <7.0 or s.s of hypoperfusion.     #Vit D deficiency  - Vit D level: 11. Started on high dose vit D Q 7d.        Diet: Diet  Regular Diet Adult  Fluid restriction 1500  ML FLUID    DVT Prophylaxis: Pneumatic Compression Devices  Merida Catheter: Not present  Lines: None     Cardiac Monitoring: None  Code Status: Full Code      Clinically Significant Risk Factors        # Hypokalemia: Lowest K = 3.3 mmol/L in last 2 days, will replace as needed  # Hyponatremia: Lowest Na = 128 mmol/L in last 2 days, will monitor as appropriate                        Disposition Plan     Expected Discharge Date: 05/10/2023,  3:00 PM    Destination: home with help/services            Norberto Best DO  Hospitalist Service, GOLD TEAM 16  [facility]  Securely message with [employer] (more info)  Text page via [facility] Paging/Directory   See signed in provider for up to date coverage information  ______________________________________________________________________    Interval History   Patient is seated comfortably in chair upon entering room.  Patient endorses no specific symptomatology.  After the visit, nursing staff reports worsening confusion.  We will reevaluate patient.  Orthopedic surgery (primary service) coordinating discharge planning.    Physical Exam   Vital Signs: Temp: (!) 96.6  F (35.9  C) Temp src: Oral BP: (!) 161/84 Pulse: 72   Resp: 16 SpO2: 98 % O2 Device: None (Room air)    Weight: 136 lbs 10.96 oz    GENERAL: Alert and oriented x 3; no acute distress; well-nourished.  HEENT: Normocephalic; atraumatic; PERRLA; MMM.  CV: RRR; normal S1, S2; no rubs, murmurs, or gallops.  RESP: Lung fields clear to aucultation B/L; no wheezing or crepitations.  GI: Abdomen is soft, nontender, nondistended; no organomegaly; normal bowel sounds.  : Deferred genital examination.   MSK: Right shoulder immobilizer.  DERM: Skin is intact; no rash, lesions, or skin breakdown.  NEURO: No focal deficits appreciated; strength & sensorium are grossly intact.  PSYCH: No active hallucinations; affect, insight appear within normal limits.    Medical Decision Making       55  MINUTES SPENT BY ME on the date of service doing chart review, history, exam, documentation & further activities per the note.      Data     I have personally reviewed the following data over the past 24 hrs:    N/A  \   N/A   / N/A     N/A N/A N/A /  N/A   3.6 N/A N/A \       Imaging results reviewed over the past 24 hrs:   No results found for this or any previous visit (from the past 24 hour(s)).

## 2023-05-11 ENCOUNTER — APPOINTMENT (OUTPATIENT)
Dept: OCCUPATIONAL THERAPY | Facility: CLINIC | Age: 73
DRG: 483 | End: 2023-05-11
Attending: STUDENT IN AN ORGANIZED HEALTH CARE EDUCATION/TRAINING PROGRAM
Payer: MEDICARE

## 2023-05-11 ENCOUNTER — APPOINTMENT (OUTPATIENT)
Dept: PHYSICAL THERAPY | Facility: CLINIC | Age: 73
DRG: 483 | End: 2023-05-11
Attending: STUDENT IN AN ORGANIZED HEALTH CARE EDUCATION/TRAINING PROGRAM
Payer: MEDICARE

## 2023-05-11 LAB
ALBUMIN SERPL BCG-MCNC: 3.9 G/DL (ref 3.5–5.2)
ALP SERPL-CCNC: 117 U/L (ref 35–104)
ALT SERPL W P-5'-P-CCNC: 50 U/L (ref 10–35)
ANION GAP SERPL CALCULATED.3IONS-SCNC: 15 MMOL/L (ref 7–15)
AST SERPL W P-5'-P-CCNC: 45 U/L (ref 10–35)
BASOPHILS # BLD AUTO: 0 10E3/UL (ref 0–0.2)
BASOPHILS NFR BLD AUTO: 0 %
BILIRUB SERPL-MCNC: 1 MG/DL
BUN SERPL-MCNC: 4.4 MG/DL (ref 8–23)
CALCIUM SERPL-MCNC: 9.6 MG/DL (ref 8.8–10.2)
CHLORIDE SERPL-SCNC: 90 MMOL/L (ref 98–107)
CREAT SERPL-MCNC: 0.5 MG/DL (ref 0.51–0.95)
DEPRECATED HCO3 PLAS-SCNC: 24 MMOL/L (ref 22–29)
EOSINOPHIL # BLD AUTO: 0 10E3/UL (ref 0–0.7)
EOSINOPHIL NFR BLD AUTO: 0 %
ERYTHROCYTE [DISTWIDTH] IN BLOOD BY AUTOMATED COUNT: 14.2 % (ref 10–15)
GFR SERPL CREATININE-BSD FRML MDRD: >90 ML/MIN/1.73M2
GLUCOSE SERPL-MCNC: 143 MG/DL (ref 70–99)
HCT VFR BLD AUTO: 29.4 % (ref 35–47)
HGB BLD-MCNC: 10 G/DL (ref 11.7–15.7)
IMM GRANULOCYTES # BLD: 0.1 10E3/UL
IMM GRANULOCYTES NFR BLD: 1 %
LYMPHOCYTES # BLD AUTO: 1.3 10E3/UL (ref 0.8–5.3)
LYMPHOCYTES NFR BLD AUTO: 11 %
MCH RBC QN AUTO: 31.2 PG (ref 26.5–33)
MCHC RBC AUTO-ENTMCNC: 34 G/DL (ref 31.5–36.5)
MCV RBC AUTO: 92 FL (ref 78–100)
MONOCYTES # BLD AUTO: 1.6 10E3/UL (ref 0–1.3)
MONOCYTES NFR BLD AUTO: 14 %
NEUTROPHILS # BLD AUTO: 8.7 10E3/UL (ref 1.6–8.3)
NEUTROPHILS NFR BLD AUTO: 74 %
NRBC # BLD AUTO: 0 10E3/UL
NRBC BLD AUTO-RTO: 0 /100
PLATELET # BLD AUTO: 586 10E3/UL (ref 150–450)
POTASSIUM SERPL-SCNC: 3.8 MMOL/L (ref 3.4–5.3)
PROT SERPL-MCNC: 7 G/DL (ref 6.4–8.3)
RBC # BLD AUTO: 3.21 10E6/UL (ref 3.8–5.2)
SODIUM SERPL-SCNC: 129 MMOL/L (ref 136–145)
WBC # BLD AUTO: 11.8 10E3/UL (ref 4–11)

## 2023-05-11 PROCEDURE — 97530 THERAPEUTIC ACTIVITIES: CPT | Mod: GO

## 2023-05-11 PROCEDURE — 250N000013 HC RX MED GY IP 250 OP 250 PS 637: Performed by: INTERNAL MEDICINE

## 2023-05-11 PROCEDURE — 250N000013 HC RX MED GY IP 250 OP 250 PS 637

## 2023-05-11 PROCEDURE — 85025 COMPLETE CBC W/AUTO DIFF WBC: CPT | Performed by: INTERNAL MEDICINE

## 2023-05-11 PROCEDURE — 36415 COLL VENOUS BLD VENIPUNCTURE: CPT | Performed by: INTERNAL MEDICINE

## 2023-05-11 PROCEDURE — 250N000013 HC RX MED GY IP 250 OP 250 PS 637: Performed by: NURSE PRACTITIONER

## 2023-05-11 PROCEDURE — 99233 SBSQ HOSP IP/OBS HIGH 50: CPT | Performed by: INTERNAL MEDICINE

## 2023-05-11 PROCEDURE — 120N000002 HC R&B MED SURG/OB UMMC

## 2023-05-11 PROCEDURE — 250N000013 HC RX MED GY IP 250 OP 250 PS 637: Performed by: STUDENT IN AN ORGANIZED HEALTH CARE EDUCATION/TRAINING PROGRAM

## 2023-05-11 PROCEDURE — 80053 COMPREHEN METABOLIC PANEL: CPT | Performed by: INTERNAL MEDICINE

## 2023-05-11 PROCEDURE — 97530 THERAPEUTIC ACTIVITIES: CPT | Mod: GP

## 2023-05-11 PROCEDURE — 97112 NEUROMUSCULAR REEDUCATION: CPT | Mod: GP

## 2023-05-11 RX ORDER — HYDROXYZINE HYDROCHLORIDE 50 MG/1
50 TABLET, FILM COATED ORAL 3 TIMES DAILY PRN
Status: DISCONTINUED | OUTPATIENT
Start: 2023-05-11 | End: 2023-05-16 | Stop reason: HOSPADM

## 2023-05-11 RX ORDER — QUETIAPINE FUMARATE 25 MG/1
25 TABLET, FILM COATED ORAL AT BEDTIME
Status: DISCONTINUED | OUTPATIENT
Start: 2023-05-11 | End: 2023-05-14

## 2023-05-11 RX ADMIN — CLOPIDOGREL BISULFATE 75 MG: 75 TABLET, FILM COATED ORAL at 08:47

## 2023-05-11 RX ADMIN — ACETAMINOPHEN 650 MG: 325 TABLET ORAL at 11:03

## 2023-05-11 RX ADMIN — DOCUSATE SODIUM AND SENNOSIDES 1 TABLET: 8.6; 5 TABLET ORAL at 08:47

## 2023-05-11 RX ADMIN — VENLAFAXINE HYDROCHLORIDE 150 MG: 150 CAPSULE, EXTENDED RELEASE ORAL at 08:48

## 2023-05-11 RX ADMIN — Medication 5 MG: at 00:51

## 2023-05-11 RX ADMIN — LISINOPRIL 40 MG: 20 TABLET ORAL at 08:48

## 2023-05-11 RX ADMIN — CARVEDILOL 12.5 MG: 12.5 TABLET, FILM COATED ORAL at 08:48

## 2023-05-11 RX ADMIN — AMLODIPINE BESYLATE 10 MG: 10 TABLET ORAL at 08:47

## 2023-05-11 RX ADMIN — HYDROXYZINE HYDROCHLORIDE 50 MG: 50 TABLET, FILM COATED ORAL at 15:39

## 2023-05-11 RX ADMIN — ROSUVASTATIN CALCIUM 20 MG: 20 TABLET, FILM COATED ORAL at 08:48

## 2023-05-11 RX ADMIN — QUETIAPINE FUMARATE 25 MG: 25 TABLET ORAL at 21:12

## 2023-05-11 RX ADMIN — BUPROPION HYDROCHLORIDE 300 MG: 300 TABLET, FILM COATED, EXTENDED RELEASE ORAL at 08:47

## 2023-05-11 RX ADMIN — POLYETHYLENE GLYCOL 3350 17 G: 17 POWDER, FOR SOLUTION ORAL at 08:48

## 2023-05-11 RX ADMIN — ACETAMINOPHEN 650 MG: 325 TABLET ORAL at 21:12

## 2023-05-11 RX ADMIN — CARVEDILOL 12.5 MG: 12.5 TABLET, FILM COATED ORAL at 18:06

## 2023-05-11 RX ADMIN — FUROSEMIDE 40 MG: 20 TABLET ORAL at 08:48

## 2023-05-11 ASSESSMENT — ACTIVITIES OF DAILY LIVING (ADL)
ADLS_ACUITY_SCORE: 43
ADLS_ACUITY_SCORE: 36
ADLS_ACUITY_SCORE: 36
ADLS_ACUITY_SCORE: 35
ADLS_ACUITY_SCORE: 36
ADLS_ACUITY_SCORE: 38
ADLS_ACUITY_SCORE: 35
ADLS_ACUITY_SCORE: 36
ADLS_ACUITY_SCORE: 35
ADLS_ACUITY_SCORE: 36

## 2023-05-11 NOTE — PROGRESS NOTES
"  VS: BP (!) 165/77 (BP Location: Left arm)   Pulse 99   Temp (!) 95.5  F (35.3  C) (Oral)   Resp 16   Ht 1.6 m (5' 2.99\")   Wt 62 kg (136 lb 11 oz)   LMP 11/09/2005 (Approximate)   SpO2 100%   BMI 24.22 kg/m     O2: RA   Output: Voids in toilet   Last BM: 5/11 ??   Activity: Indep w/ cane   Skin: intact   Pain: Denies, can get tylenol   CMS: intact   Dressing: R shoulder/arm   Diet: Reg, 1500 ml fluid restriction   LDA: none   Equipment: cane   Plan: Discharge to TCU when bed available   Additional Info:      Placed a 1:1 sitter in the morning.  Sri was agitated and attempted to leave multiple times and get on an elevator.  Her friend Bryson came to see her and she calmed down.      The plan is to discharge to [facility] in [place] when bed is available.       "

## 2023-05-11 NOTE — PROGRESS NOTES
Care Management Follow Up    Length of Stay (days): 6    Expected Discharge Date: TBD   Concerns to be Addressed:       Patient plan of care discussed at interdisciplinary rounds: Yes    Anticipated Discharge Disposition: Transitional Care    [facility] the [place] [address] [place] [address]  Phone: [phone]     Anticipated Discharge Services: None  Anticipated Discharge DME:  (TBD)    Patient/family educated on Medicare website which has current facility and service quality ratings: yes  Education Provided on the Discharge Plan: Yes  Patient/Family in Agreement with the Plan: yes    Referrals Placed by CM/SW: NA  Private pay costs discussed: Not applicable    Additional Information:  Met with patient and her friend, Bryson, at bedside this morning. Patient is still confused and has a sitter. Patient's head CT and urinalysis were negative.     TCU unable to admit patient with current mental status changes and while she is on a 1:1 sitter. Will update TCU once patient's mental status is back to baseline and 1:1 sitter has been discontinued. RNCC will continue to follow.    Caroline Buchanan RN, BSN  Care Coordinator, 5 Ortho  Phone [phone]  Pager [phone]

## 2023-05-11 NOTE — PROGRESS NOTES
Orthopaedic Surgery Progress Note 05/11/2023    S:Patient found walking around  this AM with purse in her hand stating she needed to go upstairs to call her son. Per RN, did not sleep at all last night. Head CT ordered yesterday for worsening confusion and right eye lid lag - negative for acute stroke. Tolerating diet. Awaiting TCU placement.      O:  Temp: (!) 96.5  F (35.8  C) Temp src: Oral BP: (!) 182/93 Pulse: 90   Resp: 16 SpO2: 99 % O2 Device: None (Room air)      Exam:  Gen: No acute distress, walking around unit   CV: wwp  Resp: Non-labored breathing on RA  MSK:  Right Upper Extremity:    Inspection: Dressing C/D/I, slight improvement in swelling/echymoses around digits, ecchymoses about shoulder/ axilla/ elbow/ forearm   Motor: Able to fire FDS/FDP, EDC, FPL, EPL ,interossei.   Sensory: SILT throughout axillary, median, ulnar, and radial nerve distributions   Circulation: fingers warm and well perfused    Lab:  Recent Labs   Lab 05/10/23  1131 05/07/23  1231 05/06/23  1842 05/06/23  1104 05/06/23  0856   WBC 8.6 10.6  --   --  10.9   HGB 8.8* 8.2* 8.3*   < > 7.4*    266  --   --  268    < > = values in this interval not displayed.     Sed Rate   Date Value Ref Range Status   10/14/2005 11 0 - 30 mm/h Final       Assessment: Sri Grewal is a 72 year old female s/p ORIF right distal humerus fracture with intercondylar extension on 5/3/2023 with Dr. Phipps and right reverse TSA with Dr. Krause on 5/5/23.     TODAY TO DOs:  - Limit sedating medications as possible, delirium precautions - patient should be limited with naps during the day, blinds open, lights on during the day, reorient as needed.   - Ongoing work with OT/PT  - Patient to ambulate with RN per shift protocol - encourage OOB ambulation    - Discharge planning: appreciate assistance in TCU placement       Plan:  Primary: Ortho  Activity: Up with assist.  Weight bearing status: NWB RUE   Antibiotics: Ancef x24 hours  perioperatively.  Diet: Regular diet  DVT prophylaxis: Continue Plavix and mechanical while in the hospital   Bracing/Splinting: Sling in place, Posterior long arm splint to be kept clean, dry, and intact until follow-up.   Elevation: Elevate RUE on pillows to keep swelling down as much as possible.  Pain management: transition from IV to orals as tolerated.   X-rays: Completed in PACU   Occupational Therapy: ADL's.  Labs: Hgb check   Consults: OT. Hospitalist, appreciate assistance in caring for this patient.  Follow-up: Dr. Phipps & Dr. Haile's teams, next appointment Monday with Dr. Haile     Future Appointments   Date Time Provider Department Center   6/6/2023  1:00 PM Nathan Turner MD [facility]   8/4/2023  4:45 PM Miryam Mcgowan MD [facility]       Disposition: Pending progress with therapies, pain control on orals, and medical stability, anticipate discharge to TCU today.     Patient discussed with Dr. Krause.      Louann Hoff MD, PGY1   Orthopaedic Surgery  [phone]

## 2023-05-11 NOTE — PLAN OF CARE
"BP (!) 182/93 (BP Location: Left arm)   Pulse 90   Temp (!) 96.5  F (35.8  C) (Oral)   Resp 16   Ht 1.6 m (5' 2.99\")   Wt 62 kg (136 lb 11 oz)   LMP 11/09/2005 (Approximate)   SpO2 99%   BMI 24.22 kg/m      Pt confused, when asked where she is, pt states \"your house\" and also said its the year 2002. Pt Calm but confused with repeatedly needing reorientation and redirection back to bed. Bed alarm on. BP elevated, PRN hydralazin given. Denies numbness and tingling on surgical site, bruising and edema noted, ice and elevation applied, denies pain. Up to the BR with ax 1. Fluid restrictions maintained. Will continue with POC.  "

## 2023-05-11 NOTE — PROGRESS NOTES
[facility]    Medicine Progress Note - Hospitalist Service, GOLD TEAM 16    Date of Admission:  5/3/2023    Assessment & Plan   Sri Grewal is a 72 year old female admitted on 4/30/2023. She has a past medical h/o CAD, NICM (Taksubo), HTN, previous TIA and CVA post angiogram in 6/2021.  She was seen at [facility] ED on 4/25/23 w/ increased difficulty speaking and right sided weakness. Stroke code was called upon pt's arrival to the ED. CT head w/o contrast was negative. CTA head/neck was negative for large vessel occlusion but it did show a L MCA aneurysm which was stable.  She was given TNK in the ED with improvement in her symptoms and admitted to the ICU for close monitoring post TNK. Her speech returned to her baseline.  She was in NSR on tele.  MRI brain was negative for acute stroke. Neurology team felt pt's symptoms were due to either a TIA or aborted stroke by TNK administration. Neurology recommended stopping PTA ASA and started pt on plavix, with plans to continue it indefinitely. She will follow up with neurology in clinic and complete another 28 days Zio-patch.       She has a history of a right proximal humerus fracture that has resulted from a mechanical fall in 2/2023 which has been managed nonoperatively. On 4/29, she had another episode of mechanical fall resulting in acute displaced fracture of the proximal shaft of the right humerus.  She was seen at [facility] ED on 4/30 from where she was transferred to [facility] orthopedic service for definite surgical intervention.  She was discharged to home on 5/1 with plan for patient to be admitted on 5/3 for ORIF since she was on Plavix.  She was advised to hold Plavix until she has her surgery.     Pt was admitted to [place] on 5/3 and underwent ORIF proximal right humerus fracture.  Transferred to [facility] on 5/5 and underwent reverse right TSA, complicated by right-side Ce syndrome from  brachial plexus block.  Pt is currently on surgical hernandez for postop care.  [facility] hospitalist service consulted for preop eval and postop medical management    #Confusion  -Concerning for delirium. CT head showed: No acute intracranial pathology. Patient with persistent confusion, she was oriented to person. Per report she has not being sleeping well, she received pain med's and had recent surgery.   -Start Seroquel at bedtime.   -Sleep hygiene.   -Continue with sitter at bedside.   -Infectious workup negative including UA.      #Acute displaced intra-articular fracture of the right distal humerus extending from the supracondylar region between the condyles and into the lateral humeral condyle, 4/29/23  #Subacute, ununited fracture of the humeral neck 2/2023  Post op care per ortho  - S/p ORIF proximal right humerus fracture 5/3  - S/p  reverse right TSA 5/5  - Continue prophylactic Ancef  - PT/OT consult  - IS. Bowel regimen.      #Postop right side Ce's syndrome  - Due to brachial plexus block per anesthesia  - No clear evidence of right eyelid ptosis, miosis or anhidrosis  - Resolved  - Monitor     #DVT prophylaxis  - PCD      #Recent TIA/CVA, 4/25  #Cerebral aneurysm   - S/p TNK on 4/29/23 at [facility] ED, please see above  - PTA ASA discontinued and patient was started on Plavix on 4/29  - Plavix held 5/2 - 5/3 for above surgery  - Plavix 75 mg daily today reintroduced on 5/4     #Hx CAD , HTN  - EF 65-70%, normal RV function  - Blood pressure remains elevated  - Increase amlodipine from 5 to 10 mg p.o. daily     #Right upper pole kidney cyst   - F/u w/ PCP     #Depression   - Venlafaxine 150 mg day and Bupropion 300 mg daily     #Migraine   - PTA prn fiorinal      #Chronic hyponatremia  - Na level is 133->134  -- Na 129. Continue on FR: 1500 ml. Ct lasix.   -- Follow-up BMP in am.      #Acute postop blood loss anemia  #MORENO suspect 2/2 acute anemia. cxr clear. Lungs clear. Oxygenating well on RA> no  cough or cp. Cough+ Also likely early pulmonary edema. CT neg for PE. Lung nodule.   - Outpatient follow-up.   - Better now.   - Albuterol inhalers, nebs prn  - Follow-up Hgb. Transfuse for <7.0 or s.s of hypoperfusion.     #Vit D deficiency  - Vit D level: 11. Started on high dose vit D Q 7d.        Diet: Diet  Regular Diet Adult  Fluid restriction 1500 ML FLUID    DVT Prophylaxis: Pneumatic Compression Devices  Merida Catheter: Not present  Lines: None     Cardiac Monitoring: None  Code Status: Full Code      Clinically Significant Risk Factors         # Hyponatremia: Lowest Na = 129 mmol/L in last 2 days, will monitor as appropriate      # Hypoalbuminemia: Lowest albumin = 3.4 g/dL at 5/10/2023 11:31 AM, will monitor as appropriate                   Disposition Plan      Expected Discharge Date: 05/13/2023      Destination: inpatient rehabilitation facility            Julio Silver MD  Hospitalist Service, [address]  Securely message with [employer] (more info)  Text page via [facility] Paging/Directory   See signed in provider for up to date coverage information  ______________________________________________________________________    Interval History     Patient trying to leave the floor, sitter was ordered.   She is more confuse today. Alert, oriented just to person. Significant other at bedside.   Complete ROS not able to obtain due to confusion.     Physical Exam   Vital Signs: Temp: (!) 95.5  F (35.3  C) Temp src: Oral BP: (!) 165/77 Pulse: 99   Resp: 16 SpO2: 100 % O2 Device: None (Room air)    Weight: 136 lbs 10.96 oz    GENERAL: Alert; no acute distress; well-nourished, room air.  HEENT: Normocephalic; atraumatic; PERRLA; MMM.  CV: RRR; normal S1, S2; no rubs, murmurs, or gallops.  RESP: Lung fields clear to aucultation B/L; no wheezing or crepitations.  GI: Abdomen is soft, nontender, nondistended; no organomegaly; normal bowel sounds.  DERM:  Skin is intact; no rash, lesions, or skin breakdown.  NEURO: No focal deficits appreciated; strength & sensorium are grossly intact.  PSYCH: No active hallucinations; affect, insight appear within normal limits.    Medical Decision Making       55 MINUTES SPENT BY ME on the date of service doing chart review, history, exam, documentation & further activities per the note.      Data     I have personally reviewed the following data over the past 24 hrs:    11.8 (H)  \   10.0 (L)   / 586 (H)     129 (L) 90 (L) 4.4 (L) /  143 (H)   3.8 24 0.50 (L) \       ALT: 50 (H) AST: 45 (H) AP: 117 (H) TBILI: 1.0   ALB: 3.9 TOT PROTEIN: 7.0 LIPASE: N/A       Imaging results reviewed over the past 24 hrs:   No results found for this or any previous visit (from the past 24 hour(s)).

## 2023-05-12 ENCOUNTER — APPOINTMENT (OUTPATIENT)
Dept: PHYSICAL THERAPY | Facility: CLINIC | Age: 73
DRG: 483 | End: 2023-05-12
Attending: STUDENT IN AN ORGANIZED HEALTH CARE EDUCATION/TRAINING PROGRAM
Payer: MEDICARE

## 2023-05-12 ENCOUNTER — APPOINTMENT (OUTPATIENT)
Dept: OCCUPATIONAL THERAPY | Facility: CLINIC | Age: 73
DRG: 483 | End: 2023-05-12
Attending: STUDENT IN AN ORGANIZED HEALTH CARE EDUCATION/TRAINING PROGRAM
Payer: MEDICARE

## 2023-05-12 LAB
ALBUMIN SERPL BCG-MCNC: 3.5 G/DL (ref 3.5–5.2)
ALP SERPL-CCNC: 99 U/L (ref 35–104)
ALT SERPL W P-5'-P-CCNC: 37 U/L (ref 10–35)
ANION GAP SERPL CALCULATED.3IONS-SCNC: 12 MMOL/L (ref 7–15)
AST SERPL W P-5'-P-CCNC: 32 U/L (ref 10–35)
BASOPHILS # BLD AUTO: 0 10E3/UL (ref 0–0.2)
BASOPHILS NFR BLD AUTO: 1 %
BILIRUB SERPL-MCNC: 0.8 MG/DL
BUN SERPL-MCNC: 5.6 MG/DL (ref 8–23)
CALCIUM SERPL-MCNC: 9 MG/DL (ref 8.8–10.2)
CHLORIDE SERPL-SCNC: 92 MMOL/L (ref 98–107)
CREAT SERPL-MCNC: 0.5 MG/DL (ref 0.51–0.95)
DEPRECATED HCO3 PLAS-SCNC: 24 MMOL/L (ref 22–29)
EOSINOPHIL # BLD AUTO: 0.1 10E3/UL (ref 0–0.7)
EOSINOPHIL NFR BLD AUTO: 1 %
ERYTHROCYTE [DISTWIDTH] IN BLOOD BY AUTOMATED COUNT: 13.8 % (ref 10–15)
GFR SERPL CREATININE-BSD FRML MDRD: >90 ML/MIN/1.73M2
GLUCOSE SERPL-MCNC: 118 MG/DL (ref 70–99)
HCT VFR BLD AUTO: 25.6 % (ref 35–47)
HGB BLD-MCNC: 8.9 G/DL (ref 11.7–15.7)
IMM GRANULOCYTES # BLD: 0.1 10E3/UL
IMM GRANULOCYTES NFR BLD: 1 %
LYMPHOCYTES # BLD AUTO: 1.2 10E3/UL (ref 0.8–5.3)
LYMPHOCYTES NFR BLD AUTO: 13 %
MCH RBC QN AUTO: 31 PG (ref 26.5–33)
MCHC RBC AUTO-ENTMCNC: 34.8 G/DL (ref 31.5–36.5)
MCV RBC AUTO: 89 FL (ref 78–100)
MONOCYTES # BLD AUTO: 1.4 10E3/UL (ref 0–1.3)
MONOCYTES NFR BLD AUTO: 15 %
NEUTROPHILS # BLD AUTO: 6.1 10E3/UL (ref 1.6–8.3)
NEUTROPHILS NFR BLD AUTO: 69 %
NRBC # BLD AUTO: 0 10E3/UL
NRBC BLD AUTO-RTO: 0 /100
PLATELET # BLD AUTO: 461 10E3/UL (ref 150–450)
POTASSIUM SERPL-SCNC: 3.2 MMOL/L (ref 3.4–5.3)
PROT SERPL-MCNC: 6 G/DL (ref 6.4–8.3)
RBC # BLD AUTO: 2.87 10E6/UL (ref 3.8–5.2)
SODIUM SERPL-SCNC: 128 MMOL/L (ref 136–145)
WBC # BLD AUTO: 8.9 10E3/UL (ref 4–11)

## 2023-05-12 PROCEDURE — 250N000013 HC RX MED GY IP 250 OP 250 PS 637: Performed by: INTERNAL MEDICINE

## 2023-05-12 PROCEDURE — 80053 COMPREHEN METABOLIC PANEL: CPT | Performed by: INTERNAL MEDICINE

## 2023-05-12 PROCEDURE — 99232 SBSQ HOSP IP/OBS MODERATE 35: CPT | Performed by: INTERNAL MEDICINE

## 2023-05-12 PROCEDURE — 97530 THERAPEUTIC ACTIVITIES: CPT | Mod: GP

## 2023-05-12 PROCEDURE — 85025 COMPLETE CBC W/AUTO DIFF WBC: CPT | Performed by: INTERNAL MEDICINE

## 2023-05-12 PROCEDURE — 36415 COLL VENOUS BLD VENIPUNCTURE: CPT | Performed by: INTERNAL MEDICINE

## 2023-05-12 PROCEDURE — 120N000002 HC R&B MED SURG/OB UMMC

## 2023-05-12 PROCEDURE — 97110 THERAPEUTIC EXERCISES: CPT | Mod: GP

## 2023-05-12 PROCEDURE — 250N000013 HC RX MED GY IP 250 OP 250 PS 637: Performed by: STUDENT IN AN ORGANIZED HEALTH CARE EDUCATION/TRAINING PROGRAM

## 2023-05-12 PROCEDURE — 250N000013 HC RX MED GY IP 250 OP 250 PS 637

## 2023-05-12 PROCEDURE — 250N000013 HC RX MED GY IP 250 OP 250 PS 637: Performed by: NURSE PRACTITIONER

## 2023-05-12 PROCEDURE — 97110 THERAPEUTIC EXERCISES: CPT | Mod: GO

## 2023-05-12 PROCEDURE — 97116 GAIT TRAINING THERAPY: CPT | Mod: GP

## 2023-05-12 RX ORDER — POTASSIUM CHLORIDE 1500 MG/1
40 TABLET, EXTENDED RELEASE ORAL ONCE
Status: COMPLETED | OUTPATIENT
Start: 2023-05-12 | End: 2023-05-12

## 2023-05-12 RX ORDER — HYDRALAZINE HYDROCHLORIDE 20 MG/ML
10 INJECTION INTRAMUSCULAR; INTRAVENOUS EVERY 6 HOURS PRN
Status: DISCONTINUED | OUTPATIENT
Start: 2023-05-12 | End: 2023-05-15

## 2023-05-12 RX ADMIN — BUPROPION HYDROCHLORIDE 300 MG: 300 TABLET, FILM COATED, EXTENDED RELEASE ORAL at 09:07

## 2023-05-12 RX ADMIN — VENLAFAXINE HYDROCHLORIDE 150 MG: 150 CAPSULE, EXTENDED RELEASE ORAL at 09:07

## 2023-05-12 RX ADMIN — AMLODIPINE BESYLATE 10 MG: 10 TABLET ORAL at 09:07

## 2023-05-12 RX ADMIN — LISINOPRIL 40 MG: 20 TABLET ORAL at 09:07

## 2023-05-12 RX ADMIN — FUROSEMIDE 40 MG: 20 TABLET ORAL at 09:08

## 2023-05-12 RX ADMIN — ACETAMINOPHEN 650 MG: 325 TABLET ORAL at 17:33

## 2023-05-12 RX ADMIN — QUETIAPINE FUMARATE 25 MG: 25 TABLET ORAL at 21:36

## 2023-05-12 RX ADMIN — CARVEDILOL 12.5 MG: 12.5 TABLET, FILM COATED ORAL at 17:27

## 2023-05-12 RX ADMIN — ACETAMINOPHEN 650 MG: 325 TABLET ORAL at 13:35

## 2023-05-12 RX ADMIN — CARVEDILOL 12.5 MG: 12.5 TABLET, FILM COATED ORAL at 09:08

## 2023-05-12 RX ADMIN — ACETAMINOPHEN 650 MG: 325 TABLET ORAL at 09:07

## 2023-05-12 RX ADMIN — ROSUVASTATIN CALCIUM 20 MG: 20 TABLET, FILM COATED ORAL at 09:07

## 2023-05-12 RX ADMIN — POTASSIUM CHLORIDE 40 MEQ: 1500 TABLET, EXTENDED RELEASE ORAL at 23:00

## 2023-05-12 RX ADMIN — DOCUSATE SODIUM AND SENNOSIDES 1 TABLET: 8.6; 5 TABLET ORAL at 21:36

## 2023-05-12 RX ADMIN — HYDROXYZINE HYDROCHLORIDE 50 MG: 50 TABLET, FILM COATED ORAL at 09:07

## 2023-05-12 RX ADMIN — ACETAMINOPHEN 650 MG: 325 TABLET ORAL at 21:36

## 2023-05-12 RX ADMIN — CLOPIDOGREL BISULFATE 75 MG: 75 TABLET, FILM COATED ORAL at 09:07

## 2023-05-12 ASSESSMENT — ACTIVITIES OF DAILY LIVING (ADL)
ADLS_ACUITY_SCORE: 43

## 2023-05-12 NOTE — PROGRESS NOTES
Care Management Follow Up    Length of Stay (days): 7    Expected Discharge Date: 05/13/2023     Concerns to be Addressed: discharge planning     Patient plan of care discussed at interdisciplinary rounds: Yes    Anticipated Discharge Disposition: Transitional Care     Anticipated Discharge Services: None  Anticipated Discharge DME:  (TBD)    Patient/family educated on Medicare website which has current facility and service quality ratings: yes  Education Provided on the Discharge Plan: Yes  Patient/Family in Agreement with the Plan: yes    Referrals Placed by CM/SW: Homecare  Private pay costs discussed: Not applicable    Additional Information:    Call from Juan F at [facility],  He stated that he had marked the referral as accepted in Epic but hadn't heard anything.    [facility] was still marked as pending.  Marked as accepted and returned Juan F's call.  Left voice mail that Sri is not yet ready to discharge and that it will likely be a couple of days.  Left MANJINDER Velazquez's phone number for return call.    Miley Reyes RN, BA  Care Coordinator  6 Med Surg  [phone], pager [phone]      For weekend social work needs, contact information below and available on [employer]:      Weekend [place] Pager: [phone]   Weekend 6MS, 8A, 10ICU- Pager: [phone]     For weekend RN care coordinator needs (home discharge with needs including home care, assisted living facility returns, durable medical equip, IV antibiotics) - page [phone]

## 2023-05-12 NOTE — PROGRESS NOTES
[facility]    Medicine Progress Note - Hospitalist Service, GOLD TEAM 16    Date of Admission:  5/3/2023    Assessment & Plan   Sri Grewal is a 72 year old female admitted on 4/30/2023. She has a past medical h/o CAD, NICM (Taksubo), HTN, previous TIA and CVA post angiogram in 6/2021.  She was seen at [facility] ED on 4/25/23 w/ increased difficulty speaking and right sided weakness. Stroke code was called upon pt's arrival to the ED. CT head w/o contrast was negative. CTA head/neck was negative for large vessel occlusion but it did show a L MCA aneurysm which was stable.  She was given TNK in the ED with improvement in her symptoms and admitted to the ICU for close monitoring post TNK. Her speech returned to her baseline.  She was in NSR on tele.  MRI brain was negative for acute stroke. Neurology team felt pt's symptoms were due to either a TIA or aborted stroke by TNK administration. Neurology recommended stopping PTA ASA and started pt on plavix, with plans to continue it indefinitely. She will follow up with neurology in clinic and complete another 28 days Zio-patch.       She has a history of a right proximal humerus fracture that has resulted from a mechanical fall in 2/2023 which has been managed nonoperatively. On 4/29, she had another episode of mechanical fall resulting in acute displaced fracture of the proximal shaft of the right humerus.  She was seen at [facility] ED on 4/30 from where she was transferred to [facility] orthopedic service for definite surgical intervention.  She was discharged to home on 5/1 with plan for patient to be admitted on 5/3 for ORIF since she was on Plavix.  She was advised to hold Plavix until she has her surgery.     Pt was admitted to [place] on 5/3 and underwent ORIF proximal right humerus fracture.  Transferred to [facility] on 5/5 and underwent reverse right TSA, complicated by right-side Ce syndrome from  brachial plexus block.  Pt is currently on surgical hernandez for postop care.  [facility] and [facility]op medical management    #Confusion  -Concerning for delirium. CT head showed: No acute intracranial pathology. Patient with persistent confusion, she was oriented to person. Per report she has not being sleeping well, she received pain med's and had recent surgery.   -Confusion is slowly improving.  No acute events overnight.  Patient slept well.  I had an extensive conversation with patient's significant other and nursing staff this morning.  Patient is not trying to leave the medical unit and she is quiet and cooperative.  She was alert, oriented to person and place.  -Continue on Seroquel at bedtime.   -Sleep hygiene.   -Continue with sitter at bedside.   -Infectious workup negative including UA.      #Acute displaced intra-articular fracture of the right distal humerus extending from the supracondylar region between the condyles and into the lateral humeral condyle, 4/29/23  #Subacute, ununited fracture of the humeral neck 2/2023  Post op care per ortho  - S/p ORIF proximal right humerus fracture 5/3  - S/p  reverse right TSA 5/5  - PT/OT consult  - IS. Bowel regimen.   - Patient will go to TCU once delirium is resolved.     #Postop right side Ce's syndrome  - Due to brachial plexus block per anesthesia  - No clear evidence of right eyelid ptosis, miosis or anhidrosis  - Resolved  - Monitor     #DVT prophylaxis  - PCD      #Recent TIA/CVA, 4/25  #Cerebral aneurysm   - S/p TNK on 4/29/23 at [facility] ED, please see above  - PTA ASA discontinued and patient was started on Plavix on 4/29  - Plavix held 5/2 - 5/3 for above surgery  - Plavix 75 mg daily today reintroduced on 5/4     #Hx CAD , HTN  - EF 65-70%, normal RV function  - Blood pressure remains elevated  - Continue on amlodipine, Coreg, lisinopril and Lasix.  Hydralazine as needed for systolic blood pressure  more than 160.     #Right upper pole kidney cyst   - F/u w/ PCP     #Depression   - Venlafaxine 150 mg day and Bupropion 300 mg daily     #Migraine   - PTA prn fiorinal      #Chronic hyponatremia  - Na level is 133->134  -- Na 128. Continue on FR: 1500 ml. Ct lasix.   -- Follow-up BMP in am.      #Acute postop blood loss anemia  #MORENO suspect 2/2 acute anemia. cxr clear. Lungs clear. Oxygenating well on RA> no cough or cp. Cough+ Also likely early pulmonary edema. CT neg for PE. Lung nodule.   - Outpatient follow-up.   - Better now.   - Albuterol inhalers, nebs prn  - Follow-up Hgb. Transfuse for <7.0 or s.s of hypoperfusion.     #Vit D deficiency  - Vit D level: 11. Started on high dose vit D Q 7d.        Diet: Diet  Regular Diet Adult  Fluid restriction 1500 ML FLUID    DVT Prophylaxis: Pneumatic Compression Devices  Merida Catheter: Not present  Lines: None     Cardiac Monitoring: None  Code Status: Full Code      Clinically Significant Risk Factors        # Hypokalemia: Lowest K = 3.2 mmol/L in last 2 days, will replace as needed  # Hyponatremia: Lowest Na = 128 mmol/L in last 2 days, will monitor as appropriate      # Hypoalbuminemia: Lowest albumin = 3.4 g/dL at 5/10/2023 11:31 AM, will monitor as appropriate                   Disposition Plan     Expected Discharge Date: 05/13/2023      Destination: inpatient rehabilitation facility            Julio Silver MD  Hospitalist Service, GOLD TEAM 16  [facility]  Securely message with Packet[employer]led (more info)  Text page via [facility] Paging/Directory   See signed in provider for up to date coverage information  ______________________________________________________________________    Interval History     No acute events overnight, patient slept most of the night.  Patient was pleasant this morning.  Sitter at bedside.  No other specific complaints.    Physical Exam   Vital Signs: Temp: 98.9  F (37.2  C) Temp src: Axillary  BP: (!) 167/93 Pulse: 88   Resp: 16 SpO2: 97 % O2 Device: None (Room air)    Weight: 136 lbs 10.96 oz    GENERAL: Alert; no acute distress; well-nourished, room air.  HEENT: Normocephalic; atraumatic; PERRLA; MMM.  CV: RRR; normal S1, S2; no rubs, murmurs, or gallops.  RESP: Lung fields clear to aucultation B/L; no wheezing or crepitations.  GI: Abdomen is soft, nontender, nondistended; no organomegaly; normal bowel sounds.  DERM: Skin is intact; no rash, lesions, or skin breakdown.  NEURO: No focal deficits appreciated; strength & sensorium are grossly intact.  PSYCH: No active hallucinations; affect, insight appear within normal limits.    Medical Decision Making       55 MINUTES SPENT BY ME on the date of service doing chart review, history, exam, documentation & further activities per the note.      Data     I have personally reviewed the following data over the past 24 hrs:    8.9  \   8.9 (L)   / 461 (H)     128 (L) 92 (L) 5.6 (L) /  118 (H)   3.2 (L) 24 0.50 (L) \       ALT: 37 (H) AST: 32 AP: 99 TBILI: 0.8   ALB: 3.5 TOT PROTEIN: 6.0 (L) LIPASE: N/A       Imaging results reviewed over the past 24 hrs:   No results found for this or any previous visit (from the past 24 hour(s)).

## 2023-05-12 NOTE — PLAN OF CARE
Goal Outcome Evaluation:      Plan of Care Reviewed With: patient    Overall Patient Progress: improving    VS: VSS. Denies CP/SOB. Alert, intermittent confusion. Was able to verbalize that she is in the hospital because she fell. Difficulty remembering the president, stated she was born on the 2[birth year] not the [birth year].   Speech is clear, pt seems to be a little withdrawn/quiet today.  Pt closes R eye often, but is able it open it, follow commands etc.    O2: >90% on RA    Output: Voiding adequate amounts w/o pain or difficulty    Last BM: 5/12   Activity: Up with SBA, GB. NWB RUE- sling in place.    Skin: Bruising on BUE, incision to RUE   Pain: Pain in R shoulder, managing with PRN tylenol and hydroxyzine    CMS: Intact    Dressing: CDI    Diet: Regular, tolerating well.    LDA: None    Equipment: IV pole, personal belongings    Plan: TCU when 1:1 discontinued    Additional Info: Calm and cooperative this shift.    at bedside   Encouraging pt to be up and awake during the day, diversional activities offered.    noted that pt's behavior much improved from yesterday

## 2023-05-12 NOTE — PLAN OF CARE
"[id number]  BP (!) 160/74 (BP Location: Left arm, Patient Position: Semi-Nair's, Cuff Size: Adult Regular)   Pulse 95   Temp (!) 95.7  F (35.4  C) (Axillary)   Resp 16   Ht 1.6 m (5' 2.99\")   Wt 62 kg (136 lb 11 oz)   LMP 2005 (Approximate)   SpO2 94%   BMI 24.22 kg/m     Hypertensive this shift.    Confused. AOx1, able to state name and , believes she is at home. Denies CP, SOB, N/T. ORA.   Nonverbal pain indicators of pain (grimacing with movements) present, PRN Tylenol given for R shoulder pain. NWB RUE. A1 using GB and cane. Sling on, extremity wrap replaced d/t pt removal. Aquacel dressing dried drainage, marked with  no change. Bruising to BUE.     Reg diet, tray set up. 1500mL fluid restriction. Encouraging PO fluid intake. No N/V.  Incont of B/B at times. LBM , gal held.     Bedside attendant for elopement risk and safety d/t confusion.   RN manage K+ WNL. Pt received first dose Seroquel this shift, slept throughout shift. Overall calm and cooperative. No IV access.    Plan: TCU once off 1:1.     "

## 2023-05-12 NOTE — PROGRESS NOTES
Orthopaedic Surgery Progress Note 05/12/2023    S:Patient placed on 1:1 sitter yesterday for ongoing confusion. Seroquel at night, did sleep overnight. Pain well controlled. Tolerating diet. Awaiting TCU placement.      O:  Temp: (!) 95.7  F (35.4  C) Temp src: Axillary BP: (!) 177/84 Pulse: 95   Resp: 16 SpO2: 94 % O2 Device: None (Room air)      Exam:  Gen: No acute distress   CV: wwp  Resp: Non-labored breathing on RA  MSK:  Right Upper Extremity:    Inspection: Dressing with small amount of strikethrugh, ecchymoses about shoulder/ axilla/ elbow/ forearm   Motor: Able to fire FDS/FDP, EDC, FPL, EPL ,interossei.   Sensory: SILT throughout axillary, median, ulnar, and radial nerve distributions   Circulation: fingers warm and well perfused    Lab:  Recent Labs   Lab 05/11/23  0654 05/10/23  1131 05/07/23  1231   WBC 11.8* 8.6 10.6   HGB 10.0* 8.8* 8.2*   * 383 266     Sed Rate   Date Value Ref Range Status   10/14/2005 11 0 - 30 mm/h Final       Assessment: Sri Grewal is a 72 year old female s/p ORIF right distal humerus fracture with intercondylar extension on 5/3/2023 with Dr. Phipps and right reverse TSA with Dr. Krause on 5/5/23.     TODAY TO DOs:  - Limit sedating medications as possible, delirium precautions - patient should be limited with naps during the day, blinds open, lights on during the day, reorient as needed.   - Remove sitter as able   - Ongoing work with OT/PT - should work on elbow ROM daily   - Patient to ambulate with RN per shift protocol - encourage OOB ambulation    - Discharge planning: appreciate assistance in TCU placement   - Will discuss possible transfer to medicine primary as patient has been cleared for discharge from orthopedics perspective      Plan:  Primary: Ortho  Activity: Up with assist.  Weight bearing status: NWB RUE   Antibiotics: Ancef x24 hours perioperatively.  Diet: Regular diet  DVT prophylaxis: Continue Plavix and mechanical while in the hospital    Bracing/Splinting: Sling in place, Posterior long arm splint to be kept clean, dry, and intact until follow-up.   Elevation: Elevate RUE on pillows to keep swelling down as much as possible.  Pain management: transition from IV to orals as tolerated.   X-rays: Completed in PACU   Occupational Therapy: ADL's.  Labs: Hgb check   Consults: OT. Hospitalist, appreciate assistance in caring for this patient.  Follow-up: Dr. Phipps & Dr. Haile's teams, next appointment Monday with Dr. Haile     Future Appointments   Date Time Provider Department Center   6/6/2023  1:00 PM Nathan Turner MD [facility]   8/4/2023  4:45 PM Miryam Mcgowan MD [facility]       Disposition: Pending progress with therapies, pain control on orals, and medical stability, anticipate discharge to TCU when able.     Patient discussed with Dr. Krause.      Louann Hoff MD, PGY1   Orthopaedic Surgery  [phone]

## 2023-05-13 ENCOUNTER — APPOINTMENT (OUTPATIENT)
Dept: OCCUPATIONAL THERAPY | Facility: CLINIC | Age: 73
DRG: 483 | End: 2023-05-13
Attending: STUDENT IN AN ORGANIZED HEALTH CARE EDUCATION/TRAINING PROGRAM
Payer: MEDICARE

## 2023-05-13 LAB
ALBUMIN SERPL BCG-MCNC: 3.1 G/DL (ref 3.5–5.2)
ALP SERPL-CCNC: 88 U/L (ref 35–104)
ALT SERPL W P-5'-P-CCNC: 33 U/L (ref 10–35)
ANION GAP SERPL CALCULATED.3IONS-SCNC: 12 MMOL/L (ref 7–15)
AST SERPL W P-5'-P-CCNC: 26 U/L (ref 10–35)
BASOPHILS # BLD AUTO: 0 10E3/UL (ref 0–0.2)
BASOPHILS NFR BLD AUTO: 0 %
BILIRUB SERPL-MCNC: 0.6 MG/DL
BUN SERPL-MCNC: 8 MG/DL (ref 8–23)
CALCIUM SERPL-MCNC: 8.6 MG/DL (ref 8.8–10.2)
CHLORIDE SERPL-SCNC: 94 MMOL/L (ref 98–107)
CREAT SERPL-MCNC: 0.55 MG/DL (ref 0.51–0.95)
DEPRECATED HCO3 PLAS-SCNC: 23 MMOL/L (ref 22–29)
EOSINOPHIL # BLD AUTO: 0.1 10E3/UL (ref 0–0.7)
EOSINOPHIL NFR BLD AUTO: 2 %
ERYTHROCYTE [DISTWIDTH] IN BLOOD BY AUTOMATED COUNT: 13.9 % (ref 10–15)
GFR SERPL CREATININE-BSD FRML MDRD: >90 ML/MIN/1.73M2
GLUCOSE SERPL-MCNC: 104 MG/DL (ref 70–99)
HCT VFR BLD AUTO: 24.1 % (ref 35–47)
HGB BLD-MCNC: 8.2 G/DL (ref 11.7–15.7)
IMM GRANULOCYTES # BLD: 0.1 10E3/UL
IMM GRANULOCYTES NFR BLD: 1 %
LYMPHOCYTES # BLD AUTO: 1.4 10E3/UL (ref 0.8–5.3)
LYMPHOCYTES NFR BLD AUTO: 21 %
MCH RBC QN AUTO: 30.7 PG (ref 26.5–33)
MCHC RBC AUTO-ENTMCNC: 34 G/DL (ref 31.5–36.5)
MCV RBC AUTO: 90 FL (ref 78–100)
MONOCYTES # BLD AUTO: 1 10E3/UL (ref 0–1.3)
MONOCYTES NFR BLD AUTO: 16 %
NEUTROPHILS # BLD AUTO: 4.1 10E3/UL (ref 1.6–8.3)
NEUTROPHILS NFR BLD AUTO: 60 %
NRBC # BLD AUTO: 0 10E3/UL
NRBC BLD AUTO-RTO: 0 /100
PLATELET # BLD AUTO: 416 10E3/UL (ref 150–450)
POTASSIUM SERPL-SCNC: 3.3 MMOL/L (ref 3.4–5.3)
POTASSIUM SERPL-SCNC: 3.3 MMOL/L (ref 3.4–5.3)
POTASSIUM SERPL-SCNC: 4.1 MMOL/L (ref 3.4–5.3)
PROT SERPL-MCNC: 5.4 G/DL (ref 6.4–8.3)
RBC # BLD AUTO: 2.67 10E6/UL (ref 3.8–5.2)
SODIUM SERPL-SCNC: 129 MMOL/L (ref 136–145)
WBC # BLD AUTO: 6.7 10E3/UL (ref 4–11)

## 2023-05-13 PROCEDURE — 97110 THERAPEUTIC EXERCISES: CPT | Mod: GO

## 2023-05-13 PROCEDURE — 36415 COLL VENOUS BLD VENIPUNCTURE: CPT | Performed by: INTERNAL MEDICINE

## 2023-05-13 PROCEDURE — 250N000013 HC RX MED GY IP 250 OP 250 PS 637

## 2023-05-13 PROCEDURE — 250N000013 HC RX MED GY IP 250 OP 250 PS 637: Performed by: INTERNAL MEDICINE

## 2023-05-13 PROCEDURE — 250N000013 HC RX MED GY IP 250 OP 250 PS 637: Performed by: STUDENT IN AN ORGANIZED HEALTH CARE EDUCATION/TRAINING PROGRAM

## 2023-05-13 PROCEDURE — 80053 COMPREHEN METABOLIC PANEL: CPT | Performed by: INTERNAL MEDICINE

## 2023-05-13 PROCEDURE — 99233 SBSQ HOSP IP/OBS HIGH 50: CPT | Performed by: INTERNAL MEDICINE

## 2023-05-13 PROCEDURE — 120N000002 HC R&B MED SURG/OB UMMC

## 2023-05-13 PROCEDURE — 97535 SELF CARE MNGMENT TRAINING: CPT | Mod: GO

## 2023-05-13 PROCEDURE — 250N000013 HC RX MED GY IP 250 OP 250 PS 637: Performed by: NURSE PRACTITIONER

## 2023-05-13 PROCEDURE — 84132 ASSAY OF SERUM POTASSIUM: CPT | Performed by: INTERNAL MEDICINE

## 2023-05-13 PROCEDURE — 85025 COMPLETE CBC W/AUTO DIFF WBC: CPT | Performed by: INTERNAL MEDICINE

## 2023-05-13 RX ORDER — SODIUM CHLORIDE 1 G/1
1 TABLET ORAL 2 TIMES DAILY WITH MEALS
Status: DISCONTINUED | OUTPATIENT
Start: 2023-05-13 | End: 2023-05-16 | Stop reason: HOSPADM

## 2023-05-13 RX ORDER — POTASSIUM CHLORIDE 1500 MG/1
40 TABLET, EXTENDED RELEASE ORAL ONCE
Status: COMPLETED | OUTPATIENT
Start: 2023-05-13 | End: 2023-05-13

## 2023-05-13 RX ORDER — LABETALOL 100 MG/1
100 TABLET, FILM COATED ORAL ONCE
Status: COMPLETED | OUTPATIENT
Start: 2023-05-13 | End: 2023-05-13

## 2023-05-13 RX ADMIN — CARVEDILOL 12.5 MG: 12.5 TABLET, FILM COATED ORAL at 18:27

## 2023-05-13 RX ADMIN — FUROSEMIDE 40 MG: 20 TABLET ORAL at 09:02

## 2023-05-13 RX ADMIN — AMLODIPINE BESYLATE 10 MG: 10 TABLET ORAL at 09:02

## 2023-05-13 RX ADMIN — POTASSIUM CHLORIDE 40 MEQ: 1500 TABLET, EXTENDED RELEASE ORAL at 04:51

## 2023-05-13 RX ADMIN — ACETAMINOPHEN 650 MG: 325 TABLET ORAL at 13:46

## 2023-05-13 RX ADMIN — HYDROXYZINE HYDROCHLORIDE 50 MG: 50 TABLET, FILM COATED ORAL at 04:03

## 2023-05-13 RX ADMIN — HYDROXYZINE HYDROCHLORIDE 50 MG: 50 TABLET, FILM COATED ORAL at 19:55

## 2023-05-13 RX ADMIN — VENLAFAXINE HYDROCHLORIDE 150 MG: 150 CAPSULE, EXTENDED RELEASE ORAL at 09:03

## 2023-05-13 RX ADMIN — BUPROPION HYDROCHLORIDE 300 MG: 300 TABLET, FILM COATED, EXTENDED RELEASE ORAL at 09:02

## 2023-05-13 RX ADMIN — ACETAMINOPHEN 650 MG: 325 TABLET ORAL at 04:03

## 2023-05-13 RX ADMIN — LABETALOL HYDROCHLORIDE 100 MG: 100 TABLET, FILM COATED ORAL at 23:05

## 2023-05-13 RX ADMIN — ACETAMINOPHEN 650 MG: 325 TABLET ORAL at 22:38

## 2023-05-13 RX ADMIN — LISINOPRIL 40 MG: 20 TABLET ORAL at 09:02

## 2023-05-13 RX ADMIN — DOCUSATE SODIUM AND SENNOSIDES 1 TABLET: 8.6; 5 TABLET ORAL at 19:55

## 2023-05-13 RX ADMIN — ROSUVASTATIN CALCIUM 20 MG: 20 TABLET, FILM COATED ORAL at 09:03

## 2023-05-13 RX ADMIN — DOCUSATE SODIUM AND SENNOSIDES 1 TABLET: 8.6; 5 TABLET ORAL at 09:02

## 2023-05-13 RX ADMIN — CLOPIDOGREL BISULFATE 75 MG: 75 TABLET, FILM COATED ORAL at 09:03

## 2023-05-13 RX ADMIN — ACETAMINOPHEN 650 MG: 325 TABLET ORAL at 18:27

## 2023-05-13 RX ADMIN — POLYETHYLENE GLYCOL 3350 17 G: 17 POWDER, FOR SOLUTION ORAL at 09:02

## 2023-05-13 RX ADMIN — QUETIAPINE FUMARATE 25 MG: 25 TABLET ORAL at 22:38

## 2023-05-13 RX ADMIN — CARVEDILOL 12.5 MG: 12.5 TABLET, FILM COATED ORAL at 09:02

## 2023-05-13 RX ADMIN — SODIUM CHLORIDE TAB 1 GM 1 G: 1 TAB at 18:27

## 2023-05-13 RX ADMIN — ACETAMINOPHEN 650 MG: 325 TABLET ORAL at 09:03

## 2023-05-13 ASSESSMENT — ACTIVITIES OF DAILY LIVING (ADL)
ADLS_ACUITY_SCORE: 43
ADLS_ACUITY_SCORE: 40
ADLS_ACUITY_SCORE: 40
ADLS_ACUITY_SCORE: 43

## 2023-05-13 NOTE — PROGRESS NOTES
[facility]    Medicine Progress Note - Hospitalist Service, GOLD TEAM 16    Date of Admission:  5/3/2023    Assessment & Plan   Sri Grewal is a 72 year old female admitted on 4/30/2023. She has a past medical h/o CAD, NICM (Taksubo), HTN, previous TIA and CVA post angiogram in 6/2021.  She was seen at [facility] ED on 4/25/23 w/ increased difficulty speaking and right sided weakness. Stroke code was called upon pt's arrival to the ED. CT head w/o contrast was negative. CTA head/neck was negative for large vessel occlusion but it did show a L MCA aneurysm which was stable.  She was given TNK in the ED with improvement in her symptoms and admitted to the ICU for close monitoring post TNK. Her speech returned to her baseline.  She was in NSR on tele.  MRI brain was negative for acute stroke. Neurology team felt pt's symptoms were due to either a TIA or aborted stroke by TNK administration. Neurology recommended stopping PTA ASA and started pt on plavix, with plans to continue it indefinitely. She will follow up with neurology in clinic and complete another 28 days Zio-patch.       She has a history of a right proximal humerus fracture that has resulted from a mechanical fall in 2/2023 which has been managed nonoperatively. On 4/29, she had another episode of mechanical fall resulting in acute displaced fracture of the proximal shaft of the right humerus.  She was seen at [facility] ED on 4/30 from where she was transferred to [facility] orthopedic service for definite surgical intervention.  She was discharged to home on 5/1 with plan for patient to be admitted on 5/3 for ORIF since she was on Plavix.  She was advised to hold Plavix until she has her surgery.     Pt was admitted to [place] on 5/3 and underwent ORIF proximal right humerus fracture.  Transferred to [facility] on 5/5 and underwent reverse right TSA, complicated by right-side Ce syndrome from  brachial plexus block.  Pt is currently on surgical hernandez for postop care.  [facility] and postop medical management    #Confusion  -Concerning for delirium. CT head showed: No acute intracranial pathology. Patient with persistent confusion, she was oriented to person. Per report she has not being sleeping well, she received pain med's and had recent surgery.   -Confusion is improving.  No acute events overnight, she had another good night.  Patient slept well.  I had an extensive conversation with patient's significant other and nursing staff this morning. Patient was alert, oriented to person, place and month. She is cooperative and not agitated.   -Continue on Seroquel at bedtime.   -Sleep hygiene.   -Discontinue sitter at bedside.   -Infectious workup negative including UA.      #Acute displaced intra-articular fracture of the right distal humerus extending from the supracondylar region between the condyles and into the lateral humeral condyle, 4/29/23  #Subacute, ununited fracture of the humeral neck 2/2023  Post op care per ortho  - S/p ORIF proximal right humerus fracture 5/3  - S/p  reverse right TSA 5/5  - PT/OT consult  - IS. Bowel regimen.   - Patient will go to TCU once delirium is resolved.     #Postop right side Ce's syndrome  - Due to brachial plexus block per anesthesia  - No clear evidence of right eyelid ptosis, miosis or anhidrosis  - Resolved  - Monitor     #DVT prophylaxis  - PCD      #Recent TIA/CVA, 4/25  #Cerebral aneurysm   - S/p TNK on 4/29/23 at [facility] ED, please see above  - PTA ASA discontinued and patient was started on Plavix on 4/29  - Plavix held 5/2 - 5/3 for above surgery  - Plavix 75 mg daily today reintroduced on 5/4     #Hx CAD , HTN  - EF 65-70%, normal RV function  - Blood pressure remains elevated  - Continue on amlodipine, Coreg, lisinopril and Lasix.  Hydralazine as needed for systolic blood pressure more than  160.     #Right upper pole kidney cyst   - F/u w/ PCP     #Depression   - Venlafaxine 150 mg day and Bupropion 300 mg daily     #Migraine   - PTA prn fiorinal      #Chronic hyponatremia  - Na level is 133->134  -- Na 129. Continue on FR: 1500 ml. Ct lasix. Start sodium tabs.   -- Follow-up BMP in am.      #Acute postop blood loss anemia  #MORENO suspect 2/2 acute anemia. cxr clear. Lungs clear. Oxygenating well on RA> no cough or cp. Cough+ Also likely early pulmonary edema. CT neg for PE. Lung nodule.   - Outpatient follow-up.   - Better now.   - Albuterol inhalers, nebs prn  - Follow-up Hgb. Transfuse for <7.0 or s.s of hypoperfusion.     #Vit D deficiency  - Vit D level: 11. Started on high dose vit D Q 7d.        Diet: Diet  Regular Diet Adult  Fluid restriction 1500 ML FLUID    DVT Prophylaxis: Pneumatic Compression Devices  Merida Catheter: Not present  Lines: None     Cardiac Monitoring: None  Code Status: Full Code      Clinically Significant Risk Factors        # Hypokalemia: Lowest K = 3.2 mmol/L in last 2 days, will replace as needed  # Hyponatremia: Lowest Na = 128 mmol/L in last 2 days, will monitor as appropriate      # Hypoalbuminemia: Lowest albumin = 3.1 g/dL at 5/13/2023  3:22 AM, will monitor as appropriate                   Disposition Plan     Expected Discharge Date: 05/13/2023      Destination: inpatient rehabilitation facility            Julio Silver MD  Hospitalist Service, GOLD TEAM 16  [facility]  Securely message with [employer] (more info)  Text page via [facility] Paging/Directory   See signed in provider for up to date coverage information  ______________________________________________________________________    Interval History     No acute events overnight. Patient had a quite night.   Patient was pleasant this morning.  No other specific complaints.  Mental status almost at baseline per pt's SO.     Physical Exam   Vital Signs: Temp: (!)  96.4  F (35.8  C) Temp src: Oral BP: 130/70 Pulse: 75   Resp: 16 SpO2: 98 % O2 Device: None (Room air)    Weight: 136 lbs 10.96 oz    GENERAL: Alert; no acute distress; well-nourished, room air.  HEENT: Normocephalic; atraumatic; PERRLA; MMM.  CV: RRR; normal S1, S2; no rubs, murmurs, or gallops.  RESP: Lung fields clear to aucultation B/L; no wheezing or crepitations.  GI: Abdomen is soft, nontender, nondistended; no organomegaly; normal bowel sounds.  DERM: Skin is intact; no rash, lesions, or skin breakdown.  NEURO: No focal deficits appreciated; strength & sensorium are grossly intact.  PSYCH: No active hallucinations; affect, insight appear within normal limits.    Medical Decision Making       55 MINUTES SPENT BY ME on the date of service doing chart review, history, exam, documentation & further activities per the note.      Data     I have personally reviewed the following data over the past 24 hrs:    6.7  \   8.2 (L)   / 416     129 (L) 94 (L) 8.0 /  104 (H)   4.1 23 0.55 \       ALT: 33 AST: 26 AP: 88 TBILI: 0.6   ALB: 3.1 (L) TOT PROTEIN: 5.4 (L) LIPASE: N/A       Imaging results reviewed over the past 24 hrs:   No results found for this or any previous visit (from the past 24 hour(s)).

## 2023-05-13 NOTE — PLAN OF CARE
VS: VSS, pt denied CP or SOB.   O2: Room air sat. > 90%.   Output: Voiding adequate amount in the bathroom.    Last BM: 05/13/23 passing gas.    Activity: Up walked in the room.    Skin: Intact except surgical incision.    Pain: PRN tylenol given for for headache.    Neuro: CMS and neuro intact to baseline, pt still confused oriented x 2.    Dressing: CDI.    Diet: Regular tolerating okay.    LDA: None.    Equipment: Personal belongings.    Plan: TBD.   Additional Info: Bedside attendant discontinued, pt on bed alarm and chair alarm.   Family was at the bedside most of the day.

## 2023-05-13 NOTE — PLAN OF CARE
"[id number]  BP (!) 148/81 (BP Location: Left arm)   Pulse 75   Temp (!) 96.7  F (35.9  C) (Oral)   Resp 16   Ht 1.6 m (5' 2.99\")   Wt 62 kg (136 lb 11 oz)   LMP 11/09/2005 (Approximate)   SpO2 95%   BMI 24.22 kg/m       Confused, AOx2. Denies CP, SOB, N/T. ORA.   PRN Tylenol and Atarax given for R shoulder pain. NWB RUE. SB/A1 using GB.. Sling on, extremity wrap CDI. Aquacel dressing dried drainage, marked with no change. Bruising to BUE.      Reg diet, tray set up. 1500mL fluid restriction. Encouraging PO fluid intake. No N/V.  Incont of B/B at times. LBM 5/12, senna given.      Bedside attendant for elopement risk and safety d/t confusion.     RN manage K+ Replaced twice this shift for levels 3.2 and 3.3. Recheck scheduled around 0900. Slept throughout shift. Overall calm and cooperative. No IV access.     Plan: TCU once off 1:1.   "

## 2023-05-14 ENCOUNTER — APPOINTMENT (OUTPATIENT)
Dept: OCCUPATIONAL THERAPY | Facility: CLINIC | Age: 73
DRG: 483 | End: 2023-05-14
Attending: STUDENT IN AN ORGANIZED HEALTH CARE EDUCATION/TRAINING PROGRAM
Payer: MEDICARE

## 2023-05-14 LAB
ALBUMIN SERPL BCG-MCNC: 3.7 G/DL (ref 3.5–5.2)
ALP SERPL-CCNC: 114 U/L (ref 35–104)
ALT SERPL W P-5'-P-CCNC: 35 U/L (ref 10–35)
ANION GAP SERPL CALCULATED.3IONS-SCNC: 11 MMOL/L (ref 7–15)
AST SERPL W P-5'-P-CCNC: 28 U/L (ref 10–35)
BILIRUB SERPL-MCNC: 0.7 MG/DL
BUN SERPL-MCNC: 6.1 MG/DL (ref 8–23)
CALCIUM SERPL-MCNC: 9.2 MG/DL (ref 8.8–10.2)
CHLORIDE SERPL-SCNC: 95 MMOL/L (ref 98–107)
CREAT SERPL-MCNC: 0.52 MG/DL (ref 0.51–0.95)
DEPRECATED HCO3 PLAS-SCNC: 24 MMOL/L (ref 22–29)
ERYTHROCYTE [DISTWIDTH] IN BLOOD BY AUTOMATED COUNT: 13.8 % (ref 10–15)
GFR SERPL CREATININE-BSD FRML MDRD: >90 ML/MIN/1.73M2
GLUCOSE SERPL-MCNC: 107 MG/DL (ref 70–99)
HCT VFR BLD AUTO: 28.7 % (ref 35–47)
HGB BLD-MCNC: 9.7 G/DL (ref 11.7–15.7)
MCH RBC QN AUTO: 30.7 PG (ref 26.5–33)
MCHC RBC AUTO-ENTMCNC: 33.8 G/DL (ref 31.5–36.5)
MCV RBC AUTO: 91 FL (ref 78–100)
PLATELET # BLD AUTO: 530 10E3/UL (ref 150–450)
POTASSIUM SERPL-SCNC: 3.8 MMOL/L (ref 3.4–5.3)
PROT SERPL-MCNC: 6.6 G/DL (ref 6.4–8.3)
RBC # BLD AUTO: 3.16 10E6/UL (ref 3.8–5.2)
SODIUM SERPL-SCNC: 130 MMOL/L (ref 136–145)
WBC # BLD AUTO: 8.6 10E3/UL (ref 4–11)

## 2023-05-14 PROCEDURE — 80053 COMPREHEN METABOLIC PANEL: CPT | Performed by: INTERNAL MEDICINE

## 2023-05-14 PROCEDURE — 250N000013 HC RX MED GY IP 250 OP 250 PS 637: Performed by: INTERNAL MEDICINE

## 2023-05-14 PROCEDURE — 250N000013 HC RX MED GY IP 250 OP 250 PS 637: Performed by: STUDENT IN AN ORGANIZED HEALTH CARE EDUCATION/TRAINING PROGRAM

## 2023-05-14 PROCEDURE — 250N000013 HC RX MED GY IP 250 OP 250 PS 637: Performed by: NURSE PRACTITIONER

## 2023-05-14 PROCEDURE — 120N000002 HC R&B MED SURG/OB UMMC

## 2023-05-14 PROCEDURE — 250N000013 HC RX MED GY IP 250 OP 250 PS 637

## 2023-05-14 PROCEDURE — 97110 THERAPEUTIC EXERCISES: CPT | Mod: GO

## 2023-05-14 PROCEDURE — 99232 SBSQ HOSP IP/OBS MODERATE 35: CPT | Performed by: INTERNAL MEDICINE

## 2023-05-14 PROCEDURE — 85027 COMPLETE CBC AUTOMATED: CPT | Performed by: INTERNAL MEDICINE

## 2023-05-14 PROCEDURE — 36415 COLL VENOUS BLD VENIPUNCTURE: CPT | Performed by: INTERNAL MEDICINE

## 2023-05-14 RX ORDER — QUETIAPINE FUMARATE 50 MG/1
50 TABLET, FILM COATED ORAL AT BEDTIME
Status: DISCONTINUED | OUTPATIENT
Start: 2023-05-14 | End: 2023-05-15

## 2023-05-14 RX ORDER — QUETIAPINE FUMARATE 25 MG/1
25 TABLET, FILM COATED ORAL 2 TIMES DAILY PRN
Status: DISCONTINUED | OUTPATIENT
Start: 2023-05-14 | End: 2023-05-14

## 2023-05-14 RX ADMIN — BUPROPION HYDROCHLORIDE 300 MG: 300 TABLET, FILM COATED, EXTENDED RELEASE ORAL at 07:57

## 2023-05-14 RX ADMIN — ACETAMINOPHEN 650 MG: 325 TABLET ORAL at 02:58

## 2023-05-14 RX ADMIN — ACETAMINOPHEN 650 MG: 325 TABLET ORAL at 07:58

## 2023-05-14 RX ADMIN — ROSUVASTATIN CALCIUM 20 MG: 20 TABLET, FILM COATED ORAL at 07:57

## 2023-05-14 RX ADMIN — VENLAFAXINE HYDROCHLORIDE 150 MG: 150 CAPSULE, EXTENDED RELEASE ORAL at 07:57

## 2023-05-14 RX ADMIN — Medication 5 MG: at 02:58

## 2023-05-14 RX ADMIN — ACETAMINOPHEN 650 MG: 325 TABLET ORAL at 21:29

## 2023-05-14 RX ADMIN — CARVEDILOL 12.5 MG: 12.5 TABLET, FILM COATED ORAL at 07:57

## 2023-05-14 RX ADMIN — DOCUSATE SODIUM AND SENNOSIDES 1 TABLET: 8.6; 5 TABLET ORAL at 07:57

## 2023-05-14 RX ADMIN — FUROSEMIDE 40 MG: 20 TABLET ORAL at 07:57

## 2023-05-14 RX ADMIN — SODIUM CHLORIDE TAB 1 GM 1 G: 1 TAB at 17:01

## 2023-05-14 RX ADMIN — SODIUM CHLORIDE TAB 1 GM 1 G: 1 TAB at 07:57

## 2023-05-14 RX ADMIN — QUETIAPINE FUMARATE 50 MG: 50 TABLET ORAL at 19:56

## 2023-05-14 RX ADMIN — CLOPIDOGREL BISULFATE 75 MG: 75 TABLET, FILM COATED ORAL at 07:57

## 2023-05-14 RX ADMIN — METHOCARBAMOL 500 MG: 500 TABLET ORAL at 20:09

## 2023-05-14 RX ADMIN — AMLODIPINE BESYLATE 10 MG: 10 TABLET ORAL at 07:58

## 2023-05-14 RX ADMIN — Medication 5 MG: at 19:57

## 2023-05-14 RX ADMIN — HYDROXYZINE HYDROCHLORIDE 50 MG: 50 TABLET, FILM COATED ORAL at 21:29

## 2023-05-14 RX ADMIN — DOCUSATE SODIUM AND SENNOSIDES 1 TABLET: 8.6; 5 TABLET ORAL at 19:56

## 2023-05-14 RX ADMIN — ACETAMINOPHEN 650 MG: 325 TABLET ORAL at 12:00

## 2023-05-14 RX ADMIN — ACETAMINOPHEN 650 MG: 325 TABLET ORAL at 17:01

## 2023-05-14 RX ADMIN — LISINOPRIL 40 MG: 20 TABLET ORAL at 07:57

## 2023-05-14 RX ADMIN — CARVEDILOL 12.5 MG: 12.5 TABLET, FILM COATED ORAL at 17:01

## 2023-05-14 ASSESSMENT — ACTIVITIES OF DAILY LIVING (ADL)
ADLS_ACUITY_SCORE: 43
ADLS_ACUITY_SCORE: 42
ADLS_ACUITY_SCORE: 42
ADLS_ACUITY_SCORE: 38
ADLS_ACUITY_SCORE: 42
ADLS_ACUITY_SCORE: 42
ADLS_ACUITY_SCORE: 43
ADLS_ACUITY_SCORE: 42
ADLS_ACUITY_SCORE: 42
ADLS_ACUITY_SCORE: 38

## 2023-05-14 NOTE — PLAN OF CARE
MD paged:   M.F - 5 ORTHO  Pt c/o steady dull HA 6/10, light sensitivity. SBP 170s. No IV access. Thanks Alston'ad #[id number]    PRN Tylenol administered for pain. 1x dose Labetalol 100mg ordered and given.

## 2023-05-14 NOTE — PLAN OF CARE
Goal Outcome Evaluation:      Plan of Care Reviewed With: patient    Overall Patient Progress: no changeOverall Patient Progress: no change    Outcome Evaluation: Awaiting discharge, 1:1 sitter discontinued yesterday. Bed alarm and chair alarm in use when up out of bed. Pt alert and oriented this AM but does have intermittent confusion that appears worse in evenings.      VS: VSS ex BP elevated, improved slightly in afternoon after BP meds given. Parameters for PRN hydralazine not met.  Temp: (!) 96.6  F (35.9  C) Temp src: Oral BP: (!) 156/81 Pulse: 80   Resp: 16 SpO2: 98 % O2 Device: None (Room air)     O2: >90% on RA, denies SOB or CP. LSCTA   Output: Voiding without difficulty in BR, incontinent at times   Last BM: LBM 5/14, incontinent episode.    Activity: Up with SBA and cane, up to bathroom and hallway. NWB to RUE. Sling in place. Ambulated >200ft in hallway today. OT seen at bedside for arm/hand/wrist exercises.    Skin: Skin intact ex for incision to RUE   Pain: Pain to RUE and headache managed with PRN tylenol q4h   CMS: Intact ex for some residual swelling to RUE, radial pulse +2 bilaterally.    Dressing: RUE CDI   Diet: Regular diet, denies N/V. Fluid restriction of 1500mL   LDA: No IV access   Equipment: Personal belongings, sling   Plan: Discharge TBD to TCU placement, CC/SW following. Follow up tomorrow     Additional Info: 1:1 discontinued 5/13 to help with TCU placement.   Seroquel increased at bedtime to aide in sleep, please also give atarax PRN. Pt confusion is less when she gets good rest.   Utilize bed alarm and chair alarm

## 2023-05-14 NOTE — PLAN OF CARE
"[id number]  BP (!) 153/78   Pulse 74   Temp 97.4  F (36.3  C) (Oral)   Resp 16   Ht 1.6 m (5' 2.99\")   Wt 62 kg (136 lb 11 oz)   LMP 11/09/2005 (Approximate)   SpO2 100%   BMI 24.22 kg/m       AOx4 in the evening. More confused overnight. Easily redirectable. Pleasant and cooperative. Denies CP, SOB, N/T. ORA.     Reporting moderate dull HA and R shoulder pain, PRN Tylenol and Atarax given. Aromatherapy sticks utilized.  NWB RUE. SB/A1 using GB, uses personal cane at times. Sling on, extremity wrap CDI. Aquacel dressing dried drainage. Bruising to BUE.       Reg diet, needs assistance ordering. 1500mL fluid restriction. Encouraging PO fluid intake. No N/V.  Incont of B/B at times. LBM 5/13, senna given.      RN manage K+ WNL. No IV access.     Bed/chair alarm on for safety, triggered a few times. Pt able to make needs known via call light at times. Slept OK. Care ongoing.      Plan: 1:1 discontinued on prev shift. Plan to discharge to TCU possibly Monday.   "

## 2023-05-14 NOTE — PROGRESS NOTES
[facility]    Medicine Progress Note - Hospitalist Service, GOLD TEAM 16    Date of Admission:  5/3/2023    Assessment & Plan   Sri Grweal is a 72 year old female admitted on 4/30/2023. She has a past medical h/o CAD, NICM (Taksubo), HTN, previous TIA and CVA post angiogram in 6/2021.  She was seen at [facility] ED on 4/25/23 w/ increased difficulty speaking and right sided weakness. Stroke code was called upon pt's arrival to the ED. CT head w/o contrast was negative. CTA head/neck was negative for large vessel occlusion but it did show a L MCA aneurysm which was stable.  She was given TNK in the ED with improvement in her symptoms and admitted to the ICU for close monitoring post TNK. Her speech returned to her baseline.  She was in NSR on tele.  MRI brain was negative for acute stroke. Neurology team felt pt's symptoms were due to either a TIA or aborted stroke by TNK administration. Neurology recommended stopping PTA ASA and started pt on plavix, with plans to continue it indefinitely. She will follow up with neurology in clinic and complete another 28 days Zio-patch.       She has a history of a right proximal humerus fracture that has resulted from a mechanical fall in 2/2023 which has been managed nonoperatively. On 4/29, she had another episode of mechanical fall resulting in acute displaced fracture of the proximal shaft of the right humerus.  She was seen at [facility] ED on 4/30 from where she was transferred to [facility] orthopedic service for definite surgical intervention.  She was discharged to home on 5/1 with plan for patient to be admitted on 5/3 for ORIF since she was on Plavix.  She was advised to hold Plavix until she has her surgery.     Pt was admitted to [place] on 5/3 and underwent ORIF proximal right humerus fracture.  Transferred to [facility] on 5/5 and underwent reverse right TSA, complicated by right-side Ce syndrome from  brachial plexus block.  Pt is currently on surgical hernandez for postop care.  [facility] service consulted for preop eval and postop medical management    Today's changes: 5/14/2023  Family at bedside.  Overall doing better.  Did have increased confusion /hallucinations last night.  More calm, interactive this morning.  Denies any hallucinations currently.  Changes:  -Increase Seroquel at bedtime 50 mg at bedtime  -As needed Seroquel 2.5 mg for increased agitation, sleep.  -Consider psychiatry consultation tomorrow if confusion persist.  -No other changes.      #Acute encephalopathy, suspect toxic metabolic-   -Concerning for delirium. CT head showed: No acute intracranial pathology. Patient with persistent confusion, she was oriented to person. Per report she has not being sleeping well, she received pain med's and had recent surgery.   -Confusion is improving.  No acute events overnight, she had another good night.  Patient slept well.  I had an extensive conversation with patient's significant other and nursing staff this morning. Patient was alert, oriented to person, place and month. She is cooperative and not agitated.   -Continue on Seroquel at bedtime.   -Sleep hygiene.   -Discontinue sitter at bedside.   -Infectious workup negative including UA.      #Acute displaced intra-articular fracture of the right distal humerus extending from the supracondylar region between the condyles and into the lateral humeral condyle, 4/29/23  #Subacute, ununited fracture of the humeral neck 2/2023  Post op care per ortho  - S/p ORIF proximal right humerus fracture 5/3  - S/p  reverse right TSA 5/5  - PT/OT consult  - IS. Bowel regimen.   - Patient will go to TCU once delirium is resolved.     #Postop right side Ce's syndrome  - Due to brachial plexus block per anesthesia  - No clear evidence of right eyelid ptosis, miosis or anhidrosis  - Resolved  - Monitor     #DVT prophylaxis  - PCD      #Recent TIA/CVA,  4/25  #Cerebral aneurysm   - S/p TNK on 4/29/23 at [facility] ED, please see above  - PTA ASA discontinued and patient was started on Plavix on 4/29  - Plavix held 5/2 - 5/3 for above surgery  - Plavix 75 mg daily today reintroduced on 5/4     #Hx CAD , HTN  - EF 65-70%, normal RV function  - Blood pressure remains elevated  - Continue on amlodipine, Coreg, lisinopril and Lasix.  Hydralazine as needed for systolic blood pressure more than 160.     #Right upper pole kidney cyst   - F/u w/ PCP     #Depression   - Venlafaxine 150 mg day and Bupropion 300 mg daily     #Migraine   - PTA prn fiorinal      #Chronic hyponatremia  - Na level is 133->134  -- Na 129. Continue on FR: 1500 ml. Ct lasix. Start sodium tabs.   -- Follow-up BMP in am.      #Acute postop blood loss anemia  #MORENO suspect 2/2 acute anemia. cxr clear. Lungs clear. Oxygenating well on RA> no cough or cp. Cough+ Also likely early pulmonary edema. CT neg for PE. Lung nodule.   - Outpatient follow-up.   - Better now.   - Albuterol inhalers, nebs prn  - Follow-up Hgb. Transfuse for <7.0 or s.s of hypoperfusion.     #Vit D deficiency  - Vit D level: 11. Started on high dose vit D Q 7d.        Diet: Diet  Regular Diet Adult  Fluid restriction 1500 ML FLUID  Diet    DVT Prophylaxis: Pneumatic Compression Devices  Merida Catheter: Not present  Lines: None     Cardiac Monitoring: None  Code Status: Full Code      Clinically Significant Risk Factors        # Hypokalemia: Lowest K = 3.3 mmol/L in last 2 days, will replace as needed  # Hyponatremia: Lowest Na = 129 mmol/L in last 2 days, will monitor as appropriate      # Hypoalbuminemia: Lowest albumin = 3.1 g/dL at 5/13/2023  3:22 AM, will monitor as appropriate                   Disposition Plan      Expected Discharge Date: 05/15/2023,  3:00 PM    Destination: inpatient rehabilitation facility            Justo Sinclair MD  Hospitalist Service, GOLD TEAM 16  [facility]  Securely message with mktg (more info)  Text page via [employer] Paging/Directory   See signed in provider for up to date coverage information  ______________________________________________________________________    Interval History     Increased confusion ON  Alert, calm, this am.   Family at bedside.   No other specific complaints.  Mental status almost at baseline per pt's SO.   No fever or cough or cp or sob. No NV.     Physical Exam   Vital Signs: Temp: (!) 96.4  F (35.8  C) Temp src: Oral BP: (!) 179/87 Pulse: 81   Resp: 16 SpO2: 100 % O2 Device: None (Room air)    Weight: 136 lbs 10.96 oz      General Appearance: Awake, interactive, NAD  Respiratory: Normal work of breathing. On RA.   Cardiovascular: RRR  GI: non distended.   Extremities: No pedal edema  Skin: No acute rash on exposed areas.   Neuro: Grossly non focal.   Others: Stable mood.       Medical Decision Making     45 MINUTES SPENT BY ME on the date of service doing chart review, history, exam, documentation & further activities per the note.      Data     I have personally reviewed the following data over the past 24 hrs:    8.6  \   9.7 (L)   / 530 (H)     130 (L) 95 (L) 6.1 (L) /  107 (H)   3.8 24 0.52 \       ALT: 35 AST: 28 AP: 114 (H) TBILI: 0.7   ALB: 3.7 TOT PROTEIN: 6.6 LIPASE: N/A       Imaging results reviewed over the past 24 hrs:   No results found for this or any previous visit (from the past 24 hour(s)).

## 2023-05-15 ENCOUNTER — APPOINTMENT (OUTPATIENT)
Dept: GENERAL RADIOLOGY | Facility: CLINIC | Age: 73
DRG: 483 | End: 2023-05-15
Attending: CLINICAL NURSE SPECIALIST
Payer: MEDICARE

## 2023-05-15 ENCOUNTER — APPOINTMENT (OUTPATIENT)
Dept: PHYSICAL THERAPY | Facility: CLINIC | Age: 73
DRG: 483 | End: 2023-05-15
Attending: STUDENT IN AN ORGANIZED HEALTH CARE EDUCATION/TRAINING PROGRAM
Payer: MEDICARE

## 2023-05-15 PROCEDURE — 250N000013 HC RX MED GY IP 250 OP 250 PS 637: Performed by: INTERNAL MEDICINE

## 2023-05-15 PROCEDURE — 250N000013 HC RX MED GY IP 250 OP 250 PS 637: Performed by: NURSE PRACTITIONER

## 2023-05-15 PROCEDURE — 99222 1ST HOSP IP/OBS MODERATE 55: CPT | Performed by: PSYCHIATRY & NEUROLOGY

## 2023-05-15 PROCEDURE — 258N000003 HC RX IP 258 OP 636

## 2023-05-15 PROCEDURE — 93010 ELECTROCARDIOGRAM REPORT: CPT | Performed by: INTERNAL MEDICINE

## 2023-05-15 PROCEDURE — 97110 THERAPEUTIC EXERCISES: CPT | Mod: GP

## 2023-05-15 PROCEDURE — 73080 X-RAY EXAM OF ELBOW: CPT | Mod: RT

## 2023-05-15 PROCEDURE — 73030 X-RAY EXAM OF SHOULDER: CPT | Mod: RT

## 2023-05-15 PROCEDURE — 250N000013 HC RX MED GY IP 250 OP 250 PS 637: Performed by: STUDENT IN AN ORGANIZED HEALTH CARE EDUCATION/TRAINING PROGRAM

## 2023-05-15 PROCEDURE — 97116 GAIT TRAINING THERAPY: CPT | Mod: GP

## 2023-05-15 PROCEDURE — 120N000002 HC R&B MED SURG/OB UMMC

## 2023-05-15 PROCEDURE — 93005 ELECTROCARDIOGRAM TRACING: CPT

## 2023-05-15 PROCEDURE — 99232 SBSQ HOSP IP/OBS MODERATE 35: CPT | Performed by: INTERNAL MEDICINE

## 2023-05-15 PROCEDURE — 250N000013 HC RX MED GY IP 250 OP 250 PS 637

## 2023-05-15 RX ORDER — SODIUM CHLORIDE 1 G/1
1 TABLET ORAL 2 TIMES DAILY WITH MEALS
DISCHARGE
Start: 2023-05-15 | End: 2023-05-16

## 2023-05-15 RX ORDER — HYDRALAZINE HYDROCHLORIDE 25 MG/1
25 TABLET, FILM COATED ORAL EVERY 8 HOURS
DISCHARGE
Start: 2023-05-15 | End: 2023-05-16

## 2023-05-15 RX ORDER — HYDRALAZINE HYDROCHLORIDE 25 MG/1
25 TABLET, FILM COATED ORAL EVERY 8 HOURS SCHEDULED
Status: DISCONTINUED | OUTPATIENT
Start: 2023-05-15 | End: 2023-05-16 | Stop reason: HOSPADM

## 2023-05-15 RX ORDER — HYDRALAZINE HYDROCHLORIDE 10 MG/1
10 TABLET, FILM COATED ORAL EVERY 8 HOURS PRN
Status: DISCONTINUED | OUTPATIENT
Start: 2023-05-15 | End: 2023-05-15

## 2023-05-15 RX ORDER — SODIUM CHLORIDE, SODIUM LACTATE, POTASSIUM CHLORIDE, CALCIUM CHLORIDE 600; 310; 30; 20 MG/100ML; MG/100ML; MG/100ML; MG/100ML
INJECTION, SOLUTION INTRAVENOUS
Status: COMPLETED
Start: 2023-05-15 | End: 2023-05-15

## 2023-05-15 RX ORDER — AMLODIPINE BESYLATE 10 MG/1
10 TABLET ORAL DAILY
DISCHARGE
Start: 2023-05-15 | End: 2023-05-16

## 2023-05-15 RX ORDER — QUETIAPINE FUMARATE 25 MG/1
75 TABLET, FILM COATED ORAL AT BEDTIME
DISCHARGE
Start: 2023-05-15 | End: 2023-05-16

## 2023-05-15 RX ORDER — POLYETHYLENE GLYCOL 3350 17 G/17G
17 POWDER, FOR SOLUTION ORAL DAILY PRN
Status: DISCONTINUED | OUTPATIENT
Start: 2023-05-15 | End: 2023-05-16 | Stop reason: HOSPADM

## 2023-05-15 RX ADMIN — SODIUM CHLORIDE TAB 1 GM 1 G: 1 TAB at 09:06

## 2023-05-15 RX ADMIN — VENLAFAXINE HYDROCHLORIDE 150 MG: 150 CAPSULE, EXTENDED RELEASE ORAL at 09:05

## 2023-05-15 RX ADMIN — ROSUVASTATIN CALCIUM 20 MG: 20 TABLET, FILM COATED ORAL at 09:06

## 2023-05-15 RX ADMIN — HYDRALAZINE HYDROCHLORIDE 10 MG: 10 TABLET ORAL at 02:44

## 2023-05-15 RX ADMIN — METHOCARBAMOL 500 MG: 500 TABLET ORAL at 19:40

## 2023-05-15 RX ADMIN — ACETAMINOPHEN 650 MG: 325 TABLET ORAL at 05:50

## 2023-05-15 RX ADMIN — DOCUSATE SODIUM AND SENNOSIDES 1 TABLET: 8.6; 5 TABLET ORAL at 19:40

## 2023-05-15 RX ADMIN — SODIUM CHLORIDE TAB 1 GM 1 G: 1 TAB at 17:52

## 2023-05-15 RX ADMIN — QUETIAPINE FUMARATE 75 MG: 50 TABLET ORAL at 21:35

## 2023-05-15 RX ADMIN — CLOPIDOGREL BISULFATE 75 MG: 75 TABLET, FILM COATED ORAL at 09:06

## 2023-05-15 RX ADMIN — HYDRALAZINE HYDROCHLORIDE 25 MG: 25 TABLET ORAL at 21:35

## 2023-05-15 RX ADMIN — LISINOPRIL 40 MG: 20 TABLET ORAL at 05:50

## 2023-05-15 RX ADMIN — ERGOCALCIFEROL 50000 UNITS: 1.25 CAPSULE, LIQUID FILLED ORAL at 15:53

## 2023-05-15 RX ADMIN — SODIUM CHLORIDE, POTASSIUM CHLORIDE, SODIUM LACTATE AND CALCIUM CHLORIDE: 600; 310; 30; 20 INJECTION, SOLUTION INTRAVENOUS at 18:20

## 2023-05-15 RX ADMIN — AMLODIPINE BESYLATE 10 MG: 10 TABLET ORAL at 05:50

## 2023-05-15 RX ADMIN — DOCUSATE SODIUM AND SENNOSIDES 1 TABLET: 8.6; 5 TABLET ORAL at 09:06

## 2023-05-15 RX ADMIN — ACETAMINOPHEN 650 MG: 325 TABLET ORAL at 11:40

## 2023-05-15 RX ADMIN — ACETAMINOPHEN 650 MG: 325 TABLET ORAL at 19:40

## 2023-05-15 RX ADMIN — BUPROPION HYDROCHLORIDE 300 MG: 300 TABLET, FILM COATED, EXTENDED RELEASE ORAL at 09:06

## 2023-05-15 RX ADMIN — Medication 5 MG: at 21:35

## 2023-05-15 RX ADMIN — HYDRALAZINE HYDROCHLORIDE 25 MG: 25 TABLET ORAL at 13:15

## 2023-05-15 RX ADMIN — ACETAMINOPHEN 650 MG: 325 TABLET ORAL at 01:27

## 2023-05-15 RX ADMIN — FUROSEMIDE 40 MG: 20 TABLET ORAL at 09:06

## 2023-05-15 RX ADMIN — CARVEDILOL 12.5 MG: 12.5 TABLET, FILM COATED ORAL at 05:50

## 2023-05-15 RX ADMIN — CARVEDILOL 12.5 MG: 12.5 TABLET, FILM COATED ORAL at 17:52

## 2023-05-15 RX ADMIN — HYDROXYZINE HYDROCHLORIDE 50 MG: 50 TABLET, FILM COATED ORAL at 19:40

## 2023-05-15 ASSESSMENT — ACTIVITIES OF DAILY LIVING (ADL)
ADLS_ACUITY_SCORE: 38

## 2023-05-15 NOTE — PROGRESS NOTES
Care Management Follow Up    Length of Stay (days): 10    Expected Discharge Date: 05/16/2023     Concerns to be Addressed: discharge planning     Patient plan of care discussed at interdisciplinary rounds: Yes    Anticipated Discharge Disposition: Transitional Care     Anticipated Discharge Services: None  Anticipated Discharge DME:  (TBD)    Patient/family educated on Medicare website which has current facility and service quality ratings: yes  Education Provided on the Discharge Plan: Yes  Patient/Family in Agreement with the Plan: yes    Additional Information:  Patient has been accepted to:    [facility] [address] [place] [address]  Phone: [phone]    Plan to increase patient's seroquel this evening and plan on discharge to TCU on 5/16/2023. RNCC will continue to follow.    Caroline Buchanan RN, BSN  Care Coordinator, 5 Ortho  Phone [phone]  Pager [phone]

## 2023-05-15 NOTE — PROGRESS NOTES
[facility]    Medicine Progress Note - Hospitalist Service, GOLD TEAM 16    Date of Admission:  5/3/2023    Assessment & Plan   Sri Grewal is a 72 year old female admitted on 4/30/2023. She has a past medical h/o CAD, NICM (Taksubo), HTN, previous TIA and CVA post angiogram in 6/2021.  She was seen at [facility] ED on 4/25/23 w/ increased difficulty speaking and right sided weakness. Stroke code was called upon pt's arrival to the ED. CT head w/o contrast was negative. CTA head/neck was negative for large vessel occlusion but it did show a L MCA aneurysm which was stable.  She was given TNK in the ED with improvement in her symptoms and admitted to the ICU for close monitoring post TNK. Her speech returned to her baseline.  She was in NSR on tele.  MRI brain was negative for acute stroke. Neurology team felt pt's symptoms were due to either a TIA or aborted stroke by TNK administration. Neurology recommended stopping PTA ASA and started pt on plavix, with plans to continue it indefinitely. She will follow up with neurology in clinic and complete another 28 days Zio-patch.       She has a history of a right proximal humerus fracture that has resulted from a mechanical fall in 2/2023 which has been managed nonoperatively. On 4/29, she had another episode of mechanical fall resulting in acute displaced fracture of the proximal shaft of the right humerus.  She was seen at [facility] ED on 4/30 from where she was transferred to [facility] orthopedic service for definite surgical intervention.  She was discharged to home on 5/1 with plan for patient to be admitted on 5/3 for ORIF since she was on Plavix.  She was advised to hold Plavix until she has her surgery.     Pt was admitted to [place] on 5/3 and underwent ORIF proximal right humerus fracture.  Transferred to [facility] on 5/5 and underwent reverse right TSA, complicated by right-side Ce syndrome from  brachial plexus block.  Pt is currently on surgical hernandez for postop care.  [facility] for preop eval and postop medical management.   After surgery patient developed persistent confusion and episodes of agitation, infectious work-up was negative and it was considered that the patient had delirium.  Patient was a started on Seroquel at bedtime and delirium has been improving, no more episodes of agitation.  Psychiatry evaluated the patient today and also agreed with the diagnosis of delirium.    Today's changes: 5/15/2023  - No acute events overnight.  Patient was pleasant this morning.  Psychiatry evaluated the patient and recommended to increase Seroquel to 75 mg at bedtime.  New EKG was ordered checked QTc.  - If patient remains stable and does not have further episodes of confusion she can be discharged to TCU tomorrow.  - All other review of system negative.  - Patient continues with symptomatic hypertension, I order schedule hydralazine, continue monitoring blood pressure.      #Acute encephalopathy - Delirium.   -Concerning for delirium. CT head showed: No acute intracranial pathology. Patient with persistent confusion, she was oriented to person. Per report she has not being sleeping well, she received pain med's and had recent surgery.   -Confusion is improving.  No acute events overnight, she had another good night.  Patient slept well.  I had an extensive conversation with patient's significant other and nursing staff this morning. Patient was alert, oriented to person, place and month. She is cooperative and not agitated.   -Continue on Seroquel at bedtime.   -Sleep hygiene.   -Discontinue sitter at bedside.   -Infectious workup negative including UA.   -Psychiatry consult.      #Acute displaced intra-articular fracture of the right distal humerus extending from the supracondylar region between the condyles and into the lateral humeral condyle, 4/29/23  #Subacute, ununited  fracture of the humeral neck 2/2023  Post op care per ortho  - S/p ORIF proximal right humerus fracture 5/3  - S/p  reverse right TSA 5/5  - PT/OT consult  - IS. Bowel regimen.   - Patient will go to TCU once delirium is resolved.     #Postop right side Ce's syndrome  - Due to brachial plexus block per anesthesia  - No clear evidence of right eyelid ptosis, miosis or anhidrosis  - Resolved  - Monitor     #DVT prophylaxis  - PCD      #Recent TIA/CVA, 4/25  #Cerebral aneurysm   - S/p TNK on 4/29/23 at [facility] ED, please see above  - PTA ASA discontinued and patient was started on Plavix on 4/29  - Plavix held 5/2 - 5/3 for above surgery  - Plavix 75 mg daily today reintroduced on 5/4     #Hx CAD , HTN  - EF 65-70%, normal RV function  - Blood pressure remains elevated  - Continue on amlodipine, Coreg, lisinopril and Lasix.  Hydralazine as needed for systolic blood pressure more than 160.     #Right upper pole kidney cyst   - F/u w/ PCP     #Depression   - Venlafaxine 150 mg day and Bupropion 300 mg daily     #Migraine   - PTA prn fiorinal      #Chronic hyponatremia  - Na level is 133->134  -- Na 130. Continue on FR: 1500 ml. Ct lasix. Continue on sodium tabs.   -- Follow-up BMP in am.      #Acute postop blood loss anemia  #MORENO suspect 2/2 acute anemia. cxr clear. Lungs clear. Oxygenating well on RA> no cough or cp. Cough+ Also likely early pulmonary edema. CT neg for PE. Lung nodule.   - Outpatient follow-up.   - Better now.   - Albuterol inhalers, nebs prn  - Follow-up Hgb. Transfuse for <7.0 or s.s of hypoperfusion.     #Vit D deficiency  - Vit D level: 11. Started on high dose vit D Q 7d.        Diet: Diet  Regular Diet Adult  Fluid restriction 1500 ML FLUID  Diet    DVT Prophylaxis: Pneumatic Compression Devices  Merida Catheter: Not present  Lines: None     Cardiac Monitoring: None  Code Status: Full Code      Clinically Significant Risk Factors              # Hypoalbuminemia: Lowest albumin = 3.1 g/dL  at 5/13/2023  3:22 AM, will monitor as appropriate                   Disposition Plan      Expected Discharge Date: 05/16/2023,  3:00 PM    Destination: inpatient rehabilitation facility  Discharge Comments: RACIEL Silver MD  Hospitalist Service, GOLD TEAM 16  [facility]  Securely message with [employer] (more info)  Text page via [employer] Paging/Directory   See signed in provider for up to date coverage information  ______________________________________________________________________    Interval History     No acute events overnight.   She was pleasant.   Denies headache, chest pain, palpitations, shortness of breath, nausea or vomit.       Physical Exam   Vital Signs: Temp: (!) 96.1  F (35.6  C) Temp src: Oral BP: (!) 156/83 Pulse: 81   Resp: 16 SpO2: 98 % O2 Device: None (Room air)    Weight: 136 lbs 10.96 oz  General Appearance: Awake, interactive, NAD  Respiratory: Normal work of breathing. On RA.   Cardiovascular: RRR  Extremities: No pedal edema  Neuro: Alert, oriented. Speech normal, no facial droop. Grossly non focal.   Others: Stable mood.       Medical Decision Making     45 MINUTES SPENT BY ME on the date of service doing chart review, history, exam, documentation & further activities per the note.      Data         Imaging results reviewed over the past 24 hrs:   Recent Results (from the past 24 hour(s))   XR Shoulder Right G/E 3 Views    Narrative    EXAM: XR SHOULDER RIGHT G/E 3 VIEWS  LOCATION: [facility]  DATE/TIME: 5/15/2023 10:45 AM CDT    INDICATION: Right reverse TSA with Dr. Krause on 5/5/2023.  COMPARISON: None.      Impression    IMPRESSION:   Postoperative change of reverse total shoulder arthroplasty. Heterotopic ossification about the shoulder joint. There is cortical irregularity along the proximal aspect of the diaphyseal shaft of the humerus which could represent some reactive  osteolysis   although this is unlikely to represent an overall loosening of the humeral endoprosthesis. No dislocation at the level of the joint. The right clavicle is negative. Surgical drain in place.   XR Elbow Right G/E 3 Views    Narrative    EXAM: XR ELBOW RIGHT G/E 3 VIEWS  LOCATION: [facility]  DATE/TIME: 5/15/2023 10:47 AM CDT    INDICATION: S/p ORIF right distal humerus fracture with intercondylar extension on 5/3/2023.  COMPARISON: None.      Impression    IMPRESSION:   Postoperative changes of plate and screw fixation of the distal humerus. Additional plate and screw fixation of the proximal ulna. There is a fracture fragment from the proximal ulna which is translated proximally and is seen proximal to the fixation   plate. This extends into the articular margin of the elbow joint. There is some amorphous calcification seen along the anterior aspect of the joint which could represent developing heterotopic calcification.

## 2023-05-15 NOTE — PLAN OF CARE
"  VS: BP (!) 167/86   Pulse 82   Temp (!) 96.6  F (35.9  C) (Oral)   Resp 16   Ht 1.6 m (5' 2.99\")   Wt 62 kg (136 lb 11 oz)   LMP 11/09/2005 (Approximate)   SpO2 100%   BMI 24.22 kg/m     Hydralazine PRN given and AM BP medication given early.    O2: Stable on room air>90%. Denies SOB, Chest pain   Output: Urgency, incontinent.  Ambulates to the bathroom   Last BM: 5/13/23 . Bowel sounds active   Activity: Stand by assist. Refusing gait belt. Using Cane  NWB RUE   Skin: R shoulder surgical incision, bruised right arm   Pain: Managed withTylenol, robaxin,atarax   CMS: Intermitted confusion. AXO x4   Dressing: Right Shoulder-CDI   Diet: reg   LDA: No IV access   Equipment: Pt belongings   Plan: Discharge to TCU once delirium is resolved   Additional Info: Bed alarm on. Able to make needs know via call light.                 MD Paged- PT in [phone] ortho F.M blood pressure is 184/89. There is IV prn hydralazine but PT has no IV access. can we get oral hydralazine?  NOEMY RN [phone]                 "

## 2023-05-15 NOTE — PROGRESS NOTES
"  VS: BP (!) 157/79 (BP Location: Left arm)   Pulse 88   Temp (!) 96.3  F (35.7  C) (Oral)   Resp 16   Ht 1.6 m (5' 2.99\")   Wt 62 kg (136 lb 11 oz)   LMP 11/09/2005 (Approximate)   SpO2 98%   BMI 24.22 kg/m     O2: Denied SOB on RA   Output: Urgency incontinence, ambulates to bathroom   Last BM: 5/13/23   Activity: SBA w/gait belt & cane   Skin: R shoulder incision & bruised R arm   Pain: Pt stated at beginning of shift she's \"in a little pain but it's not that bad\" and refused medication   CMS: A/O x4 w/intermittent confusion   Dressing: Clean, dry, and intact   Diet: Regular/thin   LDA: None   Equipment: Patient belongings   Plan: Discharge to [facility]CU [facility] [place] tomorrow 5/16/23   Additional Info:          "

## 2023-05-15 NOTE — PROGRESS NOTES
CLINICAL NUTRITION SERVICES    Reviewed nutrition risk factors due to LOS. Pt is tolerating diet, eating well per nursing documentation. No nutrition issues identified at this time. RD to sign off at this time. RD may be consulted if needs arise.     Jaye Tena MS, RDN, LDN  RD pager: [phone]

## 2023-05-15 NOTE — PLAN OF CARE
"Goal Outcome Evaluation:    VS: BP (!) 156/83   Pulse 81   Temp (!) 96.1  F (35.6  C) (Oral)   Resp 16   Ht 1.6 m (5' 2.99\")   Wt 62 kg (136 lb 11 oz)   LMP 11/09/2005 (Approximate)   SpO2 98%   BMI 24.22 kg/m       O2: Stable on room air>90%. Denies SOB, Chest pain   Output: Urgency, incontinent.  Ambulates to the bathroom   Last BM: 5/13/23 . Bowel sounds active   Activity: Stand by assist. Refusing gait belt. Using Cane  NWB RUE   Skin: R shoulder surgical incision, bruised right arm   Pain: Managed with Tylenol  this shift   CMS: Intermitted confusion. AXO x4   Dressing: Right Shoulder-CDI   Diet: reg   LDA: No IV access   Equipment: Pt belongings   Plan: Discharge to TCU once delirium is resolved   Additional Info: Bed alarm on. Able to make needs know via call light.         "

## 2023-05-15 NOTE — CONSULTS
Consult Date: 05/15/2023    PSYCHIATRY CONSULTATION    IDENTIFICATION:  Ms. Grewal is a 73-year-old  white female who is currently hospitalized after a broken shoulder and elbow requiring surgical repair.  I am asked to evaluate her delirium by Dr. Silver.    On interviewing Ms. Grewal it is quite obvious that she is having mental status difficulties.  She does note that she is at [facility].  She has a good deal of difficulty telling me why she is here, but she tells me that the main reason is because she fell and was confused.  She does remember that she had surgery.  She cannot remember any words at 3 minutes and she cannot name the current president or the presidents prior.  She did know that the month was May. Nursing staff perceives that her confusion got worse about 5 days ago, which was still several days post-surgery, so I was quite concerned about the potential of a postsurgical infection or some other complication.  However, I did not see any signs of infection or intracranial changes.  Her most recent CT scan did reveal an old stroke, which the patient is aware of and moderate to advanced periventricular and subcortical white matter changes suggestive of chronic small vessel ischemia.  To my eyes she has large ventricles, but nothing that looks acute. I was fortunate that her friend of 53 years, who sees her every day, came in and gave me collateral information.  He has been a significant part of her life and knows her very well.  He reports that about 2 years ago she had a stroke and has been intermittently confused ever since.  He reports that about 3 weeks ago, she had a TIA and was seen at [facility]. Care Everywhere does reveal an [facility] admission on 04/25/2023.  The friend  reports that her confusion has been significantly worse since that hospitalization.  It appears that her intermittent confusion predates her most recent orthopedic surgery and her friend thinks that her  current mental status is relatively similar to the mental status, even prior to surgery.  I note that the patient has a history of depression and is treated with Effexor and Wellbutrin, but also has been started on Seroquel.  It is noted that her confusion becomes quite a bit worse if she does not have a night's sleep.    I think it would be very reasonable to increase her nighttime Seroquel to 75 or 100 milligrams.  However, she has not had a recent EKG.  I discussed this with Dr. Silver who will order an EKG.  If the QTc is not prolonged above 500, I think the Seroquel could be increased by 25 or 50 milligrams.  However, if QTc is above 500, I would not increase the Seroquel.    The boyfriend tells me that the current plan is for the patient to go to [facility] for recovery and then home with nursing help.  The patient did not like the idea of in-home nursing, but I do think she would agree if it meant getting home sooner.  Both the patient and the friend reports that the patient's son is very active, very helpful and comes in to see the patient quite frequently.  There is also a daughter in [place] who is supportive.  If the current plan fails for some reason, in the future one could consider a capacity evaluation, but for now, I would agree with the current plan.    PAST MEDICAL HISTORY:  Includes abnormal Pap smear, allergic rhinitis, contact dermatitis, endometriosis, esophageal reflux, migraine, mild persistent asthma and unspecified disorder of the uterus, likely fibroid.    FAMILY HISTORY:  The patient is  and has 2 children as mentioned above. There is no known psychiatric family history.    SOCIAL HISTORY:  The patient has difficulty explaining to me where she lives.  She tells me that she has a home and apparently it is in [place].  She said that she has been living in various places recently and the boyfriend tells me that recently she had been at [facility].  He points out that she  does actually have a home in [place] and he would like her to come back to that home with nursing help.  Chart suggests she is a nonsmoker.    PHYSICAL REVIEW OF SYSTEMS:  The patient does complain of a headache for the last 2 hours, but does not seem in distress.  She reports that she sees well with glasses and has no difficulty hearing.  She denied chest pain or shortness of breath, abdominal pain, diarrhea or constipation, genitourinary symptoms or problems with muscles, skin or joints.  She reports her walking is improving.    MENTAL STATUS EXAM:  On my interview, the patient was pleasant and cooperative.  Her mood was generally good and her affect full.  Her speech was coherent and goal oriented.  Her associations were tight.  Her thought processes were generally logical and linear.  Her content of thought was without psychosis or suicidal ideation.  Recent and remote memory are impaired, as is fund of knowledge.  Use of language and concentration were generally intact.  She was unable to provide the names of the presidents.  She remembered zero words at 3 minutes.  She had a good deal of difficulty remembering how exactly she came to the hospital and where she was before coming to the hospital.  Insight and judgment are guarded.  Muscle strength and tone appear to be improving.  Recent vital signs include a blood pressure of 161/83, temperature of 96.1, pulse of 75, respiration rate of 16 with 98% oxygen saturation.    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    RECOMMENDATIONS:  EKG: If QTc less than 500, consider increasing Seroquel to 75 or 100 at bedtime.  I did discuss the case with social work, nurse coordinator and staff physician.    TOTAL TIME:  70 minutes.    Angelo Arreaga MD        D: 05/15/2023   T: 05/15/2023   MT: gloria    Name:     PETRA BRUNSON  MRN:      [id number]        Account:      [id number]   :      1950            Consult Date: 05/15/2023     Document: [id number]

## 2023-05-16 ENCOUNTER — APPOINTMENT (OUTPATIENT)
Dept: OCCUPATIONAL THERAPY | Facility: CLINIC | Age: 73
DRG: 483 | End: 2023-05-16
Attending: STUDENT IN AN ORGANIZED HEALTH CARE EDUCATION/TRAINING PROGRAM
Payer: MEDICARE

## 2023-05-16 ENCOUNTER — TELEPHONE (OUTPATIENT)
Dept: ORTHOPEDICS | Facility: CLINIC | Age: 73
End: 2023-05-16

## 2023-05-16 ENCOUNTER — APPOINTMENT (OUTPATIENT)
Dept: PHYSICAL THERAPY | Facility: CLINIC | Age: 73
DRG: 483 | End: 2023-05-16
Attending: STUDENT IN AN ORGANIZED HEALTH CARE EDUCATION/TRAINING PROGRAM
Payer: MEDICARE

## 2023-05-16 VITALS
HEIGHT: 63 IN | DIASTOLIC BLOOD PRESSURE: 85 MMHG | WEIGHT: 136.69 LBS | BODY MASS INDEX: 24.22 KG/M2 | HEART RATE: 86 BPM | TEMPERATURE: 97.6 F | SYSTOLIC BLOOD PRESSURE: 151 MMHG | OXYGEN SATURATION: 100 % | RESPIRATION RATE: 16 BRPM

## 2023-05-16 LAB
ATRIAL RATE - MUSE: 80 BPM
DIASTOLIC BLOOD PRESSURE - MUSE: NORMAL MMHG
INTERPRETATION ECG - MUSE: NORMAL
P AXIS - MUSE: 14 DEGREES
PR INTERVAL - MUSE: 172 MS
QRS DURATION - MUSE: 112 MS
QT - MUSE: 406 MS
QTC - MUSE: 468 MS
R AXIS - MUSE: -32 DEGREES
SYSTOLIC BLOOD PRESSURE - MUSE: NORMAL MMHG
T AXIS - MUSE: 39 DEGREES
VENTRICULAR RATE- MUSE: 80 BPM

## 2023-05-16 PROCEDURE — 250N000013 HC RX MED GY IP 250 OP 250 PS 637: Performed by: INTERNAL MEDICINE

## 2023-05-16 PROCEDURE — 97116 GAIT TRAINING THERAPY: CPT | Mod: GP

## 2023-05-16 PROCEDURE — 97530 THERAPEUTIC ACTIVITIES: CPT | Mod: GO

## 2023-05-16 PROCEDURE — 250N000013 HC RX MED GY IP 250 OP 250 PS 637

## 2023-05-16 PROCEDURE — 97110 THERAPEUTIC EXERCISES: CPT | Mod: GP

## 2023-05-16 PROCEDURE — 250N000013 HC RX MED GY IP 250 OP 250 PS 637: Performed by: NURSE PRACTITIONER

## 2023-05-16 PROCEDURE — 250N000013 HC RX MED GY IP 250 OP 250 PS 637: Performed by: STUDENT IN AN ORGANIZED HEALTH CARE EDUCATION/TRAINING PROGRAM

## 2023-05-16 PROCEDURE — 99238 HOSP IP/OBS DSCHRG MGMT 30/<: CPT | Performed by: INTERNAL MEDICINE

## 2023-05-16 PROCEDURE — 97535 SELF CARE MNGMENT TRAINING: CPT | Mod: GO

## 2023-05-16 RX ORDER — HYDROXYZINE HYDROCHLORIDE 25 MG/1
25 TABLET, FILM COATED ORAL EVERY 6 HOURS PRN
Qty: 30 TABLET | Refills: 0 | DISCHARGE
Start: 2023-05-16 | End: 2023-05-16

## 2023-05-16 RX ORDER — BUTALBITAL/ASPIRIN/CAFFEINE 50-325-40
1 CAPSULE ORAL EVERY 6 HOURS PRN
Qty: 30 CAPSULE | Refills: 5 | Status: SHIPPED | DISCHARGE
Start: 2023-05-16 | End: 2023-05-16

## 2023-05-16 RX ORDER — FUROSEMIDE 40 MG
40 TABLET ORAL DAILY
Qty: 30 TABLET | Refills: 0 | Status: ON HOLD | OUTPATIENT
Start: 2023-05-16 | End: 2023-07-18

## 2023-05-16 RX ORDER — SODIUM CHLORIDE 1 G/1
1 TABLET ORAL 2 TIMES DAILY WITH MEALS
Qty: 60 TABLET | Refills: 0 | Status: SHIPPED | OUTPATIENT
Start: 2023-05-16 | End: 2023-06-15

## 2023-05-16 RX ORDER — POLYETHYLENE GLYCOL 3350 17 G/17G
17 POWDER, FOR SOLUTION ORAL DAILY
Qty: 510 G | Status: ON HOLD | DISCHARGE
Start: 2023-05-16 | End: 2023-06-02

## 2023-05-16 RX ORDER — FUROSEMIDE 40 MG
40 TABLET ORAL DAILY
Qty: 90 TABLET | Refills: 0 | Status: SHIPPED | OUTPATIENT
Start: 2023-05-16 | End: 2023-05-16

## 2023-05-16 RX ORDER — QUETIAPINE FUMARATE 25 MG/1
75 TABLET, FILM COATED ORAL AT BEDTIME
Qty: 90 TABLET | Refills: 0 | Status: SHIPPED | OUTPATIENT
Start: 2023-05-16 | End: 2023-08-03

## 2023-05-16 RX ORDER — ROSUVASTATIN CALCIUM 20 MG/1
20 TABLET, COATED ORAL DAILY
Qty: 90 TABLET | Refills: 0 | Status: SHIPPED | OUTPATIENT
Start: 2023-05-16 | End: 2023-10-16

## 2023-05-16 RX ORDER — ASPIRIN 81 MG
100-200 TABLET, DELAYED RELEASE (ENTERIC COATED) ORAL 2 TIMES DAILY PRN
Qty: 120 TABLET | Refills: 3 | DISCHARGE
Start: 2023-05-16 | End: 2023-06-29

## 2023-05-16 RX ORDER — BUPROPION HYDROCHLORIDE 300 MG/1
300 TABLET ORAL EVERY MORNING
Qty: 90 TABLET | Refills: 1 | DISCHARGE
Start: 2023-05-16 | End: 2023-05-16

## 2023-05-16 RX ORDER — OXYCODONE HYDROCHLORIDE 5 MG/1
5 TABLET ORAL EVERY 4 HOURS PRN
Qty: 26 TABLET | Refills: 0 | Status: ON HOLD | DISCHARGE
Start: 2023-05-16 | End: 2023-06-02

## 2023-05-16 RX ORDER — ERGOCALCIFEROL 1.25 MG/1
50000 CAPSULE, LIQUID FILLED ORAL
Qty: 5 CAPSULE | Refills: 0 | Status: SHIPPED | OUTPATIENT
Start: 2023-05-16 | End: 2023-06-15

## 2023-05-16 RX ORDER — AMLODIPINE BESYLATE 10 MG/1
10 TABLET ORAL DAILY
DISCHARGE
Start: 2023-05-16 | End: 2023-05-16

## 2023-05-16 RX ORDER — HYDRALAZINE HYDROCHLORIDE 25 MG/1
25 TABLET, FILM COATED ORAL EVERY 8 HOURS
Qty: 30 TABLET | Refills: 0 | Status: SHIPPED | OUTPATIENT
Start: 2023-05-16 | End: 2023-06-15

## 2023-05-16 RX ORDER — AMOXICILLIN 250 MG
1 CAPSULE ORAL 2 TIMES DAILY
Qty: 30 TABLET | Refills: 0 | DISCHARGE
Start: 2023-05-16 | End: 2023-05-16

## 2023-05-16 RX ORDER — SODIUM CHLORIDE 1 G/1
1 TABLET ORAL 2 TIMES DAILY WITH MEALS
DISCHARGE
Start: 2023-05-16 | End: 2023-05-16

## 2023-05-16 RX ORDER — ERGOCALCIFEROL 1.25 MG/1
50000 CAPSULE, LIQUID FILLED ORAL
DISCHARGE
Start: 2023-05-16 | End: 2023-05-16

## 2023-05-16 RX ORDER — LISINOPRIL 20 MG/1
20 TABLET ORAL DAILY
Qty: 90 TABLET | Refills: 0 | Status: SHIPPED | OUTPATIENT
Start: 2023-05-16 | End: 2023-08-07

## 2023-05-16 RX ORDER — HYDRALAZINE HYDROCHLORIDE 25 MG/1
25 TABLET, FILM COATED ORAL EVERY 8 HOURS
DISCHARGE
Start: 2023-05-16 | End: 2023-05-16

## 2023-05-16 RX ORDER — ROSUVASTATIN CALCIUM 20 MG/1
20 TABLET, COATED ORAL DAILY
Qty: 90 TABLET | Refills: 3 | DISCHARGE
Start: 2023-05-16 | End: 2023-05-16

## 2023-05-16 RX ORDER — AMLODIPINE BESYLATE 10 MG/1
10 TABLET ORAL DAILY
Qty: 30 TABLET | Refills: 0 | Status: SHIPPED | OUTPATIENT
Start: 2023-05-16 | End: 2023-05-16

## 2023-05-16 RX ORDER — CLOPIDOGREL BISULFATE 75 MG/1
75 TABLET ORAL DAILY
Qty: 30 TABLET | Refills: 0 | DISCHARGE
Start: 2023-05-16 | End: 2023-05-16

## 2023-05-16 RX ORDER — FUROSEMIDE 40 MG
40 TABLET ORAL DAILY
Qty: 90 TABLET | Refills: 3 | DISCHARGE
Start: 2023-05-16 | End: 2023-05-16

## 2023-05-16 RX ORDER — POLYETHYLENE GLYCOL 3350 17 G/17G
17 POWDER, FOR SOLUTION ORAL DAILY
Qty: 510 G | DISCHARGE
Start: 2023-05-16 | End: 2023-05-16

## 2023-05-16 RX ORDER — CLOPIDOGREL BISULFATE 75 MG/1
75 TABLET ORAL DAILY
Qty: 30 TABLET | Refills: 0 | Status: SHIPPED | OUTPATIENT
Start: 2023-05-16 | End: 2023-06-15

## 2023-05-16 RX ORDER — CARVEDILOL 12.5 MG/1
TABLET ORAL
Qty: 180 TABLET | Refills: 1 | DISCHARGE
Start: 2023-05-16 | End: 2023-05-16

## 2023-05-16 RX ORDER — BUTALBITAL/ASPIRIN/CAFFEINE 50-325-40
1 CAPSULE ORAL EVERY 6 HOURS PRN
Qty: 30 CAPSULE | Refills: 5 | Status: SHIPPED | DISCHARGE
Start: 2023-05-16 | End: 2023-05-23

## 2023-05-16 RX ORDER — CARVEDILOL 12.5 MG/1
TABLET ORAL
Qty: 180 TABLET | Refills: 1 | Status: SHIPPED | OUTPATIENT
Start: 2023-05-16 | End: 2023-10-20

## 2023-05-16 RX ORDER — QUETIAPINE FUMARATE 25 MG/1
75 TABLET, FILM COATED ORAL AT BEDTIME
DISCHARGE
Start: 2023-05-16 | End: 2023-05-16

## 2023-05-16 RX ORDER — CLOPIDOGREL BISULFATE 75 MG/1
75 TABLET ORAL DAILY
DISCHARGE
Start: 2023-05-16 | End: 2023-05-16

## 2023-05-16 RX ORDER — VENLAFAXINE HYDROCHLORIDE 150 MG/1
150 CAPSULE, EXTENDED RELEASE ORAL DAILY
Qty: 90 CAPSULE | Refills: 1 | DISCHARGE
Start: 2023-05-16 | End: 2023-05-16

## 2023-05-16 RX ORDER — BUPROPION HYDROCHLORIDE 300 MG/1
300 TABLET ORAL EVERY MORNING
Qty: 90 TABLET | Refills: 1 | Status: ON HOLD | OUTPATIENT
Start: 2023-05-16 | End: 2023-06-01

## 2023-05-16 RX ORDER — ACETAMINOPHEN 325 MG/1
650 TABLET ORAL EVERY 4 HOURS PRN
Qty: 60 TABLET | Refills: 0 | Status: ON HOLD | DISCHARGE
Start: 2023-05-16 | End: 2023-06-02

## 2023-05-16 RX ORDER — AMOXICILLIN 250 MG
1 CAPSULE ORAL 2 TIMES DAILY
Qty: 30 TABLET | Refills: 0 | Status: ON HOLD | DISCHARGE
Start: 2023-05-16 | End: 2023-06-02

## 2023-05-16 RX ORDER — VENLAFAXINE HYDROCHLORIDE 150 MG/1
150 CAPSULE, EXTENDED RELEASE ORAL DAILY
Qty: 90 CAPSULE | Refills: 0 | Status: SHIPPED | OUTPATIENT
Start: 2023-05-16 | End: 2024-01-02

## 2023-05-16 RX ORDER — AMLODIPINE BESYLATE 10 MG/1
10 TABLET ORAL DAILY
Qty: 30 TABLET | Refills: 0 | Status: SHIPPED | OUTPATIENT
Start: 2023-05-16 | End: 2023-06-15

## 2023-05-16 RX ORDER — LISINOPRIL 20 MG/1
20 TABLET ORAL DAILY
Qty: 90 TABLET | Refills: 3 | DISCHARGE
Start: 2023-05-16 | End: 2023-05-16

## 2023-05-16 RX ORDER — HYDROXYZINE HYDROCHLORIDE 25 MG/1
25 TABLET, FILM COATED ORAL EVERY 6 HOURS PRN
Qty: 30 TABLET | Refills: 0 | Status: ON HOLD | OUTPATIENT
Start: 2023-05-16 | End: 2023-06-02

## 2023-05-16 RX ADMIN — HYDROXYZINE HYDROCHLORIDE 50 MG: 50 TABLET, FILM COATED ORAL at 10:12

## 2023-05-16 RX ADMIN — CARVEDILOL 12.5 MG: 12.5 TABLET, FILM COATED ORAL at 17:24

## 2023-05-16 RX ADMIN — HYDROXYZINE HYDROCHLORIDE 50 MG: 50 TABLET, FILM COATED ORAL at 17:24

## 2023-05-16 RX ADMIN — SODIUM CHLORIDE TAB 1 GM 1 G: 1 TAB at 17:24

## 2023-05-16 RX ADMIN — HYDRALAZINE HYDROCHLORIDE 25 MG: 25 TABLET ORAL at 06:17

## 2023-05-16 RX ADMIN — SODIUM CHLORIDE TAB 1 GM 1 G: 1 TAB at 08:08

## 2023-05-16 RX ADMIN — VENLAFAXINE HYDROCHLORIDE 150 MG: 150 CAPSULE, EXTENDED RELEASE ORAL at 08:08

## 2023-05-16 RX ADMIN — HYDRALAZINE HYDROCHLORIDE 25 MG: 25 TABLET ORAL at 14:47

## 2023-05-16 RX ADMIN — ROSUVASTATIN CALCIUM 20 MG: 20 TABLET, FILM COATED ORAL at 08:08

## 2023-05-16 RX ADMIN — DOCUSATE SODIUM AND SENNOSIDES 1 TABLET: 8.6; 5 TABLET ORAL at 08:08

## 2023-05-16 RX ADMIN — ACETAMINOPHEN 650 MG: 325 TABLET ORAL at 10:12

## 2023-05-16 RX ADMIN — AMLODIPINE BESYLATE 10 MG: 10 TABLET ORAL at 08:07

## 2023-05-16 RX ADMIN — FUROSEMIDE 40 MG: 20 TABLET ORAL at 08:08

## 2023-05-16 RX ADMIN — LISINOPRIL 40 MG: 20 TABLET ORAL at 08:08

## 2023-05-16 RX ADMIN — CLOPIDOGREL BISULFATE 75 MG: 75 TABLET, FILM COATED ORAL at 08:08

## 2023-05-16 RX ADMIN — ACETAMINOPHEN 650 MG: 325 TABLET ORAL at 14:47

## 2023-05-16 RX ADMIN — CARVEDILOL 12.5 MG: 12.5 TABLET, FILM COATED ORAL at 08:08

## 2023-05-16 RX ADMIN — BUPROPION HYDROCHLORIDE 300 MG: 300 TABLET, FILM COATED, EXTENDED RELEASE ORAL at 08:08

## 2023-05-16 ASSESSMENT — ACTIVITIES OF DAILY LIVING (ADL)
ADLS_ACUITY_SCORE: 38

## 2023-05-16 NOTE — PROGRESS NOTES
Care Management Follow Up    Length of Stay (days): 11    Expected Discharge Date: 05/16/2023     Concerns to be Addressed: discharge planning     Patient plan of care discussed at interdisciplinary rounds: Yes    Anticipated Discharge Disposition: Transitional Care     Additional Information:  Patient responded favorably to the increase in seroquel overnight. This morning patient was walking with staff on the unit. Smiling and engaging with this writer and staff. Patient has been cleared by MD to discharge to TCU. Two messages have been left with [facility] this morning. Waiting for a return call. Anticipate discharge to TCU this afternoon. RNCC will continue to follow.     Update 1600:Patient had PT session this afternoon and was moving very well. Expressed desire to discharge to home. Patient's friend, Bryson has arranged 24/7 care for the patient at home. Either Bryson or another friend will be with the patient at all times. Home care has been arranged through [facility]. Services include skilled nursing, physical therapy and occupational therapy. HHA was declined at this time. Discharge was completed by Dr. Jj and patient's prescriptions were E-scribed to the [facility] pharmacy in [place], [place]. All questions and concerns addressed. RNCC available as needed.    [facility]   Phone  [phone]  Fax  [phone]     RN skilled nursing visit.   RN to assess vital signs and weight, respiratory and cardiac status, pain level and activity tolerance, hydration, nutrition and bowel status   RN to complete home safety evaluation.  RN to complete weekly medication management and set-up, please involve family/primary caregiver in med education.    Physical Therapy to evaluate and treat  Occupational Therapy to evaluate and treat    Caroline Buchanan RN, BSN  Care Coordinator, [facility] Ortho  Phone [phone]  Pager [phone]

## 2023-05-16 NOTE — DISCHARGE SUMMARY
Physician Discharge Summary     Patient ID:  Sri Grewal  [id number]  73 year old  1950    Admit date: 5/3/2023    Discharge date and time: 5/16/2023  5:57 PM     Admitting Physician: Cierra Krause MD     Discharge Physician:     Admission Diagnoses: Closed bicondylar fracture of distal humerus, right, initial encounter S42.491A  Post-operative state Z98.890  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, unspecified fracture morphology, subsequent encounter S42.201K    Discharge Diagnoses:   Right sided weakness  Dysarthria  Left MCA aneurysm   S/p TNK therapy  TIA  Cerebral aneurysm  Acute blood loss anemia  Acute encephalopathy  Delirium  Chronic hyponatremia      Admission Condition: fair    Discharged Condition: fair    Indication for Admission: right humerus fracture, pain as a result    Hospital Course:         Sri Grewal is a 72 year old female admitted on 4/30/2023. She has a past medical h/o CAD, NICM ([place]), HTN, previous TIA and CVA post angiogram in 6/2021.  She was seen at [facility] ED on 4/25/23 w/ increased difficulty speaking and right sided weakness. Stroke code was called upon pt's arrival to the ED. CT head w/o contrast was negative. CTA head/neck was negative for large vessel occlusion but it did show a L MCA aneurysm which was stable.  She was given TNK in the ED with improvement in her symptoms and admitted to the ICU for close monitoring post TNK. Her speech returned to her baseline.  She was in NSR on tele.  MRI brain was negative for acute stroke. Neurology team felt pt's symptoms were due to either a TIA or aborted stroke by TNK administration. Neurology recommended stopping PTA ASA and started pt on plavix, with plans to continue it indefinitely. She will follow up with neurology in clinic and complete another 28 days Zio-patch.       She has a history of a right proximal humerus fracture that has resulted from a mechanical fall in 2/2023 which has  been managed nonoperatively. On 4/29, she had another episode of mechanical fall resulting in acute displaced fracture of the proximal shaft of the right humerus.  She was seen at [facility] ED on 4/30 from where she was transferred to [facility] orthopedic service for definite surgical intervention.  She was discharged to home on 5/1 with plan for patient to be admitted on 5/3 for ORIF since she was on Plavix.  She was advised to hold Plavix until she has her surgery.     Pt was admitted to [place] on 5/3 and underwent ORIF proximal right humerus fracture.  Transferred to [facility] on 5/5 and underwent reverse right TSA, complicated by right-side Ce syndrome from brachial plexus block.  Pt is currently on surgical hernandez for postop care.  [facility] hospitalist service consulted for preop eval and postop medical management.   After surgery patient developed persistent confusion and episodes of agitation, infectious work-up was negative and it was considered that the patient had delirium.  Patient was a started on Seroquel at bedtime and delirium has been improving, no more episodes of agitation.  Psychiatry evaluated the patient today and also agreed with the diagnosis of delirium.          #Acute encephalopathy - Delirium.   -Concerning for delirium. CT head showed: No acute intracranial pathology. Patient with persistent confusion, she was oriented to person. Per report she has not being sleeping well, she received pain med's and had recent surgery.   -Confusion is improving.  No acute events overnight, she had another good night.  Patient slept well.  I had an extensive conversation with patient's significant other and nursing staff this morning. Patient was alert, oriented to person, place and month. She is cooperative and not agitated.   -Continue on Seroquel at bedtime.   Psych consulted      #Acute displaced intra-articular fracture of the right distal humerus extending from the supracondylar  region between the condyles and into the lateral humeral condyle, 4/29/23  #Subacute, ununited fracture of the humeral neck 2/2023  Post op care per ortho  - S/p ORIF proximal right humerus fracture 5/3  - S/p  reverse right TSA 5/5  - PT/OT consult  - IS. Bowel regimen.   - Patient will go to TCU once delirium is resolved.     #Postop right side Ce's syndrome  - Due to brachial plexus block per anesthesia  - No clear evidence of right eyelid ptosis, miosis or anhidrosis  - Resolved  - Monitor     #DVT prophylaxis  - PCD      #Recent TIA/CVA, 4/25  #Cerebral aneurysm   - S/p TNK on 4/29/23 at [facility] ED, please see above  - PTA ASA discontinued and patient was started on Plavix on 4/29  - Plavix held 5/2 - 5/3 for above surgery  - Plavix 75 mg daily today reintroduced on 5/4     #Hx CAD , HTN  - EF 65-70%, normal RV function  - Blood pressure remains elevated  - Continue on amlodipine, Coreg, lisinopril and Lasix   #Right upper pole kidney cyst   - F/u w/ PCP     #Depression   - Venlafaxine 150 mg day and Bupropion 300 mg daily     #Migraine   - PTA prn fiorinal      #Chronic hyponatremia  - Na level is 133->134  -- Na 130. Continue on FR: 1500 ml. Ct lasix. Continue on sodium tabs.   -- Follow-up BMP in am.      #Acute postop blood loss anemia  #MORENO suspect 2/2 acute anemia. cxr clear. Lungs clear. Oxygenating well on RA> no cough or cp. Cough+ Also likely early pulmonary edema. CT neg for PE. Lung nodule.   - Outpatient follow-up.   - Better now.   - Albuterol inhalers, nebs prn     #Vit D deficiency  - Vit D level: 11. Started on high dose vit D Q 7d.        Discharge Exam:  General Appearance:  Awake, interactive, NAD  Respiratory: Normal work of breathing. On RA.   Cardiovascular: RRR  Extremities: No pedal edema  Neuro: Alert, oriented. Speech normal, no facial droop. Grossly non focal.   Others: Stable mood.      Disposition: TCU    Patient Instructions:   Current Discharge Medication List       START taking these medications    Details   amLODIPine (NORVASC) 10 MG tablet Take 1 tablet (10 mg) by mouth daily    Associated Diagnoses: Hypertension goal BP (blood pressure) < 140/90      clopidogrel (PLAVIX) 75 MG tablet Take 1 tablet (75 mg) by mouth daily      hydrALAZINE (APRESOLINE) 25 MG tablet Take 1 tablet (25 mg) by mouth every 8 hours    Associated Diagnoses: Hypertension goal BP (blood pressure) < 140/90      melatonin 5 MG tablet Take 1 tablet (5 mg) by mouth At Bedtime    Associated Diagnoses: Delirium      oxyCODONE (ROXICODONE) 5 MG tablet Take 1 tablet (5 mg) by mouth every 4 hours as needed for severe pain  Qty: 26 tablet, Refills: 0    Associated Diagnoses: Post-operative state      QUEtiapine (SEROQUEL) 25 MG tablet Take 3 tablets (75 mg) by mouth At Bedtime    Associated Diagnoses: Delirium      sodium chloride 1 GM tablet Take 1 tablet (1 g) by mouth 2 times daily (with meals)    Associated Diagnoses: Chronic hyponatremia      vitamin D2 (ERGOCALCIFEROL) 25406 units (1250 mcg) capsule Take 1 capsule (50,000 Units) by mouth every 7 days    Associated Diagnoses: Closed fracture of distal end of right humerus, unspecified fracture morphology, initial encounter         CONTINUE these medications which have CHANGED    Details   acetaminophen (TYLENOL) 325 MG tablet Take 2 tablets (650 mg) by mouth every 4 hours as needed for other  Qty: 60 tablet, Refills: 0    Associated Diagnoses: Post-operative state      hydrOXYzine (ATARAX) 25 MG tablet Take 1 tablet (25 mg) by mouth every 6 hours as needed for itching or other (pain adjunct)  Qty: 30 tablet, Refills: 0    Associated Diagnoses: Post-operative state      senna-docusate (SENOKOT-S/PERICOLACE) 8.6-50 MG tablet Take 1 tablet by mouth 2 times daily  Qty: 30 tablet, Refills: 0    Associated Diagnoses: Post-operative state      tiZANidine (ZANAFLEX) 4 MG tablet Take 0.5 tablets (2 mg) by mouth 3 times daily as needed for muscle spasms  Qty: 40  tablet, Refills: 0    Associated Diagnoses: Post-operative state      venlafaxine (EFFEXOR XR) 150 MG 24 hr capsule Take 1 capsule (150 mg) by mouth daily  Qty: 90 capsule, Refills: 1    Associated Diagnoses: Post-operative state         CONTINUE these medications which have NOT CHANGED    Details   buPROPion (WELLBUTRIN XL) 300 MG 24 hr tablet Take 1 tablet (300 mg) by mouth every morning  Qty: 90 tablet, Refills: 1    Associated Diagnoses: Adjustment disorder with mixed anxiety and depressed mood; Moderate major depression (H)      butalbital-aspirin-caffeine (FIORINAL) -40 MG per capsule Take 1 capsule by mouth every 6 hours as needed for moderate to severe pain  Qty: 30 capsule, Refills: 5    Associated Diagnoses: Chronic migraine without aura without status migrainosus, not intractable      carvedilol (COREG) 12.5 MG tablet TAKE ONE TABLET BY MOUTH TWICE A DAY WITH FOOD  Qty: 180 tablet, Refills: 1    Associated Diagnoses: Hypertension goal BP (blood pressure) < 140/90; ST elevation myocardial infarction (STEMI), unspecified artery (H)      docusate sodium (COLACE) 100 MG tablet Take 1-2 tablets (100-200 mg) by mouth 2 times daily as needed for constipation  Qty: 120 tablet, Refills: 3    Associated Diagnoses: Drug induced constipation      furosemide (LASIX) 40 MG tablet TAKE ONE TABLET BY MOUTH EVERY DAY  Qty: 90 tablet, Refills: 3    Associated Diagnoses: Hypertension goal BP (blood pressure) < 140/90      lisinopril (ZESTRIL) 20 MG tablet Take 1 tablet (20 mg) by mouth daily  Qty: 90 tablet, Refills: 3    Associated Diagnoses: Hypertension goal BP (blood pressure) < 140/90      polyethylene glycol (MIRALAX) 17 g packet Take 17 g by mouth daily  Qty: 10 packet, Refills: 0    Associated Diagnoses: Acute pain of right shoulder      rosuvastatin (CRESTOR) 20 MG tablet Take 1 tablet (20 mg) by mouth daily  Qty: 90 tablet, Refills: 3    Associated Diagnoses: Coronary artery disease due to lipid rich plaque          STOP taking these medications       HYDROcodone-acetaminophen (NORCO) 5-325 MG tablet Comments:   Reason for Stopping:         HYDROmorphone (DILAUDID) 2 MG tablet Comments:   Reason for Stopping:             Activity: activity as tolerated  Diet: regular diet      Signed:  Simone Jj MD  5/16/2023  10:28 AM

## 2023-05-16 NOTE — PLAN OF CARE
VS: Temp: 97.3  F (36.3  C) Temp src: Oral BP: (!) 169/90 Pulse: 78   Resp: 18 SpO2: 99 % O2 Device: None (Room air)       O2: On room air, denies cough, denies chest pain   Output: Voids spontaneously in bathroom, urgency   Last BM: 5/13/23 can be incontinent   Activity: Non compliant up independently in room, SBA of 1   Skin: Intact with the exception of incision to right shoulder, ELENI   Pain: 4-5 /10 c/o headache   CMS: Alert, can be confused and anxious   Dressing: ELENI, shoulder brae and sling to right shoulder   Diet: REG   LDA: N/A   Equipment: Personal belongings   Plan: To be discharged on 5/16   Additional Info:

## 2023-05-16 NOTE — TELEPHONE ENCOUNTER
Writer called and left pt a voice message stating that an appointment with Miryam QURESHI was scheduled for 5/22/23 at 12 pm. Patient will want to arrive early as will have x rays completed prior to appointment.     Tiarra Marsh LPN

## 2023-05-16 NOTE — PLAN OF CARE
Pt. discharged at 5:45 to home, and left with personal belongings. Pt. received complete discharge paperwork and prescription medications were sent to [facility] in [place]. Pt received written prescription for oxycodone signed by Dr. Jj. Pt. was given times of last dose for all discharge medications in writing on discharge medication sheets. Discharge teaching included when to take medications, pain management, activity restrictions, dressing changes, and signs and symptoms of infection. Pt. to follow up with primary care provider in 7-10 days.  Pt. had no further questions at the time of discharge and no unmet needs were identified.

## 2023-05-16 NOTE — TELEPHONE ENCOUNTER
----- Message from Anat Kern sent at 5/16/2023  8:14 AM CDT -----  Any of the open spots on Monday would be fine.    Thanks,  Mark  ----- Message -----  From: Tiarra Marsh LPN  Sent: 5/16/2023   8:08 AM CDT  To: Anat Kern; Cortes Navarro, [facility] Team,   Where could I schedule pt?    Tiarra LEVY     ----- Message -----  From: Louann Hoff MD  Sent: 5/16/2023   7:51 AM CDT  To: Miryam Becerril PA-C; #    To whom it may concern,    Please schedule this patient for orthopaedic follow up with Miryam Becerril PA-C, either this Friday or next with repeat right elbow xrays before clinic. The patient is s/p elbow ORIF and rTSA. Per Dr. Phipps, she will likely need revision of her elbow hardware. He is hoping to complete this on 6/1.     Thank you for your help    Very Respectfully,    Louann Hoff MD  Orthopaedic Surgery  [phone]

## 2023-05-17 NOTE — PLAN OF CARE
Physical Therapy Discharge Summary    Reason for therapy discharge:    All goals and outcomes met, no further needs identified.    Progress towards therapy goal(s). See goals on Care Plan in [facility] electronic health record for goal details.  Goals met    Therapy recommendation(s):    Continued therapy is recommended.  Rationale/Recommendations:  home PT for safety evaluation and progression.

## 2023-05-18 ENCOUNTER — TELEPHONE (OUTPATIENT)
Dept: FAMILY MEDICINE | Facility: OTHER | Age: 73
End: 2023-05-18
Payer: MEDICARE

## 2023-05-18 ENCOUNTER — PATIENT OUTREACH (OUTPATIENT)
Dept: CARE COORDINATION | Facility: CLINIC | Age: 73
End: 2023-05-18
Payer: MEDICARE

## 2023-05-18 ENCOUNTER — MEDICAL CORRESPONDENCE (OUTPATIENT)
Dept: HEALTH INFORMATION MANAGEMENT | Facility: CLINIC | Age: 73
End: 2023-05-18

## 2023-05-18 NOTE — LETTER
[facility] CARE COORDINATION  [address]  [place] [address]    May 23, 2023    Sri Grewal  [address] [address]      Dear Sri,        I am a  clinic care coordinator who works with Rosenda Pierre PA-C with the [facility]. I wanted to introduce myself and provide you with my contact information for you to be able to call me with any questions or concerns. Below is a description of clinic care coordination and how I can further assist you.       The clinic care coordination team is made up of a registered nurse, , financial resource worker and community health worker who understand the health care system. The goal of clinic care coordination is to help you manage your health and improve access to the health care system. Our team works alongside your provider to assist you in determining your health and social needs. We can help you obtain health care and community resources, providing you with necessary information and education. We can work with you through any barriers and develop a care plan that helps coordinate and strengthen the communication between you and your care team.  Our services are voluntary and are offered without charge to you personally.    Please feel free to contact me with any questions or concerns regarding care coordination and what we can offer.      We are focused on providing you with the highest-quality healthcare experience possible.    Sincerely,     Camille Roa RN Care Coordination   [facility]-  [place]  Phone: [phone]

## 2023-05-18 NOTE — PROGRESS NOTES
Clinic Care Coordination Contact  [facility]/Voicemail       Clinical Data: Care Coordinator Outreach  Outreach attempted x 1.  Unable to leave message as no voicemail picked up.  Plan: Care Coordinator will try to reach patient again in 1-2 business days.    Camille Roa RN Care Coordination   [facility]  Email: [email or web address]  Phone: [phone]

## 2023-05-18 NOTE — PROGRESS NOTES
EDWAR created a new program for Primary Care-Care Coordination for patient who is established within the [facility] system and is eligible for Clinic Care Coordination.    Connected [facility]    Background: Transitional Care Management program identified per system criteria and reviewed by [facility] Resource [place] team for possible outreach.    Assessment: Upon chart review, [facility] Team member will not proceed with patient outreach related to this episode of Transitional Care Management program due to reason below:    [facility] TCU: Patient discharged to TCU/ARU/LTACH and is established within [facility]. Referral created for Primary Care-Care Coordination program.    Plan: Transitional Care Management episode addressed appropriately per reason noted above.      EDWAR [place]  [phone]  [facility]    *Connected Care Resource Team does NOT follow patient ongoing. Referrals are identified based on internal discharge reports and the outreach is to ensure patient has an understanding of their discharge instructions.

## 2023-05-18 NOTE — TELEPHONE ENCOUNTER
The Home Care/Assisted Living/Nursing Facility is calling regarding an established patient.  Has the patient seen Home Care in the past or is currently residing in Assisted Living or Nursing Facility? Yes.     Shelby SANDS calling from [facility] requesting the following orders that are within the Home Care, Assisted Living or Nursing Home Eval and Treatment standing order and can be signed as standing order signature required by RN.    Preferred Call Back Number: [phone]      Any additional Orders:  Are there any orders requested, not stated above, that are outside of the standing order and must be routed to a licensed practitioner for approval?    Yes: Start of Care:    Skilled nursing 1 time a week for 3 weeks  Every other week for 6 weeks.  With 3 prns    OT/PT eval    She has 2 surgical incisions right shoulder and right elbow. They are open to air right now and Homecare needs an order to keep them open to air.    Patient did have a fall - last night. She hit her chin and there is a contusion there. She did not loose consciousness.    Patient had Plavix prescribed- she missed it for 2 days, RN found the medication and got them to restart it today. Blood pressure is good. No TIA or CVA signs or symptoms.    Writer has verified Requestor will send fax to have orders signed.

## 2023-05-19 NOTE — PROGRESS NOTES
Clinic Care Coordination Contact  [facility]/Voicemail       Clinical Data: Care Coordinator Outreach  Outreach attempted x 2.  Unable to leave message. No voicemail picked up.   Plan: Care Coordinator will send care coordination introduction letter with care coordinator contact information and explanation of care coordination services via mail. Care Coordinator will do no further outreaches at this time.    Camille Roa RN Care Coordination   [facility] [place]  Email: [email or web address]  Phone: [phone]

## 2023-05-21 ENCOUNTER — HOSPITAL ENCOUNTER (INPATIENT)
Facility: CLINIC | Age: 73
Setting detail: SURGERY ADMIT
End: 2023-05-21
Attending: STUDENT IN AN ORGANIZED HEALTH CARE EDUCATION/TRAINING PROGRAM | Admitting: STUDENT IN AN ORGANIZED HEALTH CARE EDUCATION/TRAINING PROGRAM
Payer: MEDICARE

## 2023-05-22 ENCOUNTER — ANCILLARY PROCEDURE (OUTPATIENT)
Dept: GENERAL RADIOLOGY | Facility: CLINIC | Age: 73
End: 2023-05-22
Attending: PHYSICIAN ASSISTANT
Payer: MEDICARE

## 2023-05-22 ENCOUNTER — OFFICE VISIT (OUTPATIENT)
Dept: ORTHOPEDICS | Facility: CLINIC | Age: 73
End: 2023-05-22
Payer: MEDICARE

## 2023-05-22 ENCOUNTER — TELEPHONE (OUTPATIENT)
Dept: FAMILY MEDICINE | Facility: OTHER | Age: 73
End: 2023-05-22

## 2023-05-22 DIAGNOSIS — Z96.611 STATUS POST REVERSE TOTAL SHOULDER REPLACEMENT, RIGHT: Primary | ICD-10-CM

## 2023-05-22 DIAGNOSIS — Z96.611 STATUS POST REVERSE TOTAL SHOULDER REPLACEMENT, RIGHT: ICD-10-CM

## 2023-05-22 DIAGNOSIS — S42.491A CLOSED BICONDYLAR FRACTURE OF DISTAL HUMERUS, RIGHT, INITIAL ENCOUNTER: ICD-10-CM

## 2023-05-22 DIAGNOSIS — S42.491A CLOSED BICONDYLAR FRACTURE OF DISTAL HUMERUS, RIGHT, INITIAL ENCOUNTER: Primary | ICD-10-CM

## 2023-05-22 PROCEDURE — 73080 X-RAY EXAM OF ELBOW: CPT | Mod: RT | Performed by: RADIOLOGY

## 2023-05-22 PROCEDURE — 99024 POSTOP FOLLOW-UP VISIT: CPT | Performed by: PHYSICIAN ASSISTANT

## 2023-05-22 PROCEDURE — 73030 X-RAY EXAM OF SHOULDER: CPT | Mod: RT | Performed by: RADIOLOGY

## 2023-05-22 NOTE — PROGRESS NOTES
Date of Service: May 22, 2023    Chief Complaint: Post operative follow up.     Date of Surgery/Procedure Performed:   1. ORIF right distal humerus fracture with intercondylar extension on 5/3/2023 with Dr. Phipps.  2. Right reverse TSA with Dr. Krause on 5/5/23.    Interval events: Sri Grewal is a 73 year old female who presents today for a postoperative follow up. Patient was not able to find TCU placement and has discharged to home with the help of her friend Bryson, who accompanies the patient today and assists with providing history. She has been wearing the sling at all times without much shoulder or elbow ROM. She has remained NWB. Pain has been well controlled. Denies numbness or tingling.     The past medical history was reviewed updated in the EMR. This includes medications, surgeries, social history, and review of systems.    Physical examination:  Well-developed, well-nourished and in no acute distress.  Alert and oriented to surroundings.  On examination of the right upper extremity, incision to anterior shoulder and elbow well-healed. There is no erythema, drainage, or dehiscence. Swelling is Moderate. Mild tenderness to palpation about the posterior elbow. Nontender throughout the forearm. Sensation is intact in median, radial and ulnar nerve distributions. Patient can actively flex and extend all digits and thumb. Elbow ROM from 60 to 120.  Fingers are warm and well-perfused.     Radiographs: Three views of the right shoulder and elbow were obtained and reviewed. These demonstratestable post surgical changes or right reverse total shoulder arthroplasty. Increased displacement of olecranon osteotomy compared to prior. Distal humerus hardware in place with out apparent complication. New cortical irregularity to the proximal radial shaft, possible due to projection.     Assessment: Sri Grewal is a 72 year old female s/p ORIF right distal humerus fracture with intercondylar extension on 5/3/2023 with Dr. Phipps  and right reverse TSA with Dr. Krause on 5/5/23. Now with interval hardware failure of right olecranon plate.     Plan:  Discussed plan for revision ORIF of right olecranon osteotomy site. This will be on 6/1 with Dr. Phipps at [facility]. She will likely be admitted post op for one night for pain control and monitoring. Continue with slight and NWB to the RUE. No Shoulder ROM. OK to get surgical incisions wet, no submerging. Patient and friend Bryson expressed understanding and agreement in the plan.       CORY WILKS PA-C  May 21, 2023 8:24 PM   Orthopaedic Surgery

## 2023-05-22 NOTE — TELEPHONE ENCOUNTER
Lisinopril typically held day before.   clopidrogel (Plavix), prasugrel (Effient), ticagrelor (Brilinta): No contraindication to stopping Plavix, HOLD 5-7 days before surgery.     Above are the general recommendations based on her chart. PCP is out of office this week.  Yanely Manrique MD

## 2023-05-22 NOTE — NURSING NOTE
Teaching Flowsheet   Relevant Diagnosis: Closed bicondylar fracture of distal humerus, Rt.  Teaching Topic: Revision ORIF Rt olecranon versus Hardware removal Rt olecranon.    [place] under General anesthesia with Dr Williams Phipps on 6-1-23.  Admit post op x 1 night.    Per Miryam Becerril PA-C, patient has had 2 surgeries in past 1 mo. Will not need outside pre-op exam. Patient will be checking with her PCP about holding Plavix and BP medications.     Person(s) involved in teaching:   Patient and Friend Bryson KAHN  Need to communicate with Bryson re: surgery plans/information.       Motivation Level:  Asks Questions: Yes  Eager to Learn: Yes  Cooperative: Yes  Receptive (willing/able to accept information): Yes  Any cultural factors/Lutheran beliefs that may influence understanding or compliance? No    Patient demonstrates understanding of the following:  Reason for the appointment, diagnosis and treatment plan: Yes  Knowledge of proper use of medications and conditions for which they are ordered (with special attention to potential side effects or drug interactions): Yes  Which situations necessitate calling provider and whom to contact: Yes    Teaching Concerns Addressed:   Proper use and care of  (medical equip, care aids, etc.): Yes  Nutritional needs and diet plan: Yes  Pain management techniques: Yes  Wound Care: Yes  How and/when to access community resources: Yes     Instructional Materials Used/Given: Preoperative surgery packet, antibacterial Chlorhexidine soap. Stop Light Tool reviewed, after-hours number provided, patient verbalized understanding, had no immediate questions. Luz Pratt RN

## 2023-05-22 NOTE — TELEPHONE ENCOUNTER
Patients friend Bryson calling on patients behalf. (C2C on file. )     Patient was recently hospitalized 5/3/2023-5/16/2023 for a total shoulder replacement and humerus fx. Patient will be having surgery again on 6/1/2023 with Ortho. Per their note, pre op is not needed. Patient was advised to check with PCP in regards to holding her Plavix and BP meds prior to procedure. Please advise.     Bryson: [phone], requesting callback as he is helping patient get all of her scheduling done.

## 2023-05-22 NOTE — NURSING NOTE
Reason For Visit:   Chief Complaint   Patient presents with     RECHECK     Post op ORIF Right Distal humerus DOS: 5/3/23       Primary MD: Rosenda Pierre  Ref. MD: Sandra    Age: 73 year old    ?  No      LMP 11/09/2005 (Approximate)       Pain Assessment  Patient Currently in Pain: Yes  0-10 Pain Scale: 5  Primary Pain Location: Elbow (Right)  Pain Descriptors: Dull, Constant    Hand Dominance Evaluation  Hand Dominance: Right          QuickDASH Assessment       View : No data to display.                   Current Outpatient Medications   Medication Sig Dispense Refill     acetaminophen (TYLENOL) 325 MG tablet Take 2 tablets (650 mg) by mouth every 4 hours as needed for other 60 tablet 0     amLODIPine (NORVASC) 10 MG tablet Take 1 tablet (10 mg) by mouth daily 30 tablet 0     buPROPion (WELLBUTRIN XL) 300 MG 24 hr tablet Take 1 tablet (300 mg) by mouth every morning 90 tablet 1     butalbital-aspirin-caffeine (FIORINAL) -40 MG per capsule Take 1 capsule by mouth every 6 hours as needed for moderate to severe pain 30 capsule 5     carvedilol (COREG) 12.5 MG tablet TAKE ONE TABLET BY MOUTH TWICE A DAY WITH FOOD 180 tablet 1     clopidogrel (PLAVIX) 75 MG tablet Take 1 tablet (75 mg) by mouth daily 30 tablet 0     docusate sodium (COLACE) 100 MG tablet Take 1-2 tablets (100-200 mg) by mouth 2 times daily as needed for constipation 120 tablet 3     furosemide (LASIX) 40 MG tablet Take 1 tablet (40 mg) by mouth daily 30 tablet 0     hydrALAZINE (APRESOLINE) 25 MG tablet Take 1 tablet (25 mg) by mouth every 8 hours 30 tablet 0     hydrOXYzine (ATARAX) 25 MG tablet Take 1 tablet (25 mg) by mouth every 6 hours as needed for itching or other (pain adjunct) 30 tablet 0     lisinopril (ZESTRIL) 20 MG tablet Take 1 tablet (20 mg) by mouth daily 90 tablet 0     melatonin 5 MG tablet Take 1 tablet (5 mg) by mouth At Bedtime 30 tablet 0     oxyCODONE (ROXICODONE) 5 MG tablet Take 1 tablet (5 mg) by  mouth every 4 hours as needed for severe pain 26 tablet 0     polyethylene glycol (MIRALAX) 17 GM/Dose powder Take 17 g by mouth daily 510 g      QUEtiapine (SEROQUEL) 25 MG tablet Take 3 tablets (75 mg) by mouth At Bedtime 90 tablet 0     rosuvastatin (CRESTOR) 20 MG tablet Take 1 tablet (20 mg) by mouth daily 90 tablet 0     senna-docusate (SENOKOT-S/PERICOLACE) 8.6-50 MG tablet Take 1 tablet by mouth 2 times daily 30 tablet 0     sodium chloride 1 GM tablet Take 1 tablet (1 g) by mouth 2 times daily (with meals) 60 tablet 0     tiZANidine (ZANAFLEX) 4 MG tablet Take 0.5 tablets (2 mg) by mouth 3 times daily as needed for muscle spasms 40 tablet 0     venlafaxine (EFFEXOR XR) 150 MG 24 hr capsule Take 1 capsule (150 mg) by mouth daily 90 capsule 0     vitamin D2 (ERGOCALCIFEROL) 37006 units (1250 mcg) capsule Take 1 capsule (50,000 Units) by mouth every 7 days 5 capsule 0       Allergies   Allergen Reactions     Morphine And Related      GI UPSET     Oxycodone      Seasonal Allergies        CRISTAL VAUGHAN, ATC

## 2023-05-22 NOTE — LETTER
5/22/2023         RE: Sri Grewal  [id number] [address] [id number]        Dear Colleague,    Thank you for referring your patient, Sri Grewal, to the [facility] [place]. Please see a copy of my visit note below.    Date of Service: May 22, 2023    Chief Complaint: Post operative follow up.     Date of Surgery/Procedure Performed:   1. ORIF right distal humerus fracture with intercondylar extension on 5/3/2023 with Dr. Phipps.  2. Right reverse TSA with Dr. Krause on 5/5/23.    Interval events: Sri Grewal is a 73 year old female who presents today for a postoperative follow up. Patient was not able to find TCU placement and has discharged to home with the help of her friend Bryson, who accompanies the patient today and assists with providing history. She has been wearing the sling at all times without much shoulder or elbow ROM. She has remained NWB. Pain has been well controlled. Denies numbness or tingling.     The past medical history was reviewed updated in the EMR. This includes medications, surgeries, social history, and review of systems.    Physical examination:  Well-developed, well-nourished and in no acute distress.  Alert and oriented to surroundings.  On examination of the right upper extremity, incision to anterior shoulder and elbow well-healed. There is no erythema, drainage, or dehiscence. Swelling is Moderate. Mild tenderness to palpation about the posterior elbow. Nontender throughout the forearm. Sensation is intact in median, radial and ulnar nerve distributions. Patient can actively flex and extend all digits and thumb. Elbow ROM from 60 to 120.  Fingers are warm and well-perfused.     Radiographs: Three views of the right shoulder and elbow were obtained and reviewed. These demonstratestable post surgical changes or right reverse total shoulder arthroplasty. Increased displacement of olecranon osteotomy compared to prior. Distal humerus hardware in  place with out apparent complication. New cortical irregularity to the proximal radial shaft, possible due to projection.     Assessment: Sri Grewal is a 72 year old female s/p ORIF right distal humerus fracture with intercondylar extension on 5/3/2023 with Dr. Phipps and right reverse TSA with Dr. Krause on 5/5/23. Now with interval hardware failure of right olecranon plate.     Plan:  Discussed plan for revision ORIF of right olecranon osteotomy site. This will be on 6/1 with Dr. Phipps at [place]. She will likely be admitted post op for one night for pain control and monitoring. Continue with slight and NWB to the RUE. No Shoulder ROM. OK to get surgical incisions wet, no submerging. Patient and friend Bryson expressed understanding and agreement in the plan.       CORY WILKS PA-C  May 21, 2023 8:24 PM   Orthopaedic Surgery

## 2023-05-22 NOTE — H&P (VIEW-ONLY)
Date of Service: May 22, 2023    Chief Complaint: Post operative follow up.     Date of Surgery/Procedure Performed:   1. ORIF right distal humerus fracture with intercondylar extension on 5/3/2023 with Dr. Phipps.  2. Right reverse TSA with Dr. Krause on 5/5/23.    Interval events: Sri Grewal is a 73 year old female who presents today for a postoperative follow up. Patient was not able to find TCU placement and has discharged to home with the help of her friend Bryson, who accompanies the patient today and assists with providing history. She has been wearing the sling at all times without much shoulder or elbow ROM. She has remained NWB. Pain has been well controlled. Denies numbness or tingling.     The past medical history was reviewed updated in the EMR. This includes medications, surgeries, social history, and review of systems.    Physical examination:  Well-developed, well-nourished and in no acute distress.  Alert and oriented to surroundings.  On examination of the right upper extremity, incision to anterior shoulder and elbow well-healed. There is no erythema, drainage, or dehiscence. Swelling is Moderate. Mild tenderness to palpation about the posterior elbow. Nontender throughout the forearm. Sensation is intact in median, radial and ulnar nerve distributions. Patient can actively flex and extend all digits and thumb. Elbow ROM from 60 to 120.  Fingers are warm and well-perfused.     Radiographs: Three views of the right shoulder and elbow were obtained and reviewed. These demonstratestable post surgical changes or right reverse total shoulder arthroplasty. Increased displacement of olecranon osteotomy compared to prior. Distal humerus hardware in place with out apparent complication. New cortical irregularity to the proximal radial shaft, possible due to projection.     Assessment: Sri Grewal is a 72 year old female s/p ORIF right distal humerus fracture with intercondylar extension on 5/3/2023 with Dr. Phipps  and right reverse TSA with Dr. Krause on 5/5/23. Now with interval hardware failure of right olecranon plate.     Plan:  Discussed plan for revision ORIF of right olecranon osteotomy site. This will be on 6/1 with Dr. Phipps at [place]. She will likely be admitted post op for one night for pain control and monitoring. Continue with slight and NWB to the RUE. No Shoulder ROM. OK to get surgical incisions wet, no submerging. Patient and friend Bryson expressed understanding and agreement in the plan.       CORY WILKS PA-C  May 21, 2023 8:24 PM   Orthopaedic Surgery

## 2023-05-23 ENCOUNTER — TELEPHONE (OUTPATIENT)
Dept: FAMILY MEDICINE | Facility: OTHER | Age: 73
End: 2023-05-23
Payer: MEDICARE

## 2023-05-23 DIAGNOSIS — G43.709 CHRONIC MIGRAINE WITHOUT AURA WITHOUT STATUS MIGRAINOSUS, NOT INTRACTABLE: ICD-10-CM

## 2023-05-23 RX ORDER — BUTALBITAL/ASPIRIN/CAFFEINE 50-325-40
1 CAPSULE ORAL EVERY 6 HOURS PRN
Qty: 30 CAPSULE | Refills: 0 | Status: ON HOLD | OUTPATIENT
Start: 2023-05-23 | End: 2023-06-01

## 2023-05-23 NOTE — TELEPHONE ENCOUNTER
Attempted to call Kandi with [facility].  Left message on confidential VM that covering provider gave verbal Ok.

## 2023-05-23 NOTE — TELEPHONE ENCOUNTER
Kandi from [facility] is looking for the following orders:    Pt 1X/week for 3 weeks for balance and mobility    Cristina Navarro RN  [facility] ~ Registered Nurse  Clinic Triage ~ [place] [facility] [place]  May 23, 2023

## 2023-05-23 NOTE — TELEPHONE ENCOUNTER
Called patient and notified them of the message below. Patient and friend fredy have also has no further questions at this time.        Angelic Sheffield MA

## 2023-05-26 ENCOUNTER — TELEPHONE (OUTPATIENT)
Dept: ORTHOPEDICS | Facility: CLINIC | Age: 73
End: 2023-05-26
Payer: MEDICARE

## 2023-05-26 ENCOUNTER — TELEPHONE (OUTPATIENT)
Dept: FAMILY MEDICINE | Facility: OTHER | Age: 73
End: 2023-05-26
Payer: MEDICARE

## 2023-05-26 NOTE — TELEPHONE ENCOUNTER
Patient is scheduled for surgery with Dr. Phipps    Spoke with: Castro    Date of Surgery: 6/2/23    Location: [facility] OR    Informed patient they will need an adult  Yes    H&P: Scheduled with N/A-please see consult note/education note    Additional imaging/appointments: N/A    Surgery packet: Received     Additional comments: N/A        Mary Hector on 5/26/2023 at 11:00 AM

## 2023-05-26 NOTE — TELEPHONE ENCOUNTER
INCOMING FORMS    Sender: [facility]    Type of Form, letter or note (What is requested?): order    How was the form received?: Fax    How should forms be returned?:  Fax : [phone]    Form placed in OOO bin for review/signature if appropriate.

## 2023-05-29 RX ORDER — CEFAZOLIN SODIUM/WATER 2 G/20 ML
2 SYRINGE (ML) INTRAVENOUS
Status: CANCELLED | OUTPATIENT
Start: 2023-06-02

## 2023-05-29 RX ORDER — CEFAZOLIN SODIUM/WATER 2 G/20 ML
2 SYRINGE (ML) INTRAVENOUS SEE ADMIN INSTRUCTIONS
Status: CANCELLED | OUTPATIENT
Start: 2023-06-02

## 2023-05-30 DIAGNOSIS — Z53.9 DIAGNOSIS NOT YET DEFINED: Primary | ICD-10-CM

## 2023-05-30 PROCEDURE — G0180 MD CERTIFICATION HHA PATIENT: HCPCS | Performed by: PHYSICIAN ASSISTANT

## 2023-05-30 NOTE — TELEPHONE ENCOUNTER
Phoned patient to explained that we have been able to move her surgery up to 6/1/23 as initially planned for.    Patient agreed and stated that 6/1/23 does work.     Patient was provided address and times.     Patient had no further questions or concerns.

## 2023-05-31 ENCOUNTER — ANESTHESIA EVENT (OUTPATIENT)
Dept: SURGERY | Facility: CLINIC | Age: 73
End: 2023-05-31
Payer: MEDICARE

## 2023-06-01 ENCOUNTER — ANESTHESIA (OUTPATIENT)
Dept: SURGERY | Facility: CLINIC | Age: 73
End: 2023-06-01
Payer: MEDICARE

## 2023-06-01 ENCOUNTER — APPOINTMENT (OUTPATIENT)
Dept: GENERAL RADIOLOGY | Facility: CLINIC | Age: 73
End: 2023-06-01
Attending: STUDENT IN AN ORGANIZED HEALTH CARE EDUCATION/TRAINING PROGRAM
Payer: MEDICARE

## 2023-06-01 ENCOUNTER — HOSPITAL ENCOUNTER (OUTPATIENT)
Facility: CLINIC | Age: 73
Discharge: HOME | End: 2023-06-02
Attending: STUDENT IN AN ORGANIZED HEALTH CARE EDUCATION/TRAINING PROGRAM | Admitting: STUDENT IN AN ORGANIZED HEALTH CARE EDUCATION/TRAINING PROGRAM
Payer: MEDICARE

## 2023-06-01 DIAGNOSIS — S52.021K CLOSED FRACTURE OF OLECRANON PROCESS OF RIGHT ULNA WITH NONUNION: Primary | ICD-10-CM

## 2023-06-01 DIAGNOSIS — Z98.890 POST-OPERATIVE STATE: ICD-10-CM

## 2023-06-01 LAB — GLUCOSE BLDC GLUCOMTR-MCNC: 104 MG/DL (ref 70–99)

## 2023-06-01 PROCEDURE — 250N000013 HC RX MED GY IP 250 OP 250 PS 637: Performed by: ANESTHESIOLOGY

## 2023-06-01 PROCEDURE — 250N000025 HC SEVOFLURANE, PER MIN: Performed by: STUDENT IN AN ORGANIZED HEALTH CARE EDUCATION/TRAINING PROGRAM

## 2023-06-01 PROCEDURE — 250N000011 HC RX IP 250 OP 636: Performed by: PHYSICIAN ASSISTANT

## 2023-06-01 PROCEDURE — 258N000003 HC RX IP 258 OP 636

## 2023-06-01 PROCEDURE — 999N000065 XR ELBOW RIGHT 2 VIEWS: Mod: RT

## 2023-06-01 PROCEDURE — 370N000017 HC ANESTHESIA TECHNICAL FEE, PER MIN: Performed by: STUDENT IN AN ORGANIZED HEALTH CARE EDUCATION/TRAINING PROGRAM

## 2023-06-01 PROCEDURE — C1713 ANCHOR/SCREW BN/BN,TIS/BN: HCPCS | Performed by: STUDENT IN AN ORGANIZED HEALTH CARE EDUCATION/TRAINING PROGRAM

## 2023-06-01 PROCEDURE — 250N000009 HC RX 250

## 2023-06-01 PROCEDURE — 250N000013 HC RX MED GY IP 250 OP 250 PS 637: Performed by: STUDENT IN AN ORGANIZED HEALTH CARE EDUCATION/TRAINING PROGRAM

## 2023-06-01 PROCEDURE — 710N000010 HC RECOVERY PHASE 1, LEVEL 2, PER MIN: Performed by: STUDENT IN AN ORGANIZED HEALTH CARE EDUCATION/TRAINING PROGRAM

## 2023-06-01 PROCEDURE — 999N000180 XR SURGERY CARM FLUORO LESS THAN 5 MIN: Mod: TC

## 2023-06-01 PROCEDURE — 250N000009 HC RX 250: Performed by: STUDENT IN AN ORGANIZED HEALTH CARE EDUCATION/TRAINING PROGRAM

## 2023-06-01 PROCEDURE — 250N000013 HC RX MED GY IP 250 OP 250 PS 637: Performed by: INTERNAL MEDICINE

## 2023-06-01 PROCEDURE — 99214 OFFICE O/P EST MOD 30 MIN: CPT | Performed by: INTERNAL MEDICINE

## 2023-06-01 PROCEDURE — 250N000011 HC RX IP 250 OP 636: Performed by: ANESTHESIOLOGY

## 2023-06-01 PROCEDURE — 250N000011 HC RX IP 250 OP 636: Performed by: STUDENT IN AN ORGANIZED HEALTH CARE EDUCATION/TRAINING PROGRAM

## 2023-06-01 PROCEDURE — 360N000084 HC SURGERY LEVEL 4 W/ FLUORO, PER MIN: Performed by: STUDENT IN AN ORGANIZED HEALTH CARE EDUCATION/TRAINING PROGRAM

## 2023-06-01 PROCEDURE — 24546 OPTX HUM FX W/NTRCNDYLR XTN: CPT | Mod: 78 | Performed by: STUDENT IN AN ORGANIZED HEALTH CARE EDUCATION/TRAINING PROGRAM

## 2023-06-01 PROCEDURE — 258N000003 HC RX IP 258 OP 636: Performed by: STUDENT IN AN ORGANIZED HEALTH CARE EDUCATION/TRAINING PROGRAM

## 2023-06-01 PROCEDURE — 999N000065 XR SHOULDER RIGHT 2 VIEWS: Mod: RT

## 2023-06-01 PROCEDURE — 272N000001 HC OR GENERAL SUPPLY STERILE: Performed by: STUDENT IN AN ORGANIZED HEALTH CARE EDUCATION/TRAINING PROGRAM

## 2023-06-01 PROCEDURE — 999N000141 HC STATISTIC PRE-PROCEDURE NURSING ASSESSMENT: Performed by: STUDENT IN AN ORGANIZED HEALTH CARE EDUCATION/TRAINING PROGRAM

## 2023-06-01 PROCEDURE — 250N000011 HC RX IP 250 OP 636

## 2023-06-01 DEVICE — IMP WIRE KIRSCHNER 0.062X4" 1646-10-000: Type: IMPLANTABLE DEVICE | Site: ELBOW | Status: FUNCTIONAL

## 2023-06-01 RX ORDER — NALOXONE HYDROCHLORIDE 0.4 MG/ML
0.2 INJECTION, SOLUTION INTRAMUSCULAR; INTRAVENOUS; SUBCUTANEOUS
Status: DISCONTINUED | OUTPATIENT
Start: 2023-06-01 | End: 2023-06-02 | Stop reason: HOSPADM

## 2023-06-01 RX ORDER — ONDANSETRON 2 MG/ML
4 INJECTION INTRAMUSCULAR; INTRAVENOUS EVERY 30 MIN PRN
Status: DISCONTINUED | OUTPATIENT
Start: 2023-06-01 | End: 2023-06-01 | Stop reason: HOSPADM

## 2023-06-01 RX ORDER — BUPIVACAINE HYDROCHLORIDE 5 MG/ML
INJECTION, SOLUTION EPIDURAL; INTRACAUDAL PRN
Status: DISCONTINUED | OUTPATIENT
Start: 2023-06-01 | End: 2023-06-01 | Stop reason: HOSPADM

## 2023-06-01 RX ORDER — BUPROPION HYDROCHLORIDE 300 MG/1
300 TABLET ORAL EVERY MORNING
Status: DISCONTINUED | OUTPATIENT
Start: 2023-06-02 | End: 2023-06-02 | Stop reason: HOSPADM

## 2023-06-01 RX ORDER — FENTANYL CITRATE 50 UG/ML
INJECTION, SOLUTION INTRAMUSCULAR; INTRAVENOUS PRN
Status: DISCONTINUED | OUTPATIENT
Start: 2023-06-01 | End: 2023-06-01

## 2023-06-01 RX ORDER — SODIUM CHLORIDE, SODIUM LACTATE, POTASSIUM CHLORIDE, CALCIUM CHLORIDE 600; 310; 30; 20 MG/100ML; MG/100ML; MG/100ML; MG/100ML
INJECTION, SOLUTION INTRAVENOUS CONTINUOUS
Status: DISPENSED | OUTPATIENT
Start: 2023-06-01 | End: 2023-06-02

## 2023-06-01 RX ORDER — HYDROMORPHONE HYDROCHLORIDE 1 MG/ML
0.2 INJECTION, SOLUTION INTRAMUSCULAR; INTRAVENOUS; SUBCUTANEOUS EVERY 5 MIN PRN
Status: DISCONTINUED | OUTPATIENT
Start: 2023-06-01 | End: 2023-06-01 | Stop reason: HOSPADM

## 2023-06-01 RX ORDER — ONDANSETRON 4 MG/1
4 TABLET, ORALLY DISINTEGRATING ORAL EVERY 30 MIN PRN
Status: DISCONTINUED | OUTPATIENT
Start: 2023-06-01 | End: 2023-06-01 | Stop reason: HOSPADM

## 2023-06-01 RX ORDER — NALOXONE HYDROCHLORIDE 0.4 MG/ML
0.4 INJECTION, SOLUTION INTRAMUSCULAR; INTRAVENOUS; SUBCUTANEOUS
Status: DISCONTINUED | OUTPATIENT
Start: 2023-06-01 | End: 2023-06-02 | Stop reason: HOSPADM

## 2023-06-01 RX ORDER — HYDRALAZINE HYDROCHLORIDE 25 MG/1
25 TABLET, FILM COATED ORAL EVERY 8 HOURS
Status: DISCONTINUED | OUTPATIENT
Start: 2023-06-01 | End: 2023-06-02 | Stop reason: HOSPADM

## 2023-06-01 RX ORDER — ONDANSETRON 2 MG/ML
4 INJECTION INTRAMUSCULAR; INTRAVENOUS EVERY 6 HOURS PRN
Status: DISCONTINUED | OUTPATIENT
Start: 2023-06-01 | End: 2023-06-02 | Stop reason: HOSPADM

## 2023-06-01 RX ORDER — LIDOCAINE 40 MG/G
CREAM TOPICAL
Status: DISCONTINUED | OUTPATIENT
Start: 2023-06-01 | End: 2023-06-01 | Stop reason: HOSPADM

## 2023-06-01 RX ORDER — TRAMADOL HYDROCHLORIDE 50 MG/1
50 TABLET ORAL EVERY 4 HOURS PRN
Status: DISCONTINUED | OUTPATIENT
Start: 2023-06-01 | End: 2023-06-02 | Stop reason: HOSPADM

## 2023-06-01 RX ORDER — LIDOCAINE HYDROCHLORIDE 20 MG/ML
INJECTION, SOLUTION INFILTRATION; PERINEURAL PRN
Status: DISCONTINUED | OUTPATIENT
Start: 2023-06-01 | End: 2023-06-01

## 2023-06-01 RX ORDER — VANCOMYCIN HYDROCHLORIDE 1 G/20ML
INJECTION, POWDER, LYOPHILIZED, FOR SOLUTION INTRAVENOUS PRN
Status: DISCONTINUED | OUTPATIENT
Start: 2023-06-01 | End: 2023-06-01 | Stop reason: HOSPADM

## 2023-06-01 RX ORDER — SODIUM CHLORIDE, SODIUM LACTATE, POTASSIUM CHLORIDE, CALCIUM CHLORIDE 600; 310; 30; 20 MG/100ML; MG/100ML; MG/100ML; MG/100ML
INJECTION, SOLUTION INTRAVENOUS CONTINUOUS
Status: DISCONTINUED | OUTPATIENT
Start: 2023-06-01 | End: 2023-06-01 | Stop reason: HOSPADM

## 2023-06-01 RX ORDER — HYDROXYZINE HYDROCHLORIDE 10 MG/1
10 TABLET, FILM COATED ORAL EVERY 6 HOURS PRN
Status: DISCONTINUED | OUTPATIENT
Start: 2023-06-01 | End: 2023-06-02 | Stop reason: HOSPADM

## 2023-06-01 RX ORDER — SODIUM CHLORIDE, SODIUM LACTATE, POTASSIUM CHLORIDE, CALCIUM CHLORIDE 600; 310; 30; 20 MG/100ML; MG/100ML; MG/100ML; MG/100ML
INJECTION, SOLUTION INTRAVENOUS CONTINUOUS PRN
Status: DISCONTINUED | OUTPATIENT
Start: 2023-06-01 | End: 2023-06-01

## 2023-06-01 RX ORDER — LABETALOL 20 MG/4 ML (5 MG/ML) INTRAVENOUS SYRINGE
PRN
Status: DISCONTINUED | OUTPATIENT
Start: 2023-06-01 | End: 2023-06-01

## 2023-06-01 RX ORDER — VENLAFAXINE HYDROCHLORIDE 75 MG/1
150 CAPSULE, EXTENDED RELEASE ORAL DAILY
Status: DISCONTINUED | OUTPATIENT
Start: 2023-06-02 | End: 2023-06-02 | Stop reason: HOSPADM

## 2023-06-01 RX ORDER — CEFAZOLIN SODIUM/WATER 2 G/20 ML
2 SYRINGE (ML) INTRAVENOUS
Status: COMPLETED | OUTPATIENT
Start: 2023-06-01 | End: 2023-06-01

## 2023-06-01 RX ORDER — CEFAZOLIN SODIUM/WATER 2 G/20 ML
2 SYRINGE (ML) INTRAVENOUS SEE ADMIN INSTRUCTIONS
Status: DISCONTINUED | OUTPATIENT
Start: 2023-06-01 | End: 2023-06-01 | Stop reason: HOSPADM

## 2023-06-01 RX ORDER — DEXAMETHASONE SODIUM PHOSPHATE 4 MG/ML
INJECTION, SOLUTION INTRA-ARTICULAR; INTRALESIONAL; INTRAMUSCULAR; INTRAVENOUS; SOFT TISSUE PRN
Status: DISCONTINUED | OUTPATIENT
Start: 2023-06-01 | End: 2023-06-01

## 2023-06-01 RX ORDER — CEFAZOLIN SODIUM 1 G/3ML
1 INJECTION, POWDER, FOR SOLUTION INTRAMUSCULAR; INTRAVENOUS EVERY 8 HOURS
Status: COMPLETED | OUTPATIENT
Start: 2023-06-01 | End: 2023-06-02

## 2023-06-01 RX ORDER — CARVEDILOL 3.12 MG/1
12.5 TABLET ORAL 2 TIMES DAILY WITH MEALS
Status: DISCONTINUED | OUTPATIENT
Start: 2023-06-01 | End: 2023-06-02 | Stop reason: HOSPADM

## 2023-06-01 RX ORDER — HYDROMORPHONE HYDROCHLORIDE 1 MG/ML
0.2 INJECTION, SOLUTION INTRAMUSCULAR; INTRAVENOUS; SUBCUTANEOUS
Status: DISCONTINUED | OUTPATIENT
Start: 2023-06-01 | End: 2023-06-02 | Stop reason: HOSPADM

## 2023-06-01 RX ORDER — AMOXICILLIN 250 MG
1 CAPSULE ORAL 2 TIMES DAILY
Status: DISCONTINUED | OUTPATIENT
Start: 2023-06-01 | End: 2023-06-02 | Stop reason: HOSPADM

## 2023-06-01 RX ORDER — POLYETHYLENE GLYCOL 3350 17 G/17G
17 POWDER, FOR SOLUTION ORAL DAILY
Status: DISCONTINUED | OUTPATIENT
Start: 2023-06-02 | End: 2023-06-02 | Stop reason: HOSPADM

## 2023-06-01 RX ORDER — ACETAMINOPHEN 325 MG/1
975 TABLET ORAL EVERY 8 HOURS
Status: DISCONTINUED | OUTPATIENT
Start: 2023-06-01 | End: 2023-06-02 | Stop reason: HOSPADM

## 2023-06-01 RX ORDER — PROCHLORPERAZINE MALEATE 5 MG
5 TABLET ORAL EVERY 6 HOURS PRN
Status: DISCONTINUED | OUTPATIENT
Start: 2023-06-01 | End: 2023-06-02 | Stop reason: HOSPADM

## 2023-06-01 RX ORDER — BUPROPION HYDROCHLORIDE 300 MG/1
300 TABLET ORAL EVERY MORNING
COMMUNITY
End: 2024-05-30

## 2023-06-01 RX ORDER — ACETAMINOPHEN 325 MG/1
650 TABLET ORAL EVERY 4 HOURS PRN
Status: DISCONTINUED | OUTPATIENT
Start: 2023-06-04 | End: 2023-06-02 | Stop reason: HOSPADM

## 2023-06-01 RX ORDER — ACETAMINOPHEN 325 MG/1
650 TABLET ORAL ONCE
Status: COMPLETED | OUTPATIENT
Start: 2023-06-01 | End: 2023-06-01

## 2023-06-01 RX ORDER — HYDROMORPHONE HYDROCHLORIDE 1 MG/ML
0.4 INJECTION, SOLUTION INTRAMUSCULAR; INTRAVENOUS; SUBCUTANEOUS
Status: DISCONTINUED | OUTPATIENT
Start: 2023-06-01 | End: 2023-06-02 | Stop reason: HOSPADM

## 2023-06-01 RX ORDER — ONDANSETRON 4 MG/1
4 TABLET, ORALLY DISINTEGRATING ORAL EVERY 6 HOURS PRN
Status: DISCONTINUED | OUTPATIENT
Start: 2023-06-01 | End: 2023-06-02 | Stop reason: HOSPADM

## 2023-06-01 RX ORDER — AMLODIPINE BESYLATE 5 MG/1
10 TABLET ORAL DAILY
Status: DISCONTINUED | OUTPATIENT
Start: 2023-06-02 | End: 2023-06-02 | Stop reason: HOSPADM

## 2023-06-01 RX ORDER — FENTANYL CITRATE 50 UG/ML
25 INJECTION, SOLUTION INTRAMUSCULAR; INTRAVENOUS EVERY 5 MIN PRN
Status: DISCONTINUED | OUTPATIENT
Start: 2023-06-01 | End: 2023-06-01 | Stop reason: HOSPADM

## 2023-06-01 RX ORDER — ONDANSETRON 2 MG/ML
INJECTION INTRAMUSCULAR; INTRAVENOUS PRN
Status: DISCONTINUED | OUTPATIENT
Start: 2023-06-01 | End: 2023-06-01

## 2023-06-01 RX ORDER — ROSUVASTATIN CALCIUM 20 MG/1
20 TABLET, COATED ORAL DAILY
Status: DISCONTINUED | OUTPATIENT
Start: 2023-06-02 | End: 2023-06-02 | Stop reason: HOSPADM

## 2023-06-01 RX ORDER — LIDOCAINE 40 MG/G
CREAM TOPICAL
Status: DISCONTINUED | OUTPATIENT
Start: 2023-06-01 | End: 2023-06-02 | Stop reason: HOSPADM

## 2023-06-01 RX ORDER — PROPOFOL 10 MG/ML
INJECTION, EMULSION INTRAVENOUS PRN
Status: DISCONTINUED | OUTPATIENT
Start: 2023-06-01 | End: 2023-06-01

## 2023-06-01 RX ORDER — BISACODYL 10 MG
10 SUPPOSITORY, RECTAL RECTAL DAILY PRN
Status: DISCONTINUED | OUTPATIENT
Start: 2023-06-01 | End: 2023-06-02 | Stop reason: HOSPADM

## 2023-06-01 RX ADMIN — LIDOCAINE HYDROCHLORIDE 60 MG: 20 INJECTION, SOLUTION INFILTRATION; PERINEURAL at 12:52

## 2023-06-01 RX ADMIN — LABETALOL 20 MG/4 ML (5 MG/ML) INTRAVENOUS SYRINGE 15 MG: at 13:51

## 2023-06-01 RX ADMIN — PHENYLEPHRINE HYDROCHLORIDE 150 MCG: 10 INJECTION INTRAVENOUS at 15:46

## 2023-06-01 RX ADMIN — CEFAZOLIN 1 G: 1 INJECTION, POWDER, FOR SOLUTION INTRAMUSCULAR; INTRAVENOUS at 20:36

## 2023-06-01 RX ADMIN — Medication 30 MG: at 12:55

## 2023-06-01 RX ADMIN — LABETALOL 20 MG/4 ML (5 MG/ML) INTRAVENOUS SYRINGE 20 MG: at 14:35

## 2023-06-01 RX ADMIN — DEXAMETHASONE SODIUM PHOSPHATE 6 MG: 4 INJECTION, SOLUTION INTRA-ARTICULAR; INTRALESIONAL; INTRAMUSCULAR; INTRAVENOUS; SOFT TISSUE at 13:14

## 2023-06-01 RX ADMIN — HYDRALAZINE HYDROCHLORIDE 25 MG: 25 TABLET, FILM COATED ORAL at 23:29

## 2023-06-01 RX ADMIN — TRAMADOL HYDROCHLORIDE 50 MG: 50 TABLET, COATED ORAL at 20:37

## 2023-06-01 RX ADMIN — SUGAMMADEX 150 MG: 100 INJECTION, SOLUTION INTRAVENOUS at 16:24

## 2023-06-01 RX ADMIN — ONDANSETRON 4 MG: 2 INJECTION INTRAMUSCULAR; INTRAVENOUS at 15:42

## 2023-06-01 RX ADMIN — Medication 2 G: at 12:58

## 2023-06-01 RX ADMIN — CARVEDILOL 12.5 MG: 3.12 TABLET, FILM COATED ORAL at 21:42

## 2023-06-01 RX ADMIN — Medication 10 MG: at 13:58

## 2023-06-01 RX ADMIN — PROPOFOL 60 MG: 10 INJECTION, EMULSION INTRAVENOUS at 12:53

## 2023-06-01 RX ADMIN — SODIUM CHLORIDE, POTASSIUM CHLORIDE, SODIUM LACTATE AND CALCIUM CHLORIDE: 600; 310; 30; 20 INJECTION, SOLUTION INTRAVENOUS at 12:43

## 2023-06-01 RX ADMIN — PHENYLEPHRINE HYDROCHLORIDE 100 MCG: 10 INJECTION INTRAVENOUS at 15:51

## 2023-06-01 RX ADMIN — LABETALOL 20 MG/4 ML (5 MG/ML) INTRAVENOUS SYRINGE 10 MG: at 14:09

## 2023-06-01 RX ADMIN — QUETIAPINE FUMARATE 75 MG: 50 TABLET ORAL at 21:43

## 2023-06-01 RX ADMIN — ACETAMINOPHEN 975 MG: 325 TABLET ORAL at 20:30

## 2023-06-01 RX ADMIN — HYDROMORPHONE HYDROCHLORIDE 0.5 MG: 1 INJECTION, SOLUTION INTRAMUSCULAR; INTRAVENOUS; SUBCUTANEOUS at 13:47

## 2023-06-01 RX ADMIN — PROPOFOL 40 MG: 10 INJECTION, EMULSION INTRAVENOUS at 12:54

## 2023-06-01 RX ADMIN — SODIUM CHLORIDE, POTASSIUM CHLORIDE, SODIUM LACTATE AND CALCIUM CHLORIDE: 600; 310; 30; 20 INJECTION, SOLUTION INTRAVENOUS at 15:15

## 2023-06-01 RX ADMIN — FENTANYL CITRATE 50 MCG: 50 INJECTION, SOLUTION INTRAMUSCULAR; INTRAVENOUS at 12:51

## 2023-06-01 RX ADMIN — FENTANYL CITRATE 25 MCG: 50 INJECTION INTRAMUSCULAR; INTRAVENOUS at 18:08

## 2023-06-01 RX ADMIN — ACETAMINOPHEN 650 MG: 325 TABLET ORAL at 11:09

## 2023-06-01 RX ADMIN — FENTANYL CITRATE 50 MCG: 50 INJECTION, SOLUTION INTRAMUSCULAR; INTRAVENOUS at 13:30

## 2023-06-01 RX ADMIN — LABETALOL 20 MG/4 ML (5 MG/ML) INTRAVENOUS SYRINGE 10 MG: at 14:03

## 2023-06-01 RX ADMIN — SODIUM CHLORIDE, POTASSIUM CHLORIDE, SODIUM LACTATE AND CALCIUM CHLORIDE: 600; 310; 30; 20 INJECTION, SOLUTION INTRAVENOUS at 20:13

## 2023-06-01 RX ADMIN — Medication 10 MG: at 13:20

## 2023-06-01 ASSESSMENT — ACTIVITIES OF DAILY LIVING (ADL)
ADLS_ACUITY_SCORE: 37
ADLS_ACUITY_SCORE: 35
ADLS_ACUITY_SCORE: 37
ADLS_ACUITY_SCORE: 37

## 2023-06-01 NOTE — OR NURSING
Pt bladder scanned per orders. Pt had >1115ml. Pt would have exceeded str cath kit bag volume. Pt was cathed with regular kit. At almost a liter out peñaloza was clamped to prevent fluid shift or BP drop. Cath unclamped 20 minutes later and another 550ml was emptied. Pt bladder scanned post cath for 0ml. Pt tolerated procedure well and is now resting comfortably.

## 2023-06-01 NOTE — OR NURSING
PACU to Inpatient Nursing Handoff    Patient Sri Grewal is a 73 year old female who speaks English.   Procedure Procedure(s):  REVISION OPEN REDUCTION INTERNAL FIXATION RIGHT OLECRANON  verses HARDWARE REMOVAL RIGHT OLECRANON   Surgeon(s) Primary: Williams Phipps MD  Assisting: Issa Thurston MD     Allergies   Allergen Reactions     Morphine And Related      GI UPSET     Oxycodone      Seasonal Allergies        Isolation  No active isolations     Past Medical History   has a past medical history of Abnormal Papanicolaou smear of cervix and cervical HPV, Allergic rhinitis, cause unspecified, Cerebral infarction (H), Contact dermatitis and other eczema, due to unspecified cause, Endometriosis, site unspecified, Esophageal reflux, Migraine, unspecified, without mention of intractable migraine without mention of status migrainosus, Mild persistent asthma, and Unspecified disorder of uterus.    Anesthesia General   Dermatome Level     Preop Meds acetaminophen (Tylenol) - time given: 1109   Nerve block Not applicable   Intraop Meds dexamethasone (Decadron)  fentanyl (Sublimaze): 100 mcg total  hydromorphone (Dilaudid): 0.5 mg total  ondansetron (Zofran): last given at 1542  Labetolol 55mg IV    Local Meds Yes   Antibiotics cefazolin (Ancef) - last given at 1258     Pain Patient Currently in Pain: denies   PACU meds  fentanyl (Sublimaze): 25 mcg (total dose) last given at 1808    PCA / epidural No   Capnography Respiratory Monitoring (EtCO2): 39 mmHg   Telemetry ECG Rhythm: Normal sinus rhythm   Inpatient Telemetry Monitor Ordered? No        Labs Glucose Lab Results   Component Value Date     06/01/2023     07/27/2021     11/04/2020       Hgb Lab Results   Component Value Date    HGB 9.7 05/14/2023    HGB 14.8 11/04/2020       INR Lab Results   Component Value Date    INR 0.96 04/30/2023    INR 1.1 06/25/2021    INR 0.87 01/15/2017      PACU Imaging Completed     Wound/Incision  Incision/Surgical Site 05/05/23 Right;Anterior Shoulder (Active)   Incision Assessment [facility] 06/01/23 1800   Nora-Incision Assessment UTV 05/15/23 1705   Closure Approximated 06/01/23 1800   Incision Drainage Amount None 06/01/23 1800   Incision Care Ice applied 05/13/23 1956   Dressing Intervention Open to air / No Dressing 06/01/23 1800   Number of days: 27       Incision/Surgical Site 06/01/23 Right Elbow (Active)   Incision Assessment UTV 06/01/23 1800   Closure ELENI 06/01/23 1800   Incision Drainage Amount None 06/01/23 1800   Dressing Intervention Clean, dry, intact 06/01/23 1800   Number of days: 0      CMS        Equipment ice pack   Other LDA       IV Access Peripheral IV 06/01/23 Left;Posterior Lower forearm (Active)   Site Assessment [facility] 06/01/23 1800   Line Status Saline locked 06/01/23 1800   Dressing Transparent 06/01/23 1800   Dressing Status clean;dry;intact 06/01/23 1700   Phlebitis Scale 0-->no symptoms 06/01/23 1800   Number of days: 0       Peripheral IV 06/01/23 Left;Dorsal Hand (Active)   Site Assessment [facility] 06/01/23 1800   Line Status Infusing 06/01/23 1800   Dressing Transparent 06/01/23 1800   Dressing Status clean;dry;intact 06/01/23 1700   Phlebitis Scale 0-->no symptoms 06/01/23 1800   Number of days: 0      Blood Products Not applicable    mL   Intake/Output Date 06/01/23 0700 - 06/02/23 0659   Shift 9164-6737 2118-7153 9582-4401 24 Hour Total   INTAKE   I.V. 900 200  1100   Shift Total(mL/kg) 900(16.67) 200(3.71)  1100(20.38)   OUTPUT   Urine  1525  1525   Blood  100  100   Shift Total(mL/kg)  1625(30.11)  1625(30.11)   Weight (kg) 53.98 53.98 53.98 53.98      Drains / [place]     Time of void PreOp Time of Void Prior to Procedure: 1217 (06/01/23 1217)    PostOp Straight cath: 550 mL (cath removed.) (06/01/23 1832)    Diapered? No   Bladder Scan Bladder Scan Volume (mL): 1115 ml (06/01/23 1748)   PO    tolerating sips     Vitals    B/P: 119/77  T: 98.4  F (36.9  C)    Temp src:  Oral  P:  Pulse: 72 (06/01/23 1815)          R: 10  O2:  SpO2: 95 %    O2 Device: Nasal cannula (06/01/23 1815)    Oxygen Delivery: 2 LPM (06/01/23 1815)         Family/support present friend Bryson has left for the evening   Patient belongings  2 bags     Patient transported on cart   DC meds/scripts (obs/outpt) Not applicable   Inpatient Pain Meds Released? Yes       Special needs/considerations Shoulder replacement a couple of weeks age.   Tasks needing completion None       Kayla Ramirez RN  [facility] [id number]

## 2023-06-01 NOTE — BRIEF OP NOTE
"[facility]    Brief Operative Note    Pre-operative diagnosis: Closed bicondylar fracture of distal humerus, right, initial encounter [S49.380J]  Post-operative diagnosis Same as pre-operative diagnosis    Procedure: Procedure(s):  REVISION OPEN REDUCTION INTERNAL FIXATION RIGHT OLECRANON  verses HARDWARE REMOVAL RIGHT OLECRANON  Surgeon: Surgeon(s) and Role:     * Williams Phipps MD - Primary     * Issa Thurston MD - Assisting  Anesthesia: General   Estimated Blood Loss: 100 mL from 6/1/2023 12:43 PM to 6/1/2023  5:04 PM      Drains: None  Specimens: * No specimens in log *  Findings:   See full operative report.  Complications: None.  Implants:   Implant Name Type Inv. Item Serial No.  Lot No. LRB No. Used Action   6 screws and 1 plate      Right 7 Explanted   IMP WIRE CHIQUIS 0.062X4\" 1646- - [id number]  IMP WIRE CHIQUIS 0.062X4\" 1646-  [employer]  Right 4 Used as a Supply   2.0Radial Head Plate  (trilock Olecranon Plate Right, 11.6, 7h      Right 1 Implanted   2.0 Radial Head Plates (Trilock Olecranon Plate left 11.6, 7h)      Right 1 Implanted   screwdriver blade (Twist epnxmr00.35mm x 50mm,K101mm,AO)      Right 1 Implanted   Gold 2.8 Cortex Bone Screw (2.8 Cortical Screw 22mm, HD7,1/PKG)      Right 1 Implanted   Gold 2.8 Cortex Bone Screw (2.8 Cortical Screw 26mm, HD7,1/PKG)      Right 1 Implanted   Gold 2.8 Cortex Bone Screw (2.8 Cortical Screw 26mm, HD7,1/PKG)      Right 1 Wasted   Gold 2.8 Cortex Bone Screw (2.8 Cortical Screw 36mm, HD7,1/PKG)      Right 1 Wasted   BLUE 2.8 LOCKING SCREW  (2.8Trilock Screw 10mm HD7.1/pkg)      Right 1 Wasted   BLUE 2.8 LOCKING SCREW  (2.8Trilock Screw 16mm HD7.1/pkg)      Right 5 Implanted   BLUE 2.8 LOCKING SCREW  (2.8Trilock Screw 20mm HD7.1/pkg)      Right 1 Implanted   BLUE 2.8 LOCKING SCREW  (2.8Trilock Screw 20mm HD7.1/pkg)      Right 3 Wasted   BLUE 2.8 LOCKING SCREW  (2.8Trilock " Screw 24mm HD7.1/pkg)      Right 1 Implanted   BLUE 2.8 LOCKING SCREW  (2.8Trilock Screw 24mm HD7.1/pkg)      Right 1 Wasted   BLUE 2.8 LOCKING SCREW  (2.8Trilock Screw 26mm HD7.1/pkg)      Right 1 Implanted   BLUE 2.8 LOCKING SCREW  (2.8Trilock Screw 40mm HD7.1/pkg)      Right 1 Implanted   BLUE 2.8 LOCKING SCREW  (2.8Trilock Screw 50mm HD7.1/pkg)      Right 1 Wasted   K-wires: Lancet (1.6 K-wire, Lncet, 150mm/pkg      Right 4 Implanted       Activity: Arm by side RUE (s/p reverse total shoulder); extension splint RUE.  Weight bearing status: NWB RUE.  Antibiotics: Ancef x24 hours perioperatively.  Diet: Begin with clear fluids and progress diet as tolerated.  DVT prophylaxis: Ambulation, SCDs  Bracing/Splinting: Extension splint RUE  Pain management: transition from IV to orals as tolerated.   X-rays: Upright 2V right shoulder (Grashey, scap Y), 2V right elbow in PACU.  Physical Therapy:  ROM, ADL's.  Occupational Therapy: ADL's.  Consults: PT, OT. Medicine, appreciate assistance in caring for this patient.    Follow-up: Clinic with Dr. Phipps care team in 2 weeks for wound check    Disposition: Pending progress with therapies, pain control on orals, and medical stability, anticipate discharge to home on POD #1.    Issa Thurston MD  Orthopaedic Surgery PGY-4  [phone]    Please page me at [phone] with any questions/concerns. If there is no response, if it is a weekend, or if it is during evening hours then please page the orthopaedic surgery resident on call.

## 2023-06-01 NOTE — ANESTHESIA PREPROCEDURE EVALUATION
Anesthesia Pre-Procedure Evaluation    Patient: Sri Grewal   MRN: [id number] : 1950        Procedure : Procedure(s):  REVISION OPEN REDUCTION INTERNAL FIXATION RIGHT OLECRANON  verses HARDWARE REMOVAL RIGHT OLECRANON          Past Medical History:   Diagnosis Date     Abnormal Papanicolaou smear of cervix and cervical HPV      Allergic rhinitis, cause unspecified     seasonal allergies     Cerebral infarction (H)      Contact dermatitis and other eczema, due to unspecified cause      Endometriosis, site unspecified      Esophageal reflux     GERD     Migraine, unspecified, without mention of intractable migraine without mention of status migrainosus      Mild persistent asthma      Unspecified disorder of uterus     fibroid uterus      Past Surgical History:   Procedure Laterality Date     COLONOSCOPY  2014    Procedure: COLONOSCOPY;  Surgeon: Nathan Baker MD;  Location: [facility]     OPEN REDUCTION INTERNAL FIXATION HUMERUS DISTAL Right 5/3/2023    Procedure: OPEN REDUCTION INTERNAL FIXATION, FRACTURE, HUMERUS, DISTAL;  Surgeon: Williams Phipps MD;  Location: [facility]     REVERSE ARTHROPLASTY SHOULDER Right 2023    Procedure: ARTHROPLASTY, SHOULDER, TOTAL, REVERSE for;  Surgeon: Cierra Krause MD;  Location: [facility]     ZC  DELIVERY ONLY       X2     ZZHC COLONOSCOPY THRU STOMA, DIAGNOSTIC  2002    normal      Allergies   Allergen Reactions     Morphine And Related      GI UPSET     Oxycodone      Seasonal Allergies       Social History     Tobacco Use     Smoking status: Never     Passive exposure: Never     Smokeless tobacco: Never   Vaping Use     Vaping status: Never Used   Substance Use Topics     Alcohol use: Yes     Comment: socially      Wt Readings from Last 1 Encounters:   23 54 kg (119 lb)        Anesthesia Evaluation   Pt has had prior anesthetic. Type: General.    No history of anesthetic complications       ROS/MED HX  ENT/Pulmonary:     (+) Mild Persistent, asthma      Neurologic:     (+) CVA, without deficits, TIA, date: 1 month ago, features: She fell and had right sided weakness.  Given the clot dissolving agent TNK in the ED with improvement, and placed on Plavix.  Had confusion initially but resolved..     Cardiovascular:     (+) hypertension--CAD ---Taking blood thinners Pt has received instructions:     METS/Exercise Tolerance:     Hematologic: Comments: Blood thinners for TIA/CVA    (+) History of blood clots, pt is anticoagulated,     Musculoskeletal:       GI/Hepatic:     (+) GERD, Asymptomatic on medication,     Renal/Genitourinary:  - neg Renal ROS     Endo:  - neg endo ROS     Psychiatric/Substance Use:  - neg psychiatric ROS     Infectious Disease:  - neg infectious disease ROS     Malignancy:  - neg malignancy ROS     Other:  - neg other ROS          Physical Exam    Airway  airway exam normal           Respiratory Devices and Support         Dental       (+) Multiple visibly decayed, broken teeth      Cardiovascular   cardiovascular exam normal          Pulmonary   pulmonary exam normal                OUTSIDE LABS:  CBC:   Lab Results   Component Value Date    WBC 8.6 05/14/2023    WBC 6.7 05/13/2023    HGB 9.7 (L) 05/14/2023    HGB 8.2 (L) 05/13/2023    HCT 28.7 (L) 05/14/2023    HCT 24.1 (L) 05/13/2023     (H) 05/14/2023     05/13/2023     BMP:   Lab Results   Component Value Date     (L) 05/14/2023     (L) 05/13/2023    POTASSIUM 3.8 05/14/2023    POTASSIUM 4.1 05/13/2023    CHLORIDE 95 (L) 05/14/2023    CHLORIDE 94 (L) 05/13/2023    CO2 24 05/14/2023    CO2 23 05/13/2023    BUN 6.1 (L) 05/14/2023    BUN 8.0 05/13/2023    CR 0.52 05/14/2023    CR 0.55 05/13/2023     (H) 06/01/2023     (H) 05/14/2023     COAGS:   Lab Results   Component Value Date    PTT 30 01/15/2017    INR 0.96 04/30/2023     POC: No results found for: BGM, HCG, HCGS  HEPATIC:   Lab Results   Component Value Date    ALBUMIN 3.7 05/14/2023    PROTTOTAL  6.6 05/14/2023    ALT 35 05/14/2023    AST 28 05/14/2023    ALKPHOS 114 (H) 05/14/2023    BILITOTAL 0.7 05/14/2023    BILIDIRECT 0.2 04/26/2002     OTHER:   Lab Results   Component Value Date    PH 7.43 05/05/2023    LACT 2.0 05/07/2023    A1C 5.4 01/16/2017    CESIA 9.2 05/14/2023    MAG 2.0 05/06/2023    LIPASE 109 05/20/2014    TSH 0.35 02/02/2023    SED 11 10/14/2005       Anesthesia Plan    ASA Status:  3   NPO Status:  NPO Appropriate    Anesthesia Type: General.     - Airway: ETT   Induction: Intravenous, Propofol.   Maintenance: Balanced.        Consents    Anesthesia Plan(s) and associated risks, benefits, and realistic alternatives discussed. Questions answered and patient/representative(s) expressed understanding.    - Discussed:     - Discussed with:  Patient      - Extended Intubation/Ventilatory Support Discussed: No.      - Patient is DNR/DNI Status: No    Use of blood products discussed: No .     Postoperative Care    Pain management: IV analgesics, Oral pain medications, Peripheral nerve block (Single Shot).   PONV prophylaxis: Ondansetron (or other 5HT-3)     Comments:                Nathan Noble MD

## 2023-06-01 NOTE — ANESTHESIA PROCEDURE NOTES
Airway       Patient location during procedure: OR       Procedure Start/Stop Times: 6/1/2023 12:57 PM  Staff -        CRNA: Ger Gerard APRN CRNA       Performed By: CRNA  Consent for Airway        Urgency: elective  Indications and Patient Condition       Indications for airway management: oren-procedural       Induction type:intravenous       Mask difficulty assessment: 1 - vent by mask    Final Airway Details       Final airway type: endotracheal airway       Successful airway: ETT - single and Oral  Endotracheal Airway Details        ETT size (mm): 7.0       Cuffed: yes       Tracheal cuff pressure (cm H2O): 25       Successful intubation technique: direct laryngoscopy       DL Blade Type: Mccullough 2       Grade View of Cords: 1       Position: Left       Measured from: lips       Secured at (cm): 22       Bite block used: None    Post intubation assessment        Placement verified by: capnometry, equal breath sounds and chest rise        Number of attempts at approach: 1       Number of other approaches attempted: 0       Secured with: silk tape       Ease of procedure: easy       Dentition: Intact and Unchanged    Medication(s) Administered   Medication Administration Time: 6/1/2023 12:57 PM    Additional Comments       Poor dentition, several rotted teeth and missing several teeth

## 2023-06-01 NOTE — INTERVAL H&P NOTE
I have reviewed the surgical (or preoperative) H&P that is linked to this encounter, and examined the patient. There are no significant changes    Clinical Conditions Present on Arrival:  Clinically Significant Risk Factors Present on Admission        # Hypokalemia: Lowest K = 3.2 mmol/L in last 30 days, will replace as needed  # Hyponatremia: Lowest Na = 127 mmol/L in last 30 days, will monitor as appropriate      # Hypoalbuminemia: Lowest albumin = 3.1 g/dL in the past 30 days , will monitor as appropriate    # Drug Induced Platelet Defect: home medication list includes an antiplatelet medication

## 2023-06-01 NOTE — ANESTHESIA POSTPROCEDURE EVALUATION
Patient: Sri Grewal    Procedure: Procedure(s):  REVISION OPEN REDUCTION INTERNAL FIXATION RIGHT OLECRANON  verses HARDWARE REMOVAL RIGHT OLECRANON       Anesthesia Type:  General    Note:  Disposition: Outpatient   Postop Pain Control: Uneventful            Sign Out: Well controlled pain   PONV: No   Neuro/Psych: Uneventful            Sign Out: Acceptable/Baseline neuro status   Airway/Respiratory: Uneventful            Sign Out: Acceptable/Baseline resp. status   CV/Hemodynamics: Uneventful            Sign Out: Acceptable CV status; No obvious hypovolemia; No obvious fluid overload   Other NRE: NONE   DID A NON-ROUTINE EVENT OCCUR? No           Last vitals:  Vitals Value Taken Time   /87 06/01/23 1730   Temp     Pulse 74 06/01/23 1739   Resp 18 06/01/23 1739   SpO2 98 % 06/01/23 1739   Vitals shown include unvalidated device data.    Electronically Signed By: Nathan Noble MD  June 1, 2023  5:40 PM

## 2023-06-02 ENCOUNTER — TELEPHONE (OUTPATIENT)
Dept: FAMILY MEDICINE | Facility: OTHER | Age: 73
End: 2023-06-02
Payer: MEDICARE

## 2023-06-02 ENCOUNTER — APPOINTMENT (OUTPATIENT)
Dept: OCCUPATIONAL THERAPY | Facility: CLINIC | Age: 73
End: 2023-06-02
Attending: STUDENT IN AN ORGANIZED HEALTH CARE EDUCATION/TRAINING PROGRAM
Payer: MEDICARE

## 2023-06-02 VITALS
DIASTOLIC BLOOD PRESSURE: 60 MMHG | OXYGEN SATURATION: 97 % | TEMPERATURE: 97.5 F | BODY MASS INDEX: 21.09 KG/M2 | RESPIRATION RATE: 16 BRPM | WEIGHT: 119 LBS | HEIGHT: 63 IN | HEART RATE: 78 BPM | SYSTOLIC BLOOD PRESSURE: 103 MMHG

## 2023-06-02 DIAGNOSIS — S42.211D CLOSED DISPLACED FRACTURE OF SURGICAL NECK OF RIGHT HUMERUS WITH ROUTINE HEALING, UNSPECIFIED FRACTURE MORPHOLOGY, SUBSEQUENT ENCOUNTER: Primary | ICD-10-CM

## 2023-06-02 LAB
ANION GAP SERPL CALCULATED.3IONS-SCNC: 13 MMOL/L (ref 7–15)
BUN SERPL-MCNC: 13.2 MG/DL (ref 8–23)
CALCIUM SERPL-MCNC: 8.4 MG/DL (ref 8.8–10.2)
CHLORIDE SERPL-SCNC: 101 MMOL/L (ref 98–107)
CREAT SERPL-MCNC: 0.85 MG/DL (ref 0.51–0.95)
DEPRECATED HCO3 PLAS-SCNC: 21 MMOL/L (ref 22–29)
ERYTHROCYTE [DISTWIDTH] IN BLOOD BY AUTOMATED COUNT: 13.5 % (ref 10–15)
GFR SERPL CREATININE-BSD FRML MDRD: 73 ML/MIN/1.73M2
GLUCOSE BLDC GLUCOMTR-MCNC: 115 MG/DL (ref 70–99)
GLUCOSE SERPL-MCNC: 114 MG/DL (ref 70–99)
HCT VFR BLD AUTO: 27.2 % (ref 35–47)
HGB BLD-MCNC: 8.9 G/DL (ref 11.7–15.7)
MCH RBC QN AUTO: 30.4 PG (ref 26.5–33)
MCHC RBC AUTO-ENTMCNC: 32.7 G/DL (ref 31.5–36.5)
MCV RBC AUTO: 93 FL (ref 78–100)
PLATELET # BLD AUTO: 297 10E3/UL (ref 150–450)
POTASSIUM SERPL-SCNC: 4.7 MMOL/L (ref 3.4–5.3)
RBC # BLD AUTO: 2.93 10E6/UL (ref 3.8–5.2)
SODIUM SERPL-SCNC: 135 MMOL/L (ref 136–145)
WBC # BLD AUTO: 9.3 10E3/UL (ref 4–11)

## 2023-06-02 PROCEDURE — 250N000013 HC RX MED GY IP 250 OP 250 PS 637: Performed by: INTERNAL MEDICINE

## 2023-06-02 PROCEDURE — 250N000011 HC RX IP 250 OP 636: Performed by: STUDENT IN AN ORGANIZED HEALTH CARE EDUCATION/TRAINING PROGRAM

## 2023-06-02 PROCEDURE — 258N000003 HC RX IP 258 OP 636: Performed by: INTERNAL MEDICINE

## 2023-06-02 PROCEDURE — 99214 OFFICE O/P EST MOD 30 MIN: CPT | Performed by: STUDENT IN AN ORGANIZED HEALTH CARE EDUCATION/TRAINING PROGRAM

## 2023-06-02 PROCEDURE — 97535 SELF CARE MNGMENT TRAINING: CPT | Mod: GO

## 2023-06-02 PROCEDURE — 82962 GLUCOSE BLOOD TEST: CPT

## 2023-06-02 PROCEDURE — 85027 COMPLETE CBC AUTOMATED: CPT | Performed by: INTERNAL MEDICINE

## 2023-06-02 PROCEDURE — 36415 COLL VENOUS BLD VENIPUNCTURE: CPT | Performed by: INTERNAL MEDICINE

## 2023-06-02 PROCEDURE — 250N000013 HC RX MED GY IP 250 OP 250 PS 637: Performed by: STUDENT IN AN ORGANIZED HEALTH CARE EDUCATION/TRAINING PROGRAM

## 2023-06-02 PROCEDURE — 80048 BASIC METABOLIC PNL TOTAL CA: CPT | Performed by: INTERNAL MEDICINE

## 2023-06-02 PROCEDURE — 97165 OT EVAL LOW COMPLEX 30 MIN: CPT | Mod: GO

## 2023-06-02 RX ORDER — ACETAMINOPHEN 325 MG/1
650 TABLET ORAL EVERY 4 HOURS PRN
Qty: 60 TABLET | Refills: 0 | Status: SHIPPED | OUTPATIENT
Start: 2023-06-04 | End: 2023-06-15

## 2023-06-02 RX ORDER — AMOXICILLIN 250 MG
1 CAPSULE ORAL 2 TIMES DAILY
Qty: 30 TABLET | Refills: 0 | Status: SHIPPED | OUTPATIENT
Start: 2023-06-02 | End: 2023-06-29

## 2023-06-02 RX ORDER — TRAMADOL HYDROCHLORIDE 50 MG/1
25-50 TABLET ORAL EVERY 4 HOURS PRN
Qty: 20 TABLET | Refills: 0 | Status: SHIPPED | OUTPATIENT
Start: 2023-06-02 | End: 2023-06-27

## 2023-06-02 RX ORDER — HYDROXYZINE HYDROCHLORIDE 10 MG/1
10 TABLET, FILM COATED ORAL EVERY 6 HOURS PRN
Qty: 30 TABLET | Refills: 0 | Status: ON HOLD | OUTPATIENT
Start: 2023-06-02 | End: 2023-07-18

## 2023-06-02 RX ORDER — POLYETHYLENE GLYCOL 3350 17 G/17G
17 POWDER, FOR SOLUTION ORAL DAILY
Qty: 10 PACKET | Refills: 0 | Status: SHIPPED | OUTPATIENT
Start: 2023-06-02 | End: 2023-06-29

## 2023-06-02 RX ADMIN — POLYETHYLENE GLYCOL 3350 17 G: 17 POWDER, FOR SOLUTION ORAL at 08:25

## 2023-06-02 RX ADMIN — SODIUM CHLORIDE, POTASSIUM CHLORIDE, SODIUM LACTATE AND CALCIUM CHLORIDE: 600; 310; 30; 20 INJECTION, SOLUTION INTRAVENOUS at 05:48

## 2023-06-02 RX ADMIN — AMLODIPINE BESYLATE 10 MG: 5 TABLET ORAL at 08:27

## 2023-06-02 RX ADMIN — BUPROPION HYDROCHLORIDE 300 MG: 300 TABLET, FILM COATED, EXTENDED RELEASE ORAL at 08:33

## 2023-06-02 RX ADMIN — HYDROMORPHONE HYDROCHLORIDE 0.2 MG: 1 INJECTION, SOLUTION INTRAMUSCULAR; INTRAVENOUS; SUBCUTANEOUS at 02:29

## 2023-06-02 RX ADMIN — HYDROMORPHONE HYDROCHLORIDE 0.4 MG: 1 INJECTION, SOLUTION INTRAMUSCULAR; INTRAVENOUS; SUBCUTANEOUS at 08:27

## 2023-06-02 RX ADMIN — ACETAMINOPHEN 975 MG: 325 TABLET ORAL at 04:31

## 2023-06-02 RX ADMIN — SENNOSIDES AND DOCUSATE SODIUM 1 TABLET: 50; 8.6 TABLET ORAL at 08:27

## 2023-06-02 RX ADMIN — VENLAFAXINE HYDROCHLORIDE 150 MG: 75 CAPSULE, EXTENDED RELEASE ORAL at 08:27

## 2023-06-02 RX ADMIN — TRAMADOL HYDROCHLORIDE 50 MG: 50 TABLET, COATED ORAL at 05:19

## 2023-06-02 RX ADMIN — CEFAZOLIN 1 G: 1 INJECTION, POWDER, FOR SOLUTION INTRAMUSCULAR; INTRAVENOUS at 04:32

## 2023-06-02 RX ADMIN — MAGNESIUM HYDROXIDE 30 ML: 400 SUSPENSION ORAL at 09:52

## 2023-06-02 RX ADMIN — TRAMADOL HYDROCHLORIDE 50 MG: 50 TABLET, COATED ORAL at 00:38

## 2023-06-02 RX ADMIN — HYDROXYZINE HYDROCHLORIDE 10 MG: 10 TABLET ORAL at 04:32

## 2023-06-02 RX ADMIN — ROSUVASTATIN 20 MG: 20 TABLET, FILM COATED ORAL at 08:27

## 2023-06-02 RX ADMIN — ACETAMINOPHEN 975 MG: 325 TABLET ORAL at 12:28

## 2023-06-02 RX ADMIN — HYDROMORPHONE HYDROCHLORIDE 0.2 MG: 1 INJECTION, SOLUTION INTRAMUSCULAR; INTRAVENOUS; SUBCUTANEOUS at 01:51

## 2023-06-02 ASSESSMENT — ACTIVITIES OF DAILY LIVING (ADL)
ADLS_ACUITY_SCORE: 37
IADL_COMMENTS: WILL HAVE ASSIST WITH IADLS
ADLS_ACUITY_SCORE: 40
ADLS_ACUITY_SCORE: 37
DEPENDENT_IADLS:: CLEANING;COOKING;LAUNDRY
ADLS_ACUITY_SCORE: 37
ADLS_ACUITY_SCORE: 40
ADLS_ACUITY_SCORE: 37

## 2023-06-02 NOTE — PLAN OF CARE
VS: Temp: 98  F (36.7  C) Temp src: Oral BP: 105/78 Pulse: 74   Resp: 11 SpO2: 96 % O2 Device: None (Room air)    O2: Stable On RA.    Output: , 130,190 even with lower BS writer was concerned with no urge to urinate and 10hrs since previous straight cath decided to straight cath after 2 attempts to void on own with no success. 200 ml out after straight cath, water was encouraged after.    Last BM: 6/01/2023.    Activity: SBA    Skin: Previous surgery incision on R shoulder. incision on R arm UTV due to ace wrapped   Pain: In R arm, scheduled tylenol and PRN tramadol, 1 0.4mg dilaudid IV, atarax and ice packs    Neuro: A&Ox4. no numbness and tingling.   Dressing: incision on R arm UTV due to ace wrapped. Dressing CDI   Diet: Reg, no N/V   LDA: 2 L PIVs, wrist sal locked, hand infusing LR @75ml/hr    Equipment: Personal items, Capno, iv pole   Plan: Continue POC.   Additional Info: Pt had minimal sleep during night due to pain medications and bladder scanning and straight cath.

## 2023-06-02 NOTE — PLAN OF CARE
Occupational Therapy Discharge Summary    Reason for therapy discharge:    All goals and outcomes met, no further needs identified.    Progress towards therapy goal(s). See goals on Care Plan in [facility] electronic health record for goal details.  Goals met    Therapy recommendation(s):    Home with 24/7 assist from friend for ADLs/IADLs  and home OT

## 2023-06-02 NOTE — PROGRESS NOTES
Orthopaedic Surgery Progress Note 2023    Interval Events  - s/p OR yesterday  - AFVSS    Subjective  Pain controlled this AM. Denies new numbness, tingling, or weakness.  Tolerating diet without nausea or vomiting.  Voiding spontaneously.  +flatus, -BM.     Objective  Temp: 98  F (36.7  C) Temp src: Oral BP: 108/68 Pulse: 74   Resp: 11 SpO2: 96 % O2 Device: None (Room air) Oxygen Delivery: 2 LPM    Exam:  Gen: No acute distress, resting comfortably in bed.  Resp: Non-labored breathing  Cardio: Regular rate via peripheral pulse  MSK:  RUE:  - Extension splint in place, c/d/I  - Fires EPL, FPL, Intrinsics  - SILT median/radial/ulnar nerves  - Radial pulse 2+, hand wwp      ImaginV right shoulder/2V right elbow show postop change, no fractures or dislocations    Assessment: Sri Grewal is a 73 year old female s/p ORIF right distal humerus facture on 5/3/2023 with Dr. Phipps, right rTSA with Dr. Krause 2023.  Now s/p revision right olecranon ORIF on 2023 with Dr. Phipps.    Today:  - Pain control/medical stability  - Possible discharge as early as today    Plan:  Activity: Arm by side RUE (s/p reverse total shoulder); extension splint RUE.  Weight bearing status: NWB RUE.  Antibiotics: Ancef x24 hours perioperatively.  Diet: Begin with clear fluids and progress diet as tolerated.  DVT prophylaxis: Ambulation, SCDs  Bracing/Splinting: Extension splint RUE  Pain management: transition from IV to orals as tolerated.   X-rays: Upright 2V right shoulder (Grashey, scap Y), 2V right elbow in PACU -- complete  Physical Therapy:  ROM, ADL's.  Occupational Therapy: ADL's.  Consults: PT, OT. Medicine, appreciate assistance in caring for this patient.     Follow-up: Clinic with Dr. Phipps care team in 2 weeks for wound check     Disposition: Pending progress with therapies, pain control on orals, and medical stability, anticipate discharge to home on POD #1.    Issa Thurston MD  Orthopaedic Surgery  PGY-4  [phone]    Please page me at [phone] with any questions/concerns. If there is no response, if it is a weekend, or if it is during evening hours then please page the orthopaedic surgery resident on call.       Future Appointments   Date Time Provider Department [place]   6/2/2023  7:00 AM [facility] WAITLIST [facility] [place]   6/6/2023  1:00 PM Nathan Turner MD [facility]   6/12/2023  8:30 AM Williams Phipps MD [facility]   6/12/2023  9:30 AM Raoul Houser, OTR [facility]   8/4/2023  4:45 PM Miryam Mcgowan MD [facility]

## 2023-06-02 NOTE — OP NOTE
Patient: Sri Grewal  : 1950  MRN: [id number]    DATE OF OPERATION: 2023      OPERATIVE REPORT       PREOPERATIVE DIAGNOSIS:  Failure of right olecranon osteotomy fixation     POSTOPERATIVE DIAGNOSIS:  Failure of right olecranon osteotomy fixation     PROCEDURE:  Revision open reduction internal fixation of right olecranon    SURGEON:  Williams Phipps MD     ASSISTANT(S):  Issa Thurston MD (PGY4)    ANESTHESIA:  General     IMPLANTS:   [employer] 2.8mm double olecranon plates and associated screws    ESTIMATED BLOOD LOSS:  100cc    DVT PROPHYLAXIS:  SCDs    TOURNIQUET TIME:  130 minutes     SPECIMENS REMOVED:  None    INTRAOPERATIVE FINDINGS:  Displaced olecranon osteotomy which had pulled out of the prior fixation. Severe osteopenia of the proximal olecranon fragment. Significant callus formation and surgical scarring. No distal humerus hardware or reduction failure.     COMPLICATIONS:  None    DISPOSITION:  Stable to PACU.     INDICATIONS:  The patient is a 73 year old female who previously underwent open reduction internal fixation of right distal humerus fracture through an olecranon osteotomy on 5/3/23. She was noted to have poor bone quality at her index surgery. Several days later she underwent a cemented reverse total shoulder arthroplasty for an acute on chronic right proximal humerus fracture. Her splint was removed for that surgery and for occupational therapy 1 week later. Sometime during the postoperative hospital stay following her elbow surgery, the olecranon osteotomy pulled out of her fixation. This was demonstrated on radiographs on her day of discharge. The patient was subsequently seen in the clinic. Revision open reduction internal fixation was recommended to restore the articular surface and elbow extension function. The patient wished to proceed.    The indications for surgery were discussed with the patient. The benefits, risks, and alternatives of operative management were discussed  in detail with the patient. The patient understands that the risks of surgery include, but are not limited to: infection, bleeding, injury to nearby structures (such as nerves, blood vessels, and tendons), nonunion, malunion, hardware failure/irritation or breakage, need for additional surgery, pain, stiffness, scarring, need for rehabilitation, and anesthetic complications.  Patient expressed understanding and elected to proceed with surgery. All questions were answered to the patient's satisfaction.     Consent was obtained for the procedure.     DESCRIPTION OF PROCEDURE IN DETAIL:  The patient was seen in the preoperative care unit. Patient identity, consent, procedure to be performed, and operative site were verified with the patient. The right upper extremity was marked.     The patient was brought to the operating room and placed supine on the operating room table. SCDs were placed on the bilateral lower extremities. A well-padded tourniquet was placed on the upper arm.     General endotracheal anesthesia was induced. The patient was then positioned in the left lateral decubitus position on a half body bean bag with right arm supported over pillows. All bony prominences were well-padded.     The right upper extremity was prepped and draped in the usual sterile fashion. A timeout was performed confirming the correct patient, procedure, operative site, and antibiotics. All were in agreement.     Incision was made through the prior posterior scar, curving laterally around the olecranon and continued along the subcutaneous border of the ulna. Blunt dissection was carried down through subcutaneous tissues to the level of the triceps fascia. Full thickness subcutaneous flaps were elevated radially and ulnarly. The displaced osteotomy fragment was identified proximally. The tissue repair over the previous plate was incised and prior Vicryl sutures removed. The ulnar nerve was identified within the groove posterior  the medial epicondyle and protected throughout the remainder of the case. All screws and the prior olecranon plate were removed intact. The triceps muscle belly was then mobilized and freed from adhesions superficially and deep along the distal humerus. The patient had a notable elbow flexion contracture preoperatively and could not extend much between 70 degrees of flexion. We therefore used a cob to bluntly elevate the anterior capsule off of the joint. We felt a palpable release which dramatically improved the elbow extension to about 15 degrees shy of full extension. The osteotomy site and the proximal ulna were then debrided of callus. Examination of the osteotomy fragment demonstrated bone loss along the ulnar aspect where it had pulled off from the prior fixation. The osteotomy was again reduced to the proximal ulna and held with provisional k-wires. Direct visualization of the semilunar notch demonstrated an anatomic articular reduction. This was likewise confirmed on fluoroscopy. Dual plates were then placed on the radial and ulnar aspects of the proximal ulna curving around the tip of the olecranon. We started on the radial side. A locking screw was first placed in the most proximal hole in the osteotomized fragment. A cortical screw was then drilled, measured, and placed in the oblong hole allowing the plate to compress across the fracture. The plate on the ulnar side was placed in similar fashion. A long proximal locking screw was drilled, measured, and placed taking care to traverse the osteotomy site into the ulna. A cortical screw was then placed distally deploying the plate in compression mode. The remaining locking screws were then drilled, measured, and placed, maximizing screw fixation in the proximal olecranon tip. Final fluoroscopic images were obtained confirming appropriate screw trajectory and length. All screws were confirmed to be extra-articular both on fluoroscopy and by visual  examination of the joint. No screws were in the proximal radioulnar joint. The elbow was brought through a range of motion which demonstrated excellent and secure fixation of the olecranon. No crepitus was felt. The patient was noted to have near full extension, excellent flexion, and full pronation and supination. The wound was then copiously irrigated with 1L of normal saline. The tourniquet was deflated and hemostasis assured. One gram of vancomycin powder was placed in the wound. The ECU and FCU interval was again re-approximated over the plates. The deep dermal tissues were closed with 3-0 Vicryl. The skin was then closed with a running 3-0 Monocryl subcuticular stitch. 20cc of 0.5% bupivacaine was infiltrated along the wound. A Prineo and sterile dressing were then placed. The right upper extremity was then placed in a long arm splint at 30 degrees of flexion.    All needle and sponge counts were correct at the end of the procedure. There were no complications.     The patient was awoken from anesthesia and taken to the postoperative recovery unit in stable condition.     I was present and scrubbed for the entire procedure.     POSTOPERATIVE PLAN:  The patient will be admitted overnight for pain control and monitoring. She will discharge tomorrow morning. She will be strict non-weight bearing of right upper extremity. She will return to clinic on 6/12 at which point radiographs of her right elbow will be needed out of plaster. She will need to see hand therapy that day to resume elbow ROM.    Williams Phipps MD     Hand, Upper Extremity & Microvascular Surgery  Department of Orthopedic Surgery  [facility]

## 2023-06-02 NOTE — CONSULTS
HOSPITALIST CONSULTATION     REQUESTING PHYSICIAN: Williams Phipps MD    REASON FOR CONSULTATION: Evaluation, Recommendations and Co-management of Medical Comorbidities.     ASSESSMENT & PLAN :       Sri Grewal is a 73 year old female with past medical h/o CAD, NICM ([place]), HTN, previous TIA and CVA post angiogram in 6/2021.  4/25/23 likely TIA sp TNK, no residual deficits, switched aspirin to plavix.   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  Now readmitted 6/1/2023 after following procedure.     Pre-operative diagnosis:  Closed bicondylar fracture of distal humerus, right, initial encounter.    Procedure:      Procedure(s):  REVISION OPEN REDUCTION INTERNAL FIXATION RIGHT OLECRANON  verses HARDWARE REMOVAL RIGHT OLECRANON.  By Dr Phipps  Estimated Blood Loss: 100 mL   Complications: None.    Currently doing well. ao x 4. No acute concern.          # Orthopedic Surgery:   POD 0    Post Op Management per Primary team.   Hemodynamics: stable currently.   Continue on gentle IV fluids, until adequate PO.    Post op 24 hr capnography per protocol.   Pain control- per Primary team and/or Pain team.    Judicious use of narcotics.  Delirium precautions.   Consider bowel regimen with narcotics.   Encourage Incentive spirometry to prevent atelectasis.  DVT prophylaxis- per Primary team.  Activity, antibiotics, wound and/or drain care - per Primary team.     # Anemia of acute blood loss:  Monitor hgb for anemia of acute blood loss. Transfuse for hgb <7.0. Pre op Hgb 9.7.        #Recent TIA/CVA, 4/25/23  #ho Cerebral aneurysm   - S/p TNK on 4/29/23 at [facility] ED  - PTA ASA discontinued and patient was started on Plavix on 4/29- resume asap per primary.   --Resume pta statin.       #Hx CAD , HTN  - EF 65-70%, normal RV function  - Blood pressure controlled.   - Resume pta amlodipine, Coreg, hydralazine with hold parameters. Holding lisinopril and Lasix for now. Resume as able. - Monitor bp     #Right upper pole kidney cyst   - F/u w/ PCP     #Depression   # Post op delirium  - Venlafaxine 150 mg day and Bupropion 300 mg daily  -- PTA seroquel 75 mg hs     #Migraine   - PTA prn fiorinal      #Chronic hyponatremia  - Last sodium 130. Follow-up.       #Vit D deficiency  - resume vit d supplement.         Rest per primary.     Dw patient, RN.     Thank you for the consultation. Please page with any question. Hospitalist team to follow.      Justo Sinclair MD   Hospitalist, Internal Medicine      CHIEF COMPLAINT: Post Op. Doing well.     HISTORY OF PRESENT ILLNESS: Obtained from the patient and chart review including Pre op evaluation, procedure note.    73 year old year old female  with above discussed medical problems s/p above procedure admitted on 6/1/2023  for post op care and monitoring  (for further details for indication of surgery and operative note, please refer to Williams Phipps MD note).   No documented hypotension, hypoxemia or other significant complications intra or post operative.   Medicine consulted to evaluate, recommend and/or co manage medical co morbidities.  Currently: Hemodynamics stable. Incisional Pain controlled. Denies any chest pain, cough, shortness of breath or LH or palpitations. Denies any nausea, vomiting or pain abdomen. No fever or chills. Denies any dysuria.  Denies any new rash. No new focal neuro deficit. No other concern.   Medical issues as discussed above.     PAST MEDICAL HISTORY:   Past Medical History:   Diagnosis Date     Abnormal Papanicolaou smear of cervix and cervical HPV      Allergic rhinitis, cause unspecified     seasonal allergies     Cerebral infarction (H)      Contact dermatitis and other eczema, due to unspecified cause       Endometriosis, site unspecified      Esophageal reflux     GERD     Migraine, unspecified, without mention of intractable migraine without mention of status migrainosus      Mild persistent asthma      Unspecified disorder of uterus     fibroid uterus       PAST SURGICAL HISTORY:   Past Surgical History:   Procedure Laterality Date     COLONOSCOPY  2014    Procedure: COLONOSCOPY;  Surgeon: Nathan Baker MD;  Location: [facility]     OPEN REDUCTION INTERNAL FIXATION HUMERUS DISTAL Right 5/3/2023    Procedure: OPEN REDUCTION INTERNAL FIXATION, FRACTURE, HUMERUS, DISTAL;  Surgeon: Williams Phipps MD;  Location: [facility]     REVERSE ARTHROPLASTY SHOULDER Right 2023    Procedure: ARTHROPLASTY, SHOULDER, TOTAL, REVERSE for;  Surgeon: Cierra Krause MD;  Location: [facility]     ZZC  DELIVERY ONLY       X2     ZZHC COLONOSCOPY THRU STOMA, DIAGNOSTIC  2002    normal       FH: reviewed.     Family History   Problem Relation Age of Onset     Heart Disease Mother         anemia     Osteoporosis Mother      Hypertension Mother      Cerebrovascular Disease Mother      Alcohol/Drug Mother         alcohol     Neurologic Disorder Mother         migraines     Hypertension Father      Cancer No family hx of         breast        SH: reviewed.     Social History     Socioeconomic History     Marital status:      Spouse name: None     Number of children: 2     Years of education: None     Highest education level: None   Occupational History     Occupation: retired     Employer: RETIRED   Tobacco Use     Smoking status: Never     Passive exposure: Never     Smokeless tobacco: Never   Vaping Use     Vaping status: Never Used   Substance and Sexual Activity     Alcohol use: Yes     Comment: socially     Drug use: No     Sexual activity: Not Currently     Partners: Male     Birth control/protection: Surgical     Comment: tubal ligation   Other Topics Concern      Service No     Blood Transfusions No      Caffeine Concern No     Occupational Exposure No     Hobby Hazards No     Sleep Concern Yes     Comment: Insomnia     Stress Concern Yes     Comment: breathing,  passed away 2004     Weight Concern Yes     Comment: would like to lose some weight     Special Diet No     Back Care Yes     Exercise Yes     Comment: walking at work daily     Seat Belt Yes     Self-Exams Yes     Comment: periodically     Parent/sibling w/ CABG, MI or angioplasty before 65F 55M? No       ALLERGIES:   Allergies   Allergen Reactions     Morphine And Related      GI UPSET     Oxycodone      Seasonal Allergies          HOME MEDICATIONS:     Prior to Admission medications    Medication Sig Start Date End Date Taking? Authorizing Provider   acetaminophen (TYLENOL) 325 MG tablet Take 2 tablets (650 mg) by mouth every 4 hours as needed for other 5/16/23  Yes Simone Jj MD   amLODIPine (NORVASC) 10 MG tablet Take 1 tablet (10 mg) by mouth daily 5/16/23  Yes Yanely Uriarte APRN CNP   buPROPion (WELLBUTRIN XL) 300 MG 24 hr tablet Take 300 mg by mouth every morning   Yes Unknown, Entered By History   carvedilol (COREG) 12.5 MG tablet TAKE ONE TABLET BY MOUTH TWICE A DAY WITH FOOD 5/16/23  Yes Yanely Uriarte APRN CNP   docusate sodium (COLACE) 100 MG tablet Take 1-2 tablets (100-200 mg) by mouth 2 times daily as needed for constipation 5/16/23  Yes Simone Jj MD   furosemide (LASIX) 40 MG tablet Take 1 tablet (40 mg) by mouth daily 5/16/23  Yes Yanely Uriarte APRN CNP   hydrALAZINE (APRESOLINE) 25 MG tablet Take 1 tablet (25 mg) by mouth every 8 hours 5/16/23  Yes Yanely Uriarte APRN CNP   hydrOXYzine (ATARAX) 25 MG tablet Take 1 tablet (25 mg) by mouth every 6 hours as needed for itching or other (pain adjunct) 5/16/23  Yes Yanely Uriarte APRN CNP   lisinopril (ZESTRIL) 20 MG tablet Take 1 tablet (20 mg) by mouth daily 5/16/23  Yes Yanely Uriarte APRN CNP   melatonin 5 MG tablet Take 1 tablet (5 mg) by mouth At Bedtime 5/16/23   Yes Yanely Uriarte APRN CNP   oxyCODONE (ROXICODONE) 5 MG tablet Take 1 tablet (5 mg) by mouth every 4 hours as needed for severe pain 5/16/23  Yes Simone Jj MD   polyethylene glycol (MIRALAX) 17 GM/Dose powder Take 17 g by mouth daily 5/16/23  Yes Simone Jj MD   QUEtiapine (SEROQUEL) 25 MG tablet Take 3 tablets (75 mg) by mouth At Bedtime 5/16/23  Yes Yanely Uriarte APRN CNP   rosuvastatin (CRESTOR) 20 MG tablet Take 1 tablet (20 mg) by mouth daily 5/16/23  Yes Yanely Uriarte APRN CNP   senna-docusate (SENOKOT-S/PERICOLACE) 8.6-50 MG tablet Take 1 tablet by mouth 2 times daily 5/16/23  Yes Simone Jj MD   sodium chloride 1 GM tablet Take 1 tablet (1 g) by mouth 2 times daily (with meals) 5/16/23  Yes Yanely Uriarte APRN CNP   tiZANidine (ZANAFLEX) 4 MG tablet Take 0.5 tablets (2 mg) by mouth 3 times daily as needed for muscle spasms 5/16/23  Yes Yanely Uriarte APRN CNP   venlafaxine (EFFEXOR XR) 150 MG 24 hr capsule Take 1 capsule (150 mg) by mouth daily 5/16/23  Yes Yanely Uriarte APRN CNP   vitamin D2 (ERGOCALCIFEROL) 72520 units (1250 mcg) capsule Take 1 capsule (50,000 Units) by mouth every 7 days 5/16/23  Yes Yanely Uriarte APRN CNP   clopidogrel (PLAVIX) 75 MG tablet Take 1 tablet (75 mg) by mouth daily 5/16/23   Yanely Uriarte APRN CNP       CURRENT MEDICATIONS:    Current Facility-Administered Medications   Medication     [START ON 6/4/2023] acetaminophen (TYLENOL) tablet 650 mg     acetaminophen (TYLENOL) tablet 975 mg     HYDROmorphone (PF) (DILAUDID) injection 0.2 mg    Or     HYDROmorphone (PF) (DILAUDID) injection 0.4 mg     hydrOXYzine (ATARAX) tablet 10 mg     lactated ringers infusion     naloxone (NARCAN) injection 0.2 mg    Or     naloxone (NARCAN) injection 0.4 mg    Or     naloxone (NARCAN) injection 0.2 mg    Or     naloxone (NARCAN) injection 0.4 mg     ondansetron (ZOFRAN ODT) ODT tab 4 mg    Or     ondansetron (ZOFRAN) injection 4 mg     traMADol (ULTRAM) tablet 50 mg  "        ROS: 10 point ROS neg other than the symptoms noted above in the HPI.    PHYSICAL EXAMINATION:     /77 (BP Location: Left arm, Patient Position: Semi-Nair's, Cuff Size: Adult Regular)   Pulse 80   Temp 98  F (36.7  C) (Oral)   Resp 17   Ht 1.6 m (5' 3\")   Wt 54 kg (119 lb)   LMP 2005 (Approximate)   SpO2 97%   BMI 21.08 kg/m    Temp (24hrs), Av.7  F (36.5  C), Min:96.8  F (36  C), Max:98.4  F (36.9  C)      BMI= Body mass index is 21.08 kg/m .      Intake/Output Summary (Last 24 hours) at 2023  Last data filed at 2023  Gross per 24 hour   Intake 1576 ml   Output 1625 ml   Net -49 ml       General: Awake, interactive, NAD.   HEENT: AT/NC. No icterus. Moist MM.    Neck: Supple.    Heart/CVS: Normal S1 and S2. Regular.   Chest/Respi: Normal work of breathing. CTA BL.   Abdomen/GI: Soft, non distended, non tender. No g/r.  Extremities/MSK: Distal pulses 2+, well perfused. Rest per ortho.   Neuro: Alert and oriented x4. Grossly non focal.   Skin:  No new rash over exposed areas.       LABORATORY DATA: reviewed.     Recent Results (from the past 24 hour(s))   Glucose by meter    Collection Time: 23 10:20 AM   Result Value Ref Range    GLUCOSE BY METER POCT 104 (H) 70 - 99 mg/dL       Recent Results (from the past 24 hour(s))   XR Surgery YESSY L/T 5 Min Fluoro    Narrative    This exam was marked as non-reportable because it will not be read by a   radiologist or a [place] non-radiologist provider.         XR Shoulder Right 2 Views    Narrative    EXAM: XR SHOULDER RIGHT 2 VIEWS  LOCATION: [facility]  DATE/TIME: 2023 5:55 PM CDT    INDICATION: Status post right elbow ORIF today, recent right reverse total shoulder.  COMPARISON: 2023. 2023.      Impression    IMPRESSION: Postoperative changes custom longstem reverse total shoulder replacement. Proximal humerus fracture shows some remodeling and bone " formation without bony bridging. Bones are demineralized. Nothing to suggest a dislocation.   XR Elbow Right 2 Views    Narrative    EXAM: XR ELBOW RIGHT 2 VIEWS  LOCATION: [facility]  DATE/TIME: 6/1/2023 5:55 PM CDT    INDICATION: Status post surgery  COMPARISON: 05/15/2023      Impression    IMPRESSION: Splint obscures fine bony detail. Internal fixation changes about the elbow involving the olecranon and distal humerus. Soft tissue swelling and bone demineralization. Hardware appears to be in place. Distracted bone fragments about the elbow   joint are again seen. Probable joint effusion.           Justo Sinclair MD        This note was in part completed with Dragon, a speech recognition software. Some grammatical and transcription errors may have occurred. If you have any concern, please contact me for clarification.

## 2023-06-02 NOTE — PROGRESS NOTES
" [facility]  OUTPATIENT OCCUPATIONAL THERAPY  EVALUATION  PLAN OF TREATMENT FOR OUTPATIENT REHABILITATION  (COMPLETE FOR INITIAL CLAIMS ONLY)  Patient's Last Name, First Name, M.I.  YOB: 1950  Sri Grewal                          Provider's Name  [facility] Medical Record No.  [id number]                             Onset Date:  06/01/23   Start of Care Date:  06/02/23   Type:     ___PT   _X_OT   ___SLP Medical Diagnosis:  s/p R ORIF elbow                    OT Diagnosis:  Decreased IND with ADLs Visits from SOC:  1     See note for plan of treatment, functional goals and certification details    I CERTIFY THE NEED FOR THESE SERVICES FURNISHED UNDER        THIS PLAN OF TREATMENT AND WHILE UNDER MY CARE     (Physician co-signature of this document indicates review and certification of the therapy plan).                               06/02/23 1030   Appointment Info   Signing Clinician's Name / Credentials (OT) Sujata Quintanilla MA OTR/L   Rehab Comments (OT) NWB R UE, Rev TSA precautions and elbow ORIF with extension splint   Living Environment   People in Home alone   Current Living Arrangements house   Home Accessibility stairs within home;stairs to enter home   Number of Stairs, Main Entrance 7   Transportation Anticipated family or friend will provide   Living Environment Comments Pt typically lives alone but reports friend (Bryson) can assist as needed. Per Bryson once in home, can stay on main level, only needs to go downstairs to access laundry. Standard toilets. Tub/shower with grab bars. Bryson or \"person on call\" is always home with pt following previous surgery.   Self-Care   Usual Activity Tolerance good   Current Activity Tolerance fair   Fall history within last six months yes   Number of times patient has fallen within last six months 2   Instrumental Activities of Daily Living (IADL)   IADL Comments will have assist with IADLs   General " Information   Onset of Illness/Injury or Date of Surgery 06/01/23   Referring Physician Dr. Phipps   Patient/Family Therapy Goal Statement (OT) Did not endorse   Additional Occupational Profile Info/Pertinent History of Current Problem Sri Grewal is a 73 year old female s/p ORIF right distal humerus facture on 5/3/2023 with Dr. Phipps, right rTSA with Dr. Krause 5/5/2023.  Now s/p revision right olecranon ORIF on 6/1/2023 with Dr. Phipps.   Existing Precautions/Restrictions shoulder;weight bearing   Right Upper Extremity (Weight-bearing Status) non weight-bearing (NWB)   Cognitive Status Examination   Cognitive Status Comments pt continues to have some confusion   Visual Perception   Visual Impairment/Limitations corrective lenses full-time   Pain Assessment   Patient Currently in Pain Yes, see Vital Sign flowsheet   Range of Motion Comprehensive   Comment, General Range of Motion L UE WFL, R UE not tested secondary to precautions   Strength Comprehensive (MMT)   Comment, General Manual Muscle Testing (MMT) Assessment L UE WFL, R UE not tested secondary to precautions   Coordination   Upper Extremity Coordination Right UE impaired   Functional Limitations Impaired ability to perform bilateral tasks   Bed Mobility   Comment (Bed Mobility) SBA   Sit-Stand Transfer   Sit-Stand [place] (Transfers) supervision   Bathing Assessment/Intervention   [place] Level (Bathing) maximum assist (25% patient effort)   Upper Body Dressing Assessment/Training   [place] Level (Upper Body Dressing) maximum assist (25% patient effort)   Lower Body Dressing Assessment/Training   [place] Level (Lower Body Dressing) minimum assist (75% patient effort)   Grooming Assessment/Training   [place] Level (Grooming) minimum assist (75% patient effort)   Toileting   [place] Level (Toileting) minimum assist (75% patient effort)   Clinical Impression   Criteria for Skilled Therapeutic Interventions Met (OT) Yes, treatment indicated    OT Diagnosis Decreased IND with ADLs   OT Problem List-Impairments impacting ADL problems related to;activity tolerance impaired;cognition;pain;post-surgical precautions   Assessment of Occupational Performance 3-5 Performance Deficits   Identified Performance Deficits dressing, bathing, toileting, transfers, home mgmt   Planned Therapy Interventions (OT) ADL retraining   Clinical Decision Making Complexity (OT) low complexity   Risk & Benefits of therapy have been explained evaluation/treatment results reviewed;care plan/treatment goals reviewed;patient;friend   OT Total Evaluation Time   OT Eval, Low Complexity Minutes (96634) 6   Therapy Certification   Start of Care Date 06/02/23   Certification date from 06/02/23   Certification date to 06/03/23   Medical Diagnosis s/p R ORIF elbow   OT Goals   Therapy Frequency (OT) One time eval and treatment   OT Predicted Duration/Target Date for Goal Attainment 06/02/23   OT: Upper Body Dressing Maximum assist;including orthotic;within precautions;Goal Met   OT: Goal 1 Pt will ambulate with SBA, maintaining precautions, goal met   Self-Care/Home Management   Self-Care/Home Mgmt/ADL, Compensatory, Meal Prep Minutes (63313) 24   Symptoms Noted During/After Treatment (Meal Preparation/Planning Training) none   Treatment Detail/Skilled Intervention Patient sitting EOB upon arrival with friend Bryson present. Pt alert and conversive, but does continue to appear slightly confused at times. pt reports pain meds make her feel funny as well. Reviewed post op precautions and to continue following NWB R UE and no shoulder range of motion, pt and Bryson verbalized understanding. Bryson asking if pt should be wearing a sling as she is only 4 weeks post op from Rev TSA, confirmed with ortho NP that is ok to place in sling as able. OT obtained cloth sling and educated pt and Bryson on how to don, unable to achieve full support due to splinted elbow in extension, but sling did provide some  "support for shoulder. Bryson found pt's ultrasling (without abduction pillow) and OT donned ultra sling with max A for increased comfort and padding around neck. Pt also instructed to support arm with pillows if sling not on. Pt completed sit<>stands with close SBA. Faciliated hallway ambulation in prep for potential dc home. pt initially required HHA as has not mobilized much, but as distance progressed, pt able to walk with SBA, taking slow steps. Pt with good safety awareness around being mindful of taking slow, small steps to avoid tripping. Discussed dc plan and recommendation for home with friend and 24/7 support (Bryson states if he can't be there for an appt or such they have \"someone on call\") and home OT/PT which is already set up.   OT Discharge Planning   OT Plan dc from inpatient OT   OT Discharge Recommendation (DC Rec) home with assist;home with home care occupational therapy   OT Rationale for DC Rec Recommend home with 24/7 assist for ADLs/IADLs and supervision with mobility, along with home OT/PT which is already set up per Bryson.   OT Brief overview of current status max A to don sling, HHA-SBA ambulation in hallway   Total Session Time   Timed Code Treatment Minutes 24   Total Session Time (sum of timed and untimed services) 30     "

## 2023-06-02 NOTE — CONSULTS
Care Management Initial Consult / Discharge Note    General Information  Assessment completed with: Patient, Friend, VM-chart review,    Type of CM/SW Visit: Initial Assessment    Primary Care Provider verified and updated as needed: Yes   Readmission within the last 30 days: planned readmission      Reason for Consult: other (see comments) (elevated risk score)  Advance Care Planning: Advance Care Planning Reviewed: no concerns identified          Communication Assessment  Patient's communication style: spoken language (English or Bilingual)             Cognitive  Cognitive/Neuro/Behavioral: WDL  Level of Consciousness: alert  Arousal Level: opens eyes spontaneously, arouses to touch/gentle shaking, arouses to voice  Orientation: oriented x 4  Mood/Behavior: calm, cooperative  Best Language: 0 - No aphasia  Speech: slow    Living Environment:   People in home: alone, friend(s) (pt's friend Bryson is staying w/ her currently and helping out, otherwise she ordinarily lives alone)     Current living Arrangements: house      Able to return to prior arrangements: yes       Family/Social Support:  Care provided by: self, friend  Provides care for: no one     Children, Other (specify) (pt's son; friends Bryson and Ronit)          Description of Support System: Supportive, Involved         Current Resources:   Patient receiving home care services: Yes  Skilled Home Care Services: Skilled Nursing, Physical Therapy, Occupational Therapy ([facility] for the listed services, per pt and Bryson)  Community Resources: None  Equipment currently used at home: grab bar, tub/shower, other (see comments) (has cane and shower stool but does not use them)  Supplies currently used at home: Other (specific type of bandaids, per pt statement)    Employment/Financial:  Employment Status: retired        Financial Concerns: No concerns identified (per pt statement)           Does the patient's insurance plan have a 3 day qualifying  hospital stay waiver?  Yes, not used for placement during this admission    Lifestyle & Psychosocial Needs:  Social Determinants of Health     Tobacco Use: Low Risk  (6/1/2023)    Patient History      Smoking Tobacco Use: Never      Smokeless Tobacco Use: Never      Passive Exposure: Never   Alcohol Use: Not on file   Financial Resource Strain: Not on file   Food Insecurity: Not on file   Transportation Needs: Not on file   Physical Activity: Not on file   Stress: Not on file   Social Connections: Not on file   Intimate Partner Violence: Not on file   Depression: At risk (3/7/2023)    PHQ-2      PHQ-2 Score: 4   Housing Stability: Not on file       Functional Status:  Prior to admission patient needed assistance:   Dependent ADLs:: Independent  Dependent IADLs:: Cleaning, Cooking, Laundry (while her arm recovers, per pt statement)       Mental Health Status:  Mental Health Status: No Current Concerns (per pt statement)       Chemical Dependency Status:  Chemical Dependency Status: No Current Concerns (per pt statement)             Values/Beliefs:  Spiritual, Cultural Beliefs, Congregational Practices, Values that affect care: no               Additional Information:  Provider informed this writer in the AM that pt would be discharging to home today.    Met w/ pt and her friend, Bryson, at bedside, introduced self and role, conducted CMA, and discussed discharge planning.    Bryson and pt stated that pt was receiving home care services from [facility] prior to admission, as it was arranged at her last discharge.  Chart review confirms this.  Sent ZOEY orders to [facility], they confirmed acceptance.    Bryson inquired if he could get an updated list of meds for pt, as he assists w/ medication mgmt currently.  Informed him that bedside RN should provide discharge paperwork that has medication instructions, and that if the list in the AVS is not what he is looking for, to simply let the bedside RN know, Bryson expressed  understanding.      Care Management Discharge Note    Discharge Date: 06/02/2023       Discharge Disposition: Home Care - [facility] resumption of care    Discharge Services: None    Discharge DME: Other (see comment) (No new DME anticipated)    Discharge Transportation: family or friend will provide    Private pay costs discussed: Not applicable    Does the patient's insurance plan have a 3 day qualifying hospital stay waiver?  Yes, not used for placement during this admission    PAS Confirmation Code:  N/A  Patient/family educated on Medicare website which has current facility and service quality ratings: no    Education Provided on the Discharge Plan:  yes  Persons Notified of Discharge Plans: pt; Bryson  Patient/Family in Agreement with the Plan: yes    Handoff Referral Completed: Yes - internal    Additional Information:  Pt discharged to home w/ ZOEY from [facility].  Bryson is staying w/ pt while she recovers, per Bryson.          RNCC available as needed.    Rudy Chery RNCC  [facility]  Unit 8M/S  Pager: [phone]  Phone: [phone]

## 2023-06-02 NOTE — PROGRESS NOTES
[facility]    Medicine Progress Note - Hospitalist Service, GOLD TEAM 21    Date of Admission:  6/1/2023    Assessment & Plan   Sri Grewal is a 73 year old female with past medical h/o CAD, NICM ([place]), HTN, previous TIA and CVA post angiogram in 6/2021.  4/25/23 likely TIA sp TNK, no residual deficits, switched aspirin to plavix. 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  Now readmitted 6/1/2023 after ORIF of revision right olecranon on 6/1    Interval Changes:  -Given milk of mag for constipation  -Medically stable for discharge    #S/p revision right olecranon ORIF on 6/1  Hx of right distal humerus fracture on 5/2023 with right TSA   -orthopaedics primary  -NWB RUE    # Anemia of acute blood loss:  Monitor hgb for anemia of acute blood loss. Transfuse for hgb <7.0. Pre op Hgb 9.7.        #Recent TIA/CVA, 4/25/23  #ho Cerebral aneurysm   - S/p TNK on 4/29/23 at [facility] ED  - PTA ASA discontinued and patient was started on Plavix on 4/29- resume asap per primary.   --Resume pta statin.      #Hx CAD  #HTN  - EF 65-70%, normal RV function  - Resume pta amlodipine, Coreg, hydralazine with hold parameters  -Resume  lisinopril and Lasix on discharge     #Right upper pole kidney cyst   - F/u w/ PCP     #Depression   # Post op delirium  - Venlafaxine 150 mg day and Bupropion 300 mg daily  -- PTA seroquel 75 mg hs     #Migraine - PTA prn fiorinal      #Chronic hyponatremia- Last sodium 130. Follow-up outpatient with PCP, monitor while inpatient       #Vit D deficiency- resume vit d supplement.     #Constipation - milk of mag, senna, miralax       Diet: Advance Diet as Tolerated: Regular Diet Adult    DVT Prophylaxis: Defer to primary service  [place]  Catheter: Not present  Lines: None     Cardiac Monitoring: None  Code Status: Full Code      Clinically Significant Risk Factors Present on Admission                # Drug Induced Platelet Defect: home medication list includes an antiplatelet medication   # Hypertension: Noted on problem list               Disposition Plan      Expected Discharge Date: 06/02/2023      Destination: inpatient rehabilitation facility            Tk Barragan MD  Hospitalist Service, GOLD TEAM 21  [facility]  Securely message with [employer] (more info)  Text page via [facility] Paging/Directory   See signed in provider for up to date coverage information  ______________________________________________________________________    Interval History   Patient admitted after surgery overnight. THis morning notes some choking with breakfast. No fever or chills. No chest pain. No shortness of breath. No difficulty breathing. She feels constipated and would like to have a bowel movement. She lives in a house and has support at home.     Physical Exam   Vital Signs: Temp: 97.5  F (36.4  C) Temp src: Oral BP: 103/60 Pulse: 78   Resp: 16 SpO2: 97 % O2 Device: None (Room air) Oxygen Delivery: 2 LPM  Weight: 119 lbs 0 oz    Constitutional: alert, oriented, no acute distress  Eyes: right eye kem syndrome, no icterus  ENT: supple, no elevated JVP  Respiratory: CTAB, no wheezing or crackles  Cardiovascular: RRR, normal s1, s2, systolic murmur 2/6 at right upper and left lower sternal border  GI: soft, protuberant, non tender, no peritoneal signs  Musculoskeletal: no lower extremity edema, right arm in dressing, able to wiggle right fingers  Neurologic: no tingling or numbness in bilateral distal upper extremities    Medical Decision Making             Data     I have personally reviewed the following data over the past 24 hrs:    9.3  \   8.9 (L)   / 297     N/A N/A N/A /  115 (H)   N/A N/A N/A \

## 2023-06-02 NOTE — TELEPHONE ENCOUNTER
INCOMING FORMS    Sender: Antwan [place] [facility]    Type of Form, letter or note (What is requested?): Order    How was the form received?: Fax    How should forms be returned?:  Fax : [phone]    Form placed in CDL bin for review/signature if appropriate.

## 2023-06-02 NOTE — TELEPHONE ENCOUNTER
Kym from [facility] is calling and if they would do a hospital follow up on Sunday.    Kym wanted you to know that they were not able to get out to do a visit on the 31st and needs to cancel the SNV and PT for this week because she is currently in the hospital.  She is due to get out today.    So needs order for follow up SNV on Sunday     Cristina Navarro RN  [facility] ~ Registered Nurse  Clinic Triage ~ [place] [facility] [place]  June 2, 2023

## 2023-06-02 NOTE — PLAN OF CARE
"VS:       Pt A/O X 4. Afebrile. VSS./60 (BP Location: Left leg)   Pulse 78   Temp 97.5  F (36.4  C) (Oral)   Resp 16   Ht 1.6 m (5' 3\")   Wt 54 kg (119 lb)   LMP 11/09/2005 (Approximate)   SpO2 97%   BMI 21.08 kg/m    Lungs- clear bilaterally with both anterior and posterior. IS encouraged. Denies nausea, shortness of breath, and chest pain.     Output:       Bowels- active in all four quadrants. Voids spontaneously without difficulty in the bathroom. Last BM 6/1/23.      Activity:       Pt up SBA with cane.     Skin:   R shoulder healing surgical incision. R forearm surgical incision.      Pain:       Has pain in the R arm and given tramadol, ICE applied, and is tolerating well.      CMS:       CMS and Neuro's are intact. Denies numbness and tingling in all extremities.      Dressing:       R forearm incisional dressing is CDI.      Diet:       Pt is on a regular diet and appetite was good this shift.       LDA:       No PIV.     Equipment:       PCD's on BLE's. Bilateral heels are elevated off the bed.     Plan:       Pt is able to make needs known and the call light is within the pt's reach. Continue to monitor.       Additional Info:                    "

## 2023-06-04 NOTE — DISCHARGE SUMMARY
ORTHOPAEDIC SURGERY DISCHARGE SUMMARY     Date of Admission: 6/1/2023  Date of Discharge: 6/2/2023  1:31 PM  Disposition: Home  Staff Physician: Williams Phipps MD  Primary Care Provider: Rosenda Pierre      ADMISSION DIAGNOSIS:  Closed bicondylar fracture of distal humerus, right, initial encounter [S42.391A]    DISCHARGE DIAGNOSIS:  Closed bicondylar fracture of distal humerus, right, initial encounter [S42.393V]    PROCEDURES: Procedure(s):  REVISION OPEN REDUCTION INTERNAL FIXATION RIGHT OLECRANON  HARDWARE REMOVAL RIGHT OLECRANON on 6/1/2023    BRIEF HISTORY:  The patient is a 73 year old female who previously underwent open reduction internal fixation of right distal humerus fracture through an olecranon osteotomy on 5/3/23. She was noted to have poor bone quality at her index surgery. Several days later she underwent a cemented reverse total shoulder arthroplasty for an acute on chronic right proximal humerus fracture. Her splint was removed for that surgery and for occupational therapy 1 week later. Sometime during the postoperative hospital stay following her elbow surgery, the olecranon osteotomy pulled out of her fixation. This was demonstrated on radiographs on her day of discharge. The patient was subsequently seen in the clinic. Revision open reduction internal fixation was recommended to restore the articular surface and elbow extension function. The patient wished to proceed.     The indications for surgery were discussed with the patient. The benefits, risks, and alternatives of operative management were discussed in detail with the patient. The patient understands that the risks of surgery include, but are not limited to: infection, bleeding, injury to nearby structures (such as nerves, blood vessels, and tendons), nonunion, malunion, hardware failure/irritation or breakage, need for additional surgery, pain, stiffness, scarring, need for rehabilitation, and anesthetic complications.   Patient expressed understanding and elected to proceed with surgery. All questions were answered to the patient's satisfaction.     Consent was obtained for the procedure.    HOSPITAL COURSE:    The patient was admitted following the above listed procedures for pain control and rehabilitation. Sri Grewal did well post-operatively. Medicine was consulted post operatively to aid in management of medical co-morbidities. The patient received routine nursing cares and at the time of discharge was medically stable. Vital signs were stable throughout admission. The patient is tolerating a regular diet and is voiding spontaneously. All PT/OT goals have been met for safe mobility. Pain is now controlled on oral medications which will be available on discharge. Stool softeners have been used while taking pain medications to help prevent constipation. Sri Grewal is deemed medically safe to discharge on POD1.     Antibiotics:  Ancef given periop and 24 hours postop.  DVT prophylaxis:  Ambulation  PT Progress:  Has met PT/OT goals for safe mobility.    Pain Meds:  Weaned off all IV pain meds by discharge.  Inpatient Events: No significant events or complications.    PHYSICAL EXAM:    Gen: No acute distress, resting comfortably in bed.  Resp: Non-labored breathing  Cardio: Regular rate via peripheral pulse  MSK:  RUE:  - Extension splint in place, c/d/I  - Fires EPL, FPL, Intrinsics  - SILT median/radial/ulnar nerves  - Radial pulse 2+, hand wwp    FOLLOWUP:    Follow up on 6/12/2023 with Dr. Phipps    Future Appointments   Date Time Provider Department Center   6/6/2023  1:00 PM Nathan Turner MD [facility]   6/12/2023  8[facility]20 AM [id number]   6/12/2023  8:30 AM [facility]   6/12/2023  9:30 AM Raoul Houser, OTR [facility]   8/4/2023  4:45 PM Miryam Mcgowan MD [facility]       Orthopaedic Surgery appointments are at the [facility] [address],  [place] [address]). Call [phone] to schedule a follow-up appointment at this location with your provider.     PLANNED DISCHARGE ORDERS:     DVT Prophylaxis: Ambulation     Activity: NWB RUE.  Arm by side RUE (s/p reverse total shoulder); extension splint RUE     Wound Care: see Below      Discharge Medication List as of 6/2/2023 12:46 PM      START taking these medications    Details   traMADol (ULTRAM) 50 MG tablet Take 0.5-1 tablets (25-50 mg) by mouth every 4 hours as needed for moderate pain, Disp-20 tablet, R-0, E-Prescribe         CONTINUE these medications which have CHANGED    Details   acetaminophen (TYLENOL) 325 MG tablet Take 2 tablets (650 mg) by mouth every 4 hours as needed for other, Disp-60 tablet, R-0, E-Prescribe      hydrOXYzine (ATARAX) 10 MG tablet Take 1 tablet (10 mg) by mouth every 6 hours as needed for other (adjuvant pain), Disp-30 tablet, R-0, E-Prescribe      polyethylene glycol (MIRALAX) 17 g packet Take 17 g by mouth daily, Disp-10 packet, R-0, E-Prescribe      senna-docusate (SENOKOT-S/PERICOLACE) 8.6-50 MG tablet Take 1 tablet by mouth 2 times daily, Disp-30 tablet, R-0, E-Prescribe         CONTINUE these medications which have NOT CHANGED    Details   amLODIPine (NORVASC) 10 MG tablet Take 1 tablet (10 mg) by mouth daily, Disp-30 tablet, R-0, E-Prescribe      buPROPion (WELLBUTRIN XL) 300 MG 24 hr tablet Take 300 mg by mouth every morning, Historical      carvedilol (COREG) 12.5 MG tablet TAKE ONE TABLET BY MOUTH TWICE A DAY WITH FOOD, Disp-180 tablet, R-1, E-Prescribe      clopidogrel (PLAVIX) 75 MG tablet Take 1 tablet (75 mg) by mouth daily, Disp-30 tablet, R-0, E-Prescribe      docusate sodium (COLACE) 100 MG tablet Take 1-2 tablets (100-200 mg) by mouth 2 times daily as needed for constipation, Disp-120 tablet, R-3, Transitional      furosemide (LASIX) 40 MG tablet Take 1 tablet (40 mg) by mouth daily, Disp-30 tablet, R-0, E-Prescribe      hydrALAZINE (APRESOLINE) 25 MG  tablet Take 1 tablet (25 mg) by mouth every 8 hours, Disp-30 tablet, R-0, E-Prescribe      lisinopril (ZESTRIL) 20 MG tablet Take 1 tablet (20 mg) by mouth daily, Disp-90 tablet, R-0, E-Prescribe      melatonin 5 MG tablet Take 1 tablet (5 mg) by mouth At Bedtime, Disp-30 tablet, R-0, E-Prescribe      QUEtiapine (SEROQUEL) 25 MG tablet Take 3 tablets (75 mg) by mouth At Bedtime, Disp-90 tablet, R-0, E-Prescribe      rosuvastatin (CRESTOR) 20 MG tablet Take 1 tablet (20 mg) by mouth daily, Disp-90 tablet, R-0, E-Prescribe      sodium chloride 1 GM tablet Take 1 tablet (1 g) by mouth 2 times daily (with meals), Disp-60 tablet, R-0, E-Prescribe      tiZANidine (ZANAFLEX) 4 MG tablet Take 0.5 tablets (2 mg) by mouth 3 times daily as needed for muscle spasms, Disp-40 tablet, R-0, E-Prescribe      venlafaxine (EFFEXOR XR) 150 MG 24 hr capsule Take 1 capsule (150 mg) by mouth daily, Disp-90 capsule, R-0, E-Prescribe      vitamin D2 (ERGOCALCIFEROL) 25160 units (1250 mcg) capsule Take 1 capsule (50,000 Units) by mouth every 7 days, Disp-5 capsule, R-0, E-Prescribe         STOP taking these medications       oxyCODONE (ROXICODONE) 5 MG tablet Comments:   Reason for Stopping:                 Discharge Procedure Orders   Primary Care - Care Coordination Referral   Standing Status: Future   Referral Priority: Routine: Next available opening Referral Type: Care Coordination   Number of Visits Requested: 1     Reason for your hospital stay   Order Comments: Sri Grewal is a 73 year old female s/p ORIF right distal humerus facture on 5/3/2023 with Dr. Phipps, right rTSA with Dr. Krause 5/5/2023.  Now s/p revision right olecranon ORIF on 6/1/2023 with Dr. Phipps.     Activity   Order Comments: Your activity upon discharge: activity as tolerated    Activity: Arm by side RUE (s/p reverse total shoulder); extension splint RUE.  Weight bearing status: NWB RUE     Order Specific Question Answer Comments   Is discharge order? Yes      When to  contact your care team   Order Comments: Call Dr. Phipps  if you have any of the following: temperature greater than 101.3  or less than 96.5,  increased shortness of breath, increased drainage, increased swelling, or increased pain.    Contact phone number is      Wound care and dressings   Order Comments: Instructions to care for your wound at home: ice to area for comfort, keep wound clean and dry, may get incision wet in shower but do not soak or scrub, reinforce dressing as needed, and remove dressing in 7 days.     Adult [facility] Follow-up and recommended labs and tests   Order Comments: Follow up with Dr Phipps as scheduled.      Appointments at [facility] at [address] [place] [address] ([facility])     Call [phone] if you haven't heard regarding these appointments within 7 days of discharge.     Resume Home Care Services   Order Comments: [facility] - Please resume home care services     Diet   Order Comments: Follow this diet upon discharge: Orders Placed This Encounter      Advance Diet as Tolerated: Regular Diet Adult     Order Specific Question Answer Comments   Is discharge order? Yes        Issa Thurston MD  Orthopaedic Surgery PGY-4  [phone]

## 2023-06-05 ENCOUNTER — MEDICAL CORRESPONDENCE (OUTPATIENT)
Dept: HEALTH INFORMATION MANAGEMENT | Facility: CLINIC | Age: 73
End: 2023-06-05
Payer: MEDICARE

## 2023-06-05 ENCOUNTER — TELEPHONE (OUTPATIENT)
Dept: FAMILY MEDICINE | Facility: OTHER | Age: 73
End: 2023-06-05
Payer: MEDICARE

## 2023-06-05 ENCOUNTER — PATIENT OUTREACH (OUTPATIENT)
Dept: CARE COORDINATION | Facility: CLINIC | Age: 73
End: 2023-06-05
Payer: MEDICARE

## 2023-06-05 ASSESSMENT — ACTIVITIES OF DAILY LIVING (ADL): DEPENDENT_IADLS:: CLEANING;COOKING;LAUNDRY

## 2023-06-05 NOTE — PROGRESS NOTES
Clinic Care Coordination Contact  [facility]: Post-Discharge Note  SITUATION                                                      Admission:    Admission Date: 06/01/23   Reason for Admission: Closed bicondylar fracture of distal humerus, right  Discharge:   Discharge Date: 06/02/23  Discharge Diagnosis: Closed bicondylar fracture of distal humerus, right    BACKGROUND                                                      Per hospital discharge summary and inpatient provider notes:    HOSPITAL COURSE:    The patient was admitted following the above listed procedures for pain control and rehabilitation. Sri Grewal did well post-operatively. Medicine was consulted post operatively to aid in management of medical co-morbidities. The patient received routine nursing cares and at the time of discharge was medically stable. Vital signs were stable throughout admission. The patient is tolerating a regular diet and is voiding spontaneously. All PT/OT goals have been met for safe mobility. Pain is now controlled on oral medications which will be available on discharge. Stool softeners have been used while taking pain medications to help prevent constipation. Sri Grewal is deemed medically safe to discharge on POD1.     ASSESSMENT           Discharge Assessment  How are you doing now that you are home?: per patient report, she is doing well. her friend Bryson is helping with her recovery. patient has a follow up appt with the ortho provider tomorrow. she does not need anything at this time.  How are your symptoms? (Red Flag symptoms escalate to triage hotline per guidelines): Unchanged  Do you feel your condition is stable enough to be safe at home until your provider visit?: Yes  Does the patient have their discharge instructions? : Yes  Does the patient have questions regarding their discharge instructions? : No  Were you started on any new medications or were there changes to any of your previous medications? :  Yes  Does the patient have all of their medications?: Yes  Do you have questions regarding any of your medications? : No  Do you have all of your needed medical supplies or equipment (DME)?  (i.e. oxygen tank, CPAP, cane, etc.): Yes  Discharge follow-up appointment scheduled within 14 calendar days? : Yes  Discharge Follow Up Appointment Date: 06/06/23  Discharge Follow Up Appointment Scheduled with?: Specialty Care Provider         Post-op (Clinicians Only)  Did the patient have surgery or a procedure: Yes  Incision: closed  Eating & Drinking: eating and drinking without complaints/concerns  PO Intake: regular diet  Bowel Function: normal  Urinary Status: voiding without complaint/concerns      PLAN                                                      Outpatient Plan:      FOLLOWUP:    Follow up on 6/12/2023 with Dr. Phipps            Future Appointments   Date Time Provider Department [place]   6/6/2023  1:00 PM Nathan Turner MD [facility]   6/12/2023  8:20 [email or web address]   6/12/2023  8:30 AM Williams Phipps MD [facility]   6/12/2023  9:30 AM Mik, Raoul, OTR [facility]   8/4/2023  4:45 PM Miryam Mcgowan MD [facility]         Orthopaedic Surgery appointments are at the [facility] [place] ([address] [place] [address]). Call [phone] to schedule a follow-up appointment at this location with your provider.      PLANNED DISCHARGE ORDERS:     DVT Prophylaxis: Ambulation     Activity: NWB RUE.  Arm by side RUE (s/p reverse total shoulder); extension splint RUE     Wound Care: see Below      Future Appointments   Date Time Provider Department [place]   6/6/2023  1:00 PM Nathan Turner MD [facility]   6/12/2023  8:20 AM [email or web address] [facility]   6/12/2023  8:30 AM Williams Phipps MD [facility]   6/12/2023  9:30 AM Mik, Raoul, OTR [facility]   8/4/2023  4:45 PM Miryam Mcgowan MD [facility]         For any urgent concerns, please  contact our 24 hour nurse triage line: [phone] ([phone])         Camille Roa RN

## 2023-06-05 NOTE — TELEPHONE ENCOUNTER
No answer on home care line. Vm hung up. Looks like forms were received today for this. This was getting verbal ok for yesterday

## 2023-06-05 NOTE — TELEPHONE ENCOUNTER
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

## 2023-06-05 NOTE — TELEPHONE ENCOUNTER
Verbal order for below.     Jose Francisco Varma-FLEX [place]  [facility]

## 2023-06-06 ENCOUNTER — OFFICE VISIT (OUTPATIENT)
Dept: ORTHOPEDICS | Facility: CLINIC | Age: 73
End: 2023-06-06
Payer: MEDICARE

## 2023-06-06 DIAGNOSIS — S52.021K CLOSED FRACTURE OF OLECRANON PROCESS OF RIGHT ULNA WITH NONUNION: Primary | ICD-10-CM

## 2023-06-06 PROCEDURE — 99207 PR FRACTURE CARE IN GLOBAL PERIOD: CPT | Performed by: ORTHOPAEDIC SURGERY

## 2023-06-06 NOTE — LETTER
6/6/2023         RE: Sri Grewal  [address] [address] [facility] [address] [facility] [address]        Dear Colleague,    Thank you for referring your patient, Sri Grewal, to the [facility]. Please see a copy of my visit note below.    Patient had open-reduction, internal fixation olecranon 6/1/23.  She has follow up 6/12/23.  Told to see primary physician this week, but scheduled with us.  Will cancel appointment.      Again, thank you for allowing me to participate in the care of your patient.        Sincerely,        Nathan Turner MD

## 2023-06-06 NOTE — PROGRESS NOTES
Patient had open-reduction, internal fixation olecranon 6/1/23.  She has follow up 6/12/23.  Told to see primary physician this week, but scheduled with us.  Will cancel appointment.

## 2023-06-08 ENCOUNTER — TELEPHONE (OUTPATIENT)
Dept: FAMILY MEDICINE | Facility: OTHER | Age: 73
End: 2023-06-08
Payer: MEDICARE

## 2023-06-08 NOTE — TELEPHONE ENCOUNTER
SITUATION:   Bryson a close friend calling.   PH: [phone]    Patient had a minor stroke the end of April. Then fell and broke shoulder and elbow. Surgery was done at the [facility]. [facility]  took [facility] Plavix until the surgery was completed. Then she went back on it. Then had the elbow surgery and she was taken off the Plavix for 7 days. The patient has not started it again.      QUESTION:   Wondering if she should go back on the Plavix?      JOSE ALBERTO Mancilla, RN, PHN  [facility]  June 8, 2023

## 2023-06-09 ENCOUNTER — TELEPHONE (OUTPATIENT)
Dept: FAMILY MEDICINE | Facility: OTHER | Age: 73
End: 2023-06-09
Payer: MEDICARE

## 2023-06-09 NOTE — TELEPHONE ENCOUNTER
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

## 2023-06-09 NOTE — TELEPHONE ENCOUNTER
Should call her neurosurgeon to ask that question.    Jose Francisco Pierre PA-C  [facility]

## 2023-06-09 NOTE — TELEPHONE ENCOUNTER
Called and spoke with patient and close friend Bryson and let them know message from Rosenda and they stated they are meeting with him on Monday and will ask then.     Babs Gray, CMA

## 2023-06-12 ENCOUNTER — MEDICAL CORRESPONDENCE (OUTPATIENT)
Dept: HEALTH INFORMATION MANAGEMENT | Facility: CLINIC | Age: 73
End: 2023-06-12

## 2023-06-12 ENCOUNTER — ANCILLARY PROCEDURE (OUTPATIENT)
Dept: GENERAL RADIOLOGY | Facility: CLINIC | Age: 73
End: 2023-06-12
Attending: STUDENT IN AN ORGANIZED HEALTH CARE EDUCATION/TRAINING PROGRAM
Payer: MEDICARE

## 2023-06-12 ENCOUNTER — TELEPHONE (OUTPATIENT)
Dept: FAMILY MEDICINE | Facility: OTHER | Age: 73
End: 2023-06-12

## 2023-06-12 ENCOUNTER — OFFICE VISIT (OUTPATIENT)
Dept: ORTHOPEDICS | Facility: CLINIC | Age: 73
End: 2023-06-12
Payer: MEDICARE

## 2023-06-12 ENCOUNTER — THERAPY VISIT (OUTPATIENT)
Dept: OCCUPATIONAL THERAPY | Facility: CLINIC | Age: 73
End: 2023-06-12
Payer: MEDICARE

## 2023-06-12 DIAGNOSIS — M25.521 RIGHT ELBOW PAIN: ICD-10-CM

## 2023-06-12 DIAGNOSIS — S42.211D CLOSED DISPLACED FRACTURE OF SURGICAL NECK OF RIGHT HUMERUS WITH ROUTINE HEALING, UNSPECIFIED FRACTURE MORPHOLOGY, SUBSEQUENT ENCOUNTER: ICD-10-CM

## 2023-06-12 DIAGNOSIS — S42.491D CLOSED BICONDYLAR FRACTURE OF DISTAL HUMERUS, RIGHT, WITH ROUTINE HEALING, SUBSEQUENT ENCOUNTER: Primary | ICD-10-CM

## 2023-06-12 DIAGNOSIS — M25.621 STIFFNESS OF ELBOW JOINT, RIGHT: ICD-10-CM

## 2023-06-12 PROCEDURE — 97165 OT EVAL LOW COMPLEX 30 MIN: CPT | Mod: GO

## 2023-06-12 PROCEDURE — 73080 X-RAY EXAM OF ELBOW: CPT | Mod: RT | Performed by: RADIOLOGY

## 2023-06-12 PROCEDURE — 99024 POSTOP FOLLOW-UP VISIT: CPT | Performed by: STUDENT IN AN ORGANIZED HEALTH CARE EDUCATION/TRAINING PROGRAM

## 2023-06-12 PROCEDURE — 97110 THERAPEUTIC EXERCISES: CPT | Mod: GO

## 2023-06-12 NOTE — TELEPHONE ENCOUNTER
Can use same day on Thursday at 11 am. This is the only availability I have this week and am out next week. So if that doesn't work, would need to see covering provider.    Jose Francisco Pierre PA-C  [facility]

## 2023-06-12 NOTE — TELEPHONE ENCOUNTER
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

## 2023-06-12 NOTE — TELEPHONE ENCOUNTER
Reason for Call:  Appointment Request    Patient requesting this type of appt:  Hospital/ED Follow-Up     Requested provider: Rosenda Pierre    Reason patient unable to be scheduled: Not within requested timeframe    When does patient want to be seen/preferred time: 3-7 days    Comments: Patient calling in to schedule hospital follow up.  Next available is 7/11.      Okay to leave a detailed message?: Yes Please call Bryson at [phone]

## 2023-06-12 NOTE — LETTER
2023         RE: Sri Grewal  [address] [address] [address]        Dear Colleague,    Thank you for referring your patient, Sri Grewal, to the [facility] [place]. Please see a copy of my visit note below.    Orthopaedic Surgery Hand Clinic Progress Note    Patient Name: Sri Grewal  MRN: [id number]  : 1950  Date: 2023    Date of Surgery:   5/3/23 - ORIF right distal humerus  23 - Revision ORIF right olecranon osteotomy    Subjective:  Ms. Sri Grewal is a 73 year old female who returns nearly 6 weeks s/p ORIF R distal humerus and 11 days s/p revision ORIF olecranon osteotomy which had displaced during her previous hospital stay. She is doing better. Pain is moderate. Has remained in the splint and sling. No numbness/tingling. Has a home health RN coming Friday. Previously had home PT/OT as well. Denies f/c/s.  Accompanied by her friend Bryson today.    Objective:  General: alert, appropriate, NAD  HEENT: NC/AT  CV: RRR by pulse  Pulm: Unlabored on RA  MSK:  Incision c/d/i, no erythema, drainage, evidence of infection  Elbow range of motion 45-95  Supination 15  Pronation 15  5 out of 5 EPL, FPL, FDP index, intrinsics  Sensation intact to light touch median, radial, ulnar nerve distributions no deficits  Warm well-perfused, capillary fill less than 2 seconds    Imaging:  X-rays of right elbow today reviewed by me demonstrates postsurgical changes status post ORIF of olecranon osteotomy.  No interval displacement.  Interval healing of distal humerus fracture is seen, articular surface remains well aligned.  No evidence of hardware complication    Assessment/Plan:  73-year-old female status post ORIF right distal humerus 5/3/2023 and revision ORIF right olecranon osteotomy 2023.  Neurovascularly intact.  Doing well.    Patient to see hand therapy today to begin outpatient range of motion.  Active and passive range of motion as tolerated.   Patient will cancel home OT sessions.  She wishes to do her remaining sessions in [place] closer to her home.    She is to be nonweightbearing of the right upper extremity until week of Nanette 10.  I advised that she should wean the sling over the next week.  I recommended that she remain out of it as much as possible to prevent stiffness and contracture.    Okay to shower.  Use gentle soap and water, let it over the wound.  No soaking, submerging, prolonged water exposure x4 weeks.  Leave open to air.    I advised that she should contact her primary care provider regarding amlodipine and hydralazine.  Bryson reports that she was started on this on her last hospital admission.    We will contact Dr. Haile's office regarding follow-up for her right reverse total shoulder arthroplasty.    Follow-up with me July 10 for 6 week postop.  X-rays of her right elbow will be needed at that time.    All question answered.  She and Bryson voiced understanding and agreement.    Williams Phipps MD    Hand & Upper Extremity Surgery  Department of Orthopedic Surgery  [facility]

## 2023-06-12 NOTE — PROGRESS NOTES
Orthopaedic Surgery Hand Clinic Progress Note    Patient Name: Sri Grewal  MRN: [id number]  : 1950  Date: 2023    Date of Surgery:   5/3/23 - ORIF right distal humerus  23 - Revision ORIF right olecranon osteotomy    Subjective:  Ms. Sri Grewal is a 73 year old female who returns nearly 6 weeks s/p ORIF R distal humerus and 11 days s/p revision ORIF olecranon osteotomy which had displaced during her previous hospital stay. She is doing better. Pain is moderate. Has remained in the splint and sling. No numbness/tingling. Has a home health RN coming Friday. Previously had home PT/OT as well. Denies f/c/s.  Accompanied by her friend Bryson today.    Objective:  General: alert, appropriate, NAD  HEENT: NC/AT  CV: RRR by pulse  Pulm: Unlabored on RA  MSK:  Incision c/d/i, no erythema, drainage, evidence of infection  Elbow range of motion 45-95  Supination 15  Pronation 15  5 out of 5 EPL, FPL, FDP index, intrinsics  Sensation intact to light touch median, radial, ulnar nerve distributions no deficits  Warm well-perfused, capillary fill less than 2 seconds    Imaging:  X-rays of right elbow today reviewed by me demonstrates postsurgical changes status post ORIF of olecranon osteotomy.  No interval displacement.  Interval healing of distal humerus fracture is seen, articular surface remains well aligned.  No evidence of hardware complication    Assessment/Plan:  73-year-old female status post ORIF right distal humerus 5/3/2023 and revision ORIF right olecranon osteotomy 2023.  Neurovascularly intact.  Doing well.    Patient to see hand therapy today to begin outpatient range of motion.  Active and passive range of motion as tolerated.  Patient will cancel home OT sessions.  She wishes to do her remaining sessions in [place] closer to her home.    She is to be nonweightbearing of the right upper extremity until week of Nanette 10.  I advised that she should wean the sling over the next week.  I  recommended that she remain out of it as much as possible to prevent stiffness and contracture.    Okay to shower.  Use gentle soap and water, let it over the wound.  No soaking, submerging, prolonged water exposure x4 weeks.  Leave open to air.    I advised that she should contact her primary care provider regarding amlodipine and hydralazine.  Bryson reports that she was started on this on her last hospital admission.    We will contact Dr. Haile's office regarding follow-up for her right reverse total shoulder arthroplasty.    Follow-up with me July 10 for 6 week postop.  X-rays of her right elbow will be needed at that time.    All question answered.  She and Bryson voiced understanding and agreement.    Williams Phipps MD    Hand & Upper Extremity Surgery  Department of Orthopedic Surgery  [facility]

## 2023-06-15 ENCOUNTER — THERAPY VISIT (OUTPATIENT)
Dept: OCCUPATIONAL THERAPY | Facility: CLINIC | Age: 73
End: 2023-06-15
Payer: MEDICARE

## 2023-06-15 ENCOUNTER — OFFICE VISIT (OUTPATIENT)
Dept: FAMILY MEDICINE | Facility: OTHER | Age: 73
End: 2023-06-15
Payer: MEDICARE

## 2023-06-15 VITALS
WEIGHT: 119 LBS | RESPIRATION RATE: 18 BRPM | TEMPERATURE: 97.1 F | SYSTOLIC BLOOD PRESSURE: 120 MMHG | OXYGEN SATURATION: 98 % | HEIGHT: 63 IN | DIASTOLIC BLOOD PRESSURE: 80 MMHG | HEART RATE: 78 BPM | BODY MASS INDEX: 21.09 KG/M2

## 2023-06-15 DIAGNOSIS — E87.1 CHRONIC HYPONATREMIA: ICD-10-CM

## 2023-06-15 DIAGNOSIS — M25.521 RIGHT ELBOW PAIN: Primary | ICD-10-CM

## 2023-06-15 DIAGNOSIS — M25.621 STIFFNESS OF ELBOW JOINT, RIGHT: ICD-10-CM

## 2023-06-15 DIAGNOSIS — S52.021K CLOSED FRACTURE OF OLECRANON PROCESS OF RIGHT ULNA WITH NONUNION: ICD-10-CM

## 2023-06-15 DIAGNOSIS — I25.83 CORONARY ARTERY DISEASE DUE TO LIPID RICH PLAQUE: ICD-10-CM

## 2023-06-15 DIAGNOSIS — S42.401A CLOSED FRACTURE OF DISTAL END OF RIGHT HUMERUS, UNSPECIFIED FRACTURE MORPHOLOGY, INITIAL ENCOUNTER: ICD-10-CM

## 2023-06-15 DIAGNOSIS — I10 HYPERTENSION GOAL BP (BLOOD PRESSURE) < 140/90: ICD-10-CM

## 2023-06-15 DIAGNOSIS — S42.201K CLOSED FRACTURE OF PROXIMAL END OF RIGHT HUMERUS WITH NONUNION, UNSPECIFIED FRACTURE MORPHOLOGY, SUBSEQUENT ENCOUNTER: Primary | ICD-10-CM

## 2023-06-15 DIAGNOSIS — I25.10 CORONARY ARTERY DISEASE DUE TO LIPID RICH PLAQUE: ICD-10-CM

## 2023-06-15 DIAGNOSIS — G45.9 TIA (TRANSIENT ISCHEMIC ATTACK): ICD-10-CM

## 2023-06-15 DIAGNOSIS — F43.23 ADJUSTMENT DISORDER WITH MIXED ANXIETY AND DEPRESSED MOOD: ICD-10-CM

## 2023-06-15 DIAGNOSIS — F33.41 MAJOR DEPRESSIVE DISORDER, RECURRENT, IN PARTIAL REMISSION (H): ICD-10-CM

## 2023-06-15 PROCEDURE — 99215 OFFICE O/P EST HI 40 MIN: CPT | Performed by: PHYSICIAN ASSISTANT

## 2023-06-15 PROCEDURE — 97110 THERAPEUTIC EXERCISES: CPT | Mod: GO

## 2023-06-15 RX ORDER — SODIUM CHLORIDE 1 G/1
1 TABLET ORAL 2 TIMES DAILY WITH MEALS
Qty: 60 TABLET | Refills: 0 | Status: SHIPPED | OUTPATIENT
Start: 2023-06-15

## 2023-06-15 RX ORDER — CLOPIDOGREL BISULFATE 75 MG/1
75 TABLET ORAL DAILY
Qty: 30 TABLET | Refills: 0 | Status: SHIPPED | OUTPATIENT
Start: 2023-06-15 | End: 2023-09-26

## 2023-06-15 RX ORDER — AMLODIPINE BESYLATE 10 MG/1
10 TABLET ORAL DAILY
Qty: 30 TABLET | Refills: 0 | Status: SHIPPED | OUTPATIENT
Start: 2023-06-15 | End: 2023-10-05

## 2023-06-15 RX ORDER — HYDRALAZINE HYDROCHLORIDE 25 MG/1
25 TABLET, FILM COATED ORAL 3 TIMES DAILY
Qty: 90 TABLET | Refills: 0 | Status: SHIPPED | OUTPATIENT
Start: 2023-06-15 | End: 2023-07-24

## 2023-06-15 RX ORDER — ERGOCALCIFEROL 1.25 MG/1
50000 CAPSULE, LIQUID FILLED ORAL
Qty: 5 CAPSULE | Refills: 0 | Status: SHIPPED | OUTPATIENT
Start: 2023-06-15 | End: 2024-05-30

## 2023-06-15 ASSESSMENT — PATIENT HEALTH QUESTIONNAIRE - PHQ9
SUM OF ALL RESPONSES TO PHQ QUESTIONS 1-9: 6
10. IF YOU CHECKED OFF ANY PROBLEMS, HOW DIFFICULT HAVE THESE PROBLEMS MADE IT FOR YOU TO DO YOUR WORK, TAKE CARE OF THINGS AT HOME, OR GET ALONG WITH OTHER PEOPLE: SOMEWHAT DIFFICULT
SUM OF ALL RESPONSES TO PHQ QUESTIONS 1-9: 6

## 2023-06-15 ASSESSMENT — PAIN SCALES - GENERAL: PAINLEVEL: NO PAIN (0)

## 2023-06-15 NOTE — PROGRESS NOTES
Assessment & Plan     ICD-10-CM    1. 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 Closed fracture of distal end of right humerus, unspecified fracture morphology, initial encounter  S42.401A vitamin D2 (ERGOCALCIFEROL) 82737 units (1250 mcg) capsule      4. Hypertension goal BP (blood pressure) < 140/90  I10 amLODIPine (NORVASC) 10 MG tablet     hydrALAZINE (APRESOLINE) 25 MG tablet      5. Coronary artery disease due to lipid rich plaque  I25.10 clopidogrel (PLAVIX) 75 MG tablet    I25.83       6. TIA (transient ischemic attack)  G45.9 clopidogrel (PLAVIX) 75 MG tablet      7. Chronic hyponatremia  E87.1 sodium chloride 1 GM tablet      8. Major depressive disorder, recurrent, in partial remission (H)  F33.41       9. Adjustment disorder with mixed anxiety and depressed mood  F43.23         - Patient here for recheck after hospitalization     Unfortunately, patient had fall in Feb and had fracture of right humerus surgical neck      Was just starting to improve and heal from that when on 4/25/23 had a TIA, had a fall shortly after that and broke her right humerus again at the proximal portion but also had fracture at distal end along with fracture of ulna      Had to have revision of upper humerus and required another surgery      Hospital stay complicated by delirium that improved with better BP control due to addition of Hydralazine and Seroquel at night   - She reports improving every day     Has had RN, OT, and physical therapy at home, but this will stop in next few days as is now doing outpatient hand therapy      Continues to follow with orthopedics      - Extensive reviewed of all medications including use and side effects     Does have 5 BP medications but BP just below goal, has been treatment  resistant in the past and history of MI      Recommend continue without change, but since new medication added in hospital, recommend they schedule with cardiology for review      Also was supposed to do a heart monitor but didn't due to above      Should call them to see if should still do this      Hyponatremia from medications, was just checked and barely low     - Mood     Reports actually doing well despite all of the above      Addition of Seroquel 1-3 at night is very helpful      Seems to be in better spirits since last time we had a visit          MED REC REQUIRED{  Post Medication Reconciliation Status: discharge medications reconciled, continue medications without change    Review of the result(s) of each unique test - See list        6/2/23 - BMP   Diagnosis or treatment significantly limited by social determinants of health - None     40 minutes spent on the date of the encounter doing chart review, history and exam, documentation and further activities as noted above    The patient indicates understanding of these issues and agrees with the plan.    Follow up: with specialists as above     FLEX Alonso [facility]      Julio Cesar Fierro is a 73 year old, presenting for the following health issues:  Hospital F/U        6/15/2023    10:48 AM   Additional Questions   Roomed by Babs HUNT   Accompanied by Friend     HPI         6/5/2023     1:48 PM   Post Discharge Outreach   Admission Date 6/1/2023   Reason for Admission Closed bicondylar fracture of distal humerus, right   Discharge Date 6/2/2023   Discharge Diagnosis Closed bicondylar fracture of distal humerus, right   How are you doing now that you are home? per patient report, she is doing well. her friend Bryson is helping with her recovery. patient has a follow up appt with the ortho provider tomorrow. she does not need anything at this time.   How are your symptoms? (Red Flag symptoms escalate to triage  hotline per guidelines) Unchanged   Do you feel your condition is stable enough to be safe at home until your provider visit? Yes   Does the patient have their discharge instructions?  Yes   Does the patient have questions regarding their discharge instructions?  No   Were you started on any new medications or were there changes to any of your previous medications?  Yes   Does the patient have all of their medications? Yes   Do you have questions regarding any of your medications?  No   Do you have all of your needed medical supplies or equipment (DME)?  (i.e. oxygen tank, CPAP, cane, etc.) Yes   Discharge follow-up appointment scheduled within 14 calendar days?  Yes   Discharge Follow Up Appointment Date 6/6/2023   Discharge Follow Up Appointment Scheduled with? Specialty Care Provider     Hospital Follow-up Visit:    Hospital/Nursing Home/ Rehab Facility: [facility]  Date of Admission: 04/25/2023 Mini stroke and Fall 04/29/2023 June 1 surgery for 2 days   Date of Discharge: 15 days   Reason(s) for Admission: mini strock and fall     Was your hospitalization related to COVID-19? No   Problems taking medications regularly:  None   Medication changes since discharge: None  Problems adhering to non-medication therapy:  None    Summary of hospitalization:  [facility] discharge summary reviewed  Diagnostic Tests/Treatments reviewed.  Follow up needed: Orthopedics   Other Healthcare Providers Involved in Patient s Care:         Homecare, Specialist appointment - following, Surgical follow-up appointment - completed  and Physical Therapy  Update since discharge: improved.       - Home nurse, physical therapy, and OT   - Now moved to outpatient today   - Not been to neurology yet   - Had to be on Plavix, back on this   - lots of BP medications, does need all this   - Never did heart monitor because fell     Plan of care communicated with patient and family        Review of Systems  "  Constitutional, HEENT, cardiovascular, pulmonary, gi and gu systems are negative, except as otherwise noted.      Objective    /80   Pulse 78   Temp 97.1  F (36.2  C) (Temporal)   Resp 18   Ht 1.6 m (5' 3\")   Wt 54 kg (119 lb)   LMP 11/09/2005 (Approximate)   SpO2 98%   BMI 21.08 kg/m    Body mass index is 21.08 kg/m .  Physical Exam   GENERAL APPEARANCE: healthy, alert and no distress  EYES: Eyes grossly normal to inspection, PERRLA, conjunctivae and sclerae without injection or discharge, EOM intact   RESP: Lungs clear to auscultation - no rales, rhonchi or wheezes    CV: Regular rates and rhythm, normal S1 S2, no S3 or S4, no murmur, click or rub, no peripheral edema and peripheral pulses strong and symmetric bilaterally   MS:   Right arm with mild edema from elbow down   No musculoskeletal defects are noted and gait is age appropriate without ataxia   SKIN: No suspicious lesions or rashes, hydration status appears adeuqate with normal skin turgor   PSYCH: Alert and oriented x3; speech- coherent , normal rate and volume; able to articulate logical thoughts, able to abstract reason, no tangential thoughts, no hallucinations or delusions, mentation appears normal, Mood is euthymic. Affect is appropriate for this mood state and bright. Thought content is free of suicidal ideation, hallucinations, and delusions. Dress is adequate and upkept. Eye contact is good during conversation.       Diagnostics: reviewed in Epic           "

## 2023-06-16 NOTE — TELEPHONE ENCOUNTER
Pending Prescriptions:                       Disp   Refills    butalbital-aspirin-caffeine (FIORINAL) 50-*30 cap*0        Sig: Take 1 capsule by mouth every 6 hours as needed for           moderate to severe pain        Routing refill request to provider for review/approval because:  Drug not on the [facility] refill protocol   Last Seen: 11/05/20  Last Refilled: 11/05/20 30Tablets/Capsules  Refills: 0  Next Visit: CLEMENTINA Zheng RN  December 10, 2020           actual

## 2023-06-21 ENCOUNTER — OFFICE VISIT (OUTPATIENT)
Dept: ORTHOPEDICS | Facility: CLINIC | Age: 73
End: 2023-06-21
Payer: MEDICARE

## 2023-06-21 ENCOUNTER — ANCILLARY PROCEDURE (OUTPATIENT)
Dept: GENERAL RADIOLOGY | Facility: CLINIC | Age: 73
End: 2023-06-21
Attending: ORTHOPAEDIC SURGERY
Payer: MEDICARE

## 2023-06-21 DIAGNOSIS — Z96.611 STATUS POST REVERSE TOTAL SHOULDER REPLACEMENT, RIGHT: Primary | ICD-10-CM

## 2023-06-21 DIAGNOSIS — Z96.611 STATUS POST REVERSE TOTAL SHOULDER REPLACEMENT, RIGHT: ICD-10-CM

## 2023-06-21 PROCEDURE — 99024 POSTOP FOLLOW-UP VISIT: CPT | Performed by: ORTHOPAEDIC SURGERY

## 2023-06-21 PROCEDURE — 73030 X-RAY EXAM OF SHOULDER: CPT | Mod: RT | Performed by: RADIOLOGY

## 2023-06-21 NOTE — PROGRESS NOTES
CHIEF COMPLAINT: Status post reduction of proximal humerus fracture and right reverse total shoulder arthoplasty     DATE OF SURGERY: 5/5/23    HISTORY OF PRESENT ILLNESS: Sri is an extremely pleasant 73 year-old right hand dominant female who is 7 weeks status post the above procedure.  She states her shoulder pain is at a 5 out of 10 with her pain is worse at night.  She also reports stiffness    EXAM:  Pleasant adult 73 year old in no distress.  Respirations even and unlabored.  Right upper extremity: Incisions clean, dry, and intact. No erythema. No drainage. Sens intact Ax/Musc/Med/Rad/Uln nerves. Motor intact EPL, FPL, and Intrinsics.   Active elevation of 30, passive 60. ER -10. Limited elbow ROM    DATA REVIEW:  AP, Scap Y, axillary  Xrays of the right were ordered and reviewed today showing reverse shoulder arthroplasty in place, no dislocation, stable    ASSESSMENT:  1. 7 weeks status post above procedure    PLAN:  I reviewed the intraop findings.  We again discussed that I would not anticipate that the patient is going to regain full range of motion especially in light of complex injury with multiple fractures in the upper extremity.  Patient has started outpatient occupational therapy, and outpatient physical therapy prescription also be given today.  I discussed with the patient that the goals would be to elevate to 90 degrees but even this may be challenging.  More so the goal is to have stable fixation of the injury.  Patient is going to start outpatient physical therapy.  Can follow-up in 2 months with repeat radiographs.    Cierra Krause  Orthopedic Surgery Sports Medicine and Shoulder Surgery

## 2023-06-21 NOTE — LETTER
6/21/2023         RE: Sri Grewal  [id number] [id number]        Dear Colleague,    Thank you for referring your patient, Sri Grewal, to the [facility] [place]. Please see a copy of my visit note below.    CHIEF COMPLAINT: Status post reduction of proximal humerus fracture and right reverse total shoulder arthoplasty     DATE OF SURGERY: 5/5/23    HISTORY OF PRESENT ILLNESS: Sri is an extremely pleasant 73 year-old right hand dominant female who is 7 weeks status post the above procedure.  She states her shoulder pain is at a 5 out of 10 with her pain is worse at night.  She also reports stiffness    EXAM:  Pleasant adult 73 year old in no distress.  Respirations even and unlabored.  Right upper extremity: Incisions clean, dry, and intact. No erythema. No drainage. Sens intact Ax/Musc/Med/Rad/Uln nerves. Motor intact EPL, FPL, and Intrinsics.   Active elevation of 30, passive 60. ER -10. Limited elbow ROM    DATA REVIEW:  AP, Scap Y, axillary  Xrays of the right were ordered and reviewed today showing reverse shoulder arthroplasty in place, no dislocation, stable    ASSESSMENT:  1. 7 weeks status post above procedure    PLAN:  I reviewed the intraop findings.  We again discussed that I would not anticipate that the patient is going to regain full range of motion especially in light of complex injury with multiple fractures in the upper extremity.  Patient has started outpatient occupational therapy, and outpatient physical therapy prescription also be given today.  I discussed with the patient that the goals would be to elevate to 90 degrees but even this may be challenging.  More so the goal is to have stable fixation of the injury.  Patient is going to start outpatient physical therapy.  Can follow-up in 2 months with repeat radiographs.    Cierra Krause  Orthopedic Surgery Sports Medicine and Shoulder Surgery

## 2023-06-22 ENCOUNTER — THERAPY VISIT (OUTPATIENT)
Dept: OCCUPATIONAL THERAPY | Facility: CLINIC | Age: 73
End: 2023-06-22
Payer: MEDICARE

## 2023-06-22 DIAGNOSIS — M25.621 STIFFNESS OF ELBOW JOINT, RIGHT: ICD-10-CM

## 2023-06-22 DIAGNOSIS — M25.521 RIGHT ELBOW PAIN: Primary | ICD-10-CM

## 2023-06-22 PROCEDURE — 97110 THERAPEUTIC EXERCISES: CPT | Mod: GO

## 2023-06-23 DIAGNOSIS — S42.491D CLOSED BICONDYLAR FRACTURE OF DISTAL HUMERUS, RIGHT, WITH ROUTINE HEALING, SUBSEQUENT ENCOUNTER: Primary | ICD-10-CM

## 2023-06-27 DIAGNOSIS — S52.021K CLOSED FRACTURE OF OLECRANON PROCESS OF RIGHT ULNA WITH NONUNION: ICD-10-CM

## 2023-06-27 RX ORDER — TRAMADOL HYDROCHLORIDE 50 MG/1
25-50 TABLET ORAL EVERY 4 HOURS PRN
Qty: 30 TABLET | Refills: 0 | Status: ON HOLD | OUTPATIENT
Start: 2023-06-27 | End: 2023-07-18

## 2023-06-27 NOTE — TELEPHONE ENCOUNTER
reviewed. Due for routine fill. E-prescribed.     Jose Francisco Pierre PA-C  [facility]

## 2023-06-29 ENCOUNTER — OFFICE VISIT (OUTPATIENT)
Dept: FAMILY MEDICINE | Facility: OTHER | Age: 73
End: 2023-06-29
Payer: MEDICARE

## 2023-06-29 ENCOUNTER — THERAPY VISIT (OUTPATIENT)
Dept: OCCUPATIONAL THERAPY | Facility: CLINIC | Age: 73
End: 2023-06-29
Payer: MEDICARE

## 2023-06-29 ENCOUNTER — ANCILLARY PROCEDURE (OUTPATIENT)
Dept: GENERAL RADIOLOGY | Facility: OTHER | Age: 73
End: 2023-06-29
Attending: PHYSICIAN ASSISTANT
Payer: MEDICARE

## 2023-06-29 VITALS
BODY MASS INDEX: 20.82 KG/M2 | DIASTOLIC BLOOD PRESSURE: 70 MMHG | SYSTOLIC BLOOD PRESSURE: 120 MMHG | RESPIRATION RATE: 18 BRPM | WEIGHT: 117.5 LBS | HEIGHT: 63 IN | TEMPERATURE: 97.7 F | OXYGEN SATURATION: 98 % | HEART RATE: 87 BPM

## 2023-06-29 DIAGNOSIS — K59.00 CONSTIPATION, UNSPECIFIED CONSTIPATION TYPE: Primary | ICD-10-CM

## 2023-06-29 DIAGNOSIS — R30.0 DYSURIA: ICD-10-CM

## 2023-06-29 DIAGNOSIS — Z23 NEED FOR DIPHTHERIA-TETANUS-PERTUSSIS (TDAP) VACCINE: ICD-10-CM

## 2023-06-29 DIAGNOSIS — M25.621 STIFFNESS OF ELBOW JOINT, RIGHT: ICD-10-CM

## 2023-06-29 DIAGNOSIS — M25.521 RIGHT ELBOW PAIN: Primary | ICD-10-CM

## 2023-06-29 DIAGNOSIS — K59.00 CONSTIPATION, UNSPECIFIED CONSTIPATION TYPE: ICD-10-CM

## 2023-06-29 LAB
ALBUMIN SERPL BCG-MCNC: 3.9 G/DL (ref 3.5–5.2)
ALBUMIN UR-MCNC: NEGATIVE MG/DL
ALP SERPL-CCNC: 103 U/L (ref 35–104)
ALT SERPL W P-5'-P-CCNC: 11 U/L (ref 0–50)
ANION GAP SERPL CALCULATED.3IONS-SCNC: 11 MMOL/L (ref 7–15)
APPEARANCE UR: CLEAR
AST SERPL W P-5'-P-CCNC: 24 U/L (ref 0–45)
BASOPHILS # BLD AUTO: 0.1 10E3/UL (ref 0–0.2)
BASOPHILS NFR BLD AUTO: 1 %
BILIRUB SERPL-MCNC: <0.2 MG/DL
BILIRUB UR QL STRIP: NEGATIVE
BUN SERPL-MCNC: 12.4 MG/DL (ref 8–23)
CALCIUM SERPL-MCNC: 9.4 MG/DL (ref 8.8–10.2)
CHLORIDE SERPL-SCNC: 96 MMOL/L (ref 98–107)
COLOR UR AUTO: YELLOW
CREAT SERPL-MCNC: 0.81 MG/DL (ref 0.51–0.95)
DEPRECATED HCO3 PLAS-SCNC: 26 MMOL/L (ref 22–29)
EOSINOPHIL # BLD AUTO: 0.3 10E3/UL (ref 0–0.7)
EOSINOPHIL NFR BLD AUTO: 5 %
ERYTHROCYTE [DISTWIDTH] IN BLOOD BY AUTOMATED COUNT: 12.9 % (ref 10–15)
GFR SERPL CREATININE-BSD FRML MDRD: 77 ML/MIN/1.73M2
GLUCOSE SERPL-MCNC: 140 MG/DL (ref 70–99)
GLUCOSE UR STRIP-MCNC: NEGATIVE MG/DL
HCT VFR BLD AUTO: 33.4 % (ref 35–47)
HGB BLD-MCNC: 10.8 G/DL (ref 11.7–15.7)
HGB UR QL STRIP: NEGATIVE
IMM GRANULOCYTES # BLD: 0 10E3/UL
IMM GRANULOCYTES NFR BLD: 0 %
KETONES UR STRIP-MCNC: NEGATIVE MG/DL
LEUKOCYTE ESTERASE UR QL STRIP: NEGATIVE
LYMPHOCYTES # BLD AUTO: 1.6 10E3/UL (ref 0.8–5.3)
LYMPHOCYTES NFR BLD AUTO: 28 %
MCH RBC QN AUTO: 28.8 PG (ref 26.5–33)
MCHC RBC AUTO-ENTMCNC: 32.3 G/DL (ref 31.5–36.5)
MCV RBC AUTO: 89 FL (ref 78–100)
MONOCYTES # BLD AUTO: 0.7 10E3/UL (ref 0–1.3)
MONOCYTES NFR BLD AUTO: 12 %
NEUTROPHILS # BLD AUTO: 3.1 10E3/UL (ref 1.6–8.3)
NEUTROPHILS NFR BLD AUTO: 55 %
NITRATE UR QL: NEGATIVE
PH UR STRIP: 6 [PH] (ref 5–7)
PLATELET # BLD AUTO: 352 10E3/UL (ref 150–450)
POTASSIUM SERPL-SCNC: 4.7 MMOL/L (ref 3.4–5.3)
PROT SERPL-MCNC: 7 G/DL (ref 6.4–8.3)
RBC # BLD AUTO: 3.75 10E6/UL (ref 3.8–5.2)
SODIUM SERPL-SCNC: 133 MMOL/L (ref 136–145)
SP GR UR STRIP: 1.01 (ref 1–1.03)
UROBILINOGEN UR STRIP-ACNC: 0.2 E.U./DL
WBC # BLD AUTO: 5.6 10E3/UL (ref 4–11)

## 2023-06-29 PROCEDURE — 74019 RADEX ABDOMEN 2 VIEWS: CPT | Mod: TC | Performed by: RADIOLOGY

## 2023-06-29 PROCEDURE — 85025 COMPLETE CBC W/AUTO DIFF WBC: CPT | Performed by: PHYSICIAN ASSISTANT

## 2023-06-29 PROCEDURE — 36415 COLL VENOUS BLD VENIPUNCTURE: CPT | Performed by: PHYSICIAN ASSISTANT

## 2023-06-29 PROCEDURE — 99214 OFFICE O/P EST MOD 30 MIN: CPT | Performed by: PHYSICIAN ASSISTANT

## 2023-06-29 PROCEDURE — 97110 THERAPEUTIC EXERCISES: CPT | Mod: GO

## 2023-06-29 PROCEDURE — 97530 THERAPEUTIC ACTIVITIES: CPT | Mod: GO

## 2023-06-29 PROCEDURE — 81003 URINALYSIS AUTO W/O SCOPE: CPT | Performed by: PHYSICIAN ASSISTANT

## 2023-06-29 PROCEDURE — 80053 COMPREHEN METABOLIC PANEL: CPT | Performed by: PHYSICIAN ASSISTANT

## 2023-06-29 NOTE — PROGRESS NOTES
Assessment & Plan     Constipation, unspecified constipation type  My overall report between her and her  I suspect that she has had 5 days of constipation.  She has passed very little gas by report.  She does not have any pain.  X-ray shows stool retention within the entire colon.  Possibly some air fluid levels to the right upper quadrant.  He remains to be seen by radiologist.  Advised consideration of doubling up on MiraLAX and adding a fiber supplement to her regimen.  She could also add an enema if this could be safely done.  Recommended cessation of pain medications if at all possible.  - XR Abdomen 2 Views; Future  - CBC with platelets and differential; Future  - Comprehensive metabolic panel (BMP + Alb, Alk Phos, ALT, AST, Total. Bili, TP); Future  - Comprehensive metabolic panel (BMP + Alb, Alk Phos, ALT, AST, Total. Bili, TP)  - CBC with platelets and differential    Dysuria  Negative urine at this point time.  - UA Macroscopic with reflex to Microscopic and Culture; Future  - UA Macroscopic with reflex to Microscopic and Culture  - XR Abdomen 2 Views; Future  - CBC with platelets and differential; Future  - Comprehensive metabolic panel (BMP + Alb, Alk Phos, ALT, AST, Total. Bili, TP); Future  - Comprehensive metabolic panel (BMP + Alb, Alk Phos, ALT, AST, Total. Bili, TP)  - CBC with platelets and differential    Need for diphtheria-tetanus-pertussis (Tdap) vaccine  Recommended TDA P through her pharmacy.     Advised that if this does not improve she should be seen in the emergency room for further evaluation and treatment options.    Karthik Sheriff PA-C  [facility]   Sri is a 73 year old, presenting for the following health issues:  Constipation        6/29/2023     4:14 PM   Additional Questions   Roomed by Kayla EDWARDS   Accompanied by self     History of Present Illness       Reason for visit:  No bowel movements in a week exactly  Symptom onset:  3-7 days  "ago  Symptoms include:  Constipation  Symptom intensity:  Moderate  Symptom progression:  Worsening  Had these symptoms before:  Yes  Has tried/received treatment for these symptoms:  Yes  Previous treatment was successful:  Yes  Prior treatment description:  Not sure what kind of medication it was  What makes it worse:  Nothing  What makes it better:  Nothing    She eats 2-3 servings of fruits and vegetables daily.She consumes 0 sweetened beverage(s) daily.She exercises with enough effort to increase her heart rate 20 to 29 minutes per day.  She exercises with enough effort to increase her heart rate 3 or less days per week.   She is taking medications regularly.     Review of Systems   Constitutional, HEENT, cardiovascular, pulmonary, GI, , musculoskeletal, neuro, skin, endocrine and psych systems are negative, except as otherwise noted.      Objective    /70   Pulse 87   Temp 97.7  F (36.5  C) (Temporal)   Resp 18   Ht 1.6 m (5' 3\")   Wt 53.3 kg (117 lb 8 oz)   LMP 11/09/2005 (Approximate)   SpO2 98%   BMI 20.81 kg/m    Body mass index is 20.81 kg/m .  Physical Exam   GENERAL: healthy, alert and no distress  NECK: no adenopathy, no asymmetry, masses, or scars and thyroid normal to palpation  RESP: lungs clear to auscultation - no rales, rhonchi or wheezes  CV: regular rate and rhythm, normal S1 S2, no S3 or S4, no murmur, click or rub, no peripheral edema and peripheral pulses strong  ABDOMEN: soft, nontender, no hepatosplenomegaly, no masses and bowel sounds decreased.  MS: no gross musculoskeletal defects noted, no edema  SKIN: no suspicious lesions or rashes to exposed visible skin.  NEURO: Normal strength and tone, mentation intact and speech normal  PSYCH: concentration poor, affect flat, fatigued and appearance disheveled    Results for orders placed or performed in visit on 06/29/23 (from the past 24 hour(s))   UA Macroscopic with reflex to Microscopic and Culture    Specimen: Urine, Clean " Catch   Result Value Ref Range    Color Urine Yellow Colorless, Straw, Light Yellow, Yellow    Appearance Urine Clear Clear    Glucose Urine Negative Negative mg/dL    Bilirubin Urine Negative Negative    Ketones Urine Negative Negative mg/dL    Specific Gravity Urine 1.010 1.003 - 1.035    Blood Urine Negative Negative    pH Urine 6.0 5.0 - 7.0    Protein Albumin Urine Negative Negative mg/dL    Urobilinogen Urine 0.2 0.2, 1.0 E.U./dL    Nitrite Urine Negative Negative    Leukocyte Esterase Urine Negative Negative    Narrative    Microscopic not indicated   CBC with platelets and differential    Narrative    The following orders were created for panel order CBC with platelets and differential.  Procedure                               Abnormality         Status                     ---------                               -----------         ------                     CBC with platelets and d...[[id number]]  Abnormal            Final result                 Please view results for these tests on the individual orders.   CBC with platelets and differential   Result Value Ref Range    WBC Count 5.6 4.0 - 11.0 10e3/uL    RBC Count 3.75 (L) 3.80 - 5.20 10e6/uL    Hemoglobin 10.8 (L) 11.7 - 15.7 g/dL    Hematocrit 33.4 (L) 35.0 - 47.0 %    MCV 89 78 - 100 fL    MCH 28.8 26.5 - 33.0 pg    MCHC 32.3 31.5 - 36.5 g/dL    RDW 12.9 10.0 - 15.0 %    Platelet Count 352 150 - 450 10e3/uL    % Neutrophils 55 %    % Lymphocytes 28 %    % Monocytes 12 %    % Eosinophils 5 %    % Basophils 1 %    % Immature Granulocytes 0 %    Absolute Neutrophils 3.1 1.6 - 8.3 10e3/uL    Absolute Lymphocytes 1.6 0.8 - 5.3 10e3/uL    Absolute Monocytes 0.7 0.0 - 1.3 10e3/uL    Absolute Eosinophils 0.3 0.0 - 0.7 10e3/uL    Absolute Basophils 0.1 0.0 - 0.2 10e3/uL    Absolute Immature Granulocytes 0.0 <=0.4 10e3/uL     *Note: Due to a large number of results and/or encounters for the requested time period, some results have not been displayed. A complete  set of results can be found in Results Review.       X-ray: negative for concerning pathology to my independent review today.  Stool burden is noted retention throughout the entire colon is present.  Possible air-fluid levels in the right upper quadrant are noted as well.  It will be overread by radiology.

## 2023-06-30 ENCOUNTER — TELEPHONE (OUTPATIENT)
Dept: FAMILY MEDICINE | Facility: OTHER | Age: 73
End: 2023-06-30
Payer: MEDICARE

## 2023-06-30 NOTE — LETTER
June 30, 2023      Sri Grewal  [address] [address] [id number]        Dear ,    We are writing to inform you of your test results.    Per your Provider. Overall stable labs at this point in time.  Follow-up with primary care provider in a month is advised.  Making that appointment now would be wise.    Call our clinic to schedule your appointment at [phone].    Resulted Orders   UA Macroscopic with reflex to Microscopic and Culture   Result Value Ref Range    Color Urine Yellow Colorless, Straw, Light Yellow, Yellow    Appearance Urine Clear Clear    Glucose Urine Negative Negative mg/dL    Bilirubin Urine Negative Negative    Ketones Urine Negative Negative mg/dL    Specific Gravity Urine 1.010 1.003 - 1.035    Blood Urine Negative Negative    pH Urine 6.0 5.0 - 7.0    Protein Albumin Urine Negative Negative mg/dL    Urobilinogen Urine 0.2 0.2, 1.0 E.U./dL    Nitrite Urine Negative Negative    Leukocyte Esterase Urine Negative Negative    Narrative    Microscopic not indicated   Comprehensive metabolic panel (BMP + Alb, Alk Phos, ALT, AST, Total. Bili, TP)   Result Value Ref Range    Sodium 133 (L) 136 - 145 mmol/L    Potassium 4.7 3.4 - 5.3 mmol/L    Chloride 96 (L) 98 - 107 mmol/L    Carbon Dioxide (CO2) 26 22 - 29 mmol/L    Anion Gap 11 7 - 15 mmol/L    Urea Nitrogen 12.4 8.0 - 23.0 mg/dL    Creatinine 0.81 0.51 - 0.95 mg/dL    Calcium 9.4 8.8 - 10.2 mg/dL    Glucose 140 (H) 70 - 99 mg/dL    Alkaline Phosphatase 103 35 - 104 U/L    AST 24 0 - 45 U/L      Comment:      Reference intervals for this test were updated on 6/12/2023 to more accurately reflect our healthy population. There may be differences in the flagging of prior results with similar values performed with this method. Interpretation of those prior results can be made in the context of the updated reference intervals.    ALT 11 0 - 50 U/L      Comment:      Reference intervals for this test were updated on 6/12/2023 to  more accurately reflect our healthy population. There may be differences in the flagging of prior results with similar values performed with this method. Interpretation of those prior results can be made in the context of the updated reference intervals.      Protein Total 7.0 6.4 - 8.3 g/dL    Albumin 3.9 3.5 - 5.2 g/dL    Bilirubin Total <0.2 <=1.2 mg/dL    GFR Estimate 77 >60 mL/min/1.73m2   CBC with platelets and differential   Result Value Ref Range    WBC Count 5.6 4.0 - 11.0 10e3/uL    RBC Count 3.75 (L) 3.80 - 5.20 10e6/uL    Hemoglobin 10.8 (L) 11.7 - 15.7 g/dL    Hematocrit 33.4 (L) 35.0 - 47.0 %    MCV 89 78 - 100 fL    MCH 28.8 26.5 - 33.0 pg    MCHC 32.3 31.5 - 36.5 g/dL    RDW 12.9 10.0 - 15.0 %    Platelet Count 352 150 - 450 10e3/uL    % Neutrophils 55 %    % Lymphocytes 28 %    % Monocytes 12 %    % Eosinophils 5 %    % Basophils 1 %    % Immature Granulocytes 0 %    Absolute Neutrophils 3.1 1.6 - 8.3 10e3/uL    Absolute Lymphocytes 1.6 0.8 - 5.3 10e3/uL    Absolute Monocytes 0.7 0.0 - 1.3 10e3/uL    Absolute Eosinophils 0.3 0.0 - 0.7 10e3/uL    Absolute Basophils 0.1 0.0 - 0.2 10e3/uL    Absolute Immature Granulocytes 0.0 <=0.4 10e3/uL       If you have any questions or concerns, please call the clinic at the number listed above.       Sincerely,      [facility] Team

## 2023-06-30 NOTE — TELEPHONE ENCOUNTER
----- Message from Karthik Sheriff PA-C sent at 6/30/2023  6:39 AM CDT -----  Overall stable labs at this point in time.  Follow-up with primary care provider in a month is advised.  Making that appointment now would be wise.  Please, send letter with a copy of results and any advice listed.  Electronically signed:    Karthik Anguiano PA-C

## 2023-07-03 DIAGNOSIS — G43.709 CHRONIC MIGRAINE WITHOUT AURA WITHOUT STATUS MIGRAINOSUS, NOT INTRACTABLE: Primary | ICD-10-CM

## 2023-07-03 DIAGNOSIS — S42.211D CLOSED DISPLACED FRACTURE OF SURGICAL NECK OF RIGHT HUMERUS WITH ROUTINE HEALING, UNSPECIFIED FRACTURE MORPHOLOGY, SUBSEQUENT ENCOUNTER: Primary | ICD-10-CM

## 2023-07-03 RX ORDER — BUTALBITAL, ASPIRIN AND CAFFEINE 50; 325; 40 MG/1; MG/1; MG/1
1 TABLET ORAL EVERY 4 HOURS PRN
Qty: 30 TABLET | Refills: 0 | Status: SHIPPED | OUTPATIENT
Start: 2023-07-03 | End: 2023-07-18

## 2023-07-03 NOTE — TELEPHONE ENCOUNTER
Routing refill request to provider for review/approval because:  Drug not active on patient's medication list    Med and pharmacy pended.  Has been filled since 7/1/2004.    Last OV 6/29/23    rCistina Navarro RN  [facility] ~ Registered Nurse  Clinic Triage ~ [place] [facility] [place]  July 3, 2023

## 2023-07-03 NOTE — TELEPHONE ENCOUNTER
reviewed. Due for routine fill. E-prescribed.     Jose Francisco Pierre PA-C  [facility]

## 2023-07-05 DIAGNOSIS — M25.521 ELBOW PAIN, RIGHT: Primary | ICD-10-CM

## 2023-07-06 ENCOUNTER — THERAPY VISIT (OUTPATIENT)
Dept: OCCUPATIONAL THERAPY | Facility: CLINIC | Age: 73
End: 2023-07-06
Payer: MEDICARE

## 2023-07-06 DIAGNOSIS — M25.521 RIGHT ELBOW PAIN: Primary | ICD-10-CM

## 2023-07-06 DIAGNOSIS — M25.621 STIFFNESS OF ELBOW JOINT, RIGHT: ICD-10-CM

## 2023-07-06 DIAGNOSIS — S42.211D CLOSED DISPLACED FRACTURE OF SURGICAL NECK OF RIGHT HUMERUS WITH ROUTINE HEALING, UNSPECIFIED FRACTURE MORPHOLOGY, SUBSEQUENT ENCOUNTER: ICD-10-CM

## 2023-07-06 PROCEDURE — 97110 THERAPEUTIC EXERCISES: CPT | Mod: GO

## 2023-07-06 PROCEDURE — 97140 MANUAL THERAPY 1/> REGIONS: CPT | Mod: GO

## 2023-07-10 ENCOUNTER — OFFICE VISIT (OUTPATIENT)
Dept: ORTHOPEDICS | Facility: CLINIC | Age: 73
End: 2023-07-10
Payer: MEDICARE

## 2023-07-10 ENCOUNTER — ANCILLARY PROCEDURE (OUTPATIENT)
Dept: GENERAL RADIOLOGY | Facility: CLINIC | Age: 73
End: 2023-07-10
Attending: STUDENT IN AN ORGANIZED HEALTH CARE EDUCATION/TRAINING PROGRAM
Payer: MEDICARE

## 2023-07-10 DIAGNOSIS — M25.521 ELBOW PAIN, RIGHT: ICD-10-CM

## 2023-07-10 DIAGNOSIS — S42.491D CLOSED BICONDYLAR FRACTURE OF DISTAL HUMERUS, RIGHT, WITH ROUTINE HEALING, SUBSEQUENT ENCOUNTER: Primary | ICD-10-CM

## 2023-07-10 PROCEDURE — 99024 POSTOP FOLLOW-UP VISIT: CPT | Performed by: STUDENT IN AN ORGANIZED HEALTH CARE EDUCATION/TRAINING PROGRAM

## 2023-07-10 PROCEDURE — 73080 X-RAY EXAM OF ELBOW: CPT | Mod: RT | Performed by: RADIOLOGY

## 2023-07-10 NOTE — PROGRESS NOTES
Orthopaedic Surgery Hand Clinic Progress Note    Patient Name: Sri Grewal  MRN: [id number]  : 1950  Date: Jul 10, 2023    Date of Surgery:   5/3/23 - ORIF right distal humerus  23 - Revision ORIF right olecranon osteotomy    Subjective:  Ms. Sri Grewal is a 73 year old female who returns nearly 2 months 1 week s/p ORIF R distal humerus and 6 weeks s/p revision ORIF olecranon osteotomy which had displaced during her previous hospital stay. She is doing better. Flexion is excellent, main limitation is extension. She is working with OT and should be receiving a BARBARA splint soon. No numbness/tingling. Accompanied by her friend Bryson today.    Objective:  General: alert, appropriate, NAD  HEENT: NC/AT  CV: RRR by pulse  Pulm: Unlabored on RA  MSK:  Incision c/d/i, no erythema, drainage, evidence of infection  Palpable crepitus of scar tissue over hardware, this is not painful  Elbow range of motion   Supination 60  Pronation 60  5 out of 5 EPL, FPL, FDP index, intrinsics  Sensation intact to light touch median, radial, ulnar nerve distributions no deficits  Warm well-perfused, capillary fill less than 2 seconds    Imaging:  X-rays of right elbow today reviewed by me demonstrates postsurgical changes status post ORIF of olecranon osteotomy.  No interval displacement.  Distal humerus and olecranon osteotomy appear healed. articular surface remains well aligned.  No evidence of hardware complication    Assessment/Plan:  73-year-old female status post ORIF right distal humerus 5/3/2023 and revision ORIF right olecranon osteotomy 2023.  Neurovascularly intact.  Doing well.    Continue hand therapy for aggressive elbow ROM. Start BARBARA splint when she receives it. I advised that it is not uncommon to lose terminal extension after this surgery, particularly since she had 2 operations. I discussed that 30 degrees shy of terminal extension should be functional for her.    She may begin progressive weight  bearing and strengthening of right upper extremity at this time.    Follow-up with me in 6 weeks. X-rays of her right elbow will be needed at that time.    All question answered.  She and Bryson voiced understanding and agreement.    Williams Phipps MD    Hand & Upper Extremity Surgery  Department of Orthopedic Surgery  [facility]

## 2023-07-10 NOTE — NURSING NOTE
Reason For Visit:   Chief Complaint   Patient presents with     Surgical Followup     DOS: 6/2/23 //  REVISION OPEN REDUCTION INTERNAL FIXATION RIGHT OLECRANON VS HARDWARE REMOVAL RIGHT OLECRANON        Primary MD: Rosenda Pierre  Ref. MD:     Age: 73 year old    ?  No      LMP 11/09/2005 (Approximate)       Pain Assessment  Patient Currently in Pain: Yes  0-10 Pain Scale: 3  Primary Pain Location: Elbow (Right)  Pain Descriptors: Other (comment) (deep)  Alleviating Factors: Rest  Aggravating Factors: Movement, Other (comment) (usage)    Hand Dominance Evaluation  Hand Dominance: Right          QuickDASH Assessment      6/12/2023     7:57 AM   QuickDASH Main   1. Open a tight or new jar Moderate difficulty   2. Do heavy household chores (e.g., wash walls, floors) Moderate difficulty   3. Carry a shopping bag or briefcase Mild difficulty   4. Wash your back Mild difficulty   5. Use a knife to cut food Moderate difficulty   6. Recreational activities in which you take some force or impact through your arm, shoulder or hand (e.g., golf, hammering, tennis, etc.) Moderate difficulty   7. During the past week, to what extent has your arm, shoulder or hand problem interfered with your normal social activities with family, friends, neighbours or groups Moderately   8. During the past week, were you limited in your work or other regular daily activities as a result of your arm, shoulder or hand problem Moderately limited   9. Arm, shoulder or hand pain Moderate   10.Tingling (pins and needles) in your arm,shoulder or hand Mild   11. During the past week, how much difficulty have you had sleeping because of the pain in your arm, shoulder or hand Moderate difficulty   Quickdash Ability Score 43.18          Current Outpatient Medications   Medication Sig Dispense Refill     amLODIPine (NORVASC) 10 MG tablet Take 1 tablet (10 mg) by mouth daily 30 tablet 0     buPROPion (WELLBUTRIN XL) 300 MG 24 hr  tablet Take 300 mg by mouth every morning       butalbital-aspirin-caffeine (FIORINAL EQUIV) -40 MG TABS per tablet Take 1 tablet by mouth every 4 hours as needed for migraine 30 tablet 0     carvedilol (COREG) 12.5 MG tablet TAKE ONE TABLET BY MOUTH TWICE A DAY WITH FOOD 180 tablet 1     clopidogrel (PLAVIX) 75 MG tablet Take 1 tablet (75 mg) by mouth daily 30 tablet 0     furosemide (LASIX) 40 MG tablet Take 1 tablet (40 mg) by mouth daily 30 tablet 0     hydrALAZINE (APRESOLINE) 25 MG tablet Take 1 tablet (25 mg) by mouth 3 times daily 90 tablet 0     hydrOXYzine (ATARAX) 10 MG tablet Take 1 tablet (10 mg) by mouth every 6 hours as needed for other (adjuvant pain) 30 tablet 0     lisinopril (ZESTRIL) 20 MG tablet Take 1 tablet (20 mg) by mouth daily 90 tablet 0     melatonin 5 MG tablet Take 1 tablet (5 mg) by mouth At Bedtime 30 tablet 0     QUEtiapine (SEROQUEL) 25 MG tablet Take 3 tablets (75 mg) by mouth At Bedtime 90 tablet 0     rosuvastatin (CRESTOR) 20 MG tablet Take 1 tablet (20 mg) by mouth daily 90 tablet 0     sodium chloride 1 GM tablet Take 1 tablet (1 g) by mouth 2 times daily (with meals) 60 tablet 0     tiZANidine (ZANAFLEX) 4 MG tablet Take 0.5 tablets (2 mg) by mouth 3 times daily as needed for muscle spasms 40 tablet 0     traMADol (ULTRAM) 50 MG tablet Take 0.5-1 tablets (25-50 mg) by mouth every 4 hours as needed for moderate pain 30 tablet 0     venlafaxine (EFFEXOR XR) 150 MG 24 hr capsule Take 1 capsule (150 mg) by mouth daily 90 capsule 0     vitamin D2 (ERGOCALCIFEROL) 73108 units (1250 mcg) capsule Take 1 capsule (50,000 Units) by mouth every 7 days 5 capsule 0       Allergies   Allergen Reactions     Morphine And Related      GI UPSET     Oxycodone      Seasonal Allergies        Jaye Francis, ATC

## 2023-07-10 NOTE — LETTER
7/10/2023         RE: Sri Grewal  [address] [address] [address]        Dear Colleague,    Thank you for referring your patient, Sri Grewal, to the [facility] [place]. Please see a copy of my visit note below.    Orthopaedic Surgery Hand Clinic Progress Note    Patient Name: Sri Grewal  MRN: [id number]  : 1950  Date: Jul 10, 2023    Date of Surgery:   5/3/23 - ORIF right distal humerus  23 - Revision ORIF right olecranon osteotomy    Subjective:  Ms. Sri Grewal is a 73 year old female who returns nearly 2 months 1 week s/p ORIF R distal humerus and 6 weeks s/p revision ORIF olecranon osteotomy which had displaced during her previous hospital stay. She is doing better. Flexion is excellent, main limitation is extension. She is working with OT and should be receiving a BARBARA splint soon. No numbness/tingling. Accompanied by her friend Bryson today.    Objective:  General: alert, appropriate, NAD  HEENT: NC/AT  CV: RRR by pulse  Pulm: Unlabored on RA  MSK:  Incision c/d/i, no erythema, drainage, evidence of infection  Palpable crepitus of scar tissue over hardware, this is not painful  Elbow range of motion   Supination 60  Pronation 60  5 out of 5 EPL, FPL, FDP index, intrinsics  Sensation intact to light touch median, radial, ulnar nerve distributions no deficits  Warm well-perfused, capillary fill less than 2 seconds    Imaging:  X-rays of right elbow today reviewed by me demonstrates postsurgical changes status post ORIF of olecranon osteotomy.  No interval displacement.  Distal humerus and olecranon osteotomy appear healed. articular surface remains well aligned.  No evidence of hardware complication    Assessment/Plan:  73-year-old female status post ORIF right distal humerus 5/3/2023 and revision ORIF right olecranon osteotomy 2023.  Neurovascularly intact.  Doing well.    Continue hand therapy for aggressive elbow ROM. Start BARBARA splint when  she receives it. I advised that it is not uncommon to lose terminal extension after this surgery, particularly since she had 2 operations. I discussed that 30 degrees shy of terminal extension should be functional for her.    She may begin progressive weight bearing and strengthening of right upper extremity at this time.    Follow-up with me in 6 weeks. X-rays of her right elbow will be needed at that time.    All question answered.  She and Bryson voiced understanding and agreement.    Williams Phipps MD    Hand & Upper Extremity Surgery  Department of Orthopedic Surgery  [facility]

## 2023-07-11 ENCOUNTER — THERAPY VISIT (OUTPATIENT)
Dept: PHYSICAL THERAPY | Facility: CLINIC | Age: 73
End: 2023-07-11
Payer: MEDICARE

## 2023-07-11 ENCOUNTER — DOCUMENTATION ONLY (OUTPATIENT)
Dept: ORTHOPEDICS | Facility: CLINIC | Age: 73
End: 2023-07-11

## 2023-07-11 DIAGNOSIS — Z96.611 STATUS POST REVERSE TOTAL SHOULDER REPLACEMENT, RIGHT: Primary | ICD-10-CM

## 2023-07-11 DIAGNOSIS — Z96.611 S/P REVERSE TOTAL SHOULDER ARTHROPLASTY, RIGHT: ICD-10-CM

## 2023-07-11 PROCEDURE — 97162 PT EVAL MOD COMPLEX 30 MIN: CPT | Mod: GP | Performed by: PHYSICAL THERAPIST

## 2023-07-11 PROCEDURE — 97110 THERAPEUTIC EXERCISES: CPT | Mod: GP | Performed by: PHYSICAL THERAPIST

## 2023-07-11 NOTE — PROGRESS NOTES
PHYSICAL THERAPY EVALUATION  Type of Visit: Evaluation        Subjective      Presenting condition or subjective complaint: S/P R TSA Reverse  Date of onset: 05/05/23 (DOS)    Relevant medical history: Bladder or bowel problems; Depression; High blood pressure; Implanted device; Migraines or headaches; Stroke   Dates & types of surgery: Revision ORIF R Olecrenon  5/3/23 and R Reverse TSA 5/5/23    Prior diagnostic imaging/testing results: MRI; X-ray     Prior therapy history for the same diagnosis, illness or injury: No      Prior Level of Function   Transfers: Independent  Ambulation: Independent  ADL: Assistive person  IADL: Driving, Laundry, Meal preparation, Medication management    Living Environment  Social support: Alone   Type of home: 2-story   Stairs to enter the home: Yes 7 Is there a railing: Yes   Ramp: No   Stairs inside the home: Yes 14 Is there a railing: Yes   Help at home: Self Cares (home health aide/personal care attendant, family, etc)  Equipment owned: Straight Cane     Employment: No    Hobbies/Interests:      Patient goals for therapy: More motion &  function right arm    Pain assessment: See objective evaluation for additional pain details     Objective   SHOULDER EVALUATION  PAIN: Pain Level at Rest: 1/10  Pain Level with Use: 9/10  Pain Location: R Shoulder Joint  Pain Quality: Aching  Pain Frequency: intermittent  Pain is Exacerbated By: Movement  Pain is Relieved By: heat and Tylenol and Tramadol  INTEGUMENTARY (edema, incisions): Incison well healed. Observe general muscle atrophy right shoulder.  POSTURE: Sitting Posture: Rounded shoulders, Forward head  GAIT:   Weightbearing Status: FWB  Assistive Device(s): None  Gait Deviations: Base of support decreased  Stride length decreased  Irene decreased  BALANCE/PROPRIOCEPTION: N/A  ROM:   (Degrees) Left AROM Left PROM Right AROM  Right PROM   Shoulder Flexion   20 90   Shoulder Extension       Shoulder Abduction   40 70   Shoulder  Adduction       Shoulder Internal Rotation       Shoulder External Rotation    70   Shoulder Horizontal Abduction       Shoulder Horizontal Adduction       Shoulder Flexion ER   R occiput    Shoulder Flexion IR   Ext/IR = T12    Elbow Extension       Elbow Flexion       Pain:   End feel:   STRENGTH: N/A  PALPATION: WNL  Assessment & Plan   CLINICAL IMPRESSIONS   Medical Diagnosis: S/P R Reverse Shoulder Replacement    Treatment Diagnosis: S/P R Reverse Shoulder Replacement   Impression/Assessment: Patient is a 73 year old female with S/P R Shoulder pain and limited function complaints.  The following significant findings have been identified: Pain, Decreased ROM/flexibility, Decreased joint mobility, Decreased strength, Impaired balance, Inflammation, Impaired gait and Impaired muscle performance. These impairments interfere with their ability to perform self care tasks, household chores, driving , household mobility and community mobility as compared to previous level of function.     Clinical Decision Making (Complexity):   Clinical Presentation: Stable/Uncomplicated  Clinical Presentation Rationale: based on medical and personal factors listed in PT evaluation  Clinical Decision Making (Complexity): Moderate complexity    PLAN OF CARE  Treatment Interventions:  Interventions: Manual Therapy, Neuromuscular Re-education, Therapeutic Activity, Therapeutic Exercise, Self-Care/Home Management    Long Term Goals     PT Goal 1  Goal Identifier: STG #1 - REACHING  PT Goal 2  Goal Identifier: LTG #1 - REACHING      Frequency of Treatment: 1 x week  Duration of Treatment: 12 weeks    Recommended Referrals to Other Professionals: Physical Therapy  Education Assessment:   Learner/Method: Patient;Caregiver;Demonstration;Pictures/Video    Risks and benefits of evaluation/treatment have been explained.   Patient/Family/caregiver agrees with Plan of Care.     Evaluation Time:            Signing Clinician: Zayra Castillo,  PT      [facility]                                                                                   OUTPATIENT PHYSICAL THERAPY      PLAN OF TREATMENT FOR OUTPATIENT REHABILITATION   Patient's Last Name, First Name, Sri Matta YOB: 1950   Provider's Name   [facility]   Medical Record No.  [id number]     Onset Date: 05/05/23 (DOS)  Start of Care Date: 07/11/23     Medical Diagnosis:  S/P R Reverse Shoulder Replacement      PT Treatment Diagnosis:  S/P R Reverse Shoulder Replacement Plan of Treatment  Frequency/Duration: 1 x week/ 12 weeks    Certification date from 07/11/23 to 10/08/23         See note for plan of treatment details and functional goals     Zayra Castillo, PT                         I CERTIFY THE NEED FOR THESE SERVICES FURNISHED UNDER        THIS PLAN OF TREATMENT AND WHILE UNDER MY CARE     (Physician attestation of this document indicates review and certification of the therapy plan).                  Referring Provider:  Williams Phipps      Initial Assessment  See Epic Evaluation- Start of Care Date: 07/11/23

## 2023-07-12 ENCOUNTER — APPOINTMENT (OUTPATIENT)
Dept: GENERAL RADIOLOGY | Facility: CLINIC | Age: 73
DRG: 481 | End: 2023-07-12
Attending: EMERGENCY MEDICINE
Payer: MEDICARE

## 2023-07-12 ENCOUNTER — HOSPITAL ENCOUNTER (INPATIENT)
Facility: CLINIC | Age: 73
LOS: 6 days | Discharge: NURSING, REHAB OR LONG-TERM CARE | DRG: 481 | End: 2023-07-18
Attending: EMERGENCY MEDICINE | Admitting: PEDIATRICS
Payer: MEDICARE

## 2023-07-12 ENCOUNTER — APPOINTMENT (OUTPATIENT)
Dept: CT IMAGING | Facility: CLINIC | Age: 73
DRG: 481 | End: 2023-07-12
Attending: EMERGENCY MEDICINE
Payer: MEDICARE

## 2023-07-12 DIAGNOSIS — S09.90XA HEAD INJURY, INITIAL ENCOUNTER: ICD-10-CM

## 2023-07-12 DIAGNOSIS — F05 POSTOPERATIVE DELIRIUM: ICD-10-CM

## 2023-07-12 DIAGNOSIS — I10 HYPERTENSION GOAL BP (BLOOD PRESSURE) < 140/90: ICD-10-CM

## 2023-07-12 DIAGNOSIS — S72.144A CLOSED NONDISPLACED INTERTROCHANTERIC FRACTURE OF RIGHT FEMUR, INITIAL ENCOUNTER (H): Primary | ICD-10-CM

## 2023-07-12 DIAGNOSIS — S72.001A HIP FRACTURE, RIGHT, CLOSED, INITIAL ENCOUNTER (H): ICD-10-CM

## 2023-07-12 DIAGNOSIS — M25.521 RIGHT ELBOW PAIN: ICD-10-CM

## 2023-07-12 DIAGNOSIS — R33.9 URINARY RETENTION: ICD-10-CM
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
## 2023-07-12 DIAGNOSIS — S51.011A SKIN TEAR OF RIGHT ELBOW WITHOUT COMPLICATION, INITIAL ENCOUNTER: ICD-10-CM

## 2023-07-12 DIAGNOSIS — G43.709 CHRONIC MIGRAINE WITHOUT AURA WITHOUT STATUS MIGRAINOSUS, NOT INTRACTABLE: ICD-10-CM

## 2023-07-12 PROBLEM — Z86.59 HISTORY OF POSTOPERATIVE DELIRIUM: Status: ACTIVE | Noted: 2023-07-12

## 2023-07-12 PROBLEM — I25.10 CORONARY ARTERY DISEASE DUE TO LIPID RICH PLAQUE: Status: ACTIVE | Noted: 2021-08-26

## 2023-07-12 PROBLEM — I25.83 CORONARY ARTERY DISEASE DUE TO LIPID RICH PLAQUE: Status: ACTIVE | Noted: 2021-08-26

## 2023-07-12 PROBLEM — M80.00XA AGE-RELATED OSTEOPOROSIS WITH CURRENT PATHOLOGICAL FRACTURE: Status: ACTIVE | Noted: 2018-11-12

## 2023-07-12 PROBLEM — M81.0 AGE-RELATED OSTEOPOROSIS WITHOUT CURRENT PATHOLOGICAL FRACTURE: Status: ACTIVE | Noted: 2018-11-12

## 2023-07-12 PROBLEM — I69.30 HISTORY OF CEREBROVASCULAR ACCIDENT (CVA) WITH RESIDUAL DEFICIT: Status: ACTIVE | Noted: 2021-12-08

## 2023-07-12 PROBLEM — Z98.890 HISTORY OF ELBOW SURGERY: Status: ACTIVE | Noted: 2023-07-12

## 2023-07-12 LAB
ALBUMIN SERPL BCG-MCNC: 3.7 G/DL (ref 3.5–5.2)
ALP SERPL-CCNC: 87 U/L (ref 35–104)
ALT SERPL W P-5'-P-CCNC: 14 U/L (ref 0–50)
ANION GAP SERPL CALCULATED.3IONS-SCNC: 12 MMOL/L (ref 7–15)
AST SERPL W P-5'-P-CCNC: 21 U/L (ref 0–45)
BASOPHILS # BLD AUTO: 0.1 10E3/UL (ref 0–0.2)
BASOPHILS NFR BLD AUTO: 1 %
BILIRUB SERPL-MCNC: 0.2 MG/DL
BUN SERPL-MCNC: 8 MG/DL (ref 8–23)
CALCIUM SERPL-MCNC: 8.9 MG/DL (ref 8.8–10.2)
CHLORIDE SERPL-SCNC: 97 MMOL/L (ref 98–107)
CREAT SERPL-MCNC: 0.71 MG/DL (ref 0.51–0.95)
DEPRECATED HCO3 PLAS-SCNC: 24 MMOL/L (ref 22–29)
EOSINOPHIL # BLD AUTO: 0.1 10E3/UL (ref 0–0.7)
EOSINOPHIL NFR BLD AUTO: 1 %
ERYTHROCYTE [DISTWIDTH] IN BLOOD BY AUTOMATED COUNT: 13.3 % (ref 10–15)
GFR SERPL CREATININE-BSD FRML MDRD: 90 ML/MIN/1.73M2
GLUCOSE SERPL-MCNC: 118 MG/DL (ref 70–99)
HCT VFR BLD AUTO: 32.5 % (ref 35–47)
HGB BLD-MCNC: 10.5 G/DL (ref 11.7–15.7)
HOLD SPECIMEN: NORMAL
HOLD SPECIMEN: NORMAL
IMM GRANULOCYTES # BLD: 0 10E3/UL
IMM GRANULOCYTES NFR BLD: 0 %
INR PPP: 1.1 (ref 0.85–1.15)
LYMPHOCYTES # BLD AUTO: 1.5 10E3/UL (ref 0.8–5.3)
LYMPHOCYTES NFR BLD AUTO: 28 %
MCH RBC QN AUTO: 27.6 PG (ref 26.5–33)
MCHC RBC AUTO-ENTMCNC: 32.3 G/DL (ref 31.5–36.5)
MCV RBC AUTO: 86 FL (ref 78–100)
MONOCYTES # BLD AUTO: 0.6 10E3/UL (ref 0–1.3)
MONOCYTES NFR BLD AUTO: 12 %
NEUTROPHILS # BLD AUTO: 3 10E3/UL (ref 1.6–8.3)
NEUTROPHILS NFR BLD AUTO: 58 %
NRBC # BLD AUTO: 0 10E3/UL
NRBC BLD AUTO-RTO: 0 /100
PLATELET # BLD AUTO: 336 10E3/UL (ref 150–450)
POTASSIUM SERPL-SCNC: 3.7 MMOL/L (ref 3.4–5.3)
PROT SERPL-MCNC: 6.4 G/DL (ref 6.4–8.3)
RBC # BLD AUTO: 3.8 10E6/UL (ref 3.8–5.2)
SODIUM SERPL-SCNC: 133 MMOL/L (ref 136–145)
WBC # BLD AUTO: 5.3 10E3/UL (ref 4–11)

## 2023-07-12 PROCEDURE — G1010 CDSM STANSON: HCPCS

## 2023-07-12 PROCEDURE — 96376 TX/PRO/DX INJ SAME DRUG ADON: CPT | Performed by: EMERGENCY MEDICINE

## 2023-07-12 PROCEDURE — 250N000013 HC RX MED GY IP 250 OP 250 PS 637: Performed by: PEDIATRICS

## 2023-07-12 PROCEDURE — 36415 COLL VENOUS BLD VENIPUNCTURE: CPT | Performed by: EMERGENCY MEDICINE

## 2023-07-12 PROCEDURE — 80053 COMPREHEN METABOLIC PANEL: CPT | Performed by: EMERGENCY MEDICINE

## 2023-07-12 PROCEDURE — 85610 PROTHROMBIN TIME: CPT | Performed by: EMERGENCY MEDICINE

## 2023-07-12 PROCEDURE — 96375 TX/PRO/DX INJ NEW DRUG ADDON: CPT | Performed by: EMERGENCY MEDICINE

## 2023-07-12 PROCEDURE — 99223 1ST HOSP IP/OBS HIGH 75: CPT | Mod: AI | Performed by: PEDIATRICS

## 2023-07-12 PROCEDURE — 96374 THER/PROPH/DIAG INJ IV PUSH: CPT | Performed by: EMERGENCY MEDICINE

## 2023-07-12 PROCEDURE — 85004 AUTOMATED DIFF WBC COUNT: CPT | Performed by: EMERGENCY MEDICINE

## 2023-07-12 PROCEDURE — 250N000011 HC RX IP 250 OP 636: Mod: JZ | Performed by: PEDIATRICS

## 2023-07-12 PROCEDURE — 73070 X-RAY EXAM OF ELBOW: CPT | Mod: RT

## 2023-07-12 PROCEDURE — 99285 EMERGENCY DEPT VISIT HI MDM: CPT | Mod: 25 | Performed by: EMERGENCY MEDICINE

## 2023-07-12 PROCEDURE — 73502 X-RAY EXAM HIP UNI 2-3 VIEWS: CPT

## 2023-07-12 PROCEDURE — 99285 EMERGENCY DEPT VISIT HI MDM: CPT | Performed by: EMERGENCY MEDICINE

## 2023-07-12 PROCEDURE — 120N000001 HC R&B MED SURG/OB

## 2023-07-12 PROCEDURE — 250N000011 HC RX IP 250 OP 636: Mod: JZ | Performed by: EMERGENCY MEDICINE

## 2023-07-12 RX ORDER — PROCHLORPERAZINE 25 MG
12.5 SUPPOSITORY, RECTAL RECTAL EVERY 12 HOURS PRN
Status: DISCONTINUED | OUTPATIENT
Start: 2023-07-12 | End: 2023-07-14

## 2023-07-12 RX ORDER — HYDROMORPHONE HCL IN WATER/PF 6 MG/30 ML
0.4 PATIENT CONTROLLED ANALGESIA SYRINGE INTRAVENOUS
Status: DISCONTINUED | OUTPATIENT
Start: 2023-07-12 | End: 2023-07-14

## 2023-07-12 RX ORDER — ACETAMINOPHEN 325 MG/1
650 TABLET ORAL EVERY 4 HOURS PRN
Status: DISCONTINUED | OUTPATIENT
Start: 2023-07-15 | End: 2023-07-14

## 2023-07-12 RX ORDER — HYDROMORPHONE HCL IN WATER/PF 6 MG/30 ML
0.2 PATIENT CONTROLLED ANALGESIA SYRINGE INTRAVENOUS
Status: DISCONTINUED | OUTPATIENT
Start: 2023-07-12 | End: 2023-07-14

## 2023-07-12 RX ORDER — QUETIAPINE FUMARATE 25 MG/1
75 TABLET, FILM COATED ORAL AT BEDTIME
Status: DISCONTINUED | OUTPATIENT
Start: 2023-07-12 | End: 2023-07-14

## 2023-07-12 RX ORDER — ONDANSETRON 2 MG/ML
4 INJECTION INTRAMUSCULAR; INTRAVENOUS ONCE
Status: COMPLETED | OUTPATIENT
Start: 2023-07-12 | End: 2023-07-12

## 2023-07-12 RX ORDER — CHOLECALCIFEROL (VITAMIN D3) 1250 MCG
50000 CAPSULE ORAL
Status: DISCONTINUED | OUTPATIENT
Start: 2023-07-13 | End: 2023-07-14

## 2023-07-12 RX ORDER — HYDROMORPHONE HYDROCHLORIDE 2 MG/1
2 TABLET ORAL EVERY 4 HOURS PRN
Status: DISCONTINUED | OUTPATIENT
Start: 2023-07-12 | End: 2023-07-14

## 2023-07-12 RX ORDER — HYDROMORPHONE HYDROCHLORIDE 2 MG/1
4 TABLET ORAL EVERY 4 HOURS PRN
Status: DISCONTINUED | OUTPATIENT
Start: 2023-07-12 | End: 2023-07-14

## 2023-07-12 RX ORDER — CARVEDILOL 6.25 MG/1
12.5 TABLET ORAL 2 TIMES DAILY WITH MEALS
Status: DISCONTINUED | OUTPATIENT
Start: 2023-07-12 | End: 2023-07-14

## 2023-07-12 RX ORDER — AMLODIPINE BESYLATE 5 MG/1
10 TABLET ORAL DAILY
Status: DISCONTINUED | OUTPATIENT
Start: 2023-07-12 | End: 2023-07-14

## 2023-07-12 RX ORDER — BUPROPION HYDROCHLORIDE 150 MG/1
300 TABLET ORAL EVERY MORNING
Status: DISCONTINUED | OUTPATIENT
Start: 2023-07-13 | End: 2023-07-14

## 2023-07-12 RX ORDER — AMOXICILLIN 250 MG
2 CAPSULE ORAL 2 TIMES DAILY PRN
Status: DISCONTINUED | OUTPATIENT
Start: 2023-07-12 | End: 2023-07-14

## 2023-07-12 RX ORDER — SODIUM CHLORIDE 1 G/1
1 TABLET ORAL 2 TIMES DAILY WITH MEALS
Status: DISCONTINUED | OUTPATIENT
Start: 2023-07-12 | End: 2023-07-14

## 2023-07-12 RX ORDER — AMLODIPINE BESYLATE 5 MG/1
10 TABLET ORAL DAILY
Status: DISCONTINUED | OUTPATIENT
Start: 2023-07-13 | End: 2023-07-12

## 2023-07-12 RX ORDER — ONDANSETRON 2 MG/ML
4 INJECTION INTRAMUSCULAR; INTRAVENOUS EVERY 6 HOURS PRN
Status: DISCONTINUED | OUTPATIENT
Start: 2023-07-12 | End: 2023-07-14

## 2023-07-12 RX ORDER — PROCHLORPERAZINE MALEATE 5 MG
5 TABLET ORAL EVERY 6 HOURS PRN
Status: DISCONTINUED | OUTPATIENT
Start: 2023-07-12 | End: 2023-07-14

## 2023-07-12 RX ORDER — ROSUVASTATIN CALCIUM 20 MG/1
20 TABLET, COATED ORAL DAILY
Status: DISCONTINUED | OUTPATIENT
Start: 2023-07-12 | End: 2023-07-14

## 2023-07-12 RX ORDER — TIZANIDINE 2 MG/1
2 TABLET ORAL 3 TIMES DAILY PRN
Status: DISCONTINUED | OUTPATIENT
Start: 2023-07-12 | End: 2023-07-14

## 2023-07-12 RX ORDER — HYDROXYZINE HYDROCHLORIDE 10 MG/1
10 TABLET, FILM COATED ORAL EVERY 6 HOURS PRN
Status: DISCONTINUED | OUTPATIENT
Start: 2023-07-12 | End: 2023-07-14

## 2023-07-12 RX ORDER — NALOXONE HYDROCHLORIDE 0.4 MG/ML
0.4 INJECTION, SOLUTION INTRAMUSCULAR; INTRAVENOUS; SUBCUTANEOUS
Status: DISCONTINUED | OUTPATIENT
Start: 2023-07-12 | End: 2023-07-14

## 2023-07-12 RX ORDER — HYDRALAZINE HYDROCHLORIDE 25 MG/1
25 TABLET, FILM COATED ORAL 3 TIMES DAILY
Status: DISCONTINUED | OUTPATIENT
Start: 2023-07-12 | End: 2023-07-14

## 2023-07-12 RX ORDER — ONDANSETRON 4 MG/1
4 TABLET, ORALLY DISINTEGRATING ORAL EVERY 6 HOURS PRN
Status: DISCONTINUED | OUTPATIENT
Start: 2023-07-12 | End: 2023-07-14

## 2023-07-12 RX ORDER — NALOXONE HYDROCHLORIDE 0.4 MG/ML
0.2 INJECTION, SOLUTION INTRAMUSCULAR; INTRAVENOUS; SUBCUTANEOUS
Status: DISCONTINUED | OUTPATIENT
Start: 2023-07-12 | End: 2023-07-14

## 2023-07-12 RX ORDER — AMOXICILLIN 250 MG
1 CAPSULE ORAL 2 TIMES DAILY PRN
Status: DISCONTINUED | OUTPATIENT
Start: 2023-07-12 | End: 2023-07-14

## 2023-07-12 RX ORDER — POLYETHYLENE GLYCOL 3350 17 G/17G
17 POWDER, FOR SOLUTION ORAL DAILY
Status: DISCONTINUED | OUTPATIENT
Start: 2023-07-12 | End: 2023-07-14

## 2023-07-12 RX ORDER — LISINOPRIL 10 MG/1
20 TABLET ORAL DAILY
Status: DISCONTINUED | OUTPATIENT
Start: 2023-07-13 | End: 2023-07-14

## 2023-07-12 RX ORDER — LANOLIN ALCOHOL/MO/W.PET/CERES
3 CREAM (GRAM) TOPICAL
Status: DISCONTINUED | OUTPATIENT
Start: 2023-07-12 | End: 2023-07-14

## 2023-07-12 RX ORDER — ACETAMINOPHEN 325 MG/1
975 TABLET ORAL EVERY 8 HOURS
Status: DISCONTINUED | OUTPATIENT
Start: 2023-07-12 | End: 2023-07-14

## 2023-07-12 RX ORDER — ENOXAPARIN SODIUM 100 MG/ML
40 INJECTION SUBCUTANEOUS EVERY 24 HOURS
Status: DISCONTINUED | OUTPATIENT
Start: 2023-07-12 | End: 2023-07-13

## 2023-07-12 RX ORDER — LIDOCAINE 40 MG/G
CREAM TOPICAL
Status: DISCONTINUED | OUTPATIENT
Start: 2023-07-12 | End: 2023-07-14

## 2023-07-12 RX ORDER — HYDROMORPHONE HYDROCHLORIDE 1 MG/ML
0.5 INJECTION, SOLUTION INTRAMUSCULAR; INTRAVENOUS; SUBCUTANEOUS EVERY 30 MIN PRN
Status: DISCONTINUED | OUTPATIENT
Start: 2023-07-12 | End: 2023-07-12

## 2023-07-12 RX ORDER — HYDROMORPHONE HYDROCHLORIDE 1 MG/ML
0.5 INJECTION, SOLUTION INTRAMUSCULAR; INTRAVENOUS; SUBCUTANEOUS EVERY 30 MIN PRN
Status: COMPLETED | OUTPATIENT
Start: 2023-07-12 | End: 2023-07-12

## 2023-07-12 RX ORDER — VENLAFAXINE HYDROCHLORIDE 150 MG/1
150 CAPSULE, EXTENDED RELEASE ORAL DAILY
Status: DISCONTINUED | OUTPATIENT
Start: 2023-07-12 | End: 2023-07-14

## 2023-07-12 RX ADMIN — VENLAFAXINE HYDROCHLORIDE 150 MG: 150 CAPSULE, EXTENDED RELEASE ORAL at 14:42

## 2023-07-12 RX ADMIN — HYDROMORPHONE HYDROCHLORIDE 0.5 MG: 1 INJECTION, SOLUTION INTRAMUSCULAR; INTRAVENOUS; SUBCUTANEOUS at 13:28

## 2023-07-12 RX ADMIN — ACETAMINOPHEN 975 MG: 325 TABLET, FILM COATED ORAL at 23:07

## 2023-07-12 RX ADMIN — CARVEDILOL 12.5 MG: 6.25 TABLET, FILM COATED ORAL at 18:01

## 2023-07-12 RX ADMIN — POLYETHYLENE GLYCOL 3350 17 G: 17 POWDER, FOR SOLUTION ORAL at 14:43

## 2023-07-12 RX ADMIN — HYDRALAZINE HYDROCHLORIDE 25 MG: 25 TABLET, FILM COATED ORAL at 21:18

## 2023-07-12 RX ADMIN — QUETIAPINE FUMARATE 75 MG: 25 TABLET ORAL at 21:18

## 2023-07-12 RX ADMIN — ENOXAPARIN SODIUM 40 MG: 40 INJECTION SUBCUTANEOUS at 21:17

## 2023-07-12 RX ADMIN — ONDANSETRON 4 MG: 2 INJECTION INTRAMUSCULAR; INTRAVENOUS at 10:38

## 2023-07-12 RX ADMIN — AMLODIPINE BESYLATE 10 MG: 5 TABLET ORAL at 14:42

## 2023-07-12 RX ADMIN — HYDROMORPHONE HYDROCHLORIDE 2 MG: 2 TABLET ORAL at 21:17

## 2023-07-12 RX ADMIN — HYDROMORPHONE HYDROCHLORIDE 2 MG: 2 TABLET ORAL at 14:42

## 2023-07-12 RX ADMIN — ACETAMINOPHEN 975 MG: 325 TABLET, FILM COATED ORAL at 14:42

## 2023-07-12 RX ADMIN — ROSUVASTATIN CALCIUM 20 MG: 20 TABLET, FILM COATED ORAL at 14:42

## 2023-07-12 RX ADMIN — HYDROMORPHONE HYDROCHLORIDE 0.5 MG: 1 INJECTION, SOLUTION INTRAMUSCULAR; INTRAVENOUS; SUBCUTANEOUS at 11:22

## 2023-07-12 RX ADMIN — HYDROMORPHONE HYDROCHLORIDE 0.5 MG: 1 INJECTION, SOLUTION INTRAMUSCULAR; INTRAVENOUS; SUBCUTANEOUS at 12:27

## 2023-07-12 RX ADMIN — HYDROMORPHONE HYDROCHLORIDE 0.5 MG: 1 INJECTION, SOLUTION INTRAMUSCULAR; INTRAVENOUS; SUBCUTANEOUS at 10:38

## 2023-07-12 RX ADMIN — HYDRALAZINE HYDROCHLORIDE 25 MG: 25 TABLET, FILM COATED ORAL at 14:42

## 2023-07-12 RX ADMIN — SODIUM CHLORIDE TAB 1 GM 1 G: 1 TAB at 18:01

## 2023-07-12 ASSESSMENT — ACTIVITIES OF DAILY LIVING (ADL)
DOING_ERRANDS_INDEPENDENTLY_DIFFICULTY: NO
ADLS_ACUITY_SCORE: 18
DIFFICULTY_EATING/SWALLOWING: NO
DRESSING/BATHING_DIFFICULTY: NO
HEARING_DIFFICULTY_OR_DEAF: NO
EQUIPMENT_CURRENTLY_USED_AT_HOME: SHOWER CHAIR
FALL_HISTORY_WITHIN_LAST_SIX_MONTHS: YES
ADLS_ACUITY_SCORE: 18
TOILETING_ISSUES: NO
WALKING_OR_CLIMBING_STAIRS_DIFFICULTY: NO
WEAR_GLASSES_OR_BLIND: NO
ADLS_ACUITY_SCORE: 35
NUMBER_OF_TIMES_PATIENT_HAS_FALLEN_WITHIN_LAST_SIX_MONTHS: 2
CHANGE_IN_FUNCTIONAL_STATUS_SINCE_ONSET_OF_CURRENT_ILLNESS/INJURY: YES
ADLS_ACUITY_SCORE: 18
CONCENTRATING,_REMEMBERING_OR_MAKING_DECISIONS_DIFFICULTY: NO
ADLS_ACUITY_SCORE: 35
DIFFICULTY_COMMUNICATING: NO

## 2023-07-12 NOTE — ED TRIAGE NOTES
Patient tripped going up her cement outdoor stairs this morning falling onto her right hip and elbow. She also hit her head on the house. She is complaining of right hip pain. Skin tear noted to right elbow. C-collar in place upon arrival per EMS. She is on blood thinners. Trauma eval called at time of arrival.      Triage Assessment     Row Name 07/12/23 1024       Triage Assessment (Adult)    Airway WDL WDL       Respiratory WDL    Respiratory WDL WDL       Skin Circulation/Temperature WDL    Skin Circulation/Temperature WDL X  skin tear to right elbow       Cardiac WDL    Cardiac WDL WDL       Peripheral/Neurovascular WDL    Peripheral Neurovascular WDL WDL       Cognitive/Neuro/Behavioral WDL    Cognitive/Neuro/Behavioral WDL WDL

## 2023-07-12 NOTE — MEDICATION SCRIBE - ADMISSION MEDICATION HISTORY
Medication Scribe Admission Medication History    Admission medication history is complete. The information provided in this note is only as accurate as the sources available at the time of the update.    Medication reconciliation/reorder completed by provider prior to medication history? No    Information Source(s):  Friend: Bryson  via in person     Pertinent Information:     Changes made to PTA medication list:  Added: None  Deleted: None  Changed: None    Medication Affordability:  Not including over the counter (OTC) medications, was there a time in the past 3 months when you did not take your medications as prescribed because of cost?: No    Allergies reviewed with patient and updates made in EHR: yes    Medication History Completed By: CLIFTON DENTON 7/12/2023 12:45 PM    Prior to Admission medications    Medication Sig Last Dose Taking? Auth Provider Long Term End Date   amLODIPine (NORVASC) 10 MG tablet Take 1 tablet (10 mg) by mouth daily 7/11/2023 at am Yes Rosenda Pierre PA-C Yes    buPROPion (WELLBUTRIN XL) 300 MG 24 hr tablet Take 300 mg by mouth every morning 7/11/2023 at am Yes Unknown, Entered By History No    butalbital-aspirin-caffeine (FIORINAL EQUIV) -40 MG TABS per tablet Take 1 tablet by mouth every 4 hours as needed for migraine 7/11/2023 at am Yes Rosenda Pierre PA-C No    carvedilol (COREG) 12.5 MG tablet TAKE ONE TABLET BY MOUTH TWICE A DAY WITH FOOD  Patient taking differently: Take 12.5 mg by mouth 2 times daily (with meals) TAKE ONE TABLET BY MOUTH TWICE A DAY WITH FOOD 7/11/2023 at hs Yes Yanely Uriarte APRN CNP Yes    clopidogrel (PLAVIX) 75 MG tablet Take 1 tablet (75 mg) by mouth daily 7/11/2023 at am Yes Rosenda Pierre PA-C Yes    furosemide (LASIX) 40 MG tablet Take 1 tablet (40 mg) by mouth daily 7/11/2023 at am Yes Yanely Uriarte APRN CNP Yes    hydrALAZINE (APRESOLINE) 25 MG tablet Take 1 tablet (25 mg) by mouth 3 times daily  7/11/2023 at hs Yes Rosenda Pierre PA-C Yes    hydrOXYzine (ATARAX) 10 MG tablet Take 1 tablet (10 mg) by mouth every 6 hours as needed for other (adjuvant pain) Past Week Yes Zayra Hicks APRN CNS No    lisinopril (ZESTRIL) 20 MG tablet Take 1 tablet (20 mg) by mouth daily 7/12/2023 at am Yes Yanely Uriarte APRN CNP Yes    melatonin 5 MG tablet Take 1 tablet (5 mg) by mouth At Bedtime 7/11/2023 at hs Yes Yanely Uriarte APRN CNP     QUEtiapine (SEROQUEL) 25 MG tablet Take 3 tablets (75 mg) by mouth At Bedtime Past Month Yes Yanely Uriarte APRN CNP Yes    rosuvastatin (CRESTOR) 20 MG tablet Take 1 tablet (20 mg) by mouth daily 7/11/2023 at am Yes Yanely Uriarte APRN CNP Yes    sodium chloride 1 GM tablet Take 1 tablet (1 g) by mouth 2 times daily (with meals) 7/11/2023 at hs Yes Rosenda Pierre PA-C     tiZANidine (ZANAFLEX) 4 MG tablet Take 0.5 tablets (2 mg) by mouth 3 times daily as needed for muscle spasms 7/11/2023 at hs Yes Yanely Uriarte APRN CNP No    traMADol (ULTRAM) 50 MG tablet Take 0.5-1 tablets (25-50 mg) by mouth every 4 hours as needed for moderate pain 7/11/2023 at hs Yes Rosenda Pierre PA-C     venlafaxine (EFFEXOR XR) 150 MG 24 hr capsule Take 1 capsule (150 mg) by mouth daily 7/11/2023 at am Yes Yanely Uriarte APRN CNP Yes    vitamin D2 (ERGOCALCIFEROL) 39838 units (1250 mcg) capsule Take 1 capsule (50,000 Units) by mouth every 7 days Past Month Yes Rosenda Pierre PA-C

## 2023-07-12 NOTE — ED PROVIDER NOTES
History     Chief Complaint   Patient presents with    Fall     HPI  Sri Grewal is a 73 year old female who presents by ambulance after she unfortunately tripped and fell earlier this morning.  Patient hit the back of her head without LOC.  Denies headache, visual change, change in hearing or difficulty with speech or swallowing.  Denies chest or back pain.  No abdominal pain, nausea or vomiting.  She has moderate right elbow pain and moderate right hip pain.  Unable to ambulate after incident.  Denies any saddle paresthesia or loss of bowel or bladder.  No treatment for pain prior to arrival.  Tetanus is up-to-date.  Patient has recent right elbow surgery.    Allergies:  Allergies   Allergen Reactions    Morphine And Related      GI UPSET    Oxycodone     Seasonal Allergies        Problem List:    Patient Active Problem List    Diagnosis Date Noted    Health Care Home 07/27/2011     Priority: High     X  EMERGENCY CARE PLAN  Presenting Problem Signs and Symptoms Treatment Plan    Questions or conerns during clinic hours    I will call the clinic directly     Questions or conerns outside clinic hours    I will call the 24 hour nurse line at [phone]    Patient needs to schedule an appointment    I will call the 24 hour scheduling team at [phone] or clinic directly    Same day treatment     I will call the clinic first, nurse line if after hours, urgent care and express care if needed                                  DX V65.8 REPLACED WITH [id number] [facility] (04/08/2013)      Closed nondisplaced intertrochanteric fracture of right femur (H) 07/12/2023     Priority: Medium    Head injury, initial encounter 07/12/2023     Priority: Medium    Hip fracture, right, closed, initial encounter (H) 07/12/2023     Priority: Medium    Skin tear of right elbow without complication, initial encounter 07/12/2023     Priority: Medium    S/P reverse total shoulder arthroplasty, right 07/11/2023     Priority: Medium     Right elbow pain 06/12/2023     Priority: Medium    Stiffness of elbow joint, right 06/12/2023     Priority: Medium    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 06/01/2023     Priority: Medium    Post-operative state 05/03/2023     Priority: Medium    Fall, initial encounter 04/30/2023     Priority: Medium    Closed fracture of distal end of right humerus, unspecified fracture morphology, initial encounter 04/30/2023     Priority: Medium    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, unspecified fracture morphology, subsequent encounter 04/30/2023     Priority: Medium    Closed displaced fracture of surgical neck of right humerus 03/27/2023     Priority: Medium    Acute pain of right shoulder 03/27/2023     Priority: Medium    Newly recognized heart murmur 02/05/2023     Priority: Medium    Major depressive disorder, recurrent, in partial remission (H) 02/05/2023     Priority: Medium    Vitamin D deficiency 02/05/2023     Priority: Medium    Loss of memory 02/05/2023     Priority: Medium    History of cerebrovascular accident (CVA) with residual deficit 12/08/2021     Priority: Medium    ST elevation myocardial infarction (STEMI), unspecified artery (H) 08/26/2021     Priority: Medium    Coronary artery disease due to lipid rich plaque 08/26/2021     Priority: Medium    Compression fracture of T12 vertebra with routine healing, subsequent encounter 11/17/2020     Priority: Medium    Hematoma 01/10/2019     Priority: Medium    Hematoma of abdominal wall, initial encounter 01/09/2019     Priority: Medium    Abdominal wall hematoma 01/09/2019     Priority: Medium    Hyponatremia 11/26/2018     Priority: Medium    Age-related osteoporosis without current pathological fracture 11/12/2018     Priority: Medium    HSV (herpes simplex virus) infection 07/31/2018     Priority: Medium    Atypical mole 06/01/2017     Priority: Medium    Hypertension goal BP (blood pressure) < 140/90  05/25/2017     Priority: Medium    TIA (transient ischemic attack) 01/16/2017     Priority: Medium    Adjustment disorder with mixed anxiety and depressed mood 09/09/2014     Priority: Medium    Middle cerebral artery aneurysm 10/29/2013     Priority: Medium     Noted in 2007.  Intervention not recommended at that time.  Observation.  Saw Interventional Radiology 12/2013.  Recheck CTA in one year.      Moderate major depression (H) 09/08/2010     Priority: Medium    Insomnia 09/08/2010     Priority: Medium    Atrophy of vagina 05/30/2010     Priority: Medium    Lump or mass in breast 03/29/2010     Priority: Medium    Mild persistent asthma 02/21/2005     Priority: Medium    Anemia 04/25/2002     Priority: Medium     Problem list name updated by automated process. Provider to review      Chronic migraine without aura without status migrainosus, not intractable      Priority: Medium     Multiple trials off of fiorinal have not been successful  Problem list name updated by automated process. Provider to review      Other abnormal Papanicolaou smear of cervix and cervical HPV(795.09)      Priority: Medium     (Problem list name updated by automated process. Provider to review and confirm.)      Endometriosis      Priority: Medium     Problem list name updated by automated process. Provider to review      Allergic rhinitis      Priority: Medium     Problem list name updated by automated process. Provider to review          Past Medical History:    Past Medical History:   Diagnosis Date    Abnormal Papanicolaou smear of cervix and cervical HPV     Allergic rhinitis, cause unspecified     Cerebral infarction (H)     Contact dermatitis and other eczema, due to unspecified cause     Endometriosis, site unspecified     Esophageal reflux     Migraine, unspecified, without mention of intractable migraine without mention of status migrainosus     Mild persistent asthma     Unspecified disorder of uterus        Past Surgical  History:    Past Surgical History:   Procedure Laterality Date    COLONOSCOPY  2014    Procedure: COLONOSCOPY;  Surgeon: Nathan Baker MD;  Location: [facility]    OPEN REDUCTION INTERNAL FIXATION ELBOW Right 2023    Procedure: REVISION OPEN REDUCTION INTERNAL FIXATION RIGHT OLECRANON;  Surgeon: Williams Phipps MD;  Location: [facility]    OPEN REDUCTION INTERNAL FIXATION HUMERUS DISTAL Right 5/3/2023    Procedure: OPEN REDUCTION INTERNAL FIXATION, FRACTURE, HUMERUS, DISTAL;  Surgeon: Williams Phipps MD;  [facility]    REMOVE HARDWARE ELBOW Right 2023    Procedure: HARDWARE REMOVAL RIGHT OLECRANON;  Surgeon: Williams Phipps MD;  Location: [facility]    REVERSE ARTHROPLASTY SHOULDER Right 2023    Procedure: ARTHROPLASTY, SHOULDER, TOTAL, REVERSE for;  Surgeon: Cierra Krause MD;  Location: [facility]    ZZC  DELIVERY ONLY       X2    ZZHC COLONOSCOPY THRU STOMA, DIAGNOSTIC  2002    normal       Family History:    Family History   Problem Relation Age of Onset    Heart Disease Mother         anemia    Osteoporosis Mother     Hypertension Mother     Cerebrovascular Disease Mother     Alcohol/Drug Mother         alcohol    Neurologic Disorder Mother         migraines    Hypertension Father     Cancer No family hx of         breast       Social History:  Marital Status:   [5]  Social History     Tobacco Use    Smoking status: Never     Passive exposure: Never    Smokeless tobacco: Never   Vaping Use    Vaping Use: Never used   Substance Use Topics    Alcohol use: Yes     Comment: socially    Drug use: No        Medications:    amLODIPine (NORVASC) 10 MG tablet  buPROPion (WELLBUTRIN XL) 300 MG 24 hr tablet  butalbital-aspirin-caffeine (FIORINAL EQUIV) -40 MG TABS per tablet  carvedilol (COREG) 12.5 MG tablet  clopidogrel (PLAVIX) 75 MG tablet  furosemide (LASIX) 40 MG tablet  hydrALAZINE (APRESOLINE) 25 MG tablet  hydrOXYzine (ATARAX) 10 MG tablet  lisinopril (ZESTRIL) 20 MG tablet  melatonin 5  MG tablet  QUEtiapine (SEROQUEL) 25 MG tablet  rosuvastatin (CRESTOR) 20 MG tablet  sodium chloride 1 GM tablet  tiZANidine (ZANAFLEX) 4 MG tablet  traMADol (ULTRAM) 50 MG tablet  venlafaxine (EFFEXOR XR) 150 MG 24 hr capsule  vitamin D2 (ERGOCALCIFEROL) 90164 units (1250 mcg) capsule          Review of Systems all other systems are reviewed and are negative.    Physical Exam   BP: (!) 183/102  Pulse: 72  Temp: 97.6  F (36.4  C)  Resp: 18  Weight: 53.1 kg (117 lb)  SpO2: 98 %      Physical Exam General alert female in moderate distress.  HEENT reveals no scalp contusion, laceration or localized crepitus/step-off.  No hemotympanum or hallman signs.  Pupils are equal reactive.  Extraocular motions are intact.  No epistasis or dental trauma.  Back, chest, and abdomen are nontender.  She has a 1 cm x 0.5 cm ovoid skin avulsion on her right elbow that is not actively bleeding.  Full range of motion of the elbow.  No crepitus.  Remainder of upper extremities are unremarkable.  She has hip tenderness and the hip is flexed at about 30 degrees.  The knee and ankle/foot are unremarkable.  The left leg is unremarkable.    ED Course                 Procedures                        Results for orders placed or performed during the hospital encounter of 07/12/23 (from the past 24 hour(s))   Extra Tube ([place] Draw)    Narrative    The following orders were created for panel order Extra Tube ([place] Draw).  Procedure                               Abnormality         Status                     ---------                               -----------         ------                     Extra Green Top (Lithium...[[id number]]                      Final result               Extra Purple Top Tube[[id number]]                            Final result                 Please view results for these tests on the individual orders.   Extra Green Top (Lithium Heparin) Tube   Result Value Ref Range    Hold Specimen [facility]    Extra Purple Top Tube    Result Value Ref Range    Hold Specimen [facility]    Comprehensive metabolic panel   Result Value Ref Range    Sodium 133 (L) 136 - 145 mmol/L    Potassium 3.7 3.4 - 5.3 mmol/L    Chloride 97 (L) 98 - 107 mmol/L    Carbon Dioxide (CO2) 24 22 - 29 mmol/L    Anion Gap 12 7 - 15 mmol/L    Urea Nitrogen 8.0 8.0 - 23.0 mg/dL    Creatinine 0.71 0.51 - 0.95 mg/dL    Calcium 8.9 8.8 - 10.2 mg/dL    Glucose 118 (H) 70 - 99 mg/dL    Alkaline Phosphatase 87 35 - 104 U/L    AST 21 0 - 45 U/L    ALT 14 0 - 50 U/L    Protein Total 6.4 6.4 - 8.3 g/dL    Albumin 3.7 3.5 - 5.2 g/dL    Bilirubin Total 0.2 <=1.2 mg/dL    GFR Estimate 90 >60 mL/min/1.73m2   CBC with platelets differential    Narrative    The following orders were created for panel order CBC with platelets differential.  Procedure                               Abnormality         Status                     ---------                               -----------         ------                     CBC with platelets and d...[[id number]]  Abnormal            Final result                 Please view results for these tests on the individual orders.   CBC with platelets and differential   Result Value Ref Range    WBC Count 5.3 4.0 - 11.0 10e3/uL    RBC Count 3.80 3.80 - 5.20 10e6/uL    Hemoglobin 10.5 (L) 11.7 - 15.7 g/dL    Hematocrit 32.5 (L) 35.0 - 47.0 %    MCV 86 78 - 100 fL    MCH 27.6 26.5 - 33.0 pg    MCHC 32.3 31.5 - 36.5 g/dL    RDW 13.3 10.0 - 15.0 %    Platelet Count 336 150 - 450 10e3/uL    % Neutrophils 58 %    % Lymphocytes 28 %    % Monocytes 12 %    % Eosinophils 1 %    % Basophils 1 %    % Immature Granulocytes 0 %    NRBCs per 100 WBC 0 <1 /100    Absolute Neutrophils 3.0 1.6 - 8.3 10e3/uL    Absolute Lymphocytes 1.5 0.8 - 5.3 10e3/uL    Absolute Monocytes 0.6 0.0 - 1.3 10e3/uL    Absolute Eosinophils 0.1 0.0 - 0.7 10e3/uL    Absolute Basophils 0.1 0.0 - 0.2 10e3/uL    Absolute Immature Granulocytes 0.0 <=0.4 10e3/uL    Absolute NRBCs 0.0 10e3/uL   CT Head w/o  Contrast    Narrative    CT SCAN OF THE HEAD WITHOUT CONTRAST July 12, 2023 11:00 AM     HISTORY: Fall, trauma.    TECHNIQUE: Axial images of the head and coronal reformations without  IV contrast material. Radiation dose for this scan was reduced using  automated exposure control, adjustment of the mA and/or kV according  to patient size, or iterative reconstruction technique.    COMPARISON: CT of the head 5/10/2023. MRI of the brain 2/28/2023.    FINDINGS: The ventricles appear stable in size and configuration. No  hydrocephalus. Mild generalized brain parenchymal volume loss.  Relatively extensive confluent nonspecific hypoattenuation throughout  the cerebral white matter, nonspecific, not significantly changed from  most recent exam. This may be due to advanced chronic small vessel  ischemic disease. Unchanged small area of chronic infarction in the  left basal ganglia region. Unchanged small chronic  cortical/subcortical infarct in the left inferior frontal gyrus. No  hyperdense acute intracranial hemorrhage, extra-axial fluid  collection, or mass effect. Bilateral lens implants. The visualized  aspects of the paranasal sinuses and mastoids appear clear with the  exception of minimal fluid/membrane thickening in the mastoid tips.  Presumed cerumen in the bilateral external auditory canals. The bony  calvarium and bones of the skull base appear intact. Degenerative  changes of the median atlantoaxial joint noted.      Impression    IMPRESSION:  1. No acute intracranial hemorrhage, extra-axial fluid collection, or  mass effect.  2. No significant change in chronic intracranial findings, as  described in the body of the report.    CHARLIE HENAO MD         SYSTEM ID:  [id number]   CT Cervical Spine w/o Contrast    Narrative    CT CERVICAL SPINE WITHOUT CONTRAST  7/12/2023 11:00 AM     HISTORY: Pain, fall with closed head injury.     TECHNIQUE: Axial images of the cervical spine were obtained without  intravenous  contrast. Multiplanar reformations were performed.   Radiation dose for this scan was reduced using automated exposure  control, adjustment of the mA and/or kV according to patient size, or  iterative reconstruction technique.    COMPARISON: None.    FINDINGS: No acute cervical spine fracture is identified. Normal  vertebral body heights. Trace anterolisthesis of C4 on C5. Mildly  exaggerated cervical lordosis. Minimal levoconvex curvature of the  cervical spine.    The intervertebral discs appear relatively maintained in height.  Moderate to severe multilevel degenerative facet arthropathy.  Degenerative changes of the median atlantoaxial joint. Central disc  protrusions at C2-C3 and C3-C4 with multilevel disc bulges elsewhere.  Scattered ligamentum flavum thickening. Mild to moderate multilevel  spinal canal stenosis. No definite high-grade spinal canal narrowing  identified. C5-C6 neural foraminal stenosis with relatively lesser  degrees of mild and moderate neural foraminal narrowing elsewhere on a  degenerative basis.    Minimal presumed fibrosis of the lung apices. Carotid bifurcation  atherosclerotic calcification bilaterally. The visualized paraspinal  soft tissues otherwise appear grossly unremarkable. A shoulder  prosthesis is partially visualized on the  image.      Impression    IMPRESSION:  1. No acute cervical spine fracture.  2. Multilevel degenerative changes, as described.    CHARLIE HENAO MD         SYSTEM ID:  [id number]   XR Pelvis and Hip Right 1 View    Narrative    XR PELVIS AND HIP RIGHT 1 VIEW  7/12/2023 11:12 AM     HISTORY: fall, pain  COMPARISON: None      Impression    IMPRESSION: Acute nondisplaced right femoral neck fracture. There is  fracture extension into the lesser trochanter and possibly the greater  trochanter superiorly. Normal joint alignment. There is normal hip  joint spacing bilaterally. Degenerative changes of the lower lumbar  spine. Osteopenia.    AYLA CARRILLO,  MD         SYSTEM ID:  [id number]   Elbow XR, 2 views, right    Narrative    ELBOW TWO VIEWS RIGHT 7/12/2023 11:14 AM     HISTORY: fall, pain  COMPARISON: 7/10/2023      Impression    IMPRESSION: Status post ORIF of the distal humeral and proximal ulnar  fractures with plate and screw fixation. There is similar fracture  alignment compared to prior. No hardware complication. No significant  interval healing. Limited evaluation for joint effusion. Otherwise  unchanged.    AYLA CARRILLO MD         SYSTEM ID:  [id number]     *Note: Due to a large number of results and/or encounters for the requested time period, some results have not been displayed. A complete set of results can be found in Results Review.       Medications   HYDROmorphone (PF) (DILAUDID) injection 0.5 mg (0.5 mg Intravenous $Given 7/12/23 1122)   ondansetron (ZOFRAN) injection 4 mg (4 mg Intravenous $Given 7/12/23 1038)     IVs established patient was given some Zofran for premedication as she gets nauseated with pain meds.  She is then given Dilaudid.  CT of the head and neck ordered.  X-ray of the right elbow, pelvis and right hip are ordered.  After patient's CT of her neck and head were cleared c-collar was removed.  No suturable lesions on her elbow also antibiotic and dressing were applied.  Assessments & Plan (with Medical Decision Making)   Sri Grewal is a 73 year old female who presents by ambulance after she unfortunately tripped and fell earlier this morning.  Patient hit the back of her head without LOC.  Denies headache, visual change, change in hearing or difficulty with speech or swallowing.  Denies chest or back pain.  No abdominal pain, nausea or vomiting.  She has moderate right elbow pain and moderate right hip pain.  Unable to ambulate after incident.  Denies any saddle paresthesia or loss of bowel or bladder.  No treatment for pain prior to arrival.  Tetanus is up-to-date.  Patient has recent right elbow surgery.   Presentation patient was afebrile and vitally stable.  Mildly hypertensive.  Despite hitting the posterior superior aspect of her head and no distinct lesion was found.  She had no hemotympanum hallman signs.  No raccoons eyes.  No nasal or dental trauma.  Neck and back were nontender.  She had a cut on her right elbow that was treated as above.  X-ray of the elbow showed no significant change from her most recent x-ray..  She had tenderness and shortening of her right leg with a pelvic/right hip x-ray showing nondisplaced femoral neck fracture.  CT imaging of her head and neck showed no acute intracranial pathology or neck fracture.  I spoke to Dr. Turner from orthopedics.  He was able to review the x-ray and recommends admission and proposed surgery for Friday as she will need to be off her Plavix prior to surgery.  Recommended Clarke's traction for comfort.  Dr. Horowitz from the hospital service was apprised and he will see the patient in the ER and write admission orders.  I have reviewed the nursing notes.    I have reviewed the findings, diagnosis, plan and need for follow up with the patient.          New Prescriptions    No medications on file       Final diagnoses:   Fall, initial encounter   Head injury, initial encounter   Skin tear of right elbow without complication, initial encounter   Hip fracture, right, closed, initial encounter (H)       7/12/2023   [facility] EMERGENCY DEPT       Yosvany Shannon MD  07/12/23 [id number]

## 2023-07-12 NOTE — PROGRESS NOTES
CLINICAL NUTRITION SERVICES - ASSESSMENT NOTE     Nutrition Prescription    RECOMMENDATIONS FOR MDs/PROVIDERS TO ORDER:  None at this time    Malnutrition Status:    Unable to determine due to lack of NFPE, nutrition hx - will meet with pt as able    Recommendations already ordered by Registered Dietitian (RD):  Will hold off on ordering supplementation as pt is going to be NPO for surgery, will order once pt's diet advances    Future/Additional Recommendations:  Will follow to monitor oral intake, weight changes, and GI symptoms/BMs     REASON FOR ASSESSMENT  Sri Grewal is a/an 73 year old female assessed by the dietitian for: identified at risk via screening criteria    MST score of 3: recent weight loss of 14-23 lbs, poor appetite noted    NUTRITION HISTORY  Pt admitted after tripping and falling on cement stairs outside yesterday AM 7/12. Fell onto right hip and elbow, also hit her head on the house. Denies LOC, headache, visual changes. Has moderate right elbow pain and moderate right hip pain. Was unable to ambulate after accident and was brought in by ambulance. Pt had recent right elbow surgery.    Found to have acute closed nondisplaced intertrochanteric fracture of right femur due to mechanical fall.    Dxhx of anemia, mild persistent asthma, HTN, age-related osteoporosis with current pathological fracture, CAD due to lipid rich plaque, hx of CVA with residual deficit, vitamin D def, STEMI, loss of memory, major depressive disorder recurrent in partial remission, hyponatremia, s/p reverse total shoulder arthroplasty right 5/5/23, hx of postoperative delirium, chronic migraine without aura, other abnormal Pap smear of cervix and cervical HPV, endometriosis, lump or mass in breast, atrophy of vagina, allergic rhinitis, insomnia, adjustment disorder with mixed anxiety and depressed mood, atypical mole, HSV, hematoma of abdominal wall, compression fracture of T12 vertebra, newly recognized heart murmur,  "closed displaced fracture of surgical neck of right humerus, closed fracture of distal end of right humerus, closed fracture of proximal end of right humerus, closed fracture of olecranon process of right ulna    CURRENT NUTRITION ORDERS  Diet: Orders Placed This Encounter      Advance Diet as Tolerated: Regular Diet Adult    Intake/Tolerance: no intake recorded since admission in flow sheet. Per RN note yesterday, pt tolerated dinner. GI is WDL, no concerns. Last BM was 7/11 per flow sheet.    LABS  Labs reviewed - sodium low at 133 this AM, chloride low, slightly elevated BG, low hemoglobin and hematocrit *pt's vitamin D was low on 5/6/23, is on daily vitamin D2    MEDICATIONS  Medications reviewed - tylenol, norvasc, wellbutrin XL, coreg, lovenox injection, hydralazine, zestril, miralax, seroquel, crestor, sodium chloride tablet, effexor, vitamin D2    ANTHROPOMETRICS  Height: 160 cm (5' 3\")  Most Recent Weight: 55.6 kg (122 lb 9.2 oz) via bed scale  IBW: 115 lbs  BMI: Normal BMI  Weight History:  Wt Readings from Last 15 Encounters:   07/12/23 55.6 kg (122 lb 9.2 oz)   06/29/23 53.3 kg (117 lb 8 oz)   06/15/23 54 kg (119 lb)   06/01/23 54 kg (119 lb)   05/08/23 62 kg (136 lb 11 oz)   04/29/23 59 kg (130 lb)   04/25/23 57.7 kg (127 lb 1.6 oz)   03/14/23 61.2 kg (135 lb)   03/07/23 61.2 kg (135 lb)   02/28/23 60.3 kg (133 lb)   02/21/23 61.2 kg (135 lb)   02/11/23 61.2 kg (135 lb)   02/02/23 56.2 kg (124 lb)   05/16/22 63.5 kg (140 lb)   04/18/22 64 kg (141 lb)   Per chart review:   118 lbs on 6/26/23    10.3% weight loss in approx. 2 months (meets criteria for severe malnutrition)     Dosing Weight: 55.6 kg using actual BW    ASSESSED NUTRITION NEEDS  Estimated Energy Needs: 1,390-1,668 kcals/day (25 - 30 kcals/kg)  Justification: Increased needs and Repletion  Estimated Protein Needs: 56-67 grams protein/day (1.0 - 1.2 grams of pro/kg)  Justification: Hypercatabolism with acute illness, Increased needs, and " Repletion  Estimated Fluid Needs: 1,668 mL/day (30 mL/kg)   Justification: Maintenance    PHYSICAL FINDINGS  See malnutrition section below.  Per chart review, pt has skin tear to right elbow.    MALNUTRITION  % Intake: Unable to assess  % Weight Loss: > 7.5% in less than 3 months (severe) - difficult to assess accuracy due to bed scale weight  Subcutaneous Fat Loss: Unable to assess  Muscle Loss: Unable to assess  Fluid Accumulation/Edema: None noted  Malnutrition Diagnosis: Unable to determine due to lack of NFPE, nutrition hx     NUTRITION DIAGNOSIS  Inadequate oral intake related to poor appetite, acute on chronic illness as evidenced by weight loss of > 7.5% in less than 3 months (severe)    INTERVENTIONS  Implementation  Nutrition Education: Will be provided if education needs arise     Collaboration with other providers  Will hold off on intervention and/or ordering of supplement as pt will be NPO for surgery, will offer intervention as pt's diet advances    Goals  Patient to consume % of nutritionally adequate meal trays TID  Pt weight will remain stable during admission     Monitoring/Evaluation  Progress toward goals will be monitored and evaluated per protocol.  Berenice Mccarthy RD, LD  Clinical Dietitian  Office: [phone]  Weekend pager: [phone]

## 2023-07-12 NOTE — PLAN OF CARE
Goal Outcome Evaluation:      Plan of Care Reviewed With: patient    Overall Patient Progress: no changeOverall Patient Progress: no change    Outcome Evaluation: Patient doing well. Pain controlled with oral oxycodone. Labelle traction 7 lbs. Tolerated dinner. No new complaints. Skin intact. Dressing to right elbow C/D/I.  Due to void. Purewick in place.    Yousif Jasso RN

## 2023-07-12 NOTE — ED NOTES
Bed: ED16  Expected date:   Expected time:   Means of arrival:   Comments:  S[facility]- [facility]  
ED Nursing criteria listed below was addressed during verbal handoff:     Abnormal vitals: No  Abnormal results: No  Med Reconciliation completed: Yes  Meds given in ED: Yes  Any Overdue Meds: No  Core Measures: No  Isolation: No  Special needs: No  Skin assessment: Yes    Observation Patient  Education provided: N/A   
Two unsuccessful IV attempts in left arm  
69

## 2023-07-12 NOTE — PROGRESS NOTES
S-(situation): Patient arrives to room 266 via cart from ED    B-(background): Right Hip fracture    A-(assessment): A&Ox4. VSS. Pain 6/10, increased pain with movement. Good CMS. No numbness or tingling. Abrasion to right elbow. Patient arrived in Hairs traction, Dr. Turner notified and order for bucks traction placed.     R-(recommendations): Orders reviewed with patient and friend. Will monitor patient per MD orders.     Inpatient nursing criteria listed below were met:    Health care directives status obtained and documented: Yes  VTE ordered/documented: Yes  Skin issues/needs documented:Yes  Isolation addressed and Signage used: NA  Fall Prevention: Care plan updated Yes Education given and documented Yes  Care Plan initiated and Co-Morbidities added: Yes  Education Assessment documented:Yes  Admission Education Documented: Yes  If present CAUTI/CLABI Education done: NA  New medication patient education completed and documented (Possible Side Effects of Common Medications handout): Yes  Allergies Reviewed: Yes  Admission Medication Reconciliation completed: Yes  Home medications if not able to send immediately home with family stored here: NA  Reminder note placed in discharge instructions regarding home meds: NA  Individualized care needs/preferences addressed and charted: Yes  Provider Notified that patient has arrived to the unit: Yes  Yousif Jasso RN

## 2023-07-12 NOTE — H&P
Formerly [facility]    History and Physical - Hospitalist Service       Date of Admission:  7/12/2023    Assessment & Plan      Sri Grewal is a 73 year old female with hypertension, CAD with previous history of MI, previous history of stroke and TIA, memory loss after old stroke, osteoporosis with previous history of osteoporotic fractures, vitamin D deficiency, right shoulder surgeries in May 2023 and right elbow surgery in June 2023, history of postoperative delirium, asthma, chronic hyponatremia, depression, and chronic anemia who presented to the ER with multiple injuries on 7/12/2023 after she tripped and fell.  She is found to have acute closed nondisplaced intertrochanteric fracture of the right hip for which operative repair has been recommend by orthopedic surgery, so hospitalization is considered medically necessary.  She is at high risk for an adverse outcome.  Based on the presently available information, hospitalization for at least 2 midnights is anticipated.    Acute closed nondisplaced intertrochanteric fracture of right hip  Osteoporosis with pathologic fracture  Vitamin D deficiency  Mechanical fall  Tripped and fell sustaining injury to right hip with radiographic findings demonstrating closed right hip fracture for which operative repair has been advised by orthopedic surgery.  However, surgeon has reportedly advised delay in operative intervention for 2 days because of the patient's chronic Plavix therapy and perioperative bleeding risk.  Patient is known to have osteoporosis and this is considered to be a fracture due to osteoporosis as well as her fall.  She was diagnosed with vitamin D deficiency in May at which time she was discovered to have abnormal bone intraoperatively during elbow surgery.  She was treated with high-dose vitamin D supplementation and remains on vitamin D supplementation.  -Admit to inpatient  -Orthopedic surgery consulted, presently anticipating  operative intervention on 7/14 per report of ER physician today who spoke with the orthopedic surgeon  -Preoperative management of traction is deferred to orthopedic surgery team  -Withhold chronic Plavix therapy preoperatively per recommendation of orthopedic surgeon  -May advance diet today and tomorrow but would make n.p.o. preoperatively overnight 7/13  -Use combination of cold application, scheduled doses of acetaminophen, and oral or IV narcotic analgesics as needed for pain management preoperatively, postoperative care including acute pain management postoperatively will be deferred to orthopedic surgery team  -Anticipate reassessment of acute and chronic medical problems by medical team over the next 24 to 48 hours preoperatively to reassess suitability for anesthesia and surgical intervention on 7/14 and will obtain EKG for this purpose  -Anticipate PT evaluation postoperatively to assist with mobilization and disposition planning    Hypertension  Chronic hypertension managed with multiple medications including lisinopril, amlodipine, carvedilol, hydralazine, and Lasix.  Presently moderately to severely hypertensive today in the context of acute injury and pain and she has not taken any of her antihypertensive medications today except for lisinopril.  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  At time of stroke in June 2021 she underwent reevaluation of CAD and had similar findings on coronary angiography at that time with overall severely reduced LVEF of less than 30%.  Despite her known history of reduced LV systolic function, she is not known to have clinical history of heart failure.  However, Echo in February and April 2023 demonstrated normal LVEF.  She may have had mild pulmonary edema postoperatively while recovering after orthopedic surgery in May 2023 although did not appear to require any specific treatment for it.  -Obtaining pre-op EKG, not anticipating additional preoperative cardiac evaluation at this time loss or clinical status changes  -Continue chronic beta-blocker, ACE inhibitor, and statin  -Holding chronic antiplatelet therapy for now  -Holding chronic Lasix for now    History of ischemic stroke  Left MCA trifurcation 5 mm aneurysm  She has had previous history ischemic stroke in the past that precipitated cognitive deterioration and memory loss.  In April 2023 she presented with speech changes and right-sided weakness and was treated in outside ER with thrombolytic therapy after which her neurologic deficits resolved.  She was thought to have had aborted stroke versus TIA at that time.  She continues to follow-up with neurology and currently is wearing a cardiac event monitor that is due to be removed on July 14.  She has known history of small 5 mm left MCA aneurysm for which no intervention has been advised today.  There was concern for possible closed head injury today when she fell although patient denies any head trauma and her neurologic status appears stable compared with her normal baseline.  Head and neck CT imaging performed in the ER on day of admission was without any acute abnormalities.  -Anticipate removing current heart monitor on July 14 as they have  previously planned to have it mailed back to her neurology team through [facility]  -Continue chronic statin  -Holding Plavix preoperatively for now, anticipate restarting postoperatively when approved by surgeon  -Optimize control of blood pressure perioperatively    Traumatic skin tear right elbow  Status post right elbow surgery 6/1/23   Patient had right elbow surgery (right olecranon osteotomy fixation) in May 2023 with subsequent failure.  She then underwent revision ORIF of right olecranon with removal of previous olecranon plate on June 1 from which she has been continuing to recover including ongoing therapies at home and as an outpatient.  On day of admission she sustained skin injury to the right elbow when she fell and appears to have an open skin lesion overlying the right olecranon area.  The skin lesion does not demonstrate significant bleeding or signs of infection.  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  Cause for chronic anemia is not known.  She is at risk for worsening anemia after her injury and planned surgery combined with chronic Plavix and aspirin therapies that cause platelet function defect and increase her bleeding risk.  -Ordered monitoring of hemoglobin  -Consider transfusion support with PRBC transfusion if indicated    Chronic hyponatremia  Chronically hyponatremic with baseline sodium 133-134 and treated chronically with sodium chloride tablets.  Current sodium is stable compared with her baseline.  -Continue chronic dose of sodium tablets perioperatively and adjust dosing accordingly depending upon changes in her sodium level  -Minimize use of hypotonic IV fluids  perioperatively and favor use of isotonic IV fluids instead  -Ordered monitoring of sodium    Memory loss  History of postoperative delirium  Known memory loss after older stroke and developed postoperative delirium after orthopedic surgery in May 2023.  Bedtime dose of Seroquel was added at that time with good effect.  Discharged with 24-hour supervision after that hospital stay was advised.  She has been living at home with her friend Bryson since then, and Bryson helps her with medication administration and daily activities at home.  -Continue chronic dose of Seroquel and continue melatonin at bedtime  -Monitor clinically for symptoms and signs of perioperative delirium    Depression  Chronically treated with Effexor XR and bupropion.  Difficult to assess depression at this time but it appears to generally be stable.  -Continue chronic doses of Effexor XR and bupropion    Asthma  Medical records refer to diagnosis of persistent asthma although the patient does not appear to be prescribed controller medications.  She does not demonstrate any symptoms or signs of active asthma at this time.  -Monitor clinically for signs of asthma  -Add bronchodilators if there is increasing concern for asthma symptoms    Migraine headache  Patient has chronic history of migraine headaches.  She has been using Fiorinal on a daily basis recently to manage her headache.  -Avoiding use of Fiorinal at this time because it contains aspirin which has antiplatelet effect and increases her bleeding risk  -Use other analgesics as needed for headache  -Reconsider use of specific migraine medication if migraine worsens although it is not clear whether her MCA aneurysm is a contraindication to use of migraine medication       Diet:  Regular  DVT Prophylaxis: Enoxaparin (Lovenox) SQ and Pneumatic Compression Devices  Merida Catheter: Not present  Lines: None     Cardiac Monitoring: None  Code Status:  Full resuscitation per her  preferences    Clinically Significant Risk Factors Present on Admission                # Drug Induced Platelet Defect: home medication list includes an antiplatelet medication   # Hypertension: Noted on problem list               Disposition Plan      Expected Discharge Date: 07/14/2023                  Clark Horowitz MD  Hospitalist Service  [facility]  Securely message with [employer] (more info)  Text page via [facility] Paging/Directory     ______________________________________________________________________    Chief Complaint   Right hip pain    History is obtained from the patient, electronic health record, emergency department physician, and patient's caregiver/friend Bryson    History of Present Illness   Sri Grewal is a 73 year old female who was in her usual health until she was getting some stairs today and tripped and fell.  She says she did not hit her head but fell mostly on her right side.  She says she did not lose consciousness or feel dizzy.  She fell more on her hip than her right arm.  She had pain in her right hip and was not able to stand and bear weight.  She did not have any worsening pain in her right elbow.  She was transported in the ER and in the ER was found to have nondisplaced right hip fracture.  Dr. Shannon from the emergency room reports that he spoke with orthopedic surgeon Dr. Turner who indicated that he recommended hospitalization with anticipated operative repair of the patient's right hip fracture on July 14 because the patient has been taking Plavix chronically.  Dr. Shannon also reports that Dr. Turner recommended use of Clarke's traction which has not yet been started in the ER.  Patient has received doses of IV Dilaudid in the emergency room.  She presently denies pain including hip pain.      Past Medical History    Past Medical History:   Diagnosis Date    Abnormal Papanicolaou smear of cervix and cervical HPV     Allergic rhinitis, cause  unspecified     seasonal allergies    Cerebral infarction (H)     Contact dermatitis and other eczema, due to unspecified cause     Endometriosis, site unspecified     Esophageal reflux     GERD    Migraine, unspecified, without mention of intractable migraine without mention of status migrainosus     Mild persistent asthma     Unspecified disorder of uterus     fibroid uterus     Patient had TIA versus aborted stroke April 25, 2023 when she presented to ER at [facility] with acute change in speech and right-sided weakness and was administered thrombolytic therapy.  At that time, neurology advised discontinuation of aspirin and initiation of Plavix therapy indefinitely.  They also recommended application of a 28-day cardiac monitor which the patient is currently wearing and is due to be removed on July 14 and mailed back to according to her friend Bryson.     Reviewed discharge summary from hospitalization May 3 to May 16 at [facility].  Patient had fallen on April 29 sustaining displaced proximal humerus fracture for which she underwent operative intervention with ORIF on May 3.  On May 5 she then had reverse right total shoulder arthroplasty.  Postoperative course was complicated by transient Ce syndrome attributed to brachial plexus block perioperatively with symptoms that resolved.  She also developed postoperative delirium with disrupted sleep that improved after starting Seroquel at bedtime.  She was discharged home with recommendation for 24-hour supervision, and her friend Bryson has been staying with her since then.  There is also reported in the discharge summary that the patient may have had pulmonary edema postoperatively that contributed to exertional dyspnea.    Reviewed notes from coronary angiography October 2005 and June 2021 both of which demonstrated findings concerning for Tako-tsubo cardiomyopathy but no significant coronary artery disease.  Reviewed reports of echocardiograms  from 2023 and 2023 demonstrating normal EF.  Cardiomyopathy with reduced EF has been managed with medical management with no discernible clinical history of heart failure.    Past Surgical History   Past Surgical History:   Procedure Laterality Date    COLONOSCOPY  2014    Procedure: COLONOSCOPY;  Surgeon: Nathan Baker MD;  Location: [facility]    OPEN REDUCTION INTERNAL FIXATION ELBOW Right 2023    Procedure: REVISION OPEN REDUCTION INTERNAL FIXATION RIGHT OLECRANON;  Surgeon: Williams Phipps MD;  Location: [facility]    OPEN REDUCTION INTERNAL FIXATION HUMERUS DISTAL Right 5/3/2023    Procedure: OPEN REDUCTION INTERNAL FIXATION, FRACTURE, HUMERUS, DISTAL;  Surgeon: Williams Phipps MD;  [facility]    REMOVE HARDWARE ELBOW Right 2023    Procedure: HARDWARE REMOVAL RIGHT OLECRANON;  Surgeon: Williams Phipps MD;  [facility]: [facility]    REVERSE ARTHROPLASTY SHOULDER Right 2023    Procedure: ARTHROPLASTY, SHOULDER, TOTAL, REVERSE for;  Surgeon: Cierra Krause MD;  Location: [facility] OR    ZZC  DELIVERY ONLY       X2    ZZHC COLONOSCOPY THRU STOMA, DIAGNOSTIC  2002    normal       Prior to Admission Medications   Prior to Admission Medications   Prescriptions Last Dose Informant Patient Reported? Taking?   QUEtiapine (SEROQUEL) 25 MG tablet Past Month Friend No Yes   Sig: Take 3 tablets (75 mg) by mouth At Bedtime   amLODIPine (NORVASC) 10 MG tablet 2023 at am Friend No Yes   Sig: Take 1 tablet (10 mg) by mouth daily   buPROPion (WELLBUTRIN XL) 300 MG 24 hr tablet 2023 at am Friend Yes Yes   Sig: Take 300 mg by mouth every morning   butalbital-aspirin-caffeine (FIORINAL EQUIV) -40 MG TABS per tablet 2023 at am Friend No Yes   Sig: Take 1 tablet by mouth every 4 hours as needed for migraine   carvedilol (COREG) 12.5 MG tablet 2023 at hs Friend No Yes   Sig: TAKE ONE TABLET BY MOUTH TWICE A DAY WITH FOOD   Patient taking differently: Take 12.5 mg by mouth 2 times daily  (with meals) TAKE ONE TABLET BY MOUTH TWICE A DAY WITH FOOD   clopidogrel (PLAVIX) 75 MG tablet 7/11/2023 at am Friend No Yes   Sig: Take 1 tablet (75 mg) by mouth daily   furosemide (LASIX) 40 MG tablet 7/11/2023 at am Friend No Yes   Sig: Take 1 tablet (40 mg) by mouth daily   hydrALAZINE (APRESOLINE) 25 MG tablet 7/11/2023 at hs Friend No Yes   Sig: Take 1 tablet (25 mg) by mouth 3 times daily   hydrOXYzine (ATARAX) 10 MG tablet Past Week Friend No Yes   Sig: Take 1 tablet (10 mg) by mouth every 6 hours as needed for other (adjuvant pain)   lisinopril (ZESTRIL) 20 MG tablet 7/12/2023 at am Friend No Yes   Sig: Take 1 tablet (20 mg) by mouth daily   melatonin 5 MG tablet 7/11/2023 at hs Friend No Yes   Sig: Take 1 tablet (5 mg) by mouth At Bedtime   rosuvastatin (CRESTOR) 20 MG tablet 7/11/2023 at am Friend No Yes   Sig: Take 1 tablet (20 mg) by mouth daily   sodium chloride 1 GM tablet 7/11/2023 at hs Friend No Yes   Sig: Take 1 tablet (1 g) by mouth 2 times daily (with meals)   tiZANidine (ZANAFLEX) 4 MG tablet 7/11/2023 at hs Friend No Yes   Sig: Take 0.5 tablets (2 mg) by mouth 3 times daily as needed for muscle spasms   traMADol (ULTRAM) 50 MG tablet 7/11/2023 at hs Friend No Yes   Sig: Take 0.5-1 tablets (25-50 mg) by mouth every 4 hours as needed for moderate pain   venlafaxine (EFFEXOR XR) 150 MG 24 hr capsule 7/11/2023 at am Friend No Yes   Sig: Take 1 capsule (150 mg) by mouth daily   vitamin D2 (ERGOCALCIFEROL) 17456 units (1250 mcg) capsule Past Month Friend No Yes   Sig: Take 1 capsule (50,000 Units) by mouth every 7 days      Facility-Administered Medications: None        Review of Systems    The 10 point Review of Systems is negative other than noted in the HPI or here.     Social History   I have reviewed this patient's social history and updated it with pertinent information if needed.  Social History     Tobacco Use    Smoking status: Never     Passive exposure: Never    Smokeless tobacco: Never    Vaping Use    Vaping Use: Never used   Substance Use Topics    Alcohol use: Yes     Comment: socially    Drug use: No         Family History   I have reviewed this patient's family history and updated it with pertinent information if needed.  Family History   Problem Relation Age of Onset    Heart Disease Mother         anemia    Osteoporosis Mother     Hypertension Mother     Cerebrovascular Disease Mother     Alcohol/Drug Mother         alcohol    Neurologic Disorder Mother         migraines    Hypertension Father     Cancer No family hx of         breast         Allergies   Allergies   Allergen Reactions    Morphine And Related      GI UPSET    Oxycodone     Seasonal Allergies         Physical Exam   Vital Signs: Temp: 97.6  F (36.4  C) Temp src: Oral BP: (!) 185/96 Pulse: 81   Resp: 18 SpO2: 95 % O2 Device: None (Room air)    Weight: 117 lbs 0 oz Body mass index is 21.71 kg/m .    Constitutional: Elderly woman without signs of acute distress while lying in bed  Eyes: Anicteric sclerae, clear conjunctivae, symmetric pupils  ENT: No obvious signs of recent head trauma, clear nares, normal oropharynx  Neck: Trachea midline, no stridor, symmetric, nontender  Hematologic / Lymphatic: no cervical lymphadenopathy and no supraclavicular lymphadenopathy  Chest: No gross anomalies, symmetric excursion, cardiac monitor device in place on the left upper chest below the clavicle  Respiratory: Normal respiratory effort, symmetric breath sounds, clear lung fields  Cardiovascular: Regular rate and rhythm, grade 3/6 systolic murmur loudest at the right upper sternal border, good radial and pedal pulses, normal capillary refill, no significant peripheral edema  GI: Normal bowel sounds, nondistended abdomen, soft, nontender  Skin: Normal color, no visible abnormality in the right hip area, right elbow has a small less than 1 cm skin tear that appears to be a full-thickness tear without active bleeding or drainage, no erythema or  hematoma of the skin of the right elbow  Musculoskeletal: Right lower extremity is grossly foreshortened compared with the left, no tenderness to palpation over the right hip region and no palpable hematoma or crepitus, right shoulder appears normal to inspection and is nontender, right elbow appears normal to inspection aside from the skin tear and is nontender  Neurologic: Appears awake and alert, answers questions although somewhat slowly and deliberately, answers generally seem to be appropriate although difficult to assess reliably because her friend Bryson answers most of the questions, able to follow simple instructions including moving her hands and the digits of both hands symmetrically and moving her feet and toes symmetrically  Neuropsychiatric: Normal mood and affect for the circumstances    Medical Decision Making       115 MINUTES SPENT BY ME on the date of service doing chart review, history, exam, documentation & further activities per the note.      Data     I have personally reviewed the following data over the past 24 hrs:    5.3  \   10.5 (L)   / 336     133 (L) 97 (L) 8.0 /  118 (H)   3.7 24 0.71 \     ALT: 14 AST: 21 AP: 87 TBILI: 0.2   ALB: 3.7 TOT PROTEIN: 6.4 LIPASE: N/A     INR:  1.10 PTT:  N/A   D-dimer:  N/A Fibrinogen:  N/A       Imaging results reviewed over the past 24 hrs:   Recent Results (from the past 24 hour(s))   CT Head w/o Contrast    Narrative    CT SCAN OF THE HEAD WITHOUT CONTRAST July 12, 2023 11:00 AM     HISTORY: Fall, trauma.    TECHNIQUE: Axial images of the head and coronal reformations without  IV contrast material. Radiation dose for this scan was reduced using  automated exposure control, adjustment of the mA and/or kV according  to patient size, or iterative reconstruction technique.    COMPARISON: CT of the head 5/10/2023. MRI of the brain 2/28/2023.    FINDINGS: The ventricles appear stable in size and configuration. No  hydrocephalus. Mild generalized brain  parenchymal volume loss.  Relatively extensive confluent nonspecific hypoattenuation throughout  the cerebral white matter, nonspecific, not significantly changed from  most recent exam. This may be due to advanced chronic small vessel  ischemic disease. Unchanged small area of chronic infarction in the  left basal ganglia region. Unchanged small chronic  cortical/subcortical infarct in the left inferior frontal gyrus. No  hyperdense acute intracranial hemorrhage, extra-axial fluid  collection, or mass effect. Bilateral lens implants. The visualized  aspects of the paranasal sinuses and mastoids appear clear with the  exception of minimal fluid/membrane thickening in the mastoid tips.  Presumed cerumen in the bilateral external auditory canals. The bony  calvarium and bones of the skull base appear intact. Degenerative  changes of the median atlantoaxial joint noted.      Impression    IMPRESSION:  1. No acute intracranial hemorrhage, extra-axial fluid collection, or  mass effect.  2. No significant change in chronic intracranial findings, as  described in the body of the report.    CHARLIE HENAO MD         SYSTEM ID:  [id number]   CT Cervical Spine w/o Contrast    Narrative    CT CERVICAL SPINE WITHOUT CONTRAST  7/12/2023 11:00 AM     HISTORY: Pain, fall with closed head injury.     TECHNIQUE: Axial images of the cervical spine were obtained without  intravenous contrast. Multiplanar reformations were performed.   Radiation dose for this scan was reduced using automated exposure  control, adjustment of the mA and/or kV according to patient size, or  iterative reconstruction technique.    COMPARISON: None.    FINDINGS: No acute cervical spine fracture is identified. Normal  vertebral body heights. Trace anterolisthesis of C4 on C5. Mildly  exaggerated cervical lordosis. Minimal levoconvex curvature of the  cervical spine.    The intervertebral discs appear relatively maintained in height.  Moderate to severe  multilevel degenerative facet arthropathy.  Degenerative changes of the median atlantoaxial joint. Central disc  protrusions at C2-C3 and C3-C4 with multilevel disc bulges elsewhere.  Scattered ligamentum flavum thickening. Mild to moderate multilevel  spinal canal stenosis. No definite high-grade spinal canal narrowing  identified. C5-C6 neural foraminal stenosis with relatively lesser  degrees of mild and moderate neural foraminal narrowing elsewhere on a  degenerative basis.    Minimal presumed fibrosis of the lung apices. Carotid bifurcation  atherosclerotic calcification bilaterally. The visualized paraspinal  soft tissues otherwise appear grossly unremarkable. A shoulder  prosthesis is partially visualized on the  image.      Impression    IMPRESSION:  1. No acute cervical spine fracture.  2. Multilevel degenerative changes, as described.    CHARLIE HENAO MD         SYSTEM ID:  [id number]   XR Pelvis and Hip Right 1 View    Narrative    XR PELVIS AND HIP RIGHT 1 VIEW  7/12/2023 11:12 AM     HISTORY: fall, pain  COMPARISON: None      Impression    IMPRESSION: Acute nondisplaced right femoral neck fracture. There is  fracture extension into the lesser trochanter and possibly the greater  trochanter superiorly. Normal joint alignment. There is normal hip  joint spacing bilaterally. Degenerative changes of the lower lumbar  spine. Osteopenia.    AYLA CARRILLO MD         SYSTEM ID:  [id number]   Elbow XR, 2 views, right    Narrative    ELBOW TWO VIEWS RIGHT 7/12/2023 11:14 AM     HISTORY: fall, pain  COMPARISON: 7/10/2023      Impression    IMPRESSION: Status post ORIF of the distal humeral and proximal ulnar  fractures with plate and screw fixation. There is similar fracture  alignment compared to prior. No hardware complication. No significant  interval healing. Limited evaluation for joint effusion. Otherwise  unchanged.    AYLA CARRILLO MD         SYSTEM ID:  [id number]     Recent Labs   Lab  07/12/23  1225 07/12/23  1035   WBC  --  5.3   HGB  --  10.5*   MCV  --  86   PLT  --  336   INR 1.10  --    NA  --  133*   POTASSIUM  --  3.7   CHLORIDE  --  97*   CO2  --  24   BUN  --  8.0   CR  --  0.71   ANIONGAP  --  12   CESIA  --  8.9   GLC  --  118*   ALBUMIN  --  3.7   PROTTOTAL  --  6.4   BILITOTAL  --  0.2   ALKPHOS  --  87   ALT  --  14   AST  --  21

## 2023-07-13 ENCOUNTER — ANESTHESIA EVENT (OUTPATIENT)
Dept: SURGERY | Facility: CLINIC | Age: 73
DRG: 481 | End: 2023-07-13
Payer: MEDICARE

## 2023-07-13 ENCOUNTER — TELEPHONE (OUTPATIENT)
Dept: ORTHOPEDICS | Facility: CLINIC | Age: 73
End: 2023-07-13

## 2023-07-13 ENCOUNTER — APPOINTMENT (OUTPATIENT)
Dept: OCCUPATIONAL THERAPY | Facility: CLINIC | Age: 73
DRG: 481 | End: 2023-07-13
Attending: PEDIATRICS
Payer: MEDICARE

## 2023-07-13 PROBLEM — R33.9 URINARY RETENTION: Status: ACTIVE | Noted: 2023-07-13

## 2023-07-13 LAB
ANION GAP SERPL CALCULATED.3IONS-SCNC: 9 MMOL/L (ref 7–15)
BUN SERPL-MCNC: 10.1 MG/DL (ref 8–23)
CALCIUM SERPL-MCNC: 8.8 MG/DL (ref 8.8–10.2)
CHLORIDE SERPL-SCNC: 99 MMOL/L (ref 98–107)
CREAT SERPL-MCNC: 0.69 MG/DL (ref 0.51–0.95)
DEPRECATED HCO3 PLAS-SCNC: 25 MMOL/L (ref 22–29)
GFR SERPL CREATININE-BSD FRML MDRD: >90 ML/MIN/1.73M2
GLUCOSE SERPL-MCNC: 126 MG/DL (ref 70–99)
HGB BLD-MCNC: 10.3 G/DL (ref 11.7–15.7)
POTASSIUM SERPL-SCNC: 3.7 MMOL/L (ref 3.4–5.3)
SODIUM SERPL-SCNC: 133 MMOL/L (ref 136–145)

## 2023-07-13 PROCEDURE — 120N000001 HC R&B MED SURG/OB

## 2023-07-13 PROCEDURE — 999N000104 HC STATISTIC NO CHARGE

## 2023-07-13 PROCEDURE — 250N000013 HC RX MED GY IP 250 OP 250 PS 637: Performed by: PEDIATRICS

## 2023-07-13 PROCEDURE — 99232 SBSQ HOSP IP/OBS MODERATE 35: CPT | Performed by: PEDIATRICS

## 2023-07-13 PROCEDURE — 85018 HEMOGLOBIN: CPT | Performed by: PEDIATRICS

## 2023-07-13 PROCEDURE — 250N000011 HC RX IP 250 OP 636: Mod: JZ | Performed by: PEDIATRICS

## 2023-07-13 PROCEDURE — 82310 ASSAY OF CALCIUM: CPT | Performed by: PEDIATRICS

## 2023-07-13 PROCEDURE — 36415 COLL VENOUS BLD VENIPUNCTURE: CPT | Performed by: PEDIATRICS

## 2023-07-13 RX ORDER — CALCIUM CARBONATE 500 MG/1
500 TABLET, CHEWABLE ORAL 3 TIMES DAILY
Status: DISCONTINUED | OUTPATIENT
Start: 2023-07-13 | End: 2023-07-14

## 2023-07-13 RX ADMIN — ACETAMINOPHEN 975 MG: 325 TABLET, FILM COATED ORAL at 22:44

## 2023-07-13 RX ADMIN — HYDROMORPHONE HYDROCHLORIDE 0.2 MG: 0.2 INJECTION, SOLUTION INTRAMUSCULAR; INTRAVENOUS; SUBCUTANEOUS at 11:29

## 2023-07-13 RX ADMIN — ROSUVASTATIN CALCIUM 20 MG: 20 TABLET, FILM COATED ORAL at 08:13

## 2023-07-13 RX ADMIN — ACETAMINOPHEN 975 MG: 325 TABLET, FILM COATED ORAL at 14:26

## 2023-07-13 RX ADMIN — BUPROPION HYDROCHLORIDE 300 MG: 150 TABLET, FILM COATED, EXTENDED RELEASE ORAL at 08:12

## 2023-07-13 RX ADMIN — LISINOPRIL 20 MG: 10 TABLET ORAL at 08:14

## 2023-07-13 RX ADMIN — Medication 50000 UNITS: at 11:15

## 2023-07-13 RX ADMIN — HYDROMORPHONE HYDROCHLORIDE 0.4 MG: 0.2 INJECTION, SOLUTION INTRAMUSCULAR; INTRAVENOUS; SUBCUTANEOUS at 16:49

## 2023-07-13 RX ADMIN — CARVEDILOL 12.5 MG: 6.25 TABLET, FILM COATED ORAL at 18:34

## 2023-07-13 RX ADMIN — HYDROMORPHONE HYDROCHLORIDE 0.4 MG: 0.2 INJECTION, SOLUTION INTRAMUSCULAR; INTRAVENOUS; SUBCUTANEOUS at 08:03

## 2023-07-13 RX ADMIN — ACETAMINOPHEN 975 MG: 325 TABLET, FILM COATED ORAL at 06:54

## 2023-07-13 RX ADMIN — HYDRALAZINE HYDROCHLORIDE 25 MG: 25 TABLET, FILM COATED ORAL at 14:26

## 2023-07-13 RX ADMIN — SODIUM CHLORIDE TAB 1 GM 1 G: 1 TAB at 08:13

## 2023-07-13 RX ADMIN — HYDROMORPHONE HYDROCHLORIDE 0.4 MG: 0.2 INJECTION, SOLUTION INTRAMUSCULAR; INTRAVENOUS; SUBCUTANEOUS at 14:26

## 2023-07-13 RX ADMIN — HYDRALAZINE HYDROCHLORIDE 25 MG: 25 TABLET, FILM COATED ORAL at 20:23

## 2023-07-13 RX ADMIN — CARVEDILOL 12.5 MG: 6.25 TABLET, FILM COATED ORAL at 08:12

## 2023-07-13 RX ADMIN — VENLAFAXINE HYDROCHLORIDE 150 MG: 150 CAPSULE, EXTENDED RELEASE ORAL at 08:13

## 2023-07-13 RX ADMIN — QUETIAPINE FUMARATE 75 MG: 25 TABLET ORAL at 20:23

## 2023-07-13 RX ADMIN — CALCIUM CARBONATE (ANTACID) CHEW TAB 500 MG 500 MG: 500 CHEW TAB at 20:23

## 2023-07-13 RX ADMIN — SODIUM CHLORIDE TAB 1 GM 1 G: 1 TAB at 18:34

## 2023-07-13 RX ADMIN — POLYETHYLENE GLYCOL 3350 17 G: 17 POWDER, FOR SOLUTION ORAL at 08:17

## 2023-07-13 RX ADMIN — AMLODIPINE BESYLATE 10 MG: 5 TABLET ORAL at 08:13

## 2023-07-13 RX ADMIN — HYDRALAZINE HYDROCHLORIDE 25 MG: 25 TABLET, FILM COATED ORAL at 08:13

## 2023-07-13 RX ADMIN — TIZANIDINE 2 MG: 2 TABLET ORAL at 22:44

## 2023-07-13 RX ADMIN — HYDROMORPHONE HYDROCHLORIDE 0.4 MG: 0.2 INJECTION, SOLUTION INTRAMUSCULAR; INTRAVENOUS; SUBCUTANEOUS at 20:17

## 2023-07-13 RX ADMIN — HYDROMORPHONE HYDROCHLORIDE 4 MG: 2 TABLET ORAL at 02:26

## 2023-07-13 RX ADMIN — CALCIUM CARBONATE (ANTACID) CHEW TAB 500 MG 500 MG: 500 CHEW TAB at 16:49

## 2023-07-13 ASSESSMENT — ACTIVITIES OF DAILY LIVING (ADL)
ADLS_ACUITY_SCORE: 18
ADLS_ACUITY_SCORE: 22

## 2023-07-13 NOTE — TELEPHONE ENCOUNTER
Hello,  I'm with ortho con.  Pt of Dr. Phipps.  [facility] or [facility] is calling, [place], from  x [phone].  Or [place] at x [phone].  They faxed over a form on 7/11 which may say standard written order requesting the updated Rx which is for review and signature.  Thank you.

## 2023-07-13 NOTE — PROGRESS NOTES
[facility]    Medicine Progress Note - Hospitalist Service    Date of Admission:  7/12/2023    Assessment & Plan      Sri Grewal is a 73 year old female with hypertension, CAD with previous history of MI, previous history of stroke and TIA, memory loss after old stroke, osteoporosis with previous history of osteoporotic fractures, vitamin D deficiency, right shoulder surgeries in May 2023 and right elbow surgery in June 2023, history of postoperative delirium, asthma, chronic hyponatremia, depression, and chronic anemia who presented to the ER with multiple injuries on 7/12/2023 after she tripped and fell.  Due to concern for acute closed nondisplaced intertrochanteric fracture of the right hip for which operative repair has been recommend by orthopedic surgery, she was admitted.  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    Patient's acute and chronic medical problems appear to be stable such that she may proceed with anesthesia and operative intervention tomorrow without additional diagnostic preoperative evaluation.    Acute closed nondisplaced intertrochanteric fracture of right hip  Osteoporosis with pathologic fracture  Vitamin D deficiency  Mechanical fall  Tripped and fell sustaining injury to right hip with radiographic findings demonstrating closed right hip fracture for which operative repair has been advised by orthopedic surgery.  However, surgeon has reportedly advised delay in operative intervention for 2 days because of the patient's chronic Plavix therapy and perioperative bleeding risk.  Patient is known to have osteoporosis and this is considered to be a fracture due to osteoporosis as well as her fall.  She was diagnosed with vitamin D deficiency in May at which time she was discovered  to have abnormal bone intraoperatively during elbow surgery.  She was previously treated with high-dose vitamin D supplementation and remains on vitamin D supplementation.  -Orthopedic surgery anticipating operative intervention on 7/14, discussed today with Dr. Turner of Orthopedic surgery  -Preoperative right hip fracture management using traction is deferred to orthopedic surgery team  -Continuing to withhold chronic Plavix therapy preoperatively per recommendation of orthopedic surgeon, anticipate restarting Plavix postoperatively depending upon recommendations from orthopedic surgeon  -n.p.o. preoperatively tonight at midnight   -Use combination of cold application, scheduled doses of acetaminophen, and oral or IV narcotic analgesics as needed for pain management preoperatively, postoperative care including acute pain management postoperatively will be deferred to orthopedic surgery team  -Anticipate PT evaluation postoperatively to assist with mobilization and disposition planning  -Continue chronic dose of vitamin D and add calcium carbonate supplementation    Hypertension  Chronic hypertension managed with multiple medications including lisinopril, amlodipine, carvedilol, hydralazine, and Lasix.  Presently moderately to severely hypertensive at admission in the context of acute injury and pain and she had not taken any of her antihypertensive medications on the day of admission except for lisinopril.  Blood pressure improved after restarting her chronic antihypertensive medications.  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  At time of stroke in June 2021 she underwent reevaluation of CAD and had similar findings on coronary angiography at that time with overall severely reduced LVEF of less than 30%.  Despite her known history of reduced LV systolic function, she is not known to have clinical history of heart failure.  However, Echo in February and April 2023 demonstrated normal LVEF.  She may have had mild pulmonary edema postoperatively while recovering after orthopedic surgery in May 2023 although did not appear to require any specific treatment for it.  EKG this hospitalization is reassuring without acute abnormalities.  -Continue chronic beta-blocker, ACE inhibitor, and statin (except hold dose of lisinopril on morning of surgery)  -Holding chronic antiplatelet therapy for now as above  -Holding chronic Lasix for now as above    History of ischemic stroke  Left MCA trifurcation 5 mm aneurysm  She has had previous history ischemic stroke in the past that precipitated cognitive deterioration and memory loss.  In April 2023 she presented with speech changes and right-sided weakness and was treated in outside ER with thrombolytic therapy after which her neurologic deficits resolved.  She was thought to have had aborted stroke versus TIA at that time.  She continues to follow-up with neurology and currently is wearing a cardiac event monitor that is due to be removed on July 14.  She has known history of small 5 mm left MCA aneurysm for which no intervention has been advised today.  There was concern for possible closed head injury today when she fell although patient denies any head trauma and her neurologic status appears  stable compared with her normal baseline.  Head and neck CT imaging performed in the ER on day of admission was without any acute abnormalities.  Due to her relatively recent neurologic event, she will be at increased risk for recurrence of TIA and/or stroke perioperatively.  -Anticipate removing current heart monitor on July 14 as they have previously planned to have it mailed back to her neurology team through [facility]  -Continue chronic statin  -Holding Plavix preoperatively for now, anticipate restarting postoperatively when approved by surgeon  -Optimize control of blood pressure perioperatively    Traumatic skin tear right elbow  Status post right elbow surgery 6/1/23   Patient had right elbow surgery (right olecranon osteotomy fixation) in May 2023 with subsequent failure.  She then underwent revision ORIF of right olecranon with removal of previous olecranon plate on June 1 from which she has been continuing to recover including ongoing therapies at home and as an outpatient.  On day of admission she sustained skin injury to the right elbow when she fell and appears to have an open skin lesion overlying the right olecranon area.  The skin lesion does not demonstrate significant bleeding or signs of infection.  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  Cause for chronic anemia is not known.  She is at risk for worsening anemia after her injury and planned surgery combined with chronic Plavix and aspirin therapies that cause platelet function defect and increase her bleeding risk.  Anemia has been stable during hospitalization preoperatively.  -Ordered monitoring of hemoglobin  -Consider  transfusion support with PRBC transfusion if indicated    Chronic hyponatremia  Chronically hyponatremic with baseline sodium 133-134 and treated chronically with sodium chloride tablets.  Current sodium continues to be stable compared with her baseline.  -Continue chronic dose of sodium tablets perioperatively and adjust dosing accordingly depending upon changes in her sodium level  -Minimize use of hypotonic IV fluids perioperatively and favor use of isotonic IV fluids instead  -Ordered monitoring of sodium    Urine retention  Developed urinary retention during hospitalization necessitating intermittent bladder catheterization.  Reporting chronic urinary habits such that she has to modify her posture to be able to void effectively at her normal baseline and she currently cannot assume that posture because of her hip fracture.  -Acute urinary catheter ordered for retention, anticipate removing catheter postoperatively as she advances activities for a trial of spontaneous voiding    Memory loss  History of postoperative delirium  Known memory loss after older stroke and developed postoperative delirium after orthopedic surgery in May 2023.  Bedtime dose of Seroquel was added at that time with good effect.  Discharged with 24-hour supervision after that hospital stay was advised.  She has been living at home with her friend Bryson since then, and Bryson helps her with medication administration and daily activities at home.  -Continue chronic dose of Seroquel and continue melatonin at bedtime  -Monitor clinically for symptoms and signs of perioperative delirium    Depression  Chronically treated with Effexor XR and bupropion.  Difficult to assess depression at this time but it appears to generally be stable.  -Continue chronic doses of Effexor XR and bupropion    Asthma  Medical records refer to diagnosis of persistent asthma although the patient does not appear to be prescribed controller medications.  She does not  demonstrate any symptoms or signs of active asthma at this time.  -Monitor clinically for signs of asthma  -Add bronchodilators if there is increasing concern for asthma symptoms    Migraine headache  Patient has chronic history of migraine headaches.  She has been using Fiorinal on a daily basis recently to manage her headache.  -Avoiding use of Fiorinal at this time because it contains aspirin which has antiplatelet effect and increases her bleeding risk  -Use other analgesics as needed for headache  -Reconsider use of specific migraine medication if migraine worsens although it is not clear whether her MCA aneurysm is a contraindication to use of migraine medication       Diet: Advance Diet as Tolerated: Regular Diet Adult    DVT Prophylaxis: Enoxaparin (Lovenox) SQ and Pneumatic Compression Devices  Merida Catheter: Not present  Lines: None     Cardiac Monitoring: None  Code Status: Full Code      Clinically Significant Risk Factors                                 Disposition Plan   Anticipate reassessing functional status and disposition planning postoperatively          Clark Horowitz MD  Hospitalist Service  [facility]  Securely message with [employer] (more info)  Text page via [employer] Paging/Directory   ______________________________________________________________________    Interval History   Overnight the patient had urge to void but was unable to do so.  She was found to have elevated urine volume on bladder scan and underwent straight catheterization with immediate return of 900 mL urine output.  Over the course of the day today, she again has not been able to void although has urged to do so.  She again has increased urine residual volume on bladder scan this afternoon.  She says she normally has to assume a certain sitting posture to void effectively.  Because of her hip fracture with current restricted activity level, she cannot sit in that posture at this time.  She has  been afebrile.  Blood pressure has fluctuated from normotensive to severely hypertensive although she has not had symptoms when she has been severely hypertensive.  Overall, blood pressure improved after restarting her chronic antihypertensive medications.  Other vital signs have been stable and oxygenation has been normal.  She denies any chest pain or shortness of breath.  She is tolerating good oral intake.    Physical Exam   Vital Signs: Temp: 98  F (36.7  C) Temp src: Oral BP: 121/69 Pulse: 72   Resp: 16 SpO2: 95 % O2 Device: None (Room air)    Patient Vitals for the past 24 hrs:   BP Temp Temp src Pulse Resp SpO2   07/13/23 1158 121/69 98  F (36.7  C) Oral 72 16 95 %   07/13/23 0918 125/71 -- -- 78 -- --   07/13/23 0759 (!) 204/96 98.1  F (36.7  C) Oral 72 16 96 %   07/13/23 0340 (!) 153/83 98.2  F (36.8  C) Oral 80 18 97 %   07/13/23 0315 -- -- -- -- 18 --   07/12/23 2355 -- -- -- -- 18 --   07/12/23 2310 (!) 161/80 98  F (36.7  C) Oral 74 -- 96 %   07/12/23 2210 -- -- -- -- 18 --   07/12/23 2118 (!) 153/90 -- -- -- -- --   07/12/23 1937 (!) 141/87 98.1  F (36.7  C) Oral 81 18 94 %   07/12/23 1547 130/68 97.9  F (36.6  C) Oral 87 18 95 %   07/12/23 1414 (!) 156/92 97.8  F (36.6  C) -- 83 16 94 %     Weight: 122 lbs 9.21 oz  Vitals:    07/12/23 1027 07/12/23 1347   Weight: 53.1 kg (117 lb) 55.6 kg (122 lb 9.2 oz)     Intake/Output Summary (Last 24 hours) at 7/13/2023 1352  Last data filed at 7/13/2023 0028  Gross per 24 hour   Intake --   Output 900 ml   Net -900 ml     General Appearance: Chronically ill-appearing elderly woman without signs of acute distress while sitting up in bed  Respiratory: Normal respiratory effort, clear lungs  Cardiovascular: Regular rate and rhythm, good radial pulse, normal capillary refill  Neuro: Alert and maintains wakefulness and attention, no confusion evident    Medical Decision Making           Data     I have personally reviewed the following data over the past 24  hrs:    N/A  \   10.3 (L)   / N/A     133 (L) 99 10.1 /  126 (H)   3.7 25 0.69 \         ECG results reviewed from yesterday: Normal sinus rhythm with normal intervals and left axis deviation, no acute ischemic changes    Recent Labs   Lab 07/13/23  0730 07/12/23  1225 07/12/23  1035   WBC  --   --  5.3   HGB 10.3*  --  10.5*   MCV  --   --  86   PLT  --   --  336   INR  --  1.10  --    *  --  133*   POTASSIUM 3.7  --  3.7   CHLORIDE 99  --  97*   CO2 25  --  24   BUN 10.1  --  8.0   CR 0.69  --  0.71   ANIONGAP 9  --  12   CESIA 8.8  --  8.9   *  --  118*   ALBUMIN  --   --  3.7   PROTTOTAL  --   --  6.4   BILITOTAL  --   --  0.2   ALKPHOS  --   --  87   ALT  --   --  14   AST  --   --  21

## 2023-07-13 NOTE — CONSULTS
Care Management Initial Consult    General Information  Assessment completed with: Patient,    Type of CM/SW Visit: Initial Assessment    Primary Care Provider verified and updated as needed: Yes   Readmission within the last 30 days:        Reason for Consult: discharge planning  Advance Care Planning:          Communication Assessment  Patient's communication style: spoken language (English or Bilingual)    Hearing Difficulty or Deaf: no   Wear Glasses or Blind: no    Cognitive  Cognitive/Neuro/Behavioral: WDL                      Living Environment:   People in home: other (see comments) (friend, Bryson)     Current living Arrangements: house      Able to return to prior arrangements:  (TBD)       Family/Social Support:  Care provided by: self, friend  Provides care for: no one  Marital Status:   Children, Other (specify) (friendBryson)          Description of Support System: Supportive, Involved    Support Assessment: Adequate family and caregiver support    Current Resources:   Patient receiving home care services: No     Community Resources: None  Equipment currently used at home: shower chair  Supplies currently used at home: Other, None    Employment/Financial:  Employment Status: retired        Financial Concerns:         Does the patient's insurance plan have a 3 day qualifying hospital stay waiver?  Yes     Lifestyle & Psychosocial Needs:  Social Determinants of Health     Tobacco Use: Low Risk  (7/10/2023)    Patient History     Smoking Tobacco Use: Never     Smokeless Tobacco Use: Never     Passive Exposure: Never   Alcohol Use: Not on file   Financial Resource Strain: Not on file   Food Insecurity: Not on file   Transportation Needs: Not on file   Physical Activity: Not on file   Stress: Not on file   Social Connections: Not on file   Intimate Partner Violence: Not on file   Depression: Not at risk (6/15/2023)    PHQ-2     PHQ-2 Score: 2   Housing Stability: Not on file       Functional  Status:  Prior to admission patient needed assistance:   Dependent ADLs:: Independent, Ambulation-no assistive device  Dependent IADLs:: Cleaning, Shopping, Cooking, Medication Management       Mental Health Status:          Chemical Dependency Status:              Values/Beliefs:  Spiritual, Cultural Beliefs, Worship Practices, Values that affect care: no               Additional Information:  Referral received for elevated risk score and discharge planning. Initial assessment completed with patient at bedside.      Patient lives in her own home. She states her friend, Bryson, recently moved in with her and stays in the basement. Bryson assists patient with medications, meals, cleaning, grocery shopping, etc.    Patient states she still drives.  She is independent with ambulation without an assistive device at baseline.  She states she has a walker at home to use at discharge.     Patient does not received any in-home services.  She currently goes to outpatient O/T at [facility].    She states Bryson will be with her 24/7 at discharge.  Patient's goal is to discharge home with support from Bryson.  She would like to continue outpatient therapy at  [facility].    Patient will have surgery on 7/14/23.  Care Management to follow for discharge needs post surgery.    [facility] task sent to schedule PCP follow up appt.    CHAD Schroeder  [facility] [phone]/ [place] [phone]  Care Management

## 2023-07-13 NOTE — PLAN OF CARE
Goal Outcome Evaluation:      Plan of Care Reviewed With: patient    Overall Patient Progress: no changeOverall Patient Progress: no change    Outcome Evaluation: VVS with elevated BP, on RA. Patient continues to have right hip pain. PRN dilaudid given x2 and scheduled Tylenol. Hollister traction set at 7 lbs. No irritation or redness. Pt unable to void, bladder scan was at 766. Straight cath x 1, output at 900. Bladder scan this morning at 58. Abrasion to right elbow, new dressing placed this morning.

## 2023-07-13 NOTE — PROGRESS NOTES
S-(situation): Occupational Therapy Consult    B-(background): Sri Grewal is a 73 year old female who had tripped and fell 7/12/23 resulting in acute nondisplaced intertrochanteric fracture on R side with planned surgery for Friday. Patient was receiving outpatient OT services for R elbow d/t recent surgical procedure June 2023. PMH: CVA/TIA, memory loss, osteoporosis, asthma, chronic hyponitremia, vitamin D deficiency and R shoulder surgery may 2023.     A-(assessment): OT orders dicussed with care team. OT reviewed orders with patient. Therapist colleague contacted patients OP OT who she was seeing prior to fall for R elbow. Patient can WBAT through R elbow. Patient does have laceration on R elbow however, will determine best fit for AE needs post hip surgery scheduled for Friday (platform walker vs standard walker). Patient's partner was present today during OT consult and able to state and demonstrate patient's current R elbow exercises to complete from an outpatient perspective. Therapist educated patient/partner on need to continue to complete elbow exercises during stay- verbalized understanding.     R-(recommendations): Further skilled IP OT services not warranted at this time. OT may have to re-intervene as appropriate post hip surgery, pending safe progression with mobility giving R elbow wbing status.     Thank you for the referral.   GELA Strauss/L   [facility]    Email: [email or web address]  Phone: [phone]

## 2023-07-13 NOTE — PLAN OF CARE
Goal Outcome Evaluation:      Plan of Care Reviewed With: patient    Overall Patient Progress: no changeOverall Patient Progress: no change    Outcome Evaluation: Pt A&Ox4. VSS on RA post medication administration. Hypertensive this AM. Continued bedrest with 7lb givens's traction until R hip surgery tomorrow morning. Given breaks due to heel discomfort. Weight shifted as pt will tolerate. PRN IV dilaudid given x4 today for 7/10 pain. Pt did not void post straight catheterization overnight, peñaloza placed per MD orders with 375 mL out this shift.

## 2023-07-14 ENCOUNTER — APPOINTMENT (OUTPATIENT)
Dept: GENERAL RADIOLOGY | Facility: CLINIC | Age: 73
DRG: 481 | End: 2023-07-14
Attending: ORTHOPAEDIC SURGERY
Payer: MEDICARE

## 2023-07-14 ENCOUNTER — ANESTHESIA (OUTPATIENT)
Dept: SURGERY | Facility: CLINIC | Age: 73
DRG: 481 | End: 2023-07-14
Payer: MEDICARE

## 2023-07-14 LAB
ANION GAP SERPL CALCULATED.3IONS-SCNC: 9 MMOL/L (ref 7–15)
CHLORIDE SERPL-SCNC: 100 MMOL/L (ref 98–107)
DEPRECATED HCO3 PLAS-SCNC: 24 MMOL/L (ref 22–29)
HGB BLD-MCNC: 9.6 G/DL (ref 11.7–15.7)
POTASSIUM SERPL-SCNC: 3.8 MMOL/L (ref 3.4–5.3)
SODIUM SERPL-SCNC: 133 MMOL/L (ref 136–145)

## 2023-07-14 PROCEDURE — 0QS604Z REPOSITION RIGHT UPPER FEMUR WITH INTERNAL FIXATION DEVICE, OPEN APPROACH: ICD-10-PCS | Performed by: ORTHOPAEDIC SURGERY

## 2023-07-14 PROCEDURE — 250N000011 HC RX IP 250 OP 636: Mod: JZ | Performed by: PEDIATRICS

## 2023-07-14 PROCEDURE — 36415 COLL VENOUS BLD VENIPUNCTURE: CPT | Performed by: PEDIATRICS

## 2023-07-14 PROCEDURE — 258N000003 HC RX IP 258 OP 636: Performed by: NURSE ANESTHETIST, CERTIFIED REGISTERED

## 2023-07-14 PROCEDURE — 272N000001 HC OR GENERAL SUPPLY STERILE: Performed by: ORTHOPAEDIC SURGERY

## 2023-07-14 PROCEDURE — 250N000025 HC SEVOFLURANE, PER MIN: Performed by: ORTHOPAEDIC SURGERY

## 2023-07-14 PROCEDURE — 370N000017 HC ANESTHESIA TECHNICAL FEE, PER MIN: Performed by: ORTHOPAEDIC SURGERY

## 2023-07-14 PROCEDURE — 250N000011 HC RX IP 250 OP 636: Performed by: ORTHOPAEDIC SURGERY

## 2023-07-14 PROCEDURE — 250N000013 HC RX MED GY IP 250 OP 250 PS 637: Performed by: PEDIATRICS

## 2023-07-14 PROCEDURE — 250N000009 HC RX 250: Performed by: NURSE ANESTHETIST, CERTIFIED REGISTERED

## 2023-07-14 PROCEDURE — 250N000013 HC RX MED GY IP 250 OP 250 PS 637: Performed by: ORTHOPAEDIC SURGERY

## 2023-07-14 PROCEDURE — C1713 ANCHOR/SCREW BN/BN,TIS/BN: HCPCS | Performed by: ORTHOPAEDIC SURGERY

## 2023-07-14 PROCEDURE — 80051 ELECTROLYTE PANEL: CPT | Performed by: PEDIATRICS

## 2023-07-14 PROCEDURE — 999N000179 XR SURGERY CARM FLUORO LESS THAN 5 MIN W STILLS

## 2023-07-14 PROCEDURE — 120N000001 HC R&B MED SURG/OB

## 2023-07-14 PROCEDURE — 710N000010 HC RECOVERY PHASE 1, LEVEL 2, PER MIN: Performed by: ORTHOPAEDIC SURGERY

## 2023-07-14 PROCEDURE — 360N000084 HC SURGERY LEVEL 4 W/ FLUORO, PER MIN: Performed by: ORTHOPAEDIC SURGERY

## 2023-07-14 PROCEDURE — 250N000013 HC RX MED GY IP 250 OP 250 PS 637: Performed by: NURSE ANESTHETIST, CERTIFIED REGISTERED

## 2023-07-14 PROCEDURE — 250N000009 HC RX 250: Performed by: ORTHOPAEDIC SURGERY

## 2023-07-14 PROCEDURE — 27245 TREAT THIGH FRACTURE: CPT | Mod: RT | Performed by: ORTHOPAEDIC SURGERY

## 2023-07-14 PROCEDURE — 99232 SBSQ HOSP IP/OBS MODERATE 35: CPT | Performed by: PEDIATRICS

## 2023-07-14 PROCEDURE — 250N000011 HC RX IP 250 OP 636: Mod: JZ | Performed by: INTERNAL MEDICINE

## 2023-07-14 PROCEDURE — 250N000011 HC RX IP 250 OP 636: Performed by: NURSE ANESTHETIST, CERTIFIED REGISTERED

## 2023-07-14 PROCEDURE — 999N000141 HC STATISTIC PRE-PROCEDURE NURSING ASSESSMENT: Performed by: ORTHOPAEDIC SURGERY

## 2023-07-14 PROCEDURE — 85018 HEMOGLOBIN: CPT | Performed by: PEDIATRICS

## 2023-07-14 PROCEDURE — 258N000003 HC RX IP 258 OP 636: Performed by: ORTHOPAEDIC SURGERY

## 2023-07-14 DEVICE — IMP SCR BONE STRK G3 LAG 10.5X95MM TI 3060-0095S: Type: IMPLANTABLE DEVICE | Site: HIP | Status: FUNCTIONAL

## 2023-07-14 DEVICE — IMP NAIL STRK TROCH IM GAMMA 125DEG 11MMX18CM 3125-1180S: Type: IMPLANTABLE DEVICE | Site: HIP | Status: FUNCTIONAL

## 2023-07-14 DEVICE — IMP WIRE KIRSCHNER 3.2X450MM 1210-6450S: Type: IMPLANTABLE DEVICE | Site: HIP | Status: FUNCTIONAL

## 2023-07-14 DEVICE — IMP SCR STRK LOCK 5.0X35MM FT 1896-5035S: Type: IMPLANTABLE DEVICE | Site: HIP | Status: FUNCTIONAL

## 2023-07-14 DEVICE — IMP WIRE KIRSCHNER STRK 3.0X285MM 1806-0050S: Type: IMPLANTABLE DEVICE | Site: HIP | Status: FUNCTIONAL

## 2023-07-14 RX ORDER — KETAMINE HYDROCHLORIDE 10 MG/ML
INJECTION INTRAMUSCULAR; INTRAVENOUS PRN
Status: DISCONTINUED | OUTPATIENT
Start: 2023-07-14 | End: 2023-07-14

## 2023-07-14 RX ORDER — ACETAMINOPHEN 325 MG/1
650 TABLET ORAL EVERY 4 HOURS PRN
Qty: 100 TABLET | Refills: 0 | Status: SHIPPED | OUTPATIENT
Start: 2023-07-14 | End: 2023-07-18

## 2023-07-14 RX ORDER — SODIUM CHLORIDE, SODIUM LACTATE, POTASSIUM CHLORIDE, CALCIUM CHLORIDE 600; 310; 30; 20 MG/100ML; MG/100ML; MG/100ML; MG/100ML
INJECTION, SOLUTION INTRAVENOUS CONTINUOUS
Status: DISCONTINUED | OUTPATIENT
Start: 2023-07-14 | End: 2023-07-14 | Stop reason: HOSPADM

## 2023-07-14 RX ORDER — ACETAMINOPHEN 325 MG/1
975 TABLET ORAL EVERY 6 HOURS PRN
Status: DISCONTINUED | OUTPATIENT
Start: 2023-07-14 | End: 2023-07-14 | Stop reason: HOSPADM

## 2023-07-14 RX ORDER — AMLODIPINE BESYLATE 5 MG/1
10 TABLET ORAL DAILY
Status: DISCONTINUED | OUTPATIENT
Start: 2023-07-14 | End: 2023-07-18 | Stop reason: HOSPADM

## 2023-07-14 RX ORDER — HYDROMORPHONE HYDROCHLORIDE 1 MG/ML
0.2 INJECTION, SOLUTION INTRAMUSCULAR; INTRAVENOUS; SUBCUTANEOUS
Status: DISCONTINUED | OUTPATIENT
Start: 2023-07-14 | End: 2023-07-16

## 2023-07-14 RX ORDER — TRANEXAMIC ACID 650 MG/1
1950 TABLET ORAL ONCE
Status: COMPLETED | OUTPATIENT
Start: 2023-07-14 | End: 2023-07-14

## 2023-07-14 RX ORDER — BISACODYL 10 MG
10 SUPPOSITORY, RECTAL RECTAL DAILY PRN
Status: DISCONTINUED | OUTPATIENT
Start: 2023-07-14 | End: 2023-07-18 | Stop reason: HOSPADM

## 2023-07-14 RX ORDER — DEXAMETHASONE SODIUM PHOSPHATE 10 MG/ML
INJECTION, SOLUTION INTRAMUSCULAR; INTRAVENOUS PRN
Status: DISCONTINUED | OUTPATIENT
Start: 2023-07-14 | End: 2023-07-14

## 2023-07-14 RX ORDER — ROSUVASTATIN CALCIUM 20 MG/1
20 TABLET, COATED ORAL DAILY
Status: DISCONTINUED | OUTPATIENT
Start: 2023-07-15 | End: 2023-07-18 | Stop reason: HOSPADM

## 2023-07-14 RX ORDER — HYDROMORPHONE HYDROCHLORIDE 1 MG/ML
0.2 INJECTION, SOLUTION INTRAMUSCULAR; INTRAVENOUS; SUBCUTANEOUS EVERY 5 MIN PRN
Status: DISCONTINUED | OUTPATIENT
Start: 2023-07-14 | End: 2023-07-14 | Stop reason: HOSPADM

## 2023-07-14 RX ORDER — ACETAMINOPHEN 325 MG/1
650 TABLET ORAL EVERY 4 HOURS PRN
Status: DISCONTINUED | OUTPATIENT
Start: 2023-07-17 | End: 2023-07-18 | Stop reason: HOSPADM

## 2023-07-14 RX ORDER — HYDROMORPHONE HYDROCHLORIDE 1 MG/ML
0.5 INJECTION, SOLUTION INTRAMUSCULAR; INTRAVENOUS; SUBCUTANEOUS EVERY 5 MIN PRN
Status: DISCONTINUED | OUTPATIENT
Start: 2023-07-14 | End: 2023-07-14 | Stop reason: HOSPADM

## 2023-07-14 RX ORDER — FENTANYL CITRATE 50 UG/ML
INJECTION, SOLUTION INTRAMUSCULAR; INTRAVENOUS PRN
Status: DISCONTINUED | OUTPATIENT
Start: 2023-07-14 | End: 2023-07-14

## 2023-07-14 RX ORDER — CEFAZOLIN SODIUM/WATER 2 G/20 ML
2 SYRINGE (ML) INTRAVENOUS
Status: COMPLETED | OUTPATIENT
Start: 2023-07-14 | End: 2023-07-14

## 2023-07-14 RX ORDER — AMOXICILLIN 250 MG
1 CAPSULE ORAL 2 TIMES DAILY
Status: DISCONTINUED | OUTPATIENT
Start: 2023-07-14 | End: 2023-07-18 | Stop reason: HOSPADM

## 2023-07-14 RX ORDER — NALOXONE HYDROCHLORIDE 0.4 MG/ML
0.2 INJECTION, SOLUTION INTRAMUSCULAR; INTRAVENOUS; SUBCUTANEOUS
Status: DISCONTINUED | OUTPATIENT
Start: 2023-07-14 | End: 2023-07-18 | Stop reason: HOSPADM

## 2023-07-14 RX ORDER — BUPROPION HYDROCHLORIDE 150 MG/1
300 TABLET ORAL EVERY MORNING
Status: DISCONTINUED | OUTPATIENT
Start: 2023-07-15 | End: 2023-07-18 | Stop reason: HOSPADM

## 2023-07-14 RX ORDER — ONDANSETRON 2 MG/ML
4 INJECTION INTRAMUSCULAR; INTRAVENOUS EVERY 6 HOURS PRN
Status: DISCONTINUED | OUTPATIENT
Start: 2023-07-14 | End: 2023-07-18 | Stop reason: HOSPADM

## 2023-07-14 RX ORDER — LABETALOL HYDROCHLORIDE 5 MG/ML
10 INJECTION, SOLUTION INTRAVENOUS EVERY 4 HOURS PRN
Status: DISCONTINUED | OUTPATIENT
Start: 2023-07-14 | End: 2023-07-14

## 2023-07-14 RX ORDER — SODIUM CHLORIDE, SODIUM LACTATE, POTASSIUM CHLORIDE, CALCIUM CHLORIDE 600; 310; 30; 20 MG/100ML; MG/100ML; MG/100ML; MG/100ML
INJECTION, SOLUTION INTRAVENOUS CONTINUOUS
Status: DISCONTINUED | OUTPATIENT
Start: 2023-07-14 | End: 2023-07-14

## 2023-07-14 RX ORDER — FENTANYL CITRATE 50 UG/ML
50 INJECTION, SOLUTION INTRAMUSCULAR; INTRAVENOUS EVERY 5 MIN PRN
Status: DISCONTINUED | OUTPATIENT
Start: 2023-07-14 | End: 2023-07-14 | Stop reason: HOSPADM

## 2023-07-14 RX ORDER — ONDANSETRON 4 MG/1
4 TABLET, ORALLY DISINTEGRATING ORAL EVERY 30 MIN PRN
Status: DISCONTINUED | OUTPATIENT
Start: 2023-07-14 | End: 2023-07-14 | Stop reason: HOSPADM

## 2023-07-14 RX ORDER — ALBUTEROL SULFATE 0.83 MG/ML
2.5 SOLUTION RESPIRATORY (INHALATION) EVERY 4 HOURS PRN
Status: DISCONTINUED | OUTPATIENT
Start: 2023-07-14 | End: 2023-07-14 | Stop reason: HOSPADM

## 2023-07-14 RX ORDER — CLOPIDOGREL BISULFATE 75 MG/1
75 TABLET ORAL DAILY
Status: DISCONTINUED | OUTPATIENT
Start: 2023-07-15 | End: 2023-07-18 | Stop reason: HOSPADM

## 2023-07-14 RX ORDER — CARVEDILOL 6.25 MG/1
12.5 TABLET ORAL 2 TIMES DAILY WITH MEALS
Status: DISCONTINUED | OUTPATIENT
Start: 2023-07-14 | End: 2023-07-18 | Stop reason: HOSPADM

## 2023-07-14 RX ORDER — LIDOCAINE 40 MG/G
CREAM TOPICAL
Status: DISCONTINUED | OUTPATIENT
Start: 2023-07-14 | End: 2023-07-18 | Stop reason: HOSPADM

## 2023-07-14 RX ORDER — BUPIVACAINE HYDROCHLORIDE AND EPINEPHRINE 2.5; 5 MG/ML; UG/ML
INJECTION, SOLUTION INFILTRATION; PERINEURAL PRN
Status: DISCONTINUED | OUTPATIENT
Start: 2023-07-14 | End: 2023-07-14 | Stop reason: HOSPADM

## 2023-07-14 RX ORDER — ONDANSETRON 4 MG/1
4 TABLET, ORALLY DISINTEGRATING ORAL EVERY 6 HOURS PRN
Status: DISCONTINUED | OUTPATIENT
Start: 2023-07-14 | End: 2023-07-18 | Stop reason: HOSPADM

## 2023-07-14 RX ORDER — NALOXONE HYDROCHLORIDE 0.4 MG/ML
0.4 INJECTION, SOLUTION INTRAMUSCULAR; INTRAVENOUS; SUBCUTANEOUS
Status: DISCONTINUED | OUTPATIENT
Start: 2023-07-14 | End: 2023-07-18 | Stop reason: HOSPADM

## 2023-07-14 RX ORDER — FENTANYL CITRATE-0.9 % NACL/PF 10 MCG/ML
PLASTIC BAG, INJECTION (ML) INTRAVENOUS CONTINUOUS PRN
Status: DISCONTINUED | OUTPATIENT
Start: 2023-07-14 | End: 2023-07-14

## 2023-07-14 RX ORDER — CEFAZOLIN SODIUM/WATER 2 G/20 ML
2 SYRINGE (ML) INTRAVENOUS SEE ADMIN INSTRUCTIONS
Status: DISCONTINUED | OUTPATIENT
Start: 2023-07-14 | End: 2023-07-14 | Stop reason: HOSPADM

## 2023-07-14 RX ORDER — HYDROMORPHONE HYDROCHLORIDE 1 MG/ML
0.4 INJECTION, SOLUTION INTRAMUSCULAR; INTRAVENOUS; SUBCUTANEOUS
Status: DISCONTINUED | OUTPATIENT
Start: 2023-07-14 | End: 2023-07-16

## 2023-07-14 RX ORDER — CEFAZOLIN SODIUM 1 G/3ML
1 INJECTION, POWDER, FOR SOLUTION INTRAMUSCULAR; INTRAVENOUS EVERY 8 HOURS
Status: COMPLETED | OUTPATIENT
Start: 2023-07-14 | End: 2023-07-15

## 2023-07-14 RX ORDER — LISINOPRIL 10 MG/1
20 TABLET ORAL DAILY
Status: DISCONTINUED | OUTPATIENT
Start: 2023-07-14 | End: 2023-07-18 | Stop reason: HOSPADM

## 2023-07-14 RX ORDER — SODIUM CHLORIDE 1 G/1
1 TABLET ORAL 2 TIMES DAILY WITH MEALS
Status: DISCONTINUED | OUTPATIENT
Start: 2023-07-14 | End: 2023-07-18 | Stop reason: HOSPADM

## 2023-07-14 RX ORDER — ONDANSETRON 2 MG/ML
INJECTION INTRAMUSCULAR; INTRAVENOUS PRN
Status: DISCONTINUED | OUTPATIENT
Start: 2023-07-14 | End: 2023-07-14

## 2023-07-14 RX ORDER — POLYETHYLENE GLYCOL 3350 17 G/17G
17 POWDER, FOR SOLUTION ORAL DAILY
Status: DISCONTINUED | OUTPATIENT
Start: 2023-07-15 | End: 2023-07-18 | Stop reason: HOSPADM

## 2023-07-14 RX ORDER — PROPOFOL 10 MG/ML
INJECTION, EMULSION INTRAVENOUS CONTINUOUS PRN
Status: DISCONTINUED | OUTPATIENT
Start: 2023-07-14 | End: 2023-07-14

## 2023-07-14 RX ORDER — DIMENHYDRINATE 50 MG/ML
25 INJECTION, SOLUTION INTRAMUSCULAR; INTRAVENOUS
Status: DISCONTINUED | OUTPATIENT
Start: 2023-07-14 | End: 2023-07-14 | Stop reason: HOSPADM

## 2023-07-14 RX ORDER — HYDROXYZINE HYDROCHLORIDE 10 MG/1
10 TABLET, FILM COATED ORAL EVERY 6 HOURS PRN
Status: DISCONTINUED | OUTPATIENT
Start: 2023-07-14 | End: 2023-07-14 | Stop reason: HOSPADM

## 2023-07-14 RX ORDER — HYDRALAZINE HYDROCHLORIDE 20 MG/ML
10 INJECTION INTRAMUSCULAR; INTRAVENOUS EVERY 6 HOURS PRN
Status: DISCONTINUED | OUTPATIENT
Start: 2023-07-14 | End: 2023-07-14

## 2023-07-14 RX ORDER — HYDRALAZINE HYDROCHLORIDE 25 MG/1
25 TABLET, FILM COATED ORAL 3 TIMES DAILY
Status: DISCONTINUED | OUTPATIENT
Start: 2023-07-14 | End: 2023-07-18 | Stop reason: HOSPADM

## 2023-07-14 RX ORDER — VENLAFAXINE HYDROCHLORIDE 150 MG/1
150 CAPSULE, EXTENDED RELEASE ORAL DAILY
Status: DISCONTINUED | OUTPATIENT
Start: 2023-07-14 | End: 2023-07-18 | Stop reason: HOSPADM

## 2023-07-14 RX ORDER — QUETIAPINE FUMARATE 25 MG/1
75 TABLET, FILM COATED ORAL AT BEDTIME
Status: DISCONTINUED | OUTPATIENT
Start: 2023-07-14 | End: 2023-07-18 | Stop reason: HOSPADM

## 2023-07-14 RX ORDER — PROCHLORPERAZINE MALEATE 5 MG
5 TABLET ORAL EVERY 6 HOURS PRN
Status: DISCONTINUED | OUTPATIENT
Start: 2023-07-14 | End: 2023-07-16

## 2023-07-14 RX ORDER — TRAMADOL HYDROCHLORIDE 50 MG/1
50 TABLET ORAL EVERY 6 HOURS PRN
Status: DISCONTINUED | OUTPATIENT
Start: 2023-07-14 | End: 2023-07-18 | Stop reason: HOSPADM

## 2023-07-14 RX ORDER — PROPOFOL 10 MG/ML
INJECTION, EMULSION INTRAVENOUS PRN
Status: DISCONTINUED | OUTPATIENT
Start: 2023-07-14 | End: 2023-07-14

## 2023-07-14 RX ORDER — ACETAMINOPHEN 325 MG/1
975 TABLET ORAL EVERY 8 HOURS
Status: COMPLETED | OUTPATIENT
Start: 2023-07-14 | End: 2023-07-17

## 2023-07-14 RX ORDER — ONDANSETRON 2 MG/ML
4 INJECTION INTRAMUSCULAR; INTRAVENOUS EVERY 30 MIN PRN
Status: DISCONTINUED | OUTPATIENT
Start: 2023-07-14 | End: 2023-07-14 | Stop reason: HOSPADM

## 2023-07-14 RX ORDER — LIDOCAINE HYDROCHLORIDE 10 MG/ML
INJECTION, SOLUTION INFILTRATION; PERINEURAL PRN
Status: DISCONTINUED | OUTPATIENT
Start: 2023-07-14 | End: 2023-07-14

## 2023-07-14 RX ORDER — HYDROXYZINE HYDROCHLORIDE 10 MG/1
10 TABLET, FILM COATED ORAL EVERY 6 HOURS PRN
Status: DISCONTINUED | OUTPATIENT
Start: 2023-07-14 | End: 2023-07-16

## 2023-07-14 RX ORDER — HALOPERIDOL 5 MG/ML
1 INJECTION INTRAMUSCULAR
Status: DISCONTINUED | OUTPATIENT
Start: 2023-07-14 | End: 2023-07-14 | Stop reason: HOSPADM

## 2023-07-14 RX ORDER — FENTANYL CITRATE 50 UG/ML
25 INJECTION, SOLUTION INTRAMUSCULAR; INTRAVENOUS EVERY 5 MIN PRN
Status: DISCONTINUED | OUTPATIENT
Start: 2023-07-14 | End: 2023-07-14 | Stop reason: HOSPADM

## 2023-07-14 RX ORDER — VASOPRESSIN IN 0.9 % NACL 2 UNIT/2ML
SYRINGE (ML) INTRAVENOUS PRN
Status: DISCONTINUED | OUTPATIENT
Start: 2023-07-14 | End: 2023-07-14

## 2023-07-14 RX ADMIN — PROPOFOL 120 MG: 10 INJECTION, EMULSION INTRAVENOUS at 10:59

## 2023-07-14 RX ADMIN — AMLODIPINE BESYLATE 10 MG: 5 TABLET ORAL at 15:56

## 2023-07-14 RX ADMIN — SODIUM CHLORIDE TAB 1 GM 1 G: 1 TAB at 18:02

## 2023-07-14 RX ADMIN — HYDRALAZINE HYDROCHLORIDE 25 MG: 25 TABLET, FILM COATED ORAL at 08:06

## 2023-07-14 RX ADMIN — HYDROMORPHONE HYDROCHLORIDE 0.2 MG: 0.2 INJECTION, SOLUTION INTRAMUSCULAR; INTRAVENOUS; SUBCUTANEOUS at 04:03

## 2023-07-14 RX ADMIN — HYDROMORPHONE HYDROCHLORIDE 0.2 MG: 0.2 INJECTION, SOLUTION INTRAMUSCULAR; INTRAVENOUS; SUBCUTANEOUS at 00:45

## 2023-07-14 RX ADMIN — DEXAMETHASONE SODIUM PHOSPHATE 4 MG: 10 INJECTION, SOLUTION INTRAMUSCULAR; INTRAVENOUS at 11:27

## 2023-07-14 RX ADMIN — ROSUVASTATIN CALCIUM 20 MG: 20 TABLET, FILM COATED ORAL at 08:04

## 2023-07-14 RX ADMIN — FENTANYL CITRATE 50 MCG: 50 INJECTION, SOLUTION INTRAMUSCULAR; INTRAVENOUS at 10:51

## 2023-07-14 RX ADMIN — HYDROMORPHONE HYDROCHLORIDE 0.5 MG: 1 INJECTION, SOLUTION INTRAMUSCULAR; INTRAVENOUS; SUBCUTANEOUS at 11:47

## 2023-07-14 RX ADMIN — PHENYLEPHRINE HYDROCHLORIDE 100 MCG: 10 INJECTION INTRAVENOUS at 11:14

## 2023-07-14 RX ADMIN — CEFAZOLIN 1 G: 1 INJECTION, POWDER, FOR SOLUTION INTRAMUSCULAR; INTRAVENOUS at 18:10

## 2023-07-14 RX ADMIN — HYDROMORPHONE HYDROCHLORIDE 0.4 MG: 1 INJECTION, SOLUTION INTRAMUSCULAR; INTRAVENOUS; SUBCUTANEOUS at 23:52

## 2023-07-14 RX ADMIN — SODIUM CHLORIDE TAB 1 GM 1 G: 1 TAB at 08:04

## 2023-07-14 RX ADMIN — LIDOCAINE HYDROCHLORIDE 40 MG: 10 INJECTION, SOLUTION INFILTRATION; PERINEURAL at 10:59

## 2023-07-14 RX ADMIN — PHENYLEPHRINE HYDROCHLORIDE 100 MCG: 10 INJECTION INTRAVENOUS at 11:07

## 2023-07-14 RX ADMIN — Medication 1 UNITS: at 11:25

## 2023-07-14 RX ADMIN — HYDROMORPHONE HYDROCHLORIDE 0.4 MG: 0.2 INJECTION, SOLUTION INTRAMUSCULAR; INTRAVENOUS; SUBCUTANEOUS at 07:44

## 2023-07-14 RX ADMIN — ACETAMINOPHEN 975 MG: 325 TABLET, FILM COATED ORAL at 13:04

## 2023-07-14 RX ADMIN — PROPOFOL 150 MCG/KG/MIN: 10 INJECTION, EMULSION INTRAVENOUS at 11:02

## 2023-07-14 RX ADMIN — PHENYLEPHRINE HYDROCHLORIDE 100 MCG: 10 INJECTION INTRAVENOUS at 12:04

## 2023-07-14 RX ADMIN — VENLAFAXINE HYDROCHLORIDE 150 MG: 150 CAPSULE, EXTENDED RELEASE ORAL at 15:57

## 2023-07-14 RX ADMIN — LISINOPRIL 20 MG: 10 TABLET ORAL at 15:57

## 2023-07-14 RX ADMIN — HYDROXYZINE HYDROCHLORIDE 10 MG: 10 TABLET ORAL at 18:08

## 2023-07-14 RX ADMIN — PHENYLEPHRINE HYDROCHLORIDE 100 MCG: 10 INJECTION INTRAVENOUS at 11:10

## 2023-07-14 RX ADMIN — PHENYLEPHRINE HYDROCHLORIDE 100 MCG: 10 INJECTION INTRAVENOUS at 12:02

## 2023-07-14 RX ADMIN — HYDRALAZINE HYDROCHLORIDE 25 MG: 25 TABLET, FILM COATED ORAL at 15:50

## 2023-07-14 RX ADMIN — PHENYLEPHRINE HYDROCHLORIDE 200 MCG: 10 INJECTION INTRAVENOUS at 11:11

## 2023-07-14 RX ADMIN — FENTANYL CITRATE 50 MCG: 50 INJECTION, SOLUTION INTRAMUSCULAR; INTRAVENOUS at 10:56

## 2023-07-14 RX ADMIN — CARVEDILOL 12.5 MG: 6.25 TABLET, FILM COATED ORAL at 08:05

## 2023-07-14 RX ADMIN — SODIUM CHLORIDE, POTASSIUM CHLORIDE, SODIUM LACTATE AND CALCIUM CHLORIDE: 600; 310; 30; 20 INJECTION, SOLUTION INTRAVENOUS at 10:51

## 2023-07-14 RX ADMIN — HYDRALAZINE HYDROCHLORIDE 10 MG: 20 INJECTION INTRAMUSCULAR; INTRAVENOUS at 05:37

## 2023-07-14 RX ADMIN — QUETIAPINE FUMARATE 75 MG: 25 TABLET ORAL at 20:09

## 2023-07-14 RX ADMIN — PHENYLEPHRINE HYDROCHLORIDE 100 MCG: 10 INJECTION INTRAVENOUS at 11:08

## 2023-07-14 RX ADMIN — PHENYLEPHRINE HYDROCHLORIDE 100 MCG: 10 INJECTION INTRAVENOUS at 11:13

## 2023-07-14 RX ADMIN — CARVEDILOL 12.5 MG: 6.25 TABLET, FILM COATED ORAL at 18:02

## 2023-07-14 RX ADMIN — TRAMADOL HYDROCHLORIDE 50 MG: 50 TABLET, COATED ORAL at 15:50

## 2023-07-14 RX ADMIN — DEXMEDETOMIDINE HYDROCHLORIDE 10 MCG: 100 INJECTION, SOLUTION INTRAVENOUS at 11:47

## 2023-07-14 RX ADMIN — ONDANSETRON 4 MG: 2 INJECTION INTRAMUSCULAR; INTRAVENOUS at 11:45

## 2023-07-14 RX ADMIN — ACETAMINOPHEN 975 MG: 325 TABLET, FILM COATED ORAL at 20:09

## 2023-07-14 RX ADMIN — TRAMADOL HYDROCHLORIDE 50 MG: 50 TABLET, COATED ORAL at 22:02

## 2023-07-14 RX ADMIN — HYDROMORPHONE HYDROCHLORIDE 0.4 MG: 0.2 INJECTION, SOLUTION INTRAMUSCULAR; INTRAVENOUS; SUBCUTANEOUS at 10:06

## 2023-07-14 RX ADMIN — CALCIUM CARBONATE (ANTACID) CHEW TAB 500 MG 500 MG: 500 CHEW TAB at 08:05

## 2023-07-14 RX ADMIN — Medication 50 MCG/MIN: at 11:15

## 2023-07-14 RX ADMIN — POLYETHYLENE GLYCOL 3350 17 G: 17 POWDER, FOR SOLUTION ORAL at 08:03

## 2023-07-14 RX ADMIN — Medication 2 G: at 10:50

## 2023-07-14 RX ADMIN — Medication 10 MG: at 11:47

## 2023-07-14 RX ADMIN — HYDROMORPHONE HYDROCHLORIDE 0.2 MG: 0.2 INJECTION, SOLUTION INTRAMUSCULAR; INTRAVENOUS; SUBCUTANEOUS at 04:19

## 2023-07-14 RX ADMIN — SENNOSIDES AND DOCUSATE SODIUM 1 TABLET: 8.6; 5 TABLET ORAL at 20:09

## 2023-07-14 RX ADMIN — SODIUM CHLORIDE, POTASSIUM CHLORIDE, SODIUM LACTATE AND CALCIUM CHLORIDE: 600; 310; 30; 20 INJECTION, SOLUTION INTRAVENOUS at 18:09

## 2023-07-14 RX ADMIN — HYDRALAZINE HYDROCHLORIDE 25 MG: 25 TABLET, FILM COATED ORAL at 20:09

## 2023-07-14 ASSESSMENT — ACTIVITIES OF DAILY LIVING (ADL)
ADLS_ACUITY_SCORE: 22

## 2023-07-14 NOTE — PROVIDER NOTIFICATION
Notified Dr. Ibarra: Pt scheduled for surgery today at 1100. /96. This was 30 minutes after pt medicated for pain.   New order for prn Hydralazine.

## 2023-07-14 NOTE — OR NURSING
Transfer from PACU to Room 266  Transferred via hospital bed      S: 74 y/o Female S/P Right hip trochanteric Internal fixation with gamma rods  Anesthesia Type: General   Surgeon: Dr. Turner  Allergies: See Medication Reconciliation Record    B: Pertinent Medical History:   Past Medical History:   Diagnosis Date     Abnormal Papanicolaou smear of cervix and cervical HPV      Allergic rhinitis, cause unspecified     seasonal allergies     Cerebral infarction (H)      Contact dermatitis and other eczema, due to unspecified cause      Endometriosis, site unspecified      Esophageal reflux     GERD     Migraine, unspecified, without mention of intractable migraine without mention of status migrainosus      Mild persistent asthma      Unspecified disorder of uterus     fibroid uterus       Surgical History:   Past Surgical History:   Procedure Laterality Date     COLONOSCOPY  2014    Procedure: COLONOSCOPY;  Surgeon: Nathan Baker MD;  Location: [facility]     OPEN REDUCTION INTERNAL FIXATION ELBOW Right 2023    Procedure: REVISION OPEN REDUCTION INTERNAL FIXATION RIGHT OLECRANON;  Surgeon: Williams Phipps MD;  [facility]     OPEN REDUCTION INTERNAL FIXATION HUMERUS DISTAL Right 5/3/2023    Procedure: OPEN REDUCTION INTERNAL FIXATION, FRACTURE, HUMERUS, DISTAL;  Surgeon: Williams Phipps MD;  [facility]     REMOVE HARDWARE ELBOW Right 2023    Procedure: HARDWARE REMOVAL RIGHT OLECRANON;  Surgeon: Williams Phipps MD;  [facility]     REVERSE ARTHROPLASTY SHOULDER Right 2023    Procedure: ARTHROPLASTY, SHOULDER, TOTAL, REVERSE for;  Surgeon: Cierra Krause MD;  Location[facility]     Z  DELIVERY ONLY       X2     ZZHC COLONOSCOPY THRU STOMA, DIAGNOSTIC  2002    normal         A: EBL: 75  IVF: 500 in OR  UOP: [place]: 200 in OR, 100 in PACU  NPO: ___Yes _X__No   Vomiting: ___Yes _X__No   Drainage: none noted  Skin Integrity: right hip/leg incision  Pain: Pt states minimal pain, Tylenol  given in PACU  See PACU record for ongoing assessment, vital signs and pain assessment.    RFO (Retained Foreign Object) __x_Yes  [place]        Report Given to:  Angélica SANDS    R: Post-Op vitals and assessments as ordered/indicated per patient's condition.  Follow Post-Op orders and notify Physician prn.  Continue to involve patient/family in plan of care and discharge planning.  Reinforce Pre-Operative education.  Implement skin safety interventions as appropriate.      Melanie Willams RN  PACU

## 2023-07-14 NOTE — OP NOTE
Nathan King MD Physician   Procedures    -   DATE OF SERVICE:  7/14/2023    SURGEON: NATHAN KING MD   ASSISTANT: Ivett Camacho PA-C     PREOPERATIVE DIAGNOSIS: Right intertrochanteric femur fracture   POSTOPERATIVE DIAGNOSIS: same   PROCEDURE PERFORMED: Right open-reduction, internal fixation intertrochanteric femur fracture with short Gamma todd.       HISTORY   This is a 73 year old  female with a fall 2 days ago sustaining a nondisplaced right intertrochanteric femur fracture.  She was on Plavix so we delayed surgery for ORIF.  She presents now   for open-reduction, internal fixation right intertrochanteric femur fracture.    Assistance of Ivett Camacho PA-C was medically necessary given the technical complexity of the case; with assistance with patient positioning, retraction and hip exposure,  assistance with implant placement, and wound closure.    DESCRIPTION OF PROCEDURE   After a smooth General anesthetic, the patient was placed on the [place] table with the right leg in slight traction and the left leg flexed and internally rotated out of the way of the C arm.  The patient's right hip  was prepped and   draped in sterile fashion. A pause was performed for patient verification.   A longitudinal incision was made just above the greater trochanter carried down through subcutaneous tissue and through the iliotibial band.  A guidepin was placed through the tip of the greater trochanter down into the femoral shaft as checked by AP and lateral projections of the C arm.  We then overdrilled this for entrance point for the short gamma todd.  A 125 degree short gamma todd was then obtained, assembled, and inserted.  We adjusted to appropriate depth.  We then placed the compression screw guide and drilled through this into the femoral neck and head.  We adjusted the positioning until we had a center center position.  Depth was measured at 88 mm and we selected a 95 mm screw.  Reaming was performed to 95 mm.  The  95 mm compression screw was then inserted to appropriate depth.  A locking screw was inserted approximately, fully tightened, and then backed off one quarter turn.  We then placed a static locking screw distally.  Once all fixation was in place we checked positioning with the C arm and found excellent position of the fracture and fixation.  The wounds were then irrigated.  Iliotibial band was closed with running 0 Vicryl suture.  Subcutaneous tissue closed with interrupted and running 2-0 Vicryl suture.  Skin edges were closed with buried 3-0 Monocryl subcuticular closure.  Dermabond was applied  Sterile dressings were applied.   The patient was taken to the recovery room in stable condition.   We will keep her 50% partial weightbearing for 1 month.  She will resume Plavix tomorrow    IRA KING MD     cc:   Rosenda Pierre   PATIENT: Sri Grewal   MR#: [id number]   : 1950   ADMIT DATE: 2023

## 2023-07-14 NOTE — PLAN OF CARE
Goal Outcome Evaluation:      Plan of Care Reviewed With: patient    Overall Patient Progress: no changeOverall Patient Progress: no change    Outcome Evaluation: BP elevated this morning 193/92. New order for prn Hydralazine and BP improved, see vs f/s. Pain to R hip controlled with prn IV Dilaudid and Zanaflex. CMS to RLE is intact. [place] traction in place at 7lbs. Adequate UOP in foely catheter. Pt has been NPO since midnight, surgery planned for 1100 today.

## 2023-07-14 NOTE — PLAN OF CARE
Goal Outcome Evaluation:      Plan of Care Reviewed With: patient    Overall Patient Progress: improvingOverall Patient Progress: improving    Outcome Evaluation: Patient A&O, denies pain after surgery, room air, elevated BP's waiting for verification on orders from pharmacy due to prior admission meds being d/c'd by surgeon

## 2023-07-14 NOTE — ANESTHESIA CARE TRANSFER NOTE
Patient: Sri Grewal    Procedure: Procedure(s):  INTERNAL FIXATION, FRACTURE, TROCHANTERIC, HIP, USING GAMMA RODS       Diagnosis: Closed nondisplaced intertrochanteric fracture of right femur, initial encounter (H) [S72.144A]  Diagnosis Additional Information: No value filed.    Anesthesia Type:   General     Note:    Oropharynx: oropharynx clear of all foreign objects and spontaneously breathing  Level of Consciousness: drowsy  Oxygen Supplementation: face mask    Independent Airway: airway patency satisfactory and stable  Dentition: dentition unchanged  Vital Signs Stable: post-procedure vital signs reviewed and stable  Report to RN Given: handoff report given  Patient transferred to: PACU    Handoff Report: Identifed the Patient, Identified the Reponsible Provider, Reviewed the pertinent medical history, Discussed the surgical course, Reviewed Intra-OP anesthesia mangement and issues during anesthesia, Set expectations for post-procedure period and Allowed opportunity for questions and acknowledgement of understanding      Vitals:  Vitals Value Taken Time   BP     Temp     Pulse 77 07/14/23 1236   Resp 8 07/14/23 1236   SpO2 99 % 07/14/23 1236   Vitals shown include unvalidated device data.    Electronically Signed By: JAYY Winter CRNA  July 14, 2023  12:38 PM

## 2023-07-14 NOTE — ANESTHESIA PREPROCEDURE EVALUATION
Anesthesia Pre-Procedure Evaluation    Patient: Sri Grewal   MRN: [id number] : 1950        Procedure : Procedure(s):  INTERNAL FIXATION, FRACTURE, TROCHANTERIC, HIP, USING GAMMA RODS          Past Medical History:   Diagnosis Date     Abnormal Papanicolaou smear of cervix and cervical HPV      Allergic rhinitis, cause unspecified     seasonal allergies     Cerebral infarction (H)      Contact dermatitis and other eczema, due to unspecified cause      Endometriosis, site unspecified      Esophageal reflux     GERD     Migraine, unspecified, without mention of intractable migraine without mention of status migrainosus      Mild persistent asthma      Unspecified disorder of uterus     fibroid uterus      Past Surgical History:   Procedure Laterality Date     COLONOSCOPY  2014    Procedure: COLONOSCOPY;  Surgeon: Nathan Baker MD;  [facility]     OPEN REDUCTION INTERNAL FIXATION ELBOW Right 2023    Procedure: REVISION OPEN REDUCTION INTERNAL FIXATION RIGHT OLECRANON;  Surgeon: Williams Phipps MD;  [facility]     OPEN REDUCTION INTERNAL FIXATION HUMERUS DISTAL Right 5/3/2023    Procedure: OPEN REDUCTION INTERNAL FIXATION, FRACTURE, HUMERUS, DISTAL;  Surgeon: Williams Phipps MD;  [facility]     REMOVE HARDWARE ELBOW Right 2023    Procedure: HARDWARE REMOVAL RIGHT OLECRANON;  Surgeon: Williams Phipps MD;  [facility]     REVERSE ARTHROPLASTY SHOULDER Right 2023    Procedure: ARTHROPLASTY, SHOULDER, TOTAL, REVERSE for;  Surgeon: Cierra Krause MD;  [facility]     ZZC  DELIVERY ONLY       X2     ZZHC COLONOSCOPY THRU STOMA, DIAGNOSTIC  2002    normal      Allergies   Allergen Reactions     Morphine And Related      GI UPSET     Oxycodone      Seasonal Allergies       Social History     Tobacco Use     Smoking status: Never     Passive exposure: Never     Smokeless tobacco: Never   Substance Use Topics     Alcohol use: Yes     Comment: socially      Wt Readings from  Last 1 Encounters:   07/12/23 55.6 kg (122 lb 9.2 oz)        Anesthesia Evaluation   Pt has had prior anesthetic. Type: General and Regional.    No history of anesthetic complications       ROS/MED HX  ENT/Pulmonary:     (+)                    Mild Persistent, asthma                  Neurologic: Comment: In April 2023 she presented with speech changes and right-sided weakness and was treated in outside ER with thrombolytic therapy after which her neurologic deficits resolved. She was thought to have had aborted stroke versus TIA at that time. She continues to follow-up with neurology.    History of postoperative delirium    (+) delerium,     migraines,    CVA, date: 2021 and April 2023, without deficits,  TIA, date: 1 month ago, features: She fell and had right sided weakness.  Given the clot dissolving agent TNK in the ED with improvement, and placed on Plavix.  Had confusion initially but resolved..                Cardiovascular: Comment: MI in October 2005 at which time coronary angiography demonstrated clean coronary arteries with isolated mid anterolateral akinesis and mid inferior akinesis, so her MI was attributed to a variant of Takotsubo syndrome. At time of stroke in June 2021 she underwent reevaluation of CAD and had similar findings on coronary angiography at that time with overall severely reduced LVEF of less than 30%. Despite her known history of reduced LV systolic function, she is not known to have clinical history of heart failure. However, Echo in February and April 2023 demonstrated normal LVEF    (+)  hypertension- -   - past MI - -   Taking blood thinners Pt has received instructions:                             Previous cardiac testing   Echo: Date: 2/28/23 Results:  nterpretation Summary     Global and regional left ventricular function is hyperkinetic with an EF of  65-70%.  Global right ventricular function is normal. The right ventricle is normal  size.  No significant valvular  abnormalities.  There is mild dilation at the level of the ascending aorta (3.6 cm, indexed  value 2.12 cm/m2).  The estimated PA systolic pressure is 20 mmHg.  IVC diameter <2.1 cm collapsing >50% with sniff suggests a normal RA pressure  of 3 mmHg.  This study was compared with the study from 01/16/2017. No significant  changes. Aortic calibers could not be compared due to poor visualization of  ascending aorta on prior study.  ______________________________________________________________________________  Left Ventricle  Global and regional left ventricular function is hyperkinetic with an EF of  65-70%. Left ventricular size is normal. Mild concentric wall thickening  consistent with left ventricular hypertrophy is present. Left ventricular  diastolic function is indeterminate. LV intracavitary gradient of 29 mmHg,  likely due to hyperdynamic state and underfilled ventricle.     Right Ventricle  Global right ventricular function is normal. The right ventricle is normal  size.     Atria  Both atria appear normal.     Mitral Valve  Mild mitral annular calcification is present. Trace mitral insufficiency is  present.     Aortic Valve  The aortic valve is tricuspid. On Doppler interrogation, there is no  significant stenosis or regurgitation.     Tricuspid Valve  The valve leaflets are not well visualized. Trace tricuspid insufficiency is  present. The right ventricular systolic pressure is approximated at 17.0 mmHg  plus the right atrial pressure.     Pulmonic Valve  The valve leaflets are not well visualized. Trace pulmonic insufficiency is  present.     Vessels  Sinuses of Valsalva 2.8 cm. There is mild dilation at the level of the  ascending aorta (3.6 cm, indexed value 2.12 cm/m2). IVC diameter <2.1 cm  collapsing >50% with sniff suggests a normal RA pressure of 3 mmHg.     Pericardium  No pericardial effusion is present.     Compared to Previous Study  This study was compared with the study from 01/16/2017 .  No significant  changes. Aortic calibers could not be compared due to poor visualization of  ascending aorta on prior study.  _________________________    Stress Test:  Date: Results:    ECG Reviewed:  Date: 7/12/23 Results:  SR  Cath:  Date: Results:      METS/Exercise Tolerance:     Hematologic: Comments: Blood thinners for TIA/CVA    (+) History of blood clots,    pt is anticoagulated, anemia,          Musculoskeletal:  - neg musculoskeletal ROS     GI/Hepatic:     (+) GERD, Asymptomatic on medication,                  Renal/Genitourinary:  - neg Renal ROS     Endo:  - neg endo ROS     Psychiatric/Substance Use:     (+) psychiatric history depression       Infectious Disease:  - neg infectious disease ROS     Malignancy:  - neg malignancy ROS     Other:  - neg other ROS          Physical Exam    Airway        Mallampati: II   TM distance: > 3 FB   Neck ROM: full   Mouth opening: > 3 cm    Respiratory Devices and Support         Dental       (+) Multiple visibly decayed, broken teeth      Cardiovascular   cardiovascular exam normal       Rhythm and rate: regular and normal     Pulmonary   pulmonary exam normal        breath sounds clear to auscultation       OUTSIDE LABS:  CBC:   Lab Results   Component Value Date    WBC 5.3 07/12/2023    WBC 5.6 06/29/2023    HGB 9.6 (L) 07/14/2023    HGB 10.3 (L) 07/13/2023    HCT 32.5 (L) 07/12/2023    HCT 33.4 (L) 06/29/2023     07/12/2023     06/29/2023     BMP:   Lab Results   Component Value Date     (L) 07/14/2023     (L) 07/13/2023    POTASSIUM 3.8 07/14/2023    POTASSIUM 3.7 07/13/2023    CHLORIDE 100 07/14/2023    CHLORIDE 99 07/13/2023    CO2 24 07/14/2023    CO2 25 07/13/2023    BUN 10.1 07/13/2023    BUN 8.0 07/12/2023    CR 0.69 07/13/2023    CR 0.71 07/12/2023     (H) 07/13/2023     (H) 07/12/2023     COAGS:   Lab Results   Component Value Date    PTT 30 01/15/2017    INR 1.10 07/12/2023     POC: No results found for: BGM,  HCG, HCGS  HEPATIC:   Lab Results   Component Value Date    ALBUMIN 3.7 07/12/2023    PROTTOTAL 6.4 07/12/2023    ALT 14 07/12/2023    AST 21 07/12/2023    ALKPHOS 87 07/12/2023    BILITOTAL 0.2 07/12/2023    BILIDIRECT 0.2 04/26/2002     OTHER:   Lab Results   Component Value Date    PH 7.43 05/05/2023    LACT 2.0 05/07/2023    A1C 5.4 01/16/2017    CESIA 8.8 07/13/2023    MAG 2.0 05/06/2023    LIPASE 109 05/20/2014    TSH 0.35 02/02/2023    SED 11 10/14/2005       Anesthesia Plan    ASA Status:  3    NPO Status:  NPO Appropriate    Anesthesia Type: General.     - Airway: LMA   Induction: Intravenous, Propofol.   Maintenance: Balanced.        Consents    Anesthesia Plan(s) and associated risks, benefits, and realistic alternatives discussed. Questions answered and patient/representative(s) expressed understanding.     - Discussed: Risks, Benefits and Alternatives for BOTH SEDATION and the PROCEDURE were discussed     - Discussed with:  Patient      - Extended Intubation/Ventilatory Support Discussed: No.      - Patient is DNR/DNI Status: No     Use of blood products discussed: No .     Postoperative Care    Pain management: IV analgesics, Oral pain medications, Multi-modal analgesia.   PONV prophylaxis: Ondansetron (or other 5HT-3), Dexamethasone or Solumedrol, Background Propofol Infusion     Comments:    Other Comments: The risks and benefits of anesthesia, and the alternatives where applicable, have been discussed with the patient, and they wish to proceed.    Unable to perform neuraxial and/or peripheral nerve block due to patients recent use of plavix (3-4 days ago).            Mona Garcia, JAYY CRNA

## 2023-07-14 NOTE — ANESTHESIA PROCEDURE NOTES
Airway       Patient location during procedure: OR  Staff -        CRNA: Mona Garcia APRN CRNA       Performed By: CRNA  Consent for Airway        Urgency: elective  Indications and Patient Condition       Indications for airway management: oren-procedural       Induction type:intravenous       Mask difficulty assessment: 1 - vent by mask    Final Airway Details       Final airway type: supraglottic airway    Supraglottic Airway Details        Type: LMA       Brand: LMA Unique       LMA size: 4    Post intubation assessment        Placement verified by: capnometry, equal breath sounds and chest rise        Number of attempts at approach: 1       Number of other approaches attempted: 0       Secured with: silk tape       Ease of procedure: easy       Dentition: Intact and Unchanged    Additional Comments       Atraumatic. No apparent complications.

## 2023-07-14 NOTE — CONSULTS
[facility] Orthopedic Consultation    Sri Grewal MRN# [id number]   Age: 73 year old YOB: 1950     Date of Admission:  7/12/2023    Reason for consult: Intertrochanteric fracture       Requesting physician: Clark Horowitz       Level of consult: Consult, follow and place orders           Assessment and Plan:   Assessment:   Intertrochanteric fracture - nondisplaced right.  This will require open-reduction, internal fixation. She was on plavix, so we will wait until Friday with Nunda traction applied.      Plan:   Open reduction and internal fixation of the right hip with short Gamma todd.  50% partial weight bearing up to 1 month postoperative.  Will restart plavix Saturday am.            Chief Complaint:   Intertrochanteric fracture       History is obtained from the patient         History of Present Illness:   This patient is a 73 year old female who presents with the following condition requiring a hospital admission:      Patient fell at home striking her head and sustaining right hip pain.  Unable to get up.  X-ray in emergency room shows nondisplaced right intertrochanteric femur fracture.  She has not used a walker previously.           Past Medical History:     Past Medical History:   Diagnosis Date     Abnormal Papanicolaou smear of cervix and cervical HPV      Allergic rhinitis, cause unspecified     seasonal allergies     Cerebral infarction (H)      Contact dermatitis and other eczema, due to unspecified cause      Endometriosis, site unspecified      Esophageal reflux     GERD     Migraine, unspecified, without mention of intractable migraine without mention of status migrainosus      Mild persistent asthma      Unspecified disorder of uterus     fibroid uterus             Past Surgical History:     Past Surgical History:   Procedure Laterality Date     COLONOSCOPY  5/30/2014    Procedure: COLONOSCOPY;  Surgeon: Nathan Baker MD;  Location:  GI     OPEN  REDUCTION INTERNAL FIXATION ELBOW Right 2023    Procedure: REVISION OPEN REDUCTION INTERNAL FIXATION RIGHT OLECRANON;  Surgeon: Williams Phipps MD;  Location: [facility]     OPEN REDUCTION INTERNAL FIXATION HUMERUS DISTAL Right 5/3/2023    Procedure: OPEN REDUCTION INTERNAL FIXATION, FRACTURE, HUMERUS, DISTAL;  Surgeon: Williams Phipps MD;  Location: [facility] OR     REMOVE HARDWARE ELBOW Right 2023    Procedure: HARDWARE REMOVAL RIGHT OLECRANON;  Surgeon: Williams Phipps MD;  Location: [facility] OR     REVERSE ARTHROPLASTY SHOULDER Right 2023    Procedure: ARTHROPLASTY, SHOULDER, TOTAL, REVERSE for;  Surgeon: Cierra Krause MD;  Location: [facility] OR     ZZC  DELIVERY ONLY       X2     ZZHC COLONOSCOPY THRU STOMA, DIAGNOSTIC  2002    normal             Social History:     Social History     Tobacco Use     Smoking status: Never     Passive exposure: Never     Smokeless tobacco: Never   Substance Use Topics     Alcohol use: Yes     Comment: socially             Family History:     Family History   Problem Relation Age of Onset     Heart Disease Mother         anemia     Osteoporosis Mother      Hypertension Mother      Cerebrovascular Disease Mother      Alcohol/Drug Mother         alcohol     Neurologic Disorder Mother         migraines     Hypertension Father      Cancer No family hx of         breast     Family history reviewed          Immunizations:     Immunization History   Administered Date(s) Administered     COVID-19 MONOVALENT 12+ (Pfizer) 2021     COVID-19 Monovalent 18+ (Moderna) 2021, 2021     Influenza Vaccine 65+ (Fluzone HD) 10/14/2020, 10/05/2021     Pneumo Conj 13-V (2010&after) 2018     Pneumococcal 23 valent 10/14/2020     TD,PF 7+ (Tenivac) 1995, 2003     TDAP Vaccine (Adacel) 2013     Zoster recombinant adjuvanted (SHINGRIX) 2022, 2022     Zoster vaccine, live 2012             Allergies:     Allergies   Allergen Reactions     Morphine And  Related      GI UPSET     Oxycodone      Seasonal Allergies              Medications:     Medications Prior to Admission   Medication Sig Dispense Refill Last Dose     amLODIPine (NORVASC) 10 MG tablet Take 1 tablet (10 mg) by mouth daily 30 tablet 0 7/11/2023 at am     buPROPion (WELLBUTRIN XL) 300 MG 24 hr tablet Take 300 mg by mouth every morning   7/11/2023 at am     butalbital-aspirin-caffeine (FIORINAL EQUIV) -40 MG TABS per tablet Take 1 tablet by mouth every 4 hours as needed for migraine 30 tablet 0 7/11/2023 at am     carvedilol (COREG) 12.5 MG tablet TAKE ONE TABLET BY MOUTH TWICE A DAY WITH FOOD (Patient taking differently: Take 12.5 mg by mouth 2 times daily (with meals) TAKE ONE TABLET BY MOUTH TWICE A DAY WITH FOOD) 180 tablet 1 7/14/2023 at hs     clopidogrel (PLAVIX) 75 MG tablet Take 1 tablet (75 mg) by mouth daily 30 tablet 0 7/11/2023 at am     furosemide (LASIX) 40 MG tablet Take 1 tablet (40 mg) by mouth daily 30 tablet 0 7/11/2023 at am     hydrALAZINE (APRESOLINE) 25 MG tablet Take 1 tablet (25 mg) by mouth 3 times daily 90 tablet 0 7/14/2023 at hs     hydrOXYzine (ATARAX) 10 MG tablet Take 1 tablet (10 mg) by mouth every 6 hours as needed for other (adjuvant pain) 30 tablet 0 Past Week     lisinopril (ZESTRIL) 20 MG tablet Take 1 tablet (20 mg) by mouth daily 90 tablet 0 7/12/2023 at am     melatonin 5 MG tablet Take 1 tablet (5 mg) by mouth At Bedtime 30 tablet 0 7/11/2023 at hs     QUEtiapine (SEROQUEL) 25 MG tablet Take 3 tablets (75 mg) by mouth At Bedtime 90 tablet 0 Past Month     rosuvastatin (CRESTOR) 20 MG tablet Take 1 tablet (20 mg) by mouth daily 90 tablet 0 7/14/2023 at am     sodium chloride 1 GM tablet Take 1 tablet (1 g) by mouth 2 times daily (with meals) 60 tablet 0 7/14/2023 at hs     tiZANidine (ZANAFLEX) 4 MG tablet Take 0.5 tablets (2 mg) by mouth 3 times daily as needed for muscle spasms 40 tablet 0 7/11/2023 at hs     traMADol (ULTRAM) 50 MG tablet Take 0.5-1  tablets (25-50 mg) by mouth every 4 hours as needed for moderate pain 30 tablet 0 7/11/2023 at hs     venlafaxine (EFFEXOR XR) 150 MG 24 hr capsule Take 1 capsule (150 mg) by mouth daily 90 capsule 0 7/11/2023 at am     vitamin D2 (ERGOCALCIFEROL) 06738 units (1250 mcg) capsule Take 1 capsule (50,000 Units) by mouth every 7 days 5 capsule 0 Past Month             Review of Systems:   The Review of Systems is negative other than noted in the HPI          Physical Exam:   Temp: 99.4  F (37.4  C) Temp src: Oral BP: (!) 155/86 Pulse: 92   Resp: 16 SpO2: 95 % O2 Device: None (Room air)    All vitals have been reviewed  Musculoskeletal:   RIGHT HIP:  redness absent  warmth absent  swelling present  tenderness present             Data:     Lab Results   Component Value Date    WBC 5.3 07/12/2023    HGB 9.6 (L) 07/14/2023    HCT 32.5 (L) 07/12/2023     07/12/2023     (L) 07/14/2023    POTASSIUM 3.8 07/14/2023    CHLORIDE 100 07/14/2023    CO2 24 07/14/2023    BUN 10.1 07/13/2023    CR 0.69 07/13/2023     (H) 07/13/2023    SED 11 10/14/2005    TROPONIN 1.17 (HH) 10/14/2005    TROPI  01/15/2017     <0.015  The 99th percentile for upper reference range is 0.045 ug/L.  Troponin values in   the range of 0.045 - 0.120 ug/L may be associated with risks of adverse   clinical events.      AST 21 07/12/2023    ALT 14 07/12/2023    ALKPHOS 87 07/12/2023    BILITOTAL 0.2 07/12/2023    BILIDIRECT 0.2 04/26/2002    INR 1.10 07/12/2023     All imaging studies reviewed by me.     Attestation:  Amount of time performed on this consult: 40 minutes.    Nathan Turner MD

## 2023-07-14 NOTE — PROGRESS NOTES
[facility]    Medicine Progress Note - Hospitalist Service    Date of Admission:  7/12/2023    Assessment & Plan   Sri Grewal is a 73 year old female with hypertension, CAD with previous history of MI, previous history of stroke and TIA, memory loss after old stroke, osteoporosis with previous history of osteoporotic fractures, vitamin D deficiency, right shoulder surgeries in May 2023 and right elbow surgery in June 2023, history of postoperative delirium, asthma, chronic hyponatremia, depression, and chronic anemia who presented to the ER with multiple injuries on 7/12/2023 after she tripped and fell.  Due to concern for acute closed nondisplaced intertrochanteric fracture of the right hip for which operative repair has been recommend by orthopedic surgery, she was admitted.  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    Patient's acute and chronic medical problems appear to be stable such that she may proceed with anesthesia and operative intervention tomorrow without additional diagnostic preoperative evaluation.    Acute closed nondisplaced intertrochanteric fracture of right hip, s/p ORIF with gamma todd placement 7/14/23  Osteoporosis with pathologic fracture  Vitamin D deficiency  Mechanical fall  Tripped and fell sustaining injury to right hip with radiographic findings demonstrating closed right hip fracture for which operative repair has been advised by orthopedic surgery.  However, surgeon has reportedly advised delay in operative intervention for 2 days because of the patient's chronic Plavix therapy and perioperative bleeding risk.  Patient is known to have osteoporosis and this is considered to be a pathologic fracture due to osteoporosis and her fall.  She was diagnosed with vitamin D  deficiency in May at which time she was discovered to have abnormal bone intraoperatively during elbow surgery.  She was previously treated with high-dose vitamin D supplementation and remains on vitamin D supplementation.  She underwent operative intervention July 14 with ORIF and placement of gamma todd and is recovering well postoperatively so far.  -Continuing to withhold chronic Plavix therapy perioperatively per recommendation of orthopedic surgeon, anticipate restarting Plavix postoperatively tomorrow per recommendations from orthopedic surgeon  -Resume usual diet postoperatively  -acute pain management postoperatively is deferred to orthopedic surgery team  -Anticipate PT evaluation and treatment to assist with mobilization and disposition planning  -Continue chronic dose of vitamin D, added calcium carbonate supplementation during hospitalization and would continue after discharge  -Given her recent significant fractures, recommend reviewing other treatment options for osteoporosis at outpatient follow-up with PCP    Hypertension  Chronic hypertension managed with multiple medications including lisinopril, amlodipine, carvedilol, hydralazine, and Lasix.  Presently moderately to severely hypertensive at admission in the context of acute injury and pain and she had not taken any of her antihypertensive medications on the day of admission except for lisinopril.  Blood pressure improved after restarting her chronic antihypertensive medications.  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  At time of stroke in June 2021 she underwent reevaluation of CAD and had similar findings on coronary angiography at that time with overall severely reduced LVEF of less than 30%.  Despite her known history of reduced LV systolic function, she is not known to have clinical history of heart failure.  However, Echo in February and April 2023 demonstrated normal LVEF.  She may have had mild pulmonary edema postoperatively while recovering after orthopedic surgery in May 2023 although did not appear to require any specific treatment for it.  EKG this hospitalization is reassuring without acute abnormalities.  -Continue chronic beta-blocker, ACE inhibitor, and statin  -Holding chronic antiplatelet therapy for now as above  -Restarting chronic Lasix    History of ischemic stroke  Left MCA trifurcation 5 mm aneurysm  She has had previous history ischemic stroke in the past that precipitated cognitive deterioration and memory loss.  In April 2023 she presented with speech changes and right-sided weakness and was treated in outside ER with thrombolytic therapy after which her neurologic deficits resolved.  She was thought to have had aborted stroke versus TIA at that time.  She continues to follow-up with neurology and currently is wearing a cardiac event monitor that is due to be removed on July 14.  She has known history of small 5 mm left MCA aneurysm for which no intervention has been advised today.  There was concern for possible closed head injury today when she fell although patient denies any head trauma and her neurologic status appears stable compared with her normal baseline.  Head and neck CT imaging performed in the ER on day of admission was without any acute abnormalities.  Due to her relatively recent neurologic event, she will be at increased risk for recurrence of TIA and/or stroke  perioperatively.  -Anticipate removing current heart monitor today on July 14 as patient had previously planned to have it mailed back to her team through [facility]  -Continue chronic statin  -Holding Plavix preoperatively for now, anticipate restarting postoperatively when approved by surgeon  -Optimize control of blood pressure perioperatively    Traumatic skin tear right elbow  Status post right elbow surgery 6/1/23   Patient had right elbow surgery (right olecranon osteotomy fixation) in May 2023 with subsequent failure.  She then underwent revision ORIF of right olecranon with removal of previous olecranon plate on June 1 from which she has been continuing to recover including ongoing therapies at home and as an outpatient.  On day of admission she sustained skin injury to the right elbow when she fell and appears to have an open skin lesion overlying the right olecranon area.  The skin lesion does not demonstrate significant bleeding or signs of infection.  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  Cause for chronic anemia is not known.  She is at risk for worsening anemia after her injury and planned surgery combined with chronic Plavix and aspirin therapies that cause platelet function defect and increase her bleeding risk.  Anemia has slightly worsened preoperatively.  Intraoperative EBL was reported at 50 mL.  -Ordered monitoring of hemoglobin  -Consider transfusion support with PRBC transfusion if indicated    Chronic hyponatremia  Chronically hyponatremic with baseline sodium 133-134 and treated chronically with sodium chloride tablets.  Current sodium continues to be stable compared with her  baseline.  -Continue chronic dose of sodium tablets perioperatively and adjust dosing accordingly depending upon changes in her sodium level  -Minimize use of hypotonic IV fluids perioperatively and favor use of isotonic IV fluids instead if needed  -Ordered monitoring of sodium    Urine retention  Developed urinary retention during hospitalization necessitating intermittent bladder catheterization.  Reporting chronic urinary habits such that she has to modify her posture to be able to void effectively at her normal baseline and she currently cannot assume that posture because of her hip fracture.  Acute urinary catheter was placed preoperatively on 7/13 due to urinary retention.  -anticipate removing urinary catheter postoperatively as she advances activity for a trial of spontaneous voiding prior to discharge    Memory loss  History of postoperative delirium  Known memory loss after older stroke and developed postoperative delirium after orthopedic surgery in May 2023.  Bedtime dose of Seroquel was added at that time with good effect.  Discharged with 24-hour supervision after that hospital stay was advised.  She has been living at home with her friend Bryson since then, and Bryson helps her with medication administration and daily activities at home.  -Continue chronic dose of Seroquel and continue melatonin at bedtime  -Monitor clinically for symptoms and signs of perioperative delirium    Depression  Chronically treated with Effexor XR and bupropion.  Difficult to assess depression at this time but it appears to generally be stable.  -Continue chronic doses of Effexor XR and bupropion    Asthma  Medical records refer to diagnosis of persistent asthma although the patient does not appear to be prescribed controller medications.  She does not demonstrate any symptoms or signs of active asthma at this time.  -Monitor clinically for signs of asthma  -Add bronchodilators if there is increasing concern for asthma  symptoms    Migraine headache  Patient has chronic history of migraine headaches.  She has been using Fiorinal on a daily basis recently to manage her headache.  -Avoiding use of Fiorinal at this time because it contains aspirin which has antiplatelet effect and increases her bleeding risk  -Use other analgesics as needed for headache  -Reconsider use of specific migraine medication if migraine worsens although it is not clear whether her MCA aneurysm is a contraindication to use of migraine medication       Diet: Advance Diet as Tolerated: Regular Diet Adult  Discharge Instruction - Regular Diet Adult    DVT Prophylaxis: Deferred to orthopedic surgery team postoperatively  Merida Catheter: PRESENT, indication: Retention  Lines: None     Cardiac Monitoring: None  Code Status:  Full resuscitation    Clinically Significant Risk Factors                  # Hypertension: Noted on problem list                 Disposition Plan   Anticipate discharge home to start outpatient PT services (and continue previous OT services) once medically stable and approved by orthopedics, possibly 1 to 2 days       Clark Horowitz MD  Hospitalist Service  [facility]  Securely message with [facility] (more info)  Text page via [facility] Paging/Directory   ______________________________________________________________________    Interval History   There were no significant overnight events last night.  She underwent operative repair of hip fracture today with apparently unremarkable intraoperative course.  She was severely hypertensive immediately postoperatively, but chronic antihypertensive medications were restarted postoperatively and blood pressure has started to trend toward improvement.  She did not have any symptoms related to severely elevated blood pressure readings today.  She generally feels good.  She says she was able to get out of bed into a chair with assistance this afternoon.  She is tolerating oral  intake.  She is not requiring oxygen supplementation but continues to wear capnography per postoperative protocol.  She says she hopes to go home where she has 24-hour help from her friend Bryson with plan to start outpatient PT.    Physical Exam   Vital Signs: Temp: 98.4  F (36.9  C) Temp src: Oral BP: (!) 155/80 Pulse: 94   Resp: 16 SpO2: 96 % O2 Device: None (Room air)   Patient Vitals for the past 24 hrs:   BP Temp Temp src Pulse Resp SpO2 Weight   07/14/23 1650 (!) 155/80 98.4  F (36.9  C) Oral 94 16 96 % --   07/14/23 1544 (!) 183/92 98  F (36.7  C) Oral 86 12 95 % --   07/14/23 1445 (!) 192/86 98.1  F (36.7  C) Oral 84 12 96 % --   07/14/23 1415 (!) 186/90 98.2  F (36.8  C) Oral 85 12 95 % --   07/14/23 1354 (!) 183/93 -- -- -- -- -- --   07/14/23 1345 (!) 193/90 98.3  F (36.8  C) Oral 85 12 93 % --   07/14/23 1315 (!) 163/83 99  F (37.2  C) -- 85 10 100 % --   07/14/23 1305 (!) 172/87 99  F (37.2  C) -- 84 10 99 % --   07/14/23 1304 -- 99  F (37.2  C) -- 83 12 99 % --   07/14/23 1300 -- 99  F (37.2  C) -- 81 (!) 7 99 % --   07/14/23 1255 (!) 164/80 99  F (37.2  C) -- 80 10 93 % --   07/14/23 1250 (!) 156/76 98.8  F (37.1  C) -- 80 12 99 % --   07/14/23 1245 (!) 151/74 99  F (37.2  C) Temporal 80 11 99 % --   07/14/23 1240 133/73 99.1  F (37.3  C) -- 76 17 99 % --   07/14/23 1235 -- -- -- 80 13 99 % --   07/14/23 1020 (!) 155/86 99.4  F (37.4  C) -- 92 16 95 % 59 kg (130 lb)   07/14/23 0911 (!) 163/89 -- -- 90 -- -- --   07/14/23 0717 (!) 199/101 98.4  F (36.9  C) Oral 99 22 97 % --   07/14/23 0555 (!) 158/77 -- -- 89 -- -- --   07/14/23 0535 (!) 193/92 -- -- 86 -- -- --   07/14/23 0425 -- -- -- -- 20 -- --   07/14/23 0400 (!) 194/96 99  F (37.2  C) Oral 86 22 96 % --   07/14/23 0100 -- -- -- -- 18 -- --   07/14/23 0045 -- -- -- -- 18 -- --   07/13/23 2253 (!) 157/81 -- -- -- -- -- --   07/13/23 2245 (!) 169/80 97.9  F (36.6  C) Oral 77 18 94 % --   07/13/23 2030 -- -- -- -- 16 -- --   07/13/23 2017 -- -- -- --  18 -- --   07/13/23 1912 138/66 98.1  F (36.7  C) Oral 84 16 97 % --   07/13/23 1834 116/70 -- -- 79 -- -- --     Weight: 130 lbs 0 oz  Vitals:    07/12/23 1027 07/12/23 1347 07/14/23 1020   Weight: 53.1 kg (117 lb) 55.6 kg (122 lb 9.2 oz) 59 kg (130 lb)       Intake/Output Summary (Last 24 hours) at 7/14/2023 1812  Last data filed at 7/14/2023 1715  Gross per 24 hour   Intake 550 ml   Output 2875 ml   Net -2325 ml       General Appearance: Elderly woman appears comfortable resting in bed  Respiratory: Normal respiratory effort, clear lungs  Cardiovascular: Regular rate and rhythm, good radial pulse, normal capillary refill  GI: Nondistended abdomen, soft, nontender  Neuro: Alert and maintains wakefulness and attention, no confusion or disorientation, wiggles toes and feet symmetrically    Medical Decision Making             Data     I have personally reviewed the following data over the past 24 hrs:    N/A  \   9.6 (L)   / N/A     133 (L) 100 N/A /  N/A   3.8 24 N/A \       Imaging results reviewed over the past 24 hrs:   Recent Results (from the past 24 hour(s))   XR Surgery YESSY L/T 5 Min Fluoro w Stills    Narrative    XR SURGERY YESSY FLUORO LESS THAN 5 MIN W STILLS 7/14/2023 12:06 PM     HISTORY: closed nondisplaced intertrochanteric fracture of right femur  COMPARISON: 7/12/2023    NUMBER OF IMAGES ACQUIRED: 3    VIEWS: 2    FLUOROSCOPY TIME: 0.5 minutes      Impression    IMPRESSION: Intraprocedural spot films demonstrate intramedullary todd  and screw fixation of the intertrochanteric right femur fracture.  Please reference the surgical report for further details.    AYLA CARRILLO MD         SYSTEM ID:  [id number]     Recent Labs   Lab 07/14/23  0617 07/13/23  0730 07/12/23  1225 07/12/23  1035   WBC  --   --   --  5.3   HGB 9.6* 10.3*  --  10.5*   MCV  --   --   --  86   PLT  --   --   --  336   INR  --   --  1.10  --    * 133*  --  133*   POTASSIUM 3.8 3.7  --  3.7   CHLORIDE 100 99  --  97*   CO2 24  25  --  24   BUN  --  10.1  --  8.0   CR  --  0.69  --  0.71   ANIONGAP 9 9  --  12   CESIA  --  8.8  --  8.9   GLC  --  126*  --  118*   ALBUMIN  --   --   --  3.7   PROTTOTAL  --   --   --  6.4   BILITOTAL  --   --   --  0.2   ALKPHOS  --   --   --  87   ALT  --   --   --  14   AST  --   --   --  21

## 2023-07-14 NOTE — TELEPHONE ENCOUNTER
ATC called and spoke to patient's friend letting him know that we have received the forms Sri had inquired about, and Dr Phipps will sign them on Monday 7/17/23 and we will fax them off.    CRISTAL VAUGHAN, ATC

## 2023-07-15 ENCOUNTER — APPOINTMENT (OUTPATIENT)
Dept: OCCUPATIONAL THERAPY | Facility: CLINIC | Age: 73
DRG: 481 | End: 2023-07-15
Attending: ORTHOPAEDIC SURGERY
Payer: MEDICARE

## 2023-07-15 ENCOUNTER — APPOINTMENT (OUTPATIENT)
Dept: PHYSICAL THERAPY | Facility: CLINIC | Age: 73
DRG: 481 | End: 2023-07-15
Attending: ORTHOPAEDIC SURGERY
Payer: MEDICARE

## 2023-07-15 LAB
ANION GAP SERPL CALCULATED.3IONS-SCNC: 9 MMOL/L (ref 7–15)
CHLORIDE SERPL-SCNC: 99 MMOL/L (ref 98–107)
DEPRECATED HCO3 PLAS-SCNC: 24 MMOL/L (ref 22–29)
GLUCOSE BLDC GLUCOMTR-MCNC: 141 MG/DL (ref 70–99)
HGB BLD-MCNC: 9.4 G/DL (ref 11.7–15.7)
POTASSIUM SERPL-SCNC: 3.5 MMOL/L (ref 3.4–5.3)
SODIUM SERPL-SCNC: 132 MMOL/L (ref 136–145)

## 2023-07-15 PROCEDURE — 250N000013 HC RX MED GY IP 250 OP 250 PS 637: Performed by: ORTHOPAEDIC SURGERY

## 2023-07-15 PROCEDURE — 97165 OT EVAL LOW COMPLEX 30 MIN: CPT | Mod: GO

## 2023-07-15 PROCEDURE — 97530 THERAPEUTIC ACTIVITIES: CPT | Mod: GP | Performed by: PHYSICAL THERAPIST

## 2023-07-15 PROCEDURE — 36415 COLL VENOUS BLD VENIPUNCTURE: CPT | Performed by: ORTHOPAEDIC SURGERY

## 2023-07-15 PROCEDURE — 85018 HEMOGLOBIN: CPT | Performed by: ORTHOPAEDIC SURGERY

## 2023-07-15 PROCEDURE — 120N000001 HC R&B MED SURG/OB

## 2023-07-15 PROCEDURE — 97110 THERAPEUTIC EXERCISES: CPT | Mod: GP | Performed by: PHYSICAL THERAPIST

## 2023-07-15 PROCEDURE — 250N000013 HC RX MED GY IP 250 OP 250 PS 637: Performed by: PEDIATRICS

## 2023-07-15 PROCEDURE — 250N000011 HC RX IP 250 OP 636: Performed by: ORTHOPAEDIC SURGERY

## 2023-07-15 PROCEDURE — 97161 PT EVAL LOW COMPLEX 20 MIN: CPT | Mod: GP | Performed by: PHYSICAL THERAPIST

## 2023-07-15 PROCEDURE — 99232 SBSQ HOSP IP/OBS MODERATE 35: CPT | Performed by: NURSE PRACTITIONER

## 2023-07-15 PROCEDURE — 80051 ELECTROLYTE PANEL: CPT | Performed by: PEDIATRICS

## 2023-07-15 RX ADMIN — QUETIAPINE FUMARATE 75 MG: 25 TABLET ORAL at 20:22

## 2023-07-15 RX ADMIN — HYDRALAZINE HYDROCHLORIDE 25 MG: 25 TABLET, FILM COATED ORAL at 20:22

## 2023-07-15 RX ADMIN — ACETAMINOPHEN 975 MG: 325 TABLET, FILM COATED ORAL at 20:22

## 2023-07-15 RX ADMIN — ACETAMINOPHEN 975 MG: 325 TABLET, FILM COATED ORAL at 12:18

## 2023-07-15 RX ADMIN — POLYETHYLENE GLYCOL 3350 17 G: 17 POWDER, FOR SOLUTION ORAL at 08:08

## 2023-07-15 RX ADMIN — VENLAFAXINE HYDROCHLORIDE 150 MG: 150 CAPSULE, EXTENDED RELEASE ORAL at 08:07

## 2023-07-15 RX ADMIN — ACETAMINOPHEN 975 MG: 325 TABLET, FILM COATED ORAL at 05:41

## 2023-07-15 RX ADMIN — SODIUM CHLORIDE TAB 1 GM 1 G: 1 TAB at 17:18

## 2023-07-15 RX ADMIN — TRAMADOL HYDROCHLORIDE 50 MG: 50 TABLET, COATED ORAL at 10:01

## 2023-07-15 RX ADMIN — HYDROMORPHONE HYDROCHLORIDE 0.2 MG: 1 INJECTION, SOLUTION INTRAMUSCULAR; INTRAVENOUS; SUBCUTANEOUS at 20:19

## 2023-07-15 RX ADMIN — CARVEDILOL 12.5 MG: 6.25 TABLET, FILM COATED ORAL at 08:07

## 2023-07-15 RX ADMIN — TRAMADOL HYDROCHLORIDE 50 MG: 50 TABLET, COATED ORAL at 16:03

## 2023-07-15 RX ADMIN — CEFAZOLIN 1 G: 1 INJECTION, POWDER, FOR SOLUTION INTRAMUSCULAR; INTRAVENOUS at 03:13

## 2023-07-15 RX ADMIN — TRAMADOL HYDROCHLORIDE 50 MG: 50 TABLET, COATED ORAL at 23:40

## 2023-07-15 RX ADMIN — BUPROPION HYDROCHLORIDE 300 MG: 150 TABLET, FILM COATED, EXTENDED RELEASE ORAL at 08:07

## 2023-07-15 RX ADMIN — CARVEDILOL 12.5 MG: 6.25 TABLET, FILM COATED ORAL at 17:18

## 2023-07-15 RX ADMIN — MAGNESIUM HYDROXIDE 30 ML: 400 SUSPENSION ORAL at 14:49

## 2023-07-15 RX ADMIN — CLOPIDOGREL BISULFATE 75 MG: 75 TABLET ORAL at 08:07

## 2023-07-15 RX ADMIN — HYDROMORPHONE HYDROCHLORIDE 0.4 MG: 1 INJECTION, SOLUTION INTRAMUSCULAR; INTRAVENOUS; SUBCUTANEOUS at 03:10

## 2023-07-15 RX ADMIN — SODIUM CHLORIDE TAB 1 GM 1 G: 1 TAB at 08:07

## 2023-07-15 RX ADMIN — HYDRALAZINE HYDROCHLORIDE 25 MG: 25 TABLET, FILM COATED ORAL at 14:15

## 2023-07-15 RX ADMIN — HYDRALAZINE HYDROCHLORIDE 25 MG: 25 TABLET, FILM COATED ORAL at 08:07

## 2023-07-15 RX ADMIN — LISINOPRIL 20 MG: 10 TABLET ORAL at 08:07

## 2023-07-15 RX ADMIN — AMLODIPINE BESYLATE 10 MG: 5 TABLET ORAL at 08:07

## 2023-07-15 RX ADMIN — ROSUVASTATIN CALCIUM 20 MG: 20 TABLET, FILM COATED ORAL at 08:07

## 2023-07-15 RX ADMIN — SENNOSIDES AND DOCUSATE SODIUM 1 TABLET: 8.6; 5 TABLET ORAL at 08:07

## 2023-07-15 ASSESSMENT — ACTIVITIES OF DAILY LIVING (ADL)
ADLS_ACUITY_SCORE: 22
ADLS_ACUITY_SCORE: 24
ADLS_ACUITY_SCORE: 24
ADLS_ACUITY_SCORE: 25
ADLS_ACUITY_SCORE: 22
ADLS_ACUITY_SCORE: 24
ADLS_ACUITY_SCORE: 22
ADLS_ACUITY_SCORE: 24
ADLS_ACUITY_SCORE: 25
ADLS_ACUITY_SCORE: 24
ADLS_ACUITY_SCORE: 22
ADLS_ACUITY_SCORE: 24

## 2023-07-15 NOTE — PLAN OF CARE
Goal Outcome Evaluation:      Plan of Care Reviewed With: patient    Overall Patient Progress: no changeOverall Patient Progress: no change    Outcome Evaluation: Pt A&Ox4, calm and cooperative this shift. Sleeping most of night. Requested pain medications for increased hip pain (see MAR). Magnolia removed this AM @0500. R hip dressing CDI. Not out of bed this shift

## 2023-07-15 NOTE — PROGRESS NOTES
"   07/15/23 1000   Appointment Info   Signing Clinician's Name / Credentials (OT) Dea Ungers, OTR/L   Living Environment   People in Home alone   Current Living Arrangements house   Home Accessibility stairs within home;stairs to enter home   Number of Stairs, Main Entrance 2   Stair Railings, Main Entrance railings safe and in good condition;railing on right side (ascending)   Number of Stairs, Within Home, Primary seven   Stair Railings, Within Home, Primary railings safe and in good condition   Transportation Anticipated family or friend will provide   Living Environment Comments Bryson, who is a friend of jann can stay with her 24/7 upon discharge and provide assistance as needed for patient.   Self-Care   Usual Activity Tolerance good   Current Activity Tolerance poor   Regular Exercise Other (see comments)   Equipment Currently Used at Home shower chair   Fall history within last six months yes   Number of times patient has fallen within last six months 2   Activity/Exercise/Self-Care Comment Patient received OP OT services for R elbow prior to admission. Does not have resistrictions at this time for R elbow   Instrumental Activities of Daily Living (IADL)   IADL Comments Bryson can provide assistance for meals and home management tasks upon discharge.   General Information   Onset of Illness/Injury or Date of Surgery 07/12/23   Referring Physician Dr. Turner   Patient/Family Therapy Goal Statement (OT) Home when able   Existing Precautions/Restrictions weight bearing   Left Upper Extremity (Weight-bearing Status) full weight-bearing (FWB)   Right Upper Extremity (Weight-bearing Status) full weight-bearing (FWB)   Left Lower Extremity (Weight-bearing Status) full weight-bearing (FWB)   Right Lower Extremity (Weight-bearing Status) partial weight-bearing (PWB)  (50%)   General Observations and Info Per epic chart review \"Jann Grewal is a 73 year old female with hypertension, CAD with previous history of MI, " "previous history of stroke and TIA, memory loss after old stroke, osteoporosis with previous history of osteoporotic fractures, vitamin D deficiency, right shoulder surgeries in May 2023 and right elbow surgery in June 2023, history of postoperative delirium, asthma, chronic hyponatremia, depression, and chronic anemia who presented to the ER with multiple injuries on 7/12/2023 after she tripped and fell. Due to concern for acute closed nondisplaced intertrochanteric fracture of the right hip for which operative repair has been recommend by orthopedic surgery, she was admitted. 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 Pt had R hip ORIF on 7/14/23 and is PWB (50%) R LE\".   Cognitive Status Examination   Orientation Status orientation to person, place and time   Cognitive Status Comments No cognitive concerns   Visual Perception   Visual Impairment/Limitations corrective lenses full-time   Sensory   Sensory Quick Adds sensation intact   Pain Assessment   Patient Currently in Pain Yes, see Vital Sign flowsheet   Posture   Posture not impaired   Range of Motion Comprehensive   Comment, General Range of Motion Functional given recent shoulder and elbow surgeries.   Transfers   Transfer Comments Min A x1 with fernandez-walker   Balance   Balance Comments Defer to PT notes   Activities of Daily Living   BADL Assessment/Intervention upper body dressing;lower body dressing;grooming;toileting   Upper Body Dressing Assessment/Training   Comment, (Upper Body Dressing) IND   Lower Body Dressing Assessment/Training   Comment, (Lower Body Dressing) Moderate assistance to complete; friend (fredy) able to provide assistance with threading clothing on/off feet. Dicussed need for assistance at discharge and fredy is comfortable to complete.  Demonstrating AE with " patient- Bryson and patient denied need to use further upon discharge   Grooming Assessment/Training   [place] Level (Grooming) set up;modified independence   Comment, (Grooming) Denied standing in bathroom to complete. Previously was completing sponge baths with set-up in home setting.   Toileting   Comment, (Toileting) Skilled edcuation on safe transfers/set up for home enviorment condusive for patient needs upon discharge. Patients friend (bryson) asking approrpiate questions to assist patient as needed.   Clinical Impression   Criteria for Skilled Therapeutic Interventions Met (OT) Yes, treatment indicated   OT Diagnosis Impaired ADL enagement   OT Problem List-Impairments impacting ADL problems related to;post-surgical precautions;pain;strength   ADL comments/analysis Paitent currently required AX1 for safe mobility/transfers, set-up ADLs. Does have a friend bryson who can stay with patient 24/7 and provide physical assistance as needed for patient.   Assessment of Occupational Performance 1-3 Performance Deficits   Planned Therapy Interventions (OT) ADL retraining   Clinical Decision Making Complexity (OT) low complexity   Anticipated Equipment Needs Upon Discharge (OT) walker, fernandez   Risk & Benefits of therapy have been explained evaluation/treatment results reviewed;care plan/treatment goals reviewed;patient;friend   Clinical Impression Comments Patient being seen for OT day one post-op R hip ORIF.  Signs and symptoms are consistent given procedure.  Patient has a friend (bryson) who can provide asssitance for patient in the home setting .However, there is a concern for saftey given prior fall. From an ADL perspective, patient will require set-up to MIN A for LB dressing, Patient already completes sponge baths and bryson can complete meals/home management for patient upon discharge. Anticipate with imporved tolerance for activity and pain manangement patient will progress far enough to return home upon discharge.  If patient unable to saftley progress ambulation/transfers then TCU is recommend prior to return home.   OT Total Evaluation Time   OT Eval, Low Complexity Minutes (52943) 20   OT Goals   Therapy Frequency (OT) 5 times/wk   OT Predicted Duration/Target Date for Goal Attainment 07/22/23   OT Goals Lower Body Dressing;Transfers;Toilet Transfer/Toileting   OT: Lower Body Dressing Modified independent   OT: Transfer Modified independent   OT: Toilet Transfer/Toileting Modified independent   OT Discharge Planning   OT Plan 1/5 Saturday, ADL progression and toilet transfers   OT Discharge Recommendation (DC Rec) home with assist;Transitional Care Facility   OT Rationale for DC Rec Patient previously from home, has a friend who can stay with patient 24/7 upon discharge and provide assistance for patient as needed. Patient PLOF IND with self-cares, however, has recently got assistance due to recent R elbow surgery. There is concern with fall hx from saftey perspective of returning home, however, patient is motivated to work hard to progress mobility in a safe manner post R hip ORIF. Currently, patient requires set-up to min A with ADLs and min A for functional transfers. Anticipate with progression in activity patient will be able to progress safe engagement in transfers and ADLs in order to return home. However, if patient does not have level of supports needed in place prior to discharge then would recommend TCU.   OT Brief overview of current status Min A LB dressing, set-up ADLs, min to mod A for transfers, used commode with nursing vs standard toilet.   Total Session Time   Total Session Time (sum of timed and untimed services) 20

## 2023-07-15 NOTE — PROGRESS NOTES
"Care Management Follow Up    Length of Stay (days): 3    Expected Discharge Date: 07/16/2023     Concerns to be Addressed: discharge planning       Patient plan of care discussed at interdisciplinary rounds: Yes    Anticipated Discharge Disposition:  TBD   Therapy Dept: Recommending TCU  --Pt currently refusing TCU AND Home Care    DME: walker, platform     Patient/family educated on Medicare website which has current facility and service quality ratings: no  Education Provided on the Discharge Plan:  yes  Patient/Family in Agreement with the Plan: yes    Referrals Placed by CM/SW: Internal Clinic Care Coordination, Scheduled Follow-up appointments  Private pay costs discussed: Not applicable    Additional Information:  Update received during IDT rounds. Informed Pt is not medically ready for discharge today.     CM met with the pt today to have further discussion regarding the discharge plan. Pt continue to urge returning home when medically ready.     Therapy recommending TCU-based on current level of function.   50% weight bearing right lower extremity, Hx of stroke   Has 2 stairs to enter home    Pt believes she will be able to safely accommodate her needs with the support from her Friend Bryson who live in the home. Pt states her bedroom and bathroom are all on 1 level.     CM attempted to discuss at great length the benefits of TCU and if not TCU then Pt could consider Home Care, although would provide less therapies.     Pt reports she had been attending Outpatient Therapy prior to admission. Refuses Home Care -stating \"Bryson can hep me with all of that, and I was already set up with Outpatient.\"     Patient agreed to have Bryson present for Therapies tomorrow 7/16 so he can witness the amount of participation he will need to commit to if the pt should discharge home to his care.     Pt will request to have Bryson in the hospital from 9am -12pm on Sunday.     PLAN: Disposition Plan TBD    CM will have further " conversation with the Pt on Sun once she completes Therapy and can have discharge planning conversation with Friend Bryson present.       CHAD Michel

## 2023-07-15 NOTE — PROGRESS NOTES
07/15/23 0900   Appointment Info   Signing Clinician's Name / Credentials (PT) Heriberto Gordon, PT   Rehab Comments (PT) POD#1 R hip ORIF, 50% R LE wt bearing   Living Environment   People in Home alone   Current Living Arrangements house   Home Accessibility stairs within home;stairs to enter home   Number of Stairs, Main Entrance 2   Stair Railings, Main Entrance railings safe and in good condition;railing on right side (ascending)   Number of Stairs, Within Home, Primary seven   Stair Railings, Within Home, Primary railings safe and in good condition   Transportation Anticipated family or friend will provide   Living Environment Comments Friend of pt (Bryson) can stay 24/7 in the lower level bedroom   Self-Care   Usual Activity Tolerance good   Current Activity Tolerance poor   Regular Exercise Other (see comments)   Equipment Currently Used at Home shower chair   Fall history within last six months yes   Number of times patient has fallen within last six months 2   Activity/Exercise/Self-Care Comment pt was going to OP PT for R shoulder rehab x 1, L elbow OT rehab multiple visits   General Information   Onset of Illness/Injury or Date of Surgery 07/14/23   Referring Physician Nathan Turner MD   Patient/Family Therapy Goals Statement (PT) Go home with assist from friend (Bryson)   Pertinent History of Current Problem (include personal factors and/or comorbidities that impact the POC) Sri Grewal is a 73 year old female with hypertension, CAD with previous history of MI, previous history of stroke and TIA, memory loss after old stroke, osteoporosis with previous history of osteoporotic fractures, vitamin D deficiency, right shoulder surgeries in May 2023 and right elbow surgery in June 2023, history of postoperative delirium, asthma, chronic hyponatremia, depression, and chronic anemia who presented to the ER with multiple injuries on 7/12/2023 after she tripped and fell. Due to concern for acute closed  nondisplaced intertrochanteric fracture of the right hip for which operative repair has been recommend by orthopedic surgery, she was admitted. 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 Pt had R hip ORIF on 7/14/23 and is PWB (50%) R LE. PT constacted OP therapist pt had been seeing for R UE rehab and there were no wt bearing concerns.   Weight-Bearing Status - RLE partial weight-bearing (% in comments)  (50% restriction R LE)   General Observations pt sitting up in chair upon PT arrival. Chair alarm on.   Cognition   Affect/Mental Status (Cognition) confused   Orientation Status (Cognition) person;place   Follows Commands (Cognition) follows two-step commands;50-74% accuracy;initiation impaired   Safety Deficit (Cognition) minimal deficit   Memory Deficit (Cognition) minimal deficit   Cognitive Status Comments pt uncertain on the date and time   Pain Assessment   Patient Currently in Pain Yes, see Vital Sign flowsheet   Posture    Posture Forward head position;Protracted shoulders   Strength (Manual Muscle Testing)   Strength Comments In sitting pt able to do R knee extension. Could flex R hip in standing. Did not test abduction.   Bed Mobility   Comment, (Bed Mobility) pt was up in bedside chair, was assist of nursing to get out of bed.   Transfers   Maintains Weight-bearing Status (Transfers) verbal cues to maintain   Transfer Safety Concerns Noted decreased balance during turns;decreased step length;decreased weight-shifting ability   Impairments Contributing to Impaired Transfers impaired balance;impaired motor control;pain;decreased ROM;decreased strength   Comment, (Transfers) Pt mod assist x 1 sit to stand   Gait/Stairs (Locomotion)   [place] Level (Gait) moderate assist (50% patient effort)   Assistive Device (Gait) walker,  front-wheeled;walker, platform  (on R)   Distance in Feet 5   Pattern (Gait) step-to   Deviations/Abnormal Patterns (Gait) stride length decreased;gait speed decreased;festinating/shuffling;base of support, narrow;right sided deviations   Maintains Weight-bearing Status (Gait) verbal cues to maintain   Balance   Balance Comments fair sitting balance, poor dynamic standing balance   Clinical Impression   Criteria for Skilled Therapeutic Intervention Yes, treatment indicated   PT Diagnosis (PT) R hip ORIF 7/15/23   Influenced by the following impairments pain, ROM, weakness, balance, fall history   Functional limitations due to impairments adls, transfers, bed mobility, ambulation, stairs   Clinical Presentation (PT Evaluation Complexity) Stable/Uncomplicated   Clinical Presentation Rationale clinical judgement   Clinical Decision Making (Complexity) low complexity   Planned Therapy Interventions (PT) balance training;home exercise program;neuromuscular re-education;patient/family education;ROM (range of motion);strengthening;transfer training;gait training   Anticipated Equipment Needs at Discharge (PT) walker, platform  (will further assess to see if pt prefers R platform on FWW vs. no platform)   Risk & Benefits of therapy have been explained evaluation/treatment results reviewed;participants voiced agreement with care plan;participants included;patient;friend   Clinical Impression Comments Pt POD #1 R hip ORIF 50%/PWB R LE due to recent fall on 7/12/23. Pt was getting OP PT and OT for R shoulder and R elbow.   PT Total Evaluation Time   PT Avery Low Complexity Minutes (12105) 10   Plan of Care Review   Plan of Care Reviewed With patient;friend   Physical Therapy Goals   PT Frequency Daily   PT Predicted Duration/Target Date for Goal Attainment 07/18/23   PT Goals Bed Mobility;Transfers;Gait;Stairs   PT: Bed Mobility Supervision/stand-by assist   PT: Transfers Supervision/stand-by assist;Sit to/from stand;Bed  to/from chair;Assistive device;Within precautions   PT: Gait Supervision/stand-by assist;Assistive device;Rolling walker;Within precautions;25 feet   PT: Stairs Moderate assist;2 stairs   Interventions   Interventions Quick Adds Therapeutic Activity;Therapeutic Procedure   Therapeutic Procedure/Exercise   Ther. Procedure: strength, endurance, ROM, flexibillity Minutes (31291) 10   Symptoms Noted During/After Treatment none   Treatment Detail/Skilled Intervention In bedside chair for B ankle pumps, R QS, B GS, R LAQ, in standng did R hip flexion x 10 each.   Therapeutic Activity   Therapeutic Activities: dynamic activities to improve functional performance Minutes (19210) 15   Symptoms Noted During/After Treatment Fatigue;Increased pain   Treatment Detail/Skilled Intervention Pt sat edge of chair x 3 with no LOB and PT instructed pt on WB restriction and when pt up keeping centor of gravity over base of support to decrease fall risk. Pt mod assist x 1 sit to stand with FWW with platform attachment on R for R UE, R LE 50% WB. Amb with FWW with cues to keep 50% wt bearing restriction and PT assist to move FWW x 5 feet. Sat back in chair with cues to use UE's to help lower. Pt then needed to use bedside commode and again was mod assist x 1 sit to stand and used FWW x 5 feet with assist to move FWW and to sit in bedside commode chair. Nursing then assisted pt.   PT Discharge Planning   PT Plan see pt daily for post op R hip ORIF exercises, progress mobility and ambulation as able, determine if R platform attachment is best   PT Discharge Recommendation (DC Rec) home with assist;home with home care physical therapy;Transitional Care Facility   PT Rationale for DC Rec pt very motivated to go back home and has friend (Bryson) that can be there 24/7 to assist. Pt has 50% R LE wt bearing restriction for now which could make ambulation and stairs difficult so if pt not able to maintain then may need TCU stay until she gets  stronger and can get to WBAT status   PT Brief overview of current status assist needed for bed mobility, mod assist x 1 sit to stand, ambulation with assist using platorm on R FWW 5 feet x 2 reps, cues needed to maintain restriction and for safety, assist needed to move FWW

## 2023-07-15 NOTE — PLAN OF CARE
Goal Outcome Evaluation:      Plan of Care Reviewed With: patient    Overall Patient Progress: improvingOverall Patient Progress: improving    Outcome Evaluation: patient A&O, PRN milk of mag, Tramadol for pain, worked with PT today, no urine output since removal of peñaloza, bladder scanned at 75mL, ambulated in room from chair to bed to commode

## 2023-07-15 NOTE — PLAN OF CARE
Goal Outcome Evaluation:      Plan of Care Reviewed With: patient    Overall Patient Progress: improvingOverall Patient Progress: improving    Outcome Evaluation: Pt is A&Ox4. VSS on RA. BP initial elevated after surgery-BP meds restarted and BP now decreased. Pt is up with 2A, gb and walker (pt is 50% WB on RLE) pt got up to chair and ambulated in room. Incision dressing is CDI. CMS intact. Pt recieved ultram x2 this shift for pain managment. Merida in place with good urine output- to be removed POD1. Pt tolerating diet denies nausea.

## 2023-07-15 NOTE — ANESTHESIA POSTPROCEDURE EVALUATION
Patient: Sri Grewal    Procedure: Procedure(s):  INTERNAL FIXATION, FRACTURE, TROCHANTERIC, HIP, USING GAMMA RODS       Anesthesia Type:  General    Note:  Disposition: Inpatient   Postop Pain Control: Uneventful            Sign Out: Well controlled pain   PONV: No   Neuro/Psych: Uneventful            Sign Out: Acceptable/Baseline neuro status   Airway/Respiratory: Uneventful            Sign Out: Acceptable/Baseline resp. status   CV/Hemodynamics: Uneventful            Sign Out: Acceptable CV status   Other NRE: NONE   DID A NON-ROUTINE EVENT OCCUR? No    Event details/Postop Comments:  Pt was happy with anesthesia care.  No complications.  I will follow up with the pt if needed.           Last vitals:  Vitals Value Taken Time   /83 07/14/23 1315   Temp 98.96  F (37.2  C) 07/14/23 1331   Pulse 83 07/14/23 1329   Resp 12 07/14/23 1329   SpO2 98 % 07/14/23 1330   Vitals shown include unvalidated device data.    Electronically Signed By: JAYY Gottlieb CRNA  July 14, 2023  7:08 PM

## 2023-07-15 NOTE — PROGRESS NOTES
"[facility] DAILY PROGRESS NOTE      History: Sri Grewal is a 73 year old female POD # 1 s/p Procedure(s):  INTERNAL FIXATION, FRACTURE, TROCHANTERIC, HIP, USING GAMMA RODS - right on 2023 - 2023 with Dr. Nathan Turner     Interval events: none      SUBJECTIVE:  Patient states pain is fair controlled with prescribed medications. Tolerating diet. Voiding spontaneously. Denies numbness or tingling in affected extremity. Denies nausea or vomiting. Denies chest pain, shortness of breath.      OBJECTIVE:  BP (!) 140/80 (BP Location: Left arm, Patient Position: Semi-Nair's, Cuff Size: Adult Small)   Pulse 75   Temp 97.7  F (36.5  C) (Oral)   Resp 16   Ht 1.6 m (5' 3\")   Wt 59 kg (130 lb)   LMP 2005 (Approximate)   SpO2 98%   BMI 23.03 kg/m      Temp (24hrs), Av.3  F (36.8  C), Min:97.7  F (36.5  C), Max:98.9  F (37.2  C)      General:   Patient awake, alert, sitting upright in chair  NAD  Breathing comfortable on room air.    right Lower Extremity:    Incisions covered.  Intact sensation deep peroneal nerve, superficial peroneal nerve, medial and lateral plantar nerve, sural nerve, saphenous nerve.  Intact ehl, edl, ta, fhl, gs, quads, hamstrings, hip flexors  Toes warm and well perfused w/ brisk capillary refill, palpable dorsalis pedis pulse  Calves soft and nontender to squeeze.      Invalid input(s): HEMOGLOBIN   Hemoglobin   Date Value Ref Range Status   07/15/2023 9.4 (L) 11.7 - 15.7 g/dL Final   2023 9.6 (L) 11.7 - 15.7 g/dL Final   2020 14.8 11.7 - 15.7 g/dL Final   2019 15.0 11.7 - 15.7 g/dL Final     Recent Labs   Lab Test 23  1225 23  0334 21  1122   INR 1.10 0.96 1.1     Recent Labs   Lab Test 07/15/23  0619 23  0617 23  0730   POTASSIUM 3.5 3.8 3.7   CHLORIDE 99 100 99   ANIONGAP 9 9 9         PT:  5ft.           ASSESSMENT: Sri Grewal is a 73 year old female POD # 1 s/p Procedure(s):  INTERNAL FIXATION, FRACTURE, " TROCHANTERIC, HIP, USING GAMMA RODS - bilateral on 7/12/2023 - 7/14/2023, doing well.    PLAN:  50% weight bearing right lower extremity  Deep vein thrombosis prophylaxis per Yue  Pain control  Therapy  Diet  dispo planning, likely TCU Monday, will recheck status tomorrow        Odell Kahn M.D., M.S.  Dept. of Orthopaedic Surgery  [facility]

## 2023-07-15 NOTE — PROGRESS NOTES
[facility]    Medicine Progress Note - Hospitalist Service    Date of Admission:  7/12/2023    Assessment & Plan   Sri Grewal is a 73 year old female with hypertension, CAD with previous history of MI, previous history of stroke and TIA, memory loss after old stroke, osteoporosis with previous history of osteoporotic fractures, vitamin D deficiency, right shoulder surgeries in May 2023 and right elbow surgery in June 2023, history of postoperative delirium, asthma, chronic hyponatremia, depression, and chronic anemia who presented to the ER with multiple injuries on 7/12/2023 after she tripped and fell.  Due to concern for acute closed nondisplaced intertrochanteric fracture of the right hip for which operative repair has been recommend by orthopedic surgery, she was admitted.  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     Patient's acute and chronic medical problems appear to be stable such that she may proceed with anesthesia and operative intervention tomorrow without additional diagnostic preoperative evaluation.    Acute closed and non-displaced intertrochanteric fracture right side due to mechanical fall S/P ORIF gamma todd placement 7-14-23  Hx osteoporosis with pathologic fracture  Vitamin D deficiency   Pt had delay in surgical intervention due to Plavix and perioperative bleeding risk.   Plan:   Plavix restarted 7-15-23  Vitamin D and Calcium Carbonate supplementation-continue as OP   PT and OT recommend TCU   Needs F/U PCP re; potential treatment options for osteoporosis    Hypertension-Chronic on Lisinopril, Norvasc, Coreg, Hydralazine and Lasix. AM B/P 177/84  Plan:   Continue home antihypertensives. Lasix on hold due to Hyponatremia     Hyponatremia-chronic: Na today 133.   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  At time of stroke in June 2021 she underwent reevaluation of CAD and had similar findings on coronary angiography at that time with overall severely reduced LVEF of less than 30%.  Despite her known history of reduced LV systolic function, she is not known to have clinical history of heart failure.  However, Echo in February and April 2023 demonstrated normal LVEF.  She may have had mild pulmonary edema postoperatively while recovering after orthopedic surgery in May 2023 although did not appear to require any specific treatment for it.  EKG this hospitalization is reassuring without acute abnormalities.  -Continue chronic beta-blocker, ACE inhibitor, and statin  - Chronic antiplatelet therapy has been restarted     History of ischemic stroke  Left MCA trifurcation 5 mm aneurysm  She has had previous history ischemic stroke in the past that precipitated cognitive deterioration and memory loss.  In April 2023 she presented with speech changes and right-sided weakness and was treated in outside ER with thrombolytic therapy after which her neurologic deficits resolved.  She was thought to have had aborted stroke versus TIA at that time.  She continues to follow-up with neurology and currently is wearing a cardiac event monitor that is due to be removed on July 14.  She has known history of small 5 mm left MCA aneurysm for which no intervention has been advised today.  There was concern for possible closed head injury today when she fell although patient denies any head trauma and her neurologic status appears stable compared with her normal  baseline.  Head and neck CT imaging performed in the ER on day of admission was without any acute abnormalities.  Due to her relatively recent neurologic event, she will be at increased risk for recurrence of TIA and/or stroke perioperatively.  -Continue chronic statin  -Optimize control of blood pressure perioperatively      Traumatic skin tear right elbow  Status post right elbow surgery 6/1/23   Patient had right elbow surgery (right olecranon osteotomy fixation) in May 2023 with subsequent failure.  She then underwent revision ORIF of right olecranon with removal of previous olecranon plate on June 1 from which she has been continuing to recover including ongoing therapies at home and as an outpatient.  On day of admission she sustained skin injury to the right elbow when she fell and appears to have an open skin lesion overlying the right olecranon area.  The skin lesion does not demonstrate significant bleeding or signs of infection.  -Keep skin area right elbow clean with soap and water every day and cover with nonocclusive dressing  -Monitor skin tear clinically for signs of infection    Anemia, unspecified type  Platelet function defect due to Plavix and aspirin (Fiorinal) Hgb 7-15-23 9.4  She been persistently anemic since surgeries in May and June 2023 although anemia has improved since then.  Cause for chronic anemia is not known.  She is at risk for worsening anemia after her injury and planned surgery combined with chronic Plavix and aspirin therapies that cause platelet function defect and increase her bleeding risk.  Anemia has slightly worsened preoperatively.  Intraoperative EBL was reported at 50 mL.  -Ordered monitoring of hemoglobin  -Transfuse if Hgb < 7.0 and or symptomatic    Chronic hyponatremia  Chronically hyponatremic with baseline sodium 133-134 and treated chronically with sodium chloride tablets.  Current sodium continues to be stable compared with her baseline.  -Continue chronic dose  of sodium tablets perioperatively and adjust dosing accordingly depending upon changes in her sodium level  -Ordered monitoring of sodium  - Isotonic IVF if needed      Urine retention  Developed urinary retention during hospitalization necessitating intermittent bladder catheterization.  Reporting chronic urinary habits such that she has to modify her posture to be able to void effectively at her normal baseline and she currently cannot assume that posture because of her hip fracture.  Acute urinary catheter was placed preoperatively on 7/13 due to urinary retention with subsequent removal     Memory loss  History of postoperative delirium  Known memory loss after older stroke and developed postoperative delirium after orthopedic surgery in May 2023.  Bedtime dose of Seroquel was added at that time with good effect.  Discharged with 24-hour supervision after that hospital stay was advised.  She has been living at home with her friend Bryson since then, and Bryson helps her with medication administration and daily activities at home.  -Continue chronic dose of Seroquel and continue melatonin at bedtime  -Monitor clinically for symptoms and signs of perioperative delirium     Depression  Chronically treated with Effexor XR and bupropion.  Difficult to assess depression at this time but it appears to generally be stable.  -Continue chronic doses of Effexor XR and bupropion     Asthma  Medical records refer to diagnosis of persistent asthma although the patient does not appear to be prescribed controller medications.  She does not demonstrate any symptoms or signs of active asthma at this time.  -Monitor clinically for signs of asthma  -Add bronchodilators if there is increasing concern for asthma symptoms     Migraine headache  Patient has chronic history of migraine headaches.  She has been using Fiorinal on a daily basis recently to manage her headache.  -Avoiding use of Fiorinal at this time because it contains aspirin  which has antiplatelet effect and increases her bleeding risk  -Use other analgesics as needed for headache  -Reconsider use of specific migraine medication if migraine worsens although it is not clear whether her MCA aneurysm is a contraindication to use of migraine medication    Discussed PT/OT recommendations with the pt. She indicated preference to go home as opposed to going to a TCU. OT did recommend TCU if she cannot get level of care/support at home. Per PT note, pt is currently is 50% weight bearing restriction making ambulation and stairs difficult.           Diet: Advance Diet as Tolerated: Regular Diet Adult  Discharge Instruction - Regular Diet Adult    DVT Prophylaxis: Plavix was restarted   Merida Catheter: Not present  Lines: None     Cardiac Monitoring: None  Code Status: Full Code      Clinically Significant Risk Factors                  # Hypertension: Noted on problem list                 Disposition Plan      I have personally reviewed the following data over the past 24 hrs:    N/A  \   9.4 (L)   / N/A     132 (L) 99 N/A /  141 (H)   3.5 24 N/A \       Imaging results reviewed over the past 24 hrs:   No results found for this or any previous visit (from the past 24 hour(s)).     The patient's care was discussed with the Attending Physician, Dr. Hutchins , Care Coordinator/ and Patient.    JAYY Gutierrez [facility]  Hospitalist Service  [facility]  Securely message with [employer] (more info)  Text page via [employer] Paging/Directory   ______________________________________________________________________    Interval History   Reviewed overnight notes. No significant events. Merida cath discontinued this AM. PT and OT recommend TCU as possible plan for dismissal     Physical Exam   Vital Signs: Temp: 98.9  F (37.2  C) Temp src: Oral BP: (!) 177/84 Pulse: 84   Resp: 17 SpO2: 94 % O2 Device: None (Room air) Oxygen Delivery: 2 LPM  Weight: 130 lbs 0  oz    Constitutional: 74 yo female sitting up in chair, she is on RA and not in acute distress.   Eyes: sclera clear and conjunctiva normal  Hematologic / Lymphatic: No active bleeding   Respiratory: No increased work of breathing, good air exchange, clear to auscultation bilaterally, no crackles or wheezing  Cardiovascular: Normal apical impulse, regular rate and rhythm, normal S1 and S2, no S3 or S4, and no murmur noted  GI: hypoactive bowel sounds, soft and NTTP non-distended and non-tender  Skin: no bruising or bleeding  Musculoskeletal: no lower extremity pitting edema present  Neurologic: Awake and oriented no focal deficits     Medical Decision Making     45 MINUTES SPENT BY ME on the date of service doing chart review, history, exam, documentation & further activities per the note.      Data     I have personally reviewed the following data over the past 24 hrs:    N/A  \   9.4 (L)   / N/A     132 (L) 99 N/A /  141 (H)   3.5 24 N/A \       Imaging results reviewed over the past 24 hrs:   No results found for this or any previous visit (from the past 24 hour(s)).

## 2023-07-15 NOTE — PLAN OF CARE
Goal Outcome Evaluation:      Plan of Care Reviewed With: patient    Overall Patient Progress: improvingOverall Patient Progress: improving    Outcome Evaluation: Pt is A&Ox4. VSS on RA. Pt has ambulated in room and up to BSC and chair. Pt is up with 1A, gb and platform walker. Pt is taking tramadol for pain managment along with ice. Pt was unable to void, pt straight cath for 325. Pt tolerating regular diet. CMS intact. Surgical dressing is CDI.

## 2023-07-16 ENCOUNTER — APPOINTMENT (OUTPATIENT)
Dept: CT IMAGING | Facility: CLINIC | Age: 73
DRG: 481 | End: 2023-07-16
Attending: NURSE PRACTITIONER
Payer: MEDICARE

## 2023-07-16 ENCOUNTER — APPOINTMENT (OUTPATIENT)
Dept: PHYSICAL THERAPY | Facility: CLINIC | Age: 73
DRG: 481 | End: 2023-07-16
Payer: MEDICARE

## 2023-07-16 ENCOUNTER — APPOINTMENT (OUTPATIENT)
Dept: GENERAL RADIOLOGY | Facility: CLINIC | Age: 73
DRG: 481 | End: 2023-07-16
Attending: NURSE PRACTITIONER
Payer: MEDICARE

## 2023-07-16 ENCOUNTER — APPOINTMENT (OUTPATIENT)
Dept: GENERAL RADIOLOGY | Facility: CLINIC | Age: 73
DRG: 481 | End: 2023-07-16
Attending: HOSPITALIST
Payer: MEDICARE

## 2023-07-16 ENCOUNTER — APPOINTMENT (OUTPATIENT)
Dept: CT IMAGING | Facility: CLINIC | Age: 73
DRG: 481 | End: 2023-07-16
Attending: HOSPITALIST
Payer: MEDICARE

## 2023-07-16 LAB
ALBUMIN SERPL BCG-MCNC: 3.2 G/DL (ref 3.5–5.2)
ALBUMIN UR-MCNC: NEGATIVE MG/DL
ALP SERPL-CCNC: 93 U/L (ref 35–104)
ALT SERPL W P-5'-P-CCNC: 25 U/L (ref 0–50)
ANION GAP SERPL CALCULATED.3IONS-SCNC: 10 MMOL/L (ref 7–15)
APPEARANCE UR: CLEAR
AST SERPL W P-5'-P-CCNC: 24 U/L (ref 0–45)
BASOPHILS # BLD AUTO: 0.1 10E3/UL (ref 0–0.2)
BASOPHILS NFR BLD AUTO: 1 %
BILIRUB SERPL-MCNC: 0.3 MG/DL
BILIRUB UR QL STRIP: NEGATIVE
BUN SERPL-MCNC: 12.2 MG/DL (ref 8–23)
CALCIUM SERPL-MCNC: 9 MG/DL (ref 8.8–10.2)
CHLORIDE SERPL-SCNC: 98 MMOL/L (ref 98–107)
COLOR UR AUTO: YELLOW
CREAT SERPL-MCNC: 0.67 MG/DL (ref 0.51–0.95)
DEPRECATED HCO3 PLAS-SCNC: 24 MMOL/L (ref 22–29)
EOSINOPHIL # BLD AUTO: 0.3 10E3/UL (ref 0–0.7)
EOSINOPHIL NFR BLD AUTO: 4 %
ERYTHROCYTE [DISTWIDTH] IN BLOOD BY AUTOMATED COUNT: 13.6 % (ref 10–15)
GFR SERPL CREATININE-BSD FRML MDRD: >90 ML/MIN/1.73M2
GLUCOSE SERPL-MCNC: 114 MG/DL (ref 70–99)
GLUCOSE SERPL-MCNC: 180 MG/DL (ref 70–99)
GLUCOSE UR STRIP-MCNC: NEGATIVE MG/DL
HCT VFR BLD AUTO: 27.7 % (ref 35–47)
HGB BLD-MCNC: 8.7 G/DL (ref 11.7–15.7)
HGB BLD-MCNC: 8.9 G/DL (ref 11.7–15.7)
HGB UR QL STRIP: NEGATIVE
HYALINE CASTS: 3 /LPF
IMM GRANULOCYTES # BLD: 0 10E3/UL
IMM GRANULOCYTES NFR BLD: 0 %
KETONES UR STRIP-MCNC: NEGATIVE MG/DL
LEUKOCYTE ESTERASE UR QL STRIP: ABNORMAL
LYMPHOCYTES # BLD AUTO: 1.1 10E3/UL (ref 0.8–5.3)
LYMPHOCYTES NFR BLD AUTO: 15 %
MCH RBC QN AUTO: 27.2 PG (ref 26.5–33)
MCHC RBC AUTO-ENTMCNC: 32.1 G/DL (ref 31.5–36.5)
MCV RBC AUTO: 85 FL (ref 78–100)
MONOCYTES # BLD AUTO: 0.6 10E3/UL (ref 0–1.3)
MONOCYTES NFR BLD AUTO: 8 %
MUCOUS THREADS #/AREA URNS LPF: PRESENT /LPF
NEUTROPHILS # BLD AUTO: 5.5 10E3/UL (ref 1.6–8.3)
NEUTROPHILS NFR BLD AUTO: 72 %
NITRATE UR QL: NEGATIVE
NRBC # BLD AUTO: 0 10E3/UL
NRBC BLD AUTO-RTO: 0 /100
PH UR STRIP: 6 [PH] (ref 5–7)
PLATELET # BLD AUTO: 256 10E3/UL (ref 150–450)
POTASSIUM SERPL-SCNC: 3.8 MMOL/L (ref 3.4–5.3)
PROT SERPL-MCNC: 5.8 G/DL (ref 6.4–8.3)
RBC # BLD AUTO: 3.27 10E6/UL (ref 3.8–5.2)
RBC URINE: <1 /HPF
SODIUM SERPL-SCNC: 132 MMOL/L (ref 136–145)
SP GR UR STRIP: 1.02 (ref 1–1.03)
SQUAMOUS EPITHELIAL: <1 /HPF
UROBILINOGEN UR STRIP-MCNC: NORMAL MG/DL
WBC # BLD AUTO: 7.5 10E3/UL (ref 4–11)
WBC URINE: 5 /HPF

## 2023-07-16 PROCEDURE — 120N000001 HC R&B MED SURG/OB

## 2023-07-16 PROCEDURE — 36415 COLL VENOUS BLD VENIPUNCTURE: CPT | Performed by: NURSE PRACTITIONER

## 2023-07-16 PROCEDURE — 85018 HEMOGLOBIN: CPT | Performed by: NURSE PRACTITIONER

## 2023-07-16 PROCEDURE — 70450 CT HEAD/BRAIN W/O DYE: CPT | Mod: MG,77

## 2023-07-16 PROCEDURE — 250N000013 HC RX MED GY IP 250 OP 250 PS 637: Performed by: NURSE PRACTITIONER

## 2023-07-16 PROCEDURE — 250N000013 HC RX MED GY IP 250 OP 250 PS 637: Performed by: ORTHOPAEDIC SURGERY

## 2023-07-16 PROCEDURE — 81001 URINALYSIS AUTO W/SCOPE: CPT | Performed by: NURSE PRACTITIONER

## 2023-07-16 PROCEDURE — 97530 THERAPEUTIC ACTIVITIES: CPT | Mod: GP | Performed by: PHYSICAL THERAPIST

## 2023-07-16 PROCEDURE — 70450 CT HEAD/BRAIN W/O DYE: CPT | Mod: MA

## 2023-07-16 PROCEDURE — 85018 HEMOGLOBIN: CPT | Performed by: ORTHOPAEDIC SURGERY

## 2023-07-16 PROCEDURE — 99232 SBSQ HOSP IP/OBS MODERATE 35: CPT | Performed by: NURSE PRACTITIONER

## 2023-07-16 PROCEDURE — 97110 THERAPEUTIC EXERCISES: CPT | Mod: GP | Performed by: PHYSICAL THERAPIST

## 2023-07-16 PROCEDURE — 71045 X-RAY EXAM CHEST 1 VIEW: CPT

## 2023-07-16 PROCEDURE — 82947 ASSAY GLUCOSE BLOOD QUANT: CPT | Performed by: NURSE PRACTITIONER

## 2023-07-16 PROCEDURE — 73522 X-RAY EXAM HIPS BI 3-4 VIEWS: CPT

## 2023-07-16 PROCEDURE — 250N000013 HC RX MED GY IP 250 OP 250 PS 637: Performed by: PEDIATRICS

## 2023-07-16 RX ORDER — QUETIAPINE FUMARATE 25 MG/1
25 TABLET, FILM COATED ORAL EVERY MORNING
Status: DISCONTINUED | OUTPATIENT
Start: 2023-07-17 | End: 2023-07-18 | Stop reason: HOSPADM

## 2023-07-16 RX ORDER — QUETIAPINE FUMARATE 25 MG/1
25 TABLET, FILM COATED ORAL DAILY
Status: DISCONTINUED | OUTPATIENT
Start: 2023-07-18 | End: 2023-07-18 | Stop reason: HOSPADM

## 2023-07-16 RX ADMIN — SENNOSIDES AND DOCUSATE SODIUM 1 TABLET: 8.6; 5 TABLET ORAL at 07:33

## 2023-07-16 RX ADMIN — ACETAMINOPHEN 975 MG: 325 TABLET, FILM COATED ORAL at 04:32

## 2023-07-16 RX ADMIN — HYDRALAZINE HYDROCHLORIDE 25 MG: 25 TABLET, FILM COATED ORAL at 09:05

## 2023-07-16 RX ADMIN — CLOPIDOGREL BISULFATE 75 MG: 75 TABLET ORAL at 07:34

## 2023-07-16 RX ADMIN — QUETIAPINE FUMARATE 75 MG: 25 TABLET ORAL at 21:10

## 2023-07-16 RX ADMIN — ACETAMINOPHEN 975 MG: 325 TABLET, FILM COATED ORAL at 21:08

## 2023-07-16 RX ADMIN — SENNOSIDES AND DOCUSATE SODIUM 1 TABLET: 8.6; 5 TABLET ORAL at 21:10

## 2023-07-16 RX ADMIN — ACETAMINOPHEN 975 MG: 325 TABLET, FILM COATED ORAL at 12:43

## 2023-07-16 RX ADMIN — TRAMADOL HYDROCHLORIDE 50 MG: 50 TABLET, COATED ORAL at 07:34

## 2023-07-16 RX ADMIN — POLYETHYLENE GLYCOL 3350 17 G: 17 POWDER, FOR SOLUTION ORAL at 07:33

## 2023-07-16 RX ADMIN — AMLODIPINE BESYLATE 10 MG: 5 TABLET ORAL at 09:05

## 2023-07-16 RX ADMIN — BUPROPION HYDROCHLORIDE 300 MG: 150 TABLET, FILM COATED, EXTENDED RELEASE ORAL at 09:05

## 2023-07-16 RX ADMIN — HYDRALAZINE HYDROCHLORIDE 25 MG: 25 TABLET, FILM COATED ORAL at 13:53

## 2023-07-16 RX ADMIN — SODIUM CHLORIDE TAB 1 GM 1 G: 1 TAB at 17:29

## 2023-07-16 RX ADMIN — SODIUM CHLORIDE TAB 1 GM 1 G: 1 TAB at 07:33

## 2023-07-16 RX ADMIN — VENLAFAXINE HYDROCHLORIDE 150 MG: 150 CAPSULE, EXTENDED RELEASE ORAL at 09:05

## 2023-07-16 RX ADMIN — CARVEDILOL 12.5 MG: 6.25 TABLET, FILM COATED ORAL at 17:29

## 2023-07-16 RX ADMIN — HYDRALAZINE HYDROCHLORIDE 25 MG: 25 TABLET, FILM COATED ORAL at 21:11

## 2023-07-16 RX ADMIN — ROSUVASTATIN CALCIUM 20 MG: 20 TABLET, FILM COATED ORAL at 09:05

## 2023-07-16 RX ADMIN — QUETIAPINE FUMARATE 12.5 MG: 25 TABLET ORAL at 17:29

## 2023-07-16 RX ADMIN — LISINOPRIL 20 MG: 10 TABLET ORAL at 09:05

## 2023-07-16 RX ADMIN — TRAMADOL HYDROCHLORIDE 50 MG: 50 TABLET, COATED ORAL at 21:08

## 2023-07-16 RX ADMIN — TRAMADOL HYDROCHLORIDE 50 MG: 50 TABLET, COATED ORAL at 13:53

## 2023-07-16 RX ADMIN — CARVEDILOL 12.5 MG: 6.25 TABLET, FILM COATED ORAL at 07:33

## 2023-07-16 ASSESSMENT — ACTIVITIES OF DAILY LIVING (ADL)
ADLS_ACUITY_SCORE: 26
ADLS_ACUITY_SCORE: 24
ADLS_ACUITY_SCORE: 26

## 2023-07-16 NOTE — PLAN OF CARE
"Goal Outcome Evaluation:      Plan of Care Reviewed With: patient    Overall Patient Progress: no changeOverall Patient Progress: no change    Outcome Evaluation: [id number] Increased confusion, rambling and mumbled. Vitals are table on RA. Bladder scan done 60mL.    /65 (BP Location: Left arm, Patient Position: Fowlers, Cuff Size: Adult Small)   Pulse 77   Temp 97.9  F (36.6  C) (Oral)   Resp 16   Ht 1.6 m (5' 3\")   Wt 59 kg (130 lb)   LMP 11/09/2005 (Approximate)   SpO2 96%   BMI 23.03 kg/m      "

## 2023-07-16 NOTE — SIGNIFICANT EVENT
Significant Event Note    Time of event: 5:01 PM July 16, 2023    Description of event:  I was notifed by pt's nurse that she is again confuse. She initially would talk to her nurse, wouldn't answer any questions. When I went into see her, she was verbal, however, was confused and answered questions inappropriately. She had confusion/delirium earlier AM. I ordered a CT of her head, no acute findings, UA trace of Leukoesterase, CMP showed hyponatremia, she has been afebrile WBC normal. I discontinued all sedating meds with the exception of Tramadol.     Plan:  FRANCO Hutchins. Will start Seroquel 12.5 x1 now and Seroquel 25 mg daily at 0800 and 1400 in addition to her HS dose    Discussed with: bedside nurse and supervising physician, JAYY Donahue CNP

## 2023-07-16 NOTE — PLAN OF CARE
Goal Outcome Evaluation:      Plan of Care Reviewed With: patient    Overall Patient Progress: no changeOverall Patient Progress: no change    Outcome Evaluation: Pt confused at times, knows the month but forgetful as to where she is and why she is here. Straight cathed for 600ml. Incontinent of stool overnight. Tramadol given x1 for pain and scheduled tylenol. Surgical dressing CDI.

## 2023-07-16 NOTE — PROGRESS NOTES
[facility]    Medicine Progress Note - Hospitalist Service    Date of Admission:  7/12/2023    Assessment & Plan   Acute closed and non-displaced intertrochanteric fracture right side due to mechanical fall S/P ORIF gamma todd placement 7-14-23  Hx osteoporosis with pathologic fracture  Vitamin D deficiency   Pt had delay in surgical intervention due to Plavix and perioperative bleeding risk.   Plan:   Plavix restarted 7-15-23  Vitamin D and Calcium Carbonate supplementation-continue as OP   PT and OT recommend TCU   Needs F/U PCP re; potential treatment options for osteoporosis     Hypertension-Chronic on Lisinopril, Norvasc, Coreg, Hydralazine and Lasix. AM B/P 177/84  Plan:   Continue home antihypertensives. Lasix on hold due to Hyponatremia      Hyponatremia-chronic: Na today 133.   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  At time of stroke in June 2021 she underwent reevaluation of CAD and had similar findings on coronary angiography at that time with overall severely reduced LVEF of less than 30%.  Despite her known history of reduced LV systolic function, she is not known to have clinical history of heart failure.  However, Echo in February and April 2023 demonstrated normal LVEF.  She may have had mild pulmonary edema postoperatively while recovering after orthopedic surgery in May 2023 although did not appear to require any specific treatment for it.  EKG this hospitalization is reassuring without acute abnormalities.  -Continue chronic beta-blocker, ACE inhibitor, and statin  - Chronic antiplatelet therapy has been restarted       History of ischemic stroke  Left MCA trifurcation 5 mm aneurysm  She has had previous history ischemic stroke in the past that precipitated cognitive deterioration and memory loss.  In April 2023 she presented with speech changes and right-sided weakness and was treated in outside ER with thrombolytic therapy after which her neurologic deficits resolved.  She was thought to have had aborted stroke versus TIA at that time.  She continues to follow-up with neurology and currently is wearing a cardiac event monitor that is due to be removed on July 14.  She has known history of small 5 mm left MCA aneurysm for which no intervention has been advised today.  There was concern for possible closed head injury today when she fell although patient denies any head trauma and her neurologic status appears stable compared with her normal baseline.  Head and neck CT imaging performed in the ER on day of admission was without any acute abnormalities.  Due to her relatively recent neurologic event, she will be at increased risk for recurrence of TIA and/or stroke perioperatively.  -Continue chronic statin  -Optimize control of blood pressure perioperatively        Traumatic skin tear right elbow  Status post right elbow surgery 6/1/23   Patient had right elbow surgery (right olecranon osteotomy fixation) in May 2023 with subsequent failure.  She then underwent revision ORIF of right olecranon with removal of previous olecranon plate on June 1 from which she has been continuing to recover including ongoing therapies at home and as an outpatient.  On day of admission she sustained skin injury to the right elbow when she fell and appears to have an open skin lesion overlying the right olecranon area.  The skin lesion does not demonstrate significant bleeding or signs of infection.  -Keep skin area right elbow clean with soap and water every day and cover with nonocclusive dressing  -Monitor skin tear clinically for signs of  infection     Anemia, unspecified type  Platelet function defect due to Plavix and aspirin (Fiorinal) Hgb 7-16-23 8.9/27.2   She been persistently anemic since surgeries in May and June 2023 although anemia has improved since then.  Cause for chronic anemia is not known.  She is at risk for worsening anemia after her injury and planned surgery combined with chronic Plavix and aspirin therapies that cause platelet function defect and increase her bleeding risk.  Anemia has slightly worsened preoperatively.  Intraoperative EBL was reported at 50 mL.  -Ordered monitoring of hemoglobin  -Transfuse if Hgb < 7.0 and or symptomatic     Chronic hyponatremia  Chronically hyponatremic with baseline sodium 133-134 and treated chronically with sodium chloride tablets.  Current sodium continues to be stable compared with her baseline.  -Continue chronic dose of sodium tablets perioperatively and adjust dosing accordingly depending upon changes in her sodium level  -Ordered monitoring of sodium  - Isotonic IVF if needed       Urine retention  Developed urinary retention during hospitalization necessitating intermittent bladder catheterization.  Reporting chronic urinary habits such that she has to modify her posture to be able to void effectively at her normal baseline and she currently cannot assume that posture because of her hip fracture.  Acute urinary catheter was placed preoperatively on 7/13 due to urinary retention with subsequent removal  She continues to have retention was catherized for 600 ml of urine      Memory loss  History of postoperative delirium  Known memory loss after older stroke and developed postoperative delirium after orthopedic surgery in May 2023.  Bedtime dose of Seroquel was added at that time with good effect.  Discharged with 24-hour supervision after that hospital stay was advised.  She has been living at home with her friend Bryson since then, and Bryson helps her with medication administration and  daily activities at home.  -Continue chronic dose of Seroquel and continue melatonin at bedtime  -Monitor clinically for symptoms and signs of perioperative delirium    Delirium  Night shift indicated she was confused. Day shift nurse indicated she is still confused and not oriented to place, day, date circumstances.  Plan:   CBC WBC 7.5 H&H 8.9/27.2   CMP Na 132 K 3.8 Scr 0.67 LFTs N/L   CXR neg  Head CT chronic changes nothing acute  UA is pending   Discontinue Dilaudid, Atarax, Compazine   Currently she is oriented to day, place and circumstances when SO Bryson was here  SO is here and indicated in past she developed delirium due to medications and interrupted sleep.        Depression  Chronically treated with Effexor XR and bupropion.  Difficult to assess depression at this time but it appears to generally be stable.  -Continue chronic doses of Effexor XR and bupropion     Asthma  Medical records refer to diagnosis of persistent asthma although the patient does not appear to be prescribed controller medications.  She does not demonstrate any symptoms or signs of active asthma at this time.  -Monitor clinically for signs of asthma  -Add bronchodilators if there is increasing concern for asthma symptoms     Migraine headache  Patient has chronic history of migraine headaches.  She has been using Fiorinal on a daily basis recently to manage her headache.  -Avoiding use of Fiorinal at this time because it contains aspirin which has antiplatelet effect and increases her bleeding risk  -Use other analgesics as needed for headache  -Reconsider use of specific migraine medication if migraine worsens although it is not clear whether her MCA aneurysm is a contraindication to use of migraine medication       Diet: Advance Diet as Tolerated: Regular Diet Adult  Discharge Instruction - Regular Diet Adult    DVT Prophylaxis: DOAC  Merida Catheter: Not present  Lines: None     Cardiac Monitoring: None  Code Status: Full Code       Clinically Significant Risk Factors                  # Hypertension: Noted on problem list                 Disposition Plan     Expected Discharge Date: 07/16/2023      Destination: home with help/services (TBD: Therapy recommending TCU. Pt currently refusing TCU AND Home Care)  Discharge Comments: PT/OT [place] - still unsure if she will go home with 24/7 assist or TCU.        The patient's care was discussed with the Attending Physician, Dr. Hutchins , Patient and Patient's Family.    JAYY Gutierrez [facility]  Hospitalist Service  [facility]  Securely message with [employer] (more info)  Text page via [facility] Paging/Directory   ______________________________________________________________________    Interval History   Reviewed overnight notes. Discussed with day shift nurse    Physical Exam   Vital Signs: Temp: 98.7  F (37.1  C) Temp src: Oral BP: 136/69 Pulse: 70   Resp: 16 SpO2: 96 % O2 Device: None (Room air)    Weight: 130 lbs 0 oz    Constitutional: 72 yo female in bed, she is on RA and does not appear to be in acute distress  Eyes: sclera clear and conjunctiva normal  Hematologic / Lymphatic: No bleeding or bruising   Respiratory: No increased work of breathing, good air exchange, clear to auscultation bilaterally, no crackles or wheezing  Cardiovascular: Normal apical impulse, regular rate and rhythm, normal S1 and S2, no S3 or S4, and no murmur noted  GI: hypoactive bowel sounds, soft, non-distended and non-tender  Skin: no bruising or bleeding and normal skin color, texture, turgor  Musculoskeletal: no lower extremity pitting edema present  Neurologic: Currently awake and alert. Oriented x3, TAMAYO, no focal deficits   Neuropsychiatric: General: normal, calm and normal eye contact  Level of consciousness: alert / normal  Affect: normal  Orientation: oriented to self, place, time and situation  Memory and insight: No recollection of confusion last night      Medical Decision  Making       47 MINUTES SPENT BY ME on the date of service doing chart review, history, exam, documentation & further activities per the note.      Data     I have personally reviewed the following data over the past 24 hrs:    N/A  \   8.7 (L)   / N/A     N/A N/A N/A /  114 (H)   N/A N/A N/A \

## 2023-07-16 NOTE — PROGRESS NOTES
Care Management Follow Up    Length of Stay (days): 4    Expected Discharge Date: 07/16/2023     Concerns to be Addressed: discharge planning     Patient plan of care discussed at interdisciplinary rounds: Yes    Anticipated Discharge Disposition:  TCU  Disposition Comments: TCU  Anticipated Discharge Services:    Anticipated Discharge DME: Other (see comment) (No new DME anticipated)    Patient/family educated on Medicare website which has current facility and service quality ratings: no  Education Provided on the Discharge Plan:    Patient/Family in Agreement with the Plan: yes    Referrals Placed by CM/SW: Internal Clinic Care Coordination, Scheduled Follow-up appointments  Private pay costs discussed: Not applicable    Additional Information:  Care Management and nursing met with patient and patient's friend Bryson at bedside. Bryson brought concerns to nursing that it would be difficult for him to care for patient at home and would like TCU options discussed with patient again.     BINDU explained TCU locations and structure and patient is now in agreement with TCU placement. She would like options brought to her before a location is chosen - CM advised that patient always has the choice about TCU placement depending on bed availability.  CM sent TCU referrals today.       Yanely Matthews [facility]  Inpatient Care Coordinator   [facility] [phone]  [facility] [phone]

## 2023-07-16 NOTE — PROGRESS NOTES
"[facility] DAILY PROGRESS NOTE    * patient not seen, chart round and discuss with RN        History: Sri Grewal is a 73 year old female POD # 2 s/p Procedure(s):  INTERNAL FIXATION, FRACTURE, TROCHANTERIC, HIP, USING GAMMA RODS - right on 2023 - 2023    Interval events: some confusion, pain with weight bearing, movements.    OBJECTIVE:  /69 (BP Location: Left arm, Patient Position: Supine, Cuff Size: Adult Small)   Pulse 70   Temp 98.7  F (37.1  C) (Oral)   Resp 16   Ht 1.6 m (5' 3\")   Wt 59 kg (130 lb)   LMP 2005 (Approximate)   SpO2 96%   BMI 23.03 kg/m      Temp (24hrs), Av.1  F (36.7  C), Min:97.7  F (36.5  C), Max:98.7  F (37.1  C)      Invalid input(s): HEMOGLOBIN   Hemoglobin   Date Value Ref Range Status   2023 8.9 (L) 11.7 - 15.7 g/dL Final   2023 8.7 (L) 11.7 - 15.7 g/dL Final   2020 14.8 11.7 - 15.7 g/dL Final   2019 15.0 11.7 - 15.7 g/dL Final     Recent Labs   Lab Test 23  1225 23  0334 21  1122   INR 1.10 0.96 1.1     Recent Labs   Lab Test 23  0949 07/15/23  0619 23  0617   POTASSIUM 3.8 3.5 3.8   CHLORIDE 98 99 100   ANIONGAP 10 9 9                ASSESSMENT: Sri Grewal is a 73 year old female POD # 2 s/p Procedure(s):  INTERNAL FIXATION, FRACTURE, TROCHANTERIC, HIP, USING GAMMA RODS - right on 2023 - 2023, doing well.    PLAN:  50% weight bearing  Deep vein thrombosis prophylaxis   Pain control  Therapy  Diet  dispo pending, likely TCU tomorrow.    Odell Kahn M.D., M.S.  Dept. of Orthopaedic Surgery  [facility]    "

## 2023-07-17 ENCOUNTER — APPOINTMENT (OUTPATIENT)
Dept: PHYSICAL THERAPY | Facility: CLINIC | Age: 73
DRG: 481 | End: 2023-07-17
Payer: MEDICARE

## 2023-07-17 ENCOUNTER — APPOINTMENT (OUTPATIENT)
Dept: GENERAL RADIOLOGY | Facility: CLINIC | Age: 73
DRG: 481 | End: 2023-07-17
Attending: NURSE PRACTITIONER
Payer: MEDICARE

## 2023-07-17 ENCOUNTER — APPOINTMENT (OUTPATIENT)
Dept: OCCUPATIONAL THERAPY | Facility: CLINIC | Age: 73
DRG: 481 | End: 2023-07-17
Payer: MEDICARE

## 2023-07-17 LAB
ANION GAP SERPL CALCULATED.3IONS-SCNC: 8 MMOL/L (ref 7–15)
BUN SERPL-MCNC: 10.6 MG/DL (ref 8–23)
CALCIUM SERPL-MCNC: 9 MG/DL (ref 8.8–10.2)
CHLORIDE SERPL-SCNC: 99 MMOL/L (ref 98–107)
CREAT SERPL-MCNC: 0.65 MG/DL (ref 0.51–0.95)
DEPRECATED HCO3 PLAS-SCNC: 25 MMOL/L (ref 22–29)
GFR SERPL CREATININE-BSD FRML MDRD: >90 ML/MIN/1.73M2
GLUCOSE SERPL-MCNC: 106 MG/DL (ref 70–99)
HOLD SPECIMEN: NORMAL
POTASSIUM SERPL-SCNC: 3.6 MMOL/L (ref 3.4–5.3)
SODIUM SERPL-SCNC: 132 MMOL/L (ref 136–145)

## 2023-07-17 PROCEDURE — 120N000001 HC R&B MED SURG/OB

## 2023-07-17 PROCEDURE — 97530 THERAPEUTIC ACTIVITIES: CPT | Mod: GP | Performed by: PHYSICAL THERAPIST

## 2023-07-17 PROCEDURE — 97530 THERAPEUTIC ACTIVITIES: CPT | Mod: GO | Performed by: SPECIALIST/TECHNOLOGIST

## 2023-07-17 PROCEDURE — 250N000011 HC RX IP 250 OP 636: Performed by: HOSPITALIST

## 2023-07-17 PROCEDURE — 99232 SBSQ HOSP IP/OBS MODERATE 35: CPT | Performed by: NURSE PRACTITIONER

## 2023-07-17 PROCEDURE — 80048 BASIC METABOLIC PNL TOTAL CA: CPT | Performed by: NURSE PRACTITIONER

## 2023-07-17 PROCEDURE — 250N000013 HC RX MED GY IP 250 OP 250 PS 637: Performed by: NURSE PRACTITIONER

## 2023-07-17 PROCEDURE — 73080 X-RAY EXAM OF ELBOW: CPT | Mod: RT

## 2023-07-17 PROCEDURE — 250N000013 HC RX MED GY IP 250 OP 250 PS 637: Performed by: ORTHOPAEDIC SURGERY

## 2023-07-17 PROCEDURE — 36415 COLL VENOUS BLD VENIPUNCTURE: CPT | Performed by: NURSE PRACTITIONER

## 2023-07-17 PROCEDURE — 250N000013 HC RX MED GY IP 250 OP 250 PS 637: Performed by: PEDIATRICS

## 2023-07-17 RX ORDER — HYDROMORPHONE HYDROCHLORIDE 1 MG/ML
0.3 INJECTION, SOLUTION INTRAMUSCULAR; INTRAVENOUS; SUBCUTANEOUS ONCE
Status: COMPLETED | OUTPATIENT
Start: 2023-07-17 | End: 2023-07-17

## 2023-07-17 RX ADMIN — HYDROMORPHONE HYDROCHLORIDE 0.3 MG: 1 INJECTION, SOLUTION INTRAMUSCULAR; INTRAVENOUS; SUBCUTANEOUS at 01:05

## 2023-07-17 RX ADMIN — QUETIAPINE FUMARATE 75 MG: 25 TABLET ORAL at 20:28

## 2023-07-17 RX ADMIN — TRAMADOL HYDROCHLORIDE 50 MG: 50 TABLET, COATED ORAL at 22:52

## 2023-07-17 RX ADMIN — SENNOSIDES AND DOCUSATE SODIUM 1 TABLET: 8.6; 5 TABLET ORAL at 20:28

## 2023-07-17 RX ADMIN — CLOPIDOGREL BISULFATE 75 MG: 75 TABLET ORAL at 08:11

## 2023-07-17 RX ADMIN — SENNOSIDES AND DOCUSATE SODIUM 1 TABLET: 8.6; 5 TABLET ORAL at 08:11

## 2023-07-17 RX ADMIN — ACETAMINOPHEN 975 MG: 325 TABLET, FILM COATED ORAL at 12:24

## 2023-07-17 RX ADMIN — LISINOPRIL 20 MG: 10 TABLET ORAL at 08:10

## 2023-07-17 RX ADMIN — QUETIAPINE FUMARATE 25 MG: 25 TABLET ORAL at 08:11

## 2023-07-17 RX ADMIN — SODIUM CHLORIDE TAB 1 GM 1 G: 1 TAB at 08:11

## 2023-07-17 RX ADMIN — HYDRALAZINE HYDROCHLORIDE 25 MG: 25 TABLET, FILM COATED ORAL at 08:11

## 2023-07-17 RX ADMIN — HYDRALAZINE HYDROCHLORIDE 25 MG: 25 TABLET, FILM COATED ORAL at 20:28

## 2023-07-17 RX ADMIN — ACETAMINOPHEN 975 MG: 325 TABLET, FILM COATED ORAL at 05:15

## 2023-07-17 RX ADMIN — SODIUM CHLORIDE TAB 1 GM 1 G: 1 TAB at 16:53

## 2023-07-17 RX ADMIN — HYDRALAZINE HYDROCHLORIDE 25 MG: 25 TABLET, FILM COATED ORAL at 15:02

## 2023-07-17 RX ADMIN — ROSUVASTATIN CALCIUM 20 MG: 20 TABLET, FILM COATED ORAL at 08:11

## 2023-07-17 RX ADMIN — CARVEDILOL 12.5 MG: 6.25 TABLET, FILM COATED ORAL at 08:11

## 2023-07-17 RX ADMIN — ACETAMINOPHEN 325 MG: 325 TABLET, FILM COATED ORAL at 20:27

## 2023-07-17 RX ADMIN — BUPROPION HYDROCHLORIDE 300 MG: 150 TABLET, FILM COATED, EXTENDED RELEASE ORAL at 08:10

## 2023-07-17 RX ADMIN — AMLODIPINE BESYLATE 10 MG: 5 TABLET ORAL at 08:11

## 2023-07-17 RX ADMIN — POLYETHYLENE GLYCOL 3350 17 G: 17 POWDER, FOR SOLUTION ORAL at 08:10

## 2023-07-17 RX ADMIN — CARVEDILOL 12.5 MG: 6.25 TABLET, FILM COATED ORAL at 16:53

## 2023-07-17 RX ADMIN — ACETAMINOPHEN 650 MG: 325 TABLET, FILM COATED ORAL at 09:49

## 2023-07-17 RX ADMIN — TRAMADOL HYDROCHLORIDE 50 MG: 50 TABLET, COATED ORAL at 15:48

## 2023-07-17 RX ADMIN — TRAMADOL HYDROCHLORIDE 50 MG: 50 TABLET, COATED ORAL at 05:15

## 2023-07-17 RX ADMIN — VENLAFAXINE HYDROCHLORIDE 150 MG: 150 CAPSULE, EXTENDED RELEASE ORAL at 08:11

## 2023-07-17 ASSESSMENT — ACTIVITIES OF DAILY LIVING (ADL)
ADLS_ACUITY_SCORE: 27
ADLS_ACUITY_SCORE: 27
ADLS_ACUITY_SCORE: 26
ADLS_ACUITY_SCORE: 27
ADLS_ACUITY_SCORE: 26
ADLS_ACUITY_SCORE: 26
ADLS_ACUITY_SCORE: 27
ADLS_ACUITY_SCORE: 26
ADLS_ACUITY_SCORE: 26
ADLS_ACUITY_SCORE: 27

## 2023-07-17 NOTE — PLAN OF CARE
[facility]    The SLUMS ([facility] Mental Status Exam) is a 30-point standardized cognitive screen used to identify the presence of cognitive deficits and/or to identify a change in cognition over time.  This screen assesses cognitive abilities in various domains.  [email or web address]    Patient's performance was as follows:      Orientation and Attention:   2/3: Reports year as        Calculation and Registration:   : Amount remaining reported as $69, should be $77       Category Naming with Time Contraint:   1/3: Names 4 animals, with cue able to ultimately able to name 7 total in one minute    Memory Delayed Recall with Interference:   : Recalls car only    Registration and Digit Span:   : Unable to sequence 4 digits          Clock drawin/4       Visual Spatial:          Story Recall with Executive Function:   : Unable to recall story subject's name         Total Score: 18           Scoring If High School Educated If Less than High School Educated   Normal 27-30 25-30   Mild Neurocognitive Disorder 21-26 20-24   Dementia 1-20 1-19       Score Interpretation: Score places patient in the dementia category.  Please note that this examination is used to screen individuals to look for the presence of cognitive deficits and to identify changes in cognition over time.  This is not a diagnosis.  This examination can be followed by further cognitive assessments if appropriate and deemed necessary.      Total Time with Patient: 25 minutes on [facility]    Thank you for your referral.  GAGAN Day/PAMELA     [facility]  O: [phone]  E: [email or web address]

## 2023-07-17 NOTE — PROGRESS NOTES
"[facility]    Progress note    73 year old female POD#3 s/p right hip open reduction internal fixation with short gamma nail by Dr. Turner  S: Patient seen at bedside in no apparent distress, alert and pleasant.  I discussed surgery and rehabilitation with the patient.  She denies numbness, tingling or major pain issues. Discussed fall and x-rays this morning and showed x-rays this morning.  Discussed transitional care placement as best for her and she agrees.  Discussed no major changes with hip fixation or weightbearing status.  O: CMS intact right LE, DF/PF intact, 5 out of 5 strength.  Patient rolling onto her other hip with no problems.       Aquacel dressing on, clean and dry with a good seal       Right hip with minimal swelling and no erythema or ecchymosis        Bilateral calves soft and non tender    Vital signs:  Temp: 99.6  F (37.6  C) Temp src: Oral BP: 124/74 Pulse: 88   Resp: 20 SpO2: 94 % O2 Device: Nasal cannula Oxygen Delivery: 2 LPM Height: 160 cm (5' 3\") Weight: 59 kg (130 lb)  Estimated body mass index is 23.03 kg/m  as calculated from the following:    Height as of this encounter: 1.6 m (5' 3\").    Weight as of this encounter: 59 kg (130 lb).      Hemoglobin   Date Value Ref Range Status   07/16/2023 8.9 (L) 11.7 - 15.7 g/dL Final   11/04/2020 14.8 11.7 - 15.7 g/dL Final     INR   Date Value Ref Range Status   07/12/2023 1.10 0.85 - 1.15 Final   01/15/2017 0.87 0.86 - 1.14 Final     INR (External)   Date Value Ref Range Status   06/25/2021 1.1 <1.3 Final         A/P:  1. Pain-controlled with Tylenol and Ultram 50 mg every 6 hours as needed  2. DVT Prophylaxis/treatment history of TIA-Plavix 75 mg daily was restarted after surgery.  3. Weight Bearing Status-50% weightbearing right lower extremity x1 month.  Consulting Dr. Turner and no change in weightbearing status after fall.  4. Hip Precautions-none  5. Physical Therapy-recommending transitional care  6. " Occupational Therapy-recommending transitional care  7. Hospitalist-Dr. Luis and NISHANT Hutton following.  Secure message sent to NISHANT Hutton this morning.  8. Incision-no signs or symptoms of infection  9. Social work-TCU referral sent out yesterday.  Placement pending.  10. Fall last PM-x-rays reviewed and compared to postoperative x-rays showing superior displacement of the lesser trochanter.  Possible superior greater troches fracture also.  Surgical todd and placement unchanged.  Discussed with Dr. Turner this morning.  He will continue the 50% weightbearing with no changes.  11. Plan-continue 50% weightbearing after fall.  Still recommending transitional care placement and follow-up with Dr. Turner in 2 weeks.  Okay for discharge when hospitalist team is cleared the patient.  Secure message sent to NISHANT Hutton and discussed plan with charge RN this morning.    Elías Willoughby PA-C  7/17/2023

## 2023-07-17 NOTE — SIGNIFICANT EVENT
Significant Event Note    Time of event: 12:03 AM July 17, 2023    Description of event:  Patient found on the floor, per RN visible bruise and swelling left forehead. Alertand not oriented, likely still at baseline.      CT Head:   Left periorbital/frontal scalp soft tissue swelling/contusion.  1.  No CT evidence for acute intracranial process.  2.  Chronic changes, as above.      X-ray Pelvis:   Since 07/12/2023 there are new postoperative changes seen involving the right hip. There is more displacement seen involving the right lesser trochanteric fracture and there appears to be a new minimally displaced fracture through the upper   aspect of the right greater trochanteric region. Otherwise, the exam is stable with again seen distal neck/intertrochanteric junction fracture of the right hip. Moderate degenerative changes most marked in the lower lumbar spine. Scattered pelvic   phleboliths. Normal bowel gas pattern. No acute findings of the left hip or the pelvis.      Plan:  Will place orthopedic consult and follow  Ice pack to scalp swelling  Analgesic prn    Discussed with: bedside nurse    Tano Luis MD

## 2023-07-17 NOTE — PLAN OF CARE
Goal Outcome Evaluation:      Plan of Care Reviewed With: patient    Overall Patient Progress: no changeOverall Patient Progress: no change    Outcome Evaluation: Patient noted some word-finding difficulty this morning and was rambling, alert to self, place, and situation, xray done on right elbow, prn Tylenol for pain, purple bruising around left eye, ambulated in odom, peñaloza in place

## 2023-07-17 NOTE — PROGRESS NOTES
S-(situation): Fall    B-(background): Left hip repair    A-(assessment): Staff walking by pt's room and noticed her feet under the curtain, found pt laying on her right side on the floor, under the bedside table.  Pt responded to voice, moves all extremities.  She has a bruise/swelling above her left eye and slight swelling noted above right eye.  Helped pt to bed with gait belt and 4 staff members, she pivoted to the bed.  Dr Luis was notified at this time.    R-(recommendations): CT of head and xrays of both hips ordered.

## 2023-07-17 NOTE — PROGRESS NOTES
[facility]    Medicine Progress Note - Hospitalist Service    Date of Admission:  7/12/2023    Assessment & Plan   Acute closed and non-displaced intertrochanteric fracture right side due to mechanical fall S/P ORIF gamma todd placement 7-14-23  Hx osteoporosis with pathologic fracture  Vitamin D deficiency   Pt had delay in surgical intervention due to Plavix and perioperative bleeding risk.   Plan:   Plavix restarted 7-15-23  Vitamin D and Calcium Carbonate supplementation-continue as OP   PT and OT recommend TCU   Needs F/U PCP re; potential treatment options for osteoporosis     Hypertension-Chronic on Lisinopril, Norvasc, Coreg, Hydralazine and Lasix. AM B/P 177/84  Plan:   Continue home antihypertensives. Lasix on hold due to Hyponatremia      Hyponatremia-chronic: Na today 133.   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  At time of stroke in June 2021 she underwent reevaluation of CAD and had similar findings on coronary angiography at that time with overall severely reduced LVEF of less than 30%.  Despite her known history of reduced LV systolic function, she is not known to have clinical history of heart failure.  However, Echo in February and April 2023 demonstrated normal LVEF.  She may have had mild pulmonary edema postoperatively while recovering after orthopedic surgery in May 2023 although did not appear to require any specific treatment for it.  EKG this hospitalization is reassuring without acute abnormalities.  -Continue chronic beta-blocker, ACE inhibitor, and statin  - Chronic antiplatelet therapy has been  restarted      History of ischemic stroke  Left MCA trifurcation 5 mm aneurysm  She has had previous history ischemic stroke in the past that precipitated cognitive deterioration and memory loss.  In April 2023 she presented with speech changes and right-sided weakness and was treated in outside ER with thrombolytic therapy after which her neurologic deficits resolved.  She was thought to have had aborted stroke versus TIA at that time.  She continues to follow-up with neurology and currently is wearing a cardiac event monitor that is due to be removed on July 14.  She has known history of small 5 mm left MCA aneurysm for which no intervention has been advised today.  There was concern for possible closed head injury today when she fell although patient denies any head trauma and her neurologic status appears stable compared with her normal baseline.  Head and neck CT imaging performed in the ER on day of admission was without any acute abnormalities.  Due to her relatively recent neurologic event, she will be at increased risk for recurrence of TIA and/or stroke perioperatively.  -Continue chronic statin  -Optimize control of blood pressure perioperatively        Traumatic skin tear right elbow  Status post right elbow surgery 6/1/23   Patient had right elbow surgery (right olecranon osteotomy fixation) in May 2023 with subsequent failure.  She then underwent revision ORIF of right olecranon with removal of previous olecranon plate on June 1 from which she has been continuing to recover including ongoing therapies at home and as an outpatient.  On day of admission she sustained skin injury to the right elbow when she fell and appears to have an open skin lesion overlying the right olecranon area.  The skin lesion does not demonstrate significant bleeding or signs of infection.  -Keep skin area right elbow clean with soap and water every day and cover with nonocclusive dressing  -Monitor skin tear clinically for  signs of infection     Anemia, unspecified type  Platelet function defect due to Plavix and aspirin (Fiorinal) Hgb 7-16-23 8.9/27.2   She been persistently anemic since surgeries in May and June 2023 although anemia has improved since then.  Cause for chronic anemia is not known.  She is at risk for worsening anemia after her injury and planned surgery combined with chronic Plavix and aspirin therapies that cause platelet function defect and increase her bleeding risk.  Anemia has slightly worsened preoperatively.  Intraoperative EBL was reported at 50 mL.  -Ordered monitoring of hemoglobin  -Transfuse if Hgb < 7.0 and or symptomatic     Chronic hyponatremia  Chronically hyponatremic with baseline sodium 133-134 and treated chronically with sodium chloride tablets.  Current sodium continues to be stable compared with her baseline.  -Continue chronic dose of sodium tablets perioperatively and adjust dosing accordingly depending upon changes in her sodium level  -Ordered monitoring of sodium  - Isotonic IVF if needed       Urine retention  Developed urinary retention during hospitalization necessitating intermittent bladder catheterization.  Reporting chronic urinary habits such that she has to modify her posture to be able to void effectively at her normal baseline and she currently cannot assume that posture because of her hip fracture.  Acute urinary catheter was placed preoperatively on 7/13 due to urinary retention with subsequent removal  She continues to have retention was catherized for 600 ml of urine. Merida cath placed overnight due to retention 850cc     Memory loss  History of postoperative delirium  Known memory loss after older stroke and developed postoperative delirium after orthopedic surgery in May 2023.  Bedtime dose of Seroquel was added at that time with good effect.  Discharged with 24-hour supervision after that hospital stay was advised.  She has been living at home with her friend Bryson since  then, and Bryson helps her with medication administration and daily activities at home.  -Continue chronic dose of Seroquel and continue melatonin at bedtime  -Monitor clinically for symptoms and signs of perioperative delirium     Delirium  Night shift indicated she was confused. Day shift nurse indicated she is still confused and not oriented to place, day, date circumstances. Currently, she is not oriented   Plan:   CBC WBC 7.5 H&H 8.9/27.2   CMP Na 132 K 3.8 Scr 0.67 LFTs N/L   CXR neg  Head CT chronic changes nothing acute  UA is small Leukoesterase   Discontinue Dilaudid, Atarax, Compazine   Currently she is oriented to day, place and circumstances when SO Bryson was here  SO is here and indicated in past she developed delirium due to medications and interrupted sleep.   Seroquel dosing increased. She was given extra 12.5 mg x1 then Seroquel 25 mg 0800 and 1400. Seroquel 75 mg at HS   She was found on the floor in room with contusion left eye. Head CT was negative for bleed. Pelvis x-rays show what appear to be a new minimally displaced fracture through the upper aspect of of the right greater trochanter. X-rays were reviewed by Dr Turner and she will be 50% weight bearing x1 month.   Pt was reporting right elbow pain. X rays of the elbow show intact hardware, in a comminuted elbow fracture. No acute changes   Checking SLUMS today          Depression  Chronically treated with Effexor XR and bupropion.  Difficult to assess depression at this time but it appears to generally be stable.  -Continue chronic doses of Effexor XR and bupropion     Asthma  Medical records refer to diagnosis of persistent asthma although the patient does not appear to be prescribed controller medications.  She does not demonstrate any symptoms or signs of active asthma at this time.  -Monitor clinically for signs of asthma  -Add bronchodilators if there is increasing concern for asthma symptoms     Migraine headache  Patient has chronic  "history of migraine headaches.  She has been using Fiorinal on a daily basis recently to manage her headache.  -Avoiding use of Fiorinal at this time because it contains aspirin which has antiplatelet effect and increases her bleeding risk  -Use other analgesics as needed for headache  -Reconsider use of specific migraine medication if migraine worsens although it is not clear whether her MCA aneurysm is a contraindication to use of migraine medication       Diet: Advance Diet as Tolerated: Regular Diet Adult  Discharge Instruction - Regular Diet Adult    DVT Prophylaxis: DOAC  Merida Catheter: PRESENT, indication: Retention  Lines: None     Cardiac Monitoring: None  Code Status: Full Code      Clinically Significant Risk Factors              # Hypoalbuminemia: Lowest albumin = 3.2 g/dL at 7/16/2023  9:49 AM, will monitor as appropriate     # Hypertension: Noted on problem list                 Disposition Plan      Expected Discharge Date: 07/18/2023      Destination: home with help/services (TBD: Therapy recommending TCU. Pt currently refusing TCU AND Home Care)  Discharge Comments: patient and \"friend\" agree TCU is best at discharge. Some confusion today. TCU referrals sent.        The patient's care was discussed with the Attending Physician, Dr. Hutchins .    JAYY Gutierrez [facility]  Hospitalist Service  [facility]  Securely message with [employer] (more info)  Text page via [email or web address] Paging/Directory   ______________________________________________________________________    Interval History   Reviewed overnight notes. Pt found on floor in room approximately 2300. She was noted to have bruising/swelling above left eye and small amt above right eye. Head CT and pelvis x-rays were negative. She had a catheter placed due to retention.     Physical Exam   Vital Signs: Temp: 99.6  F (37.6  C) Temp src: Oral BP: 124/74 Pulse: 88   Resp: 20 SpO2: 94 % O2 Device: Nasal cannula    Weight: 130 " "lbs 0 oz    Constitutional: 74 yo female in bed, on RA she is awake. Does not appear in acute distress  Eyes: sclera clear and conjunctiva normal. Has moderate left periorbital swelling, bruising 2/2 fall  Hematologic / Lymphatic: No active bleeding   Respiratory: No increased work of breathing, good air exchange, clear to auscultation bilaterally, no crackles or wheezing  Cardiovascular: Normal apical impulse, regular rate and rhythm, normal S1 and S2, no S3 or S4, and no murmur noted  GI: normal bowel sounds, soft, non-distended and non-tender  Genitounirinary: Merida cath in place. No gross hematuria   Skin: W/D. Has left periorbital swelling and bruising   Musculoskeletal: LLE sling swelling. Dsg C/D/I  Neurologic: Not oriented to day, date, month, location, circumstances. Her responses to questions are inappropriate   Neuropsychiatric: General: normal, calm and normal eye contact  Level of consciousness: alert / normal  Affect: tearful  Orientation: oriented only to self  Memory and insight: impaired: Has minimal insight as to fall last evening. She cannot articulate why she was trying to get OOB. \"I just rolled over\"     Medical Decision Making       45 MINUTES SPENT BY ME on the date of service doing chart review, history, exam, documentation & further activities per the note.      Data     I have personally reviewed the following data over the past 24 hrs:    7.5  \   8.9 (L)   / 256     132 (L) 98 12.2 /  180 (H)   3.8 24 0.67 \       ALT: 25 AST: 24 AP: 93 TBILI: 0.3   ALB: 3.2 (L) TOT PROTEIN: 5.8 (L) LIPASE: N/A       Imaging results reviewed over the past 24 hrs:   Recent Results (from the past 24 hour(s))   XR Chest Port 1 View    Narrative    EXAM: XR CHEST PORT 1 VIEW  LOCATION: [facility]  DATE: 7/16/2023    INDICATION: Delirium  COMPARISON: 05/06/2023.      Impression    IMPRESSION: Mild left base atelectasis. No acute cardiopulmonary disease identified. Right " shoulder arthroplasty appears stable. Subacute left rib fractures.   CT Head w/o contrast*    Narrative    EXAM: CT HEAD W/O CONTRAST  LOCATION: [facility]  DATE: 7/16/2023    INDICATION: confusion  COMPARISON: 07/12/2023n.  TECHNIQUE: Routine CT Head without IV contrast. Multiplanar reformats. Dose reduction techniques were used.    FINDINGS:  INTRACRANIAL CONTENTS: No intracranial hemorrhage, extraaxial collection, or mass effect.  No CT evidence of acute infarct. Small old lacunar type infarction in the left basal ganglia. Severe presumed chronic small vessel ischemic changes. Mild   generalized volume loss. No hydrocephalus.     VISUALIZED ORBITS/SINUSES/MASTOIDS: No intraorbital abnormality. No paranasal sinus mucosal disease. No middle ear or mastoid effusion.    BONES/SOFT TISSUES: No acute abnormality.      Impression    IMPRESSION:  1.  No CT evidence for acute intracranial process.  2.  Brain atrophy and presumed chronic microvascular ischemic changes as above.   CT Head w/o Contrast    Narrative    EXAM: CT HEAD W/O CONTRAST  LOCATION: [facility]  DATE: 7/17/2023    INDICATION: Fall, has swelling and bruise on left eye.  COMPARISON: 07/16/2023. 07/12/2023.  TECHNIQUE: Routine CT Head without IV contrast. Multiplanar reformats. Dose reduction techniques were used.    FINDINGS:  INTRACRANIAL CONTENTS: No intracranial hemorrhage, extraaxial collection, or mass effect. No CT evidence of acute infarct. Stable encephalomalacia identified involving the left frontal operculum and right parietal lobe. Stable chronic left basal ganglia   lacunar infarct. Severe presumed chronic small vessel ischemic changes. Mild generalized volume loss. No hydrocephalus.     VISUALIZED ORBITS/SINUSES/MASTOIDS: Prior bilateral cataract surgery. Visualized portions of the orbits are otherwise unremarkable. No paranasal sinus mucosal disease. No middle ear or mastoid  effusion.    BONES/SOFT TISSUES: No calvarial fracture. Left periorbital/frontal scalp soft tissue swelling/contusion.      Impression    IMPRESSION:  1.  No CT evidence for acute intracranial process.  2.  Chronic changes, as above.   XR Pelvis and Hip Bilateral 2 Views    Narrative    EXAM: XR PELVIS AND HIP BILATERAL 2 VIEWS  LOCATION: [facility]  DATE: 7/17/2023    INDICATION: Fall; hip pain; fracture, known or rule out, or trauma; no fracture f u or history of osteoarthritis; No hip x-ray result available.  COMPARISON: 07/12/2023.      Impression    IMPRESSION: Since 07/12/2023 there are new postoperative changes seen involving the right hip. There is more displacement seen involving the right lesser trochanteric fracture and there appears to be a new minimally displaced fracture through the upper   aspect of the right greater trochanteric region. Otherwise, the exam is stable with again seen distal neck/intertrochanteric junction fracture of the right hip. Moderate degenerative changes most marked in the lower lumbar spine. Scattered pelvic   phleboliths. Normal bowel gas pattern. No acute findings of the left hip or the pelvis.

## 2023-07-17 NOTE — PROGRESS NOTES
New bruise areas  noted above right and left eye.  Neuros done.  Also has a new abrasion on right knee.

## 2023-07-17 NOTE — PROGRESS NOTES
Dr Luis notified of increased pain in hip.  Too early for her ultram.  New orders received.  Also a little earlier pt was scanned for 840cc of urine.  She has been unable to go.  Catheter was put in per order from the doctor.

## 2023-07-18 ENCOUNTER — TELEPHONE (OUTPATIENT)
Dept: ORTHOPEDICS | Facility: CLINIC | Age: 73
End: 2023-07-18

## 2023-07-18 ENCOUNTER — APPOINTMENT (OUTPATIENT)
Dept: PHYSICAL THERAPY | Facility: CLINIC | Age: 73
DRG: 481 | End: 2023-07-18
Payer: MEDICARE

## 2023-07-18 ENCOUNTER — APPOINTMENT (OUTPATIENT)
Dept: OCCUPATIONAL THERAPY | Facility: CLINIC | Age: 73
DRG: 481 | End: 2023-07-18
Payer: MEDICARE

## 2023-07-18 VITALS
BODY MASS INDEX: 23.04 KG/M2 | RESPIRATION RATE: 18 BRPM | SYSTOLIC BLOOD PRESSURE: 172 MMHG | TEMPERATURE: 98.9 F | HEART RATE: 78 BPM | OXYGEN SATURATION: 96 % | DIASTOLIC BLOOD PRESSURE: 86 MMHG | HEIGHT: 63 IN | WEIGHT: 130 LBS

## 2023-07-18 DIAGNOSIS — M25.521 ELBOW PAIN, RIGHT: Primary | ICD-10-CM

## 2023-07-18 LAB
ANION GAP SERPL CALCULATED.3IONS-SCNC: 10 MMOL/L (ref 7–15)
BUN SERPL-MCNC: 10.3 MG/DL (ref 8–23)
CALCIUM SERPL-MCNC: 9 MG/DL (ref 8.8–10.2)
CHLORIDE SERPL-SCNC: 100 MMOL/L (ref 98–107)
CREAT SERPL-MCNC: 0.59 MG/DL (ref 0.51–0.95)
DEPRECATED HCO3 PLAS-SCNC: 24 MMOL/L (ref 22–29)
GFR SERPL CREATININE-BSD FRML MDRD: >90 ML/MIN/1.73M2
GLUCOSE SERPL-MCNC: 199 MG/DL (ref 70–99)
HGB BLD-MCNC: 9.3 G/DL (ref 11.7–15.7)
POTASSIUM SERPL-SCNC: 3.8 MMOL/L (ref 3.4–5.3)
SODIUM SERPL-SCNC: 134 MMOL/L (ref 136–145)

## 2023-07-18 PROCEDURE — 36415 COLL VENOUS BLD VENIPUNCTURE: CPT | Performed by: NURSE PRACTITIONER

## 2023-07-18 PROCEDURE — 99239 HOSP IP/OBS DSCHRG MGMT >30: CPT | Performed by: NURSE PRACTITIONER

## 2023-07-18 PROCEDURE — 80048 BASIC METABOLIC PNL TOTAL CA: CPT | Performed by: NURSE PRACTITIONER

## 2023-07-18 PROCEDURE — 85018 HEMOGLOBIN: CPT | Performed by: NURSE PRACTITIONER

## 2023-07-18 PROCEDURE — 97535 SELF CARE MNGMENT TRAINING: CPT | Mod: GO

## 2023-07-18 PROCEDURE — 250N000013 HC RX MED GY IP 250 OP 250 PS 637: Performed by: NURSE PRACTITIONER

## 2023-07-18 PROCEDURE — 250N000013 HC RX MED GY IP 250 OP 250 PS 637: Performed by: PEDIATRICS

## 2023-07-18 PROCEDURE — 250N000013 HC RX MED GY IP 250 OP 250 PS 637: Performed by: ORTHOPAEDIC SURGERY

## 2023-07-18 PROCEDURE — 97116 GAIT TRAINING THERAPY: CPT | Mod: GP | Performed by: PHYSICAL THERAPIST

## 2023-07-18 PROCEDURE — 97110 THERAPEUTIC EXERCISES: CPT | Mod: GP | Performed by: PHYSICAL THERAPIST

## 2023-07-18 RX ORDER — QUETIAPINE FUMARATE 25 MG/1
25 TABLET, FILM COATED ORAL EVERY MORNING
DISCHARGE
Start: 2023-07-19 | End: 2023-07-31

## 2023-07-18 RX ORDER — QUETIAPINE FUMARATE 25 MG/1
25 TABLET, FILM COATED ORAL DAILY
DISCHARGE
Start: 2023-07-18 | End: 2023-07-27 | Stop reason: ALTCHOICE

## 2023-07-18 RX ORDER — ACETAMINOPHEN 325 MG/1
650 TABLET ORAL EVERY 4 HOURS PRN
Qty: 100 TABLET | Refills: 0 | DISCHARGE
Start: 2023-07-18 | End: 2023-07-20 | Stop reason: ALTCHOICE

## 2023-07-18 RX ORDER — BUTALBITAL, ASPIRIN AND CAFFEINE 50; 325; 40 MG/1; MG/1; MG/1
1 TABLET ORAL EVERY 4 HOURS PRN
Qty: 30 TABLET | Refills: 0 | Status: SHIPPED | OUTPATIENT
Start: 2023-07-18 | End: 2023-08-04

## 2023-07-18 RX ORDER — TRAMADOL HYDROCHLORIDE 50 MG/1
50 TABLET ORAL EVERY 6 HOURS PRN
Qty: 40 TABLET | Refills: 0 | Status: SHIPPED | DISCHARGE
Start: 2023-07-18 | End: 2023-07-18

## 2023-07-18 RX ORDER — TRAMADOL HYDROCHLORIDE 50 MG/1
50 TABLET ORAL EVERY 6 HOURS PRN
Qty: 40 TABLET | Refills: 0 | Status: SHIPPED | OUTPATIENT
Start: 2023-07-18 | End: 2023-07-31

## 2023-07-18 RX ORDER — AMOXICILLIN 250 MG
1 CAPSULE ORAL 2 TIMES DAILY
Qty: 30 TABLET | Refills: 1 | DISCHARGE
Start: 2023-07-18 | End: 2023-07-20

## 2023-07-18 RX ADMIN — LISINOPRIL 20 MG: 10 TABLET ORAL at 08:58

## 2023-07-18 RX ADMIN — CLOPIDOGREL BISULFATE 75 MG: 75 TABLET ORAL at 08:59

## 2023-07-18 RX ADMIN — ROSUVASTATIN CALCIUM 20 MG: 20 TABLET, FILM COATED ORAL at 08:58

## 2023-07-18 RX ADMIN — CARVEDILOL 12.5 MG: 6.25 TABLET, FILM COATED ORAL at 08:58

## 2023-07-18 RX ADMIN — ACETAMINOPHEN 650 MG: 325 TABLET, FILM COATED ORAL at 05:21

## 2023-07-18 RX ADMIN — SENNOSIDES AND DOCUSATE SODIUM 1 TABLET: 8.6; 5 TABLET ORAL at 08:58

## 2023-07-18 RX ADMIN — HYDRALAZINE HYDROCHLORIDE 25 MG: 25 TABLET, FILM COATED ORAL at 08:59

## 2023-07-18 RX ADMIN — TRAMADOL HYDROCHLORIDE 50 MG: 50 TABLET, COATED ORAL at 11:57

## 2023-07-18 RX ADMIN — QUETIAPINE FUMARATE 25 MG: 25 TABLET ORAL at 08:59

## 2023-07-18 RX ADMIN — POLYETHYLENE GLYCOL 3350 17 G: 17 POWDER, FOR SOLUTION ORAL at 08:57

## 2023-07-18 RX ADMIN — SODIUM CHLORIDE TAB 1 GM 1 G: 1 TAB at 08:58

## 2023-07-18 RX ADMIN — BUPROPION HYDROCHLORIDE 300 MG: 150 TABLET, FILM COATED, EXTENDED RELEASE ORAL at 08:58

## 2023-07-18 RX ADMIN — VENLAFAXINE HYDROCHLORIDE 150 MG: 150 CAPSULE, EXTENDED RELEASE ORAL at 08:58

## 2023-07-18 RX ADMIN — ACETAMINOPHEN 650 MG: 325 TABLET, FILM COATED ORAL at 00:24

## 2023-07-18 RX ADMIN — AMLODIPINE BESYLATE 10 MG: 5 TABLET ORAL at 08:59

## 2023-07-18 ASSESSMENT — ACTIVITIES OF DAILY LIVING (ADL)
ADLS_ACUITY_SCORE: 26
ADLS_ACUITY_SCORE: 27

## 2023-07-18 NOTE — PROGRESS NOTES
"[facility]    Progress note    73 year old female POD#4 s/p right hip open reduction internal fixation with short gamma nail by Dr. Turner  S: Patient seen at bedside in no apparent distress, alert and pleasant.  I discussed right elbow and weightbearing status and discharge to TCU with the patient.  She denies numbness, tingling or major pain issues.  States she is feeling better today.  She seems more alert today also.  O: CMS intact right LE, DF/PF intact, 5 out of 5 strength.         Aquacel dressing on, clean and dry with a good seal       Right hip with minimal swelling and no erythema or ecchymosis        Bilateral calves soft and non tender       Right elbow with small clean healing skin tear.  No tenderness to palpation and no erythema swelling or drainage.    Vital signs:  Temp: 98.1  F (36.7  C) Temp src: Oral BP: (!) 143/66 Pulse: 84   Resp: 20 SpO2: 96 % O2 Device: None (Room air) Oxygen Delivery: 2 LPM Height: 160 cm (5' 3\") Weight: 59 kg (130 lb)  Estimated body mass index is 23.03 kg/m  as calculated from the following:    Height as of this encounter: 1.6 m (5' 3\").    Weight as of this encounter: 59 kg (130 lb).      Hemoglobin   Date Value Ref Range Status   07/16/2023 8.9 (L) 11.7 - 15.7 g/dL Final   11/04/2020 14.8 11.7 - 15.7 g/dL Final     INR   Date Value Ref Range Status   07/12/2023 1.10 0.85 - 1.15 Final   01/15/2017 0.87 0.86 - 1.14 Final     INR (External)   Date Value Ref Range Status   06/25/2021 1.1 <1.3 Final         A/P:  1. Pain-controlled with Tylenol and Ultram 50 mg every 6 hours as needed  2. DVT Prophylaxis/treatment history of TIA-Plavix 75 mg daily was restarted after surgery.  3. Weight Bearing Status-50% weightbearing right lower extremity x1 month.    4. Hip Precautions-none  5. Physical Therapy-recommending transitional care  6. Occupational Therapy-recommending transitional care  7. Hospitalist-NISHANT Hutton following he is anticipating discharge " today. Secure message sent to Dr. Horowitz this morning.  8. Incision-no signs or symptoms of infection  9. Social work-TCU referral sent out 2 days ago.  Placement pending.  Secure message sent to social work team this morning.  10. Fall in Hospital-x-rays of hip reviewed yesterday and Dr. Turner will continue the 50% weightbearing with no changes.  I also reviewed elbow x-rays done yesterday showing no change from previous x-rays done on 7/10/2023 thus no fracture.  11. Skin tear right elbow-Daily dressing change with nonocclusive dressing.  Wound looks like it is healing well.  12. Plan-continue 50% weightbearing after fall.    Possible discharge to transitional care today.  And follow-up with Dr. Turner in 2 weeks.  Okay for discharge when hospitalist team is cleared the patient.  Secure message sent to Dr. Horowitz and discussed plan with charge night RN.    Elías Willoughby PA-C  7/18/2023

## 2023-07-18 NOTE — PLAN OF CARE
Goal Outcome Evaluation:      Plan of Care Reviewed With: patient    Overall Patient Progress: no changeOverall Patient Progress: no change    Outcome Evaluation: Patient has times of confusion with occasional word finding difficulties. Transfers with assist of 1 and walker. Tramadol and Tylenol given for discomfort. Pt reports more right arm pain than hip pain this afternoon. Merida in place for retention.

## 2023-07-18 NOTE — PLAN OF CARE
Occupational Therapy Discharge Summary    Reason for therapy discharge:    Discharged to transitional care facility.    Progress towards therapy goal(s). See goals on Care Plan in [facility] electronic health record for goal details.  Goals not met.  Barriers to achieving goals:   discharge from facility.    Therapy recommendation(s):    Continued therapy is recommended.  Rationale/Recommendations:  Progression of strength, ADLS and related mobility prior to returning to previous living arrangements.      Thank you for your referral.  Marianne Benítez, OTR/PAMELA     [facility]  O: [phone]  E: [email or web address]

## 2023-07-18 NOTE — DISCHARGE INSTRUCTIONS
[facility] Discharge Instructions Hip Fracture Fixation  [phone]  Bone and Joint Service Line for issues or concerns     Discharge disposition:  Discharged to TCU   Wound Care/Dressings: 1.  Keep the Aquacel (surgical) dressing on for 10 days after surgery, on the 10th day you can remove the dressing and apply gauze and tape.  Change that dressing once per day until your clinic follow up. You can also choose to keep the surgical dressing on until follow up.   2.  Nonocclusive dressing change daily to right elbow until follow-up.   Diet: Orders Placed This Encounter      Advance Diet as Tolerated: Regular Diet Adult      Diet      Pain Control: Pain medication given, as pain gets better wean to tylenol as needed.  Ultram also ordered.   Activity: 50% partial weightbearing right lower extremity x1 month  No Hip Precautions.   Icing: Ice on 15 minutes then off 15 minutes as needed for pain and swelling    Follow-up: Follow-up with Dr. Turner in 10 to 14 days for wound check and x-rays AP lateral of right hip.   When to Call the Office: Temperature greater than 101.5 degrees Fahrenheit.  Increasing pain not relieved by medicine or icing.  Excessive drainage from the incision that includes bright red blood.  Drainage from the incision that appears yellow, pus-like, or foul smelling.  Persistent nausea or vomiting.    Call 911 if you experience any chest pain and/or shortness or breath.   Additional instructions: Anticoagulation: Patient is back on Plavix which she normally takes.        TCU provider follow up:   Monitor for delirium and taper seroquel as able  Merida catheter in place due to urinary retention, outpatient urology consult recommended.

## 2023-07-18 NOTE — TELEPHONE ENCOUNTER
[facility] Call Center    Phone Message    May a detailed message be left on voicemail: yes     Reason for Call: Other: Melina from [facility] is calling and needed a updated script for patients augustine brace for her right elbow. Please fax script to [phone]     Action Taken: Message routed to:  [facility] ([facility] ORTHO    Travel Screening: Not Applicable

## 2023-07-18 NOTE — DISCHARGE SUMMARY
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 Turner in 10-14 days for wound check, we will   get xray at that time, AP and lateral of right hip.        Additional follow-up instructions/to-do's for PCP    :  Monitor delirium and taper seroquel as able  Follow up with urology out patient due to urinary retention, straight cath as needed.   Check follow up Hgb, Na in the next week.      Unresulted Labs Ordered in the Past 30 Days of this Admission       No orders found from 6/12/2023 to 7/13/2023.        These results will be followed up by NA    Discharge Disposition   Discharged to short-term care facility  Condition at discharge: Stable    Hospital Course   Acute closed and non-displaced intertrochanteric fracture right side due to mechanical fall S/P ORIF gamma todd placement 7-14-23  Hx osteoporosis with pathologic fracture  Vitamin D deficiency   Pt had delay in surgical intervention due to Plavix and perioperative bleeding  risk.   Plan:   Plavix restarted 7-15-23  Vitamin D and Calcium Carbonate supplementation-continue as OP   PT and OT recommend TCU   Needs F/U PCP regarding potential treatment options for osteoporosis    Fall 7/16/23  minimally displaced fracture through the upper aspect of of the right greater trochanter  Left eye contusion  7/16/23 around 11pm patient was found on the floor in room with contusion left eye. Head CT was negative for bleed. Pelvis x-rays show what appear to be a new minimally displaced fracture through the upper aspect of of the right greater trochanter. X-rays were reviewed by Dr Turner and she will be 50% weight bearing x1 month.   Pt was reporting right elbow pain. X rays of the elbow show intact hardware, in a comminuted elbow fracture. No acute changes   Plan:  Fall precautions      Hypertension-  Chronic on Lisinopril, Norvasc, Coreg, Hydralazine and Lasix. Systolic BP range 135-170 in last 24 hours.    Plan:   Continue home antihypertensives. 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  At time of stroke in June 2021 she underwent reevaluation of CAD and had similar findings on coronary angiography at that time with overall severely reduced LVEF of less than 30%.  Despite her known history of reduced LV systolic function, she is not known to have clinical history of heart failure.  However, Echo in February and April 2023 demonstrated normal LVEF.  She may have had mild pulmonary edema postoperatively while recovering after orthopedic surgery in May 2023 although did not appear to require any specific treatment for it.  EKG this hospitalization is  "reassuring without acute abnormalities.  Plan:   -Continue chronic beta-blocker, ACE inhibitor, and statin  - Chronic antiplatelet therapy has been restarted   -home lasix dose on hold due to hyponatremia, monitor for s/s of fluid overload, consider restarting if needed.       History of ischemic stroke  Left MCA trifurcation 5 mm aneurysm  She has had previous history ischemic stroke in the past that precipitated cognitive deterioration and memory loss.  In April 2023 she presented with speech changes and right-sided weakness and was treated in outside ER with thrombolytic therapy after which her neurologic deficits resolved.  She was thought to have had aborted stroke versus TIA at that time.  She continues to follow-up with neurology and currently is wearing a cardiac event monitor that is due to be removed on July 14.  She has known history of small 5 mm left MCA aneurysm for which no intervention has been advised today.  There was concern for possible closed head injury  when she fell although patient denies any head trauma and her neurologic status appears stable compared with her normal baseline.  Head and neck CT imaging performed in the ER on day of admission was without any acute abnormalities.  Due to her relatively recent neurologic event, she will be at increased risk for recurrence of TIA and/or stroke perioperatively.  Plan\"    -Continue chronic statin       Traumatic skin tear right elbow  Status post right elbow surgery 6/1/23   Patient had right elbow surgery (right olecranon osteotomy fixation) in May 2023 with subsequent failure.  She then underwent revision ORIF of right olecranon with removal of previous olecranon plate on June 1 from which she has been continuing to recover including ongoing therapies at home and as an outpatient.  On day of admission she sustained skin injury to the right elbow when she fell and appears to have an open skin lesion overlying the right olecranon area.  The skin " lesion does not demonstrate significant bleeding or signs of infection.  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  Cause for chronic anemia is not known.  She is at risk for worsening anemia after her injury and planned surgery combined with chronic Plavix and aspirin therapies that cause platelet function defect and increase her bleeding risk.  7/18 hgb 9.3  Plan:   -recheck hgb within a week.     Chronic hyponatremia  Chronically hyponatremic with baseline sodium 133-134 and treated chronically with sodium chloride tablets.  Current sodium continues to be stable compared with her baseline.  Plan:   -Continue chronic dose of sodium tablets perioperatively and adjust dosing accordingly depending upon changes in her sodium level  -rechecl Na within a week     Urine retention  Developed urinary retention during hospitalization necessitating intermittent bladder catheterization.  Reporting chronic urinary habits such that she has to modify her posture to be able to void effectively at her normal baseline. Attempted to discontinue peñaloza, but she was unable to void so peñaloza catheter replaced.   Plan:  Will discontinue peñaloza catheter at time of discharge.  Straight cath as needed for urinary retention.         Memory loss  History of postoperative delirium  Delirium   Known memory loss after older stroke and developed postoperative delirium after orthopedic surgery in May 2023.  Bedtime dose of Seroquel was added at that time with good effect.  Discharged with 24-hour supervision after that hospital stay  was advised.  She has been living at home with her friend Bryson since then, and Bryson helps her with medication administration and daily activities at home.  Since at hospital she has had confusion, work up negative fore acute cause.  Dilauded, atarax and compazine stopped.    SO indicated in past she developed delirium due to medications and interrupted sleep.   7/17/23 SLUMS 18/30 indicating Dementia, although still may be some component of delirium  Today was calm and alert.    Plan:    -continue seroquel 25mg at 0800 and 1400 with 50mg at HS.   -taper seroquel as delirium improves  -consider reassessing cognitive testing as delirium improves.       Depression  Chronically treated with Effexor XR and bupropion.  Difficult to assess depression at this time but it appears to generally be stable.  Plan  -Continue chronic doses of Effexor XR and bupropion     Asthma  Medical records refer to diagnosis of persistent asthma although the patient does not appear to be prescribed controller medications.  She does not demonstrate any symptoms or signs of active asthma at this time.  Plan  -Monitor clinically for signs of asthma  -Add bronchodilators if there is increasing concern for asthma symptoms     Migraine headache  Patient has chronic history of migraine headaches.  She has been using Fiorinal on a daily basis recently to manage her headache.  Plan:   -continue home Fiorinal as needed  -Reconsider use of specific migraine medication if migraine worsens although it is not clear whether her MCA aneurysm is a contraindication to use of migraine medication      Consultations This Hospital Stay   ORTHOPEDIC SURGERY IP CONSULT  OCCUPATIONAL THERAPY ADULT IP CONSULT  CARE MANAGEMENT / SOCIAL WORK IP CONSULT  PHYSICAL THERAPY ADULT IP CONSULT  OCCUPATIONAL THERAPY ADULT IP CONSULT  ORTHOPEDIC SURGERY IP CONSULT  OCCUPATIONAL THERAPY ADULT IP CONSULT  PHYSICAL THERAPY ADULT IP CONSULT  OCCUPATIONAL THERAPY ADULT IP  CONSULT    Code Status   Full Code    Time Spent on this Encounter   I, Yanely Perera CNP, personally saw the patient today and spent greater than 30 minutes discharging this patient.       Yanely Perera CNP  [address] MEDICAL SURGICAL  [address] [place] [address]  Phone: [phone]  ______________________________________________________________________    Physical Exam   Vital Signs: Temp: 98.9  F (37.2  C) Temp src: Oral BP: (!) 172/86 Pulse: 78   Resp: 18 SpO2: 96 % O2 Device: None (Room air)    Weight: 130 lbs 0 oz  Constitutional: 74 yo female in bed, on RA she is awake. Does not appear in acute distress  Hematologic / Lymphatic: No active bleeding   Respiratory: No increased work of breathing, good air exchange, clear to auscultation bilaterally, no crackles or wheezing  Cardiovascular: Normal apical impulse, regular rate and rhythm, normal S1 and S2, no S3 or S4, and no murmur noted  GI: normal bowel sounds, soft, non-distended and non-tender  Genitounirinary: Merida cath in place. No gross hematuria. Urine clear and yellow   Skin: W/D. Has left periorbital swelling and bruising   Musculoskeletal: LLE sling swelling. Dsg C/D/I  Neurologic: Alert.  Able to answer simple questions.  Difficulty understanding current circumstances.  Understands she is in the hospital  Neuropsychiatric: General: normal, calm and normal eye contact  Level of consciousness: alert / normal  Affect: calm  Orientation: oriented only to self, SO Bryson  Memory and insight: impaired:Minimal insight into situation       Primary Care Physician   Rosenda Pierre    Discharge Orders      Medication Therapy Management Referral      Adult Urology  Referral      General info for SNF    Length of Stay Estimate: Short Term Care: Estimated # of Days <30 Condition at Discharge: Stable Level of care:skilled  Rehabilitation Potential: Good Admission H&P remains valid and up-to-date: Yes Recent  "Chemotherapy: N/A Use Nursing Home Standing Orders: Yes     Mantoux Instructions    Give two-step Mantoux (PPD) Per Facility Policy: Yes     Incentive Spirometry    Incentive Spirometry 10 times per hour, 4 times per day.     Shower with wound/dressing NOT covered    You do not need to cover your dressing or incision in the shower, you may allow water and soap to run over top of the surgical dressing or incision. You may shower 0 days after surgery.  You are strictly prohibited from soaking or submerging the surgical wound underwater.     Reason for your hospital stay    Left hip Fracture s/p left hip open reduction internal fixation with short gamma nail by Dr. Turner.     When to call - Contact Surgeon Team    You may experience symptoms that require follow-up before your scheduled appointment. Refer to the \"[employer]\" for instructions on when to contact your Surgeon Team if you are concerned about pain control, blood clots, constipation, or if you are unable to urinate.     When to call - Reach out to Urgent Care    If you are not able to reach your Surgeon Team and you need immediate care, go to the Orthopedic Walk-in Clinic or Urgent Care at your Surgeon's office.  Do NOT go to the Emergency Room unless you have shortness of breath, chest pain, or other signs of a medical emergency.     When to call - Reasons to Call 911    Call 911 immediately if you experience sudden-onset chest pain, arm weakness/numbness, slurred speech, or shortness of breath     Symptoms - Fever Management    A low grade fever can be expected after surgery.  Use acetaminophen (TYLENOL) as needed for fever management.  Contact your Surgeon Team if you have a fever greater than 101.5 F, chills, and/or night sweats.     Symptoms - Constipation management    Constipation (hard, dry bowel movements) is expected after surgery due to the combination of being less active, the anesthetic, and the opioid pain medication.  You can do the " following to help reduce constipation:  ~  FLUIDS:  Drink clear liquids (water or Gatorade), or juice (apple/prune).  ~  DIET:  Eat a fiber rich diet.    ~  ACTIVITY:  Get up and move around several times a day.  Increase your activity as you are able.  MEDICATIONS:  Reduce the risk of constipation by starting medications before you are constipated.  You can take Miralax   (1 packet as directed) and/or a stool softener (Senokot 1-2 tablets 1-2 times a day).  If you already have constipation and these medications are not working, you can get magnesium citrate and use as directed.  If you continue to have constipation you can try an over the counter suppository or enema.  Call your Surgeon Team if it has been greater than 3 days since your last bowel movement.     Symptoms - Reduced Urine Output    Changes in the amount of fluids you drank before and after surgery may result in problems urinating.  It is important to stay well-hydrated after surgery and drink plenty of water. If it has been greater than 8 hours since you have urinated despite drinking plenty of water, call your Surgeon Team.     Medication instructions -  Anticoagulation - aspirin    Take the aspirin as prescribed twice per day for a total of 6 weeks after surgery.  This is given to help minimize your risk of blood clot.     Activity - Exercises to prevent blood clots    Unless otherwise directed by your Surgeon team, perform the following exercises at least three times per day for the first four weeks after surgery to prevent blood clots in your legs: 1) Point and flex your feet (Ankle Pumps), 2) Move your ankle around in big circles, 3) Wiggle your toes, 4) Walk, even for short distances, several times a day, will help decrease the risk of blood clots.     Comfort and Pain Management - Pain after Surgery    Pain after surgery is normal and expected.  You will have some amount of pain for several weeks after surgery.  Your pain will improve with  "time.  There are several things you can do to help reduce your pain including: rest, ice, elevation, and using pain medications as needed. Contact your Surgeon Team if you have pain that persists or worsens after surgery despite rest, ice, elevation, and taking your medication(s) as prescribed. Contact your Surgeon Team if you have new numbness, tingling, or weakness in your operative extremity.     Comfort and Pain Management - Swelling after Surgery    Swelling and/or bruising of the surgical extremity is common and may persist for several months after surgery. In addition to frequent icing and elevation, gentle compressive support with an ACE wrap or tubigrip may help with swelling. Apply compression regularly, removing at least twice daily to perform skin checks. Contact your Surgeon Team if your swelling increases and is NOT associated with an increase in your activity level, or if your swelling increases and is associated with redness and pain.     Comfort and Pain Management - LOWER Extremity Elevation    Swelling is expected for several months after surgery. This type of swelling is usually associated with gravity and activity, and can be improved with elevation.   The best way to do this is to get your \"toes above your nose\" by laying down and placing several pillows lengthwise under your calf and heel. When elevating your leg keep your knee completely straight. Perform this elevation as often as possible especially for the first two weeks after surgery.     Comfort and Pain Management - Cold therapy    Ice can be used to control swelling and discomfort after surgery. Place a thin towel over your operative site and apply the ice pack overtop. Leave ice pack in place for 20 minutes, then remove for 20 minutes. Repeat this 20 minutes on/20 minutes off routine as often as tolerated.     Medication Instructions - Acetaminophen (TYLENOL) Instructions    You were discharged with acetaminophen (TYLENOL) for pain " management after surgery. Acetaminophen most effectively manages pain symptoms when it is taken on a schedule without missing doses (every four, six, or eight hours). Your Provider will prescribe a safe daily dose between 3000 - 4000 mg.  Do NOT exceed this daily dose. Most patients use acetaminophen for pain control for the first four weeks after surgery.  You can wean from this medication as your pain decreases.     Medication Instructions - NSAID Instructions    You were discharged with an anti-inflammatory medication for pain management to use in combination with acetaminophen (TYLENOL) and the narcotic pain medication.  Take this medication exactly as directed.  You should only take one anti-inflammatory at a time.  Some common anti-inflammatories include: ibuprofen (ADVIL, MOTRIN), naproxen (ALEVE, NAPROSYN), celecoxib (CELEBREX), meloxicam (MOBIC), ketorolac (TORADOL).  Take this medication with food and water.     Medication Instructions - Opioid Instructions (Greater than or equal to 65 years)    You were discharged with an opioid medication (hydromorphone, oxycodone, hydrocodone, or tramadol). This medication should only be taken for breakthrough pain that is not controlled with acetaminophen (TYLENOL). If you rate your pain less than 3 you do not need this medication.  Pain rating 0-3:  You do not need this medication  Pain rating 4-6:  Take 1/2 tablet every 4-6 hours as needed  Pain rating 7-10:  Take 1 tablet every 4-6 hours as needed  Do not exceed 4 tablets per day     Medication Instructions - Opioids - Tapering Instructions    In the first three days following surgery, your symptoms may warrant use of the narcotic pain medication every four to six hours as prescribed. This is normal. As your pain symptoms improve, focus your efforts on decreasing (tapering) use of narcotic medications. The most successful tapering strategy is to first, decrease the number of tablets you take every 4-6 hours to the  minimum prescribed. Then, increase the amount of time between doses.  For example:  First, taper to   or 1 tablet every 4-6 hours.  Then, taper to   or 1 tablet every 6-8 hours.  Then, taper to   or 1 tablet every 8-10 hours.  Then, taper to   or 1 tablet every 10-12 hours.  Then, taper to   or 1 tablet at bedtime.  The bedtime dose can help with comfort during sleep and is typically the last dose to be discontinued after surgery.     Follow Up Care    Follow-up with your Dr. Turner in 10-14 days for wound check, we will get xray at that time, AP and lateral of right hip.     Bladder scan    Bladder scan q shift if patient unable to urinate.  Straight catheter as needed if bladder scan shows > 300cc  Dx urinary retention     Physical Therapy Adult Consult    Evaluate and treat as clinically indicated.    Reason: Status Post Hip Surgery     Occupational Therapy Adult Consult    Evaluate and treat as clinically indicated.    Reason: Status Post Hip Surgery     Diet    Follow this diet upon discharge: Orders Placed This Encounter          Discharge Instruction - Regular Diet Adult       Significant Results and Procedures   Most Recent 3 CBC's:  Recent Labs   Lab Test 07/18/23  0857 07/16/23  0949 07/16/23  0621 07/13/23  0730 07/12/23  1035 06/29/23  1656   WBC  --  7.5  --   --  5.3 5.6   HGB 9.3* 8.9* 8.7*   < > 10.5* 10.8*   MCV  --  85  --   --  86 89   PLT  --  256  --   --  336 352    < > = values in this interval not displayed.     Most Recent 3 BMP's:  Recent Labs   Lab Test 07/18/23  0857 07/17/23  0625 07/16/23  0949   * 132* 132*   POTASSIUM 3.8 3.6 3.8   CHLORIDE 100 99 98   CO2 24 25 24   BUN 10.3 10.6 12.2   CR 0.59 0.65 0.67   ANIONGAP 10 8 10   CESIA 9.0 9.0 9.0   * 106* 180*   ,   Results for orders placed or performed during the hospital encounter of 07/12/23   XR Pelvis and Hip Right 1 View    Narrative    XR PELVIS AND HIP RIGHT 1 VIEW  7/12/2023 11:12 AM     HISTORY: fall,  pain  COMPARISON: None      Impression    IMPRESSION: Acute nondisplaced right femoral neck fracture. There is  fracture extension into the lesser trochanter and possibly the greater  trochanter superiorly. Normal joint alignment. There is normal hip  joint spacing bilaterally. Degenerative changes of the lower lumbar  spine. Osteopenia.    AYLA CARRILLO MD         SYSTEM ID:  [id number]   Elbow XR, 2 views, right    Narrative    ELBOW TWO VIEWS RIGHT 7/12/2023 11:14 AM     HISTORY: fall, pain  COMPARISON: 7/10/2023      Impression    IMPRESSION: Status post ORIF of the distal humeral and proximal ulnar  fractures with plate and screw fixation. There is similar fracture  alignment compared to prior. No hardware complication. No significant  interval healing. Limited evaluation for joint effusion. Otherwise  unchanged.    AYLA CARRILLO MD         SYSTEM ID:  [id number]   CT Head w/o Contrast    Narrative    CT SCAN OF THE HEAD WITHOUT CONTRAST July 12, 2023 11:00 AM     HISTORY: Fall, trauma.    TECHNIQUE: Axial images of the head and coronal reformations without  IV contrast material. Radiation dose for this scan was reduced using  automated exposure control, adjustment of the mA and/or kV according  to patient size, or iterative reconstruction technique.    COMPARISON: CT of the head 5/10/2023. MRI of the brain 2/28/2023.    FINDINGS: The ventricles appear stable in size and configuration. No  hydrocephalus. Mild generalized brain parenchymal volume loss.  Relatively extensive confluent nonspecific hypoattenuation throughout  the cerebral white matter, nonspecific, not significantly changed from  most recent exam. This may be due to advanced chronic small vessel  ischemic disease. Unchanged small area of chronic infarction in the  left basal ganglia region. Unchanged small chronic  cortical/subcortical infarct in the left inferior frontal gyrus. No  hyperdense acute intracranial hemorrhage, extra-axial  fluid  collection, or mass effect. Bilateral lens implants. The visualized  aspects of the paranasal sinuses and mastoids appear clear with the  exception of minimal fluid/membrane thickening in the mastoid tips.  Presumed cerumen in the bilateral external auditory canals. The bony  calvarium and bones of the skull base appear intact. Degenerative  changes of the median atlantoaxial joint noted.      Impression    IMPRESSION:  1. No acute intracranial hemorrhage, extra-axial fluid collection, or  mass effect.  2. No significant change in chronic intracranial findings, as  described in the body of the report.    CHARLIE HENAO MD         SYSTEM ID:  [id number]   CT Cervical Spine w/o Contrast    Narrative    CT CERVICAL SPINE WITHOUT CONTRAST  7/12/2023 11:00 AM     HISTORY: Pain, fall with closed head injury.     TECHNIQUE: Axial images of the cervical spine were obtained without  intravenous contrast. Multiplanar reformations were performed.   Radiation dose for this scan was reduced using automated exposure  control, adjustment of the mA and/or kV according to patient size, or  iterative reconstruction technique.    COMPARISON: None.    FINDINGS: No acute cervical spine fracture is identified. Normal  vertebral body heights. Trace anterolisthesis of C4 on C5. Mildly  exaggerated cervical lordosis. Minimal levoconvex curvature of the  cervical spine.    The intervertebral discs appear relatively maintained in height.  Moderate to severe multilevel degenerative facet arthropathy.  Degenerative changes of the median atlantoaxial joint. Central disc  protrusions at C2-C3 and C3-C4 with multilevel disc bulges elsewhere.  Scattered ligamentum flavum thickening. Mild to moderate multilevel  spinal canal stenosis. No definite high-grade spinal canal narrowing  identified. C5-C6 neural foraminal stenosis with relatively lesser  degrees of mild and moderate neural foraminal narrowing elsewhere on a  degenerative  basis.    Minimal presumed fibrosis of the lung apices. Carotid bifurcation  atherosclerotic calcification bilaterally. The visualized paraspinal  soft tissues otherwise appear grossly unremarkable. A shoulder  prosthesis is partially visualized on the  image.      Impression    IMPRESSION:  1. No acute cervical spine fracture.  2. Multilevel degenerative changes, as described.    CHARLIE HENAO MD         SYSTEM ID:  [id number]   XR Surgery YESSY L/T 5 Min Fluoro w Stills    Narrative    XR SURGERY YESSY FLUORO LESS THAN 5 MIN W STILLS 7/14/2023 12:06 PM     HISTORY: closed nondisplaced intertrochanteric fracture of right femur  COMPARISON: 7/12/2023    NUMBER OF IMAGES ACQUIRED: 3    VIEWS: 2    FLUOROSCOPY TIME: 0.5 minutes      Impression    IMPRESSION: Intraprocedural spot films demonstrate intramedullary todd  and screw fixation of the intertrochanteric right femur fracture.  Please reference the surgical report for further details.    AYLA CARRILLO MD         SYSTEM ID:  [id number]   CT Head w/o contrast*    Narrative    EXAM: CT HEAD W/O CONTRAST  LOCATION: [facility]  DATE: 7/16/2023    INDICATION: confusion  COMPARISON: 07/12/2023n.  TECHNIQUE: Routine CT Head without IV contrast. Multiplanar reformats. Dose reduction techniques were used.    FINDINGS:  INTRACRANIAL CONTENTS: No intracranial hemorrhage, extraaxial collection, or mass effect.  No CT evidence of acute infarct. Small old lacunar type infarction in the left basal ganglia. Severe presumed chronic small vessel ischemic changes. Mild   generalized volume loss. No hydrocephalus.     VISUALIZED ORBITS/SINUSES/MASTOIDS: No intraorbital abnormality. No paranasal sinus mucosal disease. No middle ear or mastoid effusion.    BONES/SOFT TISSUES: No acute abnormality.      Impression    IMPRESSION:  1.  No CT evidence for acute intracranial process.  2.  Brain atrophy and presumed chronic microvascular ischemic changes as  above.   XR Chest Port 1 View    Narrative    EXAM: XR CHEST PORT 1 VIEW  LOCATION: [facility]  DATE: 7/16/2023    INDICATION: Delirium  COMPARISON: 05/06/2023.      Impression    IMPRESSION: Mild left base atelectasis. No acute cardiopulmonary disease identified. Right shoulder arthroplasty appears stable. Subacute left rib fractures.   CT Head w/o Contrast    Narrative    EXAM: CT HEAD W/O CONTRAST  LOCATION: [facility]  DATE: 7/17/2023    INDICATION: Fall, has swelling and bruise on left eye.  COMPARISON: 07/16/2023. 07/12/2023.  TECHNIQUE: Routine CT Head without IV contrast. Multiplanar reformats. Dose reduction techniques were used.    FINDINGS:  INTRACRANIAL CONTENTS: No intracranial hemorrhage, extraaxial collection, or mass effect. No CT evidence of acute infarct. Stable encephalomalacia identified involving the left frontal operculum and right parietal lobe. Stable chronic left basal ganglia   lacunar infarct. Severe presumed chronic small vessel ischemic changes. Mild generalized volume loss. No hydrocephalus.     VISUALIZED ORBITS/SINUSES/MASTOIDS: Prior bilateral cataract surgery. Visualized portions of the orbits are otherwise unremarkable. No paranasal sinus mucosal disease. No middle ear or mastoid effusion.    BONES/SOFT TISSUES: No calvarial fracture. Left periorbital/frontal scalp soft tissue swelling/contusion.      Impression    IMPRESSION:  1.  No CT evidence for acute intracranial process.  2.  Chronic changes, as above.   XR Pelvis and Hip Bilateral 2 Views    Narrative    EXAM: XR PELVIS AND HIP BILATERAL 2 VIEWS  LOCATION: [facility]  DATE: 7/17/2023    INDICATION: Fall; hip pain; fracture, known or rule out, or trauma; no fracture f u or history of osteoarthritis; No hip x-ray result available.  COMPARISON: 07/12/2023.      Impression    IMPRESSION: Since 07/12/2023 there are new postoperative  changes seen involving the right hip. There is more displacement seen involving the right lesser trochanteric fracture and there appears to be a new minimally displaced fracture through the upper   aspect of the right greater trochanteric region. Otherwise, the exam is stable with again seen distal neck/intertrochanteric junction fracture of the right hip. Moderate degenerative changes most marked in the lower lumbar spine. Scattered pelvic   phleboliths. Normal bowel gas pattern. No acute findings of the left hip or the pelvis.   XR Elbow Right G/E 3 Views    Narrative    XR ELBOW RIGHT G/E 3 VIEWS 7/17/2023 9:18 AM     HISTORY: Fall C/O right elbow pain    COMPARISON: 7/12/2023      Impression    IMPRESSION: Plate and screw fixation across a comminuted distal  humeral fracture. The hardware is intact. Portions of the fracture  lucency in the central humeral condyle remain visible.  Stable volar  free fracture fragments. Plate and screw fixation across the olecranon  fracture. No olecranon fracture is visible. Tracks from previous  internal fixation in the proximal ulnar diaphysis. Osteopenia. Humeral  stem partially visualized.    BEA SORIA MD         SYSTEM ID:  [id number]     *Note: Due to a large number of results and/or encounters for the requested time period, some results have not been displayed. A complete set of results can be found in Results Review.       Discharge Medications   Current Discharge Medication List        START taking these medications    Details   acetaminophen (TYLENOL) 325 MG tablet Take 2 tablets (650 mg) by mouth every 4 hours as needed for other (mild pain)  Qty: 100 tablet, Refills: 0    Associated Diagnoses: Hip fracture, right, closed, initial encounter (H); Closed nondisplaced intertrochanteric fracture of right femur, initial encounter (H)      !! QUEtiapine (SEROQUEL) 25 MG tablet Take 1 tablet (25 mg) by mouth every morning    Associated Diagnoses: Postoperative  delirium      !! QUEtiapine (SEROQUEL) 25 MG tablet Take 1 tablet (25 mg) by mouth daily    Associated Diagnoses: Postoperative delirium      senna-docusate (SENOKOT-S/PERICOLACE) 8.6-50 MG tablet Take 1 tablet by mouth 2 times daily  Qty: 30 tablet, Refills: 1    Associated Diagnoses: Closed nondisplaced intertrochanteric fracture of right femur, initial encounter (H)       !! - Potential duplicate medications found. Please discuss with provider.        CONTINUE these medications which have CHANGED    Details   traMADol (ULTRAM) 50 MG tablet Take 1 tablet (50 mg) by mouth every 6 hours as needed for moderate pain  Qty: 40 tablet, Refills: 0    Associated Diagnoses: Closed nondisplaced intertrochanteric fracture of right femur, initial encounter (H)           CONTINUE these medications which have NOT CHANGED    Details   amLODIPine (NORVASC) 10 MG tablet Take 1 tablet (10 mg) by mouth daily  Qty: 30 tablet, Refills: 0    Associated Diagnoses: Hypertension goal BP (blood pressure) < 140/90      buPROPion (WELLBUTRIN XL) 300 MG 24 hr tablet Take 300 mg by mouth every morning      butalbital-aspirin-caffeine (FIORINAL EQUIV) -40 MG TABS per tablet Take 1 tablet by mouth every 4 hours as needed for migraine  Qty: 30 tablet, Refills: 0    Associated Diagnoses: Chronic migraine without aura without status migrainosus, not intractable      carvedilol (COREG) 12.5 MG tablet TAKE ONE TABLET BY MOUTH TWICE A DAY WITH FOOD  Qty: 180 tablet, Refills: 1    Associated Diagnoses: Hypertension goal BP (blood pressure) < 140/90; ST elevation myocardial infarction (STEMI), unspecified artery (H)      clopidogrel (PLAVIX) 75 MG tablet Take 1 tablet (75 mg) by mouth daily  Qty: 30 tablet, Refills: 0    Associated Diagnoses: 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, unspecified fracture morphology, subsequent encounter; Coronary artery disease due to lipid rich plaque; TIA (transient ischemic attack)       hydrALAZINE (APRESOLINE) 25 MG tablet Take 1 tablet (25 mg) by mouth 3 times daily  Qty: 90 tablet, Refills: 0    Associated Diagnoses: Hypertension goal BP (blood pressure) < 140/90      lisinopril (ZESTRIL) 20 MG tablet Take 1 tablet (20 mg) by mouth daily  Qty: 90 tablet, Refills: 0    Associated Diagnoses: Hypertension goal BP (blood pressure) < 140/90      melatonin 5 MG tablet Take 1 tablet (5 mg) by mouth At Bedtime  Qty: 30 tablet, Refills: 0    Associated Diagnoses: Delirium      !! QUEtiapine (SEROQUEL) 25 MG tablet Take 3 tablets (75 mg) by mouth At Bedtime  Qty: 90 tablet, Refills: 0    Associated Diagnoses: Delirium      rosuvastatin (CRESTOR) 20 MG tablet Take 1 tablet (20 mg) by mouth daily  Qty: 90 tablet, Refills: 0    Associated Diagnoses: Coronary artery disease due to lipid rich plaque      sodium chloride 1 GM tablet Take 1 tablet (1 g) by mouth 2 times daily (with meals)  Qty: 60 tablet, Refills: 0    Associated Diagnoses: Chronic hyponatremia      venlafaxine (EFFEXOR XR) 150 MG 24 hr capsule Take 1 capsule (150 mg) by mouth daily  Qty: 90 capsule, Refills: 0    Associated Diagnoses: Post-operative state      vitamin D2 (ERGOCALCIFEROL) 10369 units (1250 mcg) capsule Take 1 capsule (50,000 Units) by mouth every 7 days  Qty: 5 capsule, Refills: 0    Associated Diagnoses: Closed fracture of distal end of right humerus, unspecified fracture morphology, initial encounter       !! - Potential duplicate medications found. Please discuss with provider.        STOP taking these medications       furosemide (LASIX) 40 MG tablet Comments:   Reason for Stopping:         hydrOXYzine (ATARAX) 10 MG tablet Comments:   Reason for Stopping:         tiZANidine (ZANAFLEX) 4 MG tablet Comments:   Reason for Stopping:             Allergies   Allergies   Allergen Reactions    Morphine And Related      GI UPSET    Oxycodone     Seasonal Allergies

## 2023-07-18 NOTE — PROGRESS NOTES
Care Management Discharge Note    Discharge Date: 07/18/2023       Discharge Disposition: Transitional Care    Discharge Services: Transportation Services    Discharge DME: Other (see comment) (No new DME anticipated)    Discharge Transportation: agency    Private pay costs discussed: transportation costs    Does the patient's insurance plan have a 3 day qualifying hospital stay waiver?  Yes   Will the waiver be used for post-acute placement? No- Patient had 3 midnight inpatient stay     PAS Confirmation Code: [id number]  Patient/family educated on Medicare website which has current facility and service quality ratings: yes    Education Provided on the Discharge Plan: Yes  Persons Notified of Discharge Plans: Patient, friend- Bryson   Patient/Family in Agreement with the Plan: yes    Handoff Referral Completed: Yes    Additional Information:  Writer visited with patient and friend, Bryson.  Patient in agreement with discharge to TCU at [facility] today.    [facility] and cost.  Patient and Bryson in agreement.      CHAD Merino  [facility]   [phone]

## 2023-07-18 NOTE — PLAN OF CARE
Goal Outcome Evaluation:  S-(situation): Patient discharged to [facility] via w/c with Robert CHAN-(background): Hip fracture    A-(assessment): alert and oriented. Cooperative. Pain manageable. CMS intact. Aquacele CDI. Magnolia RAMON'cait at discharge.   Last bowel movement: yesterday     R-(recommendations):Report called to attempted to call and unable to leave a message. Spoke with Miryam regarding catheter orders. Listed belongings gathered and sent with patient.     Discharge Nursing Criteria:     Care Plan and Patient education resolved: Yes    Vaccines  Influenza status verified at discharge:  Yes    [facility] [place] [facility] to [facility]  Home medications returned to patient: NA  Medication Bin checked and emptied on discharge Yes  All paperwork sent with patient/Copy of AVS given to patient or family Yes.        Heather at [facility] called back to get report.

## 2023-07-18 NOTE — PLAN OF CARE
Physical Therapy Discharge Summary    Reason for therapy discharge:    Discharged to transitional care facility.    Progress towards therapy goal(s). See goals on Care Plan in [facility] electronic health record for goal details.  Goals partially met.  Barriers to achieving goals:   limited tolerance for therapy and discharge from facility.    Therapy recommendation(s):    Continued therapy is recommended.  Rationale/Recommendations:  to address post op hip fracture mobility limitations and strength progression in order to safely return home.    Thank you for your referral.  Darshana Grant, PT, DPT, ATC, [facility]  O: [phone]  E: [email or web address]

## 2023-07-18 NOTE — PLAN OF CARE
"Goal Outcome Evaluation:           Overall Patient Progress: no changeOverall Patient Progress: no change    Outcome Evaluation: Pt is clear with speech, complained of right hip pain. PRN Tylenol given and ice pack applied which were effective. Merida cath patent. Aquacel dressing is CDI. Sleeps well tonight.    BP (!) 143/66 (BP Location: Left arm)   Pulse 84   Temp 98.1  F (36.7  C) (Oral)   Resp 20   Ht 1.6 m (5' 3\")   Wt 59 kg (130 lb)   LMP 11/09/2005 (Approximate)   SpO2 96%   BMI 23.03 kg/m      "

## 2023-07-19 ENCOUNTER — TELEPHONE (OUTPATIENT)
Dept: UROLOGY | Facility: CLINIC | Age: 73
End: 2023-07-19
Payer: MEDICARE

## 2023-07-19 ENCOUNTER — PATIENT OUTREACH (OUTPATIENT)
Dept: CARE COORDINATION | Facility: CLINIC | Age: 73
End: 2023-07-19
Payer: MEDICARE

## 2023-07-19 NOTE — TELEPHONE ENCOUNTER
[facility] Call Center    Phone Message    May a detailed message be left on voicemail: yes     Reason for Call: Appointment Intake    Referring Provider Name: Yanely Perera CNP  Diagnosis and/or Symptoms: Urinary retention      Camryn from [facility] called to get patient scheduled off a referral for retention. Per protocols writer sending TE for Retention. Please contact Camryn at [phone] in regards to getting this patient scheduled. She's there from 7:30 am - 4 pm. Thank you      Action Taken: Other: Urology    Travel Screening: Not Applicable

## 2023-07-19 NOTE — PROGRESS NOTES
Clinic Care Coordination Contact  Care Coordination Transition Communication    Referral Source: IP Handoff    Clinical Data: Patient was hospitalized at [facility] from 7/12/2023 to 7/18/2023 with diagnosis of Acute closed and non-displaced intertrochanteric fracture right side due to mechanical fall S/P ORIF gamma todd placement .     Transition to Facility:              Facility Name: [facility]              Contact name and phone number/fax:     [facility] (Phone: [phone] Fax: [phone])    Plan: RN/SW Care Coordinator will await notification from facility staff informing RN/SW Care Coordinator of patient's discharge plans/needs. RN/SW Care Coordinator will review chart and outreach to facility staff every 4 weeks and as needed.     Camille Roa RN Care Coordination   [facility]  [place], [place], [place]  Email: [email or web address]  Phone: [phone]

## 2023-07-19 NOTE — TELEPHONE ENCOUNTER
7/19 patient scheduled with Dr. Chavira.    Chantal teran Procedure   Dermatology, Surgery, Urology  [facility]

## 2023-07-19 NOTE — LETTER
[facility]   To:             Please give to facility    From:   Camille Roa  RN  Care Coordinator   [facility]   P: [phone]  [email or web address]   Patient Name:  Sri Grewal YOB: 1950   Admit date: 7/18/2023      *Information Needed:  Please contact me when the patient will discharge (or if they will move to long term care)- include the discharge date, disposition, and main diagnosis   If the patient is discharged with home care services, please provide the name of the agency    Also- Please inform me if a care conference is being held.   Phone, Fax or Email with information                        Thank you

## 2023-07-20 ENCOUNTER — TRANSITIONAL CARE UNIT VISIT (OUTPATIENT)
Dept: GERIATRICS | Facility: CLINIC | Age: 73
End: 2023-07-20
Payer: MEDICARE

## 2023-07-20 ENCOUNTER — LAB REQUISITION (OUTPATIENT)
Dept: LAB | Facility: CLINIC | Age: 73
End: 2023-07-20
Payer: MEDICARE

## 2023-07-20 VITALS
SYSTOLIC BLOOD PRESSURE: 136 MMHG | DIASTOLIC BLOOD PRESSURE: 77 MMHG | WEIGHT: 117 LBS | BODY MASS INDEX: 20.73 KG/M2 | HEART RATE: 79 BPM | RESPIRATION RATE: 18 BRPM | OXYGEN SATURATION: 94 % | TEMPERATURE: 97.8 F

## 2023-07-20 DIAGNOSIS — M80.00XD AGE-RELATED OSTEOPOROSIS WITH CURRENT PATHOLOGICAL FRACTURE WITH ROUTINE HEALING, SUBSEQUENT ENCOUNTER: ICD-10-CM

## 2023-07-20 DIAGNOSIS — F03.90 DEMENTIA WITHOUT BEHAVIORAL DISTURBANCE (H): ICD-10-CM

## 2023-07-20 DIAGNOSIS — M62.81 GENERALIZED MUSCLE WEAKNESS: ICD-10-CM

## 2023-07-20 DIAGNOSIS — Z96.641 S/P TOTAL RIGHT HIP ARTHROPLASTY: ICD-10-CM

## 2023-07-20 DIAGNOSIS — R76.12 NONSPECIFIC REACTION TO CELL MEDIATED IMMUNITY MEASUREMENT OF GAMMA INTERFERON ANTIGEN RESPONSE WITHOUT ACTIVE TUBERCULOSIS: ICD-10-CM

## 2023-07-20 DIAGNOSIS — R29.6 RECURRENT FALLS: ICD-10-CM

## 2023-07-20 DIAGNOSIS — S72.144D CLOSED NONDISPLACED INTERTROCHANTERIC FRACTURE OF RIGHT FEMUR WITH ROUTINE HEALING, SUBSEQUENT ENCOUNTER: Primary | ICD-10-CM

## 2023-07-20 DIAGNOSIS — I10 HYPERTENSION, UNSPECIFIED TYPE: ICD-10-CM

## 2023-07-20 DIAGNOSIS — R60.0 LEG EDEMA, RIGHT: ICD-10-CM

## 2023-07-20 DIAGNOSIS — F33.41 MAJOR DEPRESSIVE DISORDER, RECURRENT, IN PARTIAL REMISSION (H): ICD-10-CM

## 2023-07-20 PROCEDURE — 99309 SBSQ NF CARE MODERATE MDM 30: CPT | Performed by: NURSE PRACTITIONER

## 2023-07-20 RX ORDER — POLYETHYLENE GLYCOL 3350 17 G/17G
1 POWDER, FOR SOLUTION ORAL 2 TIMES DAILY
Qty: 1 G | Refills: 0
Start: 2023-07-20 | End: 2023-08-07

## 2023-07-20 RX ORDER — AMOXICILLIN 250 MG
2 CAPSULE ORAL 2 TIMES DAILY
Qty: 30 TABLET | Refills: 1
Start: 2023-07-20 | End: 2023-08-07

## 2023-07-20 RX ORDER — ACETAMINOPHEN 500 MG
1000 TABLET ORAL 3 TIMES DAILY
Qty: 1 TABLET | Refills: 0
Start: 2023-07-20 | End: 2024-05-30

## 2023-07-20 NOTE — PROGRESS NOTES
[facility] GERIATRICS    PRIMARY CARE PROVIDER AND CLINIC:  Rosenda Pierre PA-C, [address] [facility] [address] / [facility] [address]  Chief Complaint   Patient presents with     Hospital F/U      [place] Medical Record Number:  [id number]  Place of Service where encounter took place:  [facility] (U) [8]    Sri Grewal  is a 73 year old  (1950), admitted to the above facility from  [facility] . Hospital stay 07/12/2023 through 07/18/2023..   HPI:      Acute closed and non-displaced intertrochanteric fracture right side due to mechanical fall S/P ORIF gamma todd placement 7-14-23  Hx osteoporosis with pathologic fracture  Vitamin D deficiency   Pt had delay in surgical intervention due to Plavix and perioperative bleeding risk.   Plan:   Plavix restarted 7-15-23  Vitamin D and Calcium Carbonate supplementation-continue as OP   PT and OT recommend U   Needs F/U PCP regarding potential treatment options for osteoporosis  Fall 7/16/23  minimally displaced fracture through the upper aspect of of the right greater trochanter  Left eye contusion  7/16/23 around 11pm patient was found on the floor in room with contusion left eye. Head CT was negative for bleed. Pelvis x-rays show what appear to be a new minimally displaced fracture through the upper aspect of of the right greater trochanter. X-rays were reviewed by Dr Turner and she will be 50% weight bearing x1 month.   Pt was reporting right elbow pain. X rays of the elbow show intact hardware, in a comminuted elbow fracture. No acute changes   Plan:  Fall precautions   Hypertension-  Chronic on Lisinopril, Norvasc, Coreg, Hydralazine and Lasix. Systolic BP range 135-170 in last 24 hours.    Plan:   Continue home antihypertensives. 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  At time of stroke in June 2021 she underwent reevaluation of CAD and had similar findings on coronary angiography at that time with overall severely reduced LVEF of less than 30%.  Despite her known history of reduced LV systolic function, she is not known to have clinical history of heart failure.  However, Echo in February and April 2023 demonstrated normal LVEF.  She may have had mild pulmonary edema postoperatively while recovering after orthopedic surgery in May 2023 although did not appear to require any specific treatment for it.  EKG this hospitalization is reassuring without acute abnormalities.  Plan:   -Continue chronic beta-blocker, ACE inhibitor, and statin  - Chronic antiplatelet therapy has been restarted   -home lasix dose on hold due to hyponatremia, monitor for s/s of fluid overload, consider restarting if needed.    Chronic hyponatremia  Chronically hyponatremic with baseline sodium 133-134 and treated chronically with sodium chloride tablets.  Current sodium continues to be stable compared with her baseline.  Plan:   -Continue chronic dose of sodium tablets perioperatively and adjust dosing accordingly depending upon changes in her sodium level  -rechecl Na within a week  Memory loss  History of postoperative delirium  Delirium   Known memory loss after older stroke and developed postoperative delirium after orthopedic surgery in May 2023.  Bedtime dose of Seroquel was added at that time with good effect.  Discharged with 24-hour supervision after that hospital stay was advised.  She has been living at home with her friend Bryson since then, and Bryson helps her with medication administration and daily activities at home.  Since at hospital she has had confusion, work up negative fore acute cause.  Dilauded, atarax  and compazine stopped.    SO indicated in past she developed delirium due to medications and interrupted sleep.   23 SLUMS  indicating Dementia, although still may be some component of delirium  Today was calm and alert.    Plan:    -continue seroquel 25mg at 0800 and 1400 with 50mg at HS.   -taper seroquel as delirium improves  -consider reassessing cognitive testing as delirium improves.     Patient seen today with significant other/caregive present, is oriented, pleasant, poor historian, impulsive, fall again on  without injury, continues to self transfer, moved closer to nursing station yesterday for supervision, L orbit bruising, misc extremity bruising, R hip dressing clean dry and intact, denies pain but pain + faces scale, R leg edema 2+ without s/s DVT, I&O adequate, no issues with urine retention, appears healthy, no distress.           CODE STATUS/ADVANCE DIRECTIVES DISCUSSION:  Full Code  On file  ALLERGIES:   Allergies   Allergen Reactions     Morphine And Related      GI UPSET     Oxycodone      Seasonal Allergies       PAST MEDICAL HISTORY:   Past Medical History:   Diagnosis Date     Abnormal Papanicolaou smear of cervix and cervical HPV      Allergic rhinitis, cause unspecified     seasonal allergies     Cerebral infarction (H)      Contact dermatitis and other eczema, due to unspecified cause      Endometriosis, site unspecified      Esophageal reflux     GERD     Migraine, unspecified, without mention of intractable migraine without mention of status migrainosus      Mild persistent asthma      Unspecified disorder of uterus     fibroid uterus      PAST SURGICAL HISTORY:   has a past surgical history that includes  DELIVERY ONLY; COLONOSCOPY THRU STOMA, DIAGNOSTIC (2002); Colonoscopy (2014); Open reduction internal fixation humerus distal (Right, 5/3/2023); Reverse arthroplasty shoulder (Right, 2023); Open reduction internal fixation elbow (Right, 2023); Remove  Hardware Elbow (Right, 6/1/2023); and Open reduction internal fixation hip nailing (Right, 7/14/2023).  FAMILY HISTORY: family history includes Alcohol/Drug in her mother; Cerebrovascular Disease in her mother; Heart Disease in her mother; Hypertension in her father and mother; Neurologic Disorder in her mother; Osteoporosis in her mother.  SOCIAL HISTORY:   reports that she has never smoked. She has never been exposed to tobacco smoke. She has never used smokeless tobacco. She reports current alcohol use. She reports that she does not use drugs.  Patient's living condition: lives with an adult     Post Discharge Medication Reconciliation Status:   MED REC REQUIRED  Post Medication Reconciliation Status: discharge medications reconciled and changed, per note/orders       Current Outpatient Medications   Medication Sig     acetaminophen (TYLENOL) 325 MG tablet Take 2 tablets (650 mg) by mouth every 4 hours as needed for other (mild pain)     amLODIPine (NORVASC) 10 MG tablet Take 1 tablet (10 mg) by mouth daily     buPROPion (WELLBUTRIN XL) 300 MG 24 hr tablet Take 300 mg by mouth every morning     butalbital-aspirin-caffeine (FIORINAL EQUIV) -40 MG TABS per tablet Take 1 tablet by mouth every 4 hours as needed for migraine     carvedilol (COREG) 12.5 MG tablet TAKE ONE TABLET BY MOUTH TWICE A DAY WITH FOOD (Patient taking differently: Take 12.5 mg by mouth 2 times daily (with meals) TAKE ONE TABLET BY MOUTH TWICE A DAY WITH FOOD)     clopidogrel (PLAVIX) 75 MG tablet Take 1 tablet (75 mg) by mouth daily     hydrALAZINE (APRESOLINE) 25 MG tablet Take 1 tablet (25 mg) by mouth 3 times daily     lisinopril (ZESTRIL) 20 MG tablet Take 1 tablet (20 mg) by mouth daily     melatonin 5 MG tablet Take 1 tablet (5 mg) by mouth At Bedtime     QUEtiapine (SEROQUEL) 25 MG tablet Take 1 tablet (25 mg) by mouth every morning     QUEtiapine (SEROQUEL) 25 MG tablet Take 1 tablet (25 mg) by mouth daily     QUEtiapine  (SEROQUEL) 25 MG tablet Take 3 tablets (75 mg) by mouth At Bedtime     rosuvastatin (CRESTOR) 20 MG tablet Take 1 tablet (20 mg) by mouth daily     senna-docusate (SENOKOT-S/PERICOLACE) 8.6-50 MG tablet Take 1 tablet by mouth 2 times daily     sodium chloride 1 GM tablet Take 1 tablet (1 g) by mouth 2 times daily (with meals)     traMADol (ULTRAM) 50 MG tablet Take 1 tablet (50 mg) by mouth every 6 hours as needed for moderate pain     venlafaxine (EFFEXOR XR) 150 MG 24 hr capsule Take 1 capsule (150 mg) by mouth daily     vitamin D2 (ERGOCALCIFEROL) 33776 units (1250 mcg) capsule Take 1 capsule (50,000 Units) by mouth every 7 days     No current facility-administered medications for this visit.       ROS:  4 point ROS including Respiratory, CV, GI and , other than that noted in the HPI,  is negative    Vitals:  /77   Pulse 79   Temp 97.8  F (36.6  C)   Resp 18   Wt 53.1 kg (117 lb)   LMP 11/09/2005 (Approximate)   SpO2 94%   BMI 20.73 kg/m    Exam:  GENERAL APPEARANCE:  in no distress, appears healthy  ENT:  Mouth and posterior oropharynx normal, moist mucous membranes  RESP:  lungs clear to auscultation   CV:  regular rate and rhythm, no murmur, rub, or gallop, peripheral edema 2+ in R lower legs  ABDOMEN:  bowel sounds normal  M/S:   Gait and station abnormal transfer assist  SKIN:  Inspection of skin and subcutaneous tissueWNL, as in HPI  NEURO:   Examination of sensation by touch normal  PSYCH:  affect and mood normal    Lab/Diagnostic data:  Recent labs in [facility] reviewed by me today.     ASSESSMENT/PLAN:    (Y52.266K) Closed nondisplaced intertrochanteric fracture of right femur with routine healing, subsequent encounter  (primary encounter diagnosis)  (M80.00XD) Age-related osteoporosis with current pathological fracture with routine healing, subsequent encounter  (Z96.641) S/P total right hip arthroplasty  (R29.6) Recurrent falls  (R60.0) Leg edema, right  (M62.81) Generalized muscle  weakness  Comment: multiple recent falls with injury, impulsive, pain+, edema 2+  Plan:   -PT/OT eval and treat  -change APAP to 1000mg TID  -continue tramadol PRN  -start miralax BID  -change senna to 2 tabs BID  -OK use own probiotic  -plavix for DVT prophylaxis   -CBC, BMP on 7/24  -TEDs bilateral on am off pm  -discontinue urology appt, voiding well  -follow up shoulder, elbow and hip ortho appt as scheduled    (I10) Hypertension, unspecified type  Comment: SBP's in the 130 range  Plan: continue coreg, amlodipine, hydralazine, lisinopril  -staff to check VS daily    (F33.41) Major depressive disorder, recurrent, in partial remission (H)  (F03.90) Dementia without behavioral disturbance (H)  Comment: moderate cognitive limitations, SLUMS 18/30, not safety aware  Plan:   -continue wellbutrin, effexor  -started on seroquel 25/25/75 in hospital, consider weaning as tolerates starting next week  -staff to support as possible        Electronically signed by:  JAYY Stewart CNP

## 2023-07-20 NOTE — LETTER
7/20/2023        RE: Sri Grewal  [address]  [facility] [address]        [facility] GERIATRICS    PRIMARY CARE PROVIDER AND CLINIC:  Rosenda Varma-FLEX [place], [address] [facility] [address] / [facility] [address]  Chief Complaint   Patient presents with     Hospital F/U      [place] Medical Record Number:  [id number]  Place of Service where encounter took place:  [facility] (TCU) [8]    Sri Grewal  is a 73 year old  (1950), admitted to the above facility from  [facility] . Hospital stay 07/12/2023 through 07/18/2023..   HPI:      Acute closed and non-displaced intertrochanteric fracture right side due to mechanical fall S/P ORIF gamma todd placement 7-14-23  Hx osteoporosis with pathologic fracture  Vitamin D deficiency   Pt had delay in surgical intervention due to Plavix and perioperative bleeding risk.   Plan:   Plavix restarted 7-15-23  Vitamin D and Calcium Carbonate supplementation-continue as OP   PT and OT recommend [facility]   Needs F/U PCP regarding potential treatment options for osteoporosis  Fall 7/16/23  minimally displaced fracture through the upper aspect of of the right greater trochanter  Left eye contusion  7/16/23 around 11pm patient was found on the floor in room with contusion left eye. Head CT was negative for bleed. Pelvis x-rays show what appear to be a new minimally displaced fracture through the upper aspect of of the right greater trochanter. X-rays were reviewed by Dr Turner and she will be 50% weight bearing x1 month.   Pt was reporting right elbow pain. X rays of the elbow show intact hardware, in a comminuted elbow fracture. No acute changes   Plan:  Fall precautions   Hypertension-  Chronic on Lisinopril, Norvasc, Coreg, Hydralazine and Lasix. Systolic BP range 135-170 in last 24 hours.    Plan:   Continue home antihypertensives. Lasix on hold due to Hyponatremia   Hx CAD S/P MI with reduced LV systolic function attributed to  Tako-Tsubo syndrome  According to the available records, she had MI in October 2005 at which time coronary angiography demonstrated clean coronary arteries with isolated mid anterolateral akinesis and mid inferior akinesis, so her MI was attributed to a variant of Takotsubo syndrome.  At time of stroke in June 2021 she underwent reevaluation of CAD and had similar findings on coronary angiography at that time with overall severely reduced LVEF of less than 30%.  Despite her known history of reduced LV systolic function, she is not known to have clinical history of heart failure.  However, Echo in February and April 2023 demonstrated normal LVEF.  She may have had mild pulmonary edema postoperatively while recovering after orthopedic surgery in May 2023 although did not appear to require any specific treatment for it.  EKG this hospitalization is reassuring without acute abnormalities.  Plan:   -Continue chronic beta-blocker, ACE inhibitor, and statin  - Chronic antiplatelet therapy has been restarted   -home lasix dose on hold due to hyponatremia, monitor for s/s of fluid overload, consider restarting if needed.    Chronic hyponatremia  Chronically hyponatremic with baseline sodium 133-134 and treated chronically with sodium chloride tablets.  Current sodium continues to be stable compared with her baseline.  Plan:   -Continue chronic dose of sodium tablets perioperatively and adjust dosing accordingly depending upon changes in her sodium level  -rechecl Na within a week  Memory loss  History of postoperative delirium  Delirium   Known memory loss after older stroke and developed postoperative delirium after orthopedic surgery in May 2023.  Bedtime dose of Seroquel was added at that time with good effect.  Discharged with 24-hour supervision after that hospital stay was advised.  She has been living at home with her friend Bryson since then, and Bryson helps her with medication administration and daily activities at  home.  Since at hospital she has had confusion, work up negative fore acute cause.  Dilauded, atarax and compazine stopped.    SO indicated in past she developed delirium due to medications and interrupted sleep.   23 SLUMS  indicating Dementia, although still may be some component of delirium  Today was calm and alert.    Plan:    -continue seroquel 25mg at 0800 and 1400 with 50mg at HS.   -taper seroquel as delirium improves  -consider reassessing cognitive testing as delirium improves.     Patient seen today with significant other/caregive present, is oriented, pleasant, poor historian, impulsive, fall again on  without injury, continues to self transfer, moved closer to nursing station yesterday for supervision, L orbit bruising, misc extremity bruising, R hip dressing clean dry and intact, denies pain but pain + faces scale, R leg edema 2+ without s/s DVT, I&O adequate, no issues with urine retention, appears healthy, no distress.           CODE STATUS/ADVANCE DIRECTIVES DISCUSSION:  Full Code  On file  ALLERGIES:   Allergies   Allergen Reactions     Morphine And Related      GI UPSET     Oxycodone      Seasonal Allergies       PAST MEDICAL HISTORY:   Past Medical History:   Diagnosis Date     Abnormal Papanicolaou smear of cervix and cervical HPV      Allergic rhinitis, cause unspecified     seasonal allergies     Cerebral infarction (H)      Contact dermatitis and other eczema, due to unspecified cause      Endometriosis, site unspecified      Esophageal reflux     GERD     Migraine, unspecified, without mention of intractable migraine without mention of status migrainosus      Mild persistent asthma      Unspecified disorder of uterus     fibroid uterus      PAST SURGICAL HISTORY:   has a past surgical history that includes  DELIVERY ONLY; COLONOSCOPY THRU STOMA, DIAGNOSTIC (2002); Colonoscopy (2014); Open reduction internal fixation humerus distal (Right, 5/3/2023); Reverse  arthroplasty shoulder (Right, 5/5/2023); Open reduction internal fixation elbow (Right, 6/1/2023); Remove Hardware Elbow (Right, 6/1/2023); and Open reduction internal fixation hip nailing (Right, 7/14/2023).  FAMILY HISTORY: family history includes Alcohol/Drug in her mother; Cerebrovascular Disease in her mother; Heart Disease in her mother; Hypertension in her father and mother; Neurologic Disorder in her mother; Osteoporosis in her mother.  SOCIAL HISTORY:   reports that she has never smoked. She has never been exposed to tobacco smoke. She has never used smokeless tobacco. She reports current alcohol use. She reports that she does not use drugs.  Patient's living condition: lives with an adult     Post Discharge Medication Reconciliation Status:   MED REC REQUIRED  Post Medication Reconciliation Status: discharge medications reconciled and changed, per note/orders       Current Outpatient Medications   Medication Sig     acetaminophen (TYLENOL) 325 MG tablet Take 2 tablets (650 mg) by mouth every 4 hours as needed for other (mild pain)     amLODIPine (NORVASC) 10 MG tablet Take 1 tablet (10 mg) by mouth daily     buPROPion (WELLBUTRIN XL) 300 MG 24 hr tablet Take 300 mg by mouth every morning     butalbital-aspirin-caffeine (FIORINAL EQUIV) -40 MG TABS per tablet Take 1 tablet by mouth every 4 hours as needed for migraine     carvedilol (COREG) 12.5 MG tablet TAKE ONE TABLET BY MOUTH TWICE A DAY WITH FOOD (Patient taking differently: Take 12.5 mg by mouth 2 times daily (with meals) TAKE ONE TABLET BY MOUTH TWICE A DAY WITH FOOD)     clopidogrel (PLAVIX) 75 MG tablet Take 1 tablet (75 mg) by mouth daily     hydrALAZINE (APRESOLINE) 25 MG tablet Take 1 tablet (25 mg) by mouth 3 times daily     lisinopril (ZESTRIL) 20 MG tablet Take 1 tablet (20 mg) by mouth daily     melatonin 5 MG tablet Take 1 tablet (5 mg) by mouth At Bedtime     QUEtiapine (SEROQUEL) 25 MG tablet Take 1 tablet (25 mg) by mouth  every morning     QUEtiapine (SEROQUEL) 25 MG tablet Take 1 tablet (25 mg) by mouth daily     QUEtiapine (SEROQUEL) 25 MG tablet Take 3 tablets (75 mg) by mouth At Bedtime     rosuvastatin (CRESTOR) 20 MG tablet Take 1 tablet (20 mg) by mouth daily     senna-docusate (SENOKOT-S/PERICOLACE) 8.6-50 MG tablet Take 1 tablet by mouth 2 times daily     sodium chloride 1 GM tablet Take 1 tablet (1 g) by mouth 2 times daily (with meals)     traMADol (ULTRAM) 50 MG tablet Take 1 tablet (50 mg) by mouth every 6 hours as needed for moderate pain     venlafaxine (EFFEXOR XR) 150 MG 24 hr capsule Take 1 capsule (150 mg) by mouth daily     vitamin D2 (ERGOCALCIFEROL) 86608 units (1250 mcg) capsule Take 1 capsule (50,000 Units) by mouth every 7 days     No current facility-administered medications for this visit.       ROS:  4 point ROS including Respiratory, CV, GI and , other than that noted in the HPI,  is negative    Vitals:  /77   Pulse 79   Temp 97.8  F (36.6  C)   Resp 18   Wt 53.1 kg (117 lb)   LMP 11/09/2005 (Approximate)   SpO2 94%   BMI 20.73 kg/m    Exam:  GENERAL APPEARANCE:  in no distress, appears healthy  ENT:  Mouth and posterior oropharynx normal, moist mucous membranes  RESP:  lungs clear to auscultation   CV:  regular rate and rhythm, no murmur, rub, or gallop, peripheral edema 2+ in R lower legs  ABDOMEN:  bowel sounds normal  M/S:   Gait and station abnormal transfer assist  SKIN:  Inspection of skin and subcutaneous tissueWNL, as in HPI  NEURO:   Examination of sensation by touch normal  PSYCH:  affect and mood normal    Lab/Diagnostic data:  Recent labs in [facility] reviewed by me today.     ASSESSMENT/PLAN:    (E62.060D) Closed nondisplaced intertrochanteric fracture of right femur with routine healing, subsequent encounter  (primary encounter diagnosis)  (M80.00XD) Age-related osteoporosis with current pathological fracture with routine healing, subsequent encounter  (Z96.641) S/P total right  hip arthroplasty  (R29.6) Recurrent falls  (R60.0) Leg edema, right  (M62.81) Generalized muscle weakness  Comment: multiple recent falls with injury, impulsive, pain+, edema 2+  Plan:   -PT/OT eval and treat  -change APAP to 1000mg TID  -continue tramadol PRN  -start miralax BID  -change senna to 2 tabs BID  -OK use own probiotic  -plavix for DVT prophylaxis   -CBC, BMP on 7/24  -TEDs bilateral on am off pm  -discontinue urology appt, voiding well  -follow up shoulder, elbow and hip ortho appt as scheduled    (I10) Hypertension, unspecified type  Comment: SBP's in the 130 range  Plan: continue coreg, amlodipine, hydralazine, lisinopril  -staff to check VS daily    (F33.41) Major depressive disorder, recurrent, in partial remission (H)  (F03.90) Dementia without behavioral disturbance (H)  Comment: moderate cognitive limitations, SLUMS 18/30, not safety aware  Plan:   -continue wellbutrin, effexor  -started on seroquel 25/25/75 in hospital, consider weaning as tolerates starting next week  -staff to support as possible        Electronically signed by:  JAYY Stewart CNP                     Sincerely,        JAYY Stewart CNP

## 2023-07-21 ENCOUNTER — TELEPHONE (OUTPATIENT)
Dept: UROLOGY | Facility: CLINIC | Age: 73
End: 2023-07-21
Payer: MEDICARE

## 2023-07-21 PROCEDURE — 86481 TB AG RESPONSE T-CELL SUSP: CPT | Performed by: NURSE PRACTITIONER

## 2023-07-21 PROCEDURE — P9603 ONE-WAY ALLOW PRORATED MILES: HCPCS | Performed by: NURSE PRACTITIONER

## 2023-07-21 PROCEDURE — 36415 COLL VENOUS BLD VENIPUNCTURE: CPT | Performed by: NURSE PRACTITIONER

## 2023-07-21 NOTE — TELEPHONE ENCOUNTER
[facility] Call Center    Phone Message    May a detailed message be left on voicemail: yes     Reason for Call: Other: .  Per Camryn from [facility] calling to cancel pt upcoming appt for retention. Please call Camryn if needed.     Action Taken: Message routed to:  Other: Uro    Travel Screening: Not Applicable

## 2023-07-23 ENCOUNTER — LAB REQUISITION (OUTPATIENT)
Dept: LAB | Facility: CLINIC | Age: 73
End: 2023-07-23
Payer: MEDICARE

## 2023-07-23 DIAGNOSIS — D64.9 ANEMIA, UNSPECIFIED: ICD-10-CM

## 2023-07-23 DIAGNOSIS — N18.9 CHRONIC KIDNEY DISEASE, UNSPECIFIED: ICD-10-CM

## 2023-07-23 DIAGNOSIS — I10 HYPERTENSION GOAL BP (BLOOD PRESSURE) < 140/90: ICD-10-CM

## 2023-07-23 LAB
GAMMA INTERFERON BACKGROUND BLD IA-ACNC: 0 IU/ML
M TB IFN-G BLD-IMP: ABNORMAL
M TB IFN-G CD4+ BCKGRND COR BLD-ACNC: 0.09 IU/ML
MITOGEN IGNF BCKGRD COR BLD-ACNC: 0 IU/ML
MITOGEN IGNF BCKGRD COR BLD-ACNC: 0 IU/ML
QUANTIFERON MITOGEN: 0.09 IU/ML
QUANTIFERON NIL TUBE: 0 IU/ML
QUANTIFERON TB1 TUBE: 0 IU/ML
QUANTIFERON TB2 TUBE: 0

## 2023-07-24 ENCOUNTER — TRANSITIONAL CARE UNIT VISIT (OUTPATIENT)
Dept: GERIATRICS | Facility: CLINIC | Age: 73
End: 2023-07-24
Payer: MEDICARE

## 2023-07-24 ENCOUNTER — MEDICAL CORRESPONDENCE (OUTPATIENT)
Dept: HEALTH INFORMATION MANAGEMENT | Facility: CLINIC | Age: 73
End: 2023-07-24

## 2023-07-24 VITALS
RESPIRATION RATE: 24 BRPM | SYSTOLIC BLOOD PRESSURE: 184 MMHG | WEIGHT: 117 LBS | HEART RATE: 82 BPM | BODY MASS INDEX: 20.73 KG/M2 | DIASTOLIC BLOOD PRESSURE: 91 MMHG | OXYGEN SATURATION: 98 % | TEMPERATURE: 98.8 F

## 2023-07-24 DIAGNOSIS — F03.90 DEMENTIA WITHOUT BEHAVIORAL DISTURBANCE (H): Primary | ICD-10-CM

## 2023-07-24 DIAGNOSIS — R33.9 URINE RETENTION: ICD-10-CM

## 2023-07-24 DIAGNOSIS — S42.201K CLOSED FRACTURE OF PROXIMAL END OF RIGHT HUMERUS WITH NONUNION, UNSPECIFIED FRACTURE MORPHOLOGY, SUBSEQUENT ENCOUNTER: ICD-10-CM

## 2023-07-24 LAB
ANION GAP SERPL CALCULATED.3IONS-SCNC: 13 MMOL/L (ref 7–15)
BUN SERPL-MCNC: 10.6 MG/DL (ref 8–23)
CALCIUM SERPL-MCNC: 9.3 MG/DL (ref 8.8–10.2)
CHLORIDE SERPL-SCNC: 99 MMOL/L (ref 98–107)
CREAT SERPL-MCNC: 0.63 MG/DL (ref 0.51–0.95)
DEPRECATED HCO3 PLAS-SCNC: 23 MMOL/L (ref 22–29)
ERYTHROCYTE [DISTWIDTH] IN BLOOD BY AUTOMATED COUNT: 14.6 % (ref 10–15)
GFR SERPL CREATININE-BSD FRML MDRD: >90 ML/MIN/1.73M2
GLUCOSE SERPL-MCNC: 173 MG/DL (ref 70–99)
HCT VFR BLD AUTO: 31.1 % (ref 35–47)
HGB BLD-MCNC: 9.6 G/DL (ref 11.7–15.7)
MCH RBC QN AUTO: 27.4 PG (ref 26.5–33)
MCHC RBC AUTO-ENTMCNC: 30.9 G/DL (ref 31.5–36.5)
MCV RBC AUTO: 89 FL (ref 78–100)
PLATELET # BLD AUTO: 453 10E3/UL (ref 150–450)
POTASSIUM SERPL-SCNC: 3.8 MMOL/L (ref 3.4–5.3)
RBC # BLD AUTO: 3.5 10E6/UL (ref 3.8–5.2)
SODIUM SERPL-SCNC: 135 MMOL/L (ref 136–145)
WBC # BLD AUTO: 10.3 10E3/UL (ref 4–11)

## 2023-07-24 PROCEDURE — 80048 BASIC METABOLIC PNL TOTAL CA: CPT | Performed by: NURSE PRACTITIONER

## 2023-07-24 PROCEDURE — 36415 COLL VENOUS BLD VENIPUNCTURE: CPT | Performed by: NURSE PRACTITIONER

## 2023-07-24 PROCEDURE — P9603 ONE-WAY ALLOW PRORATED MILES: HCPCS | Performed by: NURSE PRACTITIONER

## 2023-07-24 PROCEDURE — 99309 SBSQ NF CARE MODERATE MDM 30: CPT | Performed by: NURSE PRACTITIONER

## 2023-07-24 PROCEDURE — 85027 COMPLETE CBC AUTOMATED: CPT | Performed by: NURSE PRACTITIONER

## 2023-07-24 RX ORDER — HYDRALAZINE HYDROCHLORIDE 25 MG/1
TABLET, FILM COATED ORAL
Qty: 90 TABLET | Refills: 0 | Status: SHIPPED | OUTPATIENT
Start: 2023-07-24 | End: 2023-09-07

## 2023-07-24 NOTE — PROGRESS NOTES
[facility] GERIATRICS    Chief Complaint   Patient presents with    RECHECK     HPI:  Sri Grewal is a 73 year old  (1950), who is being seen today for an episodic care visit at: [facility] ([facility]) [8]. Today's concern is:   1. Dementia without behavioral disturbance (H)    2. 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 Urine retention      Patient seen for follow up, also met with sig other regarding plan/status, no recent falls, staff doing a good job of keeping in community area for monitoring, impulsive and will self transfer, moderate cognitive limitations, denies pain, R hip incision dressing intact, also still scanning bladder as required peñaloza in hospital but now urinating without issues.    Allergies, and PMH/PSH reviewed in EPIC today.  REVIEW OF SYSTEMS:  4 point ROS including Respiratory, CV, GI and , other than that noted in the HPI,  is negative    Objective:   BP (!) 184/91   Pulse 82   Temp 98.8  F (37.1  C)   Resp 24   Wt 53.1 kg (117 lb)   LMP 11/09/2005 (Approximate)   SpO2 98%   BMI 20.73 kg/m    GENERAL APPEARANCE:  in no distress, appears healthy  ENT:  Mouth and posterior oropharynx normal, moist mucous membranes  RESP:  lungs clear to auscultation   CV:  peripheral edema 1-2+ in lower legs  ABDOMEN:  bowel sounds normal  M/S:   Gait and station abnormal unsteady gait  SKIN:  Palpation of skin and subcutaneous tissue baseline  NEURO:   Examination of sensation by touch normal  PSYCH:  affect and mood normal    Labs done in SNF are in [facility]. Please refer to them using [facility]/Care Everywhere.    Assessment/Plan:  (F03.90) Dementia without behavioral disturbance (H)  (primary encounter diagnosis)  Comment: moderate cognitive limitations, limited safety awareness  Plan: continue seroquel 25/25/75  -considering trial wean to BID later this week    (S42.201K) Closed fracture of  proximal end of right humerus with nonunion, unspecified fracture morphology, subsequent encounter  Comment: pain controlled, WBAT  Plan: plan to check XR's in-house vs send in to   -ABEL's bilateral on am off pm (not applied)  -continue APAP TID, tramadol PRN  -PT/OT working with  -consider discontinue tramadol prior to discharge      (R33.9) Urine retention  Comment: urinating without issue, PVR's all <100  Plan: discontinue PVR's  -monitor output, ensure voiding  -if having retention may need peñaloza replaced    MED REC REQUIRED  Post Medication Reconciliation Status: medication reconcilation previously completed during another office visit        Electronically signed by: JAYY Stewart CNP

## 2023-07-24 NOTE — LETTER
7/24/2023        RE: Sri Grewal  [address] [address]        [facility] GERIATRICS    Chief Complaint   Patient presents with     RECHECK     HPI:  Sri Grewal is a 73 year old  (1950), who is being seen today for an episodic care visit at: [facility] ([facility]) [8]. Today's concern is:   1. Dementia without behavioral disturbance (H)    2. 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 Urine retention      Patient seen for follow up, also met with sig other regarding plan/status, no recent falls, staff doing a good job of keeping in community area for monitoring, impulsive and will self transfer, moderate cognitive limitations, denies pain, R hip incision dressing intact, also still scanning bladder as required peñaloza in hospital but now urinating without issues.    Allergies, and PMH/PSH reviewed in EPIC today.  REVIEW OF SYSTEMS:  4 point ROS including Respiratory, CV, GI and , other than that noted in the HPI,  is negative    Objective:   BP (!) 184/91   Pulse 82   Temp 98.8  F (37.1  C)   Resp 24   Wt 53.1 kg (117 lb)   LMP 11/09/2005 (Approximate)   SpO2 98%   BMI 20.73 kg/m    GENERAL APPEARANCE:  in no distress, appears healthy  ENT:  Mouth and posterior oropharynx normal, moist mucous membranes  RESP:  lungs clear to auscultation   CV:  peripheral edema 1-2+ in lower legs  ABDOMEN:  bowel sounds normal  M/S:   Gait and station abnormal unsteady gait  SKIN:  Palpation of skin and subcutaneous tissue baseline  NEURO:   Examination of sensation by touch normal  PSYCH:  affect and mood normal    Labs done in SNF are in [facility]. Please refer to them using [facility]/Care Everywhere.    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, unspecified fracture morphology, subsequent encounter  Comment: pain controlled, WBAT  Plan: plan to check XR's in-house vs send in to   -ABEL's bilateral on am off pm (not applied)  -continue APAP TID, tramadol PRN  -PT/OT working with  -consider discontinue tramadol prior to discharge      (R33.9) Urine retention  Comment: urinating without issue, PVR's all <100  Plan: discontinue PVR's  -monitor output, ensure voiding  -if having retention may need peñaloza replaced    MED REC REQUIRED  Post Medication Reconciliation Status: medication reconcilation previously completed during another office visit        Electronically signed by: JAYY Stewart CNP          Sincerely,        JAYY Stewart CNP

## 2023-07-25 ENCOUNTER — TELEPHONE (OUTPATIENT)
Dept: ORTHOPEDICS | Facility: CLINIC | Age: 73
End: 2023-07-25

## 2023-07-25 ENCOUNTER — LAB REQUISITION (OUTPATIENT)
Dept: LAB | Facility: CLINIC | Age: 73
End: 2023-07-25
Payer: MEDICARE

## 2023-07-25 DIAGNOSIS — R76.12 NONSPECIFIC REACTION TO CELL MEDIATED IMMUNITY MEASUREMENT OF GAMMA INTERFERON ANTIGEN RESPONSE WITHOUT ACTIVE TUBERCULOSIS: ICD-10-CM

## 2023-07-25 NOTE — TELEPHONE ENCOUNTER
[facility] Call Center    Phone Message    May a detailed message be left on voicemail: yes     Reason for Call: Other: [facility] requesting protocol for reverse shoulder and clarification on splint for elbow looking for specifics, fax:[phone]     Action Taken: Message routed to:  [facility] Krause and Phipps    Travel Screening: Not Applicable

## 2023-07-25 NOTE — TELEPHONE ENCOUNTER
[facility] Call Center    Phone Message    May a detailed message be left on voicemail: yes     Reason for Call: Order(s): Other:   Reason for requested: Patient requesting OT orders be sent to [facility] fax: [phone]  Date needed: asap  Provider name: [facility]     Action Taken: Message routed to:  [facility] Moise    Travel Screening: Not Applicable

## 2023-07-25 NOTE — TELEPHONE ENCOUNTER
[facility] Call Center    Phone Message    May a detailed message be left on voicemail: yes     Reason for Call: Other:  Melina from [email or web address] [facility] is calling back. She never received updated script for patients augustine brace for her right elbow. Requesting MD notes as well.  Please fax script to [phone].     Action Taken: Message routed to:  [facility] [phone]    Travel Screening: Not Applicable

## 2023-07-25 NOTE — TELEPHONE ENCOUNTER
Faxed Dr Phipps's note from 7-10-23 that includes the plan for the elbow brace. Informed [facility] (via fax) that we have ordered the [facility] elbow brace through [employer] [facility], phone [phone] if needed.  Informed them that Dr Phipps is not treating for the shoulder, only the elbow.  Informed that we sent the message re: shoulder to Dr Krause's team. Luz Pratt RN

## 2023-07-25 NOTE — TELEPHONE ENCOUNTER
Writer faxed physical therapy orders from Dr. Krause and Dr. YANG to [facility] after talking with Bryson on the phone to confirm those were the orders needed. Orders faxed to [phone].     Tiarra Marsh LPN

## 2023-07-25 NOTE — TELEPHONE ENCOUNTER
Order placed by Dr Phipps on 7-18-23. Faxed order along with office visit note from 7-10-23. Luz Pratt RN

## 2023-07-25 NOTE — TELEPHONE ENCOUNTER
[facility] Call Center    Phone Message    May a detailed message be left on voicemail: yes     Reason for Call: Order(s): Other:   Reason for requested: [facility] requesting PT orders from Dr. Krause please fax to [phone]  Date needed: asap  Provider name: [facility]    Action Taken: Message routed to:  [facility] Jimi    Travel Screening: Not Applicable

## 2023-07-26 ENCOUNTER — DOCUMENTATION ONLY (OUTPATIENT)
Dept: ORTHOPEDICS | Facility: CLINIC | Age: 73
End: 2023-07-26
Payer: MEDICARE

## 2023-07-26 PROCEDURE — 86481 TB AG RESPONSE T-CELL SUSP: CPT | Performed by: NURSE PRACTITIONER

## 2023-07-26 PROCEDURE — 36415 COLL VENOUS BLD VENIPUNCTURE: CPT | Performed by: NURSE PRACTITIONER

## 2023-07-26 PROCEDURE — P9603 ONE-WAY ALLOW PRORATED MILES: HCPCS | Performed by: NURSE PRACTITIONER

## 2023-07-26 NOTE — PROGRESS NOTES
Received Completed forms Yes   Faxed Forms Faxed To: [facility]  Fax Number: [phone] and [phone]   Sent to HIM (Date) 7/25/23

## 2023-07-27 ENCOUNTER — TRANSITIONAL CARE UNIT VISIT (OUTPATIENT)
Dept: GERIATRICS | Facility: CLINIC | Age: 73
End: 2023-07-27
Payer: MEDICARE

## 2023-07-27 VITALS
HEART RATE: 86 BPM | BODY MASS INDEX: 20.73 KG/M2 | TEMPERATURE: 97.9 F | OXYGEN SATURATION: 94 % | DIASTOLIC BLOOD PRESSURE: 82 MMHG | WEIGHT: 117 LBS | SYSTOLIC BLOOD PRESSURE: 177 MMHG | RESPIRATION RATE: 18 BRPM

## 2023-07-27 DIAGNOSIS — F03.90 DEMENTIA WITHOUT BEHAVIORAL DISTURBANCE (H): Primary | ICD-10-CM

## 2023-07-27 DIAGNOSIS — S72.144D CLOSED NONDISPLACED INTERTROCHANTERIC FRACTURE OF RIGHT FEMUR WITH ROUTINE HEALING, SUBSEQUENT ENCOUNTER: ICD-10-CM

## 2023-07-27 DIAGNOSIS — R60.0 BILATERAL LEG EDEMA: ICD-10-CM

## 2023-07-27 DIAGNOSIS — Z96.641 S/P TOTAL RIGHT HIP ARTHROPLASTY: ICD-10-CM

## 2023-07-27 LAB
GAMMA INTERFERON BACKGROUND BLD IA-ACNC: 0.02 IU/ML
M TB IFN-G BLD-IMP: NEGATIVE
M TB IFN-G CD4+ BCKGRND COR BLD-ACNC: 2.34 IU/ML
MITOGEN IGNF BCKGRD COR BLD-ACNC: 0.01 IU/ML
MITOGEN IGNF BCKGRD COR BLD-ACNC: 0.01 IU/ML
QUANTIFERON MITOGEN: 2.36 IU/ML
QUANTIFERON NIL TUBE: 0.02 IU/ML
QUANTIFERON TB1 TUBE: 0.03 IU/ML
QUANTIFERON TB2 TUBE: 0.03

## 2023-07-27 PROCEDURE — 99309 SBSQ NF CARE MODERATE MDM 30: CPT | Performed by: NURSE PRACTITIONER

## 2023-07-27 NOTE — PROGRESS NOTES
[facility] GERIATRICS    Chief Complaint   Patient presents with    RECHECK     HPI:  Sri Grewal is a 73 year old  (1950), who is being seen today for an episodic care visit at: [facility] ([facility]) [8]. Today's concern is:   1. Dementia without behavioral disturbance (H)    2. S/P total right hip arthroplasty    3. Closed nondisplaced intertrochanteric fracture of right femur with routine healing, subsequent encounter    4. Bilateral leg edema      Patient seen for follow up, moderate cognitive limitations, pleasant, post R hip surgery 7/14, pain controlled, incision approximated, now increasing edema with legs in dependent position much of day, overall appears healthy.    Allergies, and PMH/PSH reviewed in EPIC today.  REVIEW OF SYSTEMS:  4 point ROS including Respiratory, CV, GI and , other than that noted in the HPI,  is negative    Objective:   BP (!) 177/82   Pulse 86   Temp 97.9  F (36.6  C)   Resp 18   Wt 53.1 kg (117 lb)   LMP 11/09/2005 (Approximate)   SpO2 94%   BMI 20.73 kg/m    GENERAL APPEARANCE:  in no distress, appears healthy  ENT:  Mouth and posterior oropharynx normal, moist mucous membranes  RESP:  lungs clear to auscultation , no respiratory distress  CV:  peripheral edema 1-2+ in lower legs, R>L  ABDOMEN:  bowel sounds normal  M/S:   Gait and station abnormal transfer assist  SKIN:  Inspection of skin and subcutaneous tissue baseline  NEURO:   Examination of sensation by touch normal  PSYCH:  affect and mood normal    Labs done in SNF are in [facility]. Please refer to them using [facility]/Care Everywhere.    Assessment/Plan:  (F03.90) Dementia without behavioral disturbance (H)  (primary encounter diagnosis)  Comment: moderate cognitive limitations, SLUMS 18/30  Plan: discontinue noon seroquel 25mg  -continue seroquel 25mg am and 75mg at bedtime for now  -staff to support as indicated    (Z96.641) S/P total right hip arthroplasty  (S72.180D) Closed nondisplaced  intertrochanteric fracture of right femur with routine healing, subsequent encounter  (R60.0) Bilateral leg edema  Comment: post IM nail 7/14, pain controlled, edema 1-2+  Plan:   -PT/OT working with  -continue tramadol PRN, APAP TID  -probable discharge next week    MED REC REQUIRED  Post Medication Reconciliation Status: medication reconcilation previously completed during another office visit        Electronically signed by: JAYY Stewart CNP

## 2023-07-27 NOTE — LETTER
7/27/2023        RE: Sri Grewal  [address] [address]        [facility] GERIATRICS    Chief Complaint   Patient presents with     RECHECK     HPI:  Sri Grewal is a 73 year old  (1950), who is being seen today for an episodic care visit at: [facility] ([facility]) [8]. Today's concern is:   1. Dementia without behavioral disturbance (H)    2. S/P total right hip arthroplasty    3. Closed nondisplaced intertrochanteric fracture of right femur with routine healing, subsequent encounter    4. Bilateral leg edema      Patient seen for follow up, moderate cognitive limitations, pleasant, post R hip surgery 7/14, pain controlled, incision approximated, now increasing edema with legs in dependent position much of day, overall appears healthy.    Allergies, and PMH/PSH reviewed in EPIC today.  REVIEW OF SYSTEMS:  4 point ROS including Respiratory, CV, GI and , other than that noted in the HPI,  is negative    Objective:   BP (!) 177/82   Pulse 86   Temp 97.9  F (36.6  C)   Resp 18   Wt 53.1 kg (117 lb)   LMP 11/09/2005 (Approximate)   SpO2 94%   BMI 20.73 kg/m    GENERAL APPEARANCE:  in no distress, appears healthy  ENT:  Mouth and posterior oropharynx normal, moist mucous membranes  RESP:  lungs clear to auscultation , no respiratory distress  CV:  peripheral edema 1-2+ in lower legs, R>L  ABDOMEN:  bowel sounds normal  M/S:   Gait and station abnormal transfer assist  SKIN:  Inspection of skin and subcutaneous tissue baseline  NEURO:   Examination of sensation by touch normal  PSYCH:  affect and mood normal    Labs done in SNF are in [facility]. Please refer to them using [facility]IC/Care Everywhere.    Assessment/Plan:  (F03.90) Dementia without behavioral disturbance (H)  (primary encounter diagnosis)  Comment: moderate cognitive limitations, SLUMS 18/30  Plan: discontinue noon seroquel 25mg  -continue seroquel 25mg am and 75mg at bedtime for now  -staff to  support as indicated    (Z96.641) S/P total right hip arthroplasty  (S72.144D) Closed nondisplaced intertrochanteric fracture of right femur with routine healing, subsequent encounter  (R60.0) Bilateral leg edema  Comment: post IM nail 7/14, pain controlled, edema 1-2+  Plan:   -PT/OT working with  -continue tramadol PRN, APAP TID  -probable discharge next week    MED REC REQUIRED  Post Medication Reconciliation Status: medication reconcilation previously completed during another office visit        Electronically signed by: JAYY Stewart CNP          Sincerely,        JAYY Stewart CNP

## 2023-07-27 NOTE — TELEPHONE ENCOUNTER
Only official restriction with right shoulder is that she should not lift more than 10 pounds. Otherwise, goal is to rehab the shoulder as much as tolerated.     Called and left detailed VM with Heather (PT through [facility]) and gave my direct line for callback as needed.    Ron Sun RN

## 2023-07-30 VITALS
BODY MASS INDEX: 20.12 KG/M2 | WEIGHT: 113.6 LBS | DIASTOLIC BLOOD PRESSURE: 76 MMHG | TEMPERATURE: 97.8 F | OXYGEN SATURATION: 96 % | HEART RATE: 84 BPM | SYSTOLIC BLOOD PRESSURE: 136 MMHG | RESPIRATION RATE: 16 BRPM

## 2023-07-31 ENCOUNTER — TRANSITIONAL CARE UNIT VISIT (OUTPATIENT)
Dept: GERIATRICS | Facility: CLINIC | Age: 73
End: 2023-07-31
Payer: MEDICARE

## 2023-07-31 DIAGNOSIS — F03.90 DEMENTIA WITHOUT BEHAVIORAL DISTURBANCE (H): Primary | ICD-10-CM

## 2023-07-31 DIAGNOSIS — S72.144A CLOSED NONDISPLACED INTERTROCHANTERIC FRACTURE OF RIGHT FEMUR, INITIAL ENCOUNTER (H): ICD-10-CM

## 2023-07-31 DIAGNOSIS — D64.9 ANEMIA, UNSPECIFIED TYPE: ICD-10-CM

## 2023-07-31 PROCEDURE — 99309 SBSQ NF CARE MODERATE MDM 30: CPT | Performed by: NURSE PRACTITIONER

## 2023-07-31 RX ORDER — TRAMADOL HYDROCHLORIDE 50 MG/1
25 TABLET ORAL EVERY 4 HOURS PRN
Qty: 40 TABLET | Refills: 0
Start: 2023-07-31 | End: 2023-09-07

## 2023-07-31 NOTE — LETTER
7/31/2023        RE: Sri Grewal  [address] [address]        [facility] GERIATRICS    Chief Complaint   Patient presents with     RECHECK     HPI:  Sri Grewal is a 73 year old  (1950), who is being seen today for an episodic care visit at: [facility] ([facility]) [8]. Today's concern is:   1. Dementia without behavioral disturbance (H)    2. Closed nondisplaced intertrochanteric fracture of right femur, initial encounter (H)    3. Anemia, unspecified type      Patient seen for follow up, moderate cognitive limitations, appears healthy, started on seroquel in hospital and no change in status after reducing last week, pain controlled fairly well, concerned with opioid use and increased potential for falls, Hgb today 9.6 with recent average 12-13 range, no distress.    Allergies, and PMH/PSH reviewed in EPIC today.  REVIEW OF SYSTEMS:  4 point ROS including Respiratory, CV, GI and , other than that noted in the HPI,  is negative    Objective:   /76   Pulse 84   Temp 97.8  F (36.6  C)   Resp 16   Wt 51.5 kg (113 lb 9.6 oz)   LMP 11/09/2005 (Approximate)   SpO2 96%   BMI 20.12 kg/m    GENERAL APPEARANCE:  in no distress, appears healthy  ENT:  Mouth and posterior oropharynx normal, moist mucous membranes  RESP:  lungs clear to auscultation   CV:  regular rate and rhythm, no murmur, rub, or gallop, no edema  ABDOMEN:  bowel sounds normal  M/S:   Gait and station abnormal unsteady gait  SKIN:  Inspection of skin and subcutaneous tissue baseline  NEURO:   Examination of sensation by touch normal  PSYCH:  affect and mood normal    Labs done in SNF are in [facility]. Please refer to them using [employer]/Care Everywhere.    Assessment/Plan:  (F03.90) Dementia without behavioral disturbance (H)  (primary encounter diagnosis)  Comment: moderate cognitive limitations, redirectable  Plan: discontinue seroquel am dose only  -continue seroquel 75mg at bedtime for  now    (S79.955J) Closed nondisplaced intertrochanteric fracture of right femur, initial encounter (H)  Comment: mild pain, participating with therapy  Plan:   -change tramadol from 50 to 25mg Q4h PRN  -continue APAP scheduled    (D64.9) Anemia, unspecified type  Comment: recent Hgb range 11-12, today 9.6  Plan: asymptomatic  -plan to recheck Hgb in 1-2 weeks    MED REC REQUIRED  Post Medication Reconciliation Status: medication reconcilation previously completed during another office visit        Electronically signed by: JAYY Stewart CNP          Sincerely,        JAYY Stewart CNP

## 2023-07-31 NOTE — PROGRESS NOTES
[facility] GERIATRICS    Chief Complaint   Patient presents with    RECHECK     HPI:  Sri Grewal is a 73 year old  (1950), who is being seen today for an episodic care visit at: [facility] ([facility]) [8]. Today's concern is:   1. Dementia without behavioral disturbance (H)    2. Closed nondisplaced intertrochanteric fracture of right femur, initial encounter (H)    3. Anemia, unspecified type      Patient seen for follow up, moderate cognitive limitations, appears healthy, started on seroquel in hospital and no change in status after reducing last week, pain controlled fairly well, concerned with opioid use and increased potential for falls, Hgb today 9.6 with recent average 12-13 range, no distress.    Allergies, and PMH/PSH reviewed in EPIC today.  REVIEW OF SYSTEMS:  4 point ROS including Respiratory, CV, GI and , other than that noted in the HPI,  is negative    Objective:   /76   Pulse 84   Temp 97.8  F (36.6  C)   Resp 16   Wt 51.5 kg (113 lb 9.6 oz)   LMP 11/09/2005 (Approximate)   SpO2 96%   BMI 20.12 kg/m    GENERAL APPEARANCE:  in no distress, appears healthy  ENT:  Mouth and posterior oropharynx normal, moist mucous membranes  RESP:  lungs clear to auscultation   CV:  regular rate and rhythm, no murmur, rub, or gallop, no edema  ABDOMEN:  bowel sounds normal  M/S:   Gait and station abnormal unsteady gait  SKIN:  Inspection of skin and subcutaneous tissue baseline  NEURO:   Examination of sensation by touch normal  PSYCH:  affect and mood normal    Labs done in SNF are in [facility]. Please refer to them using [facility]/Care Everywhere.    Assessment/Plan:  (F03.90) Dementia without behavioral disturbance (H)  (primary encounter diagnosis)  Comment: moderate cognitive limitations, redirectable  Plan: discontinue seroquel am dose only  -continue seroquel 75mg at bedtime for now    (S70.209D) Closed nondisplaced intertrochanteric fracture of right femur, initial  encounter (H)  Comment: mild pain, participating with therapy  Plan:   -change tramadol from 50 to 25mg Q4h PRN  -continue APAP scheduled    (D64.9) Anemia, unspecified type  Comment: recent Hgb range 11-12, today 9.6  Plan: asymptomatic  -plan to recheck Hgb in 1-2 weeks    MED REC REQUIRED  Post Medication Reconciliation Status: medication reconcilation previously completed during another office visit        Electronically signed by: JAYY Stewart CNP

## 2023-08-01 ENCOUNTER — OFFICE VISIT (OUTPATIENT)
Dept: ORTHOPEDICS | Facility: CLINIC | Age: 73
End: 2023-08-01
Payer: COMMERCIAL

## 2023-08-01 ENCOUNTER — ANCILLARY PROCEDURE (OUTPATIENT)
Dept: GENERAL RADIOLOGY | Facility: CLINIC | Age: 73
End: 2023-08-01
Attending: ORTHOPAEDIC SURGERY
Payer: COMMERCIAL

## 2023-08-01 VITALS
RESPIRATION RATE: 18 BRPM | DIASTOLIC BLOOD PRESSURE: 71 MMHG | HEART RATE: 64 BPM | SYSTOLIC BLOOD PRESSURE: 121 MMHG | BODY MASS INDEX: 20.12 KG/M2 | HEIGHT: 63 IN

## 2023-08-01 DIAGNOSIS — S72.144A CLOSED NONDISPLACED INTERTROCHANTERIC FRACTURE OF RIGHT FEMUR, INITIAL ENCOUNTER (H): ICD-10-CM

## 2023-08-01 DIAGNOSIS — S72.144A CLOSED NONDISPLACED INTERTROCHANTERIC FRACTURE OF RIGHT FEMUR, INITIAL ENCOUNTER (H): Primary | ICD-10-CM

## 2023-08-01 PROCEDURE — 99024 POSTOP FOLLOW-UP VISIT: CPT | Performed by: ORTHOPAEDIC SURGERY

## 2023-08-01 PROCEDURE — 73502 X-RAY EXAM HIP UNI 2-3 VIEWS: CPT | Mod: TC | Performed by: RADIOLOGY

## 2023-08-01 NOTE — PROGRESS NOTES
Follow up open-reduction, internal fixation right Intertrochanteric fracture with short Gamma todd on 7/14/23.    She has been PWB 50%    Ambulation aids:  walker.  moderate pain with ambulation.    Xray shows good position of fracture and fixation.  The lesser trochanter is avulsed.    Exam shows moderate pain with rotation of right hip, no pain on left.  External rotation 30 degrees on right, 40 degrees on left.  Internal rotation 5-10 degrees on right, 15-20 degrees on left.    Assessment:  right hip intertrochanteric femur fracture open-reduction, internal fixation is stable at 2 weeks.  She had fallen 3 days after surgery and her lesser trochanteric avulsion occurred then.  It is stable.  We will continue partial weightbearing to avoid pain.  Ideally I would want to do 50% for another 2 weeks and then advance as tolerated.  Return to clinic 1 month with x-ray of right hip.

## 2023-08-01 NOTE — LETTER
8/1/2023         RE: Sri Grewal  [address] [address]        Dear Colleague,    Thank you for referring your patient, Sri Grewal, to the [facility]. Please see a copy of my visit note below.    Follow up open-reduction, internal fixation right Intertrochanteric fracture with short Gamma todd on 7/14/23.    She has been PWB 50%    Ambulation aids:  walker.  moderate pain with ambulation.    Xray shows good position of fracture and fixation.  The lesser trochanter is avulsed.    Exam shows moderate pain with rotation of right hip, no pain on left.  External rotation 30 degrees on right, 40 degrees on left.  Internal rotation 5-10 degrees on right, 15-20 degrees on left.    Assessment:  right hip intertrochanteric femur fracture open-reduction, internal fixation is stable at 2 weeks.  She had fallen 3 days after surgery and her lesser trochanteric avulsion occurred then.  It is stable.  We will continue partial weightbearing to avoid pain.  Ideally I would want to do 50% for another 2 weeks and then advance as tolerated.  Return to clinic 1 month with x-ray of right hip.    Again, thank you for allowing me to participate in the care of your patient.        Sincerely,        Nathan Turner MD

## 2023-08-01 NOTE — PROGRESS NOTES
Appropriate assistive devices provided during their visit. yes (Yes, No, N/A) wc (list device)    Exam table and/or cart  placed in the lowest position. yes (Yes, No, N/A)    Brakes on tables/carts/wheelchairs used at all times. yes (Yes, No, N/A)    Non slip footwear applied. na (Yes, No, NA)    Patient was accompanied by staff throughout visit. yes (Yes, No, N/A)    Equipment safety straps used. na (Yes, No, N/A)    Assist with toileting. na (Yes, No, N/A)

## 2023-08-02 VITALS
TEMPERATURE: 97.8 F | HEART RATE: 79 BPM | SYSTOLIC BLOOD PRESSURE: 151 MMHG | OXYGEN SATURATION: 97 % | RESPIRATION RATE: 18 BRPM | WEIGHT: 113.8 LBS | BODY MASS INDEX: 20.16 KG/M2 | DIASTOLIC BLOOD PRESSURE: 76 MMHG

## 2023-08-03 ENCOUNTER — TRANSITIONAL CARE UNIT VISIT (OUTPATIENT)
Dept: GERIATRICS | Facility: CLINIC | Age: 73
End: 2023-08-03
Payer: MEDICARE

## 2023-08-03 DIAGNOSIS — R41.0 DELIRIUM: ICD-10-CM

## 2023-08-03 DIAGNOSIS — F03.90 DEMENTIA WITHOUT BEHAVIORAL DISTURBANCE (H): Primary | ICD-10-CM

## 2023-08-03 DIAGNOSIS — D64.9 ANEMIA, UNSPECIFIED TYPE: ICD-10-CM

## 2023-08-03 DIAGNOSIS — S72.144D CLOSED NONDISPLACED INTERTROCHANTERIC FRACTURE OF RIGHT FEMUR WITH ROUTINE HEALING, SUBSEQUENT ENCOUNTER: ICD-10-CM

## 2023-08-03 PROCEDURE — 99309 SBSQ NF CARE MODERATE MDM 30: CPT | Performed by: NURSE PRACTITIONER

## 2023-08-03 RX ORDER — QUETIAPINE FUMARATE 25 MG/1
50 TABLET, FILM COATED ORAL AT BEDTIME
Qty: 90 TABLET | Refills: 0
Start: 2023-08-03 | End: 2023-08-04

## 2023-08-03 NOTE — PROGRESS NOTES
[facility] GERIATRICS    Chief Complaint   Patient presents with    RECHECK     HPI:  Sri Grewal is a 73 year old  (1950), who is being seen today for an episodic care visit at: [facility] ([facility]) [8]. Today's concern is:   1. Dementia without behavioral disturbance (H)    2. Delirium    3. Anemia, unspecified type    4. Closed nondisplaced intertrochanteric fracture of right femur with routine healing, subsequent encounter      Patient seen for follow up, moderate cognitive limitations, SLUMS 18/30, having certain level of delirium in hospital but has been WNL in TCU, Hgb drop from 10.5 to 9.3 unknown etiology as no s/s bleeding nor bruising, R leg denies pain at rest, participating with therapies, appears healthy, happy.    Allergies, and PMH/PSH reviewed in EPIC today.  REVIEW OF SYSTEMS:  4 point ROS including Respiratory, CV, GI and , other than that noted in the HPI,  is negative    Objective:   BP (!) 151/76   Pulse 79   Temp 97.8  F (36.6  C)   Resp 18   Wt 51.6 kg (113 lb 12.8 oz)   LMP 11/09/2005 (Approximate)   SpO2 97%   BMI 20.16 kg/m    GENERAL APPEARANCE:  in no distress, appears healthy  ENT:  Mouth and posterior oropharynx normal, moist mucous membranes  RESP:  lungs clear to auscultation   CV:  regular rate and rhythm, no murmur, rub, or gallop, no edema  ABDOMEN:  bowel sounds normal  M/S:   Gait and station abnormal 50% WB status  SKIN:  Inspection of skin and subcutaneous tissue baseline  NEURO:   Examination of sensation by touch normal  PSYCH:  memory impaired     Labs done in SNF are in [facility]. Please refer to them using [employer]Care Everywhere.    Assessment/Plan:  (F03.90) Dementia without behavioral disturbance (H)  (primary encounter diagnosis)  (R41.0) Delirium  Comment: moderate cognitive limitations, no behaviors  Plan:   -reduce seroquel from 25/25/75 to 50mg at bedtime  -staff to monitor  -considering additional weaning, will consult with  significant other prior    (D64.9) Anemia, unspecified type  Comment: Hgb was 10.5, now 9/6  Plan: repeat Hgb on 8/7    (T42.845S) Closed nondisplaced intertrochanteric fracture of right femur with routine healing, subsequent encounter  Comment: pain controlled, participating with therapies  Plan:   -PT/OT working with  -continue tramadol 25mg Q4h PRN and APAP TID for now  -discontinue PVR's (from post-op retention)  -continue ABEL stockings, elevation  -follow up ortho PRN    MED REC REQUIRED  Post Medication Reconciliation Status: medication reconcilation previously completed during another office visit        Electronically signed by: JAYY Stewart CNP

## 2023-08-03 NOTE — LETTER
8/3/2023        RE: Sri Grewal  [address] [address] [address]        [facility] GERIATRICS    Chief Complaint   Patient presents with     RECHECK     HPI:  Sri Grewal is a 73 year old  (1950), who is being seen today for an episodic care visit at: [facility] ([facility]) [8]. Today's concern is:   1. Dementia without behavioral disturbance (H)    2. Delirium    3. Anemia, unspecified type    4. Closed nondisplaced intertrochanteric fracture of right femur with routine healing, subsequent encounter      Patient seen for follow up, moderate cognitive limitations, SLUMS 18/30, having certain level of delirium in hospital but has been WNL in TCU, Hgb drop from 10.5 to 9.3 unknown etiology as no s/s bleeding nor bruising, R leg denies pain at rest, participating with therapies, appears healthy, happy.    Allergies, and PMH/PSH reviewed in EPIC today.  REVIEW OF SYSTEMS:  4 point ROS including Respiratory, CV, GI and , other than that noted in the HPI,  is negative    Objective:   BP (!) 151/76   Pulse 79   Temp 97.8  F (36.6  C)   Resp 18   Wt 51.6 kg (113 lb 12.8 oz)   LMP 11/09/2005 (Approximate)   SpO2 97%   BMI 20.16 kg/m    GENERAL APPEARANCE:  in no distress, appears healthy  ENT:  Mouth and posterior oropharynx normal, moist mucous membranes  RESP:  lungs clear to auscultation   CV:  regular rate and rhythm, no murmur, rub, or gallop, no edema  ABDOMEN:  bowel sounds normal  M/S:   Gait and station abnormal 50% WB status  SKIN:  Inspection of skin and subcutaneous tissue baseline  NEURO:   Examination of sensation by touch normal  PSYCH:  memory impaired     Labs done in SNF are in [facility]. Please refer to them using [employer]/Care Everywhere.    Assessment/Plan:  (F03.90) Dementia without behavioral disturbance (H)  (primary encounter diagnosis)  (R41.0) Delirium  Comment: moderate cognitive limitations, no behaviors  Plan:   -reduce seroquel from 25/25/75 to  50mg at bedtime  -staff to monitor  -considering additional weaning, will consult with significant other prior    (D64.9) Anemia, unspecified type  Comment: Hgb was 10.5, now 9/6  Plan: repeat Hgb on 8/7    (S76.662D) Closed nondisplaced intertrochanteric fracture of right femur with routine healing, subsequent encounter  Comment: pain controlled, participating with therapies  Plan:   -PT/OT working with  -continue tramadol 25mg Q4h PRN and APAP TID for now  -discontinue PVR's (from post-op retention)  -continue ABEL stockings, elevation  -follow up ortho PRN    MED REC REQUIRED  Post Medication Reconciliation Status: medication reconcilation previously completed during another office visit        Electronically signed by: JAYY Stewart CNP          Sincerely,        JAYY Stewart CNP

## 2023-08-04 ENCOUNTER — LAB REQUISITION (OUTPATIENT)
Dept: LAB | Facility: CLINIC | Age: 73
End: 2023-08-04
Payer: MEDICARE

## 2023-08-04 ENCOUNTER — OFFICE VISIT (OUTPATIENT)
Dept: NEUROLOGY | Facility: CLINIC | Age: 73
End: 2023-08-04
Attending: PHYSICIAN ASSISTANT
Payer: MEDICARE

## 2023-08-04 ENCOUNTER — TELEPHONE (OUTPATIENT)
Dept: GERIATRICS | Facility: CLINIC | Age: 73
End: 2023-08-04

## 2023-08-04 VITALS
RESPIRATION RATE: 16 BRPM | DIASTOLIC BLOOD PRESSURE: 76 MMHG | SYSTOLIC BLOOD PRESSURE: 147 MMHG | OXYGEN SATURATION: 97 % | HEART RATE: 84 BPM

## 2023-08-04 DIAGNOSIS — G43.709 CHRONIC MIGRAINE WITHOUT AURA WITHOUT STATUS MIGRAINOSUS, NOT INTRACTABLE: ICD-10-CM

## 2023-08-04 DIAGNOSIS — I69.30 HISTORY OF CEREBROVASCULAR ACCIDENT (CVA) WITH RESIDUAL DEFICIT: ICD-10-CM

## 2023-08-04 DIAGNOSIS — R41.3 LOSS OF MEMORY: ICD-10-CM

## 2023-08-04 DIAGNOSIS — R30.0 DYSURIA: ICD-10-CM

## 2023-08-04 DIAGNOSIS — R41.0 DELIRIUM: ICD-10-CM

## 2023-08-04 LAB
ALBUMIN UR-MCNC: 30 MG/DL
APPEARANCE UR: ABNORMAL
BACTERIA #/AREA URNS HPF: ABNORMAL /HPF
BILIRUB UR QL STRIP: NEGATIVE
COLOR UR AUTO: YELLOW
GLUCOSE UR STRIP-MCNC: NEGATIVE MG/DL
HGB UR QL STRIP: ABNORMAL
KETONES UR STRIP-MCNC: NEGATIVE MG/DL
LEUKOCYTE ESTERASE UR QL STRIP: ABNORMAL
NITRATE UR QL: POSITIVE
PH UR STRIP: 6 [PH] (ref 5–7)
RBC URINE: 10 /HPF
SP GR UR STRIP: 1.01 (ref 1–1.03)
UROBILINOGEN UR STRIP-MCNC: NORMAL MG/DL
WBC CLUMPS #/AREA URNS HPF: PRESENT /HPF
WBC URINE: >182 /HPF

## 2023-08-04 PROCEDURE — 99417 PROLNG OP E/M EACH 15 MIN: CPT | Performed by: PSYCHIATRY & NEUROLOGY

## 2023-08-04 PROCEDURE — 99215 OFFICE O/P EST HI 40 MIN: CPT | Performed by: PSYCHIATRY & NEUROLOGY

## 2023-08-04 PROCEDURE — 81001 URINALYSIS AUTO W/SCOPE: CPT | Performed by: NURSE PRACTITIONER

## 2023-08-04 PROCEDURE — 87186 SC STD MICRODIL/AGAR DIL: CPT | Performed by: NURSE PRACTITIONER

## 2023-08-04 RX ORDER — QUETIAPINE FUMARATE 25 MG/1
25 TABLET, FILM COATED ORAL AT BEDTIME
Qty: 7 TABLET | Refills: 0 | Status: SHIPPED | OUTPATIENT
Start: 2023-08-04 | End: 2023-08-07

## 2023-08-04 ASSESSMENT — PAIN SCALES - GENERAL: PAINLEVEL: NO PAIN (0)

## 2023-08-04 NOTE — PROGRESS NOTES
[facility]    Sri Grewal MRN# [id number]   Age: 73 year old YOB: 1950     Requesting physician: Rosenda Varma-*  Avani-Rosenda Saravia            Assessment and Plan:     (G43.321) Chronic migraine without aura without status migrainosus, not intractable  Comment: Not safe to use triptans due to stroke history. Ubrelvy is safe.   Plan: ubrogepant (UBRELVY) 50 MG tablet prn            (I69.30) History of cerebrovascular accident (CVA) with residual deficit  Comment: Worsening of memory after new stroke, we will obtain neuropsych testing.   Plan: Adult Neuropsychology Referral, stop quetiapine as it increases risk of stroke, MI and death. She was given this for hospital delirium.  She still has uncontrolled hypertension and her PCP could consider a nephrology consult if it continues to be above goal.     (R41.3) Loss of memory  Comment: Vascular dementia vs stroke residual deficits vs Alzheimers. Due to stroke history she would not be able to take any of the new medicines such as lecanumab.   Plan: Adult Neuropsychology Referral    I spent 60 minutes caring for the patient on the day of the visit including chart review and visit time.     Miryam Mcgowan MD           History of Present Illness:   CC: Return visit to discuss memory.     Sri is a 73 year old woman who has been cared for once previously over the telephone during the pandemic. This is my first meeting of her in person. She has her friend, Bryson, with her.     She had a stroke 4/25/23 which resulted in increased speech problems and right sided weakness. Went to [facility] (20 minute+s after start of symptoms) and received tenecteplase.  She felt it worked right away. She was switched from Aspirin to Plavix. Cardiac screening did not reveal cardioembolic source.     Has multiple BP meds and is still not at goal    Memory has gotten worse. She needs help remembering plans for the day, finances and medicines. Right now her  friend, Bryson, is helping her. He whatley snot feel she would be safe to live alone at this time.     She was placed in Seroquel for delirium in hospital still taking it. Delirium resolved.          Physical Exam:   BP (!) 147/76 (BP Location: Left arm, Patient Position: Sitting, Cuff Size: Adult Small)   Pulse 84   Resp 16   LMP 11/09/2005 (Approximate)   SpO2 97%   [employer] Short Test of Mental Status:   Orientation 6/8  Attention 6/7  Registration 4/4 (2)  Info 2/4  Abstract thinking 3/3  Calculation 2/4  Construction 2/4  Recall 0/4  Total score: 24/38           Pertinent History/Data for appointment:     Total cholesterol 137  HDL 74  LDL 52   TG 55    XAM: MR HEAD BRAIN WO   LOCATION: [facility]   DATE/TIME: 4/26/2023 6:32 PM CDT     INDICATION: Neuro deficit, acute, stroke suspected. Post TNK.   COMPARISON: Head CT/CT angiogram head and neck 04/25/2023, brain MRI 10/13/2021.   TECHNIQUE: Routine multiplanar multisequence head MRI without intravenous contrast.     FINDINGS:   INTRACRANIAL CONTENTS: There is no evidence for acute or subacute infarction. Nothing for restricted diffusion abnormality is present intracranially. No intracranial mass, acute hemorrhage, or extra-axial fluid collections. Scattered chronic areas of hemosiderin/chronic microhemorrhage redemonstrated at the supratentorial level including the region of the medial left lentiform nucleus and genu of the left internal capsule stable from prior brain MRI. Patchy and confluent nonspecific T2/FLAIR hyperintensities within the cerebral white matter most consistent with moderate chronic microvascular ischemic change. Moderate generalized cerebral atrophy. No hydrocephalus. Corpus callosum is normal. Normal position of the cerebellar tonsils.     SELLA: No abnormality accounting for technique.     OSSEOUS STRUCTURES/SOFT TISSUES: No evidence for marrow infiltrative process. Degenerative changes both TMJs. The major intracranial vascular flow voids  are maintained. Vertebrobasilar tortuosity.     ORBITS: Prior bilateral cataract surgery. Visualized portions of the orbits are otherwise unremarkable.     SINUSES/MASTOIDS: No paranasal sinus mucosal disease. Scattered fluid/membrane thickening in the mastoid air cells bilaterally.     IMPRESSION:  1.  No acute intracranial process.   2.  Generalized brain atrophy and presumed microvascular ischemic changes as detailed above.   3.  Nothing for acute or evolving infarction is identified intracranially     Zio Patch  Patient: Sri Grewal   : 1950   Indication: acute cerebrovascular accident    Ordered by: Emmie Ramirez MD   Dates Recorded: 23-23   Analysis Time: 13 days 23 hours (after artifact removed)     Predominant underlying rhythm was Sinus Rhythm. Rare PACs and are PVCs seen, both measuring <1% of total beats. No sustained atrial fibrillation seen. There were zero diary entries and zero patient-triggered events for correlation.     Electronically signed by Dr. Caio Marino 23 08:10 AM (CT)

## 2023-08-04 NOTE — NURSING NOTE
Chief Complaint   Patient presents with    New Patient     Here for consult, confirmed with patient.      Jennifer Quinonez

## 2023-08-04 NOTE — PROGRESS NOTES
[facility]    Sri Grewal MRN# [id number]   Age: 73 year old YOB: 1950     Requesting physician: No ref. provider found  Rosenda Pierre            Assessment and Plan:     No diagnosis found.                   History of Present Illness:   CC:                 Physical Exam:              Pertinent History/Data for appointment:

## 2023-08-04 NOTE — TELEPHONE ENCOUNTER
[facility] Triage Nurse Telephone Encounter    Provider: JAYY Cueto CNP   Facility: [facility]  Facility Type:  TCU    Caller: Larisa  Call Back Number: [phone]    Allergies:    Allergies   Allergen Reactions     Morphine And Related      GI UPSET     Oxycodone      Seasonal Allergies         Reason for call: Nurse is reporting that patient is c/o burning and pain with voiding.  No fever noted.      Verbal Order/Direction given by Provider: Obtain a clean catch UA/UC.      Provider giving Order:  JAYY Cueto CNP     Verbal Order given to: Larisa Rodriguez RN

## 2023-08-04 NOTE — LETTER
8/4/2023      RE: Sri Grewal  [address]  [facility] [address]           [facility]    Sri Grewal MRN# [id number]   Age: 73 year old YOB: 1950     Requesting physician: Rosenda Varma-*  Rosenda Pierre            Assessment and Plan:     (G43.709) Chronic migraine without aura without status migrainosus, not intractable  Comment: Not safe to use triptans due to stroke history. Ubrelvy is safe.   Plan: ubrogepant (UBRELVY) 50 MG tablet prn            (I69.30) History of cerebrovascular accident (CVA) with residual deficit  Comment: Worsening of memory after new stroke, we will obtain neuropsych testing.   Plan: Adult Neuropsychology Referral, stop quetiapine as it increases risk of stroke, MI and death. She was given this for hospital delirium.  She still has uncontrolled hypertension and her PCP could consider a nephrology consult if it continues to be above goal.     (R41.3) Loss of memory  Comment: Vascular dementia vs stroke residual deficits vs Alzheimers. Due to stroke history she would not be able to take any of the new medicines such as lecanumab.   Plan: Adult Neuropsychology Referral    I spent 60 minutes caring for the patient on the day of the visit including chart review and visit time.     Miryam Mcgowan MD           History of Present Illness:   CC: Return visit to discuss memory.     Sri is a 73 year old woman who has been cared for once previously over the telephone during the pandemic. This is my first meeting of her in person. She has her friend, Bryson, with her.     She had a stroke 4/25/23 which resulted in increased speech problems and right sided weakness. Went to [facility] (20 minute+s after start of symptoms) and received tenecteplase.  She felt it worked right away. She was switched from Aspirin to Plavix. Cardiac screening did not reveal cardioembolic source.     Has multiple BP meds and is still not at goal    Memory has gotten worse. She  needs help remembering plans for the day, finances and medicines. Right now her friend, Bryson, is helping her. He whatlye snot feel she would be safe to live alone at this time.     She was placed in Seroquel for delirium in hospital still taking it. Delirium resolved.          Physical Exam:   BP (!) 147/76 (BP Location: Left arm, Patient Position: Sitting, Cuff Size: Adult Small)   Pulse 84   Resp 16   LMP 11/09/2005 (Approximate)   SpO2 97%   [employer] Short Test of Mental Status:   Orientation 6/8  Attention 6/7  Registration 4/4 (2)  Info 2/4  Abstract thinking 3/3  Calculation 2/4  Construction 2/4  Recall 0/4  Total score: 24/38           Pertinent History/Data for appointment:     Total cholesterol 137  HDL 74  LDL 52   TG 55    XAM: MR HEAD BRAIN WO   LOCATION: [facility]   DATE/TIME: 4/26/2023 6:32 PM CDT     INDICATION: Neuro deficit, acute, stroke suspected. Post TNK.   COMPARISON: Head CT/CT angiogram head and neck 04/25/2023, brain MRI 10/13/2021.   TECHNIQUE: Routine multiplanar multisequence head MRI without intravenous contrast.     FINDINGS:   INTRACRANIAL CONTENTS: There is no evidence for acute or subacute infarction. Nothing for restricted diffusion abnormality is present intracranially. No intracranial mass, acute hemorrhage, or extra-axial fluid collections. Scattered chronic areas of hemosiderin/chronic microhemorrhage redemonstrated at the supratentorial level including the region of the medial left lentiform nucleus and genu of the left internal capsule stable from prior brain MRI. Patchy and confluent nonspecific T2/FLAIR hyperintensities within the cerebral white matter most consistent with moderate chronic microvascular ischemic change. Moderate generalized cerebral atrophy. No hydrocephalus. Corpus callosum is normal. Normal position of the cerebellar tonsils.     SELLA: No abnormality accounting for technique.     OSSEOUS STRUCTURES/SOFT TISSUES: No evidence for marrow infiltrative  process. Degenerative changes both TMJs. The major intracranial vascular flow voids are maintained. Vertebrobasilar tortuosity.     ORBITS: Prior bilateral cataract surgery. Visualized portions of the orbits are otherwise unremarkable.     SINUSES/MASTOIDS: No paranasal sinus mucosal disease. Scattered fluid/membrane thickening in the mastoid air cells bilaterally.     IMPRESSION:  1.  No acute intracranial process.   2.  Generalized brain atrophy and presumed microvascular ischemic changes as detailed above.   3.  Nothing for acute or evolving infarction is identified intracranially     Zio Patch  Patient: Sri Grewal   : 1950   Indication: acute cerebrovascular accident    Ordered by: Emmie Ramirez MD   Dates Recorded: 23-23   Analysis Time: 13 days 23 hours (after artifact removed)     Predominant underlying rhythm was Sinus Rhythm. Rare PACs and are PVCs seen, both measuring <1% of total beats. No sustained atrial fibrillation seen. There were zero diary entries and zero patient-triggered events for correlation.     Electronically signed by Dr. Caio Marino 23 08:10 AM (CT)       Miryam Mcgowan MD

## 2023-08-06 ENCOUNTER — LAB REQUISITION (OUTPATIENT)
Dept: LAB | Facility: CLINIC | Age: 73
End: 2023-08-06
Payer: MEDICARE

## 2023-08-06 ENCOUNTER — TELEPHONE (OUTPATIENT)
Dept: GERIATRICS | Facility: CLINIC | Age: 73
End: 2023-08-06
Payer: MEDICARE

## 2023-08-06 DIAGNOSIS — D64.9 ANEMIA, UNSPECIFIED: ICD-10-CM

## 2023-08-06 LAB — BACTERIA UR CULT: ABNORMAL

## 2023-08-06 NOTE — TELEPHONE ENCOUNTER
Culture >100,000 CFU/mL Escherichia coli Abnormal            Resulting Agency: [facility]     Susceptibility     Escherichia coli     EVANGELISTA     Ampicillin >=32 ug/mL Resistant     Ampicillin/ Sulbactam 16 ug/mL Intermediate     Cefazolin <=4 ug/mL Susceptible 1     Cefepime <=1 ug/mL Susceptible     Cefoxitin <=4 ug/mL Susceptible     Ceftazidime <=1 ug/mL Susceptible     Ceftriaxone <=1 ug/mL Susceptible     Ciprofloxacin <=0.25 ug/mL Susceptible     Gentamicin <=1 ug/mL Susceptible     Levofloxacin 0.25 ug/mL Susceptible     Nitrofurantoin <=16 ug/mL Susceptible     Piperacillin/Tazobactam <=4 ug/mL Susceptible     Tobramycin <=1 ug/mL Susceptible     Trimethoprim/Sulfamethoxazole >16/304 ug/mL Resistant                 Keflex 500mg po TID for 7 days for UTI    Estefany Farooq, JAYY CNP

## 2023-08-07 ENCOUNTER — TRANSITIONAL CARE UNIT VISIT (OUTPATIENT)
Dept: GERIATRICS | Facility: CLINIC | Age: 73
End: 2023-08-07
Payer: MEDICARE

## 2023-08-07 VITALS
DIASTOLIC BLOOD PRESSURE: 90 MMHG | BODY MASS INDEX: 20.12 KG/M2 | WEIGHT: 113.6 LBS | HEART RATE: 75 BPM | RESPIRATION RATE: 16 BRPM | SYSTOLIC BLOOD PRESSURE: 160 MMHG | OXYGEN SATURATION: 100 % | TEMPERATURE: 98.1 F

## 2023-08-07 DIAGNOSIS — N39.0 URINARY TRACT INFECTION WITHOUT HEMATURIA, SITE UNSPECIFIED: ICD-10-CM

## 2023-08-07 DIAGNOSIS — F03.90 DEMENTIA WITHOUT BEHAVIORAL DISTURBANCE (H): Primary | ICD-10-CM

## 2023-08-07 DIAGNOSIS — S72.144D CLOSED NONDISPLACED INTERTROCHANTERIC FRACTURE OF RIGHT FEMUR WITH ROUTINE HEALING, SUBSEQUENT ENCOUNTER: ICD-10-CM

## 2023-08-07 DIAGNOSIS — I10 HYPERTENSION GOAL BP (BLOOD PRESSURE) < 140/90: ICD-10-CM

## 2023-08-07 LAB — HGB BLD-MCNC: 10.5 G/DL (ref 11.7–15.7)

## 2023-08-07 PROCEDURE — 99309 SBSQ NF CARE MODERATE MDM 30: CPT | Performed by: NURSE PRACTITIONER

## 2023-08-07 PROCEDURE — 36415 COLL VENOUS BLD VENIPUNCTURE: CPT | Performed by: NURSE PRACTITIONER

## 2023-08-07 PROCEDURE — P9604 ONE-WAY ALLOW PRORATED TRIP: HCPCS | Performed by: NURSE PRACTITIONER

## 2023-08-07 PROCEDURE — 85018 HEMOGLOBIN: CPT | Performed by: NURSE PRACTITIONER

## 2023-08-07 RX ORDER — AMOXICILLIN 250 MG
2 CAPSULE ORAL 2 TIMES DAILY PRN
Qty: 30 TABLET | Refills: 1
Start: 2023-08-07 | End: 2023-10-05

## 2023-08-07 RX ORDER — POLYETHYLENE GLYCOL 3350 17 G/17G
1 POWDER, FOR SOLUTION ORAL DAILY PRN
Qty: 1 G | Refills: 0
Start: 2023-08-07 | End: 2024-05-30

## 2023-08-07 RX ORDER — LISINOPRIL 20 MG/1
40 TABLET ORAL DAILY
Qty: 90 TABLET | Refills: 0
Start: 2023-08-07 | End: 2024-01-25

## 2023-08-07 RX ORDER — CEPHALEXIN 500 MG/1
500 CAPSULE ORAL 3 TIMES DAILY
Qty: 21 CAPSULE | Refills: 0
Start: 2023-08-07 | End: 2023-08-14

## 2023-08-07 NOTE — PROGRESS NOTES
[facility] GERIATRICS    Chief Complaint   Patient presents with    RECHECK     HPI:  Sri Grewal is a 73 year old  (1950), who is being seen today for an episodic care visit at: [facility] ([facility]) [8]. Today's concern is:   1. Dementia without behavioral disturbance (H)    2. Hypertension goal BP (blood pressure) < 140/90    3. Closed nondisplaced intertrochanteric fracture of right femur with routine healing, subsequent encounter    4. Urinary tract infection without hematuria, site unspecified      Patient seen for follow up, also met with significant other regarding plan, moderate cognitive limitations, pleasant, started on seroquel in hospital but have been weaning eithout issues, post fall with R femur Fx, pain controlled, also SBP's have been 130-180 range, also UC >100k ecoli, asymptomatic.    Allergies, and PMH/PSH reviewed in EPIC today.  REVIEW OF SYSTEMS:  4 point ROS including Respiratory, CV, GI and , other than that noted in the HPI,  is negative    Objective:   BP (!) 160/90   Pulse 75   Temp 98.1  F (36.7  C)   Resp 16   Wt 51.5 kg (113 lb 9.6 oz)   LMP 11/09/2005 (Approximate)   SpO2 100%   BMI 20.12 kg/m    GENERAL APPEARANCE:  in no distress, appears healthy  ENT:  Mouth and posterior oropharynx normal, moist mucous membranes  RESP:  lungs clear to auscultation   CV:  regular rate and rhythm, no murmur, rub, or gallop, no edema  ABDOMEN:  bowel sounds normal  M/S:   Gait and station abnormal transfer assist  SKIN:  Inspection of skin and subcutaneous tissue baseline  NEURO:   Examination of sensation by touch normal  PSYCH:  affect and mood normal    Labs done in SNF are in [facility]. Please refer to them using [employer]/Care Everywhere.    Assessment/Plan:  (F03.90) Dementia without behavioral disturbance (H)  (primary encounter diagnosis)  Comment: moderate cognitive limitations  Plan: discontinue seroquel  -continue effexor as mood stabilizer    (I10)  Hypertension goal BP (blood pressure) < 140/90  Comment: SBP's in the 130-180 range  Plan:   -increase lisinopril from 20 to 40mg  -continue coreg and hydralazine  -staff to check BG's daily    (Y89.804L) Closed nondisplaced intertrochanteric fracture of right femur with routine healing, subsequent encounter  Comment: pain controlled, 50% WB  Plan:  -change senna and miralax to PRN only  -continue tramadol for now  -follow up ortho in 1 months    (N39.0) Urinary tract infection without hematuria, site unspecified  Comment: UC >100k Ecoli  Plan:   -start cephalexin TID x7 days  -staff to monitor urine output      MED REC REQUIRED  Post Medication Reconciliation Status: medication reconcilation previously completed during another office visit        Electronically signed by: JAYY Stewart CNP

## 2023-08-07 NOTE — LETTER
8/7/2023        RE: Sri Grewal  [address] [address] [facility] [address]        [facility] GERIATRICS    Chief Complaint   Patient presents with     RECHECK     HPI:  Sri Grewal is a 73 year old  (1950), who is being seen today for an episodic care visit at: [facility] [8]. Today's concern is:   1. Dementia without behavioral disturbance (H)    2. Hypertension goal BP (blood pressure) < 140/90    3. Closed nondisplaced intertrochanteric fracture of right femur with routine healing, subsequent encounter    4. Urinary tract infection without hematuria, site unspecified      Patient seen for follow up, also met with significant other regarding plan, moderate cognitive limitations, pleasant, started on seroquel in hospital but have been weaning eithout issues, post fall with R femur Fx, pain controlled, also SBP's have been 130-180 range, also UC >100k ecoli, asymptomatic.    Allergies, and PMH/PSH reviewed in EPIC today.  REVIEW OF SYSTEMS:  4 point ROS including Respiratory, CV, GI and , other than that noted in the HPI,  is negative    Objective:   BP (!) 160/90   Pulse 75   Temp 98.1  F (36.7  C)   Resp 16   Wt 51.5 kg (113 lb 9.6 oz)   LMP 11/09/2005 (Approximate)   SpO2 100%   BMI 20.12 kg/m    GENERAL APPEARANCE:  in no distress, appears healthy  ENT:  Mouth and posterior oropharynx normal, moist mucous membranes  RESP:  lungs clear to auscultation   CV:  regular rate and rhythm, no murmur, rub, or gallop, no edema  ABDOMEN:  bowel sounds normal  M/S:   Gait and station abnormal transfer assist  SKIN:  Inspection of skin and subcutaneous tissue baseline  NEURO:   Examination of sensation by touch normal  PSYCH:  affect and mood normal    Labs done in SNF are in [facility]. Please refer to them using [employer]IC/Care Everywhere.    Assessment/Plan:  (F03.90) Dementia without behavioral disturbance (H)  (primary encounter diagnosis)  Comment: moderate cognitive  limitations  Plan: discontinue seroquel  -continue effexor as mood stabilizer    (I10) Hypertension goal BP (blood pressure) < 140/90  Comment: SBP's in the 130-180 range  Plan:   -increase lisinopril from 20 to 40mg  -continue coreg and hydralazine  -staff to check BG's daily    (J85.070D) Closed nondisplaced intertrochanteric fracture of right femur with routine healing, subsequent encounter  Comment: pain controlled, 50% WB  Plan:  -change senna and miralax to PRN only  -continue tramadol for now  -follow up ortho in 1 months    (N39.0) Urinary tract infection without hematuria, site unspecified  Comment: UC >100k Ecoli  Plan:   -start cephalexin TID x7 days  -staff to monitor urine output      MED REC REQUIRED  Post Medication Reconciliation Status: medication reconcilation previously completed during another office visit        Electronically signed by: JAYY Stewart CNP          Sincerely,        JAYY Stewart CNP

## 2023-08-10 ENCOUNTER — TRANSITIONAL CARE UNIT VISIT (OUTPATIENT)
Dept: GERIATRICS | Facility: CLINIC | Age: 73
End: 2023-08-10
Payer: MEDICARE

## 2023-08-10 VITALS
RESPIRATION RATE: 16 BRPM | HEART RATE: 79 BPM | OXYGEN SATURATION: 100 % | TEMPERATURE: 97.1 F | DIASTOLIC BLOOD PRESSURE: 81 MMHG | WEIGHT: 113.6 LBS | SYSTOLIC BLOOD PRESSURE: 148 MMHG | BODY MASS INDEX: 20.12 KG/M2

## 2023-08-10 DIAGNOSIS — N39.0 URINARY TRACT INFECTION WITHOUT HEMATURIA, SITE UNSPECIFIED: ICD-10-CM

## 2023-08-10 DIAGNOSIS — F03.90 DEMENTIA WITHOUT BEHAVIORAL DISTURBANCE (H): ICD-10-CM

## 2023-08-10 DIAGNOSIS — S72.001A HIP FRACTURE, RIGHT, CLOSED, INITIAL ENCOUNTER (H): Primary | ICD-10-CM

## 2023-08-10 PROCEDURE — 99309 SBSQ NF CARE MODERATE MDM 30: CPT | Performed by: NURSE PRACTITIONER

## 2023-08-10 NOTE — PROGRESS NOTES
[facility] GERIATRICS    Chief Complaint   Patient presents with    RECHECK     HPI:  Sri Grewal is a 73 year old  (1950), who is being seen today for an episodic care visit at: [facility] ([facility]) [8]. Today's concern is:   1. Hip fracture, right, closed, initial encounter (H)    2. Urinary tract infection without hematuria, site unspecified    3. Dementia without behavioral disturbance (H)      Patient seen for follow up, post R hip Fx with nailing on 7/14, pain controlled, WB status 50%, motivated, also moderate cognitive limitations with increased confusion, UC returned with >100k ecoli, started ABX, now confusion clearing, SLUMS 18/30, overall appears healthy.    Allergies, and PMH/PSH reviewed in EPIC today.  REVIEW OF SYSTEMS:  4 point ROS including Respiratory, CV, GI and , other than that noted in the HPI,  is negative    Objective:   BP (!) 148/81   Pulse 79   Temp 97.1  F (36.2  C)   Resp 16   Wt 51.5 kg (113 lb 9.6 oz)   LMP 11/09/2005 (Approximate)   SpO2 100%   BMI 20.12 kg/m    GENERAL APPEARANCE:  in no distress, appears healthy  ENT:  Mouth and posterior oropharynx normal, moist mucous membranes  RESP:  lungs clear to auscultation   CV:  regular rate and rhythm, no murmur, rub, or gallop, no edema  ABDOMEN:  bowel sounds normal  M/S:   Gait and station abnormal unsteady gait  SKIN:  Inspection of skin and subcutaneous tissue baseline  NEURO:   Examination of sensation by touch normal  PSYCH:  affect and mood normal    Labs done in SNF are in [facility]. Please refer to them using [facility]/Care Everywhere.    Assessment/Plan:  (S72.001A) Hip fracture, right, closed, initial encounter (H)  (primary encounter diagnosis)  Comment: pain controlled, starting to ambulate  Plan: WBAT 50% x2 weeks  -continue tramadol PRN, consider discontinue next week  -maintain APAP TID scheduled  -PT/OT working with   -follow up ortho in 2-3 weeks    (N39.0) Urinary tract infection  without hematuria, site unspecified  Comment: UC returned >100k ecoli  Plan: continue cephalexin x7 days then DC    (F03.90) Dementia without behavioral disturbance (H)  Comment: moderate cognitive limitations, confusion improving  Plan: Dc'd seroquel  -continue wellbutrin and effexor  -follow up with PCP after discharge    MED REC REQUIRED  Post Medication Reconciliation Status: medication reconcilation previously completed during another office visit        Electronically signed by: JAYY Stewart CNP

## 2023-08-10 NOTE — LETTER
8/10/2023        RE: Sri Grewal  [address] [address] [address]        [facility] GERIATRICS    Chief Complaint   Patient presents with     RECHECK     HPI:  Sri Grewal is a 73 year old  (1950), who is being seen today for an episodic care visit at: [facility] ([facility]) [8]. Today's concern is:   1. Hip fracture, right, closed, initial encounter (H)    2. Urinary tract infection without hematuria, site unspecified    3. Dementia without behavioral disturbance (H)      Patient seen for follow up, post R hip Fx with nailing on 7/14, pain controlled, WB status 50%, motivated, also moderate cognitive limitations with increased confusion, UC returned with >100k ecoli, started ABX, now confusion clearing, SLUMS 18/30, overall appears healthy.    Allergies, and PMH/PSH reviewed in EPIC today.  REVIEW OF SYSTEMS:  4 point ROS including Respiratory, CV, GI and , other than that noted in the HPI,  is negative    Objective:   BP (!) 148/81   Pulse 79   Temp 97.1  F (36.2  C)   Resp 16   Wt 51.5 kg (113 lb 9.6 oz)   LMP 11/09/2005 (Approximate)   SpO2 100%   BMI 20.12 kg/m    GENERAL APPEARANCE:  in no distress, appears healthy  ENT:  Mouth and posterior oropharynx normal, moist mucous membranes  RESP:  lungs clear to auscultation   CV:  regular rate and rhythm, no murmur, rub, or gallop, no edema  ABDOMEN:  bowel sounds normal  M/S:   Gait and station abnormal unsteady gait  SKIN:  Inspection of skin and subcutaneous tissue baseline  NEURO:   Examination of sensation by touch normal  PSYCH:  affect and mood normal    Labs done in SNF are in [facility]. Please refer to them using [employer]/Care Everywhere.    Assessment/Plan:  (S72.001A) Hip fracture, right, closed, initial encounter (H)  (primary encounter diagnosis)  Comment: pain controlled, starting to ambulate  Plan: WBAT 50% x2 weeks  -continue tramadol PRN, consider discontinue next week  -maintain APAP TID  scheduled  -PT/OT working with   -follow up ortho in 2-3 weeks    (N39.0) Urinary tract infection without hematuria, site unspecified  Comment: UC returned >100k ecoli  Plan: continue cephalexin x7 days then DC    (F03.90) Dementia without behavioral disturbance (H)  Comment: moderate cognitive limitations, confusion improving  Plan: Dc'd seroquel  -continue wellbutrin and effexor  -follow up with PCP after discharge    MED REC REQUIRED  Post Medication Reconciliation Status: medication reconcilation previously completed during another office visit        Electronically signed by: JAYY Stewart CNP          Sincerely,        JAYY Stewart CNP

## 2023-08-11 ENCOUNTER — TELEPHONE (OUTPATIENT)
Dept: ORTHOPEDICS | Facility: CLINIC | Age: 73
End: 2023-08-11

## 2023-08-11 NOTE — TELEPHONE ENCOUNTER
Hello,  I'm with ortho con.  Pt sees Dr. Phipps.  Issa with [employer] is calling from .  FAX# [phone].  He is asking for the attestation form to support the unsigned doctors noted they have.  This is a medicare requirement.  This is regarding an elbow device for the pts right elbow.  Thank you.

## 2023-08-14 ENCOUNTER — TRANSITIONAL CARE UNIT VISIT (OUTPATIENT)
Dept: GERIATRICS | Facility: CLINIC | Age: 73
End: 2023-08-14
Payer: MEDICARE

## 2023-08-14 VITALS
RESPIRATION RATE: 16 BRPM | HEART RATE: 81 BPM | OXYGEN SATURATION: 96 % | SYSTOLIC BLOOD PRESSURE: 171 MMHG | TEMPERATURE: 97.8 F | DIASTOLIC BLOOD PRESSURE: 95 MMHG | BODY MASS INDEX: 20.44 KG/M2 | WEIGHT: 115.4 LBS

## 2023-08-14 DIAGNOSIS — S42.211D CLOSED DISPLACED FRACTURE OF SURGICAL NECK OF RIGHT HUMERUS WITH ROUTINE HEALING, UNSPECIFIED FRACTURE MORPHOLOGY, SUBSEQUENT ENCOUNTER: ICD-10-CM

## 2023-08-14 DIAGNOSIS — I10 HYPERTENSION, UNSPECIFIED TYPE: ICD-10-CM

## 2023-08-14 DIAGNOSIS — F03.90 DEMENTIA WITHOUT BEHAVIORAL DISTURBANCE (H): Primary | ICD-10-CM

## 2023-08-14 PROCEDURE — 99309 SBSQ NF CARE MODERATE MDM 30: CPT | Performed by: NURSE PRACTITIONER

## 2023-08-14 NOTE — PROGRESS NOTES
[facility] GERIATRICS    Chief Complaint   Patient presents with    RECHECK     HPI:  Sri Grewal is a 73 year old  (1950), who is being seen today for an episodic care visit at: [facility] ([facility]) [8]. Today's concern is:   1. Dementia without behavioral disturbance (H)    2. Closed displaced fracture of surgical neck of right humerus with routine healing, unspecified fracture morphology, subsequent encounter    3. Hypertension, unspecified type      Patient seen for follow up, moderate cognitive limitations, pleasant, lives with spouse, numerous fractures recently, unsteady gait, R femur denies pain, XR's with hardware intact, SBP's range 130-180, is high falls risk and prefer on higher end due to impulsivity and does not ask for help, walking in room today without walker.    Allergies, and PMH/PSH reviewed in EPIC today.  REVIEW OF SYSTEMS:  4 point ROS including Respiratory, CV, GI and , other than that noted in the HPI,  is negative    Objective:   BP (!) 171/95   Pulse 81   Temp 97.8  F (36.6  C)   Resp 16   Wt 52.3 kg (115 lb 6.4 oz)   LMP 11/09/2005 (Approximate)   SpO2 96%   BMI 20.44 kg/m    GENERAL APPEARANCE:  in no distress, appears healthy  ENT:  Mouth and posterior oropharynx normal, moist mucous membranes  RESP:  lungs clear to auscultation   CV:  regular rate and rhythm, no murmur, rub, or gallop, no edema  ABDOMEN:  bowel sounds normal  M/S:   Gait and station abnormal unsteady gait  SKIN:  Inspection of skin and subcutaneous tissue baseline  NEURO:   Examination of sensation by touch normal  PSYCH:  affect and mood normal    Labs done in SNF are in [facility]. Please refer to them using [employer]/Care Everywhere.    Assessment/Plan:  (F03.90) Dementia without behavioral disturbance (H)  (primary encounter diagnosis)  Comment: moderate cognitive limitations, impulsive, pleasant  Plan: staff to support as indicated/possible  -continue wellbutrin, venlafaxine as mood  stabilizers    (S49.270Y) Closed displaced fracture of surgical neck of right humerus with routine healing, unspecified fracture morphology, subsequent encounter  Comment: fall on 7/12, post nailing 7/14, denies pain  Plan: continue APAP TID, tramadol PRN  -PT/OT working with   -potential discharge this week    (I10) Hypertension, unspecified type  Comment: SBP's 130-180, high fall risk  Plan: check VS daily  -continue coreg, amlodipine, lisinopril  -if >170 may consider increasing BP med; prefer no results below 130    MED REC REQUIRED  Post Medication Reconciliation Status: medication reconcilation previously completed during another office visit        Electronically signed by: JAYY Stewart CNP

## 2023-08-14 NOTE — LETTER
8/14/2023        RE: Sri Grewal  [address] [address] [address]        [facility] GERIATRICS    Chief Complaint   Patient presents with     RECHECK     HPI:  Sri Grewal is a 73 year old  (1950), who is being seen today for an episodic care visit at: [facility] ([facility]) [8]. Today's concern is:   1. Dementia without behavioral disturbance (H)    2. Closed displaced fracture of surgical neck of right humerus with routine healing, unspecified fracture morphology, subsequent encounter    3. Hypertension, unspecified type      Patient seen for follow up, moderate cognitive limitations, pleasant, lives with spouse, numerous fractures recently, unsteady gait, R femur denies pain, XR's with hardware intact, SBP's range 130-180, is high falls risk and prefer on higher end due to impulsivity and does not ask for help, walking in room today without walker.    Allergies, and PMH/PSH reviewed in EPIC today.  REVIEW OF SYSTEMS:  4 point ROS including Respiratory, CV, GI and , other than that noted in the HPI,  is negative    Objective:   BP (!) 171/95   Pulse 81   Temp 97.8  F (36.6  C)   Resp 16   Wt 52.3 kg (115 lb 6.4 oz)   LMP 11/09/2005 (Approximate)   SpO2 96%   BMI 20.44 kg/m    GENERAL APPEARANCE:  in no distress, appears healthy  ENT:  Mouth and posterior oropharynx normal, moist mucous membranes  RESP:  lungs clear to auscultation   CV:  regular rate and rhythm, no murmur, rub, or gallop, no edema  ABDOMEN:  bowel sounds normal  M/S:   Gait and station abnormal unsteady gait  SKIN:  Inspection of skin and subcutaneous tissue baseline  NEURO:   Examination of sensation by touch normal  PSYCH:  affect and mood normal    Labs done in SNF are in [facility]. Please refer to them using [employer]/Care Everywhere.    Assessment/Plan:  (F03.90) Dementia without behavioral disturbance (H)  (primary encounter diagnosis)  Comment: moderate cognitive limitations, impulsive,  pleasant  Plan: staff to support as indicated/possible  -continue wellbutrin, venlafaxine as mood stabilizers    (S42.211D) Closed displaced fracture of surgical neck of right humerus with routine healing, unspecified fracture morphology, subsequent encounter  Comment: fall on 7/12, post nailing 7/14, denies pain  Plan: continue APAP TID, tramadol PRN  -PT/OT working with   -potential discharge this week    (I10) Hypertension, unspecified type  Comment: SBP's 130-180, high fall risk  Plan: check VS daily  -continue coreg, amlodipine, lisinopril  -if >170 may consider increasing BP med; prefer no results below 130    MED REC REQUIRED  Post Medication Reconciliation Status: medication reconcilation previously completed during another office visit        Electronically signed by: JAYY Stewart CNP          Sincerely,        JAYY Stewart CNP

## 2023-08-15 ENCOUNTER — TELEPHONE (OUTPATIENT)
Dept: NEUROPSYCHOLOGY | Facility: CLINIC | Age: 73
End: 2023-08-15
Payer: MEDICARE

## 2023-08-15 NOTE — TELEPHONE ENCOUNTER
Patient Contacted to schedule the following:    Appointment type: Neuropsych Eval  Provider: Any provider [facility], [place], or   Return date: First available after 2/4/2024 (per referral)  Specialty phone number: [phone]  Additional appointment(s) needed: Follow-up with Dr. Mcgowan to be scheduled in or after November 2023  Additonal Notes: Call Bryson (friend) to set up [phone]     Spoke with Bryson (c2c on file). Scheduled NPE on 3/4/2024 at 12:30pm with Dr. Ni at the [facility]. Scheduled follow-up with Dr. Mcgowan on 1/2/2024 at 11:30am at the [facility].     Per Bryson's request emailed appt information to [email or web address].    Kashif Isaac on 8/15/2023 at 12:44 PM

## 2023-08-15 NOTE — TELEPHONE ENCOUNTER
Spoke with Issa.  Apparently patient had a fall in their facility and they will be returning the BARBARA device for now.  Unsure of the details of the fall.  Issa will still send over the attestation form in the event that they want to resume the BARBARA brace, then they will have all of the paperwork in order.  He is faxing the form over to us now. Dr Phipps to sign on Monday 8-21-23. Luz Pratt RN

## 2023-08-16 ENCOUNTER — PATIENT OUTREACH (OUTPATIENT)
Dept: CARE COORDINATION | Facility: CLINIC | Age: 73
End: 2023-08-16
Payer: MEDICARE

## 2023-08-16 DIAGNOSIS — M25.521 ELBOW PAIN, RIGHT: Primary | ICD-10-CM

## 2023-08-16 NOTE — PROGRESS NOTES
Clinic Care Coordination Contact    Situation: Patient chart reviewed by care coordinator.    Background: Patient in identified status. Patient was in the hospital and then discharged to [facility] on 7/18/2023.    Assessment: Patient is still currently at the TCU.     Plan/Recommendations: RN/SW Care Coordinator will await notification from facility staff informing RN/SW Care Coordinator of patient's discharge plans/needs. RN/SW Care Coordinator will review chart and outreach to facility staff every 4 weeks and as needed.      Camille Roa RN Care Coordination   [facility]  Email: [email or web address]  Phone: [phone]

## 2023-08-17 ENCOUNTER — DISCHARGE SUMMARY NURSING HOME (OUTPATIENT)
Dept: GERIATRICS | Facility: CLINIC | Age: 73
End: 2023-08-17
Payer: MEDICARE

## 2023-08-17 VITALS
WEIGHT: 113.8 LBS | SYSTOLIC BLOOD PRESSURE: 165 MMHG | RESPIRATION RATE: 16 BRPM | OXYGEN SATURATION: 95 % | TEMPERATURE: 97.8 F | DIASTOLIC BLOOD PRESSURE: 87 MMHG | HEART RATE: 78 BPM | BODY MASS INDEX: 20.16 KG/M2

## 2023-08-17 DIAGNOSIS — F33.41 MAJOR DEPRESSIVE DISORDER, RECURRENT, IN PARTIAL REMISSION (H): ICD-10-CM

## 2023-08-17 DIAGNOSIS — M62.81 GENERALIZED MUSCLE WEAKNESS: ICD-10-CM

## 2023-08-17 DIAGNOSIS — I10 HYPERTENSION, UNSPECIFIED TYPE: ICD-10-CM

## 2023-08-17 DIAGNOSIS — Z96.641 S/P TOTAL RIGHT HIP ARTHROPLASTY: ICD-10-CM

## 2023-08-17 DIAGNOSIS — F03.90 DEMENTIA WITHOUT BEHAVIORAL DISTURBANCE (H): ICD-10-CM

## 2023-08-17 DIAGNOSIS — S72.144D CLOSED NONDISPLACED INTERTROCHANTERIC FRACTURE OF RIGHT FEMUR WITH ROUTINE HEALING, SUBSEQUENT ENCOUNTER: Primary | ICD-10-CM

## 2023-08-17 DIAGNOSIS — R29.6 RECURRENT FALLS: ICD-10-CM

## 2023-08-17 DIAGNOSIS — R60.0 LEG EDEMA, RIGHT: ICD-10-CM

## 2023-08-17 DIAGNOSIS — M80.00XD AGE-RELATED OSTEOPOROSIS WITH CURRENT PATHOLOGICAL FRACTURE WITH ROUTINE HEALING, SUBSEQUENT ENCOUNTER: ICD-10-CM

## 2023-08-17 PROCEDURE — 99316 NF DSCHRG MGMT 30 MIN+: CPT | Performed by: NURSE PRACTITIONER

## 2023-08-17 NOTE — PROGRESS NOTES
[facility] GERIATRICS DISCHARGE SUMMARY  PATIENT'S NAME: Sri Grewal  YOB: 1950  MEDICAL RECORD NUMBER:  [id number]  Place of Service where encounter took place:  [facility] ([facility]) [8]    PRIMARY CARE PROVIDER AND CLINIC RESPONSIBLE AFTER TRANSFER:   Rosenda Pierre PA-C, [address] / [place] [facility] [address]    [facility] Provider     Transferring providers: JAYY Cueto CNP; Paula Ralph MD  Recent Hospitalization/ED:  [facility]  stay 07/12/2023 to 07/18/2023.  Date of SNF Admission:  07/18/2023  Date of SNF (anticipated) Discharge: August 17, 2023  Discharged to: previous independent home  Cognitive Scores:  NA  Physical Function: Pivot transfers  DME: Walker    CODE STATUS/ADVANCE DIRECTIVES DISCUSSION:  Full Code   ALLERGIES: Morphine and related, Oxycodone, and Seasonal allergies    NURSING FACILITY COURSE   Medication Changes/Rationale:   See notes    Summary of nursing facility stay:      Closed nondisplaced intertrochanteric fracture of right femur with routine healing, subsequent encounter  Recurrent falls  Age-related osteoporosis with current pathological fracture with routine healing, subsequent encounter  Leg edema, right  Generalized muscle weakness  S/P total right hip arthroplasty  Hypertension, unspecified type  Dementia without behavioral disturbance (H)  Major depressive disorder, recurrent, in partial remission (H)    (S72.144D) Closed nondisplaced intertrochanteric fracture of right femur with routine healing, subsequent encounter  (primary encounter diagnosis)  (M80.00XD) Age-related osteoporosis with current pathological fracture with routine healing, subsequent encounter  (Z96.641) S/P total right hip arthroplasty  (R29.6) Recurrent falls  (R60.0) Leg edema, right  (M62.81) Generalized muscle weakness  Comment: multiple recent falls with injury, impulsive, pain+, edema 2+  Plan:   -PT/OT eval and  treat  -change APAP to 1000mg TID  -continue tramadol PRN  -change miralax to PRN  -change senna to 2 tabs BID PRN  -OK use own probiotic  -plavix for DVT prophylaxis   -CBC, BMP on 7/24  -TEDs bilateral on am off pm  -discontinue urology appt, voiding well  -follow up shoulder, elbow and hip ortho appt as scheduled     (I10) Hypertension, unspecified type  Comment: SBP's in the 130 range  Plan: continue coreg, amlodipine, hydralazine, lisinopril  -increase lisinopril from 20 to 40mg  -staff to check VS daily     (F33.41) Major depressive disorder, recurrent, in partial remission (H)  (F03.90) Dementia without behavioral disturbance (H)  Comment: moderate cognitive limitations, SLUMS 18/30, not safety aware  Plan:   -continue wellbutrin, effexor  -started on seroquel 25/25/75 in hospital; all doses DC'd  -staff to support as possible       Discharge Medications:  MED REC REQUIRED  Post Medication Reconciliation Status: medication reconcilation previously completed during another office visit       Current Outpatient Medications   Medication Sig Dispense Refill    acetaminophen (TYLENOL) 500 MG tablet Take 2 tablets (1,000 mg) by mouth 3 times daily 1 tablet 0    amLODIPine (NORVASC) 10 MG tablet Take 1 tablet (10 mg) by mouth daily 30 tablet 0    buPROPion (WELLBUTRIN XL) 300 MG 24 hr tablet Take 300 mg by mouth every morning      carvedilol (COREG) 12.5 MG tablet TAKE ONE TABLET BY MOUTH TWICE A DAY WITH FOOD (Patient taking differently: Take 12.5 mg by mouth 2 times daily (with meals) TAKE ONE TABLET BY MOUTH TWICE A DAY WITH FOOD) 180 tablet 1    clopidogrel (PLAVIX) 75 MG tablet Take 1 tablet (75 mg) by mouth daily 30 tablet 0    hydrALAZINE (APRESOLINE) 25 MG tablet TAKE 1 TABLET(25 MG) BY MOUTH THREE TIMES DAILY 90 tablet 0    lisinopril (ZESTRIL) 20 MG tablet Take 2 tablets (40 mg) by mouth daily 90 tablet 0    melatonin 5 MG tablet Take 1 tablet (5 mg) by mouth At Bedtime 30 tablet 0    polyethylene glycol  (MIRALAX) 17 GM/Dose powder Take 17 g (1 Capful) by mouth daily as needed for constipation 1 g 0    rosuvastatin (CRESTOR) 20 MG tablet Take 1 tablet (20 mg) by mouth daily 90 tablet 0    senna-docusate (SENOKOT-S/PERICOLACE) 8.6-50 MG tablet Take 2 tablets by mouth 2 times daily as needed for constipation 30 tablet 1    sodium chloride 1 GM tablet Take 1 tablet (1 g) by mouth 2 times daily (with meals) 60 tablet 0    traMADol (ULTRAM) 50 MG tablet Take 0.5 tablets (25 mg) by mouth every 4 hours as needed for moderate pain 40 tablet 0    ubrogepant (UBRELVY) 50 MG tablet Take 1 tablet (50 mg) by mouth at onset of headache (May repeat ose after 2 hours if heasdache persists. max 2 pills a day) 16 tablet 11    venlafaxine (EFFEXOR XR) 150 MG 24 hr capsule Take 1 capsule (150 mg) by mouth daily 90 capsule 0    vitamin D2 (ERGOCALCIFEROL) 62594 units (1250 mcg) capsule Take 1 capsule (50,000 Units) by mouth every 7 days 5 capsule 0          Controlled medications:   Week supply of tramadol being sent home     Past Medical History:   Past Medical History:   Diagnosis Date    Abnormal Papanicolaou smear of cervix and cervical HPV     Allergic rhinitis, cause unspecified     seasonal allergies    Cerebral infarction (H)     Contact dermatitis and other eczema, due to unspecified cause     Endometriosis, site unspecified     Esophageal reflux     GERD    Migraine, unspecified, without mention of intractable migraine without mention of status migrainosus     Mild persistent asthma     Unspecified disorder of uterus     fibroid uterus     Physical Exam:   Vitals: BP (!) 165/87   Pulse 78   Temp 97.8  F (36.6  C)   Resp 16   Wt 51.6 kg (113 lb 12.8 oz)   LMP 11/09/2005 (Approximate)   SpO2 95%   BMI 20.16 kg/m    BMI: Body mass index is 20.16 kg/m .  GENERAL APPEARANCE:  in no distress, appears healthy  ENT:  Mouth and posterior oropharynx normal, moist mucous membranes  RESP:  lungs clear to auscultation , no  respiratory distress  CV:  regular rate and rhythm, no murmur, rub, or gallop, no edema  ABDOMEN:  bowel sounds normal  M/S:   Gait and station abnormal unsteady gait  SKIN:  Inspection of skin and subcutaneous tissue baseline  NEURO:   Examination of sensation by touch normal  PSYCH:  affect and mood normal     SNF labs: Labs done in SNF are in [facility] EPIC. Please refer to them using EPIC/Care Everywhere.    DISCHARGE PLAN:  Follow up labs: No labs orders/due  Medical Follow Up:      Follow up with primary care provider in 1 weeks  Current [facility] scheduled appointments:   NA  Discharge Services: Home Care:  Occupational Therapy, Physical Therapy, Registered Nurse, and Home Health Aide  Discharge Instructions Verbalized to Patient at Discharge:   None    TOTAL DISCHARGE TIME:   Greater than 30 minutes  Electronically signed by:  JAYY Stewart CNP         Documentation of Face-to-Face and Certification for Home Health Services     Patient: Sri Grewal   YOB: 1950  MR Number: [id number]  Today's Date: 8/17/2023    I certify that patient: Sri Grewal is under my care and that I, or a nurse practitioner or physician's assistant working with me, had a face-to-face encounter that meets the physician face-to-face encounter requirements with this patient on: 8/17/2023.    This encounter with the patient was in whole, or in part, for the following medical condition, which is the primary reason for home health care:   1. Closed nondisplaced intertrochanteric fracture of right femur with routine healing, subsequent encounter    2. Recurrent falls    3. Age-related osteoporosis with current pathological fracture with routine healing, subsequent encounter    4. Leg edema, right    5. Generalized muscle weakness    6. S/P total right hip arthroplasty    7. Hypertension, unspecified type    8. Dementia without behavioral disturbance (H)    9. Major depressive disorder, recurrent, in partial  remission (H)          I certify that, based on my findings, the following services are medically necessary home health services: Nursing, Occupational Therapy, Physical Therapy, and HHA .    My clinical findings support the need for the above services because: Nurse is needed: To provide caregiver training to assist with: pain, med teaching.., Occupational Therapy Services are needed to assess and treat cognitive ability and address ADL safety due to impairment in transfers., Physical Therapy Services are needed to assess and treat the following functional impairments: gait instability., and HHA for bathing.    Further, I certify that my clinical findings support that this patient is homebound (i.e. absences from home require considerable and taxing effort and are for medical reasons or Orthodoxy services or infrequently or of short duration when for other reasons) because: Requires assistance of another person or specialized equipment to access medical services because patient: Is unable to operate assistive equipment on their own...    Based on the above findings. I certify that this patient is confined to the home and needs intermittent skilled nursing care, physical therapy and/or speech therapy.  The patient is under my care, and I have initiated the establishment of the plan of care.  This patient will be followed by a physician who will periodically review the plan of care.  Physician/Provider to provide follow up care: Rosenda Pierre    Responsible Medicare certified PEC Physician: Carlton Cervantes NP  Electronic Physician Signature  Date: 8/17/2023

## 2023-08-17 NOTE — LETTER
8/17/2023        RE: Sri Grewal  [address]  [facility] [address]        [facility] GERIATRICS DISCHARGE SUMMARY  PATIENT'S NAME: Sri Grewal  YOB: 1950  MEDICAL RECORD NUMBER:  [id number]  Place of Service where encounter took place:  [facility] [8]    PRIMARY CARE PROVIDER AND CLINIC RESPONSIBLE AFTER TRANSFER:   Rosenda Pierre PA-C, [address] / [facility] [address]    [facility] [place]     Transferring providers: JAYY Cueto CNP; Paula Ralph MD  Recent Hospitalization/ED:  [facility]  stay 07/12/2023 to 07/18/2023.  Date of SNF Admission:  07/18/2023  Date of SNF (anticipated) Discharge: August 17, 2023  Discharged to: previous independent home  Cognitive Scores:  NA  Physical Function: Pivot transfers  DME: Walker    CODE STATUS/ADVANCE DIRECTIVES DISCUSSION:  Full Code   ALLERGIES: Morphine and related, Oxycodone, and Seasonal allergies    NURSING FACILITY COURSE   Medication Changes/Rationale:   See notes    Summary of nursing facility stay:      Closed nondisplaced intertrochanteric fracture of right femur with routine healing, subsequent encounter  Recurrent falls  Age-related osteoporosis with current pathological fracture with routine healing, subsequent encounter  Leg edema, right  Generalized muscle weakness  S/P total right hip arthroplasty  Hypertension, unspecified type  Dementia without behavioral disturbance (H)  Major depressive disorder, recurrent, in partial remission (H)    (S72.144D) Closed nondisplaced intertrochanteric fracture of right femur with routine healing, subsequent encounter  (primary encounter diagnosis)  (M80.00XD) Age-related osteoporosis with current pathological fracture with routine healing, subsequent encounter  (Z96.641) S/P total right hip arthroplasty  (R29.6) Recurrent falls  (R60.0) Leg edema, right  (M62.81) Generalized muscle weakness  Comment:  multiple recent falls with injury, impulsive, pain+, edema 2+  Plan:   -PT/OT eval and treat  -change APAP to 1000mg TID  -continue tramadol PRN  -change miralax to PRN  -change senna to 2 tabs BID PRN  -OK use own probiotic  -plavix for DVT prophylaxis   -CBC, BMP on 7/24  -TEDs bilateral on am off pm  -discontinue urology appt, voiding well  -follow up shoulder, elbow and hip ortho appt as scheduled     (I10) Hypertension, unspecified type  Comment: SBP's in the 130 range  Plan: continue coreg, amlodipine, hydralazine, lisinopril  -increase lisinopril from 20 to 40mg  -staff to check VS daily     (F33.41) Major depressive disorder, recurrent, in partial remission (H)  (F03.90) Dementia without behavioral disturbance (H)  Comment: moderate cognitive limitations, SLUMS 18/30, not safety aware  Plan:   -continue wellbutrin, effexor  -started on seroquel 25/25/75 in hospital; all doses DC'd  -staff to support as possible       Discharge Medications:  MED REC REQUIRED  Post Medication Reconciliation Status: medication reconcilation previously completed during another office visit       Current Outpatient Medications   Medication Sig Dispense Refill     acetaminophen (TYLENOL) 500 MG tablet Take 2 tablets (1,000 mg) by mouth 3 times daily 1 tablet 0     amLODIPine (NORVASC) 10 MG tablet Take 1 tablet (10 mg) by mouth daily 30 tablet 0     buPROPion (WELLBUTRIN XL) 300 MG 24 hr tablet Take 300 mg by mouth every morning       carvedilol (COREG) 12.5 MG tablet TAKE ONE TABLET BY MOUTH TWICE A DAY WITH FOOD (Patient taking differently: Take 12.5 mg by mouth 2 times daily (with meals) TAKE ONE TABLET BY MOUTH TWICE A DAY WITH FOOD) 180 tablet 1     clopidogrel (PLAVIX) 75 MG tablet Take 1 tablet (75 mg) by mouth daily 30 tablet 0     hydrALAZINE (APRESOLINE) 25 MG tablet TAKE 1 TABLET(25 MG) BY MOUTH THREE TIMES DAILY 90 tablet 0     lisinopril (ZESTRIL) 20 MG tablet Take 2 tablets (40 mg) by mouth daily 90 tablet 0      melatonin 5 MG tablet Take 1 tablet (5 mg) by mouth At Bedtime 30 tablet 0     polyethylene glycol (MIRALAX) 17 GM/Dose powder Take 17 g (1 Capful) by mouth daily as needed for constipation 1 g 0     rosuvastatin (CRESTOR) 20 MG tablet Take 1 tablet (20 mg) by mouth daily 90 tablet 0     senna-docusate (SENOKOT-S/PERICOLACE) 8.6-50 MG tablet Take 2 tablets by mouth 2 times daily as needed for constipation 30 tablet 1     sodium chloride 1 GM tablet Take 1 tablet (1 g) by mouth 2 times daily (with meals) 60 tablet 0     traMADol (ULTRAM) 50 MG tablet Take 0.5 tablets (25 mg) by mouth every 4 hours as needed for moderate pain 40 tablet 0     ubrogepant (UBRELVY) 50 MG tablet Take 1 tablet (50 mg) by mouth at onset of headache (May repeat ose after 2 hours if heasdache persists. max 2 pills a day) 16 tablet 11     venlafaxine (EFFEXOR XR) 150 MG 24 hr capsule Take 1 capsule (150 mg) by mouth daily 90 capsule 0     vitamin D2 (ERGOCALCIFEROL) 82340 units (1250 mcg) capsule Take 1 capsule (50,000 Units) by mouth every 7 days 5 capsule 0          Controlled medications:   Week supply of tramadol being sent home     Past Medical History:   Past Medical History:   Diagnosis Date     Abnormal Papanicolaou smear of cervix and cervical HPV      Allergic rhinitis, cause unspecified     seasonal allergies     Cerebral infarction (H)      Contact dermatitis and other eczema, due to unspecified cause      Endometriosis, site unspecified      Esophageal reflux     GERD     Migraine, unspecified, without mention of intractable migraine without mention of status migrainosus      Mild persistent asthma      Unspecified disorder of uterus     fibroid uterus     Physical Exam:   Vitals: BP (!) 165/87   Pulse 78   Temp 97.8  F (36.6  C)   Resp 16   Wt 51.6 kg (113 lb 12.8 oz)   LMP 11/09/2005 (Approximate)   SpO2 95%   BMI 20.16 kg/m    BMI: Body mass index is 20.16 kg/m .  GENERAL APPEARANCE:  in no distress, appears  healthy  ENT:  Mouth and posterior oropharynx normal, moist mucous membranes  RESP:  lungs clear to auscultation , no respiratory distress  CV:  regular rate and rhythm, no murmur, rub, or gallop, no edema  ABDOMEN:  bowel sounds normal  M/S:   Gait and station abnormal unsteady gait  SKIN:  Inspection of skin and subcutaneous tissue baseline  NEURO:   Examination of sensation by touch normal  PSYCH:  affect and mood normal     SNF labs: Labs done in SNF are in [facility] EPIC. Please refer to them using EPIC/Care Everywhere.    DISCHARGE PLAN:  Follow up labs: No labs orders/due  Medical Follow Up:      Follow up with primary care provider in 1 weeks  Current [facility] scheduled appointments:   NA  Discharge Services: Home Care:  Occupational Therapy, Physical Therapy, Registered Nurse, and Home Health Aide  Discharge Instructions Verbalized to Patient at Discharge:   None    TOTAL DISCHARGE TIME:   Greater than 30 minutes  Electronically signed by:  JAYY Stewart CNP         Documentation of Face-to-Face and Certification for Home Health Services     Patient: Sri Grewal   YOB: 1950  MR Number: [id number]  Today's Date: 8/17/2023    I certify that patient: Sri Grewal is under my care and that I, or a nurse practitioner or physician's assistant working with me, had a face-to-face encounter that meets the physician face-to-face encounter requirements with this patient on: 8/17/2023.    This encounter with the patient was in whole, or in part, for the following medical condition, which is the primary reason for home health care:   1. Closed nondisplaced intertrochanteric fracture of right femur with routine healing, subsequent encounter    2. Recurrent falls    3. Age-related osteoporosis with current pathological fracture with routine healing, subsequent encounter    4. Leg edema, right    5. Generalized muscle weakness    6. S/P total right hip arthroplasty    7. Hypertension,  unspecified type    8. Dementia without behavioral disturbance (H)    9. Major depressive disorder, recurrent, in partial remission (H)          I certify that, based on my findings, the following services are medically necessary home health services: Nursing, Occupational Therapy, Physical Therapy, and HHA .    My clinical findings support the need for the above services because: Nurse is needed: To provide caregiver training to assist with: pain, med teaching.., Occupational Therapy Services are needed to assess and treat cognitive ability and address ADL safety due to impairment in transfers., Physical Therapy Services are needed to assess and treat the following functional impairments: gait instability., and HHA for bathing.    Further, I certify that my clinical findings support that this patient is homebound (i.e. absences from home require considerable and taxing effort and are for medical reasons or Christianity services or infrequently or of short duration when for other reasons) because: Requires assistance of another person or specialized equipment to access medical services because patient: Is unable to operate assistive equipment on their own...    Based on the above findings. I certify that this patient is confined to the home and needs intermittent skilled nursing care, physical therapy and/or speech therapy.  The patient is under my care, and I have initiated the establishment of the plan of care.  This patient will be followed by a physician who will periodically review the plan of care.  Physician/Provider to provide follow up care: Rosenda Pierre    Responsible Medicare certified PECOS Physician: Carlton Cervantes NP  Electronic Physician Signature  Date: 8/17/2023                 Sincerely,        JAYY Stewart CNP

## 2023-08-21 ENCOUNTER — ANCILLARY PROCEDURE (OUTPATIENT)
Dept: GENERAL RADIOLOGY | Facility: CLINIC | Age: 73
End: 2023-08-21
Attending: STUDENT IN AN ORGANIZED HEALTH CARE EDUCATION/TRAINING PROGRAM
Payer: MEDICARE

## 2023-08-21 ENCOUNTER — OFFICE VISIT (OUTPATIENT)
Dept: ORTHOPEDICS | Facility: CLINIC | Age: 73
End: 2023-08-21
Payer: MEDICARE

## 2023-08-21 ENCOUNTER — ANCILLARY PROCEDURE (OUTPATIENT)
Dept: GENERAL RADIOLOGY | Facility: CLINIC | Age: 73
End: 2023-08-21
Attending: ORTHOPAEDIC SURGERY
Payer: MEDICARE

## 2023-08-21 DIAGNOSIS — Z96.611 STATUS POST REVERSE TOTAL SHOULDER REPLACEMENT, RIGHT: ICD-10-CM

## 2023-08-21 DIAGNOSIS — M25.521 ELBOW PAIN, RIGHT: ICD-10-CM

## 2023-08-21 DIAGNOSIS — Z96.611 STATUS POST REVERSE TOTAL SHOULDER REPLACEMENT, RIGHT: Primary | ICD-10-CM

## 2023-08-21 DIAGNOSIS — S52.021K CLOSED FRACTURE OF OLECRANON PROCESS OF RIGHT ULNA WITH NONUNION: ICD-10-CM

## 2023-08-21 DIAGNOSIS — S42.491D CLOSED BICONDYLAR FRACTURE OF DISTAL HUMERUS, RIGHT, WITH ROUTINE HEALING, SUBSEQUENT ENCOUNTER: Primary | ICD-10-CM

## 2023-08-21 PROCEDURE — 99024 POSTOP FOLLOW-UP VISIT: CPT | Performed by: STUDENT IN AN ORGANIZED HEALTH CARE EDUCATION/TRAINING PROGRAM

## 2023-08-21 PROCEDURE — 99213 OFFICE O/P EST LOW 20 MIN: CPT | Performed by: ORTHOPAEDIC SURGERY

## 2023-08-21 PROCEDURE — 73030 X-RAY EXAM OF SHOULDER: CPT | Mod: RT | Performed by: RADIOLOGY

## 2023-08-21 PROCEDURE — 73080 X-RAY EXAM OF ELBOW: CPT | Mod: RT | Performed by: RADIOLOGY

## 2023-08-21 RX ORDER — HYDROXYZINE HYDROCHLORIDE 25 MG/1
25 TABLET, FILM COATED ORAL
Qty: 60 TABLET | Refills: 5 | Status: SHIPPED | OUTPATIENT
Start: 2023-08-21 | End: 2023-11-06

## 2023-08-21 NOTE — PROGRESS NOTES
Orthopaedic Surgery Hand Clinic Progress Note    Patient Name: Sri Grewal  MRN: [id number]  : 1950  Date: Aug 21, 2023    Date of Surgery:   5/3/23 - ORIF right distal humerus  23 - Revision ORIF right olecranon osteotomy    Subjective:  Ms. Sri Grewal is a 73 year old female who returns 3.5 months s/p ORIF R distal humerus and 2.5 months s/p revision ORIF olecranon osteotomy. Unfortunately the patient sustained a right intertrochanteric femur fx on 23 requiring IMN. Therapy has been on hold since then. She is using a walker. She is doing ok, flexion is excellent, main limitation remains extension which is largely unchanged. She never received her BARBARA splint. No numbness/tingling. Accompanied by her friend Bryson today.    Objective:  General: alert, appropriate, NAD  HEENT: NC/AT  CV: RRR by pulse  Pulm: Unlabored on RA  MSK:  Incision c/d/i, no erythema, drainage, evidence of infection  Palpable crepitus of scar tissue over hardware, this is not painful. Palpable olecranon plate  Elbow range of motion   Supination 60  Pronation 60  5 out of 5 EPL, FPL, FDP index, intrinsics  Sensation intact to light touch median, radial, ulnar nerve distributions no deficits  Warm well-perfused, capillary fill less than 2 seconds    Imaging:  X-rays of right elbow today reviewed by me demonstrates postsurgical changes status post ORIF of olecranon osteotomy.  No interval displacement.  Distal humerus and olecranon osteotomy are healed healed. articular surface remains well aligned.  No evidence of hardware complication    Assessment/Plan:  73-year-old female status post ORIF right distal humerus 5/3/2023 and revision ORIF right olecranon osteotomy 2023.  Neurovascularly intact.  Doing well.    Fortunately patient did not injure right elbow during recent fall. I recommended resuming hand therapy for aggressive elbow ROM focusing on terminal extension. She will follow-up with OT about status of BARBARA  splint. I advised that it is not uncommon to lose terminal extension after this surgery, particularly since she had 2 operations. She is not bothered by the scar tissue crepitus or palpable olecranon plate at this time.    She may weight bear as tolerated through the right elbow at this time. Hydroxyzine sent to pharmacy at her request to help patient sleep.    Follow-up with me in 3 months. X-rays of her right elbow will be needed at that time.     All question answered.  She and Bryson voiced understanding and agreement.    Williams Phipps MD    Hand & Upper Extremity Surgery  Department of Orthopedic Surgery  [facility]

## 2023-08-21 NOTE — NURSING NOTE
Reason For Visit:   Chief Complaint   Patient presents with    Surgical Followup     Follow right distal humerus revision ORIF and olecranon osteotomy DOS 6/1/23       Primary MD: Rosenda Pierre  Ref. MD: daisy    Age: 73 year old    ?  No      LMP 11/09/2005 (Approximate)       Pain Assessment  Patient Currently in Pain: Yes  0-10 Pain Scale: 4  Primary Pain Location: Elbow (right)  Pain Descriptors: Aching, Constant (worse at night)  Alleviating Factors: Pain medication    Hand Dominance Evaluation  Hand Dominance: Right          QuickDASH Assessment      6/12/2023     7:57 AM   QuickDASH Main   1. Open a tight or new jar Moderate difficulty   2. Do heavy household chores (e.g., wash walls, floors) Moderate difficulty   3. Carry a shopping bag or briefcase Mild difficulty   4. Wash your back Mild difficulty   5. Use a knife to cut food Moderate difficulty   6. Recreational activities in which you take some force or impact through your arm, shoulder or hand (e.g., golf, hammering, tennis, etc.) Moderate difficulty   7. During the past week, to what extent has your arm, shoulder or hand problem interfered with your normal social activities with family, friends, neighbours or groups Moderately   8. During the past week, were you limited in your work or other regular daily activities as a result of your arm, shoulder or hand problem Moderately limited   9. Arm, shoulder or hand pain Moderate   10.Tingling (pins and needles) in your arm,shoulder or hand Mild   11. During the past week, how much difficulty have you had sleeping because of the pain in your arm, shoulder or hand Moderate difficulty   Quickdash Ability Score 43.18          Current Outpatient Medications   Medication Sig Dispense Refill    acetaminophen (TYLENOL) 500 MG tablet Take 2 tablets (1,000 mg) by mouth 3 times daily 1 tablet 0    amLODIPine (NORVASC) 10 MG tablet Take 1 tablet (10 mg) by mouth daily 30 tablet 0    buPROPion  (WELLBUTRIN XL) 300 MG 24 hr tablet Take 300 mg by mouth every morning      carvedilol (COREG) 12.5 MG tablet TAKE ONE TABLET BY MOUTH TWICE A DAY WITH FOOD (Patient taking differently: Take 12.5 mg by mouth 2 times daily (with meals) TAKE ONE TABLET BY MOUTH TWICE A DAY WITH FOOD) 180 tablet 1    clopidogrel (PLAVIX) 75 MG tablet Take 1 tablet (75 mg) by mouth daily 30 tablet 0    hydrALAZINE (APRESOLINE) 25 MG tablet TAKE 1 TABLET(25 MG) BY MOUTH THREE TIMES DAILY 90 tablet 0    lisinopril (ZESTRIL) 20 MG tablet Take 2 tablets (40 mg) by mouth daily 90 tablet 0    melatonin 5 MG tablet Take 1 tablet (5 mg) by mouth At Bedtime 30 tablet 0    polyethylene glycol (MIRALAX) 17 GM/Dose powder Take 17 g (1 Capful) by mouth daily as needed for constipation 1 g 0    rosuvastatin (CRESTOR) 20 MG tablet Take 1 tablet (20 mg) by mouth daily 90 tablet 0    senna-docusate (SENOKOT-S/PERICOLACE) 8.6-50 MG tablet Take 2 tablets by mouth 2 times daily as needed for constipation 30 tablet 1    sodium chloride 1 GM tablet Take 1 tablet (1 g) by mouth 2 times daily (with meals) 60 tablet 0    traMADol (ULTRAM) 50 MG tablet Take 0.5 tablets (25 mg) by mouth every 4 hours as needed for moderate pain 40 tablet 0    ubrogepant (UBRELVY) 50 MG tablet Take 1 tablet (50 mg) by mouth at onset of headache (May repeat ose after 2 hours if heasdache persists. max 2 pills a day) 16 tablet 11    venlafaxine (EFFEXOR XR) 150 MG 24 hr capsule Take 1 capsule (150 mg) by mouth daily 90 capsule 0    vitamin D2 (ERGOCALCIFEROL) 60320 units (1250 mcg) capsule Take 1 capsule (50,000 Units) by mouth every 7 days 5 capsule 0       Allergies   Allergen Reactions    Morphine And Related      GI UPSET    Oxycodone     Seasonal Allergies        Roslyn Ontiveros, ATC

## 2023-08-21 NOTE — LETTER
2023         RE: Sri Grewal  [address] [facility] [address]        Dear Colleague,    Thank you for referring your patient, Sri Grewal, to the [facility] [place]. Please see a copy of my visit note below.    Orthopaedic Surgery Hand Clinic Progress Note    Patient Name: Sri Grewal  MRN: [id number]  : 1950  Date: Aug 21, 2023    Date of Surgery:   5/3/23 - ORIF right distal humerus  23 - Revision ORIF right olecranon osteotomy    Subjective:  Ms. Sri Grewal is a 73 year old female who returns 3.5 months s/p ORIF R distal humerus and 2.5 months s/p revision ORIF olecranon osteotomy. Unfortunately the patient sustained a right intertrochanteric femur fx on 23 requiring IMN. Therapy has been on hold since then. She is using a walker. She is doing ok, flexion is excellent, main limitation remains extension which is largely unchanged. She never received her BARBARA splint. No numbness/tingling. Accompanied by her friend Bryson today.    Objective:  General: alert, appropriate, NAD  HEENT: NC/AT  CV: RRR by pulse  Pulm: Unlabored on RA  MSK:  Incision c/d/i, no erythema, drainage, evidence of infection  Palpable crepitus of scar tissue over hardware, this is not painful. Palpable olecranon plate  Elbow range of motion   Supination 60  Pronation 60  5 out of 5 EPL, FPL, FDP index, intrinsics  Sensation intact to light touch median, radial, ulnar nerve distributions no deficits  Warm well-perfused, capillary fill less than 2 seconds    Imaging:  X-rays of right elbow today reviewed by me demonstrates postsurgical changes status post ORIF of olecranon osteotomy.  No interval displacement.  Distal humerus and olecranon osteotomy are healed healed. articular surface remains well aligned.  No evidence of hardware complication    Assessment/Plan:  73-year-old female status post ORIF right distal humerus 5/3/2023 and revision ORIF right olecranon osteotomy  6/1/2023.  Neurovascularly intact.  Doing well.    Fortunately patient did not injure right elbow during recent fall. I recommended resuming hand therapy for aggressive elbow ROM focusing on terminal extension. She will follow-up with OT about status of BARBARA splint. I advised that it is not uncommon to lose terminal extension after this surgery, particularly since she had 2 operations. She is not bothered by the scar tissue crepitus or palpable olecranon plate at this time.    She may weight bear as tolerated through the right elbow at this time. Hydroxyzine sent to pharmacy at her request to help patient sleep.    Follow-up with me in 3 months. X-rays of her right elbow will be needed at that time.     All question answered.  She and Bryson voiced understanding and agreement.    Williams Phipps MD    Hand & Upper Extremity Surgery  Department of Orthopedic Surgery  [facility]

## 2023-08-21 NOTE — LETTER
8/21/2023         RE: Sri Grewal  [address] [facility] [address]        Dear Colleague,    Thank you for referring your patient, Sri Grewal, to the [facility]THOP[facility] [place]. Please see a copy of my visit note below.    CHIEF COMPLAINT: Status post reduction of proximal humerus fracture and right reverse total shoulder arthoplasty     DATE OF SURGERY: 5/5/23    HISTORY OF PRESENT ILLNESS: Sri is an extremely pleasant 73 year-old right hand dominant female who is 3 months status post the above procedure.  She states her shoulder pain is at a 5 out of 10 with her pain is worse with movements.  She reports that she fell in July and broke her hip.  Therapies been on hold as result of the recent hip fracture    EXAM:  Pleasant adult 73 year old in no distress.  Respirations even and unlabored.  Right upper extremity: Incisions clean, dry, and intact. No erythema. No drainage. Sens intact Ax/Musc/Med/Rad/Uln nerves. Motor intact EPL, FPL, and Intrinsics.   Active elevation of 30, passive 70. ER -10. Limited elbow ROM    DATA REVIEW:  AP, Scap Y, axillary  Xrays of the right were ordered and reviewed today showing reverse shoulder arthroplasty in place, no dislocation, stable    ASSESSMENT:  1. 7 weeks status post above procedure    PLAN:  I discussed with the patient that unfortunately things are continuing to have setbacks due to the patient's recent hip fracture.  The patient really has multiple fractures at this point and again discussed that given the complex injury in the upper extremity, the recent hip fracture that really our goals are basic ADLs of hand to mouth feeding, light hair brushing that may require some movement from the head and neck.  I recommended that the patient see physical therapy in [place].  If so far achieved our goal of stable fixation.  I will follow-up with the patient in another 3 months time with repeat x-rays.      Cierra Krause  Orthopedic  Surgery Sports Medicine and Shoulder Surgery

## 2023-08-21 NOTE — PROGRESS NOTES
CHIEF COMPLAINT: Status post reduction of proximal humerus fracture and right reverse total shoulder arthoplasty     DATE OF SURGERY: 5/5/23    HISTORY OF PRESENT ILLNESS: Sri is an extremely pleasant 73 year-old right hand dominant female who is 3 months status post the above procedure.  She states her shoulder pain is at a 5 out of 10 with her pain is worse with movements.  She reports that she fell in July and broke her hip.  Therapies been on hold as result of the recent hip fracture    EXAM:  Pleasant adult 73 year old in no distress.  Respirations even and unlabored.  Right upper extremity: Incisions clean, dry, and intact. No erythema. No drainage. Sens intact Ax/Musc/Med/Rad/Uln nerves. Motor intact EPL, FPL, and Intrinsics.   Active elevation of 30, passive 70. ER -10. Limited elbow ROM    DATA REVIEW:  AP, Scap Y, axillary  Xrays of the right were ordered and reviewed today showing reverse shoulder arthroplasty in place, no dislocation, stable    ASSESSMENT:  1. 7 weeks status post above procedure    PLAN:  I discussed with the patient that unfortunately things are continuing to have setbacks due to the patient's recent hip fracture.  The patient really has multiple fractures at this point and again discussed that given the complex injury in the upper extremity, the recent hip fracture that really our goals are basic ADLs of hand to mouth feeding, light hair brushing that may require some movement from the head and neck.  I recommended that the patient see physical therapy in [place].  If so far achieved our goal of stable fixation.  I will follow-up with the patient in another 3 months time with repeat x-rays.      Cierra Krause  Orthopedic Surgery Sports Medicine and Shoulder Surgery

## 2023-08-24 ENCOUNTER — THERAPY VISIT (OUTPATIENT)
Dept: OCCUPATIONAL THERAPY | Facility: CLINIC | Age: 73
End: 2023-08-24
Payer: MEDICARE

## 2023-08-24 DIAGNOSIS — S42.491A CLOSED BICONDYLAR FRACTURE OF DISTAL HUMERUS, RIGHT, INITIAL ENCOUNTER: Primary | ICD-10-CM

## 2023-08-24 DIAGNOSIS — S42.491D CLOSED BICONDYLAR FRACTURE OF DISTAL HUMERUS, RIGHT, WITH ROUTINE HEALING, SUBSEQUENT ENCOUNTER: ICD-10-CM

## 2023-08-24 PROCEDURE — 97110 THERAPEUTIC EXERCISES: CPT | Mod: GO

## 2023-08-24 PROCEDURE — 97165 OT EVAL LOW COMPLEX 30 MIN: CPT | Mod: GO

## 2023-08-24 NOTE — PROGRESS NOTES
DISCHARGE  Reason for Discharge: Change in medical status.    Discharge Plan: Other services: surgery for hip, returning to therapy at a later date.    Referring Provider:  Williams Phipps

## 2023-08-24 NOTE — PROGRESS NOTES
OCCUPATIONAL THERAPY EVALUATION  Type of Visit: Evaluation    See electronic medical record for Abuse and Falls Screening details.    Subjective      Presenting condition or subjective complaint: Continuation of elbow therapy  Date of onset: 06/01/23    Past Medical History:   Diagnosis Date    Abnormal Papanicolaou smear of cervix and cervical HPV     Allergic rhinitis, cause unspecified     seasonal allergies    Cerebral infarction (H)     Contact dermatitis and other eczema, due to unspecified cause     Endometriosis, site unspecified     Esophageal reflux     GERD    Migraine, unspecified, without mention of intractable migraine without mention of status migrainosus     Mild persistent asthma     Unspecified disorder of uterus     fibroid uterus   Dates & types of surgery: Elbow & shoulder May 2023; Hip July 2023    Prior diagnostic imaging/testing results: MRI; X-ray     Prior therapy history for the same diagnosis, illness or injury: Yes Initial therapy disrupted due to breaking the hip    Prior Level of Function  Transfers: Assistive equipment  Ambulation: Independent, Assistive equipment  ADL: Independent    Living Environment  Social support: Alone   Type of home: 2-story   Stairs to enter the home: Yes 7 Is there a railing: No   Ramp: No   Stairs inside the home: Yes       Help at home: Home management tasks (cooking, cleaning)  Equipment owned: Straight Cane; Walker with wheels; Walker; Grab bars; Commode; Bath bench     Employment: No    Hobbies/Interests: Sewing    Patient goals for therapy: Improve use of elbow    Pain assessment: Pain present  See objective evaluation for additional pain details     Objective   ADDITIONAL HISTORY:  Right hand dominant  Patient reports symptoms of pain, stiffness/loss of motion, weakness/loss of strength, and edema    Functional Outcome Measure:   Upper Extremity Functional Index Score:  SCORE:   Column Totals: /80: 43   (A lower score indicates greater  disability.)    PAIN:  Pain Level at Rest: 4/10  Pain Level with Use: 9/10  Pain Location: elbow  Pain Quality: Throbbing  Pain Frequency: intermittent or daily  Pain is Worst: daytime or nighttime  Pain is Exacerbated By: laying on the side at night, general use  Pain is Relieved By: cold, heat, and rest  Pain Progression: Improved    SCAR/WOUND: Sensitivity: no sensitivity  Quality: fully closed, no signs of infection, mobile tissue    SENSATION: WNL throughout all nerve distributions; per patient report     ROM:   Elbow ROM  Left AROM Right AROM    Extension -10 -67   Flexion 147 143   Supination 102 83   Pronation 86 94     STRENGTH:     Measured in pounds 8/24/2023 8/24/2023    Left Right   Trial 1 39 24, + (mild)   Average 39 24     Lateral Pinch  Measured in pounds 8/24/2023 8/24/2023    Left Right   Trial 1 8 8   Average 8 8     3 Point Pinch  Measured in pounds 8/24/2023 8/24/2023    Left Right   Trial 1 10 7, + (mild)   Average 10 7       Assessment & Plan   CLINICAL IMPRESSIONS  Medical Diagnosis: Closed bicondylar fracture of distal humerus, right    Treatment Diagnosis: (P) Right Elbow Pain    Impression/Assessment: Pt is a 73 year old female presenting to Occupational Therapy due to Closed bicondylar fracture of distal humerus, right.  The following significant findings have been identified: Impaired ROM, Impaired strength, and Pain.  These identified deficits interfere with their ability to perform self care tasks, recreational activities, household chores, driving ,  yard work, and meal planning and preparation as compared to previous level of function.   Patient's limitations or Problem List includes: Pain, Decreased ROM/motion, Increased edema, Weakness, Decreased , Decreased pinch, and Tightness in musculature of the right elbow which interferes with the patient's ability to perform Self Care Tasks (dressing, hygiene/toileting), Sleep Patterns, Recreational Activities, Household Chores,  and Driving  as compared to previous level of function.    Clinical Decision Making (Complexity):  Assessment of Occupational Performance: 5 or more Performance Deficits  Occupational Performance Limitations: dressing, hygiene and grooming, driving and community mobility, health management and maintenance, home establishment and management, meal preparation and cleanup, shopping, sleep, and leisure activities  Clinical Decision Making (Complexity): Low complexity    PLAN OF CARE  Treatment Interventions:  Modalities:  US  Therapeutic Exercise:  AROM, AAROM, PROM, Tendon Gliding, Blocking, Reverse Blocking, Place and Hold, Contract Relax, Extensor Tracking, Isotonics, Isometrics, and Stabilization  Manual Techniques:  Joint mobilization, Scar mobilization, Friction massage, Myofascial release, and Manual edema mobilization  Orthotic Fabrication:  Static progressive  Self Care:  Self Care Tasks and Ergonomic Considerations    Long Term Goals   OT Goal 1  Goal Identifier: Weightbearing  Goal Description: Patient will report no difficulty with weightbearing on the arm in order to get out of a chair  Goal Progress: Moderate Difficulty      Frequency of Treatment: 1x weekly  Duration of Treatment: 8 weeks     Education Assessment: Learner/Method: (P) Patient (Friend)  Education Comments: (P) PTRX via printout     Risks and benefits of evaluation/treatment have been explained.   Patient/Family/caregiver agrees with Plan of Care.     Evaluation Time:    OT Eval, Low Complexity Minutes (99028): (P) 20    Signing Clinician: MEKA Thomson [facility]                                                                                   OUTPATIENT OCCUPATIONAL THERAPY      PLAN OF TREATMENT FOR OUTPATIENT REHABILITATION   Patient's Last Name, First Name, Sri Matta YOB: 1950   Provider's Name   [facility]   Medical Record  No.  [id number]     Onset Date: 06/01/23 Start of Care Date: 08/24/23     Medical Diagnosis:  Closed bicondylar fracture of distal humerus, right      OT Treatment Diagnosis:  (P) Right Elbow Pain Plan of Treatment  Frequency/Duration:1x weekly/8 weeks    Certification date from 08/24/23   To 10/19/23        See note for plan of treatment details and functional goals     Dionne Chaudhary OT                         I CERTIFY THE NEED FOR THESE SERVICES FURNISHED UNDER        THIS PLAN OF TREATMENT AND WHILE UNDER MY CARE     (Physician attestation of this document indicates review and certification of the therapy plan).                Referring Provider:  Williams Phipps      Initial Assessment  See Epic Evaluation- 08/24/23

## 2023-09-05 ENCOUNTER — OFFICE VISIT (OUTPATIENT)
Dept: ORTHOPEDICS | Facility: CLINIC | Age: 73
End: 2023-09-05
Payer: MEDICARE

## 2023-09-05 ENCOUNTER — ANCILLARY PROCEDURE (OUTPATIENT)
Dept: GENERAL RADIOLOGY | Facility: CLINIC | Age: 73
End: 2023-09-05
Attending: ORTHOPAEDIC SURGERY
Payer: MEDICARE

## 2023-09-05 VITALS
BODY MASS INDEX: 19.67 KG/M2 | HEART RATE: 77 BPM | RESPIRATION RATE: 18 BRPM | DIASTOLIC BLOOD PRESSURE: 74 MMHG | HEIGHT: 63 IN | WEIGHT: 111 LBS | SYSTOLIC BLOOD PRESSURE: 150 MMHG

## 2023-09-05 DIAGNOSIS — S72.144D CLOSED NONDISPLACED INTERTROCHANTERIC FRACTURE OF RIGHT FEMUR WITH ROUTINE HEALING, SUBSEQUENT ENCOUNTER: Primary | ICD-10-CM

## 2023-09-05 DIAGNOSIS — S72.144A CLOSED NONDISPLACED INTERTROCHANTERIC FRACTURE OF RIGHT FEMUR, INITIAL ENCOUNTER (H): ICD-10-CM

## 2023-09-05 PROCEDURE — 73502 X-RAY EXAM HIP UNI 2-3 VIEWS: CPT | Mod: TC | Performed by: RADIOLOGY

## 2023-09-05 PROCEDURE — 99024 POSTOP FOLLOW-UP VISIT: CPT | Performed by: ORTHOPAEDIC SURGERY

## 2023-09-05 NOTE — LETTER
9/5/2023         RE: Sri Grewal  [address] [address] [address]        Dear Colleague,    Thank you for referring your patient, Sri Grewal, to the [facility]. Please see a copy of my visit note below.    Follow up open-reduction, internal fixation right Intertrochanteric fracture with short Gamma todd on 7/14/23.    She has been full weight bearing.  Ambulation aids:  none now.  Mild  pain with ambulation.    Xray shows good position of fracture and fixation.  The lesser trochanter is avulsed.  The fracture is mildly compressed, but stable.    Exam shows mild  pain with rotation of right hip, no pain on left.  External rotation 30 degrees on right, 40 degrees on left.  Internal rotation 15 degrees on right, 15-20 degrees on left.    Assessment:  right hip intertrochanteric femur fracture open-reduction, internal fixation is stable at 7 weeks.  She had fallen 3 days after surgery and her lesser trochanteric avulsion occurred then.  It is stable.  Plan:  continue weight bearing as tolerated.  Resume activity as tolerated.  We could consider screw removal at one year if it is painful.    Again, thank you for allowing me to participate in the care of your patient.        Sincerely,        Nathan Turner MD

## 2023-09-05 NOTE — PROGRESS NOTES
Follow up open-reduction, internal fixation right Intertrochanteric fracture with short Gamma todd on 7/14/23.    She has been full weight bearing.  Ambulation aids:  none now.  Mild  pain with ambulation.    Xray shows good position of fracture and fixation.  The lesser trochanter is avulsed.  The fracture is mildly compressed, but stable.    Exam shows mild  pain with rotation of right hip, no pain on left.  External rotation 30 degrees on right, 40 degrees on left.  Internal rotation 15 degrees on right, 15-20 degrees on left.    Assessment:  right hip intertrochanteric femur fracture open-reduction, internal fixation is stable at 7 weeks.  She had fallen 3 days after surgery and her lesser trochanteric avulsion occurred then.  It is stable.  Plan:  continue weight bearing as tolerated.  Resume activity as tolerated.  We could consider screw removal at one year if it is painful.

## 2023-09-07 ENCOUNTER — OFFICE VISIT (OUTPATIENT)
Dept: FAMILY MEDICINE | Facility: OTHER | Age: 73
End: 2023-09-07
Payer: MEDICARE

## 2023-09-07 ENCOUNTER — THERAPY VISIT (OUTPATIENT)
Dept: OCCUPATIONAL THERAPY | Facility: CLINIC | Age: 73
End: 2023-09-07
Payer: MEDICARE

## 2023-09-07 VITALS
RESPIRATION RATE: 18 BRPM | OXYGEN SATURATION: 97 % | WEIGHT: 112 LBS | HEIGHT: 62 IN | BODY MASS INDEX: 20.61 KG/M2 | TEMPERATURE: 98.7 F | DIASTOLIC BLOOD PRESSURE: 52 MMHG | SYSTOLIC BLOOD PRESSURE: 100 MMHG | HEART RATE: 72 BPM

## 2023-09-07 DIAGNOSIS — I10 HYPERTENSION GOAL BP (BLOOD PRESSURE) < 140/90: ICD-10-CM

## 2023-09-07 DIAGNOSIS — H61.23 BILATERAL IMPACTED CERUMEN: ICD-10-CM

## 2023-09-07 DIAGNOSIS — F43.23 ADJUSTMENT DISORDER WITH MIXED ANXIETY AND DEPRESSED MOOD: ICD-10-CM

## 2023-09-07 DIAGNOSIS — D64.9 ANEMIA, UNSPECIFIED TYPE: ICD-10-CM

## 2023-09-07 DIAGNOSIS — M25.521 RIGHT ELBOW PAIN: Primary | ICD-10-CM

## 2023-09-07 DIAGNOSIS — M80.00XG AGE-RELATED OSTEOPOROSIS WITH CURRENT PATHOLOGICAL FRACTURE WITH DELAYED HEALING, SUBSEQUENT ENCOUNTER: ICD-10-CM

## 2023-09-07 DIAGNOSIS — F03.90 DEMENTIA WITHOUT BEHAVIORAL DISTURBANCE (H): ICD-10-CM

## 2023-09-07 DIAGNOSIS — F32.1 MODERATE MAJOR DEPRESSION (H): ICD-10-CM

## 2023-09-07 DIAGNOSIS — S72.144A CLOSED NONDISPLACED INTERTROCHANTERIC FRACTURE OF RIGHT FEMUR, INITIAL ENCOUNTER (H): Primary | ICD-10-CM

## 2023-09-07 LAB
ANION GAP SERPL CALCULATED.3IONS-SCNC: 13 MMOL/L (ref 7–15)
BUN SERPL-MCNC: 9.6 MG/DL (ref 8–23)
CALCIUM SERPL-MCNC: 9.6 MG/DL (ref 8.8–10.2)
CHLORIDE SERPL-SCNC: 102 MMOL/L (ref 98–107)
CREAT SERPL-MCNC: 0.66 MG/DL (ref 0.51–0.95)
DEPRECATED HCO3 PLAS-SCNC: 22 MMOL/L (ref 22–29)
EGFRCR SERPLBLD CKD-EPI 2021: >90 ML/MIN/1.73M2
ERYTHROCYTE [DISTWIDTH] IN BLOOD BY AUTOMATED COUNT: 15.2 % (ref 10–15)
FERRITIN SERPL-MCNC: 21 NG/ML (ref 11–328)
GLUCOSE SERPL-MCNC: 108 MG/DL (ref 70–99)
HCT VFR BLD AUTO: 35 % (ref 35–47)
HGB BLD-MCNC: 11 G/DL (ref 11.7–15.7)
IRON BINDING CAPACITY (ROCHE): 342 UG/DL (ref 240–430)
IRON SATN MFR SERPL: 10 % (ref 15–46)
IRON SERPL-MCNC: 33 UG/DL (ref 37–145)
MCH RBC QN AUTO: 26.2 PG (ref 26.5–33)
MCHC RBC AUTO-ENTMCNC: 31.4 G/DL (ref 31.5–36.5)
MCV RBC AUTO: 83 FL (ref 78–100)
PLATELET # BLD AUTO: 323 10E3/UL (ref 150–450)
POTASSIUM SERPL-SCNC: 4.6 MMOL/L (ref 3.4–5.3)
RBC # BLD AUTO: 4.2 10E6/UL (ref 3.8–5.2)
SODIUM SERPL-SCNC: 137 MMOL/L (ref 136–145)
WBC # BLD AUTO: 5.4 10E3/UL (ref 4–11)

## 2023-09-07 PROCEDURE — 83550 IRON BINDING TEST: CPT | Performed by: PHYSICIAN ASSISTANT

## 2023-09-07 PROCEDURE — 69209 REMOVE IMPACTED EAR WAX UNI: CPT | Performed by: PHYSICIAN ASSISTANT

## 2023-09-07 PROCEDURE — 97535 SELF CARE MNGMENT TRAINING: CPT | Mod: GO

## 2023-09-07 PROCEDURE — 97110 THERAPEUTIC EXERCISES: CPT | Mod: GO

## 2023-09-07 PROCEDURE — 85027 COMPLETE CBC AUTOMATED: CPT | Performed by: PHYSICIAN ASSISTANT

## 2023-09-07 PROCEDURE — 82728 ASSAY OF FERRITIN: CPT | Performed by: PHYSICIAN ASSISTANT

## 2023-09-07 PROCEDURE — 36415 COLL VENOUS BLD VENIPUNCTURE: CPT | Performed by: PHYSICIAN ASSISTANT

## 2023-09-07 PROCEDURE — 83540 ASSAY OF IRON: CPT | Performed by: PHYSICIAN ASSISTANT

## 2023-09-07 PROCEDURE — 80048 BASIC METABOLIC PNL TOTAL CA: CPT | Performed by: PHYSICIAN ASSISTANT

## 2023-09-07 PROCEDURE — 99215 OFFICE O/P EST HI 40 MIN: CPT | Mod: 25 | Performed by: PHYSICIAN ASSISTANT

## 2023-09-07 RX ORDER — TRAMADOL HYDROCHLORIDE 50 MG/1
50 TABLET ORAL EVERY 4 HOURS PRN
Qty: 90 TABLET | Refills: 0 | Status: SHIPPED | OUTPATIENT
Start: 2023-09-07 | End: 2024-05-30

## 2023-09-07 RX ORDER — IBANDRONATE SODIUM 150 MG/1
150 TABLET, FILM COATED ORAL
Qty: 12 TABLET | Refills: 3 | Status: SHIPPED | OUTPATIENT
Start: 2023-09-07 | End: 2024-05-30

## 2023-09-07 RX ORDER — HYDRALAZINE HYDROCHLORIDE 25 MG/1
25 TABLET, FILM COATED ORAL 3 TIMES DAILY
Qty: 90 TABLET | Refills: 4 | Status: SHIPPED | OUTPATIENT
Start: 2023-09-07 | End: 2024-02-26

## 2023-09-07 ASSESSMENT — ANXIETY QUESTIONNAIRES
IF YOU CHECKED OFF ANY PROBLEMS ON THIS QUESTIONNAIRE, HOW DIFFICULT HAVE THESE PROBLEMS MADE IT FOR YOU TO DO YOUR WORK, TAKE CARE OF THINGS AT HOME, OR GET ALONG WITH OTHER PEOPLE: NOT DIFFICULT AT ALL
3. WORRYING TOO MUCH ABOUT DIFFERENT THINGS: SEVERAL DAYS
GAD7 TOTAL SCORE: 3
1. FEELING NERVOUS, ANXIOUS, OR ON EDGE: NOT AT ALL
6. BECOMING EASILY ANNOYED OR IRRITABLE: NOT AT ALL
5. BEING SO RESTLESS THAT IT IS HARD TO SIT STILL: NOT AT ALL
GAD7 TOTAL SCORE: 3
7. FEELING AFRAID AS IF SOMETHING AWFUL MIGHT HAPPEN: NOT AT ALL
2. NOT BEING ABLE TO STOP OR CONTROL WORRYING: SEVERAL DAYS
4. TROUBLE RELAXING: SEVERAL DAYS

## 2023-09-07 ASSESSMENT — PAIN SCALES - GENERAL: PAINLEVEL: MILD PAIN (3)

## 2023-09-07 ASSESSMENT — PATIENT HEALTH QUESTIONNAIRE - PHQ9
SUM OF ALL RESPONSES TO PHQ QUESTIONS 1-9: 9
SUM OF ALL RESPONSES TO PHQ QUESTIONS 1-9: 9
10. IF YOU CHECKED OFF ANY PROBLEMS, HOW DIFFICULT HAVE THESE PROBLEMS MADE IT FOR YOU TO DO YOUR WORK, TAKE CARE OF THINGS AT HOME, OR GET ALONG WITH OTHER PEOPLE: SOMEWHAT DIFFICULT

## 2023-09-07 ASSESSMENT — ASTHMA QUESTIONNAIRES: ACT_TOTALSCORE: 25

## 2023-09-07 NOTE — PROGRESS NOTES
Assessment & Plan     ICD-10-CM    1. Closed nondisplaced intertrochanteric fracture of right femur, initial encounter (H)  S72.144A traMADol (ULTRAM) 50 MG tablet      2. Age-related osteoporosis with current pathological fracture with delayed healing, subsequent encounter  M80.00XG IBANdronate (BONIVA) 150 MG tablet      3. Anemia, unspecified type  D64.9 Iron and iron binding capacity     Ferritin     Iron and iron binding capacity     Ferritin      4. Hypertension goal BP (blood pressure) < 140/90  I10 hydrALAZINE (APRESOLINE) 25 MG tablet     Basic metabolic panel  (Ca, Cl, CO2, Creat, Gluc, K, Na, BUN)     CBC with platelets     Basic metabolic panel  (Ca, Cl, CO2, Creat, Gluc, K, Na, BUN)     CBC with platelets      5. Adjustment disorder with mixed anxiety and depressed mood  F43.23       6. Moderate major depression (H)  F32.1       7. Dementia without behavioral disturbance (H)  F03.90       8. Bilateral impacted cerumen  H61.23           - Patient reports improving   - Has physical therapy, working with neurology regarding her dementia      Followed up with orthopedics as well      Her house has had some safety work, friend moved in to provide 24/7 care   - Per discharge, discussed treatment for osteoporosis      Last Dexa was in 2018, looks like fosamax was sent but unclear if ever took it   - Recommend update DEXA and start Boniva   - Reviewed medication use and side effects   - HTN in a good range  - Extensive review of all medications including use and side effects, refilled as needed   - Has ongoing anemia, like due to many surgeries this year      Chart shows did have B12 and folate checked, no iron studies, will get this today      Had mild hyponatremia that should be rechecked as well      Await results   - Taking Tramadol at night for sleep due to her pain      OK to continue, reviewed medication use and side effects including sedation and addiction, patient doesn't drive   -  reviewed, no  concerns   - Mood      Struggling a little due to all the health concerns since Feb (several broken bones, falls, MI, TIA, etc) but maintaining, desires no medication change at this time     - Bilateral impacted cerumen      Cleaned via lavage by MA staff, symptoms improved     Review of the result(s) of each unique test - See list          8/7/23 - Hgb       7/24/22 - BMP        11/9/18 - Dexa   Diagnosis or treatment significantly limited by social determinants of health - none     45 minutes spent on the date of the encounter doing chart review, history and exam, documentation and further activities as noted above    The patient indicates understanding of these issues and agrees with the plan.    PA student as below was present and participated in shadow capacity only    LINDA Dick   [facility]     Follow up: 2 months     Rosenda Pierre PA-C  [employer] SUSY Fierro is a 73 year old, presenting for the following health issues:  Hospital F/U        9/7/2023    10:31 AM   Additional Questions   Roomed by Wendy   Accompanied by Friend: Bryson         9/7/2023    10:31 AM   Patient Reported Additional Medications   Patient reports taking the following new medications NA       [facility]       Hospital Follow-up Visit:    Hospital/Nursing Home/IP Rehab Facility: [facility]  Date of Admission: 7/12/23  Date of Discharge: 7/18/23  Reason(s) for Admission: Rt sided broken hip/surgery    Was your hospitalization related to COVID-19? No   Problems taking medications regularly:  None  Medication changes since discharge: None  Problems adhering to non-medication therapy:  None    Summary of hospitalization:  [facility] discharge summary reviewed  Diagnostic Tests/Treatments reviewed.  Follow up needed: Labs   Other Healthcare Providers Involved in Patient s Care:         Specialist  "appointment - ortho  Update since discharge: improved.     The patient is a 73-year-old female who is here for hospital followup. She is accompanied by an adult male.    She saw Dr. Turner on Tuesday who said she is good to go. Her pain is not great, but it is okay. She takes tramadol 1 tablet almost every night and needs a refill. The adult male states he mixes tramadol with hydroxyzine and she is able to finally go to sleep. They met with Dr. Mcgowan who ordered a new migraine injection. They have not had to take any of it. She has a followup appointment with Dr. Mcgowan in 11/2023. She is going to physical therapy for her elbow. Her weight has not changed much. They have a health directive after the meeting with the  tomorrow. She would like to get her ears cleaned. They try to get her out and walk every day. They keep her from going downstairs unless he walks. The neurologist said she has mild dementia.    Supplemental Information  The adult male thinks she has a systolic murmur. She has mitral valve prolapses.    Plan of care communicated with patient and family         Review of Systems   Constitutional, HEENT, cardiovascular, pulmonary, gi and gu systems are negative, except as otherwise noted.      Objective    /52   Pulse 72   Temp 98.7  F (37.1  C) (Temporal)   Resp 18   Ht 1.57 m (5' 1.81\")   Wt 50.8 kg (112 lb)   LMP 11/09/2005 (Approximate)   SpO2 97%   BMI 20.61 kg/m    Body mass index is 20.61 kg/m .  Physical Exam   GENERAL APPEARANCE: healthy, alert and no distress  EYES: Eyes grossly normal to inspection, PERRLA, conjunctivae and sclerae without injection or discharge, EOM intact   HENT: Bilateral ear canals without impacted cerumen   RESP: Lungs clear to auscultation - no rales, rhonchi or wheezes   CV: Regular rates and rhythm, normal S1 S2, no S3 or S4, 2+ systolic murmur, click or rub, no peripheral edema and peripheral pulses strong and symmetric bilaterally   MS: No " musculoskeletal defects are noted and gait is age appropriate without ataxia   SKIN: No suspicious lesions or rashes, hydration status appears adeuqate with normal skin turgor   PSYCH: Alert and oriented x3; speech- coherent , normal rate and volume; able to articulate logical thoughts for the most part, no tangential thoughts, no hallucinations or delusions, mentation appears normal, Mood is depressed Affect is flat. Thought content is free of suicidal ideation, hallucinations, and delusions. Dress is adequate and upkept. Eye contact is good during conversation.       Diagnostics: reviewed in Epic, see orders pending in Epic

## 2023-09-08 NOTE — TELEPHONE ENCOUNTER
September 8, 2023     Patient: Alejandra Shaw   YOB: 1955           To Whom it May Concern:    Alejandra Shaw is my patient. Please excuse Alejandra for her absence from work on 9/8/23 due to illness.     Sincerely,         Mela Russell MD    Medical information is confidential and cannot be disclosed without the written consent of the patient or her representative.       Reason for Call:  Other call back and prescription    Detailed comments: Patient called clinic regarding issue with butalbital-aspirin-caffeine (FIORINAL) -40 MG per capsule on hold with [facility] pharmacy- they did not state reason. Patient has had her med refill follow up in clinic so she isn't sure why they will not fill her rx.. Please give patient a call back. Patient is completely out of medication.     Phone Number Patient can be reached at: Cell number on file:    Telephone Information:   Mobile [phone]       Best Time: any    Can we leave a detailed message on this number? YES    Call taken on 11/14/2018 at 8:18 AM by Helena White

## 2023-09-08 NOTE — RESULT ENCOUNTER NOTE
Please call patient's contact with the following message    Iron levels are low. I would like her to start over the counter supplement - iron 325 mg once a day. Can cause constipation, but this is less if takes with something containing vitamin C like orange juice. Rest of labs improved.     Jose Francisco Pierre PA-C

## 2023-09-13 ENCOUNTER — PATIENT OUTREACH (OUTPATIENT)
Dept: CARE COORDINATION | Facility: CLINIC | Age: 73
End: 2023-09-13
Payer: MEDICARE

## 2023-09-13 NOTE — PROGRESS NOTES
Clinic Care Coordination Contact  [facility]/Voicemail       Clinical Data: Care Coordinator Outreach  Outreach attempted x 1.  Left message on patient's voicemail with call back information and requested return call.  Plan: Care Coordinator will try to reach patient again in 1-2 business days.    Camille Roa RN Care Coordination   [facility]  Email: [email or web address]  Phone: [phone]

## 2023-09-13 NOTE — LETTER
[facility] CARE COORDINATION  [address]  [place] [address]    September 19, 2023    Sri Grewal  [address] [address]      Dear Sri,        I am a  clinic care coordinator who works with Rosenda Pierre PA-C with the [facility]. I have been trying to reach you recently to introduce Clinic Care Coordination. Below is a description of clinic care coordination and how I can further assist you.       The clinic care coordination team is made up of a registered nurse, , financial resource worker and community health worker who understand the health care system. The goal of clinic care coordination is to help you manage your health and improve access to the health care system. Our team works alongside your provider to assist you in determining your health and social needs. We can help you obtain health care and community resources, providing you with necessary information and education. We can work with you through any barriers and develop a care plan that helps coordinate and strengthen the communication between you and your care team.  Our services are voluntary and are offered without charge to you personally.    Please feel free to contact me with any questions or concerns regarding care coordination and what we can offer.      We are focused on providing you with the highest-quality healthcare experience possible.    Sincerely,     Camille Roa RN Care Coordination   [facility]  [place]  Phone: [phone]

## 2023-09-14 ENCOUNTER — THERAPY VISIT (OUTPATIENT)
Dept: PHYSICAL THERAPY | Facility: CLINIC | Age: 73
End: 2023-09-14
Attending: ORTHOPAEDIC SURGERY
Payer: MEDICARE

## 2023-09-14 DIAGNOSIS — Z96.611 S/P REVERSE TOTAL SHOULDER ARTHROPLASTY, RIGHT: Primary | ICD-10-CM

## 2023-09-14 DIAGNOSIS — Z96.611 STATUS POST REVERSE TOTAL SHOULDER REPLACEMENT, RIGHT: ICD-10-CM

## 2023-09-14 PROCEDURE — 97140 MANUAL THERAPY 1/> REGIONS: CPT | Mod: GP | Performed by: PHYSICAL THERAPIST

## 2023-09-14 PROCEDURE — 97110 THERAPEUTIC EXERCISES: CPT | Mod: GP | Performed by: PHYSICAL THERAPIST

## 2023-09-14 NOTE — PROGRESS NOTES
Clinic Care Coordination Contact  [facility]/Voicemail       Clinical Data: Care Coordinator Outreach  Outreach attempted x 2.  Left message on patient's voicemail with call back information and requested return call.  Plan: Care Coordinator will send care coordination introduction letter with care coordinator contact information and explanation of care coordination services via mail. Care Coordinator will do no further outreaches at this time.    Camille Roa RN Care Coordination   [facility]  Email: [email or web address]  Phone: [phone]

## 2023-09-14 NOTE — PROGRESS NOTES
09/14/23 0500   Appointment Info   Signing clinician's name / credentials Tiarra Vela DPT   Total/Authorized Visits 12   Visits Used 2   Medical Diagnosis S/P R R Shoulder Replacement   Quick Adds Certification   Progress Note/Certification   Start of Care Date 07/11/23   Onset of illness/injury or Date of Surgery 05/05/23   Therapy Frequency 1x/week   Predicted Duration 12 weeks   Certification date from 07/11/23   Certification date to 10/08/23   Progress Note Due Date 10/08/23   Progress Note Completed Date 09/14/23   PT Goal 1   Goal Identifier Reaching   Goal Description Able to reach overhead >120 degrees of flexion   Rationale to maximize safety and independence with performance of ADLs and functional tasks   Goal Progress AROM flexion 66   Target Date 10/08/23   Subjective Report   Subjective Report Patient reports there has been a lapse in PT due to a fall and fracturing her hip.  At this time that is healed and she is ready to reume PT to her shoulder.  She is also compling hand therapy in relation to her elbow and wrist.   Objective Measure 1   Objective Measure AROM   Details Flexion 66 Abd 59 Ext 58 ER 45   Treatment Interventions (PT)   Interventions Therapeutic Procedure/Exercise;Manual Therapy   Therapeutic Procedure/Exercise   Therapeutic Procedures: strength, endurance, ROM, flexibillity minutes (51493) 30   PTRx Ther Proc 1 Passive Shoulder Flexion Sitting   PTRx Ther Proc 1 - Details 10   PTRx Ther Proc 2 Wand Shoulder External Rotation in Standing   PTRx Ther Proc 2 - Details 10   PTRx Ther Proc 3 Wand Shoulder Extension Standing   PTRx Ther Proc 3 - Details 10   PTRx Ther Proc 4 Wall Climb   PTRx Ther Proc 4 - Details 10   PTRx Ther Proc 5 Shoulder Extension in Standing   PTRx Ther Proc 5 - Details 10   PTRx Ther Proc 6 Shoulder Theraband Rows   PTRx Ther Proc 6 - Details 10 YTB   Manual Therapy   Manual Therapy: Mobilization, MFR, MLD, friction massage minutes (13685) 10   Manual  Therapy 1 Efflaurage   Manual Therapy 1 - Details Anterior/Lateral/Posterior shoulder in sitting   Patient Response/Progress dec in pain   Total Session Time   Timed Code Treatment Minutes 40   Total Treatment Time (sum of timed and untimed services) 40         PLAN  Continue therapy per current plan of care.    Beginning/End Dates of Progress Note Reporting Period:  7/11/23 to 09/14/2023    Referring Provider:  Cierra Krause

## 2023-09-21 ENCOUNTER — THERAPY VISIT (OUTPATIENT)
Dept: PHYSICAL THERAPY | Facility: CLINIC | Age: 73
End: 2023-09-21
Payer: MEDICARE

## 2023-09-21 DIAGNOSIS — Z96.611 S/P REVERSE TOTAL SHOULDER ARTHROPLASTY, RIGHT: Primary | ICD-10-CM

## 2023-09-21 PROCEDURE — 97110 THERAPEUTIC EXERCISES: CPT | Mod: GP | Performed by: PHYSICAL THERAPIST

## 2023-09-21 PROCEDURE — 97140 MANUAL THERAPY 1/> REGIONS: CPT | Mod: GP | Performed by: PHYSICAL THERAPIST

## 2023-09-25 ENCOUNTER — THERAPY VISIT (OUTPATIENT)
Dept: PHYSICAL THERAPY | Facility: CLINIC | Age: 73
End: 2023-09-25
Attending: ORTHOPAEDIC SURGERY
Payer: MEDICARE

## 2023-09-25 DIAGNOSIS — Z96.611 S/P REVERSE TOTAL SHOULDER ARTHROPLASTY, RIGHT: Primary | ICD-10-CM

## 2023-09-25 PROCEDURE — 97140 MANUAL THERAPY 1/> REGIONS: CPT | Mod: GP | Performed by: PHYSICAL THERAPIST

## 2023-09-25 PROCEDURE — 97110 THERAPEUTIC EXERCISES: CPT | Mod: GP | Performed by: PHYSICAL THERAPIST

## 2023-09-26 ENCOUNTER — NURSE TRIAGE (OUTPATIENT)
Dept: FAMILY MEDICINE | Facility: OTHER | Age: 73
End: 2023-09-26
Payer: MEDICARE

## 2023-09-26 DIAGNOSIS — I25.83 CORONARY ARTERY DISEASE DUE TO LIPID RICH PLAQUE: ICD-10-CM

## 2023-09-26 DIAGNOSIS — G45.9 TIA (TRANSIENT ISCHEMIC ATTACK): ICD-10-CM

## 2023-09-26 DIAGNOSIS — I25.10 CORONARY ARTERY DISEASE DUE TO LIPID RICH PLAQUE: ICD-10-CM

## 2023-09-26 DIAGNOSIS — F32.1 MODERATE MAJOR DEPRESSION (H): ICD-10-CM

## 2023-09-26 DIAGNOSIS — S42.201K CLOSED FRACTURE OF PROXIMAL END OF RIGHT HUMERUS WITH NONUNION, UNSPECIFIED FRACTURE MORPHOLOGY, SUBSEQUENT ENCOUNTER: ICD-10-CM

## 2023-09-26 RX ORDER — VENLAFAXINE HYDROCHLORIDE 75 MG/1
150 CAPSULE, EXTENDED RELEASE ORAL DAILY
Qty: 90 CAPSULE | Refills: 3 | Status: SHIPPED | OUTPATIENT
Start: 2023-09-26 | End: 2023-11-06 | Stop reason: DRUGHIGH

## 2023-09-26 RX ORDER — CLOPIDOGREL BISULFATE 75 MG/1
75 TABLET ORAL DAILY
Qty: 90 TABLET | Refills: 1 | Status: SHIPPED | OUTPATIENT
Start: 2023-09-26 | End: 2024-04-23

## 2023-09-26 NOTE — CONFIDENTIAL NOTE
Friend Bryson calling in today requesting refills for patient on  Effexor  Plavix  The Effexor most recently is showing up as a hospital medication but it appears that she has taken it before and is listed in her historical meds. In the hospital the dose was 150mg daily.   Heterosexual

## 2023-10-04 ENCOUNTER — THERAPY VISIT (OUTPATIENT)
Dept: OCCUPATIONAL THERAPY | Facility: CLINIC | Age: 73
End: 2023-10-04
Payer: MEDICARE

## 2023-10-04 ENCOUNTER — THERAPY VISIT (OUTPATIENT)
Dept: PHYSICAL THERAPY | Facility: CLINIC | Age: 73
End: 2023-10-04
Payer: MEDICARE

## 2023-10-04 DIAGNOSIS — Z96.611 S/P REVERSE TOTAL SHOULDER ARTHROPLASTY, RIGHT: Primary | ICD-10-CM

## 2023-10-04 DIAGNOSIS — M25.521 RIGHT ELBOW PAIN: Primary | ICD-10-CM

## 2023-10-04 PROCEDURE — 97110 THERAPEUTIC EXERCISES: CPT | Mod: GP | Performed by: PHYSICAL THERAPIST

## 2023-10-04 PROCEDURE — 97140 MANUAL THERAPY 1/> REGIONS: CPT | Mod: GP | Performed by: PHYSICAL THERAPIST

## 2023-10-04 PROCEDURE — 97110 THERAPEUTIC EXERCISES: CPT | Mod: GO

## 2023-10-04 PROCEDURE — 97140 MANUAL THERAPY 1/> REGIONS: CPT | Mod: GO

## 2023-10-05 DIAGNOSIS — I10 HYPERTENSION GOAL BP (BLOOD PRESSURE) < 140/90: ICD-10-CM

## 2023-10-05 DIAGNOSIS — S72.144D CLOSED NONDISPLACED INTERTROCHANTERIC FRACTURE OF RIGHT FEMUR WITH ROUTINE HEALING, SUBSEQUENT ENCOUNTER: ICD-10-CM

## 2023-10-05 RX ORDER — AMLODIPINE BESYLATE 10 MG/1
10 TABLET ORAL DAILY
Qty: 90 TABLET | Refills: 0 | Status: SHIPPED | OUTPATIENT
Start: 2023-10-05 | End: 2024-01-03

## 2023-10-05 RX ORDER — AMOXICILLIN 250 MG
2 CAPSULE ORAL 2 TIMES DAILY PRN
Qty: 30 TABLET | Refills: 1 | Status: SHIPPED | OUTPATIENT
Start: 2023-10-05 | End: 2024-05-30

## 2023-10-11 ENCOUNTER — THERAPY VISIT (OUTPATIENT)
Dept: PHYSICAL THERAPY | Facility: CLINIC | Age: 73
End: 2023-10-11
Payer: MEDICARE

## 2023-10-11 DIAGNOSIS — Z96.611 S/P REVERSE TOTAL SHOULDER ARTHROPLASTY, RIGHT: Primary | ICD-10-CM

## 2023-10-11 PROCEDURE — 97140 MANUAL THERAPY 1/> REGIONS: CPT | Mod: GP | Performed by: PHYSICAL THERAPIST

## 2023-10-11 PROCEDURE — 97110 THERAPEUTIC EXERCISES: CPT | Mod: GP | Performed by: PHYSICAL THERAPIST

## 2023-10-14 ENCOUNTER — HOSPITAL ENCOUNTER (EMERGENCY)
Facility: CLINIC | Age: 73
Discharge: HOME | End: 2023-10-14
Attending: EMERGENCY MEDICINE | Admitting: EMERGENCY MEDICINE
Payer: MEDICARE

## 2023-10-14 VITALS
SYSTOLIC BLOOD PRESSURE: 164 MMHG | DIASTOLIC BLOOD PRESSURE: 91 MMHG | BODY MASS INDEX: 23.92 KG/M2 | RESPIRATION RATE: 19 BRPM | TEMPERATURE: 97.8 F | HEART RATE: 68 BPM | WEIGHT: 130 LBS | OXYGEN SATURATION: 98 %

## 2023-10-14 DIAGNOSIS — R35.0 URINARY FREQUENCY: ICD-10-CM

## 2023-10-14 LAB
ALBUMIN UR-MCNC: NEGATIVE MG/DL
APPEARANCE UR: CLEAR
BILIRUB UR QL STRIP: NEGATIVE
COLOR UR AUTO: YELLOW
GLUCOSE UR STRIP-MCNC: NEGATIVE MG/DL
HGB UR QL STRIP: NEGATIVE
KETONES UR STRIP-MCNC: NEGATIVE MG/DL
LEUKOCYTE ESTERASE UR QL STRIP: NEGATIVE
NITRATE UR QL: NEGATIVE
PH UR STRIP: 6 [PH] (ref 5–7)
RBC URINE: 0 /HPF
SP GR UR STRIP: 1.01 (ref 1–1.03)
SQUAMOUS EPITHELIAL: <1 /HPF
UROBILINOGEN UR STRIP-MCNC: NORMAL MG/DL
WBC URINE: <1 /HPF

## 2023-10-14 PROCEDURE — 99283 EMERGENCY DEPT VISIT LOW MDM: CPT | Performed by: EMERGENCY MEDICINE

## 2023-10-14 PROCEDURE — 81001 URINALYSIS AUTO W/SCOPE: CPT | Performed by: EMERGENCY MEDICINE

## 2023-10-14 NOTE — ED PROVIDER NOTES
History     Chief Complaint   Patient presents with    Dysuria     HPI  Sri Grewal is a 73 year old female who presents with urinary frequency.  This has been ongoing in the morning over the last 3 to 4 days she states.  No pain.  No burning.  No hematuria.  No fever.  No vomiting.  No back pain.  She does endorse some anxiety.  No increased caffeine intake, only 1 cup in the morning she states    Allergies:  Allergies   Allergen Reactions    Morphine And Related      GI UPSET    Oxycodone     Seasonal Allergies        Problem List:    Patient Active Problem List    Diagnosis Date Noted    Health Care Home 07/27/2011     Priority: High     X  EMERGENCY CARE PLAN  Presenting Problem Signs and Symptoms Treatment Plan    Questions or conerns during clinic hours    I will call the clinic directly     Questions or conerns outside clinic hours    I will call the 24 hour nurse line at [phone]    Patient needs to schedule an appointment    I will call the 24 hour scheduling team at [phone] or clinic directly    Same day treatment     I will call the clinic first, nurse line if after hours, urgent care and express care if needed                                  DX V65.8 REPLACED WITH [id number] [facility] (04/08/2013)      Closed bicondylar fracture of distal humerus, right, with routine healing, subsequent encounter 08/24/2023     Priority: Medium    Closed bicondylar fracture of distal humerus, right, initial encounter 08/24/2023     Priority: Medium    Hypertension, unspecified type 08/14/2023     Priority: Medium    Dementia without behavioral disturbance (H) 07/20/2023     Priority: Medium    Generalized muscle weakness 07/20/2023     Priority: Medium    Recurrent falls 07/20/2023     Priority: Medium    Leg edema, right 07/20/2023     Priority: Medium    S/P total right hip arthroplasty 07/20/2023     Priority: Medium    Urinary retention 07/13/2023     Priority: Medium    Closed nondisplaced  intertrochanteric fracture of right femur (H)-s/p ORIF with gamma todd 7/14/23 07/12/2023     Priority: Medium    Head injury, initial encounter 07/12/2023     Priority: Medium    Hip fracture, right, closed, initial encounter (H) 07/12/2023     Priority: Medium    Skin tear of right elbow without complication, initial encounter 07/12/2023     Priority: Medium    History of revision right elbow surgery 6/1/23 07/12/2023     Priority: Medium    History of postoperative delirium May 2023 07/12/2023     Priority: Medium    S/P reverse total shoulder arthroplasty, right 5/5/23 07/11/2023     Priority: Medium    Right elbow pain 06/12/2023     Priority: Medium    Stiffness of elbow joint, right 06/12/2023     Priority: Medium    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 06/01/2023     Priority: Medium    Post-operative state 05/03/2023     Priority: Medium    Fall, initial encounter 04/30/2023     Priority: Medium    Closed fracture of distal end of right humerus, unspecified fracture morphology, initial encounter 04/30/2023     Priority: Medium    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, unspecified fracture morphology, subsequent encounter 04/30/2023     Priority: Medium    Closed displaced fracture of surgical neck of right humerus 03/27/2023     Priority: Medium    Acute pain of right shoulder 03/27/2023     Priority: Medium    Newly recognized heart murmur 02/05/2023     Priority: Medium    Major depressive disorder, recurrent, in partial remission (H24) 02/05/2023     Priority: Medium    Vitamin D deficiency 02/05/2023     Priority: Medium    Loss of memory 02/05/2023     Priority: Medium    History of cerebrovascular accident (CVA) with residual deficit 12/08/2021     Priority: Medium    ST elevation myocardial infarction (STEMI), unspecified artery (H) 08/26/2021     Priority: Medium    Coronary artery disease due to lipid rich plaque 08/26/2021     Priority: Medium     Compression fracture of T12 vertebra with routine healing, subsequent encounter 11/17/2020     Priority: Medium    Hematoma 01/10/2019     Priority: Medium    Hematoma of abdominal wall, initial encounter 01/09/2019     Priority: Medium    Abdominal wall hematoma 01/09/2019     Priority: Medium    Hyponatremia 11/26/2018     Priority: Medium    Age-related osteoporosis with current pathological fracture 11/12/2018     Priority: Medium    HSV (herpes simplex virus) infection 07/31/2018     Priority: Medium    Atypical mole 06/01/2017     Priority: Medium    Hypertension goal BP (blood pressure) < 140/90 05/25/2017     Priority: Medium    TIA (transient ischemic attack) 01/16/2017     Priority: Medium    Adjustment disorder with mixed anxiety and depressed mood 09/09/2014     Priority: Medium    Middle cerebral artery aneurysm 10/29/2013     Priority: Medium     Noted in 2007.  Intervention not recommended at that time.  Observation.  Saw Interventional Radiology 12/2013.  Recheck CTA in one year.      Moderate major depression (H) 09/08/2010     Priority: Medium    Insomnia 09/08/2010     Priority: Medium    Atrophy of vagina 05/30/2010     Priority: Medium    Lump or mass in breast 03/29/2010     Priority: Medium    Mild persistent asthma 02/21/2005     Priority: Medium    Anemia, unspecified type 04/25/2002     Priority: Medium     Problem list name updated by automated process. Provider to review      Chronic migraine without aura without status migrainosus, not intractable      Priority: Medium     Multiple trials off of fiorinal have not been successful  Problem list name updated by automated process. Provider to review      Other abnormal Papanicolaou smear of cervix and cervical HPV(795.09)      Priority: Medium     (Problem list name updated by automated process. Provider to review and confirm.)      Endometriosis      Priority: Medium     Problem list name updated by automated process. Provider to review       Allergic rhinitis      Priority: Medium     Problem list name updated by automated process. Provider to review          Past Medical History:    Past Medical History:   Diagnosis Date    Abnormal Papanicolaou smear of cervix and cervical HPV     Allergic rhinitis, cause unspecified     Cerebral infarction (H)     Contact dermatitis and other eczema, due to unspecified cause     Endometriosis, site unspecified     Esophageal reflux     Migraine, unspecified, without mention of intractable migraine without mention of status migrainosus     Mild persistent asthma     Unspecified disorder of uterus        Past Surgical History:    Past Surgical History:   Procedure Laterality Date    COLONOSCOPY  2014    Procedure: COLONOSCOPY;  Surgeon: Nathan Baker MD;  Location: [facility]    OPEN REDUCTION INTERNAL FIXATION ELBOW Right 2023    Procedure: REVISION OPEN REDUCTION INTERNAL FIXATION RIGHT OLECRANON;  Surgeon: Williams Phipps MD;  Location: [facility]    OPEN REDUCTION INTERNAL FIXATION HIP NAILING Right 2023    Procedure: INTERNAL FIXATION, FRACTURE, TROCHANTERIC,RIGHT HIP, USING GAMMA RODS;  Surgeon: Nathan Turner MD;  Location: [facility]    OPEN REDUCTION INTERNAL FIXATION HUMERUS DISTAL Right 5/3/2023    Procedure: OPEN REDUCTION INTERNAL FIXATION, FRACTURE, HUMERUS, DISTAL;  Surgeon: Williams Phipps MD;  [facility]    REMOVE HARDWARE ELBOW Right 2023    Procedure: HARDWARE REMOVAL RIGHT OLECRANON;  Surgeon: Williams Phipps MD;  [facility]    REVERSE ARTHROPLASTY SHOULDER Right 2023    Procedure: ARTHROPLASTY, SHOULDER, TOTAL, REVERSE for;  Surgeon: Cierra Krause MD;  Location: [facility]    ZZC  DELIVERY ONLY       X2    ZZHC COLONOSCOPY THRU STOMA, DIAGNOSTIC  2002    normal       Family History:    Family History   Problem Relation Age of Onset    Heart Disease Mother         anemia    Osteoporosis Mother     Hypertension Mother     Cerebrovascular Disease Mother      Alcohol/Drug Mother         alcohol    Neurologic Disorder Mother         migraines    Hypertension Father     Cancer No family hx of         breast       Social History:  Marital Status:   [5]  Social History     Tobacco Use    Smoking status: Never     Passive exposure: Never    Smokeless tobacco: Never   Vaping Use    Vaping Use: Never used   Substance Use Topics    Alcohol use: Yes     Comment: socially    Drug use: No        Medications:    acetaminophen (TYLENOL) 500 MG tablet  amLODIPine (NORVASC) 10 MG tablet  buPROPion (WELLBUTRIN XL) 300 MG 24 hr tablet  carvedilol (COREG) 12.5 MG tablet  clopidogrel (PLAVIX) 75 MG tablet  hydrALAZINE (APRESOLINE) 25 MG tablet  hydrOXYzine (ATARAX) 25 MG tablet  IBANdronate (BONIVA) 150 MG tablet  lisinopril (ZESTRIL) 20 MG tablet  melatonin 5 MG tablet  polyethylene glycol (MIRALAX) 17 GM/Dose powder  rosuvastatin (CRESTOR) 20 MG tablet  senna-docusate (SENOKOT-S/PERICOLACE) 8.6-50 MG tablet  sodium chloride 1 GM tablet  traMADol (ULTRAM) 50 MG tablet  ubrogepant (UBRELVY) 50 MG tablet  venlafaxine (EFFEXOR XR) 150 MG 24 hr capsule  venlafaxine (EFFEXOR XR) 75 MG 24 hr capsule  vitamin D2 (ERGOCALCIFEROL) 43225 units (1250 mcg) capsule          Review of Systems  All other systems are reviewed and are negative    Physical Exam   BP: (!) 164/91  Pulse: 68  Temp: 97.8  F (36.6  C)  Resp: 19  Weight: 59 kg (130 lb)  SpO2: 98 %      Physical Exam  Vitals and nursing note reviewed.   Constitutional:       General: She is not in acute distress.     Appearance: She is well-developed. She is not diaphoretic.   HENT:      Head: Normocephalic and atraumatic.      Nose: Nose normal.      Mouth/Throat:      Mouth: Mucous membranes are moist.      Pharynx: Oropharynx is clear.   Eyes:      General: No scleral icterus.     Conjunctiva/sclera: Conjunctivae normal.   Cardiovascular:      Rate and Rhythm: Normal rate and regular rhythm.      Heart sounds: Normal heart sounds. No  murmur heard.  Pulmonary:      Effort: Pulmonary effort is normal. No respiratory distress.      Breath sounds: No stridor. No wheezing or rales.   Abdominal:      General: Abdomen is flat. There is no distension.      Palpations: Abdomen is soft. There is no mass.      Tenderness: There is no abdominal tenderness. There is no guarding or rebound.   Musculoskeletal:         General: No tenderness.      Cervical back: Normal range of motion and neck supple.   Skin:     General: Skin is warm and dry.      Coloration: Skin is not pale.      Findings: No erythema or rash.   Neurological:      General: No focal deficit present.      Mental Status: She is alert.   Psychiatric:         Mood and Affect: Mood normal.         ED Course                 Procedures        Results for orders placed or performed during the hospital encounter of 10/14/23 (from the past 24 hour(s))   UA with Microscopic reflex to Culture    Specimen: Urine, NOS   Result Value Ref Range    Color Urine Yellow Colorless, Straw, Light Yellow, Yellow    Appearance Urine Clear Clear    Glucose Urine Negative Negative mg/dL    Bilirubin Urine Negative Negative    Ketones Urine Negative Negative mg/dL    Specific Gravity Urine 1.012 1.003 - 1.035    Blood Urine Negative Negative    pH Urine 6.0 5.0 - 7.0    Protein Albumin Urine Negative Negative mg/dL    Urobilinogen Urine Normal Normal, 2.0 mg/dL    Nitrite Urine Negative Negative    Leukocyte Esterase Urine Negative Negative    RBC Urine 0 <=2 /HPF    WBC Urine <1 <=5 /HPF    Squamous Epithelials Urine <1 <=1 /HPF    Narrative    Urine Culture not indicated     *Note: Due to a large number of results and/or encounters for the requested time period, some results have not been displayed. A complete set of results can be found in Results Review.       Medications - No data to display    Assessments & Plan (with Medical Decision Making)  73-year-old female presents with urinary frequency in the morning over  the last 3 to 4 days.  Urinalysis reviewed, no signs of infection.  No fever.  No vomiting.  No signs of significant abdominal problems today.  She does endorse some anxiety, this could represent more of a nervous bladder we discussed.  Consideration for medication, but would endorse a trial of reassurance today.  Have recommended limiting caffeine intake.  Follow-up in clinic next week if not improved     I have reviewed the nursing notes.    I have reviewed the findings, diagnosis, plan and need for follow up with the patient.          New Prescriptions    No medications on file       Final diagnoses:   Urinary frequency       10/14/2023   [facility] EMERGENCY DEPT       Oc Agosto MD  10/14/23 6862

## 2023-10-14 NOTE — ED TRIAGE NOTES
pT C/O INCREASED URINARY FREQUENCY. DENIES FEVERS OR PAIN, DENIES HEMATURIA. vss     Triage Assessment (Adult)       Row Name 10/14/23 0958          Triage Assessment    Airway WDL WDL

## 2023-10-16 DIAGNOSIS — I25.10 CORONARY ARTERY DISEASE DUE TO LIPID RICH PLAQUE: ICD-10-CM

## 2023-10-16 DIAGNOSIS — I25.83 CORONARY ARTERY DISEASE DUE TO LIPID RICH PLAQUE: ICD-10-CM

## 2023-10-16 RX ORDER — ROSUVASTATIN CALCIUM 20 MG/1
20 TABLET, COATED ORAL DAILY
Qty: 90 TABLET | Refills: 0 | Status: SHIPPED | OUTPATIENT
Start: 2023-10-16 | End: 2024-01-08

## 2023-10-17 ENCOUNTER — THERAPY VISIT (OUTPATIENT)
Dept: OCCUPATIONAL THERAPY | Facility: CLINIC | Age: 73
End: 2023-10-17
Payer: MEDICARE

## 2023-10-17 ENCOUNTER — THERAPY VISIT (OUTPATIENT)
Dept: PHYSICAL THERAPY | Facility: CLINIC | Age: 73
End: 2023-10-17
Payer: MEDICARE

## 2023-10-17 DIAGNOSIS — Z96.611 S/P REVERSE TOTAL SHOULDER ARTHROPLASTY, RIGHT: Primary | ICD-10-CM

## 2023-10-17 DIAGNOSIS — M25.521 RIGHT ELBOW PAIN: Primary | ICD-10-CM

## 2023-10-17 PROCEDURE — 97140 MANUAL THERAPY 1/> REGIONS: CPT | Mod: GP | Performed by: PHYSICAL THERAPIST

## 2023-10-17 PROCEDURE — 97110 THERAPEUTIC EXERCISES: CPT | Mod: GP | Performed by: PHYSICAL THERAPIST

## 2023-10-17 PROCEDURE — 97110 THERAPEUTIC EXERCISES: CPT | Mod: GO

## 2023-10-17 NOTE — PROGRESS NOTES
"   10/17/23 0500   Appointment Info   Treating Provider Dionne Chaudhary OTR/L   Total/Authorized Visits 8 (POC)   Visits Used 4   Medical Diagnosis Closed bicondylar fracture of distal humerus, right   OT Tx Diagnosis Right Elbow Pain   Quick Add  Certification   Progress Note/Certification   Start Of Care Date 08/24/23   Onset of Illness/Injury or Date of Surgery 06/01/23   Therapy Frequency 1x weekly   Predicted Duration 4 weeks   Certification date from 10/20/23   Certification date to 02/17/24   Progress Note Due Date 10/05/23   Progress Note Completed Date 08/24/23   Goals   OT Goals 1   OT Goal 1   Goal Identifier Weightbearing   Goal Description Patient will report no difficulty with weightbearing on the arm in order to get out of a chair   Goal Progress Moderate Difficulty   Target Date 02/17/24   Subjective Report   Subjective Report \"It feels okay. It felt fine after the last therapy appointment. The exercises are okay. The stretching brace is good.\"   Objective Measures   Objective Measures Objective Measure 1;Objective Measure 2;Objective Measure 3;Objective Measure 4;Objective Measure 5   Objective Measure 1   Objective Measure Elbow Extension   Details -53 (Post: -48)   Objective Measure 2   Objective Measure Elbow ROM   Details Fl: 147 Sup: 79   Objective Measure 3   Objective Measure  Strength   Details 24lbs   Objective Measure 4   Objective Measure 3-pt pinch   Details 8lbs   Objective Measure 5   Objective Measure Pain Scale   Details 5/10 pain   Treatment Interventions (OT)   Interventions Therapeutic Procedure/Exercise;Manual Therapy;Self Care/Home Management   Self Care/Home Management   Self-Care/Home Mgmt/ADL, Compensatory, Meal Prep Minutes (49043) 4 Minutes   Self Care 1 BARBARA brace   Self Care 1 - Details Positioned patient into brace for 30-minute stretching session.   Therapeutic Procedure/Exercise   Therapeutic Procedure: strength, endurance, ROM, flexibillity minutes (82736) 24 "   HAND Therapeutic Exercise PROM;Other Exercises/Activities   Ther Proc 1 PN   Ther Proc 1 - Details Please see the objective data gathered above for today's PN   PROM   PROM PROM Elbow   PROM Elbow Extension   Sets/Reps 2 sets;20 reps  (30-60 sec hold)   Education   Learner/Method Patient  (Friend)   Education Comments PTRX via printout   Plan   Home program See PTRX   Plan for next session MFR & A/PROM   Total Session Time   Timed Code Treatment Minutes 28   Total Treatment Time (sum of timed and untimed services) 28         [facility]                                                                                   OUTPATIENT OCCUPATIONAL THERAPY    PLAN OF TREATMENT FOR OUTPATIENT REHABILITATION   Patient's Last Name, First Name, Sri Matta YOB: 1950   Provider's Name   [facility]   Medical Record No.  [id number]     Onset Date: 06/01/23 Start of Care Date: 08/24/23     Medical Diagnosis:  Closed bicondylar fracture of distal humerus, right      OT Treatment Diagnosis:  Right Elbow Pain Plan of Treatment  Frequency/Duration:1x weekly/4 weeks    Certification date from 10/20/23   To 02/17/24        See note for plan of treatment details and functional goals     Dionne Chaudhary OT                         I CERTIFY THE NEED FOR THESE SERVICES FURNISHED UNDER        THIS PLAN OF TREATMENT AND WHILE UNDER MY CARE     (Physician attestation of this document indicates review and certification of the therapy plan).                Referring Provider:  Williams Phipps      Initial Assessment  See Epic Evaluation- 08/24/23          PLAN  Continue therapy per current plan of care.    Beginning/End Dates of Progress Note Reporting Period:  08/24/23 to 10/17/2023    Referring Provider:  Williams Phipps

## 2023-10-20 DIAGNOSIS — I21.3 ST ELEVATION MYOCARDIAL INFARCTION (STEMI), UNSPECIFIED ARTERY (H): ICD-10-CM

## 2023-10-20 DIAGNOSIS — I10 HYPERTENSION GOAL BP (BLOOD PRESSURE) < 140/90: ICD-10-CM

## 2023-10-23 RX ORDER — CARVEDILOL 12.5 MG/1
TABLET ORAL
Qty: 180 TABLET | Refills: 1 | Status: SHIPPED | OUTPATIENT
Start: 2023-10-23 | End: 2024-04-15

## 2023-10-24 ENCOUNTER — THERAPY VISIT (OUTPATIENT)
Dept: OCCUPATIONAL THERAPY | Facility: CLINIC | Age: 73
End: 2023-10-24
Payer: MEDICARE

## 2023-10-24 ENCOUNTER — THERAPY VISIT (OUTPATIENT)
Dept: PHYSICAL THERAPY | Facility: CLINIC | Age: 73
End: 2023-10-24
Payer: MEDICARE

## 2023-10-24 DIAGNOSIS — Z96.611 S/P REVERSE TOTAL SHOULDER ARTHROPLASTY, RIGHT: Primary | ICD-10-CM

## 2023-10-24 DIAGNOSIS — M25.521 RIGHT ELBOW PAIN: Primary | ICD-10-CM

## 2023-10-24 PROCEDURE — 97140 MANUAL THERAPY 1/> REGIONS: CPT | Mod: GP | Performed by: PHYSICAL THERAPIST

## 2023-10-24 PROCEDURE — 97110 THERAPEUTIC EXERCISES: CPT | Mod: GO

## 2023-10-24 PROCEDURE — 97110 THERAPEUTIC EXERCISES: CPT | Mod: GP | Performed by: PHYSICAL THERAPIST

## 2023-10-24 NOTE — PROGRESS NOTES
10/04/23 0500   Appointment Info   Signing clinician's name / credentials Tiarra Vela DPT   Total/Authorized Visits 12   Visits Used 4   Medical Diagnosis S/P R R Shoulder Replacement   Quick Adds Certification   Progress Note/Certification   Start of Care Date 07/11/23   Onset of illness/injury or Date of Surgery 05/05/23   Therapy Frequency 1x/week   Predicted Duration 8 weeks   Certification date from 10/09/23   Certification date to 12/04/23   Progress Note Completed Date 10/08/23   PT Goal 1   Goal Identifier Reaching   Goal Description Able to reach overhead >120 degrees of flexion   Rationale to maximize safety and independence with performance of ADLs and functional tasks   Goal Progress AROM flexion 65   Target Date 12/04/23   Subjective Report   Subjective Report Patient reports the shoulder HEP has been going good. Is feeling very tight today.   Objective Measure 1   Objective Measure ROM   Details PROM flexion 75 AAROM Abd 78 Ext 62 ER 60 AROM Flex 65   Treatment Interventions (PT)   Interventions Therapeutic Procedure/Exercise;Manual Therapy   Therapeutic Procedure/Exercise   Therapeutic Procedures: strength, endurance, ROM, flexibillity minutes (88998) 30   PTRx Ther Proc 1 Passive Shoulder Flexion Sitting   PTRx Ther Proc 1 - Details 10   PTRx Ther Proc 2 Wand Shoulder External Rotation in Standing   PTRx Ther Proc 2 - Details 10   PTRx Ther Proc 3 Wand Shoulder Extension Standing   PTRx Ther Proc 3 - Details 10   PTRx Ther Proc 4 Wall Climb   PTRx Ther Proc 4 - Details Hold due to elbow tightness today   PTRx Ther Proc 5 Shoulder Extension in Standing   PTRx Ther Proc 5 - Details 10   PTRx Ther Proc 6 Wand Shoulder Abduction Standing   PTRx Ther Proc 6 - Details 10   PTRx Ther Proc 7 Supine Ceiling Punch   PTRx Ther Proc 7 - Details with wand HEP   PTRx Ther Proc 8 Shoulder Theraband Rows   PTRx Ther Proc 8 - Details 10 RTB   PTRx Ther Proc 9 Pulley Shoulder Flexion   PTRx Ther Proc 9 -  Details HEP   PTRx Ther Proc 10 Shoulder Flexion   PTRx Ther Proc 10 - Details clinic only 10   PTRx Ther Proc 11 Sidelying Shoulder Flexion   PTRx Ther Proc 11 - Details 10   PTRx Ther Proc 12 Shoulder External Rotation Sidelying   PTRx Ther Proc 12 - Details 10   Manual Therapy   Manual Therapy: Mobilization, MFR, MLD, friction massage minutes (17114) 10   Manual Therapy 1 Efflaurage with mobilizations   Manual Therapy 1 - Details Anterior/Lateral/Posterior shoulder with Grade I post glides in supine   Patient Response/Progress dec in pain   Total Session Time   Timed Code Treatment Minutes 40   Total Treatment Time (sum of timed and untimed services) 40         [facility]                                                                                   OUTPATIENT PHYSICAL THERAPY    PLAN OF TREATMENT FOR OUTPATIENT REHABILITATION   Patient's Last Name, First Name, Sri Matta YOB: 1950   Provider's Name   [facility]   Medical Record No.  [id number]     Onset Date: 05/05/23  Start of Care Date: 07/11/23     Medical Diagnosis:  S/P R R Shoulder Replacement      PT Treatment Diagnosis:    Plan of Treatment  Frequency/Duration: 1x/week/ (P) 8 weeks    Certification date from (P) 10/09/23 to (P) 12/04/23         See note for plan of treatment details and functional goals     Tiarra Vela, PT                         I CERTIFY THE NEED FOR THESE SERVICES FURNISHED UNDER        THIS PLAN OF TREATMENT AND WHILE UNDER MY CARE     (Physician attestation of this document indicates review and certification of the therapy plan).                Referring Provider:  Cierra Krause      Initial Assessment  See Epic Evaluation- Start of Care Date: 07/11/23            PLAN  Continue therapy per current plan of care.    Beginning/End Dates of Progress Note Reporting Period:  (P) 10/08/23 to 10/04/2023    Referring Provider:  Cierra Krause

## 2023-10-31 ENCOUNTER — THERAPY VISIT (OUTPATIENT)
Dept: OCCUPATIONAL THERAPY | Facility: CLINIC | Age: 73
End: 2023-10-31
Payer: MEDICARE

## 2023-10-31 ENCOUNTER — THERAPY VISIT (OUTPATIENT)
Dept: PHYSICAL THERAPY | Facility: CLINIC | Age: 73
End: 2023-10-31
Payer: MEDICARE

## 2023-10-31 DIAGNOSIS — Z96.611 S/P REVERSE TOTAL SHOULDER ARTHROPLASTY, RIGHT: Primary | ICD-10-CM

## 2023-10-31 DIAGNOSIS — M25.521 RIGHT ELBOW PAIN: Primary | ICD-10-CM

## 2023-10-31 PROCEDURE — 97110 THERAPEUTIC EXERCISES: CPT | Mod: GO

## 2023-10-31 PROCEDURE — 97140 MANUAL THERAPY 1/> REGIONS: CPT | Mod: GO

## 2023-10-31 PROCEDURE — 97140 MANUAL THERAPY 1/> REGIONS: CPT | Mod: GP | Performed by: PHYSICAL THERAPIST

## 2023-10-31 PROCEDURE — 97110 THERAPEUTIC EXERCISES: CPT | Mod: GP | Performed by: PHYSICAL THERAPIST

## 2023-10-31 NOTE — PROGRESS NOTES
10/31/23 0500   Appointment Info   Signing clinician's name / credentials Tiarra Vela DPT/Samantha Salazar SPT   Total/Authorized Visits 12   Visits Used 8   Medical Diagnosis S/P R R Shoulder Replacement   Quick Adds Certification   Progress Note/Certification   Start of Care Date 07/11/23   Onset of illness/injury or Date of Surgery 05/05/23   Therapy Frequency 1x/week   Predicted Duration 12 weeks   Certification date from 10/09/23   Certification date to 12/04/23   Progress Note Completed Date 10/08/23   PT Goal 1   Goal Identifier Reaching   Goal Description Able to reach overhead >120 degrees of flexion   Rationale to maximize safety and independence with performance of ADLs and functional tasks   Goal Progress AROM flexion 74   Target Date 12/04/23   Subjective Report   Subjective Report Her shoulder is feeling good today. Exercises have been going well.   Objective Measures   Objective Measures Objective Measure 2   Objective Measure 1   Objective Measure ROM   Details AROM: flexion 74, abduction 65, ER 42, IR T10, extension 50. AAROM: abduction 80, flexion 80   Objective Measure 2   Objective Measure strength   Details R: flexion and abduction 4/5, ER 3+/5, IR 4/5   Treatment Interventions (PT)   Interventions Therapeutic Procedure/Exercise;Manual Therapy   Therapeutic Procedure/Exercise   Therapeutic Procedures: strength, endurance, ROM, flexibillity minutes (83103) 30   PTRx Ther Proc 1 Passive Shoulder Flexion Sitting   PTRx Ther Proc 1 - Details 10   PTRx Ther Proc 2 Wand Shoulder External Rotation in Standing   PTRx Ther Proc 2 - Details 10   PTRx Ther Proc 3 Wand Shoulder Extension Standing   PTRx Ther Proc 3 - Details 10   PTRx Ther Proc 4 Wall Climb   PTRx Ther Proc 4 - Details 10   PTRx Ther Proc 5 Shoulder Extension in Standing   PTRx Ther Proc 6 Wand Shoulder Abduction Standing   PTRx Ther Proc 6 - Details 10   PTRx Ther Proc 7 Supine Ceiling Punch   PTRx Ther Proc 8 Shoulder Theraband Rows    PTRx Ther Proc 8 - Details x15 rtb   PTRx Ther Proc 9 Pulley Shoulder Flexion   PTRx Ther Proc 10 Sidelying Shoulder Flexion   PTRx Ther Proc 11 Shoulder External Rotation Sidelying   PTRx Ther Proc 11 - Details 20x AG x10 1#   PTRx Ther Proc 12 Shoulder Theraband Internal Rotation   PTRx Ther Proc 12 - Details x10 RTB   PTRx Ther Proc 13 UBE   PTRx Ther Proc 13 - Details x3 min   Manual Therapy   Manual Therapy: Mobilization, MFR, MLD, friction massage minutes (75151) 10   Manual Therapy 1 Efflaurage with mobilizations   Manual Therapy 1 - Details Anterior/Lateral/Posterior shoulder with Grade I/II post glides in supine; PROM of stretching into abduction and flexion   Plan   Home program TPRx   Plan for next session Try adding weights as tolerated for strengthening   Total Session Time   Timed Code Treatment Minutes 40   Total Treatment Time (sum of timed and untimed services) 40         PLAN  Continue therapy per current plan of care.    Beginning/End Dates of Progress Note Reporting Period:  10/08/23 to 10/31/2023    Referring Provider:  Cierra Krause

## 2023-11-03 DIAGNOSIS — M25.521 ELBOW PAIN, RIGHT: Primary | ICD-10-CM

## 2023-11-06 ENCOUNTER — TELEPHONE (OUTPATIENT)
Dept: FAMILY MEDICINE | Facility: OTHER | Age: 73
End: 2023-11-06

## 2023-11-06 ENCOUNTER — OFFICE VISIT (OUTPATIENT)
Dept: FAMILY MEDICINE | Facility: OTHER | Age: 73
End: 2023-11-06
Payer: MEDICARE

## 2023-11-06 VITALS
WEIGHT: 113.5 LBS | SYSTOLIC BLOOD PRESSURE: 130 MMHG | RESPIRATION RATE: 18 BRPM | OXYGEN SATURATION: 96 % | DIASTOLIC BLOOD PRESSURE: 70 MMHG | HEART RATE: 66 BPM | TEMPERATURE: 98.7 F | HEIGHT: 62 IN | BODY MASS INDEX: 20.89 KG/M2

## 2023-11-06 DIAGNOSIS — E61.1 IRON DEFICIENCY: ICD-10-CM

## 2023-11-06 DIAGNOSIS — S42.491D CLOSED BICONDYLAR FRACTURE OF DISTAL HUMERUS, RIGHT, WITH ROUTINE HEALING, SUBSEQUENT ENCOUNTER: Primary | ICD-10-CM

## 2023-11-06 DIAGNOSIS — I10 HYPERTENSION GOAL BP (BLOOD PRESSURE) < 140/90: ICD-10-CM

## 2023-11-06 DIAGNOSIS — F51.04 PSYCHOPHYSIOLOGICAL INSOMNIA: ICD-10-CM

## 2023-11-06 LAB
ANION GAP SERPL CALCULATED.3IONS-SCNC: 12 MMOL/L (ref 7–15)
BUN SERPL-MCNC: 9.8 MG/DL (ref 8–23)
CALCIUM SERPL-MCNC: 9.4 MG/DL (ref 8.8–10.2)
CHLORIDE SERPL-SCNC: 100 MMOL/L (ref 98–107)
CREAT SERPL-MCNC: 0.6 MG/DL (ref 0.51–0.95)
DEPRECATED HCO3 PLAS-SCNC: 24 MMOL/L (ref 22–29)
EGFRCR SERPLBLD CKD-EPI 2021: >90 ML/MIN/1.73M2
ERYTHROCYTE [DISTWIDTH] IN BLOOD BY AUTOMATED COUNT: 17.3 % (ref 10–15)
FERRITIN SERPL-MCNC: 39 NG/ML (ref 11–328)
GLUCOSE SERPL-MCNC: 100 MG/DL (ref 70–99)
HCT VFR BLD AUTO: 40.2 % (ref 35–47)
HGB BLD-MCNC: 13.4 G/DL (ref 11.7–15.7)
IRON BINDING CAPACITY (ROCHE): 339 UG/DL (ref 240–430)
IRON SATN MFR SERPL: 61 % (ref 15–46)
IRON SERPL-MCNC: 208 UG/DL (ref 37–145)
MCH RBC QN AUTO: 29.2 PG (ref 26.5–33)
MCHC RBC AUTO-ENTMCNC: 33.3 G/DL (ref 31.5–36.5)
MCV RBC AUTO: 88 FL (ref 78–100)
PLATELET # BLD AUTO: 214 10E3/UL (ref 150–450)
POTASSIUM SERPL-SCNC: 5.4 MMOL/L (ref 3.4–5.3)
RBC # BLD AUTO: 4.59 10E6/UL (ref 3.8–5.2)
SODIUM SERPL-SCNC: 136 MMOL/L (ref 135–145)
WBC # BLD AUTO: 5.9 10E3/UL (ref 4–11)

## 2023-11-06 PROCEDURE — 83540 ASSAY OF IRON: CPT | Performed by: PHYSICIAN ASSISTANT

## 2023-11-06 PROCEDURE — 99214 OFFICE O/P EST MOD 30 MIN: CPT | Performed by: PHYSICIAN ASSISTANT

## 2023-11-06 PROCEDURE — 85027 COMPLETE CBC AUTOMATED: CPT | Performed by: PHYSICIAN ASSISTANT

## 2023-11-06 PROCEDURE — 83550 IRON BINDING TEST: CPT | Performed by: PHYSICIAN ASSISTANT

## 2023-11-06 PROCEDURE — 82728 ASSAY OF FERRITIN: CPT | Performed by: PHYSICIAN ASSISTANT

## 2023-11-06 PROCEDURE — 80048 BASIC METABOLIC PNL TOTAL CA: CPT | Performed by: PHYSICIAN ASSISTANT

## 2023-11-06 PROCEDURE — 36415 COLL VENOUS BLD VENIPUNCTURE: CPT | Performed by: PHYSICIAN ASSISTANT

## 2023-11-06 RX ORDER — RESPIRATORY SYNCYTIAL VIRUS VACCINE 120MCG/0.5
0.5 KIT INTRAMUSCULAR ONCE
Qty: 1 EACH | Refills: 0 | Status: CANCELLED | OUTPATIENT
Start: 2023-11-06 | End: 2023-11-06

## 2023-11-06 RX ORDER — HYDROXYZINE HYDROCHLORIDE 25 MG/1
50-75 TABLET, FILM COATED ORAL
Qty: 60 TABLET | Refills: 5 | Status: SHIPPED | OUTPATIENT
Start: 2023-11-06 | End: 2024-01-02 | Stop reason: SINTOL

## 2023-11-06 RX ORDER — TIZANIDINE 2 MG/1
1-2 TABLET ORAL 3 TIMES DAILY PRN
Qty: 90 TABLET | Refills: 3 | Status: SHIPPED | OUTPATIENT
Start: 2023-11-06

## 2023-11-06 ASSESSMENT — ANXIETY QUESTIONNAIRES
1. FEELING NERVOUS, ANXIOUS, OR ON EDGE: SEVERAL DAYS
4. TROUBLE RELAXING: SEVERAL DAYS
6. BECOMING EASILY ANNOYED OR IRRITABLE: SEVERAL DAYS
2. NOT BEING ABLE TO STOP OR CONTROL WORRYING: SEVERAL DAYS
7. FEELING AFRAID AS IF SOMETHING AWFUL MIGHT HAPPEN: NOT AT ALL
IF YOU CHECKED OFF ANY PROBLEMS ON THIS QUESTIONNAIRE, HOW DIFFICULT HAVE THESE PROBLEMS MADE IT FOR YOU TO DO YOUR WORK, TAKE CARE OF THINGS AT HOME, OR GET ALONG WITH OTHER PEOPLE: NOT DIFFICULT AT ALL
3. WORRYING TOO MUCH ABOUT DIFFERENT THINGS: SEVERAL DAYS
GAD7 TOTAL SCORE: 6
GAD7 TOTAL SCORE: 6
5. BEING SO RESTLESS THAT IT IS HARD TO SIT STILL: SEVERAL DAYS

## 2023-11-06 ASSESSMENT — PAIN SCALES - GENERAL: PAINLEVEL: MILD PAIN (2)

## 2023-11-06 ASSESSMENT — PATIENT HEALTH QUESTIONNAIRE - PHQ9
SUM OF ALL RESPONSES TO PHQ QUESTIONS 1-9: 9
SUM OF ALL RESPONSES TO PHQ QUESTIONS 1-9: 9
10. IF YOU CHECKED OFF ANY PROBLEMS, HOW DIFFICULT HAVE THESE PROBLEMS MADE IT FOR YOU TO DO YOUR WORK, TAKE CARE OF THINGS AT HOME, OR GET ALONG WITH OTHER PEOPLE: NOT DIFFICULT AT ALL

## 2023-11-06 NOTE — TELEPHONE ENCOUNTER
----- Message from Rosenda Pierre PA-C sent at 11/6/2023  4:08 PM CST -----  Please call patient with the following message    Iron and hemoglobin all back in normal range. Great news.     Jose Francisco Pierre PA-C

## 2023-11-06 NOTE — PROGRESS NOTES
CHIEF COMPLAINT: Status post reduction of proximal humerus fracture and right reverse total shoulder arthoplasty     DATE OF SURGERY: 5/5/23    HISTORY OF PRESENT ILLNESS: Sri is an extremely pleasant 73 year-old right hand dominant female who is 6 months status post the above procedure.  Finished out physical therapy last week.  Able to do most activities of daily living with the exception of brushing hair, does wash hair independently.  Also finished occupational therapy for the elbow last week.  Sane: R- 70 L- 100    EXAM:  Pleasant adult 73 year old in no distress.  Respirations even and unlabored.  Right upper extremity: Incisions clean, dry, and intact. No erythema. No drainage. Sens intact Ax/Musc/Med/Rad/Uln nerves. Motor intact EPL, FPL, and Intrinsics.   Active elevation of 60,  ER 0. Limited elbow ROM    DATA REVIEW:  AP, Scap Y, axillary  Xrays of the right were ordered and reviewed today showing reverse shoulder arthroplasty in place, no dislocation, stable    ASSESSMENT:  1.  6 months status post above procedure    PLAN:  I discussed with the patient and friend that fortunately the shoulder is stable.  The patient is able to do most activities of daily living including hand to mouth feeding which is the major goal, able to wash hair although cannot brush it independently.  We discussed continuing with home exercises but can start to finish out physical therapy.  We discussed a 10 pound weight lifting restriction.  I advised the patient multiple times to avoid falls, discussed the significant difficulty patient sustained a periprosthetic fracture.  Advised yearly follow-up.      Cierra Krause  Orthopedic Surgery Sports Medicine and Shoulder Surgery

## 2023-11-06 NOTE — TELEPHONE ENCOUNTER
Called and spoke with Bryson patient's friend let him know patients results and he said he would let her know.    RAFAEL Phan

## 2023-11-06 NOTE — PROGRESS NOTES
Assessment & Plan     ICD-10-CM    1. Psychophysiological insomnia  F51.04 hydrOXYzine (ATARAX) 25 MG tablet      2. Closed bicondylar fracture of distal humerus, right, with routine healing, subsequent encounter  S42.491D tiZANidine (ZANAFLEX) 2 MG tablet      3. Iron deficiency  E61.1 CBC with platelets     Ferritin     Iron and iron binding capacity     CBC with platelets     Ferritin     Iron and iron binding capacity      4. Hypertension goal BP (blood pressure) < 140/90  I10 Basic metabolic panel  (Ca, Cl, CO2, Creat, Gluc, K, Na, BUN)     Basic metabolic panel  (Ca, Cl, CO2, Creat, Gluc, K, Na, BUN)          1. Sleep issues & arm pain   She continues to improve from recent fractures/surgeries. Has upcoming recheck for her shoulder and elbow. Some of the sleep issues are related to her pain She was given Hydroxyzine 25 mg, which worked at first but stopped working. I will start her on hydroxyzine 25 mg - 2 to 3 tablets as needed. She was cautioned that hydroxyzine can make her feel groggy in the morning. She was advised not to take melatonin with hydroxyzine.  We will also give Tizanidine (muscle relaxer) for pain. Discussed will make tired as well. Reviewed use and side effects. Can also continue the PRN tylenol and tramadol. Advised not to use Tramadol and Tizanidine together due to over sedation.     3. Iron deficiency I will recheck her hemoglobin and iron studies. She is tolerating iron. Likely this is post op anemia. Await labs     4. HTN   Her BP looks good today. Continues to follow with cardiology. Recommend recheck sodium as has been low in the past. Await results       Review of the result(s) of each unique test - See list         9/7/23 - Ferritin, Iron, CBC, BMP   Diagnosis or treatment significantly limited by social determinants of health - None     26 minutes spent on the date of the encounter doing chart review, history and exam, documentation and further activities as noted above    Follow  up: pending labs 3-6 months     FLEX Alonso [facility] SUSY Fierro is a 73 year old, presenting for the following health issues:  Follow Up      11/6/2023    10:39 AM   Additional Questions   Roomed by Wendy   Accompanied by Friend: Bryson         11/6/2023    10:39 AM   Patient Reported Additional Medications   Patient reports taking the following new medications NA       History of Present Illness       Reason for visit:  Follow up: Closed nondisplaced intertrochanteric fracture of right femur, initial encounter (H)    She eats 2-3 servings of fruits and vegetables daily.She consumes 1 sweetened beverage(s) daily.She exercises with enough effort to increase her heart rate 10 to 19 minutes per day.  She exercises with enough effort to increase her heart rate 3 or less days per week.   She is taking medications regularly.     - Trouble sleeping   - Melatonin 10 mg         The patient is a 73-year-old female who is here for follow-up. She is accompanied by an adult male (Friend Bryson).     She has been feeling about the same and a little better. She feels stronger some days. She is still having trouble sleeping at night. Bryson gave her melatonin 10 mg, but it does not seem to help. She is tempting to get on a sleeping pill. She is doing okay with everyday things. She is still doing therapy (physical therapy). She has occasional pain. She takes Tylenol for pain, which helps. Once in a while, Bryson will give her tramadol. She still has the brace that she puts on 2 to 3 times a day for half an hour. Dr. Phipps is her elbow surgeon. She was given hydroxyzine 25 mg for sleep, which seemed to work, but she has not given her any of that for probably a month because it did not seem to be working anymore. She is not sure if she has tried trazodone. She sometimes gets sore after therapy.  She is not sure if her sleep issues are related to her pain.     She is seeing the  "shoulder doctor on Thursday and then next Monday, she will see the elbow doctor.    She has received the tetanus, COVID-19, and influenza vaccines. She has not received the RSV vaccine yet.         Review of Systems   Constitutional, HEENT, cardiovascular, pulmonary, gi and gu systems are negative, except as otherwise noted.      Objective    /70   Pulse 66   Temp 98.7  F (37.1  C) (Temporal)   Resp 18   Ht 1.57 m (5' 1.81\")   Wt 51.5 kg (113 lb 8 oz)   LMP 11/09/2005 (Approximate)   SpO2 96%   BMI 20.89 kg/m    Body mass index is 20.89 kg/m .  Physical Exam   GENERAL APPEARANCE: healthy, alert and no distress  EYES: Eyes grossly normal to inspection, PERRLA, conjunctivae and sclerae without injection or discharge, EOM intact   RESP: Lungs clear to auscultation - no rales, rhonchi or wheezes    CV: Regular rates and rhythm, normal S1 S2, no S3 or S4, no murmur, click or rub, no peripheral edema and peripheral pulses strong and symmetric bilaterally   MS: No musculoskeletal defects are noted and gait is age appropriate without ataxia   SKIN: No suspicious lesions or rashes, hydration status appears adeuqate with normal skin turgor   PSYCH: Alert and oriented x3; speech- coherent , normal rate and volume; able to articulate logical thoughts, able to abstract reason, no tangential thoughts, no hallucinations or delusions, mentation appears normal, Mood is euthymic. Affect is appropriate for this mood state and bright. Thought content is free of suicidal ideation, hallucinations, and delusions. Dress is adequate and upkept. Eye contact is good during conversation.       Diagnostics: reviewed in Epic, see orders pending in Epic         "

## 2023-11-06 NOTE — COMMUNITY RESOURCES LIST (ENGLISH)
11/06/2023   [facility] Nodeable  N/A  For questions about this resource list or additional care needs, please contact your primary care clinic or care manager.  Phone: [phone]   Email: N/A   Address: [address] [place] [address]   Hours: N/A        Financial Stability       Utility payment assistance  1  [facility] ([facility]) - Emergency Assistance - Utility payment assistance Distance: 2.14 miles      In-Person   [address] [place] [address]  Language: English, Norwegian  Hours: Mon 10:00 AM - 1:30 PM Appt. Only, Wed 10:00 AM - 1:30 PM Appt. Only, Thu 4:30 PM - 6:30 PM Appt. Only, Fri 10:00 AM - 1:30 PM Appt. Only  Fees: Free   Phone: (619) 886-8610 Email: info@[employer].[facility] Website: http://www.[facility] Center[facility].org     2  [facility] - Emergency Assistance - Utility payment assistance Distance: 3.82 miles      In-Person   [address] [place] [address]  Language: English  Hours: Mon - Fri 8:00 AM - 4:30 PM  Fees: Free   Phone: (702) 856-7391 Email: [facility]. Website: http://www.[facility]./[facility]/index.php          Important Numbers & Websites       Emergency Services   911  [facility] Services   311  Poison Control   (989) 768-4847  Suicide Prevention Lifeline   (586) 742-6367 (TALK)  Child Abuse Hotline   (412) 739-8669 (4-A-Child)  Sexual Assault Hotline   (206) 262-5253 (HOPE)  National Runaway Safeline   (575) 530-8733 (RUNAWAY)  All-Options Talkline   (459) 684-1084  Substance Abuse Referral   (491) 236-6997 (HELP)    
 11/06/2023   [facility] [employer] App  N/A  For questions about this resource list or additional care needs, please contact your primary care clinic or care manager.  Phone: [phone]   Email: N/A   Address: [address] [place] [address]   Hours: N/A        Financial Stability       Utility payment assistance  1  [facility] ([facility]) - Emergency Assistance - Utility payment assistance Distance: 2.14 miles      In-Person   [address] [place] [address]  Language: English, Cymraes  Hours: Mon 10:00 AM - 1:30 PM Appt. Only, Wed 10:00 AM - 1:30 PM Appt. Only, Thu 4:30 PM - 6:30 PM Appt. Only, Fri 10:00 AM - 1:30 PM Appt. Only  Fees: Free   Phone: (196) 100-3291 Email: info@[employer] Website: http://www.[facility].org     2  [facility] - Emergency Assistance - Utility payment assistance Distance: 3.82 miles      In-Person   [address] [place] [address]  Language: English  Hours: Mon - Fri 8:00 AM - 4:30 PM  Fees: Free   Phone: (748) 276-7573 Email: [facility]. Website: http://www.[facility]./[facility]/index.php          Important Numbers & Websites       Emergency Services   911  [facility] Services   311  Poison Control   (595) 250-9398  Suicide Prevention Lifeline   (576) 657-3208 (TALK)  Child Abuse Hotline   (816) 127-9116 (4-A-Child)  Sexual Assault Hotline   (397) 291-7651 (HOPE)  National Runaway Safeline   (766) 991-7415 (RUNAWAY)  All-Options Talkline   (668) 183-3052  Substance Abuse Referral   (505) 173-1874 (HELP)    
Need for Mobility Assisted Device

## 2023-11-06 NOTE — RESULT ENCOUNTER NOTE
Please call patient with the following message    Iron and hemoglobin all back in normal range. Great news.     Jose Francisco Pierre PA-C

## 2023-11-09 ENCOUNTER — OFFICE VISIT (OUTPATIENT)
Dept: ORTHOPEDICS | Facility: CLINIC | Age: 73
End: 2023-11-09
Payer: MEDICARE

## 2023-11-09 ENCOUNTER — ANCILLARY PROCEDURE (OUTPATIENT)
Dept: GENERAL RADIOLOGY | Facility: CLINIC | Age: 73
End: 2023-11-09
Attending: ORTHOPAEDIC SURGERY
Payer: MEDICARE

## 2023-11-09 DIAGNOSIS — Z96.611 STATUS POST REVERSE TOTAL SHOULDER REPLACEMENT, RIGHT: ICD-10-CM

## 2023-11-09 DIAGNOSIS — Z96.611 STATUS POST REVERSE TOTAL SHOULDER REPLACEMENT, RIGHT: Primary | ICD-10-CM

## 2023-11-09 PROCEDURE — 73030 X-RAY EXAM OF SHOULDER: CPT | Mod: RT | Performed by: RADIOLOGY

## 2023-11-09 PROCEDURE — 99212 OFFICE O/P EST SF 10 MIN: CPT | Performed by: ORTHOPAEDIC SURGERY

## 2023-11-09 NOTE — LETTER
11/9/2023         RE: Sri Grewal  [address] [address] [address]        Dear Colleague,    Thank you for referring your patient, Sri Grewal, to the [facility]. Please see a copy of my visit note below.    CHIEF COMPLAINT: Status post reduction of proximal humerus fracture and right reverse total shoulder arthoplasty     DATE OF SURGERY: 5/5/23    HISTORY OF PRESENT ILLNESS: Sri is an extremely pleasant 73 year-old right hand dominant female who is 6 months status post the above procedure.  Finished out physical therapy last week.  Able to do most activities of daily living with the exception of brushing hair, does wash hair independently.  Also finished occupational therapy for the elbow last week.  Sane: R- 70 L- 100    EXAM:  Pleasant adult 73 year old in no distress.  Respirations even and unlabored.  Right upper extremity: Incisions clean, dry, and intact. No erythema. No drainage. Sens intact Ax/Musc/Med/Rad/Uln nerves. Motor intact EPL, FPL, and Intrinsics.   Active elevation of 60,  ER 0. Limited elbow ROM    DATA REVIEW:  AP, Scap Y, axillary  Xrays of the right were ordered and reviewed today showing reverse shoulder arthroplasty in place, no dislocation, stable    ASSESSMENT:  1.  6 months status post above procedure    PLAN:  I discussed with the patient and friend that fortunately the shoulder is stable.  The patient is able to do most activities of daily living including hand to mouth feeding which is the major goal, able to wash hair although cannot brush it independently.  We discussed continuing with home exercises but can start to finish out physical therapy.  We discussed a 10 pound weight lifting restriction.  I advised the patient multiple times to avoid falls, discussed the significant difficulty patient sustained a periprosthetic fracture.  Advised yearly follow-up.      Cierra Krause  Orthopedic Surgery Sports Medicine and Shoulder  [facility]FF HEMODIALYSIS NOTE     Patient currently on hemodialysis for removal of uremic toxins and volume.    Patient seen and evaluated on hemodialysis, tolerating treatment, see HD flowsheet for vitals and assessments.      Ultrafiltration goal is 2L as tolerated     Labs have been reviewed and the dialysate bath has been adjusted.     Assessment/Plan:     HD today for removal of uremic toxins and volume.   Surgery      Again, thank you for allowing me to participate in the care of your patient.        Sincerely,        MARINA LEBLANC MD

## 2023-11-10 ENCOUNTER — DOCUMENTATION ONLY (OUTPATIENT)
Dept: OTHER | Facility: CLINIC | Age: 73
End: 2023-11-10
Payer: MEDICARE

## 2023-11-13 ENCOUNTER — OFFICE VISIT (OUTPATIENT)
Dept: ORTHOPEDICS | Facility: CLINIC | Age: 73
End: 2023-11-13
Payer: MEDICARE

## 2023-11-13 ENCOUNTER — ANCILLARY PROCEDURE (OUTPATIENT)
Dept: GENERAL RADIOLOGY | Facility: CLINIC | Age: 73
End: 2023-11-13
Attending: STUDENT IN AN ORGANIZED HEALTH CARE EDUCATION/TRAINING PROGRAM
Payer: MEDICARE

## 2023-11-13 DIAGNOSIS — S42.491D CLOSED BICONDYLAR FRACTURE OF DISTAL HUMERUS, RIGHT, WITH ROUTINE HEALING, SUBSEQUENT ENCOUNTER: Primary | ICD-10-CM

## 2023-11-13 DIAGNOSIS — M25.521 ELBOW PAIN, RIGHT: ICD-10-CM

## 2023-11-13 PROCEDURE — 99213 OFFICE O/P EST LOW 20 MIN: CPT | Performed by: STUDENT IN AN ORGANIZED HEALTH CARE EDUCATION/TRAINING PROGRAM

## 2023-11-13 PROCEDURE — 73080 X-RAY EXAM OF ELBOW: CPT | Mod: RT | Performed by: RADIOLOGY

## 2023-11-13 NOTE — NURSING NOTE
Reason For Visit:   Chief Complaint   Patient presents with    RECHECK     Follow up on revision of right olecranon osteotomy ORIF DOS 6/1/23       Primary MD: Rosenda Pierre  Ref. MD: daisy    Age: 73 year old    ?  No      LMP 11/09/2005 (Approximate)       Pain Assessment  Patient Currently in Pain: Yes  0-10 Pain Scale: 1 (at night)  Primary Pain Location: Elbow (right)  Pain Descriptors: Aching, Intermittent  Alleviating Factors: NSAIDS    Hand Dominance Evaluation  Hand Dominance: Right          QuickDASH Assessment      11/13/2023    10:03 AM   QuickDASH Main   1. Open a tight or new jar Mild difficulty   2. Do heavy household chores (e.g., wash walls, floors) Severe difficulty   3. Carry a shopping bag or briefcase Mild difficulty   4. Wash your back Mild difficulty   5. Use a knife to cut food Mild difficulty   6. Recreational activities in which you take some force or impact through your arm, shoulder or hand (e.g., golf, hammering, tennis, etc.) Mild difficulty   7. During the past week, to what extent has your arm, shoulder or hand problem interfered with your normal social activities with family, friends, neighbours or groups Slightly   8. During the past week, were you limited in your work or other regular daily activities as a result of your arm, shoulder or hand problem Slightly limited   9. Arm, shoulder or hand pain Mild   10.Tingling (pins and needles) in your arm,shoulder or hand Mild   11. During the past week, how much difficulty have you had sleeping because of the pain in your arm, shoulder or hand Mild difficulty   Quickdash Ability Score 29.55          Current Outpatient Medications   Medication Sig Dispense Refill    acetaminophen (TYLENOL) 500 MG tablet Take 2 tablets (1,000 mg) by mouth 3 times daily 1 tablet 0    amLODIPine (NORVASC) 10 MG tablet Take 1 tablet (10 mg) by mouth daily 90 tablet 0    buPROPion (WELLBUTRIN XL) 300 MG 24 hr tablet Take 300 mg by  mouth every morning      carvedilol (COREG) 12.5 MG tablet TAKE ONE TABLET BY MOUTH TWICE A DAY WITH FOOD 180 tablet 1    clopidogrel (PLAVIX) 75 MG tablet Take 1 tablet (75 mg) by mouth daily 90 tablet 1    hydrALAZINE (APRESOLINE) 25 MG tablet Take 1 tablet (25 mg) by mouth 3 times daily 90 tablet 4    hydrOXYzine (ATARAX) 25 MG tablet Take 2-3 tablets (50-75 mg) by mouth nightly as needed for other (insomnia) 60 tablet 5    IBANdronate (BONIVA) 150 MG tablet Take 1 tablet (150 mg) by mouth every 30 days 12 tablet 3    lisinopril (ZESTRIL) 20 MG tablet Take 2 tablets (40 mg) by mouth daily 90 tablet 0    melatonin 5 MG tablet Take 1 tablet (5 mg) by mouth At Bedtime 30 tablet 0    polyethylene glycol (MIRALAX) 17 GM/Dose powder Take 17 g (1 Capful) by mouth daily as needed for constipation 1 g 0    rosuvastatin (CRESTOR) 20 MG tablet Take 1 tablet (20 mg) by mouth daily 90 tablet 0    senna-docusate (SENOKOT-S/PERICOLACE) 8.6-50 MG tablet Take 2 tablets by mouth 2 times daily as needed for constipation 30 tablet 1    sodium chloride 1 GM tablet Take 1 tablet (1 g) by mouth 2 times daily (with meals) 60 tablet 0    tiZANidine (ZANAFLEX) 2 MG tablet Take 0.5-1 tablets (1-2 mg) by mouth 3 times daily as needed for muscle spasms 90 tablet 3    traMADol (ULTRAM) 50 MG tablet Take 1 tablet (50 mg) by mouth every 4 hours as needed for moderate pain 90 tablet 0    ubrogepant (UBRELVY) 50 MG tablet Take 1 tablet (50 mg) by mouth at onset of headache (May repeat ose after 2 hours if heasdache persists. max 2 pills a day) 16 tablet 11    venlafaxine (EFFEXOR XR) 150 MG 24 hr capsule Take 1 capsule (150 mg) by mouth daily 90 capsule 0    vitamin D2 (ERGOCALCIFEROL) 34603 units (1250 mcg) capsule Take 1 capsule (50,000 Units) by mouth every 7 days 5 capsule 0       Allergies   Allergen Reactions    Morphine And Related      GI UPSET    Oxycodone     Seasonal Allergies        Roslyn Ontiveros, ATC

## 2023-11-13 NOTE — PROGRESS NOTES
Orthopaedic Surgery Hand Clinic Progress Note    Patient Name: Sri Grewal  MRN: [id number]  : 1950  Date: 2023    Date of Surgery:   5/3/23 - ORIF right distal humerus  23 - Revision ORIF right olecranon osteotomy    Subjective:  Ms. Sri Grewal is a 73 year old female who returns 6 months s/p ORIF R distal humerus and 5 months s/p revision ORIF olecranon osteotomy. She is doing well. I recently discussed with her OT Winnie that her elbow extension has plateaued despite use of the BARBARA splint. However she has full elbow flexion and can touch her face. She states that the elbow is very functional and that she does not feel the need to do anymore hand therapy. Accompanied by her friend Bryson today.    Objective:  General: alert, appropriate, NAD  HEENT: NC/AT  CV: RRR by pulse  Pulm: Unlabored on RA  MSK:  Incision c/d/i, no erythema, drainage, evidence of infection  Palpable crepitus of scar tissue over hardware, this is not painful. Palpable olecranon plate  Elbow range of motion   Supination 60  Pronation 75  5 out of 5 EPL, FPL, FDP index, intrinsics  Sensation intact to light touch median, radial, ulnar nerve distributions no deficits  Warm well-perfused, capillary fill less than 2 seconds    Imaging:  X-rays of right elbow today reviewed by me demonstrates postsurgical changes status post ORIF of olecranon osteotomy.  No interval displacement.  Distal humerus and olecranon osteotomy are healed. articular surface remains well aligned.  No evidence of hardware complication    Assessment/Plan:  73-year-old female status post ORIF right distal humerus 5/3/2023 and revision ORIF right olecranon osteotomy 2023.  Neurovascularly intact.  Doing well.    Despite lacking elbow extension, the patient reports the elbow is fully functional for all her activities of daily living. She does not feel the extension is very limiting to her. She has no pain. She has full flexion.    She thinks she does  not need to go to OT anymore. I agree as she can do all the exercises on her own at home. We discussed elbow gravity extension exercises as well as continued use of the BARBARA splint.    She is not bothered by the scar tissue crepitus or palpable olecranon plate at this time.    She may weight bear as tolerated through the right elbow, but she has a 10 lb weight bearing restriction for the shoulder.    The patient may follow-up with me next May for her 1 year postop check. X-rays of her right elbow will be needed at that time. If she is doing well and has no new developments with the right elbow, she can follow-up as needed.    All question answered.  She and Bryson voiced understanding and agreement.    Williams Phipps MD    Hand & Upper Extremity Surgery  Department of Orthopedic Surgery  [facility]

## 2023-11-13 NOTE — LETTER
2023         RE: Sri Grewal  [address] [facility] [address]        Dear Colleague,    Thank you for referring your patient, Sri Grewal, to the [facility] [place]. Please see a copy of my visit note below.    Orthopaedic Surgery Hand Clinic Progress Note    Patient Name: Sri Grewal  MRN: [id number]  : 1950  Date: 2023    Date of Surgery:   5/3/23 - ORIF right distal humerus  23 - Revision ORIF right olecranon osteotomy    Subjective:  Ms. Sri Grewal is a 73 year old female who returns 6 months s/p ORIF R distal humerus and 5 months s/p revision ORIF olecranon osteotomy. She is doing well. I recently discussed with her OT Winnie that her elbow extension has plateaued despite use of the BARBARA splint. However she has full elbow flexion and can touch her face. She states that the elbow is very functional and that she does not feel the need to do anymore hand therapy. Accompanied by her friend Bryson today.    Objective:  General: alert, appropriate, NAD  HEENT: NC/AT  CV: RRR by pulse  Pulm: Unlabored on RA  MSK:  Incision c/d/i, no erythema, drainage, evidence of infection  Palpable crepitus of scar tissue over hardware, this is not painful. Palpable olecranon plate  Elbow range of motion   Supination 60  Pronation 75  5 out of 5 EPL, FPL, FDP index, intrinsics  Sensation intact to light touch median, radial, ulnar nerve distributions no deficits  Warm well-perfused, capillary fill less than 2 seconds    Imaging:  X-rays of right elbow today reviewed by me demonstrates postsurgical changes status post ORIF of olecranon osteotomy.  No interval displacement.  Distal humerus and olecranon osteotomy are healed. articular surface remains well aligned.  No evidence of hardware complication    Assessment/Plan:  73-year-old female status post ORIF right distal humerus 5/3/2023 and revision ORIF right olecranon osteotomy 2023.  Neurovascularly  intact.  Doing well.    Despite lacking elbow extension, the patient reports the elbow is fully functional for all her activities of daily living. She does not feel the extension is very limiting to her. She has no pain. She has full flexion.    She thinks she does not need to go to OT anymore. I agree as she can do all the exercises on her own at home. We discussed elbow gravity extension exercises as well as continued use of the BARBARA splint.    She is not bothered by the scar tissue crepitus or palpable olecranon plate at this time.    She may weight bear as tolerated through the right elbow, but she has a 10 lb weight bearing restriction for the shoulder.    The patient may follow-up with me next May for her 1 year postop check. X-rays of her right elbow will be needed at that time. If she is doing well and has no new developments with the right elbow, she can follow-up as needed.    All question answered.  She and Bryson voiced understanding and agreement.    Williams Phipps MD    Hand & Upper Extremity Surgery  Department of Orthopedic Surgery  [facility]

## 2023-12-06 NOTE — PROGRESS NOTES
DISCHARGE  Reason for Discharge: Patient has failed to schedule further appointments.    Discharge Plan: Patient to continue home program.    Referring Provider:  Williams Phipps

## 2024-01-02 ENCOUNTER — TELEPHONE (OUTPATIENT)
Dept: FAMILY MEDICINE | Facility: OTHER | Age: 74
End: 2024-01-02

## 2024-01-02 ENCOUNTER — OFFICE VISIT (OUTPATIENT)
Dept: NEUROLOGY | Facility: CLINIC | Age: 74
End: 2024-01-02
Payer: MEDICARE

## 2024-01-02 VITALS
OXYGEN SATURATION: 99 % | WEIGHT: 117 LBS | HEART RATE: 63 BPM | RESPIRATION RATE: 16 BRPM | BODY MASS INDEX: 22.97 KG/M2 | HEIGHT: 60 IN | DIASTOLIC BLOOD PRESSURE: 79 MMHG | SYSTOLIC BLOOD PRESSURE: 160 MMHG

## 2024-01-02 DIAGNOSIS — F51.01 PRIMARY INSOMNIA: ICD-10-CM

## 2024-01-02 DIAGNOSIS — I10 HYPERTENSION GOAL BP (BLOOD PRESSURE) < 140/90: ICD-10-CM

## 2024-01-02 DIAGNOSIS — F03.90 DEMENTIA WITHOUT BEHAVIORAL DISTURBANCE (H): Primary | ICD-10-CM

## 2024-01-02 DIAGNOSIS — Z98.890 POST-OPERATIVE STATE: ICD-10-CM

## 2024-01-02 DIAGNOSIS — Z86.73 HISTORY OF STROKE: ICD-10-CM

## 2024-01-02 DIAGNOSIS — G43.009 MIGRAINE WITHOUT AURA AND WITHOUT STATUS MIGRAINOSUS, NOT INTRACTABLE: ICD-10-CM

## 2024-01-02 PROCEDURE — 99215 OFFICE O/P EST HI 40 MIN: CPT | Performed by: PSYCHIATRY & NEUROLOGY

## 2024-01-02 RX ORDER — VENLAFAXINE HYDROCHLORIDE 150 MG/1
150 CAPSULE, EXTENDED RELEASE ORAL DAILY
Qty: 90 CAPSULE | Refills: 0 | Status: SHIPPED | OUTPATIENT
Start: 2024-01-02 | End: 2024-03-22

## 2024-01-02 ASSESSMENT — PAIN SCALES - GENERAL: PAINLEVEL: NO PAIN (0)

## 2024-01-02 NOTE — NURSING NOTE
"Chief Complaint   Patient presents with    RECHECK     BP (!) 160/79 (BP Location: Left arm, Patient Position: Sitting, Cuff Size: Adult Regular)   Pulse 63   Resp 16   Ht 1.53 m (5' 0.24\")   Wt 53.1 kg (117 lb)   LMP 11/09/2005 (Approximate)   SpO2 99%   BMI 22.67 kg/m      VJ LUGO    "

## 2024-01-02 NOTE — PROGRESS NOTES
Neurology Progress Note    [facility]    Sri Grewal MRN# [id number]   Age: 73 year old YOB: 1950     PCP: Rosenda Pierre            Assessment and Plan:     (F03.90) Dementia without behavioral disturbance (H)  (primary encounter diagnosis)  Comment: Awaiting neuropsych, better than last testing but still impaired.       (G43.009) Migraine without aura without status migrainosus, not intractable  Comment: No triptans can be used, keep Ubrelvy on hand for as needed use.       (Z86.73) History of stroke  Comment: residual rights sided spastic weakness and cognitive changes, needs assistance. Should use cane  Plan: Primary risk factor management by PCP    (F51.01) Primary insomnia  Comment: Avoid hydroxyzine in setting of memory issues. Can use meltaonin 2 mg 2-3 hours before bedtime and sunlamp in AM. Doesn't seem overall tired. She complains of mind racing when trying to fall aslpee. She has run out of venlafaxine and needs it refilled by PCP.   Plan: discharge hydroxyzine, avoid anticholinergic sleeping medicines.     (I10) Hypertension goal BP (blood pressure) < 140/90  Comment: Not at goal, she thinks it is being in the neurology office, needs to follow up with PCP. SBP < 140, DBP < 90   Plan: See PCP.     6 month return visit.   42 minutes spent caring for the patient today including visit time and documentation, orders and messaging PCP team.     Miryam Mcgowan MD           History of Present Illness:   CC: Recheck    Sri Grewal was seen in August 2023. At the time she was recovering from a stroke that occurred 4/25/2023. She had stroke residua of right sided weakness, speech changes and memory worsening. Since then she reports her memory is the same. Her , Bryson, feels it is the same too. She is walking and moving better but still weak and slow. She is not using her cane.     Fragmented sleep but getting 6-8 hours, no napping during the day. She was given hydroxyzine to  "take which may make memory and balance worse.       Hasn't used Ubrelvy yet because hasn't had any headaches.          Physical Exam:     BP (!) 160/79 (BP Location: Left arm, Patient Position: Sitting, Cuff Size: Adult Regular)   Pulse 63   Resp 16   Ht 1.53 m (5' 0.24\")   Wt 53.1 kg (117 lb)   LMP 11/09/2005 (Approximate)   SpO2 99%   BMI 22.67 kg/m    BP rechecked and same numbers.   Pt not checking at home  7/8January 5th, 2024, Tuesday, 3rd, [facility], [place], [place]  5/7 Attention  4/4 (1) Registration  3/4 calculation  3/3 abstract thinking  2/4 info  1/4  with prompting 3/4  Construction not tested.   26/34 improved since last testing.     Right UE weakness deltoid, triceps finger ext 4/5, mildly increased tone on right side too.   Gait slow steps, smaller on right. She is unstable.          Pertinent History/Data for appointment:     Neuropsych testing scheduled for March   "

## 2024-01-02 NOTE — LETTER
1/2/2024       RE: Sri Grewal  [address] [facility] [id number]       Dear Colleague,    Thank you for referring your patient, Sri Grewal, to the [facility] [place] [facility]. Please see a copy of my visit note below.     Neurology Progress Note    [facility]    Sri Grewal MRN# [id number]   Age: 73 year old YOB: 1950     PCP: Rosenda Pierre            Assessment and Plan:     (F03.90) Dementia without behavioral disturbance (H)  (primary encounter diagnosis)  Comment: Awaiting neuropsych, better than last testing but still impaired.       (G43.009) Migraine without aura without status migrainosus, not intractable  Comment: No triptans can be used, keep Ubrelvy on hand for as needed use.       (Z86.73) History of stroke  Comment: residual rights sided spastic weakness and cognitive changes, needs assistance. Should use cane  Plan: Primary risk factor management by PCP    (F51.01) Primary insomnia  Comment: Avoid hydroxyzine in setting of memory issues. Can use meltaonin 2 mg 2-3 hours before bedtime and sunlamp in AM. Doesn't seem overall tired. She complains of mind racing when trying to fall aslpee. She has run out of venlafaxine and needs it refilled by PCP.   Plan: discharge hydroxyzine, avoid anticholinergic sleeping medicines.     (I10) Hypertension goal BP (blood pressure) < 140/90  Comment: Not at goal, she thinks it is being in the neurology office, needs to follow up with PCP. SBP < 140, DBP < 90   Plan: See PCP.     6 month return visit.   42 minutes spent caring for the patient today including visit time and documentation, orders and messaging PCP team.     Miryam Mcgowan MD           History of Present Illness:   CC: Recheck    Sri Grewal was seen in August 2023. At the time she was recovering from a stroke that occurred 4/25/2023. She had stroke residua of right sided weakness,  "speech changes and memory worsening. Since then she reports her memory is the same. Her , Bryson, feels it is the same too. She is walking and moving better but still weak and slow. She is not using her cane.     Fragmented sleep but getting 6-8 hours, no napping during the day. She was given hydroxyzine to take which may make memory and balance worse.       Hasn't used Ubrelvy yet because hasn't had any headaches.          Physical Exam:     BP (!) 160/79 (BP Location: Left arm, Patient Position: Sitting, Cuff Size: Adult Regular)   Pulse 63   Resp 16   Ht 1.53 m (5' 0.24\")   Wt 53.1 kg (117 lb)   LMP 11/09/2005 (Approximate)   SpO2 99%   BMI 22.67 kg/m    BP rechecked and same numbers.   Pt not checking at home  7/8January 5th, 2024, Tuesday, 3rd, [facility], [place], [place]  5/7 Attention  4/4 (1) Registration  3/4 calculation  3/3 abstract thinking  2/4 info  1/4  with prompting 3/4  Construction not tested.   26/34 improved since last testing.     Right UE weakness deltoid, triceps finger ext 4/5, mildly increased tone on right side too.   Gait slow steps, smaller on right. She is unstable.          Pertinent History/Data for appointment:     Neuropsych testing scheduled for March           Again, thank you for allowing me to participate in the care of your patient.      Sincerely,    Miryam Mcgowan MD    "

## 2024-01-02 NOTE — Clinical Note
Pt reports pharmacy saying venlafaxine was discontinued, she would like refills if you could send, also she was hypertensive at office today, if she could have a recheck with you they would appreciate it,. Finally, I stopped hydroxyzine due to the anticholinergic effect making memory worse.  Thanks, Miryam Mcgowan

## 2024-01-03 RX ORDER — AMLODIPINE BESYLATE 10 MG/1
10 TABLET ORAL DAILY
Qty: 90 TABLET | Refills: 0 | Status: SHIPPED | OUTPATIENT
Start: 2024-01-03 | End: 2024-04-08

## 2024-01-04 ENCOUNTER — PATIENT OUTREACH (OUTPATIENT)
Dept: CARE COORDINATION | Facility: CLINIC | Age: 74
End: 2024-01-04
Payer: MEDICARE

## 2024-01-04 NOTE — TELEPHONE ENCOUNTER
Called patient and scheduled for 1/8 bp check.  
LM for patient to call the clinic back. Please help schedule per message below.   
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 Will refill Effexor and please help schedule nursing only BP recheck within the next month. Thanks.    Humberto Mccullough PA-C            Pt reports pharmacy saying venlafaxine was discontinued, she would like refills if you could send, also she was hypertensive at office today, if she could have a recheck with you they would appreciate it,. Finally, I stopped hydroxyzine due to the anticholinergic effect making memory worse.  Thanks,  Miryam Mcgowan  
Breathing spontaneous and unlabored. Breath sounds clear and equal bilaterally with regular rhythm.

## 2024-01-05 DIAGNOSIS — I25.83 CORONARY ARTERY DISEASE DUE TO LIPID RICH PLAQUE: ICD-10-CM

## 2024-01-05 DIAGNOSIS — I25.10 CORONARY ARTERY DISEASE DUE TO LIPID RICH PLAQUE: ICD-10-CM

## 2024-01-08 ENCOUNTER — ALLIED HEALTH/NURSE VISIT (OUTPATIENT)
Dept: FAMILY MEDICINE | Facility: OTHER | Age: 74
End: 2024-01-08
Payer: MEDICARE

## 2024-01-08 VITALS — SYSTOLIC BLOOD PRESSURE: 122 MMHG | DIASTOLIC BLOOD PRESSURE: 64 MMHG

## 2024-01-08 DIAGNOSIS — I10 HYPERTENSION GOAL BP (BLOOD PRESSURE) < 140/90: Primary | ICD-10-CM

## 2024-01-08 PROCEDURE — 99207 PR NO CHARGE NURSE ONLY: CPT

## 2024-01-08 RX ORDER — ROSUVASTATIN CALCIUM 20 MG/1
20 TABLET, COATED ORAL DAILY
Qty: 90 TABLET | Refills: 0 | Status: SHIPPED | OUTPATIENT
Start: 2024-01-08 | End: 2024-04-01

## 2024-01-08 NOTE — PROGRESS NOTES
Sri Grewal is a 73 year old patient who comes in today for a Blood Pressure check with a Medical Assistant because of ongoing blood pressure monitoring. (Was reported that she had hypertension on 01/02/24 visit)  Initial BP: 122/64 LMP 11/09/2005 (Approximate)      Blood pressure is not high.   Repeat Blood pressure: No  Patient is taking medication as prescribed.  Patient is tolerating medications well.  Is patient taking blood pressures at home? No  Current Symptoms: none    Disposition:   Abnormal Blood Pressure NO

## 2024-01-10 ENCOUNTER — PATIENT OUTREACH (OUTPATIENT)
Dept: CARE COORDINATION | Facility: CLINIC | Age: 74
End: 2024-01-10
Payer: MEDICARE

## 2024-01-18 ENCOUNTER — PATIENT OUTREACH (OUTPATIENT)
Dept: CARE COORDINATION | Facility: CLINIC | Age: 74
End: 2024-01-18
Payer: MEDICARE

## 2024-01-24 DIAGNOSIS — I10 HYPERTENSION GOAL BP (BLOOD PRESSURE) < 140/90: ICD-10-CM

## 2024-01-25 RX ORDER — LISINOPRIL 20 MG/1
TABLET ORAL
Qty: 180 TABLET | Refills: 1 | Status: SHIPPED | OUTPATIENT
Start: 2024-01-25 | End: 2024-05-30

## 2024-02-07 ENCOUNTER — PATIENT OUTREACH (OUTPATIENT)
Dept: CARE COORDINATION | Facility: CLINIC | Age: 74
End: 2024-02-07
Payer: MEDICARE

## 2024-02-25 DIAGNOSIS — I10 HYPERTENSION GOAL BP (BLOOD PRESSURE) < 140/90: ICD-10-CM

## 2024-02-26 RX ORDER — HYDRALAZINE HYDROCHLORIDE 25 MG/1
25 TABLET, FILM COATED ORAL 3 TIMES DAILY
Qty: 90 TABLET | Refills: 4 | Status: SHIPPED | OUTPATIENT
Start: 2024-02-26 | End: 2024-05-30

## 2024-02-27 ENCOUNTER — TELEPHONE (OUTPATIENT)
Dept: FAMILY MEDICINE | Facility: OTHER | Age: 74
End: 2024-02-27
Payer: MEDICARE

## 2024-02-27 NOTE — TELEPHONE ENCOUNTER
Forms/Letter Request    Type of form/letter: DMV/Handicap Parking      Do we have the form/letter: No    Who is the form from? Patient    When is form/letter needed by: ANJANA Massey called stated that parking permit is set to  in May and Sri is needing this extended for 5 years. Bryson needs to know if an appointment is needed or if they can just drop off the paperwork off at the clinic?    Please contact Bryson and inform him of the decision.     Okay to leave a detailed message?: Yes at Other phone number:  [phone]

## 2024-02-28 NOTE — TELEPHONE ENCOUNTER
Can just drop off    Jose Francisco Pierre PA-C  [facility] [place]

## 2024-02-28 NOTE — TELEPHONE ENCOUNTER
Form dropped off.  Placed in Form Bin.  Please call Bryson when complete and he will .  [phone]

## 2024-02-29 NOTE — TELEPHONE ENCOUNTER
Copy of form sent to scanning.  Form placed at [facility].  Called Bryson and informed him form is compete and is at [facility].

## 2024-02-29 NOTE — TELEPHONE ENCOUNTER
Form completed and faxed back    Jose Francisco Pierre PA-C  [facility] [place]

## 2024-03-04 ENCOUNTER — OFFICE VISIT (OUTPATIENT)
Dept: NEUROPSYCHOLOGY | Facility: CLINIC | Age: 74
End: 2024-03-04
Attending: PSYCHIATRY & NEUROLOGY
Payer: MEDICARE

## 2024-03-04 DIAGNOSIS — I69.30 HISTORY OF CEREBROVASCULAR ACCIDENT (CVA) WITH RESIDUAL DEFICIT: ICD-10-CM

## 2024-03-04 DIAGNOSIS — F33.0 MAJOR DEPRESSIVE DISORDER, RECURRENT EPISODE, MILD (H): ICD-10-CM

## 2024-03-04 DIAGNOSIS — G31.84 MILD COGNITIVE IMPAIRMENT: ICD-10-CM

## 2024-03-04 DIAGNOSIS — R41.844 FRONTAL LOBE AND EXECUTIVE FUNCTION DEFICIT: Primary | ICD-10-CM

## 2024-03-04 DIAGNOSIS — R41.3 LOSS OF MEMORY: ICD-10-CM

## 2024-03-04 DIAGNOSIS — F41.9 ANXIETY: ICD-10-CM

## 2024-03-04 DIAGNOSIS — F06.8 OTHER SPECIFIED MENTAL DISORDERS DUE TO KNOWN PHYSIOLOGICAL CONDITION: ICD-10-CM

## 2024-03-04 PROCEDURE — 96133 NRPSYC TST EVAL PHYS/QHP EA: CPT | Performed by: CLINICAL NEUROPSYCHOLOGIST

## 2024-03-04 PROCEDURE — 96138 PSYCL/NRPSYC TECH 1ST: CPT | Performed by: CLINICAL NEUROPSYCHOLOGIST

## 2024-03-04 PROCEDURE — 90791 PSYCH DIAGNOSTIC EVALUATION: CPT | Performed by: CLINICAL NEUROPSYCHOLOGIST

## 2024-03-04 PROCEDURE — 96139 PSYCL/NRPSYC TST TECH EA: CPT | Performed by: CLINICAL NEUROPSYCHOLOGIST

## 2024-03-04 PROCEDURE — 96132 NRPSYC TST EVAL PHYS/QHP 1ST: CPT | Performed by: CLINICAL NEUROPSYCHOLOGIST

## 2024-03-04 NOTE — PROGRESS NOTES
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 She was accompanied during the interview by her friend, Bryson (who she has known for 54 years).    The patient was in-clinic for the entirety of this evaluation. The interview and testing were completed face-to-face.     BACKGROUND INFORMATION / INTERVIEW FINDINGS    Records indicate that Ms. Sri Grewal was hospitalized from June 25 through July 3, 2021 at [facility].  She was initially found confused.  She was noted to have elevated troponin and there was concern for myocardial infarction.  It was also felt that she had an acute metabolic encephalopathy due to combination of severe sepsis, hypercalcemia, acute renal failure, and dehydration.  An MRI of her brain on June 26, 2021 documented \"1.  Multiple scattered small foci of diffusion restriction compatible with acute infarct involving the posterior left insula, the periventricular left corona radiata, the posterolateral left temporal lobe, the left temporal occipital region, the posterior right temporal lobe, the anteroinferior right frontal lobe, the lateral left cerebellar hemisphere, and the lateral right parietal lobe. Given the diffuse scattered distribution, an embolic etiology is likely. 2.  Nonenhancing FLAIR hyperintense diffusion restriction involving the medial aspect of the left amygdala and the entirety of the left hippocampus. While an acute to very early subacute infarct is possible, this distribution is somewhat atypical. For this reason, consideration should also be given towards a limbic encephalitis, with consideration towards a paraneoplastic syndrome or possible HSV in the appropriate clinical setting. This could additionally relate to a postictal change " "in the appropriate clinical setting. 3.  Chronic lacunar infarct with accompanying hemosiderin deposition in the left basal ganglia. Severe burden chronic small vessel ischemic change accompanies this, with an additional small chronic lacunar infarct in the left cerebellar hemisphere.\"  Upon discharge, she went to a rehabilitation center in [place].      She suffered a fall in February, 2023 and fractured her right humerus.      On April 25, 2023, she developed acute onset of aphasia.  She was admitted again to [facility] with a principal diagnosis of aborted ischemic stroke versus TIA.  Imaging did not document acute abnormalities at that time.  She was given TNK.      She fell again on April 29, 2023.  She suffered a right proximal humeral fracture and a right distal humeral fracture.  She was admitted to the [facility] for surgical repair.  She was in and out of the hospital through June 2, 2023.  She developed acute encephalopathy while hospitalized.  She had another fall on June 12, 2023, and was seen in the emergency department.  She suffered a right intertrochanteric femur fracture.  She subsequently underwent surgical repair.  She was discharged on July 18, 2023.    The most recent CT scan of her head on July 16, 2023 documented stable encephalomalacia involving the left frontal operculum and right parietal lobe.  The scan also noted a stable chronic left basal ganglia lacunar infarct.  Also noted were severe presumed chronic small vessel ischemic changes and mild generalized volume loss.  Her other medical history includes depression, adjustment disorder, insomnia, hypertension, urinary retention, coronary artery disease, compression fracture T12 vertebra, age-related osteoporosis, herpes simplex virus infection, hypertension, middle cerebral artery aneurysm, mild persistent asthma, anemia, chronic migraine, and endometriosis, among other diagnoses.  More recent notes indicate " that concerns have been expressed about her cognition.  There is suspicion for dementia syndrome due to vascular causes, residual effects of her stroke, or Alzheimer's disease.  The current evaluation was requested by Dr. Miryam Mcgowan, in this context.    On interview, Ms. Grewal and her friend Bryson confirmed the above history.  They stated that her neurologic and cognitive functioning were normal prior to her stroke in June, 2021.  The patient reported that in June, 2021, she had acute onset of dizziness and disorientation.  They stated that she was taken via ambulance to [facility].  It was documented that she had a heart attack.  They reported that after her hospitalization at [facility], she stayed at a rehabilitation facility in [place] (where her daughter lives) for about a month.  The patient reported that she had troubles with her memory after this event.  She stated that events were blurred in her mind after the stroke.  Bryson also noted that she had difficulties with finishing her sentences after that stroke and she was quieter.  He stated that she perseverates on words.  She stated that in April, 2023, she again had acute onset of dizziness.  She was hospitalized again due to the suspicion of the stroke.  She stated that she had increased difficulty with memory after this incident.  She also endorsed having visual changes.  For example, she reported that she would not follow through or complete tasks such as fixing items.  Bryson reported that she had more difficulties with focusing on tasks after the second stroke.  He stated that she loses track of her progress when completing projects.  He noted that her problem-solving is an issue.  He stated that she has difficulty figuring out multistep processes.  When asked about personality changes, he stated that she is quieter and has less interest in speaking up in groups.  He stated that she becomes concerned that she will not be able to finish her thoughts in a  conversation and will then be embarrassed.  He noted that he may now interject in conversations to help her.    With respect to mental health, Ms. Grewal reported that her mood is stable.  She stated that she does not show her emotions.  Bryson stated that she is kind of depressed.  The patient reported that she has dealt with depression since her 30s.  She is prescribed antidepressant medication.  She is not seeing a therapist.  She denied other prior mental health diagnoses or treatments outside of depression.  She denied prior psychiatric hospitalization, hallucinations, symptoms consistent with severe mental illness, trauma, abuse, suicidal ideation, or past suicide attempts.  They did note that she developed delirium when she was in the hospital.    With regard to other medical background, Ms. Grewal denied prior head injury or seizure.  She reported that she can fall asleep easily but she wakes up in the night.  She sleeps in chunks.  She averages about 8 hours of total sleep throughout the day.  She slept 6 hours the night before this exam.  She denied symptoms of REM behavior disorder.  She stated that she still has pains in various spots in her body.  She also has headaches.  She rated her pain at 3-4/10 at the time of the interview.  She noted that she had a headache on the morning of this exam.  When asked about gross motor abnormalities, she stated that she is more careful when she walks.  She noted worsened balance.  She has had falls, as described above.  Bryson helps her with balance when she is walking.  She denied sensory changes.  Per records, her current medications include acetaminophen, amlodipine, bupropion, carvedilol, clopidogrel, hydralazine, ibandronate, lisinopril, melatonin, polyethylene glycol, rosuvastatin, senna-docusate, sodium chloride, tizanidine, tramadol, ubrogepant, venlafaxine, and vitamin D2.  She stated that she consumes a couple of alcoholic drinks per day but otherwise denied  substance use.  She denied past problematic substance use.    When asked about family neurologic history, she stated that her father had Parkinson's disease.    Ms. Grewal lives in her home.  Bryson moved into her basement to provide support for her about a year ago.  She has also helped support him when he has had health issues.  Both of their spouses are .  She manages her own basic daily activities.  Bryson is helping her with her medications.  She manages her own finances.  Bryson prepares some of her meals.  She is not driving.  She struggles to use her right arm after breaking her elbow.    By way of background, Ms. Grewal was  once.  Her  passed away.  She has 2 adult children and 3 grandchildren.  She has good relationships with her children.  Regarding educational background, she graduated from high school with average grades.  She completed 2 years of college coursework at [employer] to earn an associate's degree.  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  She was an .  Bryson was her supervisor during this time.  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  She is retired.    BEHAVIORAL OBSERVATIONS  Ms. Grewal was pleasant and cooperative with the exam.  She ambulated slowly and with a shuffling gait.  She wore glasses.  Her speech was notable for difficulties with word finding. Her comprehension was normal. Her thought processes were notable for mild slowing.  There was 1 instance during the interview in which she perseverated on a word.  Her mood was mildly anxious and depressed with congruent affect.  She appeared on the verge of tears throughout testing and the interview.  Her effort was good. The current results are felt to be an accurate depiction of her cognitive functioning.      RESULTS OF EXAM  Her performances on standardized measures of neuropsychological functioning were as follows.      She was severely disoriented to time (stated that the year was 2016).  She misstated her age by 1 year but was otherwise oriented to various aspects of personal information.  She misstated the name of the clinic but was otherwise oriented to place.  She was not able to state the names of any presidents who had served in office since 1988.  Performance on a measure of single word reading was average.  She obtained passing scores on stand-alone and embedded metrics of cognitive performance validity.  Auditory attention for digits was average.  Mental calculations were low average.  Learning of words in a list format was low average.  Delayed recall of list words was impaired.  Percent retention of list words was impaired.  Delayed recognition of list words was low average.  Learning of story information was low average.  Delayed recall of story information was average.  Delayed recognition of story information was high average.  Learning of simple geometric shapes and their spatial locations was impaired.  Delayed recall of the shapes and their locations was impaired.  Percent retention of the shapes was performed in the borderline impaired to low average range.  Delayed recognition of the shapes was normal.  Visual spatial judgments for variably oriented lines were average.  Visual problem-solving with blocks was average.  Her drawing of a complicated geometric figure was low average and notable for inattention to a few of the figure's details.  Comprehension of phrases and short stories was average.  Verbal associative fluency was low average.  Animal fluency was impaired.  Naming to confrontation was low average.  Verbal abstract reasoning was high average.  Speeded visual sequencing under focused attention was borderline impaired.  A similar measure with a divided attention component was low average.  Speeded word reading was borderline impaired.  Speeded color naming was impaired.  Speeded inhibition of an  overlearned response was low average.  Speeded visual motor coding was average.  Speeded fine motor dexterity was low average, bilaterally.    She endorsed items consistent with mild symptoms of depression and mild symptoms of anxiety on self-report measures.    IMPRESSIONS  Ms. Grewal demonstrated weaknesses that are consistent with mild dysfunction of subcortical, bilateral frontal, and left temporal lobe regions.  These findings are compatible with the results of her multifocal strokes and known cerebrovascular disease.  Frankly, given her multiple strokes and the severity of her cerebrovascular disease, her cognition is relatively well-preserved.  She essentially sits on the cusp between mild cognitive impairment and a mild dementia syndrome.  Based on the severity of her cognitive weaknesses, I would not think that she necessarily meets criteria for dementia. However, she is receiving support with some of her instrumental daily activities.  Given the time since her most recent stroke, general cognitive stability is predicted.  On testing, weaknesses were identified in cognitive speed, bilateral psychomotor speed, semantic fluency, and aspects of both verbal and nonverbal learning and free recall.  Other cognitive abilities, including recognition memory, were generally normal and performed in keeping with her average range cognitive baseline.  She has strengths in verbal reasoning and recognition memory.  The intact nature of her recognition memory argues against Alzheimer's disease.  She is also reporting mild symptoms of both depression and anxiety.  It is possible that these depression and anxiety symptoms introduced some degree of variability in her thinking, although I do not believe we can attribute all of her cognitive dysfunction to psychological factors.    RECOMMENDATIONS  Preliminary results and recommendations were provided to the patient and her friend, Bryson, over the telephone on March 5, 2024, and  all questions were answered.     1.  In spite of treatment, she remains depressed and anxious.  If medically indicated, consideration might be given to modified treatment of her mental health.    2. Along similar lines, referral for psychotherapy services is recommended.  One possible referral option would be [facility], with locations throughout the [place] area.  They can be reached by calling [phone].    3.  She will continue to benefit from some degree of support and supervision of her daily activities.    4.  She has had several falls.  She completed physical therapy in the past.  If medically indicated, consideration might be given to additional sessions with physical therapy.    5.  She should exercise caution when driving.    6.  Her memory could benefit from the routine use of a memory notebook or other assistive device.  Her memory also benefits from being provided with information in a context of framework, as well as from recognition cues.    7. Follow-up neuropsychological evaluation is recommended in 1 year in order to assess and update recommendations as appropriate. The current results can be used as a baseline at that time.    Felix Ni, Ph.D., L.P., ABPP  Board Certified in Clinical Neuropsychology   / Licensed Psychologist [id number]    Time spent:  One unit psychiatric diagnostic interview including interview and clinical assessment by licensed and board-certified neuropsychologist (CPT 54434). One unit (60 minutes) neuropsychological testing evaluation by licensed and board-certified neuropsychologist, including integration of patient data, interpretation of standardized test results and clinical data, clinical decision-making, treatment planning, and report, first hour (CPT 24427). One unit(s) (50 minutes) of neuropsychological testing evaluation by licensed and board-certified neuropsychologist, including integration of patient data, interpretation of  standardized test results and clinical data, clinical decision-making, treatment planning, and report, subsequent hours (CPT 88613). One unit (30 minutes) of psychological and neuropsychological test administration and scoring by technician, first 30 minutes (CPT 51301). Five units (144 minutes) psychological or neuropsychological test administration and scoring by technician, subsequent 30 minutes (CPT 05674). Diagnoses: I69.30, R41.844, G31.84, F06.8, F33.0, F41.9.

## 2024-03-04 NOTE — NURSING NOTE
The patient was seen for neuropsychological evaluation at the request of Dr. Miryam Mcgowan, for the purposes of diagnostic clarification and treatment planning. 174 minutes of test administration and scoring were provided by this writer, Demetrio Joel. Please see Dr. Felix Ni's report for a full interpretation of the findings.

## 2024-03-05 NOTE — PROGRESS NOTES
NAME  Sri Grewal    MRN  [id number]      52     AGE  73     SEX  Female     HANDEDNESS Right     EDUCATION 14     MORENO  3/4/24     PROVIDER       TECH  KB     STATION  OP            ORIENTATION      Time  -60     Personal Info. 3 /4    Place  1 /2    Presidents  0 /6           WAIS-IV       FSIQ:  WMI: 86    VCI:  PSI:     RATNA:  RDS: 9             Raw ScS    Similarities  27 12    Block Design 28 9    Digit Span  21 8    Arithmetic  10 7    Coding  40 8           STEVENSON-O COMPLEX FIGURE       Raw T %ile   Copy  29  11-16   Time to Copy 217  >16                       WRAT    5     SS %ile GE   Reading  100 50 >12.9          COWAT FAS       Raw 26      ScS 7      T 38             ANIMAL FLUENCY      Raw 9      ScS 4      T 26              BOSTON NAMING TEST     Raw 51 /60     ScS 7      %ile 11-18             COMPLEX IDEATIONAL MATERIAL     Raw 12      ScS 12      T 56             TRAIL MAKING TEST       Time Errors ScS %ile   A 60 0 5 35   B 137 1 6 38          STROOP        Raw %ile     Word 66 3-5     Color 41 1     C/W 26 11-18             ALEKSANDAR   H-Short   Raw 11      %ile 41-59             GROOVED PEGBOARD       Raw Drops ScS T   RH 99 0 5 40    1 5 41          BDI-II       Raw 17      Interp. Mild             LOLY       Raw 9      Interp. Mild              WMS-IV LOGICAL MEMORY OA    Raw ScS/%ile     LM I 24 7     LM II 19 11     Recog. 21 >75             HVLT   1     Raw T    Trial 1  5     Trial 2  5     Trial 3  8     Learning  3     Total Recall 18 39    Delayed Recall 3 29    Percent Retention 36% 24    True Positives 10     False Positives 1     Disc. Index  9 42            BVMT   1     Raw T/%ile    Trial 1  1     Trial 2  4     Trial 3  3     Learning  3     Total Recall 8 28    Delayed Recall 2 25    Percent Retention 67% 6-10    Recognition Hits 6     Recognition F.P 1     Disc. Index  5 >16           DCT       E-Score 9

## 2024-03-22 ENCOUNTER — TELEPHONE (OUTPATIENT)
Dept: FAMILY MEDICINE | Facility: OTHER | Age: 74
End: 2024-03-22
Payer: MEDICARE

## 2024-03-22 DIAGNOSIS — Z98.890 POST-OPERATIVE STATE: ICD-10-CM

## 2024-03-22 RX ORDER — VENLAFAXINE HYDROCHLORIDE 150 MG/1
150 CAPSULE, EXTENDED RELEASE ORAL DAILY
Qty: 90 CAPSULE | Refills: 0 | Status: SHIPPED | OUTPATIENT
Start: 2024-03-22 | End: 2024-05-30

## 2024-03-22 NOTE — TELEPHONE ENCOUNTER
Patient Quality Outreach    Patient is due for the following:   Physical Annual Wellness Visit    Next Steps:   Schedule a Annual Wellness Visit    Type of outreach:    Sent letter.      Questions for provider review:    None           Heather Thao, [facility]

## 2024-04-08 DIAGNOSIS — I10 HYPERTENSION GOAL BP (BLOOD PRESSURE) < 140/90: ICD-10-CM

## 2024-04-08 RX ORDER — AMLODIPINE BESYLATE 10 MG/1
10 TABLET ORAL DAILY
Qty: 90 TABLET | Refills: 0 | Status: SHIPPED | OUTPATIENT
Start: 2024-04-08 | End: 2024-05-30

## 2024-04-15 DIAGNOSIS — I10 HYPERTENSION GOAL BP (BLOOD PRESSURE) < 140/90: ICD-10-CM

## 2024-04-15 DIAGNOSIS — I21.3 ST ELEVATION MYOCARDIAL INFARCTION (STEMI), UNSPECIFIED ARTERY (H): ICD-10-CM

## 2024-04-15 RX ORDER — CARVEDILOL 12.5 MG/1
TABLET ORAL
Qty: 180 TABLET | Refills: 1 | Status: SHIPPED | OUTPATIENT
Start: 2024-04-15 | End: 2024-05-30

## 2024-04-23 DIAGNOSIS — S42.201K CLOSED FRACTURE OF PROXIMAL END OF RIGHT HUMERUS WITH NONUNION, UNSPECIFIED FRACTURE MORPHOLOGY, SUBSEQUENT ENCOUNTER: ICD-10-CM

## 2024-04-23 DIAGNOSIS — I25.83 CORONARY ARTERY DISEASE DUE TO LIPID RICH PLAQUE: ICD-10-CM

## 2024-04-23 DIAGNOSIS — I25.10 CORONARY ARTERY DISEASE DUE TO LIPID RICH PLAQUE: ICD-10-CM

## 2024-04-23 DIAGNOSIS — G45.9 TIA (TRANSIENT ISCHEMIC ATTACK): ICD-10-CM

## 2024-04-23 RX ORDER — CLOPIDOGREL BISULFATE 75 MG/1
75 TABLET ORAL DAILY
Qty: 90 TABLET | Refills: 1 | Status: SHIPPED | OUTPATIENT
Start: 2024-04-23

## 2024-05-10 ENCOUNTER — HOSPITAL ENCOUNTER (EMERGENCY)
Facility: CLINIC | Age: 74
Discharge: HOME | End: 2024-05-10
Attending: STUDENT IN AN ORGANIZED HEALTH CARE EDUCATION/TRAINING PROGRAM | Admitting: STUDENT IN AN ORGANIZED HEALTH CARE EDUCATION/TRAINING PROGRAM
Payer: MEDICARE

## 2024-05-10 ENCOUNTER — APPOINTMENT (OUTPATIENT)
Dept: ULTRASOUND IMAGING | Facility: CLINIC | Age: 74
End: 2024-05-10
Attending: STUDENT IN AN ORGANIZED HEALTH CARE EDUCATION/TRAINING PROGRAM
Payer: MEDICARE

## 2024-05-10 ENCOUNTER — APPOINTMENT (OUTPATIENT)
Dept: CT IMAGING | Facility: CLINIC | Age: 74
End: 2024-05-10
Attending: STUDENT IN AN ORGANIZED HEALTH CARE EDUCATION/TRAINING PROGRAM
Payer: MEDICARE

## 2024-05-10 VITALS
WEIGHT: 130 LBS | RESPIRATION RATE: 18 BRPM | OXYGEN SATURATION: 96 % | BODY MASS INDEX: 25.19 KG/M2 | SYSTOLIC BLOOD PRESSURE: 146 MMHG | DIASTOLIC BLOOD PRESSURE: 79 MMHG | TEMPERATURE: 98.6 F | HEART RATE: 72 BPM

## 2024-05-10 DIAGNOSIS — K59.00 CONSTIPATION, UNSPECIFIED CONSTIPATION TYPE: ICD-10-CM

## 2024-05-10 DIAGNOSIS — R93.5 ABNORMAL CT OF THE ABDOMEN: ICD-10-CM

## 2024-05-10 DIAGNOSIS — R10.10 UPPER ABDOMINAL PAIN: ICD-10-CM

## 2024-05-10 LAB
ALBUMIN SERPL BCG-MCNC: 4.1 G/DL (ref 3.5–5.2)
ALBUMIN UR-MCNC: NEGATIVE MG/DL
ALP SERPL-CCNC: 67 U/L (ref 40–150)
ALT SERPL W P-5'-P-CCNC: 16 U/L (ref 0–50)
ANION GAP SERPL CALCULATED.3IONS-SCNC: 9 MMOL/L (ref 7–15)
APPEARANCE UR: CLEAR
AST SERPL W P-5'-P-CCNC: 26 U/L (ref 0–45)
BASOPHILS # BLD AUTO: 0.1 10E3/UL (ref 0–0.2)
BASOPHILS NFR BLD AUTO: 1 %
BILIRUB SERPL-MCNC: 0.5 MG/DL
BILIRUB UR QL STRIP: NEGATIVE
BUN SERPL-MCNC: 6 MG/DL (ref 8–23)
CALCIUM SERPL-MCNC: 9.5 MG/DL (ref 8.8–10.2)
CHLORIDE SERPL-SCNC: 97 MMOL/L (ref 98–107)
COLOR UR AUTO: NORMAL
CREAT SERPL-MCNC: 0.63 MG/DL (ref 0.51–0.95)
DEPRECATED HCO3 PLAS-SCNC: 27 MMOL/L (ref 22–29)
EGFRCR SERPLBLD CKD-EPI 2021: >90 ML/MIN/1.73M2
EOSINOPHIL # BLD AUTO: 0.1 10E3/UL (ref 0–0.7)
EOSINOPHIL NFR BLD AUTO: 1 %
ERYTHROCYTE [DISTWIDTH] IN BLOOD BY AUTOMATED COUNT: 12.6 % (ref 10–15)
GLUCOSE SERPL-MCNC: 114 MG/DL (ref 70–99)
GLUCOSE UR STRIP-MCNC: NEGATIVE MG/DL
HCT VFR BLD AUTO: 40.9 % (ref 35–47)
HGB BLD-MCNC: 14.1 G/DL (ref 11.7–15.7)
HGB UR QL STRIP: NEGATIVE
IMM GRANULOCYTES # BLD: 0 10E3/UL
IMM GRANULOCYTES NFR BLD: 0 %
KETONES UR STRIP-MCNC: NEGATIVE MG/DL
LEUKOCYTE ESTERASE UR QL STRIP: NEGATIVE
LIPASE SERPL-CCNC: 20 U/L (ref 13–60)
LYMPHOCYTES # BLD AUTO: 1.4 10E3/UL (ref 0.8–5.3)
LYMPHOCYTES NFR BLD AUTO: 24 %
MCH RBC QN AUTO: 32.7 PG (ref 26.5–33)
MCHC RBC AUTO-ENTMCNC: 34.5 G/DL (ref 31.5–36.5)
MCV RBC AUTO: 95 FL (ref 78–100)
MONOCYTES # BLD AUTO: 0.6 10E3/UL (ref 0–1.3)
MONOCYTES NFR BLD AUTO: 10 %
NEUTROPHILS # BLD AUTO: 3.8 10E3/UL (ref 1.6–8.3)
NEUTROPHILS NFR BLD AUTO: 65 %
NITRATE UR QL: NEGATIVE
NRBC # BLD AUTO: 0 10E3/UL
NRBC BLD AUTO-RTO: 0 /100
PH UR STRIP: 7 [PH] (ref 5–7)
PLATELET # BLD AUTO: 197 10E3/UL (ref 150–450)
POTASSIUM SERPL-SCNC: 4.6 MMOL/L (ref 3.4–5.3)
PROT SERPL-MCNC: 7.4 G/DL (ref 6.4–8.3)
RBC # BLD AUTO: 4.31 10E6/UL (ref 3.8–5.2)
RBC URINE: <1 /HPF
SODIUM SERPL-SCNC: 133 MMOL/L (ref 135–145)
SP GR UR STRIP: 1.01 (ref 1–1.03)
TROPONIN T SERPL HS-MCNC: 11 NG/L
UROBILINOGEN UR STRIP-MCNC: NORMAL MG/DL
WBC # BLD AUTO: 5.9 10E3/UL (ref 4–11)
WBC URINE: <1 /HPF

## 2024-05-10 PROCEDURE — 99285 EMERGENCY DEPT VISIT HI MDM: CPT | Mod: 25 | Performed by: STUDENT IN AN ORGANIZED HEALTH CARE EDUCATION/TRAINING PROGRAM

## 2024-05-10 PROCEDURE — 250N000011 HC RX IP 250 OP 636: Performed by: STUDENT IN AN ORGANIZED HEALTH CARE EDUCATION/TRAINING PROGRAM

## 2024-05-10 PROCEDURE — C9113 INJ PANTOPRAZOLE SODIUM, VIA: HCPCS | Performed by: STUDENT IN AN ORGANIZED HEALTH CARE EDUCATION/TRAINING PROGRAM

## 2024-05-10 PROCEDURE — 93005 ELECTROCARDIOGRAM TRACING: CPT

## 2024-05-10 PROCEDURE — 96374 THER/PROPH/DIAG INJ IV PUSH: CPT | Mod: 59

## 2024-05-10 PROCEDURE — 96376 TX/PRO/DX INJ SAME DRUG ADON: CPT

## 2024-05-10 PROCEDURE — 96361 HYDRATE IV INFUSION ADD-ON: CPT

## 2024-05-10 PROCEDURE — 80053 COMPREHEN METABOLIC PANEL: CPT | Performed by: STUDENT IN AN ORGANIZED HEALTH CARE EDUCATION/TRAINING PROGRAM

## 2024-05-10 PROCEDURE — 81001 URINALYSIS AUTO W/SCOPE: CPT | Performed by: STUDENT IN AN ORGANIZED HEALTH CARE EDUCATION/TRAINING PROGRAM

## 2024-05-10 PROCEDURE — 84484 ASSAY OF TROPONIN QUANT: CPT | Performed by: STUDENT IN AN ORGANIZED HEALTH CARE EDUCATION/TRAINING PROGRAM

## 2024-05-10 PROCEDURE — 36415 COLL VENOUS BLD VENIPUNCTURE: CPT | Performed by: STUDENT IN AN ORGANIZED HEALTH CARE EDUCATION/TRAINING PROGRAM

## 2024-05-10 PROCEDURE — 99285 EMERGENCY DEPT VISIT HI MDM: CPT | Mod: 25

## 2024-05-10 PROCEDURE — 74177 CT ABD & PELVIS W/CONTRAST: CPT | Mod: MG

## 2024-05-10 PROCEDURE — 85025 COMPLETE CBC W/AUTO DIFF WBC: CPT | Performed by: STUDENT IN AN ORGANIZED HEALTH CARE EDUCATION/TRAINING PROGRAM

## 2024-05-10 PROCEDURE — 258N000003 HC RX IP 258 OP 636: Performed by: STUDENT IN AN ORGANIZED HEALTH CARE EDUCATION/TRAINING PROGRAM

## 2024-05-10 PROCEDURE — 250N000009 HC RX 250: Performed by: STUDENT IN AN ORGANIZED HEALTH CARE EDUCATION/TRAINING PROGRAM

## 2024-05-10 PROCEDURE — 96375 TX/PRO/DX INJ NEW DRUG ADDON: CPT

## 2024-05-10 PROCEDURE — 93010 ELECTROCARDIOGRAM REPORT: CPT | Performed by: STUDENT IN AN ORGANIZED HEALTH CARE EDUCATION/TRAINING PROGRAM

## 2024-05-10 PROCEDURE — 83690 ASSAY OF LIPASE: CPT | Performed by: STUDENT IN AN ORGANIZED HEALTH CARE EDUCATION/TRAINING PROGRAM

## 2024-05-10 PROCEDURE — 76705 ECHO EXAM OF ABDOMEN: CPT

## 2024-05-10 RX ORDER — SUCRALFATE 1 G/1
1 TABLET ORAL 4 TIMES DAILY PRN
Qty: 15 TABLET | Refills: 0 | Status: SHIPPED | OUTPATIENT
Start: 2024-05-10 | End: 2024-05-30

## 2024-05-10 RX ORDER — HYDROCODONE BITARTRATE AND ACETAMINOPHEN 5; 325 MG/1; MG/1
1 TABLET ORAL EVERY 6 HOURS PRN
Qty: 10 TABLET | Refills: 0 | Status: SHIPPED | OUTPATIENT
Start: 2024-05-10 | End: 2024-05-30

## 2024-05-10 RX ORDER — HYDROMORPHONE HYDROCHLORIDE 1 MG/ML
0.5 INJECTION, SOLUTION INTRAMUSCULAR; INTRAVENOUS; SUBCUTANEOUS EVERY 30 MIN PRN
Status: DISCONTINUED | OUTPATIENT
Start: 2024-05-10 | End: 2024-05-10 | Stop reason: HOSPADM

## 2024-05-10 RX ORDER — IOPAMIDOL 755 MG/ML
500 INJECTION, SOLUTION INTRAVASCULAR ONCE
Status: COMPLETED | OUTPATIENT
Start: 2024-05-10 | End: 2024-05-10

## 2024-05-10 RX ADMIN — PANTOPRAZOLE SODIUM 40 MG: 40 INJECTION, POWDER, FOR SOLUTION INTRAVENOUS at 10:00

## 2024-05-10 RX ADMIN — HYDROMORPHONE HYDROCHLORIDE 0.5 MG: 1 INJECTION, SOLUTION INTRAMUSCULAR; INTRAVENOUS; SUBCUTANEOUS at 10:57

## 2024-05-10 RX ADMIN — SODIUM CHLORIDE 1000 ML: 9 INJECTION, SOLUTION INTRAVENOUS at 10:00

## 2024-05-10 RX ADMIN — SODIUM CHLORIDE 60 ML: 9 INJECTION, SOLUTION INTRAVENOUS at 10:23

## 2024-05-10 RX ADMIN — HYDROMORPHONE HYDROCHLORIDE 0.5 MG: 1 INJECTION, SOLUTION INTRAMUSCULAR; INTRAVENOUS; SUBCUTANEOUS at 10:01

## 2024-05-10 RX ADMIN — IOPAMIDOL 64 ML: 755 INJECTION, SOLUTION INTRAVENOUS at 10:22

## 2024-05-10 ASSESSMENT — ACTIVITIES OF DAILY LIVING (ADL)
ADLS_ACUITY_SCORE: 38

## 2024-05-10 ASSESSMENT — COLUMBIA-SUICIDE SEVERITY RATING SCALE - C-SSRS
2. HAVE YOU ACTUALLY HAD ANY THOUGHTS OF KILLING YOURSELF IN THE PAST MONTH?: NO
6. HAVE YOU EVER DONE ANYTHING, STARTED TO DO ANYTHING, OR PREPARED TO DO ANYTHING TO END YOUR LIFE?: NO
1. IN THE PAST MONTH, HAVE YOU WISHED YOU WERE DEAD OR WISHED YOU COULD GO TO SLEEP AND NOT WAKE UP?: NO

## 2024-05-10 NOTE — ED PROVIDER NOTES
History     Chief Complaint   Patient presents with    Abdominal Pain     HPI  Sri Grewal is a 74 year old female with complex medical history including hypertension, depression, TIA, coronary disease who presents for evaluation of abdominal pain.  Patient reports waxing waning left upper quadrant pain for the past 2 to 3 days.  This began without obvious injury or strain.  It radiates somewhat into the epigastrium.  She denies any obvious trigger or similar pain in the past.  Patient further denies any fevers, chills, chest pain or shortness of breath, nausea or vomiting, changes in bowel or urinary habits, other complaints.    Allergies:  Allergies   Allergen Reactions    Morphine And Codeine      GI UPSET    Oxycodone     Seasonal Allergies        Problem List:    Patient Active Problem List    Diagnosis Date Noted    Health Care Home 07/27/2011     Priority: High     X  EMERGENCY CARE PLAN  Presenting Problem Signs and Symptoms Treatment Plan    Questions or conerns during clinic hours    I will call the clinic directly     Questions or conerns outside clinic hours    I will call the 24 hour nurse line at [phone]    Patient needs to schedule an appointment    I will call the 24 hour scheduling team at [phone] or clinic directly    Same day treatment     I will call the clinic first, nurse line if after hours, urgent care and express care if needed                                  DX V65.8 REPLACED WITH [id number] [facility] HOME (04/08/2013)      Closed bicondylar fracture of distal humerus, right, with routine healing, subsequent encounter 08/24/2023     Priority: Medium    Closed bicondylar fracture of distal humerus, right, initial encounter 08/24/2023     Priority: Medium    Hypertension, unspecified type 08/14/2023     Priority: Medium    Dementia without behavioral disturbance (H) 07/20/2023     Priority: Medium    Generalized muscle weakness 07/20/2023     Priority: Medium    Recurrent falls  07/20/2023     Priority: Medium    Leg edema, right 07/20/2023     Priority: Medium    S/P total right hip arthroplasty 07/20/2023     Priority: Medium    Urinary retention 07/13/2023     Priority: Medium    Closed nondisplaced intertrochanteric fracture of right femur (H)-s/p ORIF with gamma todd 7/14/23 07/12/2023     Priority: Medium    Head injury, initial encounter 07/12/2023     Priority: Medium    Hip fracture, right, closed, initial encounter (H) 07/12/2023     Priority: Medium    Skin tear of right elbow without complication, initial encounter 07/12/2023     Priority: Medium    History of revision right elbow surgery 6/1/23 07/12/2023     Priority: Medium    History of postoperative delirium May 2023 07/12/2023     Priority: Medium    S/P reverse total shoulder arthroplasty, right 5/5/23 07/11/2023     Priority: Medium    Right elbow pain 06/12/2023     Priority: Medium    Stiffness of elbow joint, right 06/12/2023     Priority: Medium    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 06/01/2023     Priority: Medium    Post-operative state 05/03/2023     Priority: Medium    Fall, initial encounter 04/30/2023     Priority: Medium    Closed fracture of distal end of right humerus, unspecified fracture morphology, initial encounter 04/30/2023     Priority: Medium    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, unspecified fracture morphology, subsequent encounter 04/30/2023     Priority: Medium    Closed displaced fracture of surgical neck of right humerus 03/27/2023     Priority: Medium    Acute pain of right shoulder 03/27/2023     Priority: Medium    Newly recognized heart murmur 02/05/2023     Priority: Medium    Major depressive disorder, recurrent, in partial remission (H24) 02/05/2023     Priority: Medium    Vitamin D deficiency 02/05/2023     Priority: Medium    Loss of memory 02/05/2023     Priority: Medium    History of cerebrovascular accident (CVA) with residual  deficit 12/08/2021     Priority: Medium    ST elevation myocardial infarction (STEMI), unspecified artery (H) 08/26/2021     Priority: Medium    Coronary artery disease due to lipid rich plaque 08/26/2021     Priority: Medium    Compression fracture of T12 vertebra with routine healing, subsequent encounter 11/17/2020     Priority: Medium    Hematoma 01/10/2019     Priority: Medium    Hematoma of abdominal wall, initial encounter 01/09/2019     Priority: Medium    Abdominal wall hematoma 01/09/2019     Priority: Medium    Hyponatremia 11/26/2018     Priority: Medium    Age-related osteoporosis with current pathological fracture 11/12/2018     Priority: Medium    HSV (herpes simplex virus) infection 07/31/2018     Priority: Medium    Atypical mole 06/01/2017     Priority: Medium    Hypertension goal BP (blood pressure) < 140/90 05/25/2017     Priority: Medium    TIA (transient ischemic attack) 01/16/2017     Priority: Medium    Adjustment disorder with mixed anxiety and depressed mood 09/09/2014     Priority: Medium    Middle cerebral artery aneurysm 10/29/2013     Priority: Medium     Noted in 2007.  Intervention not recommended at that time.  Observation.  Saw Interventional Radiology 12/2013.  Recheck CTA in one year.      Moderate major depression (H) 09/08/2010     Priority: Medium    Insomnia 09/08/2010     Priority: Medium    Atrophy of vagina 05/30/2010     Priority: Medium    Lump or mass in breast 03/29/2010     Priority: Medium    Mild persistent asthma 02/21/2005     Priority: Medium    Anemia, unspecified type 04/25/2002     Priority: Medium     Problem list name updated by automated process. Provider to review      Chronic migraine without aura without status migrainosus, not intractable      Priority: Medium     Multiple trials off of fiorinal have not been successful  Problem list name updated by automated process. Provider to review      Other abnormal Papanicolaou smear of cervix and cervical  HPV(795.09)      Priority: Medium     (Problem list name updated by automated process. Provider to review and confirm.)      Endometriosis      Priority: Medium     Problem list name updated by automated process. Provider to review      Allergic rhinitis      Priority: Medium     Problem list name updated by automated process. Provider to review          Past Medical History:    Past Medical History:   Diagnosis Date    Abnormal Papanicolaou smear of cervix and cervical HPV     Allergic rhinitis, cause unspecified     Cerebral infarction (H)     Contact dermatitis and other eczema, due to unspecified cause     Endometriosis, site unspecified     Esophageal reflux     Migraine, unspecified, without mention of intractable migraine without mention of status migrainosus     Mild persistent asthma     Unspecified disorder of uterus        Past Surgical History:    Past Surgical History:   Procedure Laterality Date    COLONOSCOPY  2014    Procedure: COLONOSCOPY;  Surgeon: Nathan Baker MD;  Location: [facility]    OPEN REDUCTION INTERNAL FIXATION ELBOW Right 2023    Procedure: REVISION OPEN REDUCTION INTERNAL FIXATION RIGHT OLECRANON;  Surgeon: Williams Phipps MD;  Location: [facility]    OPEN REDUCTION INTERNAL FIXATION HIP NAILING Right 2023    Procedure: INTERNAL FIXATION, FRACTURE, TROCHANTERIC,RIGHT HIP, USING GAMMA RODS;  Surgeon: Nathan Turner MD;  Location: [facility]    OPEN REDUCTION INTERNAL FIXATION HUMERUS DISTAL Right 5/3/2023    Procedure: OPEN REDUCTION INTERNAL FIXATION, FRACTURE, HUMERUS, DISTAL;  Surgeon: Williams Phipps MD;  [facility]    REMOVE HARDWARE ELBOW Right 2023    Procedure: HARDWARE REMOVAL RIGHT OLECRANON;  Surgeon: Williams Phipps MD;  Location: [facility]    REVERSE ARTHROPLASTY SHOULDER Right 2023    Procedure: ARTHROPLASTY, SHOULDER, TOTAL, REVERSE for;  Surgeon: Cierra Krause MD;  Location: [facility]    ZZC  DELIVERY ONLY       X2    ZZHC COLONOSCOPY THRU STOMA,  DIAGNOSTIC  5/2002    normal       Family History:    Family History   Problem Relation Age of Onset    Heart Disease Mother         anemia    Osteoporosis Mother     Hypertension Mother     Cerebrovascular Disease Mother     Alcohol/Drug Mother         alcohol    Neurologic Disorder Mother         migraines    Hypertension Father     Cancer No family hx of         breast       Social History:  Marital Status:   [5]  Social History     Tobacco Use    Smoking status: Never     Passive exposure: Never    Smokeless tobacco: Never   Vaping Use    Vaping status: Never Used   Substance Use Topics    Alcohol use: Yes     Comment: socially    Drug use: No        Medications:    HYDROcodone-acetaminophen (NORCO) 5-325 MG tablet  sucralfate (CARAFATE) 1 GM tablet  acetaminophen (TYLENOL) 500 MG tablet  amLODIPine (NORVASC) 10 MG tablet  buPROPion (WELLBUTRIN XL) 300 MG 24 hr tablet  carvedilol (COREG) 12.5 MG tablet  clopidogrel (PLAVIX) 75 MG tablet  hydrALAZINE (APRESOLINE) 25 MG tablet  IBANdronate (BONIVA) 150 MG tablet  lisinopril (ZESTRIL) 20 MG tablet  melatonin 1 MG TABS tablet  polyethylene glycol (MIRALAX) 17 GM/Dose powder  rosuvastatin (CRESTOR) 20 MG tablet  senna-docusate (SENOKOT-S/PERICOLACE) 8.6-50 MG tablet  sodium chloride 1 GM tablet  tiZANidine (ZANAFLEX) 2 MG tablet  traMADol (ULTRAM) 50 MG tablet  ubrogepant (UBRELVY) 50 MG tablet  venlafaxine (EFFEXOR XR) 150 MG 24 hr capsule  vitamin D2 (ERGOCALCIFEROL) 31037 units (1250 mcg) capsule      Review of Systems   All other systems reviewed and are negative.  See HPI.    Physical Exam   BP: (!) 187/93  Pulse: 90  Temp: 98.6  F (37  C)  Resp: 18  Weight: 59 kg (130 lb)  SpO2: 99 %      Physical Exam  Vitals and nursing note reviewed.   Constitutional:       Appearance: Normal appearance. She is well-developed. She is not diaphoretic.      Comments: Slightly uncomfortable.  Otherwise nontoxic.  No acute distress.   HENT:      Head: Atraumatic.       Mouth/Throat:      Mouth: Mucous membranes are moist.      Pharynx: Oropharynx is clear.   Eyes:      General: No scleral icterus.     Conjunctiva/sclera: Conjunctivae normal.   Cardiovascular:      Rate and Rhythm: Normal rate and regular rhythm.      Heart sounds: Murmur heard.      Comments: Minimal holosystolic murmur.  Pulmonary:      Effort: No respiratory distress.      Breath sounds: Normal breath sounds. No wheezing.   Abdominal:      General: Abdomen is flat. Bowel sounds are normal. There is no distension.      Palpations: Abdomen is soft.      Tenderness: There is abdominal tenderness in the epigastric area and left upper quadrant. There is no guarding or rebound. Negative signs include Higgins's sign and McBurney's sign.      Comments: Mild epigastric and left upper quadrant tenderness with no rebound or guarding.  No CVA tenderness.   Musculoskeletal:      Cervical back: Neck supple.   Skin:     General: Skin is warm.      Capillary Refill: Capillary refill takes less than 2 seconds.      Findings: No rash.   Neurological:      General: No focal deficit present.      Mental Status: She is alert and oriented to person, place, and time.   Psychiatric:         Mood and Affect: Mood normal.         ED Course        Procedures         EKG performed at 9:19 AM.  Sinus rhythm, rate 69.  Left axis deviation.  Normal intervals.  Nonspecific ST and T wave changes exam independently interpreted by me.       Results for orders placed or performed during the hospital encounter of 05/10/24 (from the past 24 hour(s))   UA with Microscopic reflex to Culture    Specimen: Urine, Midstream   Result Value Ref Range    Color Urine Straw Colorless, Straw, Light Yellow, Yellow    Appearance Urine Clear Clear    Glucose Urine Negative Negative mg/dL    Bilirubin Urine Negative Negative    Ketones Urine Negative Negative mg/dL    Specific Gravity Urine 1.006 1.003 - 1.035    Blood Urine Negative Negative    pH Urine 7.0 5.0 -  7.0    Protein Albumin Urine Negative Negative mg/dL    Urobilinogen Urine Normal Normal, 2.0 mg/dL    Nitrite Urine Negative Negative    Leukocyte Esterase Urine Negative Negative    RBC Urine <1 <=2 /HPF    WBC Urine <1 <=5 /HPF    Narrative    Urine Culture not indicated   CBC with platelets differential    Narrative    The following orders were created for panel order CBC with platelets differential.  Procedure                               Abnormality         Status                     ---------                               -----------         ------                     CBC with platelets and d...[[id number]]                      Final result                 Please view results for these tests on the individual orders.   Comprehensive metabolic panel   Result Value Ref Range    Sodium 133 (L) 135 - 145 mmol/L    Potassium 4.6 3.4 - 5.3 mmol/L    Carbon Dioxide (CO2) 27 22 - 29 mmol/L    Anion Gap 9 7 - 15 mmol/L    Urea Nitrogen 6.0 (L) 8.0 - 23.0 mg/dL    Creatinine 0.63 0.51 - 0.95 mg/dL    GFR Estimate >90 >60 mL/min/1.73m2    Calcium 9.5 8.8 - 10.2 mg/dL    Chloride 97 (L) 98 - 107 mmol/L    Glucose 114 (H) 70 - 99 mg/dL    Alkaline Phosphatase 67 40 - 150 U/L    AST 26 0 - 45 U/L    ALT 16 0 - 50 U/L    Protein Total 7.4 6.4 - 8.3 g/dL    Albumin 4.1 3.5 - 5.2 g/dL    Bilirubin Total 0.5 <=1.2 mg/dL   Lipase   Result Value Ref Range    Lipase 20 13 - 60 U/L   Troponin T, High Sensitivity   Result Value Ref Range    Troponin T, High Sensitivity 11 <=14 ng/L   CBC with platelets and differential   Result Value Ref Range    WBC Count 5.9 4.0 - 11.0 10e3/uL    RBC Count 4.31 3.80 - 5.20 10e6/uL    Hemoglobin 14.1 11.7 - 15.7 g/dL    Hematocrit 40.9 35.0 - 47.0 %    MCV 95 78 - 100 fL    MCH 32.7 26.5 - 33.0 pg    MCHC 34.5 31.5 - 36.5 g/dL    RDW 12.6 10.0 - 15.0 %    Platelet Count 197 150 - 450 10e3/uL    % Neutrophils 65 %    % Lymphocytes 24 %    % Monocytes 10 %    % Eosinophils 1 %    % Basophils 1 %     % Immature Granulocytes 0 %    NRBCs per 100 WBC 0 <1 /100    Absolute Neutrophils 3.8 1.6 - 8.3 10e3/uL    Absolute Lymphocytes 1.4 0.8 - 5.3 10e3/uL    Absolute Monocytes 0.6 0.0 - 1.3 10e3/uL    Absolute Eosinophils 0.1 0.0 - 0.7 10e3/uL    Absolute Basophils 0.1 0.0 - 0.2 10e3/uL    Absolute Immature Granulocytes 0.0 <=0.4 10e3/uL    Absolute NRBCs 0.0 10e3/uL   CT Abdomen Pelvis w Contrast    Narrative    CT ABDOMEN AND PELVIS WITH CONTRAST 5/10/2024 10:35 AM    CLINICAL HISTORY: Epigastric, left upper quadrant tenderness for 3  days.    TECHNIQUE: CT scan of the abdomen and pelvis was performed following  injection of IV contrast. Multiplanar reformats were obtained. Dose  reduction techniques were used.    CONTRAST: 64mL, Isovue-370    COMPARISON: CT 11/4/2020.    FINDINGS:   LOWER CHEST: Normal.    HEPATOBILIARY: Gallbladder not seen. Dilated extrahepatic biliary  tree. Common bile duct is approximately 11 mm, previously 7 mm series  3 image 64. No focal hepatic observation.    PANCREAS: Dilated main pancreatic duct appears increased measuring 5  mm, previously 3 mm image 65. 9 mm apparent enhancing rounded nodule  at the pancreas head/uncinate series 3 image 82.    SPLEEN: A few benign calcifications.    ADRENAL GLANDS: Normal.    KIDNEYS/BLADDER: No significant mass, stones, or hydronephrosis. There  are simple or benign cysts. No follow up is needed. Distended urinary  bladder.    BOWEL: Prominent stool at the proximal colon. No small bowel  obstruction. Colonic diverticulosis without convincing diverticulitis.  Normal appendix.    PELVIC ORGANS: No acute pelvic abnormality identified.    ADDITIONAL FINDINGS: Scattered vascular calcifications.    MUSCULOSKELETAL: T12 compression deformity demonstrates significant  height loss 0n series 5 image 59, progressed since the older CT from  11/4/2020. Postoperative change at the right femur.      Impression    IMPRESSION:   1.  Interval increase of dilatation of  the biliary and pancreatic  ducts since 11/4/2020. Small focal nodular enhancement newly seen at  the pancreas head/neck. It is difficult to exclude a pancreas mass.  This potentially could be further evaluated with MRI.  2.  Prominent stool within the colon.  3.  Colonic diverticulosis without convincing diverticulitis.  4.  Progression of height loss at a T12 compression deformity since  11/4/2020.    MONE DENTON MD         SYSTEM ID:  [id number]   US Abdomen Limited (RUQ)    Narrative    US ABDOMEN LIMITED 5/10/2024 11:38 AM    CLINICAL HISTORY: Upper abdominal pain, question  pancreatic head mass  on CT, increased ductal dilatation.    TECHNIQUE: Limited abdominal ultrasound.    COMPARISON: CT 5/10/2024.    FINDINGS:    GALLBLADDER: Absent.    BILE DUCTS: There is no biliary dilatation. The common duct measures  11mm.    LIVER: Normal where seen.    RIGHT KIDNEY: No hydronephrosis. Simple cyst right upper kidney  without specific imaging follow-up recommended. This measures 3.8 cm.    PANCREAS: No visible focal lesion. A few bowel loops adjacent to the  pancreas head area identified.    No ascites.      Impression    IMPRESSION:  1.  Dilated common duct measuring 11 mm.  2.  No other specific abnormality can be seen.    MONE DENTON MD         SYSTEM ID:  [id number]     *Note: Due to a large number of results and/or encounters for the requested time period, some results have not been displayed. A complete set of results can be found in Results Review.       Medications   sodium chloride 0.9% BOLUS 1,000 mL (0 mLs Intravenous Stopped 5/10/24 1108)   pantoprazole (PROTONIX) IV push injection 40 mg (40 mg Intravenous $Given 5/10/24 1000)   sodium chloride 0.9 % bag 100mL for CT scan flush use (60 mLs Intravenous $Given 5/10/24 1023)   iopamidol (ISOVUE-370) solution 500 mL (64 mLs Intravenous $Given 5/10/24 1022)       Assessments & Plan (with Medical Decision Making)     I have reviewed the nursing notes.    I have  reviewed the findings, diagnosis, plan and need for follow up with the patient.  Medical Decision Making  Sri Grewal is a 74 year old female with complex medical history including hypertension, depression, TIA, coronary disease who presents for evaluation of abdominal pain.  Patient was hypertensive on arrival, vitals otherwise normal.  Exam is significant for very mild epigastric and left upper quadrant tenderness with no rebound or guarding.  Lung sounds are clear, no respiratory distress.  Pulses are equal in all extremities.  The cause of her symptoms is somewhat unclear, but differential would be most concerning for constipation, gastritis/ulcer, less likely cardiopulmonary in nature.    Pain improved with a combination of Dilaudid, Protonix.  Workup was very reassuring overall.  She has no leukocytosis or anemia, electrolytes, transaminases, lipase were within normal limits.  Troponin was negative and EKG showed no acute ischemic changes.  Urinalysis was unremarkable as well.  CT of the abdomen showed increased dilatation of the biliary and pancreatic ducts since 2020.  She was also found to have a nodular enhancement to the pancreatic head/neck, possibly indicative of a pancreatic mass.  Separately, she was also noted to have increased stool burden/constipation, and slight progression in height loss of an old T12 fracture.  She has no midline back tenderness on exam today.  We did add on a right upper quadrant ultrasound to potentially assess for causes of ductal dilatation and CT pancreatic head.  This did not show any other acute abnormalities.    Will discharge the patient home with outpatient follow-up, including GI referral for further evaluation of the above findings.  Will prescribe Norco to help with breakthrough discomfort in the meantime and also prescribe Carafate to see if she does have some component of gastritis.  Patient was in agreement with this plan and will follow-up with her primary care  doctor.  She will return to the emergency department sooner for any new or acutely worsening symptoms.    Discharge Medication List as of 5/10/2024 12:16 PM        START taking these medications    Details   HYDROcodone-acetaminophen (NORCO) 5-325 MG tablet Take 1 tablet by mouth every 6 hours as needed for severe pain, Disp-10 tablet, R-0, E-Prescribe      sucralfate (CARAFATE) 1 GM tablet Take 1 tablet (1 g) by mouth 4 times daily as needed, Disp-15 tablet, R-0, E-Prescribe             Final diagnoses:   Upper abdominal pain   Constipation, unspecified constipation type   Abnormal CT of the abdomen       5/10/2024   [facility] EMERGENCY DEPT       Sudeep Mahoney MD  05/10/24 8980

## 2024-05-10 NOTE — ED TRIAGE NOTES
Pt c/o LUQ abd pain; onset 2 days ago. Denies n/v/d. No fevers, no urinary symptoms.     Normal Bms per pt.      Triage Assessment (Adult)       Row Name 05/10/24 5439          Triage Assessment    Airway WDL WDL        Respiratory WDL    Respiratory WDL WDL        Cardiac WDL    Cardiac WDL WDL

## 2024-05-10 NOTE — DISCHARGE INSTRUCTIONS
Overall your lab tests and CT results were reassuring.  I see no signs of severe infection or other clear causes of her pain.  He did have a tiny abnormal area near your pancreas on CT, but it is unclear what this is or if it is related at all to your current symptoms.  You were also noted to have constipation, which could theoretically be causing your discomfort.  I will send you home with 2 different medications to help with discomfort; the first is Norco (a narcotic), uses only as needed.  I also sent a medication which could help if you have some gastritis/ulcer called Carafate.  See if this helps with your pain.  Referral was placed to GI to further explore the abnormal CT results.  Make an appointment as soon as possible.  Return to the emergency department in the meantime for any new or acutely worsening symptoms.

## 2024-05-30 ENCOUNTER — OFFICE VISIT (OUTPATIENT)
Dept: FAMILY MEDICINE | Facility: OTHER | Age: 74
End: 2024-05-30
Payer: MEDICARE

## 2024-05-30 VITALS
BODY MASS INDEX: 23.85 KG/M2 | WEIGHT: 121.5 LBS | HEART RATE: 70 BPM | HEIGHT: 60 IN | TEMPERATURE: 99 F | DIASTOLIC BLOOD PRESSURE: 70 MMHG | SYSTOLIC BLOOD PRESSURE: 114 MMHG | RESPIRATION RATE: 18 BRPM | OXYGEN SATURATION: 96 %

## 2024-05-30 DIAGNOSIS — F32.1 MODERATE MAJOR DEPRESSION (H): ICD-10-CM

## 2024-05-30 DIAGNOSIS — Z96.611 S/P REVERSE TOTAL SHOULDER ARTHROPLASTY, RIGHT: ICD-10-CM

## 2024-05-30 DIAGNOSIS — Z12.31 VISIT FOR SCREENING MAMMOGRAM: ICD-10-CM

## 2024-05-30 DIAGNOSIS — Z00.00 ENCOUNTER FOR MEDICARE ANNUAL WELLNESS EXAM: Primary | ICD-10-CM

## 2024-05-30 DIAGNOSIS — J45.30 MILD PERSISTENT ASTHMA WITHOUT COMPLICATION: ICD-10-CM

## 2024-05-30 DIAGNOSIS — S72.144A CLOSED NONDISPLACED INTERTROCHANTERIC FRACTURE OF RIGHT FEMUR, INITIAL ENCOUNTER (H): ICD-10-CM

## 2024-05-30 DIAGNOSIS — M80.00XG AGE-RELATED OSTEOPOROSIS WITH CURRENT PATHOLOGICAL FRACTURE WITH DELAYED HEALING, SUBSEQUENT ENCOUNTER: ICD-10-CM

## 2024-05-30 DIAGNOSIS — I10 HYPERTENSION, UNSPECIFIED TYPE: ICD-10-CM

## 2024-05-30 DIAGNOSIS — I25.83 CORONARY ARTERY DISEASE DUE TO LIPID RICH PLAQUE: ICD-10-CM

## 2024-05-30 DIAGNOSIS — E87.1 CHRONIC HYPONATREMIA: ICD-10-CM

## 2024-05-30 DIAGNOSIS — Z12.11 SCREEN FOR COLON CANCER: ICD-10-CM

## 2024-05-30 DIAGNOSIS — I21.3 ST ELEVATION MYOCARDIAL INFARCTION (STEMI), UNSPECIFIED ARTERY (H): ICD-10-CM

## 2024-05-30 DIAGNOSIS — I10 HYPERTENSION GOAL BP (BLOOD PRESSURE) < 140/90: ICD-10-CM

## 2024-05-30 DIAGNOSIS — Z98.890 POST-OPERATIVE STATE: ICD-10-CM

## 2024-05-30 DIAGNOSIS — I25.10 CORONARY ARTERY DISEASE DUE TO LIPID RICH PLAQUE: ICD-10-CM

## 2024-05-30 LAB
ANION GAP SERPL CALCULATED.3IONS-SCNC: 10 MMOL/L (ref 7–15)
BUN SERPL-MCNC: 8.7 MG/DL (ref 8–23)
CALCIUM SERPL-MCNC: 9.6 MG/DL (ref 8.8–10.2)
CHLORIDE SERPL-SCNC: 98 MMOL/L (ref 98–107)
CHOLEST SERPL-MCNC: 166 MG/DL
CREAT SERPL-MCNC: 0.7 MG/DL (ref 0.51–0.95)
DEPRECATED HCO3 PLAS-SCNC: 28 MMOL/L (ref 22–29)
EGFRCR SERPLBLD CKD-EPI 2021: >90 ML/MIN/1.73M2
FASTING STATUS PATIENT QL REPORTED: NO
FASTING STATUS PATIENT QL REPORTED: NO
GLUCOSE SERPL-MCNC: 114 MG/DL (ref 70–99)
HDLC SERPL-MCNC: 109 MG/DL
LDLC SERPL CALC-MCNC: 48 MG/DL
NONHDLC SERPL-MCNC: 57 MG/DL
POTASSIUM SERPL-SCNC: 5.4 MMOL/L (ref 3.4–5.3)
SODIUM SERPL-SCNC: 136 MMOL/L (ref 135–145)
TRIGL SERPL-MCNC: 46 MG/DL

## 2024-05-30 PROCEDURE — 99214 OFFICE O/P EST MOD 30 MIN: CPT | Mod: 25 | Performed by: PHYSICIAN ASSISTANT

## 2024-05-30 PROCEDURE — 36415 COLL VENOUS BLD VENIPUNCTURE: CPT | Performed by: PHYSICIAN ASSISTANT

## 2024-05-30 PROCEDURE — 80048 BASIC METABOLIC PNL TOTAL CA: CPT | Performed by: PHYSICIAN ASSISTANT

## 2024-05-30 PROCEDURE — 80061 LIPID PANEL: CPT | Performed by: PHYSICIAN ASSISTANT

## 2024-05-30 PROCEDURE — G0439 PPPS, SUBSEQ VISIT: HCPCS | Performed by: PHYSICIAN ASSISTANT

## 2024-05-30 RX ORDER — LISINOPRIL 20 MG/1
40 TABLET ORAL DAILY
Qty: 180 TABLET | Refills: 3 | Status: SHIPPED | OUTPATIENT
Start: 2024-05-30

## 2024-05-30 RX ORDER — HYDRALAZINE HYDROCHLORIDE 25 MG/1
25 TABLET, FILM COATED ORAL 3 TIMES DAILY
Qty: 90 TABLET | Refills: 11 | Status: SHIPPED | OUTPATIENT
Start: 2024-05-30

## 2024-05-30 RX ORDER — HYDROCODONE BITARTRATE AND ACETAMINOPHEN 5; 325 MG/1; MG/1
1 TABLET ORAL EVERY 6 HOURS PRN
Qty: 45 TABLET | Refills: 0 | Status: SHIPPED | OUTPATIENT
Start: 2024-05-30

## 2024-05-30 RX ORDER — QUETIAPINE FUMARATE 25 MG/1
25 TABLET, FILM COATED ORAL AT BEDTIME
Qty: 30 TABLET | Refills: 1 | Status: SHIPPED | OUTPATIENT
Start: 2024-05-30 | End: 2024-06-27

## 2024-05-30 RX ORDER — AMLODIPINE BESYLATE 10 MG/1
10 TABLET ORAL DAILY
Qty: 90 TABLET | Refills: 3 | Status: SHIPPED | OUTPATIENT
Start: 2024-05-30

## 2024-05-30 RX ORDER — TRAMADOL HYDROCHLORIDE 50 MG/1
50 TABLET ORAL EVERY 4 HOURS PRN
Qty: 90 TABLET | Refills: 0 | Status: SHIPPED | OUTPATIENT
Start: 2024-05-30

## 2024-05-30 RX ORDER — VENLAFAXINE HYDROCHLORIDE 150 MG/1
150 CAPSULE, EXTENDED RELEASE ORAL DAILY
Qty: 90 CAPSULE | Refills: 3 | Status: SHIPPED | OUTPATIENT
Start: 2024-05-30

## 2024-05-30 RX ORDER — ROSUVASTATIN CALCIUM 20 MG/1
20 TABLET, COATED ORAL DAILY
Qty: 90 TABLET | Refills: 3 | Status: SHIPPED | OUTPATIENT
Start: 2024-05-30

## 2024-05-30 RX ORDER — RESPIRATORY SYNCYTIAL VIRUS VACCINE 120MCG/0.5
0.5 KIT INTRAMUSCULAR ONCE
Qty: 1 EACH | Refills: 0 | Status: CANCELLED | OUTPATIENT
Start: 2024-05-30 | End: 2024-05-30

## 2024-05-30 RX ORDER — ALBUTEROL SULFATE 90 UG/1
2 AEROSOL, METERED RESPIRATORY (INHALATION) EVERY 6 HOURS PRN
Qty: 18 G | Refills: 3 | Status: SHIPPED | OUTPATIENT
Start: 2024-05-30

## 2024-05-30 RX ORDER — CARVEDILOL 12.5 MG/1
TABLET ORAL
Qty: 180 TABLET | Refills: 3 | Status: SHIPPED | OUTPATIENT
Start: 2024-05-30

## 2024-05-30 SDOH — HEALTH STABILITY: PHYSICAL HEALTH: ON AVERAGE, HOW MANY DAYS PER WEEK DO YOU ENGAGE IN MODERATE TO STRENUOUS EXERCISE (LIKE A BRISK WALK)?: 2 DAYS

## 2024-05-30 ASSESSMENT — ANXIETY QUESTIONNAIRES
GAD7 TOTAL SCORE: 4
6. BECOMING EASILY ANNOYED OR IRRITABLE: SEVERAL DAYS
1. FEELING NERVOUS, ANXIOUS, OR ON EDGE: NOT AT ALL
3. WORRYING TOO MUCH ABOUT DIFFERENT THINGS: SEVERAL DAYS
7. FEELING AFRAID AS IF SOMETHING AWFUL MIGHT HAPPEN: NOT AT ALL
8. IF YOU CHECKED OFF ANY PROBLEMS, HOW DIFFICULT HAVE THESE MADE IT FOR YOU TO DO YOUR WORK, TAKE CARE OF THINGS AT HOME, OR GET ALONG WITH OTHER PEOPLE?: SOMEWHAT DIFFICULT
2. NOT BEING ABLE TO STOP OR CONTROL WORRYING: SEVERAL DAYS
5. BEING SO RESTLESS THAT IT IS HARD TO SIT STILL: NOT AT ALL
IF YOU CHECKED OFF ANY PROBLEMS ON THIS QUESTIONNAIRE, HOW DIFFICULT HAVE THESE PROBLEMS MADE IT FOR YOU TO DO YOUR WORK, TAKE CARE OF THINGS AT HOME, OR GET ALONG WITH OTHER PEOPLE: SOMEWHAT DIFFICULT
7. FEELING AFRAID AS IF SOMETHING AWFUL MIGHT HAPPEN: NOT AT ALL
4. TROUBLE RELAXING: SEVERAL DAYS

## 2024-05-30 ASSESSMENT — ASTHMA QUESTIONNAIRES
QUESTION_2 LAST FOUR WEEKS HOW OFTEN HAVE YOU HAD SHORTNESS OF BREATH: ONCE OR TWICE A WEEK
ACT_TOTALSCORE: 23
ACT_TOTALSCORE: 23
QUESTION_3 LAST FOUR WEEKS HOW OFTEN DID YOUR ASTHMA SYMPTOMS (WHEEZING, COUGHING, SHORTNESS OF BREATH, CHEST TIGHTNESS OR PAIN) WAKE YOU UP AT NIGHT OR EARLIER THAN USUAL IN THE MORNING: NOT AT ALL
QUESTION_1 LAST FOUR WEEKS HOW MUCH OF THE TIME DID YOUR ASTHMA KEEP YOU FROM GETTING AS MUCH DONE AT WORK, SCHOOL OR AT HOME: NONE OF THE TIME
QUESTION_5 LAST FOUR WEEKS HOW WOULD YOU RATE YOUR ASTHMA CONTROL: WELL CONTROLLED
QUESTION_4 LAST FOUR WEEKS HOW OFTEN HAVE YOU USED YOUR RESCUE INHALER OR NEBULIZER MEDICATION (SUCH AS ALBUTEROL): NOT AT ALL

## 2024-05-30 ASSESSMENT — PATIENT HEALTH QUESTIONNAIRE - PHQ9
SUM OF ALL RESPONSES TO PHQ QUESTIONS 1-9: 7
10. IF YOU CHECKED OFF ANY PROBLEMS, HOW DIFFICULT HAVE THESE PROBLEMS MADE IT FOR YOU TO DO YOUR WORK, TAKE CARE OF THINGS AT HOME, OR GET ALONG WITH OTHER PEOPLE: SOMEWHAT DIFFICULT
SUM OF ALL RESPONSES TO PHQ QUESTIONS 1-9: 7

## 2024-05-30 ASSESSMENT — PAIN SCALES - GENERAL: PAINLEVEL: NO PAIN (0)

## 2024-05-30 ASSESSMENT — SOCIAL DETERMINANTS OF HEALTH (SDOH): HOW OFTEN DO YOU GET TOGETHER WITH FRIENDS OR RELATIVES?: THREE TIMES A WEEK

## 2024-05-30 NOTE — LETTER
May 30, 2024      Sri Grewal  [address] [place] [address]        Dear ,    We are writing to inform you of your test results.    Your test results fall within the expected range(s) or remain unchanged from previous results.  Please continue with current treatment plan. Normal labs.     Resulted Orders   Lipid panel reflex to direct LDL Non-fasting   Result Value Ref Range    Cholesterol 166 <200 mg/dL    Triglycerides 46 <150 mg/dL    Direct Measure  >=50 mg/dL    LDL Cholesterol Calculated 48 <=100 mg/dL    Non HDL Cholesterol 57 <130 mg/dL    Patient Fasting > 8hrs? No     Narrative    Cholesterol  Desirable:  <200 mg/dL    Triglycerides  Normal:  Less than 150 mg/dL  Borderline High:  150-199 mg/dL  High:  200-499 mg/dL  Very High:  Greater than or equal to 500 mg/dL    Direct Measure HDL  Female:  Greater than or equal to 50 mg/dL   Male:  Greater than or equal to 40 mg/dL    LDL Cholesterol  Desirable:  <100mg/dL  Above Desirable:  100-129 mg/dL   Borderline High:  130-159 mg/dL   High:  160-189 mg/dL   Very High:  >= 190 mg/dL    Non HDL Cholesterol  Desirable:  130 mg/dL  Above Desirable:  130-159 mg/dL  Borderline High:  160-189 mg/dL  High:  190-219 mg/dL  Very High:  Greater than or equal to 220 mg/dL   Basic metabolic panel  (Ca, Cl, CO2, Creat, Gluc, K, Na, BUN)   Result Value Ref Range    Sodium 136 135 - 145 mmol/L      Comment:      Reference intervals for this test were updated on 09/26/2023 to more accurately reflect our healthy population. There may be differences in the flagging of prior results with similar values performed with this method. Interpretation of those prior results can be made in the context of the updated reference intervals.     Potassium 5.4 (H) 3.4 - 5.3 mmol/L    Chloride 98 98 - 107 mmol/L    Carbon Dioxide (CO2) 28 22 - 29 mmol/L    Anion Gap 10 7 - 15 mmol/L    Urea Nitrogen 8.7 8.0 - 23.0 mg/dL    Creatinine 0.70 0.51 - 0.95 mg/dL    GFR  Estimate >90 >60 mL/min/1.73m2    Calcium 9.6 8.8 - 10.2 mg/dL    Glucose 114 (H) 70 - 99 mg/dL    Patient Fasting > 8hrs? No        If you have any questions or concerns, please call the clinic at the number listed above.       Sincerely,      Rosenda Pierre PA-C

## 2024-05-30 NOTE — PATIENT INSTRUCTIONS
"Preventive Care Advice   This is general advice we often give to help people stay healthy. Your care team may have specific advice just for you. Please talk to your care team about your own preventive care needs.  Lifestyle  Exercise at least 150 minutes each week (30 minutes a day, 5 days a week).  Do muscle strengthening activities 2 days a week. These help control your weight and prevent disease.  No smoking.  Wear sunscreen to prevent skin cancer.  Have your home tested for radon every 2 to 5 years. Radon is a colorless, odorless gas that can harm your lungs. To learn more, go to www.health.[facility].[place]. and search for \"Radon in Homes.\"  Keep guns unloaded and locked up in a safe place like a safe or gun vault, or, use a gun lock and hide the keys. Always lock away bullets separately. To learn more, visit [place].gov and search for \"safe gun storage.\"  Nutrition  Eat 5 or more servings of fruits and vegetables each day.  Try wheat bread, brown rice and whole grain pasta (instead of white bread, rice, and pasta).  Get enough calcium and vitamin D. Check the label on foods and aim for 100% of the RDA (recommended daily allowance).  Regular exams  Have a dental exam and cleaning every 6 months.  See your health care team every year to talk about:  Any changes in your health.  Any medicines your care team has prescribed.  Preventive care, family planning, and ways to prevent chronic diseases.  Shots (vaccines)   HPV shots (up to age 26), if you've never had them before.  Hepatitis B shots (up to age 59), if you've never had them before.  COVID-19 shot: Get this shot when it's due.  Flu shot: Get a flu shot every year.  Tetanus shot: Get a tetanus shot every 10 years.  Pneumococcal, hepatitis A, and RSV shots: Ask your care team if you need these based on your risk.  Shingles shot (for age 50 and up).  General health tests  Diabetes screening:  Starting at age 35, Get screened for diabetes at least every 3 years.  If " you are younger than age 35, ask your care team if you should be screened for diabetes.  Cholesterol test: At age 39, start having a cholesterol test every 5 years, or more often if advised.  Bone density scan (DEXA): At age 50, ask your care team if you should have this scan for osteoporosis (brittle bones).  Hepatitis C: Get tested at least once in your life.  Abdominal aortic aneurysm screening: Talk to your doctor about having this screening if you:  Have ever smoked; and  Are biologically male; and  Are between the ages of 65 and 75.  STIs (sexually transmitted infections)  Before age 24: Ask your care team if you should be screened for STIs.  After age 24: Get screened for STIs if you're at risk. You are at risk for STIs (including HIV) if:  You are sexually active with more than one person.  You don't use condoms every time.  You or a partner was diagnosed with a sexually transmitted infection.  If you are at risk for HIV, ask about PrEP medicine to prevent HIV.  Get tested for HIV at least once in your life, whether you are at risk for HIV or not.  Cancer screening tests  Cervical cancer screening: If you have a cervix, begin getting regular cervical cancer screening tests at age 21. Most people who have regular screenings with normal results can stop after age 65. Talk about this with your provider.  Breast cancer scan (mammogram): If you've ever had breasts, begin having regular mammograms starting at age 40. This is a scan to check for breast cancer.  Colon cancer screening: It is important to start screening for colon cancer at age 45.  Have a colonoscopy test every 10 years (or more often if you're at risk) Or, ask your provider about stool tests like a FIT test every year or Cologuard test every 3 years.  To learn more about your testing options, visit: www.[employer]/125804.pdf.  For help making a decision, visit: fidencio/lc57060.  Prostate cancer screening test: If you have a prostate and are age 55  to 69, ask your provider if you would benefit from a yearly prostate cancer screening test.  Lung cancer screening: If you are a current or former smoker age 50 to 80, ask your care team if ongoing lung cancer screenings are right for you.  For informational purposes only. Not to replace the advice of your health care provider. Copyright   2023 [employer]. All rights reserved. Clinically reviewed by the [facility]. UPEK [id number] - REV 04/24.    Learning About Stress  What is stress?     Stress is your body's response to a hard situation. Your body can have a physical, emotional, or mental response. Stress is a fact of life for most people, and it affects everyone differently. What causes stress for you may not be stressful for someone else.  A lot of things can cause stress. You may feel stress when you go on a job interview, take a test, or run a race. This kind of short-term stress is normal and even useful. It can help you if you need to work hard or react quickly. For example, stress can help you finish an important job on time.  Long-term stress is caused by ongoing stressful situations or events. Examples of long-term stress include long-term health problems, ongoing problems at work, or conflicts in your family. Long-term stress can harm your health.  How does stress affect your health?  When you are stressed, your body responds as though you are in danger. It makes hormones that speed up your heart, make you breathe faster, and give you a burst of energy. This is called the fight-or-flight stress response. If the stress is over quickly, your body goes back to normal and no harm is done.  But if stress happens too often or lasts too long, it can have bad effects. Long-term stress can make you more likely to get sick, and it can make symptoms of some diseases worse. If you tense up when you are stressed, you may develop neck, shoulder, or low back pain. Stress is  linked to high blood pressure and heart disease.  Stress also harms your emotional health. It can make you álvarez, tense, or depressed. Your relationships may suffer, and you may not do well at work or school.  What can you do to manage stress?  You can try these things to help manage stress:   Do something active. Exercise or activity can help reduce stress. Walking is a great way to get started. Even everyday activities such as housecleaning or yard work can help.  Try yoga or rebeca chi. These techniques combine exercise and meditation. You may need some training at first to learn them.  Do something you enjoy. For example, listen to music or go to a movie. Practice your hobby or do volunteer work.  Meditate. This can help you relax, because you are not worrying about what happened before or what may happen in the future.  Do guided imagery. Imagine yourself in any setting that helps you feel calm. You can use online videos, books, or a teacher to guide you.  Do breathing exercises. For example:  From a standing position, bend forward from the waist with your knees slightly bent. Let your arms dangle close to the floor.  Breathe in slowly and deeply as you return to a standing position. Roll up slowly and lift your head last.  Hold your breath for just a few seconds in the standing position.  Breathe out slowly and bend forward from the waist.  Let your feelings out. Talk, laugh, cry, and express anger when you need to. Talking with supportive friends or family, a counselor, or a ariana leader about your feelings is a healthy way to relieve stress. Avoid discussing your feelings with people who make you feel worse.  Write. It may help to write about things that are bothering you. This helps you find out how much stress you feel and what is causing it. When you know this, you can find better ways to cope.  What can you do to prevent stress?  You might try some of these things to help prevent stress:  Manage your time.  "This helps you find time to do the things you want and need to do.  Get enough sleep. Your body recovers from the stresses of the day while you are sleeping.  Get support. Your family, friends, and community can make a difference in how you experience stress.  Limit your news feed. Avoid or limit time on social media or news that may make you feel stressed.  Do something active. Exercise or activity can help reduce stress. Walking is a great way to get started.  Where can you learn more?  Go to https://www.[employer].net/patiented  Enter N032 in the search box to learn more about \"Learning About Stress.\"  Current as of: October 24, 2023               Content Version: 14.0    5567-2777 [employer].   Care instructions adapted under license by your healthcare professional. If you have questions about a medical condition or this instruction, always ask your healthcare professional. [employer] disclaims any warranty or liability for your use of this information.      Learning About Depression Screening  What is depression screening?  Depression screening is a way to see if you have depression symptoms. It may be done by a doctor or counselor. It's often part of a routine checkup. That's because your mental health is just as important as your physical health.  Depression is a mental health condition that affects how you feel, think, and act. You may:  Have less energy.  Lose interest in your daily activities.  Feel sad and grouchy for a long time.  Depression is very common. It affects people of all ages.  Many things can lead to depression. Some people become depressed after they have a stroke or find out they have a major illness like cancer or heart disease. The death of a loved one or a breakup may lead to depression. It can run in families. Most experts believe that a combination of inherited genes and stressful life events can cause it.  What happens during screening?  You may be asked to " "fill out a form about your depression symptoms. You and the doctor will discuss your answers. The doctor may ask you more questions to learn more about how you think, act, and feel.  What happens after screening?  If you have symptoms of depression, your doctor will talk to you about your options.  Doctors usually treat depression with medicines or counseling. Often, combining the two works best. Many people don't get help because they think that they'll get over the depression on their own. But people with depression may not get better unless they get treatment.  The cause of depression is not well understood. There may be many factors involved. But if you have depression, it's not your fault.  A serious symptom of depression is thinking about death or suicide. If you or someone you care about talks about this or about feeling hopeless, get help right away.  It's important to know that depression can be treated. Medicine, counseling, and self-care may help.  Where can you learn more?  Go to https://www.[employer].net/patiented  Enter T185 in the search box to learn more about \"Learning About Depression Screening.\"  Current as of: June 24, 2023               Content Version: 14.0    9347-6815 [employer].   Care instructions adapted under license by your healthcare professional. If you have questions about a medical condition or this instruction, always ask your healthcare professional. [employer] disclaims any warranty or liability for your use of this information.      "

## 2024-05-30 NOTE — PROGRESS NOTES
Preventive Care Visit  [facility]  Rosenda Pierre PA-C, Family Medicine  May 30, 2024    Assessment & Plan     ICD-10-CM    1. Encounter for Medicare annual wellness exam  Z00.00       2. Visit for screening mammogram  Z12.31 MA SCREENING DIGITAL BILAT - Future  (s+30)      3. Screen for colon cancer  Z12.11 Colonoscopy Screening  Referral      4. Coronary artery disease due to lipid rich plaque  I25.10 Lipid panel reflex to direct LDL Non-fasting    I25.83 Adult Cardiology Eval  Referral     rosuvastatin (CRESTOR) 20 MG tablet     Lipid panel reflex to direct LDL Non-fasting     OFFICE/OUTPT VISIT,EST,LEVL IV      5. Moderate major depression (H)  F32.1 QUEtiapine (SEROQUEL) 25 MG tablet     OFFICE/OUTPT VISIT,EST,LEVL IV      6. Mild persistent asthma without complication  J45.30 albuterol (PROAIR HFA/PROVENTIL HFA/VENTOLIN HFA) 108 (90 Base) MCG/ACT inhaler     OFFICE/OUTPT VISIT,EST,LEVL IV      7. Hypertension goal BP (blood pressure) < 140/90  I10 amLODIPine (NORVASC) 10 MG tablet     carvedilol (COREG) 12.5 MG tablet     lisinopril (ZESTRIL) 20 MG tablet     hydrALAZINE (APRESOLINE) 25 MG tablet     Basic metabolic panel  (Ca, Cl, CO2, Creat, Gluc, K, Na, BUN)     Basic metabolic panel  (Ca, Cl, CO2, Creat, Gluc, K, Na, BUN)     OFFICE/OUTPT VISIT,EST,LEVL IV      8. S/P reverse total shoulder arthroplasty, right 5/5/23  Z96.611 HYDROcodone-acetaminophen (NORCO) 5-325 MG tablet     OFFICE/OUTPT VISIT,EST,LEVL IV      9. Hypertension, unspecified type  I10 Adult Cardiology Eval  Referral     OFFICE/OUTPT VISIT,EST,LEVL IV      10. ST elevation myocardial infarction (STEMI), unspecified artery (H)  I21.3 carvedilol (COREG) 12.5 MG tablet     lisinopril (ZESTRIL) 20 MG tablet     hydrALAZINE (APRESOLINE) 25 MG tablet     OFFICE/OUTPT VISIT,EST,LEVL IV      11. Age-related osteoporosis with current pathological fracture with delayed healing,  subsequent encounter  M80.00XG       12. Chronic hyponatremia  E87.1       13. Closed nondisplaced intertrochanteric fracture of right femur, initial encounter (H)  S72.144A traMADol (ULTRAM) 50 MG tablet      14. Post-operative state  Z98.890 venlafaxine (EFFEXOR XR) 150 MG 24 hr capsule        Patient has been advised of split billing requirements and indicates understanding: Yes    Counseling  Appropriate preventive services were discussed with this patient, including applicable screening as appropriate for fall prevention, nutrition, physical activity, Tobacco-use cessation, weight loss and cognition.  Checklist reviewing preventive services available has been given to the patient.  Reviewed patient's diet, addressing concerns and/or questions.   She is at risk for lack of exercise and has been provided with information to increase physical activity for the benefit of her well-being.   She is at risk for psychosocial distress and has been provided with information to reduce risk.   The patient's PHQ-9 score is consistent with mild depression. She was provided with information regarding depression.     2 & 3. Patient to schedule mammogram and colonoscopy     - Patient wishing to get off some of her medications   - Extensive review of all medications including use and side effects   - Has already discontinued Boniva, appears only did this for a few months anyway   - Rest of medications cardiac related      Reviewed chart, appears was going to see cardiology last summer but then had another fall and break and this never happened      Needs to establish with cardiology and discuss medication changes with them      Referral placed     - Pain     Still from her back, hip, and shoulder on and off     Partner reports using Tramadol but sometimes not enough so uses Hydrocodone only at night and doesn't mix      Will allow to continue this       reviewed no concerns      Reviewed use and side effects including not  mixing      OK for refills as needed     - Mood      Still reports some struggling       Wishes to restart Wellbutrin, but this was stopped due to delirium       Not sleeping well       Discussed possibly trying Seroquel as she was on this in the past and tapered off when in the TCU      Will touch base with her neurologist to make sure on board      Reviewed use and side effects including risks     - Reviewed all rest of medications including use and side effects       Review of the result(s) of each unique test - See list          5/10/24 - all labs        11/6/23 - BMP, Iron, Ferritin   Diagnosis or treatment significantly limited by social determinants of health - None     50 minutes spent on the date of the encounter doing chart review, history and exam, documentation and further activities as noted above    The patient indicates understanding of these issues and agrees with the plan.    Follow up: 1 month     Jose Francisco Varma-FLEX Saravia  [facility] [place]         Julio Cesar Fierro is a 74 year old, presenting for the following:  Physical        5/30/2024     9:46 AM   Additional Questions   Roomed by Wendy   Accompanied by Friend: Giovanni         5/30/2024     9:46 AM   Patient Reported Additional Medications   Patient reports taking the following new medications NA     Health Care Directive  Patient has a Health Care Directive on file  Advance care planning document is on file and is current.    HPI    - GI consult for 6/28  - Found out constipation due to iron use   - Hydrocodone given in ED      Using sparingly, if wakes up from pain while in bed will take one to get back to sleep      <1 per week      Filled rx for ulcers but hasn't used   - Tramadol - as needed - 1 every 2 weeks      Never mixes with Hydrocodone   - Tizanidine hasn't used in awhile   - Waking up during the night     - Neurology, had consult      Got on Ubrevly but hasn't taken, expensive        - Inhaler for breathing,  rarely      Used Bryson's and really helped      Seems to be from allergies     - Get rid of any medications?     - Have not been taking Boniva     Sodium over the counter, 1 gm BID               5/30/2024   General Health   How would you rate your overall physical health? (!) FAIR   Feel stress (tense, anxious, or unable to sleep) Only a little   (!) STRESS CONCERN      5/30/2024   Nutrition   Diet: Regular (no restrictions)         5/30/2024   Exercise   Days per week of moderate/strenous exercise 2 days   (!) EXERCISE CONCERN      5/30/2024   Social Factors   Frequency of gathering with friends or relatives Three times a week   Worry food won't last until get money to buy more No   Food not last or not have enough money for food? No   Do you have housing?  Yes   Are you worried about losing your housing? No   Lack of transportation? No   Unable to get utilities (heat,electricity)? No         5/30/2024   Fall Risk   Fallen 2 or more times in the past year? No    No   Trouble with walking or balance? No    No          5/30/2024   Activities of Daily Living- Home Safety   Needs help with the following daily activites None of the above   Safety concerns in the home None of the above         5/30/2024   Dental   Dentist two times every year? Yes         5/30/2024   Hearing Screening   Hearing concerns? None of the above         5/30/2024   Driving Risk Screening   Patient/family members have concerns about driving No         5/30/2024   General Alertness/Fatigue Screening   Have you been more tired than usual lately? No         5/30/2024   Urinary Incontinence Screening   Bothered by leaking urine in past 6 months No          Today's PHQ-9 Score:       5/30/2024     9:29 AM   PHQ-9 SCORE   PHQ-9 Total Score MyChart 7 (Mild depression)   PHQ-9 Total Score 7         5/30/2024   Substance Use   Alcohol more than 3/day or more than 7/wk No   Do you have a current opioid prescription? No   How severe/bad is pain from 1 to  10? 3/10   Do you use any other substances recreationally? No     Social History     Tobacco Use    Smoking status: Never     Passive exposure: Never    Smokeless tobacco: Never   Vaping Use    Vaping status: Never Used   Substance Use Topics    Alcohol use: Yes     Comment: socially    Drug use: No         2/9/2022   LAST FHS-7 RESULTS   1st degree relative breast or ovarian cancer No   Any relative bilateral breast cancer No   Any male have breast cancer No   Any ONE woman have BOTH breast AND ovarian cancer No   Any woman with breast cancer before 50yrs No   2 or more relatives with breast AND/OR ovarian cancer No   2 or more relatives with breast AND/OR bowel cancer No     Mammogram Screening - Mammogram every 1-2 years updated in Health Maintenance based on mutual decision making    ASCVD Risk   The ASCVD Risk score (Herson MCCLURE, et al., 2019) failed to calculate for the following reasons:    The patient has a prior MI or stroke diagnosis      Reviewed and updated as needed this visit by Provider   Tobacco  Allergies  Meds  Problems  Med Hx  Surg Hx  Fam Hx            Past Medical History:   Diagnosis Date    Abnormal Papanicolaou smear of cervix and cervical HPV     Allergic rhinitis, cause unspecified     seasonal allergies    Cerebral infarction (H)     Contact dermatitis and other eczema, due to unspecified cause     Endometriosis, site unspecified     Esophageal reflux     GERD    Migraine, unspecified, without mention of intractable migraine without mention of status migrainosus     Mild persistent asthma     Unspecified disorder of uterus     fibroid uterus     Past Surgical History:   Procedure Laterality Date    COLONOSCOPY  5/30/2014    Procedure: COLONOSCOPY;  Surgeon: Nathan Baker MD;  Location: [facility]    OPEN REDUCTION INTERNAL FIXATION ELBOW Right 6/1/2023    Procedure: REVISION OPEN REDUCTION INTERNAL FIXATION RIGHT OLECRANON;  Surgeon: Williams Phipps MD;  Location: [facility]    OPEN  REDUCTION INTERNAL FIXATION HIP NAILING Right 2023    Procedure: INTERNAL FIXATION, FRACTURE, TROCHANTERIC,RIGHT HIP, USING GAMMA RODS;  Surgeon: Nathan Turner MD;  Location: [facility]    OPEN REDUCTION INTERNAL FIXATION HUMERUS DISTAL Right 5/3/2023    Procedure: OPEN REDUCTION INTERNAL FIXATION, FRACTURE, HUMERUS, DISTAL;  Surgeon: Williams Phipps MD;  [facility]    REMOVE HARDWARE ELBOW Right 2023    Procedure: HARDWARE REMOVAL RIGHT OLECRANON;  Surgeon: Williams Phipps MD;  [facility]    REVERSE ARTHROPLASTY SHOULDER Right 2023    Procedure: ARTHROPLASTY, SHOULDER, TOTAL, REVERSE for;  Surgeon: Cierra Krause MD;  Location: [facility]    ZZC  DELIVERY ONLY       X2    ZZHC COLONOSCOPY THRU STOMA, DIAGNOSTIC  2002    normal     Lab work is in process  Labs reviewed in EPIC  BP Readings from Last 3 Encounters:   24 114/70   05/10/24 (!) 146/79   24 122/64    Wt Readings from Last 3 Encounters:   24 55.1 kg (121 lb 8 oz)   05/10/24 59 kg (130 lb)   24 53.1 kg (117 lb)                  Current providers sharing in care for this patient include:  Patient Care Team:  Rosenda Pierre PA-C as PCP - General (Physician Assistant - Medical)  Rosenda Pierre PA-C as Assigned PCP  Miryam Mcgowan MD as MD (Neurology)  Miryam Mcgowan MD as Assigned Neuroscience Provider  Williams Phipps MD as Assigned Musculoskeletal Provider  Felix Ni, PhD LP as Assigned Behavioral Health Provider  Li White APRN CNP as Nurse Practitioner (Gastroenterology)    The following health maintenance items are reviewed in Epic and correct as of today:  Health Maintenance   Topic Date Due    RSV VACCINE (Pregnancy & 60+) (1 - 1-dose 60+ series) Never done    ASTHMA ACTION PLAN  2020    COVID-19 Vaccine (2023- season) 2023    MEDICARE ANNUAL WELLNESS VISIT  2024    LIPID  2024    MAMMO SCREENING   "02/09/2024    COLORECTAL CANCER SCREENING  05/30/2024    ANNUAL REVIEW OF HM ORDERS  06/15/2024    ASTHMA CONTROL TEST  11/30/2024    PHQ-9  11/30/2024    BMP  05/10/2025    FALL RISK ASSESSMENT  05/30/2025    GLUCOSE  05/10/2027    ADVANCE CARE PLANNING  11/10/2028    DTAP/TDAP/TD IMMUNIZATION (3 - Td or Tdap) 09/12/2033    DEXA  11/09/2033    HEPATITIS C SCREENING  Completed    DEPRESSION ACTION PLAN  Completed    MIGRAINE ACTION PLAN  Completed    INFLUENZA VACCINE  Completed    Pneumococcal Vaccine: 65+ Years  Completed    ZOSTER IMMUNIZATION  Completed    IPV IMMUNIZATION  Aged Out    HPV IMMUNIZATION  Aged Out    MENINGITIS IMMUNIZATION  Aged Out    RSV MONOCLONAL ANTIBODY  Aged Out         Review of Systems  Constitutional, neuro, ENT, endocrine, pulmonary, cardiac, gastrointestinal, genitourinary, musculoskeletal, integument and psychiatric systems are negative, except as otherwise noted.     Objective    Exam  /70   Pulse 70   Temp 99  F (37.2  C) (Temporal)   Resp 18   Ht 1.525 m (5' 0.04\")   Wt 55.1 kg (121 lb 8 oz)   LMP 11/09/2005 (Approximate)   SpO2 96%   BMI 23.70 kg/m     Estimated body mass index is 23.7 kg/m  as calculated from the following:    Height as of this encounter: 1.525 m (5' 0.04\").    Weight as of this encounter: 55.1 kg (121 lb 8 oz).    Physical Exam  Constitutional:       General: She is not in acute distress.     Appearance: She is well-developed.   HENT:      Right Ear: External ear normal. No middle ear effusion. There is no impacted cerumen. Tympanic membrane is not injected, perforated, erythematous or bulging.      Left Ear: External ear normal.  No middle ear effusion. There is no impacted cerumen. Tympanic membrane is not injected, perforated, erythematous or bulging.      Nose: Nose normal. No mucosal edema or rhinorrhea.      Mouth/Throat:      Dentition: Normal dentition.      Tongue: No lesions. Tongue does not deviate from midline.      Palate: No lesions. "      Pharynx: No pharyngeal swelling, oropharyngeal exudate or posterior oropharyngeal erythema.      Tonsils: No tonsillar exudate or tonsillar abscesses.   Eyes:      General: Lids are normal.         Right eye: No discharge.         Left eye: No discharge.      Conjunctiva/sclera: Conjunctivae normal.      Right eye: Right conjunctiva is not injected.      Left eye: Left conjunctiva is not injected.      Pupils: Pupils are equal, round, and reactive to light.   Neck:      Thyroid: No thyroid mass or thyromegaly.      Vascular: No JVD.      Trachea: Trachea normal. No tracheal deviation.   Cardiovascular:      Rate and Rhythm: Normal rate and regular rhythm.      Heart sounds: Normal heart sounds. No murmur heard.     No friction rub. No gallop.   Pulmonary:      Effort: Pulmonary effort is normal. No respiratory distress.      Breath sounds: Normal breath sounds. No stridor or decreased air movement. No wheezing, rhonchi or rales.   Abdominal:      General: Bowel sounds are normal. There is no distension.      Palpations: Abdomen is soft. There is no mass.      Tenderness: There is no abdominal tenderness. There is no guarding or rebound.      Hernia: No hernia is present.   Musculoskeletal:         General: Normal range of motion.      Cervical back: Normal range of motion and neck supple.   Lymphadenopathy:      Cervical: No cervical adenopathy.   Skin:     General: Skin is warm and dry.   Neurological:      Mental Status: She is alert and oriented to person, place, and time.   Psychiatric:         Behavior: Behavior normal.         Thought Content: Thought content normal.         Judgment: Judgment normal.         Diagnostics: reviewed in Epic         5/30/2024   Mini Cog   Mini-Cog Not Completed (choose reason) Known dementia       Signed Electronically by: Rosenda Pierre PA-C

## 2024-06-04 ENCOUNTER — TELEPHONE (OUTPATIENT)
Dept: GASTROENTEROLOGY | Facility: CLINIC | Age: 74
End: 2024-06-04
Payer: MEDICARE

## 2024-06-04 DIAGNOSIS — Z12.11 SPECIAL SCREENING FOR MALIGNANT NEOPLASMS, COLON: Primary | ICD-10-CM

## 2024-06-04 NOTE — TELEPHONE ENCOUNTER
"Endoscopy Scheduling Screen    Have you had a positive Covid test in the last 14 days?  No    What is your communication preference for Instructions and/or Bowel Prep?   Mail/USPS    What insurance is in the chart?  Other:  MEDICARE / [employer]    Ordering/Referring Provider:   DAILA CORMIER        (If ordering provider performs procedure, schedule with ordering provider unless otherwise instructed. )    BMI: Estimated body mass index is 23.7 kg/m  as calculated from the following:    Height as of 5/30/24: 1.525 m (5' 0.04\").    Weight as of 5/30/24: 55.1 kg (121 lb 8 oz).     Sedation Ordered  moderate sedation.   If patient BMI > 50 do not schedule in [facility].    If patient BMI > 45 do not schedule at [facility].    Are you taking methadone or Suboxone?  No    Have you had difficulties, pain, or discomfort during past endoscopy procedures?  No    Are you taking any prescription medications for pain 3 or more times per week?   NO, No RN review required.    Do you have a history of malignant hyperthermia?  No    (Females) Are you currently pregnant?   No     Have you been diagnosed or told you have pulmonary hypertension?   No    Do you have an LVAD?  No    Have you been told you have moderate to severe sleep apnea?  No    Have you been told you have COPD, asthma, or any other lung disease?  No    Do you have any heart conditions?  No     Have you ever had or are you waiting for an organ transplant?  No. Continue scheduling, no site restrictions.    Have you had a stroke or transient ischemic attack (TIA aka \"mini stroke\" in the last 6 months?   No    Have you been diagnosed with or been told you have cirrhosis of the liver?   No    Are you currently on dialysis?   No    Do you need assistance transferring?   No    BMI: Estimated body mass index is 23.7 kg/m  as calculated from the following:    Height as of 5/30/24: 1.525 m (5' 0.04\").    Weight as of 5/30/24: 55.1 kg (121 lb 8 oz).     Is patients BMI > 40 " and scheduling location [facility]?  No    Do you take an injectable medication for weight loss or diabetes (excluding insulin)?  No    Do you take the medication Naltrexone?  No    Do you take blood thinners?  Yes     Are you taking Effient/Prasugrel?  No, you must contact your prescribing provider for direction on holding or bridging with a different medication.       Prep   Are you currently on dialysis or do you have chronic kidney disease?  No    Do you have a diagnosis of diabetes?  No    Do you have a diagnosis of cystic fibrosis (CF)?  No    On a regular basis do you go 3 -5 days between bowel movements?  No    BMI > 40?  No    Preferred Pharmacy:    [id number] - [place], [place] [facility] [address] [facility] [address]  [address] [address]  Phone: [phone] Fax: [phone]    Final Scheduling Details     Procedure scheduled  Colonoscopy    Surgeon:  MIRIAM     Date of procedure:  07/23/2024     Pre-OP / PAC:   No - Not required for this site.    Location  [facility] - Patient preference.    Sedation   MAC/Deep Sedation  Per Location      Patient Reminders:   You will receive a call from a Nurse to review instructions and health history.  This assessment must be completed prior to your procedure.  Failure to complete the Nurse assessment may result in the procedure being cancelled.      On the day of your procedure, please designate an adult(s) who can drive you home stay with you for the next 24 hours. The medicines used in the exam will make you sleepy. You will not be able to drive.      You cannot take public transportation, ride share services, or non-medical taxi service without a responsible caregiver.  Medical transport services are allowed with the requirement that a responsible caregiver will receive you at your destination.  We require that drivers and caregivers are confirmed prior to your procedure.

## 2024-06-04 NOTE — LETTER
2024      Sri Grewal  [address] [facility] [id number]              Dear Mat Fierro Extended Bowel Prep   Prep instructions for your colonoscopy   For prep questions, please call: [address] [place], [place] [address] - [phone]  [facility] to [facility]    Please read these instructions carefully at least 7 days prior to your colonoscopy procedure. Be sure to follow all directions completely. The inside of your colon must be clean to allow for a complete examination for the presence of any growths, polyps, and/or abnormalities, as well as their biopsy or removal. A number of tips are included in order to make this part of the procedure as comfortable as possible.    Getting ready      prescription at the pharmacy. Endoscopy team will order this about 2 weeks before to your scheduled procedure. Included in the kit will be the followin  Dulcolax (Bisacodyl) 5mg tablets  Two containers of Polyethylene glycol (Golytely, Colyte, Nulytely, Gavilyte-G)    A nurse will call you to go over your appointment details and prep instructions. Not completing the nurse call could result with your appointment being cancelled.    You must arrange for an adult to drive you home after your exam. Your colonoscopy cannot be done unless you have a ride. If you need to use public transportation, someone must ride with you and stay with you for up to 24 hours.       7 days before procedure     Consult with your prescribing provider about stopping any:     Diabetic/Weight Loss Injectable Medication GLP-1 agonist (such as Exenatide (Byetta, Bydureon),  Mounjaro (Tirzepatide).  Ozempic (Semaglutide). Semaglutide. Symlin (Pamlintide), Tanzeum (Albiglutide). Tirzepatide-Weight Management (Zepbound), Trulicity (Dulaglutide), Victoza (Saxenda, Liraglutide), Wegovy (Semaglutide) please follow below guidelines for holding:    For weekly injection HOLD 7 days  before procedure.  For once or twice a day injection HOLD the day before procedure and day of procedure.  For oral, daily dosing (Rybelsus) HOLD 7 days before procedure.    Blood thinning and/or anti-platelet medications: such as Coumadin, Plavix, Xarelto, Eliquis, Lovenox or others, these medications may need to be stopped temporarily before your procedure.     If you take insulin for diabetes, ask your prescribing provider for instructions on how to manage this medication while preparing for a colonoscopy.     NSAIDs medications such as Sulindac, Celebrex, Mobic, Relafen or others may need to be stopped before the procedure. This will be discussed during nurse review call, or you can reach out to your prescribing provider.      Stop taking iron (ferrous sulfate), multivitamins that contain iron, and/or fiber supplements (Metamucil, Benefiber, Psyllium husk powder, Fibercon, Bran, etc.).      Stop eating whole kernel corn, popcorn, nuts, and foods that contain seeds. These can stay in the colon for many days, and they can clog up the colonoscope.    Begin a low-fiber diet. (See examples below). No Olestra (a fat substitute).   Consume no more than 10-15 grams of fiber each day.         LOW FIBER DIET   You may have: Do not have:    Starches: White bread, rolls, biscuits, croissants, [place] toast, white flour tortillas, waffles, pancakes, Turkish toast; white rice, noodles, pasta, macaroni; cooked and peeled potatoes; plain crackers, saltines; cooked farina or cream of rice; puffed rice, corn flakes, Rice Krispies, Special K      Vegetables: tender cooked and canned, vegetable broths     Fruits and fruit juices: Strained fruit juice, canned fruit without seeds or skin (not pineapple), applesauce, pear sauce, ripe bananas, melons (not watermelon)     Milk products: Milk (plain or flavored), cheese, cottage cheese, yogurt (no berries), custard, ice cream       Proteins: Tender, well-cooked ground beef, lamb, veal, ham,  pork, chicken, turkey, fish or organ meat, Tofu, eggs, creamy peanut butter      Fats and condiments: Margarine, butter, oils, mayonnaise, sour cream, salad dressing, plain gravy; spices, cooked herbs; sugar, clear jelly, honey, syrup      Snacks, sweets and drinks: Pretzels, hard candy; plain cakes and cookies (no nuts or seeds); gelatin, plain pudding, sherbet, Popsicles; coffee, tea, carbonated ( fizzy ) drinks     Starches: Breads or rolls that contain nuts, seeds or fruit; whole wheat or whole grain breads that contain more than 2 grams of fiber per serving; cornbread; corn or whole wheat tortillas; potatoes with skin; brown rice, wild rice, quinoa, kasha (buckwheat), and oatmeal      Vegetables: Any raw or steamed vegetables; vegetables with seeds; corn in any form      Fruits and fruit juices: Prunes, prune juice, raisins and other dried fruits, berries and other fruits with seeds, canned pineapple juices with pulp such as orange, grapefruit, pineapple or tomato juice     Milk products: Any yogurt with nuts, seeds or berries      Proteins: Tough, fibrous meats with gristle; cooked dried beans, peas or lentils; crunchy peanut butter     Fats and condiments: Pickles, olives, relish, horseradish; jam, marmalade, preserves      Snacks, sweets and drinks: Popcorn, nuts, seeds, granola, coconut, candies made with nuts or seeds; all desserts that contain nuts, seeds, raisins and other dried fruits, coconut, whole grains or bran.       2 days before procedure       Start a clear liquid diet AFTER 1 PM. Do not eat solid foods. (See examples below).    Drink at least eight to ten 8-ounce glasses of water throughout the day  ? ? ? ? ? ? ? ?    Fill one container of Golytely with a full gallon of warm water. Cover and shake until well mixed. Chill in refrigerator until it is time to drink solution.     CLEAR LIQUID DIET:  You can have: Do not have:    Water, tea, coffee (no milk or cream)   Soda pop, Gatorade (not red or  purple)   Coconut water   Jell-O, Popsicles (no milk or fruit pieces - not red or purple)   Fat-free soup broth or bouillon   Plain hard candy, such as clear life savers (not red or purple)   Clear juices and fruit-flavored drinks, such as apple juice, white grape juice, Hi-C, and Wilner-Aid (not red or purple)    Milk or milk products such as ice cream, malts or shakes, or coffee creamer   Red or purple drinks of any kind such as cranberry juice, grape juice, or Wilner-Aid. Avoid red or purple Jell-O, Popsicles, sorbet, sherbet and candy.   Juices with pulp such as orange, grapefruit, pineapple, or tomato juice   Cream soups of any kind   Alcohol and beer   Protein drinks or protein powder     Step 1     At 4 PM, take 2 Dulcolax (Bisacodyl) tablets.  At 5 PM, start drinking one 8-ounce glass of Golytely mixture every 15 minutes until the container is HALF empty. Drink each glass quickly. Store the rest in the refrigerator.   Continue to drink clear liquids.      Reminders While Drinking Laxatives:     After you start drinking the solution, stay near a toilet. You may have watery stools (diarrhea), mild cramping, bloating, and nausea. You may want to use Vaseline on the skin around your anus after each bowel movement or use wet wipes to prevent irritation. Bowel movements will be liquid and dark in color at first and then should turn clear yellow in color. Drinking the prepared solution may make you cold, wearing warm clothing can be helpful.    Some find it helpful to:  For added flavor, include a crystal light lemonade packet in each glass of Golytely, rather than mixing it into the entire prepared mixture.  In between each glass, try sucking on a lemon or a piece of hard candy to help diminish the flavor of the preparation.  Drinking from a straw can help minimize the taste of the prepared mixture.    If you have nausea or vomiting during drinking the solution, rinse your mouth with water and take a 15-30 minute  break and then continue drinking solution.         1 day before procedure       Continue on a clear liquid diet. Do not eat solid foods.    Drink at least eight to ten 8-ounce glasses of water throughout the day  ? ? ? ? ? ? ? ?    Stop taking any NSAIDs pain relievers such as Advil, Ibuprofen, Motrin, etc. You may take Tylenol.      Step 2     At 4 PM, take 2 Dulcolax (Bisacodyl) tablets.  At 5 PM, start drinking the 2nd half of Golytely mixture. Drink one 8-ounce glass of Golytely mixture every 15 minutes until the container is empty.  Drink each glass quickly.   Continue to drink clear liquids.    Before you go to bed mix the second container of Golytely and place in the refrigerator for the morning.     Day of procedure     Step 3     6 hours before your arrival time, start drinking one 8-ounce glass of Golytely mixture every 15 minutes until the container is HALF empty. You will not drink the entire container. The remaining solution can be discarded.     2 hours before your arrival time stop drinking all liquids, including water.   Do not smoke or swallow anything, including water or gum for at least 2 hours before your arrival time. This is a safety issue. Your procedure could be cancelled if you do not follow directions.  No chewing tobacco 6 hours prior to procedure arrival time.     You may take your necessary morning medications with sips of water (4 ounces).   Do not take diabetes medicine by mouth until after your exam.  If you have asthma, bring your inhalers.  Please perform your nebulizer treatments and airway clearance therapy in the morning prior to the procedure (if applicable).    Arrive with a responsible adult who can drive you home and stay with you for a minimum of 24 hours. The medications used during the procedure will make you sleepy, so you won't be able to drive yourself home.   You cannot use public transportation, ride-share services, or non-medical taxi services without a responsible  caregiver. Medical transport services are okay, but a caregiver must be there to receive you at your destination.  Please check in with your  when you arrive. Drivers should stay on campus.    Expect to be at the procedure center for about 1.5-2.5 hours.    Do not wear jewelry (i.e. earrings, rings, necklaces, watches, etc.). Leave your purse, billfold, credit cards, and other valuables at home.      Bring insurance card and ID.         Answers to Commonly Asked Questions     How soon can I eat after the procedure?  You may resume your normal diet when you feel ready, unless advised otherwise by the doctor performing your procedure. We recommend starting with a light meal.   Do not drink alcohol for 24 hours after your procedure.  You may resume normal activities (work, exercise, etc.) after 24 hours.    How will I feel after the procedure?  It is normal to feel bloated and gassy after your procedure. Walking will help move the air through your colon. You can take non-aspirin pain relievers that contain acetaminophen (Tylenol).  If you are having sedation, we require a responsible adult to take you home for your safety. The sedation medicines used to relax you during the procedure can impair your judgement and reaction time and make you forgetful and possibly a little unsteady.  Do not drive, make any important decisions, or sign any legal documents for 24 hours after your procedure.    When will I get my test results?  You should have your procedure results and any lab results (if applicable) by letter, MyChart message, or phone call within 2 weeks. If you have any questions, please call the doctor that referred you for the procedure.    How do I know if my colon is cleaned out?   After completing the bowel prep, your bowel movements should be all liquid and yellow. Your bowel movements will look similar to urine in the toilet. If there are pieces of stool (poop) in the toilet, or if you can't see to the  bottom of the toilet, please call our office for advice. Call [phone] and ask to speak with a nurse.    Why is the Golytely bowel prep taken in several steps?   The stool is flushed out by a large wave of fluid going through the colon. Just sipping a large volume of the solution will not achieve the desired result. Studies have shown that two smaller waves (or more in some cases) are better than one large one.      What if I need to cancel or reschedule my procedure?  Contact our endoscopy scheduling team at [phone], option 2. Monday through Friday, 7:00am-5:00pm.

## 2024-06-10 ENCOUNTER — HOSPITAL ENCOUNTER (OUTPATIENT)
Dept: MAMMOGRAPHY | Facility: CLINIC | Age: 74
Discharge: HOME | End: 2024-06-10
Attending: PHYSICIAN ASSISTANT | Admitting: PHYSICIAN ASSISTANT
Payer: MEDICARE

## 2024-06-10 DIAGNOSIS — Z12.31 VISIT FOR SCREENING MAMMOGRAM: ICD-10-CM

## 2024-06-10 PROCEDURE — 77063 BREAST TOMOSYNTHESIS BI: CPT

## 2024-06-25 ENCOUNTER — OFFICE VISIT (OUTPATIENT)
Dept: CARDIOLOGY | Facility: CLINIC | Age: 74
End: 2024-06-25
Attending: PHYSICIAN ASSISTANT
Payer: MEDICARE

## 2024-06-25 VITALS
RESPIRATION RATE: 16 BRPM | OXYGEN SATURATION: 96 % | WEIGHT: 122 LBS | BODY MASS INDEX: 23.95 KG/M2 | DIASTOLIC BLOOD PRESSURE: 78 MMHG | HEART RATE: 82 BPM | HEIGHT: 60 IN | SYSTOLIC BLOOD PRESSURE: 136 MMHG

## 2024-06-25 DIAGNOSIS — R01.1 HEART MURMUR: Primary | ICD-10-CM

## 2024-06-25 DIAGNOSIS — I25.10 CORONARY ARTERY DISEASE DUE TO LIPID RICH PLAQUE: ICD-10-CM

## 2024-06-25 DIAGNOSIS — I25.83 CORONARY ARTERY DISEASE DUE TO LIPID RICH PLAQUE: ICD-10-CM

## 2024-06-25 DIAGNOSIS — I10 HYPERTENSION, UNSPECIFIED TYPE: ICD-10-CM

## 2024-06-25 PROCEDURE — 99204 OFFICE O/P NEW MOD 45 MIN: CPT | Performed by: INTERNAL MEDICINE

## 2024-06-25 PROCEDURE — G2211 COMPLEX E/M VISIT ADD ON: HCPCS | Performed by: INTERNAL MEDICINE

## 2024-06-25 RX ORDER — BISACODYL 5 MG/1
TABLET, DELAYED RELEASE ORAL
Qty: 4 TABLET | Refills: 0 | Status: SHIPPED | OUTPATIENT
Start: 2024-06-25

## 2024-06-25 ASSESSMENT — PAIN SCALES - GENERAL: PAINLEVEL: NO PAIN (0)

## 2024-06-25 NOTE — LETTER
6/25/2024    Rosenda Pierre PA-C  [address] [facility] [address]  [facility] [address]    RE: Sri Grewal       Dear Colleague,     I had the pleasure of seeing Sri Grewal in the [facility].  HISTORY:    Sri Grewal is a pleasant 74-year-old female accompanied by her good friend Bryson.  She was asked to see me reasons that are not completely clear, apparently because she carries a diagnosis of previous myocardial infarction.    Review of the chart shows that Sri was admitted in 2005 with what was felt to be Takotsubo syndrome.  Angiogram at that time showed no significant disease.  She was readmitted to [facility] in June 2021 with EKG changes suggesting MI.  She underwent coronary angiography which demonstrated only trivial disease and her audiologist felt that she had Takotsubo once again.  Her presentation on that admission was unusual in that she had basically an encephalopathy.  She was found in her home standing in the bathroom confused.  Unfortunately I am unable to find a discharge summary that gives together the various information on the chart, but she had an MRI at that time showing multiple small vessel infarcts suggesting a embolic source.  A long-term monitor for atrial fibs did not find any arrhythmias.    At this time Sri reports no cardiac symptoms.  She does not have any chest pain with exertion or at rest and she has not had palpitations, PND/orthopnea, syncope or near syncope, new strokelike symptoms, peripheral edema, or symptoms of claudication.  She states that she does not get short of breath with activity but feels that she tires easily.  Bryson, her friend, reports that she remains confused at times and she has been admitted at least once outside hospital with aphasia since her initial presentation, and she has ongoing memory loss which apparently has not been well-defined (or cause found).    ECG done recently shows normal sinus rhythm with left anterior  fascicular block.  An echo last completed in April 2023 showed normal LV function and was essentially normal.  Back in June 2021 on presentation her EF was less than 30%.      ASSESSMENT/PLAN:    1.  Recurrent Takotsubo in 2005 and 2021 with full recovery of her LV function.  She has not had a myocardial infarct and had essentially normal coronary arteries at the time of her angiogram in 2021.  To prevent regression of her LV function I would suggest continuing current medications which include carvedilol and lisinopril from a cardiomyopathy standpoint.  2.  Hypertension well-controlled on current medications.  3.  By exam today she has a 2/6 systolic murmur consistent with aortic stenosis.  Her last echo done just a year and a half ago did not show significant aortic stenosis but I will have this repeated given her fairly loud murmur.  If that study is negative no further cardiology evaluation is needed.  If it is positive we may need to follow-up with serial echocardiograms over the years.    Thank you for inviting me to participate in the care of your patient.  Please don't hesitate to call if I can be of further assistance.  45 minutes were spent today reviewing the chart and other records, interviewing and examining the patient, and documenting our visit.    The longitudinal plan of care for the diagnosis(es)/condition(s) as documented were addressed during this visit. Due to the added complexity in care, I will continue to support Sri in the subsequent management and with ongoing continuity of care.     Chart documentation was completed, in part, with [employer] voice-recognition software. Even though reviewed, some grammatical, spelling, and word errors may remain.       Orders Placed This Encounter   Procedures    Echocardiogram Complete     No orders of the defined types were placed in this encounter.    There are no discontinued medications.    10 year ASCVD risk: The ASCVD Risk score ([place] DK, et al.,  2019) failed to calculate for the following reasons:    The patient has a prior MI or stroke diagnosis    Encounter Diagnoses   Name Primary?    Coronary artery disease due to lipid rich plaque     Hypertension, unspecified type     Heart murmur Yes       CURRENT MEDICATIONS:  Current Outpatient Medications   Medication Sig Dispense Refill    albuterol (PROAIR HFA/PROVENTIL HFA/VENTOLIN HFA) 108 (90 Base) MCG/ACT inhaler Inhale 2 puffs into the lungs every 6 hours as needed for shortness of breath, wheezing or cough 18 g 3    amLODIPine (NORVASC) 10 MG tablet Take 1 tablet (10 mg) by mouth daily 90 tablet 3    bisacodyl (DULCOLAX) 5 MG EC tablet Two days prior to exam take two (2) tablets at 4pm. One day prior to exam take two (2) tablets at 4pm 4 tablet 0    carvedilol (COREG) 12.5 MG tablet TAKE 1 TABLET BY MOUTH TWICE DAILY WITH FOOD 180 tablet 3    clopidogrel (PLAVIX) 75 MG tablet TAKE 1 TABLET(75 MG) BY MOUTH DAILY 90 tablet 1    hydrALAZINE (APRESOLINE) 25 MG tablet Take 1 tablet (25 mg) by mouth 3 times daily 90 tablet 11    HYDROcodone-acetaminophen (NORCO) 5-325 MG tablet Take 1 tablet by mouth every 6 hours as needed for severe pain (Do not mix with Tramadol) 45 tablet 0    lisinopril (ZESTRIL) 20 MG tablet Take 2 tablets (40 mg) by mouth daily 180 tablet 3    melatonin 1 MG TABS tablet Take 2 tablets (2 mg) by mouth at bedtime      QUEtiapine (SEROQUEL) 25 MG tablet Take 1 tablet (25 mg) by mouth at bedtime 30 tablet 1    rosuvastatin (CRESTOR) 20 MG tablet Take 1 tablet (20 mg) by mouth daily 90 tablet 3    sodium chloride 1 GM tablet Take 1 tablet (1 g) by mouth 2 times daily (with meals) 60 tablet 0    tiZANidine (ZANAFLEX) 2 MG tablet Take 0.5-1 tablets (1-2 mg) by mouth 3 times daily as needed for muscle spasms 90 tablet 3    traMADol (ULTRAM) 50 MG tablet Take 1 tablet (50 mg) by mouth every 4 hours as needed for moderate pain (Do not mix with Hydrocodone) 90 tablet 0    venlafaxine (EFFEXOR XR)  150 MG 24 hr capsule Take 1 capsule (150 mg) by mouth daily 90 capsule 3    polyethylene glycol (GOLYTELY) 236 g suspension Two days before procedure at 5PM fill first container with water. Mix and drink an 8 oz glass every 10-15 minutes until HALF of the container is gone. Place the remainder in the refrigerator. One day before procedure at 5PM drink second half of bowel prep. Drink an 8 oz glass every 10-15 minutes until it is gone. Day of procedure 6 hours before arrival time fill the 2nd container with water. Mix and drink an 8 oz glass every 10-15 minutes until HALF of the container is gone. Discard the remaining solution. (Patient not taking: Reported on 6/25/2024) 8000 mL 0    ubrogepant (UBRELVY) 50 MG tablet Take 1 tablet (50 mg) by mouth at onset of headache (May repeat ose after 2 hours if heasdache persists. max 2 pills a day) (Patient not taking: Reported on 6/25/2024) 16 tablet 11       ALLERGIES     Allergies   Allergen Reactions    Morphine And Codeine      GI UPSET    Oxycodone     Seasonal Allergies        PAST MEDICAL HISTORY:  Past Medical History:   Diagnosis Date    Abnormal Papanicolaou smear of cervix and cervical HPV     Allergic rhinitis, cause unspecified     seasonal allergies    Cerebral infarction (H)     Contact dermatitis and other eczema, due to unspecified cause     Endometriosis, site unspecified     Esophageal reflux     GERD    Migraine, unspecified, without mention of intractable migraine without mention of status migrainosus     Mild persistent asthma     Unspecified disorder of uterus     fibroid uterus       PAST SURGICAL HISTORY:  Past Surgical History:   Procedure Laterality Date    COLONOSCOPY  5/30/2014    Procedure: COLONOSCOPY;  Surgeon: Nathan Baker MD;  Location: [facility]    OPEN REDUCTION INTERNAL FIXATION ELBOW Right 6/1/2023    Procedure: REVISION OPEN REDUCTION INTERNAL FIXATION RIGHT OLECRANON;  Surgeon: Williams Phipps MD;  Location: [facility]    OPEN REDUCTION INTERNAL  FIXATION HIP NAILING Right 2023    Procedure: INTERNAL FIXATION, FRACTURE, TROCHANTERIC,RIGHT HIP, USING GAMMA RODS;  Surgeon: Nathan Turner MD;  Location: [facility]    OPEN REDUCTION INTERNAL FIXATION HUMERUS DISTAL Right 5/3/2023    Procedure: OPEN REDUCTION INTERNAL FIXATION, FRACTURE, HUMERUS, DISTAL;  Surgeon: Williams Phipps MD;  [facility]    REMOVE HARDWARE ELBOW Right 2023    Procedure: HARDWARE REMOVAL RIGHT OLECRANON;  Surgeon: Williams Phipps MD;  Location: [facility]    REVERSE ARTHROPLASTY SHOULDER Right 2023    Procedure: ARTHROPLASTY, SHOULDER, TOTAL, REVERSE for;  Surgeon: Cierra Krause MD;  Location: [facility]    ZZC  DELIVERY ONLY       X2    ZZHC COLONOSCOPY THRU STOMA, DIAGNOSTIC  2002    normal       FAMILY HISTORY:  Family History   Problem Relation Age of Onset    Heart Disease Mother         anemia    Osteoporosis Mother     Hypertension Mother     Cerebrovascular Disease Mother     Alcohol/Drug Mother         alcohol    Neurologic Disorder Mother         migraines    Hypertension Father     Cancer No family hx of         breast       SOCIAL HISTORY:  Social History     Socioeconomic History    Marital status:      Spouse name: None    Number of children: 2    Years of education: None    Highest education level: None   Occupational History    Occupation: retired     Employer: RETIRED   Tobacco Use    Smoking status: Never     Passive exposure: Never    Smokeless tobacco: Never   Vaping Use    Vaping status: Never Used   Substance and Sexual Activity    Alcohol use: Yes     Comment: socially    Drug use: No    Sexual activity: Not Currently     Partners: Male     Birth control/protection: Surgical     Comment: tubal ligation   Other Topics Concern     Service No    Blood Transfusions No    Caffeine Concern No    Occupational Exposure No    Hobby Hazards No    Sleep Concern Yes     Comment: Insomnia    Stress Concern Yes     Comment: breathing,   passed away 2004    Weight Concern Yes     Comment: would like to lose some weight    Special Diet No    Back Care Yes    Exercise Yes     Comment: walking at work daily    Seat Belt Yes    Self-Exams Yes     Comment: periodically    Parent/sibling w/ CABG, MI or angioplasty before 65F 55M? No     Social Determinants of Health     Financial Resource Strain: Low Risk  (5/30/2024)    Financial Resource Strain     Within the past 12 months, have you or your family members you live with been unable to get utilities (heat, electricity) when it was really needed?: No   Food Insecurity: Low Risk  (5/30/2024)    Food Insecurity     Within the past 12 months, did you worry that your food would run out before you got money to buy more?: No     Within the past 12 months, did the food you bought just not last and you didn t have money to get more?: No   Transportation Needs: Low Risk  (5/30/2024)    Transportation Needs     Within the past 12 months, has lack of transportation kept you from medical appointments, getting your medicines, non-medical meetings or appointments, work, or from getting things that you need?: No   Physical Activity: Unknown (5/30/2024)    Exercise Vital Sign     Days of Exercise per Week: 2 days   Stress: No Stress Concern Present (5/30/2024)    [employer] - Occupational Stress Questionnaire     Feeling of Stress : Only a little   Social Connections: Unknown (5/30/2024)    Social Connection and Isolation Panel [NHANES]     Frequency of Social Gatherings with Friends and Family: Three times a week   Housing Stability: Low Risk  (5/30/2024)    Housing Stability     Do you have housing? : Yes     Are you worried about losing your housing?: No       Review of Systems:  Skin:        Eyes:       ENT:       Respiratory:  Negative shortness of breath;cough;wheezing  Cardiovascular:  Negative;palpitations;chest pain;edema;lightheadedness;dizziness;syncope or near-syncope   "  Gastroenterology:      Genitourinary:       Musculoskeletal:       Neurologic:  Negative numbness or tingling of hands;numbness or tingling of feet  Psychiatric:       Heme/Lymph/Imm:  Positive for allergies  Endocrine:         Physical Exam:  Vitals: /78 (BP Location: Left arm, Patient Position: Sitting, Cuff Size: Adult Regular)   Pulse 82   Resp 16   Ht 1.525 m (5' 0.04\")   Wt 55.3 kg (122 lb)   LMP 11/09/2005 (Approximate)   SpO2 96%   BMI 23.79 kg/m      Constitutional:  cooperative, alert and oriented, well developed, well nourished, in no acute distress        Skin:  warm and dry to the touch, no apparent skin lesions or masses noted        Head:  normocephalic, no masses or lesions        Eyes:  pupils equal and round, conjunctivae and lids unremarkable, sclera white, no xanthalasma, EOMS intact, no nystagmus        ENT:           Neck:  carotid pulses are full and equal bilaterally, JVP normal, no carotid bruit        Chest:  normal breath sounds, clear to auscultation, normal A-P diameter, normal symmetry, normal respiratory excursion, no use of accessory muscles        Cardiac: regular rhythm;normal S1 and S2;no S3 or S4;apical impulse not displaced       systolic ejection murmur;grade 2;RUSB          Abdomen:  abdomen soft;BS normoactive        Vascular: pulses full and equal                                      Extremities and Muscular Skeletal:  no edema           Neurological:  no gross motor deficits        Psych:  affect appropriate, oriented to time, person and place     Recent Lab Results:  LIPID RESULTS:  Lab Results   Component Value Date    CHOL 166 05/30/2024    CHOL 215 (H) 10/14/2020     05/30/2024     10/14/2020    LDL 48 05/30/2024    LDL 99 10/14/2020    TRIG 46 05/30/2024    TRIG 75 10/14/2020    CHOLHDLRATIO 2.0 07/13/2012       LIVER ENZYME RESULTS:  Lab Results   Component Value Date    AST 26 05/10/2024    AST 30 01/12/2019    ALT 16 05/10/2024    ALT 44 " 01/12/2019       CBC RESULTS:  Lab Results   Component Value Date    WBC 5.9 05/10/2024    WBC 8.1 11/04/2020    RBC 4.31 05/10/2024    RBC 4.59 11/04/2020    HGB 14.1 05/10/2024    HGB 14.8 11/04/2020    HCT 40.9 05/10/2024    HCT 41.7 11/04/2020    MCV 95 05/10/2024    MCV 91 11/04/2020    MCH 32.7 05/10/2024    MCH 32.2 11/04/2020    MCHC 34.5 05/10/2024    MCHC 35.5 11/04/2020    RDW 12.6 05/10/2024    RDW 11.7 11/04/2020     05/10/2024     11/04/2020       BMP RESULTS:  Lab Results   Component Value Date     05/30/2024     (L) 05/08/2023     (L) 11/04/2020    POTASSIUM 5.4 (H) 05/30/2024    POTASSIUM 5.5 (H) 07/27/2021    POTASSIUM 4.2 11/04/2020    CHLORIDE 98 05/30/2024    CHLORIDE 103 07/27/2021    CHLORIDE 95 11/04/2020    CO2 28 05/30/2024    CO2 28 07/27/2021    CO2 27 11/04/2020    ANIONGAP 10 05/30/2024    ANIONGAP 5 07/27/2021    ANIONGAP 8 11/04/2020     (H) 05/30/2024     (H) 07/15/2023     (H) 07/27/2021     (H) 11/04/2020    BUN 8.7 05/30/2024    BUN 7 07/27/2021    BUN 7 11/04/2020    CR 0.70 05/30/2024    CR 0.54 11/04/2020    GFRESTIMATED >90 05/30/2024    GFRESTIMATED >90 11/04/2020    GFRESTBLACK >90 11/04/2020    CESIA 9.6 05/30/2024    CESIA 9.1 11/04/2020        A1C RESULTS:  Lab Results   Component Value Date    A1C 5.4 01/16/2017       INR RESULTS:  Lab Results   Component Value Date    INR 1.10 07/12/2023    INR 0.96 04/30/2023    INR 1.1 06/25/2021    INR 0.87 01/15/2017    INR 1.00 10/14/2005         Elías Titus MD, [facility]    CC  Rosenda Pierre PA-C  [address] [address]      Thank you for allowing me to participate in the care of your patient.      Sincerely,     Elías Titus MD     [facility]

## 2024-06-25 NOTE — PROGRESS NOTES
HISTORY:    Sri Grewal is a pleasant 74-year-old female accompanied by her good friend Bryson.  She was asked to see me reasons that are not completely clear, apparently because she carries a diagnosis of previous myocardial infarction.    Review of the chart shows that Sri was admitted in 2005 with what was felt to be Takotsubo syndrome.  Angiogram at that time showed no significant disease.  She was readmitted to [facility] in June 2021 with EKG changes suggesting MI.  She underwent coronary angiography which demonstrated only trivial disease and her audiologist felt that she had Takotsubo once again.  Her presentation on that admission was unusual in that she had basically an encephalopathy.  She was found in her home standing in the bathroom confused.  Unfortunately I am unable to find a discharge summary that gives together the various information on the chart, but she had an MRI at that time showing multiple small vessel infarcts suggesting a embolic source.  A long-term monitor for atrial fibs did not find any arrhythmias.    At this time Sri reports no cardiac symptoms.  She does not have any chest pain with exertion or at rest and she has not had palpitations, PND/orthopnea, syncope or near syncope, new strokelike symptoms, peripheral edema, or symptoms of claudication.  She states that she does not get short of breath with activity but feels that she tires easily.  Bryson, her friend, reports that she remains confused at times and she has been admitted at least once outside hospital with aphasia since her initial presentation, and she has ongoing memory loss which apparently has not been well-defined (or cause found).    ECG done recently shows normal sinus rhythm with left anterior fascicular block.  An echo last completed in April 2023 showed normal LV function and was essentially normal.  Back in June 2021 on presentation her EF was less than 30%.      ASSESSMENT/PLAN:    1.  Recurrent Takotsubo in  2005 and 2021 with full recovery of her LV function.  She has not had a myocardial infarct and had essentially normal coronary arteries at the time of her angiogram in 2021.  To prevent regression of her LV function I would suggest continuing current medications which include carvedilol and lisinopril from a cardiomyopathy standpoint.  2.  Hypertension well-controlled on current medications.  3.  By exam today she has a 2/6 systolic murmur consistent with aortic stenosis.  Her last echo done just a year and a half ago did not show significant aortic stenosis but I will have this repeated given her fairly loud murmur.  If that study is negative no further cardiology evaluation is needed.  If it is positive we may need to follow-up with serial echocardiograms over the years.    Thank you for inviting me to participate in the care of your patient.  Please don't hesitate to call if I can be of further assistance.  45 minutes were spent today reviewing the chart and other records, interviewing and examining the patient, and documenting our visit.    The longitudinal plan of care for the diagnosis(es)/condition(s) as documented were addressed during this visit. Due to the added complexity in care, I will continue to support Sri in the subsequent management and with ongoing continuity of care.     Chart documentation was completed, in part, with [employer] voice-recognition software. Even though reviewed, some grammatical, spelling, and word errors may remain.       Orders Placed This Encounter   Procedures    Echocardiogram Complete     No orders of the defined types were placed in this encounter.    There are no discontinued medications.    10 year ASCVD risk: The ASCVD Risk score (Herson MCCLURE, et al., 2019) failed to calculate for the following reasons:    The patient has a prior MI or stroke diagnosis    Encounter Diagnoses   Name Primary?    Coronary artery disease due to lipid rich plaque     Hypertension, unspecified  type     Heart murmur Yes       CURRENT MEDICATIONS:  Current Outpatient Medications   Medication Sig Dispense Refill    albuterol (PROAIR HFA/PROVENTIL HFA/VENTOLIN HFA) 108 (90 Base) MCG/ACT inhaler Inhale 2 puffs into the lungs every 6 hours as needed for shortness of breath, wheezing or cough 18 g 3    amLODIPine (NORVASC) 10 MG tablet Take 1 tablet (10 mg) by mouth daily 90 tablet 3    bisacodyl (DULCOLAX) 5 MG EC tablet Two days prior to exam take two (2) tablets at 4pm. One day prior to exam take two (2) tablets at 4pm 4 tablet 0    carvedilol (COREG) 12.5 MG tablet TAKE 1 TABLET BY MOUTH TWICE DAILY WITH FOOD 180 tablet 3    clopidogrel (PLAVIX) 75 MG tablet TAKE 1 TABLET(75 MG) BY MOUTH DAILY 90 tablet 1    hydrALAZINE (APRESOLINE) 25 MG tablet Take 1 tablet (25 mg) by mouth 3 times daily 90 tablet 11    HYDROcodone-acetaminophen (NORCO) 5-325 MG tablet Take 1 tablet by mouth every 6 hours as needed for severe pain (Do not mix with Tramadol) 45 tablet 0    lisinopril (ZESTRIL) 20 MG tablet Take 2 tablets (40 mg) by mouth daily 180 tablet 3    melatonin 1 MG TABS tablet Take 2 tablets (2 mg) by mouth at bedtime      QUEtiapine (SEROQUEL) 25 MG tablet Take 1 tablet (25 mg) by mouth at bedtime 30 tablet 1    rosuvastatin (CRESTOR) 20 MG tablet Take 1 tablet (20 mg) by mouth daily 90 tablet 3    sodium chloride 1 GM tablet Take 1 tablet (1 g) by mouth 2 times daily (with meals) 60 tablet 0    tiZANidine (ZANAFLEX) 2 MG tablet Take 0.5-1 tablets (1-2 mg) by mouth 3 times daily as needed for muscle spasms 90 tablet 3    traMADol (ULTRAM) 50 MG tablet Take 1 tablet (50 mg) by mouth every 4 hours as needed for moderate pain (Do not mix with Hydrocodone) 90 tablet 0    venlafaxine (EFFEXOR XR) 150 MG 24 hr capsule Take 1 capsule (150 mg) by mouth daily 90 capsule 3    polyethylene glycol (GOLYTELY) 236 g suspension Two days before procedure at 5PM fill first container with water. Mix and drink an 8 oz glass every  10-15 minutes until HALF of the container is gone. Place the remainder in the refrigerator. One day before procedure at 5PM drink second half of bowel prep. Drink an 8 oz glass every 10-15 minutes until it is gone. Day of procedure 6 hours before arrival time fill the 2nd container with water. Mix and drink an 8 oz glass every 10-15 minutes until HALF of the container is gone. Discard the remaining solution. (Patient not taking: Reported on 6/25/2024) 8000 mL 0    ubrogepant (UBRELVY) 50 MG tablet Take 1 tablet (50 mg) by mouth at onset of headache (May repeat ose after 2 hours if heasdache persists. max 2 pills a day) (Patient not taking: Reported on 6/25/2024) 16 tablet 11       ALLERGIES     Allergies   Allergen Reactions    Morphine And Codeine      GI UPSET    Oxycodone     Seasonal Allergies        PAST MEDICAL HISTORY:  Past Medical History:   Diagnosis Date    Abnormal Papanicolaou smear of cervix and cervical HPV     Allergic rhinitis, cause unspecified     seasonal allergies    Cerebral infarction (H)     Contact dermatitis and other eczema, due to unspecified cause     Endometriosis, site unspecified     Esophageal reflux     GERD    Migraine, unspecified, without mention of intractable migraine without mention of status migrainosus     Mild persistent asthma     Unspecified disorder of uterus     fibroid uterus       PAST SURGICAL HISTORY:  Past Surgical History:   Procedure Laterality Date    COLONOSCOPY  5/30/2014    Procedure: COLONOSCOPY;  Surgeon: Nathan Baker MD;  Location: [facility]    OPEN REDUCTION INTERNAL FIXATION ELBOW Right 6/1/2023    Procedure: REVISION OPEN REDUCTION INTERNAL FIXATION RIGHT OLECRANON;  Surgeon: Williams Phipps MD;  Location: [facility]    OPEN REDUCTION INTERNAL FIXATION HIP NAILING Right 7/14/2023    Procedure: INTERNAL FIXATION, FRACTURE, TROCHANTERIC,RIGHT HIP, USING GAMMA RODS;  Surgeon: Nathan Turner MD;  Location: [facility]    OPEN REDUCTION INTERNAL FIXATION HUMERUS  DISTAL Right 5/3/2023    Procedure: OPEN REDUCTION INTERNAL FIXATION, FRACTURE, HUMERUS, DISTAL;  Surgeon: Williams Phipps MD;  [facility]    REMOVE HARDWARE ELBOW Right 2023    Procedure: HARDWARE REMOVAL RIGHT OLECRANON;  Surgeon: Williams Phipps MD;  Location: [facility]    REVERSE ARTHROPLASTY SHOULDER Right 2023    Procedure: ARTHROPLASTY, SHOULDER, TOTAL, REVERSE for;  Surgeon: Cierra Krause MD;  Location: [facility]    ZZC  DELIVERY ONLY       X2    ZZHC COLONOSCOPY THRU STOMA, DIAGNOSTIC  2002    normal       FAMILY HISTORY:  Family History   Problem Relation Age of Onset    Heart Disease Mother         anemia    Osteoporosis Mother     Hypertension Mother     Cerebrovascular Disease Mother     Alcohol/Drug Mother         alcohol    Neurologic Disorder Mother         migraines    Hypertension Father     Cancer No family hx of         breast       SOCIAL HISTORY:  Social History     Socioeconomic History    Marital status:      Spouse name: None    Number of children: 2    Years of education: None    Highest education level: None   Occupational History    Occupation: retired     Employer: RETIRED   Tobacco Use    Smoking status: Never     Passive exposure: Never    Smokeless tobacco: Never   Vaping Use    Vaping status: Never Used   Substance and Sexual Activity    Alcohol use: Yes     Comment: socially    Drug use: No    Sexual activity: Not Currently     Partners: Male     Birth control/protection: Surgical     Comment: tubal ligation   Other Topics Concern     Service No    Blood Transfusions No    Caffeine Concern No    Occupational Exposure No    Hobby Hazards No    Sleep Concern Yes     Comment: Insomnia    Stress Concern Yes     Comment: breathing,  passed away     Weight Concern Yes     Comment: would like to lose some weight    Special Diet No    Back Care Yes    Exercise Yes     Comment: walking at work daily    Seat Belt Yes    Self-Exams Yes     Comment:  periodically    Parent/sibling w/ CABG, MI or angioplasty before 65F 55M? No     Social Determinants of Health     Financial Resource Strain: Low Risk  (5/30/2024)    Financial Resource Strain     Within the past 12 months, have you or your family members you live with been unable to get utilities (heat, electricity) when it was really needed?: No   Food Insecurity: Low Risk  (5/30/2024)    Food Insecurity     Within the past 12 months, did you worry that your food would run out before you got money to buy more?: No     Within the past 12 months, did the food you bought just not last and you didn t have money to get more?: No   Transportation Needs: Low Risk  (5/30/2024)    Transportation Needs     Within the past 12 months, has lack of transportation kept you from medical appointments, getting your medicines, non-medical meetings or appointments, work, or from getting things that you need?: No   Physical Activity: Unknown (5/30/2024)    Exercise Vital Sign     Days of Exercise per Week: 2 days   Stress: No Stress Concern Present (5/30/2024)    [employer] [place] [employer] - Occupational Stress Questionnaire     Feeling of Stress : Only a little   Social Connections: Unknown (5/30/2024)    Social Connection and Isolation Panel [NHANES]     Frequency of Social Gatherings with Friends and Family: Three times a week   Housing Stability: Low Risk  (5/30/2024)    Housing Stability     Do you have housing? : Yes     Are you worried about losing your housing?: No       Review of Systems:  Skin:        Eyes:       ENT:       Respiratory:  Negative shortness of breath;cough;wheezing  Cardiovascular:  Negative;palpitations;chest pain;edema;lightheadedness;dizziness;syncope or near-syncope    Gastroenterology:      Genitourinary:       Musculoskeletal:       Neurologic:  Negative numbness or tingling of hands;numbness or tingling of feet  Psychiatric:       Heme/Lymph/Imm:  Positive for allergies  Endocrine:      "    Physical Exam:  Vitals: /78 (BP Location: Left arm, Patient Position: Sitting, Cuff Size: Adult Regular)   Pulse 82   Resp 16   Ht 1.525 m (5' 0.04\")   Wt 55.3 kg (122 lb)   LMP 11/09/2005 (Approximate)   SpO2 96%   BMI 23.79 kg/m      Constitutional:  cooperative, alert and oriented, well developed, well nourished, in no acute distress        Skin:  warm and dry to the touch, no apparent skin lesions or masses noted        Head:  normocephalic, no masses or lesions        Eyes:  pupils equal and round, conjunctivae and lids unremarkable, sclera white, no xanthalasma, EOMS intact, no nystagmus        ENT:           Neck:  carotid pulses are full and equal bilaterally, JVP normal, no carotid bruit        Chest:  normal breath sounds, clear to auscultation, normal A-P diameter, normal symmetry, normal respiratory excursion, no use of accessory muscles        Cardiac: regular rhythm;normal S1 and S2;no S3 or S4;apical impulse not displaced       systolic ejection murmur;grade 2;RUSB          Abdomen:  abdomen soft;BS normoactive        Vascular: pulses full and equal                                      Extremities and Muscular Skeletal:  no edema           Neurological:  no gross motor deficits        Psych:  affect appropriate, oriented to time, person and place     Recent Lab Results:  LIPID RESULTS:  Lab Results   Component Value Date    CHOL 166 05/30/2024    CHOL 215 (H) 10/14/2020     05/30/2024     10/14/2020    LDL 48 05/30/2024    LDL 99 10/14/2020    TRIG 46 05/30/2024    TRIG 75 10/14/2020    CHOLHDLRATIO 2.0 07/13/2012       LIVER ENZYME RESULTS:  Lab Results   Component Value Date    AST 26 05/10/2024    AST 30 01/12/2019    ALT 16 05/10/2024    ALT 44 01/12/2019       CBC RESULTS:  Lab Results   Component Value Date    WBC 5.9 05/10/2024    WBC 8.1 11/04/2020    RBC 4.31 05/10/2024    RBC 4.59 11/04/2020    HGB 14.1 05/10/2024    HGB 14.8 11/04/2020    HCT 40.9 05/10/2024 "    HCT 41.7 11/04/2020    MCV 95 05/10/2024    MCV 91 11/04/2020    MCH 32.7 05/10/2024    MCH 32.2 11/04/2020    MCHC 34.5 05/10/2024    MCHC 35.5 11/04/2020    RDW 12.6 05/10/2024    RDW 11.7 11/04/2020     05/10/2024     11/04/2020       BMP RESULTS:  Lab Results   Component Value Date     05/30/2024     (L) 05/08/2023     (L) 11/04/2020    POTASSIUM 5.4 (H) 05/30/2024    POTASSIUM 5.5 (H) 07/27/2021    POTASSIUM 4.2 11/04/2020    CHLORIDE 98 05/30/2024    CHLORIDE 103 07/27/2021    CHLORIDE 95 11/04/2020    CO2 28 05/30/2024    CO2 28 07/27/2021    CO2 27 11/04/2020    ANIONGAP 10 05/30/2024    ANIONGAP 5 07/27/2021    ANIONGAP 8 11/04/2020     (H) 05/30/2024     (H) 07/15/2023     (H) 07/27/2021     (H) 11/04/2020    BUN 8.7 05/30/2024    BUN 7 07/27/2021    BUN 7 11/04/2020    CR 0.70 05/30/2024    CR 0.54 11/04/2020    GFRESTIMATED >90 05/30/2024    GFRESTIMATED >90 11/04/2020    GFRESTBLACK >90 11/04/2020    CESIA 9.6 05/30/2024    CESIA 9.1 11/04/2020        A1C RESULTS:  Lab Results   Component Value Date    A1C 5.4 01/16/2017       INR RESULTS:  Lab Results   Component Value Date    INR 1.10 07/12/2023    INR 0.96 04/30/2023    INR 1.1 06/25/2021    INR 0.87 01/15/2017    INR 1.00 10/14/2005         Elías Titus MD, FACC    CC  Rosenda Pierre PA-C  [address]  [place] [address]

## 2024-06-25 NOTE — TELEPHONE ENCOUNTER
Extended Golytely Bowel Prep  recommended due to standard bowel prep. and chronic pain medication noted in chart.  Instructions were sent via letter. Bowel prep was sent 6/25/2024 to      "[facility] #[id number] - [place], [place] - [address] [address] AT [facility] [address]

## 2024-06-27 ENCOUNTER — OFFICE VISIT (OUTPATIENT)
Dept: FAMILY MEDICINE | Facility: OTHER | Age: 74
End: 2024-06-27
Payer: MEDICARE

## 2024-06-27 VITALS
DIASTOLIC BLOOD PRESSURE: 58 MMHG | BODY MASS INDEX: 23.66 KG/M2 | HEIGHT: 60 IN | WEIGHT: 120.5 LBS | RESPIRATION RATE: 18 BRPM | HEART RATE: 90 BPM | SYSTOLIC BLOOD PRESSURE: 122 MMHG | TEMPERATURE: 98.4 F | OXYGEN SATURATION: 96 %

## 2024-06-27 DIAGNOSIS — F43.23 ADJUSTMENT DISORDER WITH MIXED ANXIETY AND DEPRESSED MOOD: Primary | ICD-10-CM

## 2024-06-27 DIAGNOSIS — F32.1 MODERATE MAJOR DEPRESSION (H): ICD-10-CM

## 2024-06-27 PROCEDURE — 99214 OFFICE O/P EST MOD 30 MIN: CPT | Performed by: PHYSICIAN ASSISTANT

## 2024-06-27 RX ORDER — QUETIAPINE FUMARATE 25 MG/1
25 TABLET, FILM COATED ORAL AT BEDTIME
Qty: 90 TABLET | Refills: 3 | Status: SHIPPED | OUTPATIENT
Start: 2024-06-27

## 2024-06-27 RX ORDER — RESPIRATORY SYNCYTIAL VIRUS VACCINE 120MCG/0.5
0.5 KIT INTRAMUSCULAR ONCE
Qty: 1 EACH | Refills: 0 | Status: CANCELLED | OUTPATIENT
Start: 2024-06-27 | End: 2024-06-27

## 2024-06-27 ASSESSMENT — ANXIETY QUESTIONNAIRES
IF YOU CHECKED OFF ANY PROBLEMS ON THIS QUESTIONNAIRE, HOW DIFFICULT HAVE THESE PROBLEMS MADE IT FOR YOU TO DO YOUR WORK, TAKE CARE OF THINGS AT HOME, OR GET ALONG WITH OTHER PEOPLE: SOMEWHAT DIFFICULT
GAD7 TOTAL SCORE: 3
7. FEELING AFRAID AS IF SOMETHING AWFUL MIGHT HAPPEN: SEVERAL DAYS
GAD7 TOTAL SCORE: 3
GAD7 TOTAL SCORE: 3
2. NOT BEING ABLE TO STOP OR CONTROL WORRYING: SEVERAL DAYS
7. FEELING AFRAID AS IF SOMETHING AWFUL MIGHT HAPPEN: SEVERAL DAYS
1. FEELING NERVOUS, ANXIOUS, OR ON EDGE: NOT AT ALL
5. BEING SO RESTLESS THAT IT IS HARD TO SIT STILL: NOT AT ALL
3. WORRYING TOO MUCH ABOUT DIFFERENT THINGS: SEVERAL DAYS
8. IF YOU CHECKED OFF ANY PROBLEMS, HOW DIFFICULT HAVE THESE MADE IT FOR YOU TO DO YOUR WORK, TAKE CARE OF THINGS AT HOME, OR GET ALONG WITH OTHER PEOPLE?: SOMEWHAT DIFFICULT
6. BECOMING EASILY ANNOYED OR IRRITABLE: NOT AT ALL
4. TROUBLE RELAXING: NOT AT ALL

## 2024-06-27 ASSESSMENT — PAIN SCALES - GENERAL: PAINLEVEL: NO PAIN (0)

## 2024-06-27 NOTE — PROGRESS NOTES
Assessment & Plan     ICD-10-CM    1. Adjustment disorder with mixed anxiety and depressed mood  F43.23       2. Moderate major depression (H)  F32.1 QUEtiapine (SEROQUEL) 25 MG tablet        - Patient continues to struggle with her depression after many health issues      Has dementia as well   - Continues on Effexor 150 mg      Last visit added in Seroquel for a night as she reported not sleeping well      I did clear this with her neurologist, ok as long as we keep low dose   - PHQ9 & GAD7 scores have improved with this and reports sleeping a lot better   - Still feels that she wants something more, really wants her Wellbutrin back but this was discontinued as worsened her dementia   - Encouraged her that Seroquel still not to peak effects and depression and anxiety scores are the lowest they have ever been   - Recommend giving more time   - Patient and friend in agreement   - Reviewed use and side effects  - Recheck 2-3 months       Review of the result(s) of each unique test - PHQ9 & GAD7  Assessment requiring an independent historian(s) - significant other - Bryson    Diagnosis or treatment significantly limited by social determinants of health - none     18 minutes spent on the date of the encounter doing chart review, history and exam, documentation and further activities as noted above    The patient indicates understanding of these issues and agrees with the plan.    Follow up: 2-3 months    Jose Francisco Varma-FLEX Saravia  [facility] [place]         Julio Cesar Fierro is a 74 year old, presenting for the following health issues:  Follow Up      6/27/2024    12:40 PM   Additional Questions   Roomed by ROSALINDA   Accompanied by FRIEND: BRYSON         6/27/2024    12:40 PM   Patient Reported Additional Medications   Patient reports taking the following new medications NA     History of Present Illness       Mental Health Follow-up:  Patient presents to follow-up on Depression.Patient's depression  since last visit has been:  Medium  The patient is not having other symptoms associated with depression.      Any significant life events: health concerns  Patient is not feeling anxious or having panic attacks.  Patient has no concerns about alcohol or drug use.    She eats 2-3 servings of fruits and vegetables daily.She consumes 0 sweetened beverage(s) daily.She exercises with enough effort to increase her heart rate 9 or less minutes per day.  She exercises with enough effort to increase her heart rate 3 or less days per week.   She is taking medications regularly.           Social History     Tobacco Use    Smoking status: Never     Passive exposure: Never    Smokeless tobacco: Never   Vaping Use    Vaping status: Never Used   Substance Use Topics    Alcohol use: Yes     Comment: socially    Drug use: No         11/6/2023    10:34 AM 5/30/2024     9:29 AM 6/27/2024    12:18 PM   PHQ   PHQ-9 Total Score 9 7 4   Q9: Thoughts of better off dead/self-harm past 2 weeks Not at all Not at all Not at all         11/6/2023    10:35 AM 5/30/2024     9:31 AM 6/27/2024    12:19 PM   HAYDEE-7 SCORE   Total Score 6 (mild anxiety) 4 (minimal anxiety) 3 (minimal anxiety)   Total Score 6 4 3         6/27/2024    12:18 PM   Last PHQ-9   1.  Little interest or pleasure in doing things 1   2.  Feeling down, depressed, or hopeless 1   3.  Trouble falling or staying asleep, or sleeping too much 0   4.  Feeling tired or having little energy 1   5.  Poor appetite or overeating 0   6.  Feeling bad about yourself 1   7.  Trouble concentrating 0   8.  Moving slowly or restless 0   Q9: Thoughts of better off dead/self-harm past 2 weeks 0   PHQ-9 Total Score 4         6/27/2024    12:19 PM   HAYDEE-7    1. Feeling nervous, anxious, or on edge 0   2. Not being able to stop or control worrying 1   3. Worrying too much about different things 1   4. Trouble relaxing 0   5. Being so restless that it is hard to sit still 0   6. Becoming easily annoyed  "or irritable 0   7. Feeling afraid, as if something awful might happen 1   HAYDEE-7 Total Score 3   If you checked any problems, how difficult have they made it for you to do your work, take care of things at home, or get along with other people? Somewhat difficult         Objective    /58   Pulse 90   Temp 98.4  F (36.9  C) (Temporal)   Resp 18   Ht 1.522 m (4' 11.92\")   Wt 54.7 kg (120 lb 8 oz)   LMP 11/09/2005 (Approximate)   SpO2 96%   BMI 23.60 kg/m    Body mass index is 23.6 kg/m .  Physical Exam   GENERAL APPEARANCE: healthy, alert and no distress  EYES: Eyes grossly normal to inspection, PERRLA, conjunctivae and sclerae without injection or discharge, EOM intact   MS: No musculoskeletal defects are noted and gait is age appropriate without ataxia   SKIN: No suspicious lesions or rashes, hydration status appears adeuqate with normal skin turgor   PSYCH: Alert and oriented x3; speech- coherent , normal rate and volume; able to articulate logical thoughts, able to abstract reason, no tangential thoughts, no hallucinations or delusions, mentation appears normal, Mood is euthymic. Affect is appropriate for this mood state and bright. Thought content is free of suicidal ideation, hallucinations, and delusions. Dress is adequate and upkept. Eye contact is good during conversation.       Diagnostics; None         Signed Electronically by: Rosenda Pierre PA-C    "

## 2024-06-28 ENCOUNTER — OFFICE VISIT (OUTPATIENT)
Dept: GASTROENTEROLOGY | Facility: CLINIC | Age: 74
End: 2024-06-28
Payer: MEDICARE

## 2024-06-28 VITALS
DIASTOLIC BLOOD PRESSURE: 84 MMHG | HEIGHT: 61 IN | TEMPERATURE: 98.2 F | WEIGHT: 120.3 LBS | SYSTOLIC BLOOD PRESSURE: 162 MMHG | OXYGEN SATURATION: 98 % | BODY MASS INDEX: 22.71 KG/M2 | HEART RATE: 88 BPM

## 2024-06-28 DIAGNOSIS — K86.9 LESION OF PANCREAS: ICD-10-CM

## 2024-06-28 DIAGNOSIS — K59.00 CONSTIPATION, UNSPECIFIED CONSTIPATION TYPE: ICD-10-CM

## 2024-06-28 DIAGNOSIS — K57.30 DIVERTICULOSIS OF LARGE INTESTINE WITHOUT HEMORRHAGE: Primary | ICD-10-CM

## 2024-06-28 DIAGNOSIS — R93.5 ABNORMAL CT OF THE ABDOMEN: ICD-10-CM

## 2024-06-28 DIAGNOSIS — K83.8 COMMON BILE DUCT DILATION: ICD-10-CM

## 2024-06-28 DIAGNOSIS — R10.10 UPPER ABDOMINAL PAIN: ICD-10-CM

## 2024-06-28 DIAGNOSIS — R01.1 HEART MURMUR: ICD-10-CM

## 2024-06-28 PROCEDURE — 99204 OFFICE O/P NEW MOD 45 MIN: CPT | Performed by: NURSE PRACTITIONER

## 2024-06-28 ASSESSMENT — PAIN SCALES - GENERAL: PAINLEVEL: NO PAIN (0)

## 2024-06-28 NOTE — PATIENT INSTRUCTIONS
It was a pleasure taking care of you today.  I've included a brief summary of our discussion and care plan from today's visit below.  Please review this information with your primary care provider.  ______________________________________________________________________    My recommendations are summarized as follows:    As we discussed today, I am adding upper GI endoscopy to your colonoscopy order.  Most likely, your studies will be rescheduled for a later date.  You will be contacted to confirm the appointment.      2.  Another test was ordered- called MRCP- to look closer at your pancreatic and common bile ducts.  You will receive a phone call from imaging department to schedule.  It cannot be done on the same day with endoscopy.    3.  Start taking MiraLAX 1 capful in the morning for constipation.      Return to GI Clinic in 2 months to review your progress.    ______________________________________________________________________  Constipation refers to a change in bowel habits, but it has varied meanings. Stools may be too hard or too small, difficult to pass, or infrequent (less than three times per week). People with constipation may also notice a frequent need to strain and a sense that the bowels are not empty.  Constipation is a very common problem. Each year more than 2.5 million Americans visit their healthcare provider for relief from this problem. Many factors can contribute to or cause constipation, although in most people, no single cause can be found. In general, constipation occurs more frequently as you get older.     Treatment for constipation includes changing some behaviors, eating foods high in fiber, and using medications if needed.  Behavior changes -- The bowels are most active following meals, and this is often the time when stools will pass most readily. If you ignore your body's signals to have a bowel movement, the signals become weaker and weaker over time. By paying close attention  to these signals, you may have an easier time moving your bowels. Drinking a caffeine-containing beverage in the morning may also be helpful.  Increase fiber -- Increasing fiber in your diet may reduce or eliminate constipation. The recommended amount of dietary fiber is 20 to 35 grams of fiber per day. By reading the product information panel on the side of the package, you can determine the number of grams of fiber per serving .Many fruits and vegetables can be particularly helpful in preventing and treating constipation .This is especially true of citrus fruits, prunes, and prune juice. Some breakfast cereals are also an excellent source of dietary fiber.  Start your day off with a high-fiber breakfast such as whole grain oatmeal sprinkled with  raisins, blueberries, pecans or macadamia nuts.   Slice up raw veggies (carrots, celery, bell peppers) and fruit (apples, pears) and keep  them in baggies for quick high-fiber snacks.   Munch on a small handful of nuts, such as peanuts, walnuts, and almonds.   Look for individually wrapped prunes in the grocery store for a quick fiber snack.   When buying frozen veggies, look for ones that have been  flash frozen.    Add half a cup of garbanzo or black beans or peas to your salad to add fiber, texture, and flavor.   EXAMPLES OF HIGH-FIBER FOODS   1 large apple or pear (5g)   1 cup Raisin Bran (5g)     cup raspberries (9g)   1 cup cooked broccoli (5g)   23 almonds (3.5g)   1 cup black beans (15g)   2 brazil nuts (2.5g)   1 cup peas (16g)    Anti-constipation fruit paste:  Ingredients  1 cup pitted prunes  1 cup raisins  1 cup pitted dates  1/2 cup orange juice  2/3 cup prune juice  Tip:  For even more fiber add 1 cup of natural wheat bran to the fruit mixture.  Directions (Makes 25 servings)  Combine all ingredients in a bowl and soak overnight in the refrigerator. Blend in  or  until smooth. Keep in the refrigerator (can be kept frozen in small  containers).   Serving size: 2 tablespoons. Spread it on toast or eat from a spoon.  Caution: May cause bloating and abdominal cramping. Start with smaller portion and increase it gradually over a few weeks as tolerate.    Medications:  Bulk forming -- These include natural fiber and commercial fiber preparations such as Psyllium (Konsyl; Metamucil; Perdiem), Methylcellulose (Citrucel),  or Wheat dextrin (Benefiber);  You should increase the dose of fiber supplements slowly to prevent gas and cramping, and you should always take the supplement with plenty of fluid.  2. Hyperosmolar medications  -- such as   ?Polyethylene glycol (MiraLAX, GlycoLax)  ? Lactulose  ? Sorbitol  Polyethylene glycol is generally preferred since it does not cause gas or bloating and is available without a prescription. Lactulose and sorbitol can produce gas and bloating. Sorbitol works as well as lactulose and is much less expensive.  3.Saline laxatives -- such as Milk of Magnesia and magnesium citrate (Evac-Q-Mag) act similarly to the hyperosmolar drugs.  4. Stimulant drugs -- include Senna (eg, Black Draught, Ex-Lax, Senokot) and Bisacodil (eg, Correctol, Doxidan, Dulcolax).     Constipation treatments to avoid:  ? Emollients - Emollient laxatives, principally mineral oil, soften stools by moisturizing them. However, other treatments have fewer risks and equal benefit.  ? Natural products - A wide variety of natural products are advertised for constipation. Some of them contain the active ingredients found in commercially available laxatives. However, their dose and purity may not be carefully controlled. Thus, these products are not generally recommended.  ? A variety of home-made enema preparations have been used throughout the years, such as soapsuds, hydrogen peroxide, and household detergents. These can be extremely irritating to the lining of the intestine and should be avoided.                ____________________________________________________________________        Who do I call with any questions after my visit?  Please be in touch if there are any further questions that arise following today's visit.  There are multiple ways to contact your gastroenterology care team.      During business hours, you may reach a Gastroenterology nurse at [phone], option 3.     To schedule or reschedule an appointment, please call [phone].   To schedule your imaging studies (CT, MRI, ultrasound)  call [phone] (or toll-free # [phone])  To schedule your lab work at [facility], please call [phone]    You can always send a secure message through [employer].  [employer] messages are answered by your nurse or doctor typically within 24 hours.  Please allow extra time on weekends and holidays.      For urgent/emergent questions after business hours, you may reach the on-call GI Fellow by contacting the [facility]  at [phone].    In order for your refill to be processed in a timely fashion, it is your responsibility to ensure you follow the recommendations from your provider regarding your laboratory studies and follow up appointments.       How will I get the results of any tests ordered?    You will receive all of your results.  If you have signed up for [employer], any tests ordered at your visit will be available to you after your physician reviews them.  Typically this takes 1-2 weeks.  If there are urgent results that require a change in your care plan, your physician or nurse will call you to discuss the next steps.   What is [employer]?  [employer] is a secure way for you to access all of your healthcare records from the [facility].  It is a web based computer program, so you can sign on to it from any location.  It also allows you to send secure messages to your care team.  I recommend signing up for [employer] access if you have not already done  so and are comfortable with using a computer.    How to I schedule a follow-up visit?  If you did not schedule a follow-up visit today, please call [phone] to schedule a follow-up office visit.      Sincerely,  CATHIE Cunha,  [facility],  Division of Gastroenterology   ([facility])

## 2024-06-28 NOTE — PROGRESS NOTES
[address] [address]  Phone: [phone]    Patient:  Sri Grewal, Date of birth 1950    Referring Provider Sudeep Mahoney    Gastroenterology CLINIC VISIT, NEW PATIENT    REASON FOR CONSULTATION: abdominal pain    HPI: 74 year old female with PMH of HTN, heart murmur, recurrent Takotsubo in 2005 and 2021 with full recovery of her LV function, TIA, and CAD, was referred  to GI clinic for a consult on upper abdominal pain and constipation. Patient is here today with her male friend, who stated that he sets up all of her medications and helps with appointments. They reported that the patient has less symptoms that before- her constipation had improved after she stopped iron pill.  Has been having bowel movements every other day. Reported that her stool consistency ranges from mushy to hard. Denies hematochezia or melena.  Stated that her abdominal pain had improved as well. Experiences occasional dull or achy periumbilical and upper abdominal pain, not related to meal intake or defecation. Denies nausea or vomiting. NO dysphagia or odynophagia. No acid reflux. Hx of cholecystectomy.  Noted dilated CBD at 11 mm on ultrasound.  CT scan of abdomen:  Interval increase of dilatation of the biliary and pancreatic ducts since 11/4/2020. Small focal nodular enhancement newly seen at  the pancreas head/neck. Normal lipase and LFT. Hyperkalemia on last CMP.        PREVIOUS ENDOSCOPY:  5/30/2014 Colonoscopy  Findings:       The terminal ileum appeared normal. Multiple small and large-mouthed        diverticula were found in the sigmoid colon and in the descending colon.        Nora-diverticular erythema was seen. There was evidence of an impacted        diverticulum. Non-bleeding external and internal hemorrhoids were found        during retroflexion and were Grade I (internal hemorrhoids that do not        prolapse). Anal papilla(e) were hypertrophied. The  recto-sigmoid colon,        sigmoid colon, descending colon, splenic flexure, transverse colon,        hepatic flexure, ascending colon, cecum, appendiceal orifice and        ileocecal valve appeared normal.     PERTINENT STUDIES Reviewed in EMR  5/10/2024 Limited abd. US  FINDINGS:   GALLBLADDER: Absent.   BILE DUCTS: There is no biliary dilatation. The common duct measures  11mm.   LIVER: Normal where seen.   RIGHT KIDNEY: No hydronephrosis. Simple cyst right upper kidney  without specific imaging follow-up recommended. This measures 3.8 cm.   PANCREAS: No visible focal lesion. A few bowel loops adjacent to the  pancreas head area identified.   No ascites.                                                                    IMPRESSION:  1.  Dilated common duct measuring 11 mm.  2.  No other specific abnormality can be seen.    5/10/2024 CT of abdomen/pelvis  FINDINGS:   LOWER CHEST: Normal.   HEPATOBILIARY: Gallbladder not seen. Dilated extrahepatic biliary  tree. Common bile duct is approximately 11 mm, previously 7 mm series  3 image 64. No focal hepatic observation.     PANCREAS: Dilated main pancreatic duct appears increased measuring 5  mm, previously 3 mm image 65. 9 mm apparent enhancing rounded nodule  at the pancreas head/uncinate series 3 image 82.     SPLEEN: A few benign calcifications.     ADRENAL GLANDS: Normal.     KIDNEYS/BLADDER: No significant mass, stones, or hydronephrosis. There  are simple or benign cysts. No follow up is needed. Distended urinary  bladder.     BOWEL: Prominent stool at the proximal colon. No small bowel  obstruction. Colonic diverticulosis without convincing diverticulitis.  Normal appendix.     PELVIC ORGANS: No acute pelvic abnormality identified.     ADDITIONAL FINDINGS: Scattered vascular calcifications.     MUSCULOSKELETAL: T12 compression deformity demonstrates significant  height loss 0n series 5 image 59, progressed since the older CT from  11/4/2020. Postoperative  change at the right femur.                                                                      IMPRESSION:   1.  Interval increase of dilatation of the biliary and pancreatic  ducts since 11/4/2020. Small focal nodular enhancement newly seen at  the pancreas head/neck. It is difficult to exclude a pancreas mass.  This potentially could be further evaluated with MRI.  2.  Prominent stool within the colon.  3.  Colonic diverticulosis without convincing diverticulitis.  4.  Progression of height loss at a T12 compression deformity since  11/4/2020.      ROS: 10pt ROS performed and otherwise negative.    PAST MEDICAL HISTORY:  Past Medical History:   Diagnosis Date    Abnormal Papanicolaou smear of cervix and cervical HPV     Allergic rhinitis, cause unspecified     seasonal allergies    Cerebral infarction (H)     Contact dermatitis and other eczema, due to unspecified cause     Endometriosis, site unspecified     Esophageal reflux     GERD    Migraine, unspecified, without mention of intractable migraine without mention of status migrainosus     Mild persistent asthma     Unspecified disorder of uterus     fibroid uterus       PREVIOUS ABDOMINAL/GYNECOLOGIC SURGERIES:  Past Surgical History:   Procedure Laterality Date    COLONOSCOPY  5/30/2014    Procedure: COLONOSCOPY;  Surgeon: Nathan Baker MD;  Location: [facility]    OPEN REDUCTION INTERNAL FIXATION ELBOW Right 6/1/2023    Procedure: REVISION OPEN REDUCTION INTERNAL FIXATION RIGHT OLECRANON;  Surgeon: Williams Phipps MD;  Location: [facility]    OPEN REDUCTION INTERNAL FIXATION HIP NAILING Right 7/14/2023    Procedure: INTERNAL FIXATION, FRACTURE, TROCHANTERIC,RIGHT HIP, USING GAMMA RODS;  Surgeon: Nathan Turner MD;  Location: [facility]    OPEN REDUCTION INTERNAL FIXATION HUMERUS DISTAL Right 5/3/2023    Procedure: OPEN REDUCTION INTERNAL FIXATION, FRACTURE, HUMERUS, DISTAL;  Surgeon: Williams Phipps MD;  [facility]    REMOVE HARDWARE ELBOW Right 6/1/2023     Procedure: HARDWARE REMOVAL RIGHT OLECRANON;  Surgeon: Williams Phipps MD;  [facility]    REVERSE ARTHROPLASTY SHOULDER Right 2023    Procedure: ARTHROPLASTY, SHOULDER, TOTAL, REVERSE for;  Surgeon: Cierra Krause MD;  Location: [facility]    ZZC  DELIVERY ONLY       X2    ZZHC COLONOSCOPY THRU STOMA, DIAGNOSTIC  2002    normal         PERTINENT MEDICATIONS:  Current Outpatient Medications   Medication Sig Dispense Refill    albuterol (PROAIR HFA/PROVENTIL HFA/VENTOLIN HFA) 108 (90 Base) MCG/ACT inhaler Inhale 2 puffs into the lungs every 6 hours as needed for shortness of breath, wheezing or cough 18 g 3    amLODIPine (NORVASC) 10 MG tablet Take 1 tablet (10 mg) by mouth daily 90 tablet 3    bisacodyl (DULCOLAX) 5 MG EC tablet Two days prior to exam take two (2) tablets at 4pm. One day prior to exam take two (2) tablets at 4pm 4 tablet 0    carvedilol (COREG) 12.5 MG tablet TAKE 1 TABLET BY MOUTH TWICE DAILY WITH FOOD 180 tablet 3    clopidogrel (PLAVIX) 75 MG tablet TAKE 1 TABLET(75 MG) BY MOUTH DAILY 90 tablet 1    hydrALAZINE (APRESOLINE) 25 MG tablet Take 1 tablet (25 mg) by mouth 3 times daily 90 tablet 11    HYDROcodone-acetaminophen (NORCO) 5-325 MG tablet Take 1 tablet by mouth every 6 hours as needed for severe pain (Do not mix with Tramadol) 45 tablet 0    lisinopril (ZESTRIL) 20 MG tablet Take 2 tablets (40 mg) by mouth daily 180 tablet 3    melatonin 1 MG TABS tablet Take 2 tablets (2 mg) by mouth at bedtime      polyethylene glycol (GOLYTELY) 236 g suspension Two days before procedure at 5PM fill first container with water. Mix and drink an 8 oz glass every 10-15 minutes until HALF of the container is gone. Place the remainder in the refrigerator. One day before procedure at 5PM drink second half of bowel prep. Drink an 8 oz glass every 10-15 minutes until it is gone. Day of procedure 6 hours before arrival time fill the 2nd container with water. Mix and drink an 8 oz glass every  10-15 minutes until HALF of the container is gone. Discard the remaining solution. (Patient not taking: Reported on 6/25/2024) 8000 mL 0    QUEtiapine (SEROQUEL) 25 MG tablet Take 1 tablet (25 mg) by mouth at bedtime 90 tablet 3    rosuvastatin (CRESTOR) 20 MG tablet Take 1 tablet (20 mg) by mouth daily 90 tablet 3    sodium chloride 1 GM tablet Take 1 tablet (1 g) by mouth 2 times daily (with meals) 60 tablet 0    tiZANidine (ZANAFLEX) 2 MG tablet Take 0.5-1 tablets (1-2 mg) by mouth 3 times daily as needed for muscle spasms 90 tablet 3    traMADol (ULTRAM) 50 MG tablet Take 1 tablet (50 mg) by mouth every 4 hours as needed for moderate pain (Do not mix with Hydrocodone) 90 tablet 0    ubrogepant (UBRELVY) 50 MG tablet Take 1 tablet (50 mg) by mouth at onset of headache (May repeat ose after 2 hours if heasdache persists. max 2 pills a day) (Patient not taking: Reported on 6/25/2024) 16 tablet 11    venlafaxine (EFFEXOR XR) 150 MG 24 hr capsule Take 1 capsule (150 mg) by mouth daily 90 capsule 3         SOCIAL HISTORY:  Social History     Socioeconomic History    Marital status:      Spouse name: Not on file    Number of children: 2    Years of education: Not on file    Highest education level: Not on file   Occupational History    Occupation: retired     Employer: RETIRED   Tobacco Use    Smoking status: Never     Passive exposure: Never    Smokeless tobacco: Never   Vaping Use    Vaping status: Never Used   Substance and Sexual Activity    Alcohol use: Yes     Comment: socially    Drug use: No    Sexual activity: Not Currently     Partners: Male     Birth control/protection: Surgical     Comment: tubal ligation   Other Topics Concern     Service No    Blood Transfusions No    Caffeine Concern No    Occupational Exposure No    Hobby Hazards No    Sleep Concern Yes     Comment: Insomnia    Stress Concern Yes     Comment: breathing,  passed away 2004    Weight Concern Yes     Comment: would  like to lose some weight    Special Diet No    Back Care Yes    Exercise Yes     Comment: walking at work daily    Bike Helmet Not Asked    Seat Belt Yes    Self-Exams Yes     Comment: periodically    Parent/sibling w/ CABG, MI or angioplasty before 65F 55M? No   Social History Narrative    Not on file     Social Determinants of Health     Financial Resource Strain: Low Risk  (5/30/2024)    Financial Resource Strain     Within the past 12 months, have you or your family members you live with been unable to get utilities (heat, electricity) when it was really needed?: No   Food Insecurity: Low Risk  (5/30/2024)    Food Insecurity     Within the past 12 months, did you worry that your food would run out before you got money to buy more?: No     Within the past 12 months, did the food you bought just not last and you didn t have money to get more?: No   Transportation Needs: Low Risk  (5/30/2024)    Transportation Needs     Within the past 12 months, has lack of transportation kept you from medical appointments, getting your medicines, non-medical meetings or appointments, work, or from getting things that you need?: No   Physical Activity: Unknown (5/30/2024)    Exercise Vital Sign     Days of Exercise per Week: 2 days     Minutes of Exercise per Session: Not on file   Stress: No Stress Concern Present (5/30/2024)    [employer] - Occupational Stress Questionnaire     Feeling of Stress : Only a little   Social Connections: Unknown (5/30/2024)    Social Connection and Isolation Panel [NHANES]     Frequency of Communication with Friends and Family: Not on file     Frequency of Social Gatherings with Friends and Family: Three times a week     Attends Restoration Services: Not on file     Active Member of Clubs or Organizations: Not on file     Attends Club or Organization Meetings: Not on file     Marital Status: Not on file   Interpersonal Safety: Not on file   Housing Stability: Low Risk   "(5/30/2024)    Housing Stability     Do you have housing? : Yes     Are you worried about losing your housing?: No       FAMILY HISTORY:  Denies colon/panc/esophageal/other GI CA, no other Krishna or other HPS-related Mary. No IBD/celiac, no other AI/liver/thyroid disease.    Family History   Problem Relation Age of Onset    Heart Disease Mother         anemia    Osteoporosis Mother     Hypertension Mother     Cerebrovascular Disease Mother     Alcohol/Drug Mother         alcohol    Neurologic Disorder Mother         migraines    Hypertension Father     Cancer No family hx of         breast       PHYSICAL EXAMINATION:  Vitals reviewed  BP (!) 162/84 (BP Location: Right arm, Patient Position: Sitting, Cuff Size: Adult Regular)   Pulse 88   Temp 98.2  F (36.8  C) (Temporal)   Ht 1.549 m (5' 1\")   Wt 54.6 kg (120 lb 4.8 oz)   LMP 11/09/2005 (Approximate)   SpO2 98%   BMI 22.73 kg/m      General: Patient appears well in no acute distress.    Skin: No visualized rash or lesions on visualized skin  HEENT:    EOMI, no erythema, sclera icterus or discharge noted.  Mouth mucosa intact, pink, moist  No cervical or supraclavicular lymphadenopathy. Thyroid gland not enlarged.  Resp: breathing comfortably without accessory muscle usage, speaking in full sentences, no cough. Lung sounds clear  Card: Regular and rhythmic S1 and S2. No gallop or rub. Pansystolic heart murmur 4/6 over the right upper chest.  No LE edema.  Abdomen: Active bowel sounds X 4 quadrants. Soft to palpation. Tenderness in the upper abdomen on deep palpation. Lumpiness and \"pressure\" in the LLQ (stool?). No guarding or rebound tenderness. Higgins's sign negative.  MSK: Appears to have normal range of motion based on visualized movements  Neurologic: No apparent tremors, facial movements symmetric  Psych: affect normal, alert and oriented      ASSESSMENT/PLAN:    ICD-10-CM    1. Diverticulosis of large intestine without hemorrhage  K57.30       2. Upper " abdominal pain  R10.10 Adult GI  Referral - Consult Only     MR Abdomen MRCP w/o & w Contrast     Adult GI  Referral - Procedure Only      3. Constipation, unspecified constipation type  K59.00 Adult GI  Referral - Consult Only      4. Abnormal CT of the abdomen  R93.5 Adult GI  Referral - Consult Only     MR Abdomen MRCP w/o & w Contrast      5. Lesion of pancreas  K86.9 MR Abdomen MRCP w/o & w Contrast      6. Common bile duct dilation  K83.8 MR Abdomen MRCP w/o & w Contrast      7. Heart murmur  R01.1          74 year old female with complex medical history including HTN, heart murmur, recurrent Takotsubo in 2005 and 2021 with full recovery of LV function, TIA, cholecystectomy, and CAD, was referred to GI clinic for evaluation of upper abdominal pain and constipation. Stated that constipation has always been an issue; was worse when she was taking iron supplement. Has been having BM q 2 days on average. Takes Miralax as needed.  I suggested to take 17 gram of Miralax every day. Increase dietary fiber intake. Maintain proper hydration. Take bisacodyl 5 mg if no BM for 3 or more days.    Patient cannot recall when she started experiencing upper abdominal pain. There are no aggravating factors. Stated that taking Tramadol and hydrocodone helps her pain. She eats 2 meals a day. Stated that her appetite is worse than before but relates it to issues with falls and orthopedic surgeries for fractures that she had last year. Said that she is recovering slowly from those. No N/V, dysphagia, or acid reflux. No hematochezia or melena.  Admits to some alcohol intake, about 3 drinks a week (?).  Patient said that her pain had improved; noted that she is on tramadol, Seroquel, and hydrocodone, which could interfere with her perception of pain. CT scan of abdomen revealed interval increase of dilatation of the biliary and pancreatic  ducts since 11/4/2020. Small focal nodular enhancement newly  seen at the pancreas head/neck. Patient had normal LFT and lipase.  We discussed differential diagnosis including but not limited to pancreatitis, pancreatic mass, gastritis, peptic ulcer, GERD, chronic constipation, colitis, or IBS. Patient is on clopidogrel.  She is scheduled for colonoscopy on 7/23. Suggested to add EGD. Will also proceed with MRCP.   Educated on red flag symptoms and when to seek medical attention. Will contact the patient with endoscopy and MRCP findings when available.   Briefly talked about benefits of vit D and calcium supplement and screening for osteoporosis due to Hx of falls and bone fractures. Suggested to discuss this with her PCP.  Patient verbalized understanding and appreciation of care provided. Stated that all of the questions were answered to her/his satisfaction.  RTC in 2 months    Thank you for this consultation. It was a pleasure to participate in the care of this patient; please contact us with any further questions.    JAYY Cunha, FNP-C  Division of Gastroenterology  [facility], [place], [place]    This note was created with Dragon voice recognition software, and while reviewed for accuracy, inadvertent minor typographic errors may occur. Please contact the provider if you have any questions.

## 2024-06-28 NOTE — LETTER
6/28/2024      Sri Grewal  [address] [facility] [address]      Dear Colleague,    Thank you for referring your patient, Sri Grewal, to the [facility]. Please see a copy of my visit note below.      [facility]  [address] [address]  Phone: [phone]    Patient:  Sri Grewal, Date of birth 1950    Referring Provider Sudeep Mahoney    Gastroenterology CLINIC VISIT, NEW PATIENT    REASON FOR CONSULTATION: abdominal pain    HPI: 74 year old female with PMH of HTN, heart murmur, recurrent Takotsubo in 2005 and 2021 with full recovery of her LV function, TIA, and CAD, was referred  to GI clinic for a consult on upper abdominal pain and constipation. Patient is here today with her male friend, who stated that he sets up all of her medications and helps with appointments. They reported that the patient has less symptoms that before- her constipation had improved after she stopped iron pill.  Has been having bowel movements every other day. Reported that her stool consistency ranges from mushy to hard. Denies hematochezia or melena.  Stated that her abdominal pain had improved as well. Experiences occasional dull or achy periumbilical and upper abdominal pain, not related to meal intake or defecation. Denies nausea or vomiting. NO dysphagia or odynophagia. No acid reflux. Hx of cholecystectomy.  Noted dilated CBD at 11 mm on ultrasound.  CT scan of abdomen:  Interval increase of dilatation of the biliary and pancreatic ducts since 11/4/2020. Small focal nodular enhancement newly seen at  the pancreas head/neck. Normal lipase and LFT. Hyperkalemia on last CMP.        PREVIOUS ENDOSCOPY:  5/30/2014 Colonoscopy  Findings:       The terminal ileum appeared normal. Multiple small and large-mouthed        diverticula were found in the sigmoid colon and in the descending colon.        Nora-diverticular erythema was seen. There was  evidence of an impacted        diverticulum. Non-bleeding external and internal hemorrhoids were found        during retroflexion and were Grade I (internal hemorrhoids that do not        prolapse). Anal papilla(e) were hypertrophied. The recto-sigmoid colon,        sigmoid colon, descending colon, splenic flexure, transverse colon,        hepatic flexure, ascending colon, cecum, appendiceal orifice and        ileocecal valve appeared normal.     PERTINENT STUDIES Reviewed in EMR  5/10/2024 Limited abd. US  FINDINGS:   GALLBLADDER: Absent.   BILE DUCTS: There is no biliary dilatation. The common duct measures  11mm.   LIVER: Normal where seen.   RIGHT KIDNEY: No hydronephrosis. Simple cyst right upper kidney  without specific imaging follow-up recommended. This measures 3.8 cm.   PANCREAS: No visible focal lesion. A few bowel loops adjacent to the  pancreas head area identified.   No ascites.                                                                    IMPRESSION:  1.  Dilated common duct measuring 11 mm.  2.  No other specific abnormality can be seen.    5/10/2024 CT of abdomen/pelvis  FINDINGS:   LOWER CHEST: Normal.   HEPATOBILIARY: Gallbladder not seen. Dilated extrahepatic biliary  tree. Common bile duct is approximately 11 mm, previously 7 mm series  3 image 64. No focal hepatic observation.     PANCREAS: Dilated main pancreatic duct appears increased measuring 5  mm, previously 3 mm image 65. 9 mm apparent enhancing rounded nodule  at the pancreas head/uncinate series 3 image 82.     SPLEEN: A few benign calcifications.     ADRENAL GLANDS: Normal.     KIDNEYS/BLADDER: No significant mass, stones, or hydronephrosis. There  are simple or benign cysts. No follow up is needed. Distended urinary  bladder.     BOWEL: Prominent stool at the proximal colon. No small bowel  obstruction. Colonic diverticulosis without convincing diverticulitis.  Normal appendix.     PELVIC ORGANS: No acute pelvic abnormality  identified.     ADDITIONAL FINDINGS: Scattered vascular calcifications.     MUSCULOSKELETAL: T12 compression deformity demonstrates significant  height loss 0n series 5 image 59, progressed since the older CT from  11/4/2020. Postoperative change at the right femur.                                                                      IMPRESSION:   1.  Interval increase of dilatation of the biliary and pancreatic  ducts since 11/4/2020. Small focal nodular enhancement newly seen at  the pancreas head/neck. It is difficult to exclude a pancreas mass.  This potentially could be further evaluated with MRI.  2.  Prominent stool within the colon.  3.  Colonic diverticulosis without convincing diverticulitis.  4.  Progression of height loss at a T12 compression deformity since  11/4/2020.      ROS: 10pt ROS performed and otherwise negative.    PAST MEDICAL HISTORY:  Past Medical History:   Diagnosis Date     Abnormal Papanicolaou smear of cervix and cervical HPV      Allergic rhinitis, cause unspecified     seasonal allergies     Cerebral infarction (H)      Contact dermatitis and other eczema, due to unspecified cause      Endometriosis, site unspecified      Esophageal reflux     GERD     Migraine, unspecified, without mention of intractable migraine without mention of status migrainosus      Mild persistent asthma      Unspecified disorder of uterus     fibroid uterus       PREVIOUS ABDOMINAL/GYNECOLOGIC SURGERIES:  Past Surgical History:   Procedure Laterality Date     COLONOSCOPY  5/30/2014    Procedure: COLONOSCOPY;  Surgeon: Nathan Baker MD;  Location: [facility]     OPEN REDUCTION INTERNAL FIXATION ELBOW Right 6/1/2023    Procedure: REVISION OPEN REDUCTION INTERNAL FIXATION RIGHT OLECRANON;  Surgeon: Williams Phipps MD;  Location: [facility]     OPEN REDUCTION INTERNAL FIXATION HIP NAILING Right 7/14/2023    Procedure: INTERNAL FIXATION, FRACTURE, TROCHANTERIC,RIGHT HIP, USING GAMMA RODS;  Surgeon: Nathan Turner MD;   Location: [facility]     OPEN REDUCTION INTERNAL FIXATION HUMERUS DISTAL Right 5/3/2023    Procedure: OPEN REDUCTION INTERNAL FIXATION, FRACTURE, HUMERUS, DISTAL;  Surgeon: Williams Phipps MD;  [facility]     REMOVE HARDWARE ELBOW Right 2023    Procedure: HARDWARE REMOVAL RIGHT OLECRANON;  Surgeon: Williams Phipps MD;  [facility]     REVERSE ARTHROPLASTY SHOULDER Right 2023    Procedure: ARTHROPLASTY, SHOULDER, TOTAL, REVERSE for;  Surgeon: Cierra Krause MD;  Location: [facility]     ZZC  DELIVERY ONLY       X2     ZZHC COLONOSCOPY THRU STOMA, DIAGNOSTIC  2002    normal         PERTINENT MEDICATIONS:  Current Outpatient Medications   Medication Sig Dispense Refill     albuterol (PROAIR HFA/PROVENTIL HFA/VENTOLIN HFA) 108 (90 Base) MCG/ACT inhaler Inhale 2 puffs into the lungs every 6 hours as needed for shortness of breath, wheezing or cough 18 g 3     amLODIPine (NORVASC) 10 MG tablet Take 1 tablet (10 mg) by mouth daily 90 tablet 3     bisacodyl (DULCOLAX) 5 MG EC tablet Two days prior to exam take two (2) tablets at 4pm. One day prior to exam take two (2) tablets at 4pm 4 tablet 0     carvedilol (COREG) 12.5 MG tablet TAKE 1 TABLET BY MOUTH TWICE DAILY WITH FOOD 180 tablet 3     clopidogrel (PLAVIX) 75 MG tablet TAKE 1 TABLET(75 MG) BY MOUTH DAILY 90 tablet 1     hydrALAZINE (APRESOLINE) 25 MG tablet Take 1 tablet (25 mg) by mouth 3 times daily 90 tablet 11     HYDROcodone-acetaminophen (NORCO) 5-325 MG tablet Take 1 tablet by mouth every 6 hours as needed for severe pain (Do not mix with Tramadol) 45 tablet 0     lisinopril (ZESTRIL) 20 MG tablet Take 2 tablets (40 mg) by mouth daily 180 tablet 3     melatonin 1 MG TABS tablet Take 2 tablets (2 mg) by mouth at bedtime       polyethylene glycol (GOLYTELY) 236 g suspension Two days before procedure at 5PM fill first container with water. Mix and drink an 8 oz glass every 10-15 minutes until HALF of the container is gone. Place the remainder in  the refrigerator. One day before procedure at 5PM drink second half of bowel prep. Drink an 8 oz glass every 10-15 minutes until it is gone. Day of procedure 6 hours before arrival time fill the 2nd container with water. Mix and drink an 8 oz glass every 10-15 minutes until HALF of the container is gone. Discard the remaining solution. (Patient not taking: Reported on 6/25/2024) 8000 mL 0     QUEtiapine (SEROQUEL) 25 MG tablet Take 1 tablet (25 mg) by mouth at bedtime 90 tablet 3     rosuvastatin (CRESTOR) 20 MG tablet Take 1 tablet (20 mg) by mouth daily 90 tablet 3     sodium chloride 1 GM tablet Take 1 tablet (1 g) by mouth 2 times daily (with meals) 60 tablet 0     tiZANidine (ZANAFLEX) 2 MG tablet Take 0.5-1 tablets (1-2 mg) by mouth 3 times daily as needed for muscle spasms 90 tablet 3     traMADol (ULTRAM) 50 MG tablet Take 1 tablet (50 mg) by mouth every 4 hours as needed for moderate pain (Do not mix with Hydrocodone) 90 tablet 0     ubrogepant (UBRELVY) 50 MG tablet Take 1 tablet (50 mg) by mouth at onset of headache (May repeat ose after 2 hours if heasdache persists. max 2 pills a day) (Patient not taking: Reported on 6/25/2024) 16 tablet 11     venlafaxine (EFFEXOR XR) 150 MG 24 hr capsule Take 1 capsule (150 mg) by mouth daily 90 capsule 3         SOCIAL HISTORY:  Social History     Socioeconomic History     Marital status:      Spouse name: Not on file     Number of children: 2     Years of education: Not on file     Highest education level: Not on file   Occupational History     Occupation: retired     Employer: RETIRED   Tobacco Use     Smoking status: Never     Passive exposure: Never     Smokeless tobacco: Never   Vaping Use     Vaping status: Never Used   Substance and Sexual Activity     Alcohol use: Yes     Comment: socially     Drug use: No     Sexual activity: Not Currently     Partners: Male     Birth control/protection: Surgical     Comment: tubal ligation   Other Topics Concern       Service No     Blood Transfusions No     Caffeine Concern No     Occupational Exposure No     Hobby Hazards No     Sleep Concern Yes     Comment: Insomnia     Stress Concern Yes     Comment: breathing,  passed away 2004     Weight Concern Yes     Comment: would like to lose some weight     Special Diet No     Back Care Yes     Exercise Yes     Comment: walking at work daily     Bike Helmet Not Asked     Seat Belt Yes     Self-Exams Yes     Comment: periodically     Parent/sibling w/ CABG, MI or angioplasty before 65F 55M? No   Social History Narrative     Not on file     Social Determinants of Health     Financial Resource Strain: Low Risk  (5/30/2024)    Financial Resource Strain      Within the past 12 months, have you or your family members you live with been unable to get utilities (heat, electricity) when it was really needed?: No   Food Insecurity: Low Risk  (5/30/2024)    Food Insecurity      Within the past 12 months, did you worry that your food would run out before you got money to buy more?: No      Within the past 12 months, did the food you bought just not last and you didn t have money to get more?: No   Transportation Needs: Low Risk  (5/30/2024)    Transportation Needs      Within the past 12 months, has lack of transportation kept you from medical appointments, getting your medicines, non-medical meetings or appointments, work, or from getting things that you need?: No   Physical Activity: Unknown (5/30/2024)    Exercise Vital Sign      Days of Exercise per Week: 2 days      Minutes of Exercise per Session: Not on file   Stress: No Stress Concern Present (5/30/2024)    [employer] - Occupational Stress Questionnaire      Feeling of Stress : Only a little   Social Connections: Unknown (5/30/2024)    Social Connection and Isolation Panel [NHANES]      Frequency of Communication with Friends and Family: Not on file      Frequency of Social Gatherings with  "Friends and Family: Three times a week      Attends Gnosticism Services: Not on file      Active Member of Clubs or Organizations: Not on file      Attends Club or Organization Meetings: Not on file      Marital Status: Not on file   Interpersonal Safety: Not on file   Housing Stability: Low Risk  (5/30/2024)    Housing Stability      Do you have housing? : Yes      Are you worried about losing your housing?: No       FAMILY HISTORY:  Denies colon/panc/esophageal/other GI CA, no other Krishna or other HPS-related Mary. No IBD/celiac, no other AI/liver/thyroid disease.    Family History   Problem Relation Age of Onset     Heart Disease Mother         anemia     Osteoporosis Mother      Hypertension Mother      Cerebrovascular Disease Mother      Alcohol/Drug Mother         alcohol     Neurologic Disorder Mother         migraines     Hypertension Father      Cancer No family hx of         breast       PHYSICAL EXAMINATION:  Vitals reviewed  BP (!) 162/84 (BP Location: Right arm, Patient Position: Sitting, Cuff Size: Adult Regular)   Pulse 88   Temp 98.2  F (36.8  C) (Temporal)   Ht 1.549 m (5' 1\")   Wt 54.6 kg (120 lb 4.8 oz)   LMP 11/09/2005 (Approximate)   SpO2 98%   BMI 22.73 kg/m      General: Patient appears well in no acute distress.    Skin: No visualized rash or lesions on visualized skin  HEENT:    EOMI, no erythema, sclera icterus or discharge noted.  Mouth mucosa intact, pink, moist  No cervical or supraclavicular lymphadenopathy. Thyroid gland not enlarged.  Resp: breathing comfortably without accessory muscle usage, speaking in full sentences, no cough. Lung sounds clear  Card: Regular and rhythmic S1 and S2. No gallop or rub. Pansystolic heart murmur 4/6 over the right upper chest.  No LE edema.  Abdomen: Active bowel sounds X 4 quadrants. Soft to palpation. Tenderness in the upper abdomen on deep palpation. Lumpiness and \"pressure\" in the LLQ (stool?). No guarding or rebound tenderness. Higgins's sign " negative.  MSK: Appears to have normal range of motion based on visualized movements  Neurologic: No apparent tremors, facial movements symmetric  Psych: affect normal, alert and oriented      ASSESSMENT/PLAN:    ICD-10-CM    1. Diverticulosis of large intestine without hemorrhage  K57.30       2. Upper abdominal pain  R10.10 Adult GI  Referral - Consult Only     MR Abdomen MRCP w/o & w Contrast     Adult GI  Referral - Procedure Only      3. Constipation, unspecified constipation type  K59.00 Adult GI  Referral - Consult Only      4. Abnormal CT of the abdomen  R93.5 Adult GI  Referral - Consult Only     MR Abdomen MRCP w/o & w Contrast      5. Lesion of pancreas  K86.9 MR Abdomen MRCP w/o & w Contrast      6. Common bile duct dilation  K83.8 MR Abdomen MRCP w/o & w Contrast      7. Heart murmur  R01.1          74 year old female with complex medical history including HTN, heart murmur, recurrent Takotsubo in 2005 and 2021 with full recovery of LV function, TIA, cholecystectomy, and CAD, was referred to GI clinic for evaluation of upper abdominal pain and constipation. Stated that constipation has always been an issue; was worse when she was taking iron supplement. Has been having BM q 2 days on average. Takes Miralax as needed.  I suggested to take 17 gram of Miralax every day. Increase dietary fiber intake. Maintain proper hydration. Take bisacodyl 5 mg if no BM for 3 or more days.    Patient cannot recall when she started experiencing upper abdominal pain. There are no aggravating factors. Stated that taking Tramadol and hydrocodone helps her pain. She eats 2 meals a day. Stated that her appetite is worse than before but relates it to issues with falls and orthopedic surgeries for fractures that she had last year. Said that she is recovering slowly from those. No N/V, dysphagia, or acid reflux. No hematochezia or melena.  Admits to some alcohol intake, about 3 drinks a week  (?).  Patient said that her pain had improved; noted that she is on tramadol, Seroquel, and hydrocodone, which could interfere with her perception of pain. CT scan of abdomen revealed interval increase of dilatation of the biliary and pancreatic  ducts since 11/4/2020. Small focal nodular enhancement newly seen at the pancreas head/neck. Patient had normal LFT and lipase.  We discussed differential diagnosis including but not limited to pancreatitis, pancreatic mass, gastritis, peptic ulcer, GERD, chronic constipation, colitis, or IBS. Patient is on clopidogrel.  She is scheduled for colonoscopy on 7/23. Suggested to add EGD. Will also proceed with MRCP.   Educated on red flag symptoms and when to seek medical attention. Will contact the patient with endoscopy and MRCP findings when available.   Briefly talked about benefits of vit D and calcium supplement and screening for osteoporosis due to Hx of falls and bone fractures. Suggested to discuss this with her PCP.  Patient verbalized understanding and appreciation of care provided. Stated that all of the questions were answered to her/his satisfaction.  RTC in 2 months    Thank you for this consultation. It was a pleasure to participate in the care of this patient; please contact us with any further questions.    JAYY Cunha, FNP-C  Division of Gastroenterology  [facility], [place], [place]    This note was created with Dragon voice recognition software, and while reviewed for accuracy, inadvertent minor typographic errors may occur. Please contact the provider if you have any questions.       Again, thank you for allowing me to participate in the care of your patient.        Sincerely,        JAYY CUNHA CNP

## 2024-07-11 ENCOUNTER — TELEPHONE (OUTPATIENT)
Dept: FAMILY MEDICINE | Facility: OTHER | Age: 74
End: 2024-07-11
Payer: MEDICARE

## 2024-07-11 NOTE — TELEPHONE ENCOUNTER
For her colonoscopy, will need to hold Plavix 7 days prior. I would like patient to check with her neurosurgeon Dr. Earl to make sure this is ok before holding the medication.    Jose Francisco Pierre PA-C  [facility]

## 2024-07-11 NOTE — TELEPHONE ENCOUNTER
----- Message from Melanie GONZALEZ sent at 7/10/2024  6:41 AM CDT -----  Regarding: Plavix  Dear provider, this patient has a procedure (colonoscopy) on 7/23 our preoperative guidelines indicate that the patient holds Plavix for 7-10 days.     If your patient has not already been advised, please reach out to the patient to make a plan for holding this medication, to prevent any delay in care.    Thank You,   Same Day Surgery

## 2024-07-16 ENCOUNTER — HOSPITAL ENCOUNTER (OUTPATIENT)
Dept: MRI IMAGING | Facility: CLINIC | Age: 74
Discharge: HOME | End: 2024-07-16
Attending: NURSE PRACTITIONER | Admitting: NURSE PRACTITIONER
Payer: MEDICARE

## 2024-07-16 DIAGNOSIS — R93.5 ABNORMAL CT OF THE ABDOMEN: ICD-10-CM

## 2024-07-16 DIAGNOSIS — K83.8 COMMON BILE DUCT DILATION: ICD-10-CM

## 2024-07-16 DIAGNOSIS — K86.9 LESION OF PANCREAS: ICD-10-CM

## 2024-07-16 DIAGNOSIS — R10.10 UPPER ABDOMINAL PAIN: ICD-10-CM

## 2024-07-16 PROCEDURE — 255N000002 HC RX 255 OP 636: Performed by: NURSE PRACTITIONER

## 2024-07-16 PROCEDURE — A9585 GADOBUTROL INJECTION: HCPCS | Performed by: NURSE PRACTITIONER

## 2024-07-16 PROCEDURE — 74183 MRI ABD W/O CNTR FLWD CNTR: CPT | Mod: MG

## 2024-07-16 RX ORDER — GADOBUTROL 604.72 MG/ML
5.5 INJECTION INTRAVENOUS ONCE
Status: COMPLETED | OUTPATIENT
Start: 2024-07-16 | End: 2024-07-16

## 2024-07-16 RX ADMIN — GADOBUTROL 5.5 ML: 604.72 INJECTION INTRAVENOUS at 14:07

## 2024-07-19 ENCOUNTER — OFFICE VISIT (OUTPATIENT)
Dept: NEUROLOGY | Facility: CLINIC | Age: 74
End: 2024-07-19
Payer: MEDICARE

## 2024-07-19 VITALS
HEART RATE: 104 BPM | RESPIRATION RATE: 16 BRPM | SYSTOLIC BLOOD PRESSURE: 149 MMHG | DIASTOLIC BLOOD PRESSURE: 80 MMHG | OXYGEN SATURATION: 99 %

## 2024-07-19 DIAGNOSIS — G43.709 CHRONIC MIGRAINE WITHOUT AURA WITHOUT STATUS MIGRAINOSUS, NOT INTRACTABLE: Primary | ICD-10-CM

## 2024-07-19 DIAGNOSIS — F03.90 DEMENTIA WITHOUT BEHAVIORAL DISTURBANCE (H): Chronic | ICD-10-CM

## 2024-07-19 PROCEDURE — 99214 OFFICE O/P EST MOD 30 MIN: CPT | Performed by: PSYCHIATRY & NEUROLOGY

## 2024-07-19 ASSESSMENT — PAIN SCALES - GENERAL: PAINLEVEL: MILD PAIN (3)

## 2024-07-19 NOTE — LETTER
7/19/2024       RE: Sri Grewal  [address]  [facility] [address]     Dear Colleague,    Thank you for referring your patient, Sri Grewal, to the [facility] NEUROLOGY CL[facility]IC [place] [facility]. Please see a copy of my visit note below.    Neurology Progress Note    [facility]    Sri Grewal MRN# [id number]   Age: 74 year old YOB: 1950     PCP: Rosenda Pierre            Assessment and Plan:     (G43.709) Chronic migraine without aura without status migrainosus, not intractable  (primary encounter diagnosis)  Comment: past stroke contraindicates triptan use. Encouraged Ubrelvy use but it is hard due to cost concerns. I will have staff review PA status.   Plan: Ubrelvy prn    (F03.90) Dementia without behavioral disturbance (H)  Comment: Likely vascular due to past stroke  Plan: Stable if not slightly better with improved sleep and time away from stroke.     Pt has had stroke. BP goal is SBP < 135, DBP < 85 defer to PCP on management choices    Follow up in one year.     2 stable chronic diseases, Pharmacology management.     Miryam Mcgowan MD           History of Present Illness:   CC: Recheck    Last visit 1/2/24.   Overall Sri is doing better than last visit.   Cognition/memory seems better to her and her friend.   She reports she is sleeping better with quetiapine    She continues to have headaches daily. She has Ubrelvy but isn't using it, Using Tylenol daily we did review rebound headaches.     BP is high but better than previous visits.          Physical Exam:     BP (!) 149/80   Pulse 104   Resp 16   LMP 11/09/2005 (Approximate)   SpO2 99%     8/8 Friday, July, 19th, 2024, 3rd, [facility], [place], [place]  6/7 attent  4/4 (1) ashly  3/4 calc   2/4 current pres couldn't remember, # of weeks  3/3 abstract  4/4 construction  2/4 recall 3/4 with prompting  32/ 38         Pertinent History/Data for appointment:        Miryam Mcgowan MD

## 2024-07-19 NOTE — PROGRESS NOTES
Neurology Progress Note    [facility]    Sri Grewal MRN# [id number]   Age: 74 year old YOB: 1950     PCP: Rosenda Pierre            Assessment and Plan:     (G43.709) Chronic migraine without aura without status migrainosus, not intractable  (primary encounter diagnosis)  Comment: past stroke contraindicates triptan use. Encouraged Ubrelvy use but it is hard due to cost concerns. I will have staff review PA status.   Plan: Ubrelvy prn    (F03.90) Dementia without behavioral disturbance (H)  Comment: Likely vascular due to past stroke  Plan: Stable if not slightly better with improved sleep and time away from stroke.     Pt has had stroke. BP goal is SBP < 135, DBP < 85 defer to PCP on management choices    Follow up in one year.     2 stable chronic diseases, Pharmacology management.     Miryam Mcgowan MD           History of Present Illness:   CC: Recheck    Last visit 1/2/24.   Overall Sri is doing better than last visit.   Cognition/memory seems better to her and her friend.   She reports she is sleeping better with quetiapine    She continues to have headaches daily. She has Ubrelvy but isn't using it, Using Tylenol daily we did review rebound headaches.     BP is high but better than previous visits.          Physical Exam:     BP (!) 149/80   Pulse 104   Resp 16   LMP 11/09/2005 (Approximate)   SpO2 99%     8/8 Friday, July, 19th, 2024, 3rd, [facility], [place], [place]  6/7 attent  4/4 (1) ashly  3/4 calc   2/4 current pres couldn't remember, # of weeks  3/3 abstract  4/4 construction  2/4 recall 3/4 with prompting  32/ 38         Pertinent History/Data for appointment:

## 2024-07-19 NOTE — Clinical Note
Could we please check to see if a PA is in place for Ubrelvy-high cost according to her friend is stopping her from using. If I need to change to 10 pills I can but would prefer 16

## 2024-07-22 ENCOUNTER — TELEPHONE (OUTPATIENT)
Dept: NEUROLOGY | Facility: CLINIC | Age: 74
End: 2024-07-22

## 2024-07-22 NOTE — TELEPHONE ENCOUNTER
Prior Authorization Retail Medication Request    Medication/Dose: Ubrelvy 50 mg  Diagnosis and ICD code (if different than what is on RX):    New/renewal/insurance change PA/secondary ins. PA:  Previously Tried and Failed:   past stroke contraindicates triptan use. Encouraged Ubrelvy use but it is hard due to cost concerns.     Rationale:  She continues to have headaches daily     Insurance   Primary: Medicare  Insurance ID:  [id number]

## 2024-07-22 NOTE — H&P
[facility] History and Physical    Sri Grewal MRN# [id number]   Age: 74 year old YOB: 1950     Date of Admission:  (Not on file)    Home clinic: [facility]  Primary care provider: Rosenda Pierre          Impression and Plan:   Impression:   Screen for colon cancer [Z12.11]  Last colonoscopy-2014.  Normal      Plan:   Proceed to Colonoscopy as planned.  The procedure, risks(bleeding, perforation), benefits and alternatives were discussed and the patient agrees to proceed. Cleared for Anesthesia             Chief Complaint:   Screen for colon cancer [Z12.11]    History is obtained from the patient          History of Present Illness:   This 74 year old female is being seen at this time for evaluation for colonoscopy. No complaints or family hx           Past Medical History:     Past Medical History:   Diagnosis Date    Abnormal Papanicolaou smear of cervix and cervical HPV     Allergic rhinitis, cause unspecified     seasonal allergies    Cerebral infarction (H)     Contact dermatitis and other eczema, due to unspecified cause     Endometriosis, site unspecified     Esophageal reflux     GERD    Migraine, unspecified, without mention of intractable migraine without mention of status migrainosus     Mild persistent asthma     Unspecified disorder of uterus     fibroid uterus            Past Surgical History:     Past Surgical History:   Procedure Laterality Date    COLONOSCOPY  5/30/2014    Procedure: COLONOSCOPY;  Surgeon: Nathan Baker MD;  [facility]    OPEN REDUCTION INTERNAL FIXATION ELBOW Right 6/1/2023    Procedure: REVISION OPEN REDUCTION INTERNAL FIXATION RIGHT OLECRANON;  Surgeon: Williams Phipps MD;  [facility]    OPEN REDUCTION INTERNAL FIXATION HIP NAILING Right 7/14/2023    Procedure: INTERNAL FIXATION, FRACTURE, TROCHANTERIC,RIGHT HIP, USING GAMMA RODS;  Surgeon: Nathan Turner MD;  [facility]    OPEN REDUCTION INTERNAL  FIXATION HUMERUS DISTAL Right 5/3/2023    Procedure: OPEN REDUCTION INTERNAL FIXATION, FRACTURE, HUMERUS, DISTAL;  Surgeon: Williams Phipps MD;  [facility]    REMOVE HARDWARE ELBOW Right 2023    Procedure: HARDWARE REMOVAL RIGHT OLECRANON;  Surgeon: Williams Phipps MD;  [facility]    REVERSE ARTHROPLASTY SHOULDER Right 2023    Procedure: ARTHROPLASTY, SHOULDER, TOTAL, REVERSE for;  Surgeon: Cierra Krause MD;  [facility]    ZZC  DELIVERY ONLY       X2    ZZHC COLONOSCOPY THRU STOMA, DIAGNOSTIC  2002    normal            Social History:     Social History     Tobacco Use    Smoking status: Never     Passive exposure: Never    Smokeless tobacco: Never   Substance Use Topics    Alcohol use: Yes     Comment: socially            Family History:     Family History   Problem Relation Age of Onset    Heart Disease Mother         anemia    Osteoporosis Mother     Hypertension Mother     Cerebrovascular Disease Mother     Alcohol/Drug Mother         alcohol    Neurologic Disorder Mother         migraines    Hypertension Father     Cancer No family hx of         breast            Immunizations:     VACCINE/DOSE   Diptheria   DPT   DTAP   HBIG   Hepatitis A   Hepatitis B   HIB   Influenza   Measles   Meningococcal   MMR   Mumps   Pneumococcal   Polio   Rubella   Small Pox   TDAP   Varicella   Zoster            Allergies:     Allergies   Allergen Reactions    Morphine And Codeine      GI UPSET    Oxycodone     Seasonal Allergies             Medications:     No current facility-administered medications for this encounter.     Current Outpatient Medications   Medication Sig Dispense Refill    albuterol (PROAIR HFA/PROVENTIL HFA/VENTOLIN HFA) 108 (90 Base) MCG/ACT inhaler Inhale 2 puffs into the lungs every 6 hours as needed for shortness of breath, wheezing or cough 18 g 3    amLODIPine (NORVASC) 10 MG tablet Take 1 tablet (10 mg) by mouth daily 90 tablet 3    carvedilol (COREG) 12.5 MG tablet  TAKE 1 TABLET BY MOUTH TWICE DAILY WITH FOOD 180 tablet 3    clopidogrel (PLAVIX) 75 MG tablet TAKE 1 TABLET(75 MG) BY MOUTH DAILY 90 tablet 1    hydrALAZINE (APRESOLINE) 25 MG tablet Take 1 tablet (25 mg) by mouth 3 times daily 90 tablet 11    HYDROcodone-acetaminophen (NORCO) 5-325 MG tablet Take 1 tablet by mouth every 6 hours as needed for severe pain (Do not mix with Tramadol) 45 tablet 0    lisinopril (ZESTRIL) 20 MG tablet Take 2 tablets (40 mg) by mouth daily 180 tablet 3    melatonin 1 MG TABS tablet Take 2 tablets (2 mg) by mouth at bedtime      QUEtiapine (SEROQUEL) 25 MG tablet Take 1 tablet (25 mg) by mouth at bedtime 90 tablet 3    rosuvastatin (CRESTOR) 20 MG tablet Take 1 tablet (20 mg) by mouth daily 90 tablet 3    sodium chloride 1 GM tablet Take 1 tablet (1 g) by mouth 2 times daily (with meals) 60 tablet 0    tiZANidine (ZANAFLEX) 2 MG tablet Take 0.5-1 tablets (1-2 mg) by mouth 3 times daily as needed for muscle spasms 90 tablet 3    traMADol (ULTRAM) 50 MG tablet Take 1 tablet (50 mg) by mouth every 4 hours as needed for moderate pain (Do not mix with Hydrocodone) 90 tablet 0    venlafaxine (EFFEXOR XR) 150 MG 24 hr capsule Take 1 capsule (150 mg) by mouth daily 90 capsule 3    bisacodyl (DULCOLAX) 5 MG EC tablet Two days prior to exam take two (2) tablets at 4pm. One day prior to exam take two (2) tablets at 4pm 4 tablet 0    polyethylene glycol (GOLYTELY) 236 g suspension Two days before procedure at 5PM fill first container with water. Mix and drink an 8 oz glass every 10-15 minutes until HALF of the container is gone. Place the remainder in the refrigerator. One day before procedure at 5PM drink second half of bowel prep. Drink an 8 oz glass every 10-15 minutes until it is gone. Day of procedure 6 hours before arrival time fill the 2nd container with water. Mix and drink an 8 oz glass every 10-15 minutes until HALF of the container is gone. Discard the remaining solution. (Patient not  taking: Reported on 6/25/2024) 8000 mL 0    ubrogepant (UBRELVY) 50 MG tablet Take 1 tablet (50 mg) by mouth at onset of headache (May repeat ose after 2 hours if heasdache persists. max 2 pills a day) 16 tablet 11             Review of Systems:   The review of systems was positive for the following findings.  None.  The remainder of the review of systems was unremarkable.          Physical Exam:   All vitals have been reviewed  Last menstrual period 11/09/2005, not currently breastfeeding.  No intake or output data in the 24 hours ending 07/22/24 1655  SHEENT examination revealed NC/AT, EOMI.  Examination of the chest revealed CTA.  Examination of the heart revealed RRR.  Examination of the abdomen revealed Soft, non teder.  The neuromuscular examination was NL.          Data:   All laboratory data reviewed  No results found for any visits on 07/23/24.  -     Carlton Ramirez MD, FACS

## 2024-07-23 ENCOUNTER — HOSPITAL ENCOUNTER (OUTPATIENT)
Facility: CLINIC | Age: 74
Discharge: HOME | End: 2024-07-23
Attending: SPECIALIST | Admitting: SPECIALIST
Payer: MEDICARE

## 2024-07-23 ENCOUNTER — ANESTHESIA EVENT (OUTPATIENT)
Dept: GASTROENTEROLOGY | Facility: CLINIC | Age: 74
End: 2024-07-23
Payer: MEDICARE

## 2024-07-23 ENCOUNTER — ANESTHESIA (OUTPATIENT)
Dept: GASTROENTEROLOGY | Facility: CLINIC | Age: 74
End: 2024-07-23
Payer: MEDICARE

## 2024-07-23 VITALS
TEMPERATURE: 98.3 F | OXYGEN SATURATION: 97 % | RESPIRATION RATE: 70 BRPM | HEART RATE: 70 BPM | DIASTOLIC BLOOD PRESSURE: 86 MMHG | SYSTOLIC BLOOD PRESSURE: 135 MMHG

## 2024-07-23 LAB — COLONOSCOPY: NORMAL

## 2024-07-23 PROCEDURE — 45378 DIAGNOSTIC COLONOSCOPY: CPT | Performed by: SPECIALIST

## 2024-07-23 PROCEDURE — 250N000011 HC RX IP 250 OP 636: Performed by: NURSE ANESTHETIST, CERTIFIED REGISTERED

## 2024-07-23 PROCEDURE — 370N000017 HC ANESTHESIA TECHNICAL FEE, PER MIN: Performed by: SPECIALIST

## 2024-07-23 PROCEDURE — G0121 COLON CA SCRN NOT HI RSK IND: HCPCS | Performed by: SPECIALIST

## 2024-07-23 PROCEDURE — 258N000003 HC RX IP 258 OP 636: Performed by: NURSE ANESTHETIST, CERTIFIED REGISTERED

## 2024-07-23 PROCEDURE — 250N000009 HC RX 250: Performed by: NURSE ANESTHETIST, CERTIFIED REGISTERED

## 2024-07-23 RX ORDER — ONDANSETRON 2 MG/ML
4 INJECTION INTRAMUSCULAR; INTRAVENOUS EVERY 30 MIN PRN
Status: CANCELLED | OUTPATIENT
Start: 2024-07-23

## 2024-07-23 RX ORDER — LIDOCAINE 40 MG/G
CREAM TOPICAL
Status: DISCONTINUED | OUTPATIENT
Start: 2024-07-23 | End: 2024-07-23 | Stop reason: HOSPADM

## 2024-07-23 RX ORDER — LIDOCAINE HYDROCHLORIDE 20 MG/ML
INJECTION, SOLUTION INFILTRATION; PERINEURAL PRN
Status: DISCONTINUED | OUTPATIENT
Start: 2024-07-23 | End: 2024-07-23

## 2024-07-23 RX ORDER — PROPOFOL 10 MG/ML
INJECTION, EMULSION INTRAVENOUS PRN
Status: DISCONTINUED | OUTPATIENT
Start: 2024-07-23 | End: 2024-07-23

## 2024-07-23 RX ORDER — SODIUM CHLORIDE, SODIUM LACTATE, POTASSIUM CHLORIDE, CALCIUM CHLORIDE 600; 310; 30; 20 MG/100ML; MG/100ML; MG/100ML; MG/100ML
INJECTION, SOLUTION INTRAVENOUS CONTINUOUS
Status: DISCONTINUED | OUTPATIENT
Start: 2024-07-23 | End: 2024-07-23 | Stop reason: HOSPADM

## 2024-07-23 RX ORDER — PROPOFOL 10 MG/ML
INJECTION, EMULSION INTRAVENOUS CONTINUOUS PRN
Status: DISCONTINUED | OUTPATIENT
Start: 2024-07-23 | End: 2024-07-23

## 2024-07-23 RX ORDER — ONDANSETRON 4 MG/1
4 TABLET, ORALLY DISINTEGRATING ORAL EVERY 30 MIN PRN
Status: CANCELLED | OUTPATIENT
Start: 2024-07-23

## 2024-07-23 RX ORDER — DEXAMETHASONE SODIUM PHOSPHATE 10 MG/ML
4 INJECTION, SOLUTION INTRAMUSCULAR; INTRAVENOUS
Status: CANCELLED | OUTPATIENT
Start: 2024-07-23

## 2024-07-23 RX ORDER — NALOXONE HYDROCHLORIDE 0.4 MG/ML
0.1 INJECTION, SOLUTION INTRAMUSCULAR; INTRAVENOUS; SUBCUTANEOUS
Status: CANCELLED | OUTPATIENT
Start: 2024-07-23

## 2024-07-23 RX ADMIN — SODIUM CHLORIDE, POTASSIUM CHLORIDE, SODIUM LACTATE AND CALCIUM CHLORIDE: 600; 310; 30; 20 INJECTION, SOLUTION INTRAVENOUS at 14:27

## 2024-07-23 RX ADMIN — PROPOFOL 150 MCG/KG/MIN: 10 INJECTION, EMULSION INTRAVENOUS at 14:50

## 2024-07-23 RX ADMIN — LIDOCAINE HYDROCHLORIDE 50 MG: 20 INJECTION, SOLUTION INFILTRATION; PERINEURAL at 14:50

## 2024-07-23 RX ADMIN — PROPOFOL 70 MG: 10 INJECTION, EMULSION INTRAVENOUS at 14:50

## 2024-07-23 ASSESSMENT — ACTIVITIES OF DAILY LIVING (ADL)
ADLS_ACUITY_SCORE: 36
ADLS_ACUITY_SCORE: 36
ADLS_ACUITY_SCORE: 38
ADLS_ACUITY_SCORE: 38

## 2024-07-23 NOTE — DISCHARGE INSTRUCTIONS
[facility]    Home Care Following Endoscopy          Activity:  You have just undergone an endoscopic procedure usually performed with conscious sedation.  Do not work or operate machinery (including a car) for at least 12 hours.    I encourage you to walk and attempt to pass this air as soon as possible.    Diet:  Return to the diet you were on before your procedure but eat lightly for the first 12-24 hours.  Drink plenty of water.  Resume any regular medications unless otherwise advised by your physician.  Please begin any new medication prescribed as a result of your procedure as directed by your physician.   If you had any biopsy or polyp removed please refrain from aspirin or aspirin products for 2 days.  If on Coumadin please restart as instructed by your physician.   Pain:  You may take Tylenol as needed for pain.  Expected Recovery:  You can expect some mild abdominal fullness and/or discomfort due to the air used to inflate your intestinal tract.     Call Your Physician if You Have:    After Colonoscopy:  Worsening persisting abdominal pain which is worse with activity.  Fevers (>101 degrees F), chills or shakes.  Passage of continued blood with bowel movements.     Any questions or concerns about your recovery, please call [phone] or after hours [facility] ([phone]) Nurse Advice Line.    Follow-up Care:  You did have polyps/biopsy tissue sample(s) removed.  The polyps/biopsy tissue sample(s) will be sent to pathology.    You should receive letter in your My Chart from Dr Ramirez with your results within 1-2 weeks. If you do not participate in My Chart a physical letter will come in the mail in 2-3 weeks.  Please call if you have not received a notification of your results.               Call [phone].

## 2024-07-23 NOTE — ANESTHESIA POSTPROCEDURE EVALUATION
Patient: Sri Grewal    Procedure: Procedure(s):  Colonoscopy       Anesthesia Type:  MAC    Note:  Disposition: Outpatient   Postop Pain Control: Uneventful            Sign Out: Well controlled pain   PONV: No   Neuro/Psych: Uneventful            Sign Out: Acceptable/Baseline neuro status   Airway/Respiratory: Uneventful            Sign Out: Acceptable/Baseline resp. status   CV/Hemodynamics: Uneventful            Sign Out: Acceptable CV status   Other NRE: NONE   DID A NON-ROUTINE EVENT OCCUR? No    Event details/Postop Comments:  Pt was happy with anesthesia care.  No complications.  I will follow up with the pt if needed.           Last vitals:  Vitals:    07/23/24 1356 07/23/24 1410 07/23/24 1420   BP: (!) 177/112 (!) 163/107 (!) 179/100   Pulse: 117 112 110   Resp: 18     Temp: 98.3  F (36.8  C)     SpO2: 97%         Electronically Signed By: JAYY Munoz CRNA  July 23, 2024  3:24 PM

## 2024-07-23 NOTE — ANESTHESIA PREPROCEDURE EVALUATION
Anesthesia Pre-Procedure Evaluation    Patient: Sri Grewal   MRN: [id number] : 1950        Procedure : Procedure(s):  Colonoscopy          Past Medical History:   Diagnosis Date     Abnormal Papanicolaou smear of cervix and cervical HPV      Allergic rhinitis, cause unspecified     seasonal allergies     Cerebral infarction (H)      Contact dermatitis and other eczema, due to unspecified cause      Endometriosis, site unspecified      Esophageal reflux     GERD     Migraine, unspecified, without mention of intractable migraine without mention of status migrainosus      Mild persistent asthma      Unspecified disorder of uterus     fibroid uterus      Past Surgical History:   Procedure Laterality Date     COLONOSCOPY  2014    Procedure: COLONOSCOPY;  Surgeon: Nathan Baker MD;  [facility]     OPEN REDUCTION INTERNAL FIXATION ELBOW Right 2023    Procedure: REVISION OPEN REDUCTION INTERNAL FIXATION RIGHT OLECRANON;  Surgeon: Williams Phipps MD;  [facility]     OPEN REDUCTION INTERNAL FIXATION HIP NAILING Right 2023    Procedure: INTERNAL FIXATION, FRACTURE, TROCHANTERIC,RIGHT HIP, USING GAMMA RODS;  Surgeon: Nathan Turner MD;  [facility]     OPEN REDUCTION INTERNAL FIXATION HUMERUS DISTAL Right 5/3/2023    Procedure: OPEN REDUCTION INTERNAL FIXATION, FRACTURE, HUMERUS, DISTAL;  Surgeon: Williams Phipps MD;  [facility]     REMOVE HARDWARE ELBOW Right 2023    Procedure: HARDWARE REMOVAL RIGHT OLECRANON;  Surgeon: Williams Phipps MD;  [facility]     REVERSE ARTHROPLASTY SHOULDER Right 2023    Procedure: ARTHROPLASTY, SHOULDER, TOTAL, REVERSE for;  Surgeon: Cierra Krause MD;  [facility]     ZZC  DELIVERY ONLY       X2     ZZHC COLONOSCOPY THRU STOMA, DIAGNOSTIC  2002    normal      Allergies   Allergen Reactions     Morphine And Codeine      GI UPSET     Oxycodone      Seasonal Allergies       Social History     Tobacco Use     Smoking status:  Never     Passive exposure: Never     Smokeless tobacco: Never   Substance Use Topics     Alcohol use: Yes     Comment: socially      Wt Readings from Last 1 Encounters:   06/28/24 54.6 kg (120 lb 4.8 oz)        Anesthesia Evaluation   Pt has had prior anesthetic. Type: General and Regional.    No history of anesthetic complications       ROS/MED HX  ENT/Pulmonary:     (+)                     Mild Persistent, asthma                  Neurologic: Comment: In April 2023 she presented with speech changes and right-sided weakness and was treated in outside ER with thrombolytic therapy after which her neurologic deficits resolved. She was thought to have had aborted stroke versus TIA at that time. She continues to follow-up with neurology.    History of postoperative delirium    (+) delerium,     migraines,    CVA, date: 2021 and April 2023, without deficits,  TIA, date: 1 month ago, features: She fell and had right sided weakness.  Given the clot dissolving agent TNK in the ED with improvement, and placed on Plavix.  Had confusion initially but resolved..                Cardiovascular: Comment: MI in October 2005 at which time coronary angiography demonstrated clean coronary arteries with isolated mid anterolateral akinesis and mid inferior akinesis, so her MI was attributed to a variant of Takotsubo syndrome. At time of stroke in June 2021 she underwent reevaluation of CAD and had similar findings on coronary angiography at that time with overall severely reduced LVEF of less than 30%. Despite her known history of reduced LV systolic function, she is not known to have clinical history of heart failure. However, Echo in February and April 2023 demonstrated normal LVEF    (+)  hypertension- -   - past MI - -   Taking blood thinners Pt has received instructions:                             Previous cardiac testing   Echo: Date: 2/28/23 Results:  nterpretation Summary     Global and regional left ventricular function is  hyperkinetic with an EF of  65-70%.  Global right ventricular function is normal. The right ventricle is normal  size.  No significant valvular abnormalities.  There is mild dilation at the level of the ascending aorta (3.6 cm, indexed  value 2.12 cm/m2).  The estimated PA systolic pressure is 20 mmHg.  IVC diameter <2.1 cm collapsing >50% with sniff suggests a normal RA pressure  of 3 mmHg.  This study was compared with the study from 01/16/2017. No significant  changes. Aortic calibers could not be compared due to poor visualization of  ascending aorta on prior study.  ______________________________________________________________________________  Left Ventricle  Global and regional left ventricular function is hyperkinetic with an EF of  65-70%. Left ventricular size is normal. Mild concentric wall thickening  consistent with left ventricular hypertrophy is present. Left ventricular  diastolic function is indeterminate. LV intracavitary gradient of 29 mmHg,  likely due to hyperdynamic state and underfilled ventricle.     Right Ventricle  Global right ventricular function is normal. The right ventricle is normal  size.     Atria  Both atria appear normal.     Mitral Valve  Mild mitral annular calcification is present. Trace mitral insufficiency is  present.     Aortic Valve  The aortic valve is tricuspid. On Doppler interrogation, there is no  significant stenosis or regurgitation.     Tricuspid Valve  The valve leaflets are not well visualized. Trace tricuspid insufficiency is  present. The right ventricular systolic pressure is approximated at 17.0 mmHg  plus the right atrial pressure.     Pulmonic Valve  The valve leaflets are not well visualized. Trace pulmonic insufficiency is  present.     Vessels  Sinuses of Valsalva 2.8 cm. There is mild dilation at the level of the  ascending aorta (3.6 cm, indexed value 2.12 cm/m2). IVC diameter <2.1 cm  collapsing >50% with sniff suggests a normal RA pressure of 3  mmHg.     Pericardium  No pericardial effusion is present.     Compared to Previous Study  This study was compared with the study from 01/16/2017 . No significant  changes. Aortic calibers could not be compared due to poor visualization of  ascending aorta on prior study.  _________________________    Stress Test:  Date: Results:    ECG Reviewed:  Date: 5-10-24 Results:  Sinus Rhythm   -Left axis -anterior fascicular block.  Cath:  Date: Results:      METS/Exercise Tolerance:     Hematologic: Comments: Blood thinners for TIA/CVA    (+) History of blood clots,    pt is anticoagulated, anemia,          Musculoskeletal: Comment: Had hip fracture and arm fracture repair last year      GI/Hepatic:     (+) GERD, Asymptomatic on medication,                  Renal/Genitourinary:  - neg Renal ROS     Endo:  - neg endo ROS     Psychiatric/Substance Use: Comment: Adjustment disorder    (+) psychiatric history depression       Infectious Disease:  - neg infectious disease ROS     Malignancy:  - neg malignancy ROS     Other:  - neg other ROS          Physical Exam    Airway        Mallampati: II   TM distance: > 3 FB   Neck ROM: full   Mouth opening: > 3 cm    Respiratory Devices and Support         Dental       (+) Multiple visibly decayed, broken teeth      Cardiovascular   cardiovascular exam normal       Rhythm and rate: regular and normal     Pulmonary   pulmonary exam normal        breath sounds clear to auscultation       OUTSIDE LABS:  CBC:   Lab Results   Component Value Date    WBC 5.9 05/10/2024    WBC 5.9 11/06/2023    HGB 14.1 05/10/2024    HGB 13.4 11/06/2023    HCT 40.9 05/10/2024    HCT 40.2 11/06/2023     05/10/2024     11/06/2023     BMP:   Lab Results   Component Value Date     05/30/2024     (L) 05/10/2024    POTASSIUM 5.4 (H) 05/30/2024    POTASSIUM 4.6 05/10/2024    CHLORIDE 98 05/30/2024    CHLORIDE 97 (L) 05/10/2024    CO2 28 05/30/2024    CO2 27 05/10/2024    BUN 8.7 05/30/2024  "   BUN 6.0 (L) 05/10/2024    CR 0.70 05/30/2024    CR 0.63 05/10/2024     (H) 05/30/2024     (H) 05/10/2024     COAGS:   Lab Results   Component Value Date    PTT 30 01/15/2017    INR 1.10 07/12/2023     POC: No results found for: \"BGM\", \"HCG\", \"HCGS\"  HEPATIC:   Lab Results   Component Value Date    ALBUMIN 4.1 05/10/2024    PROTTOTAL 7.4 05/10/2024    ALT 16 05/10/2024    AST 26 05/10/2024    ALKPHOS 67 05/10/2024    BILITOTAL 0.5 05/10/2024    BILIDIRECT 0.2 04/26/2002     OTHER:   Lab Results   Component Value Date    PH 7.43 05/05/2023    LACT 2.0 05/07/2023    A1C 5.4 01/16/2017    CESIA 9.6 05/30/2024    MAG 2.0 05/06/2023    LIPASE 20 05/10/2024    TSH 0.35 02/02/2023    SED 11 10/14/2005       Anesthesia Plan    ASA Status:  3    NPO Status:  NPO Appropriate    Anesthesia Type: MAC.     - Reason for MAC: straight local not clinically adequate   Induction: Intravenous, Propofol.   Maintenance: TIVA.        Consents    Anesthesia Plan(s) and associated risks, benefits, and realistic alternatives discussed. Questions answered and patient/representative(s) expressed understanding.     - Discussed: Risks, Benefits and Alternatives for BOTH SEDATION and the PROCEDURE were discussed     - Discussed with:  Patient      - Extended Intubation/Ventilatory Support Discussed: No.      - Patient is DNR/DNI Status: No     Use of blood products discussed: No .     Postoperative Care       PONV prophylaxis: Background Propofol Infusion     Comments:    Other Comments: The risks and benefits of anesthesia, and the alternatives where applicable, have been discussed with the patient, and they wish to proceed.    Pre-op Rn heard some irregularities when auscultating heart sounds.  Stated pauses and weird beats.  I ordered an 12 lead and this showed no major changes from previous and the pt has had a recent echo.  I elected to proceed and I would guess the increased HR and irritability are from dehydration and no " taking her coreg this AM.  Advised the pt to take when home           JAYY Munoz CRNA    I have reviewed the pertinent notes and labs in the chart from the past 30 days and (re)examined the patient.  Any updates or changes from those notes are reflected in this note.             # Drug Induced Platelet Defect: home medication list includes an antiplatelet medication

## 2024-07-23 NOTE — ANESTHESIA CARE TRANSFER NOTE
Patient: Sri Grewal    Procedure: Procedure(s):  Colonoscopy       Diagnosis: Screen for colon cancer [Z12.11]  Diagnosis Additional Information: No value filed.    Anesthesia Type:   MAC     Note:    Oropharynx: oropharynx clear of all foreign objects and spontaneously breathing  Level of Consciousness: drowsy  Oxygen Supplementation: face mask    Independent Airway: airway patency satisfactory and stable  Dentition: dentition unchanged  Vital Signs Stable: post-procedure vital signs reviewed and stable  Report to RN Given: handoff report given  Patient transferred to: Phase II    Handoff Report: Identifed the Patient, Identified the Reponsible Provider, Reviewed the pertinent medical history, Discussed the surgical course, Reviewed Intra-OP anesthesia mangement and issues during anesthesia, Set expectations for post-procedure period and Allowed opportunity for questions and acknowledgement of understanding      Vitals:  Vitals Value Taken Time   /66 07/23/24 1517   Temp     Pulse 70 07/23/24 1517   Resp     SpO2     Vitals shown include unfiled device data.    Electronically Signed By: JAYY Munoz CRNA  July 23, 2024  3:23 PM

## 2024-07-25 ENCOUNTER — TELEPHONE (OUTPATIENT)
Dept: GASTROENTEROLOGY | Facility: CLINIC | Age: 74
End: 2024-07-25
Payer: MEDICARE

## 2024-07-25 DIAGNOSIS — R93.5 ABNORMAL CT OF THE ABDOMEN: Primary | ICD-10-CM

## 2024-07-25 DIAGNOSIS — R10.10 UPPER ABDOMINAL PAIN: ICD-10-CM

## 2024-07-25 DIAGNOSIS — K86.9 LESION OF PANCREAS: ICD-10-CM

## 2024-07-25 NOTE — TELEPHONE ENCOUNTER
Central Prior Authorization Team  Phone: [phone]    PA Initiation    Medication: UBRELVY 50 MG PO TABS  Insurance Company: [employer] - Phone [phone] Fax [phone]  Pharmacy Filling the Rx: [id number] [place], [place] - [address] AT [facility] [address]  Filling Pharmacy Phone: [phone]  Filling Pharmacy Fax:    Start Date: 7/25/2024

## 2024-07-25 NOTE — TELEPHONE ENCOUNTER
----- Message from JOEY WHITE sent at 7/25/2024  8:02 AM CDT -----  Please let the patient know that her MRCP was suggestive for a nodule of the pancreas head, measuring 7 mm. I would recommend to repeat MRI in 6 months. If the patient's abdominal pain recur, I may refer her to pancreatic team for consideration of ERCP and consultation.  Thank you,  Joey White CNP, GI

## 2024-07-25 NOTE — TELEPHONE ENCOUNTER
Call placed, results given.  Expressed verbal understanding of plan.  Wondering if MRI order can be placed for MRI as they would like to get scheduled for a 6 month follow up.     Caren Daniels RN

## 2024-07-26 ENCOUNTER — HOSPITAL ENCOUNTER (OUTPATIENT)
Dept: CARDIOLOGY | Facility: CLINIC | Age: 74
Discharge: HOME | End: 2024-07-26
Attending: INTERNAL MEDICINE | Admitting: INTERNAL MEDICINE
Payer: MEDICARE

## 2024-07-26 DIAGNOSIS — R01.1 HEART MURMUR: ICD-10-CM

## 2024-07-26 LAB — LVEF ECHO: NORMAL

## 2024-07-26 PROCEDURE — 93306 TTE W/DOPPLER COMPLETE: CPT | Mod: 26 | Performed by: INTERNAL MEDICINE

## 2024-07-26 PROCEDURE — 93306 TTE W/DOPPLER COMPLETE: CPT

## 2024-07-30 ENCOUNTER — TELEPHONE (OUTPATIENT)
Dept: CARDIOLOGY | Facility: CLINIC | Age: 74
End: 2024-07-30
Payer: MEDICARE

## 2024-07-30 DIAGNOSIS — I25.10 CORONARY ARTERY DISEASE DUE TO LIPID RICH PLAQUE: ICD-10-CM

## 2024-07-30 DIAGNOSIS — I25.83 CORONARY ARTERY DISEASE DUE TO LIPID RICH PLAQUE: ICD-10-CM

## 2024-07-30 DIAGNOSIS — R01.1 HEART MURMUR: Primary | ICD-10-CM

## 2024-07-30 DIAGNOSIS — I35.8 AORTIC VALVE SCLEROSIS: ICD-10-CM

## 2024-07-30 DIAGNOSIS — I10 HYPERTENSION, UNSPECIFIED TYPE: ICD-10-CM

## 2024-07-30 NOTE — TELEPHONE ENCOUNTER
Called patient and review results over the phone. No further questions at this time.     ----- Message from Elías Stone Tacho sent at 7/29/2024  8:11 PM CDT -----  She is 74 and active without symptoms.  Her aortic valve shows some sclerosis, but I might have heard a mild outflow tract obstruction on exam.  Have echo repeated in 2 years.  She does not need a cardiac MRI because it would not change therapy.    Lizet SANDS  [facility]

## 2024-08-25 ENCOUNTER — HOSPITAL ENCOUNTER (EMERGENCY)
Facility: CLINIC | Age: 74
Discharge: HOME | End: 2024-08-25
Attending: EMERGENCY MEDICINE | Admitting: EMERGENCY MEDICINE
Payer: MEDICARE

## 2024-08-25 VITALS
TEMPERATURE: 98.6 F | RESPIRATION RATE: 23 BRPM | WEIGHT: 116 LBS | BODY MASS INDEX: 20.55 KG/M2 | HEART RATE: 65 BPM | SYSTOLIC BLOOD PRESSURE: 164 MMHG | OXYGEN SATURATION: 98 % | DIASTOLIC BLOOD PRESSURE: 86 MMHG | HEIGHT: 63 IN

## 2024-08-25 DIAGNOSIS — T14.8XXA BLEEDING FROM WOUND: ICD-10-CM

## 2024-08-25 PROCEDURE — 99284 EMERGENCY DEPT VISIT MOD MDM: CPT | Performed by: EMERGENCY MEDICINE

## 2024-08-25 PROCEDURE — 250N000013 HC RX MED GY IP 250 OP 250 PS 637: Performed by: EMERGENCY MEDICINE

## 2024-08-25 PROCEDURE — 272N000397 HC DRESSING QUICK CLOT 4X4: Performed by: EMERGENCY MEDICINE

## 2024-08-25 PROCEDURE — 250N000009 HC RX 250: Performed by: EMERGENCY MEDICINE

## 2024-08-25 PROCEDURE — 99283 EMERGENCY DEPT VISIT LOW MDM: CPT | Performed by: EMERGENCY MEDICINE

## 2024-08-25 RX ORDER — CARVEDILOL 6.25 MG/1
12.5 TABLET ORAL ONCE
Status: COMPLETED | OUTPATIENT
Start: 2024-08-25 | End: 2024-08-25

## 2024-08-25 RX ORDER — LISINOPRIL 10 MG/1
40 TABLET ORAL ONCE
Status: COMPLETED | OUTPATIENT
Start: 2024-08-25 | End: 2024-08-25

## 2024-08-25 RX ORDER — AMLODIPINE BESYLATE 5 MG/1
5 TABLET ORAL ONCE
Status: COMPLETED | OUTPATIENT
Start: 2024-08-25 | End: 2024-08-25

## 2024-08-25 RX ORDER — ACETAMINOPHEN 500 MG
1000 TABLET ORAL ONCE
Status: COMPLETED | OUTPATIENT
Start: 2024-08-25 | End: 2024-08-25

## 2024-08-25 RX ORDER — HYDRALAZINE HYDROCHLORIDE 25 MG/1
25 TABLET, FILM COATED ORAL ONCE
Status: COMPLETED | OUTPATIENT
Start: 2024-08-25 | End: 2024-08-25

## 2024-08-25 RX ORDER — TRANEXAMIC ACID 100 MG/ML
INJECTION, SOLUTION INTRAVENOUS ONCE
Status: COMPLETED | OUTPATIENT
Start: 2024-08-25 | End: 2024-08-25

## 2024-08-25 RX ADMIN — TRANEXAMIC ACID 1000 MG: 1 INJECTION, SOLUTION INTRAVENOUS at 11:16

## 2024-08-25 RX ADMIN — LISINOPRIL 40 MG: 10 TABLET ORAL at 10:31

## 2024-08-25 RX ADMIN — HYDRALAZINE HYDROCHLORIDE 25 MG: 25 TABLET ORAL at 10:30

## 2024-08-25 RX ADMIN — ACETAMINOPHEN 1000 MG: 500 TABLET ORAL at 11:29

## 2024-08-25 RX ADMIN — AMLODIPINE BESYLATE 5 MG: 5 TABLET ORAL at 10:30

## 2024-08-25 RX ADMIN — CARVEDILOL 12.5 MG: 6.25 TABLET, FILM COATED ORAL at 10:31

## 2024-08-25 ASSESSMENT — ACTIVITIES OF DAILY LIVING (ADL)
ADLS_ACUITY_SCORE: 38

## 2024-08-25 ASSESSMENT — COLUMBIA-SUICIDE SEVERITY RATING SCALE - C-SSRS
1. IN THE PAST MONTH, HAVE YOU WISHED YOU WERE DEAD OR WISHED YOU COULD GO TO SLEEP AND NOT WAKE UP?: NO
2. HAVE YOU ACTUALLY HAD ANY THOUGHTS OF KILLING YOURSELF IN THE PAST MONTH?: NO
6. HAVE YOU EVER DONE ANYTHING, STARTED TO DO ANYTHING, OR PREPARED TO DO ANYTHING TO END YOUR LIFE?: NO

## 2024-08-25 NOTE — DISCHARGE INSTRUCTIONS
Okay to shower but do not scrub or pick the glue off of the wound.  It should fall off by itself.    If it resumes bleeding, you can try pressure.  You may need to return and I may need to put a suture in place.    Hold Plavix for 2 days.    I am going to send a message to your primary care provider about your ED visit.  I think this lesion should be biopsied.    Return at anytime for concerns.    I hope that you have a great week!

## 2024-08-25 NOTE — ED TRIAGE NOTES
PT presents with c/o nasal bleeding over the right nares that started last night. No history of trauma to the face. PT BP elevated on triage 215/109mmhg. On blood thinners Plavix.

## 2024-08-26 ENCOUNTER — TELEPHONE (OUTPATIENT)
Dept: FAMILY MEDICINE | Facility: OTHER | Age: 74
End: 2024-08-26

## 2024-08-26 ENCOUNTER — ALLIED HEALTH/NURSE VISIT (OUTPATIENT)
Dept: FAMILY MEDICINE | Facility: OTHER | Age: 74
End: 2024-08-26
Payer: MEDICARE

## 2024-08-26 DIAGNOSIS — R29.90 NEUROLOGICAL SYMPTOMS: Primary | ICD-10-CM

## 2024-08-26 PROCEDURE — 99207 PR NO CHARGE NURSE ONLY: CPT

## 2024-08-26 NOTE — ED PROVIDER NOTES
History     Chief Complaint   Patient presents with    Epistaxis    Hypertension     HPI  History per patient, friend    This is a 74-year-old female, history of chronic migraine, asthma, stroke, hypertension, multiple falls with fractures and surgery, presenting with nosebleed, elevated blood pressure.  Patient apparently was sitting watching the football game when she had acute onset of bleeding on the right side of her nose.  She was able to get it to slow down but woke this morning with blood on her pillow and continued bleeding.  She is on Plavix.  She did not take her morning blood pressure medications.  No history of nosebleeds.  No bruising, bleeding, black or bloody stools, other areas of bleeding.  No facial trauma.  Nose clamp was placed on the nose and patient had a little bit of a wait before I saw her.    Allergies:  Allergies   Allergen Reactions    Morphine And Codeine      GI UPSET    Oxycodone     Seasonal Allergies        Problem List:    Patient Active Problem List    Diagnosis Date Noted    Closed bicondylar fracture of distal humerus, right, with routine healing, subsequent encounter 08/24/2023     Priority: Medium    Closed bicondylar fracture of distal humerus, right, initial encounter 08/24/2023     Priority: Medium    Hypertension, unspecified type 08/14/2023     Priority: Medium    Dementia without behavioral disturbance (H) 07/20/2023     Priority: Medium    Generalized muscle weakness 07/20/2023     Priority: Medium    Recurrent falls 07/20/2023     Priority: Medium    Leg edema, right 07/20/2023     Priority: Medium    S/P total right hip arthroplasty 07/20/2023     Priority: Medium    Urinary retention 07/13/2023     Priority: Medium    Closed nondisplaced intertrochanteric fracture of right femur (H)-s/p ORIF with gamma todd 7/14/23 07/12/2023     Priority: Medium    Head injury, initial encounter 07/12/2023     Priority: Medium    Hip fracture, right, closed, initial encounter (H)  07/12/2023     Priority: Medium    Skin tear of right elbow without complication, initial encounter 07/12/2023     Priority: Medium    History of revision right elbow surgery 6/1/23 07/12/2023     Priority: Medium    History of postoperative delirium May 2023 07/12/2023     Priority: Medium    S/P reverse total shoulder arthroplasty, right 5/5/23 07/11/2023     Priority: Medium    Right elbow pain 06/12/2023     Priority: Medium    Stiffness of elbow joint, right 06/12/2023     Priority: Medium    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 06/01/2023     Priority: Medium    Post-operative state 05/03/2023     Priority: Medium    Fall, initial encounter 04/30/2023     Priority: Medium    Closed fracture of distal end of right humerus, unspecified fracture morphology, initial encounter 04/30/2023     Priority: Medium    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, unspecified fracture morphology, subsequent encounter 04/30/2023     Priority: Medium    Closed displaced fracture of surgical neck of right humerus 03/27/2023     Priority: Medium    Acute pain of right shoulder 03/27/2023     Priority: Medium    Newly recognized heart murmur 02/05/2023     Priority: Medium    Major depressive disorder, recurrent, in partial remission (H24) 02/05/2023     Priority: Medium    Vitamin D deficiency 02/05/2023     Priority: Medium    Loss of memory 02/05/2023     Priority: Medium    History of cerebrovascular accident (CVA) with residual deficit 12/08/2021     Priority: Medium    ST elevation myocardial infarction (STEMI), unspecified artery (H) 08/26/2021     Priority: Medium    Coronary artery disease due to lipid rich plaque 08/26/2021     Priority: Medium    Compression fracture of T12 vertebra with routine healing, subsequent encounter 11/17/2020     Priority: Medium    Hematoma 01/10/2019     Priority: Medium    Hematoma of abdominal wall, initial encounter 01/09/2019     Priority:  Medium    Abdominal wall hematoma 01/09/2019     Priority: Medium    Hyponatremia 11/26/2018     Priority: Medium    Age-related osteoporosis with current pathological fracture 11/12/2018     Priority: Medium    HSV (herpes simplex virus) infection 07/31/2018     Priority: Medium    Atypical mole 06/01/2017     Priority: Medium    Hypertension goal BP (blood pressure) < 140/90 05/25/2017     Priority: Medium    TIA (transient ischemic attack) 01/16/2017     Priority: Medium    Adjustment disorder with mixed anxiety and depressed mood 09/09/2014     Priority: Medium    Middle cerebral artery aneurysm 10/29/2013     Priority: Medium     Noted in 2007.  Intervention not recommended at that time.  Observation.  Saw Interventional Radiology 12/2013.  Recheck CTA in one year.      Moderate major depression (H) 09/08/2010     Priority: Medium    Insomnia 09/08/2010     Priority: Medium    Atrophy of vagina 05/30/2010     Priority: Medium    Lump or mass in breast 03/29/2010     Priority: Medium    Mild persistent asthma 02/21/2005     Priority: Medium    Anemia, unspecified type 04/25/2002     Priority: Medium     Problem list name updated by automated process. Provider to review      Chronic migraine without aura without status migrainosus, not intractable      Priority: Medium     Multiple trials off of fiorinal have not been successful  Problem list name updated by automated process. Provider to review      Other abnormal Papanicolaou smear of cervix and cervical HPV(795.09)      Priority: Medium     (Problem list name updated by automated process. Provider to review and confirm.)      Endometriosis      Priority: Medium     Problem list name updated by automated process. Provider to review      Allergic rhinitis      Priority: Medium     Problem list name updated by automated process. Provider to review          Past Medical History:    Past Medical History:   Diagnosis Date    Abnormal Papanicolaou smear of cervix and  cervical HPV     Allergic rhinitis, cause unspecified     Cerebral infarction (H)     Contact dermatitis and other eczema, due to unspecified cause     Endometriosis, site unspecified     Esophageal reflux     Migraine, unspecified, without mention of intractable migraine without mention of status migrainosus     Mild persistent asthma     Unspecified disorder of uterus        Past Surgical History:    Past Surgical History:   Procedure Laterality Date    COLONOSCOPY  2014    Procedure: COLONOSCOPY;  Surgeon: Nathan Baker MD;  Location: [facility]    COLONOSCOPY N/A 2024    Procedure: Colonoscopy;  Surgeon: Carlton Ramirez MD;  Location: [facility]    OPEN REDUCTION INTERNAL FIXATION ELBOW Right 2023    Procedure: REVISION OPEN REDUCTION INTERNAL FIXATION RIGHT OLECRANON;  Surgeon: Williams Phipps MD;  Location: [facility]    OPEN REDUCTION INTERNAL FIXATION HIP NAILING Right 2023    Procedure: INTERNAL FIXATION, FRACTURE, TROCHANTERIC,RIGHT HIP, USING GAMMA RODS;  Surgeon: Nathan Turner MD;  Location: [facility]    OPEN REDUCTION INTERNAL FIXATION HUMERUS DISTAL Right 5/3/2023    Procedure: OPEN REDUCTION INTERNAL FIXATION, FRACTURE, HUMERUS, DISTAL;  Surgeon: Williams Phipps MD;  [facility]    REMOVE HARDWARE ELBOW Right 2023    Procedure: HARDWARE REMOVAL RIGHT OLECRANON;  Surgeon: Williams Phipps MD;  [facility]    REVERSE ARTHROPLASTY SHOULDER Right 2023    Procedure: ARTHROPLASTY, SHOULDER, TOTAL, REVERSE for;  Surgeon: Cierra Krause MD;  Location: [facility]    ZZC  DELIVERY ONLY       X2    ZZHC COLONOSCOPY THRU STOMA, DIAGNOSTIC  2002    normal       Family History:    Family History   Problem Relation Age of Onset    Heart Disease Mother         anemia    Osteoporosis Mother     Hypertension Mother     Cerebrovascular Disease Mother     Alcohol/Drug Mother         alcohol    Neurologic Disorder Mother         migraines    Hypertension Father     Cancer No family hx of       "   breast       Social History:  Marital Status:   [5]  Social History     Tobacco Use    Smoking status: Never     Passive exposure: Never    Smokeless tobacco: Never   Vaping Use    Vaping status: Never Used   Substance Use Topics    Alcohol use: Yes     Comment: socially    Drug use: No        Medications:    amLODIPine (NORVASC) 10 MG tablet  carvedilol (COREG) 12.5 MG tablet  clopidogrel (PLAVIX) 75 MG tablet  hydrALAZINE (APRESOLINE) 25 MG tablet  lisinopril (ZESTRIL) 20 MG tablet  albuterol (PROAIR HFA/PROVENTIL HFA/VENTOLIN HFA) 108 (90 Base) MCG/ACT inhaler  bisacodyl (DULCOLAX) 5 MG EC tablet  HYDROcodone-acetaminophen (NORCO) 5-325 MG tablet  melatonin 1 MG TABS tablet  polyethylene glycol (GOLYTELY) 236 g suspension  QUEtiapine (SEROQUEL) 25 MG tablet  rosuvastatin (CRESTOR) 20 MG tablet  sodium chloride 1 GM tablet  tiZANidine (ZANAFLEX) 2 MG tablet  traMADol (ULTRAM) 50 MG tablet  ubrogepant (UBRELVY) 50 MG tablet  venlafaxine (EFFEXOR XR) 150 MG 24 hr capsule          Review of Systems  All other ROS reviewed and are negative or non-contributory except as stated in HPI.     Physical Exam   BP: (!) 215/109  Pulse: 75  Temp: 98.6  F (37  C)  Resp: 23  Height: 160 cm (5' 3\")  Weight: 52.6 kg (116 lb)  SpO2: 98 %      Physical Exam  Vitals and nursing note reviewed.   Constitutional:       Comments: Older appearing somewhat frail female sitting in the bed   HENT:      Head: Normocephalic.      Nose:      Comments: Initially, clamp was on the nose.  There was a little bit of dried blood around the external right nare.  Interestingly, when the clamp was removed the bleeding is actually from a small lesion on the right exterior nose.  This has some brisk bleeding.  Nose clamp was replaced.  Eyes:      Extraocular Movements: Extraocular movements intact.      Conjunctiva/sclera: Conjunctivae normal.   Cardiovascular:      Rate and Rhythm: Normal rate. Rhythm irregular.   Pulmonary:      Effort: " Pulmonary effort is normal.      Comments: Occasional throat clearing and loose cough  Musculoskeletal:         General: Normal range of motion.      Cervical back: Normal range of motion.   Skin:     General: Skin is warm and dry.      Coloration: Skin is not pale.      Findings: No bruising.   Neurological:      General: No focal deficit present.      Mental Status: She is alert. Mental status is at baseline.   Psychiatric:         Behavior: Behavior normal.         ED Course (with Medical Decision Making)    Pt seen and examined by me.  RN and EPIC notes reviewed.       Patient presenting with nosebleed.  This is actually bleeding from the external portion of her nose.  She has very high blood pressure and is on blood thinners.    I think this will be difficult to stop the bleeding if we do not get her blood pressure down.  I am going to give her her usual medications.  I tried some quick clot and the clamp.    Patient continued to have bleeding despite quick clot.  I then used TXA soaked gauze.  Blood pressures trending down.    After prolonged TXA gauze and pressure, the bleeding finally slowed and off that I could dry the wound.  I think she has a small mole but could also potentially have a small skin cancer that was initially bleeding quite briskly.  I was able to place some glue over the lesion as it had stopped bleeding and was dry.  No further bleeding noted.    I am going to recommend that they hold her Plavix for the next couple of days.  Continue to take blood pressure medications and monitor blood pressures.  Do not pick at the glue, let it fall off on its own.  I am going to send a message to patient's primary care provider because I think she needs referral to see dermatology to evaluate the lesion.    If they note continued bleeding that is uncontrolled they should return to the ED ED at any time.        Procedures    No results found. However, due to the size of the patient record, not all  encounters were searched. Please check Results Review for a complete set of results.    Medications   amLODIPine (NORVASC) tablet 5 mg (5 mg Oral $Given 8/25/24 1030)   carvedilol (COREG) tablet 12.5 mg (12.5 mg Oral $Given 8/25/24 1031)   hydrALAZINE (APRESOLINE) tablet 25 mg (25 mg Oral $Given 8/25/24 1030)   lisinopril (ZESTRIL) tablet 40 mg (40 mg Oral $Given 8/25/24 1031)   tranexamic acid (CYKLOKAPRON) TOPICAL (injection used topically) (1,000 mg Topical $Given 8/25/24 1116)   acetaminophen (TYLENOL) tablet 1,000 mg (1,000 mg Oral $Given 8/25/24 1129)       Assessments & Plan      I have reviewed the findings, diagnosis, plan and need for follow up with the patient.    Discharge Medication List as of 8/25/2024 12:13 PM          Final diagnoses:   Bleeding from wound     Disposition: Patient discharged home in stable condition.  Plan as above.  Return for concerns.     Note: Chart documentation done in part with Dragon Voice Recognition software. Although reviewed after completion, some word and grammatical errors may remain.     8/25/2024   [facility] EMERGENCY DEPT       Trisha Young MD  08/26/24 [id number]

## 2024-08-26 NOTE — PROGRESS NOTES
"3:40pm:   paged for RN - \"Emergency\"   patient in passenger seat in car; slumped to right.  Eyes open but not responding to questions. 911 called.   Blood noted from right side of mouth; mouth droopy.  Initially completely unresponsive; could not answer any questions; wouldn't squeeze hands to command; pupils were both deviated to the right.  P: 138.  O2 sat room air: 95%.  Staff unable to obtain blood pressure due to seizure activity and patient's position in car.  She had started seizing (3:45pm)  body twitching, patient drooling from mouth.  Initial seizure appeared to last 2.5 min.  Briefly was able to squeeze an RN's hand with her left hand weakly but then began seizing again.  O2 per NC at 2L applied.  P: 147.  Patient seat was reclined in car and staff guarded head to keep from injuring.  Male with her (friend) states she has had 2 strokes and a couple MI's.  States first stroke was in 2021.  He states that they'd been to [employer].  She was alert/oriented.  States normal voice is quiet but was communicating earlier. States had developed a cough a couple days ago.  Was in ED yesterday with nose bleed.   He came here when she slumped over in car because \"[facility]".  Just before EMT arrival at 3:50pm her heart rate dropped to 45 and 35.  Her O2 sat dropped to 88.  Eyes open but continued deviated to right; Unresponsive.  Report given to paramedics per FRANNY Sheriff PA-C who was present outside with RN/MA staff assisting with patient during this event.  Face sheet and Med list sent with paramedics when transported.  Camryn MATHEW RN  "

## 2024-08-26 NOTE — TELEPHONE ENCOUNTER
Central Prior Authorization Team  Phone: [phone]    Prior Authorization Approval    Medication: UBRELVY 50 MG PO TABS  Authorization Effective Date:    Authorization Expiration Date:    Approved Dose/Quantity:   Reference #:     Insurance Company:  - Phone [phone] Fax [phone]  Expected CoPay: $ 300  CoPay Card Available:      Financial Assistance Needed:   Which Pharm[facility] is filling the [id number] [id number] AT [facility] [address]  Pharmacy Notified: yes  Patient Notified: pharmacy is going to call the patient due to high copay

## 2024-08-28 ENCOUNTER — TELEPHONE (OUTPATIENT)
Dept: GASTROENTEROLOGY | Facility: CLINIC | Age: 74
End: 2024-08-28
Payer: MEDICARE

## 2024-08-28 NOTE — TELEPHONE ENCOUNTER
Unable to LM. VM not set up. Ok to wait until 10/25/2024. Please advise patient an EGD was also ordered at [facility] with CATHIE Cunha. It would be best this is completed before her upcoming appointment.  Ronit Saucedo cma.....8/28/2024 at 1:47 PM

## 2024-08-28 NOTE — TELEPHONE ENCOUNTER
Reason for Call:  Other appointment    Detailed comments: Sri is currently in the hospital, she has been diagnosed with covid. She was scheduled to see Li on 8/30 but rescheduled her appointment to the next available opening with Li which is 10/25/24. Is it ok to wait until October to be seen. Please advise    Phone Number Patient can be reached at: [phone]    Best Time:     Can we leave a detailed message on this number? YES    Call taken on 8/28/2024 at 12:55 PM by Beulah Washington

## 2024-09-09 NOTE — TELEPHONE ENCOUNTER
Please review and advise if EGD is still needed before follow up appointment 10/25/2024.  Ronit Saucedo cma.....9/9/2024 at 4:08 PM

## 2024-09-09 NOTE — TELEPHONE ENCOUNTER
Unable to LM. VM not set up. Ok to wait until 10/25/2024. Please advise patient an EGD was also ordered at [facility] with CATHIE Cunha. It would be best this is completed before her upcoming appointment.   Ronit Saucedo cma.....9/9/2024 at 12:29 PM

## 2024-10-18 DIAGNOSIS — I25.10 CORONARY ARTERY DISEASE DUE TO LIPID RICH PLAQUE: ICD-10-CM

## 2024-10-18 DIAGNOSIS — I25.83 CORONARY ARTERY DISEASE DUE TO LIPID RICH PLAQUE: ICD-10-CM

## 2024-10-18 DIAGNOSIS — S42.201K CLOSED FRACTURE OF PROXIMAL END OF RIGHT HUMERUS WITH NONUNION, UNSPECIFIED FRACTURE MORPHOLOGY, SUBSEQUENT ENCOUNTER: ICD-10-CM

## 2024-10-18 DIAGNOSIS — G45.9 TIA (TRANSIENT ISCHEMIC ATTACK): ICD-10-CM

## 2024-10-18 RX ORDER — CLOPIDOGREL BISULFATE 75 MG/1
75 TABLET ORAL DAILY
Qty: 30 TABLET | Refills: 0 | Status: SHIPPED | OUTPATIENT
Start: 2024-10-18

## 2024-10-18 RX ORDER — CLOPIDOGREL BISULFATE 75 MG/1
75 TABLET ORAL DAILY
Qty: 90 TABLET | OUTPATIENT
Start: 2024-10-18

## 2024-10-22 ASSESSMENT — ANXIETY QUESTIONNAIRES: GAD7 TOTAL SCORE: 0

## 2024-10-25 ENCOUNTER — OFFICE VISIT (OUTPATIENT)
Dept: GASTROENTEROLOGY | Facility: CLINIC | Age: 74
End: 2024-10-25
Payer: MEDICARE

## 2024-10-25 VITALS
HEART RATE: 64 BPM | HEIGHT: 63 IN | SYSTOLIC BLOOD PRESSURE: 138 MMHG | DIASTOLIC BLOOD PRESSURE: 84 MMHG | WEIGHT: 115 LBS | BODY MASS INDEX: 20.38 KG/M2

## 2024-10-25 DIAGNOSIS — R10.10 UPPER ABDOMINAL PAIN: Primary | ICD-10-CM

## 2024-10-25 DIAGNOSIS — K86.9 LESION OF PANCREAS: ICD-10-CM

## 2024-10-25 DIAGNOSIS — R19.5 LOOSE STOOLS: ICD-10-CM

## 2024-10-25 DIAGNOSIS — K57.30 DIVERTICULOSIS OF LARGE INTESTINE WITHOUT HEMORRHAGE: ICD-10-CM

## 2024-10-25 PROCEDURE — 99214 OFFICE O/P EST MOD 30 MIN: CPT | Performed by: NURSE PRACTITIONER

## 2024-10-25 ASSESSMENT — PAIN SCALES - GENERAL: PAINLEVEL_OUTOF10: NO PAIN (0)

## 2024-10-25 NOTE — LETTER
10/25/2024      Sri Grewal  [address] [facility] [id number]      Dear Colleague,    Thank you for referring your patient, Sri Grewal, to the [facility]. Please see a copy of my visit note below.      [facility]  [address] [address]  Phone: [phone]    Patient:  Sri Grewal, Date of birth 1950    Referring Provider: Sudeep Mahoney       Gastroenterology CLINIC VISIT, RETURN PATIENT    REASON FOR CONSULTATION: follow up    HPI: 74 year old female with PMH of HTN, heart murmur, recurrent Takotsubo in 2005 and 2021 with full recovery of her LV function, TIA, and CAD   presented to GI clinic for a follow up. The patient was seen for upper abdominal pain and constipation. Patient is here today with her male friend, who stated that he sets up all of her medications and helps with appointments.   Stated that constipation has always been an issue in the past; was worse when she was taking iron supplement.  Now, she is having mainly [place] type 7 stools, 1-2 stools a day.  At times, she skips a day of bowel movements.  Patient does not take bisacodyl or MiraLAX due to changes in stool consistency.  Uses over-the-counter stool softener if misses a day of bowel movement.  Patient denies acid reflux, nausea, vomiting, abdominal pain, weight loss, or changes in appetite.  No hematochezia or melena.    Interval hx: admitted on 8/26 for seizures. Covid+, septic.      PREVIOUS ENDOSCOPY:  7/23/24 Colonoscopy  Findings:       The perianal and digital rectal examinations were normal.        Multiple small and large-mouthed diverticula were found in the sigmoid        colon.        External hemorrhoids were found during retroflexion. The hemorrhoids        were moderate.        The exam was otherwise without abnormality.       5/30/2014 Colonoscopy  Findings:       The terminal ileum appeared normal. Multiple small and large-mouthed         diverticula were found in the sigmoid colon and in the descending colon.        Nora-diverticular erythema was seen. There was evidence of an impacted        diverticulum. Non-bleeding external and internal hemorrhoids were found        during retroflexion and were Grade I (internal hemorrhoids that do not        prolapse). Anal papilla(e) were hypertrophied. The recto-sigmoid colon,        sigmoid colon, descending colon, splenic flexure, transverse colon,        hepatic flexure, ascending colon, cecum, appendiceal orifice and        ileocecal valve appeared normal.      PERTINENT STUDIES Reviewed in EMR  7/16/2024 MRCP  FINDINGS:   HEPATOBILIARY: MRCP shows cholecystectomy. Extrahepatic biliary tree  is mildly ectatic. The common bile duct now measures 8 mm. No  choledocholithiasis. Normal size of the main pancreatic duct that is  now 2 mm.   No fat or iron deposition within the liver identified. No worrisome  focal hepatic observation.   PANCREAS: The previously described rounded apparent enhancing nodule  at the pancreas head/uncinate is very ill-defined. It is suggested on  subtraction series 23 image 45 measuring 7 mm. It is unsure whether  this represents a true pancreas parenchymal nodule versus some signal  heterogeneity. This is just in the region of the pancreas duct that  decompresses in this area. Remainder of the pancreas shows no  abnormality.   SPLEEN: Normal.   ADRENAL GLANDS: Normal.   KIDNEYS: No significant mass,  or hydronephrosis. There are simple or  benign cysts. No follow up is needed.   BOWEL: No acute abnormality.   ADDITIONAL FINDINGS: No suspicious lymph node identified.   MUSCULOSKELETAL: Compression deformity at T12.                                                                    IMPRESSION:   1.  Again suggested is a subtle minimally enhancing nodule at the  pancreas head/uncinate region. The pancreas duct decompresses at this  location. While this could just be signal  heterogeneity, a pancreas  mass cannot be excluded. If no intervention is to be performed,  recommend surveillance pancreas MRI in 6 months.  2.  Mild ectasia of the extrahepatic biliary tree with no  choledocholithiasis identified. Status post cholecystectomy.      5/10/2024 Limited abd. US  FINDINGS:   GALLBLADDER: Absent.   BILE DUCTS: There is no biliary dilatation. The common duct measures  11mm.   LIVER: Normal where seen.   RIGHT KIDNEY: No hydronephrosis. Simple cyst right upper kidney  without specific imaging follow-up recommended. This measures 3.8 cm.   PANCREAS: No visible focal lesion. A few bowel loops adjacent to the  pancreas head area identified.   No ascites.                                                                    IMPRESSION:  1.  Dilated common duct measuring 11 mm.  2.  No other specific abnormality can be seen.     5/10/2024 CT of abdomen/pelvis  FINDINGS:   LOWER CHEST: Normal.   HEPATOBILIARY: Gallbladder not seen. Dilated extrahepatic biliary  tree. Common bile duct is approximately 11 mm, previously 7 mm series  3 image 64. No focal hepatic observation.     PANCREAS: Dilated main pancreatic duct appears increased measuring 5  mm, previously 3 mm image 65. 9 mm apparent enhancing rounded nodule  at the pancreas head/uncinate series 3 image 82.     SPLEEN: A few benign calcifications.     ADRENAL GLANDS: Normal.     KIDNEYS/BLADDER: No significant mass, stones, or hydronephrosis. There  are simple or benign cysts. No follow up is needed. Distended urinary  bladder.     BOWEL: Prominent stool at the proximal colon. No small bowel  obstruction. Colonic diverticulosis without convincing diverticulitis.6                                                                      IMPRESSION:   1.  Interval increase of dilatation of the biliary and pancreatic  ducts since 11/4/2020. Small focal nodular enhancement newly seen at  the pancreas head/neck. It is difficult to exclude a pancreas  mass.  This potentially could be further evaluated with MRI.  2.  Prominent stool within the colon.  3.  Colonic diverticulosis without convincing diverticulitis.  4.  Progression of height loss at a T12 compression deformity since  11/4/2020.    ROS: 10pt ROS performed and otherwise negative.    PAST MEDICAL HISTORY:  Past Medical History:   Diagnosis Date     Abnormal Papanicolaou smear of cervix and cervical HPV      Allergic rhinitis, cause unspecified     seasonal allergies     Cerebral infarction (H)      Contact dermatitis and other eczema, due to unspecified cause      Endometriosis, site unspecified      Esophageal reflux     GERD     Migraine, unspecified, without mention of intractable migraine without mention of status migrainosus      Mild persistent asthma      Unspecified disorder of uterus     fibroid uterus       PREVIOUS ABDOMINAL/GYNECOLOGIC SURGERIES:  Past Surgical History:   Procedure Laterality Date     COLONOSCOPY  5/30/2014    Procedure: COLONOSCOPY;  Surgeon: Nathan Baker MD;  Location: [facility]     COLONOSCOPY N/A 7/23/2024    Procedure: Colonoscopy;  Surgeon: Carlton Ramirez MD;  Location: [facility]     OPEN REDUCTION INTERNAL FIXATION ELBOW Right 6/1/2023    Procedure: REVISION OPEN REDUCTION INTERNAL FIXATION RIGHT OLECRANON;  Surgeon: Williams Phipps MD;  Location: [facility]     OPEN REDUCTION INTERNAL FIXATION HIP NAILING Right 7/14/2023    Procedure: INTERNAL FIXATION, FRACTURE, TROCHANTERIC,RIGHT HIP, USING GAMMA RODS;  Surgeon: Nathan Turner MD;  Location: [facility]     OPEN REDUCTION INTERNAL FIXATION HUMERUS DISTAL Right 5/3/2023    Procedure: OPEN REDUCTION INTERNAL FIXATION, FRACTURE, HUMERUS, DISTAL;  Surgeon: Williams Phipps MD;  [facility]     REMOVE HARDWARE ELBOW Right 6/1/2023    Procedure: HARDWARE REMOVAL RIGHT OLECRANON;  Surgeon: Williams Phipps MD;  [facility]     REVERSE ARTHROPLASTY SHOULDER Right 5/5/2023    Procedure: ARTHROPLASTY, SHOULDER, TOTAL, REVERSE for;   Surgeon: Cierra Krause MD;  Location: [facility]     ZZC  DELIVERY ONLY       X2     ZZHC COLONOSCOPY THRU STOMA, DIAGNOSTIC  2002    normal         PERTINENT MEDICATIONS:  Current Outpatient Medications   Medication Sig Dispense Refill     albuterol (PROAIR HFA/PROVENTIL HFA/VENTOLIN HFA) 108 (90 Base) MCG/ACT inhaler Inhale 2 puffs into the lungs every 6 hours as needed for shortness of breath, wheezing or cough 18 g 3     amLODIPine (NORVASC) 10 MG tablet Take 1 tablet (10 mg) by mouth daily 90 tablet 3     carvedilol (COREG) 12.5 MG tablet TAKE 1 TABLET BY MOUTH TWICE DAILY WITH FOOD 180 tablet 3     clopidogrel (PLAVIX) 75 MG tablet TAKE 1 TABLET(75 MG) BY MOUTH DAILY 30 tablet 0     furosemide (LASIX) 20 MG tablet TAKE 1 TABLET BY MOUTH EVERY MORNING 90 tablet 1     HYDROcodone-acetaminophen (NORCO) 5-325 MG tablet Take 1 tablet by mouth every 6 hours as needed for severe pain (Do not mix with Tramadol) 45 tablet 0     levETIRAcetam (KEPPRA) 500 MG tablet TAKE 1 TABLET BY MOUTH TWICE DAILY 180 tablet 1     lisinopril (ZESTRIL) 20 MG tablet Take 2 tablets (40 mg) by mouth daily 180 tablet 3     melatonin 1 MG TABS tablet Take 2 tablets (2 mg) by mouth at bedtime       methylcellulose (CITRUCEL) 500 MG TABS tablet Take 2 tablets (1,000 mg) by mouth 2 times daily. 120 tablet 4     QUEtiapine (SEROQUEL) 25 MG tablet Take 1 tablet (25 mg) by mouth at bedtime 90 tablet 3     rosuvastatin (CRESTOR) 20 MG tablet Take 1 tablet (20 mg) by mouth daily 90 tablet 3     tiZANidine (ZANAFLEX) 2 MG tablet Take 0.5-1 tablets (1-2 mg) by mouth 3 times daily as needed for muscle spasms 90 tablet 3     traMADol (ULTRAM) 50 MG tablet Take 1 tablet (50 mg) by mouth every 4 hours as needed for moderate pain (Do not mix with Hydrocodone) 90 tablet 0     ubrogepant (UBRELVY) 50 MG tablet Take 1 tablet (50 mg) by mouth at onset of headache (May repeat ose after 2 hours if heasdache persists. max 2 pills a day) 16 tablet  11     venlafaxine (EFFEXOR XR) 150 MG 24 hr capsule Take 1 capsule (150 mg) by mouth daily 90 capsule 3         SOCIAL HISTORY:  Social History     Socioeconomic History     Marital status:      Spouse name: Not on file     Number of children: 2     Years of education: Not on file     Highest education level: Not on file   Occupational History     Occupation: retired     Employer: RETIRED   Tobacco Use     Smoking status: Never     Passive exposure: Never     Smokeless tobacco: Never   Vaping Use     Vaping status: Never Used   Substance and Sexual Activity     Alcohol use: Yes     Comment: socially     Drug use: No     Sexual activity: Not Currently     Partners: Male     Birth control/protection: Surgical     Comment: tubal ligation   Other Topics Concern      Service No     Blood Transfusions No     Caffeine Concern No     Occupational Exposure No     Hobby Hazards No     Sleep Concern Yes     Comment: Insomnia     Stress Concern Yes     Comment: breathing,  passed away 2004     Weight Concern Yes     Comment: would like to lose some weight     Special Diet No     Back Care Yes     Exercise Yes     Comment: walking at work daily     Bike Helmet Not Asked     Seat Belt Yes     Self-Exams Yes     Comment: periodically     Parent/sibling w/ CABG, MI or angioplasty before 65F 55M? No   Social History Narrative     Not on file     Social Drivers of Health     Financial Resource Strain: Low Risk  (5/30/2024)    Financial Resource Strain      Within the past 12 months, have you or your family members you live with been unable to get utilities (heat, electricity) when it was really needed?: No   Food Insecurity: Low Risk  (5/30/2024)    Food Insecurity      Within the past 12 months, did you worry that your food would run out before you got money to buy more?: No      Within the past 12 months, did the food you bought just not last and you didn t have money to get more?: No   Transportation Needs:  "Low Risk  (5/30/2024)    Transportation Needs      Within the past 12 months, has lack of transportation kept you from medical appointments, getting your medicines, non-medical meetings or appointments, work, or from getting things that you need?: No   Physical Activity: Unknown (5/30/2024)    Exercise Vital Sign      Days of Exercise per Week: 2 days      Minutes of Exercise per Session: Not on file   Stress: No Stress Concern Present (5/30/2024)    [place] [place] [employer] - Occupational Stress Questionnaire      Feeling of Stress : Only a little   Social Connections: Unknown (5/30/2024)    Social Connection and Isolation Panel [NHANES]      Frequency of Communication with Friends and Family: Not on file      Frequency of Social Gatherings with Friends and Family: Three times a week      Attends Orthodox Services: Not on file      Active Member of Clubs or Organizations: Not on file      Attends Club or Organization Meetings: Not on file      Marital Status: Not on file   Interpersonal Safety: Not on file   Housing Stability: Low Risk  (5/30/2024)    Housing Stability      Do you have housing? : Yes      Are you worried about losing your housing?: No       FAMILY HISTORY:  Denies colon/panc/esophageal/other GI CA, no other Krishna or other HPS-related Mary. No IBD/celiac, no other AI/liver/thyroid disease.    Family History   Problem Relation Age of Onset     Heart Disease Mother         anemia     Osteoporosis Mother      Hypertension Mother      Cerebrovascular Disease Mother      Alcohol/Drug Mother         alcohol     Neurologic Disorder Mother         migraines     Hypertension Father      Cancer No family hx of         breast       PHYSICAL EXAMINATION:  Vitals reviewed  /84 (BP Location: Right arm, Patient Position: Sitting, Cuff Size: Adult Regular)   Pulse 64   Ht 1.6 m (5' 3\")   Wt 52.2 kg (115 lb)   LMP 11/09/2005 (Approximate)   Breastfeeding No   BMI 20.37 kg/m      General: " Patient appears well in no acute distress.    Skin: No visualized rash or lesions on visualized skin  HEENT:    EOMI, no erythema, sclera icterus or discharge noted.  Mouth mucosa intact, pink, moist  No cervical or supraclavicular lymphadenopathy. Thyroid gland not enlarged.  Resp: breathing comfortably without accessory muscle usage, speaking in full sentences, no cough. Lung sounds: Wheezing over anterior chest.  Resolved after patient used albuterol inhaler.  Card: Regular and rhythmic S1 and S2. No gallop or rub.  Pansystolic 2 out of 6 soft murmur, best heard over the right chest with radiation to right axilla.  No LE edema.  Abdomen: Active bowel sounds X 4 quadrants. Soft to palpation.  Tenderness in epigastrium and left upper quadrant, mild.  No guarding or rebound tenderness. Higgins's sign negative.  MSK: Appears to have normal range of motion based on visualized movements  Neurologic: No apparent tremors, facial movements symmetric  Psych: affect normal, alert and oriented      ASSESSMENT/PLAN:    ICD-10-CM    1. Upper abdominal pain  R10.10       2. Lesion of pancreas  K86.9 MR Pancreas wo & w Contrast      3. Diverticulosis of large intestine without hemorrhage  K57.30 methylcellulose (CITRUCEL) 500 MG TABS tablet      4. Loose stools  R19.5 methylcellulose (CITRUCEL) 500 MG TABS tablet         74 year old female  presented to GI clinic for a follow-up on diffuse abdominal pain and constipation.  Patient reported that her constipation had resolved but now, she is having loose stools, [place] type 7, 1-2 stools in the morning.  She denies any changes to her diet.  Denies abdominal pain, but there was tenderness on palpation in epigastrium and left upper quadrant.  No guarding.  We reviewed the patient's colonoscopy report, which showed colonic diverticulosis without diverticulitis and nonbleeding external and internal hemorrhoids.  CT scan of abdomen revealed interval increase of dilatation of the  biliary and pancreatic ducts comparing to the study on 11/4/2020. Small focal nodular enhancement newly seen at the pancreas head/neck. Patient had normal LFT and lipase. Referred the patient to MRCP, which  revealed a subtle minimally enhancing nodule at the pancreas head/uncinate region.  Measures 8 mm.  The pancreas duct decompresses at this location. While this could just be signal heterogeneity, a pancreas mass cannot be excluded. Surveillance pancreas MRI in 6 months was recommended.  We discussed findings with the patient and her friend.  They agreed to proceed with MRI next month.  Order was placed.  If no changes in nodule appearance and size, we will continue surveillance MRI q 1-2 years.  Patient was educated to hold stool softeners and laxatives.  Will order Citrucel tablets to take, 1000 mg twice a day.  If diarrhea persist, may order stool study for fecal elastase.    Noted expiratory wheezing in prone position during exam.  Resolved after use of albuterol inhaler.  Unchanged heart murmur on exam.  Noted that patient had transthroacic echocardiogram in August that showed normal LV size and systolic function with estimated ejection fraction of 60-65%. Moderate aortic valve sclerosis without significant stenosis.  Patient verbalized understanding and appreciation of care provided. Stated that all of the questions were answered to her/his satisfaction.  RTC in 2 months    Thank you for this consultation. It was a pleasure to participate in the care of this patient; please contact us with any further questions.    JAYY Cunha, GRACIEP-C  Division of Gastroenterology  [facility], [place], [place]    This note was created with Dragon voice recognition software, and while reviewed for accuracy, inadvertent minor typographic errors may occur. Please contact the provider if you have any questions.       Again, thank you for allowing me to participate in the care of your  patient.        Sincerely,        JAYY ROBB CNP

## 2024-10-25 NOTE — PROGRESS NOTES
[address] [address]  Phone: [phone]    Patient:  Sri Grewal, Date of birth 1950    Referring Provider: Sudeep Mahoney       Gastroenterology CLINIC VISIT, RETURN PATIENT    REASON FOR CONSULTATION: follow up    HPI: 74 year old female with PMH of HTN, heart murmur, recurrent Takotsubo in 2005 and 2021 with full recovery of her LV function, TIA, and CAD   presented to GI clinic for a follow up. The patient was seen for upper abdominal pain and constipation. Patient is here today with her male friend, who stated that he sets up all of her medications and helps with appointments.   Stated that constipation has always been an issue in the past; was worse when she was taking iron supplement.  Now, she is having mainly [place] type 7 stools, 1-2 stools a day.  At times, she skips a day of bowel movements.  Patient does not take bisacodyl or MiraLAX due to changes in stool consistency.  Uses over-the-counter stool softener if misses a day of bowel movement.  Patient denies acid reflux, nausea, vomiting, abdominal pain, weight loss, or changes in appetite.  No hematochezia or melena.    Interval hx: admitted on 8/26 for seizures. Covid+, septic.      PREVIOUS ENDOSCOPY:  7/23/24 Colonoscopy  Findings:       The perianal and digital rectal examinations were normal.        Multiple small and large-mouthed diverticula were found in the sigmoid        colon.        External hemorrhoids were found during retroflexion. The hemorrhoids        were moderate.        The exam was otherwise without abnormality.       5/30/2014 Colonoscopy  Findings:       The terminal ileum appeared normal. Multiple small and large-mouthed        diverticula were found in the sigmoid colon and in the descending colon.        Nora-diverticular erythema was seen. There was evidence of an impacted        diverticulum. Non-bleeding external and internal hemorrhoids were found         during retroflexion and were Grade I (internal hemorrhoids that do not        prolapse). Anal papilla(e) were hypertrophied. The recto-sigmoid colon,        sigmoid colon, descending colon, splenic flexure, transverse colon,        hepatic flexure, ascending colon, cecum, appendiceal orifice and        ileocecal valve appeared normal.      PERTINENT STUDIES Reviewed in EMR  7/16/2024 MRCP  FINDINGS:   HEPATOBILIARY: MRCP shows cholecystectomy. Extrahepatic biliary tree  is mildly ectatic. The common bile duct now measures 8 mm. No  choledocholithiasis. Normal size of the main pancreatic duct that is  now 2 mm.   No fat or iron deposition within the liver identified. No worrisome  focal hepatic observation.   PANCREAS: The previously described rounded apparent enhancing nodule  at the pancreas head/uncinate is very ill-defined. It is suggested on  subtraction series 23 image 45 measuring 7 mm. It is unsure whether  this represents a true pancreas parenchymal nodule versus some signal  heterogeneity. This is just in the region of the pancreas duct that  decompresses in this area. Remainder of the pancreas shows no  abnormality.   SPLEEN: Normal.   ADRENAL GLANDS: Normal.   KIDNEYS: No significant mass,  or hydronephrosis. There are simple or  benign cysts. No follow up is needed.   BOWEL: No acute abnormality.   ADDITIONAL FINDINGS: No suspicious lymph node identified.   MUSCULOSKELETAL: Compression deformity at T12.                                                                    IMPRESSION:   1.  Again suggested is a subtle minimally enhancing nodule at the  pancreas head/uncinate region. The pancreas duct decompresses at this  location. While this could just be signal heterogeneity, a pancreas  mass cannot be excluded. If no intervention is to be performed,  recommend surveillance pancreas MRI in 6 months.  2.  Mild ectasia of the extrahepatic biliary tree with no  choledocholithiasis identified. Status  post cholecystectomy.      5/10/2024 Limited abd. US  FINDINGS:   GALLBLADDER: Absent.   BILE DUCTS: There is no biliary dilatation. The common duct measures  11mm.   LIVER: Normal where seen.   RIGHT KIDNEY: No hydronephrosis. Simple cyst right upper kidney  without specific imaging follow-up recommended. This measures 3.8 cm.   PANCREAS: No visible focal lesion. A few bowel loops adjacent to the  pancreas head area identified.   No ascites.                                                                    IMPRESSION:  1.  Dilated common duct measuring 11 mm.  2.  No other specific abnormality can be seen.     5/10/2024 CT of abdomen/pelvis  FINDINGS:   LOWER CHEST: Normal.   HEPATOBILIARY: Gallbladder not seen. Dilated extrahepatic biliary  tree. Common bile duct is approximately 11 mm, previously 7 mm series  3 image 64. No focal hepatic observation.     PANCREAS: Dilated main pancreatic duct appears increased measuring 5  mm, previously 3 mm image 65. 9 mm apparent enhancing rounded nodule  at the pancreas head/uncinate series 3 image 82.     SPLEEN: A few benign calcifications.     ADRENAL GLANDS: Normal.     KIDNEYS/BLADDER: No significant mass, stones, or hydronephrosis. There  are simple or benign cysts. No follow up is needed. Distended urinary  bladder.     BOWEL: Prominent stool at the proximal colon. No small bowel  obstruction. Colonic diverticulosis without convincing diverticulitis.6                                                                      IMPRESSION:   1.  Interval increase of dilatation of the biliary and pancreatic  ducts since 11/4/2020. Small focal nodular enhancement newly seen at  the pancreas head/neck. It is difficult to exclude a pancreas mass.  This potentially could be further evaluated with MRI.  2.  Prominent stool within the colon.  3.  Colonic diverticulosis without convincing diverticulitis.  4.  Progression of height loss at a T12 compression deformity  since  2020.    ROS: 10pt ROS performed and otherwise negative.    PAST MEDICAL HISTORY:  Past Medical History:   Diagnosis Date    Abnormal Papanicolaou smear of cervix and cervical HPV     Allergic rhinitis, cause unspecified     seasonal allergies    Cerebral infarction (H)     Contact dermatitis and other eczema, due to unspecified cause     Endometriosis, site unspecified     Esophageal reflux     GERD    Migraine, unspecified, without mention of intractable migraine without mention of status migrainosus     Mild persistent asthma     Unspecified disorder of uterus     fibroid uterus       PREVIOUS ABDOMINAL/GYNECOLOGIC SURGERIES:  Past Surgical History:   Procedure Laterality Date    COLONOSCOPY  2014    Procedure: COLONOSCOPY;  Surgeon: Nathan Baker MD;  Location: [facility]    COLONOSCOPY N/A 2024    Procedure: Colonoscopy;  Surgeon: Carlton Ramirez MD;  Location: [facility]    OPEN REDUCTION INTERNAL FIXATION ELBOW Right 2023    Procedure: REVISION OPEN REDUCTION INTERNAL FIXATION RIGHT OLECRANON;  Surgeon: Williams Phipps MD;  [facility]    OPEN REDUCTION INTERNAL FIXATION HIP NAILING Right 2023    Procedure: INTERNAL FIXATION, FRACTURE, TROCHANTERIC,RIGHT HIP, USING GAMMA RODS;  Surgeon: Nathan Turner MD;  [facility]    OPEN REDUCTION INTERNAL FIXATION HUMERUS DISTAL Right 5/3/2023    Procedure: OPEN REDUCTION INTERNAL FIXATION, FRACTURE, HUMERUS, DISTAL;  Surgeon: Williams Phipps MD;  [facility]    REMOVE HARDWARE ELBOW Right 2023    Procedure: HARDWARE REMOVAL RIGHT OLECRANON;  Surgeon: Williams Phipps MD;  [facility]    REVERSE ARTHROPLASTY SHOULDER Right 2023    Procedure: ARTHROPLASTY, SHOULDER, TOTAL, REVERSE for;  Surgeon: Cierra Krause MD;  [facility]    ZZC  DELIVERY ONLY       X2    ZZHC COLONOSCOPY THRU STOMA, DIAGNOSTIC  2002    normal         PERTINENT MEDICATIONS:  Current Outpatient Medications   Medication Sig Dispense  Refill    albuterol (PROAIR HFA/PROVENTIL HFA/VENTOLIN HFA) 108 (90 Base) MCG/ACT inhaler Inhale 2 puffs into the lungs every 6 hours as needed for shortness of breath, wheezing or cough 18 g 3    amLODIPine (NORVASC) 10 MG tablet Take 1 tablet (10 mg) by mouth daily 90 tablet 3    carvedilol (COREG) 12.5 MG tablet TAKE 1 TABLET BY MOUTH TWICE DAILY WITH FOOD 180 tablet 3    clopidogrel (PLAVIX) 75 MG tablet TAKE 1 TABLET(75 MG) BY MOUTH DAILY 30 tablet 0    furosemide (LASIX) 20 MG tablet TAKE 1 TABLET BY MOUTH EVERY MORNING 90 tablet 1    HYDROcodone-acetaminophen (NORCO) 5-325 MG tablet Take 1 tablet by mouth every 6 hours as needed for severe pain (Do not mix with Tramadol) 45 tablet 0    levETIRAcetam (KEPPRA) 500 MG tablet TAKE 1 TABLET BY MOUTH TWICE DAILY 180 tablet 1    lisinopril (ZESTRIL) 20 MG tablet Take 2 tablets (40 mg) by mouth daily 180 tablet 3    melatonin 1 MG TABS tablet Take 2 tablets (2 mg) by mouth at bedtime      methylcellulose (CITRUCEL) 500 MG TABS tablet Take 2 tablets (1,000 mg) by mouth 2 times daily. 120 tablet 4    QUEtiapine (SEROQUEL) 25 MG tablet Take 1 tablet (25 mg) by mouth at bedtime 90 tablet 3    rosuvastatin (CRESTOR) 20 MG tablet Take 1 tablet (20 mg) by mouth daily 90 tablet 3    tiZANidine (ZANAFLEX) 2 MG tablet Take 0.5-1 tablets (1-2 mg) by mouth 3 times daily as needed for muscle spasms 90 tablet 3    traMADol (ULTRAM) 50 MG tablet Take 1 tablet (50 mg) by mouth every 4 hours as needed for moderate pain (Do not mix with Hydrocodone) 90 tablet 0    ubrogepant (UBRELVY) 50 MG tablet Take 1 tablet (50 mg) by mouth at onset of headache (May repeat ose after 2 hours if heasdache persists. max 2 pills a day) 16 tablet 11    venlafaxine (EFFEXOR XR) 150 MG 24 hr capsule Take 1 capsule (150 mg) by mouth daily 90 capsule 3         SOCIAL HISTORY:  Social History     Socioeconomic History    Marital status:      Spouse name: Not on file    Number of children: 2    Years  of education: Not on file    Highest education level: Not on file   Occupational History    Occupation: retired     Employer: RETIRED   Tobacco Use    Smoking status: Never     Passive exposure: Never    Smokeless tobacco: Never   Vaping Use    Vaping status: Never Used   Substance and Sexual Activity    Alcohol use: Yes     Comment: socially    Drug use: No    Sexual activity: Not Currently     Partners: Male     Birth control/protection: Surgical     Comment: tubal ligation   Other Topics Concern     Service No    Blood Transfusions No    Caffeine Concern No    Occupational Exposure No    Hobby Hazards No    Sleep Concern Yes     Comment: Insomnia    Stress Concern Yes     Comment: breathing,  passed away 2004    Weight Concern Yes     Comment: would like to lose some weight    Special Diet No    Back Care Yes    Exercise Yes     Comment: walking at work daily    Bike Helmet Not Asked    Seat Belt Yes    Self-Exams Yes     Comment: periodically    Parent/sibling w/ CABG, MI or angioplasty before 65F 55M? No   Social History Narrative    Not on file     Social Drivers of Health     Financial Resource Strain: Low Risk  (5/30/2024)    Financial Resource Strain     Within the past 12 months, have you or your family members you live with been unable to get utilities (heat, electricity) when it was really needed?: No   Food Insecurity: Low Risk  (5/30/2024)    Food Insecurity     Within the past 12 months, did you worry that your food would run out before you got money to buy more?: No     Within the past 12 months, did the food you bought just not last and you didn t have money to get more?: No   Transportation Needs: Low Risk  (5/30/2024)    Transportation Needs     Within the past 12 months, has lack of transportation kept you from medical appointments, getting your medicines, non-medical meetings or appointments, work, or from getting things that you need?: No   Physical Activity: Unknown (5/30/2024)  "   Exercise Vital Sign     Days of Exercise per Week: 2 days     Minutes of Exercise per Session: Not on file   Stress: No Stress Concern Present (5/30/2024)    [place] [employer] - Occupational Stress Questionnaire     Feeling of Stress : Only a little   Social Connections: Unknown (5/30/2024)    Social Connection and Isolation Panel [NHANES]     Frequency of Communication with Friends and Family: Not on file     Frequency of Social Gatherings with Friends and Family: Three times a week     Attends Anabaptism Services: Not on file     Active Member of Clubs or Organizations: Not on file     Attends Club or Organization Meetings: Not on file     Marital Status: Not on file   Interpersonal Safety: Not on file   Housing Stability: Low Risk  (5/30/2024)    Housing Stability     Do you have housing? : Yes     Are you worried about losing your housing?: No       FAMILY HISTORY:  Denies colon/panc/esophageal/other GI CA, no other Krisnha or other HPS-related Mary. No IBD/celiac, no other AI/liver/thyroid disease.    Family History   Problem Relation Age of Onset    Heart Disease Mother         anemia    Osteoporosis Mother     Hypertension Mother     Cerebrovascular Disease Mother     Alcohol/Drug Mother         alcohol    Neurologic Disorder Mother         migraines    Hypertension Father     Cancer No family hx of         breast       PHYSICAL EXAMINATION:  Vitals reviewed  /84 (BP Location: Right arm, Patient Position: Sitting, Cuff Size: Adult Regular)   Pulse 64   Ht 1.6 m (5' 3\")   Wt 52.2 kg (115 lb)   LMP 11/09/2005 (Approximate)   Breastfeeding No   BMI 20.37 kg/m      General: Patient appears well in no acute distress.    Skin: No visualized rash or lesions on visualized skin  HEENT:    EOMI, no erythema, sclera icterus or discharge noted.  Mouth mucosa intact, pink, moist  No cervical or supraclavicular lymphadenopathy. Thyroid gland not enlarged.  Resp: breathing comfortably " without accessory muscle usage, speaking in full sentences, no cough. Lung sounds: Wheezing over anterior chest.  Resolved after patient used albuterol inhaler.  Card: Regular and rhythmic S1 and S2. No gallop or rub.  Pansystolic 2 out of 6 soft murmur, best heard over the right chest with radiation to right axilla.  No LE edema.  Abdomen: Active bowel sounds X 4 quadrants. Soft to palpation.  Tenderness in epigastrium and left upper quadrant, mild.  No guarding or rebound tenderness. Higgins's sign negative.  MSK: Appears to have normal range of motion based on visualized movements  Neurologic: No apparent tremors, facial movements symmetric  Psych: affect normal, alert and oriented      ASSESSMENT/PLAN:    ICD-10-CM    1. Upper abdominal pain  R10.10       2. Lesion of pancreas  K86.9 MR Pancreas wo & w Contrast      3. Diverticulosis of large intestine without hemorrhage  K57.30 methylcellulose (CITRUCEL) 500 MG TABS tablet      4. Loose stools  R19.5 methylcellulose (CITRUCEL) 500 MG TABS tablet         74 year old female  presented to GI clinic for a follow-up on diffuse abdominal pain and constipation.  Patient reported that her constipation had resolved but now, she is having loose stools, [place]y type 7, 1-2 stools in the morning.  She denies any changes to her diet.  Denies abdominal pain, but there was tenderness on palpation in epigastrium and left upper quadrant.  No guarding.  We reviewed the patient's colonoscopy report, which showed colonic diverticulosis without diverticulitis and nonbleeding external and internal hemorrhoids.  CT scan of abdomen revealed interval increase of dilatation of the biliary and pancreatic ducts comparing to the study on 11/4/2020. Small focal nodular enhancement newly seen at the pancreas head/neck. Patient had normal LFT and lipase. Referred the patient to MRCP, which  revealed a subtle minimally enhancing nodule at the pancreas head/uncinate region.  Measures 8 mm.  The  pancreas duct decompresses at this location. While this could just be signal heterogeneity, a pancreas mass cannot be excluded. Surveillance pancreas MRI in 6 months was recommended.  We discussed findings with the patient and her friend.  They agreed to proceed with MRI next month.  Order was placed.  If no changes in nodule appearance and size, we will continue surveillance MRI q 1-2 years.  Patient was educated to hold stool softeners and laxatives.  Will order Citrucel tablets to take, 1000 mg twice a day.  If diarrhea persist, may order stool study for fecal elastase.    Noted expiratory wheezing in prone position during exam.  Resolved after use of albuterol inhaler.  Unchanged heart murmur on exam.  Noted that patient had transthroacic echocardiogram in August that showed normal LV size and systolic function with estimated ejection fraction of 60-65%. Moderate aortic valve sclerosis without significant stenosis.  Patient verbalized understanding and appreciation of care provided. Stated that all of the questions were answered to her/his satisfaction.  RTC in 2 months    Thank you for this consultation. It was a pleasure to participate in the care of this patient; please contact us with any further questions.    JAYY Cunha, FNP-C  Division of Gastroenterology  [facility], [place], [place]    This note was created with Dragon voice recognition software, and while reviewed for accuracy, inadvertent minor typographic errors may occur. Please contact the provider if you have any questions.

## 2024-10-25 NOTE — PATIENT INSTRUCTIONS
It was a pleasure taking care of you today.  I've included a brief summary of our discussion and care plan from today's visit below.  Please review this information with your primary care provider.  ______________________________________________________________________    My recommendations are summarized as follows:      As we discussed today, I placed an order for MRI of the pancreas for a follow-up on the pancreatic nodule found on previous studies.  You will be contacted to schedule the test.    2.  Start fiber tablets, Citrucel 1000 mg twice a day with meals.  This should help to regulate your bowel movements.    3.  Continue to use stool softener and MiraLAX as needed.    4.  If diarrhea persist, I will be ordering stool studies for additional evaluation.    Return to GI Clinic in 2 months to review your progress.    ______________________________________________________________________    A high-fiber diet is a commonly recommended treatment for digestive problems, such as constipation, diarrhea, diverticulosis, irritable bowel syndrome, and hemorrhoids.   Most dietary fiber is not digested or absorbed, so it stays within the intestine where it modulates digestion of other foods and affects the consistency of stool. There are two types of fiber, each of which is thought to have its own benefits:  ?Soluble fiber consists of a group of substances that is made of carbohydrates and dissolves in water. Examples of foods that contain soluble fiber include fruits, oats, barley, and legumes (peas and beans).  ?Insoluble fiber comes from plant cell walls and does not dissolve in water. Examples of foods that contain insoluble fiber include wheat, rye, and other grains. Insoluble fiber (wheat bran, and some fruits and vegetables) has been recommended to treat digestive problems such as constipation, hemorrhoids, chronic diarrhea, and fecal incontinence.   ?Dietary fiber is the sum of all soluble and insoluble  The patient is a 80 year old female with a history of HTN, COPD, UC, OA, severe AS who presents with confusion.    Plan:  - ECG with no evidence of ischemia or infarction  - Troponin trended up to 6623 - may be reflective of underlying neurologic event. Cannot exclude NSTEMI although no symptoms to suggest such at this time.  - Echo done last month with normal LV systolic function, severe AS  - Repeat echo pending  - Continue aspirin 81 mg daily  - Continue atorvastatin 40 mg daily  - No anticoagulation for now - awaiting further neurologic work-up  - Permissive hypertension  - Monitor on telemetry  - Neurology eval  - Will likely need transfer to [facility] after neurologic work-up complete. She will likely need a cardiac catheterization; however this will depend on the presence of a CVA or not. fiber.Fiber bulks the stool, making it softer and easier to pass. Fiber helps the stool pass regularly, although it is not a laxative.   - Recommended daily dose of fiber is 25-35 gram. It is difficult to consume this amount of fiber from food alone. Therefore, I would suggest to take fiber supplement.  - Please start supplementation with a powdered soluble fiber. When used on a daily basis, this can help regulate the consistency of your stools.   - Metamucil (psyllium) and Citrucel are preferred examples. You can start with 1-2 teaspoons per day, with goal to gradually increase the dose  to 1 tablespoon daily. You can increase up to 1 tablespoon three times daily if needed.   - It is important to stay well-hydrated with use of fiber supplementation and to make sure that the fiber powder is well mixed with water as directed on the label.   - You may experience some bloating with initiation of fiber, which will improve over the first few weeks. We will evaluate the effect  of fiber in 3-6 months.   - Of note, many of the fiber products contain artificial sweeteners, which can cause bloating, gas, and diarrhea in those who may be sensitive to artificial sweeteners. If this is the case, I would recommend trying Metamucil Premium Blend (with Stevia), Metamucil 4-in-1 without Added Sweeteners, or [place]way (sweetened with Monk fruit extract).      ____________________________________________________________________        Who do I call with any questions after my visit?  Please be in touch if there are any further questions that arise following today's visit.  There are multiple ways to contact your gastroenterology care team.      During business hours, you may reach a Gastroenterology nurse at [phone], option 3.     To schedule or reschedule an appointment, please call [phone].   To schedule your imaging studies (CT, MRI, ultrasound)  call [phone] (or toll-free # [phone])  To schedule your lab work  at [facility], please call [phone]    You can always send a secure message through [employer].  [employer] messages are answered by your nurse or doctor typically within 24 hours.  Please allow extra time on weekends and holidays.      For urgent/emergent questions after business hours, you may reach the on-call GI Fellow by contacting the [facility]  at [phone].    In order for your refill to be processed in a timely fashion, it is your responsibility to ensure you follow the recommendations from your provider regarding your laboratory studies and follow up appointments.       How will I get the results of any tests ordered?    You will receive all of your results.  If you have signed up for [employer], any tests ordered at your visit will be available to you after your physician reviews them.  Typically this takes 1-2 weeks.  If there are urgent results that require a change in your care plan, your physician or nurse will call you to discuss the next steps.   What is [employer]?  [employer] is a secure way for you to access all of your healthcare records from the [facility].  It is a web based computer program, so you can sign on to it from any location.  It also allows you to send secure messages to your care team.  I recommend signing up for [employer] access if you have not already done so and are comfortable with using a computer.    How to I schedule a follow-up visit?  If you did not schedule a follow-up visit today, please call [phone] to schedule a follow-up office visit.      Sincerely,  CATHIE Cunha,  [facility],  Division of Gastroenterology   ([facility])

## 2024-11-07 ENCOUNTER — HOSPITAL ENCOUNTER (OUTPATIENT)
Dept: MRI IMAGING | Facility: CLINIC | Age: 74
Discharge: HOME | End: 2024-11-07
Attending: NURSE PRACTITIONER | Admitting: NURSE PRACTITIONER
Payer: MEDICARE

## 2024-11-07 DIAGNOSIS — K86.9 LESION OF PANCREAS: ICD-10-CM

## 2024-11-07 PROCEDURE — G1010 CDSM STANSON: HCPCS

## 2024-11-07 PROCEDURE — 255N000002 HC RX 255 OP 636: Performed by: NURSE PRACTITIONER

## 2024-11-07 PROCEDURE — A9585 GADOBUTROL INJECTION: HCPCS | Performed by: NURSE PRACTITIONER

## 2024-11-07 RX ORDER — GADOBUTROL 604.72 MG/ML
5.5 INJECTION INTRAVENOUS ONCE
Status: COMPLETED | OUTPATIENT
Start: 2024-11-07 | End: 2024-11-07

## 2024-11-07 RX ADMIN — GADOBUTROL 5.5 ML: 604.72 INJECTION INTRAVENOUS at 17:03

## 2024-11-13 DIAGNOSIS — G45.9 TIA (TRANSIENT ISCHEMIC ATTACK): ICD-10-CM

## 2024-11-13 DIAGNOSIS — S42.201K CLOSED FRACTURE OF PROXIMAL END OF RIGHT HUMERUS WITH NONUNION, UNSPECIFIED FRACTURE MORPHOLOGY, SUBSEQUENT ENCOUNTER: ICD-10-CM

## 2024-11-13 DIAGNOSIS — I25.10 CORONARY ARTERY DISEASE DUE TO LIPID RICH PLAQUE: ICD-10-CM

## 2024-11-13 DIAGNOSIS — I25.83 CORONARY ARTERY DISEASE DUE TO LIPID RICH PLAQUE: ICD-10-CM

## 2024-11-16 RX ORDER — CLOPIDOGREL BISULFATE 75 MG/1
75 TABLET ORAL DAILY
Qty: 90 TABLET | Refills: 3 | Status: SHIPPED | OUTPATIENT
Start: 2024-11-16

## 2024-11-26 ASSESSMENT — ANXIETY QUESTIONNAIRES: GAD7 TOTAL SCORE: 3

## 2025-02-26 DIAGNOSIS — I10 HYPERTENSION GOAL BP (BLOOD PRESSURE) < 140/90: ICD-10-CM

## 2025-02-26 RX ORDER — FUROSEMIDE 20 MG/1
20 TABLET ORAL EVERY MORNING
Qty: 90 TABLET | Refills: 0 | Status: SHIPPED | OUTPATIENT
Start: 2025-02-26

## 2025-03-12 DIAGNOSIS — G40.909 SEIZURE DISORDER (H): ICD-10-CM

## 2025-03-12 RX ORDER — LEVETIRACETAM 500 MG/1
500 TABLET ORAL 2 TIMES DAILY
Qty: 180 TABLET | Refills: 1 | Status: SHIPPED | OUTPATIENT
Start: 2025-03-12

## 2025-04-14 ENCOUNTER — TELEPHONE (OUTPATIENT)
Dept: FAMILY MEDICINE | Facility: OTHER | Age: 75
End: 2025-04-14

## 2025-04-14 NOTE — TELEPHONE ENCOUNTER
Reason for Call:  Appointment Request    Patient requesting this type of appt:  following-up about trouble sleeping, additionally pt has wound on elbow that cannot get cleared up    Requested provider: Rosenda Pierre    Reason patient unable to be scheduled: Not within requested timeframe    When does patient want to be seen/preferred time:  as soon as possible    Comments: appt scheduled for 4/14 at 3:10 with another provider at ER, pt would prefer to be seen with PCP to discuss symptoms    Okay to leave a detailed message?: Yes at Cell number on file:    Telephone Information:       Mobile [phone]       Call taken on 4/14/2025 at 9:01 AM by Jonna Shabazz

## 2025-04-15 ENCOUNTER — OFFICE VISIT (OUTPATIENT)
Dept: FAMILY MEDICINE | Facility: OTHER | Age: 75
End: 2025-04-15
Payer: MEDICARE

## 2025-04-15 ENCOUNTER — TELEPHONE (OUTPATIENT)
Dept: ORTHOPEDICS | Facility: CLINIC | Age: 75
End: 2025-04-15

## 2025-04-15 VITALS
DIASTOLIC BLOOD PRESSURE: 100 MMHG | OXYGEN SATURATION: 96 % | TEMPERATURE: 98.6 F | WEIGHT: 120 LBS | RESPIRATION RATE: 18 BRPM | HEART RATE: 73 BPM | BODY MASS INDEX: 23.56 KG/M2 | SYSTOLIC BLOOD PRESSURE: 160 MMHG | HEIGHT: 60 IN

## 2025-04-15 DIAGNOSIS — F51.04 PSYCHOPHYSIOLOGICAL INSOMNIA: ICD-10-CM

## 2025-04-15 DIAGNOSIS — F43.23 ADJUSTMENT DISORDER WITH MIXED ANXIETY AND DEPRESSED MOOD: ICD-10-CM

## 2025-04-15 DIAGNOSIS — F03.90 DEMENTIA WITHOUT BEHAVIORAL DISTURBANCE (H): ICD-10-CM

## 2025-04-15 DIAGNOSIS — I10 HYPERTENSION GOAL BP (BLOOD PRESSURE) < 140/90: ICD-10-CM

## 2025-04-15 DIAGNOSIS — T84.498A EXPOSED ORTHOPAEDIC HARDWARE: Primary | ICD-10-CM

## 2025-04-15 DIAGNOSIS — F32.1 MODERATE MAJOR DEPRESSION (H): ICD-10-CM

## 2025-04-15 PROCEDURE — 1126F AMNT PAIN NOTED NONE PRSNT: CPT | Performed by: PHYSICIAN ASSISTANT

## 2025-04-15 PROCEDURE — 3075F SYST BP GE 130 - 139MM HG: CPT | Performed by: PHYSICIAN ASSISTANT

## 2025-04-15 PROCEDURE — 99214 OFFICE O/P EST MOD 30 MIN: CPT | Performed by: PHYSICIAN ASSISTANT

## 2025-04-15 PROCEDURE — 3080F DIAST BP >= 90 MM HG: CPT | Performed by: PHYSICIAN ASSISTANT

## 2025-04-15 PROCEDURE — G2211 COMPLEX E/M VISIT ADD ON: HCPCS | Performed by: PHYSICIAN ASSISTANT

## 2025-04-15 RX ORDER — QUETIAPINE FUMARATE 25 MG/1
25-50 TABLET, FILM COATED ORAL
Qty: 180 TABLET | Refills: 3 | Status: SHIPPED | OUTPATIENT
Start: 2025-04-15

## 2025-04-15 RX ORDER — DOXEPIN 3 MG/1
3 TABLET, FILM COATED ORAL
Status: CANCELLED | OUTPATIENT
Start: 2025-04-15

## 2025-04-15 ASSESSMENT — ASTHMA QUESTIONNAIRES
QUESTION_3 LAST FOUR WEEKS HOW OFTEN DID YOUR ASTHMA SYMPTOMS (WHEEZING, COUGHING, SHORTNESS OF BREATH, CHEST TIGHTNESS OR PAIN) WAKE YOU UP AT NIGHT OR EARLIER THAN USUAL IN THE MORNING: ONCE OR TWICE
ACT_TOTALSCORE: 22
QUESTION_2 LAST FOUR WEEKS HOW OFTEN HAVE YOU HAD SHORTNESS OF BREATH: ONCE OR TWICE A WEEK
QUESTION_4 LAST FOUR WEEKS HOW OFTEN HAVE YOU USED YOUR RESCUE INHALER OR NEBULIZER MEDICATION (SUCH AS ALBUTEROL): ONCE A WEEK OR LESS
QUESTION_1 LAST FOUR WEEKS HOW MUCH OF THE TIME DID YOUR ASTHMA KEEP YOU FROM GETTING AS MUCH DONE AT WORK, SCHOOL OR AT HOME: NONE OF THE TIME
QUESTION_5 LAST FOUR WEEKS HOW WOULD YOU RATE YOUR ASTHMA CONTROL: COMPLETELY CONTROLLED

## 2025-04-15 ASSESSMENT — PATIENT HEALTH QUESTIONNAIRE - PHQ9
SUM OF ALL RESPONSES TO PHQ QUESTIONS 1-9: 13
10. IF YOU CHECKED OFF ANY PROBLEMS, HOW DIFFICULT HAVE THESE PROBLEMS MADE IT FOR YOU TO DO YOUR WORK, TAKE CARE OF THINGS AT HOME, OR GET ALONG WITH OTHER PEOPLE: SOMEWHAT DIFFICULT
SUM OF ALL RESPONSES TO PHQ QUESTIONS 1-9: 13

## 2025-04-15 ASSESSMENT — PAIN SCALES - GENERAL: PAINLEVEL_OUTOF10: NO PAIN (0)

## 2025-04-15 NOTE — TELEPHONE ENCOUNTER
Patient confirmed scheduled appointment:  Date: 4/21/2025  Time: 11:15am  Visit type: Return Hand/Wrist  Provider:   [place] [facility] [place]

## 2025-04-15 NOTE — PROGRESS NOTES
Assessment & Plan     ICD-10-CM    1. Exposed orthopaedic hardware  T84.498A Orthopedic  Referral      2. Adjustment disorder with mixed anxiety and depressed mood  F43.23       3. Dementia without behavioral disturbance (H)  F03.90       4. Moderate major depression (H)  F32.1 QUEtiapine (SEROQUEL) 25 MG tablet      5. Psychophysiological insomnia  F51.04       6. Hypertension goal BP (blood pressure) < 140/90  I10         See picture, exposed hardware   - Needs to return to ortho as complication of her surgery in 2023   - Continue to cover to avoid infection     2-5.   - Continues to struggle with some depression and insomnia   - Not able to be restarted on Wellbutrin or Hydroxyzine due to her memory issues   - Does have neurologist that sees yearly, due in July     - Melatonin 5 mg is not effective. Hydroxyzine stopped by neurology to avoid anticholenergic effects. Trazodone not effective. Did consider Doxepin but would potentially cause serotonin syndrome as would interact with her Effexor and Tramadol   - Increase Seroquel (Quintipine) to 50 mg  - Risks and side effects: Seroquel has a black box warning for psychosis in individuals over 65 and sudden death. Has been doing the 25 mg since hospital without issue, neurology aware she is on this as well and has approved   - Case was reviewed with Dr. FRANCISCO Ralph who agreed with above plan     Hypertension goal BP < 140/90:  - Blood pressure readings vary; first reading was at goal, second was higher.  - Monitor blood pressure with additional checks. Await further readings before making changes.    The patient indicates understanding of these issues and agrees with the plan.    Follow up: 1-2 months     Jose Francisco Varma-FLEX [place]  [facility] [place] [place] a 74 year old, presenting for the following health issues:  Sleep Problem and Derm Problem        4/15/2025     9:30 AM   Additional Questions   Roomed by  Wendy   Accompanied by Bryson         4/15/2025     9:30 AM   Patient Reported Additional Medications   Patient reports taking the following new medications NA     History of Present Illness       Reason for visit:  Sleep issues/derm issue on elbow    She eats 0-1 servings of fruits and vegetables daily.She consumes 0 sweetened beverage(s) daily.She exercises with enough effort to increase her heart rate 9 or less minutes per day.  She exercises with enough effort to increase her heart rate 3 or less days per week.   She is taking medications regularly.    Sleep issues   - Melatonin helping only a little bit   - Waking up 3-5 am and can't get back to sleep   - Been using 5 mg melatonin   - Seroquel - 25 mg at night     - Issues with sleep, waking up between 3:00 and 5:00 AM frequently, unable to return to sleep.  - Has been using 5 mg of melatonin without improvement.  - Seroquel (Quintipine) currently used for sleep  - Wound on elbow with visible metal, not healing properly, treated with Neosporin and bacitracin, bandaged regularly.  - Previous surgery related to the elbow, performed at a [facility].      Concern - Rt Elbow previous injury/sores on elbow  Onset: 6 weeks  Description: sore on elbow scabs over and does not heal  Intensity: moderate  Progression of Symptoms:  same  Accompanying Signs & Symptoms: redness, scaling, scabbing, has had puss, pain  Previous history of similar problem: no  Precipitating factors:        Worsened by: bumping it  Alleviating factors:        Improved by: nothing  Therapies tried and outcome: neosporin, basitracin/bandage: helped some but did not heal            Review of Systems  Constitutional, neuro, ENT, endocrine, pulmonary, cardiac, gastrointestinal, genitourinary, musculoskeletal, integument and psychiatric systems are negative, except as otherwise noted.      Objective    BP (!) 160/100   Pulse 73   Temp 98.6  F (37  C) (Temporal)   Resp 18   Ht 1.518 m (4'  "11.76\")   Wt 54.4 kg (120 lb)   LMP 11/09/2005 (Approximate)   SpO2 96%   BMI 23.62 kg/m    Body mass index is 23.62 kg/m .  Physical Exam   GENERAL APPEARANCE: healthy, alert and no distress  EYES: Eyes grossly normal to inspection, PERRLA, conjunctivae and sclerae without injection or discharge, EOM intact   RESP: Lungs clear to auscultation - no rales, rhonchi or wheezes    CV: Regular rates and rhythm, normal S1 S2, no S3 or S4, no murmur, click or rub, no peripheral edema and peripheral pulses strong and symmetric bilaterally   MS: No musculoskeletal defects are noted and gait is age appropriate without ataxia   SKIN:   Right elbow - not warm or tender, not able to move skin at all         No suspicious lesions or rashes, hydration status appears adeuqate with normal skin turgor   PSYCH: Alert and oriented x3; speech- coherent , normal rate and volume; able to articulate logical thoughts, able to abstract reason, no tangential thoughts, no hallucinations or delusions, mentation appears normal, Mood is euthymic. Affect is flat. Thought content is free of suicidal ideation, hallucinations, and delusions. Dress is adequate and upkept. Eye contact is good during conversation.       Diagnostics: none         Signed Electronically by: Rosenda Pierre PA-C    "

## 2025-04-17 DIAGNOSIS — S42.491D CLOSED BICONDYLAR FRACTURE OF DISTAL HUMERUS, RIGHT, WITH ROUTINE HEALING, SUBSEQUENT ENCOUNTER: Primary | ICD-10-CM

## 2025-04-21 ENCOUNTER — ANCILLARY PROCEDURE (OUTPATIENT)
Dept: CT IMAGING | Facility: CLINIC | Age: 75
End: 2025-04-21
Attending: STUDENT IN AN ORGANIZED HEALTH CARE EDUCATION/TRAINING PROGRAM
Payer: MEDICARE

## 2025-04-21 ENCOUNTER — OFFICE VISIT (OUTPATIENT)
Dept: ORTHOPEDICS | Facility: CLINIC | Age: 75
End: 2025-04-21
Payer: MEDICARE

## 2025-04-21 ENCOUNTER — ANCILLARY PROCEDURE (OUTPATIENT)
Dept: GENERAL RADIOLOGY | Facility: CLINIC | Age: 75
End: 2025-04-21
Attending: STUDENT IN AN ORGANIZED HEALTH CARE EDUCATION/TRAINING PROGRAM
Payer: MEDICARE

## 2025-04-21 DIAGNOSIS — T84.498A EXPOSED ORTHOPAEDIC HARDWARE: ICD-10-CM

## 2025-04-21 DIAGNOSIS — S42.491D CLOSED BICONDYLAR FRACTURE OF DISTAL HUMERUS, RIGHT, WITH ROUTINE HEALING, SUBSEQUENT ENCOUNTER: ICD-10-CM

## 2025-04-21 PROCEDURE — 73080 X-RAY EXAM OF ELBOW: CPT | Mod: RT | Performed by: RADIOLOGY

## 2025-04-21 PROCEDURE — 73200 CT UPPER EXTREMITY W/O DYE: CPT | Mod: RT | Performed by: RADIOLOGY

## 2025-04-21 RX ORDER — DOXYCYCLINE 100 MG/1
100 CAPSULE ORAL 2 TIMES DAILY
Qty: 42 CAPSULE | Refills: 0 | Status: SHIPPED | OUTPATIENT
Start: 2025-04-21 | End: 2025-05-12

## 2025-04-21 NOTE — LETTER
2025      Sri Grewal  [address] [facility] [address]      Dear Colleague,    Thank you for referring your patient, Sri Grewal, to the [facility] [place]. Please see a copy of my visit note below.    Orthopaedic Surgery Hand Clinic Progress Note    Patient Name: Sri Grewal  MRN: [id number]  : 1950  Date: 2025    Date of Surgery:   5/3/23 - ORIF right distal humerus  23 - Revision ORIF right olecranon osteotomy    Subjective:  Ms. Sri Grewal is a 73 year old female who returns almost 2 years s/p ORIF R distal humerus and s/p revision ORIF olecranon osteotomy.  She had been doing well until last month when she developed a wound over the posterior elbow.  She and Bryson stated that it just appeared and did not have any antecedent symptoms.  They have been treating with ointment and bandages.  It does form a crust but does not have significant drainage.  She denies any fevers.  Accompanied by her friend Bryson today.    Objective:  General: alert, appropriate, NAD  HEENT: NC/AT  CV: RRR by pulse  Pulm: Unlabored on RA  MSK:  1 cm wound posterior medial olecranon with exposed tip of the olecranon plate   Dried crust around the edges of the wound, but no erythema, drainage, evidence of infection  Palpable distal humerus placed medially laterally which she states are at times bothersome  Elbow range of motion , unchanged  Supination 60  Pronation 75  5 out of 5 EPL, FPL, FDP index, intrinsics  Sensation intact to light touch median, radial, ulnar nerve distributions no deficits  Warm well-perfused, capillary fill less than 2 seconds    Imaging:  X-rays of right elbow today reviewed by me demonstrates postsurgical changes status post ORIF of olecranon osteotomy.  Distal humerus and olecranon osteotomy are healed. articular surface remains well aligned.  No evidence of hardware complication    Assessment/Plan:  73-year-old female status post ORIF  right distal humerus 5/3/2023 and revision ORIF right olecranon osteotomy 6/1/2023.     Patient now presents with pressure wound over the tip of the olecranon from underlying hardware.  It is not infected.  The patient does have sufficient skin for this small wound to be ellipsed out and closed.    I have recommended surgery for hardware removal and wound treatment.  I did advise that once these types of wounds appear with exposed metal, it is unlikely that it will heal on its own.  There is also a high chance that it might become larger or become infected.  If it becomes larger, the patient may need soft tissue rearrangement which would be a more significant ordeal.  We did discuss removing the distal humerus plates as well, however I think this would be a much bigger surgery and recovery.  Plates also currently are protective against periprosthetic fracture.  With mutual decision making, we concluded that it is better to leave them in place.    The indications for surgery were discussed with the patient. The benefits, risks, and alternatives of operative management were discussed in detail with the patient. The patient understands that the risks of surgery include, but are not limited to: infection, bleeding, injury to nearby structures (such as nerves, blood vessels, and tendons), persistent wound problems, need for additional surgery, pain, stiffness, scarring, need for rehabilitation, and anesthetic complications.  Patient expressed understanding and elected to proceed with surgery. All questions were answered to the patient's satisfaction.    Case request placed, regional + MAC. Will get CT to confirm healing of olecranon    All question answered.  She and Bryson voiced understanding and agreement.    Williams Phipps MD    Hand & Upper Extremity Surgery  Department of Orthopedic Surgery  [facility]      Again, thank you for allowing me to participate in the care of your patient.         Sincerely,        Williams Phipps MD    Electronically signed

## 2025-04-21 NOTE — NURSING NOTE
Teaching Flowsheet     Visit Type: In Clinic    Time Start: 1115  Time End: 1127  Total Time Spent: 12 min.    Surgeon: Williams Phipps MD  Location of Surgery (known or anticipated): [facility]   Type of Anesthesia: MAC with Regional Block  Worker's Compensation Procedure: No    Pertinent Medical History:  HTN, Dementia/Memory loss, depression, Hx CVA 2021,STEMI 2021,Asthma  Were medical conditions reviewed and appropriate for location? Yes  BMI: Normal BMI (23.62)    Relevant Diagnosis: Retained hardware of right olecranon  Teaching Topic: Hardware excision right olecranon    Person(s) involved in teaching:   Patient and friend  : No.   Verified Patient's Phone Number: YES: Phone number for Bryson Espino at [phone]    Caregiver//  Name: Bryson Espino  Phone Number: [phone]   Relationship: Close friend  Consent to Communicate on file: Yes     Motivation Level:  Asks Questions: Yes  Eager to Learn: Yes  Cooperative: Yes  Receptive (willing/able to accept information): Yes  Any cultural factors/Church beliefs that may influence understanding or compliance? No     Patient demonstrates understanding of the following:  Reason for the appointment, diagnosis and treatment plan: Yes  Knowledge of proper use of medications and conditions for which they are ordered (with special attention to potential side effects or drug interactions): Yes  Which situations necessitate calling provider and whom to contact: Yes     Teaching Concerns Addressed:   Proper use and care of medical equip, care aids, etc.: Yes  Nutritional needs and diet plan: Yes  Pain management techniques: Yes  Wound Care: Yes  How and/when to access community resources: Yes  Need for pre-op with in 30 days: YES, will be done with PCP. I asked them to ensure they go over their daily medications during this visit and discuss what medications need to be stopped before surgery and when. If you are doing a pre-op with your PCP and  they are not within the [facility], I ask them to fax it to our pre-op office. Patient verbalized understanding.   Also can be done with PAC.    Does patient have difficulty getting a ride to appointments (post-ops, PT/OT): No  Patient's plan after discharge:  Home with friend      Instructional Materials Used/Given:Preoperative surgery packet, antibacterial Chlorhexidine soap. Stop Light Tool reviewed, after-hours number provided, patient verbalized understanding, had no immediate questions.    - Important Contact Info/Phone Numbers: emphasizing clinic number [phone] and after hours number [phone]  - Map/location of surgery and follow-up appointments  - Showering instructions  - Stoplight Tool     -Next step: schedule a surgery date and schedule a pre-op with PCP or PAC.    Luz Pratt RN

## 2025-04-21 NOTE — NURSING NOTE
Reason For Visit:   Chief Complaint   Patient presents with    RECHECK     Follow up right elbow. S/p revision of right olecranon osteotomy ORIF DOS 6/1/23. Reports an open sore. First noticed about 6 weeks ago          74 year old  1950      Primary MD: Rosenda Pierre  Ref. MD: daisy      LMP 11/09/2005 (Approximate)       Pain Assessment  Patient Currently in Pain: Yes  0-10 Pain Scale: 5  Primary Pain Location: Elbow (right)            Sven King, ATC

## 2025-04-21 NOTE — PROGRESS NOTES
Orthopaedic Surgery Hand Clinic Progress Note    Patient Name: Sri Grewal  MRN: [id number]  : 1950  Date: 2025    Date of Surgery:   5/3/23 - ORIF right distal humerus  23 - Revision ORIF right olecranon osteotomy    Subjective:  Ms. Sri Grewal is a 73 year old female who returns almost 2 years s/p ORIF R distal humerus and s/p revision ORIF olecranon osteotomy.  She had been doing well until last month when she developed a wound over the posterior elbow.  She and Bryson stated that it just appeared and did not have any antecedent symptoms.  They have been treating with ointment and bandages.  It does form a crust but does not have significant drainage.  She denies any fevers.  Accompanied by her friend Bryson today.    Objective:  General: alert, appropriate, NAD  HEENT: NC/AT  CV: RRR by pulse  Pulm: Unlabored on RA  MSK:  1 cm wound posterior medial olecranon with exposed tip of the olecranon plate   Dried crust around the edges of the wound, but no erythema, drainage, evidence of infection  Palpable distal humerus placed medially laterally which she states are at times bothersome  Elbow range of motion , unchanged  Supination 60  Pronation 75  5 out of 5 EPL, FPL, FDP index, intrinsics  Sensation intact to light touch median, radial, ulnar nerve distributions no deficits  Warm well-perfused, capillary fill less than 2 seconds    Imaging:  X-rays of right elbow today reviewed by me demonstrates postsurgical changes status post ORIF of olecranon osteotomy.  Distal humerus and olecranon osteotomy are healed. articular surface remains well aligned.  No evidence of hardware complication    Assessment/Plan:  73-year-old female status post ORIF right distal humerus 5/3/2023 and revision ORIF right olecranon osteotomy 2023.     Patient now presents with pressure wound over the tip of the olecranon from underlying hardware.  It is not infected.  The patient does have sufficient skin for this  small wound to be ellipsed out and closed.    I have recommended surgery for hardware removal and wound treatment.  I did advise that once these types of wounds appear with exposed metal, it is unlikely that it will heal on its own.  There is also a high chance that it might become larger or become infected.  If it becomes larger, the patient may need soft tissue rearrangement which would be a more significant ordeal.  We did discuss removing the distal humerus plates as well, however I think this would be a much bigger surgery and recovery.  Plates also currently are protective against periprosthetic fracture.  With mutual decision making, we concluded that it is better to leave them in place.    The indications for surgery were discussed with the patient. The benefits, risks, and alternatives of operative management were discussed in detail with the patient. The patient understands that the risks of surgery include, but are not limited to: infection, bleeding, injury to nearby structures (such as nerves, blood vessels, and tendons), persistent wound problems, need for additional surgery, pain, stiffness, scarring, need for rehabilitation, and anesthetic complications.  Patient expressed understanding and elected to proceed with surgery. All questions were answered to the patient's satisfaction.    Case request placed, regional + MAC. Will get CT to confirm healing of olecranon    All question answered.  She and Bryson voiced understanding and agreement.    Williams Phipps MD    Hand & Upper Extremity Surgery  Department of Orthopedic Surgery  [facility]

## 2025-04-22 PROBLEM — T84.498A EXPOSED ORTHOPAEDIC HARDWARE: Status: ACTIVE | Noted: 2025-04-21

## 2025-04-23 ENCOUNTER — TELEPHONE (OUTPATIENT)
Dept: ORTHOPEDICS | Facility: CLINIC | Age: 75
End: 2025-04-23
Payer: MEDICARE

## 2025-04-23 NOTE — TELEPHONE ENCOUNTER
Patient has been scheduled for surgery. Details are below.    Date of Surgery: 05/29/25    Approximate Arrival Time: SURGERY CENTER WILL CALL 3/4 DAYS PRIOR TO CONFIRM A TIME   Surgeon:SHEA ALONSO PROVIDERS: Dr. Williams Phipps    Procedure: REMOVAL, HARDWARE, ELBOW - Right   Location: [facility] surgery locations: [facility] [address]  Surgery Consult: NA  PreOp Physical: WILL CALL PCP  PostOp: 06/06/25  Packet Mailed/MyChart Sent: YES  Added to [place]: YES    Spoke to: BRETT

## 2025-04-25 DIAGNOSIS — I21.3 ST ELEVATION MYOCARDIAL INFARCTION (STEMI), UNSPECIFIED ARTERY (H): ICD-10-CM

## 2025-04-25 DIAGNOSIS — I10 HYPERTENSION GOAL BP (BLOOD PRESSURE) < 140/90: ICD-10-CM

## 2025-04-28 RX ORDER — CARVEDILOL 12.5 MG/1
TABLET ORAL
Qty: 180 TABLET | Refills: 3 | Status: SHIPPED | OUTPATIENT
Start: 2025-04-28

## 2025-04-30 ENCOUNTER — PATIENT OUTREACH (OUTPATIENT)
Dept: CARE COORDINATION | Facility: CLINIC | Age: 75
End: 2025-04-30
Payer: MEDICARE

## 2025-05-12 ENCOUNTER — OFFICE VISIT (OUTPATIENT)
Dept: FAMILY MEDICINE | Facility: OTHER | Age: 75
End: 2025-05-12
Payer: MEDICARE

## 2025-05-12 ENCOUNTER — TELEPHONE (OUTPATIENT)
Dept: FAMILY MEDICINE | Facility: OTHER | Age: 75
End: 2025-05-12

## 2025-05-12 VITALS
RESPIRATION RATE: 18 BRPM | TEMPERATURE: 97.1 F | HEIGHT: 59 IN | WEIGHT: 120 LBS | OXYGEN SATURATION: 96 % | SYSTOLIC BLOOD PRESSURE: 146 MMHG | DIASTOLIC BLOOD PRESSURE: 96 MMHG | BODY MASS INDEX: 24.19 KG/M2 | HEART RATE: 80 BPM

## 2025-05-12 DIAGNOSIS — S42.201K CLOSED FRACTURE OF PROXIMAL END OF RIGHT HUMERUS WITH NONUNION, UNSPECIFIED FRACTURE MORPHOLOGY, SUBSEQUENT ENCOUNTER: ICD-10-CM

## 2025-05-12 DIAGNOSIS — G40.909 SEIZURE DISORDER (H): ICD-10-CM

## 2025-05-12 DIAGNOSIS — G45.9 TIA (TRANSIENT ISCHEMIC ATTACK): ICD-10-CM

## 2025-05-12 DIAGNOSIS — I10 HYPERTENSION GOAL BP (BLOOD PRESSURE) < 140/90: ICD-10-CM

## 2025-05-12 DIAGNOSIS — K57.30 DIVERTICULOSIS OF LARGE INTESTINE WITHOUT HEMORRHAGE: ICD-10-CM

## 2025-05-12 DIAGNOSIS — I25.10 CORONARY ARTERY DISEASE DUE TO LIPID RICH PLAQUE: ICD-10-CM

## 2025-05-12 DIAGNOSIS — G43.709 CHRONIC MIGRAINE WITHOUT AURA WITHOUT STATUS MIGRAINOSUS, NOT INTRACTABLE: ICD-10-CM

## 2025-05-12 DIAGNOSIS — I21.3 ST ELEVATION MYOCARDIAL INFARCTION (STEMI), UNSPECIFIED ARTERY (H): ICD-10-CM

## 2025-05-12 DIAGNOSIS — F32.1 MODERATE MAJOR DEPRESSION (H): ICD-10-CM

## 2025-05-12 DIAGNOSIS — I25.83 CORONARY ARTERY DISEASE DUE TO LIPID RICH PLAQUE: ICD-10-CM

## 2025-05-12 DIAGNOSIS — I10 HYPERTENSION, UNSPECIFIED TYPE: ICD-10-CM

## 2025-05-12 DIAGNOSIS — S42.491D CLOSED BICONDYLAR FRACTURE OF DISTAL HUMERUS, RIGHT, WITH ROUTINE HEALING, SUBSEQUENT ENCOUNTER: ICD-10-CM

## 2025-05-12 DIAGNOSIS — Z01.818 PREOP GENERAL PHYSICAL EXAM: Primary | ICD-10-CM

## 2025-05-12 DIAGNOSIS — Z96.611 S/P REVERSE TOTAL SHOULDER ARTHROPLASTY, RIGHT: ICD-10-CM

## 2025-05-12 DIAGNOSIS — Z13.6 SCREENING FOR CARDIOVASCULAR CONDITION: ICD-10-CM

## 2025-05-12 DIAGNOSIS — S72.144A CLOSED NONDISPLACED INTERTROCHANTERIC FRACTURE OF RIGHT FEMUR, INITIAL ENCOUNTER (H): ICD-10-CM

## 2025-05-12 LAB
ANION GAP SERPL CALCULATED.3IONS-SCNC: 8 MMOL/L (ref 7–15)
BUN SERPL-MCNC: 15.6 MG/DL (ref 8–23)
CALCIUM SERPL-MCNC: 10.2 MG/DL (ref 8.8–10.4)
CHLORIDE SERPL-SCNC: 101 MMOL/L (ref 98–107)
CHOLEST SERPL-MCNC: 178 MG/DL
CREAT SERPL-MCNC: 0.85 MG/DL (ref 0.51–0.95)
EGFRCR SERPLBLD CKD-EPI 2021: 72 ML/MIN/1.73M2
ERYTHROCYTE [DISTWIDTH] IN BLOOD BY AUTOMATED COUNT: 11.6 % (ref 10–15)
FASTING STATUS PATIENT QL REPORTED: NO
FASTING STATUS PATIENT QL REPORTED: NO
GLUCOSE SERPL-MCNC: 128 MG/DL (ref 70–99)
HCO3 SERPL-SCNC: 30 MMOL/L (ref 22–29)
HCT VFR BLD AUTO: 42.9 % (ref 35–47)
HDLC SERPL-MCNC: 126 MG/DL
HGB BLD-MCNC: 14.4 G/DL (ref 11.7–15.7)
LDLC SERPL CALC-MCNC: 42 MG/DL
MCH RBC QN AUTO: 32.6 PG (ref 26.5–33)
MCHC RBC AUTO-ENTMCNC: 33.6 G/DL (ref 31.5–36.5)
MCV RBC AUTO: 97 FL (ref 78–100)
NONHDLC SERPL-MCNC: 52 MG/DL
PLATELET # BLD AUTO: 203 10E3/UL (ref 150–450)
POTASSIUM SERPL-SCNC: 4.5 MMOL/L (ref 3.4–5.3)
RBC # BLD AUTO: 4.42 10E6/UL (ref 3.8–5.2)
SODIUM SERPL-SCNC: 139 MMOL/L (ref 135–145)
TRIGL SERPL-MCNC: 52 MG/DL
WBC # BLD AUTO: 6.5 10E3/UL (ref 4–11)

## 2025-05-12 PROCEDURE — 93000 ELECTROCARDIOGRAM COMPLETE: CPT | Performed by: STUDENT IN AN ORGANIZED HEALTH CARE EDUCATION/TRAINING PROGRAM

## 2025-05-12 PROCEDURE — 80048 BASIC METABOLIC PNL TOTAL CA: CPT | Performed by: STUDENT IN AN ORGANIZED HEALTH CARE EDUCATION/TRAINING PROGRAM

## 2025-05-12 PROCEDURE — 3077F SYST BP >= 140 MM HG: CPT | Performed by: STUDENT IN AN ORGANIZED HEALTH CARE EDUCATION/TRAINING PROGRAM

## 2025-05-12 PROCEDURE — 85027 COMPLETE CBC AUTOMATED: CPT | Performed by: STUDENT IN AN ORGANIZED HEALTH CARE EDUCATION/TRAINING PROGRAM

## 2025-05-12 PROCEDURE — 99214 OFFICE O/P EST MOD 30 MIN: CPT | Performed by: STUDENT IN AN ORGANIZED HEALTH CARE EDUCATION/TRAINING PROGRAM

## 2025-05-12 PROCEDURE — 3080F DIAST BP >= 90 MM HG: CPT | Performed by: STUDENT IN AN ORGANIZED HEALTH CARE EDUCATION/TRAINING PROGRAM

## 2025-05-12 PROCEDURE — 36415 COLL VENOUS BLD VENIPUNCTURE: CPT | Performed by: STUDENT IN AN ORGANIZED HEALTH CARE EDUCATION/TRAINING PROGRAM

## 2025-05-12 PROCEDURE — 80061 LIPID PANEL: CPT | Performed by: STUDENT IN AN ORGANIZED HEALTH CARE EDUCATION/TRAINING PROGRAM

## 2025-05-12 ASSESSMENT — ANXIETY QUESTIONNAIRES
5. BEING SO RESTLESS THAT IT IS HARD TO SIT STILL: NOT AT ALL
7. FEELING AFRAID AS IF SOMETHING AWFUL MIGHT HAPPEN: NOT AT ALL
3. WORRYING TOO MUCH ABOUT DIFFERENT THINGS: SEVERAL DAYS
GAD7 TOTAL SCORE: 3
7. FEELING AFRAID AS IF SOMETHING AWFUL MIGHT HAPPEN: NOT AT ALL
GAD7 TOTAL SCORE: 3
1. FEELING NERVOUS, ANXIOUS, OR ON EDGE: NOT AT ALL
GAD7 TOTAL SCORE: 3
4. TROUBLE RELAXING: SEVERAL DAYS
8. IF YOU CHECKED OFF ANY PROBLEMS, HOW DIFFICULT HAVE THESE MADE IT FOR YOU TO DO YOUR WORK, TAKE CARE OF THINGS AT HOME, OR GET ALONG WITH OTHER PEOPLE?: NOT DIFFICULT AT ALL
2. NOT BEING ABLE TO STOP OR CONTROL WORRYING: SEVERAL DAYS
IF YOU CHECKED OFF ANY PROBLEMS ON THIS QUESTIONNAIRE, HOW DIFFICULT HAVE THESE PROBLEMS MADE IT FOR YOU TO DO YOUR WORK, TAKE CARE OF THINGS AT HOME, OR GET ALONG WITH OTHER PEOPLE: NOT DIFFICULT AT ALL
6. BECOMING EASILY ANNOYED OR IRRITABLE: NOT AT ALL

## 2025-05-12 ASSESSMENT — ASTHMA QUESTIONNAIRES
QUESTION_4 LAST FOUR WEEKS HOW OFTEN HAVE YOU USED YOUR RESCUE INHALER OR NEBULIZER MEDICATION (SUCH AS ALBUTEROL): ONCE A WEEK OR LESS
QUESTION_3 LAST FOUR WEEKS HOW OFTEN DID YOUR ASTHMA SYMPTOMS (WHEEZING, COUGHING, SHORTNESS OF BREATH, CHEST TIGHTNESS OR PAIN) WAKE YOU UP AT NIGHT OR EARLIER THAN USUAL IN THE MORNING: NOT AT ALL
QUESTION_5 LAST FOUR WEEKS HOW WOULD YOU RATE YOUR ASTHMA CONTROL: WELL CONTROLLED
ACT_TOTALSCORE: 21
QUESTION_1 LAST FOUR WEEKS HOW MUCH OF THE TIME DID YOUR ASTHMA KEEP YOU FROM GETTING AS MUCH DONE AT WORK, SCHOOL OR AT HOME: A LITTLE OF THE TIME
QUESTION_2 LAST FOUR WEEKS HOW OFTEN HAVE YOU HAD SHORTNESS OF BREATH: ONCE OR TWICE A WEEK

## 2025-05-12 ASSESSMENT — PATIENT HEALTH QUESTIONNAIRE - PHQ9
SUM OF ALL RESPONSES TO PHQ QUESTIONS 1-9: 7
SUM OF ALL RESPONSES TO PHQ QUESTIONS 1-9: 7
10. IF YOU CHECKED OFF ANY PROBLEMS, HOW DIFFICULT HAVE THESE PROBLEMS MADE IT FOR YOU TO DO YOUR WORK, TAKE CARE OF THINGS AT HOME, OR GET ALONG WITH OTHER PEOPLE: NOT DIFFICULT AT ALL

## 2025-05-12 NOTE — PATIENT INSTRUCTIONS
How to Take Your Medication Before Surgery  Preoperative Medication Instructions   Antiplatelet or Anticoagulation Medication Instructions   - clopidrogel (Plavix), prasugrel (Effient), ticagrelor (Brilinta): No contraindication to stopping Plavix, DO NOT TAKE 5-7 days before surgery.     Additional Medication Instructions   - Herbal medications and vitamins: DO NOT TAKE 14 days prior to surgery.   - Beta Blockers (carvedilol) : Continue taking on the day of surgery.   - Calcium Channel Blockers (amlodipine): May be continued on the day of surgery.  - Lisinopril and lasix HOLD day of the procedure    - Statins (rosuvastatin) : Continue taking on the day of surgery.    - fiber, laxatives: DO NOT TAKE day of surgery   - Antiepileptics (keppra): Continue without modification.   - SSRIs, SNRIs, TCAs, Antipsychotics: Continue without modification. (Venlafaxine and quetiapine)   - rescue Inhaler: Continue PRN. Bring to hospital on the day of surgery.  -Pain medicines can be used the day before if needed       Patient Education   Preparing for Your Surgery  For Adults  Getting started  In most cases, a nurse will call to review your health history and instructions. They will give you an arrival time based on your scheduled surgery time. Please be ready to share:  Your doctor's clinic name and phone number  Your medical, surgical, and anesthesia history  A list of allergies and sensitivities  A list of medicines, including herbal treatments and over-the-counter drugs  Whether the patient has a legal guardian (ask how to send us the papers in advance)  Note: You may not receive a call if you were seen at our PAC (Preoperative Assessment Center).  Please tell us if you're pregnant--or if there's any chance you might be pregnant. Some surgeries may injure a fetus (unborn baby), so they require a pregnancy test. Surgeries that are safe for a fetus don't always need a test, and you can choose whether to have one.   Preparing for  surgery  Within 10 to 30 days of surgery: Have a pre-op exam (sometimes called an H&P, or History and Physical). This can be done at a clinic or pre-operative center.  If you're having a , you may not need this exam. Talk to your care team.  At your pre-op exam, talk to your care team about all medicines you take. (This includes CBD oil and any drugs, such as THC, marijuana, and other forms of cannabis.) If you need to stop any medicine before surgery, ask when to start taking it again.  This is for your safety. Many medicines and drugs can make you bleed too much during surgery. Some change how well surgery (anesthesia) drugs work.  Call your insurance company to let them know you're having surgery. (If you don't have insurance, call [phone].)  Call your clinic if there's any change in your health. This includes a scrape or scratch near the surgery site, or any signs of a cold (sore throat, runny nose, cough, rash, fever).  Eating and drinking guidelines  For your safety: Unless your surgeon tells you otherwise, follow the guidelines below.  Eat and drink as normal until 8 hours before you arrive for surgery. After that, no food or milk. You can spit out gum when you arrive.  Drink clear liquids until 2 hours before you arrive. These are liquids you can see through, like water, Gatorade, and Propel Water. They also include plain black coffee and tea (no cream or milk).  No alcohol for 24 hours before you arrive. The night before surgery, stop any drinks that contain THC.  If your care team tells you to take medicine on the morning of surgery, it's okay to take it with a sip of water. No other medicines or drugs are allowed (including CBD oil)--follow your care team's instructions.  If you have questions the day of surgery, call your hospital or surgery center.   Preventing infection  Shower or bathe the night before and the morning of surgery. Follow the instructions your clinic gave you. (If no  instructions, use regular soap.)  Don't shave or clip hair near your surgery site. We'll remove the hair if needed.  Don't smoke or vape the morning of surgery. No chewing tobacco for 6 hours before you arrive. A nicotine patch is okay. You may spit out nicotine gum when you arrive.  For some surgeries, the surgeon will tell you to fully quit smoking and nicotine.  We will make every effort to keep you safe from infection. We will:  Clean our hands often with soap and water (or an alcohol-based hand rub).  Clean the skin at your surgery site with a special soap that kills germs.  Give you a special gown to keep you warm. (Cold raises the risk of infection.)  Wear hair covers, masks, gowns, and gloves during surgery.  Give antibiotic medicine, if prescribed. Not all surgeries need this medicine.  What to bring on the day of surgery  Photo ID and insurance card  Copy of your health care directive, if you have one  Glasses and hearing aids (bring cases)  You can't wear contacts during surgery  Inhaler and eye drops, if you use them (tell us about these when you arrive)  CPAP machine or breathing device, if you use them  A few personal items, if spending the night  If you have . . .  A pacemaker, ICD (cardiac defibrillator), or other implant: Bring the ID card.  An implanted stimulator: Bring the remote control.  A legal guardian: Bring a copy of the certified (court-stamped) guardianship papers.  Please remove any jewelry, including body piercings. Leave jewelry and other valuables at home.  If you're going home the day of surgery  You must have a responsible adult drive you home. They should stay with you overnight as well.  If you don't have someone to stay with you, and you aren't safe to go home alone, we may keep you overnight. Insurance often won't pay for this.  After surgery  If it's hard to control your pain or you need more pain medicine, please call your surgeon's office.  Questions?   If you have any  questions for your care team, list them here:   ____________________________________________________________________________________________________________________________________________________________________________________________________________________________________________________________  For informational purposes only. Not to replace the advice of your health care provider. Copyright   2003, 2019 [facility]. All rights reserved. Clinically reviewed by Wilder Wren MD. [employer] [id number] - REV 08/24.

## 2025-05-12 NOTE — PROGRESS NOTES
Preoperative Evaluation  [facility]  [address]  [facility] [place] [address]  Phone: [phone]  Fax: [phone]  Primary Provider: Rosenda Pierre PA-C  Pre-op Performing Provider: OLIVIER MCKEON MD  May 12, 2025             5/12/2025   Surgical Information   What procedure is being done? Hardware Removal; Right arm   Facility or Hospital where procedure/surgery will be performed: [facility]   Who is doing the procedure / surgery?    Date of surgery / procedure: 05/29/2025   Time of surgery / procedure: TBD   Where do you plan to recover after surgery? at home with family     Fax number for surgical facility: Note does not need to be faxed, will be available electronically in Epic.    Removal , hardware, elbow - wondering if she needs to stop her Plavix again before this surgery? She did prior for another surgery.      Assessment & Plan     The proposed surgical procedure is considered INTERMEDIATE risk.    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, unspecified fracture morphology, subsequent encounter - s/p ORIF  Optimized for planned procedure as above for hardware removal status post fixation in 2023    Hypertension, unspecified type  Blood pressure nearly at goal, continue to monitor  - BASIC METABOLIC PANEL  Coronary artery disease due to lipid rich plaque  - Lipid panel reflex to direct LDL Non-fasting  Hypertension goal BP (blood pressure) < 140/90  ST elevation myocardial infarction (STEMI), unspecified artery (H)  Blood pressure nearly at goal, continue with current regimen of furosemide, amlodipine, Coreg, and lisinopril    TIA (transient ischemic attack)  On Plavix    S/P reverse total shoulder arthroplasty, right 5/5/23  Chronic migraine without aura without status migrainosus, not intractable  Closed nondisplaced intertrochanteric fracture of right  femur, initial encounter (H)  Closed bicondylar fracture of distal humerus, right, with routine healing, subsequent encounter  Norco and tramadol use very sparingly.  Also muscle relaxer use sparingly    Moderate major depression (H)  On quetiapine and venlafaxine    Diverticulosis of large intestine without hemorrhage  With constipation, fiber supplementation as needed    Seizure disorder (H)  Stable on Keppra           Risks and Recommendations  The patient has the following additional risks and recommendations for perioperative complications:   - No identified additional risk factors other than previously addressed     - Herbal medications and vitamins: DO NOT TAKE 14 days prior to surgery.   - Beta Blockers (carvedilol) : Continue taking on the day of surgery.   - Calcium Channel Blockers (amlodipine): May be continued on the day of surgery.  - Lisinopril and lasix HOLD day of the procedure    - Statins (rosuvastatin) : Continue taking on the day of surgery.    - fiber, laxatives: DO NOT TAKE day of surgery   - Antiepileptics (keppra): Continue without modification.   - SSRIs, SNRIs, TCAs, Antipsychotics: Continue without modification. (Venlafaxine and quetiapine)   - rescue Inhaler: Continue PRN. Bring to hospital on the day of surgery.  -Pain medicines can be used the day before if needed    Recommendation  Approval given to proceed with proposed procedure pending review of diagnostic evaluation.        Julio Cesar Fierro is a 75 year old, presenting for the following:  Pre-Op Exam          5/12/2025    10:50 AM   Additional Questions   Roomed by Kayla EDWARDS   Accompanied by Bryson         5/12/2025   Forms   Any forms needing to be completed Yes     HPI: In 2023 patient had ORIF of right distal humerus with hardware placement with plans for hardware removal, details as above.  Also notable history of TIA/CVA and STEMI in 2021.  She does have some history of dementia, she is present with a close friend today.  Blood  pressure close to goal today, being monitored by primary care provider, close to goal today, continue with current regimen.  As needed pain medication use for total shoulder arthroplasty and right femoral fracture.  History of seizure stable on Keppra        5/12/2025   Pre-Op Questionnaire   Have you ever had a heart attack or stroke? (!) YES [place] history and MI history    Have you ever had surgery on your heart or blood vessels, such as a stent placement, a coronary artery bypass, or surgery on an artery in your head, neck, heart, or legs? No   Do you have chest pain with activity? No   Do you have a history of heart failure? No   Do you currently have a cold, bronchitis or symptoms of other infection? No   Do you have a cough, shortness of breath, or wheezing? No   Do you or anyone in your family have previous history of blood clots? No   Do you or does anyone in your family have a serious bleeding problem such as prolonged bleeding following surgeries or cuts? No   Have you ever had problems with anemia or been told to take iron pills? No   Have you had any abnormal blood loss such as black, tarry or bloody stools, or abnormal vaginal bleeding? No   Have you ever had a blood transfusion? No   Are you willing to have a blood transfusion if it is medically needed before, during, or after your surgery? Yes   Have you or any of your relatives ever had problems with anesthesia? No   Do you have sleep apnea, excessive snoring or daytime drowsiness? No   Do you have any artifical heart valves or other implanted medical devices like a pacemaker, defibrillator, or continuous glucose monitor? No   Do you have artificial joints? (!) YES - R elbow. R shoulder, R hip    Are you allergic to latex? No     Advance Care Planning    Document on file is a Health Care Directive or POLST.    Preoperative Review of    reviewed - controlled substances reflected in medication list.          Patient Active Problem List     Diagnosis Date Noted    Exposed orthopaedic hardware 04/21/2025     Priority: Medium    Closed bicondylar fracture of distal humerus, right, with routine healing, subsequent encounter 08/24/2023     Priority: Medium    Closed bicondylar fracture of distal humerus, right, initial encounter 08/24/2023     Priority: Medium    Hypertension, unspecified type 08/14/2023     Priority: Medium    Dementia without behavioral disturbance (H) 07/20/2023     Priority: Medium    Generalized muscle weakness 07/20/2023     Priority: Medium    Recurrent falls 07/20/2023     Priority: Medium    Leg edema, right 07/20/2023     Priority: Medium    S/P total right hip arthroplasty 07/20/2023     Priority: Medium    Urinary retention 07/13/2023     Priority: Medium    Closed nondisplaced intertrochanteric fracture of right femur (H)-s/p ORIF with gamma todd 7/14/23 07/12/2023     Priority: Medium    Head injury, initial encounter 07/12/2023     Priority: Medium    Hip fracture, right, closed, initial encounter (H) 07/12/2023     Priority: Medium    Skin tear of right elbow without complication, initial encounter 07/12/2023     Priority: Medium    History of revision right elbow surgery 6/1/23 07/12/2023     Priority: Medium    History of postoperative delirium May 2023 07/12/2023     Priority: Medium    S/P reverse total shoulder arthroplasty, right 5/5/23 07/11/2023     Priority: Medium    Right elbow pain 06/12/2023     Priority: Medium    Stiffness of elbow joint, right 06/12/2023     Priority: Medium    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 06/01/2023     Priority: Medium    Post-operative state 05/03/2023     Priority: Medium    Fall, initial encounter 04/30/2023     Priority: Medium    Closed fracture of distal end of right humerus, unspecified fracture morphology, initial encounter 04/30/2023     Priority: Medium    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, unspecified fracture  morphology, subsequent encounter 04/30/2023     Priority: Medium    Closed displaced fracture of surgical neck of right humerus 03/27/2023     Priority: Medium    Acute pain of right shoulder 03/27/2023     Priority: Medium    Newly recognized heart murmur 02/05/2023     Priority: Medium    Major depressive disorder, recurrent, in partial remission 02/05/2023     Priority: Medium    Vitamin D deficiency 02/05/2023     Priority: Medium    Loss of memory 02/05/2023     Priority: Medium    History of cerebrovascular accident (CVA) with residual deficit 12/08/2021     Priority: Medium    ST elevation myocardial infarction (STEMI), unspecified artery (H) 08/26/2021     Priority: Medium    Coronary artery disease due to lipid rich plaque 08/26/2021     Priority: Medium    Compression fracture of T12 vertebra with routine healing, subsequent encounter 11/17/2020     Priority: Medium    Hematoma 01/10/2019     Priority: Medium    Hematoma of abdominal wall, initial encounter 01/09/2019     Priority: Medium    Abdominal wall hematoma 01/09/2019     Priority: Medium    Hyponatremia 11/26/2018     Priority: Medium    Age-related osteoporosis with current pathological fracture 11/12/2018     Priority: Medium    HSV (herpes simplex virus) infection 07/31/2018     Priority: Medium    Atypical mole 06/01/2017     Priority: Medium    Hypertension goal BP (blood pressure) < 140/90 05/25/2017     Priority: Medium    TIA (transient ischemic attack) 01/16/2017     Priority: Medium    Adjustment disorder with mixed anxiety and depressed mood 09/09/2014     Priority: Medium    Middle cerebral artery aneurysm 10/29/2013     Priority: Medium     Noted in 2007.  Intervention not recommended at that time.  Observation.  Saw Interventional Radiology 12/2013.  Recheck CTA in one year.      Moderate major depression (H) 09/08/2010     Priority: Medium    Insomnia 09/08/2010     Priority: Medium    Atrophy of vagina 05/30/2010     Priority:  Medium    Lump or mass in breast 03/29/2010     Priority: Medium    Mild persistent asthma 02/21/2005     Priority: Medium    Anemia, unspecified type 04/25/2002     Priority: Medium     Problem list name updated by automated process. Provider to review      Chronic migraine without aura without status migrainosus, not intractable      Priority: Medium     Multiple trials off of fiorinal have not been successful  Problem list name updated by automated process. Provider to review      Other abnormal Papanicolaou smear of cervix and cervical HPV(795.09)      Priority: Medium     (Problem list name updated by automated process. Provider to review and confirm.)      Endometriosis      Priority: Medium     Problem list name updated by automated process. Provider to review      Allergic rhinitis      Priority: Medium     Problem list name updated by automated process. Provider to review        Past Medical History:   Diagnosis Date    Abnormal Papanicolaou smear of cervix and cervical HPV     Allergic rhinitis, cause unspecified     seasonal allergies    Cerebral infarction (H)     Contact dermatitis and other eczema, due to unspecified cause     Endometriosis, site unspecified     Esophageal reflux     GERD    Migraine, unspecified, without mention of intractable migraine without mention of status migrainosus     Mild persistent asthma     Unspecified disorder of uterus     fibroid uterus     Past Surgical History:   Procedure Laterality Date    COLONOSCOPY  5/30/2014    Procedure: COLONOSCOPY;  Surgeon: Nathan Baker MD;  Location: [facility]    COLONOSCOPY N/A 7/23/2024    Procedure: Colonoscopy;  Surgeon: Carlton Ramirez MD;  Location: [facility]    IR LUMBAR PUNCTURE  6/26/2021    OPEN REDUCTION INTERNAL FIXATION ELBOW Right 6/1/2023    Procedure: REVISION OPEN REDUCTION INTERNAL FIXATION RIGHT OLECRANON;  Surgeon: Williams Phipps MD;  Location: [facility]    OPEN REDUCTION INTERNAL FIXATION HIP NAILING Right 7/14/2023     Procedure: INTERNAL FIXATION, FRACTURE, TROCHANTERIC,RIGHT HIP, USING GAMMA RODS;  Surgeon: Nathan Turner MD;  Location: [facility]    OPEN REDUCTION INTERNAL FIXATION HUMERUS DISTAL Right 5/3/2023    Procedure: OPEN REDUCTION INTERNAL FIXATION, FRACTURE, HUMERUS, DISTAL;  Surgeon: Williams Phipps MD;  [facility]    REMOVE HARDWARE ELBOW Right 2023    Procedure: HARDWARE REMOVAL RIGHT OLECRANON;  Surgeon: Williams Phipps MD;  [facility]    REVERSE ARTHROPLASTY SHOULDER Right 2023    Procedure: ARTHROPLASTY, SHOULDER, TOTAL, REVERSE for;  Surgeon: Cierra Krause MD;  Location: [facility]    ZZC  DELIVERY ONLY       X2    ZZHC COLONOSCOPY THRU STOMA, DIAGNOSTIC  2002    normal     Current Outpatient Medications   Medication Sig Dispense Refill    albuterol (PROAIR HFA/PROVENTIL HFA/VENTOLIN HFA) 108 (90 Base) MCG/ACT inhaler Inhale 2 puffs into the lungs every 6 hours as needed for shortness of breath, wheezing or cough 18 g 3    amLODIPine (NORVASC) 10 MG tablet Take 1 tablet (10 mg) by mouth daily 90 tablet 3    carvedilol (COREG) 12.5 MG tablet TAKE 1 TABLET BY MOUTH TWICE DAILY WITH FOOD 180 tablet 3    clopidogrel (PLAVIX) 75 MG tablet TAKE 1 TABLET(75 MG) BY MOUTH DAILY 90 tablet 3    doxycycline hyclate (VIBRAMYCIN) 100 MG capsule Take 1 capsule (100 mg) by mouth 2 times daily for 21 days. 42 capsule 0    furosemide (LASIX) 20 MG tablet TAKE 1 TABLET BY MOUTH EVERY MORNING 90 tablet 0    HYDROcodone-acetaminophen (NORCO) 5-325 MG tablet Take 1 tablet by mouth every 6 hours as needed for severe pain (Do not mix with Tramadol) 45 tablet 0    levETIRAcetam (KEPPRA) 500 MG tablet TAKE 1 TABLET BY MOUTH TWICE DAILY 180 tablet 1    lisinopril (ZESTRIL) 20 MG tablet Take 2 tablets (40 mg) by mouth daily 180 tablet 3    methylcellulose (CITRUCEL) 500 MG TABS tablet Take 2 tablets (1,000 mg) by mouth 2 times daily. 120 tablet 4    QUEtiapine (SEROQUEL) 25 MG tablet Take 1-2 tablets (25-50 mg)  "by mouth nightly as needed (sleep). 180 tablet 3    rosuvastatin (CRESTOR) 20 MG tablet Take 1 tablet (20 mg) by mouth daily 90 tablet 3    tiZANidine (ZANAFLEX) 2 MG tablet Take 0.5-1 tablets (1-2 mg) by mouth 3 times daily as needed for muscle spasms 90 tablet 3    traMADol (ULTRAM) 50 MG tablet Take 1 tablet (50 mg) by mouth every 4 hours as needed for moderate pain (Do not mix with Hydrocodone) 90 tablet 0    ubrogepant (UBRELVY) 50 MG tablet Take 1 tablet (50 mg) by mouth at onset of headache (May repeat ose after 2 hours if heasdache persists. max 2 pills a day) 16 tablet 11    venlafaxine (EFFEXOR XR) 150 MG 24 hr capsule Take 1 capsule (150 mg) by mouth daily 90 capsule 3       Allergies   Allergen Reactions    Morphine And Codeine      GI UPSET    Oxycodone     Seasonal Allergies         Social History     Tobacco Use    Smoking status: Never     Passive exposure: Never    Smokeless tobacco: Never   Substance Use Topics    Alcohol use: Yes     Comment: socially                 Review of Systems  Constitutional, HEENT, cardiovascular, pulmonary, gi and gu systems are negative, except as otherwise noted.    Objective    BP (!) 150/100   Pulse 80   Temp 97.1  F (36.2  C) (Temporal)   Resp 18   Ht 1.499 m (4' 11\")   Wt 54.4 kg (120 lb)   LMP 11/09/2005 (Approximate)   SpO2 96%   BMI 24.24 kg/m     Estimated body mass index is 24.24 kg/m  as calculated from the following:    Height as of this encounter: 1.499 m (4' 11\").    Weight as of this encounter: 54.4 kg (120 lb).  Physical Exam  Vitals and nursing note reviewed.   Constitutional:       General: She is not in acute distress.     Appearance: Normal appearance. She is not ill-appearing, toxic-appearing or diaphoretic.   HENT:      Head: Normocephalic and atraumatic.      Right Ear: Tympanic membrane, ear canal and external ear normal. There is no impacted cerumen.      Left Ear: Tympanic membrane, ear canal and external ear normal. There is no " impacted cerumen.      Nose: Nose normal. No congestion or rhinorrhea.      Mouth/Throat:      Mouth: Mucous membranes are moist.      Pharynx: Oropharynx is clear. No oropharyngeal exudate or posterior oropharyngeal erythema.   Eyes:      General: No scleral icterus.        Right eye: No discharge.         Left eye: No discharge.      Extraocular Movements: Extraocular movements intact.      Conjunctiva/sclera: Conjunctivae normal.      Pupils: Pupils are equal, round, and reactive to light.   Cardiovascular:      Rate and Rhythm: Normal rate and regular rhythm.      Heart sounds: No murmur heard.  Pulmonary:      Effort: Pulmonary effort is normal. No respiratory distress.      Breath sounds: Normal breath sounds. No stridor.   Abdominal:      General: There is no distension.      Palpations: Abdomen is soft.      Tenderness: There is no abdominal tenderness.      Hernia: No hernia is present.   Musculoskeletal:         General: Normal range of motion.      Cervical back: Normal range of motion.      Right lower leg: No edema.      Left lower leg: No edema.      Comments: Palpable distal right humerus hardware   Lymphadenopathy:      Cervical: No cervical adenopathy.   Skin:     General: Skin is warm.   Neurological:      Mental Status: She is alert.   Psychiatric:         Mood and Affect: Mood normal.         Behavior: Behavior normal.         Thought Content: Thought content normal.         Judgment: Judgment normal.           Recent Labs   Lab Test 05/30/24  1045      POTASSIUM 5.4*   CR 0.70        Diagnostics  Labs pending at this time.  Results will be reviewed when available.   EKG required today     Revised Cardiac Risk Index (RCRI)  The patient has the following serious cardiovascular risks for perioperative complications:   - Coronary Artery Disease (MI, positive stress test, angina, Qs on EKG) = 1 point   - Cerebrovascular Disease (TIA or CVA) = 1 point     RCRI Interpretation: 2 points: Class III  (moderate risk - 6.6% complication rate)     Estimated Functional Capacity: Performs 4 METS exercise without symptoms (e.g., light housework, stairs, 4 mph walk, 7 mph bike, slow step dance)           Signed Electronically by: OLIVIER MCKEON MD  A copy of this evaluation report is provided to the requesting physician.         .undefined[^^

## 2025-05-12 NOTE — TELEPHONE ENCOUNTER
Scheduled Patient under MA visit for EKG on 5/19 at 10:20 during the same time that Dr. Ashley is in the office. Patient's Friend who helped schedule the appointment stated that if it doesn't work then they will call the [facility] back and reschedule.  -Wendy Lauren

## 2025-05-12 NOTE — TELEPHONE ENCOUNTER
Patient was seen by  today for pre-op for surgery 05/29. He says that she needed an EKG done which was did not complete in office, please have her schedule for this on a day that Gabi is in office. Gabi said any day but Thursdays.    I tried but there was no answer and no VM box.    Kayla Sands, [facility]

## 2025-05-13 ENCOUNTER — RESULTS FOLLOW-UP (OUTPATIENT)
Dept: FAMILY MEDICINE | Facility: OTHER | Age: 75
End: 2025-05-13

## 2025-05-13 NOTE — RESULT ENCOUNTER NOTE
Team - please call patient with results.  They are negative/stable.,     Please also remind her she will need to update her EKG before her procedure, it appears that this is already scheduled on the 19th of May.    Thank you,    Amaury Ashley MD

## 2025-05-19 ENCOUNTER — ALLIED HEALTH/NURSE VISIT (OUTPATIENT)
Dept: FAMILY MEDICINE | Facility: OTHER | Age: 75
End: 2025-05-19
Payer: MEDICARE

## 2025-05-19 ENCOUNTER — DOCUMENTATION ONLY (OUTPATIENT)
Dept: FAMILY MEDICINE | Facility: OTHER | Age: 75
End: 2025-05-19

## 2025-05-19 DIAGNOSIS — Z01.818 PREOP GENERAL PHYSICAL EXAM: Primary | ICD-10-CM

## 2025-05-19 PROCEDURE — 99207 PR NO CHARGE LOS: CPT

## 2025-05-19 NOTE — PROGRESS NOTES
Routing comment by Dr. Ashley:    EKG reviewed, similar to previous. Known history of myocardial infarction        Thank you,     Ger Ashley MD     [address] [address]   Ph: [phone]  Fax:[phone]

## 2025-05-19 NOTE — PROGRESS NOTES
Sri is a 75 year old patient who comes in today for an EKG with a Medical Assistant because of pre-operative examination.       Ordering Provider: Ger Ashley MD    Reading Provider: Ger Ashley MD    Result: Sinus Rhythm WITHIN NORMAL LIMITS Per Dr. Ashley       Abnormal: Create a telephone encounter copy this note and route to requesting provider/PCP.  Normal: CC this chart to requesting provider/PCP.     Dora Isaacs CMA

## 2025-05-19 NOTE — PROGRESS NOTES
Sri is a 75 year old patient who comes in today for an EKG with a Medical Assistant because of pre-operative examination.         Ordering Provider: Ger Ashley MD     Reading Provider: Ger Ashley MD     Result: Sinus Rhythm WITHIN NORMAL LIMITS Per Dr. Gabi Isaacs CMA

## 2025-05-25 DIAGNOSIS — I10 HYPERTENSION GOAL BP (BLOOD PRESSURE) < 140/90: ICD-10-CM

## 2025-05-27 RX ORDER — FUROSEMIDE 20 MG/1
20 TABLET ORAL EVERY MORNING
Qty: 90 TABLET | Refills: 3 | Status: SHIPPED | OUTPATIENT
Start: 2025-05-27

## 2025-05-28 ENCOUNTER — ANCILLARY PROCEDURE (OUTPATIENT)
Dept: ULTRASOUND IMAGING | Facility: CLINIC | Age: 75
End: 2025-05-28
Attending: ANESTHESIOLOGY
Payer: MEDICARE

## 2025-05-28 ENCOUNTER — ANESTHESIA EVENT (OUTPATIENT)
Dept: SURGERY | Facility: AMBULATORY SURGERY CENTER | Age: 75
End: 2025-05-28
Payer: MEDICARE

## 2025-05-28 ASSESSMENT — ENCOUNTER SYMPTOMS: SEIZURES: 1

## 2025-05-28 NOTE — ANESTHESIA PREPROCEDURE EVALUATION
Anesthesia Pre-Procedure Evaluation    Patient: Sri Grewal   MRN: [id number] : 1950          Procedure : Procedure(s):  REMOVAL, HARDWARE, ELBOW         Past Medical History:   Diagnosis Date    Abnormal Papanicolaou smear of cervix and cervical HPV     Allergic rhinitis, cause unspecified     seasonal allergies    Cerebral infarction (H)     Contact dermatitis and other eczema, due to unspecified cause     Endometriosis, site unspecified     Esophageal reflux     GERD    Migraine, unspecified, without mention of intractable migraine without mention of status migrainosus     Mild persistent asthma     Unspecified disorder of uterus     fibroid uterus      Past Surgical History:   Procedure Laterality Date    COLONOSCOPY  2014    Procedure: COLONOSCOPY;  Surgeon: Nathan Baker MD;  Location: [facility]    COLONOSCOPY N/A 2024    Procedure: Colonoscopy;  Surgeon: Carlton Ramirez MD;  Location: [facility]    IR LUMBAR PUNCTURE  2021    OPEN REDUCTION INTERNAL FIXATION ELBOW Right 2023    Procedure: REVISION OPEN REDUCTION INTERNAL FIXATION RIGHT OLECRANON;  Surgeon: iWlliams Phipps MD;  Location: [facility]    OPEN REDUCTION INTERNAL FIXATION HIP NAILING Right 2023    Procedure: INTERNAL FIXATION, FRACTURE, TROCHANTERIC,RIGHT HIP, USING GAMMA RODS;  Surgeon: Nathan Turner MD;  Location: [facility]    OPEN REDUCTION INTERNAL FIXATION HUMERUS DISTAL Right 5/3/2023    Procedure: OPEN REDUCTION INTERNAL FIXATION, FRACTURE, HUMERUS, DISTAL;  Surgeon: iWlliams Phipps MD;  [facility]    REMOVE HARDWARE ELBOW Right 2023    Procedure: HARDWARE REMOVAL RIGHT OLECRANON;  Surgeon: Williams Phipps MD;  [facility]    REVERSE ARTHROPLASTY SHOULDER Right 2023    Procedure: ARTHROPLASTY, SHOULDER, TOTAL, REVERSE for;  Surgeon: Cierra Krause MD;  Location: [facility]    ZZC  DELIVERY ONLY       X2    ZZHC COLONOSCOPY THRU STOMA, DIAGNOSTIC  2002    normal      Allergies   Allergen Reactions     Morphine And Codeine      GI UPSET    Oxycodone     Seasonal Allergies       Social History     Tobacco Use    Smoking status: Never     Passive exposure: Never    Smokeless tobacco: Never   Substance Use Topics    Alcohol use: Yes     Comment: socially      Wt Readings from Last 1 Encounters:   05/12/25 54.4 kg (120 lb)        Anesthesia Evaluation            ROS/MED HX  ENT/Pulmonary:     (+)                      asthma                  Neurologic:     (+)      migraines, seizures (on Keppra),   CVA, date: 4/2023, without deficits,                    Cardiovascular:     (+)  hypertension- -  CAD - past MI (STEMI 2021) - -   Taking blood thinners (Plavix)                              Previous cardiac testing   Echo: Date: 7/26/27 Results:  Left ventricular systolic function is normal.  The visual ejection fraction is 55-60%.  Proximal septal thickening is noted (1.7 cm).  Possible mild chordal systolic anterior motion of the mitral valve is present  without significant LVOT obstruction gradient present (10 mmHg).  The right ventricle is normal in structure, function and size.  The inferior vena cava was normal in size with preserved respiratory  variability.  There is no pericardial effusion.  Aortic root and ascending aorta are normal size based on updated guidelines.     Compared to study 2/28/2023, concentric hypertrophy is better defined  (asymmetric, proximal) and there is slight chordal LUCINDA and a very minimal LVOT  gradient. Left ventricular function is no longer hyperdynamic. Consider  cardiac MRI to evaluate for hypertrophic cardiomyopathy.    Stress Test:  Date: Results:    ECG Reviewed:  Date: Results:    Cath:  Date: Results:      METS/Exercise Tolerance:     Hematologic:       Musculoskeletal:       GI/Hepatic:     (+) GERD,                   Renal/Genitourinary:  - neg Renal ROS     Endo:  - neg endo ROS     Psychiatric/Substance Use:     (+) psychiatric history depression       Infectious Disease:   "- neg infectious disease ROS     Malignancy:  - neg malignancy ROS     Other:              Physical Exam  Airway  Cardiovascular - normal exam   Dental   (+) Minor Abnormalities - some fillings, tiny chips      Pulmonary - normal exam      Neurological - normal exam  She appears awake, alert and oriented x3.    Other Findings       OUTSIDE LABS:  CBC:   Lab Results   Component Value Date    WBC 6.5 05/12/2025    WBC 5.9 05/10/2024    HGB 14.4 05/12/2025    HGB 14.1 05/10/2024    HCT 42.9 05/12/2025    HCT 40.9 05/10/2024     05/12/2025     05/10/2024     BMP:   Lab Results   Component Value Date     05/12/2025     05/30/2024    POTASSIUM 4.5 05/12/2025    POTASSIUM 5.4 (H) 05/30/2024    CHLORIDE 101 05/12/2025    CHLORIDE 98 05/30/2024    CO2 30 (H) 05/12/2025    CO2 28 05/30/2024    BUN 15.6 05/12/2025    BUN 8.7 05/30/2024    CR 0.85 05/12/2025    CR 0.70 05/30/2024     (H) 05/12/2025     (H) 05/30/2024     COAGS:   Lab Results   Component Value Date    PTT 30 01/15/2017    INR 1.10 07/12/2023     POC: No results found for: \"BGM\", \"HCG\", \"HCGS\"  HEPATIC:   Lab Results   Component Value Date    ALBUMIN 4.1 05/10/2024    PROTTOTAL 7.4 05/10/2024    ALT 16 05/10/2024    AST 26 05/10/2024    ALKPHOS 67 05/10/2024    BILITOTAL 0.5 05/10/2024    BILIDIRECT 0.2 04/26/2002     OTHER:   Lab Results   Component Value Date    PH 7.43 05/05/2023    LACT 2.0 05/07/2023    A1C 5.4 01/16/2017    CESIA 10.2 05/12/2025    MAG 2.0 05/06/2023    LIPASE 20 05/10/2024    TSH 0.35 02/02/2023    SED 11 10/14/2005       Anesthesia Plan    ASA Status:  3      NPO Status: NPO Appropriate   Anesthesia Type: General.  Airway: natural airway.  Maintenance: TIVA.   Techniques and Equipment:       - Monitoring Plan: standard ASA monitoring     Consents    Anesthesia Plan(s) and associated risks, benefits, and realistic alternatives discussed. Questions answered and patient/representative(s) expressed " understanding.     - Discussed:     - Discussed with:  Patient               Postoperative Care         Comments:                   Carlton Ivey MD    I have reviewed the pertinent notes and labs in the chart from the past 30 days and (re)examined the patient.  Any updates or changes from those notes are reflected in this note.    Clinically Significant Risk Factors Present on Admission                   # Hypertension: Noted on problem list     # Dementia: noted on problem list           # Asthma: noted on problem list

## 2025-05-29 ENCOUNTER — ANESTHESIA (OUTPATIENT)
Dept: SURGERY | Facility: AMBULATORY SURGERY CENTER | Age: 75
End: 2025-05-29
Payer: MEDICARE

## 2025-05-29 ENCOUNTER — HOSPITAL ENCOUNTER (OUTPATIENT)
Facility: AMBULATORY SURGERY CENTER | Age: 75
Discharge: HOME | End: 2025-05-29
Attending: STUDENT IN AN ORGANIZED HEALTH CARE EDUCATION/TRAINING PROGRAM | Admitting: STUDENT IN AN ORGANIZED HEALTH CARE EDUCATION/TRAINING PROGRAM
Payer: MEDICARE

## 2025-05-29 VITALS
OXYGEN SATURATION: 93 % | HEART RATE: 88 BPM | RESPIRATION RATE: 18 BRPM | DIASTOLIC BLOOD PRESSURE: 98 MMHG | WEIGHT: 120 LBS | SYSTOLIC BLOOD PRESSURE: 178 MMHG | TEMPERATURE: 98.6 F | BODY MASS INDEX: 23.56 KG/M2 | HEIGHT: 60 IN

## 2025-05-29 DIAGNOSIS — T84.498A EXPOSED ORTHOPAEDIC HARDWARE: Primary | ICD-10-CM

## 2025-05-29 DIAGNOSIS — S42.491D CLOSED BICONDYLAR FRACTURE OF DISTAL HUMERUS, RIGHT, WITH ROUTINE HEALING, SUBSEQUENT ENCOUNTER: ICD-10-CM

## 2025-05-29 PROCEDURE — 20680 REMOVAL OF IMPLANT DEEP: CPT | Mod: RT

## 2025-05-29 PROCEDURE — 20680 REMOVAL OF IMPLANT DEEP: CPT | Mod: RT | Performed by: STUDENT IN AN ORGANIZED HEALTH CARE EDUCATION/TRAINING PROGRAM

## 2025-05-29 RX ORDER — FENTANYL CITRATE 50 UG/ML
INJECTION, SOLUTION INTRAMUSCULAR; INTRAVENOUS PRN
Status: DISCONTINUED | OUTPATIENT
Start: 2025-05-29 | End: 2025-05-29

## 2025-05-29 RX ORDER — IPRATROPIUM BROMIDE AND ALBUTEROL SULFATE 2.5; .5 MG/3ML; MG/3ML
3 SOLUTION RESPIRATORY (INHALATION) ONCE
Status: COMPLETED | OUTPATIENT
Start: 2025-05-29 | End: 2025-05-29

## 2025-05-29 RX ORDER — ONDANSETRON 2 MG/ML
INJECTION INTRAMUSCULAR; INTRAVENOUS PRN
Status: DISCONTINUED | OUTPATIENT
Start: 2025-05-29 | End: 2025-05-29

## 2025-05-29 RX ORDER — NALOXONE HYDROCHLORIDE 0.4 MG/ML
0.2 INJECTION, SOLUTION INTRAMUSCULAR; INTRAVENOUS; SUBCUTANEOUS
Status: DISCONTINUED | OUTPATIENT
Start: 2025-05-29 | End: 2025-05-31 | Stop reason: HOSPADM

## 2025-05-29 RX ORDER — NALOXONE HYDROCHLORIDE 0.4 MG/ML
0.4 INJECTION, SOLUTION INTRAMUSCULAR; INTRAVENOUS; SUBCUTANEOUS
Status: DISCONTINUED | OUTPATIENT
Start: 2025-05-29 | End: 2025-05-31 | Stop reason: HOSPADM

## 2025-05-29 RX ORDER — DEXAMETHASONE SODIUM PHOSPHATE 10 MG/ML
4 INJECTION, SOLUTION INTRAMUSCULAR; INTRAVENOUS
Status: DISCONTINUED | OUTPATIENT
Start: 2025-05-29 | End: 2025-05-31 | Stop reason: HOSPADM

## 2025-05-29 RX ORDER — DEXAMETHASONE SODIUM PHOSPHATE 4 MG/ML
INJECTION, SOLUTION INTRA-ARTICULAR; INTRALESIONAL; INTRAMUSCULAR; INTRAVENOUS; SOFT TISSUE PRN
Status: DISCONTINUED | OUTPATIENT
Start: 2025-05-29 | End: 2025-05-29

## 2025-05-29 RX ORDER — CEFAZOLIN SODIUM 2 G/50ML
2 SOLUTION INTRAVENOUS SEE ADMIN INSTRUCTIONS
Status: DISCONTINUED | OUTPATIENT
Start: 2025-05-29 | End: 2025-05-31 | Stop reason: HOSPADM

## 2025-05-29 RX ORDER — TRAMADOL HYDROCHLORIDE 50 MG/1
50 TABLET ORAL EVERY 6 HOURS PRN
Qty: 8 TABLET | Refills: 0 | Status: SHIPPED | OUTPATIENT
Start: 2025-05-29 | End: 2025-06-01

## 2025-05-29 RX ORDER — ONDANSETRON 4 MG/1
4 TABLET, ORALLY DISINTEGRATING ORAL EVERY 30 MIN PRN
Status: DISCONTINUED | OUTPATIENT
Start: 2025-05-29 | End: 2025-05-31 | Stop reason: HOSPADM

## 2025-05-29 RX ORDER — NALOXONE HYDROCHLORIDE 0.4 MG/ML
0.1 INJECTION, SOLUTION INTRAMUSCULAR; INTRAVENOUS; SUBCUTANEOUS
Status: DISCONTINUED | OUTPATIENT
Start: 2025-05-29 | End: 2025-05-31 | Stop reason: HOSPADM

## 2025-05-29 RX ORDER — FLUMAZENIL 0.1 MG/ML
0.2 INJECTION, SOLUTION INTRAVENOUS
Status: DISCONTINUED | OUTPATIENT
Start: 2025-05-29 | End: 2025-05-31 | Stop reason: HOSPADM

## 2025-05-29 RX ORDER — MAGNESIUM HYDROXIDE 1200 MG/15ML
LIQUID ORAL PRN
Status: DISCONTINUED | OUTPATIENT
Start: 2025-05-29 | End: 2025-05-29 | Stop reason: HOSPADM

## 2025-05-29 RX ORDER — SODIUM CHLORIDE, SODIUM LACTATE, POTASSIUM CHLORIDE, CALCIUM CHLORIDE 600; 310; 30; 20 MG/100ML; MG/100ML; MG/100ML; MG/100ML
INJECTION, SOLUTION INTRAVENOUS CONTINUOUS
Status: DISCONTINUED | OUTPATIENT
Start: 2025-05-29 | End: 2025-05-31 | Stop reason: HOSPADM

## 2025-05-29 RX ORDER — PROPOFOL 10 MG/ML
INJECTION, EMULSION INTRAVENOUS PRN
Status: DISCONTINUED | OUTPATIENT
Start: 2025-05-29 | End: 2025-05-29

## 2025-05-29 RX ORDER — PROPOFOL 10 MG/ML
INJECTION, EMULSION INTRAVENOUS CONTINUOUS PRN
Status: DISCONTINUED | OUTPATIENT
Start: 2025-05-29 | End: 2025-05-29

## 2025-05-29 RX ORDER — BUPIVACAINE HYDROCHLORIDE 5 MG/ML
INJECTION, SOLUTION EPIDURAL; INTRACAUDAL; PERINEURAL
Status: COMPLETED | OUTPATIENT
Start: 2025-05-29 | End: 2025-05-29

## 2025-05-29 RX ORDER — SODIUM CHLORIDE, SODIUM LACTATE, POTASSIUM CHLORIDE, CALCIUM CHLORIDE 600; 310; 30; 20 MG/100ML; MG/100ML; MG/100ML; MG/100ML
INJECTION, SOLUTION INTRAVENOUS CONTINUOUS PRN
Status: DISCONTINUED | OUTPATIENT
Start: 2025-05-29 | End: 2025-05-29

## 2025-05-29 RX ORDER — HYDRALAZINE HYDROCHLORIDE 20 MG/ML
5 INJECTION INTRAMUSCULAR; INTRAVENOUS EVERY 30 MIN PRN
Status: DISCONTINUED | OUTPATIENT
Start: 2025-05-29 | End: 2025-05-31 | Stop reason: HOSPADM

## 2025-05-29 RX ORDER — FENTANYL CITRATE 50 UG/ML
25-50 INJECTION, SOLUTION INTRAMUSCULAR; INTRAVENOUS
Status: DISCONTINUED | OUTPATIENT
Start: 2025-05-29 | End: 2025-05-31 | Stop reason: HOSPADM

## 2025-05-29 RX ORDER — FENTANYL CITRATE 50 UG/ML
25 INJECTION, SOLUTION INTRAMUSCULAR; INTRAVENOUS
Status: DISCONTINUED | OUTPATIENT
Start: 2025-05-29 | End: 2025-05-31 | Stop reason: HOSPADM

## 2025-05-29 RX ORDER — ONDANSETRON 2 MG/ML
4 INJECTION INTRAMUSCULAR; INTRAVENOUS EVERY 30 MIN PRN
Status: DISCONTINUED | OUTPATIENT
Start: 2025-05-29 | End: 2025-05-31 | Stop reason: HOSPADM

## 2025-05-29 RX ORDER — CEFAZOLIN SODIUM 2 G/50ML
2 SOLUTION INTRAVENOUS
Status: COMPLETED | OUTPATIENT
Start: 2025-05-29 | End: 2025-05-29

## 2025-05-29 RX ORDER — ACETAMINOPHEN 325 MG/1
975 TABLET ORAL ONCE
Status: COMPLETED | OUTPATIENT
Start: 2025-05-29 | End: 2025-05-29

## 2025-05-29 RX ORDER — LIDOCAINE 40 MG/G
CREAM TOPICAL
Status: DISCONTINUED | OUTPATIENT
Start: 2025-05-29 | End: 2025-05-31 | Stop reason: HOSPADM

## 2025-05-29 RX ADMIN — Medication 100 MCG: at 14:57

## 2025-05-29 RX ADMIN — FENTANYL CITRATE 25 MCG: 50 INJECTION, SOLUTION INTRAMUSCULAR; INTRAVENOUS at 13:58

## 2025-05-29 RX ADMIN — IPRATROPIUM BROMIDE AND ALBUTEROL SULFATE 3 ML: 2.5; .5 SOLUTION RESPIRATORY (INHALATION) at 16:10

## 2025-05-29 RX ADMIN — SODIUM CHLORIDE, SODIUM LACTATE, POTASSIUM CHLORIDE, CALCIUM CHLORIDE: 600; 310; 30; 20 INJECTION, SOLUTION INTRAVENOUS at 14:13

## 2025-05-29 RX ADMIN — HYDRALAZINE HYDROCHLORIDE 5 MG: 20 INJECTION INTRAMUSCULAR; INTRAVENOUS at 16:28

## 2025-05-29 RX ADMIN — PROPOFOL 60 MG: 10 INJECTION, EMULSION INTRAVENOUS at 15:17

## 2025-05-29 RX ADMIN — BUPIVACAINE HYDROCHLORIDE 20 ML: 5 INJECTION, SOLUTION EPIDURAL; INTRACAUDAL; PERINEURAL at 14:08

## 2025-05-29 RX ADMIN — FENTANYL CITRATE 50 MCG: 50 INJECTION, SOLUTION INTRAMUSCULAR; INTRAVENOUS at 14:20

## 2025-05-29 RX ADMIN — PROPOFOL 40 MG: 10 INJECTION, EMULSION INTRAVENOUS at 14:44

## 2025-05-29 RX ADMIN — FENTANYL CITRATE 50 MCG: 50 INJECTION, SOLUTION INTRAMUSCULAR; INTRAVENOUS at 14:35

## 2025-05-29 RX ADMIN — CEFAZOLIN SODIUM 2 G: 2 SOLUTION INTRAVENOUS at 14:15

## 2025-05-29 RX ADMIN — DEXAMETHASONE SODIUM PHOSPHATE 4 MG: 4 INJECTION, SOLUTION INTRA-ARTICULAR; INTRALESIONAL; INTRAMUSCULAR; INTRAVENOUS; SOFT TISSUE at 14:20

## 2025-05-29 RX ADMIN — PROPOFOL 70 MG: 10 INJECTION, EMULSION INTRAVENOUS at 14:20

## 2025-05-29 RX ADMIN — Medication 0.5 MG: at 15:20

## 2025-05-29 RX ADMIN — ACETAMINOPHEN 975 MG: 325 TABLET ORAL at 13:07

## 2025-05-29 RX ADMIN — PROPOFOL 30 MG: 10 INJECTION, EMULSION INTRAVENOUS at 14:21

## 2025-05-29 RX ADMIN — PROPOFOL 150 MCG/KG/MIN: 10 INJECTION, EMULSION INTRAVENOUS at 14:22

## 2025-05-29 RX ADMIN — ONDANSETRON 4 MG: 2 INJECTION INTRAMUSCULAR; INTRAVENOUS at 15:27

## 2025-05-29 RX ADMIN — Medication 100 MCG: at 14:51

## 2025-05-29 NOTE — ANESTHESIA POSTPROCEDURE EVALUATION
Patient: Sri Grewal    Procedure: Procedure(s):  REMOVAL, HARDWARE, ELBOW       Anesthesia Type:  General    Note:  Disposition: Outpatient   Postop Pain Control: Uneventful            Sign Out: Well controlled pain   PONV: No   Neuro/Psych: Uneventful            Sign Out: Acceptable/Baseline neuro status   Airway/Respiratory:             Events: Bronchospasm            Sign Out: Acceptable/Baseline resp. status   CV/Hemodynamics: Uneventful            Sign Out: Acceptable CV status; No obvious hypovolemia; No obvious fluid overload   Other NRE: NONE   DID A NON-ROUTINE EVENT OCCUR? YES    Event details/Postop Comments:  Patient with wheezing, working hard to breathe and lower sats. Duoneb given.  Blood pressure was labile(up and down). Treated with hydralazine to keep SBP<200 and DBP <100         Last vitals:  Vitals Value Taken Time   /96 05/29/25 15:45   Temp 36.8  C (98.2  F) 05/29/25 15:45   Pulse 73 05/29/25 15:45   Resp 14 05/29/25 15:45   SpO2 95 % 05/29/25 15:45       Electronically Signed By: Caroline Strong MD  May 29, 2025  4:08 PM

## 2025-05-29 NOTE — OP NOTE
Patient: Sri Grewal  : 1950  MRN: [id number]    DATE OF OPERATION: May 29, 2025      OPERATIVE REPORT       PREOPERATIVE DIAGNOSIS:  Exposed hardware right elbow     POSTOPERATIVE DIAGNOSIS:  Exposed hardware right elbow     PROCEDURE:  1) Removal of buried lateral olecranon plate and screws  2) Sharp excisional debridement of skin, subcutaneous tissue of olecranon sinus    SURGEON:  Williams Phipps MD     ASSISTANT(S):  Arthur Hansen MD    ANESTHESIA:  General + regional     IMPLANTS:   None    ESTIMATED BLOOD LOSS:  5cc    DVT PROPHYLAXIS:  SCDs    TOURNIQUET TIME:  26 minutes     SPECIMENS REMOVED:  None    INTRAOPERATIVE FINDINGS:  1 cm wound posterior olecranon with exposed lateral plate. No drainage or evidence of infection. No involvement of the plate distally. The medial olecranon plate was not visualized and not in contact with the wound or exposed lateral plate. Therefore the medial plate was not removed.    COMPLICATIONS:  None    DISPOSITION:  Stable to PACU.     INDICATIONS:  This is a 75-year-old female whom I previously treated for a right distal humerus fracture with open reduction internal fixation 5/3/2023.  She had proximal escape of her olecranon osteotomy and underwent revision open reduction internal fixation of right olecranon 2023 with dual plating.  The patient return to clinic 1 month ago with a small wound over the posterior olecranon with exposed hardware.  There was no evidence of infection.  She is indicated for hardware removal and debridement.    The indications for surgery were discussed with the patient. The benefits, risks, and alternatives of operative management were discussed in detail with the patient. The patient understands that the risks of surgery include, but are not limited to: infection, bleeding, injury to nearby structures (such as nerves, blood vessels, and tendons), persistent wound problems, need for additional surgery, pain, stiffness, scarring, need for  rehabilitation, and anesthetic complications.  Patient expressed understanding and elected to proceed with surgery. All questions were answered to the patient's satisfaction.      Consent was obtained for the procedure.     DESCRIPTION OF PROCEDURE IN DETAIL:  The patient was seen in the preoperative care unit. Patient identity, consent, procedure to be performed, and operative site were verified with the patient. The right upper extremity was marked.  The regional anesthesia team performed a preoperative block.     The patient was brought to the operating room and placed supine on the operating room table. The right upper extremity was placed on an armboard. SCDs were placed on the bilateral lower extremities. A well-padded tourniquet was placed on the upper arm.     General anesthesia was induced.     The right upper extremity was prepped and draped in the usual sterile fashion. A timeout was performed confirming the correct patient, procedure, operative site, and antibiotics. All were in agreement.    The limb was exsanguinated tourniquet inflated to 250 mmHg.  The wound was first evaluated.  Although it probed down to the proximal edge of the plate, it did not appear to track distally.  There was no drainage or evidence of infection.  A longitudinal incision was made from just proximal to the wound and continued along the subcutaneous border of the ulna.  Sharp dissection was performed using a scalpel and electrocautery through postsurgical scar and down through the ECU and FCU interval to the ulna.  The plate was fully visualized and scar removed.  Again we noted no involvement or evidence of infection of the plate distally which was well encased in scar and did not appear to communicate with the small wound.  All screws were then removed intact and the plate elevated off bone and removed.  The medial plate was not encountered and was also buried deep within scar.  It did not communicate with the open wound.   Therefore decision was made to leave it in place to minimize soft tissue dissection and to function as prophylaxis against future fracture.  The skin edges and subcutaneous tissue around the wound was then sharply excised using a scalpel.  The tissue here as well as along the subcutaneous border of the ulna under the plate was debrided sharply using a rongeur and curette.  3 L of normal saline was then irrigated through the wound.  The ECU FCU interval and subcutaneous scar was then closed with interrupted 3-0 PDS.  The tourniquet was deflated and hemostasis assured.  The skin incision was then closed primarily with interrupted 4-0 nylon horizontal mattress sutures.  Adaptic and a sterile soft dressing were applied.    All needle and sponge counts were correct at the end of the procedure. There were no complications.     The patient was awoken from anesthesia and taken to the postoperative recovery unit in stable condition.     I was present and scrubbed for the entire procedure.     POSTOPERATIVE PLAN:  The patient will be discharged home.  She may weight-bear as tolerated without restrictions.  She will return in 1 week for a postoperative visit.  X-rays of the right elbow needed out of dressing.  Sutures to be removed if appropriate.      Williams Phipps MD     Hand, Upper Extremity & Microvascular Surgery  Department of Orthopedic Surgery  [facility]

## 2025-05-29 NOTE — OR NURSING
Patient received right side Brachial Plexus nerve block  without Exparel.  Fentanyl 25mcg given. Tolerated procedure well.

## 2025-05-29 NOTE — ANESTHESIA PROCEDURE NOTES
Brachial plexus Procedure Note    Pre-Procedure   Staff -        Anesthesiologist:  Carlton Ivey MD       Performed By: anesthesiologist       Location: pre-op       Procedure Start/Stop Times: 5/29/2025 2:08 PM and 5/29/2025 2:18 PM       Pre-Anesthestic Checklist: patient identified, IV checked, site marked, risks and benefits discussed, informed consent, monitors and equipment checked, pre-op evaluation, at physician/surgeon's request and post-op pain management  Timeout:       Correct Patient: Yes        Correct Procedure: Yes        Correct Site: Yes        Correct Position: Yes        Correct Laterality: Yes        Site Marked: Yes  Procedure Documentation  Procedure: Brachial plexus         Laterality: right       Patient Position: sitting       Skin prep: Chloraprep (supraclavicular approach).       Needle Type: insulated       Needle Length (millimeters): 100        Ultrasound guided       1. Ultrasound was used to identify targeted nerve, plexus, vascular marker, or fascial plane and place a needle adjacent to it in real-time.       2. Ultrasound was used to visualize the spread of anesthetic in close proximity to the above referenced structure.       3. A permanent image is entered into the patient's record.       4. The visualized anatomic structures appeared normal.       5. There were no apparent abnormal pathologic findings.    Assessment/Narrative         The placement was negative for: blood aspirated, painful injection and site bleeding       Paresthesias: No.       Bolus given via needle. no blood aspirated via catheter.        Secured via.        Insertion/Infusion Method: Single Shot       Complications: none    Medication(s) Administered   Bupivacaine 0.5% PF (Infiltration) - Infiltration   20 mL - 5/29/2025 2:08:00 PM  Medication Administration Time: 5/29/2025 2:08 PM      FOR [facility] ([facility]) ONLY:   Pain Team Contact information: please page the Pain Team Via [employer]. Search  "\"Pain\". During daytime hours, please page the attending first. At night please page the resident first.      "

## 2025-05-29 NOTE — ANESTHESIA CARE TRANSFER NOTE
Patient: Sri Grewal    Procedure: Procedure(s):  REMOVAL, HARDWARE, ELBOW       Diagnosis: Exposed orthopaedic hardware [T84.498A]  Diagnosis Additional Information: No value filed.    Anesthesia Type:   General     Note:    Oropharynx: oropharynx clear of all foreign objects  Level of Consciousness: awake and drowsy  Oxygen Supplementation: nasal cannula  Level of Supplemental Oxygen (L/min / FiO2): 2  Independent Airway: airway patency satisfactory and stable  Dentition: dentition unchanged  Vital Signs Stable: post-procedure vital signs reviewed and stable  Report to RN Given: handoff report given  Patient transferred to: PACU  Comments: Denies pain.   BP improved when compared with preop.   Handoff Report: Identifed the Patient, Identified the Reponsible Provider, Reviewed the pertinent medical history, Discussed the surgical course, Reviewed Intra-OP anesthesia mangement and issues during anesthesia, Set expectations for post-procedure period and Allowed opportunity for questions and acknowledgement of understanding      Vitals:  Vitals Value Taken Time   /91    Temp     Pulse 78    Resp 12    SpO2 94%        Electronically Signed By: JAYY Cooley CRNA  May 29, 2025  3:35 PM

## 2025-05-29 NOTE — DISCHARGE INSTRUCTIONS
Procedure Performed: Hardware removal right elbow  Attending Surgeon: Williams Phipps MD  Date: 2025    DIAGNOSIS  1. Exposed orthopaedic hardware    2. Closed bicondylar fracture of distal humerus, right, with routine healing, subsequent encounter        MEDICATIONS   Resume all home medications as directed unless otherwise instructed during this hospitalization. If there is any question, double check with your primary care provider.  Start new discharge medications as directed.    Take 1-2 tablets of extra strength Tylenol 500 mg every 6 hours.    For breakthrough pain use narcotic pain medication as prescribed.    Do not drive or operate machinery while taking narcotic pain medications.   If you are taking other Tylenol containing medicines at home, be sure NOT to exceed 4 gram's (4000 milligrams) of Tylenol per day.   If you are taking pain medications, be sure to take Colace (docusate sodium) as well to prevent constipation. If constipated, try adding another cathartic or enema.  If nausea and vomiting, call the hospital or seek medical attention.    ACTIVITY   Weight bearin lb coffee cup weight bearing to operative extremity    DIET  Resume same diet prior to your hospital admission.    WOUND   Leave dressing on until you are seen in clinic for your follow up visit.   Watch for signs and symptoms of infection of your wounds including; pain, redness, swelling, drainage or fever.  If you notice any of these symptoms please call or seek medical attention.    Keep wound clean, dry, and intact.  Do not submerge wounds in water until they are healed. No baths, soaking, swimming, or prolonged water exposure for 4 weeks after surgery.    RETURN   Follow-up with Orthopedic Clinic as directed.     Future Appointments   Date Time Provider Department Center   2025 10:30 AM Rosenda Pierre PA-C [facility] [place]   2025 10:30 AM Rosenda Pierre PA-C [facility] [place]    7/25/2025  1:15 PM Miryam Mcgowan [place] [facility]   2/6/2026 10:30 AM White, Li, APRN CNP [facility] [place]       Call the [facility] at [phone] during business hours for any symptoms such as:    * Fevers with Temperature greater than 101.5 degrees.   * Pus drainage from wound site.   * Severe pain, not controlled by medication.   * Persistent nausea, vomiting and inablility to tolerate fluids.    If you are receiving care in [place], you may call the Orthopedic clinic at [phone] Option #2.    FOR URGENT PROBLEMS ONLY, after hours or on weekends call the hospital  at [phone] and ask to speak with the orthopedic resident on call.      [facility]  Home Care Following Anesthesia  For 24 hours after surgery:  Get plenty of rest.  A responsible adult must stay with you for at least 24 hours after you leave the surgery center.  Do not drive or use heavy equipment.  If you have weakness or tingling, don't drive or use heavy equipment until this feeling goes away.   Do not drink alcohol.   Avoid strenuous or risky activities.  Ask for help when climbing stairs.  You may feel lightheaded.  IF so, sit for a few minutes before standing.  Have someone help you get up.   If you have nausea (feel sick to your stomach): Drink only clear liquids such as apple juice, ginger ale, broth or 7-Up.  Rest may also help.  Be sure to drink enough fluids.  Move to a regular diet as you feel able.   You may have a slight fever.  Call the doctor if your fever is over 100 F (37.7 C) (taken under the tongue) or lasts longer than 24 hours.  You may have a dry mouth, a sore throat, muscle aches or trouble sleeping. These should go away after 24 hours.  Do not make important or legal decisions.   It is recommended to avoid smoking.               Tips for taking pain medications  To get the best pain relief possible, remember these points:  Take  pain medications as directed, before pain becomes severe.  Pain medication can upset your stomach: taking it with food may help.  Constipation is a common side effect of pain medication. Drink plenty of  fluids.  Eat foods high in fiber. Take a stool softener if recommended by your doctor or pharmacist.  Do not drink alcohol, drive or operate machinery while taking pain medications.  Ask about other ways to control pain, such as with heat, ice or relaxation.    Tylenol/Acetaminophen Consumption    If you feel your pain relief is insufficient, you may take Tylenol/Acetaminophen in addition to your narcotic pain medication.   Be careful not to exceed 4,000 mg of Tylenol/Acetaminophen in a 24 hour period from all sources.  If you are taking extra strength Tylenol/acetaminophen (500 mg), the maximum dose is 8 tablets in 24 hours.  If you are taking regular strength acetaminophen (325 mg), the maximum dose is 12 tablets in 24 hours.    Call a doctor for any of the following:  Signs of infection (fever, growing tenderness at the surgery site, a large amount of drainage or bleeding, severe pain, foul-smelling drainage, redness, swelling).  It has been over 8 to 10 hours since surgery and you are still not able to urinate (pass water).  Headache for over 24 hours.  Numbness, tingling or weakness the day after surgery (if you had spinal anesthesia).  Signs of Covid-19 infection (temperature over 100 degrees, shortness of breath, cough, loss of taste/smell, generalized body aches, persistent headache, chills, sore throat, nausea/vomiting/diarrhea)    Your doctor is:       Dr. Williams Phipps, Orthopaedics: [phone]               After hours and weekends call the hospital @ [phone] and ask for the resident on call for:  Orthopaedics  For emergency care, call the:  [facility] [place] Emergency Department: [phone] (TTY for hearing impaired: [phone])        Post Operative Instructions: Regional Anesthetic for Upper Extremity  with Liposomal Bupivacaine  General Information:   Regional anesthesia is when local anesthetic or  numbing  medication is injected around the nerves to anesthetize or  numb  the area supplied by that set of nerves. It is a type of analgesia used to control pain and decreases the need for narcotics following surgery.    Types of Regional Blocks:  Interscalene: A block injected into the neck on the operative shoulder/arm of a patient having shoulder surgery  Supraclavicular: A block injected near the clavicle on the operative shoulder of a patient having elbow, forearm, or hand surgery    Procedure:  The type of anesthesia your doctor used to numb your shoulder or arm will usually not start to wear off for 24-48 hours, but may last as long as 72 hours. You should be careful during that period, since it is possible to injure your arm without being aware of the injury. While your arm is numb, you should:  Avoid striking or bumping your arm  Avoid extreme hot or cold    Diet:  There are no restrictions on your diet. You should drink plenty of fluids.     Discomfort:  You will have a tingling and prickly sensation in your arm as the feeling begins to return. You can also expect some discomfort. The amount of discomfort is unpredictable, but if you have more pain than can be controlled with pain medication you should notify your physician.     Pain Medications:  Begin taking your oral pain pills before bedtime and during the night to avoid a sudden onset of pain as part of the block wears off.  Do not engage in drinking, driving, or hazardous occupations while taking pain medication.     Stitches:   You may have stitches or special skin closures. You doctor will inform you when to return to the office to have them removed.     Activity:  On the day of surgery you should try to stay in bed with your hand elevated on pillows. You may resume your normal activity after that, wearing a sling for comfort. Contact your physician  "if you have any of the problems:   Continued numbness or tingling in the arm or hand after 72 hours  Swelling of the fingers or fingers that are cold to the touch  Excessive bleeding or drainage  Severe pain    Information about liposomal bupivacaine (Exparel)    What is Liposomal Bupivacaine?    Liposomal Bupivacaine is a numbing medication that can help you manage your pain after surgery.  This medication is similar to \"novacaine,\" which is often used by the dentist.  Liposomal bupivacaine is released slowly and can help control pain for up to 72 hours.    What is the purpose of Liposomal Bupivacaine?  To manage your pain after surgery  To help you sleep better, take deep breaths, walk more comfortable, and feel up to visiting with others    How is the procedure done?  Liposomal bupivacaine is a medication given by an injection.  It is usually given right before your surgery.  If this is the case, you will be awake or sedated, but you should experience minimal pain during the procedure.  For some people, the injection may be given at the very end of your surgery.  It all depends on the type of surgery and your situation.  The procedure usually takes about 5-15 minutes.  An ultrasound machine will help the anesthesiologist insert it in the right place or the surgeon will inject it under direct vision.   A needle is used to place the numbing medication under your skin.  It provides pain relief by numbing the tissue in the area where your surgeon will make the incision.    What can I expect?  You may experience numbness, tingling, or a feeling of heaviness around the area that was injected.  If you experience any of the follow symptoms IMMEDIATELY CALL THE REGIONAL ANESTHESIA PAIN SERVICE:  Numbness or tingling occurs in areas other than around the injection site  Blurry vision  Ringing in your ears  A metallic taste in your mouth    CALL the Regional Anesthesia Pain Service at:  [phone].  Press \"4\" for the " " and let the hospital  know that you are having a problem with a nerve block and that you would like to page the \"adult care inpatient pain management/[facility] team\".  From 7 am - 5 pm, page the \"staff\" physician  From 5 pm - 7 am, page the \"resident\" physician (if no response from \"resident\" physician, call the  back and the \"staff\" physician).    You should not receive any other type of numbing medication within 4 days after receiving liposomal bupivacaine unless your anesthesiologist approves.           "

## 2025-06-02 ENCOUNTER — TELEPHONE (OUTPATIENT)
Dept: ORTHOPEDICS | Facility: CLINIC | Age: 75
End: 2025-06-02
Payer: MEDICARE

## 2025-06-02 NOTE — TELEPHONE ENCOUNTER
Patient Returning Call    Reason for call:  regarding post surgery question    Information relayed to patient:  n/a    Patient has additional questions:  Yes    What are your questions/concerns:  Per Bryson, called in for patient- We are waiting for the care team to call us, I believe we are suppose to schedule a f/up for this week maybe for a stitch removal but need confirmation on that please.     Who does the patient want to speak with:  RN    Is an  needed?:  No      Okay to leave a detailed message?: Yes at Other phone number:  [phone] Bryson

## 2025-06-02 NOTE — TELEPHONE ENCOUNTER
I called and scheduled patient on 6/6/25 at 12:50 pm with Miryam MCCORD. Date, time and location confirmed with patient.

## 2025-06-04 DIAGNOSIS — Z98.890 POST-OPERATIVE STATE: Primary | ICD-10-CM

## 2025-06-06 ENCOUNTER — ANCILLARY PROCEDURE (OUTPATIENT)
Dept: GENERAL RADIOLOGY | Facility: CLINIC | Age: 75
End: 2025-06-06
Attending: PHYSICIAN ASSISTANT
Payer: MEDICARE

## 2025-06-06 ENCOUNTER — OFFICE VISIT (OUTPATIENT)
Dept: ORTHOPEDICS | Facility: CLINIC | Age: 75
End: 2025-06-06
Payer: MEDICARE

## 2025-06-06 DIAGNOSIS — Z98.890 POST-OPERATIVE STATE: ICD-10-CM

## 2025-06-06 DIAGNOSIS — T84.498A EXPOSED ORTHOPAEDIC HARDWARE: Primary | ICD-10-CM

## 2025-06-06 PROCEDURE — 1125F AMNT PAIN NOTED PAIN PRSNT: CPT | Performed by: PHYSICIAN ASSISTANT

## 2025-06-06 PROCEDURE — 73080 X-RAY EXAM OF ELBOW: CPT | Mod: RT | Performed by: RADIOLOGY

## 2025-06-06 PROCEDURE — 99024 POSTOP FOLLOW-UP VISIT: CPT | Performed by: PHYSICIAN ASSISTANT

## 2025-06-06 NOTE — NURSING NOTE
Reason For Visit:   Chief Complaint   Patient presents with    Surgical Followup     Post op Removal, Hardware, Elbow - Right. DOS: 5/29/25       Primary MD: Rosenda Pierre  Ref. MD: Sandra    Age: 75 year old    ?  No      LMP 11/09/2005 (Approximate)       Pain Assessment  Patient Currently in Pain: Yes  0-10 Pain Scale: 4  Primary Pain Location: Elbow (Elbow)  Pain Descriptors: Sharp, Intermittent    Hand Dominance Evaluation  Hand Dominance: Right          [facility] Assessment      4/21/2025    10:43 AM   [facility]   1. Open a tight or new jar Moderate difficulty   2. Do heavy household chores (e.g., wash walls, floors) Moderate difficulty   3. Carry a shopping bag or briefcase Mild difficulty   4. Wash your back Moderate difficulty          Current Outpatient Medications   Medication Sig Dispense Refill    albuterol (PROAIR HFA/PROVENTIL HFA/VENTOLIN HFA) 108 (90 Base) MCG/ACT inhaler Inhale 2 puffs into the lungs every 6 hours as needed for shortness of breath, wheezing or cough 18 g 3    amLODIPine (NORVASC) 10 MG tablet Take 1 tablet (10 mg) by mouth daily 90 tablet 3    carvedilol (COREG) 12.5 MG tablet TAKE 1 TABLET BY MOUTH TWICE DAILY WITH FOOD 180 tablet 3    clopidogrel (PLAVIX) 75 MG tablet TAKE 1 TABLET(75 MG) BY MOUTH DAILY 90 tablet 3    furosemide (LASIX) 20 MG tablet TAKE 1 TABLET BY MOUTH EVERY MORNING 90 tablet 3    HYDROcodone-acetaminophen (NORCO) 5-325 MG tablet Take 1 tablet by mouth every 6 hours as needed for severe pain (Do not mix with Tramadol) 45 tablet 0    levETIRAcetam (KEPPRA) 500 MG tablet TAKE 1 TABLET BY MOUTH TWICE DAILY 180 tablet 1    lisinopril (ZESTRIL) 20 MG tablet Take 2 tablets (40 mg) by mouth daily 180 tablet 3    methylcellulose (CITRUCEL) 500 MG TABS tablet Take 2 tablets (1,000 mg) by mouth 2 times daily. 120 tablet 4    QUEtiapine (SEROQUEL) 25 MG tablet Take 1-2 tablets (25-50 mg) by mouth nightly as needed (sleep). 180 tablet 3     rosuvastatin (CRESTOR) 20 MG tablet Take 1 tablet (20 mg) by mouth daily 90 tablet 3    tiZANidine (ZANAFLEX) 2 MG tablet Take 0.5-1 tablets (1-2 mg) by mouth 3 times daily as needed for muscle spasms 90 tablet 3    traMADol (ULTRAM) 50 MG tablet Take 1 tablet (50 mg) by mouth every 4 hours as needed for moderate pain (Do not mix with Hydrocodone) 90 tablet 0    ubrogepant (UBRELVY) 50 MG tablet Take 1 tablet (50 mg) by mouth at onset of headache (May repeat ose after 2 hours if heasdache persists. max 2 pills a day) 16 tablet 11    venlafaxine (EFFEXOR XR) 150 MG 24 hr capsule Take 1 capsule (150 mg) by mouth daily 90 capsule 3       Allergies   Allergen Reactions    Morphine And Codeine      GI UPSET    Oxycodone     Seasonal Allergies        Rachel Boogie, ATC

## 2025-06-09 NOTE — PROGRESS NOTES
Date of Service: Jun 6, 2025    Chief Complaint: Post operative follow up.     Date of Surgery: 6/2/2025    Procedure Performed:   1) Removal of buried lateral olecranon plate and screws  2) Sharp excisional debridement of skin, subcutaneous tissue of olecranon sinus     Surgeon: Dr. Williams Phipps    Interval events: Sri Grewal is a 75 year old female who presents today for a postoperative follow up.  She is doing well.  Pain well-controlled.  Denies any numbness or tingling.     The past medical history was reviewed updated in the EMR. This includes medications, surgeries, social history, and review of systems.    Physical examination:  Well-developed, well-nourished and in no acute distress.  Alert and oriented to surroundings.  On examination of the  right upper extremity, incision is well-healed with sutures in place.  No wound or active drainage.  No erythema.  Swelling is mild. Patient can actively flex and extend all digits and thumb. Fingers are warm and well-perfused. Radial pulse is palpable.  Range of motion from .    Radiographs: Three views of the left elbow were obtained and reviewed. These demonstrate interval postsurgical changes of olecranon plate removal.  Alignment of internal fixation of the distal humerus.    Assessment: 75 year old female s/p right elbow olecranon plate removal and excision of subcutaneous sinus over the olecranon,progressing appropriately.     Plan: Is healing well.  Sutures removed.  She can shower and get the incision wet.  No spreading standing water until wounds are fully healed.  She can continue to work on range of motion as tolerated.  Encouraged her to avoid any pressure onto the surgical incision as it continues to heal.  Patient can progress activities as tolerated.  Weight-bear as tolerated.  Offered return visit at 6 weeks postop for recheck.  However if patient is doing well, wound is healed well and she has no further concerns this visit can be deferred  follow-up as needed.  All questions were answered and the patient was in agreement with plan.    CORY WILKS PA-C  Orthopaedic Surgery

## 2025-06-10 DIAGNOSIS — Z98.890 POST-OPERATIVE STATE: ICD-10-CM

## 2025-06-10 RX ORDER — VENLAFAXINE HYDROCHLORIDE 150 MG/1
150 CAPSULE, EXTENDED RELEASE ORAL DAILY
Qty: 90 CAPSULE | Refills: 3 | Status: SHIPPED | OUTPATIENT
Start: 2025-06-10

## 2025-06-18 DIAGNOSIS — I25.10 CORONARY ARTERY DISEASE DUE TO LIPID RICH PLAQUE: ICD-10-CM

## 2025-06-18 DIAGNOSIS — I25.83 CORONARY ARTERY DISEASE DUE TO LIPID RICH PLAQUE: ICD-10-CM

## 2025-06-18 RX ORDER — ROSUVASTATIN CALCIUM 20 MG/1
20 TABLET, COATED ORAL DAILY
Qty: 90 TABLET | Refills: 2 | Status: SHIPPED | OUTPATIENT
Start: 2025-06-18

## 2025-07-31 ENCOUNTER — TELEPHONE (OUTPATIENT)
Dept: FAMILY MEDICINE | Facility: OTHER | Age: 75
End: 2025-07-31
Payer: MEDICARE

## 2025-07-31 NOTE — TELEPHONE ENCOUNTER
Patient Quality Outreach    Patient is due for the following:   Physical Preventive Adult Physical    Action(s) Taken:   Patient has upcoming appointment, these items will be addressed at that time.    Type of outreach:    Chart review performed, no outreach needed.    Questions for provider review:    None         Heather Thao, [facility]  Chart routed to None.

## 2025-08-05 ENCOUNTER — OFFICE VISIT (OUTPATIENT)
Dept: FAMILY MEDICINE | Facility: OTHER | Age: 75
End: 2025-08-05
Payer: MEDICARE

## 2025-08-05 VITALS
OXYGEN SATURATION: 96 % | DIASTOLIC BLOOD PRESSURE: 78 MMHG | RESPIRATION RATE: 18 BRPM | HEART RATE: 68 BPM | SYSTOLIC BLOOD PRESSURE: 104 MMHG | TEMPERATURE: 97.6 F | WEIGHT: 122 LBS | BODY MASS INDEX: 23.95 KG/M2 | HEIGHT: 60 IN

## 2025-08-05 DIAGNOSIS — I25.83 CORONARY ARTERY DISEASE DUE TO LIPID RICH PLAQUE: ICD-10-CM

## 2025-08-05 DIAGNOSIS — Z00.00 ENCOUNTER FOR MEDICARE ANNUAL WELLNESS EXAM: Primary | ICD-10-CM

## 2025-08-05 DIAGNOSIS — I25.10 CORONARY ARTERY DISEASE DUE TO LIPID RICH PLAQUE: ICD-10-CM

## 2025-08-05 DIAGNOSIS — F32.1 MODERATE MAJOR DEPRESSION (H): ICD-10-CM

## 2025-08-05 DIAGNOSIS — J45.30 MILD PERSISTENT ASTHMA WITHOUT COMPLICATION: ICD-10-CM

## 2025-08-05 DIAGNOSIS — F43.23 ADJUSTMENT DISORDER WITH MIXED ANXIETY AND DEPRESSED MOOD: ICD-10-CM

## 2025-08-05 DIAGNOSIS — F03.90 DEMENTIA WITHOUT BEHAVIORAL DISTURBANCE (H): ICD-10-CM

## 2025-08-05 DIAGNOSIS — I10 HYPERTENSION GOAL BP (BLOOD PRESSURE) < 140/90: ICD-10-CM

## 2025-08-05 DIAGNOSIS — G45.9 TIA (TRANSIENT ISCHEMIC ATTACK): ICD-10-CM

## 2025-08-05 PROCEDURE — G0439 PPPS, SUBSEQ VISIT: HCPCS | Mod: 24 | Performed by: PHYSICIAN ASSISTANT

## 2025-08-05 PROCEDURE — 3074F SYST BP LT 130 MM HG: CPT | Performed by: PHYSICIAN ASSISTANT

## 2025-08-05 PROCEDURE — 1126F AMNT PAIN NOTED NONE PRSNT: CPT | Performed by: PHYSICIAN ASSISTANT

## 2025-08-05 PROCEDURE — G2211 COMPLEX E/M VISIT ADD ON: HCPCS | Performed by: PHYSICIAN ASSISTANT

## 2025-08-05 PROCEDURE — 3078F DIAST BP <80 MM HG: CPT | Performed by: PHYSICIAN ASSISTANT

## 2025-08-05 PROCEDURE — 99214 OFFICE O/P EST MOD 30 MIN: CPT | Mod: 25 | Performed by: PHYSICIAN ASSISTANT

## 2025-08-05 RX ORDER — CLOPIDOGREL BISULFATE 75 MG/1
75 TABLET ORAL DAILY
Qty: 90 TABLET | Refills: 3 | Status: SHIPPED | OUTPATIENT
Start: 2025-08-05

## 2025-08-05 RX ORDER — ALBUTEROL SULFATE 90 UG/1
2 INHALANT RESPIRATORY (INHALATION) EVERY 6 HOURS PRN
Qty: 18 G | Refills: 3 | Status: SHIPPED | OUTPATIENT
Start: 2025-08-05

## 2025-08-05 SDOH — HEALTH STABILITY: PHYSICAL HEALTH: ON AVERAGE, HOW MANY DAYS PER WEEK DO YOU ENGAGE IN MODERATE TO STRENUOUS EXERCISE (LIKE A BRISK WALK)?: 2 DAYS

## 2025-08-05 ASSESSMENT — PAIN SCALES - GENERAL: PAINLEVEL_OUTOF10: NO PAIN (0)

## 2025-08-05 ASSESSMENT — SOCIAL DETERMINANTS OF HEALTH (SDOH): HOW OFTEN DO YOU GET TOGETHER WITH FRIENDS OR RELATIVES?: MORE THAN THREE TIMES A WEEK

## 2025-08-25 ENCOUNTER — ANCILLARY PROCEDURE (OUTPATIENT)
Dept: NEUROLOGY | Facility: CLINIC | Age: 75
End: 2025-08-25
Payer: OTHER GOVERNMENT

## 2025-08-25 DIAGNOSIS — R56.9 SEIZURES (H): ICD-10-CM

## 2025-08-25 PROCEDURE — 95700 EEG CONT REC W/VID EEG TECH: CPT | Performed by: STUDENT IN AN ORGANIZED HEALTH CARE EDUCATION/TRAINING PROGRAM

## 2025-08-27 ENCOUNTER — RESULTS FOLLOW-UP (OUTPATIENT)
Dept: NEUROLOGY | Facility: CLINIC | Age: 75
End: 2025-08-27
Payer: OTHER GOVERNMENT

## 2025-08-27 DIAGNOSIS — G40.909 SEIZURE DISORDER (H): ICD-10-CM

## 2025-08-27 RX ORDER — LEVETIRACETAM 500 MG/1
500 TABLET ORAL DAILY
Status: SHIPPED
Start: 2025-08-27 | End: 2025-09-03

## (undated) DEVICE — SU MONOCRYL 4-0 PS-2 27" UND Y426H

## (undated) DEVICE — CAST PLASTER SPLINT 5X30" 7395

## (undated) DEVICE — BLADE KNIFE SURG 10 371110

## (undated) DEVICE — TOURNIQUET CUFF 24" YELLOW LF 5921-024-135

## (undated) DEVICE — SOL NACL 0.9% INJ 1000ML BAG 2B1324X

## (undated) DEVICE — GOWN IMPERVIOUS SPECIALTY XL/XLONG 39049

## (undated) DEVICE — SU ETHILON 4-0 PS-2 18" BLACK 1667H

## (undated) DEVICE — SUCTION MANIFOLD NEPTUNE 2 SYS 4 PORT 0702-020-000

## (undated) DEVICE — SU VICRYL 3-0 SH 27" J316H

## (undated) DEVICE — Device

## (undated) DEVICE — SPONGE LAP 18X18" 23250-400A

## (undated) DEVICE — SOL WATER IRRIG 1000ML BOTTLE 2F7114

## (undated) DEVICE — STRAP KNEE/BODY 31143004

## (undated) DEVICE — DRAPE C-ARMOR 5 SIDED 5523

## (undated) DEVICE — SUCTION MANIFOLD NEPTUNE 2 SYS 1 PORT 702-025-000

## (undated) DEVICE — SOL NACL 0.9% IRRIG 3000ML BAG 2B7477

## (undated) DEVICE — GLOVE BIOGEL PI MICRO INDICATOR UNDERGLOVE SZ 6.5 48965

## (undated) DEVICE — DRAPE SHEET MED 44X70" 9355

## (undated) DEVICE — DRSG AQUACEL AG HYDROFIBER 3.5X6" 422604

## (undated) DEVICE — PREP CHLORAPREP 26ML TINTED HI-LITE ORANGE 930815

## (undated) DEVICE — SUCTION IRR SYSTEM W/O TIP INTERPULSE HANDPIECE 0210-100-000

## (undated) DEVICE — SU MONOCRYL 3-0 PS-1 27" Y936H

## (undated) DEVICE — SU VICRYL 0 CT-1 27" UND J260H

## (undated) DEVICE — DRAPE SLEEVE 599

## (undated) DEVICE — DRSG ABDOMINAL 07 1/2X8" 7197D

## (undated) DEVICE — GLOVE BIOGEL PI SZ 7.5 40875

## (undated) DEVICE — TUBING IRRIG CYSTO/BLADDER SET 81" LF 2C4040

## (undated) DEVICE — DRAPE C-ARM W/STRAPS 42X72" 07-CA104

## (undated) DEVICE — SU VICRYL 3-0 PS-2 27" UND J427H

## (undated) DEVICE — SU ETHIBOND 1 CTX CR 8/18" CX30D

## (undated) DEVICE — GLOVE BIOGEL PI MICRO SZ 7.0 48570

## (undated) DEVICE — CAST PADDING 4" COTTON WEBRIL STERILE 9084S

## (undated) DEVICE — DRAPE U-POUCH 34X29" 1067

## (undated) DEVICE — SU FIBERLINK 0 BLUE W/CLOSED LOOP ON ONE END AR-7258

## (undated) DEVICE — PACK HAND CUSTOM ASC SOP15HPU15

## (undated) DEVICE — SU VICRYL 0 CT-1 36" J346H

## (undated) DEVICE — LINEN TOWEL PACK X30 5481

## (undated) DEVICE — PACK SET-UP STD 9102

## (undated) DEVICE — SU VICRYL+ 3-0 27IN SH UND VCP416H

## (undated) DEVICE — DRAPE IOBAN ISOLATION VERTICAL 320X21CM 6617

## (undated) DEVICE — TOURNIQUET SGL BLADDER 18"X4" RED 5921-218-135

## (undated) DEVICE — VESSEL LOOPS RED MINI 31145710

## (undated) DEVICE — SU VICRYL 0 CT-1 36" J946H

## (undated) DEVICE — SPONGE LAP 18X18" X8435

## (undated) DEVICE — ESU CLEANER TIP 31142717

## (undated) DEVICE — SUCTION MINISQUAIR SMOKE EVAC CAPTURE DEVICE SQ20012-01

## (undated) DEVICE — IMM KIT SHOULDER TMAX MASK FACE 7210559

## (undated) DEVICE — KIT PATIENT CARE HANA TABLE PROFX SUPINE 6855

## (undated) DEVICE — ESU CORD BIPOLAR GREEN 10-4000

## (undated) DEVICE — PACK GOWN 3/PK DISP XL SBA32GPFCB

## (undated) DEVICE — GLOVE BIOGEL PI INDICATOR 8.0 LF 41680

## (undated) DEVICE — DRAPE STERI TOWEL LG 1010

## (undated) DEVICE — SOL NACL 0.9% IRRIG 500ML BOTTLE 2F7123

## (undated) DEVICE — LINEN TOWEL PACK X6 WHITE 5487

## (undated) DEVICE — BLADE SAW OSCILLATING STRYK MED 9.0X25X0.38MM 2296-003-111

## (undated) DEVICE — BLADE KNIFE SURG 20 371120

## (undated) DEVICE — BIT DRILL BIOMET CENTRAL SCR 3.2MM SS 405883

## (undated) DEVICE — BRUSH SURGICAL SCRUB W/4% CHLORHEXIDINE GLUCONATE SOL 4458A

## (undated) DEVICE — DRAPE C-ARM MINI 54X85" 07-CA600

## (undated) DEVICE — BONE CLEANING TIP INTERPULSE  0210-010-000

## (undated) DEVICE — DRILL BIT STRK GAMMA 4.2X300MM  1320-3042S

## (undated) DEVICE — BIT DRILL BIOMET PERIPHERAL SCR 2.7MM SS 405889

## (undated) DEVICE — SOL WATER IRRIG 500ML BOTTLE 2F7113

## (undated) DEVICE — SU VICRYL 2-0 CT-1 27" UND J259H

## (undated) DEVICE — ESU PENCIL W/SMOKE EVAC NEPTUNE STRYKER 0703-046-000

## (undated) DEVICE — SU PDS II 3-0 PS-2 18" Z497G

## (undated) DEVICE — GLOVE BIOGEL PI MICRO INDICATOR UNDERGLOVE SZ 7.5 48975

## (undated) DEVICE — SU ETHIBOND 5 V-37 4X30" MB66G

## (undated) DEVICE — SLING ARM MED 79-99155

## (undated) DEVICE — DRAPE U-DRAPE 1015NSD NON-STERILE

## (undated) DEVICE — IMM KIT SHOULDER STABILIZATION 7210573

## (undated) DEVICE — DRSG AQUACEL AG HYDROFIBER  3.5X10" 422605

## (undated) DEVICE — SYR BULB IRRIG DOVER 60 ML LATEX FREE 67000

## (undated) DEVICE — SUCTION TIP YANKAUER STR K87

## (undated) DEVICE — DRSG ADAPTIC 3X8" 6113

## (undated) DEVICE — LINEN ORTHO PACK 5446

## (undated) DEVICE — SOL NACL 0.9% IRRIG 1000ML BOTTLE 2F7124

## (undated) DEVICE — KIT ENDO TURNOVER/PROCEDURE CARRY-ON 101822

## (undated) DEVICE — LINEN DRAPE 54X72" 5467

## (undated) DEVICE — SU MONOCRYL 3-0 PS-2 18" UND Y497G

## (undated) DEVICE — ESU PENCIL SMOKE EVAC W/ROCKER SWITCH 0703-047-000

## (undated) DEVICE — COVER BIG CASE BACK TABLE 6'X2' 420-HD-S

## (undated) DEVICE — DRAPE SHEET REV FOLD 3/4 9349

## (undated) DEVICE — ESU ELEC NDL 1" E1552

## (undated) DEVICE — ESU HOLSTER PLASTIC DISP E2400

## (undated) DEVICE — CAST PADDING 4" STERILE 9044S

## (undated) DEVICE — GLOVE BIOGEL INDICATOR 7.5 LF 41675

## (undated) DEVICE — DRAPE STERI U 1015

## (undated) DEVICE — IMP SCR SYN 2.7X34MM T8 SD LOCKING SELF-TAP VA 02.211.034: Type: IMPLANTABLE DEVICE | Site: ARM | Status: NON-FUNCTIONAL

## (undated) DEVICE — DRAPE POUCH INSTRUMENT 1018

## (undated) DEVICE — DRAPE STERI TOWEL SM 1000

## (undated) DEVICE — SPONGE LAP 18X18" 1515

## (undated) DEVICE — RESTRAINT LIMB HOLDER ANKLE/WRIST FOAM W/QUICK RELEASE 2533

## (undated) DEVICE — TUBING SUCTION MEDI-VAC 1/4"X20' N620A

## (undated) DEVICE — COVER CAMERA IN-LIGHT DISP LT-C02

## (undated) DEVICE — ESU ELEC NDL 1" COATED/INSULATED E1465

## (undated) DEVICE — IMPLANTABLE DEVICE: Type: IMPLANTABLE DEVICE | Site: SHOULDER | Status: NON-FUNCTIONAL

## (undated) DEVICE — SUTURE NONABSORBABLE FIBERTAPE CERCLAGE LOADER AR-7268

## (undated) DEVICE — TOURNIQUET CUFF 18" REPRO RED 60-7070-103

## (undated) DEVICE — SOL ISOPROPYL RUBBING ALCOHOL USP 70% 4OZ HDX-20 I0020

## (undated) DEVICE — ESU GROUND PAD ADULT W/CORD E7507

## (undated) DEVICE — DRSG GAUZE 4X8" NON21842

## (undated) DEVICE — DRSG GAUZE 4X4" TRAY 6939

## (undated) DEVICE — SYR 10ML FINGER CONTROL W/O NDL 309695

## (undated) DEVICE — POSITIONER ARMBOARD FOAM 1PAIR LF FP-ARMB1

## (undated) DEVICE — CAST PADDING 3" STERILE 9043S

## (undated) DEVICE — DRSG AQUACEL AG ADV 3.5X4" 422603

## (undated) DEVICE — VESSEL LOOPS RED MINI 24000-01R

## (undated) DEVICE — MINI C-ARM KITE W/PLATE PROTECTOR & FOOT COVER 5999777

## (undated) DEVICE — BNDG ESMARK 4" STERILE LF 820-3412

## (undated) DEVICE — DRSG STERI STRIP 1X5" R1548

## (undated) DEVICE — PACK LOWER EXTREMITY RIVERSIDE SOP32LEFSX

## (undated) DEVICE — CAST PADDING 4" COTTON WEBRIL UNSTERILE 9084

## (undated) DEVICE — SOL HYDROGEN PEROXIDE 3% 4OZ BOTTLE F0010

## (undated) DEVICE — SU MONOCRYL 2-0 SH 27" UND Y417H

## (undated) DEVICE — SU DERMABOND PRINEO 42CM CLR422US

## (undated) DEVICE — GLOVE BIOGEL PI MICRO SZ 6.5 48565

## (undated) DEVICE — BNDG ELASTIC 4" DBL LENGTH UNSTERILE 6611-14

## (undated) DEVICE — TUBING SUCTION 6"X3/16" N56A

## (undated) DEVICE — PACK MINOR PROCEDURE CUSTOM

## (undated) DEVICE — DRAPE IOBAN INCISE 23X17" 6650EZ

## (undated) DEVICE — SYR EAR 3OZ BULB IRR STRL DISP BLU PVC 4173

## (undated) DEVICE — GLOVE BIOGEL PI MICRO INDICATOR UNDERGLOVE SZ 7.0 48970

## (undated) DEVICE — DRAPE SHEET HALF 40X60" 9358

## (undated) DEVICE — SU ETHIBOND 3-0 RB-1 30" X872H

## (undated) RX ORDER — HYDROMORPHONE HCL IN WATER/PF 6 MG/30 ML
PATIENT CONTROLLED ANALGESIA SYRINGE INTRAVENOUS
Status: DISPENSED
Start: 2023-05-03

## (undated) RX ORDER — PROPOFOL 10 MG/ML
INJECTION, EMULSION INTRAVENOUS
Status: DISPENSED
Start: 2023-05-03

## (undated) RX ORDER — HYDROMORPHONE HYDROCHLORIDE 1 MG/ML
INJECTION, SOLUTION INTRAMUSCULAR; INTRAVENOUS; SUBCUTANEOUS
Status: DISPENSED
Start: 2023-06-01

## (undated) RX ORDER — IPRATROPIUM BROMIDE AND ALBUTEROL SULFATE 2.5; .5 MG/3ML; MG/3ML
SOLUTION RESPIRATORY (INHALATION)
Status: DISPENSED
Start: 2025-05-29

## (undated) RX ORDER — HYDRALAZINE HYDROCHLORIDE 20 MG/ML
INJECTION INTRAMUSCULAR; INTRAVENOUS
Status: DISPENSED
Start: 2025-05-29

## (undated) RX ORDER — HYDROMORPHONE HYDROCHLORIDE 1 MG/ML
INJECTION, SOLUTION INTRAMUSCULAR; INTRAVENOUS; SUBCUTANEOUS
Status: DISPENSED
Start: 2023-05-03

## (undated) RX ORDER — LABETALOL HYDROCHLORIDE 5 MG/ML
INJECTION, SOLUTION INTRAVENOUS
Status: DISPENSED
Start: 2023-05-03

## (undated) RX ORDER — HEPARIN SODIUM 1000 [USP'U]/ML
INJECTION, SOLUTION INTRAVENOUS; SUBCUTANEOUS
Status: DISPENSED
Start: 2023-05-03

## (undated) RX ORDER — FENTANYL CITRATE 50 UG/ML
INJECTION, SOLUTION INTRAMUSCULAR; INTRAVENOUS
Status: DISPENSED
Start: 2023-05-03

## (undated) RX ORDER — VASOPRESSIN 20 U/ML
INJECTION PARENTERAL
Status: DISPENSED
Start: 2023-07-14

## (undated) RX ORDER — ONDANSETRON 2 MG/ML
INJECTION INTRAMUSCULAR; INTRAVENOUS
Status: DISPENSED
Start: 2025-05-29

## (undated) RX ORDER — ACETAMINOPHEN 325 MG/1
TABLET ORAL
Status: DISPENSED
Start: 2025-05-29

## (undated) RX ORDER — DEXAMETHASONE SODIUM PHOSPHATE 10 MG/ML
INJECTION, SOLUTION INTRAMUSCULAR; INTRAVENOUS
Status: DISPENSED
Start: 2023-07-14

## (undated) RX ORDER — BUPIVACAINE HYDROCHLORIDE 5 MG/ML
INJECTION, SOLUTION EPIDURAL; INTRACAUDAL
Status: DISPENSED
Start: 2023-06-01

## (undated) RX ORDER — HYDROMORPHONE HYDROCHLORIDE 1 MG/ML
INJECTION, SOLUTION INTRAMUSCULAR; INTRAVENOUS; SUBCUTANEOUS
Status: DISPENSED
Start: 2025-05-29

## (undated) RX ORDER — FENTANYL CITRATE-0.9 % NACL/PF 10 MCG/ML
PLASTIC BAG, INJECTION (ML) INTRAVENOUS
Status: DISPENSED
Start: 2023-07-14

## (undated) RX ORDER — PROPOFOL 10 MG/ML
INJECTION, EMULSION INTRAVENOUS
Status: DISPENSED
Start: 2025-05-29

## (undated) RX ORDER — ACETAMINOPHEN 325 MG/1
TABLET ORAL
Status: DISPENSED
Start: 2023-05-05

## (undated) RX ORDER — BUPIVACAINE HYDROCHLORIDE 2.5 MG/ML
INJECTION, SOLUTION EPIDURAL; INFILTRATION; INTRACAUDAL
Status: DISPENSED
Start: 2023-05-03

## (undated) RX ORDER — CEFAZOLIN SODIUM 2 G/50ML
SOLUTION INTRAVENOUS
Status: DISPENSED
Start: 2025-05-29

## (undated) RX ORDER — FENTANYL CITRATE 50 UG/ML
INJECTION, SOLUTION INTRAMUSCULAR; INTRAVENOUS
Status: DISPENSED
Start: 2023-07-14

## (undated) RX ORDER — PROPOFOL 10 MG/ML
INJECTION, EMULSION INTRAVENOUS
Status: DISPENSED
Start: 2023-05-05

## (undated) RX ORDER — FENTANYL CITRATE 50 UG/ML
INJECTION, SOLUTION INTRAMUSCULAR; INTRAVENOUS
Status: DISPENSED
Start: 2025-05-29

## (undated) RX ORDER — FENTANYL CITRATE 50 UG/ML
INJECTION, SOLUTION INTRAMUSCULAR; INTRAVENOUS
Status: DISPENSED
Start: 2023-05-05

## (undated) RX ORDER — CEFAZOLIN SODIUM/WATER 2 G/20 ML
SYRINGE (ML) INTRAVENOUS
Status: DISPENSED
Start: 2023-05-05

## (undated) RX ORDER — VANCOMYCIN HYDROCHLORIDE 500 MG/10ML
INJECTION, POWDER, LYOPHILIZED, FOR SOLUTION INTRAVENOUS
Status: DISPENSED
Start: 2023-06-01

## (undated) RX ORDER — VANCOMYCIN HYDROCHLORIDE 1 G/20ML
INJECTION, POWDER, LYOPHILIZED, FOR SOLUTION INTRAVENOUS
Status: DISPENSED
Start: 2023-05-03

## (undated) RX ORDER — FENTANYL CITRATE-0.9 % NACL/PF 10 MCG/ML
PLASTIC BAG, INJECTION (ML) INTRAVENOUS
Status: DISPENSED
Start: 2023-05-05

## (undated) RX ORDER — FENTANYL CITRATE 50 UG/ML
INJECTION, SOLUTION INTRAMUSCULAR; INTRAVENOUS
Status: DISPENSED
Start: 2023-06-01

## (undated) RX ORDER — FENTANYL CITRATE-0.9 % NACL/PF 10 MCG/ML
PLASTIC BAG, INJECTION (ML) INTRAVENOUS
Status: DISPENSED
Start: 2023-05-03

## (undated) RX ORDER — TRANEXAMIC ACID 650 MG/1
TABLET ORAL
Status: DISPENSED
Start: 2023-05-05

## (undated) RX ORDER — DEXMEDETOMIDINE HYDROCHLORIDE 100 UG/ML
INJECTION, SOLUTION INTRAVENOUS
Status: DISPENSED
Start: 2023-07-14

## (undated) RX ORDER — FENTANYL CITRATE-0.9 % NACL/PF 10 MCG/ML
PLASTIC BAG, INJECTION (ML) INTRAVENOUS
Status: DISPENSED
Start: 2025-05-29

## (undated) RX ORDER — BUPIVACAINE HYDROCHLORIDE 2.5 MG/ML
INJECTION, SOLUTION EPIDURAL; INFILTRATION; INTRACAUDAL
Status: DISPENSED
Start: 2023-05-05

## (undated) RX ORDER — ONDANSETRON 2 MG/ML
INJECTION INTRAMUSCULAR; INTRAVENOUS
Status: DISPENSED
Start: 2023-07-14

## (undated) RX ORDER — LIDOCAINE HYDROCHLORIDE 10 MG/ML
INJECTION, SOLUTION EPIDURAL; INFILTRATION; INTRACAUDAL; PERINEURAL
Status: DISPENSED
Start: 2023-07-14

## (undated) RX ORDER — ALBUTEROL SULFATE 0.83 MG/ML
SOLUTION RESPIRATORY (INHALATION)
Status: DISPENSED
Start: 2023-05-05

## (undated) RX ORDER — ACETAMINOPHEN 325 MG/1
TABLET ORAL
Status: DISPENSED
Start: 2023-06-01

## (undated) RX ORDER — HYDROMORPHONE HYDROCHLORIDE 1 MG/ML
INJECTION, SOLUTION INTRAMUSCULAR; INTRAVENOUS; SUBCUTANEOUS
Status: DISPENSED
Start: 2023-05-05

## (undated) RX ORDER — VANCOMYCIN HYDROCHLORIDE 1 G/20ML
INJECTION, POWDER, LYOPHILIZED, FOR SOLUTION INTRAVENOUS
Status: DISPENSED
Start: 2023-05-05

## (undated) RX ORDER — PROPOFOL 10 MG/ML
INJECTION, EMULSION INTRAVENOUS
Status: DISPENSED
Start: 2023-07-14

## (undated) RX ORDER — DEXAMETHASONE SODIUM PHOSPHATE 4 MG/ML
INJECTION, SOLUTION INTRA-ARTICULAR; INTRALESIONAL; INTRAMUSCULAR; INTRAVENOUS; SOFT TISSUE
Status: DISPENSED
Start: 2025-05-29

## (undated) RX ORDER — CEFAZOLIN SODIUM/WATER 2 G/20 ML
SYRINGE (ML) INTRAVENOUS
Status: DISPENSED
Start: 2023-06-01

## (undated) RX ORDER — CEFAZOLIN SODIUM/WATER 2 G/20 ML
SYRINGE (ML) INTRAVENOUS
Status: DISPENSED
Start: 2023-05-03

## (undated) RX ORDER — SODIUM CHLORIDE, SODIUM LACTATE, POTASSIUM CHLORIDE, CALCIUM CHLORIDE 600; 310; 30; 20 MG/100ML; MG/100ML; MG/100ML; MG/100ML
INJECTION, SOLUTION INTRAVENOUS
Status: DISPENSED
Start: 2023-05-03

## (undated) RX ORDER — OXYCODONE HYDROCHLORIDE 5 MG/1
TABLET ORAL
Status: DISPENSED
Start: 2023-05-05

## (undated) RX ORDER — BUPIVACAINE HYDROCHLORIDE AND EPINEPHRINE 2.5; 5 MG/ML; UG/ML
INJECTION, SOLUTION EPIDURAL; INFILTRATION; INTRACAUDAL; PERINEURAL
Status: DISPENSED
Start: 2023-07-14

## (undated) RX ORDER — DEXAMETHASONE SODIUM PHOSPHATE 4 MG/ML
INJECTION, SOLUTION INTRA-ARTICULAR; INTRALESIONAL; INTRAMUSCULAR; INTRAVENOUS; SOFT TISSUE
Status: DISPENSED
Start: 2023-05-03

## (undated) RX ORDER — ONDANSETRON 2 MG/ML
INJECTION INTRAMUSCULAR; INTRAVENOUS
Status: DISPENSED
Start: 2023-05-03

## (undated) RX ORDER — HYDROMORPHONE HYDROCHLORIDE 1 MG/ML
INJECTION, SOLUTION INTRAMUSCULAR; INTRAVENOUS; SUBCUTANEOUS
Status: DISPENSED
Start: 2023-07-14

## (undated) RX ORDER — CEFAZOLIN SODIUM 1 G/3ML
INJECTION, POWDER, FOR SOLUTION INTRAMUSCULAR; INTRAVENOUS
Status: DISPENSED
Start: 2023-05-05